# Patient Record
Sex: MALE | Race: BLACK OR AFRICAN AMERICAN | Employment: OTHER | URBAN - METROPOLITAN AREA
[De-identification: names, ages, dates, MRNs, and addresses within clinical notes are randomized per-mention and may not be internally consistent; named-entity substitution may affect disease eponyms.]

---

## 2017-06-27 PROBLEM — I35.9 AORTIC VALVE DISEASE: Status: ACTIVE | Noted: 2017-06-27

## 2017-06-28 PROBLEM — E66.01 MORBID OBESITY DUE TO EXCESS CALORIES (HCC): Status: ACTIVE | Noted: 2017-06-28

## 2017-07-11 PROBLEM — R10.9 ABDOMINAL PAIN: Status: ACTIVE | Noted: 2017-07-11

## 2017-07-29 PROBLEM — E66.01 MORBID OBESITY DUE TO EXCESS CALORIES (HCC): Chronic | Status: ACTIVE | Noted: 2017-06-28

## 2017-09-26 PROBLEM — E03.9 ACQUIRED HYPOTHYROIDISM: Status: ACTIVE | Noted: 2017-09-26

## 2017-09-26 PROBLEM — G47.33 OSA (OBSTRUCTIVE SLEEP APNEA): Status: ACTIVE | Noted: 2017-09-26

## 2017-10-05 PROBLEM — I50.32 CHRONIC DIASTOLIC HEART FAILURE (HCC): Status: ACTIVE | Noted: 2017-10-05

## 2017-10-25 PROBLEM — R05.8 ACE-INHIBITOR COUGH: Status: ACTIVE | Noted: 2017-10-25

## 2017-10-25 PROBLEM — T46.4X5A ACE-INHIBITOR COUGH: Status: ACTIVE | Noted: 2017-10-25

## 2018-03-14 ENCOUNTER — HOSPITAL ENCOUNTER (OUTPATIENT)
Dept: CT IMAGING | Age: 58
Discharge: HOME | End: 2018-03-16
Payer: MEDICARE

## 2018-03-14 DIAGNOSIS — R05.9 COUGH: ICD-10-CM

## 2018-03-14 DIAGNOSIS — R04.2 HEMOPTYSIS: ICD-10-CM

## 2018-03-14 PROCEDURE — 71250 CT THORAX DX C-: CPT

## 2018-04-18 ENCOUNTER — HOSPITAL ENCOUNTER (OUTPATIENT)
Dept: SLEEP CENTER | Age: 58
Discharge: HOME | End: 2018-04-18
Payer: MEDICARE

## 2018-04-18 PROCEDURE — 95811 POLYSOM 6/>YRS CPAP 4/> PARM: CPT

## 2018-04-19 VITALS
WEIGHT: 282 LBS | OXYGEN SATURATION: 93 % | SYSTOLIC BLOOD PRESSURE: 178 MMHG | HEIGHT: 69 IN | DIASTOLIC BLOOD PRESSURE: 84 MMHG | HEART RATE: 75 BPM | BODY MASS INDEX: 41.77 KG/M2

## 2018-04-19 ASSESSMENT — SLEEP AND FATIGUE QUESTIONNAIRES
HOW LIKELY ARE YOU TO NOD OFF OR FALL ASLEEP WHILE SITTING AND TALKING TO SOMEONE: 2
HOW LIKELY ARE YOU TO NOD OFF OR FALL ASLEEP WHILE SITTING QUIETLY AFTER LUNCH WITHOUT ALCOHOL: 0
ESS TOTAL SCORE: 10
HOW LIKELY ARE YOU TO NOD OFF OR FALL ASLEEP IN A CAR, WHILE STOPPED FOR A FEW MINUTES IN TRAFFIC: 0
HOW LIKELY ARE YOU TO NOD OFF OR FALL ASLEEP WHILE LYING DOWN TO REST IN THE AFTERNOON WHEN CIRCUMSTANCES PERMIT: 3
HOW LIKELY ARE YOU TO NOD OFF OR FALL ASLEEP WHEN YOU ARE A PASSENGER IN A CAR FOR AN HOUR WITHOUT A BREAK: 0
HOW LIKELY ARE YOU TO NOD OFF OR FALL ASLEEP WHILE WATCHING TV: 3
HOW LIKELY ARE YOU TO NOD OFF OR FALL ASLEEP WHILE SITTING INACTIVE IN A PUBLIC PLACE: 1
HOW LIKELY ARE YOU TO NOD OFF OR FALL ASLEEP WHILE SITTING AND READING: 1

## 2018-05-18 ENCOUNTER — HOSPITAL ENCOUNTER (OUTPATIENT)
Dept: GENERAL RADIOLOGY | Age: 58
Discharge: HOME | DRG: 194 | End: 2018-05-20
Payer: MEDICARE

## 2018-05-18 ENCOUNTER — HOSPITAL ENCOUNTER (OUTPATIENT)
Age: 58
Discharge: HOME | DRG: 194 | End: 2018-05-20
Payer: MEDICARE

## 2018-05-18 DIAGNOSIS — R06.02 SOB (SHORTNESS OF BREATH): ICD-10-CM

## 2018-05-18 PROCEDURE — 71046 X-RAY EXAM CHEST 2 VIEWS: CPT

## 2018-05-19 ENCOUNTER — HOSPITAL ENCOUNTER (INPATIENT)
Age: 58
LOS: 2 days | Discharge: HOME | DRG: 194 | End: 2018-05-21
Attending: INTERNAL MEDICINE | Admitting: INTERNAL MEDICINE
Payer: MEDICARE

## 2018-05-19 PROBLEM — J18.9 PNEUMONIA: Status: ACTIVE | Noted: 2018-05-19

## 2018-05-19 PROCEDURE — 94664 DEMO&/EVAL PT USE INHALER: CPT

## 2018-05-19 PROCEDURE — 1200000000 HC SEMI PRIVATE

## 2018-05-19 PROCEDURE — 6360000002 HC RX W HCPCS: Performed by: INTERNAL MEDICINE

## 2018-05-19 PROCEDURE — 6370000000 HC RX 637 (ALT 250 FOR IP): Performed by: INTERNAL MEDICINE

## 2018-05-19 PROCEDURE — 87040 BLOOD CULTURE FOR BACTERIA: CPT

## 2018-05-19 PROCEDURE — 94640 AIRWAY INHALATION TREATMENT: CPT

## 2018-05-19 PROCEDURE — 2580000003 HC RX 258: Performed by: INTERNAL MEDICINE

## 2018-05-19 PROCEDURE — 36415 COLL VENOUS BLD VENIPUNCTURE: CPT

## 2018-05-19 RX ORDER — LEVOTHYROXINE SODIUM 0.05 MG/1
50 TABLET ORAL DAILY
Status: DISCONTINUED | OUTPATIENT
Start: 2018-05-20 | End: 2018-05-21 | Stop reason: HOSPADM

## 2018-05-19 RX ORDER — AMLODIPINE BESYLATE 5 MG/1
5 TABLET ORAL 2 TIMES DAILY
Status: DISCONTINUED | OUTPATIENT
Start: 2018-05-19 | End: 2018-05-21 | Stop reason: HOSPADM

## 2018-05-19 RX ORDER — HYDROCODONE BITARTRATE AND ACETAMINOPHEN 5; 325 MG/1; MG/1
1 TABLET ORAL EVERY 6 HOURS PRN
Status: DISCONTINUED | OUTPATIENT
Start: 2018-05-19 | End: 2018-05-19 | Stop reason: ALTCHOICE

## 2018-05-19 RX ORDER — LOSARTAN POTASSIUM 50 MG/1
100 TABLET ORAL DAILY
Status: DISCONTINUED | OUTPATIENT
Start: 2018-05-19 | End: 2018-05-21 | Stop reason: HOSPADM

## 2018-05-19 RX ORDER — SODIUM CHLORIDE 0.9 % (FLUSH) 0.9 %
10 SYRINGE (ML) INJECTION PRN
Status: DISCONTINUED | OUTPATIENT
Start: 2018-05-19 | End: 2018-05-21 | Stop reason: HOSPADM

## 2018-05-19 RX ORDER — IPRATROPIUM BROMIDE AND ALBUTEROL SULFATE 2.5; .5 MG/3ML; MG/3ML
1 SOLUTION RESPIRATORY (INHALATION)
Status: DISCONTINUED | OUTPATIENT
Start: 2018-05-19 | End: 2018-05-21 | Stop reason: HOSPADM

## 2018-05-19 RX ORDER — PANTOPRAZOLE SODIUM 40 MG/1
40 TABLET, DELAYED RELEASE ORAL
Status: DISCONTINUED | OUTPATIENT
Start: 2018-05-20 | End: 2018-05-21 | Stop reason: HOSPADM

## 2018-05-19 RX ORDER — SODIUM CHLORIDE 0.9 % (FLUSH) 0.9 %
10 SYRINGE (ML) INJECTION EVERY 12 HOURS SCHEDULED
Status: DISCONTINUED | OUTPATIENT
Start: 2018-05-19 | End: 2018-05-21 | Stop reason: HOSPADM

## 2018-05-19 RX ORDER — METFORMIN HYDROCHLORIDE 500 MG/1
500 TABLET, EXTENDED RELEASE ORAL
Status: DISCONTINUED | OUTPATIENT
Start: 2018-05-20 | End: 2018-05-21 | Stop reason: HOSPADM

## 2018-05-19 RX ORDER — OXYCODONE HYDROCHLORIDE AND ACETAMINOPHEN 5; 325 MG/1; MG/1
1 TABLET ORAL EVERY 4 HOURS PRN
Status: DISCONTINUED | OUTPATIENT
Start: 2018-05-19 | End: 2018-05-21 | Stop reason: HOSPADM

## 2018-05-19 RX ORDER — ATORVASTATIN CALCIUM 40 MG/1
40 TABLET, FILM COATED ORAL DAILY
Status: DISCONTINUED | OUTPATIENT
Start: 2018-05-19 | End: 2018-05-21 | Stop reason: HOSPADM

## 2018-05-19 RX ORDER — ALPRAZOLAM 0.25 MG/1
0.25 TABLET ORAL 3 TIMES DAILY PRN
Status: DISCONTINUED | OUTPATIENT
Start: 2018-05-19 | End: 2018-05-21 | Stop reason: HOSPADM

## 2018-05-19 RX ORDER — ONDANSETRON 2 MG/ML
4 INJECTION INTRAMUSCULAR; INTRAVENOUS EVERY 6 HOURS PRN
Status: DISCONTINUED | OUTPATIENT
Start: 2018-05-19 | End: 2018-05-21 | Stop reason: HOSPADM

## 2018-05-19 RX ORDER — CARVEDILOL 25 MG/1
25 TABLET ORAL 2 TIMES DAILY
Status: DISCONTINUED | OUTPATIENT
Start: 2018-05-19 | End: 2018-05-21 | Stop reason: HOSPADM

## 2018-05-19 RX ADMIN — SODIUM CHLORIDE 3 G: 900 INJECTION INTRAVENOUS at 23:30

## 2018-05-19 RX ADMIN — AMLODIPINE BESYLATE 5 MG: 5 TABLET ORAL at 13:42

## 2018-05-19 RX ADMIN — OXYCODONE HYDROCHLORIDE AND ACETAMINOPHEN 1 TABLET: 5; 325 TABLET ORAL at 23:30

## 2018-05-19 RX ADMIN — Medication 10 ML: at 18:41

## 2018-05-19 RX ADMIN — SODIUM CHLORIDE 3 G: 900 INJECTION INTRAVENOUS at 13:19

## 2018-05-19 RX ADMIN — HYDROCODONE BITARTRATE AND ACETAMINOPHEN 1 TABLET: 5; 325 TABLET ORAL at 20:12

## 2018-05-19 RX ADMIN — SODIUM CHLORIDE 3 G: 900 INJECTION INTRAVENOUS at 18:40

## 2018-05-19 RX ADMIN — IPRATROPIUM BROMIDE AND ALBUTEROL SULFATE 1 AMPULE: 2.5; .5 SOLUTION RESPIRATORY (INHALATION) at 10:46

## 2018-05-19 RX ADMIN — IPRATROPIUM BROMIDE AND ALBUTEROL SULFATE 1 AMPULE: 2.5; .5 SOLUTION RESPIRATORY (INHALATION) at 22:00

## 2018-05-19 RX ADMIN — Medication 10 ML: at 13:23

## 2018-05-19 RX ADMIN — HYDROCODONE BITARTRATE AND ACETAMINOPHEN 1 TABLET: 5; 325 TABLET ORAL at 14:00

## 2018-05-19 RX ADMIN — IPRATROPIUM BROMIDE AND ALBUTEROL SULFATE 1 AMPULE: 2.5; .5 SOLUTION RESPIRATORY (INHALATION) at 16:00

## 2018-05-19 RX ADMIN — Medication 10 ML: at 20:13

## 2018-05-19 ASSESSMENT — PAIN DESCRIPTION - PAIN TYPE
TYPE: CHRONIC PAIN

## 2018-05-19 ASSESSMENT — PAIN DESCRIPTION - FREQUENCY
FREQUENCY: CONTINUOUS

## 2018-05-19 ASSESSMENT — PAIN DESCRIPTION - ORIENTATION
ORIENTATION: RIGHT;LEFT

## 2018-05-19 ASSESSMENT — PAIN DESCRIPTION - PROGRESSION
CLINICAL_PROGRESSION: GRADUALLY WORSENING

## 2018-05-19 ASSESSMENT — PAIN DESCRIPTION - LOCATION
LOCATION: GENERALIZED

## 2018-05-19 ASSESSMENT — PAIN SCALES - GENERAL
PAINLEVEL_OUTOF10: 10
PAINLEVEL_OUTOF10: 9

## 2018-05-19 ASSESSMENT — PAIN DESCRIPTION - DESCRIPTORS
DESCRIPTORS: ACHING;CONSTANT;DISCOMFORT
DESCRIPTORS: ACHING;CONSTANT;DISCOMFORT
DESCRIPTORS: ACHING;CONSTANT;DISCOMFORT;TENDER
DESCRIPTORS: ACHING;CONSTANT;DISCOMFORT;TENDER

## 2018-05-19 ASSESSMENT — PAIN DESCRIPTION - ONSET
ONSET: ON-GOING
ONSET: ON-GOING

## 2018-05-20 LAB
ALBUMIN SERPL-MCNC: 3.9 G/DL (ref 3.5–5.2)
ALP BLD-CCNC: 59 U/L (ref 40–129)
ALT SERPL-CCNC: 23 U/L (ref 0–40)
ANION GAP SERPL CALCULATED.3IONS-SCNC: 12 MMOL/L (ref 7–16)
AST SERPL-CCNC: 21 U/L (ref 0–39)
BASOPHILS ABSOLUTE: 0.02 E9/L (ref 0–0.2)
BASOPHILS RELATIVE PERCENT: 0.3 % (ref 0–2)
BILIRUB SERPL-MCNC: 0.3 MG/DL (ref 0–1.2)
BUN BLDV-MCNC: 12 MG/DL (ref 6–20)
CALCIUM SERPL-MCNC: 9 MG/DL (ref 8.6–10.2)
CHLORIDE BLD-SCNC: 102 MMOL/L (ref 98–107)
CO2: 27 MMOL/L (ref 22–29)
CREAT SERPL-MCNC: 1 MG/DL (ref 0.7–1.2)
EOSINOPHILS ABSOLUTE: 0.11 E9/L (ref 0.05–0.5)
EOSINOPHILS RELATIVE PERCENT: 1.8 % (ref 0–6)
GFR AFRICAN AMERICAN: >60
GFR NON-AFRICAN AMERICAN: >60 ML/MIN/1.73
GLUCOSE BLD-MCNC: 118 MG/DL (ref 74–109)
HCT VFR BLD CALC: 32.8 % (ref 37–54)
HEMOGLOBIN: 11 G/DL (ref 12.5–16.5)
IMMATURE GRANULOCYTES #: 0.01 E9/L
IMMATURE GRANULOCYTES %: 0.2 % (ref 0–5)
LYMPHOCYTES ABSOLUTE: 3.16 E9/L (ref 1.5–4)
LYMPHOCYTES RELATIVE PERCENT: 51.2 % (ref 20–42)
MCH RBC QN AUTO: 27.1 PG (ref 26–35)
MCHC RBC AUTO-ENTMCNC: 33.5 % (ref 32–34.5)
MCV RBC AUTO: 80.8 FL (ref 80–99.9)
MONOCYTES ABSOLUTE: 0.58 E9/L (ref 0.1–0.95)
MONOCYTES RELATIVE PERCENT: 9.4 % (ref 2–12)
NEUTROPHILS ABSOLUTE: 2.29 E9/L (ref 1.8–7.3)
NEUTROPHILS RELATIVE PERCENT: 37.1 % (ref 43–80)
PDW BLD-RTO: 17.3 FL (ref 11.5–15)
PLATELET # BLD: 172 E9/L (ref 130–450)
PMV BLD AUTO: 9.7 FL (ref 7–12)
POTASSIUM REFLEX MAGNESIUM: 4.1 MMOL/L (ref 3.5–5)
PROCALCITONIN: 0.03 NG/ML (ref 0–0.08)
RBC # BLD: 4.06 E12/L (ref 3.8–5.8)
SODIUM BLD-SCNC: 141 MMOL/L (ref 132–146)
TOTAL PROTEIN: 7 G/DL (ref 6.4–8.3)
WBC # BLD: 6.2 E9/L (ref 4.5–11.5)

## 2018-05-20 PROCEDURE — 6360000002 HC RX W HCPCS: Performed by: INTERNAL MEDICINE

## 2018-05-20 PROCEDURE — 94760 N-INVAS EAR/PLS OXIMETRY 1: CPT

## 2018-05-20 PROCEDURE — 36415 COLL VENOUS BLD VENIPUNCTURE: CPT

## 2018-05-20 PROCEDURE — 84145 PROCALCITONIN (PCT): CPT

## 2018-05-20 PROCEDURE — 6370000000 HC RX 637 (ALT 250 FOR IP): Performed by: INTERNAL MEDICINE

## 2018-05-20 PROCEDURE — 85025 COMPLETE CBC W/AUTO DIFF WBC: CPT

## 2018-05-20 PROCEDURE — 1200000000 HC SEMI PRIVATE

## 2018-05-20 PROCEDURE — 94640 AIRWAY INHALATION TREATMENT: CPT

## 2018-05-20 PROCEDURE — 2580000003 HC RX 258: Performed by: INTERNAL MEDICINE

## 2018-05-20 PROCEDURE — 80053 COMPREHEN METABOLIC PANEL: CPT

## 2018-05-20 RX ORDER — DIPHENHYDRAMINE HCL 25 MG
25 TABLET ORAL EVERY 6 HOURS PRN
Status: DISCONTINUED | OUTPATIENT
Start: 2018-05-20 | End: 2018-05-21 | Stop reason: HOSPADM

## 2018-05-20 RX ADMIN — SODIUM CHLORIDE 3 G: 900 INJECTION INTRAVENOUS at 11:42

## 2018-05-20 RX ADMIN — LEVOTHYROXINE SODIUM 50 MCG: 50 TABLET ORAL at 06:22

## 2018-05-20 RX ADMIN — AMLODIPINE BESYLATE 5 MG: 5 TABLET ORAL at 09:34

## 2018-05-20 RX ADMIN — Medication 10 ML: at 09:38

## 2018-05-20 RX ADMIN — METFORMIN HYDROCHLORIDE 500 MG: 500 TABLET, EXTENDED RELEASE ORAL at 09:34

## 2018-05-20 RX ADMIN — OXYCODONE HYDROCHLORIDE AND ACETAMINOPHEN 1 TABLET: 5; 325 TABLET ORAL at 19:21

## 2018-05-20 RX ADMIN — PANTOPRAZOLE SODIUM 40 MG: 40 TABLET, DELAYED RELEASE ORAL at 06:22

## 2018-05-20 RX ADMIN — CARVEDILOL 25 MG: 25 TABLET, FILM COATED ORAL at 09:34

## 2018-05-20 RX ADMIN — OXYCODONE HYDROCHLORIDE AND ACETAMINOPHEN 1 TABLET: 5; 325 TABLET ORAL at 10:22

## 2018-05-20 RX ADMIN — IPRATROPIUM BROMIDE AND ALBUTEROL SULFATE 1 AMPULE: 2.5; .5 SOLUTION RESPIRATORY (INHALATION) at 17:44

## 2018-05-20 RX ADMIN — Medication 10 ML: at 18:12

## 2018-05-20 RX ADMIN — AMLODIPINE BESYLATE 5 MG: 5 TABLET ORAL at 22:19

## 2018-05-20 RX ADMIN — Medication 10 ML: at 22:21

## 2018-05-20 RX ADMIN — ATORVASTATIN CALCIUM 40 MG: 40 TABLET, FILM COATED ORAL at 09:34

## 2018-05-20 RX ADMIN — IPRATROPIUM BROMIDE AND ALBUTEROL SULFATE 1 AMPULE: 2.5; .5 SOLUTION RESPIRATORY (INHALATION) at 13:32

## 2018-05-20 RX ADMIN — SODIUM CHLORIDE 3 G: 900 INJECTION INTRAVENOUS at 06:21

## 2018-05-20 RX ADMIN — OXYCODONE HYDROCHLORIDE AND ACETAMINOPHEN 1 TABLET: 5; 325 TABLET ORAL at 06:22

## 2018-05-20 RX ADMIN — ONDANSETRON 4 MG: 2 INJECTION INTRAMUSCULAR; INTRAVENOUS at 11:42

## 2018-05-20 RX ADMIN — Medication 10 ML: at 06:21

## 2018-05-20 RX ADMIN — OXYCODONE HYDROCHLORIDE AND ACETAMINOPHEN 1 TABLET: 5; 325 TABLET ORAL at 14:52

## 2018-05-20 RX ADMIN — SODIUM CHLORIDE 3 G: 900 INJECTION INTRAVENOUS at 18:11

## 2018-05-20 RX ADMIN — CARVEDILOL 25 MG: 25 TABLET, FILM COATED ORAL at 18:17

## 2018-05-20 ASSESSMENT — PAIN SCALES - GENERAL
PAINLEVEL_OUTOF10: 4
PAINLEVEL_OUTOF10: 7
PAINLEVEL_OUTOF10: 0
PAINLEVEL_OUTOF10: 7
PAINLEVEL_OUTOF10: 7
PAINLEVEL_OUTOF10: 8

## 2018-05-20 ASSESSMENT — PAIN DESCRIPTION - ORIENTATION
ORIENTATION: RIGHT;LEFT

## 2018-05-20 ASSESSMENT — PAIN DESCRIPTION - FREQUENCY
FREQUENCY: CONTINUOUS

## 2018-05-20 ASSESSMENT — PAIN DESCRIPTION - PROGRESSION
CLINICAL_PROGRESSION: GRADUALLY IMPROVING
CLINICAL_PROGRESSION: GRADUALLY WORSENING

## 2018-05-20 ASSESSMENT — PAIN DESCRIPTION - DESCRIPTORS
DESCRIPTORS: ACHING;CONSTANT;DISCOMFORT
DESCRIPTORS: ACHING;CONSTANT;DISCOMFORT
DESCRIPTORS: ACHING;CONSTANT;DISCOMFORT;TENDER
DESCRIPTORS: ACHING;CONSTANT;DISCOMFORT;TENDER
DESCRIPTORS: ACHING;SORE;DISCOMFORT

## 2018-05-20 ASSESSMENT — PAIN DESCRIPTION - LOCATION
LOCATION: GENERALIZED

## 2018-05-20 ASSESSMENT — PAIN DESCRIPTION - PAIN TYPE
TYPE: CHRONIC PAIN

## 2018-05-20 ASSESSMENT — PAIN DESCRIPTION - ONSET
ONSET: ON-GOING

## 2018-05-21 VITALS
HEART RATE: 73 BPM | HEIGHT: 68 IN | SYSTOLIC BLOOD PRESSURE: 139 MMHG | WEIGHT: 300 LBS | RESPIRATION RATE: 16 BRPM | TEMPERATURE: 97.7 F | DIASTOLIC BLOOD PRESSURE: 56 MMHG | BODY MASS INDEX: 45.47 KG/M2 | OXYGEN SATURATION: 94 %

## 2018-05-21 PROCEDURE — 6370000000 HC RX 637 (ALT 250 FOR IP): Performed by: INTERNAL MEDICINE

## 2018-05-21 PROCEDURE — 6360000002 HC RX W HCPCS: Performed by: INTERNAL MEDICINE

## 2018-05-21 PROCEDURE — 2580000003 HC RX 258: Performed by: INTERNAL MEDICINE

## 2018-05-21 RX ADMIN — Medication 10 ML: at 06:23

## 2018-05-21 RX ADMIN — SODIUM CHLORIDE 3 G: 900 INJECTION INTRAVENOUS at 00:10

## 2018-05-21 RX ADMIN — LEVOTHYROXINE SODIUM 50 MCG: 50 TABLET ORAL at 06:23

## 2018-05-21 RX ADMIN — ATORVASTATIN CALCIUM 40 MG: 40 TABLET, FILM COATED ORAL at 09:39

## 2018-05-21 RX ADMIN — OXYCODONE HYDROCHLORIDE AND ACETAMINOPHEN 1 TABLET: 5; 325 TABLET ORAL at 00:10

## 2018-05-21 RX ADMIN — LOSARTAN POTASSIUM 50 MG: 50 TABLET, FILM COATED ORAL at 09:38

## 2018-05-21 RX ADMIN — METFORMIN HYDROCHLORIDE 500 MG: 500 TABLET, EXTENDED RELEASE ORAL at 09:40

## 2018-05-21 RX ADMIN — PANTOPRAZOLE SODIUM 40 MG: 40 TABLET, DELAYED RELEASE ORAL at 06:23

## 2018-05-21 RX ADMIN — AMLODIPINE BESYLATE 5 MG: 5 TABLET ORAL at 09:40

## 2018-05-21 RX ADMIN — Medication 10 ML: at 00:10

## 2018-05-21 RX ADMIN — SODIUM CHLORIDE 3 G: 900 INJECTION INTRAVENOUS at 05:35

## 2018-05-21 RX ADMIN — CARVEDILOL 25 MG: 25 TABLET, FILM COATED ORAL at 06:23

## 2018-05-21 RX ADMIN — OXYCODONE HYDROCHLORIDE AND ACETAMINOPHEN 1 TABLET: 5; 325 TABLET ORAL at 06:23

## 2018-05-21 ASSESSMENT — PAIN SCALES - GENERAL
PAINLEVEL_OUTOF10: 5
PAINLEVEL_OUTOF10: 0
PAINLEVEL_OUTOF10: 8
PAINLEVEL_OUTOF10: 3

## 2018-05-21 ASSESSMENT — PAIN DESCRIPTION - LOCATION: LOCATION: GENERALIZED

## 2018-05-21 ASSESSMENT — PAIN DESCRIPTION - FREQUENCY: FREQUENCY: CONTINUOUS

## 2018-05-21 ASSESSMENT — PAIN DESCRIPTION - PAIN TYPE: TYPE: CHRONIC PAIN

## 2018-05-21 ASSESSMENT — PAIN DESCRIPTION - DESCRIPTORS: DESCRIPTORS: ACHING;CONSTANT;SORE

## 2018-05-24 LAB
BLOOD CULTURE, ROUTINE: NORMAL
CULTURE, BLOOD 2: NORMAL

## 2018-06-04 PROBLEM — M10.9 ACUTE GOUT OF RIGHT KNEE: Status: ACTIVE | Noted: 2018-06-04

## 2018-07-21 ENCOUNTER — APPOINTMENT (OUTPATIENT)
Dept: GENERAL RADIOLOGY | Age: 58
End: 2018-07-21
Payer: MEDICARE

## 2018-07-21 ENCOUNTER — HOSPITAL ENCOUNTER (OUTPATIENT)
Age: 58
Setting detail: OBSERVATION
Discharge: HOME | End: 2018-07-22
Attending: EMERGENCY MEDICINE | Admitting: INTERNAL MEDICINE
Payer: MEDICARE

## 2018-07-21 DIAGNOSIS — R07.9 CHEST PAIN, UNSPECIFIED TYPE: Primary | ICD-10-CM

## 2018-07-21 LAB
ALBUMIN SERPL-MCNC: 4.6 G/DL (ref 3.5–5.2)
ALP BLD-CCNC: 79 U/L (ref 40–129)
ALT SERPL-CCNC: 14 U/L (ref 0–40)
ANION GAP SERPL CALCULATED.3IONS-SCNC: 16 MMOL/L (ref 7–16)
AST SERPL-CCNC: 20 U/L (ref 0–39)
BILIRUB SERPL-MCNC: 0.5 MG/DL (ref 0–1.2)
BUN BLDV-MCNC: 18 MG/DL (ref 6–20)
CALCIUM SERPL-MCNC: 9.5 MG/DL (ref 8.6–10.2)
CHLORIDE BLD-SCNC: 101 MMOL/L (ref 98–107)
CO2: 25 MMOL/L (ref 22–29)
CREAT SERPL-MCNC: 1.1 MG/DL (ref 0.7–1.2)
D DIMER: 202 NG/ML DDU
EKG ATRIAL RATE: 85 BPM
EKG P AXIS: 67 DEGREES
EKG P-R INTERVAL: 154 MS
EKG Q-T INTERVAL: 358 MS
EKG QRS DURATION: 90 MS
EKG QTC CALCULATION (BAZETT): 426 MS
EKG R AXIS: -44 DEGREES
EKG T AXIS: 53 DEGREES
EKG VENTRICULAR RATE: 85 BPM
GFR AFRICAN AMERICAN: >60
GFR NON-AFRICAN AMERICAN: >60 ML/MIN/1.73
GLUCOSE BLD-MCNC: 113 MG/DL (ref 74–109)
HCT VFR BLD CALC: 36.2 % (ref 37–54)
HEMOGLOBIN: 12.4 G/DL (ref 12.5–16.5)
INR BLD: 0.9
MCH RBC QN AUTO: 25.7 PG (ref 26–35)
MCHC RBC AUTO-ENTMCNC: 34.3 % (ref 32–34.5)
MCV RBC AUTO: 75.1 FL (ref 80–99.9)
PDW BLD-RTO: 18.4 FL (ref 11.5–15)
PLATELET # BLD: 214 E9/L (ref 130–450)
PMV BLD AUTO: 8.9 FL (ref 7–12)
POTASSIUM SERPL-SCNC: 4.4 MMOL/L (ref 3.5–5)
PRO-BNP: 14 PG/ML (ref 0–125)
PROTHROMBIN TIME: 10.9 SEC (ref 9.3–12.4)
RBC # BLD: 4.82 E12/L (ref 3.8–5.8)
SODIUM BLD-SCNC: 142 MMOL/L (ref 132–146)
STREP GRP A PCR: NEGATIVE
TOTAL PROTEIN: 8.3 G/DL (ref 6.4–8.3)
TROPONIN: <0.01 NG/ML (ref 0–0.03)
WBC # BLD: 8.3 E9/L (ref 4.5–11.5)

## 2018-07-21 PROCEDURE — G0378 HOSPITAL OBSERVATION PER HR: HCPCS

## 2018-07-21 PROCEDURE — 94761 N-INVAS EAR/PLS OXIMETRY MLT: CPT

## 2018-07-21 PROCEDURE — 80307 DRUG TEST PRSMV CHEM ANLYZR: CPT

## 2018-07-21 PROCEDURE — 6370000000 HC RX 637 (ALT 250 FOR IP): Performed by: EMERGENCY MEDICINE

## 2018-07-21 PROCEDURE — 36415 COLL VENOUS BLD VENIPUNCTURE: CPT

## 2018-07-21 PROCEDURE — 87880 STREP A ASSAY W/OPTIC: CPT

## 2018-07-21 PROCEDURE — 83880 ASSAY OF NATRIURETIC PEPTIDE: CPT

## 2018-07-21 PROCEDURE — 85610 PROTHROMBIN TIME: CPT

## 2018-07-21 PROCEDURE — 71045 X-RAY EXAM CHEST 1 VIEW: CPT

## 2018-07-21 PROCEDURE — 99285 EMERGENCY DEPT VISIT HI MDM: CPT

## 2018-07-21 PROCEDURE — 85027 COMPLETE CBC AUTOMATED: CPT

## 2018-07-21 PROCEDURE — 80053 COMPREHEN METABOLIC PANEL: CPT

## 2018-07-21 PROCEDURE — 85378 FIBRIN DEGRADE SEMIQUANT: CPT

## 2018-07-21 PROCEDURE — 84484 ASSAY OF TROPONIN QUANT: CPT

## 2018-07-21 PROCEDURE — 6370000000 HC RX 637 (ALT 250 FOR IP): Performed by: INTERNAL MEDICINE

## 2018-07-21 PROCEDURE — 93005 ELECTROCARDIOGRAM TRACING: CPT | Performed by: EMERGENCY MEDICINE

## 2018-07-21 RX ORDER — LOSARTAN POTASSIUM 50 MG/1
100 TABLET ORAL DAILY
Status: DISCONTINUED | OUTPATIENT
Start: 2018-07-22 | End: 2018-07-22 | Stop reason: HOSPADM

## 2018-07-21 RX ORDER — ATORVASTATIN CALCIUM 40 MG/1
40 TABLET, FILM COATED ORAL NIGHTLY
Status: DISCONTINUED | OUTPATIENT
Start: 2018-07-21 | End: 2018-07-22 | Stop reason: HOSPADM

## 2018-07-21 RX ORDER — SODIUM CHLORIDE 0.9 % (FLUSH) 0.9 %
10 SYRINGE (ML) INJECTION PRN
Status: DISCONTINUED | OUTPATIENT
Start: 2018-07-21 | End: 2018-07-21 | Stop reason: SDUPTHER

## 2018-07-21 RX ORDER — ACETAMINOPHEN 325 MG/1
650 TABLET ORAL EVERY 4 HOURS PRN
Status: DISCONTINUED | OUTPATIENT
Start: 2018-07-21 | End: 2018-07-22 | Stop reason: HOSPADM

## 2018-07-21 RX ORDER — SODIUM CHLORIDE 0.9 % (FLUSH) 0.9 %
10 SYRINGE (ML) INJECTION EVERY 12 HOURS SCHEDULED
Status: DISCONTINUED | OUTPATIENT
Start: 2018-07-21 | End: 2018-07-22 | Stop reason: HOSPADM

## 2018-07-21 RX ORDER — LEVOTHYROXINE SODIUM 0.05 MG/1
50 TABLET ORAL DAILY
Status: DISCONTINUED | OUTPATIENT
Start: 2018-07-22 | End: 2018-07-22 | Stop reason: HOSPADM

## 2018-07-21 RX ORDER — SODIUM CHLORIDE 0.9 % (FLUSH) 0.9 %
10 SYRINGE (ML) INJECTION EVERY 12 HOURS SCHEDULED
Status: DISCONTINUED | OUTPATIENT
Start: 2018-07-21 | End: 2018-07-21 | Stop reason: SDUPTHER

## 2018-07-21 RX ORDER — PANTOPRAZOLE SODIUM 40 MG/1
40 TABLET, DELAYED RELEASE ORAL
Status: DISCONTINUED | OUTPATIENT
Start: 2018-07-22 | End: 2018-07-22 | Stop reason: HOSPADM

## 2018-07-21 RX ORDER — HYDRALAZINE HYDROCHLORIDE 25 MG/1
25 TABLET, FILM COATED ORAL EVERY 8 HOURS SCHEDULED
Status: DISCONTINUED | OUTPATIENT
Start: 2018-07-21 | End: 2018-07-22 | Stop reason: HOSPADM

## 2018-07-21 RX ORDER — LABETALOL HYDROCHLORIDE 5 MG/ML
10 INJECTION, SOLUTION INTRAVENOUS EVERY 4 HOURS PRN
Status: DISCONTINUED | OUTPATIENT
Start: 2018-07-21 | End: 2018-07-22 | Stop reason: HOSPADM

## 2018-07-21 RX ORDER — FUROSEMIDE 20 MG/1
20 TABLET ORAL DAILY
Status: DISCONTINUED | OUTPATIENT
Start: 2018-07-22 | End: 2018-07-22 | Stop reason: HOSPADM

## 2018-07-21 RX ORDER — FUROSEMIDE 20 MG/1
20 TABLET ORAL DAILY
COMMUNITY
End: 2018-10-03

## 2018-07-21 RX ORDER — SODIUM CHLORIDE 0.9 % (FLUSH) 0.9 %
10 SYRINGE (ML) INJECTION PRN
Status: DISCONTINUED | OUTPATIENT
Start: 2018-07-21 | End: 2018-07-22 | Stop reason: HOSPADM

## 2018-07-21 RX ORDER — ASPIRIN 81 MG/1
324 TABLET, CHEWABLE ORAL ONCE
Status: COMPLETED | OUTPATIENT
Start: 2018-07-21 | End: 2018-07-21

## 2018-07-21 RX ORDER — AMLODIPINE BESYLATE 5 MG/1
5 TABLET ORAL 2 TIMES DAILY
Status: DISCONTINUED | OUTPATIENT
Start: 2018-07-21 | End: 2018-07-22 | Stop reason: HOSPADM

## 2018-07-21 RX ORDER — METFORMIN HYDROCHLORIDE 500 MG/1
500 TABLET, EXTENDED RELEASE ORAL
Status: DISCONTINUED | OUTPATIENT
Start: 2018-07-22 | End: 2018-07-22 | Stop reason: HOSPADM

## 2018-07-21 RX ORDER — CARVEDILOL 25 MG/1
25 TABLET ORAL 2 TIMES DAILY WITH MEALS
Status: DISCONTINUED | OUTPATIENT
Start: 2018-07-22 | End: 2018-07-22 | Stop reason: HOSPADM

## 2018-07-21 RX ADMIN — NITROGLYCERIN 0.5 INCH: 20 OINTMENT TOPICAL at 19:25

## 2018-07-21 RX ADMIN — ATORVASTATIN CALCIUM 40 MG: 40 TABLET, FILM COATED ORAL at 23:25

## 2018-07-21 RX ADMIN — AMLODIPINE BESYLATE 5 MG: 5 TABLET ORAL at 23:25

## 2018-07-21 RX ADMIN — HYDRALAZINE HYDROCHLORIDE 25 MG: 25 TABLET, FILM COATED ORAL at 23:25

## 2018-07-21 RX ADMIN — ASPIRIN 81 MG CHEWABLE TABLET 324 MG: 81 TABLET CHEWABLE at 19:25

## 2018-07-21 ASSESSMENT — PAIN DESCRIPTION - PROGRESSION: CLINICAL_PROGRESSION: GRADUALLY WORSENING

## 2018-07-21 ASSESSMENT — PAIN DESCRIPTION - PAIN TYPE: TYPE: ACUTE PAIN

## 2018-07-21 ASSESSMENT — PAIN DESCRIPTION - ORIENTATION: ORIENTATION: MID

## 2018-07-21 ASSESSMENT — PAIN DESCRIPTION - LOCATION: LOCATION: CHEST;THROAT

## 2018-07-21 ASSESSMENT — PAIN DESCRIPTION - DESCRIPTORS: DESCRIPTORS: ACHING

## 2018-07-21 ASSESSMENT — PAIN SCALES - GENERAL
PAINLEVEL_OUTOF10: 8
PAINLEVEL_OUTOF10: 0
PAINLEVEL_OUTOF10: 0

## 2018-07-21 ASSESSMENT — PAIN DESCRIPTION - FREQUENCY: FREQUENCY: CONTINUOUS

## 2018-07-21 NOTE — ED PROVIDER NOTES
Department of Emergency Medicine   ED  Provider Note  Admit Date/RoomTime: 7/21/2018  6:54 PM  ED Room: 21/21      History of Present Illness:  7/21/18, Time: 6:46 PM       Wing Carlin is a 62 y.o. male presenting to the ED for chest pain, beginning today. The complaint has been intermittent, moderate in severity, and worsened by nothing. Pt reports that he had a sudden onset of mid sternal chest pain that radiates to the left side of his chest beginning today around 4pm. States that when the pain is present it lasts for about 5 seconds. Reports that he has no pain now but their is some \"pressure\" in his chest. Denies any hx of blood clots. C/o sore throat and nausea that began today. Patient reports no shortness of breath, abdominal pain, vomiting or diarrhea. Patient reports no fever, chills, cough, congestion or any other further symptoms at this time. Review of Systems:   Pertinent positives and negatives are stated within HPI, all other systems reviewed and are negative.    --------------------------------------------- PAST HISTORY ---------------------------------------------  Past Medical History:  has a past medical history of Anxiety; Depression; Gouty arthritis; Hyperlipidemia; Hypertension; Lymphedema; Obesity; Obstructive sleep apnea; and Osteoarthritis. Past Surgical History:  has a past surgical history that includes Foot surgery (1990); External ear surgery (6/2/2009); ECHO Compl W Dop Color Flow (6/21/2013); Gastrostomy tube placement (08/10/2017); Tracheostomy tube placement (08/10/2017); and bronchoscopy (08/10/2017). Social History:  reports that he quit smoking about 13 years ago. His smoking use included Cigarettes. He has a 1.00 pack-year smoking history. He has never used smokeless tobacco. He reports that he drinks about 2.4 oz of alcohol per week . He reports that he does not use drugs.     Family History: family history includes Alzheimer's Disease in his mother; Heart records and past encounters were reviewed. ------------------------------ ED COURSE/MEDICAL DECISION MAKING----------------------  Medications   aspirin chewable tablet 324 mg (324 mg Oral Given 7/21/18 1925)   nitroglycerin (NITRO-BID) 2 % ointment 0.5 inch (0.5 inches Topical Given 7/21/18 1925)       Medical Decision Making:    Patient presents for chest pain. Patient assessed. Imaging, EKG, and Labs obtained and reviewed; results discussed with patient. This patient's ED course included: a personal history and physical examination and re-evaluation prior to disposition. This patient has been closely monitored during their ED course. Re-Evaluations:           Re-evaluation. Patient rechecked in made aware of findings. Patient currently having some mild chest pain but improved. Patient's blood pressure has come down. Consultations:             IM, spoke to Dr Nyoka Runner  Counseling: The emergency provider has spoken with the patient and discussed todays results, in addition to providing specific details for the plan of care and counseling regarding the diagnosis and prognosis. Questions are answered at this time and they are agreeable with the plan.     --------------------------------- IMPRESSION AND DISPOSITION ---------------------------------    IMPRESSION  1. Chest pain, unspecified type        DISPOSITION  Disposition: Admit to telemetry  Patient condition is stable     7/21/18, 6:46 PM.    This note is prepared by [employer], acting as Scribe for Neal Torres MD.    Neal Torres MD:  The scribe's documentation has been prepared under my direction and personally reviewed by me in its entirety. I confirm that the note above accurately reflects all work, treatment, procedures, and medical decision making performed by me. NOTE: This report was transcribed using voice recognition software.  Every effort was made to ensure accuracy; however, inadvertent computerized

## 2018-07-22 ENCOUNTER — APPOINTMENT (OUTPATIENT)
Dept: NUCLEAR MEDICINE | Age: 58
End: 2018-07-22
Payer: MEDICARE

## 2018-07-22 VITALS
BODY MASS INDEX: 44.38 KG/M2 | SYSTOLIC BLOOD PRESSURE: 140 MMHG | TEMPERATURE: 98.6 F | HEIGHT: 70 IN | OXYGEN SATURATION: 96 % | RESPIRATION RATE: 16 BRPM | HEART RATE: 100 BPM | DIASTOLIC BLOOD PRESSURE: 58 MMHG | WEIGHT: 310 LBS

## 2018-07-22 LAB
AMPHETAMINE SCREEN, URINE: NOT DETECTED
ANION GAP SERPL CALCULATED.3IONS-SCNC: 14 MMOL/L (ref 7–16)
BARBITURATE SCREEN URINE: NOT DETECTED
BENZODIAZEPINE SCREEN, URINE: NOT DETECTED
BUN BLDV-MCNC: 20 MG/DL (ref 6–20)
CALCIUM SERPL-MCNC: 9.2 MG/DL (ref 8.6–10.2)
CANNABINOID SCREEN URINE: NOT DETECTED
CHLORIDE BLD-SCNC: 99 MMOL/L (ref 98–107)
CO2: 28 MMOL/L (ref 22–29)
COCAINE METABOLITE SCREEN URINE: NOT DETECTED
CREAT SERPL-MCNC: 1.2 MG/DL (ref 0.7–1.2)
GFR AFRICAN AMERICAN: >60
GFR NON-AFRICAN AMERICAN: >60 ML/MIN/1.73
GLUCOSE BLD-MCNC: 100 MG/DL (ref 74–109)
METHADONE SCREEN, URINE: NOT DETECTED
OPIATE SCREEN URINE: NOT DETECTED
PHENCYCLIDINE SCREEN URINE: NOT DETECTED
POTASSIUM REFLEX MAGNESIUM: 3.9 MMOL/L (ref 3.5–5)
PROPOXYPHENE SCREEN: NOT DETECTED
SODIUM BLD-SCNC: 141 MMOL/L (ref 132–146)
TROPONIN: <0.01 NG/ML (ref 0–0.03)
TROPONIN: <0.01 NG/ML (ref 0–0.03)

## 2018-07-22 PROCEDURE — A9500 TC99M SESTAMIBI: HCPCS | Performed by: RADIOLOGY

## 2018-07-22 PROCEDURE — G0378 HOSPITAL OBSERVATION PER HR: HCPCS

## 2018-07-22 PROCEDURE — 6370000000 HC RX 637 (ALT 250 FOR IP): Performed by: INTERNAL MEDICINE

## 2018-07-22 PROCEDURE — 36415 COLL VENOUS BLD VENIPUNCTURE: CPT

## 2018-07-22 PROCEDURE — 3430000000 HC RX DIAGNOSTIC RADIOPHARMACEUTICAL: Performed by: RADIOLOGY

## 2018-07-22 PROCEDURE — 80048 BASIC METABOLIC PNL TOTAL CA: CPT

## 2018-07-22 PROCEDURE — 84484 ASSAY OF TROPONIN QUANT: CPT

## 2018-07-22 RX ORDER — HYDRALAZINE HYDROCHLORIDE 25 MG/1
25 TABLET, FILM COATED ORAL EVERY 8 HOURS SCHEDULED
Qty: 90 TABLET | Refills: 3 | Status: SHIPPED | OUTPATIENT
Start: 2018-07-22 | End: 2018-09-18 | Stop reason: SDUPTHER

## 2018-07-22 RX ADMIN — HYDRALAZINE HYDROCHLORIDE 25 MG: 25 TABLET, FILM COATED ORAL at 06:23

## 2018-07-22 RX ADMIN — FUROSEMIDE 20 MG: 20 TABLET ORAL at 11:23

## 2018-07-22 RX ADMIN — CARVEDILOL 25 MG: 25 TABLET, FILM COATED ORAL at 11:23

## 2018-07-22 RX ADMIN — LEVOTHYROXINE SODIUM 50 MCG: 50 TABLET ORAL at 06:23

## 2018-07-22 RX ADMIN — AMLODIPINE BESYLATE 5 MG: 5 TABLET ORAL at 11:22

## 2018-07-22 RX ADMIN — LOSARTAN POTASSIUM 100 MG: 50 TABLET, FILM COATED ORAL at 11:24

## 2018-07-22 RX ADMIN — Medication 12 MILLICURIE: at 08:15

## 2018-07-22 RX ADMIN — METFORMIN HYDROCHLORIDE 500 MG: 500 TABLET, EXTENDED RELEASE ORAL at 11:25

## 2018-07-22 RX ADMIN — PANTOPRAZOLE SODIUM 40 MG: 40 TABLET, DELAYED RELEASE ORAL at 06:23

## 2018-07-22 ASSESSMENT — PAIN SCALES - GENERAL
PAINLEVEL_OUTOF10: 0

## 2018-07-22 NOTE — ED NOTES
Telemetry data transferred to [facility], confirmed with Twyla in CM.      Vaishali Mcdaniel  07/21/18 [id number]

## 2018-07-22 NOTE — CONSULTS
Consults   Patient seen, old records reviewed  Clearly NON cardiac chest discomfort by history  ekg and enzymes NO evidence of ACS  No testing needed  Will sing off

## 2018-07-22 NOTE — H&P
pack-year smoking history. He has never used smokeless tobacco.    Family History:   Family History   Problem Relation Age of Onset    Alzheimer's Disease Mother     Heart Disease Father 76        Heart arrhythmia     REVIEW OF SYSTEMS:  CONSTITUTIONAL:  negative  EYES:  negative  HEENT:  negative  RESPIRATORY:  negative  CARDIOVASCULAR:  positive for  chest pain  GASTROINTESTINAL:  positive for nausea  GENITOURINARY:  negative  INTEGUMENT/BREAST:  negative  HEMATOLOGIC/LYMPHATIC:  negative  ALLERGIC/IMMUNOLOGIC:  negative  ENDOCRINE:  negative  MUSCULOSKELETAL:  negative  NEUROLOGICAL:  negative  BEHAVIOR/PSYCH:  negative  PHYSICAL EXAM:    Vitals:  BP (!) 150/58   Pulse 96   Temp 98.5 °F (36.9 °C) (Oral)   Resp 16   Ht 5' 10\" (1.778 m)   Wt (!) 310 lb (140.6 kg)   SpO2 96%   BMI 44.48 kg/m²     CONSTITUTIONAL:  awake, alert, cooperative, no apparent distress, and appears stated age  EYES:  Lids and lashes normal, pupils equal, round and reactive to light, extra ocular muscles intact, sclera clear, conjunctiva normal  ENT:  Normocephalic, without obvious abnormality, atraumatic, sinuses nontender on palpation, external ears without lesions, oral pharynx with moist mucus membranes, tonsils without erythema or exudates, gums normal and good dentition. NECK:  Supple, symmetrical, trachea midline, no adenopathy, thyroid symmetric, not enlarged and no tenderness, skin normal  HEMATOLOGIC/LYMPHATICS:  no cervical lymphadenopathy  BACK:  Symmetric, no curvature, spinous processes are non-tender on palpation, paraspinous muscles are non-tender on palpation, no costal vertebral tenderness  LUNGS:  No increased work of breathing, good air exchange, clear to auscultation bilaterally, no crackles or wheezing  CARDIOVASCULAR:  Normal apical impulse, regular rate and rhythm, normal S1 and S2, no S3 or S4, and no murmur noted  ABDOMEN:  S, nt.  Nd. Nl bs  MUSCULOSKELETAL:  There is no redness, warmth, or swelling of the

## 2018-07-23 NOTE — DISCHARGE SUMMARY
Physician Discharge Summary     Patient ID:  Wing Carlin  [id number]  56 y.o.  1960    Admit date: 7/21/2018    Discharge date and time: 7/22/2018  4:25 PM     Admitting Physician: Lianna Deluca MD     Discharge Physician: Lianna Deluca    Admission Diagnoses: Chest pain [R07.9]    Discharge Diagnoses: noncardiac cp  Admission Condition: fair    Discharged Condition: good    Indication for Admission: cp    Hospital Course: pt had atypical cp. Seen by cardiol. Pt could not complete stress test- got claustrophobia    Consults: cardiology    Significant Diagnostic Studies: labs: troponin neg.     Treatments: analgesia: acetaminophen    Discharge Exam:  BP (!) 140/58   Pulse 100   Temp 98.6 °F (37 °C) (Oral)   Resp 16   Ht 5' 10\" (1.778 m)   Wt (!) 310 lb (140.6 kg)   SpO2 96%   BMI 44.48 kg/m²     General Appearance:    Alert, cooperative, no distress, appears stated age   Head:    Normocephalic, without obvious abnormality, atraumatic   Eyes:    PERRL, conjunctiva/corneas clear, EOM's intact, fundi     benign, both eyes        Ears:    Normal TM's and external ear canals, both ears   Nose:   Nares normal, septum midline, mucosa normal, no drainage    or sinus tenderness   Throat:   Lips, mucosa, and tongue normal; teeth and gums normal   Neck:   Supple, symmetrical, trachea midline, no adenopathy;        thyroid:  No enlargement/tenderness/nodules; no carotid    bruit or JVD   Back:     Symmetric, no curvature, ROM normal, no CVA tenderness   Lungs:     Clear to auscultation bilaterally, respirations unlabored   Chest wall:    No tenderness or deformity   Heart:    Regular rate and rhythm, S1 and S2 normal, no murmur, rub   or gallop   Abdomen:     Soft, non-tender, bowel sounds active all four quadrants,     no masses, no organomegaly   Genitalia:    Normal male without lesion, discharge or tenderness   Rectal:    Normal tone, normal prostate, no masses or tenderness;    guaiac negative stool

## 2018-07-25 ENCOUNTER — OFFICE VISIT (OUTPATIENT)
Dept: CARDIOLOGY CLINIC | Age: 58
End: 2018-07-25
Payer: MEDICARE

## 2018-07-25 VITALS
HEART RATE: 69 BPM | WEIGHT: 300.4 LBS | DIASTOLIC BLOOD PRESSURE: 90 MMHG | HEIGHT: 68 IN | RESPIRATION RATE: 16 BRPM | OXYGEN SATURATION: 96 % | BODY MASS INDEX: 45.53 KG/M2 | SYSTOLIC BLOOD PRESSURE: 158 MMHG

## 2018-07-25 DIAGNOSIS — E11.9 TYPE 2 DIABETES MELLITUS WITHOUT COMPLICATION, WITHOUT LONG-TERM CURRENT USE OF INSULIN (HCC): Chronic | ICD-10-CM

## 2018-07-25 DIAGNOSIS — I10 ESSENTIAL HYPERTENSION: ICD-10-CM

## 2018-07-25 DIAGNOSIS — E78.2 MIXED HYPERLIPIDEMIA: Chronic | ICD-10-CM

## 2018-07-25 DIAGNOSIS — I35.9 AORTIC VALVE DISEASE: ICD-10-CM

## 2018-07-25 DIAGNOSIS — G47.33 OBSTRUCTIVE SLEEP APNEA: ICD-10-CM

## 2018-07-25 DIAGNOSIS — R07.89 ATYPICAL CHEST PAIN: Primary | ICD-10-CM

## 2018-07-25 DIAGNOSIS — I50.32 CHRONIC DIASTOLIC HEART FAILURE (HCC): ICD-10-CM

## 2018-07-25 DIAGNOSIS — E66.01 MORBID OBESITY DUE TO EXCESS CALORIES (HCC): Chronic | ICD-10-CM

## 2018-07-25 PROCEDURE — 2022F DILAT RTA XM EVC RTNOPTHY: CPT | Performed by: INTERNAL MEDICINE

## 2018-07-25 PROCEDURE — G8417 CALC BMI ABV UP PARAM F/U: HCPCS | Performed by: INTERNAL MEDICINE

## 2018-07-25 PROCEDURE — 3044F HG A1C LEVEL LT 7.0%: CPT | Performed by: INTERNAL MEDICINE

## 2018-07-25 PROCEDURE — G8598 ASA/ANTIPLAT THER USED: HCPCS | Performed by: INTERNAL MEDICINE

## 2018-07-25 PROCEDURE — G8427 DOCREV CUR MEDS BY ELIG CLIN: HCPCS | Performed by: INTERNAL MEDICINE

## 2018-07-25 PROCEDURE — 3017F COLORECTAL CA SCREEN DOC REV: CPT | Performed by: INTERNAL MEDICINE

## 2018-07-25 PROCEDURE — 99215 OFFICE O/P EST HI 40 MIN: CPT | Performed by: INTERNAL MEDICINE

## 2018-07-25 PROCEDURE — 1036F TOBACCO NON-USER: CPT | Performed by: INTERNAL MEDICINE

## 2018-07-25 PROCEDURE — 93000 ELECTROCARDIOGRAM COMPLETE: CPT | Performed by: INTERNAL MEDICINE

## 2018-07-25 NOTE — PATIENT INSTRUCTIONS
Patient Education        Heart-Healthy Diet: Care Instructions  Your Care Instructions    A heart-healthy diet has lots of vegetables, fruits, nuts, beans, and whole grains, and is low in salt. It limits foods that are high in saturated fat, such as meats, cheeses, and fried foods. It may be hard to change your diet, but even small changes can lower your risk of heart attack and heart disease. Follow-up care is a key part of your treatment and safety. Be sure to make and go to all appointments, and call your doctor if you are having problems. It's also a good idea to know your test results and keep a list of the medicines you take. How can you care for yourself at home? Watch your portions  · Learn what a serving is. A \"serving\" and a \"portion\" are not always the same thing. Make sure that you are not eating larger portions than are recommended. For example, a serving of pasta is ½ cup. A serving size of meat is 2 to 3 ounces. A 3-ounce serving is about the size of a deck of cards. Measure serving sizes until you are good at [place]" them. Keep in mind that restaurants often serve portions that are 2 or 3 times the size of one serving. · To keep your energy level up and keep you from feeling hungry, eat often but in smaller portions. · Eat only the number of calories you need to stay at a healthy weight. If you need to lose weight, eat fewer calories than your body burns (through exercise and other physical activity). Eat more fruits and vegetables  · Eat a variety of fruit and vegetables every day. Dark green, deep orange, red, or yellow fruits and vegetables are especially good for you. Examples include spinach, carrots, peaches, and berries. · Keep carrots, celery, and other veggies handy for snacks. Buy fruit that is in season and store it where you can see it so that you will be tempted to eat it. · Cook dishes that have a lot of veggies in them, such as stir-fries and soups.   Limit saturated and

## 2018-07-25 NOTE — PROGRESS NOTES
mg by mouth daily      Compression Stockings MISC Chronic left leg swelling. 2 each 0     No current facility-administered medications for this visit. Physical Exam:  BP (!) 158/90   Pulse 69   Resp 16   Ht 5' 8\" (1.727 m)   Wt (!) 300 lb 6.4 oz (136.3 kg)   SpO2 96% Comment: room air  BMI 45.68 kg/m²   Wt Readings from Last 3 Encounters:   07/25/18 (!) 300 lb 6.4 oz (136.3 kg)   07/21/18 (!) 310 lb (140.6 kg)   06/20/18 (!) 302 lb (137 kg)     The patient is awake, alert and in no discomfort or distress. No gross musculoskeletal deformity is present. No significant skin or nail changes are present. Gross examination of head, eyes, nose and throat are negative. Jugular venous pressure is normal and no carotid bruits are present. Normal respiratory effort is noted with no accessory muscle usage present. Lung fields are clear to ascultation. Cardiac examination is notable for a regular rate and rhythm with no palpable thrill. No gallop rhythm and an early to mid peaking systolic ejection murmur at the right upper sternal border with a preserved aortic component the 2nd heart sound are identified. A benign abdominal examination is present with the exception of obesity no masses or organomegaly. Intact pulses are present throughout all extremities and trivial pretibial edema is present. No focal neurologic deficits are present.     Laboratory Tests:  Lab Results   Component Value Date    CREATININE 1.2 07/22/2018    BUN 20 07/22/2018     07/22/2018    K 3.9 07/22/2018    CL 99 07/22/2018    CO2 28 07/22/2018     No results found for: BNP  Lab Results   Component Value Date    WBC 8.3 07/21/2018    RBC 4.82 07/21/2018    HGB 12.4 07/21/2018    HCT 36.2 07/21/2018    MCV 75.1 07/21/2018    MCH 25.7 07/21/2018    MCHC 34.3 07/21/2018    RDW 18.4 07/21/2018     07/21/2018    MPV 8.9 07/21/2018     No results for input(s): ALKPHOS, ALT, AST, PROT, BILITOT, BILIDIR, LABALBU in the last 72 hours. Lab Results   Component Value Date    MG 2.5 08/19/2017     Lab Results   Component Value Date    PROTIME 10.9 07/21/2018    INR 0.9 07/21/2018     Lab Results   Component Value Date    TSH 3.390 08/10/2017     No components found for: CHLPL  Lab Results   Component Value Date    TRIG 348 (H) 08/03/2017    TRIG 183 (H) 03/23/2017    TRIG 180 (H) 08/10/2015     Lab Results   Component Value Date    HDL 66 03/23/2017    HDL 65 08/10/2015    HDL 60.0 06/20/2013     Lab Results   Component Value Date    LDLCALC 192 (H) 03/23/2017    LDLCALC 201 (H) 08/10/2015    LDLCALC 151 (H) 06/20/2013       Cardiac Tests:  ECG: A resting electrocardiogram demonstrates evidence of sinus rhythm with left axis deviation, an incomplete right bundle branch block conduction pattern and nonspecific ST changes  Chest X-ray: A chest x-ray of July, 2018 demonstrated no evidence of cardiomegaly or infiltrate      ASSESSMENT / PLAN:  On a clinical basis, the patient remains stable from a cardiovascular standpoint with continued episodes of atypically described chest discomfort and no clinical manifestations of decompensated left ventricular systolic dysfunction nor significant progression of his known aortic valve disease. I have extensively discussed his needs of appropriate lifestyle modification to achieve weight reduction which will benefit diastolic cardiac performance and assist in the management of his obstructive sleep apnea and attempt to reduce risk of further adverse cardiovascular effects including that of an increased risk of atrial arrhythmias. This will additionally benefit his noncardiovascular issues of lumbosacral back pain. Continued aggressive risk factor modification of blood pressure, diabetes and serum lipids will remain essential to reducing risk of future atherosclerotic development with potential benefits of adjustment of his carvedilol dosage of persistent elevation of blood pressure is noted.  If persistent

## 2018-08-20 ENCOUNTER — CARE COORDINATION (OUTPATIENT)
Dept: CARE COORDINATION | Age: 58
End: 2018-08-20

## 2018-09-21 ENCOUNTER — CARE COORDINATION (OUTPATIENT)
Dept: CARE COORDINATION | Age: 58
End: 2018-09-21

## 2018-09-25 ENCOUNTER — CARE COORDINATION (OUTPATIENT)
Dept: CARE COORDINATION | Age: 58
End: 2018-09-25

## 2018-10-22 ENCOUNTER — CARE COORDINATION (OUTPATIENT)
Dept: CARE COORDINATION | Age: 58
End: 2018-10-22

## 2019-03-21 ENCOUNTER — HOSPITAL ENCOUNTER (OUTPATIENT)
Dept: ULTRASOUND IMAGING | Age: 59
Discharge: HOME | End: 2019-03-23
Payer: MEDICARE

## 2019-03-21 DIAGNOSIS — N50.812 TESTICULAR PAIN, LEFT: ICD-10-CM

## 2019-03-21 PROCEDURE — 76870 US EXAM SCROTUM: CPT

## 2019-05-07 ENCOUNTER — HOSPITAL ENCOUNTER (OUTPATIENT)
Age: 59
Discharge: HOME | End: 2019-05-09
Payer: MEDICARE

## 2019-05-07 ENCOUNTER — HOSPITAL ENCOUNTER (OUTPATIENT)
Dept: GENERAL RADIOLOGY | Age: 59
Discharge: HOME | End: 2019-05-09
Payer: MEDICARE

## 2019-05-07 DIAGNOSIS — R06.02 SOB (SHORTNESS OF BREATH): ICD-10-CM

## 2019-05-07 PROCEDURE — 71046 X-RAY EXAM CHEST 2 VIEWS: CPT

## 2019-05-26 ENCOUNTER — APPOINTMENT (OUTPATIENT)
Dept: GENERAL RADIOLOGY | Age: 59
End: 2019-05-26
Payer: MEDICARE

## 2019-05-26 ENCOUNTER — HOSPITAL ENCOUNTER (EMERGENCY)
Age: 59
Discharge: HOME | End: 2019-05-26
Attending: EMERGENCY MEDICINE
Payer: MEDICARE

## 2019-05-26 VITALS
DIASTOLIC BLOOD PRESSURE: 57 MMHG | RESPIRATION RATE: 18 BRPM | HEIGHT: 69 IN | SYSTOLIC BLOOD PRESSURE: 145 MMHG | TEMPERATURE: 97.5 F | BODY MASS INDEX: 46.51 KG/M2 | WEIGHT: 314 LBS | HEART RATE: 92 BPM | OXYGEN SATURATION: 95 %

## 2019-05-26 DIAGNOSIS — R07.89 ATYPICAL CHEST PAIN: Primary | ICD-10-CM

## 2019-05-26 LAB
ALBUMIN SERPL-MCNC: 4.6 G/DL (ref 3.5–5.2)
ALP BLD-CCNC: 101 U/L (ref 40–129)
ALT SERPL-CCNC: 15 U/L (ref 0–40)
ANION GAP SERPL CALCULATED.3IONS-SCNC: 18 MMOL/L (ref 7–16)
AST SERPL-CCNC: 20 U/L (ref 0–39)
BILIRUB SERPL-MCNC: 0.6 MG/DL (ref 0–1.2)
BUN BLDV-MCNC: 15 MG/DL (ref 6–20)
CALCIUM SERPL-MCNC: 9.4 MG/DL (ref 8.6–10.2)
CHLORIDE BLD-SCNC: 100 MMOL/L (ref 98–107)
CO2: 22 MMOL/L (ref 22–29)
CREAT SERPL-MCNC: 1.1 MG/DL (ref 0.7–1.2)
D DIMER: 214 NG/ML DDU
EKG ATRIAL RATE: 98 BPM
EKG P AXIS: 51 DEGREES
EKG P-R INTERVAL: 170 MS
EKG Q-T INTERVAL: 358 MS
EKG QRS DURATION: 92 MS
EKG QTC CALCULATION (BAZETT): 457 MS
EKG R AXIS: -38 DEGREES
EKG T AXIS: 83 DEGREES
EKG VENTRICULAR RATE: 98 BPM
GFR AFRICAN AMERICAN: >60
GFR NON-AFRICAN AMERICAN: >60 ML/MIN/1.73
GLUCOSE BLD-MCNC: 138 MG/DL (ref 74–99)
HCT VFR BLD CALC: 35.4 % (ref 37–54)
HEMOGLOBIN: 12.1 G/DL (ref 12.5–16.5)
MCH RBC QN AUTO: 26.3 PG (ref 26–35)
MCHC RBC AUTO-ENTMCNC: 34.2 % (ref 32–34.5)
MCV RBC AUTO: 77 FL (ref 80–99.9)
PDW BLD-RTO: 18.9 FL (ref 11.5–15)
PLATELET # BLD: 186 E9/L (ref 130–450)
PMV BLD AUTO: 9.7 FL (ref 7–12)
POTASSIUM SERPL-SCNC: 4 MMOL/L (ref 3.5–5)
PRO-BNP: 17 PG/ML (ref 0–125)
RBC # BLD: 4.6 E12/L (ref 3.8–5.8)
SODIUM BLD-SCNC: 140 MMOL/L (ref 132–146)
TOTAL PROTEIN: 8.4 G/DL (ref 6.4–8.3)
TROPONIN: <0.01 NG/ML (ref 0–0.03)
TROPONIN: <0.01 NG/ML (ref 0–0.03)
WBC # BLD: 8.6 E9/L (ref 4.5–11.5)

## 2019-05-26 PROCEDURE — 84484 ASSAY OF TROPONIN QUANT: CPT

## 2019-05-26 PROCEDURE — 85378 FIBRIN DEGRADE SEMIQUANT: CPT

## 2019-05-26 PROCEDURE — 99285 EMERGENCY DEPT VISIT HI MDM: CPT

## 2019-05-26 PROCEDURE — 93005 ELECTROCARDIOGRAM TRACING: CPT | Performed by: EMERGENCY MEDICINE

## 2019-05-26 PROCEDURE — 80053 COMPREHEN METABOLIC PANEL: CPT

## 2019-05-26 PROCEDURE — 36415 COLL VENOUS BLD VENIPUNCTURE: CPT

## 2019-05-26 PROCEDURE — 93010 ELECTROCARDIOGRAM REPORT: CPT | Performed by: INTERNAL MEDICINE

## 2019-05-26 PROCEDURE — 71045 X-RAY EXAM CHEST 1 VIEW: CPT

## 2019-05-26 PROCEDURE — 85027 COMPLETE CBC AUTOMATED: CPT

## 2019-05-26 PROCEDURE — 83880 ASSAY OF NATRIURETIC PEPTIDE: CPT

## 2019-05-26 NOTE — ED PROVIDER NOTES
products    ---------------------------------------------------PHYSICAL EXAM--------------------------------------    Constitutional/General: Alert and oriented x3, well appearing, non toxic in NAD  Head: Normocephalic and atraumatic  Eyes: PERRL, EOMI  Mouth: Oropharynx clear, handling secretions, no trismus  Neck: Supple, full ROM, non tender to palpation in the midline, no stridor, no crepitus, no meningeal signs  Pulmonary: Lungs clear to auscultation bilaterally, no wheezes, rales, or rhonchi. Not in respiratory distress  Cardiovascular:  Regular rate. Regular rhythm. No murmurs, gallops, or rubs. 2+ distal pulses  Chest: no chest wall tenderness  Abdomen: Soft. Non tender. Non distended. +BS. No rebound, guarding, or rigidity. No pulsatile masses appreciated. Musculoskeletal: Moves all extremities x 4. Warm and well perfused, no clubbing, Edema noted to the left lower extremity  Skin: warm and dry. No rashes. Neurologic: GCS 15, CN 2-12 grossly intact, no focal deficits, symmetric strength 5/5 in the upper and lower extremities bilaterally  Psych: Normal Affect    -------------------------------------------------- RESULTS -------------------------------------------------  I have personally reviewed all laboratory and imaging results for this patient. Results are listed below.      LABS:  Results for orders placed or performed during the hospital encounter of 05/26/19   CBC   Result Value Ref Range    WBC 8.6 4.5 - 11.5 E9/L    RBC 4.60 3.80 - 5.80 E12/L    Hemoglobin 12.1 (L) 12.5 - 16.5 g/dL    Hematocrit 35.4 (L) 37.0 - 54.0 %    MCV 77.0 (L) 80.0 - 99.9 fL    MCH 26.3 26.0 - 35.0 pg    MCHC 34.2 32.0 - 34.5 %    RDW 18.9 (H) 11.5 - 15.0 fL    Platelets 849 238 - 600 E9/L    MPV 9.7 7.0 - 12.0 fL   Comprehensive Metabolic Panel   Result Value Ref Range    Sodium 140 132 - 146 mmol/L    Potassium 4.0 3.5 - 5.0 mmol/L    Chloride 100 98 - 107 mmol/L    CO2 22 22 - 29 mmol/L    Anion Gap 18 (H) 7 - 16 mmol/L    Glucose 138 (H) 74 - 99 mg/dL    BUN 15 6 - 20 mg/dL    CREATININE 1.1 0.7 - 1.2 mg/dL    GFR Non-African American >60 >=60 mL/min/1.73    GFR African American >60     Calcium 9.4 8.6 - 10.2 mg/dL    Total Protein 8.4 (H) 6.4 - 8.3 g/dL    Alb 4.6 3.5 - 5.2 g/dL    Total Bilirubin 0.6 0.0 - 1.2 mg/dL    Alkaline Phosphatase 101 40 - 129 U/L    ALT 15 0 - 40 U/L    AST 20 0 - 39 U/L   Troponin   Result Value Ref Range    Troponin <0.01 0.00 - 0.03 ng/mL   D-Dimer, Quantitative   Result Value Ref Range    D-Dimer, Quant 214 ng/mL DDU   Brain Natriuretic Peptide   Result Value Ref Range    Pro-BNP 17 0 - 125 pg/mL   EKG 12 Lead   Result Value Ref Range    Ventricular Rate 98 BPM    Atrial Rate 98 BPM    P-R Interval 170 ms    QRS Duration 92 ms    Q-T Interval 358 ms    QTc Calculation (Bazett) 457 ms    P Axis 51 degrees    R Axis -38 degrees    T Axis 83 degrees       RADIOLOGY:  Interpreted by Radiologist.  XR CHEST PORTABLE   Final Result   Tortuous ectatic aorta   Cardiomegaly    Airspace disease compatible with atelectasis   at the right and the left lung base                          This X-Ray is independently viewed and interpreted by me:   - Study: Chest X-Ray   - Number of Views: 1  - Findings: No pneumothorax and Cardiomegaly with vascular congestion    EKG: This EKG is signed and interpreted by me. Rate: 98  Rhythm: Sinus  Interpretation: non-specific EKG  Comparison: stable as compared to patient's most recent EKG    ------------------------- NURSING NOTES AND VITALS REVIEWED ---------------------------   The nursing notes within the ED encounter and vital signs as below have been reviewed by myself. BP (!) 145/57   Pulse 92   Temp 97.5 °F (36.4 °C) (Temporal)   Resp 18   Ht 5' 9\" (1.753 m)   Wt (!) 314 lb (142.4 kg)   SpO2 95%   BMI 46.37 kg/m²   Oxygen Saturation Interpretation: Normal    The patients available past medical records and past encounters were reviewed. ------------------------------ ED COURSE/MEDICAL DECISION MAKING----------------------  Medications - No data to display          Medical Decision Making:        Re-Evaluations:             Re-evaluation. Patients symptoms show no change    Patient rechecked and vital signs improved and patient made aware of findings and need for follow-up. Repeat troponin ordered and if within normal limits patient will be discharged  Consultations:             Spoke to Dr Vincent Long and discussed And comfortable with patient discharge and will follow up in office and have cardiology follow-up    Critical Care: This patient's ED course included: a personal history and physicial eaxmination    This patient has been closely monitored during their ED course. Counseling: The emergency provider has spoken with the patient and discussed todays results, in addition to providing specific details for the plan of care and counseling regarding the diagnosis and prognosis. Questions are answered at this time and they are agreeable with the plan.       --------------------------------- IMPRESSION AND DISPOSITION ---------------------------------    IMPRESSION  1. Atypical chest pain        DISPOSITION  Disposition: Discharged home  Patient condition is stable        NOTE: This report was transcribed using voice recognition software.  Every effort was made to ensure accuracy; however, inadvertent computerized transcription errors may be present          Lilliana Armenta MD  05/26/19 Rica Oconnor MD  05/26/19 1515

## 2019-07-30 ENCOUNTER — OFFICE VISIT (OUTPATIENT)
Dept: CARDIOLOGY CLINIC | Age: 59
End: 2019-07-30
Payer: MEDICARE

## 2019-07-30 VITALS
BODY MASS INDEX: 47.74 KG/M2 | DIASTOLIC BLOOD PRESSURE: 70 MMHG | HEIGHT: 68 IN | RESPIRATION RATE: 16 BRPM | WEIGHT: 315 LBS | HEART RATE: 82 BPM | OXYGEN SATURATION: 95 % | SYSTOLIC BLOOD PRESSURE: 142 MMHG

## 2019-07-30 DIAGNOSIS — E66.01 MORBID OBESITY DUE TO EXCESS CALORIES (HCC): Chronic | ICD-10-CM

## 2019-07-30 DIAGNOSIS — I10 ESSENTIAL HYPERTENSION: ICD-10-CM

## 2019-07-30 DIAGNOSIS — E11.9 TYPE 2 DIABETES MELLITUS WITHOUT COMPLICATION, WITHOUT LONG-TERM CURRENT USE OF INSULIN (HCC): ICD-10-CM

## 2019-07-30 DIAGNOSIS — I50.32 CHRONIC DIASTOLIC HEART FAILURE (HCC): Primary | ICD-10-CM

## 2019-07-30 DIAGNOSIS — G47.33 OBSTRUCTIVE SLEEP APNEA: ICD-10-CM

## 2019-07-30 DIAGNOSIS — I35.9 AORTIC VALVE DISEASE: ICD-10-CM

## 2019-07-30 DIAGNOSIS — R07.89 ATYPICAL CHEST PAIN: ICD-10-CM

## 2019-07-30 DIAGNOSIS — E78.2 MIXED HYPERLIPIDEMIA: Chronic | ICD-10-CM

## 2019-07-30 PROCEDURE — G8427 DOCREV CUR MEDS BY ELIG CLIN: HCPCS | Performed by: INTERNAL MEDICINE

## 2019-07-30 PROCEDURE — 2022F DILAT RTA XM EVC RTNOPTHY: CPT | Performed by: INTERNAL MEDICINE

## 2019-07-30 PROCEDURE — G8417 CALC BMI ABV UP PARAM F/U: HCPCS | Performed by: INTERNAL MEDICINE

## 2019-07-30 PROCEDURE — 93000 ELECTROCARDIOGRAM COMPLETE: CPT | Performed by: INTERNAL MEDICINE

## 2019-07-30 PROCEDURE — 3044F HG A1C LEVEL LT 7.0%: CPT | Performed by: INTERNAL MEDICINE

## 2019-07-30 PROCEDURE — 3017F COLORECTAL CA SCREEN DOC REV: CPT | Performed by: INTERNAL MEDICINE

## 2019-07-30 PROCEDURE — 1036F TOBACCO NON-USER: CPT | Performed by: INTERNAL MEDICINE

## 2019-07-30 PROCEDURE — 99215 OFFICE O/P EST HI 40 MIN: CPT | Performed by: INTERNAL MEDICINE

## 2019-07-30 SDOH — HEALTH STABILITY: MENTAL HEALTH: HOW MANY STANDARD DRINKS CONTAINING ALCOHOL DO YOU HAVE ON A TYPICAL DAY?: 1 OR 2

## 2019-07-30 SDOH — HEALTH STABILITY: MENTAL HEALTH: HOW OFTEN DO YOU HAVE A DRINK CONTAINING ALCOHOL?: 2-3 TIMES A WEEK

## 2019-07-30 NOTE — PROGRESS NOTES
OUTPATIENT CARDIOLOGY FOLLOW-UP    Name: Tobias Viera    Age: 61 y.o. Primary Care Physician: Jewels Patterson MD    Date of Service: 7/30/2019    Chief Complaint: Chronic diastolic heart failure, aortic valve disease, atypical chest pain, hypertension, morbid obesity, obstructive sleep apnea    Interim History: Since his most recent evaluation, the patient has noted no significant cardiovascular symptomatology with assessment for atypically described chest discomfort and in the absence of decompensated diastolic heart failure. Unfortunately, he has made no lifestyle modification and is gained in excess of 15 pounds since his most recent assessment and while undergoing the initial phase of sleep assessment and apparent initial fitting for nocturnal CPAP has not followed up to initiate therapy. In addition, inadvertent reduction of his carvedilol dosage has occurred contributing to evidence of stage II systolic hypertension time of his evaluation. Fortunately, interim assessment of his diabetes has demonstrated acceptable control. Review of Systems: The remainder of a complete multisystem review including consitutional, central nervous, respiratory, circulatory, gastrointestinal, genitourinary, endocrinologic, hematologic, musculoskeletal and psychiatric are negative.     Past Medical History:  Past Medical History:   Diagnosis Date    Acquired hypothyroidism 9/26/2017    Anxiety     Congestive heart failure with LV diastolic dysfunction, NYHA class 3 ([address]     Depression     DM (diabetes mellitus) ([address]     Essential hypertension 6/1/2016    Gouty arthritis 2006    Hyperlipidemia     Hypertension     Lymphedema     Obesity     Obstructive sleep apnea 8/17/2009    Obstructive sleep apnea 9/26/2017    Osteoarthritis        Past Surgical History:  Past Surgical History:   Procedure Laterality Date    BRONCHOSCOPY  08/10/2017    ECHO COMPL W DOP COLOR FLOW  6/21/2013         EXTERNAL EAR SURGERY  2009    keloid left ear    FOOT SURGERY      Left foot    GASTROSTOMY TUBE PLACEMENT  08/10/2017    TRACHEOSTOMY TUBE PLACEMENT  08/10/2017       Family History:  Family History   Problem Relation Age of Onset    Alzheimer's Disease Mother     Heart Disease Father         Heart arrhythmia    Heart Attack Father        Social History:  Social History     Socioeconomic History    Marital status: Legally      Spouse name: Not on file    Number of children: Not on file    Years of education: Not on file    Highest education level: Not on file   Occupational History    Not on file   Social Needs    Financial resource strain: Not on file    Food insecurity:     Worry: Not on file     Inability: Not on file    Transportation needs:     Medical: Not on file     Non-medical: Not on file   Tobacco Use    Smoking status: Former Smoker     Packs/day: 0.10     Years: 10.00     Pack years: 1.00     Types: Cigarettes     Last attempt to quit: 2005     Years since quittin.4    Smokeless tobacco: Former User     Types: Chew     Quit date: 1990   Substance and Sexual Activity    Alcohol use: Yes     Frequency: 2-3 times a week     Drinks per session: 1 or 2     Binge frequency: Never    Drug use: Not Currently     Types: Cocaine, Marijuana     Comment: past use; none x 30 years    Sexual activity: Not Currently     Partners: Female   Lifestyle    Physical activity:     Days per week: Not on file     Minutes per session: Not on file    Stress: Not on file   Relationships    Social connections:     Talks on phone: Not on file     Gets together: Not on file     Attends [employer] service: Not on file     Active member of club or organization: Not on file     Attends meetings of clubs or organizations: Not on file     Relationship status: Not on file    Intimate partner violence:     Fear of current or ex partner: Not on file     Emotionally abused: Not on file     Physically 05/26/19 (!) 314 lb (142.4 kg)     The patient is awake, alert and in no discomfort or distress. No gross musculoskeletal deformity is present. No significant skin or nail changes are present. Gross examination of head, eyes, nose and throat are negative. Jugular venous pressure is normal and no carotid bruits are present. Normal respiratory effort is noted with no accessory muscle usage present. Lung fields are clear to ascultation. Cardiac examination is notable for a regular rate and rhythm with no palpable thrill. No gallop rhythm or cardiac murmur are identified. A benign abdominal examination is present with the exception of significant obesity and no masses or organomegaly. Intact pulses are present throughout upper extremities and chronic lymphedema is present. No focal neurologic deficits are present. Laboratory Tests:  Lab Results   Component Value Date    CREATININE 1.1 05/26/2019    BUN 15 05/26/2019     05/26/2019    K 4.0 05/26/2019     05/26/2019    CO2 22 05/26/2019     No results found for: BNP  Lab Results   Component Value Date    WBC 8.6 05/26/2019    RBC 4.60 05/26/2019    HGB 12.1 05/26/2019    HCT 35.4 05/26/2019    MCV 77.0 05/26/2019    MCH 26.3 05/26/2019    MCHC 34.2 05/26/2019    RDW 18.9 05/26/2019     05/26/2019    MPV 9.7 05/26/2019     No results for input(s): ALKPHOS, ALT, AST, PROT, BILITOT, BILIDIR, LABALBU in the last 72 hours.   Lab Results   Component Value Date    MG 2.5 08/19/2017     Lab Results   Component Value Date    PROTIME 10.9 07/21/2018    INR 0.9 07/21/2018     Lab Results   Component Value Date    TSH 3.390 08/10/2017     No components found for: CHLPL  Lab Results   Component Value Date    TRIG 348 (H) 08/03/2017    TRIG 183 (H) 03/23/2017    TRIG 180 (H) 08/10/2015     Lab Results   Component Value Date    HDL 66 03/23/2017    HDL 65 08/10/2015    HDL 60.0 06/20/2013     Lab Results   Component Value Date    LDLCALC 192 (H) 03/23/2017 LDLCALC 201 (H) 08/10/2015    LDLCALC 151 (H) 06/20/2013       Cardiac Tests:  ECG: A resting electrocardiogram demonstrates evidence of sinus rhythm with left atrial enlargement      ASSESSMENT / PLAN: On a clinical basis, the patient remains stable from a cardiovascular standpoint with no overt decompensation of his chronic diastolic heart failure in spite of his lack of compliance both related to weight gain and the absence of appropriate follow-up of his sleep study and initiation of nocturnal CPAP. I have extensively discussed his needs of compliance with these issues and attempt to reduce risk of future risk of exacerbation as well as that of atrial arrhythmias and have extensively discussed the needs of prompt follow-up and initiation of appropriate nocturnal CPAP. In addition, inadvertently in the course of prescription refills, his carvedilol dosage was reduced to that of daily administration of a contributing to a component of the hypertension noted time of his assessment with recommended optimization of dosing and needs of appropriate follow-up of his blood pressures. Continued aggressive risk factor modification of blood pressure, diabetes and serum lipids will remain essential to reducing his risk of future atherosclerotic development. I presently plan his clinical reassessment in approximately 1 year and would happily reassess him in the interim should additional cardiovascular difficulties or concerns arise. The patient's current medication list, allergies, problem list and results of all previously ordered testing were reviewed at today's visit. Follow-up office visit in 1 year     The duration of discussion and counseling with the patient and family at the time of evaluation exceeded 40 minutes      Note: This report was completed using computerized voice recognition software.  Every effort has been made to ensure accuracy, however; and invert and computerized transcription errors may be

## 2019-08-23 ENCOUNTER — HOSPITAL ENCOUNTER (OUTPATIENT)
Dept: ULTRASOUND IMAGING | Age: 59
Discharge: HOME | End: 2019-08-25
Payer: MEDICARE

## 2019-08-23 DIAGNOSIS — R31.9 HEMATURIA, UNSPECIFIED TYPE: ICD-10-CM

## 2019-08-23 PROCEDURE — 76770 US EXAM ABDO BACK WALL COMP: CPT

## 2019-09-16 ENCOUNTER — APPOINTMENT (OUTPATIENT)
Dept: CT IMAGING | Age: 59
End: 2019-09-16
Payer: MEDICARE

## 2019-09-16 ENCOUNTER — HOSPITAL ENCOUNTER (EMERGENCY)
Age: 59
Discharge: HOME | End: 2019-09-16
Attending: EMERGENCY MEDICINE
Payer: MEDICARE

## 2019-09-16 VITALS
OXYGEN SATURATION: 97 % | BODY MASS INDEX: 44.28 KG/M2 | HEIGHT: 69 IN | WEIGHT: 299 LBS | DIASTOLIC BLOOD PRESSURE: 82 MMHG | TEMPERATURE: 98.6 F | SYSTOLIC BLOOD PRESSURE: 181 MMHG | HEART RATE: 85 BPM | RESPIRATION RATE: 14 BRPM

## 2019-09-16 DIAGNOSIS — N20.0 KIDNEY STONE: Primary | ICD-10-CM

## 2019-09-16 LAB
ALBUMIN SERPL-MCNC: 4.8 G/DL (ref 3.5–5.2)
ALP BLD-CCNC: 106 U/L (ref 40–129)
ALT SERPL-CCNC: 14 U/L (ref 0–40)
ANION GAP SERPL CALCULATED.3IONS-SCNC: 13 MMOL/L (ref 7–16)
AST SERPL-CCNC: 17 U/L (ref 0–39)
BACTERIA: ABNORMAL /HPF
BASOPHILS ABSOLUTE: 0.02 E9/L (ref 0–0.2)
BASOPHILS RELATIVE PERCENT: 0.2 % (ref 0–2)
BILIRUB SERPL-MCNC: 0.6 MG/DL (ref 0–1.2)
BILIRUBIN URINE: NEGATIVE
BLOOD, URINE: ABNORMAL
BUN BLDV-MCNC: 20 MG/DL (ref 6–20)
CALCIUM SERPL-MCNC: 10.4 MG/DL (ref 8.6–10.2)
CHLORIDE BLD-SCNC: 96 MMOL/L (ref 98–107)
CLARITY: CLEAR
CO2: 29 MMOL/L (ref 22–29)
COLOR: YELLOW
CREAT SERPL-MCNC: 1.8 MG/DL (ref 0.7–1.2)
EOSINOPHILS ABSOLUTE: 0.03 E9/L (ref 0.05–0.5)
EOSINOPHILS RELATIVE PERCENT: 0.4 % (ref 0–6)
GFR AFRICAN AMERICAN: 47
GFR NON-AFRICAN AMERICAN: 47 ML/MIN/1.73
GLUCOSE BLD-MCNC: 126 MG/DL (ref 74–99)
GLUCOSE URINE: NEGATIVE MG/DL
HCT VFR BLD CALC: 35.6 % (ref 37–54)
HEMOGLOBIN: 11.9 G/DL (ref 12.5–16.5)
IMMATURE GRANULOCYTES #: 0.05 E9/L
IMMATURE GRANULOCYTES %: 0.6 % (ref 0–5)
KETONES, URINE: NEGATIVE MG/DL
LEUKOCYTE ESTERASE, URINE: NEGATIVE
LYMPHOCYTES ABSOLUTE: 2.01 E9/L (ref 1.5–4)
LYMPHOCYTES RELATIVE PERCENT: 23.6 % (ref 20–42)
MCH RBC QN AUTO: 25.9 PG (ref 26–35)
MCHC RBC AUTO-ENTMCNC: 33.4 % (ref 32–34.5)
MCV RBC AUTO: 77.4 FL (ref 80–99.9)
MONOCYTES ABSOLUTE: 0.49 E9/L (ref 0.1–0.95)
MONOCYTES RELATIVE PERCENT: 5.7 % (ref 2–12)
NEUTROPHILS ABSOLUTE: 5.93 E9/L (ref 1.8–7.3)
NEUTROPHILS RELATIVE PERCENT: 69.5 % (ref 43–80)
NITRITE, URINE: NEGATIVE
PDW BLD-RTO: 18.6 FL (ref 11.5–15)
PH UA: 6 (ref 5–9)
PLATELET # BLD: 201 E9/L (ref 130–450)
PMV BLD AUTO: 9.2 FL (ref 7–12)
POTASSIUM SERPL-SCNC: 4.6 MMOL/L (ref 3.5–5)
PROTEIN UA: 100 MG/DL
RBC # BLD: 4.6 E12/L (ref 3.8–5.8)
RBC UA: ABNORMAL /HPF (ref 0–2)
SODIUM BLD-SCNC: 138 MMOL/L (ref 132–146)
SPECIFIC GRAVITY UA: 1.02 (ref 1–1.03)
TOTAL PROTEIN: 9 G/DL (ref 6.4–8.3)
UROBILINOGEN, URINE: 0.2 E.U./DL
WBC # BLD: 8.5 E9/L (ref 4.5–11.5)
WBC UA: ABNORMAL /HPF (ref 0–5)

## 2019-09-16 PROCEDURE — 36415 COLL VENOUS BLD VENIPUNCTURE: CPT

## 2019-09-16 PROCEDURE — 2580000003 HC RX 258: Performed by: EMERGENCY MEDICINE

## 2019-09-16 PROCEDURE — 6360000002 HC RX W HCPCS: Performed by: EMERGENCY MEDICINE

## 2019-09-16 PROCEDURE — 85025 COMPLETE CBC W/AUTO DIFF WBC: CPT

## 2019-09-16 PROCEDURE — 74176 CT ABD & PELVIS W/O CONTRAST: CPT

## 2019-09-16 PROCEDURE — 99284 EMERGENCY DEPT VISIT MOD MDM: CPT

## 2019-09-16 PROCEDURE — 96374 THER/PROPH/DIAG INJ IV PUSH: CPT

## 2019-09-16 PROCEDURE — 6370000000 HC RX 637 (ALT 250 FOR IP): Performed by: EMERGENCY MEDICINE

## 2019-09-16 PROCEDURE — 81001 URINALYSIS AUTO W/SCOPE: CPT

## 2019-09-16 PROCEDURE — 87088 URINE BACTERIA CULTURE: CPT

## 2019-09-16 PROCEDURE — 96375 TX/PRO/DX INJ NEW DRUG ADDON: CPT

## 2019-09-16 PROCEDURE — 80053 COMPREHEN METABOLIC PANEL: CPT

## 2019-09-16 RX ORDER — ONDANSETRON 4 MG/1
4 TABLET, ORALLY DISINTEGRATING ORAL EVERY 8 HOURS PRN
Qty: 20 TABLET | Refills: 0 | Status: ON HOLD | OUTPATIENT
Start: 2019-09-16 | End: 2020-01-01 | Stop reason: HOSPADM

## 2019-09-16 RX ORDER — TAMSULOSIN HYDROCHLORIDE 0.4 MG/1
0.4 CAPSULE ORAL DAILY
Qty: 30 CAPSULE | Refills: 0 | Status: SHIPPED | OUTPATIENT
Start: 2019-09-16 | End: 2020-03-02

## 2019-09-16 RX ORDER — ONDANSETRON 2 MG/ML
4 INJECTION INTRAMUSCULAR; INTRAVENOUS ONCE
Status: COMPLETED | OUTPATIENT
Start: 2019-09-16 | End: 2019-09-16

## 2019-09-16 RX ORDER — HYDROCODONE BITARTRATE AND ACETAMINOPHEN 5; 325 MG/1; MG/1
1 TABLET ORAL EVERY 4 HOURS PRN
Qty: 12 TABLET | Refills: 0 | Status: SHIPPED | OUTPATIENT
Start: 2019-09-16 | End: 2019-09-19

## 2019-09-16 RX ORDER — KETOROLAC TROMETHAMINE 30 MG/ML
15 INJECTION, SOLUTION INTRAMUSCULAR; INTRAVENOUS ONCE
Status: COMPLETED | OUTPATIENT
Start: 2019-09-16 | End: 2019-09-16

## 2019-09-16 RX ORDER — 0.9 % SODIUM CHLORIDE 0.9 %
1000 INTRAVENOUS SOLUTION INTRAVENOUS ONCE
Status: COMPLETED | OUTPATIENT
Start: 2019-09-16 | End: 2019-09-16

## 2019-09-16 RX ORDER — MORPHINE SULFATE 4 MG/ML
4 INJECTION, SOLUTION INTRAMUSCULAR; INTRAVENOUS ONCE
Status: COMPLETED | OUTPATIENT
Start: 2019-09-16 | End: 2019-09-16

## 2019-09-16 RX ORDER — TAMSULOSIN HYDROCHLORIDE 0.4 MG/1
0.4 CAPSULE ORAL ONCE
Status: COMPLETED | OUTPATIENT
Start: 2019-09-16 | End: 2019-09-16

## 2019-09-16 RX ADMIN — ONDANSETRON HYDROCHLORIDE 4 MG: 2 SOLUTION INTRAMUSCULAR; INTRAVENOUS at 08:44

## 2019-09-16 RX ADMIN — MORPHINE SULFATE 4 MG: 4 INJECTION, SOLUTION INTRAMUSCULAR; INTRAVENOUS at 08:43

## 2019-09-16 RX ADMIN — KETOROLAC TROMETHAMINE 15 MG: 30 INJECTION, SOLUTION INTRAMUSCULAR at 08:43

## 2019-09-16 RX ADMIN — TAMSULOSIN HYDROCHLORIDE 0.4 MG: 0.4 CAPSULE ORAL at 09:19

## 2019-09-16 RX ADMIN — SODIUM CHLORIDE 1000 ML: 9 INJECTION, SOLUTION INTRAVENOUS at 08:43

## 2019-09-16 ASSESSMENT — PAIN SCALES - GENERAL
PAINLEVEL_OUTOF10: 10
PAINLEVEL_OUTOF10: 10

## 2019-09-18 LAB — URINE CULTURE, ROUTINE: NORMAL

## 2019-10-04 ENCOUNTER — HOSPITAL ENCOUNTER (OUTPATIENT)
Dept: ULTRASOUND IMAGING | Age: 59
Discharge: HOME | End: 2019-10-06
Payer: MEDICARE

## 2019-10-04 DIAGNOSIS — N20.1 CALCULUS OF URETER: ICD-10-CM

## 2019-10-04 PROCEDURE — 76770 US EXAM ABDO BACK WALL COMP: CPT

## 2020-01-01 ENCOUNTER — APPOINTMENT (OUTPATIENT)
Dept: GENERAL RADIOLOGY | Age: 60
DRG: 003 | End: 2020-01-01
Payer: MEDICARE

## 2020-01-01 ENCOUNTER — APPOINTMENT (OUTPATIENT)
Dept: GENERAL RADIOLOGY | Age: 60
DRG: 175 | End: 2020-01-01
Payer: MEDICARE

## 2020-01-01 ENCOUNTER — APPOINTMENT (OUTPATIENT)
Dept: CT IMAGING | Age: 60
DRG: 175 | End: 2020-01-01
Payer: MEDICARE

## 2020-01-01 ENCOUNTER — APPOINTMENT (OUTPATIENT)
Dept: NEUROLOGY | Age: 60
DRG: 003 | End: 2020-01-01
Payer: MEDICARE

## 2020-01-01 ENCOUNTER — HOSPITAL ENCOUNTER (EMERGENCY)
Age: 60
Discharge: LEFT BEFORE DISCHARGE | End: 2020-12-25
Attending: EMERGENCY MEDICINE
Payer: MEDICARE

## 2020-01-01 ENCOUNTER — ANESTHESIA EVENT (OUTPATIENT)
Dept: NON INVASIVE DIAGNOSTICS | Age: 60
DRG: 003 | End: 2020-01-01
Payer: MEDICARE

## 2020-01-01 ENCOUNTER — HOSPITAL ENCOUNTER (EMERGENCY)
Age: 60
Discharge: HOME | End: 2020-12-24
Attending: EMERGENCY MEDICINE
Payer: MEDICARE

## 2020-01-01 ENCOUNTER — HOSPITAL ENCOUNTER (INPATIENT)
Age: 60
LOS: 8 days | Discharge: NURSING, REHAB OR LONG-TERM CARE | DRG: 175 | End: 2020-12-17
Attending: EMERGENCY MEDICINE | Admitting: INTERNAL MEDICINE
Payer: MEDICARE

## 2020-01-01 ENCOUNTER — APPOINTMENT (OUTPATIENT)
Dept: INTERVENTIONAL RADIOLOGY/VASCULAR | Age: 60
DRG: 003 | End: 2020-01-01
Payer: MEDICARE

## 2020-01-01 ENCOUNTER — TELEPHONE (OUTPATIENT)
Dept: ADMINISTRATIVE | Age: 60
End: 2020-01-01

## 2020-01-01 ENCOUNTER — APPOINTMENT (OUTPATIENT)
Dept: CT IMAGING | Age: 60
DRG: 003 | End: 2020-01-01
Payer: MEDICARE

## 2020-01-01 ENCOUNTER — APPOINTMENT (OUTPATIENT)
Dept: ULTRASOUND IMAGING | Age: 60
DRG: 175 | End: 2020-01-01
Payer: MEDICARE

## 2020-01-01 ENCOUNTER — ANESTHESIA (OUTPATIENT)
Dept: NON INVASIVE DIAGNOSTICS | Age: 60
DRG: 003 | End: 2020-01-01
Payer: MEDICARE

## 2020-01-01 VITALS
HEIGHT: 68 IN | WEIGHT: 315 LBS | RESPIRATION RATE: 18 BRPM | BODY MASS INDEX: 47.74 KG/M2 | OXYGEN SATURATION: 96 % | TEMPERATURE: 98.4 F | SYSTOLIC BLOOD PRESSURE: 185 MMHG | HEART RATE: 87 BPM | DIASTOLIC BLOOD PRESSURE: 72 MMHG

## 2020-01-01 VITALS
HEART RATE: 80 BPM | SYSTOLIC BLOOD PRESSURE: 152 MMHG | WEIGHT: 315 LBS | RESPIRATION RATE: 20 BRPM | HEIGHT: 69 IN | OXYGEN SATURATION: 99 % | TEMPERATURE: 97.8 F | BODY MASS INDEX: 46.65 KG/M2 | DIASTOLIC BLOOD PRESSURE: 71 MMHG

## 2020-01-01 VITALS
DIASTOLIC BLOOD PRESSURE: 76 MMHG | TEMPERATURE: 98.6 F | SYSTOLIC BLOOD PRESSURE: 144 MMHG | RESPIRATION RATE: 20 BRPM | OXYGEN SATURATION: 100 % | HEART RATE: 76 BPM

## 2020-01-01 VITALS
DIASTOLIC BLOOD PRESSURE: 53 MMHG | OXYGEN SATURATION: 100 % | RESPIRATION RATE: 13 BRPM | SYSTOLIC BLOOD PRESSURE: 93 MMHG

## 2020-01-01 LAB
AADO2: 121.3 MMHG
AADO2: 148.5 MMHG
AADO2: 150.2 MMHG
AADO2: 345 MMHG
AADO2: 348.9 MMHG
ACETAMINOPHEN LEVEL: <5 MCG/ML (ref 10–30)
ADENOVIRUS BY PCR: NOT DETECTED
ALBUMIN SERPL-MCNC: 3.1 G/DL (ref 3.5–5.2)
ALBUMIN SERPL-MCNC: 3.2 G/DL (ref 3.5–5.2)
ALBUMIN SERPL-MCNC: 3.2 G/DL (ref 3.5–5.2)
ALBUMIN SERPL-MCNC: 3.3 G/DL (ref 3.5–5.2)
ALBUMIN SERPL-MCNC: 3.3 G/DL (ref 3.5–5.2)
ALBUMIN SERPL-MCNC: 3.4 G/DL (ref 3.5–5.2)
ALBUMIN SERPL-MCNC: 3.5 G/DL (ref 3.5–5.2)
ALBUMIN SERPL-MCNC: 3.6 G/DL (ref 3.5–5.2)
ALBUMIN SERPL-MCNC: 3.7 G/DL (ref 3.5–5.2)
ALBUMIN SERPL-MCNC: 4.2 G/DL (ref 3.5–5.2)
ALP BLD-CCNC: 102 U/L (ref 40–129)
ALP BLD-CCNC: 107 U/L (ref 40–129)
ALP BLD-CCNC: 109 U/L (ref 40–129)
ALP BLD-CCNC: 110 U/L (ref 40–129)
ALP BLD-CCNC: 112 U/L (ref 40–129)
ALP BLD-CCNC: 113 U/L (ref 40–129)
ALP BLD-CCNC: 115 U/L (ref 40–129)
ALP BLD-CCNC: 118 U/L (ref 40–129)
ALP BLD-CCNC: 125 U/L (ref 40–129)
ALP BLD-CCNC: 142 U/L (ref 40–129)
ALP BLD-CCNC: 143 U/L (ref 40–129)
ALP BLD-CCNC: 164 U/L (ref 40–129)
ALP BLD-CCNC: 85 U/L (ref 40–129)
ALP BLD-CCNC: 88 U/L (ref 40–129)
ALP BLD-CCNC: 90 U/L (ref 40–129)
ALP BLD-CCNC: 91 U/L (ref 40–129)
ALP BLD-CCNC: 96 U/L (ref 40–129)
ALP BLD-CCNC: 98 U/L (ref 40–129)
ALP BLD-CCNC: 99 U/L (ref 40–129)
ALT SERPL-CCNC: 107 U/L (ref 0–40)
ALT SERPL-CCNC: 112 U/L (ref 0–40)
ALT SERPL-CCNC: 114 U/L (ref 0–40)
ALT SERPL-CCNC: 120 U/L (ref 0–40)
ALT SERPL-CCNC: 123 U/L (ref 0–40)
ALT SERPL-CCNC: 126 U/L (ref 0–40)
ALT SERPL-CCNC: 132 U/L (ref 0–40)
ALT SERPL-CCNC: 140 U/L (ref 0–40)
ALT SERPL-CCNC: 149 U/L (ref 0–40)
ALT SERPL-CCNC: 163 U/L (ref 0–40)
ALT SERPL-CCNC: 19 U/L (ref 0–40)
ALT SERPL-CCNC: 23 U/L (ref 0–40)
ALT SERPL-CCNC: 28 U/L (ref 0–40)
ALT SERPL-CCNC: 30 U/L (ref 0–40)
ALT SERPL-CCNC: 36 U/L (ref 0–40)
ALT SERPL-CCNC: 44 U/L (ref 0–40)
ALT SERPL-CCNC: 45 U/L (ref 0–40)
ALT SERPL-CCNC: 53 U/L (ref 0–40)
ALT SERPL-CCNC: 65 U/L (ref 0–40)
ALT SERPL-CCNC: 70 U/L (ref 0–40)
ALT SERPL-CCNC: 98 U/L (ref 0–40)
AMORPHOUS: NORMAL
AMPHETAMINE SCREEN, URINE: NOT DETECTED
ANION GAP SERPL CALCULATED.3IONS-SCNC: 10 MMOL/L (ref 7–16)
ANION GAP SERPL CALCULATED.3IONS-SCNC: 11 MMOL/L (ref 7–16)
ANION GAP SERPL CALCULATED.3IONS-SCNC: 12 MMOL/L (ref 7–16)
ANION GAP SERPL CALCULATED.3IONS-SCNC: 13 MMOL/L (ref 7–16)
ANION GAP SERPL CALCULATED.3IONS-SCNC: 13 MMOL/L (ref 7–16)
ANION GAP SERPL CALCULATED.3IONS-SCNC: 14 MMOL/L (ref 7–16)
ANION GAP SERPL CALCULATED.3IONS-SCNC: 15 MMOL/L (ref 7–16)
ANION GAP SERPL CALCULATED.3IONS-SCNC: 15 MMOL/L (ref 7–16)
ANION GAP SERPL CALCULATED.3IONS-SCNC: 16 MMOL/L (ref 7–16)
ANION GAP SERPL CALCULATED.3IONS-SCNC: 8 MMOL/L (ref 7–16)
ANION GAP SERPL CALCULATED.3IONS-SCNC: 9 MMOL/L (ref 7–16)
ANISOCYTOSIS: ABNORMAL
APTT: 166.1 SEC (ref 24.5–35.1)
APTT: 29.2 SEC (ref 24.5–35.1)
APTT: 67.9 SEC (ref 24.5–35.1)
APTT: 78.3 SEC (ref 24.5–35.1)
APTT: 83.7 SEC (ref 24.5–35.1)
APTT: >240 SEC (ref 24.5–35.1)
APTT: >240 SEC (ref 24.5–35.1)
AST SERPL-CCNC: 12 U/L (ref 0–39)
AST SERPL-CCNC: 13 U/L (ref 0–39)
AST SERPL-CCNC: 13 U/L (ref 0–39)
AST SERPL-CCNC: 14 U/L (ref 0–39)
AST SERPL-CCNC: 15 U/L (ref 0–39)
AST SERPL-CCNC: 16 U/L (ref 0–39)
AST SERPL-CCNC: 17 U/L (ref 0–39)
AST SERPL-CCNC: 18 U/L (ref 0–39)
AST SERPL-CCNC: 27 U/L (ref 0–39)
AST SERPL-CCNC: 32 U/L (ref 0–39)
AST SERPL-CCNC: 39 U/L (ref 0–39)
AST SERPL-CCNC: 39 U/L (ref 0–39)
AST SERPL-CCNC: 41 U/L (ref 0–39)
AST SERPL-CCNC: 43 U/L (ref 0–39)
AST SERPL-CCNC: 45 U/L (ref 0–39)
AST SERPL-CCNC: 46 U/L (ref 0–39)
AST SERPL-CCNC: 48 U/L (ref 0–39)
AST SERPL-CCNC: 52 U/L (ref 0–39)
AST SERPL-CCNC: 58 U/L (ref 0–39)
AST SERPL-CCNC: 69 U/L (ref 0–39)
AST SERPL-CCNC: 80 U/L (ref 0–39)
B.E.: -1.3 MMOL/L (ref -3–3)
B.E.: -2.3 MMOL/L (ref -3–3)
B.E.: -4.2 MMOL/L (ref -3–3)
B.E.: 0.7 MMOL/L (ref -3–3)
B.E.: 1.9 MMOL/L (ref -3–3)
BACTERIA: ABNORMAL /HPF
BACTERIA: NORMAL /HPF
BARBITURATE SCREEN URINE: NOT DETECTED
BASOPHILS ABSOLUTE: 0 E9/L (ref 0–0.2)
BASOPHILS ABSOLUTE: 0 E9/L (ref 0–0.2)
BASOPHILS ABSOLUTE: 0.01 E9/L (ref 0–0.2)
BASOPHILS ABSOLUTE: 0.02 E9/L (ref 0–0.2)
BASOPHILS ABSOLUTE: 0.02 E9/L (ref 0–0.2)
BASOPHILS ABSOLUTE: 0.03 E9/L (ref 0–0.2)
BASOPHILS ABSOLUTE: 0.04 E9/L (ref 0–0.2)
BASOPHILS ABSOLUTE: 0.05 E9/L (ref 0–0.2)
BASOPHILS ABSOLUTE: 0.23 E9/L (ref 0–0.2)
BASOPHILS RELATIVE PERCENT: 0.1 % (ref 0–2)
BASOPHILS RELATIVE PERCENT: 0.2 % (ref 0–2)
BASOPHILS RELATIVE PERCENT: 0.3 % (ref 0–2)
BASOPHILS RELATIVE PERCENT: 0.4 % (ref 0–2)
BASOPHILS RELATIVE PERCENT: 0.5 % (ref 0–2)
BASOPHILS RELATIVE PERCENT: 1.8 % (ref 0–2)
BENZODIAZEPINE SCREEN, URINE: NOT DETECTED
BILIRUB SERPL-MCNC: 0.2 MG/DL (ref 0–1.2)
BILIRUB SERPL-MCNC: 0.3 MG/DL (ref 0–1.2)
BILIRUB SERPL-MCNC: 0.3 MG/DL (ref 0–1.2)
BILIRUB SERPL-MCNC: 0.4 MG/DL (ref 0–1.2)
BILIRUB SERPL-MCNC: 0.4 MG/DL (ref 0–1.2)
BILIRUB SERPL-MCNC: 0.5 MG/DL (ref 0–1.2)
BILIRUB SERPL-MCNC: 0.6 MG/DL (ref 0–1.2)
BILIRUB SERPL-MCNC: 0.6 MG/DL (ref 0–1.2)
BILIRUB SERPL-MCNC: 0.7 MG/DL (ref 0–1.2)
BILIRUB SERPL-MCNC: <0.2 MG/DL (ref 0–1.2)
BILIRUBIN URINE: NEGATIVE
BLOOD CULTURE, ROUTINE: ABNORMAL
BLOOD CULTURE, ROUTINE: NORMAL
BLOOD, URINE: ABNORMAL
BLOOD, URINE: ABNORMAL
BLOOD, URINE: NEGATIVE
BORDETELLA PARAPERTUSSIS BY PCR: NOT DETECTED
BORDETELLA PERTUSSIS BY PCR: NOT DETECTED
BUN BLDV-MCNC: 10 MG/DL (ref 8–23)
BUN BLDV-MCNC: 15 MG/DL (ref 8–23)
BUN BLDV-MCNC: 17 MG/DL (ref 8–23)
BUN BLDV-MCNC: 18 MG/DL (ref 8–23)
BUN BLDV-MCNC: 19 MG/DL (ref 8–23)
BUN BLDV-MCNC: 19 MG/DL (ref 8–23)
BUN BLDV-MCNC: 20 MG/DL (ref 8–23)
BUN BLDV-MCNC: 20 MG/DL (ref 8–23)
BUN BLDV-MCNC: 21 MG/DL (ref 8–23)
BUN BLDV-MCNC: 23 MG/DL (ref 8–23)
BUN BLDV-MCNC: 28 MG/DL (ref 8–23)
BUN BLDV-MCNC: 30 MG/DL (ref 8–23)
BUN BLDV-MCNC: 38 MG/DL (ref 8–23)
C DIFF TOXIN/ANTIGEN: NORMAL
CALCIUM IONIZED: 1.39 MMOL/L (ref 1.15–1.33)
CALCIUM IONIZED: 1.41 MMOL/L (ref 1.15–1.33)
CALCIUM IONIZED: 1.42 MMOL/L (ref 1.15–1.33)
CALCIUM IONIZED: 1.43 MMOL/L (ref 1.15–1.33)
CALCIUM SERPL-MCNC: 10 MG/DL (ref 8.6–10.2)
CALCIUM SERPL-MCNC: 10.2 MG/DL (ref 8.6–10.2)
CALCIUM SERPL-MCNC: 8.8 MG/DL (ref 8.6–10.2)
CALCIUM SERPL-MCNC: 8.9 MG/DL (ref 8.6–10.2)
CALCIUM SERPL-MCNC: 9 MG/DL (ref 8.6–10.2)
CALCIUM SERPL-MCNC: 9.1 MG/DL (ref 8.6–10.2)
CALCIUM SERPL-MCNC: 9.1 MG/DL (ref 8.6–10.2)
CALCIUM SERPL-MCNC: 9.2 MG/DL (ref 8.6–10.2)
CALCIUM SERPL-MCNC: 9.3 MG/DL (ref 8.6–10.2)
CALCIUM SERPL-MCNC: 9.3 MG/DL (ref 8.6–10.2)
CALCIUM SERPL-MCNC: 9.4 MG/DL (ref 8.6–10.2)
CALCIUM SERPL-MCNC: 9.4 MG/DL (ref 8.6–10.2)
CALCIUM SERPL-MCNC: 9.5 MG/DL (ref 8.6–10.2)
CALCIUM SERPL-MCNC: 9.6 MG/DL (ref 8.6–10.2)
CALCIUM SERPL-MCNC: 9.7 MG/DL (ref 8.6–10.2)
CALCIUM SERPL-MCNC: 9.7 MG/DL (ref 8.6–10.2)
CALCIUM SERPL-MCNC: 9.8 MG/DL (ref 8.6–10.2)
CALCIUM SERPL-MCNC: 9.9 MG/DL (ref 8.6–10.2)
CANNABINOID SCREEN URINE: NOT DETECTED
CHLAMYDOPHILIA PNEUMONIAE BY PCR: NOT DETECTED
CHLORIDE BLD-SCNC: 100 MMOL/L (ref 98–107)
CHLORIDE BLD-SCNC: 102 MMOL/L (ref 98–107)
CHLORIDE BLD-SCNC: 103 MMOL/L (ref 98–107)
CHLORIDE BLD-SCNC: 104 MMOL/L (ref 98–107)
CHLORIDE BLD-SCNC: 104 MMOL/L (ref 98–107)
CHLORIDE BLD-SCNC: 105 MMOL/L (ref 98–107)
CHLORIDE BLD-SCNC: 106 MMOL/L (ref 98–107)
CHLORIDE BLD-SCNC: 106 MMOL/L (ref 98–107)
CHLORIDE BLD-SCNC: 107 MMOL/L (ref 98–107)
CHLORIDE BLD-SCNC: 107 MMOL/L (ref 98–107)
CHLORIDE BLD-SCNC: 108 MMOL/L (ref 98–107)
CHLORIDE BLD-SCNC: 108 MMOL/L (ref 98–107)
CHLORIDE BLD-SCNC: 110 MMOL/L (ref 98–107)
CHLORIDE BLD-SCNC: 110 MMOL/L (ref 98–107)
CHLORIDE BLD-SCNC: 111 MMOL/L (ref 98–107)
CHLORIDE BLD-SCNC: 115 MMOL/L (ref 98–107)
CHLORIDE BLD-SCNC: 93 MMOL/L (ref 98–107)
CHLORIDE BLD-SCNC: 98 MMOL/L (ref 98–107)
CHLORIDE BLD-SCNC: 98 MMOL/L (ref 98–107)
CHLORIDE BLD-SCNC: 99 MMOL/L (ref 98–107)
CLARITY: CLEAR
CO2: 19 MMOL/L (ref 22–29)
CO2: 21 MMOL/L (ref 22–29)
CO2: 21 MMOL/L (ref 22–29)
CO2: 23 MMOL/L (ref 22–29)
CO2: 24 MMOL/L (ref 22–29)
CO2: 24 MMOL/L (ref 22–29)
CO2: 25 MMOL/L (ref 22–29)
CO2: 26 MMOL/L (ref 22–29)
CO2: 27 MMOL/L (ref 22–29)
CO2: 29 MMOL/L (ref 22–29)
CO2: 31 MMOL/L (ref 22–29)
CO2: 32 MMOL/L (ref 22–29)
CO2: 32 MMOL/L (ref 22–29)
CO2: 33 MMOL/L (ref 22–29)
CO2: 33 MMOL/L (ref 22–29)
COCAINE METABOLITE SCREEN URINE: NOT DETECTED
COHB: 0 % (ref 0–1.5)
COHB: 0.2 % (ref 0–1.5)
COHB: 0.3 % (ref 0–1.5)
COHB: 0.3 % (ref 0–1.5)
COHB: 0.4 % (ref 0–1.5)
COLOR: YELLOW
COMMENT: ABNORMAL
CORONAVIRUS 229E BY PCR: NOT DETECTED
CORONAVIRUS HKU1 BY PCR: NOT DETECTED
CORONAVIRUS NL63 BY PCR: NOT DETECTED
CORONAVIRUS OC43 BY PCR: NOT DETECTED
CREAT SERPL-MCNC: 1 MG/DL (ref 0.7–1.2)
CREAT SERPL-MCNC: 1 MG/DL (ref 0.7–1.2)
CREAT SERPL-MCNC: 1.1 MG/DL (ref 0.7–1.2)
CREAT SERPL-MCNC: 1.2 MG/DL (ref 0.7–1.2)
CREAT SERPL-MCNC: 1.3 MG/DL (ref 0.7–1.2)
CREAT SERPL-MCNC: 1.4 MG/DL (ref 0.7–1.2)
CREAT SERPL-MCNC: 1.4 MG/DL (ref 0.7–1.2)
CREAT SERPL-MCNC: 1.5 MG/DL (ref 0.7–1.2)
CREAT SERPL-MCNC: 1.8 MG/DL (ref 0.7–1.2)
CREAT SERPL-MCNC: 2.2 MG/DL (ref 0.7–1.2)
CREAT SERPL-MCNC: 2.5 MG/DL (ref 0.7–1.2)
CRITICAL: ABNORMAL
CRITICAL: NORMAL
CULTURE, BLOOD 2: NORMAL
CULTURE, BLOOD 2: NORMAL
CULTURE, RESPIRATORY: ABNORMAL
CULTURE, RESPIRATORY: ABNORMAL
DATE ANALYZED: ABNORMAL
DATE ANALYZED: NORMAL
DATE OF COLLECTION: ABNORMAL
DATE OF COLLECTION: NORMAL
EKG ATRIAL RATE: 77 BPM
EKG ATRIAL RATE: 83 BPM
EKG P AXIS: 65 DEGREES
EKG P AXIS: 65 DEGREES
EKG P-R INTERVAL: 156 MS
EKG P-R INTERVAL: 172 MS
EKG Q-T INTERVAL: 368 MS
EKG Q-T INTERVAL: 372 MS
EKG QRS DURATION: 88 MS
EKG QRS DURATION: 92 MS
EKG QTC CALCULATION (BAZETT): 416 MS
EKG QTC CALCULATION (BAZETT): 437 MS
EKG R AXIS: -27 DEGREES
EKG R AXIS: -31 DEGREES
EKG T AXIS: 31 DEGREES
EKG T AXIS: 40 DEGREES
EKG VENTRICULAR RATE: 77 BPM
EKG VENTRICULAR RATE: 83 BPM
EOSINOPHILS ABSOLUTE: 0.19 E9/L (ref 0.05–0.5)
EOSINOPHILS ABSOLUTE: 0.19 E9/L (ref 0.05–0.5)
EOSINOPHILS ABSOLUTE: 0.24 E9/L (ref 0.05–0.5)
EOSINOPHILS ABSOLUTE: 0.27 E9/L (ref 0.05–0.5)
EOSINOPHILS ABSOLUTE: 0.3 E9/L (ref 0.05–0.5)
EOSINOPHILS ABSOLUTE: 0.33 E9/L (ref 0.05–0.5)
EOSINOPHILS ABSOLUTE: 0.37 E9/L (ref 0.05–0.5)
EOSINOPHILS ABSOLUTE: 0.43 E9/L (ref 0.05–0.5)
EOSINOPHILS ABSOLUTE: 0.45 E9/L (ref 0.05–0.5)
EOSINOPHILS ABSOLUTE: 0.48 E9/L (ref 0.05–0.5)
EOSINOPHILS ABSOLUTE: 0.55 E9/L (ref 0.05–0.5)
EOSINOPHILS ABSOLUTE: 0.55 E9/L (ref 0.05–0.5)
EOSINOPHILS ABSOLUTE: 0.6 E9/L (ref 0.05–0.5)
EOSINOPHILS ABSOLUTE: 0.6 E9/L (ref 0.05–0.5)
EOSINOPHILS ABSOLUTE: 0.61 E9/L (ref 0.05–0.5)
EOSINOPHILS ABSOLUTE: 0.64 E9/L (ref 0.05–0.5)
EOSINOPHILS ABSOLUTE: 0.66 E9/L (ref 0.05–0.5)
EOSINOPHILS ABSOLUTE: 0.66 E9/L (ref 0.05–0.5)
EOSINOPHILS ABSOLUTE: 0.71 E9/L (ref 0.05–0.5)
EOSINOPHILS ABSOLUTE: 0.91 E9/L (ref 0.05–0.5)
EOSINOPHILS ABSOLUTE: 0.92 E9/L (ref 0.05–0.5)
EOSINOPHILS ABSOLUTE: 0.94 E9/L (ref 0.05–0.5)
EOSINOPHILS RELATIVE PERCENT: 1.8 % (ref 0–6)
EOSINOPHILS RELATIVE PERCENT: 10.1 % (ref 0–6)
EOSINOPHILS RELATIVE PERCENT: 2.5 % (ref 0–6)
EOSINOPHILS RELATIVE PERCENT: 2.6 % (ref 0–6)
EOSINOPHILS RELATIVE PERCENT: 2.6 % (ref 0–6)
EOSINOPHILS RELATIVE PERCENT: 3.3 % (ref 0–6)
EOSINOPHILS RELATIVE PERCENT: 3.4 % (ref 0–6)
EOSINOPHILS RELATIVE PERCENT: 3.5 % (ref 0–6)
EOSINOPHILS RELATIVE PERCENT: 4.4 % (ref 0–6)
EOSINOPHILS RELATIVE PERCENT: 4.5 % (ref 0–6)
EOSINOPHILS RELATIVE PERCENT: 4.7 % (ref 0–6)
EOSINOPHILS RELATIVE PERCENT: 4.8 % (ref 0–6)
EOSINOPHILS RELATIVE PERCENT: 5.5 % (ref 0–6)
EOSINOPHILS RELATIVE PERCENT: 5.6 % (ref 0–6)
EOSINOPHILS RELATIVE PERCENT: 5.7 % (ref 0–6)
EOSINOPHILS RELATIVE PERCENT: 5.8 % (ref 0–6)
EOSINOPHILS RELATIVE PERCENT: 5.8 % (ref 0–6)
EOSINOPHILS RELATIVE PERCENT: 6.2 % (ref 0–6)
EOSINOPHILS RELATIVE PERCENT: 6.5 % (ref 0–6)
EOSINOPHILS RELATIVE PERCENT: 6.6 % (ref 0–6)
EOSINOPHILS RELATIVE PERCENT: 7.8 % (ref 0–6)
EOSINOPHILS RELATIVE PERCENT: 9.1 % (ref 0–6)
EPITHELIAL CELLS, UA: ABNORMAL /HPF
EPITHELIAL CELLS, UA: NORMAL /HPF
ETHANOL: <10 MG/DL (ref 0–0.08)
FENTANYL SCREEN, URINE: NOT DETECTED
FIO2: 35 %
FIO2: 40 %
FIO2: 40 %
FIO2: 70 %
FIO2: 70 %
GFR AFRICAN AMERICAN: 32
GFR AFRICAN AMERICAN: 37
GFR AFRICAN AMERICAN: 47
GFR AFRICAN AMERICAN: 58
GFR AFRICAN AMERICAN: >60
GFR NON-AFRICAN AMERICAN: 32 ML/MIN/1.73
GFR NON-AFRICAN AMERICAN: 37 ML/MIN/1.73
GFR NON-AFRICAN AMERICAN: 47 ML/MIN/1.73
GFR NON-AFRICAN AMERICAN: 58 ML/MIN/1.73
GFR NON-AFRICAN AMERICAN: >60 ML/MIN/1.73
GLUCOSE BLD-MCNC: 109 MG/DL (ref 74–99)
GLUCOSE BLD-MCNC: 113 MG/DL (ref 74–99)
GLUCOSE BLD-MCNC: 121 MG/DL (ref 74–99)
GLUCOSE BLD-MCNC: 125 MG/DL (ref 74–99)
GLUCOSE BLD-MCNC: 130 MG/DL (ref 74–99)
GLUCOSE BLD-MCNC: 135 MG/DL (ref 74–99)
GLUCOSE BLD-MCNC: 136 MG/DL (ref 74–99)
GLUCOSE BLD-MCNC: 136 MG/DL (ref 74–99)
GLUCOSE BLD-MCNC: 141 MG/DL (ref 74–99)
GLUCOSE BLD-MCNC: 141 MG/DL (ref 74–99)
GLUCOSE BLD-MCNC: 142 MG/DL (ref 74–99)
GLUCOSE BLD-MCNC: 144 MG/DL (ref 74–99)
GLUCOSE BLD-MCNC: 144 MG/DL (ref 74–99)
GLUCOSE BLD-MCNC: 145 MG/DL (ref 74–99)
GLUCOSE BLD-MCNC: 147 MG/DL (ref 74–99)
GLUCOSE BLD-MCNC: 147 MG/DL (ref 74–99)
GLUCOSE BLD-MCNC: 150 MG/DL (ref 74–99)
GLUCOSE BLD-MCNC: 167 MG/DL (ref 74–99)
GLUCOSE BLD-MCNC: 169 MG/DL (ref 74–99)
GLUCOSE BLD-MCNC: 173 MG/DL (ref 74–99)
GLUCOSE BLD-MCNC: 178 MG/DL (ref 74–99)
GLUCOSE BLD-MCNC: 223 MG/DL (ref 74–99)
GLUCOSE BLD-MCNC: 293 MG/DL (ref 74–99)
GLUCOSE URINE: NEGATIVE MG/DL
HCO3: 20 MMOL/L (ref 22–26)
HCO3: 22.8 MMOL/L (ref 22–26)
HCO3: 23.4 MMOL/L (ref 22–26)
HCO3: 25.7 MMOL/L (ref 22–26)
HCO3: 27 MMOL/L (ref 22–26)
HCT VFR BLD CALC: 21.6 % (ref 37–54)
HCT VFR BLD CALC: 22.4 % (ref 37–54)
HCT VFR BLD CALC: 22.4 % (ref 37–54)
HCT VFR BLD CALC: 22.5 % (ref 37–54)
HCT VFR BLD CALC: 22.8 % (ref 37–54)
HCT VFR BLD CALC: 23.1 % (ref 37–54)
HCT VFR BLD CALC: 23.8 % (ref 37–54)
HCT VFR BLD CALC: 23.9 % (ref 37–54)
HCT VFR BLD CALC: 24.4 % (ref 37–54)
HCT VFR BLD CALC: 25.1 % (ref 37–54)
HCT VFR BLD CALC: 25.3 % (ref 37–54)
HCT VFR BLD CALC: 25.5 % (ref 37–54)
HCT VFR BLD CALC: 25.5 % (ref 37–54)
HCT VFR BLD CALC: 25.7 % (ref 37–54)
HCT VFR BLD CALC: 26.5 % (ref 37–54)
HCT VFR BLD CALC: 26.7 % (ref 37–54)
HCT VFR BLD CALC: 26.7 % (ref 37–54)
HCT VFR BLD CALC: 26.8 % (ref 37–54)
HCT VFR BLD CALC: 26.8 % (ref 37–54)
HCT VFR BLD CALC: 27.1 % (ref 37–54)
HCT VFR BLD CALC: 28 % (ref 37–54)
HCT VFR BLD CALC: 28.6 % (ref 37–54)
HCT VFR BLD CALC: 28.7 % (ref 37–54)
HCT VFR BLD CALC: 31.2 % (ref 37–54)
HCT VFR BLD CALC: 32.3 % (ref 37–54)
HEMOGLOBIN: 10.4 G/DL (ref 12.5–16.5)
HEMOGLOBIN: 10.6 G/DL (ref 12.5–16.5)
HEMOGLOBIN: 7.2 G/DL (ref 12.5–16.5)
HEMOGLOBIN: 7.2 G/DL (ref 12.5–16.5)
HEMOGLOBIN: 7.3 G/DL (ref 12.5–16.5)
HEMOGLOBIN: 7.4 G/DL (ref 12.5–16.5)
HEMOGLOBIN: 7.4 G/DL (ref 12.5–16.5)
HEMOGLOBIN: 7.5 G/DL (ref 12.5–16.5)
HEMOGLOBIN: 7.8 G/DL (ref 12.5–16.5)
HEMOGLOBIN: 7.8 G/DL (ref 12.5–16.5)
HEMOGLOBIN: 7.9 G/DL (ref 12.5–16.5)
HEMOGLOBIN: 8.2 G/DL (ref 12.5–16.5)
HEMOGLOBIN: 8.3 G/DL (ref 12.5–16.5)
HEMOGLOBIN: 8.4 G/DL (ref 12.5–16.5)
HEMOGLOBIN: 8.5 G/DL (ref 12.5–16.5)
HEMOGLOBIN: 8.8 G/DL (ref 12.5–16.5)
HEMOGLOBIN: 8.9 G/DL (ref 12.5–16.5)
HEMOGLOBIN: 8.9 G/DL (ref 12.5–16.5)
HEMOGLOBIN: 9 G/DL (ref 12.5–16.5)
HEMOGLOBIN: 9 G/DL (ref 12.5–16.5)
HEMOGLOBIN: 9.2 G/DL (ref 12.5–16.5)
HEMOGLOBIN: 9.3 G/DL (ref 12.5–16.5)
HEMOGLOBIN: 9.5 G/DL (ref 12.5–16.5)
HHB: 3.9 % (ref 0–5)
HHB: 4 % (ref 0–5)
HHB: 4.6 % (ref 0–5)
HHB: 5 % (ref 0–5)
HHB: 7.4 % (ref 0–5)
HUMAN METAPNEUMOVIRUS BY PCR: NOT DETECTED
HUMAN RHINOVIRUS/ENTEROVIRUS BY PCR: NOT DETECTED
HYPOCHROMIA: ABNORMAL
IMMATURE GRANULOCYTES #: 0.02 E9/L
IMMATURE GRANULOCYTES #: 0.03 E9/L
IMMATURE GRANULOCYTES #: 0.06 E9/L
IMMATURE GRANULOCYTES #: 0.06 E9/L
IMMATURE GRANULOCYTES #: 0.07 E9/L
IMMATURE GRANULOCYTES #: 0.08 E9/L
IMMATURE GRANULOCYTES #: 0.1 E9/L
IMMATURE GRANULOCYTES #: 0.12 E9/L
IMMATURE GRANULOCYTES #: 0.17 E9/L
IMMATURE GRANULOCYTES #: 0.2 E9/L
IMMATURE GRANULOCYTES #: 0.27 E9/L
IMMATURE GRANULOCYTES #: 0.28 E9/L
IMMATURE GRANULOCYTES #: 0.28 E9/L
IMMATURE GRANULOCYTES %: 0.3 % (ref 0–5)
IMMATURE GRANULOCYTES %: 0.4 % (ref 0–5)
IMMATURE GRANULOCYTES %: 0.6 % (ref 0–5)
IMMATURE GRANULOCYTES %: 0.7 % (ref 0–5)
IMMATURE GRANULOCYTES %: 0.9 % (ref 0–5)
IMMATURE GRANULOCYTES %: 1.5 % (ref 0–5)
IMMATURE GRANULOCYTES %: 1.5 % (ref 0–5)
IMMATURE GRANULOCYTES %: 1.9 % (ref 0–5)
IMMATURE GRANULOCYTES %: 2.5 % (ref 0–5)
IMMATURE GRANULOCYTES %: 2.5 % (ref 0–5)
IMMATURE GRANULOCYTES %: 2.7 % (ref 0–5)
INFLUENZA A BY PCR: NOT DETECTED
INFLUENZA B BY PCR: NOT DETECTED
INR BLD: 1.2
INR BLD: 1.2
KETONES, URINE: NEGATIVE MG/DL
LAB: ABNORMAL
LAB: NORMAL
LACTIC ACID: 0.7 MMOL/L (ref 0.5–2.2)
LEUKOCYTE ESTERASE, URINE: NEGATIVE
LIPASE: 53 U/L (ref 13–60)
LYMPHOCYTES ABSOLUTE: 1.32 E9/L (ref 1.5–4)
LYMPHOCYTES ABSOLUTE: 1.44 E9/L (ref 1.5–4)
LYMPHOCYTES ABSOLUTE: 1.65 E9/L (ref 1.5–4)
LYMPHOCYTES ABSOLUTE: 1.69 E9/L (ref 1.5–4)
LYMPHOCYTES ABSOLUTE: 1.81 E9/L (ref 1.5–4)
LYMPHOCYTES ABSOLUTE: 2.09 E9/L (ref 1.5–4)
LYMPHOCYTES ABSOLUTE: 2.18 E9/L (ref 1.5–4)
LYMPHOCYTES ABSOLUTE: 2.31 E9/L (ref 1.5–4)
LYMPHOCYTES ABSOLUTE: 2.41 E9/L (ref 1.5–4)
LYMPHOCYTES ABSOLUTE: 2.42 E9/L (ref 1.5–4)
LYMPHOCYTES ABSOLUTE: 2.77 E9/L (ref 1.5–4)
LYMPHOCYTES ABSOLUTE: 2.77 E9/L (ref 1.5–4)
LYMPHOCYTES ABSOLUTE: 2.81 E9/L (ref 1.5–4)
LYMPHOCYTES ABSOLUTE: 2.86 E9/L (ref 1.5–4)
LYMPHOCYTES ABSOLUTE: 2.88 E9/L (ref 1.5–4)
LYMPHOCYTES ABSOLUTE: 2.89 E9/L (ref 1.5–4)
LYMPHOCYTES ABSOLUTE: 2.95 E9/L (ref 1.5–4)
LYMPHOCYTES ABSOLUTE: 2.96 E9/L (ref 1.5–4)
LYMPHOCYTES ABSOLUTE: 3.09 E9/L (ref 1.5–4)
LYMPHOCYTES ABSOLUTE: 3.66 E9/L (ref 1.5–4)
LYMPHOCYTES ABSOLUTE: 3.66 E9/L (ref 1.5–4)
LYMPHOCYTES ABSOLUTE: 4.16 E9/L (ref 1.5–4)
LYMPHOCYTES RELATIVE PERCENT: 10.7 % (ref 20–42)
LYMPHOCYTES RELATIVE PERCENT: 12.2 % (ref 20–42)
LYMPHOCYTES RELATIVE PERCENT: 12.9 % (ref 20–42)
LYMPHOCYTES RELATIVE PERCENT: 15.7 % (ref 20–42)
LYMPHOCYTES RELATIVE PERCENT: 18.2 % (ref 20–42)
LYMPHOCYTES RELATIVE PERCENT: 18.4 % (ref 20–42)
LYMPHOCYTES RELATIVE PERCENT: 20 % (ref 20–42)
LYMPHOCYTES RELATIVE PERCENT: 21.2 % (ref 20–42)
LYMPHOCYTES RELATIVE PERCENT: 21.9 % (ref 20–42)
LYMPHOCYTES RELATIVE PERCENT: 22.5 % (ref 20–42)
LYMPHOCYTES RELATIVE PERCENT: 28.2 % (ref 20–42)
LYMPHOCYTES RELATIVE PERCENT: 29.1 % (ref 20–42)
LYMPHOCYTES RELATIVE PERCENT: 29.2 % (ref 20–42)
LYMPHOCYTES RELATIVE PERCENT: 29.2 % (ref 20–42)
LYMPHOCYTES RELATIVE PERCENT: 31.7 % (ref 20–42)
LYMPHOCYTES RELATIVE PERCENT: 32.1 % (ref 20–42)
LYMPHOCYTES RELATIVE PERCENT: 32.5 % (ref 20–42)
LYMPHOCYTES RELATIVE PERCENT: 33.5 % (ref 20–42)
LYMPHOCYTES RELATIVE PERCENT: 36.5 % (ref 20–42)
LYMPHOCYTES RELATIVE PERCENT: 37.9 % (ref 20–42)
LYMPHOCYTES RELATIVE PERCENT: 39.8 % (ref 20–42)
LYMPHOCYTES RELATIVE PERCENT: 41.3 % (ref 20–42)
Lab: ABNORMAL
Lab: NORMAL
MAGNESIUM: 1.5 MG/DL (ref 1.6–2.6)
MAGNESIUM: 1.7 MG/DL (ref 1.6–2.6)
MAGNESIUM: 1.7 MG/DL (ref 1.6–2.6)
MAGNESIUM: 1.8 MG/DL (ref 1.6–2.6)
MAGNESIUM: 1.9 MG/DL (ref 1.6–2.6)
MCH RBC QN AUTO: 25.8 PG (ref 26–35)
MCH RBC QN AUTO: 25.9 PG (ref 26–35)
MCH RBC QN AUTO: 26 PG (ref 26–35)
MCH RBC QN AUTO: 26.1 PG (ref 26–35)
MCH RBC QN AUTO: 26.2 PG (ref 26–35)
MCH RBC QN AUTO: 26.3 PG (ref 26–35)
MCH RBC QN AUTO: 26.4 PG (ref 26–35)
MCH RBC QN AUTO: 26.5 PG (ref 26–35)
MCH RBC QN AUTO: 26.5 PG (ref 26–35)
MCH RBC QN AUTO: 26.6 PG (ref 26–35)
MCH RBC QN AUTO: 26.7 PG (ref 26–35)
MCHC RBC AUTO-ENTMCNC: 32.1 % (ref 32–34.5)
MCHC RBC AUTO-ENTMCNC: 32.1 % (ref 32–34.5)
MCHC RBC AUTO-ENTMCNC: 32.2 % (ref 32–34.5)
MCHC RBC AUTO-ENTMCNC: 32.4 % (ref 32–34.5)
MCHC RBC AUTO-ENTMCNC: 32.5 % (ref 32–34.5)
MCHC RBC AUTO-ENTMCNC: 32.6 % (ref 32–34.5)
MCHC RBC AUTO-ENTMCNC: 32.6 % (ref 32–34.5)
MCHC RBC AUTO-ENTMCNC: 32.7 % (ref 32–34.5)
MCHC RBC AUTO-ENTMCNC: 32.7 % (ref 32–34.5)
MCHC RBC AUTO-ENTMCNC: 32.8 % (ref 32–34.5)
MCHC RBC AUTO-ENTMCNC: 32.8 % (ref 32–34.5)
MCHC RBC AUTO-ENTMCNC: 32.9 % (ref 32–34.5)
MCHC RBC AUTO-ENTMCNC: 32.9 % (ref 32–34.5)
MCHC RBC AUTO-ENTMCNC: 33.1 % (ref 32–34.5)
MCHC RBC AUTO-ENTMCNC: 33.2 % (ref 32–34.5)
MCHC RBC AUTO-ENTMCNC: 33.3 % (ref 32–34.5)
MCHC RBC AUTO-ENTMCNC: 33.6 % (ref 32–34.5)
MCHC RBC AUTO-ENTMCNC: 33.6 % (ref 32–34.5)
MCV RBC AUTO: 77.9 FL (ref 80–99.9)
MCV RBC AUTO: 78.1 FL (ref 80–99.9)
MCV RBC AUTO: 78.1 FL (ref 80–99.9)
MCV RBC AUTO: 78.4 FL (ref 80–99.9)
MCV RBC AUTO: 78.8 FL (ref 80–99.9)
MCV RBC AUTO: 79.1 FL (ref 80–99.9)
MCV RBC AUTO: 79.3 FL (ref 80–99.9)
MCV RBC AUTO: 79.4 FL (ref 80–99.9)
MCV RBC AUTO: 79.6 FL (ref 80–99.9)
MCV RBC AUTO: 79.7 FL (ref 80–99.9)
MCV RBC AUTO: 79.8 FL (ref 80–99.9)
MCV RBC AUTO: 79.9 FL (ref 80–99.9)
MCV RBC AUTO: 80.1 FL (ref 80–99.9)
MCV RBC AUTO: 80.1 FL (ref 80–99.9)
MCV RBC AUTO: 80.2 FL (ref 80–99.9)
MCV RBC AUTO: 80.3 FL (ref 80–99.9)
MCV RBC AUTO: 80.4 FL (ref 80–99.9)
MCV RBC AUTO: 80.5 FL (ref 80–99.9)
MCV RBC AUTO: 80.8 FL (ref 80–99.9)
MCV RBC AUTO: 80.8 FL (ref 80–99.9)
MCV RBC AUTO: 80.9 FL (ref 80–99.9)
MCV RBC AUTO: 81 FL (ref 80–99.9)
MCV RBC AUTO: 81.1 FL (ref 80–99.9)
MCV RBC AUTO: 81.3 FL (ref 80–99.9)
MCV RBC AUTO: 81.5 FL (ref 80–99.9)
METAMYELOCYTES RELATIVE PERCENT: 1.8 % (ref 0–1)
METER GLUCOSE: 103 MG/DL (ref 74–99)
METER GLUCOSE: 114 MG/DL (ref 74–99)
METER GLUCOSE: 120 MG/DL (ref 74–99)
METER GLUCOSE: 120 MG/DL (ref 74–99)
METER GLUCOSE: 121 MG/DL (ref 74–99)
METER GLUCOSE: 123 MG/DL (ref 74–99)
METER GLUCOSE: 124 MG/DL (ref 74–99)
METER GLUCOSE: 125 MG/DL (ref 74–99)
METER GLUCOSE: 127 MG/DL (ref 74–99)
METER GLUCOSE: 127 MG/DL (ref 74–99)
METER GLUCOSE: 128 MG/DL (ref 74–99)
METER GLUCOSE: 129 MG/DL (ref 74–99)
METER GLUCOSE: 130 MG/DL (ref 74–99)
METER GLUCOSE: 131 MG/DL (ref 74–99)
METER GLUCOSE: 132 MG/DL (ref 74–99)
METER GLUCOSE: 134 MG/DL (ref 74–99)
METER GLUCOSE: 134 MG/DL (ref 74–99)
METER GLUCOSE: 135 MG/DL (ref 74–99)
METER GLUCOSE: 135 MG/DL (ref 74–99)
METER GLUCOSE: 137 MG/DL (ref 74–99)
METER GLUCOSE: 137 MG/DL (ref 74–99)
METER GLUCOSE: 138 MG/DL (ref 74–99)
METER GLUCOSE: 139 MG/DL (ref 74–99)
METER GLUCOSE: 139 MG/DL (ref 74–99)
METER GLUCOSE: 140 MG/DL (ref 74–99)
METER GLUCOSE: 140 MG/DL (ref 74–99)
METER GLUCOSE: 141 MG/DL (ref 74–99)
METER GLUCOSE: 141 MG/DL (ref 74–99)
METER GLUCOSE: 143 MG/DL (ref 74–99)
METER GLUCOSE: 144 MG/DL (ref 74–99)
METER GLUCOSE: 145 MG/DL (ref 74–99)
METER GLUCOSE: 146 MG/DL (ref 74–99)
METER GLUCOSE: 146 MG/DL (ref 74–99)
METER GLUCOSE: 148 MG/DL (ref 74–99)
METER GLUCOSE: 149 MG/DL (ref 74–99)
METER GLUCOSE: 149 MG/DL (ref 74–99)
METER GLUCOSE: 150 MG/DL (ref 74–99)
METER GLUCOSE: 151 MG/DL (ref 74–99)
METER GLUCOSE: 152 MG/DL (ref 74–99)
METER GLUCOSE: 153 MG/DL (ref 74–99)
METER GLUCOSE: 153 MG/DL (ref 74–99)
METER GLUCOSE: 154 MG/DL (ref 74–99)
METER GLUCOSE: 155 MG/DL (ref 74–99)
METER GLUCOSE: 155 MG/DL (ref 74–99)
METER GLUCOSE: 157 MG/DL (ref 74–99)
METER GLUCOSE: 158 MG/DL (ref 74–99)
METER GLUCOSE: 159 MG/DL (ref 74–99)
METER GLUCOSE: 161 MG/DL (ref 74–99)
METER GLUCOSE: 163 MG/DL (ref 74–99)
METER GLUCOSE: 164 MG/DL (ref 74–99)
METER GLUCOSE: 165 MG/DL (ref 74–99)
METER GLUCOSE: 166 MG/DL (ref 74–99)
METER GLUCOSE: 169 MG/DL (ref 74–99)
METER GLUCOSE: 172 MG/DL (ref 74–99)
METER GLUCOSE: 173 MG/DL (ref 74–99)
METER GLUCOSE: 173 MG/DL (ref 74–99)
METER GLUCOSE: 174 MG/DL (ref 74–99)
METER GLUCOSE: 175 MG/DL (ref 74–99)
METER GLUCOSE: 176 MG/DL (ref 74–99)
METER GLUCOSE: 179 MG/DL (ref 74–99)
METER GLUCOSE: 183 MG/DL (ref 74–99)
METER GLUCOSE: 183 MG/DL (ref 74–99)
METER GLUCOSE: 186 MG/DL (ref 74–99)
METER GLUCOSE: 188 MG/DL (ref 74–99)
METER GLUCOSE: 196 MG/DL (ref 74–99)
METER GLUCOSE: 198 MG/DL (ref 74–99)
METER GLUCOSE: 200 MG/DL (ref 74–99)
METER GLUCOSE: 249 MG/DL (ref 74–99)
METER GLUCOSE: 254 MG/DL (ref 74–99)
METER GLUCOSE: 257 MG/DL (ref 74–99)
METER GLUCOSE: 258 MG/DL (ref 74–99)
METER GLUCOSE: 268 MG/DL (ref 74–99)
METER GLUCOSE: 444 MG/DL (ref 74–99)
METER GLUCOSE: 99 MG/DL (ref 74–99)
METHADONE SCREEN, URINE: NOT DETECTED
METHB: 0.3 % (ref 0–1.5)
METHB: 0.4 % (ref 0–1.5)
METHB: 0.5 % (ref 0–1.5)
MODE: ABNORMAL
MODE: AC
MONOCYTES ABSOLUTE: 0.51 E9/L (ref 0.1–0.95)
MONOCYTES ABSOLUTE: 0.56 E9/L (ref 0.1–0.95)
MONOCYTES ABSOLUTE: 0.58 E9/L (ref 0.1–0.95)
MONOCYTES ABSOLUTE: 0.6 E9/L (ref 0.1–0.95)
MONOCYTES ABSOLUTE: 0.63 E9/L (ref 0.1–0.95)
MONOCYTES ABSOLUTE: 0.64 E9/L (ref 0.1–0.95)
MONOCYTES ABSOLUTE: 0.66 E9/L (ref 0.1–0.95)
MONOCYTES ABSOLUTE: 0.67 E9/L (ref 0.1–0.95)
MONOCYTES ABSOLUTE: 0.78 E9/L (ref 0.1–0.95)
MONOCYTES ABSOLUTE: 0.8 E9/L (ref 0.1–0.95)
MONOCYTES ABSOLUTE: 0.8 E9/L (ref 0.1–0.95)
MONOCYTES ABSOLUTE: 0.84 E9/L (ref 0.1–0.95)
MONOCYTES ABSOLUTE: 0.85 E9/L (ref 0.1–0.95)
MONOCYTES ABSOLUTE: 0.89 E9/L (ref 0.1–0.95)
MONOCYTES ABSOLUTE: 0.92 E9/L (ref 0.1–0.95)
MONOCYTES ABSOLUTE: 0.94 E9/L (ref 0.1–0.95)
MONOCYTES ABSOLUTE: 0.94 E9/L (ref 0.1–0.95)
MONOCYTES ABSOLUTE: 1.01 E9/L (ref 0.1–0.95)
MONOCYTES ABSOLUTE: 1.14 E9/L (ref 0.1–0.95)
MONOCYTES ABSOLUTE: 1.22 E9/L (ref 0.1–0.95)
MONOCYTES RELATIVE PERCENT: 10 % (ref 2–12)
MONOCYTES RELATIVE PERCENT: 10 % (ref 2–12)
MONOCYTES RELATIVE PERCENT: 10.1 % (ref 2–12)
MONOCYTES RELATIVE PERCENT: 11.5 % (ref 2–12)
MONOCYTES RELATIVE PERCENT: 3.8 % (ref 2–12)
MONOCYTES RELATIVE PERCENT: 4.1 % (ref 2–12)
MONOCYTES RELATIVE PERCENT: 5 % (ref 2–12)
MONOCYTES RELATIVE PERCENT: 5.8 % (ref 2–12)
MONOCYTES RELATIVE PERCENT: 6.2 % (ref 2–12)
MONOCYTES RELATIVE PERCENT: 6.6 % (ref 2–12)
MONOCYTES RELATIVE PERCENT: 7 % (ref 2–12)
MONOCYTES RELATIVE PERCENT: 7.3 % (ref 2–12)
MONOCYTES RELATIVE PERCENT: 7.4 % (ref 2–12)
MONOCYTES RELATIVE PERCENT: 7.6 % (ref 2–12)
MONOCYTES RELATIVE PERCENT: 7.9 % (ref 2–12)
MONOCYTES RELATIVE PERCENT: 7.9 % (ref 2–12)
MONOCYTES RELATIVE PERCENT: 8 % (ref 2–12)
MONOCYTES RELATIVE PERCENT: 8.2 % (ref 2–12)
MONOCYTES RELATIVE PERCENT: 8.2 % (ref 2–12)
MONOCYTES RELATIVE PERCENT: 8.5 % (ref 2–12)
MONOCYTES RELATIVE PERCENT: 9.3 % (ref 2–12)
MONOCYTES RELATIVE PERCENT: 9.5 % (ref 2–12)
MYCOPLASMA PNEUMONIAE BY PCR: NOT DETECTED
MYELOCYTE PERCENT: 0.9 % (ref 0–0)
NEUTROPHILS ABSOLUTE: 13.64 E9/L (ref 1.8–7.3)
NEUTROPHILS ABSOLUTE: 2.48 E9/L (ref 1.8–7.3)
NEUTROPHILS ABSOLUTE: 3.4 E9/L (ref 1.8–7.3)
NEUTROPHILS ABSOLUTE: 3.7 E9/L (ref 1.8–7.3)
NEUTROPHILS ABSOLUTE: 3.72 E9/L (ref 1.8–7.3)
NEUTROPHILS ABSOLUTE: 4.55 E9/L (ref 1.8–7.3)
NEUTROPHILS ABSOLUTE: 5.44 E9/L (ref 1.8–7.3)
NEUTROPHILS ABSOLUTE: 5.45 E9/L (ref 1.8–7.3)
NEUTROPHILS ABSOLUTE: 5.48 E9/L (ref 1.8–7.3)
NEUTROPHILS ABSOLUTE: 5.53 E9/L (ref 1.8–7.3)
NEUTROPHILS ABSOLUTE: 5.59 E9/L (ref 1.8–7.3)
NEUTROPHILS ABSOLUTE: 5.61 E9/L (ref 1.8–7.3)
NEUTROPHILS ABSOLUTE: 5.81 E9/L (ref 1.8–7.3)
NEUTROPHILS ABSOLUTE: 6.38 E9/L (ref 1.8–7.3)
NEUTROPHILS ABSOLUTE: 6.61 E9/L (ref 1.8–7.3)
NEUTROPHILS ABSOLUTE: 6.8 E9/L (ref 1.8–7.3)
NEUTROPHILS ABSOLUTE: 7.42 E9/L (ref 1.8–7.3)
NEUTROPHILS ABSOLUTE: 7.82 E9/L (ref 1.8–7.3)
NEUTROPHILS ABSOLUTE: 8.19 E9/L (ref 1.8–7.3)
NEUTROPHILS ABSOLUTE: 8.46 E9/L (ref 1.8–7.3)
NEUTROPHILS ABSOLUTE: 8.76 E9/L (ref 1.8–7.3)
NEUTROPHILS ABSOLUTE: 9.98 E9/L (ref 1.8–7.3)
NEUTROPHILS RELATIVE PERCENT: 44.4 % (ref 43–80)
NEUTROPHILS RELATIVE PERCENT: 46.9 % (ref 43–80)
NEUTROPHILS RELATIVE PERCENT: 47.1 % (ref 43–80)
NEUTROPHILS RELATIVE PERCENT: 49.2 % (ref 43–80)
NEUTROPHILS RELATIVE PERCENT: 49.7 % (ref 43–80)
NEUTROPHILS RELATIVE PERCENT: 50.4 % (ref 43–80)
NEUTROPHILS RELATIVE PERCENT: 50.6 % (ref 43–80)
NEUTROPHILS RELATIVE PERCENT: 50.9 % (ref 43–80)
NEUTROPHILS RELATIVE PERCENT: 52.6 % (ref 43–80)
NEUTROPHILS RELATIVE PERCENT: 54.4 % (ref 43–80)
NEUTROPHILS RELATIVE PERCENT: 55.3 % (ref 43–80)
NEUTROPHILS RELATIVE PERCENT: 56.6 % (ref 43–80)
NEUTROPHILS RELATIVE PERCENT: 61.3 % (ref 43–80)
NEUTROPHILS RELATIVE PERCENT: 64.8 % (ref 43–80)
NEUTROPHILS RELATIVE PERCENT: 65.4 % (ref 43–80)
NEUTROPHILS RELATIVE PERCENT: 66 % (ref 43–80)
NEUTROPHILS RELATIVE PERCENT: 67.1 % (ref 43–80)
NEUTROPHILS RELATIVE PERCENT: 71.1 % (ref 43–80)
NEUTROPHILS RELATIVE PERCENT: 72.9 % (ref 43–80)
NEUTROPHILS RELATIVE PERCENT: 73 % (ref 43–80)
NEUTROPHILS RELATIVE PERCENT: 80.7 % (ref 43–80)
NEUTROPHILS RELATIVE PERCENT: 80.9 % (ref 43–80)
NITRITE, URINE: NEGATIVE
NUCLEATED RED BLOOD CELLS: 0.9 /100 WBC
NUCLEATED RED BLOOD CELLS: 0.9 /100 WBC
O2 CONTENT: 11.5 ML/DL
O2 CONTENT: 11.7 ML/DL
O2 CONTENT: 12 ML/DL
O2 CONTENT: 13.6 ML/DL
O2 CONTENT: 15.3 ML/DL
O2 SATURATION: 92.5 % (ref 92–98.5)
O2 SATURATION: 95 % (ref 92–98.5)
O2 SATURATION: 95.4 % (ref 92–98.5)
O2 SATURATION: 96 % (ref 92–98.5)
O2 SATURATION: 96.1 % (ref 92–98.5)
O2HB: 91.9 % (ref 94–97)
O2HB: 94.1 % (ref 94–97)
O2HB: 94.7 % (ref 94–97)
O2HB: 95.3 % (ref 94–97)
O2HB: 95.7 % (ref 94–97)
OPERATOR ID: 2325
OPERATOR ID: 2962
OPERATOR ID: ABNORMAL
OPIATE SCREEN URINE: NOT DETECTED
ORGANISM: ABNORMAL
ORGANISM: ABNORMAL
OSMOLALITY: 310 MOSM/KG (ref 285–310)
OVALOCYTES: ABNORMAL
OXYCODONE URINE: POSITIVE
PARAINFLUENZA VIRUS 1 BY PCR: NOT DETECTED
PARAINFLUENZA VIRUS 2 BY PCR: NOT DETECTED
PARAINFLUENZA VIRUS 3 BY PCR: NOT DETECTED
PARAINFLUENZA VIRUS 4 BY PCR: NOT DETECTED
PATIENT TEMP: 37 C
PCO2: 33.3 MMHG (ref 35–45)
PCO2: 39 MMHG (ref 35–45)
PCO2: 40.2 MMHG (ref 35–45)
PCO2: 43.1 MMHG (ref 35–45)
PCO2: 45 MMHG (ref 35–45)
PDW BLD-RTO: 18.7 FL (ref 11.5–15)
PDW BLD-RTO: 18.7 FL (ref 11.5–15)
PDW BLD-RTO: 18.8 FL (ref 11.5–15)
PDW BLD-RTO: 18.8 FL (ref 11.5–15)
PDW BLD-RTO: 19.3 FL (ref 11.5–15)
PDW BLD-RTO: 19.4 FL (ref 11.5–15)
PDW BLD-RTO: 19.4 FL (ref 11.5–15)
PDW BLD-RTO: 19.5 FL (ref 11.5–15)
PDW BLD-RTO: 19.6 FL (ref 11.5–15)
PDW BLD-RTO: 19.7 FL (ref 11.5–15)
PDW BLD-RTO: 19.7 FL (ref 11.5–15)
PDW BLD-RTO: 19.8 FL (ref 11.5–15)
PDW BLD-RTO: 19.9 FL (ref 11.5–15)
PDW BLD-RTO: 20.2 FL (ref 11.5–15)
PDW BLD-RTO: 20.3 FL (ref 11.5–15)
PDW BLD-RTO: 20.6 FL (ref 11.5–15)
PEEP/CPAP: 5 CMH2O
PEEP/CPAP: 8 CMH2O
PFO2: 1.2 MMHG/%
PFO2: 1.29 MMHG/%
PFO2: 2 MMHG/%
PFO2: 2.08 MMHG/%
PFO2: 2.21 MMHG/%
PH BLOOD GAS: 7.37 (ref 7.35–7.45)
PH BLOOD GAS: 7.39 (ref 7.35–7.45)
PH BLOOD GAS: 7.4 (ref 7.35–7.45)
PH UA: 5.5 (ref 5–9)
PH UA: 5.5 (ref 5–9)
PH UA: 6 (ref 5–9)
PHENCYCLIDINE SCREEN URINE: NOT DETECTED
PHOSPHORUS: 2.4 MG/DL (ref 2.5–4.5)
PHOSPHORUS: 2.4 MG/DL (ref 2.5–4.5)
PHOSPHORUS: 3.2 MG/DL (ref 2.5–4.5)
PHOSPHORUS: 3.6 MG/DL (ref 2.5–4.5)
PHOSPHORUS: 3.7 MG/DL (ref 2.5–4.5)
PLATELET # BLD: 240 E9/L (ref 130–450)
PLATELET # BLD: 252 E9/L (ref 130–450)
PLATELET # BLD: 271 E9/L (ref 130–450)
PLATELET # BLD: 291 E9/L (ref 130–450)
PLATELET # BLD: 298 E9/L (ref 130–450)
PLATELET # BLD: 303 E9/L (ref 130–450)
PLATELET # BLD: 309 E9/L (ref 130–450)
PLATELET # BLD: 310 E9/L (ref 130–450)
PLATELET # BLD: 324 E9/L (ref 130–450)
PLATELET # BLD: 324 E9/L (ref 130–450)
PLATELET # BLD: 344 E9/L (ref 130–450)
PLATELET # BLD: 344 E9/L (ref 130–450)
PLATELET # BLD: 345 E9/L (ref 130–450)
PLATELET # BLD: 347 E9/L (ref 130–450)
PLATELET # BLD: 349 E9/L (ref 130–450)
PLATELET # BLD: 351 E9/L (ref 130–450)
PLATELET # BLD: 354 E9/L (ref 130–450)
PLATELET # BLD: 354 E9/L (ref 130–450)
PLATELET # BLD: 359 E9/L (ref 130–450)
PLATELET # BLD: 364 E9/L (ref 130–450)
PLATELET # BLD: 366 E9/L (ref 130–450)
PLATELET # BLD: 374 E9/L (ref 130–450)
PLATELET # BLD: 383 E9/L (ref 130–450)
PLATELET # BLD: 384 E9/L (ref 130–450)
PLATELET # BLD: 416 E9/L (ref 130–450)
PLATELET # BLD: 419 E9/L (ref 130–450)
PLATELET # BLD: 456 E9/L (ref 130–450)
PMV BLD AUTO: 10 FL (ref 7–12)
PMV BLD AUTO: 10 FL (ref 7–12)
PMV BLD AUTO: 10.1 FL (ref 7–12)
PMV BLD AUTO: 10.2 FL (ref 7–12)
PMV BLD AUTO: 10.3 FL (ref 7–12)
PMV BLD AUTO: 10.3 FL (ref 7–12)
PMV BLD AUTO: 10.4 FL (ref 7–12)
PMV BLD AUTO: 10.5 FL (ref 7–12)
PMV BLD AUTO: 10.5 FL (ref 7–12)
PMV BLD AUTO: 10.6 FL (ref 7–12)
PMV BLD AUTO: 10.7 FL (ref 7–12)
PMV BLD AUTO: 10.8 FL (ref 7–12)
PMV BLD AUTO: 11 FL (ref 7–12)
PMV BLD AUTO: 11.1 FL (ref 7–12)
PMV BLD AUTO: 11.6 FL (ref 7–12)
PMV BLD AUTO: 9.1 FL (ref 7–12)
PMV BLD AUTO: 9.3 FL (ref 7–12)
PMV BLD AUTO: 9.4 FL (ref 7–12)
PMV BLD AUTO: 9.5 FL (ref 7–12)
PMV BLD AUTO: 9.5 FL (ref 7–12)
PMV BLD AUTO: 9.7 FL (ref 7–12)
PMV BLD AUTO: 9.7 FL (ref 7–12)
PMV BLD AUTO: 9.9 FL (ref 7–12)
PO2: 72.8 MMHG (ref 75–100)
PO2: 80.2 MMHG (ref 75–100)
PO2: 84.3 MMHG (ref 75–100)
PO2: 88.4 MMHG (ref 75–100)
PO2: 90.3 MMHG (ref 75–100)
POIKILOCYTES: ABNORMAL
POLYCHROMASIA: ABNORMAL
POTASSIUM SERPL-SCNC: 3.4 MMOL/L (ref 3.5–5)
POTASSIUM SERPL-SCNC: 3.4 MMOL/L (ref 3.5–5)
POTASSIUM SERPL-SCNC: 3.6 MMOL/L (ref 3.5–5)
POTASSIUM SERPL-SCNC: 3.7 MMOL/L (ref 3.5–5)
POTASSIUM SERPL-SCNC: 3.9 MMOL/L (ref 3.5–5)
POTASSIUM SERPL-SCNC: 4 MMOL/L (ref 3.5–5)
POTASSIUM SERPL-SCNC: 4.1 MMOL/L (ref 3.5–5)
POTASSIUM SERPL-SCNC: 4.27 MMOL/L (ref 3.5–5)
POTASSIUM SERPL-SCNC: 4.3 MMOL/L (ref 3.5–5)
POTASSIUM SERPL-SCNC: 4.3 MMOL/L (ref 3.5–5)
POTASSIUM SERPL-SCNC: 4.4 MMOL/L (ref 3.5–5)
POTASSIUM SERPL-SCNC: 4.5 MMOL/L (ref 3.5–5)
POTASSIUM SERPL-SCNC: 4.6 MMOL/L (ref 3.5–5)
POTASSIUM SERPL-SCNC: 4.6 MMOL/L (ref 3.5–5)
POTASSIUM SERPL-SCNC: 4.7 MMOL/L (ref 3.5–5)
POTASSIUM SERPL-SCNC: 4.7 MMOL/L (ref 3.5–5)
POTASSIUM SERPL-SCNC: 4.8 MMOL/L (ref 3.5–5)
PRO-BNP: 154 PG/ML (ref 0–125)
PRO-BNP: 33 PG/ML (ref 0–125)
PROCALCITONIN: 0.12 NG/ML (ref 0–0.08)
PROCALCITONIN: 0.38 NG/ML (ref 0–0.08)
PROTEIN UA: ABNORMAL MG/DL
PROTEIN UA: ABNORMAL MG/DL
PROTEIN UA: NEGATIVE MG/DL
PROTHROMBIN TIME: 13 SEC (ref 9.3–12.4)
PROTHROMBIN TIME: 14 SEC (ref 9.3–12.4)
PS: 12 CMH20
RBC # BLD: 2.73 E12/L (ref 3.8–5.8)
RBC # BLD: 2.79 E12/L (ref 3.8–5.8)
RBC # BLD: 2.81 E12/L (ref 3.8–5.8)
RBC # BLD: 2.81 E12/L (ref 3.8–5.8)
RBC # BLD: 2.82 E12/L (ref 3.8–5.8)
RBC # BLD: 2.85 E12/L (ref 3.8–5.8)
RBC # BLD: 2.95 E12/L (ref 3.8–5.8)
RBC # BLD: 2.96 E12/L (ref 3.8–5.8)
RBC # BLD: 2.98 E12/L (ref 3.8–5.8)
RBC # BLD: 2.99 E12/L (ref 3.8–5.8)
RBC # BLD: 3.03 E12/L (ref 3.8–5.8)
RBC # BLD: 3.13 E12/L (ref 3.8–5.8)
RBC # BLD: 3.13 E12/L (ref 3.8–5.8)
RBC # BLD: 3.16 E12/L (ref 3.8–5.8)
RBC # BLD: 3.18 E12/L (ref 3.8–5.8)
RBC # BLD: 3.21 E12/L (ref 3.8–5.8)
RBC # BLD: 3.26 E12/L (ref 3.8–5.8)
RBC # BLD: 3.39 E12/L (ref 3.8–5.8)
RBC # BLD: 3.4 E12/L (ref 3.8–5.8)
RBC # BLD: 3.42 E12/L (ref 3.8–5.8)
RBC # BLD: 3.43 E12/L (ref 3.8–5.8)
RBC # BLD: 3.44 E12/L (ref 3.8–5.8)
RBC # BLD: 3.53 E12/L (ref 3.8–5.8)
RBC # BLD: 3.54 E12/L (ref 3.8–5.8)
RBC # BLD: 3.6 E12/L (ref 3.8–5.8)
RBC # BLD: 3.98 E12/L (ref 3.8–5.8)
RBC # BLD: 4.05 E12/L (ref 3.8–5.8)
RBC UA: >20 /HPF (ref 0–2)
RBC UA: NORMAL /HPF (ref 0–2)
RESPIRATORY SYNCYTIAL VIRUS BY PCR: NOT DETECTED
RI(T): 1.67
RI(T): 1.68
RI(T): 1.87
RI(T): 3.82
RI(T): 4.14
ROULEAUX: ABNORMAL
RR MECHANICAL: 12 B/MIN
RR MECHANICAL: 16 B/MIN
SALICYLATE, SERUM: <0.3 MG/DL (ref 0–30)
SARS-COV-2, PCR: NOT DETECTED
SARS-COV-2, PCR: NOT DETECTED
SARS-COV-2: NOT DETECTED
SARS-COV-2: NOT DETECTED
SCHISTOCYTES: ABNORMAL
SMEAR, RESPIRATORY: ABNORMAL
SODIUM BLD-SCNC: 134 MMOL/L (ref 132–146)
SODIUM BLD-SCNC: 136 MMOL/L (ref 132–146)
SODIUM BLD-SCNC: 138 MMOL/L (ref 132–146)
SODIUM BLD-SCNC: 138 MMOL/L (ref 132–146)
SODIUM BLD-SCNC: 139 MMOL/L (ref 132–146)
SODIUM BLD-SCNC: 140 MMOL/L (ref 132–146)
SODIUM BLD-SCNC: 141 MMOL/L (ref 132–146)
SODIUM BLD-SCNC: 141 MMOL/L (ref 132–146)
SODIUM BLD-SCNC: 142 MMOL/L (ref 132–146)
SODIUM BLD-SCNC: 143 MMOL/L (ref 132–146)
SODIUM BLD-SCNC: 144 MMOL/L (ref 132–146)
SODIUM BLD-SCNC: 145 MMOL/L (ref 132–146)
SODIUM BLD-SCNC: 147 MMOL/L (ref 132–146)
SODIUM BLD-SCNC: 147 MMOL/L (ref 132–146)
SODIUM BLD-SCNC: 148 MMOL/L (ref 132–146)
SOURCE, BLOOD GAS: ABNORMAL
SOURCE, BLOOD GAS: NORMAL
SOURCE: NORMAL
SOURCE: NORMAL
SPECIFIC GRAVITY UA: 1.01 (ref 1–1.03)
SPECIFIC GRAVITY UA: 1.02 (ref 1–1.03)
SPECIFIC GRAVITY UA: 1.02 (ref 1–1.03)
STOMATOCYTES: ABNORMAL
TARGET CELLS: ABNORMAL
TEAR DROP CELLS: ABNORMAL
TEAR DROP CELLS: ABNORMAL
THB: 10 G/DL (ref 11.5–16.5)
THB: 11.5 G/DL (ref 11.5–16.5)
THB: 8.5 G/DL (ref 11.5–16.5)
THB: 8.6 G/DL (ref 11.5–16.5)
THB: 9.2 G/DL (ref 11.5–16.5)
TIME ANALYZED: 112
TIME ANALYZED: 241
TIME ANALYZED: 536
TIME ANALYZED: 549
TIME ANALYZED: 916
TOTAL PROTEIN: 6.2 G/DL (ref 6.4–8.3)
TOTAL PROTEIN: 6.7 G/DL (ref 6.4–8.3)
TOTAL PROTEIN: 6.8 G/DL (ref 6.4–8.3)
TOTAL PROTEIN: 7.1 G/DL (ref 6.4–8.3)
TOTAL PROTEIN: 7.2 G/DL (ref 6.4–8.3)
TOTAL PROTEIN: 7.4 G/DL (ref 6.4–8.3)
TOTAL PROTEIN: 7.4 G/DL (ref 6.4–8.3)
TOTAL PROTEIN: 7.5 G/DL (ref 6.4–8.3)
TOTAL PROTEIN: 7.6 G/DL (ref 6.4–8.3)
TOTAL PROTEIN: 7.8 G/DL (ref 6.4–8.3)
TOTAL PROTEIN: 8 G/DL (ref 6.4–8.3)
TRICYCLIC ANTIDEPRESSANTS SCREEN SERUM: NEGATIVE NG/ML
UROBILINOGEN, URINE: 0.2 E.U./DL
VANCOMYCIN TROUGH: 16.2 MCG/ML (ref 5–16)
VT MECHANICAL: 450 ML
VT MECHANICAL: 480 ML
WBC # BLD: 10.1 E9/L (ref 4.5–11.5)
WBC # BLD: 10.5 E9/L (ref 4.5–11.5)
WBC # BLD: 10.6 E9/L (ref 4.5–11.5)
WBC # BLD: 10.7 E9/L (ref 4.5–11.5)
WBC # BLD: 10.8 E9/L (ref 4.5–11.5)
WBC # BLD: 10.9 E9/L (ref 4.5–11.5)
WBC # BLD: 11.4 E9/L (ref 4.5–11.5)
WBC # BLD: 11.4 E9/L (ref 4.5–11.5)
WBC # BLD: 11.5 E9/L (ref 4.5–11.5)
WBC # BLD: 12 E9/L (ref 4.5–11.5)
WBC # BLD: 12.3 E9/L (ref 4.5–11.5)
WBC # BLD: 12.6 E9/L (ref 4.5–11.5)
WBC # BLD: 12.6 E9/L (ref 4.5–11.5)
WBC # BLD: 16.9 E9/L (ref 4.5–11.5)
WBC # BLD: 5.6 E9/L (ref 4.5–11.5)
WBC # BLD: 5.7 E9/L (ref 4.5–11.5)
WBC # BLD: 5.9 E9/L (ref 4.5–11.5)
WBC # BLD: 6.1 E9/L (ref 4.5–11.5)
WBC # BLD: 6.3 E9/L (ref 4.5–11.5)
WBC # BLD: 7.2 E9/L (ref 4.5–11.5)
WBC # BLD: 7.3 E9/L (ref 4.5–11.5)
WBC # BLD: 7.9 E9/L (ref 4.5–11.5)
WBC # BLD: 8.3 E9/L (ref 4.5–11.5)
WBC # BLD: 8.3 E9/L (ref 4.5–11.5)
WBC # BLD: 9 E9/L (ref 4.5–11.5)
WBC # BLD: 9.7 E9/L (ref 4.5–11.5)
WBC # BLD: 9.9 E9/L (ref 4.5–11.5)
WBC UA: ABNORMAL /HPF (ref 0–5)
WBC UA: NORMAL /HPF (ref 0–5)

## 2020-01-01 PROCEDURE — 97535 SELF CARE MNGMENT TRAINING: CPT

## 2020-01-01 PROCEDURE — 6370000000 HC RX 637 (ALT 250 FOR IP): Performed by: INTERNAL MEDICINE

## 2020-01-01 PROCEDURE — 2500000003 HC RX 250 WO HCPCS: Performed by: INTERNAL MEDICINE

## 2020-01-01 PROCEDURE — 2580000003 HC RX 258: Performed by: INTERNAL MEDICINE

## 2020-01-01 PROCEDURE — 97162 PT EVAL MOD COMPLEX 30 MIN: CPT

## 2020-01-01 PROCEDURE — 6360000002 HC RX W HCPCS: Performed by: INTERNAL MEDICINE

## 2020-01-01 PROCEDURE — 82962 GLUCOSE BLOOD TEST: CPT

## 2020-01-01 PROCEDURE — 2580000003 HC RX 258: Performed by: SPECIALIST

## 2020-01-01 PROCEDURE — 82330 ASSAY OF CALCIUM: CPT

## 2020-01-01 PROCEDURE — 2580000003 HC RX 258: Performed by: REGISTERED NURSE

## 2020-01-01 PROCEDURE — 6360000002 HC RX W HCPCS: Performed by: REGISTERED NURSE

## 2020-01-01 PROCEDURE — 6370000000 HC RX 637 (ALT 250 FOR IP): Performed by: SPECIALIST

## 2020-01-01 PROCEDURE — 36415 COLL VENOUS BLD VENIPUNCTURE: CPT

## 2020-01-01 PROCEDURE — 99232 SBSQ HOSP IP/OBS MODERATE 35: CPT | Performed by: CLINICAL NURSE SPECIALIST

## 2020-01-01 PROCEDURE — 93010 ELECTROCARDIOGRAM REPORT: CPT | Performed by: INTERNAL MEDICINE

## 2020-01-01 PROCEDURE — 97530 THERAPEUTIC ACTIVITIES: CPT

## 2020-01-01 PROCEDURE — 6360000002 HC RX W HCPCS

## 2020-01-01 PROCEDURE — 80053 COMPREHEN METABOLIC PANEL: CPT

## 2020-01-01 PROCEDURE — 2060000000 HC ICU INTERMEDIATE R&B

## 2020-01-01 PROCEDURE — 97167 OT EVAL HIGH COMPLEX 60 MIN: CPT

## 2020-01-01 PROCEDURE — C9113 INJ PANTOPRAZOLE SODIUM, VIA: HCPCS | Performed by: INTERNAL MEDICINE

## 2020-01-01 PROCEDURE — U0003 INFECTIOUS AGENT DETECTION BY NUCLEIC ACID (DNA OR RNA); SEVERE ACUTE RESPIRATORY SYNDROME CORONAVIRUS 2 (SARS-COV-2) (CORONAVIRUS DISEASE [COVID-19]), AMPLIFIED PROBE TECHNIQUE, MAKING USE OF HIGH THROUGHPUT TECHNOLOGIES AS DESCRIBED BY CMS-2020-01-R: HCPCS

## 2020-01-01 PROCEDURE — 84145 PROCALCITONIN (PCT): CPT

## 2020-01-01 PROCEDURE — 94640 AIRWAY INHALATION TREATMENT: CPT

## 2020-01-01 PROCEDURE — 94003 VENT MGMT INPAT SUBQ DAY: CPT

## 2020-01-01 PROCEDURE — 6360000004 HC RX CONTRAST MEDICATION: Performed by: RADIOLOGY

## 2020-01-01 PROCEDURE — 6370000000 HC RX 637 (ALT 250 FOR IP): Performed by: REGISTERED NURSE

## 2020-01-01 PROCEDURE — B5131ZA FLUOROSCOPY OF RIGHT JUGULAR VEINS USING LOW OSMOLAR CONTRAST, GUIDANCE: ICD-10-PCS | Performed by: RADIOLOGY

## 2020-01-01 PROCEDURE — 82805 BLOOD GASES W/O2 SATURATION: CPT

## 2020-01-01 PROCEDURE — 85025 COMPLETE CBC W/AUTO DIFF WBC: CPT

## 2020-01-01 PROCEDURE — 83690 ASSAY OF LIPASE: CPT

## 2020-01-01 PROCEDURE — 87040 BLOOD CULTURE FOR BACTERIA: CPT

## 2020-01-01 PROCEDURE — 71045 X-RAY EXAM CHEST 1 VIEW: CPT

## 2020-01-01 PROCEDURE — 85027 COMPLETE CBC AUTOMATED: CPT

## 2020-01-01 PROCEDURE — 99232 SBSQ HOSP IP/OBS MODERATE 35: CPT | Performed by: PHYSICIAN ASSISTANT

## 2020-01-01 PROCEDURE — 2700000000 HC OXYGEN THERAPY PER DAY

## 2020-01-01 PROCEDURE — 83880 ASSAY OF NATRIURETIC PEPTIDE: CPT

## 2020-01-01 PROCEDURE — 87070 CULTURE OTHR SPECIMN AEROBIC: CPT

## 2020-01-01 PROCEDURE — 95816 EEG AWAKE AND DROWSY: CPT

## 2020-01-01 PROCEDURE — 92609 USE OF SPEECH DEVICE SERVICE: CPT | Performed by: SPEECH-LANGUAGE PATHOLOGIST

## 2020-01-01 PROCEDURE — 74230 X-RAY XM SWLNG FUNCJ C+: CPT

## 2020-01-01 PROCEDURE — 93970 EXTREMITY STUDY: CPT | Performed by: RADIOLOGY

## 2020-01-01 PROCEDURE — 83930 ASSAY OF BLOOD OSMOLALITY: CPT

## 2020-01-01 PROCEDURE — 92526 ORAL FUNCTION THERAPY: CPT

## 2020-01-01 PROCEDURE — 93970 EXTREMITY STUDY: CPT

## 2020-01-01 PROCEDURE — 2500000003 HC RX 250 WO HCPCS: Performed by: STUDENT IN AN ORGANIZED HEALTH CARE EDUCATION/TRAINING PROGRAM

## 2020-01-01 PROCEDURE — 6360000002 HC RX W HCPCS: Performed by: RADIOLOGY

## 2020-01-01 PROCEDURE — 81291 MTHFR GENE: CPT

## 2020-01-01 PROCEDURE — 36592 COLLECT BLOOD FROM PICC: CPT

## 2020-01-01 PROCEDURE — 96374 THER/PROPH/DIAG INJ IV PUSH: CPT

## 2020-01-01 PROCEDURE — 0202U NFCT DS 22 TRGT SARS-COV-2: CPT

## 2020-01-01 PROCEDURE — 87186 SC STD MICRODIL/AGAR DIL: CPT

## 2020-01-01 PROCEDURE — 6360000002 HC RX W HCPCS: Performed by: EMERGENCY MEDICINE

## 2020-01-01 PROCEDURE — 84100 ASSAY OF PHOSPHORUS: CPT

## 2020-01-01 PROCEDURE — 99284 EMERGENCY DEPT VISIT MOD MDM: CPT

## 2020-01-01 PROCEDURE — 36558 INSERT TUNNELED CV CATH: CPT | Performed by: RADIOLOGY

## 2020-01-01 PROCEDURE — 6360000002 HC RX W HCPCS: Performed by: SPECIALIST

## 2020-01-01 PROCEDURE — 83735 ASSAY OF MAGNESIUM: CPT

## 2020-01-01 PROCEDURE — 81400 MOPATH PROCEDURE LEVEL 1: CPT

## 2020-01-01 PROCEDURE — 80307 DRUG TEST PRSMV CHEM ANLYZR: CPT

## 2020-01-01 PROCEDURE — 77001 FLUOROGUIDE FOR VEIN DEVICE: CPT | Performed by: RADIOLOGY

## 2020-01-01 PROCEDURE — 99285 EMERGENCY DEPT VISIT HI MDM: CPT

## 2020-01-01 PROCEDURE — 2500000003 HC RX 250 WO HCPCS: Performed by: RADIOLOGY

## 2020-01-01 PROCEDURE — 71275 CT ANGIOGRAPHY CHEST: CPT

## 2020-01-01 PROCEDURE — 36012 PLACE CATHETER IN VEIN: CPT

## 2020-01-01 PROCEDURE — 74176 CT ABD & PELVIS W/O CONTRAST: CPT

## 2020-01-01 PROCEDURE — 0JH63XZ INSERTION OF TUNNELED VASCULAR ACCESS DEVICE INTO CHEST SUBCUTANEOUS TISSUE AND FASCIA, PERCUTANEOUS APPROACH: ICD-10-PCS | Performed by: RADIOLOGY

## 2020-01-01 PROCEDURE — 4500000002 HC ER NO CHARGE

## 2020-01-01 PROCEDURE — 87324 CLOSTRIDIUM AG IA: CPT

## 2020-01-01 PROCEDURE — 75820 VEIN X-RAY ARM/LEG: CPT | Performed by: RADIOLOGY

## 2020-01-01 PROCEDURE — 83605 ASSAY OF LACTIC ACID: CPT

## 2020-01-01 PROCEDURE — 93005 ELECTROCARDIOGRAM TRACING: CPT | Performed by: EMERGENCY MEDICINE

## 2020-01-01 PROCEDURE — 74177 CT ABD & PELVIS W/CONTRAST: CPT

## 2020-01-01 PROCEDURE — 96375 TX/PRO/DX INJ NEW DRUG ADDON: CPT

## 2020-01-01 PROCEDURE — 93312 ECHO TRANSESOPHAGEAL: CPT

## 2020-01-01 PROCEDURE — 36600 WITHDRAWAL OF ARTERIAL BLOOD: CPT

## 2020-01-01 PROCEDURE — 81001 URINALYSIS AUTO W/SCOPE: CPT

## 2020-01-01 PROCEDURE — 36558 INSERT TUNNELED CV CATH: CPT

## 2020-01-01 PROCEDURE — 6370000000 HC RX 637 (ALT 250 FOR IP)

## 2020-01-01 PROCEDURE — 97166 OT EVAL MOD COMPLEX 45 MIN: CPT

## 2020-01-01 PROCEDURE — 85610 PROTHROMBIN TIME: CPT

## 2020-01-01 PROCEDURE — 75820 VEIN X-RAY ARM/LEG: CPT

## 2020-01-01 PROCEDURE — 99233 SBSQ HOSP IP/OBS HIGH 50: CPT | Performed by: INTERNAL MEDICINE

## 2020-01-01 PROCEDURE — 81003 URINALYSIS AUTO W/O SCOPE: CPT

## 2020-01-01 PROCEDURE — 85730 THROMBOPLASTIN TIME PARTIAL: CPT

## 2020-01-01 PROCEDURE — 76937 US GUIDE VASCULAR ACCESS: CPT

## 2020-01-01 PROCEDURE — 95816 EEG AWAKE AND DROWSY: CPT | Performed by: PSYCHIATRY & NEUROLOGY

## 2020-01-01 PROCEDURE — 2580000003 HC RX 258: Performed by: STUDENT IN AN ORGANIZED HEALTH CARE EDUCATION/TRAINING PROGRAM

## 2020-01-01 PROCEDURE — 36005 INJECTION EXT VENOGRAPHY: CPT | Performed by: RADIOLOGY

## 2020-01-01 PROCEDURE — 05HM33Z INSERTION OF INFUSION DEVICE INTO RIGHT INTERNAL JUGULAR VEIN, PERCUTANEOUS APPROACH: ICD-10-PCS | Performed by: RADIOLOGY

## 2020-01-01 PROCEDURE — B51M1ZZ FLUOROSCOPY OF RIGHT UPPER EXTREMITY VEINS USING LOW OSMOLAR CONTRAST: ICD-10-PCS | Performed by: RADIOLOGY

## 2020-01-01 PROCEDURE — 97530 THERAPEUTIC ACTIVITIES: CPT | Performed by: PHYSICAL THERAPIST

## 2020-01-01 PROCEDURE — 70450 CT HEAD/BRAIN W/O DYE: CPT

## 2020-01-01 PROCEDURE — 92611 MOTION FLUOROSCOPY/SWALLOW: CPT

## 2020-01-01 PROCEDURE — 80048 BASIC METABOLIC PNL TOTAL CA: CPT

## 2020-01-01 PROCEDURE — 2709999900 IR FLUORO GUIDED CVA DEVICE PLMT/REPLACE/REMOVAL

## 2020-01-01 PROCEDURE — G0480 DRUG TEST DEF 1-7 CLASSES: HCPCS

## 2020-01-01 PROCEDURE — 77001 FLUOROGUIDE FOR VEIN DEVICE: CPT

## 2020-01-01 PROCEDURE — 81241 F5 GENE: CPT

## 2020-01-01 PROCEDURE — 2580000003 HC RX 258

## 2020-01-01 PROCEDURE — 93321 DOPPLER ECHO F-UP/LMTD STD: CPT

## 2020-01-01 PROCEDURE — 2709999900 HC NON-CHARGEABLE SUPPLY

## 2020-01-01 PROCEDURE — 92597 ORAL SPEECH DEVICE EVAL: CPT | Performed by: SPEECH-LANGUAGE PATHOLOGIST

## 2020-01-01 PROCEDURE — 80202 ASSAY OF VANCOMYCIN: CPT

## 2020-01-01 PROCEDURE — 87206 SMEAR FLUORESCENT/ACID STAI: CPT

## 2020-01-01 PROCEDURE — 93325 DOPPLER ECHO COLOR FLOW MAPG: CPT

## 2020-01-01 PROCEDURE — 84132 ASSAY OF SERUM POTASSIUM: CPT

## 2020-01-01 PROCEDURE — 87449 NOS EACH ORGANISM AG IA: CPT

## 2020-01-01 PROCEDURE — 81240 F2 GENE: CPT

## 2020-01-01 PROCEDURE — 76937 US GUIDE VASCULAR ACCESS: CPT | Performed by: RADIOLOGY

## 2020-01-01 RX ORDER — PETROLATUM 42 G/100G
OINTMENT TOPICAL 2 TIMES DAILY PRN
Status: DISCONTINUED | OUTPATIENT
Start: 2020-01-01 | End: 2020-01-01 | Stop reason: HOSPADM

## 2020-01-01 RX ORDER — SENNA PLUS 8.6 MG/1
2 TABLET ORAL NIGHTLY
Status: DISCONTINUED | OUTPATIENT
Start: 2020-01-01 | End: 2020-01-01 | Stop reason: HOSPADM

## 2020-01-01 RX ORDER — SODIUM CHLORIDE 9 MG/ML
INJECTION, SOLUTION INTRAVENOUS CONTINUOUS PRN
Status: DISCONTINUED | OUTPATIENT
Start: 2020-01-01 | End: 2020-01-01 | Stop reason: SDUPTHER

## 2020-01-01 RX ORDER — QUETIAPINE FUMARATE 25 MG/1
25 TABLET, FILM COATED ORAL NIGHTLY
Status: COMPLETED | OUTPATIENT
Start: 2020-01-01 | End: 2020-01-01

## 2020-01-01 RX ORDER — POLYETHYLENE GLYCOL 3350 17 G/17G
17 POWDER, FOR SOLUTION ORAL DAILY PRN
Qty: 527 G | Refills: 1 | Status: SHIPPED | OUTPATIENT
Start: 2020-01-01 | End: 2021-01-01

## 2020-01-01 RX ORDER — FENTANYL CITRATE 50 UG/ML
INJECTION, SOLUTION INTRAMUSCULAR; INTRAVENOUS
Status: COMPLETED | OUTPATIENT
Start: 2020-01-01 | End: 2020-01-01

## 2020-01-01 RX ORDER — HYDROCODONE BITARTRATE AND ACETAMINOPHEN 5; 325 MG/1; MG/1
1 TABLET ORAL EVERY 6 HOURS PRN
Status: DISCONTINUED | OUTPATIENT
Start: 2020-01-01 | End: 2020-01-01 | Stop reason: HOSPADM

## 2020-01-01 RX ORDER — HYDROCODONE BITARTRATE AND ACETAMINOPHEN 5; 325 MG/1; MG/1
1 TABLET ORAL EVERY 6 HOURS PRN
Qty: 9 TABLET | Refills: 0
Start: 2020-01-01 | End: 2020-01-01

## 2020-01-01 RX ORDER — SODIUM CHLORIDE FOR INHALATION 3 %
2 VIAL, NEBULIZER (ML) INHALATION EVERY 4 HOURS
Qty: 300 ML | Refills: 0
Start: 2020-01-01 | End: 2021-01-01

## 2020-01-01 RX ORDER — FLUCONAZOLE 200 MG/1
200 TABLET ORAL DAILY
Qty: 14 TABLET | Refills: 0 | Status: ON HOLD | OUTPATIENT
Start: 2020-01-01 | End: 2020-01-01

## 2020-01-01 RX ORDER — CARVEDILOL 6.25 MG/1
12.5 TABLET ORAL 2 TIMES DAILY WITH MEALS
Status: DISCONTINUED | OUTPATIENT
Start: 2020-01-01 | End: 2020-01-01

## 2020-01-01 RX ORDER — HYDROCODONE BITARTRATE AND ACETAMINOPHEN 5; 325 MG/1; MG/1
1 TABLET ORAL EVERY 4 HOURS PRN
Status: DISCONTINUED | OUTPATIENT
Start: 2020-01-01 | End: 2020-01-01

## 2020-01-01 RX ORDER — CARVEDILOL 6.25 MG/1
6.25 TABLET ORAL 2 TIMES DAILY WITH MEALS
Qty: 60 TABLET | Refills: 3 | Status: ON HOLD | OUTPATIENT
Start: 2020-01-01 | End: 2021-01-01 | Stop reason: HOSPADM

## 2020-01-01 RX ORDER — BUDESONIDE 0.5 MG/2ML
0.5 INHALANT ORAL 2 TIMES DAILY
Qty: 60 AMPULE | Refills: 3 | Status: SHIPPED | OUTPATIENT
Start: 2020-01-01 | End: 2021-01-01

## 2020-01-01 RX ORDER — INSULIN GLARGINE 100 [IU]/ML
20 INJECTION, SOLUTION SUBCUTANEOUS NIGHTLY
COMMUNITY
End: 2021-01-01

## 2020-01-01 RX ORDER — VALPROIC ACID 250 MG/5ML
250 SOLUTION ORAL EVERY 8 HOURS SCHEDULED
Status: DISCONTINUED | OUTPATIENT
Start: 2020-01-01 | End: 2020-01-01 | Stop reason: HOSPADM

## 2020-01-01 RX ORDER — ARFORMOTEROL TARTRATE 15 UG/2ML
15 SOLUTION RESPIRATORY (INHALATION) 2 TIMES DAILY
Qty: 120 ML | Refills: 3 | Status: SHIPPED | OUTPATIENT
Start: 2020-01-01 | End: 2021-01-01

## 2020-01-01 RX ORDER — HEPARIN SODIUM 1000 [USP'U]/ML
5000 INJECTION, SOLUTION INTRAVENOUS; SUBCUTANEOUS PRN
Status: DISCONTINUED | OUTPATIENT
Start: 2020-01-01 | End: 2020-01-01 | Stop reason: ALTCHOICE

## 2020-01-01 RX ORDER — FLUCONAZOLE 100 MG/1
200 TABLET ORAL DAILY
Status: DISCONTINUED | OUTPATIENT
Start: 2020-01-01 | End: 2020-01-01 | Stop reason: HOSPADM

## 2020-01-01 RX ORDER — CARVEDILOL 6.25 MG/1
6.25 TABLET ORAL 2 TIMES DAILY WITH MEALS
Status: DISCONTINUED | OUTPATIENT
Start: 2020-01-01 | End: 2020-01-01 | Stop reason: HOSPADM

## 2020-01-01 RX ORDER — LINEZOLID 600 MG/1
600 TABLET, FILM COATED ORAL EVERY 12 HOURS SCHEDULED
Qty: 28 TABLET | Refills: 0 | Status: SHIPPED
Start: 2020-01-01 | End: 2020-01-01 | Stop reason: HOSPADM

## 2020-01-01 RX ORDER — CHLORDIAZEPOXIDE HYDROCHLORIDE 25 MG/1
25 CAPSULE, GELATIN COATED ORAL DAILY
Status: DISCONTINUED | OUTPATIENT
Start: 2020-01-01 | End: 2020-01-01

## 2020-01-01 RX ORDER — POLYVINYL ALCOHOL 14 MG/ML
1 SOLUTION/ DROPS OPHTHALMIC EVERY 4 HOURS PRN
Qty: 1 BOTTLE | Refills: 4 | Status: SHIPPED | OUTPATIENT
Start: 2020-01-01 | End: 2020-01-01

## 2020-01-01 RX ORDER — SODIUM CHLORIDE 0.9 % (FLUSH) 0.9 %
10 SYRINGE (ML) INJECTION EVERY 12 HOURS SCHEDULED
Status: CANCELLED | OUTPATIENT
Start: 2020-01-01

## 2020-01-01 RX ORDER — LABETALOL HYDROCHLORIDE 5 MG/ML
10 INJECTION, SOLUTION INTRAVENOUS EVERY 6 HOURS PRN
Status: DISCONTINUED | OUTPATIENT
Start: 2020-01-01 | End: 2020-01-01

## 2020-01-01 RX ORDER — IPRATROPIUM BROMIDE AND ALBUTEROL SULFATE 2.5; .5 MG/3ML; MG/3ML
3 SOLUTION RESPIRATORY (INHALATION)
Status: DISCONTINUED | OUTPATIENT
Start: 2020-01-01 | End: 2020-01-01 | Stop reason: HOSPADM

## 2020-01-01 RX ORDER — CHLORHEXIDINE GLUCONATE 0.12 MG/ML
15 RINSE ORAL 2 TIMES DAILY
Qty: 420 ML | Refills: 0 | Status: SHIPPED | OUTPATIENT
Start: 2020-01-01 | End: 2020-01-01

## 2020-01-01 RX ORDER — LEVOTHYROXINE SODIUM 0.05 MG/1
50 TABLET ORAL DAILY
Qty: 30 TABLET | Refills: 3 | Status: ON HOLD | OUTPATIENT
Start: 2020-01-01 | End: 2021-01-01

## 2020-01-01 RX ORDER — HYDRALAZINE HYDROCHLORIDE 25 MG/1
25 TABLET, FILM COATED ORAL EVERY 8 HOURS SCHEDULED
Status: DISCONTINUED | OUTPATIENT
Start: 2020-01-01 | End: 2020-01-01 | Stop reason: HOSPADM

## 2020-01-01 RX ORDER — QUETIAPINE FUMARATE 25 MG/1
25 TABLET, FILM COATED ORAL 2 TIMES DAILY
Status: DISCONTINUED | OUTPATIENT
Start: 2020-01-01 | End: 2020-01-01

## 2020-01-01 RX ORDER — MIDAZOLAM HYDROCHLORIDE 1 MG/ML
2 INJECTION INTRAMUSCULAR; INTRAVENOUS EVERY 4 HOURS PRN
Status: DISCONTINUED | OUTPATIENT
Start: 2020-01-01 | End: 2020-01-01 | Stop reason: SDUPTHER

## 2020-01-01 RX ORDER — SODIUM CHLORIDE 9 MG/ML
INJECTION, SOLUTION INTRAVENOUS CONTINUOUS
Status: DISCONTINUED | OUTPATIENT
Start: 2020-01-01 | End: 2020-01-01 | Stop reason: HOSPADM

## 2020-01-01 RX ORDER — DIPHENHYDRAMINE HCL 25 MG
25 TABLET ORAL EVERY 6 HOURS PRN
Status: DISCONTINUED | OUTPATIENT
Start: 2020-01-01 | End: 2020-01-01 | Stop reason: HOSPADM

## 2020-01-01 RX ORDER — ACETAMINOPHEN 650 MG/1
650 SUPPOSITORY RECTAL EVERY 6 HOURS PRN
Status: DISCONTINUED | OUTPATIENT
Start: 2020-01-01 | End: 2020-01-01 | Stop reason: HOSPADM

## 2020-01-01 RX ORDER — DEXTROSE MONOHYDRATE 25 G/50ML
25 INJECTION, SOLUTION INTRAVENOUS ONCE
Status: COMPLETED | OUTPATIENT
Start: 2020-01-01 | End: 2020-01-01

## 2020-01-01 RX ORDER — HEPARIN SODIUM 10000 [USP'U]/100ML
14.28 INJECTION, SOLUTION INTRAVENOUS CONTINUOUS
Status: DISCONTINUED | OUTPATIENT
Start: 2020-01-01 | End: 2020-01-01

## 2020-01-01 RX ORDER — HYDRALAZINE HYDROCHLORIDE 10 MG/1
10 TABLET, FILM COATED ORAL EVERY 8 HOURS SCHEDULED
Status: DISCONTINUED | OUTPATIENT
Start: 2020-01-01 | End: 2020-01-01

## 2020-01-01 RX ORDER — ARFORMOTEROL TARTRATE 15 UG/2ML
15 SOLUTION RESPIRATORY (INHALATION) 2 TIMES DAILY
Status: DISCONTINUED | OUTPATIENT
Start: 2020-01-01 | End: 2020-01-01 | Stop reason: HOSPADM

## 2020-01-01 RX ORDER — THIAMINE MONONITRATE (VIT B1) 100 MG
100 TABLET ORAL DAILY
Status: DISCONTINUED | OUTPATIENT
Start: 2020-01-01 | End: 2020-01-01 | Stop reason: HOSPADM

## 2020-01-01 RX ORDER — LINEZOLID 100 MG/5ML
600 SUSPENSION ORAL EVERY 12 HOURS SCHEDULED
Qty: 960 ML | Refills: 0 | Status: ON HOLD | OUTPATIENT
Start: 2020-01-01 | End: 2020-01-01 | Stop reason: HOSPADM

## 2020-01-01 RX ORDER — LABETALOL HYDROCHLORIDE 5 MG/ML
10 INJECTION, SOLUTION INTRAVENOUS EVERY 4 HOURS PRN
Status: DISCONTINUED | OUTPATIENT
Start: 2020-01-01 | End: 2020-01-01 | Stop reason: HOSPADM

## 2020-01-01 RX ORDER — SODIUM CHLORIDE 0.9 % (FLUSH) 0.9 %
10 SYRINGE (ML) INJECTION EVERY 12 HOURS SCHEDULED
Status: DISCONTINUED | OUTPATIENT
Start: 2020-01-01 | End: 2020-01-01 | Stop reason: HOSPADM

## 2020-01-01 RX ORDER — IPRATROPIUM BROMIDE AND ALBUTEROL SULFATE 2.5; .5 MG/3ML; MG/3ML
3 SOLUTION RESPIRATORY (INHALATION)
Qty: 360 ML | Refills: 0
Start: 2020-01-01 | End: 2021-01-01

## 2020-01-01 RX ORDER — PROPOFOL 10 MG/ML
INJECTION, EMULSION INTRAVENOUS PRN
Status: DISCONTINUED | OUTPATIENT
Start: 2020-01-01 | End: 2020-01-01 | Stop reason: SDUPTHER

## 2020-01-01 RX ORDER — HEPARIN SODIUM 10000 [USP'U]/ML
7500 INJECTION, SOLUTION INTRAVENOUS; SUBCUTANEOUS EVERY 8 HOURS
Qty: 1 VIAL | Refills: 0 | Status: ON HOLD | OUTPATIENT
Start: 2020-01-01 | End: 2020-01-01 | Stop reason: HOSPADM

## 2020-01-01 RX ORDER — ALPRAZOLAM 0.25 MG/1
0.25 TABLET ORAL 3 TIMES DAILY PRN
Status: DISCONTINUED | OUTPATIENT
Start: 2020-01-01 | End: 2020-01-01 | Stop reason: HOSPADM

## 2020-01-01 RX ORDER — LANSOPRAZOLE
30 KIT
Status: DISCONTINUED | OUTPATIENT
Start: 2020-01-01 | End: 2020-01-01 | Stop reason: HOSPADM

## 2020-01-01 RX ORDER — MIDAZOLAM HYDROCHLORIDE 1 MG/ML
INJECTION INTRAMUSCULAR; INTRAVENOUS
Status: COMPLETED | OUTPATIENT
Start: 2020-01-01 | End: 2020-01-01

## 2020-01-01 RX ORDER — PETROLATUM 42 G/100G
OINTMENT TOPICAL
Qty: 1 TUBE | Refills: 0 | Status: ON HOLD | OUTPATIENT
Start: 2020-01-01 | End: 2020-01-01 | Stop reason: HOSPADM

## 2020-01-01 RX ORDER — ONDANSETRON 2 MG/ML
4 INJECTION INTRAMUSCULAR; INTRAVENOUS EVERY 6 HOURS PRN
Status: DISCONTINUED | OUTPATIENT
Start: 2020-01-01 | End: 2020-01-01 | Stop reason: HOSPADM

## 2020-01-01 RX ORDER — DIVALPROEX SODIUM 125 MG/1
250 CAPSULE, COATED PELLETS ORAL EVERY 8 HOURS SCHEDULED
Qty: 60 CAPSULE | Refills: 3 | Status: SHIPPED | OUTPATIENT
Start: 2020-01-01 | End: 2021-01-01

## 2020-01-01 RX ORDER — SODIUM CHLORIDE 0.9 % (FLUSH) 0.9 %
10 SYRINGE (ML) INJECTION PRN
Status: CANCELLED | OUTPATIENT
Start: 2020-01-01

## 2020-01-01 RX ORDER — DIVALPROEX SODIUM 125 MG/1
250 CAPSULE, COATED PELLETS ORAL EVERY 8 HOURS SCHEDULED
Status: DISCONTINUED | OUTPATIENT
Start: 2020-01-01 | End: 2020-01-01 | Stop reason: ALTCHOICE

## 2020-01-01 RX ORDER — AMLODIPINE BESYLATE 10 MG/1
10 TABLET ORAL DAILY
Status: DISCONTINUED | OUTPATIENT
Start: 2020-01-01 | End: 2020-01-01 | Stop reason: HOSPADM

## 2020-01-01 RX ORDER — OXYCODONE HYDROCHLORIDE AND ACETAMINOPHEN 5; 325 MG/1; MG/1
1 TABLET ORAL EVERY 4 HOURS PRN
Status: DISCONTINUED | OUTPATIENT
Start: 2020-01-01 | End: 2020-01-01 | Stop reason: HOSPADM

## 2020-01-01 RX ORDER — MORPHINE SULFATE 4 MG/ML
4 INJECTION, SOLUTION INTRAMUSCULAR; INTRAVENOUS ONCE
Status: COMPLETED | OUTPATIENT
Start: 2020-01-01 | End: 2020-01-01

## 2020-01-01 RX ORDER — HEPARIN SODIUM (PORCINE) LOCK FLUSH IV SOLN 100 UNIT/ML 100 UNIT/ML
3 SOLUTION INTRAVENOUS EVERY 12 HOURS SCHEDULED
Status: CANCELLED | OUTPATIENT
Start: 2020-01-01

## 2020-01-01 RX ORDER — LIDOCAINE HYDROCHLORIDE 20 MG/ML
INJECTION, SOLUTION INFILTRATION; PERINEURAL
Status: COMPLETED | OUTPATIENT
Start: 2020-01-01 | End: 2020-01-01

## 2020-01-01 RX ORDER — DIVALPROEX SODIUM 125 MG/1
250 CAPSULE, COATED PELLETS ORAL EVERY 8 HOURS SCHEDULED
Status: DISCONTINUED | OUTPATIENT
Start: 2020-01-01 | End: 2020-01-01 | Stop reason: HOSPADM

## 2020-01-01 RX ORDER — PROMETHAZINE HYDROCHLORIDE 25 MG/1
12.5 TABLET ORAL EVERY 6 HOURS PRN
Status: DISCONTINUED | OUTPATIENT
Start: 2020-01-01 | End: 2020-01-01 | Stop reason: HOSPADM

## 2020-01-01 RX ORDER — DIVALPROEX SODIUM 250 MG/1
250 TABLET, DELAYED RELEASE ORAL EVERY 8 HOURS SCHEDULED
Status: DISCONTINUED | OUTPATIENT
Start: 2020-01-01 | End: 2020-01-01 | Stop reason: CLARIF

## 2020-01-01 RX ORDER — LEVOTHYROXINE SODIUM 0.05 MG/1
50 TABLET ORAL DAILY
Status: DISCONTINUED | OUTPATIENT
Start: 2020-01-01 | End: 2020-01-01 | Stop reason: HOSPADM

## 2020-01-01 RX ORDER — BUMETANIDE 0.25 MG/ML
1 INJECTION, SOLUTION INTRAMUSCULAR; INTRAVENOUS 2 TIMES DAILY
Status: DISCONTINUED | OUTPATIENT
Start: 2020-01-01 | End: 2020-01-01

## 2020-01-01 RX ORDER — CHLORHEXIDINE GLUCONATE 0.12 MG/ML
15 RINSE ORAL 2 TIMES DAILY
Qty: 420 ML | Refills: 0 | Status: ON HOLD | OUTPATIENT
Start: 2020-01-01 | End: 2020-01-01

## 2020-01-01 RX ORDER — POLYETHYLENE GLYCOL 3350 17 G/17G
17 POWDER, FOR SOLUTION ORAL DAILY PRN
Status: DISCONTINUED | OUTPATIENT
Start: 2020-01-01 | End: 2020-01-01 | Stop reason: SDUPTHER

## 2020-01-01 RX ORDER — MIDAZOLAM HYDROCHLORIDE 1 MG/ML
2 INJECTION INTRAMUSCULAR; INTRAVENOUS EVERY 4 HOURS PRN
Status: DISCONTINUED | OUTPATIENT
Start: 2020-01-01 | End: 2020-01-01 | Stop reason: HOSPADM

## 2020-01-01 RX ORDER — DIPHENHYDRAMINE HCL 25 MG
25 TABLET ORAL EVERY 6 HOURS PRN
Qty: 30 TABLET | Refills: 0 | COMMUNITY
Start: 2020-01-01 | End: 2021-01-01

## 2020-01-01 RX ORDER — FENTANYL CITRATE 50 UG/ML
100 INJECTION, SOLUTION INTRAMUSCULAR; INTRAVENOUS ONCE
Status: COMPLETED | OUTPATIENT
Start: 2020-01-01 | End: 2020-01-01

## 2020-01-01 RX ORDER — POLYETHYLENE GLYCOL 3350 17 G/17G
17 POWDER, FOR SOLUTION ORAL DAILY PRN
Status: DISCONTINUED | OUTPATIENT
Start: 2020-01-01 | End: 2020-01-01 | Stop reason: HOSPADM

## 2020-01-01 RX ORDER — LANSOPRAZOLE
30 KIT
Qty: 300 ML | Refills: 0 | Status: SHIPPED | OUTPATIENT
Start: 2020-01-01 | End: 2021-01-01

## 2020-01-01 RX ORDER — THIAMINE MONONITRATE (VIT B1) 100 MG
100 TABLET ORAL DAILY
Status: DISCONTINUED | OUTPATIENT
Start: 2020-01-01 | End: 2020-01-01

## 2020-01-01 RX ORDER — LANOLIN ALCOHOL/MO/W.PET/CERES
100 CREAM (GRAM) TOPICAL DAILY
Qty: 30 TABLET | Refills: 3 | Status: SHIPPED | OUTPATIENT
Start: 2020-01-01 | End: 2021-01-01

## 2020-01-01 RX ORDER — POLYVINYL ALCOHOL 14 MG/ML
1 SOLUTION/ DROPS OPHTHALMIC EVERY 4 HOURS PRN
Status: DISCONTINUED | OUTPATIENT
Start: 2020-01-01 | End: 2020-01-01 | Stop reason: HOSPADM

## 2020-01-01 RX ORDER — CHLORDIAZEPOXIDE HYDROCHLORIDE 25 MG/1
25 CAPSULE, GELATIN COATED ORAL EVERY 12 HOURS
Status: DISCONTINUED | OUTPATIENT
Start: 2020-01-01 | End: 2020-01-01

## 2020-01-01 RX ORDER — CHLORHEXIDINE GLUCONATE 0.12 MG/ML
15 RINSE ORAL 2 TIMES DAILY
Status: DISCONTINUED | OUTPATIENT
Start: 2020-01-01 | End: 2020-01-01 | Stop reason: HOSPADM

## 2020-01-01 RX ORDER — QUETIAPINE FUMARATE 25 MG/1
50 TABLET, FILM COATED ORAL 2 TIMES DAILY
Status: DISCONTINUED | OUTPATIENT
Start: 2020-01-01 | End: 2020-01-01

## 2020-01-01 RX ORDER — HYDRALAZINE HYDROCHLORIDE 25 MG/1
25 TABLET, FILM COATED ORAL 3 TIMES DAILY
Status: ON HOLD | COMMUNITY
End: 2020-01-01 | Stop reason: HOSPADM

## 2020-01-01 RX ORDER — FLUCONAZOLE 200 MG/1
200 TABLET ORAL DAILY
Qty: 14 TABLET | Refills: 0 | Status: SHIPPED | OUTPATIENT
Start: 2020-01-01 | End: 2020-01-01

## 2020-01-01 RX ORDER — ACETAMINOPHEN 325 MG/1
650 TABLET ORAL EVERY 6 HOURS PRN
Status: DISCONTINUED | OUTPATIENT
Start: 2020-01-01 | End: 2020-01-01 | Stop reason: HOSPADM

## 2020-01-01 RX ORDER — HEPARIN SODIUM 1000 [USP'U]/ML
10000 INJECTION, SOLUTION INTRAVENOUS; SUBCUTANEOUS ONCE
Status: COMPLETED | OUTPATIENT
Start: 2020-01-01 | End: 2020-01-01

## 2020-01-01 RX ORDER — SODIUM CHLORIDE 0.9 % (FLUSH) 0.9 %
10 SYRINGE (ML) INJECTION PRN
Status: DISCONTINUED | OUTPATIENT
Start: 2020-01-01 | End: 2020-01-01 | Stop reason: HOSPADM

## 2020-01-01 RX ORDER — BUDESONIDE 0.5 MG/2ML
0.5 INHALANT ORAL 2 TIMES DAILY
Status: DISCONTINUED | OUTPATIENT
Start: 2020-01-01 | End: 2020-01-01 | Stop reason: HOSPADM

## 2020-01-01 RX ORDER — DEXTROSE MONOHYDRATE 100 MG/ML
INJECTION, SOLUTION INTRAVENOUS CONTINUOUS
Status: ACTIVE | OUTPATIENT
Start: 2020-01-01 | End: 2020-01-01

## 2020-01-01 RX ORDER — POLYETHYLENE GLYCOL 3350 17 G/17G
17 POWDER, FOR SOLUTION ORAL DAILY PRN
Qty: 527 G | Refills: 1 | Status: ON HOLD | OUTPATIENT
Start: 2020-01-01 | End: 2020-01-01 | Stop reason: HOSPADM

## 2020-01-01 RX ORDER — LORAZEPAM 2 MG/ML
2 INJECTION INTRAMUSCULAR ONCE
Status: COMPLETED | OUTPATIENT
Start: 2020-01-01 | End: 2020-01-01

## 2020-01-01 RX ORDER — BUSPIRONE HYDROCHLORIDE 5 MG/1
5 TABLET ORAL 3 TIMES DAILY
Status: DISCONTINUED | OUTPATIENT
Start: 2020-01-01 | End: 2020-01-01

## 2020-01-01 RX ORDER — HYDRALAZINE HYDROCHLORIDE 50 MG/1
50 TABLET, FILM COATED ORAL EVERY 8 HOURS SCHEDULED
Status: DISCONTINUED | OUTPATIENT
Start: 2020-01-01 | End: 2020-01-01

## 2020-01-01 RX ORDER — LINEZOLID 100 MG/5ML
600 SUSPENSION ORAL EVERY 12 HOURS SCHEDULED
Status: DISCONTINUED | OUTPATIENT
Start: 2020-01-01 | End: 2020-01-01 | Stop reason: HOSPADM

## 2020-01-01 RX ORDER — LINEZOLID 600 MG/1
600 TABLET, FILM COATED ORAL EVERY 12 HOURS SCHEDULED
Status: DISCONTINUED | OUTPATIENT
Start: 2020-01-01 | End: 2020-01-01 | Stop reason: HOSPADM

## 2020-01-01 RX ORDER — NICOTINE POLACRILEX 4 MG
15 LOZENGE BUCCAL PRN
Qty: 45 G | Refills: 1 | Status: SHIPPED | OUTPATIENT
Start: 2020-01-01 | End: 2021-01-01

## 2020-01-01 RX ORDER — SENNA PLUS 8.6 MG/1
2 TABLET ORAL NIGHTLY
Qty: 60 TABLET | Refills: 0 | Status: SHIPPED | OUTPATIENT
Start: 2020-01-01 | End: 2020-01-01

## 2020-01-01 RX ORDER — HEPARIN SODIUM 1000 [USP'U]/ML
10000 INJECTION, SOLUTION INTRAVENOUS; SUBCUTANEOUS PRN
Status: DISCONTINUED | OUTPATIENT
Start: 2020-01-01 | End: 2020-01-01 | Stop reason: ALTCHOICE

## 2020-01-01 RX ORDER — COLCHICINE 0.6 MG/1
0.6 TABLET ORAL 2 TIMES DAILY PRN
Status: DISCONTINUED | OUTPATIENT
Start: 2020-01-01 | End: 2020-01-01 | Stop reason: HOSPADM

## 2020-01-01 RX ORDER — HEPARIN SODIUM (PORCINE) LOCK FLUSH IV SOLN 100 UNIT/ML 100 UNIT/ML
3 SOLUTION INTRAVENOUS PRN
Status: CANCELLED | OUTPATIENT
Start: 2020-01-01

## 2020-01-01 RX ADMIN — BUDESONIDE 500 MCG: 0.5 SUSPENSION RESPIRATORY (INHALATION) at 19:51

## 2020-01-01 RX ADMIN — BUDESONIDE 500 MCG: 0.5 SUSPENSION RESPIRATORY (INHALATION) at 08:36

## 2020-01-01 RX ADMIN — SODIUM CHLORIDE SOLN NEBU 3% 2 ML: 3 NEBU SOLN at 19:15

## 2020-01-01 RX ADMIN — ARFORMOTEROL TARTRATE 15 MCG: 15 SOLUTION RESPIRATORY (INHALATION) at 20:45

## 2020-01-01 RX ADMIN — LEVOTHYROXINE SODIUM 50 MCG: 0.05 TABLET ORAL at 05:30

## 2020-01-01 RX ADMIN — CHLORDIAZEPOXIDE HYDROCHLORIDE 25 MG: 25 CAPSULE ORAL at 08:56

## 2020-01-01 RX ADMIN — CHLORDIAZEPOXIDE HYDROCHLORIDE 25 MG: 25 CAPSULE ORAL at 22:43

## 2020-01-01 RX ADMIN — LEVOTHYROXINE SODIUM 50 MCG: 0.05 TABLET ORAL at 06:20

## 2020-01-01 RX ADMIN — HYDRALAZINE HYDROCHLORIDE 25 MG: 25 TABLET, FILM COATED ORAL at 22:06

## 2020-01-01 RX ADMIN — IPRATROPIUM BROMIDE AND ALBUTEROL SULFATE 1 AMPULE: 2.5; .5 SOLUTION RESPIRATORY (INHALATION) at 20:46

## 2020-01-01 RX ADMIN — DIVALPROEX SODIUM 250 MG: 125 CAPSULE, COATED PELLETS ORAL at 05:30

## 2020-01-01 RX ADMIN — CARVEDILOL 6.25 MG: 6.25 TABLET, FILM COATED ORAL at 10:06

## 2020-01-01 RX ADMIN — ARFORMOTEROL TARTRATE 15 MCG: 15 SOLUTION RESPIRATORY (INHALATION) at 09:51

## 2020-01-01 RX ADMIN — SODIUM CHLORIDE SOLN NEBU 3% 2 ML: 3 NEBU SOLN at 23:00

## 2020-01-01 RX ADMIN — IPRATROPIUM BROMIDE AND ALBUTEROL SULFATE 1 AMPULE: 2.5; .5 SOLUTION RESPIRATORY (INHALATION) at 16:24

## 2020-01-01 RX ADMIN — CHLORDIAZEPOXIDE HYDROCHLORIDE 25 MG: 25 CAPSULE ORAL at 00:34

## 2020-01-01 RX ADMIN — SODIUM CHLORIDE SOLN NEBU 3% 2 ML: 3 NEBU SOLN at 11:52

## 2020-01-01 RX ADMIN — IPRATROPIUM BROMIDE AND ALBUTEROL SULFATE 1 AMPULE: 2.5; .5 SOLUTION RESPIRATORY (INHALATION) at 11:19

## 2020-01-01 RX ADMIN — ALPRAZOLAM 0.25 MG: 0.25 TABLET ORAL at 21:00

## 2020-01-01 RX ADMIN — IPRATROPIUM BROMIDE AND ALBUTEROL SULFATE 3 ML: .5; 3 SOLUTION RESPIRATORY (INHALATION) at 12:20

## 2020-01-01 RX ADMIN — LINEZOLID 600 MG: 100 SUSPENSION ORAL at 08:23

## 2020-01-01 RX ADMIN — ARFORMOTEROL TARTRATE 15 MCG: 15 SOLUTION RESPIRATORY (INHALATION) at 14:29

## 2020-01-01 RX ADMIN — HYDROCODONE BITARTRATE AND ACETAMINOPHEN 1 TABLET: 5; 325 TABLET ORAL at 23:43

## 2020-01-01 RX ADMIN — DIVALPROEX SODIUM 250 MG: 125 CAPSULE, COATED PELLETS ORAL at 13:39

## 2020-01-01 RX ADMIN — PIPERACILLIN AND TAZOBACTAM 3.38 G: 3; .375 INJECTION, POWDER, LYOPHILIZED, FOR SOLUTION INTRAVENOUS at 20:14

## 2020-01-01 RX ADMIN — FENTANYL CITRATE 50 MCG: 50 INJECTION, SOLUTION INTRAMUSCULAR; INTRAVENOUS at 08:37

## 2020-01-01 RX ADMIN — BUDESONIDE 500 MCG: 0.5 SUSPENSION RESPIRATORY (INHALATION) at 21:10

## 2020-01-01 RX ADMIN — IPRATROPIUM BROMIDE AND ALBUTEROL SULFATE 1 AMPULE: 2.5; .5 SOLUTION RESPIRATORY (INHALATION) at 20:10

## 2020-01-01 RX ADMIN — SODIUM CHLORIDE SOLN NEBU 3% 2 ML: 3 NEBU SOLN at 08:35

## 2020-01-01 RX ADMIN — ARFORMOTEROL TARTRATE 15 MCG: 15 SOLUTION RESPIRATORY (INHALATION) at 09:37

## 2020-01-01 RX ADMIN — ARFORMOTEROL TARTRATE 15 MCG: 15 SOLUTION RESPIRATORY (INHALATION) at 20:52

## 2020-01-01 RX ADMIN — HYDRALAZINE HYDROCHLORIDE 10 MG: 20 INJECTION, SOLUTION INTRAMUSCULAR; INTRAVENOUS at 01:03

## 2020-01-01 RX ADMIN — POTASSIUM BICARBONATE 40 MEQ: 782 TABLET, EFFERVESCENT ORAL at 16:58

## 2020-01-01 RX ADMIN — SODIUM CHLORIDE: 9 INJECTION, SOLUTION INTRAVENOUS at 12:44

## 2020-01-01 RX ADMIN — BUDESONIDE 500 MCG: 0.5 SUSPENSION RESPIRATORY (INHALATION) at 19:49

## 2020-01-01 RX ADMIN — DIVALPROEX SODIUM 250 MG: 125 CAPSULE, COATED PELLETS ORAL at 13:23

## 2020-01-01 RX ADMIN — SODIUM CHLORIDE SOLN NEBU 3% 2 ML: 3 NEBU SOLN at 16:58

## 2020-01-01 RX ADMIN — DIVALPROEX SODIUM 250 MG: 125 CAPSULE, COATED PELLETS ORAL at 21:42

## 2020-01-01 RX ADMIN — CHLORHEXIDINE GLUCONATE 0.12% ORAL RINSE 15 ML: 1.2 LIQUID ORAL at 11:22

## 2020-01-01 RX ADMIN — Medication 10 ML: at 10:07

## 2020-01-01 RX ADMIN — Medication 10 ML: at 11:15

## 2020-01-01 RX ADMIN — SODIUM CHLORIDE SOLN NEBU 3% 2 ML: 3 NEBU SOLN at 08:44

## 2020-01-01 RX ADMIN — NYSTATIN 500000 UNITS: 100000 SUSPENSION ORAL at 09:08

## 2020-01-01 RX ADMIN — CARVEDILOL 6.25 MG: 6.25 TABLET, FILM COATED ORAL at 16:44

## 2020-01-01 RX ADMIN — Medication 10 ML: at 10:23

## 2020-01-01 RX ADMIN — IPRATROPIUM BROMIDE AND ALBUTEROL SULFATE 3 ML: .5; 3 SOLUTION RESPIRATORY (INHALATION) at 13:31

## 2020-01-01 RX ADMIN — HEPARIN SODIUM 7500 UNITS: 10000 INJECTION INTRAVENOUS; SUBCUTANEOUS at 08:08

## 2020-01-01 RX ADMIN — SODIUM CHLORIDE, PRESERVATIVE FREE 10 ML: 5 INJECTION INTRAVENOUS at 08:50

## 2020-01-01 RX ADMIN — ARFORMOTEROL TARTRATE 15 MCG: 15 SOLUTION RESPIRATORY (INHALATION) at 11:04

## 2020-01-01 RX ADMIN — HEPARIN SODIUM 7500 UNITS: 10000 INJECTION INTRAVENOUS; SUBCUTANEOUS at 15:40

## 2020-01-01 RX ADMIN — VALPROIC ACID 250 MG: 250 SOLUTION ORAL at 21:03

## 2020-01-01 RX ADMIN — DEXTROSE MONOHYDRATE 25 G: 25 INJECTION, SOLUTION INTRAVENOUS at 01:38

## 2020-01-01 RX ADMIN — HYDRALAZINE HYDROCHLORIDE 25 MG: 25 TABLET, FILM COATED ORAL at 13:39

## 2020-01-01 RX ADMIN — ARFORMOTEROL TARTRATE 15 MCG: 15 SOLUTION RESPIRATORY (INHALATION) at 19:15

## 2020-01-01 RX ADMIN — BUDESONIDE 500 MCG: 0.5 SUSPENSION RESPIRATORY (INHALATION) at 08:25

## 2020-01-01 RX ADMIN — CHLORHEXIDINE GLUCONATE 0.12% ORAL RINSE 15 ML: 1.2 LIQUID ORAL at 09:28

## 2020-01-01 RX ADMIN — DIVALPROEX SODIUM 250 MG: 125 CAPSULE, COATED PELLETS ORAL at 06:18

## 2020-01-01 RX ADMIN — SODIUM CHLORIDE SOLN NEBU 3% 2 ML: 3 NEBU SOLN at 07:41

## 2020-01-01 RX ADMIN — ARFORMOTEROL TARTRATE 15 MCG: 15 SOLUTION RESPIRATORY (INHALATION) at 20:50

## 2020-01-01 RX ADMIN — PANTOPRAZOLE SODIUM 40 MG: 40 INJECTION, POWDER, FOR SOLUTION INTRAVENOUS at 09:28

## 2020-01-01 RX ADMIN — INSULIN LISPRO 6 UNITS: 100 INJECTION, SOLUTION INTRAVENOUS; SUBCUTANEOUS at 00:28

## 2020-01-01 RX ADMIN — BUDESONIDE 500 MCG: 0.5 SUSPENSION RESPIRATORY (INHALATION) at 20:15

## 2020-01-01 RX ADMIN — CHLORHEXIDINE GLUCONATE 0.12% ORAL RINSE 15 ML: 1.2 LIQUID ORAL at 08:10

## 2020-01-01 RX ADMIN — SODIUM CHLORIDE, PRESERVATIVE FREE 300 UNITS: 5 INJECTION INTRAVENOUS at 19:58

## 2020-01-01 RX ADMIN — CARVEDILOL 3.12 MG: 6.25 TABLET, FILM COATED ORAL at 16:59

## 2020-01-01 RX ADMIN — ARFORMOTEROL TARTRATE 15 MCG: 15 SOLUTION RESPIRATORY (INHALATION) at 20:19

## 2020-01-01 RX ADMIN — Medication 200 MCG/HR: at 03:50

## 2020-01-01 RX ADMIN — FENTANYL CITRATE 50 MCG: 50 INJECTION, SOLUTION INTRAMUSCULAR; INTRAVENOUS at 08:59

## 2020-01-01 RX ADMIN — SODIUM CHLORIDE SOLN NEBU 3% 2 ML: 3 NEBU SOLN at 12:16

## 2020-01-01 RX ADMIN — IPRATROPIUM BROMIDE AND ALBUTEROL SULFATE 3 ML: .5; 3 SOLUTION RESPIRATORY (INHALATION) at 23:55

## 2020-01-01 RX ADMIN — CHLORHEXIDINE GLUCONATE 0.12% ORAL RINSE 15 ML: 1.2 LIQUID ORAL at 08:58

## 2020-01-01 RX ADMIN — SODIUM CHLORIDE SOLN NEBU 3% 2 ML: 3 NEBU SOLN at 11:40

## 2020-01-01 RX ADMIN — DIVALPROEX SODIUM 250 MG: 125 CAPSULE, COATED PELLETS ORAL at 17:16

## 2020-01-01 RX ADMIN — HYDRALAZINE HYDROCHLORIDE 25 MG: 25 TABLET, FILM COATED ORAL at 05:11

## 2020-01-01 RX ADMIN — SODIUM CHLORIDE, PRESERVATIVE FREE 300 UNITS: 5 INJECTION INTRAVENOUS at 22:44

## 2020-01-01 RX ADMIN — SENNOSIDES 17.2 MG: 8.6 TABLET, FILM COATED ORAL at 22:04

## 2020-01-01 RX ADMIN — OXYCODONE AND ACETAMINOPHEN 1 TABLET: 5; 325 TABLET ORAL at 12:41

## 2020-01-01 RX ADMIN — FENTANYL CITRATE 50 MCG: 50 INJECTION, SOLUTION INTRAMUSCULAR; INTRAVENOUS at 08:22

## 2020-01-01 RX ADMIN — HYDROCODONE BITARTRATE AND ACETAMINOPHEN 1 TABLET: 5; 325 TABLET ORAL at 09:35

## 2020-01-01 RX ADMIN — PANTOPRAZOLE SODIUM 40 MG: 40 INJECTION, POWDER, FOR SOLUTION INTRAVENOUS at 10:08

## 2020-01-01 RX ADMIN — BUDESONIDE 500 MCG: 0.5 SUSPENSION RESPIRATORY (INHALATION) at 08:07

## 2020-01-01 RX ADMIN — IPRATROPIUM BROMIDE AND ALBUTEROL SULFATE 3 ML: .5; 3 SOLUTION RESPIRATORY (INHALATION) at 20:52

## 2020-01-01 RX ADMIN — SODIUM CHLORIDE SOLN NEBU 3% 2 ML: 3 NEBU SOLN at 16:08

## 2020-01-01 RX ADMIN — INSULIN LISPRO 3 UNITS: 100 INJECTION, SOLUTION INTRAVENOUS; SUBCUTANEOUS at 00:17

## 2020-01-01 RX ADMIN — SODIUM CHLORIDE, PRESERVATIVE FREE 300 UNITS: 5 INJECTION INTRAVENOUS at 21:46

## 2020-01-01 RX ADMIN — SODIUM CHLORIDE, PRESERVATIVE FREE 300 UNITS: 5 INJECTION INTRAVENOUS at 09:06

## 2020-01-01 RX ADMIN — VALPROIC ACID 250 MG: 250 SOLUTION ORAL at 13:19

## 2020-01-01 RX ADMIN — IPRATROPIUM BROMIDE AND ALBUTEROL SULFATE 1 AMPULE: 2.5; .5 SOLUTION RESPIRATORY (INHALATION) at 02:01

## 2020-01-01 RX ADMIN — VALPROIC ACID 250 MG: 250 SOLUTION ORAL at 05:00

## 2020-01-01 RX ADMIN — SODIUM CHLORIDE 3 G: 900 INJECTION INTRAVENOUS at 14:42

## 2020-01-01 RX ADMIN — LANSOPRAZOLE 30 MG: KIT at 06:38

## 2020-01-01 RX ADMIN — IPRATROPIUM BROMIDE AND ALBUTEROL SULFATE 3 ML: .5; 3 SOLUTION RESPIRATORY (INHALATION) at 08:52

## 2020-01-01 RX ADMIN — BUDESONIDE 500 MCG: 0.5 SUSPENSION RESPIRATORY (INHALATION) at 09:52

## 2020-01-01 RX ADMIN — MEROPENEM 1 G: 1 INJECTION, POWDER, FOR SOLUTION INTRAVENOUS at 11:43

## 2020-01-01 RX ADMIN — HYDROCODONE BITARTRATE AND ACETAMINOPHEN 1 TABLET: 5; 325 TABLET ORAL at 09:05

## 2020-01-01 RX ADMIN — FOLIC ACID 1 MG: 5 INJECTION, SOLUTION INTRAMUSCULAR; INTRAVENOUS; SUBCUTANEOUS at 09:34

## 2020-01-01 RX ADMIN — IPRATROPIUM BROMIDE AND ALBUTEROL SULFATE 1 AMPULE: 2.5; .5 SOLUTION RESPIRATORY (INHALATION) at 16:08

## 2020-01-01 RX ADMIN — CHLORHEXIDINE GLUCONATE 0.12% ORAL RINSE 15 ML: 1.2 LIQUID ORAL at 20:46

## 2020-01-01 RX ADMIN — Medication 10 ML: at 21:03

## 2020-01-01 RX ADMIN — QUETIAPINE FUMARATE 25 MG: 25 TABLET ORAL at 09:13

## 2020-01-01 RX ADMIN — SODIUM CHLORIDE SOLN NEBU 3% 2 ML: 3 NEBU SOLN at 20:09

## 2020-01-01 RX ADMIN — IPRATROPIUM BROMIDE AND ALBUTEROL SULFATE 3 ML: .5; 3 SOLUTION RESPIRATORY (INHALATION) at 17:02

## 2020-01-01 RX ADMIN — ARFORMOTEROL TARTRATE 15 MCG: 15 SOLUTION RESPIRATORY (INHALATION) at 08:25

## 2020-01-01 RX ADMIN — HYDRALAZINE HYDROCHLORIDE 25 MG: 25 TABLET, FILM COATED ORAL at 05:29

## 2020-01-01 RX ADMIN — SODIUM CHLORIDE SOLN NEBU 3% 4 ML: 3 NEBU SOLN at 05:25

## 2020-01-01 RX ADMIN — Medication 10 ML: at 15:23

## 2020-01-01 RX ADMIN — LABETALOL HYDROCHLORIDE 10 MG: 5 INJECTION, SOLUTION INTRAVENOUS at 10:07

## 2020-01-01 RX ADMIN — Medication 10 ML: at 11:38

## 2020-01-01 RX ADMIN — DIVALPROEX SODIUM 250 MG: 125 CAPSULE, COATED PELLETS ORAL at 20:16

## 2020-01-01 RX ADMIN — Medication 100 MG: at 09:23

## 2020-01-01 RX ADMIN — ARFORMOTEROL TARTRATE 15 MCG: 15 SOLUTION RESPIRATORY (INHALATION) at 21:10

## 2020-01-01 RX ADMIN — Medication 10 ML: at 19:58

## 2020-01-01 RX ADMIN — CHLORHEXIDINE GLUCONATE 0.12% ORAL RINSE 15 ML: 1.2 LIQUID ORAL at 09:20

## 2020-01-01 RX ADMIN — CARVEDILOL 6.25 MG: 6.25 TABLET, FILM COATED ORAL at 08:27

## 2020-01-01 RX ADMIN — CHLORHEXIDINE GLUCONATE 0.12% ORAL RINSE 15 ML: 1.2 LIQUID ORAL at 20:58

## 2020-01-01 RX ADMIN — HEPARIN SODIUM 7500 UNITS: 10000 INJECTION INTRAVENOUS; SUBCUTANEOUS at 17:49

## 2020-01-01 RX ADMIN — BUDESONIDE 500 MCG: 0.5 SUSPENSION RESPIRATORY (INHALATION) at 20:30

## 2020-01-01 RX ADMIN — DEXTROSE MONOHYDRATE 25 G: 25 INJECTION, SOLUTION INTRAVENOUS at 03:07

## 2020-01-01 RX ADMIN — SODIUM CHLORIDE SOLN NEBU 3% 4 ML: 3 NEBU SOLN at 11:05

## 2020-01-01 RX ADMIN — HEPARIN SODIUM 7500 UNITS: 10000 INJECTION INTRAVENOUS; SUBCUTANEOUS at 08:56

## 2020-01-01 RX ADMIN — CARVEDILOL 6.25 MG: 6.25 TABLET, FILM COATED ORAL at 17:42

## 2020-01-01 RX ADMIN — HYDRALAZINE HYDROCHLORIDE 10 MG: 20 INJECTION, SOLUTION INTRAMUSCULAR; INTRAVENOUS at 22:31

## 2020-01-01 RX ADMIN — HYDROCODONE BITARTRATE AND ACETAMINOPHEN 1 TABLET: 5; 325 TABLET ORAL at 05:59

## 2020-01-01 RX ADMIN — Medication 10 ML: at 08:57

## 2020-01-01 RX ADMIN — LEVOTHYROXINE SODIUM 50 MCG: 0.05 TABLET ORAL at 05:52

## 2020-01-01 RX ADMIN — IPRATROPIUM BROMIDE AND ALBUTEROL SULFATE 3 ML: .5; 3 SOLUTION RESPIRATORY (INHALATION) at 13:10

## 2020-01-01 RX ADMIN — SENNOSIDES 17.2 MG: 8.6 TABLET, FILM COATED ORAL at 22:43

## 2020-01-01 RX ADMIN — QUETIAPINE FUMARATE 25 MG: 25 TABLET ORAL at 21:42

## 2020-01-01 RX ADMIN — HEPARIN SODIUM 7500 UNITS: 10000 INJECTION INTRAVENOUS; SUBCUTANEOUS at 23:18

## 2020-01-01 RX ADMIN — LANSOPRAZOLE 30 MG: KIT at 05:01

## 2020-01-01 RX ADMIN — Medication 10 ML: at 20:15

## 2020-01-01 RX ADMIN — THIAMINE HYDROCHLORIDE 250 MG: 100 INJECTION, SOLUTION INTRAMUSCULAR; INTRAVENOUS at 13:29

## 2020-01-01 RX ADMIN — LINEZOLID 600 MG: 100 SUSPENSION ORAL at 09:37

## 2020-01-01 RX ADMIN — IPRATROPIUM BROMIDE AND ALBUTEROL SULFATE 3 ML: .5; 3 SOLUTION RESPIRATORY (INHALATION) at 20:40

## 2020-01-01 RX ADMIN — AMLODIPINE BESYLATE 10 MG: 10 TABLET ORAL at 08:12

## 2020-01-01 RX ADMIN — ARFORMOTEROL TARTRATE 15 MCG: 15 SOLUTION RESPIRATORY (INHALATION) at 08:46

## 2020-01-01 RX ADMIN — SODIUM CHLORIDE SOLN NEBU 3% 2 ML: 3 NEBU SOLN at 16:45

## 2020-01-01 RX ADMIN — MIDAZOLAM 1 MG: 1 INJECTION INTRAMUSCULAR; INTRAVENOUS at 08:22

## 2020-01-01 RX ADMIN — DEXTROSE MONOHYDRATE 25 G: 25 INJECTION, SOLUTION INTRAVENOUS at 03:41

## 2020-01-01 RX ADMIN — INSULIN LISPRO 3 UNITS: 100 INJECTION, SOLUTION INTRAVENOUS; SUBCUTANEOUS at 08:10

## 2020-01-01 RX ADMIN — SODIUM CHLORIDE SOLN NEBU 3% 2 ML: 3 NEBU SOLN at 00:28

## 2020-01-01 RX ADMIN — QUETIAPINE FUMARATE 25 MG: 25 TABLET ORAL at 08:58

## 2020-01-01 RX ADMIN — AMLODIPINE BESYLATE 10 MG: 10 TABLET ORAL at 09:13

## 2020-01-01 RX ADMIN — Medication 10 ML: at 09:36

## 2020-01-01 RX ADMIN — Medication 10 ML: at 22:07

## 2020-01-01 RX ADMIN — Medication 100 MG: at 08:12

## 2020-01-01 RX ADMIN — SODIUM CHLORIDE: 9 INJECTION, SOLUTION INTRAVENOUS at 12:14

## 2020-01-01 RX ADMIN — QUETIAPINE FUMARATE 50 MG: 25 TABLET ORAL at 09:01

## 2020-01-01 RX ADMIN — CHLORHEXIDINE GLUCONATE 0.12% ORAL RINSE 15 ML: 1.2 LIQUID ORAL at 09:35

## 2020-01-01 RX ADMIN — IPRATROPIUM BROMIDE AND ALBUTEROL SULFATE 1 AMPULE: 2.5; .5 SOLUTION RESPIRATORY (INHALATION) at 11:49

## 2020-01-01 RX ADMIN — SODIUM CHLORIDE, PRESERVATIVE FREE 300 UNITS: 5 INJECTION INTRAVENOUS at 21:18

## 2020-01-01 RX ADMIN — IPRATROPIUM BROMIDE AND ALBUTEROL SULFATE 1 AMPULE: 2.5; .5 SOLUTION RESPIRATORY (INHALATION) at 16:30

## 2020-01-01 RX ADMIN — DIVALPROEX SODIUM 250 MG: 125 CAPSULE, COATED PELLETS ORAL at 16:58

## 2020-01-01 RX ADMIN — INSULIN LISPRO 3 UNITS: 100 INJECTION, SOLUTION INTRAVENOUS; SUBCUTANEOUS at 16:47

## 2020-01-01 RX ADMIN — STANDARDIZED SENNA CONCENTRATE 17.2 MG: 8.6 TABLET ORAL at 21:14

## 2020-01-01 RX ADMIN — HEPARIN SODIUM 7500 UNITS: 10000 INJECTION INTRAVENOUS; SUBCUTANEOUS at 23:32

## 2020-01-01 RX ADMIN — MIDAZOLAM 1 MG: 1 INJECTION INTRAMUSCULAR; INTRAVENOUS at 08:32

## 2020-01-01 RX ADMIN — IPRATROPIUM BROMIDE AND ALBUTEROL SULFATE 1 AMPULE: 2.5; .5 SOLUTION RESPIRATORY (INHALATION) at 19:52

## 2020-01-01 RX ADMIN — BUMETANIDE 1 MG: 0.25 INJECTION INTRAMUSCULAR; INTRAVENOUS at 10:06

## 2020-01-01 RX ADMIN — ARFORMOTEROL TARTRATE 15 MCG: 15 SOLUTION RESPIRATORY (INHALATION) at 08:07

## 2020-01-01 RX ADMIN — CARVEDILOL 6.25 MG: 6.25 TABLET, FILM COATED ORAL at 08:58

## 2020-01-01 RX ADMIN — LEVOTHYROXINE SODIUM 50 MCG: 0.05 TABLET ORAL at 10:07

## 2020-01-01 RX ADMIN — IOVERSOL 10 ML: 678 INJECTION INTRA-ARTERIAL; INTRAVENOUS at 10:55

## 2020-01-01 RX ADMIN — FENTANYL CITRATE 50 MCG: 50 INJECTION, SOLUTION INTRAMUSCULAR; INTRAVENOUS at 09:15

## 2020-01-01 RX ADMIN — CHLORHEXIDINE GLUCONATE 0.12% ORAL RINSE 15 ML: 1.2 LIQUID ORAL at 09:04

## 2020-01-01 RX ADMIN — NYSTATIN 500000 UNITS: 100000 SUSPENSION ORAL at 18:24

## 2020-01-01 RX ADMIN — SODIUM CHLORIDE SOLN NEBU 3% 2 ML: 3 NEBU SOLN at 16:20

## 2020-01-01 RX ADMIN — SODIUM CHLORIDE, PRESERVATIVE FREE 10 ML: 5 INJECTION INTRAVENOUS at 08:58

## 2020-01-01 RX ADMIN — CHLORHEXIDINE GLUCONATE 0.12% ORAL RINSE 15 ML: 1.2 LIQUID ORAL at 08:23

## 2020-01-01 RX ADMIN — SODIUM CHLORIDE SOLN NEBU 3% 2 ML: 3 NEBU SOLN at 20:10

## 2020-01-01 RX ADMIN — HYDRALAZINE HYDROCHLORIDE 10 MG: 20 INJECTION, SOLUTION INTRAMUSCULAR; INTRAVENOUS at 15:24

## 2020-01-01 RX ADMIN — CARVEDILOL 3.12 MG: 6.25 TABLET, FILM COATED ORAL at 18:14

## 2020-01-01 RX ADMIN — BUDESONIDE 500 MCG: 0.5 SUSPENSION RESPIRATORY (INHALATION) at 10:32

## 2020-01-01 RX ADMIN — Medication 10 ML: at 08:52

## 2020-01-01 RX ADMIN — Medication 10 ML: at 10:09

## 2020-01-01 RX ADMIN — IPRATROPIUM BROMIDE AND ALBUTEROL SULFATE 3 ML: .5; 3 SOLUTION RESPIRATORY (INHALATION) at 09:21

## 2020-01-01 RX ADMIN — SODIUM CHLORIDE, PRESERVATIVE FREE 300 UNITS: 5 INJECTION INTRAVENOUS at 21:50

## 2020-01-01 RX ADMIN — IPRATROPIUM BROMIDE AND ALBUTEROL SULFATE 1 AMPULE: 2.5; .5 SOLUTION RESPIRATORY (INHALATION) at 08:43

## 2020-01-01 RX ADMIN — BUMETANIDE 1 MG: 0.25 INJECTION INTRAMUSCULAR; INTRAVENOUS at 08:10

## 2020-01-01 RX ADMIN — LINEZOLID 600 MG: 600 TABLET, FILM COATED ORAL at 11:21

## 2020-01-01 RX ADMIN — IPRATROPIUM BROMIDE AND ALBUTEROL SULFATE 1 AMPULE: 2.5; .5 SOLUTION RESPIRATORY (INHALATION) at 08:10

## 2020-01-01 RX ADMIN — AMLODIPINE BESYLATE 10 MG: 10 TABLET ORAL at 08:24

## 2020-01-01 RX ADMIN — Medication 100 MG: at 11:21

## 2020-01-01 RX ADMIN — CARVEDILOL 6.25 MG: 6.25 TABLET, FILM COATED ORAL at 17:54

## 2020-01-01 RX ADMIN — CARVEDILOL 3.12 MG: 6.25 TABLET, FILM COATED ORAL at 15:48

## 2020-01-01 RX ADMIN — PIPERACILLIN AND TAZOBACTAM 3.38 G: 3; .375 INJECTION, POWDER, LYOPHILIZED, FOR SOLUTION INTRAVENOUS at 04:10

## 2020-01-01 RX ADMIN — ARFORMOTEROL TARTRATE 15 MCG: 15 SOLUTION RESPIRATORY (INHALATION) at 20:30

## 2020-01-01 RX ADMIN — SODIUM CHLORIDE SOLN NEBU 3% 2 ML: 3 NEBU SOLN at 11:49

## 2020-01-01 RX ADMIN — HYDROCODONE BITARTRATE AND ACETAMINOPHEN 1 TABLET: 5; 325 TABLET ORAL at 09:16

## 2020-01-01 RX ADMIN — IPRATROPIUM BROMIDE AND ALBUTEROL SULFATE 1 AMPULE: 2.5; .5 SOLUTION RESPIRATORY (INHALATION) at 16:27

## 2020-01-01 RX ADMIN — DIVALPROEX SODIUM 250 MG: 125 CAPSULE, COATED PELLETS ORAL at 05:56

## 2020-01-01 RX ADMIN — OXYCODONE AND ACETAMINOPHEN 1 TABLET: 5; 325 TABLET ORAL at 05:23

## 2020-01-01 RX ADMIN — SODIUM CHLORIDE SOLN NEBU 3% 2 ML: 3 NEBU SOLN at 09:38

## 2020-01-01 RX ADMIN — QUETIAPINE FUMARATE 25 MG: 25 TABLET ORAL at 20:57

## 2020-01-01 RX ADMIN — Medication 10 ML: at 10:05

## 2020-01-01 RX ADMIN — QUETIAPINE FUMARATE 100 MG: 100 TABLET ORAL at 08:15

## 2020-01-01 RX ADMIN — CARVEDILOL 6.25 MG: 6.25 TABLET, FILM COATED ORAL at 16:55

## 2020-01-01 RX ADMIN — PANTOPRAZOLE SODIUM 40 MG: 40 INJECTION, POWDER, FOR SOLUTION INTRAVENOUS at 10:13

## 2020-01-01 RX ADMIN — HYDRALAZINE HYDROCHLORIDE 25 MG: 25 TABLET, FILM COATED ORAL at 05:55

## 2020-01-01 RX ADMIN — Medication 10 ML: at 22:43

## 2020-01-01 RX ADMIN — HEPARIN SODIUM 7500 UNITS: 10000 INJECTION INTRAVENOUS; SUBCUTANEOUS at 00:14

## 2020-01-01 RX ADMIN — HEPARIN SODIUM 7500 UNITS: 10000 INJECTION INTRAVENOUS; SUBCUTANEOUS at 16:25

## 2020-01-01 RX ADMIN — CHLORHEXIDINE GLUCONATE 0.12% ORAL RINSE 15 ML: 1.2 LIQUID ORAL at 09:00

## 2020-01-01 RX ADMIN — CHLORDIAZEPOXIDE HYDROCHLORIDE 25 MG: 25 CAPSULE ORAL at 08:16

## 2020-01-01 RX ADMIN — IPRATROPIUM BROMIDE AND ALBUTEROL SULFATE 1 AMPULE: 2.5; .5 SOLUTION RESPIRATORY (INHALATION) at 20:30

## 2020-01-01 RX ADMIN — PIPERACILLIN AND TAZOBACTAM 3.38 G: 3; .375 INJECTION, POWDER, LYOPHILIZED, FOR SOLUTION INTRAVENOUS at 03:00

## 2020-01-01 RX ADMIN — TRIMETHOBENZAMIDE HYDROCHLORIDE 200 MG: 100 INJECTION INTRAMUSCULAR at 08:51

## 2020-01-01 RX ADMIN — IPRATROPIUM BROMIDE AND ALBUTEROL SULFATE 3 ML: .5; 3 SOLUTION RESPIRATORY (INHALATION) at 16:45

## 2020-01-01 RX ADMIN — THIAMINE HYDROCHLORIDE 250 MG: 100 INJECTION, SOLUTION INTRAMUSCULAR; INTRAVENOUS at 12:29

## 2020-01-01 RX ADMIN — CARVEDILOL 6.25 MG: 6.25 TABLET, FILM COATED ORAL at 08:06

## 2020-01-01 RX ADMIN — DIVALPROEX SODIUM 250 MG: 125 CAPSULE, COATED PELLETS ORAL at 06:40

## 2020-01-01 RX ADMIN — HEPARIN SODIUM 7500 UNITS: 10000 INJECTION INTRAVENOUS; SUBCUTANEOUS at 09:00

## 2020-01-01 RX ADMIN — Medication 10 ML: at 22:01

## 2020-01-01 RX ADMIN — THIAMINE HYDROCHLORIDE 250 MG: 100 INJECTION, SOLUTION INTRAMUSCULAR; INTRAVENOUS at 10:12

## 2020-01-01 RX ADMIN — NYSTATIN 500000 UNITS: 100000 SUSPENSION ORAL at 20:06

## 2020-01-01 RX ADMIN — BUDESONIDE 500 MCG: 0.5 SUSPENSION RESPIRATORY (INHALATION) at 07:51

## 2020-01-01 RX ADMIN — SODIUM CHLORIDE SOLN NEBU 3% 2 ML: 3 NEBU SOLN at 20:47

## 2020-01-01 RX ADMIN — DIVALPROEX SODIUM 250 MG: 125 CAPSULE, COATED PELLETS ORAL at 06:46

## 2020-01-01 RX ADMIN — ARFORMOTEROL TARTRATE 15 MCG: 15 SOLUTION RESPIRATORY (INHALATION) at 08:11

## 2020-01-01 RX ADMIN — SODIUM CHLORIDE, PRESERVATIVE FREE 300 UNITS: 5 INJECTION INTRAVENOUS at 08:41

## 2020-01-01 RX ADMIN — ARFORMOTEROL TARTRATE 15 MCG: 15 SOLUTION RESPIRATORY (INHALATION) at 08:43

## 2020-01-01 RX ADMIN — DIPHENHYDRAMINE HYDROCHLORIDE 25 MG: 25 TABLET ORAL at 08:12

## 2020-01-01 RX ADMIN — INSULIN LISPRO 3 UNITS: 100 INJECTION, SOLUTION INTRAVENOUS; SUBCUTANEOUS at 06:18

## 2020-01-01 RX ADMIN — SODIUM CHLORIDE SOLN NEBU 3% 2 ML: 3 NEBU SOLN at 00:44

## 2020-01-01 RX ADMIN — SENNOSIDES 17.2 MG: 8.6 TABLET, FILM COATED ORAL at 20:46

## 2020-01-01 RX ADMIN — SODIUM CHLORIDE, PRESERVATIVE FREE 10 ML: 5 INJECTION INTRAVENOUS at 12:13

## 2020-01-01 RX ADMIN — Medication 10 ML: at 09:09

## 2020-01-01 RX ADMIN — Medication 10 ML: at 22:10

## 2020-01-01 RX ADMIN — INSULIN LISPRO 3 UNITS: 100 INJECTION, SOLUTION INTRAVENOUS; SUBCUTANEOUS at 12:02

## 2020-01-01 RX ADMIN — CHLORHEXIDINE GLUCONATE 0.12% ORAL RINSE 15 ML: 1.2 LIQUID ORAL at 09:05

## 2020-01-01 RX ADMIN — HEPARIN SODIUM 7500 UNITS: 10000 INJECTION INTRAVENOUS; SUBCUTANEOUS at 09:25

## 2020-01-01 RX ADMIN — FLUCONAZOLE 200 MG: 100 TABLET ORAL at 09:20

## 2020-01-01 RX ADMIN — COLCHICINE 0.6 MG: 0.6 TABLET, FILM COATED ORAL at 14:05

## 2020-01-01 RX ADMIN — ARFORMOTEROL TARTRATE 15 MCG: 15 SOLUTION RESPIRATORY (INHALATION) at 08:52

## 2020-01-01 RX ADMIN — CHLORHEXIDINE GLUCONATE 0.12% ORAL RINSE 15 ML: 1.2 LIQUID ORAL at 21:17

## 2020-01-01 RX ADMIN — THIAMINE HYDROCHLORIDE 250 MG: 100 INJECTION, SOLUTION INTRAMUSCULAR; INTRAVENOUS at 12:14

## 2020-01-01 RX ADMIN — HYDROCODONE BITARTRATE AND ACETAMINOPHEN 1 TABLET: 5; 325 TABLET ORAL at 15:34

## 2020-01-01 RX ADMIN — CHLORHEXIDINE GLUCONATE 0.12% ORAL RINSE 15 ML: 1.2 LIQUID ORAL at 20:13

## 2020-01-01 RX ADMIN — INSULIN LISPRO 3 UNITS: 100 INJECTION, SOLUTION INTRAVENOUS; SUBCUTANEOUS at 21:00

## 2020-01-01 RX ADMIN — CARVEDILOL 6.25 MG: 6.25 TABLET, FILM COATED ORAL at 09:16

## 2020-01-01 RX ADMIN — HYDRALAZINE HYDROCHLORIDE 25 MG: 25 TABLET, FILM COATED ORAL at 22:01

## 2020-01-01 RX ADMIN — HYDRALAZINE HYDROCHLORIDE 25 MG: 25 TABLET, FILM COATED ORAL at 21:10

## 2020-01-01 RX ADMIN — SENNOSIDES 17.2 MG: 8.6 TABLET, FILM COATED ORAL at 21:46

## 2020-01-01 RX ADMIN — AMLODIPINE BESYLATE 10 MG: 10 TABLET ORAL at 10:30

## 2020-01-01 RX ADMIN — OXYCODONE AND ACETAMINOPHEN 1 TABLET: 5; 325 TABLET ORAL at 23:11

## 2020-01-01 RX ADMIN — APIXABAN 10 MG: 5 TABLET, FILM COATED ORAL at 09:36

## 2020-01-01 RX ADMIN — IPRATROPIUM BROMIDE AND ALBUTEROL SULFATE 1 AMPULE: 2.5; .5 SOLUTION RESPIRATORY (INHALATION) at 16:20

## 2020-01-01 RX ADMIN — VALPROIC ACID 250 MG: 250 SOLUTION ORAL at 20:52

## 2020-01-01 RX ADMIN — DIVALPROEX SODIUM 250 MG: 125 CAPSULE, COATED PELLETS ORAL at 21:16

## 2020-01-01 RX ADMIN — HEPARIN SODIUM 7500 UNITS: 10000 INJECTION INTRAVENOUS; SUBCUTANEOUS at 00:25

## 2020-01-01 RX ADMIN — BUDESONIDE 500 MCG: 0.5 SUSPENSION RESPIRATORY (INHALATION) at 08:11

## 2020-01-01 RX ADMIN — CARVEDILOL 12.5 MG: 6.25 TABLET, FILM COATED ORAL at 08:49

## 2020-01-01 RX ADMIN — HYDRALAZINE HYDROCHLORIDE 10 MG: 20 INJECTION, SOLUTION INTRAMUSCULAR; INTRAVENOUS at 06:24

## 2020-01-01 RX ADMIN — HEPARIN SODIUM 7500 UNITS: 10000 INJECTION INTRAVENOUS; SUBCUTANEOUS at 00:01

## 2020-01-01 RX ADMIN — CHLORHEXIDINE GLUCONATE 0.12% ORAL RINSE 15 ML: 1.2 LIQUID ORAL at 09:17

## 2020-01-01 RX ADMIN — HEPARIN SODIUM 7500 UNITS: 10000 INJECTION INTRAVENOUS; SUBCUTANEOUS at 16:21

## 2020-01-01 RX ADMIN — INSULIN LISPRO 3 UNITS: 100 INJECTION, SOLUTION INTRAVENOUS; SUBCUTANEOUS at 23:36

## 2020-01-01 RX ADMIN — VALPROIC ACID 250 MG: 250 SOLUTION ORAL at 20:11

## 2020-01-01 RX ADMIN — DEXTROSE MONOHYDRATE: 100 INJECTION, SOLUTION INTRAVENOUS at 01:37

## 2020-01-01 RX ADMIN — NYSTATIN 500000 UNITS: 100000 SUSPENSION ORAL at 08:56

## 2020-01-01 RX ADMIN — FOLIC ACID 1 MG: 5 INJECTION, SOLUTION INTRAMUSCULAR; INTRAVENOUS; SUBCUTANEOUS at 09:04

## 2020-01-01 RX ADMIN — VANCOMYCIN HYDROCHLORIDE 1.5 G: 10 INJECTION, POWDER, LYOPHILIZED, FOR SOLUTION INTRAVENOUS at 01:14

## 2020-01-01 RX ADMIN — FOLIC ACID 1 MG: 5 INJECTION, SOLUTION INTRAMUSCULAR; INTRAVENOUS; SUBCUTANEOUS at 09:40

## 2020-01-01 RX ADMIN — LEVOTHYROXINE SODIUM 50 MCG: 0.05 TABLET ORAL at 06:14

## 2020-01-01 RX ADMIN — SODIUM CHLORIDE SOLN NEBU 3% 2 ML: 3 NEBU SOLN at 13:30

## 2020-01-01 RX ADMIN — CHLORHEXIDINE GLUCONATE 0.12% ORAL RINSE 15 ML: 1.2 LIQUID ORAL at 20:47

## 2020-01-01 RX ADMIN — CARVEDILOL 6.25 MG: 6.25 TABLET, FILM COATED ORAL at 17:13

## 2020-01-01 RX ADMIN — OXYCODONE AND ACETAMINOPHEN 1 TABLET: 5; 325 TABLET ORAL at 22:57

## 2020-01-01 RX ADMIN — SODIUM CHLORIDE, PRESERVATIVE FREE 300 UNITS: 5 INJECTION INTRAVENOUS at 08:23

## 2020-01-01 RX ADMIN — SODIUM CHLORIDE SOLN NEBU 3% 2 ML: 3 NEBU SOLN at 00:14

## 2020-01-01 RX ADMIN — HYDRALAZINE HYDROCHLORIDE 25 MG: 25 TABLET, FILM COATED ORAL at 06:46

## 2020-01-01 RX ADMIN — DIVALPROEX SODIUM 250 MG: 125 CAPSULE, COATED PELLETS ORAL at 14:06

## 2020-01-01 RX ADMIN — CHLORHEXIDINE GLUCONATE 0.12% ORAL RINSE 15 ML: 1.2 LIQUID ORAL at 20:06

## 2020-01-01 RX ADMIN — SODIUM CHLORIDE 25 ML: 9 INJECTION, SOLUTION INTRAVENOUS at 08:10

## 2020-01-01 RX ADMIN — IPRATROPIUM BROMIDE AND ALBUTEROL SULFATE 1 AMPULE: 2.5; .5 SOLUTION RESPIRATORY (INHALATION) at 12:59

## 2020-01-01 RX ADMIN — LINEZOLID 600 MG: 100 SUSPENSION ORAL at 21:45

## 2020-01-01 RX ADMIN — INSULIN LISPRO 3 UNITS: 100 INJECTION, SOLUTION INTRAVENOUS; SUBCUTANEOUS at 20:10

## 2020-01-01 RX ADMIN — CHLORHEXIDINE GLUCONATE 0.12% ORAL RINSE 15 ML: 1.2 LIQUID ORAL at 08:38

## 2020-01-01 RX ADMIN — LEVOTHYROXINE SODIUM 50 MCG: 0.05 TABLET ORAL at 07:38

## 2020-01-01 RX ADMIN — IPRATROPIUM BROMIDE AND ALBUTEROL SULFATE 3 ML: .5; 3 SOLUTION RESPIRATORY (INHALATION) at 12:27

## 2020-01-01 RX ADMIN — LANSOPRAZOLE 30 MG: KIT at 06:12

## 2020-01-01 RX ADMIN — TRIMETHOBENZAMIDE HYDROCHLORIDE 200 MG: 100 INJECTION INTRAMUSCULAR at 20:49

## 2020-01-01 RX ADMIN — SODIUM CHLORIDE SOLN NEBU 3% 2 ML: 3 NEBU SOLN at 11:53

## 2020-01-01 RX ADMIN — INSULIN LISPRO 3 UNITS: 100 INJECTION, SOLUTION INTRAVENOUS; SUBCUTANEOUS at 15:39

## 2020-01-01 RX ADMIN — BUSPIRONE HYDROCHLORIDE 5 MG: 5 TABLET ORAL at 13:35

## 2020-01-01 RX ADMIN — SENNOSIDES 17.2 MG: 8.6 TABLET, FILM COATED ORAL at 20:14

## 2020-01-01 RX ADMIN — CHLORHEXIDINE GLUCONATE 0.12% ORAL RINSE 15 ML: 1.2 LIQUID ORAL at 19:57

## 2020-01-01 RX ADMIN — STANDARDIZED SENNA CONCENTRATE 17.2 MG: 8.6 TABLET ORAL at 20:12

## 2020-01-01 RX ADMIN — Medication 10 ML: at 06:24

## 2020-01-01 RX ADMIN — IPRATROPIUM BROMIDE AND ALBUTEROL SULFATE 1 AMPULE: 2.5; .5 SOLUTION RESPIRATORY (INHALATION) at 07:41

## 2020-01-01 RX ADMIN — VALPROIC ACID 250 MG: 250 SOLUTION ORAL at 13:35

## 2020-01-01 RX ADMIN — INSULIN LISPRO 3 UNITS: 100 INJECTION, SOLUTION INTRAVENOUS; SUBCUTANEOUS at 12:18

## 2020-01-01 RX ADMIN — HYDROCODONE BITARTRATE AND ACETAMINOPHEN 1 TABLET: 5; 325 TABLET ORAL at 20:06

## 2020-01-01 RX ADMIN — ARFORMOTEROL TARTRATE 15 MCG: 15 SOLUTION RESPIRATORY (INHALATION) at 20:34

## 2020-01-01 RX ADMIN — DIVALPROEX SODIUM 250 MG: 125 CAPSULE, COATED PELLETS ORAL at 14:53

## 2020-01-01 RX ADMIN — MIDAZOLAM 1 MG: 1 INJECTION INTRAMUSCULAR; INTRAVENOUS at 09:14

## 2020-01-01 RX ADMIN — LABETALOL HYDROCHLORIDE 10 MG: 5 INJECTION, SOLUTION INTRAVENOUS at 06:22

## 2020-01-01 RX ADMIN — HEPARIN 300 UNITS: 100 SYRINGE at 12:13

## 2020-01-01 RX ADMIN — BUMETANIDE 1 MG: 0.25 INJECTION INTRAMUSCULAR; INTRAVENOUS at 15:46

## 2020-01-01 RX ADMIN — DIVALPROEX SODIUM 250 MG: 125 CAPSULE, COATED PELLETS ORAL at 13:32

## 2020-01-01 RX ADMIN — AMLODIPINE BESYLATE 10 MG: 10 TABLET ORAL at 08:15

## 2020-01-01 RX ADMIN — STANDARDIZED SENNA CONCENTRATE 17.2 MG: 8.6 TABLET ORAL at 20:51

## 2020-01-01 RX ADMIN — FOLIC ACID 1 MG: 5 INJECTION, SOLUTION INTRAMUSCULAR; INTRAVENOUS; SUBCUTANEOUS at 09:35

## 2020-01-01 RX ADMIN — OXYCODONE AND ACETAMINOPHEN 1 TABLET: 5; 325 TABLET ORAL at 05:00

## 2020-01-01 RX ADMIN — SODIUM CHLORIDE SOLN NEBU 3% 2 ML: 3 NEBU SOLN at 13:02

## 2020-01-01 RX ADMIN — IPRATROPIUM BROMIDE AND ALBUTEROL SULFATE 1 AMPULE: 2.5; .5 SOLUTION RESPIRATORY (INHALATION) at 16:17

## 2020-01-01 RX ADMIN — SODIUM CHLORIDE, PRESERVATIVE FREE 10 ML: 5 INJECTION INTRAVENOUS at 10:13

## 2020-01-01 RX ADMIN — SODIUM CHLORIDE, PRESERVATIVE FREE 300 UNITS: 5 INJECTION INTRAVENOUS at 09:04

## 2020-01-01 RX ADMIN — HYDRALAZINE HYDROCHLORIDE 50 MG: 50 TABLET, FILM COATED ORAL at 06:04

## 2020-01-01 RX ADMIN — FOLIC ACID 1 MG: 5 INJECTION, SOLUTION INTRAMUSCULAR; INTRAVENOUS; SUBCUTANEOUS at 08:18

## 2020-01-01 RX ADMIN — IPRATROPIUM BROMIDE AND ALBUTEROL SULFATE 1 AMPULE: 2.5; .5 SOLUTION RESPIRATORY (INHALATION) at 08:45

## 2020-01-01 RX ADMIN — HYDRALAZINE HYDROCHLORIDE 25 MG: 25 TABLET, FILM COATED ORAL at 20:06

## 2020-01-01 RX ADMIN — CHLORHEXIDINE GLUCONATE 0.12% ORAL RINSE 15 ML: 1.2 LIQUID ORAL at 09:36

## 2020-01-01 RX ADMIN — NYSTATIN 500000 UNITS: 100000 SUSPENSION ORAL at 21:17

## 2020-01-01 RX ADMIN — NYSTATIN 500000 UNITS: 100000 SUSPENSION ORAL at 14:06

## 2020-01-01 RX ADMIN — QUETIAPINE FUMARATE 50 MG: 25 TABLET ORAL at 10:08

## 2020-01-01 RX ADMIN — CHLORHEXIDINE GLUCONATE 0.12% ORAL RINSE 15 ML: 1.2 LIQUID ORAL at 08:56

## 2020-01-01 RX ADMIN — Medication 10 ML: at 09:25

## 2020-01-01 RX ADMIN — SODIUM CHLORIDE SOLN NEBU 3% 2 ML: 3 NEBU SOLN at 04:08

## 2020-01-01 RX ADMIN — BUDESONIDE 500 MCG: 0.5 SUSPENSION RESPIRATORY (INHALATION) at 20:00

## 2020-01-01 RX ADMIN — MORPHINE SULFATE 4 MG: 4 INJECTION, SOLUTION INTRAMUSCULAR; INTRAVENOUS at 07:11

## 2020-01-01 RX ADMIN — SODIUM CHLORIDE: 9 INJECTION, SOLUTION INTRAVENOUS at 21:21

## 2020-01-01 RX ADMIN — OXYCODONE AND ACETAMINOPHEN 1 TABLET: 5; 325 TABLET ORAL at 13:20

## 2020-01-01 RX ADMIN — IPRATROPIUM BROMIDE AND ALBUTEROL SULFATE 1 AMPULE: 2.5; .5 SOLUTION RESPIRATORY (INHALATION) at 11:53

## 2020-01-01 RX ADMIN — SODIUM CHLORIDE 25 ML: 9 INJECTION, SOLUTION INTRAVENOUS at 06:05

## 2020-01-01 RX ADMIN — COLCHICINE 0.6 MG: 0.6 TABLET, FILM COATED ORAL at 08:23

## 2020-01-01 RX ADMIN — BUDESONIDE 500 MCG: 0.5 SUSPENSION RESPIRATORY (INHALATION) at 19:15

## 2020-01-01 RX ADMIN — PANTOPRAZOLE SODIUM 40 MG: 40 INJECTION, POWDER, FOR SOLUTION INTRAVENOUS at 08:16

## 2020-01-01 RX ADMIN — QUETIAPINE FUMARATE 50 MG: 25 TABLET ORAL at 22:10

## 2020-01-01 RX ADMIN — IPRATROPIUM BROMIDE AND ALBUTEROL SULFATE 1 AMPULE: 2.5; .5 SOLUTION RESPIRATORY (INHALATION) at 11:32

## 2020-01-01 RX ADMIN — VALPROIC ACID 250 MG: 250 SOLUTION ORAL at 13:00

## 2020-01-01 RX ADMIN — IPRATROPIUM BROMIDE AND ALBUTEROL SULFATE 1 AMPULE: 2.5; .5 SOLUTION RESPIRATORY (INHALATION) at 20:34

## 2020-01-01 RX ADMIN — Medication 200 MCG/HR: at 11:38

## 2020-01-01 RX ADMIN — ARFORMOTEROL TARTRATE 15 MCG: 15 SOLUTION RESPIRATORY (INHALATION) at 08:35

## 2020-01-01 RX ADMIN — THIAMINE HYDROCHLORIDE 250 MG: 100 INJECTION, SOLUTION INTRAMUSCULAR; INTRAVENOUS at 09:14

## 2020-01-01 RX ADMIN — ARFORMOTEROL TARTRATE 15 MCG: 15 SOLUTION RESPIRATORY (INHALATION) at 19:52

## 2020-01-01 RX ADMIN — CHLORDIAZEPOXIDE HYDROCHLORIDE 25 MG: 25 CAPSULE ORAL at 10:30

## 2020-01-01 RX ADMIN — Medication 10 ML: at 00:19

## 2020-01-01 RX ADMIN — HEPARIN SODIUM 7500 UNITS: 10000 INJECTION INTRAVENOUS; SUBCUTANEOUS at 08:37

## 2020-01-01 RX ADMIN — SENNOSIDES 17.2 MG: 8.6 TABLET, FILM COATED ORAL at 21:20

## 2020-01-01 RX ADMIN — HEPARIN SODIUM 7500 UNITS: 10000 INJECTION INTRAVENOUS; SUBCUTANEOUS at 16:31

## 2020-01-01 RX ADMIN — VALPROIC ACID 250 MG: 250 SOLUTION ORAL at 21:02

## 2020-01-01 RX ADMIN — SODIUM CHLORIDE SOLN NEBU 3% 2 ML: 3 NEBU SOLN at 20:30

## 2020-01-01 RX ADMIN — OXYCODONE AND ACETAMINOPHEN 1 TABLET: 5; 325 TABLET ORAL at 11:29

## 2020-01-01 RX ADMIN — DIVALPROEX SODIUM 250 MG: 125 CAPSULE, COATED PELLETS ORAL at 15:01

## 2020-01-01 RX ADMIN — SODIUM CHLORIDE SOLN NEBU 3% 2 ML: 3 NEBU SOLN at 12:50

## 2020-01-01 RX ADMIN — HYDRALAZINE HYDROCHLORIDE 10 MG: 20 INJECTION, SOLUTION INTRAMUSCULAR; INTRAVENOUS at 10:12

## 2020-01-01 RX ADMIN — ARFORMOTEROL TARTRATE 15 MCG: 15 SOLUTION RESPIRATORY (INHALATION) at 20:55

## 2020-01-01 RX ADMIN — SODIUM CHLORIDE SOLN NEBU 3% 2 ML: 3 NEBU SOLN at 11:20

## 2020-01-01 RX ADMIN — SODIUM CHLORIDE, PRESERVATIVE FREE 300 UNITS: 5 INJECTION INTRAVENOUS at 08:56

## 2020-01-01 RX ADMIN — MIDAZOLAM 1 MG: 1 INJECTION INTRAMUSCULAR; INTRAVENOUS at 08:59

## 2020-01-01 RX ADMIN — OXYCODONE AND ACETAMINOPHEN 1 TABLET: 5; 325 TABLET ORAL at 09:18

## 2020-01-01 RX ADMIN — CHLORHEXIDINE GLUCONATE 0.12% ORAL RINSE 15 ML: 1.2 LIQUID ORAL at 21:47

## 2020-01-01 RX ADMIN — DIVALPROEX SODIUM 250 MG: 125 CAPSULE, COATED PELLETS ORAL at 22:01

## 2020-01-01 RX ADMIN — IPRATROPIUM BROMIDE AND ALBUTEROL SULFATE 3 ML: .5; 3 SOLUTION RESPIRATORY (INHALATION) at 21:10

## 2020-01-01 RX ADMIN — SODIUM CHLORIDE, PRESERVATIVE FREE 10 ML: 5 INJECTION INTRAVENOUS at 10:30

## 2020-01-01 RX ADMIN — HYDROCODONE BITARTRATE AND ACETAMINOPHEN 1 TABLET: 5; 325 TABLET ORAL at 09:33

## 2020-01-01 RX ADMIN — INSULIN LISPRO 3 UNITS: 100 INJECTION, SOLUTION INTRAVENOUS; SUBCUTANEOUS at 11:45

## 2020-01-01 RX ADMIN — VALPROIC ACID 250 MG: 250 SOLUTION ORAL at 14:45

## 2020-01-01 RX ADMIN — SODIUM CHLORIDE, PRESERVATIVE FREE 10 ML: 5 INJECTION INTRAVENOUS at 09:00

## 2020-01-01 RX ADMIN — CARVEDILOL 6.25 MG: 6.25 TABLET, FILM COATED ORAL at 18:24

## 2020-01-01 RX ADMIN — IPRATROPIUM BROMIDE AND ALBUTEROL SULFATE 1 AMPULE: 2.5; .5 SOLUTION RESPIRATORY (INHALATION) at 11:52

## 2020-01-01 RX ADMIN — ARFORMOTEROL TARTRATE 15 MCG: 15 SOLUTION RESPIRATORY (INHALATION) at 10:32

## 2020-01-01 RX ADMIN — Medication 10 ML: at 21:36

## 2020-01-01 RX ADMIN — HEPARIN SODIUM 7500 UNITS: 10000 INJECTION INTRAVENOUS; SUBCUTANEOUS at 08:50

## 2020-01-01 RX ADMIN — CARVEDILOL 6.25 MG: 6.25 TABLET, FILM COATED ORAL at 16:36

## 2020-01-01 RX ADMIN — OXYCODONE AND ACETAMINOPHEN 1 TABLET: 5; 325 TABLET ORAL at 05:48

## 2020-01-01 RX ADMIN — PIPERACILLIN AND TAZOBACTAM 3.38 G: 3; .375 INJECTION, POWDER, FOR SOLUTION INTRAVENOUS at 15:33

## 2020-01-01 RX ADMIN — DIVALPROEX SODIUM 250 MG: 125 CAPSULE, COATED PELLETS ORAL at 05:52

## 2020-01-01 RX ADMIN — LANSOPRAZOLE 30 MG: KIT at 05:49

## 2020-01-01 RX ADMIN — CARVEDILOL 6.25 MG: 6.25 TABLET, FILM COATED ORAL at 08:30

## 2020-01-01 RX ADMIN — QUETIAPINE FUMARATE 25 MG: 25 TABLET ORAL at 22:06

## 2020-01-01 RX ADMIN — HEPARIN SODIUM 7500 UNITS: 10000 INJECTION INTRAVENOUS; SUBCUTANEOUS at 17:23

## 2020-01-01 RX ADMIN — DIVALPROEX SODIUM 250 MG: 125 CAPSULE, COATED PELLETS ORAL at 15:23

## 2020-01-01 RX ADMIN — CHLORHEXIDINE GLUCONATE 0.12% ORAL RINSE 15 ML: 1.2 LIQUID ORAL at 20:49

## 2020-01-01 RX ADMIN — HEPARIN SODIUM 7500 UNITS: 10000 INJECTION INTRAVENOUS; SUBCUTANEOUS at 00:46

## 2020-01-01 RX ADMIN — HEPARIN SODIUM 7500 UNITS: 10000 INJECTION INTRAVENOUS; SUBCUTANEOUS at 00:26

## 2020-01-01 RX ADMIN — Medication 10 ML: at 20:13

## 2020-01-01 RX ADMIN — SODIUM CHLORIDE, PRESERVATIVE FREE 300 UNITS: 5 INJECTION INTRAVENOUS at 12:46

## 2020-01-01 RX ADMIN — HEPARIN SODIUM 7500 UNITS: 10000 INJECTION INTRAVENOUS; SUBCUTANEOUS at 16:55

## 2020-01-01 RX ADMIN — SODIUM CHLORIDE SOLN NEBU 3% 2 ML: 3 NEBU SOLN at 12:32

## 2020-01-01 RX ADMIN — IPRATROPIUM BROMIDE AND ALBUTEROL SULFATE 1 AMPULE: 2.5; .5 SOLUTION RESPIRATORY (INHALATION) at 08:39

## 2020-01-01 RX ADMIN — HYDRALAZINE HYDROCHLORIDE 25 MG: 25 TABLET, FILM COATED ORAL at 05:39

## 2020-01-01 RX ADMIN — HEPARIN SODIUM AND DEXTROSE 6.28 UNITS/KG/HR: 10000; 5 INJECTION INTRAVENOUS at 06:00

## 2020-01-01 RX ADMIN — COLCHICINE 0.6 MG: 0.6 TABLET, FILM COATED ORAL at 13:49

## 2020-01-01 RX ADMIN — LABETALOL HYDROCHLORIDE 10 MG: 5 INJECTION, SOLUTION INTRAVENOUS at 20:52

## 2020-01-01 RX ADMIN — BUDESONIDE 500 MCG: 0.5 SUSPENSION RESPIRATORY (INHALATION) at 21:04

## 2020-01-01 RX ADMIN — SODIUM CHLORIDE SOLN NEBU 3% 2 ML: 3 NEBU SOLN at 20:31

## 2020-01-01 RX ADMIN — SODIUM CHLORIDE SOLN NEBU 3% 2 ML: 3 NEBU SOLN at 04:39

## 2020-01-01 RX ADMIN — MIDAZOLAM HYDROCHLORIDE 2 MG: 2 INJECTION, SOLUTION INTRAMUSCULAR; INTRAVENOUS at 16:40

## 2020-01-01 RX ADMIN — SODIUM CHLORIDE SOLN NEBU 3% 2 ML: 3 NEBU SOLN at 00:13

## 2020-01-01 RX ADMIN — BUDESONIDE 500 MCG: 0.5 SUSPENSION RESPIRATORY (INHALATION) at 08:43

## 2020-01-01 RX ADMIN — AMLODIPINE BESYLATE 10 MG: 10 TABLET ORAL at 09:36

## 2020-01-01 RX ADMIN — LIDOCAINE HYDROCHLORIDE 5 ML: 10 INJECTION, SOLUTION EPIDURAL; INFILTRATION; INTRACAUDAL; PERINEURAL at 17:21

## 2020-01-01 RX ADMIN — FOLIC ACID 1 MG: 5 INJECTION, SOLUTION INTRAMUSCULAR; INTRAVENOUS; SUBCUTANEOUS at 08:56

## 2020-01-01 RX ADMIN — Medication 10 ML: at 08:13

## 2020-01-01 RX ADMIN — SODIUM CHLORIDE SOLN NEBU 3% 2 ML: 3 NEBU SOLN at 23:48

## 2020-01-01 RX ADMIN — SODIUM CHLORIDE SOLN NEBU 3% 2 ML: 3 NEBU SOLN at 20:15

## 2020-01-01 RX ADMIN — BUDESONIDE 500 MCG: 0.5 SUSPENSION RESPIRATORY (INHALATION) at 08:22

## 2020-01-01 RX ADMIN — SENNOSIDES 17.2 MG: 8.6 TABLET, FILM COATED ORAL at 20:55

## 2020-01-01 RX ADMIN — HEPARIN SODIUM 7500 UNITS: 10000 INJECTION INTRAVENOUS; SUBCUTANEOUS at 23:33

## 2020-01-01 RX ADMIN — INSULIN LISPRO 3 UNITS: 100 INJECTION, SOLUTION INTRAVENOUS; SUBCUTANEOUS at 21:43

## 2020-01-01 RX ADMIN — SODIUM CHLORIDE, PRESERVATIVE FREE 10 ML: 5 INJECTION INTRAVENOUS at 09:05

## 2020-01-01 RX ADMIN — HYDROCODONE BITARTRATE AND ACETAMINOPHEN 1 TABLET: 5; 325 TABLET ORAL at 07:38

## 2020-01-01 RX ADMIN — DIVALPROEX SODIUM 250 MG: 125 CAPSULE, COATED PELLETS ORAL at 20:06

## 2020-01-01 RX ADMIN — LINEZOLID 600 MG: 600 TABLET, FILM COATED ORAL at 08:12

## 2020-01-01 RX ADMIN — Medication 100 MG: at 08:38

## 2020-01-01 RX ADMIN — HYDROCODONE BITARTRATE AND ACETAMINOPHEN 1 TABLET: 5; 325 TABLET ORAL at 17:16

## 2020-01-01 RX ADMIN — LANSOPRAZOLE 30 MG: KIT at 05:30

## 2020-01-01 RX ADMIN — HYDRALAZINE HYDROCHLORIDE 25 MG: 25 TABLET, FILM COATED ORAL at 23:55

## 2020-01-01 RX ADMIN — CHLORHEXIDINE GLUCONATE 0.12% ORAL RINSE 15 ML: 1.2 LIQUID ORAL at 21:33

## 2020-01-01 RX ADMIN — SODIUM CHLORIDE, PRESERVATIVE FREE 300 UNITS: 5 INJECTION INTRAVENOUS at 09:28

## 2020-01-01 RX ADMIN — HYDRALAZINE HYDROCHLORIDE 10 MG: 20 INJECTION, SOLUTION INTRAMUSCULAR; INTRAVENOUS at 14:19

## 2020-01-01 RX ADMIN — LEVOTHYROXINE SODIUM 50 MCG: 0.05 TABLET ORAL at 08:12

## 2020-01-01 RX ADMIN — Medication 100 MG: at 09:04

## 2020-01-01 RX ADMIN — SENNOSIDES 17.2 MG: 8.6 TABLET, FILM COATED ORAL at 22:01

## 2020-01-01 RX ADMIN — Medication 10 ML: at 14:42

## 2020-01-01 RX ADMIN — PROMETHAZINE HYDROCHLORIDE 12.5 MG: 25 TABLET ORAL at 20:12

## 2020-01-01 RX ADMIN — Medication 10 ML: at 09:43

## 2020-01-01 RX ADMIN — SODIUM CHLORIDE SOLN NEBU 3% 2 ML: 3 NEBU SOLN at 11:54

## 2020-01-01 RX ADMIN — THIAMINE HYDROCHLORIDE 250 MG: 100 INJECTION, SOLUTION INTRAMUSCULAR; INTRAVENOUS at 11:43

## 2020-01-01 RX ADMIN — QUETIAPINE FUMARATE 50 MG: 25 TABLET ORAL at 20:19

## 2020-01-01 RX ADMIN — LINEZOLID 600 MG: 100 SUSPENSION ORAL at 20:17

## 2020-01-01 RX ADMIN — DEXTROSE MONOHYDRATE 12.5 G: 25 INJECTION, SOLUTION INTRAVENOUS at 02:18

## 2020-01-01 RX ADMIN — SODIUM CHLORIDE, PRESERVATIVE FREE 300 UNITS: 5 INJECTION INTRAVENOUS at 20:58

## 2020-01-01 RX ADMIN — ARFORMOTEROL TARTRATE 15 MCG: 15 SOLUTION RESPIRATORY (INHALATION) at 21:05

## 2020-01-01 RX ADMIN — QUETIAPINE FUMARATE 50 MG: 25 TABLET ORAL at 19:56

## 2020-01-01 RX ADMIN — OXYCODONE AND ACETAMINOPHEN 1 TABLET: 5; 325 TABLET ORAL at 18:00

## 2020-01-01 RX ADMIN — CHLORDIAZEPOXIDE HYDROCHLORIDE 25 MG: 25 CAPSULE ORAL at 08:12

## 2020-01-01 RX ADMIN — DIVALPROEX SODIUM 250 MG: 125 CAPSULE, COATED PELLETS ORAL at 06:13

## 2020-01-01 RX ADMIN — SODIUM CHLORIDE 1.1 MCG/KG/HR: 9 INJECTION, SOLUTION INTRAVENOUS at 14:53

## 2020-01-01 RX ADMIN — IPRATROPIUM BROMIDE AND ALBUTEROL SULFATE 1 AMPULE: 2.5; .5 SOLUTION RESPIRATORY (INHALATION) at 08:27

## 2020-01-01 RX ADMIN — SODIUM CHLORIDE SOLN NEBU 3% 2 ML: 3 NEBU SOLN at 00:22

## 2020-01-01 RX ADMIN — Medication 10 ML: at 21:21

## 2020-01-01 RX ADMIN — IPRATROPIUM BROMIDE AND ALBUTEROL SULFATE 1 AMPULE: 2.5; .5 SOLUTION RESPIRATORY (INHALATION) at 16:13

## 2020-01-01 RX ADMIN — INSULIN LISPRO 3 UNITS: 100 INJECTION, SOLUTION INTRAVENOUS; SUBCUTANEOUS at 16:55

## 2020-01-01 RX ADMIN — LEVOTHYROXINE SODIUM 50 MCG: 0.05 TABLET ORAL at 06:22

## 2020-01-01 RX ADMIN — HYDRALAZINE HYDROCHLORIDE 25 MG: 25 TABLET, FILM COATED ORAL at 16:32

## 2020-01-01 RX ADMIN — SODIUM CHLORIDE SOLN NEBU 3% 2 ML: 3 NEBU SOLN at 16:30

## 2020-01-01 RX ADMIN — THIAMINE HYDROCHLORIDE 250 MG: 100 INJECTION, SOLUTION INTRAMUSCULAR; INTRAVENOUS at 17:00

## 2020-01-01 RX ADMIN — MORPHINE SULFATE 4 MG: 4 INJECTION, SOLUTION INTRAMUSCULAR; INTRAVENOUS at 06:21

## 2020-01-01 RX ADMIN — BUDESONIDE 500 MCG: 0.5 SUSPENSION RESPIRATORY (INHALATION) at 08:53

## 2020-01-01 RX ADMIN — CARVEDILOL 6.25 MG: 6.25 TABLET, FILM COATED ORAL at 09:19

## 2020-01-01 RX ADMIN — HEPARIN SODIUM 7500 UNITS: 10000 INJECTION INTRAVENOUS; SUBCUTANEOUS at 18:33

## 2020-01-01 RX ADMIN — PANTOPRAZOLE SODIUM 40 MG: 40 INJECTION, POWDER, FOR SOLUTION INTRAVENOUS at 08:58

## 2020-01-01 RX ADMIN — Medication 10 ML: at 09:04

## 2020-01-01 RX ADMIN — SODIUM CHLORIDE 25 ML: 9 INJECTION, SOLUTION INTRAVENOUS at 02:39

## 2020-01-01 RX ADMIN — AMLODIPINE BESYLATE 10 MG: 10 TABLET ORAL at 09:05

## 2020-01-01 RX ADMIN — BUDESONIDE 500 MCG: 0.5 SUSPENSION RESPIRATORY (INHALATION) at 08:03

## 2020-01-01 RX ADMIN — SODIUM CHLORIDE SOLN NEBU 3% 2 ML: 3 NEBU SOLN at 04:31

## 2020-01-01 RX ADMIN — SODIUM CHLORIDE: 9 INJECTION, SOLUTION INTRAVENOUS at 09:14

## 2020-01-01 RX ADMIN — MEROPENEM 1 G: 1 INJECTION, POWDER, FOR SOLUTION INTRAVENOUS at 15:02

## 2020-01-01 RX ADMIN — HEPARIN SODIUM AND DEXTROSE 8.28 UNITS/KG/HR: 10000; 5 INJECTION INTRAVENOUS at 14:33

## 2020-01-01 RX ADMIN — SODIUM CHLORIDE SOLN NEBU 3% 2 ML: 3 NEBU SOLN at 15:49

## 2020-01-01 RX ADMIN — Medication 10 ML: at 21:47

## 2020-01-01 RX ADMIN — SODIUM CHLORIDE, PRESERVATIVE FREE 10 ML: 5 INJECTION INTRAVENOUS at 09:16

## 2020-01-01 RX ADMIN — LEVOTHYROXINE SODIUM 50 MCG: 0.05 TABLET ORAL at 09:19

## 2020-01-01 RX ADMIN — LANSOPRAZOLE 30 MG: KIT at 06:57

## 2020-01-01 RX ADMIN — QUETIAPINE FUMARATE 50 MG: 25 TABLET ORAL at 08:49

## 2020-01-01 RX ADMIN — ACETAMINOPHEN ORAL SOLUTION 650 MG: 650 SOLUTION ORAL at 16:30

## 2020-01-01 RX ADMIN — HYDROCODONE BITARTRATE AND ACETAMINOPHEN 1 TABLET: 5; 325 TABLET ORAL at 20:46

## 2020-01-01 RX ADMIN — SODIUM CHLORIDE SOLN NEBU 3% 2 ML: 3 NEBU SOLN at 04:24

## 2020-01-01 RX ADMIN — OXYCODONE AND ACETAMINOPHEN 1 TABLET: 5; 325 TABLET ORAL at 04:37

## 2020-01-01 RX ADMIN — AMLODIPINE BESYLATE 10 MG: 10 TABLET ORAL at 08:43

## 2020-01-01 RX ADMIN — SODIUM CHLORIDE SOLN NEBU 3% 2 ML: 3 NEBU SOLN at 15:22

## 2020-01-01 RX ADMIN — FLUCONAZOLE 200 MG: 100 TABLET ORAL at 08:56

## 2020-01-01 RX ADMIN — HEPARIN SODIUM 10000 UNITS: 1000 INJECTION INTRAVENOUS; SUBCUTANEOUS at 04:41

## 2020-01-01 RX ADMIN — HYDROCODONE BITARTRATE AND ACETAMINOPHEN 1 TABLET: 5; 325 TABLET ORAL at 11:38

## 2020-01-01 RX ADMIN — SODIUM CHLORIDE SOLN NEBU 3% 2 ML: 3 NEBU SOLN at 07:52

## 2020-01-01 RX ADMIN — MIDAZOLAM 1 MG: 1 INJECTION INTRAMUSCULAR; INTRAVENOUS at 08:37

## 2020-01-01 RX ADMIN — DIVALPROEX SODIUM 250 MG: 125 CAPSULE, COATED PELLETS ORAL at 13:30

## 2020-01-01 RX ADMIN — HYDRALAZINE HYDROCHLORIDE 25 MG: 25 TABLET, FILM COATED ORAL at 21:28

## 2020-01-01 RX ADMIN — QUETIAPINE FUMARATE 50 MG: 25 TABLET ORAL at 20:55

## 2020-01-01 RX ADMIN — ARFORMOTEROL TARTRATE 15 MCG: 15 SOLUTION RESPIRATORY (INHALATION) at 07:41

## 2020-01-01 RX ADMIN — SENNOSIDES 17.2 MG: 8.6 TABLET, FILM COATED ORAL at 22:34

## 2020-01-01 RX ADMIN — SODIUM CHLORIDE 25 ML: 9 INJECTION, SOLUTION INTRAVENOUS at 16:12

## 2020-01-01 RX ADMIN — FOLIC ACID 1 MG: 5 INJECTION, SOLUTION INTRAMUSCULAR; INTRAVENOUS; SUBCUTANEOUS at 15:23

## 2020-01-01 RX ADMIN — APIXABAN 10 MG: 5 TABLET, FILM COATED ORAL at 09:17

## 2020-01-01 RX ADMIN — ACETAMINOPHEN ORAL SOLUTION 650 MG: 650 SOLUTION ORAL at 08:10

## 2020-01-01 RX ADMIN — Medication 10 ML: at 20:52

## 2020-01-01 RX ADMIN — CHLORHEXIDINE GLUCONATE 0.12% ORAL RINSE 15 ML: 1.2 LIQUID ORAL at 22:04

## 2020-01-01 RX ADMIN — CHLORHEXIDINE GLUCONATE 0.12% ORAL RINSE 15 ML: 1.2 LIQUID ORAL at 20:17

## 2020-01-01 RX ADMIN — FOLIC ACID 1 MG: 5 INJECTION, SOLUTION INTRAMUSCULAR; INTRAVENOUS; SUBCUTANEOUS at 08:24

## 2020-01-01 RX ADMIN — CHLORHEXIDINE GLUCONATE 0.12% ORAL RINSE 15 ML: 1.2 LIQUID ORAL at 09:23

## 2020-01-01 RX ADMIN — LINEZOLID 600 MG: 600 TABLET, FILM COATED ORAL at 10:05

## 2020-01-01 RX ADMIN — HYDROCODONE BITARTRATE AND ACETAMINOPHEN 1 TABLET: 5; 325 TABLET ORAL at 13:12

## 2020-01-01 RX ADMIN — LINEZOLID 600 MG: 600 TABLET, FILM COATED ORAL at 09:19

## 2020-01-01 RX ADMIN — HEPARIN SODIUM 7500 UNITS: 10000 INJECTION INTRAVENOUS; SUBCUTANEOUS at 00:00

## 2020-01-01 RX ADMIN — IPRATROPIUM BROMIDE AND ALBUTEROL SULFATE 1 AMPULE: 2.5; .5 SOLUTION RESPIRATORY (INHALATION) at 16:00

## 2020-01-01 RX ADMIN — HYDRALAZINE HYDROCHLORIDE 25 MG: 25 TABLET, FILM COATED ORAL at 20:47

## 2020-01-01 RX ADMIN — VALPROIC ACID 250 MG: 250 SOLUTION ORAL at 05:54

## 2020-01-01 RX ADMIN — SODIUM CHLORIDE SOLN NEBU 3% 2 ML: 3 NEBU SOLN at 00:32

## 2020-01-01 RX ADMIN — HEPARIN SODIUM 7500 UNITS: 10000 INJECTION INTRAVENOUS; SUBCUTANEOUS at 08:30

## 2020-01-01 RX ADMIN — NYSTATIN 500000 UNITS: 100000 SUSPENSION ORAL at 11:57

## 2020-01-01 RX ADMIN — IPRATROPIUM BROMIDE AND ALBUTEROL SULFATE 1 AMPULE: 2.5; .5 SOLUTION RESPIRATORY (INHALATION) at 11:03

## 2020-01-01 RX ADMIN — VALPROIC ACID 250 MG: 250 SOLUTION ORAL at 15:56

## 2020-01-01 RX ADMIN — LINEZOLID 600 MG: 100 SUSPENSION ORAL at 08:42

## 2020-01-01 RX ADMIN — LINEZOLID 600 MG: 600 TABLET, FILM COATED ORAL at 21:00

## 2020-01-01 RX ADMIN — IPRATROPIUM BROMIDE AND ALBUTEROL SULFATE 1 AMPULE: 2.5; .5 SOLUTION RESPIRATORY (INHALATION) at 08:36

## 2020-01-01 RX ADMIN — CHLORHEXIDINE GLUCONATE 0.12% ORAL RINSE 15 ML: 1.2 LIQUID ORAL at 09:16

## 2020-01-01 RX ADMIN — DIVALPROEX SODIUM 250 MG: 125 CAPSULE, COATED PELLETS ORAL at 22:37

## 2020-01-01 RX ADMIN — ACETAMINOPHEN ORAL SOLUTION 650 MG: 650 SOLUTION ORAL at 06:22

## 2020-01-01 RX ADMIN — SODIUM CHLORIDE: 9 INJECTION, SOLUTION INTRAVENOUS at 03:22

## 2020-01-01 RX ADMIN — SENNOSIDES 17.2 MG: 8.6 TABLET, FILM COATED ORAL at 20:17

## 2020-01-01 RX ADMIN — CHLORHEXIDINE GLUCONATE 0.12% ORAL RINSE 15 ML: 1.2 LIQUID ORAL at 08:16

## 2020-01-01 RX ADMIN — DIVALPROEX SODIUM 250 MG: 125 CAPSULE, COATED PELLETS ORAL at 06:12

## 2020-01-01 RX ADMIN — IPRATROPIUM BROMIDE AND ALBUTEROL SULFATE 1 AMPULE: 2.5; .5 SOLUTION RESPIRATORY (INHALATION) at 11:45

## 2020-01-01 RX ADMIN — LABETALOL HYDROCHLORIDE 10 MG: 5 INJECTION, SOLUTION INTRAVENOUS at 12:09

## 2020-01-01 RX ADMIN — HYDRALAZINE HYDROCHLORIDE 25 MG: 25 TABLET, FILM COATED ORAL at 05:52

## 2020-01-01 RX ADMIN — HYDRALAZINE HYDROCHLORIDE 10 MG: 20 INJECTION, SOLUTION INTRAMUSCULAR; INTRAVENOUS at 16:51

## 2020-01-01 RX ADMIN — SODIUM CHLORIDE SOLN NEBU 3% 2 ML: 3 NEBU SOLN at 20:50

## 2020-01-01 RX ADMIN — INSULIN LISPRO 3 UNITS: 100 INJECTION, SOLUTION INTRAVENOUS; SUBCUTANEOUS at 06:12

## 2020-01-01 RX ADMIN — HYDROCODONE BITARTRATE AND ACETAMINOPHEN 1 TABLET: 5; 325 TABLET ORAL at 09:13

## 2020-01-01 RX ADMIN — LEVOTHYROXINE SODIUM 50 MCG: 0.05 TABLET ORAL at 05:39

## 2020-01-01 RX ADMIN — SODIUM CHLORIDE SOLN NEBU 3% 2 ML: 3 NEBU SOLN at 19:49

## 2020-01-01 RX ADMIN — CARVEDILOL 6.25 MG: 6.25 TABLET, FILM COATED ORAL at 09:05

## 2020-01-01 RX ADMIN — DIVALPROEX SODIUM 250 MG: 125 CAPSULE, COATED PELLETS ORAL at 15:19

## 2020-01-01 RX ADMIN — SODIUM CHLORIDE 25 ML: 9 INJECTION, SOLUTION INTRAVENOUS at 01:14

## 2020-01-01 RX ADMIN — SODIUM CHLORIDE, PRESERVATIVE FREE 300 UNITS: 5 INJECTION INTRAVENOUS at 09:16

## 2020-01-01 RX ADMIN — CARVEDILOL 3.12 MG: 6.25 TABLET, FILM COATED ORAL at 08:10

## 2020-01-01 RX ADMIN — SODIUM CHLORIDE SOLN NEBU 3% 2 ML: 3 NEBU SOLN at 19:52

## 2020-01-01 RX ADMIN — IPRATROPIUM BROMIDE AND ALBUTEROL SULFATE 1 AMPULE: 2.5; .5 SOLUTION RESPIRATORY (INHALATION) at 08:22

## 2020-01-01 RX ADMIN — LEVOTHYROXINE SODIUM 50 MCG: 0.05 TABLET ORAL at 05:11

## 2020-01-01 RX ADMIN — DIVALPROEX SODIUM 250 MG: 125 CAPSULE, COATED PELLETS ORAL at 21:49

## 2020-01-01 RX ADMIN — PIPERACILLIN AND TAZOBACTAM 3.38 G: 3; .375 INJECTION, POWDER, LYOPHILIZED, FOR SOLUTION INTRAVENOUS at 12:14

## 2020-01-01 RX ADMIN — DIVALPROEX SODIUM 250 MG: 125 CAPSULE, COATED PELLETS ORAL at 20:14

## 2020-01-01 RX ADMIN — SODIUM CHLORIDE SOLN NEBU 3% 2 ML: 3 NEBU SOLN at 08:12

## 2020-01-01 RX ADMIN — LEVOTHYROXINE SODIUM 50 MCG: 0.05 TABLET ORAL at 06:25

## 2020-01-01 RX ADMIN — LEVOTHYROXINE SODIUM 50 MCG: 0.05 TABLET ORAL at 05:28

## 2020-01-01 RX ADMIN — CHLORHEXIDINE GLUCONATE 0.12% ORAL RINSE 15 ML: 1.2 LIQUID ORAL at 22:00

## 2020-01-01 RX ADMIN — APIXABAN 10 MG: 5 TABLET, FILM COATED ORAL at 21:02

## 2020-01-01 RX ADMIN — LINEZOLID 600 MG: 100 SUSPENSION ORAL at 09:25

## 2020-01-01 RX ADMIN — CHLORDIAZEPOXIDE HYDROCHLORIDE 25 MG: 25 CAPSULE ORAL at 23:59

## 2020-01-01 RX ADMIN — VANCOMYCIN HYDROCHLORIDE 1.5 G: 10 INJECTION, POWDER, LYOPHILIZED, FOR SOLUTION INTRAVENOUS at 15:02

## 2020-01-01 RX ADMIN — Medication 10 ML: at 09:28

## 2020-01-01 RX ADMIN — SODIUM CHLORIDE: 9 INJECTION, SOLUTION INTRAVENOUS at 03:00

## 2020-01-01 RX ADMIN — Medication 10 ML: at 08:39

## 2020-01-01 RX ADMIN — CHLORHEXIDINE GLUCONATE 0.12% ORAL RINSE 15 ML: 1.2 LIQUID ORAL at 09:13

## 2020-01-01 RX ADMIN — BUDESONIDE 500 MCG: 0.5 SUSPENSION RESPIRATORY (INHALATION) at 09:37

## 2020-01-01 RX ADMIN — CARVEDILOL 6.25 MG: 6.25 TABLET, FILM COATED ORAL at 09:04

## 2020-01-01 RX ADMIN — HEPARIN SODIUM 7500 UNITS: 10000 INJECTION INTRAVENOUS; SUBCUTANEOUS at 01:14

## 2020-01-01 RX ADMIN — SODIUM CHLORIDE, PRESERVATIVE FREE 10 ML: 5 INJECTION INTRAVENOUS at 08:06

## 2020-01-01 RX ADMIN — HEPARIN SODIUM 7500 UNITS: 10000 INJECTION INTRAVENOUS; SUBCUTANEOUS at 16:37

## 2020-01-01 RX ADMIN — VALPROIC ACID 250 MG: 250 SOLUTION ORAL at 05:29

## 2020-01-01 RX ADMIN — SODIUM CHLORIDE, PRESERVATIVE FREE 300 UNITS: 5 INJECTION INTRAVENOUS at 21:52

## 2020-01-01 RX ADMIN — SODIUM CHLORIDE 3 G: 900 INJECTION INTRAVENOUS at 06:49

## 2020-01-01 RX ADMIN — SODIUM CHLORIDE, PRESERVATIVE FREE 10 ML: 5 INJECTION INTRAVENOUS at 09:29

## 2020-01-01 RX ADMIN — Medication 10 ML: at 20:50

## 2020-01-01 RX ADMIN — Medication 10 ML: at 09:17

## 2020-01-01 RX ADMIN — NYSTATIN 500000 UNITS: 100000 SUSPENSION ORAL at 08:24

## 2020-01-01 RX ADMIN — ARFORMOTEROL TARTRATE 15 MCG: 15 SOLUTION RESPIRATORY (INHALATION) at 08:36

## 2020-01-01 RX ADMIN — CARVEDILOL 6.25 MG: 6.25 TABLET, FILM COATED ORAL at 07:45

## 2020-01-01 RX ADMIN — NYSTATIN 500000 UNITS: 100000 SUSPENSION ORAL at 20:46

## 2020-01-01 RX ADMIN — FLUCONAZOLE 200 MG: 100 TABLET ORAL at 09:37

## 2020-01-01 RX ADMIN — NYSTATIN 500000 UNITS: 100000 SUSPENSION ORAL at 20:16

## 2020-01-01 RX ADMIN — OXYCODONE AND ACETAMINOPHEN 1 TABLET: 5; 325 TABLET ORAL at 13:38

## 2020-01-01 RX ADMIN — Medication 100 MG: at 09:19

## 2020-01-01 RX ADMIN — DIVALPROEX SODIUM 250 MG: 125 CAPSULE, COATED PELLETS ORAL at 21:30

## 2020-01-01 RX ADMIN — HEPARIN SODIUM 7500 UNITS: 10000 INJECTION INTRAVENOUS; SUBCUTANEOUS at 09:36

## 2020-01-01 RX ADMIN — CARVEDILOL 6.25 MG: 6.25 TABLET, FILM COATED ORAL at 18:32

## 2020-01-01 RX ADMIN — LINEZOLID 600 MG: 600 TABLET, FILM COATED ORAL at 22:37

## 2020-01-01 RX ADMIN — Medication 10 ML: at 22:34

## 2020-01-01 RX ADMIN — ARFORMOTEROL TARTRATE 15 MCG: 15 SOLUTION RESPIRATORY (INHALATION) at 20:10

## 2020-01-01 RX ADMIN — MIDAZOLAM 2 MG: 1 INJECTION INTRAMUSCULAR; INTRAVENOUS at 11:08

## 2020-01-01 RX ADMIN — INSULIN LISPRO 6 UNITS: 100 INJECTION, SOLUTION INTRAVENOUS; SUBCUTANEOUS at 04:06

## 2020-01-01 RX ADMIN — IPRATROPIUM BROMIDE AND ALBUTEROL SULFATE 3 ML: .5; 3 SOLUTION RESPIRATORY (INHALATION) at 08:36

## 2020-01-01 RX ADMIN — AMLODIPINE BESYLATE 10 MG: 10 TABLET ORAL at 10:13

## 2020-01-01 RX ADMIN — Medication 10 ML: at 21:15

## 2020-01-01 RX ADMIN — CARVEDILOL 6.25 MG: 6.25 TABLET, FILM COATED ORAL at 09:13

## 2020-01-01 RX ADMIN — CHLORDIAZEPOXIDE HYDROCHLORIDE 25 MG: 25 CAPSULE ORAL at 10:23

## 2020-01-01 RX ADMIN — IPRATROPIUM BROMIDE AND ALBUTEROL SULFATE 1 AMPULE: 2.5; .5 SOLUTION RESPIRATORY (INHALATION) at 09:38

## 2020-01-01 RX ADMIN — CHLORHEXIDINE GLUCONATE 0.12% ORAL RINSE 15 ML: 1.2 LIQUID ORAL at 21:48

## 2020-01-01 RX ADMIN — LORAZEPAM 2 MG: 2 INJECTION INTRAMUSCULAR; INTRAVENOUS at 14:09

## 2020-01-01 RX ADMIN — ARFORMOTEROL TARTRATE 15 MCG: 15 SOLUTION RESPIRATORY (INHALATION) at 08:22

## 2020-01-01 RX ADMIN — Medication 10 ML: at 22:42

## 2020-01-01 RX ADMIN — STANDARDIZED SENNA CONCENTRATE 17.2 MG: 8.6 TABLET ORAL at 21:03

## 2020-01-01 RX ADMIN — CARVEDILOL 6.25 MG: 6.25 TABLET, FILM COATED ORAL at 08:56

## 2020-01-01 RX ADMIN — SODIUM CHLORIDE SOLN NEBU 3% 2 ML: 3 NEBU SOLN at 06:03

## 2020-01-01 RX ADMIN — BUDESONIDE 500 MCG: 0.5 SUSPENSION RESPIRATORY (INHALATION) at 20:52

## 2020-01-01 RX ADMIN — IPRATROPIUM BROMIDE AND ALBUTEROL SULFATE 3 ML: .5; 3 SOLUTION RESPIRATORY (INHALATION) at 21:04

## 2020-01-01 RX ADMIN — HEPARIN SODIUM 7500 UNITS: 10000 INJECTION INTRAVENOUS; SUBCUTANEOUS at 23:59

## 2020-01-01 RX ADMIN — ASPIRIN 81 MG CHEWABLE TABLET 81 MG: 81 TABLET CHEWABLE at 08:10

## 2020-01-01 RX ADMIN — Medication 10 ML: at 20:07

## 2020-01-01 RX ADMIN — ARFORMOTEROL TARTRATE 15 MCG: 15 SOLUTION RESPIRATORY (INHALATION) at 07:51

## 2020-01-01 RX ADMIN — NYSTATIN 500000 UNITS: 100000 SUSPENSION ORAL at 16:55

## 2020-01-01 RX ADMIN — VANCOMYCIN HYDROCHLORIDE 1.5 G: 10 INJECTION, POWDER, LYOPHILIZED, FOR SOLUTION INTRAVENOUS at 01:12

## 2020-01-01 RX ADMIN — FLUCONAZOLE 200 MG: 100 TABLET ORAL at 08:23

## 2020-01-01 RX ADMIN — POTASSIUM BICARBONATE 40 MEQ: 782 TABLET, EFFERVESCENT ORAL at 10:33

## 2020-01-01 RX ADMIN — IPRATROPIUM BROMIDE AND ALBUTEROL SULFATE 1 AMPULE: 2.5; .5 SOLUTION RESPIRATORY (INHALATION) at 08:04

## 2020-01-01 RX ADMIN — IPRATROPIUM BROMIDE AND ALBUTEROL SULFATE 1 AMPULE: 2.5; .5 SOLUTION RESPIRATORY (INHALATION) at 11:24

## 2020-01-01 RX ADMIN — CHLORHEXIDINE GLUCONATE 0.12% ORAL RINSE 15 ML: 1.2 LIQUID ORAL at 22:44

## 2020-01-01 RX ADMIN — SODIUM CHLORIDE, PRESERVATIVE FREE 300 UNITS: 5 INJECTION INTRAVENOUS at 10:09

## 2020-01-01 RX ADMIN — LEVOTHYROXINE SODIUM 50 MCG: 0.05 TABLET ORAL at 06:18

## 2020-01-01 RX ADMIN — FENTANYL CITRATE 50 MCG: 50 INJECTION, SOLUTION INTRAMUSCULAR; INTRAVENOUS at 08:33

## 2020-01-01 RX ADMIN — CHLORHEXIDINE GLUCONATE 0.12% ORAL RINSE 15 ML: 1.2 LIQUID ORAL at 21:37

## 2020-01-01 RX ADMIN — SODIUM CHLORIDE SOLN NEBU 3% 2 ML: 3 NEBU SOLN at 11:32

## 2020-01-01 RX ADMIN — VANCOMYCIN HYDROCHLORIDE 1.5 G: 10 INJECTION, POWDER, LYOPHILIZED, FOR SOLUTION INTRAVENOUS at 12:30

## 2020-01-01 RX ADMIN — ARFORMOTEROL TARTRATE 15 MCG: 15 SOLUTION RESPIRATORY (INHALATION) at 20:46

## 2020-01-01 RX ADMIN — LABETALOL HYDROCHLORIDE 10 MG: 5 INJECTION INTRAVENOUS at 04:13

## 2020-01-01 RX ADMIN — OXYCODONE AND ACETAMINOPHEN 1 TABLET: 5; 325 TABLET ORAL at 15:23

## 2020-01-01 RX ADMIN — FLUCONAZOLE 200 MG: 100 TABLET ORAL at 09:35

## 2020-01-01 RX ADMIN — Medication 100 MG: at 14:06

## 2020-01-01 RX ADMIN — ARFORMOTEROL TARTRATE 15 MCG: 15 SOLUTION RESPIRATORY (INHALATION) at 19:49

## 2020-01-01 RX ADMIN — CARVEDILOL 6.25 MG: 6.25 TABLET, FILM COATED ORAL at 16:39

## 2020-01-01 RX ADMIN — HEPARIN SODIUM 7500 UNITS: 10000 INJECTION INTRAVENOUS; SUBCUTANEOUS at 17:21

## 2020-01-01 RX ADMIN — HYDRALAZINE HYDROCHLORIDE 25 MG: 25 TABLET, FILM COATED ORAL at 20:50

## 2020-01-01 RX ADMIN — OXYCODONE AND ACETAMINOPHEN 1 TABLET: 5; 325 TABLET ORAL at 20:53

## 2020-01-01 RX ADMIN — IPRATROPIUM BROMIDE AND ALBUTEROL SULFATE 1 AMPULE: 2.5; .5 SOLUTION RESPIRATORY (INHALATION) at 20:18

## 2020-01-01 RX ADMIN — DIVALPROEX SODIUM 250 MG: 125 CAPSULE, COATED PELLETS ORAL at 14:00

## 2020-01-01 RX ADMIN — DIVALPROEX SODIUM 250 MG: 125 CAPSULE, COATED PELLETS ORAL at 21:23

## 2020-01-01 RX ADMIN — SODIUM CHLORIDE SOLN NEBU 3% 2 ML: 3 NEBU SOLN at 04:33

## 2020-01-01 RX ADMIN — DIVALPROEX SODIUM 250 MG: 125 CAPSULE, COATED PELLETS ORAL at 05:11

## 2020-01-01 RX ADMIN — SODIUM CHLORIDE SOLN NEBU 3% 2 ML: 3 NEBU SOLN at 16:17

## 2020-01-01 RX ADMIN — QUETIAPINE FUMARATE 50 MG: 25 TABLET ORAL at 10:30

## 2020-01-01 RX ADMIN — DIVALPROEX SODIUM 250 MG: 125 CAPSULE, COATED PELLETS ORAL at 06:04

## 2020-01-01 RX ADMIN — APIXABAN 10 MG: 5 TABLET, FILM COATED ORAL at 11:19

## 2020-01-01 RX ADMIN — BUDESONIDE 500 MCG: 0.5 SUSPENSION RESPIRATORY (INHALATION) at 08:20

## 2020-01-01 RX ADMIN — OXYCODONE AND ACETAMINOPHEN 1 TABLET: 5; 325 TABLET ORAL at 22:07

## 2020-01-01 RX ADMIN — CHLORHEXIDINE GLUCONATE 0.12% ORAL RINSE 15 ML: 1.2 LIQUID ORAL at 21:00

## 2020-01-01 RX ADMIN — LEVOTHYROXINE SODIUM 50 MCG: 0.05 TABLET ORAL at 06:12

## 2020-01-01 RX ADMIN — LINEZOLID 600 MG: 600 TABLET, FILM COATED ORAL at 21:14

## 2020-01-01 RX ADMIN — BUDESONIDE 500 MCG: 0.5 SUSPENSION RESPIRATORY (INHALATION) at 20:10

## 2020-01-01 RX ADMIN — SODIUM CHLORIDE SOLN NEBU 3% 2 ML: 3 NEBU SOLN at 00:00

## 2020-01-01 RX ADMIN — FOLIC ACID 1 MG: 5 INJECTION, SOLUTION INTRAMUSCULAR; INTRAVENOUS; SUBCUTANEOUS at 10:07

## 2020-01-01 RX ADMIN — ACETAMINOPHEN ORAL SOLUTION 650 MG: 650 SOLUTION ORAL at 02:29

## 2020-01-01 RX ADMIN — HEPARIN 300 UNITS: 100 SYRINGE at 00:23

## 2020-01-01 RX ADMIN — IPRATROPIUM BROMIDE AND ALBUTEROL SULFATE 1 AMPULE: 2.5; .5 SOLUTION RESPIRATORY (INHALATION) at 20:50

## 2020-01-01 RX ADMIN — SODIUM CHLORIDE SOLN NEBU 3% 2 ML: 3 NEBU SOLN at 01:30

## 2020-01-01 RX ADMIN — IPRATROPIUM BROMIDE AND ALBUTEROL SULFATE 1 AMPULE: 2.5; .5 SOLUTION RESPIRATORY (INHALATION) at 19:50

## 2020-01-01 RX ADMIN — IPRATROPIUM BROMIDE AND ALBUTEROL SULFATE 3 ML: .5; 3 SOLUTION RESPIRATORY (INHALATION) at 14:29

## 2020-01-01 RX ADMIN — LABETALOL HYDROCHLORIDE 10 MG: 5 INJECTION INTRAVENOUS at 23:41

## 2020-01-01 RX ADMIN — FOLIC ACID 1 MG: 5 INJECTION, SOLUTION INTRAMUSCULAR; INTRAVENOUS; SUBCUTANEOUS at 10:23

## 2020-01-01 RX ADMIN — OXYCODONE AND ACETAMINOPHEN 1 TABLET: 5; 325 TABLET ORAL at 02:58

## 2020-01-01 RX ADMIN — LINEZOLID 600 MG: 100 SUSPENSION ORAL at 09:27

## 2020-01-01 RX ADMIN — IPRATROPIUM BROMIDE AND ALBUTEROL SULFATE 1 AMPULE: 2.5; .5 SOLUTION RESPIRATORY (INHALATION) at 15:04

## 2020-01-01 RX ADMIN — SODIUM CHLORIDE, PRESERVATIVE FREE 10 ML: 5 INJECTION INTRAVENOUS at 08:19

## 2020-01-01 RX ADMIN — HYDRALAZINE HYDROCHLORIDE 10 MG: 10 TABLET, FILM COATED ORAL at 14:07

## 2020-01-01 RX ADMIN — Medication 10 ML: at 16:51

## 2020-01-01 RX ADMIN — QUETIAPINE FUMARATE 50 MG: 25 TABLET ORAL at 21:30

## 2020-01-01 RX ADMIN — VALPROIC ACID 250 MG: 250 SOLUTION ORAL at 20:53

## 2020-01-01 RX ADMIN — PANTOPRAZOLE SODIUM 40 MG: 40 INJECTION, POWDER, FOR SOLUTION INTRAVENOUS at 09:16

## 2020-01-01 RX ADMIN — BUDESONIDE 500 MCG: 0.5 SUSPENSION RESPIRATORY (INHALATION) at 20:09

## 2020-01-01 RX ADMIN — NYSTATIN 500000 UNITS: 100000 SUSPENSION ORAL at 13:39

## 2020-01-01 RX ADMIN — FOLIC ACID 1 MG: 5 INJECTION, SOLUTION INTRAMUSCULAR; INTRAVENOUS; SUBCUTANEOUS at 09:29

## 2020-01-01 RX ADMIN — CHLORHEXIDINE GLUCONATE 0.12% ORAL RINSE 15 ML: 1.2 LIQUID ORAL at 10:07

## 2020-01-01 RX ADMIN — FLUCONAZOLE 200 MG: 100 TABLET ORAL at 09:19

## 2020-01-01 RX ADMIN — VALPROIC ACID 250 MG: 250 SOLUTION ORAL at 22:56

## 2020-01-01 RX ADMIN — APIXABAN 10 MG: 5 TABLET, FILM COATED ORAL at 20:53

## 2020-01-01 RX ADMIN — QUETIAPINE FUMARATE 25 MG: 25 TABLET ORAL at 09:16

## 2020-01-01 RX ADMIN — SODIUM CHLORIDE SOLN NEBU 3% 2 ML: 3 NEBU SOLN at 04:44

## 2020-01-01 RX ADMIN — Medication 10 ML: at 09:29

## 2020-01-01 RX ADMIN — SENNOSIDES 17.2 MG: 8.6 TABLET, FILM COATED ORAL at 20:58

## 2020-01-01 RX ADMIN — NYSTATIN 500000 UNITS: 100000 SUSPENSION ORAL at 16:59

## 2020-01-01 RX ADMIN — IPRATROPIUM BROMIDE AND ALBUTEROL SULFATE 1 AMPULE: 2.5; .5 SOLUTION RESPIRATORY (INHALATION) at 18:25

## 2020-01-01 RX ADMIN — AMLODIPINE BESYLATE 10 MG: 10 TABLET ORAL at 09:19

## 2020-01-01 RX ADMIN — APIXABAN 10 MG: 5 TABLET, FILM COATED ORAL at 09:19

## 2020-01-01 RX ADMIN — SODIUM CHLORIDE SOLN NEBU 3% 2 ML: 3 NEBU SOLN at 08:04

## 2020-01-01 RX ADMIN — PROPOFOL 50 MG: 10 INJECTION, EMULSION INTRAVENOUS at 16:30

## 2020-01-01 RX ADMIN — HYDRALAZINE HYDROCHLORIDE 25 MG: 25 TABLET, FILM COATED ORAL at 05:49

## 2020-01-01 RX ADMIN — Medication 10 ML: at 12:09

## 2020-01-01 RX ADMIN — FOLIC ACID 1 MG: 5 INJECTION, SOLUTION INTRAMUSCULAR; INTRAVENOUS; SUBCUTANEOUS at 09:28

## 2020-01-01 RX ADMIN — STANDARDIZED SENNA CONCENTRATE 17.2 MG: 8.6 TABLET ORAL at 20:53

## 2020-01-01 RX ADMIN — SODIUM CHLORIDE, PRESERVATIVE FREE 300 UNITS: 5 INJECTION INTRAVENOUS at 08:12

## 2020-01-01 RX ADMIN — THIAMINE HYDROCHLORIDE 250 MG: 100 INJECTION, SOLUTION INTRAMUSCULAR; INTRAVENOUS at 11:16

## 2020-01-01 RX ADMIN — HEPARIN SODIUM AND DEXTROSE 14.28 UNITS/KG/HR: 10000; 5 INJECTION INTRAVENOUS at 04:41

## 2020-01-01 RX ADMIN — SODIUM CHLORIDE SOLN NEBU 3% 2 ML: 3 NEBU SOLN at 16:27

## 2020-01-01 RX ADMIN — LEVOTHYROXINE SODIUM 50 MCG: 0.05 TABLET ORAL at 06:40

## 2020-01-01 RX ADMIN — DIVALPROEX SODIUM 250 MG: 125 CAPSULE, COATED PELLETS ORAL at 22:51

## 2020-01-01 RX ADMIN — NYSTATIN 500000 UNITS: 100000 SUSPENSION ORAL at 21:47

## 2020-01-01 RX ADMIN — IPRATROPIUM BROMIDE AND ALBUTEROL SULFATE 3 ML: .5; 3 SOLUTION RESPIRATORY (INHALATION) at 17:20

## 2020-01-01 RX ADMIN — LEVOTHYROXINE SODIUM 50 MCG: 0.05 TABLET ORAL at 09:36

## 2020-01-01 RX ADMIN — LEVOTHYROXINE SODIUM 50 MCG: 0.05 TABLET ORAL at 11:19

## 2020-01-01 RX ADMIN — INSULIN LISPRO 3 UNITS: 100 INJECTION, SOLUTION INTRAVENOUS; SUBCUTANEOUS at 04:50

## 2020-01-01 RX ADMIN — CHLORHEXIDINE GLUCONATE 0.12% ORAL RINSE 15 ML: 1.2 LIQUID ORAL at 20:52

## 2020-01-01 RX ADMIN — Medication 10 ML: at 20:48

## 2020-01-01 RX ADMIN — PIPERACILLIN AND TAZOBACTAM 3.38 G: 3; .375 INJECTION, POWDER, FOR SOLUTION INTRAVENOUS at 15:39

## 2020-01-01 RX ADMIN — LANSOPRAZOLE 30 MG: KIT at 05:50

## 2020-01-01 RX ADMIN — NYSTATIN 500000 UNITS: 100000 SUSPENSION ORAL at 14:15

## 2020-01-01 RX ADMIN — LANSOPRAZOLE 30 MG: KIT at 05:16

## 2020-01-01 RX ADMIN — ASPIRIN 81 MG CHEWABLE TABLET 81 MG: 81 TABLET CHEWABLE at 08:16

## 2020-01-01 RX ADMIN — ARFORMOTEROL TARTRATE 15 MCG: 15 SOLUTION RESPIRATORY (INHALATION) at 08:03

## 2020-01-01 RX ADMIN — CHLORHEXIDINE GLUCONATE 0.12% ORAL RINSE 15 ML: 1.2 LIQUID ORAL at 07:38

## 2020-01-01 RX ADMIN — HYDROCODONE BITARTRATE AND ACETAMINOPHEN 1 TABLET: 5; 325 TABLET ORAL at 05:16

## 2020-01-01 RX ADMIN — HYDRALAZINE HYDROCHLORIDE 25 MG: 25 TABLET, FILM COATED ORAL at 05:30

## 2020-01-01 RX ADMIN — CHLORHEXIDINE GLUCONATE 0.12% ORAL RINSE 15 ML: 1.2 LIQUID ORAL at 22:12

## 2020-01-01 RX ADMIN — Medication 10 ML: at 02:29

## 2020-01-01 RX ADMIN — PIPERACILLIN AND TAZOBACTAM 3.38 G: 3; .375 INJECTION, POWDER, FOR SOLUTION INTRAVENOUS at 01:22

## 2020-01-01 RX ADMIN — SODIUM CHLORIDE, PRESERVATIVE FREE 300 UNITS: 5 INJECTION INTRAVENOUS at 08:59

## 2020-01-01 RX ADMIN — DEXTROSE MONOHYDRATE 25 G: 25 INJECTION, SOLUTION INTRAVENOUS at 04:30

## 2020-01-01 RX ADMIN — HEPARIN 300 UNITS: 100 SYRINGE at 08:59

## 2020-01-01 RX ADMIN — INSULIN LISPRO 3 UNITS: 100 INJECTION, SOLUTION INTRAVENOUS; SUBCUTANEOUS at 03:47

## 2020-01-01 RX ADMIN — PIPERACILLIN AND TAZOBACTAM 3.38 G: 3; .375 INJECTION, POWDER, LYOPHILIZED, FOR SOLUTION INTRAVENOUS at 12:29

## 2020-01-01 RX ADMIN — LINEZOLID 600 MG: 100 SUSPENSION ORAL at 22:46

## 2020-01-01 RX ADMIN — CHLORHEXIDINE GLUCONATE 0.12% ORAL RINSE 15 ML: 1.2 LIQUID ORAL at 08:06

## 2020-01-01 RX ADMIN — VALPROIC ACID 250 MG: 250 SOLUTION ORAL at 06:12

## 2020-01-01 RX ADMIN — SODIUM CHLORIDE, PRESERVATIVE FREE 10 ML: 5 INJECTION INTRAVENOUS at 04:38

## 2020-01-01 RX ADMIN — VALPROIC ACID 250 MG: 250 SOLUTION ORAL at 14:35

## 2020-01-01 RX ADMIN — BUDESONIDE 500 MCG: 0.5 SUSPENSION RESPIRATORY (INHALATION) at 20:39

## 2020-01-01 RX ADMIN — HYDROCODONE BITARTRATE AND ACETAMINOPHEN 1 TABLET: 5; 325 TABLET ORAL at 18:06

## 2020-01-01 RX ADMIN — IPRATROPIUM BROMIDE AND ALBUTEROL SULFATE 1 AMPULE: 2.5; .5 SOLUTION RESPIRATORY (INHALATION) at 08:11

## 2020-01-01 RX ADMIN — SODIUM CHLORIDE SOLN NEBU 3% 2 ML: 3 NEBU SOLN at 08:45

## 2020-01-01 RX ADMIN — INSULIN LISPRO 3 UNITS: 100 INJECTION, SOLUTION INTRAVENOUS; SUBCUTANEOUS at 08:25

## 2020-01-01 RX ADMIN — CHLORHEXIDINE GLUCONATE 0.12% ORAL RINSE 15 ML: 1.2 LIQUID ORAL at 21:28

## 2020-01-01 RX ADMIN — SODIUM CHLORIDE SOLN NEBU 3% 2 ML: 3 NEBU SOLN at 20:35

## 2020-01-01 RX ADMIN — BUDESONIDE 500 MCG: 0.5 SUSPENSION RESPIRATORY (INHALATION) at 11:05

## 2020-01-01 RX ADMIN — DIVALPROEX SODIUM 250 MG: 125 CAPSULE, COATED PELLETS ORAL at 22:43

## 2020-01-01 RX ADMIN — BUDESONIDE 500 MCG: 0.5 SUSPENSION RESPIRATORY (INHALATION) at 14:29

## 2020-01-01 RX ADMIN — HEPARIN SODIUM 7500 UNITS: 10000 INJECTION INTRAVENOUS; SUBCUTANEOUS at 10:10

## 2020-01-01 RX ADMIN — QUETIAPINE FUMARATE 50 MG: 25 TABLET ORAL at 09:28

## 2020-01-01 RX ADMIN — IPRATROPIUM BROMIDE AND ALBUTEROL SULFATE 1 AMPULE: 2.5; .5 SOLUTION RESPIRATORY (INHALATION) at 12:10

## 2020-01-01 RX ADMIN — PIPERACILLIN AND TAZOBACTAM 3.38 G: 3; .375 INJECTION, POWDER, LYOPHILIZED, FOR SOLUTION INTRAVENOUS at 21:21

## 2020-01-01 RX ADMIN — ARFORMOTEROL TARTRATE 15 MCG: 15 SOLUTION RESPIRATORY (INHALATION) at 05:28

## 2020-01-01 RX ADMIN — NYSTATIN 500000 UNITS: 100000 SUSPENSION ORAL at 17:54

## 2020-01-01 RX ADMIN — IPRATROPIUM BROMIDE AND ALBUTEROL SULFATE 1 AMPULE: 2.5; .5 SOLUTION RESPIRATORY (INHALATION) at 11:51

## 2020-01-01 RX ADMIN — SODIUM CHLORIDE, PRESERVATIVE FREE 10 ML: 5 INJECTION INTRAVENOUS at 09:28

## 2020-01-01 RX ADMIN — IOPAMIDOL 90 ML: 755 INJECTION, SOLUTION INTRAVENOUS at 02:56

## 2020-01-01 RX ADMIN — HYDRALAZINE HYDROCHLORIDE 25 MG: 25 TABLET, FILM COATED ORAL at 06:40

## 2020-01-01 RX ADMIN — SODIUM CHLORIDE SOLN NEBU 3% 2 ML: 3 NEBU SOLN at 23:50

## 2020-01-01 RX ADMIN — IPRATROPIUM BROMIDE AND ALBUTEROL SULFATE 1 AMPULE: 2.5; .5 SOLUTION RESPIRATORY (INHALATION) at 15:43

## 2020-01-01 RX ADMIN — ARFORMOTEROL TARTRATE 15 MCG: 15 SOLUTION RESPIRATORY (INHALATION) at 08:05

## 2020-01-01 RX ADMIN — SODIUM CHLORIDE, PRESERVATIVE FREE 10 ML: 5 INJECTION INTRAVENOUS at 16:09

## 2020-01-01 RX ADMIN — AMLODIPINE BESYLATE 10 MG: 10 TABLET ORAL at 09:34

## 2020-01-01 RX ADMIN — DIVALPROEX SODIUM 250 MG: 125 CAPSULE, COATED PELLETS ORAL at 20:46

## 2020-01-01 RX ADMIN — FLUCONAZOLE 200 MG: 100 TABLET ORAL at 09:04

## 2020-01-01 RX ADMIN — Medication 10 ML: at 08:22

## 2020-01-01 RX ADMIN — CARVEDILOL 6.25 MG: 6.25 TABLET, FILM COATED ORAL at 16:59

## 2020-01-01 RX ADMIN — Medication 10 ML: at 15:02

## 2020-01-01 RX ADMIN — DEXTROSE MONOHYDRATE 12.5 G: 25 INJECTION, SOLUTION INTRAVENOUS at 02:44

## 2020-01-01 RX ADMIN — SENNOSIDES 17.2 MG: 8.6 TABLET, FILM COATED ORAL at 20:06

## 2020-01-01 RX ADMIN — BUDESONIDE 500 MCG: 0.5 SUSPENSION RESPIRATORY (INHALATION) at 09:38

## 2020-01-01 RX ADMIN — BUDESONIDE 500 MCG: 0.5 SUSPENSION RESPIRATORY (INHALATION) at 20:34

## 2020-01-01 RX ADMIN — QUETIAPINE FUMARATE 50 MG: 25 TABLET ORAL at 10:13

## 2020-01-01 RX ADMIN — LABETALOL HYDROCHLORIDE 10 MG: 5 INJECTION, SOLUTION INTRAVENOUS at 12:14

## 2020-01-01 RX ADMIN — LINEZOLID 600 MG: 600 TABLET, FILM COATED ORAL at 09:20

## 2020-01-01 RX ADMIN — DIVALPROEX SODIUM 250 MG: 125 CAPSULE, COATED PELLETS ORAL at 22:10

## 2020-01-01 RX ADMIN — FOLIC ACID 1 MG: 5 INJECTION, SOLUTION INTRAMUSCULAR; INTRAVENOUS; SUBCUTANEOUS at 09:24

## 2020-01-01 RX ADMIN — HEPARIN SODIUM 7500 UNITS: 10000 INJECTION INTRAVENOUS; SUBCUTANEOUS at 16:51

## 2020-01-01 RX ADMIN — LANSOPRAZOLE 30 MG: KIT at 08:23

## 2020-01-01 RX ADMIN — MIDAZOLAM 2 MG: 1 INJECTION INTRAMUSCULAR; INTRAVENOUS at 00:05

## 2020-01-01 RX ADMIN — CHLORDIAZEPOXIDE HYDROCHLORIDE 25 MG: 25 CAPSULE ORAL at 10:12

## 2020-01-01 RX ADMIN — DIVALPROEX SODIUM 250 MG: 125 CAPSULE, COATED PELLETS ORAL at 13:11

## 2020-01-01 RX ADMIN — NYSTATIN 500000 UNITS: 100000 SUSPENSION ORAL at 08:39

## 2020-01-01 RX ADMIN — HYDRALAZINE HYDROCHLORIDE 25 MG: 25 TABLET, FILM COATED ORAL at 14:06

## 2020-01-01 RX ADMIN — QUETIAPINE FUMARATE 50 MG: 25 TABLET ORAL at 22:42

## 2020-01-01 RX ADMIN — CARVEDILOL 3.12 MG: 6.25 TABLET, FILM COATED ORAL at 10:12

## 2020-01-01 RX ADMIN — THIAMINE HYDROCHLORIDE 250 MG: 100 INJECTION, SOLUTION INTRAMUSCULAR; INTRAVENOUS at 10:46

## 2020-01-01 RX ADMIN — HEPARIN SODIUM 7500 UNITS: 10000 INJECTION INTRAVENOUS; SUBCUTANEOUS at 18:23

## 2020-01-01 RX ADMIN — NYSTATIN 500000 UNITS: 100000 SUSPENSION ORAL at 12:30

## 2020-01-01 RX ADMIN — Medication 10 ML: at 18:14

## 2020-01-01 RX ADMIN — QUETIAPINE FUMARATE 25 MG: 25 TABLET ORAL at 21:17

## 2020-01-01 RX ADMIN — Medication 100 MG: at 09:17

## 2020-01-01 RX ADMIN — LINEZOLID 600 MG: 600 TABLET, FILM COATED ORAL at 09:35

## 2020-01-01 RX ADMIN — CARVEDILOL 3.12 MG: 6.25 TABLET, FILM COATED ORAL at 10:30

## 2020-01-01 RX ADMIN — SODIUM CHLORIDE SOLN NEBU 3% 2 ML: 3 NEBU SOLN at 16:24

## 2020-01-01 RX ADMIN — BUDESONIDE 500 MCG: 0.5 SUSPENSION RESPIRATORY (INHALATION) at 05:27

## 2020-01-01 RX ADMIN — IPRATROPIUM BROMIDE AND ALBUTEROL SULFATE 1 AMPULE: 2.5; .5 SOLUTION RESPIRATORY (INHALATION) at 12:31

## 2020-01-01 RX ADMIN — INSULIN LISPRO 3 UNITS: 100 INJECTION, SOLUTION INTRAVENOUS; SUBCUTANEOUS at 11:44

## 2020-01-01 RX ADMIN — CARVEDILOL 3.12 MG: 6.25 TABLET, FILM COATED ORAL at 10:09

## 2020-01-01 RX ADMIN — IPRATROPIUM BROMIDE AND ALBUTEROL SULFATE 1 AMPULE: 2.5; .5 SOLUTION RESPIRATORY (INHALATION) at 16:59

## 2020-01-01 RX ADMIN — AMLODIPINE BESYLATE 10 MG: 10 TABLET ORAL at 09:17

## 2020-01-01 RX ADMIN — QUETIAPINE FUMARATE 25 MG: 25 TABLET ORAL at 20:50

## 2020-01-01 RX ADMIN — LEVOTHYROXINE SODIUM 50 MCG: 0.05 TABLET ORAL at 06:04

## 2020-01-01 RX ADMIN — VALPROIC ACID 250 MG: 250 SOLUTION ORAL at 05:16

## 2020-01-01 RX ADMIN — LIDOCAINE HYDROCHLORIDE 10 ML: 20 INJECTION, SOLUTION INFILTRATION; PERINEURAL at 08:34

## 2020-01-01 RX ADMIN — BUDESONIDE 500 MCG: 0.5 SUSPENSION RESPIRATORY (INHALATION) at 20:40

## 2020-01-01 RX ADMIN — HYDRALAZINE HYDROCHLORIDE 25 MG: 25 TABLET, FILM COATED ORAL at 14:53

## 2020-01-01 RX ADMIN — IPRATROPIUM BROMIDE AND ALBUTEROL SULFATE 1 AMPULE: 2.5; .5 SOLUTION RESPIRATORY (INHALATION) at 19:49

## 2020-01-01 RX ADMIN — HEPARIN 300 UNITS: 100 SYRINGE at 06:27

## 2020-01-01 RX ADMIN — AMLODIPINE BESYLATE 10 MG: 10 TABLET ORAL at 09:04

## 2020-01-01 RX ADMIN — MIDAZOLAM HYDROCHLORIDE 2 MG: 2 INJECTION, SOLUTION INTRAMUSCULAR; INTRAVENOUS at 17:25

## 2020-01-01 RX ADMIN — LINEZOLID 600 MG: 100 SUSPENSION ORAL at 09:08

## 2020-01-01 RX ADMIN — AMLODIPINE BESYLATE 10 MG: 10 TABLET ORAL at 16:58

## 2020-01-01 RX ADMIN — SODIUM CHLORIDE SOLN NEBU 3% 2 ML: 3 NEBU SOLN at 03:40

## 2020-01-01 RX ADMIN — SODIUM CHLORIDE: 9 INJECTION, SOLUTION INTRAVENOUS at 20:38

## 2020-01-01 RX ADMIN — Medication 10 ML: at 08:16

## 2020-01-01 RX ADMIN — INSULIN LISPRO 3 UNITS: 100 INJECTION, SOLUTION INTRAVENOUS; SUBCUTANEOUS at 16:25

## 2020-01-01 RX ADMIN — PIPERACILLIN AND TAZOBACTAM 3.38 G: 3; .375 INJECTION, POWDER, LYOPHILIZED, FOR SOLUTION INTRAVENOUS at 22:00

## 2020-01-01 RX ADMIN — DIVALPROEX SODIUM 250 MG: 125 CAPSULE, COATED PELLETS ORAL at 20:53

## 2020-01-01 RX ADMIN — SODIUM CHLORIDE: 9 INJECTION, SOLUTION INTRAVENOUS at 16:21

## 2020-01-01 RX ADMIN — BUDESONIDE 500 MCG: 0.5 SUSPENSION RESPIRATORY (INHALATION) at 20:18

## 2020-01-01 RX ADMIN — HYDROCODONE BITARTRATE AND ACETAMINOPHEN 1 TABLET: 5; 325 TABLET ORAL at 23:37

## 2020-01-01 RX ADMIN — MIDAZOLAM HYDROCHLORIDE 2 MG: 2 INJECTION, SOLUTION INTRAMUSCULAR; INTRAVENOUS at 21:52

## 2020-01-01 RX ADMIN — APIXABAN 10 MG: 5 TABLET, FILM COATED ORAL at 20:12

## 2020-01-01 RX ADMIN — HEPARIN SODIUM 7500 UNITS: 10000 INJECTION INTRAVENOUS; SUBCUTANEOUS at 16:59

## 2020-01-01 RX ADMIN — CHLORHEXIDINE GLUCONATE 0.12% ORAL RINSE 15 ML: 1.2 LIQUID ORAL at 20:53

## 2020-01-01 RX ADMIN — LINEZOLID 600 MG: 600 TABLET, FILM COATED ORAL at 09:18

## 2020-01-01 RX ADMIN — SENNOSIDES 17.2 MG: 8.6 TABLET, FILM COATED ORAL at 21:48

## 2020-01-01 RX ADMIN — BUMETANIDE 1 MG: 0.25 INJECTION INTRAMUSCULAR; INTRAVENOUS at 16:24

## 2020-01-01 RX ADMIN — SODIUM CHLORIDE 25 ML: 9 INJECTION, SOLUTION INTRAVENOUS at 15:00

## 2020-01-01 RX ADMIN — SODIUM CHLORIDE, PRESERVATIVE FREE 300 UNITS: 5 INJECTION INTRAVENOUS at 21:36

## 2020-01-01 RX ADMIN — CARVEDILOL 3.12 MG: 6.25 TABLET, FILM COATED ORAL at 17:30

## 2020-01-01 RX ADMIN — FLUCONAZOLE 200 MG: 100 TABLET ORAL at 10:06

## 2020-01-01 RX ADMIN — FLUCONAZOLE 200 MG: 100 TABLET ORAL at 15:19

## 2020-01-01 RX ADMIN — THIAMINE HYDROCHLORIDE 250 MG: 100 INJECTION, SOLUTION INTRAMUSCULAR; INTRAVENOUS at 10:39

## 2020-01-01 RX ADMIN — NYSTATIN 500000 UNITS: 100000 SUSPENSION ORAL at 09:23

## 2020-01-01 RX ADMIN — Medication 10 ML: at 21:52

## 2020-01-01 RX ADMIN — BUDESONIDE 500 MCG: 0.5 SUSPENSION RESPIRATORY (INHALATION) at 08:39

## 2020-01-01 RX ADMIN — SODIUM CHLORIDE 25 ML: 9 INJECTION, SOLUTION INTRAVENOUS at 08:36

## 2020-01-01 RX ADMIN — SODIUM CHLORIDE SOLN NEBU 3% 2 ML: 3 NEBU SOLN at 20:18

## 2020-01-01 RX ADMIN — SODIUM CHLORIDE SOLN NEBU 3% 2 ML: 3 NEBU SOLN at 20:55

## 2020-01-01 RX ADMIN — DIVALPROEX SODIUM 250 MG: 125 CAPSULE, COATED PELLETS ORAL at 23:55

## 2020-01-01 RX ADMIN — Medication 10 ML: at 20:58

## 2020-01-01 RX ADMIN — HYDROCODONE BITARTRATE AND ACETAMINOPHEN 1 TABLET: 5; 325 TABLET ORAL at 12:35

## 2020-01-01 RX ADMIN — HEPARIN SODIUM 7500 UNITS: 10000 INJECTION INTRAVENOUS; SUBCUTANEOUS at 00:38

## 2020-01-01 RX ADMIN — Medication 10 ML: at 08:27

## 2020-01-01 RX ADMIN — Medication 100 MG: at 09:36

## 2020-01-01 RX ADMIN — HEPARIN SODIUM 7500 UNITS: 10000 INJECTION INTRAVENOUS; SUBCUTANEOUS at 17:54

## 2020-01-01 RX ADMIN — DIVALPROEX SODIUM 250 MG: 125 CAPSULE, COATED PELLETS ORAL at 13:57

## 2020-01-01 RX ADMIN — NYSTATIN 500000 UNITS: 100000 SUSPENSION ORAL at 09:36

## 2020-01-01 RX ADMIN — SODIUM CHLORIDE, PRESERVATIVE FREE 300 UNITS: 5 INJECTION INTRAVENOUS at 20:07

## 2020-01-01 RX ADMIN — SODIUM CHLORIDE SOLN NEBU 3% 4 ML: 3 NEBU SOLN at 18:25

## 2020-01-01 RX ADMIN — CARVEDILOL 3.12 MG: 6.25 TABLET, FILM COATED ORAL at 17:22

## 2020-01-01 RX ADMIN — IPRATROPIUM BROMIDE AND ALBUTEROL SULFATE 1 AMPULE: 2.5; .5 SOLUTION RESPIRATORY (INHALATION) at 20:54

## 2020-01-01 RX ADMIN — NYSTATIN 500000 UNITS: 100000 SUSPENSION ORAL at 22:01

## 2020-01-01 RX ADMIN — HEPARIN SODIUM 7500 UNITS: 10000 INJECTION INTRAVENOUS; SUBCUTANEOUS at 09:11

## 2020-01-01 RX ADMIN — SODIUM CHLORIDE SOLN NEBU 3% 4 ML: 3 NEBU SOLN at 02:00

## 2020-01-01 RX ADMIN — FOLIC ACID 1 MG: 5 INJECTION, SOLUTION INTRAMUSCULAR; INTRAVENOUS; SUBCUTANEOUS at 12:29

## 2020-01-01 RX ADMIN — VALPROIC ACID 250 MG: 250 SOLUTION ORAL at 21:14

## 2020-01-01 RX ADMIN — IPRATROPIUM BROMIDE AND ALBUTEROL SULFATE 3 ML: .5; 3 SOLUTION RESPIRATORY (INHALATION) at 08:25

## 2020-01-01 RX ADMIN — INSULIN LISPRO 3 UNITS: 100 INJECTION, SOLUTION INTRAVENOUS; SUBCUTANEOUS at 16:37

## 2020-01-01 RX ADMIN — COLCHICINE 0.6 MG: 0.6 TABLET, FILM COATED ORAL at 09:35

## 2020-01-01 RX ADMIN — CARVEDILOL 6.25 MG: 6.25 TABLET, FILM COATED ORAL at 17:32

## 2020-01-01 RX ADMIN — HEPARIN SODIUM 7500 UNITS: 10000 INJECTION INTRAVENOUS; SUBCUTANEOUS at 08:10

## 2020-01-01 RX ADMIN — AMLODIPINE BESYLATE 10 MG: 10 TABLET ORAL at 17:20

## 2020-01-01 RX ADMIN — HEPARIN SODIUM 7500 UNITS: 10000 INJECTION INTRAVENOUS; SUBCUTANEOUS at 00:34

## 2020-01-01 RX ADMIN — SODIUM CHLORIDE SOLN NEBU 3% 2 ML: 3 NEBU SOLN at 16:01

## 2020-01-01 RX ADMIN — AMLODIPINE BESYLATE 10 MG: 10 TABLET ORAL at 09:01

## 2020-01-01 RX ADMIN — HEPARIN SODIUM 7500 UNITS: 10000 INJECTION INTRAVENOUS; SUBCUTANEOUS at 09:24

## 2020-01-01 RX ADMIN — ARFORMOTEROL TARTRATE 15 MCG: 15 SOLUTION RESPIRATORY (INHALATION) at 20:09

## 2020-01-01 RX ADMIN — LEVOTHYROXINE SODIUM 50 MCG: 0.05 TABLET ORAL at 05:49

## 2020-01-01 RX ADMIN — PANTOPRAZOLE SODIUM 40 MG: 40 INJECTION, POWDER, FOR SOLUTION INTRAVENOUS at 09:00

## 2020-01-01 RX ADMIN — PIPERACILLIN AND TAZOBACTAM 3.38 G: 3; .375 INJECTION, POWDER, LYOPHILIZED, FOR SOLUTION INTRAVENOUS at 04:35

## 2020-01-01 RX ADMIN — Medication 10 ML: at 09:18

## 2020-01-01 RX ADMIN — BARIUM SULFATE 15 ML: 400 PASTE ORAL at 14:32

## 2020-01-01 RX ADMIN — SODIUM CHLORIDE, PRESERVATIVE FREE 10 ML: 5 INJECTION INTRAVENOUS at 08:24

## 2020-01-01 RX ADMIN — DIVALPROEX SODIUM 250 MG: 125 CAPSULE, COATED PELLETS ORAL at 20:57

## 2020-01-01 RX ADMIN — DIPHENHYDRAMINE HYDROCHLORIDE 25 MG: 25 TABLET ORAL at 18:20

## 2020-01-01 RX ADMIN — LANSOPRAZOLE 30 MG: KIT at 06:46

## 2020-01-01 RX ADMIN — AMLODIPINE BESYLATE 10 MG: 10 TABLET ORAL at 09:23

## 2020-01-01 RX ADMIN — DIVALPROEX SODIUM 250 MG: 125 CAPSULE, COATED PELLETS ORAL at 05:39

## 2020-01-01 RX ADMIN — DIVALPROEX SODIUM 250 MG: 125 CAPSULE, COATED PELLETS ORAL at 14:05

## 2020-01-01 RX ADMIN — SENNOSIDES 17.2 MG: 8.6 TABLET, FILM COATED ORAL at 22:10

## 2020-01-01 RX ADMIN — LINEZOLID 600 MG: 600 TABLET, FILM COATED ORAL at 21:03

## 2020-01-01 RX ADMIN — INSULIN LISPRO 3 UNITS: 100 INJECTION, SOLUTION INTRAVENOUS; SUBCUTANEOUS at 12:15

## 2020-01-01 RX ADMIN — LANSOPRAZOLE 30 MG: KIT at 05:40

## 2020-01-01 RX ADMIN — IPRATROPIUM BROMIDE AND ALBUTEROL SULFATE 1 AMPULE: 2.5; .5 SOLUTION RESPIRATORY (INHALATION) at 12:50

## 2020-01-01 RX ADMIN — VALPROIC ACID 250 MG: 250 SOLUTION ORAL at 06:57

## 2020-01-01 RX ADMIN — HYDROCODONE BITARTRATE AND ACETAMINOPHEN 1 TABLET: 5; 325 TABLET ORAL at 11:48

## 2020-01-01 RX ADMIN — HYDROCODONE BITARTRATE AND ACETAMINOPHEN 1 TABLET: 5; 325 TABLET ORAL at 23:57

## 2020-01-01 RX ADMIN — CARVEDILOL 6.25 MG: 6.25 TABLET, FILM COATED ORAL at 09:23

## 2020-01-01 RX ADMIN — SODIUM CHLORIDE SOLN NEBU 3% 2 ML: 3 NEBU SOLN at 16:14

## 2020-01-01 RX ADMIN — DIVALPROEX SODIUM 250 MG: 125 CAPSULE, COATED PELLETS ORAL at 21:46

## 2020-01-01 RX ADMIN — HEPARIN SODIUM 7500 UNITS: 10000 INJECTION INTRAVENOUS; SUBCUTANEOUS at 17:32

## 2020-01-01 RX ADMIN — CARVEDILOL 6.25 MG: 6.25 TABLET, FILM COATED ORAL at 09:37

## 2020-01-01 RX ADMIN — SODIUM CHLORIDE SOLN NEBU 3% 2 ML: 3 NEBU SOLN at 12:11

## 2020-01-01 RX ADMIN — IPRATROPIUM BROMIDE AND ALBUTEROL SULFATE 3 ML: .5; 3 SOLUTION RESPIRATORY (INHALATION) at 09:37

## 2020-01-01 RX ADMIN — CHLORHEXIDINE GLUCONATE 0.12% ORAL RINSE 15 ML: 1.2 LIQUID ORAL at 10:09

## 2020-01-01 RX ADMIN — SODIUM CHLORIDE SOLN NEBU 3% 2 ML: 3 NEBU SOLN at 15:51

## 2020-01-01 RX ADMIN — IPRATROPIUM BROMIDE AND ALBUTEROL SULFATE 3 ML: .5; 3 SOLUTION RESPIRATORY (INHALATION) at 08:35

## 2020-01-01 RX ADMIN — FOLIC ACID 1 MG: 5 INJECTION, SOLUTION INTRAMUSCULAR; INTRAVENOUS; SUBCUTANEOUS at 09:05

## 2020-01-01 RX ADMIN — CARVEDILOL 6.25 MG: 6.25 TABLET, FILM COATED ORAL at 09:17

## 2020-01-01 RX ADMIN — HYDRALAZINE HYDROCHLORIDE 10 MG: 20 INJECTION, SOLUTION INTRAMUSCULAR; INTRAVENOUS at 00:20

## 2020-01-01 RX ADMIN — SODIUM CHLORIDE, PRESERVATIVE FREE 10 ML: 5 INJECTION INTRAVENOUS at 10:08

## 2020-01-01 RX ADMIN — CARVEDILOL 6.25 MG: 6.25 TABLET, FILM COATED ORAL at 18:52

## 2020-01-01 RX ADMIN — Medication 10 ML: at 14:45

## 2020-01-01 RX ADMIN — SODIUM CHLORIDE: 9 INJECTION, SOLUTION INTRAVENOUS at 11:56

## 2020-01-01 RX ADMIN — CARVEDILOL 6.25 MG: 6.25 TABLET, FILM COATED ORAL at 15:28

## 2020-01-01 RX ADMIN — Medication 10 ML: at 10:26

## 2020-01-01 RX ADMIN — HEPARIN SODIUM 7500 UNITS: 10000 INJECTION INTRAVENOUS; SUBCUTANEOUS at 16:44

## 2020-01-01 RX ADMIN — HYDRALAZINE HYDROCHLORIDE 50 MG: 50 TABLET, FILM COATED ORAL at 23:16

## 2020-01-01 RX ADMIN — QUETIAPINE FUMARATE 100 MG: 100 TABLET ORAL at 21:23

## 2020-01-01 RX ADMIN — BUDESONIDE 500 MCG: 0.5 SUSPENSION RESPIRATORY (INHALATION) at 20:19

## 2020-01-01 RX ADMIN — SODIUM CHLORIDE 3 G: 900 INJECTION INTRAVENOUS at 01:27

## 2020-01-01 RX ADMIN — IPRATROPIUM BROMIDE AND ALBUTEROL SULFATE 3 ML: .5; 3 SOLUTION RESPIRATORY (INHALATION) at 21:32

## 2020-01-01 RX ADMIN — COLCHICINE 0.6 MG: 0.6 TABLET, FILM COATED ORAL at 09:00

## 2020-01-01 RX ADMIN — Medication 100 MG: at 09:37

## 2020-01-01 RX ADMIN — OXYCODONE AND ACETAMINOPHEN 1 TABLET: 5; 325 TABLET ORAL at 17:26

## 2020-01-01 RX ADMIN — FENTANYL CITRATE 100 MCG: 50 INJECTION, SOLUTION INTRAMUSCULAR; INTRAVENOUS at 00:41

## 2020-01-01 RX ADMIN — INSULIN LISPRO 3 UNITS: 100 INJECTION, SOLUTION INTRAVENOUS; SUBCUTANEOUS at 08:50

## 2020-01-01 RX ADMIN — DIVALPROEX SODIUM 250 MG: 125 CAPSULE, COATED PELLETS ORAL at 13:58

## 2020-01-01 RX ADMIN — HEPARIN SODIUM 7500 UNITS: 10000 INJECTION INTRAVENOUS; SUBCUTANEOUS at 09:04

## 2020-01-01 RX ADMIN — LEVOTHYROXINE SODIUM 50 MCG: 0.05 TABLET ORAL at 06:16

## 2020-01-01 RX ADMIN — AMLODIPINE BESYLATE 10 MG: 10 TABLET ORAL at 08:06

## 2020-01-01 RX ADMIN — ARFORMOTEROL TARTRATE 15 MCG: 15 SOLUTION RESPIRATORY (INHALATION) at 08:27

## 2020-01-01 RX ADMIN — Medication 100 MG: at 08:23

## 2020-01-01 RX ADMIN — APIXABAN 10 MG: 5 TABLET, FILM COATED ORAL at 21:00

## 2020-01-01 RX ADMIN — INSULIN LISPRO 3 UNITS: 100 INJECTION, SOLUTION INTRAVENOUS; SUBCUTANEOUS at 12:52

## 2020-01-01 RX ADMIN — BUDESONIDE 500 MCG: 0.5 SUSPENSION RESPIRATORY (INHALATION) at 08:35

## 2020-01-01 RX ADMIN — CHLORHEXIDINE GLUCONATE 0.12% ORAL RINSE 15 ML: 1.2 LIQUID ORAL at 10:12

## 2020-01-01 RX ADMIN — CARVEDILOL 6.25 MG: 6.25 TABLET, FILM COATED ORAL at 21:16

## 2020-01-01 RX ADMIN — CARVEDILOL 6.25 MG: 6.25 TABLET, FILM COATED ORAL at 16:51

## 2020-01-01 RX ADMIN — SENNOSIDES 17.2 MG: 8.6 TABLET, FILM COATED ORAL at 21:17

## 2020-01-01 RX ADMIN — IPRATROPIUM BROMIDE AND ALBUTEROL SULFATE 3 ML: .5; 3 SOLUTION RESPIRATORY (INHALATION) at 05:50

## 2020-01-01 RX ADMIN — MIDAZOLAM HYDROCHLORIDE 2 MG: 2 INJECTION, SOLUTION INTRAMUSCULAR; INTRAVENOUS at 20:52

## 2020-01-01 RX ADMIN — HEPARIN SODIUM 7500 UNITS: 10000 INJECTION INTRAVENOUS; SUBCUTANEOUS at 23:57

## 2020-01-01 RX ADMIN — HYDRALAZINE HYDROCHLORIDE 10 MG: 20 INJECTION, SOLUTION INTRAMUSCULAR; INTRAVENOUS at 20:30

## 2020-01-01 RX ADMIN — SODIUM CHLORIDE SOLN NEBU 3% 2 ML: 3 NEBU SOLN at 12:43

## 2020-01-01 RX ADMIN — SENNOSIDES 17.2 MG: 8.6 TABLET, FILM COATED ORAL at 21:30

## 2020-01-01 RX ADMIN — ARFORMOTEROL TARTRATE 15 MCG: 15 SOLUTION RESPIRATORY (INHALATION) at 09:38

## 2020-01-01 RX ADMIN — NYSTATIN 500000 UNITS: 100000 SUSPENSION ORAL at 21:45

## 2020-01-01 RX ADMIN — SODIUM CHLORIDE 25 ML: 9 INJECTION, SOLUTION INTRAVENOUS at 17:19

## 2020-01-01 RX ADMIN — MEROPENEM 1 G: 1 INJECTION, POWDER, FOR SOLUTION INTRAVENOUS at 21:34

## 2020-01-01 RX ADMIN — IPRATROPIUM BROMIDE AND ALBUTEROL SULFATE 1 AMPULE: 2.5; .5 SOLUTION RESPIRATORY (INHALATION) at 12:42

## 2020-01-01 RX ADMIN — OXYCODONE AND ACETAMINOPHEN 1 TABLET: 5; 325 TABLET ORAL at 11:06

## 2020-01-01 RX ADMIN — FOLIC ACID 1 MG: 5 INJECTION, SOLUTION INTRAMUSCULAR; INTRAVENOUS; SUBCUTANEOUS at 11:13

## 2020-01-01 RX ADMIN — ARFORMOTEROL TARTRATE 15 MCG: 15 SOLUTION RESPIRATORY (INHALATION) at 20:18

## 2020-01-01 RX ADMIN — Medication 10 ML: at 12:29

## 2020-01-01 RX ADMIN — BUDESONIDE 500 MCG: 0.5 SUSPENSION RESPIRATORY (INHALATION) at 08:27

## 2020-01-01 RX ADMIN — HYDRALAZINE HYDROCHLORIDE 10 MG: 20 INJECTION, SOLUTION INTRAMUSCULAR; INTRAVENOUS at 05:34

## 2020-01-01 RX ADMIN — Medication 100 MG: at 17:16

## 2020-01-01 RX ADMIN — FOLIC ACID 1 MG: 5 INJECTION, SOLUTION INTRAMUSCULAR; INTRAVENOUS; SUBCUTANEOUS at 08:51

## 2020-01-01 RX ADMIN — HYDRALAZINE HYDROCHLORIDE 25 MG: 25 TABLET, FILM COATED ORAL at 06:12

## 2020-01-01 RX ADMIN — PROPOFOL 50 MG: 10 INJECTION, EMULSION INTRAVENOUS at 16:27

## 2020-01-01 RX ADMIN — HEPARIN SODIUM 7500 UNITS: 10000 INJECTION INTRAVENOUS; SUBCUTANEOUS at 08:34

## 2020-01-01 RX ADMIN — CHLORHEXIDINE GLUCONATE 0.12% ORAL RINSE 15 ML: 1.2 LIQUID ORAL at 10:30

## 2020-01-01 RX ADMIN — DIVALPROEX SODIUM 250 MG: 125 CAPSULE, COATED PELLETS ORAL at 06:22

## 2020-01-01 RX ADMIN — SODIUM CHLORIDE SOLN NEBU 3% 2 ML: 3 NEBU SOLN at 08:06

## 2020-01-01 RX ADMIN — NYSTATIN 500000 UNITS: 100000 SUSPENSION ORAL at 17:13

## 2020-01-01 RX ADMIN — SODIUM CHLORIDE SOLN NEBU 3% 2 ML: 3 NEBU SOLN at 09:52

## 2020-01-01 RX ADMIN — HYDRALAZINE HYDROCHLORIDE 10 MG: 20 INJECTION, SOLUTION INTRAMUSCULAR; INTRAVENOUS at 06:12

## 2020-01-01 RX ADMIN — CHLORDIAZEPOXIDE HYDROCHLORIDE 25 MG: 25 CAPSULE ORAL at 15:47

## 2020-01-01 RX ADMIN — SODIUM CHLORIDE, PRESERVATIVE FREE 10 ML: 5 INJECTION INTRAVENOUS at 08:07

## 2020-01-01 RX ADMIN — CARVEDILOL 6.25 MG: 6.25 TABLET, FILM COATED ORAL at 08:38

## 2020-01-01 RX ADMIN — ALPRAZOLAM 0.25 MG: 0.25 TABLET ORAL at 10:05

## 2020-01-01 RX ADMIN — IPRATROPIUM BROMIDE AND ALBUTEROL SULFATE 3 ML: .5; 3 SOLUTION RESPIRATORY (INHALATION) at 20:39

## 2020-01-01 RX ADMIN — AMLODIPINE BESYLATE 10 MG: 10 TABLET ORAL at 07:38

## 2020-01-01 RX ADMIN — AMLODIPINE BESYLATE 10 MG: 10 TABLET ORAL at 08:58

## 2020-01-01 RX ADMIN — CHLORDIAZEPOXIDE HYDROCHLORIDE 25 MG: 25 CAPSULE ORAL at 09:29

## 2020-01-01 RX ADMIN — CHLORDIAZEPOXIDE HYDROCHLORIDE 25 MG: 25 CAPSULE ORAL at 22:10

## 2020-01-01 RX ADMIN — SODIUM CHLORIDE SOLN NEBU 3% 2 ML: 3 NEBU SOLN at 23:45

## 2020-01-01 RX ADMIN — PIPERACILLIN AND TAZOBACTAM 3.38 G: 3; .375 INJECTION, POWDER, FOR SOLUTION INTRAVENOUS at 06:35

## 2020-01-01 RX ADMIN — IPRATROPIUM BROMIDE AND ALBUTEROL SULFATE 3 ML: .5; 3 SOLUTION RESPIRATORY (INHALATION) at 13:39

## 2020-01-01 RX ADMIN — SODIUM CHLORIDE SOLN NEBU 3% 2 ML: 3 NEBU SOLN at 08:39

## 2020-01-01 RX ADMIN — ARFORMOTEROL TARTRATE 15 MCG: 15 SOLUTION RESPIRATORY (INHALATION) at 20:15

## 2020-01-01 RX ADMIN — SODIUM CHLORIDE 3 G: 900 INJECTION INTRAVENOUS at 18:52

## 2020-01-01 RX ADMIN — IPRATROPIUM BROMIDE AND ALBUTEROL SULFATE 3 ML: .5; 3 SOLUTION RESPIRATORY (INHALATION) at 08:07

## 2020-01-01 RX ADMIN — HYDRALAZINE HYDROCHLORIDE 25 MG: 25 TABLET, FILM COATED ORAL at 05:34

## 2020-01-01 RX ADMIN — HYDRALAZINE HYDROCHLORIDE 25 MG: 25 TABLET, FILM COATED ORAL at 13:32

## 2020-01-01 RX ADMIN — IPRATROPIUM BROMIDE AND ALBUTEROL SULFATE 1 AMPULE: 2.5; .5 SOLUTION RESPIRATORY (INHALATION) at 20:09

## 2020-01-01 RX ADMIN — OXYCODONE AND ACETAMINOPHEN 1 TABLET: 5; 325 TABLET ORAL at 16:37

## 2020-01-01 RX ADMIN — LEVOTHYROXINE SODIUM 50 MCG: 0.05 TABLET ORAL at 06:46

## 2020-01-01 RX ADMIN — HYDRALAZINE HYDROCHLORIDE 10 MG: 20 INJECTION, SOLUTION INTRAMUSCULAR; INTRAVENOUS at 04:38

## 2020-01-01 RX ADMIN — INSULIN LISPRO 3 UNITS: 100 INJECTION, SOLUTION INTRAVENOUS; SUBCUTANEOUS at 11:57

## 2020-01-01 RX ADMIN — SODIUM CHLORIDE: 9 INJECTION, SOLUTION INTRAVENOUS at 11:09

## 2020-01-01 RX ADMIN — CARVEDILOL 6.25 MG: 6.25 TABLET, FILM COATED ORAL at 15:56

## 2020-01-01 RX ADMIN — IPRATROPIUM BROMIDE AND ALBUTEROL SULFATE 3 ML: .5; 3 SOLUTION RESPIRATORY (INHALATION) at 10:32

## 2020-01-01 RX ADMIN — DIVALPROEX SODIUM 250 MG: 125 CAPSULE, COATED PELLETS ORAL at 20:49

## 2020-01-01 RX ADMIN — IPRATROPIUM BROMIDE AND ALBUTEROL SULFATE 1 AMPULE: 2.5; .5 SOLUTION RESPIRATORY (INHALATION) at 15:57

## 2020-01-01 RX ADMIN — SODIUM CHLORIDE, PRESERVATIVE FREE 300 UNITS: 5 INJECTION INTRAVENOUS at 09:42

## 2020-01-01 RX ADMIN — BUDESONIDE 500 MCG: 0.5 SUSPENSION RESPIRATORY (INHALATION) at 07:41

## 2020-01-01 RX ADMIN — ARFORMOTEROL TARTRATE 15 MCG: 15 SOLUTION RESPIRATORY (INHALATION) at 20:39

## 2020-01-01 RX ADMIN — QUETIAPINE FUMARATE 25 MG: 25 TABLET ORAL at 09:05

## 2020-01-01 RX ADMIN — DIVALPROEX SODIUM 250 MG: 125 CAPSULE, COATED PELLETS ORAL at 06:20

## 2020-01-01 RX ADMIN — SODIUM CHLORIDE SOLN NEBU 3% 2 ML: 3 NEBU SOLN at 08:23

## 2020-01-01 RX ADMIN — SODIUM CHLORIDE SOLN NEBU 3% 2 ML: 3 NEBU SOLN at 08:30

## 2020-01-01 RX ADMIN — FOLIC ACID 1 MG: 5 INJECTION, SOLUTION INTRAMUSCULAR; INTRAVENOUS; SUBCUTANEOUS at 10:09

## 2020-01-01 RX ADMIN — IPRATROPIUM BROMIDE AND ALBUTEROL SULFATE 1 AMPULE: 2.5; .5 SOLUTION RESPIRATORY (INHALATION) at 20:15

## 2020-01-01 RX ADMIN — BUDESONIDE 500 MCG: 0.5 SUSPENSION RESPIRATORY (INHALATION) at 20:46

## 2020-01-01 RX ADMIN — FLUCONAZOLE 200 MG: 100 TABLET ORAL at 07:38

## 2020-01-01 RX ADMIN — OXYCODONE AND ACETAMINOPHEN 1 TABLET: 5; 325 TABLET ORAL at 08:11

## 2020-01-01 RX ADMIN — SODIUM CHLORIDE, PRESERVATIVE FREE 300 UNITS: 5 INJECTION INTRAVENOUS at 20:17

## 2020-01-01 RX ADMIN — Medication 10 ML: at 10:31

## 2020-01-01 RX ADMIN — IPRATROPIUM BROMIDE AND ALBUTEROL SULFATE 1 AMPULE: 2.5; .5 SOLUTION RESPIRATORY (INHALATION) at 12:16

## 2020-01-01 RX ADMIN — CARVEDILOL 6.25 MG: 6.25 TABLET, FILM COATED ORAL at 17:16

## 2020-01-01 RX ADMIN — QUETIAPINE FUMARATE 100 MG: 100 TABLET ORAL at 08:10

## 2020-01-01 RX ADMIN — SODIUM CHLORIDE, PRESERVATIVE FREE 300 UNITS: 5 INJECTION INTRAVENOUS at 21:20

## 2020-01-01 RX ADMIN — HYDRALAZINE HYDROCHLORIDE 25 MG: 25 TABLET, FILM COATED ORAL at 20:58

## 2020-01-01 RX ADMIN — LINEZOLID 600 MG: 100 SUSPENSION ORAL at 20:47

## 2020-01-01 RX ADMIN — HYDRALAZINE HYDROCHLORIDE 25 MG: 25 TABLET, FILM COATED ORAL at 14:15

## 2020-01-01 RX ADMIN — HYDROCODONE BITARTRATE AND ACETAMINOPHEN 1 TABLET: 5; 325 TABLET ORAL at 21:46

## 2020-01-01 RX ADMIN — DIVALPROEX SODIUM 250 MG: 125 CAPSULE, COATED PELLETS ORAL at 14:15

## 2020-01-01 RX ADMIN — BUDESONIDE 500 MCG: 0.5 SUSPENSION RESPIRATORY (INHALATION) at 21:32

## 2020-01-01 RX ADMIN — CARVEDILOL 6.25 MG: 6.25 TABLET, FILM COATED ORAL at 17:09

## 2020-01-01 RX ADMIN — AMLODIPINE BESYLATE 10 MG: 10 TABLET ORAL at 08:10

## 2020-01-01 RX ADMIN — ACETAMINOPHEN ORAL SOLUTION 650 MG: 650 SOLUTION ORAL at 09:01

## 2020-01-01 RX ADMIN — PANTOPRAZOLE SODIUM 40 MG: 40 INJECTION, POWDER, FOR SOLUTION INTRAVENOUS at 09:14

## 2020-01-01 RX ADMIN — HYDRALAZINE HYDROCHLORIDE 25 MG: 25 TABLET, FILM COATED ORAL at 21:42

## 2020-01-01 RX ADMIN — AMLODIPINE BESYLATE 10 MG: 10 TABLET ORAL at 09:40

## 2020-01-01 RX ADMIN — LEVOTHYROXINE SODIUM 50 MCG: 0.05 TABLET ORAL at 05:34

## 2020-01-01 RX ADMIN — HEPARIN SODIUM 7500 UNITS: 10000 INJECTION INTRAVENOUS; SUBCUTANEOUS at 00:24

## 2020-01-01 RX ADMIN — SODIUM CHLORIDE SOLN NEBU 3% 2 ML: 3 NEBU SOLN at 03:30

## 2020-01-01 RX ADMIN — FLUCONAZOLE 200 MG: 100 TABLET ORAL at 17:16

## 2020-01-01 RX ADMIN — IPRATROPIUM BROMIDE AND ALBUTEROL SULFATE 1 AMPULE: 2.5; .5 SOLUTION RESPIRATORY (INHALATION) at 19:15

## 2020-01-01 RX ADMIN — VALPROIC ACID 250 MG: 250 SOLUTION ORAL at 06:10

## 2020-01-01 RX ADMIN — CARVEDILOL 6.25 MG: 6.25 TABLET, FILM COATED ORAL at 18:00

## 2020-01-01 RX ADMIN — IPRATROPIUM BROMIDE AND ALBUTEROL SULFATE 3 ML: .5; 3 SOLUTION RESPIRATORY (INHALATION) at 16:44

## 2020-01-01 RX ADMIN — ACETAMINOPHEN ORAL SOLUTION 650 MG: 650 SOLUTION ORAL at 00:12

## 2020-01-01 RX ADMIN — PIPERACILLIN AND TAZOBACTAM 3.38 G: 3; .375 INJECTION, POWDER, FOR SOLUTION INTRAVENOUS at 23:59

## 2020-01-01 RX ADMIN — ARFORMOTEROL TARTRATE 15 MCG: 15 SOLUTION RESPIRATORY (INHALATION) at 08:12

## 2020-01-01 RX ADMIN — DIVALPROEX SODIUM 250 MG: 125 CAPSULE, COATED PELLETS ORAL at 05:28

## 2020-01-01 RX ADMIN — CHLORHEXIDINE GLUCONATE 0.12% ORAL RINSE 15 ML: 1.2 LIQUID ORAL at 21:20

## 2020-01-01 RX ADMIN — IPRATROPIUM BROMIDE AND ALBUTEROL SULFATE 1 AMPULE: 2.5; .5 SOLUTION RESPIRATORY (INHALATION) at 05:27

## 2020-01-01 RX ADMIN — OXYCODONE AND ACETAMINOPHEN 1 TABLET: 5; 325 TABLET ORAL at 10:04

## 2020-01-01 RX ADMIN — ARFORMOTEROL TARTRATE 15 MCG: 15 SOLUTION RESPIRATORY (INHALATION) at 09:22

## 2020-01-01 RX ADMIN — FLUCONAZOLE 200 MG: 100 TABLET ORAL at 08:38

## 2020-01-01 RX ADMIN — LINEZOLID 600 MG: 600 TABLET, FILM COATED ORAL at 20:53

## 2020-01-01 RX ADMIN — DIVALPROEX SODIUM 250 MG: 125 CAPSULE, COATED PELLETS ORAL at 06:14

## 2020-01-01 RX ADMIN — ARFORMOTEROL TARTRATE 15 MCG: 15 SOLUTION RESPIRATORY (INHALATION) at 21:32

## 2020-01-01 RX ADMIN — CHLORDIAZEPOXIDE HYDROCHLORIDE 25 MG: 25 CAPSULE ORAL at 16:24

## 2020-01-01 RX ADMIN — THIAMINE HYDROCHLORIDE 250 MG: 100 INJECTION, SOLUTION INTRAMUSCULAR; INTRAVENOUS at 14:38

## 2020-01-01 RX ADMIN — COLCHICINE 0.6 MG: 0.6 TABLET, FILM COATED ORAL at 09:23

## 2020-01-01 RX ADMIN — INSULIN LISPRO 3 UNITS: 100 INJECTION, SOLUTION INTRAVENOUS; SUBCUTANEOUS at 01:18

## 2020-01-01 RX ADMIN — AMLODIPINE BESYLATE 10 MG: 10 TABLET ORAL at 11:21

## 2020-01-01 RX ADMIN — SODIUM CHLORIDE SOLN NEBU 3% 2 ML: 3 NEBU SOLN at 20:20

## 2020-01-01 RX ADMIN — BARIUM SULFATE 15 ML: 0.81 POWDER, FOR SUSPENSION ORAL at 14:33

## 2020-01-01 RX ADMIN — INSULIN LISPRO 3 UNITS: 100 INJECTION, SOLUTION INTRAVENOUS; SUBCUTANEOUS at 04:22

## 2020-01-01 RX ADMIN — Medication 10 ML: at 21:51

## 2020-01-01 RX ADMIN — BARIUM SULFATE 15 ML: 400 SUSPENSION ORAL at 14:32

## 2020-01-01 RX ADMIN — OXYCODONE AND ACETAMINOPHEN 1 TABLET: 5; 325 TABLET ORAL at 21:14

## 2020-01-01 RX ADMIN — HYDROCODONE BITARTRATE AND ACETAMINOPHEN 1 TABLET: 5; 325 TABLET ORAL at 08:57

## 2020-01-01 RX ADMIN — SODIUM CHLORIDE, PRESERVATIVE FREE 300 UNITS: 5 INJECTION INTRAVENOUS at 22:00

## 2020-01-01 RX ADMIN — CARVEDILOL 3.12 MG: 6.25 TABLET, FILM COATED ORAL at 09:28

## 2020-01-01 RX ADMIN — LANSOPRAZOLE 30 MG: KIT at 06:14

## 2020-01-01 RX ADMIN — ARFORMOTEROL TARTRATE 15 MCG: 15 SOLUTION RESPIRATORY (INHALATION) at 19:50

## 2020-01-01 RX ADMIN — Medication 10 ML: at 21:29

## 2020-01-01 RX ADMIN — QUETIAPINE FUMARATE 100 MG: 100 TABLET ORAL at 22:34

## 2020-01-01 RX ADMIN — LEVOTHYROXINE SODIUM 50 MCG: 0.05 TABLET ORAL at 09:17

## 2020-01-01 RX ADMIN — FLUCONAZOLE 200 MG: 100 TABLET ORAL at 11:21

## 2020-01-01 RX ADMIN — VANCOMYCIN HYDROCHLORIDE 1.5 G: 10 INJECTION, POWDER, LYOPHILIZED, FOR SOLUTION INTRAVENOUS at 00:24

## 2020-01-01 RX ADMIN — HYDRALAZINE HYDROCHLORIDE 25 MG: 25 TABLET, FILM COATED ORAL at 06:18

## 2020-01-01 RX ADMIN — SODIUM CHLORIDE SOLN NEBU 3% 2 ML: 3 NEBU SOLN at 15:57

## 2020-01-01 RX ADMIN — ARFORMOTEROL TARTRATE 15 MCG: 15 SOLUTION RESPIRATORY (INHALATION) at 08:06

## 2020-01-01 RX ADMIN — NYSTATIN 500000 UNITS: 100000 SUSPENSION ORAL at 18:33

## 2020-01-01 RX ADMIN — LEVOTHYROXINE SODIUM 50 MCG: 0.05 TABLET ORAL at 05:55

## 2020-01-01 RX ADMIN — PANTOPRAZOLE SODIUM 40 MG: 40 INJECTION, POWDER, FOR SOLUTION INTRAVENOUS at 08:49

## 2020-01-01 RX ADMIN — HYDRALAZINE HYDROCHLORIDE 25 MG: 25 TABLET, FILM COATED ORAL at 13:31

## 2020-01-01 RX ADMIN — INSULIN LISPRO 3 UNITS: 100 INJECTION, SOLUTION INTRAVENOUS; SUBCUTANEOUS at 21:37

## 2020-01-01 RX ADMIN — BUDESONIDE 500 MCG: 0.5 SUSPENSION RESPIRATORY (INHALATION) at 09:22

## 2020-01-01 RX ADMIN — THIAMINE HYDROCHLORIDE 250 MG: 100 INJECTION, SOLUTION INTRAMUSCULAR; INTRAVENOUS at 11:25

## 2020-01-01 RX ADMIN — FLUCONAZOLE 200 MG: 100 TABLET ORAL at 09:17

## 2020-01-01 RX ADMIN — PANTOPRAZOLE SODIUM 40 MG: 40 INJECTION, POWDER, FOR SOLUTION INTRAVENOUS at 10:30

## 2020-01-01 RX ADMIN — HYDRALAZINE HYDROCHLORIDE 25 MG: 25 TABLET, FILM COATED ORAL at 13:58

## 2020-01-01 RX ADMIN — APIXABAN 10 MG: 5 TABLET, FILM COATED ORAL at 20:52

## 2020-01-01 RX ADMIN — SODIUM CHLORIDE, PRESERVATIVE FREE 300 UNITS: 5 INJECTION INTRAVENOUS at 20:47

## 2020-01-01 RX ADMIN — Medication 10 ML: at 08:07

## 2020-01-01 RX ADMIN — CHLORHEXIDINE GLUCONATE 0.12% ORAL RINSE 15 ML: 1.2 LIQUID ORAL at 22:35

## 2020-01-01 RX ADMIN — SODIUM CHLORIDE SOLN NEBU 3% 2 ML: 3 NEBU SOLN at 20:43

## 2020-01-01 RX ADMIN — HYDROCODONE BITARTRATE AND ACETAMINOPHEN 1 TABLET: 5; 325 TABLET ORAL at 21:10

## 2020-01-01 RX ADMIN — SODIUM CHLORIDE, PRESERVATIVE FREE 300 UNITS: 5 INJECTION INTRAVENOUS at 22:07

## 2020-01-01 RX ADMIN — SODIUM CHLORIDE SOLN NEBU 3% 2 ML: 3 NEBU SOLN at 03:55

## 2020-01-01 RX ADMIN — Medication 10 ML: at 09:06

## 2020-01-01 RX ADMIN — COLCHICINE 0.6 MG: 0.6 TABLET, FILM COATED ORAL at 22:43

## 2020-01-01 RX ADMIN — CHLORHEXIDINE GLUCONATE 0.12% ORAL RINSE 15 ML: 1.2 LIQUID ORAL at 08:43

## 2020-01-01 RX ADMIN — ARFORMOTEROL TARTRATE 15 MCG: 15 SOLUTION RESPIRATORY (INHALATION) at 08:39

## 2020-01-01 RX ADMIN — LINEZOLID 600 MG: 600 TABLET, FILM COATED ORAL at 20:12

## 2020-01-01 RX ADMIN — INSULIN LISPRO 3 UNITS: 100 INJECTION, SOLUTION INTRAVENOUS; SUBCUTANEOUS at 20:35

## 2020-01-01 RX ADMIN — SODIUM CHLORIDE 25 ML: 9 INJECTION, SOLUTION INTRAVENOUS at 00:19

## 2020-01-01 RX ADMIN — LINEZOLID 600 MG: 600 TABLET, FILM COATED ORAL at 21:16

## 2020-01-01 RX ADMIN — HYDRALAZINE HYDROCHLORIDE 10 MG: 20 INJECTION, SOLUTION INTRAMUSCULAR; INTRAVENOUS at 12:06

## 2020-01-01 RX ADMIN — LINEZOLID 600 MG: 600 TABLET, FILM COATED ORAL at 20:52

## 2020-01-01 RX ADMIN — SODIUM CHLORIDE SOLN NEBU 3% 2 ML: 3 NEBU SOLN at 11:24

## 2020-01-01 RX ADMIN — HYDRALAZINE HYDROCHLORIDE 25 MG: 25 TABLET, FILM COATED ORAL at 15:33

## 2020-01-01 RX ADMIN — SKIN PROTECTANT: 44 OINTMENT TOPICAL at 14:00

## 2020-01-01 RX ADMIN — LINEZOLID 600 MG: 100 SUSPENSION ORAL at 21:20

## 2020-01-01 RX ADMIN — DIVALPROEX SODIUM 250 MG: 125 CAPSULE, COATED PELLETS ORAL at 13:55

## 2020-01-01 RX ADMIN — BUDESONIDE 500 MCG: 0.5 SUSPENSION RESPIRATORY (INHALATION) at 20:54

## 2020-01-01 RX ADMIN — IPRATROPIUM BROMIDE AND ALBUTEROL SULFATE 1 AMPULE: 2.5; .5 SOLUTION RESPIRATORY (INHALATION) at 20:43

## 2020-01-01 RX ADMIN — BUDESONIDE 500 MCG: 0.5 SUSPENSION RESPIRATORY (INHALATION) at 08:46

## 2020-01-01 RX ADMIN — SODIUM CHLORIDE, PRESERVATIVE FREE 300 UNITS: 5 INJECTION INTRAVENOUS at 21:34

## 2020-01-01 RX ADMIN — CARVEDILOL 6.25 MG: 6.25 TABLET, FILM COATED ORAL at 07:38

## 2020-01-01 RX ADMIN — HYDRALAZINE HYDROCHLORIDE 25 MG: 25 TABLET, FILM COATED ORAL at 13:23

## 2020-01-01 RX ADMIN — INSULIN LISPRO 3 UNITS: 100 INJECTION, SOLUTION INTRAVENOUS; SUBCUTANEOUS at 11:32

## 2020-01-01 RX ADMIN — INSULIN LISPRO 3 UNITS: 100 INJECTION, SOLUTION INTRAVENOUS; SUBCUTANEOUS at 10:10

## 2020-01-01 RX ADMIN — SODIUM CHLORIDE, PRESERVATIVE FREE 300 UNITS: 5 INJECTION INTRAVENOUS at 08:06

## 2020-01-01 RX ADMIN — LANSOPRAZOLE 30 MG: KIT at 06:10

## 2020-01-01 RX ADMIN — DIVALPROEX SODIUM 250 MG: 125 CAPSULE, COATED PELLETS ORAL at 06:25

## 2020-01-01 RX ADMIN — OXYCODONE AND ACETAMINOPHEN 1 TABLET: 5; 325 TABLET ORAL at 15:28

## 2020-01-01 RX ADMIN — SODIUM CHLORIDE, PRESERVATIVE FREE 300 UNITS: 5 INJECTION INTRAVENOUS at 20:51

## 2020-01-01 RX ADMIN — IPRATROPIUM BROMIDE AND ALBUTEROL SULFATE 1 AMPULE: 2.5; .5 SOLUTION RESPIRATORY (INHALATION) at 20:19

## 2020-01-01 RX ADMIN — SENNOSIDES 17.2 MG: 8.6 TABLET, FILM COATED ORAL at 19:58

## 2020-01-01 RX ADMIN — CARVEDILOL 3.12 MG: 6.25 TABLET, FILM COATED ORAL at 16:24

## 2020-01-01 RX ADMIN — SODIUM CHLORIDE SOLN NEBU 3% 2 ML: 3 NEBU SOLN at 20:19

## 2020-01-01 RX ADMIN — HYDRALAZINE HYDROCHLORIDE 25 MG: 25 TABLET, FILM COATED ORAL at 14:00

## 2020-01-01 RX ADMIN — INSULIN LISPRO 3 UNITS: 100 INJECTION, SOLUTION INTRAVENOUS; SUBCUTANEOUS at 12:29

## 2020-01-01 RX ADMIN — HYDRALAZINE HYDROCHLORIDE 10 MG: 20 INJECTION, SOLUTION INTRAMUSCULAR; INTRAVENOUS at 16:09

## 2020-01-01 RX ADMIN — LINEZOLID 600 MG: 600 TABLET, FILM COATED ORAL at 07:44

## 2020-01-01 RX ADMIN — CHLORHEXIDINE GLUCONATE 0.12% ORAL RINSE 15 ML: 1.2 LIQUID ORAL at 20:14

## 2020-01-01 RX ADMIN — PIPERACILLIN AND TAZOBACTAM 3.38 G: 3; .375 INJECTION, POWDER, LYOPHILIZED, FOR SOLUTION INTRAVENOUS at 12:58

## 2020-01-01 RX ADMIN — PANTOPRAZOLE SODIUM 40 MG: 40 INJECTION, POWDER, FOR SOLUTION INTRAVENOUS at 09:05

## 2020-01-01 RX ADMIN — VALPROIC ACID 250 MG: 250 SOLUTION ORAL at 13:38

## 2020-01-01 RX ADMIN — AMLODIPINE BESYLATE 10 MG: 10 TABLET ORAL at 10:06

## 2020-01-01 RX ADMIN — PIPERACILLIN AND TAZOBACTAM 3.38 G: 3; .375 INJECTION, POWDER, FOR SOLUTION INTRAVENOUS at 06:16

## 2020-01-01 RX ADMIN — Medication 10 ML: at 20:54

## 2020-01-01 RX ADMIN — APIXABAN 10 MG: 5 TABLET, FILM COATED ORAL at 21:14

## 2020-01-01 RX ADMIN — ARFORMOTEROL TARTRATE 15 MCG: 15 SOLUTION RESPIRATORY (INHALATION) at 20:43

## 2020-01-01 RX ADMIN — BUMETANIDE 1 MG: 0.25 INJECTION INTRAMUSCULAR; INTRAVENOUS at 09:29

## 2020-01-01 RX ADMIN — PROPOFOL 350 MG: 10 INJECTION, EMULSION INTRAVENOUS at 16:25

## 2020-01-01 RX ADMIN — HYDRALAZINE HYDROCHLORIDE 25 MG: 25 TABLET, FILM COATED ORAL at 15:06

## 2020-01-01 RX ADMIN — CARVEDILOL 6.25 MG: 6.25 TABLET, FILM COATED ORAL at 11:21

## 2020-01-01 RX ADMIN — HYDROCODONE BITARTRATE AND ACETAMINOPHEN 1 TABLET: 5; 325 TABLET ORAL at 11:41

## 2020-01-01 RX ADMIN — BUDESONIDE 500 MCG: 0.5 SUSPENSION RESPIRATORY (INHALATION) at 20:43

## 2020-01-01 RX ADMIN — AMLODIPINE BESYLATE 10 MG: 10 TABLET ORAL at 10:08

## 2020-01-01 RX ADMIN — DIVALPROEX SODIUM 250 MG: 125 CAPSULE, COATED PELLETS ORAL at 13:49

## 2020-01-01 RX ADMIN — HYDRALAZINE HYDROCHLORIDE 25 MG: 25 TABLET, FILM COATED ORAL at 15:19

## 2020-01-01 RX ADMIN — PANTOPRAZOLE SODIUM 40 MG: 40 INJECTION, POWDER, FOR SOLUTION INTRAVENOUS at 08:06

## 2020-01-01 RX ADMIN — AMLODIPINE BESYLATE 10 MG: 10 TABLET ORAL at 08:56

## 2020-01-01 RX ADMIN — DIVALPROEX SODIUM 250 MG: 125 CAPSULE, COATED PELLETS ORAL at 05:49

## 2020-01-01 RX ADMIN — HYDROCODONE BITARTRATE AND ACETAMINOPHEN 1 TABLET: 5; 325 TABLET ORAL at 21:24

## 2020-01-01 RX ADMIN — BUDESONIDE 500 MCG: 0.5 SUSPENSION RESPIRATORY (INHALATION) at 08:06

## 2020-01-01 RX ADMIN — LABETALOL HYDROCHLORIDE 10 MG: 5 INJECTION, SOLUTION INTRAVENOUS at 18:14

## 2020-01-01 RX ADMIN — IPRATROPIUM BROMIDE AND ALBUTEROL SULFATE 1 AMPULE: 2.5; .5 SOLUTION RESPIRATORY (INHALATION) at 15:22

## 2020-01-01 RX ADMIN — NYSTATIN 500000 UNITS: 100000 SUSPENSION ORAL at 17:41

## 2020-01-01 RX ADMIN — FLUCONAZOLE 200 MG: 100 TABLET ORAL at 09:23

## 2020-01-01 RX ADMIN — HYDROCODONE BITARTRATE AND ACETAMINOPHEN 1 TABLET: 5; 325 TABLET ORAL at 11:57

## 2020-01-01 RX ADMIN — HYDROCODONE BITARTRATE AND ACETAMINOPHEN 1 TABLET: 5; 325 TABLET ORAL at 02:57

## 2020-01-01 RX ADMIN — LABETALOL HYDROCHLORIDE 10 MG: 5 INJECTION, SOLUTION INTRAVENOUS at 02:29

## 2020-01-01 RX ADMIN — LINEZOLID 600 MG: 100 SUSPENSION ORAL at 23:15

## 2020-01-01 RX ADMIN — OXYCODONE AND ACETAMINOPHEN 1 TABLET: 5; 325 TABLET ORAL at 06:10

## 2020-01-01 RX ADMIN — SODIUM CHLORIDE: 9 INJECTION, SOLUTION INTRAVENOUS at 03:02

## 2020-01-01 RX ADMIN — Medication 10 ML: at 20:18

## 2020-01-01 RX ADMIN — COLCHICINE 0.6 MG: 0.6 TABLET, FILM COATED ORAL at 10:06

## 2020-01-01 RX ADMIN — IPRATROPIUM BROMIDE AND ALBUTEROL SULFATE 1 AMPULE: 2.5; .5 SOLUTION RESPIRATORY (INHALATION) at 16:45

## 2020-01-01 RX ADMIN — THIAMINE HYDROCHLORIDE 250 MG: 100 INJECTION, SOLUTION INTRAMUSCULAR; INTRAVENOUS at 11:31

## 2020-01-01 RX ADMIN — PANTOPRAZOLE SODIUM 40 MG: 40 INJECTION, POWDER, FOR SOLUTION INTRAVENOUS at 08:10

## 2020-01-01 RX ADMIN — HYDRALAZINE HYDROCHLORIDE 25 MG: 25 TABLET, FILM COATED ORAL at 21:17

## 2020-01-01 RX ADMIN — HEPARIN SODIUM 7500 UNITS: 10000 INJECTION INTRAVENOUS; SUBCUTANEOUS at 17:12

## 2020-01-01 RX ADMIN — DIVALPROEX SODIUM 250 MG: 125 CAPSULE, COATED PELLETS ORAL at 22:05

## 2020-01-01 RX ADMIN — QUETIAPINE FUMARATE 25 MG: 25 TABLET ORAL at 21:48

## 2020-01-01 RX ADMIN — ACETAMINOPHEN ORAL SOLUTION 650 MG: 650 SOLUTION ORAL at 14:08

## 2020-01-01 RX ADMIN — OXYCODONE AND ACETAMINOPHEN 1 TABLET: 5; 325 TABLET ORAL at 18:21

## 2020-01-01 RX ADMIN — SODIUM CHLORIDE SOLN NEBU 3% 2 ML: 3 NEBU SOLN at 00:30

## 2020-01-01 RX ADMIN — DEXTROSE MONOHYDRATE 25 G: 25 INJECTION, SOLUTION INTRAVENOUS at 06:07

## 2020-01-01 RX ADMIN — IPRATROPIUM BROMIDE AND ALBUTEROL SULFATE 1 AMPULE: 2.5; .5 SOLUTION RESPIRATORY (INHALATION) at 09:52

## 2020-01-01 RX ADMIN — Medication 10 ML: at 09:20

## 2020-01-01 RX ADMIN — HEPARIN SODIUM 7500 UNITS: 10000 INJECTION INTRAVENOUS; SUBCUTANEOUS at 09:05

## 2020-01-01 RX ADMIN — MEROPENEM 1 G: 1 INJECTION, POWDER, FOR SOLUTION INTRAVENOUS at 04:21

## 2020-01-01 RX ADMIN — QUETIAPINE FUMARATE 50 MG: 25 TABLET ORAL at 08:06

## 2020-01-01 RX ADMIN — SODIUM CHLORIDE, PRESERVATIVE FREE 10 ML: 5 INJECTION INTRAVENOUS at 12:14

## 2020-01-01 RX ADMIN — IPRATROPIUM BROMIDE AND ALBUTEROL SULFATE 1 AMPULE: 2.5; .5 SOLUTION RESPIRATORY (INHALATION) at 07:52

## 2020-01-01 RX ADMIN — Medication 100 MG: at 07:38

## 2020-01-01 RX ADMIN — IPRATROPIUM BROMIDE AND ALBUTEROL SULFATE 1 AMPULE: 2.5; .5 SOLUTION RESPIRATORY (INHALATION) at 11:40

## 2020-01-01 RX ADMIN — APIXABAN 10 MG: 5 TABLET, FILM COATED ORAL at 10:05

## 2020-01-01 RX ADMIN — HYDRALAZINE HYDROCHLORIDE 25 MG: 25 TABLET, FILM COATED ORAL at 21:46

## 2020-01-01 RX ADMIN — FLUCONAZOLE 200 MG: 100 TABLET ORAL at 08:12

## 2020-01-01 RX ADMIN — DIVALPROEX SODIUM 250 MG: 125 CAPSULE, COATED PELLETS ORAL at 05:34

## 2020-01-01 RX ADMIN — PIPERACILLIN AND TAZOBACTAM 3.38 G: 3; .375 INJECTION, POWDER, FOR SOLUTION INTRAVENOUS at 09:19

## 2020-01-01 RX ADMIN — SODIUM CHLORIDE SOLN NEBU 3% 2 ML: 3 NEBU SOLN at 08:27

## 2020-01-01 RX ADMIN — CARVEDILOL 6.25 MG: 6.25 TABLET, FILM COATED ORAL at 08:12

## 2020-01-01 RX ADMIN — CARVEDILOL 3.12 MG: 6.25 TABLET, FILM COATED ORAL at 08:15

## 2020-01-01 RX ADMIN — IPRATROPIUM BROMIDE AND ALBUTEROL SULFATE 1 AMPULE: 2.5; .5 SOLUTION RESPIRATORY (INHALATION) at 08:06

## 2020-01-01 RX ADMIN — SODIUM CHLORIDE SOLN NEBU 3% 2 ML: 3 NEBU SOLN at 19:50

## 2020-01-01 RX ADMIN — SODIUM CHLORIDE SOLN NEBU 3% 2 ML: 3 NEBU SOLN at 04:04

## 2020-01-01 RX ADMIN — LINEZOLID 600 MG: 100 SUSPENSION ORAL at 20:07

## 2020-01-01 RX ADMIN — VANCOMYCIN HYDROCHLORIDE 1.5 G: 10 INJECTION, POWDER, LYOPHILIZED, FOR SOLUTION INTRAVENOUS at 14:06

## 2020-01-01 RX ADMIN — SODIUM CHLORIDE 1.4 MCG/KG/HR: 9 INJECTION, SOLUTION INTRAVENOUS at 01:30

## 2020-01-01 RX ADMIN — SODIUM CHLORIDE, PRESERVATIVE FREE 300 UNITS: 5 INJECTION INTRAVENOUS at 09:14

## 2020-01-01 RX ADMIN — HYDRALAZINE HYDROCHLORIDE 25 MG: 25 TABLET, FILM COATED ORAL at 20:16

## 2020-01-01 ASSESSMENT — PULMONARY FUNCTION TESTS
PIF_VALUE: 18
PIF_VALUE: 46
PIF_VALUE: 17
PIF_VALUE: 16
PIF_VALUE: 19
PIF_VALUE: 20
PIF_VALUE: 26
PIF_VALUE: 26
PIF_VALUE: 16
PIF_VALUE: 28
PIF_VALUE: 11
PIF_VALUE: 19
PIF_VALUE: 30
PIF_VALUE: 33
PIF_VALUE: 117
PIF_VALUE: 17
PIF_VALUE: 25
PIF_VALUE: 17
PIF_VALUE: 24
PIF_VALUE: 35
PIF_VALUE: 27
PIF_VALUE: 17
PIF_VALUE: 28
PIF_VALUE: 19
PIF_VALUE: 11
PIF_VALUE: 19
PIF_VALUE: 39
PIF_VALUE: 38
PIF_VALUE: 36
PIF_VALUE: 36
PIF_VALUE: 35
PIF_VALUE: 16
PIF_VALUE: 30
PIF_VALUE: 16
PIF_VALUE: 31
PIF_VALUE: 28
PIF_VALUE: 10
PIF_VALUE: 16
PIF_VALUE: 31
PIF_VALUE: 17
PIF_VALUE: 34
PIF_VALUE: 24
PIF_VALUE: 20
PIF_VALUE: 32
PIF_VALUE: 33
PIF_VALUE: 25
PIF_VALUE: 16
PIF_VALUE: 16
PIF_VALUE: 17
PIF_VALUE: 16
PIF_VALUE: 26
PIF_VALUE: 17
PIF_VALUE: 17
PIF_VALUE: 35
PIF_VALUE: 16
PIF_VALUE: 26
PIF_VALUE: 33
PIF_VALUE: 27
PIF_VALUE: 26
PIF_VALUE: 18
PIF_VALUE: 16
PIF_VALUE: 30
PIF_VALUE: 19
PIF_VALUE: 33
PIF_VALUE: 23
PIF_VALUE: 26
PIF_VALUE: 29
PIF_VALUE: 34
PIF_VALUE: 16
PIF_VALUE: 33
PIF_VALUE: 24
PIF_VALUE: 31
PIF_VALUE: 20
PIF_VALUE: 17
PIF_VALUE: 16
PIF_VALUE: 30
PIF_VALUE: 30
PIF_VALUE: 17
PIF_VALUE: 31
PIF_VALUE: 26
PIF_VALUE: 20
PIF_VALUE: 24
PIF_VALUE: 30
PIF_VALUE: 16
PIF_VALUE: 25
PIF_VALUE: 31
PIF_VALUE: 16
PIF_VALUE: 29
PIF_VALUE: 17
PIF_VALUE: 19
PIF_VALUE: 18
PIF_VALUE: 17
PIF_VALUE: 8.4
PIF_VALUE: 29
PIF_VALUE: 28
PIF_VALUE: 20
PIF_VALUE: 18
PIF_VALUE: 38
PIF_VALUE: 18
PIF_VALUE: 34
PIF_VALUE: 16
PIF_VALUE: 37
PIF_VALUE: 17
PIF_VALUE: 34
PIF_VALUE: 31
PIF_VALUE: 39

## 2020-01-01 ASSESSMENT — PAIN DESCRIPTION - DESCRIPTORS
DESCRIPTORS: HEADACHE
DESCRIPTORS: ACHING;HEADACHE
DESCRIPTORS: HEADACHE
DESCRIPTORS: HEADACHE;ACHING;DISCOMFORT
DESCRIPTORS: HEADACHE
DESCRIPTORS: ACHING
DESCRIPTORS: ACHING;DISCOMFORT;SORE
DESCRIPTORS: PATIENT UNABLE TO DESCRIBE
DESCRIPTORS: ACHING;THROBBING;DISCOMFORT
DESCRIPTORS: ACHING;DISCOMFORT;SORE
DESCRIPTORS: ACHING;DISCOMFORT
DESCRIPTORS: ACHING;DISCOMFORT;SORE
DESCRIPTORS: ACHING;DISCOMFORT
DESCRIPTORS: HEADACHE;ACHING;DISCOMFORT
DESCRIPTORS: HEADACHE;ACHING;DISCOMFORT
DESCRIPTORS: ACHING;DISCOMFORT;SORE
DESCRIPTORS: HEADACHE;ACHING;DISCOMFORT
DESCRIPTORS: ACHING;DISCOMFORT;SORE
DESCRIPTORS: ACHING;DISCOMFORT
DESCRIPTORS: ACHING;THROBBING;DISCOMFORT
DESCRIPTORS: ACHING;DISCOMFORT
DESCRIPTORS: ACHING;DISCOMFORT;SORE
DESCRIPTORS: ACHING;DISCOMFORT;SORE

## 2020-01-01 ASSESSMENT — PAIN DESCRIPTION - PAIN TYPE
TYPE: ACUTE PAIN
TYPE: CHRONIC PAIN
TYPE: ACUTE PAIN
TYPE: CHRONIC PAIN
TYPE: ACUTE PAIN
TYPE: CHRONIC PAIN
TYPE: ACUTE PAIN
TYPE: CHRONIC PAIN
TYPE: CHRONIC PAIN
TYPE: ACUTE PAIN
TYPE: CHRONIC PAIN
TYPE: ACUTE PAIN
TYPE: CHRONIC PAIN
TYPE: ACUTE PAIN
TYPE: CHRONIC PAIN
TYPE: ACUTE PAIN
TYPE: ACUTE PAIN
TYPE: CHRONIC PAIN
TYPE: CHRONIC PAIN
TYPE: ACUTE PAIN
TYPE: CHRONIC PAIN
TYPE: CHRONIC PAIN
TYPE: ACUTE PAIN

## 2020-01-01 ASSESSMENT — PAIN DESCRIPTION - PROGRESSION
CLINICAL_PROGRESSION: GRADUALLY IMPROVING
CLINICAL_PROGRESSION: NOT CHANGED
CLINICAL_PROGRESSION: GRADUALLY IMPROVING
CLINICAL_PROGRESSION: NOT CHANGED
CLINICAL_PROGRESSION: GRADUALLY IMPROVING
CLINICAL_PROGRESSION: GRADUALLY IMPROVING
CLINICAL_PROGRESSION: NOT CHANGED
CLINICAL_PROGRESSION: GRADUALLY IMPROVING
CLINICAL_PROGRESSION: NOT CHANGED
CLINICAL_PROGRESSION: GRADUALLY IMPROVING
CLINICAL_PROGRESSION: GRADUALLY IMPROVING
CLINICAL_PROGRESSION: NOT CHANGED
CLINICAL_PROGRESSION: GRADUALLY IMPROVING
CLINICAL_PROGRESSION: NOT CHANGED

## 2020-01-01 ASSESSMENT — PAIN SCALES - GENERAL
PAINLEVEL_OUTOF10: 8
PAINLEVEL_OUTOF10: 0
PAINLEVEL_OUTOF10: 8
PAINLEVEL_OUTOF10: 6
PAINLEVEL_OUTOF10: 5
PAINLEVEL_OUTOF10: 5
PAINLEVEL_OUTOF10: 0
PAINLEVEL_OUTOF10: 6
PAINLEVEL_OUTOF10: 10
PAINLEVEL_OUTOF10: 0
PAINLEVEL_OUTOF10: 0
PAINLEVEL_OUTOF10: 8
PAINLEVEL_OUTOF10: 10
PAINLEVEL_OUTOF10: 9
PAINLEVEL_OUTOF10: 5
PAINLEVEL_OUTOF10: 10
PAINLEVEL_OUTOF10: 5
PAINLEVEL_OUTOF10: 2
PAINLEVEL_OUTOF10: 0
PAINLEVEL_OUTOF10: 0
PAINLEVEL_OUTOF10: 5
PAINLEVEL_OUTOF10: 0
PAINLEVEL_OUTOF10: 0
PAINLEVEL_OUTOF10: 9
PAINLEVEL_OUTOF10: 6
PAINLEVEL_OUTOF10: 5
PAINLEVEL_OUTOF10: 0
PAINLEVEL_OUTOF10: 6
PAINLEVEL_OUTOF10: 0
PAINLEVEL_OUTOF10: 9
PAINLEVEL_OUTOF10: 0
PAINLEVEL_OUTOF10: 7
PAINLEVEL_OUTOF10: 6
PAINLEVEL_OUTOF10: 0
PAINLEVEL_OUTOF10: 2
PAINLEVEL_OUTOF10: 10
PAINLEVEL_OUTOF10: 0
PAINLEVEL_OUTOF10: 2
PAINLEVEL_OUTOF10: 2
PAINLEVEL_OUTOF10: 4
PAINLEVEL_OUTOF10: 0
PAINLEVEL_OUTOF10: 0
PAINLEVEL_OUTOF10: 10
PAINLEVEL_OUTOF10: 10
PAINLEVEL_OUTOF10: 3
PAINLEVEL_OUTOF10: 0
PAINLEVEL_OUTOF10: 10
PAINLEVEL_OUTOF10: 6
PAINLEVEL_OUTOF10: 0
PAINLEVEL_OUTOF10: 0
PAINLEVEL_OUTOF10: 4
PAINLEVEL_OUTOF10: 0
PAINLEVEL_OUTOF10: 6
PAINLEVEL_OUTOF10: 0
PAINLEVEL_OUTOF10: 0
PAINLEVEL_OUTOF10: 10
PAINLEVEL_OUTOF10: 8
PAINLEVEL_OUTOF10: 0
PAINLEVEL_OUTOF10: 0
PAINLEVEL_OUTOF10: 10
PAINLEVEL_OUTOF10: 8
PAINLEVEL_OUTOF10: 0
PAINLEVEL_OUTOF10: 0
PAINLEVEL_OUTOF10: 4
PAINLEVEL_OUTOF10: 0
PAINLEVEL_OUTOF10: 9
PAINLEVEL_OUTOF10: 10
PAINLEVEL_OUTOF10: 6
PAINLEVEL_OUTOF10: 0
PAINLEVEL_OUTOF10: 6
PAINLEVEL_OUTOF10: 6
PAINLEVEL_OUTOF10: 5
PAINLEVEL_OUTOF10: 8
PAINLEVEL_OUTOF10: 0
PAINLEVEL_OUTOF10: 6
PAINLEVEL_OUTOF10: 9
PAINLEVEL_OUTOF10: 10
PAINLEVEL_OUTOF10: 8
PAINLEVEL_OUTOF10: 0
PAINLEVEL_OUTOF10: 0
PAINLEVEL_OUTOF10: 9
PAINLEVEL_OUTOF10: 10
PAINLEVEL_OUTOF10: 6
PAINLEVEL_OUTOF10: 0
PAINLEVEL_OUTOF10: 7
PAINLEVEL_OUTOF10: 10
PAINLEVEL_OUTOF10: 0
PAINLEVEL_OUTOF10: 10
PAINLEVEL_OUTOF10: 8
PAINLEVEL_OUTOF10: 0
PAINLEVEL_OUTOF10: 6
PAINLEVEL_OUTOF10: 0
PAINLEVEL_OUTOF10: 9
PAINLEVEL_OUTOF10: 5
PAINLEVEL_OUTOF10: 10
PAINLEVEL_OUTOF10: 10
PAINLEVEL_OUTOF10: 6
PAINLEVEL_OUTOF10: 6
PAINLEVEL_OUTOF10: 0
PAINLEVEL_OUTOF10: 10
PAINLEVEL_OUTOF10: 3
PAINLEVEL_OUTOF10: 0
PAINLEVEL_OUTOF10: 9
PAINLEVEL_OUTOF10: 0
PAINLEVEL_OUTOF10: 0
PAINLEVEL_OUTOF10: 10
PAINLEVEL_OUTOF10: 10
PAINLEVEL_OUTOF10: 6
PAINLEVEL_OUTOF10: 0
PAINLEVEL_OUTOF10: 9
PAINLEVEL_OUTOF10: 0
PAINLEVEL_OUTOF10: 10
PAINLEVEL_OUTOF10: 0
PAINLEVEL_OUTOF10: 7
PAINLEVEL_OUTOF10: 6
PAINLEVEL_OUTOF10: 10
PAINLEVEL_OUTOF10: 0
PAINLEVEL_OUTOF10: 7
PAINLEVEL_OUTOF10: 8
PAINLEVEL_OUTOF10: 7
PAINLEVEL_OUTOF10: 0
PAINLEVEL_OUTOF10: 2
PAINLEVEL_OUTOF10: 0
PAINLEVEL_OUTOF10: 10
PAINLEVEL_OUTOF10: 0
PAINLEVEL_OUTOF10: 0
PAINLEVEL_OUTOF10: 10
PAINLEVEL_OUTOF10: 0
PAINLEVEL_OUTOF10: 0
PAINLEVEL_OUTOF10: 2
PAINLEVEL_OUTOF10: 5
PAINLEVEL_OUTOF10: 0
PAINLEVEL_OUTOF10: 7
PAINLEVEL_OUTOF10: 0
PAINLEVEL_OUTOF10: 0
PAINLEVEL_OUTOF10: 3
PAINLEVEL_OUTOF10: 0
PAINLEVEL_OUTOF10: 5
PAINLEVEL_OUTOF10: 2
PAINLEVEL_OUTOF10: 10
PAINLEVEL_OUTOF10: 0
PAINLEVEL_OUTOF10: 9
PAINLEVEL_OUTOF10: 0
PAINLEVEL_OUTOF10: 0
PAINLEVEL_OUTOF10: 4
PAINLEVEL_OUTOF10: 0
PAINLEVEL_OUTOF10: 7
PAINLEVEL_OUTOF10: 8
PAINLEVEL_OUTOF10: 0
PAINLEVEL_OUTOF10: 7
PAINLEVEL_OUTOF10: 8

## 2020-01-01 ASSESSMENT — PAIN DESCRIPTION - ONSET
ONSET: ON-GOING
ONSET: GRADUAL
ONSET: ON-GOING
ONSET: GRADUAL
ONSET: ON-GOING
ONSET: GRADUAL
ONSET: ON-GOING
ONSET: GRADUAL
ONSET: GRADUAL
ONSET: ON-GOING
ONSET: ON-GOING
ONSET: GRADUAL
ONSET: ON-GOING

## 2020-01-01 ASSESSMENT — PAIN DESCRIPTION - FREQUENCY
FREQUENCY: CONTINUOUS
FREQUENCY: INTERMITTENT
FREQUENCY: INTERMITTENT
FREQUENCY: CONTINUOUS
FREQUENCY: INTERMITTENT
FREQUENCY: CONTINUOUS
FREQUENCY: INTERMITTENT
FREQUENCY: INTERMITTENT
FREQUENCY: CONTINUOUS
FREQUENCY: INTERMITTENT
FREQUENCY: CONTINUOUS
FREQUENCY: INTERMITTENT
FREQUENCY: CONTINUOUS
FREQUENCY: CONTINUOUS
FREQUENCY: INTERMITTENT

## 2020-01-01 ASSESSMENT — PAIN - FUNCTIONAL ASSESSMENT
PAIN_FUNCTIONAL_ASSESSMENT: ACTIVITIES ARE NOT PREVENTED
PAIN_FUNCTIONAL_ASSESSMENT: PREVENTS OR INTERFERES WITH MANY ACTIVE NOT PASSIVE ACTIVITIES
PAIN_FUNCTIONAL_ASSESSMENT: ACTIVITIES ARE NOT PREVENTED
PAIN_FUNCTIONAL_ASSESSMENT: PREVENTS OR INTERFERES SOME ACTIVE ACTIVITIES AND ADLS
PAIN_FUNCTIONAL_ASSESSMENT: PREVENTS OR INTERFERES SOME ACTIVE ACTIVITIES AND ADLS
PAIN_FUNCTIONAL_ASSESSMENT: ACTIVITIES ARE NOT PREVENTED
PAIN_FUNCTIONAL_ASSESSMENT: PREVENTS OR INTERFERES SOME ACTIVE ACTIVITIES AND ADLS
PAIN_FUNCTIONAL_ASSESSMENT: ACTIVITIES ARE NOT PREVENTED
PAIN_FUNCTIONAL_ASSESSMENT: PREVENTS OR INTERFERES SOME ACTIVE ACTIVITIES AND ADLS
PAIN_FUNCTIONAL_ASSESSMENT: PREVENTS OR INTERFERES WITH MANY ACTIVE NOT PASSIVE ACTIVITIES
PAIN_FUNCTIONAL_ASSESSMENT: PREVENTS OR INTERFERES SOME ACTIVE ACTIVITIES AND ADLS
PAIN_FUNCTIONAL_ASSESSMENT: PREVENTS OR INTERFERES WITH MANY ACTIVE NOT PASSIVE ACTIVITIES
PAIN_FUNCTIONAL_ASSESSMENT: ACTIVITIES ARE NOT PREVENTED
PAIN_FUNCTIONAL_ASSESSMENT: PREVENTS OR INTERFERES SOME ACTIVE ACTIVITIES AND ADLS

## 2020-01-01 ASSESSMENT — PAIN DESCRIPTION - LOCATION
LOCATION: GENERALIZED
LOCATION: HEAD
LOCATION: CHEST
LOCATION: HEAD
LOCATION: HEAD
LOCATION: BACK
LOCATION: GENERALIZED
LOCATION: HEAD
LOCATION: CHEST
LOCATION: GENERALIZED
LOCATION: HEAD
LOCATION: GENERALIZED
LOCATION: CHEST
LOCATION: HEAD
LOCATION: HEAD
LOCATION: GENERALIZED
LOCATION: CHEST
LOCATION: HEAD
LOCATION: GENERALIZED
LOCATION: GENERALIZED
LOCATION: HEAD
LOCATION: HEAD
LOCATION: GENERALIZED
LOCATION: HEAD;CHEST
LOCATION: HEAD;CHEST
LOCATION: HEAD;THROAT
LOCATION: HEAD;CHEST
LOCATION: CHEST

## 2020-01-01 ASSESSMENT — PAIN DESCRIPTION - ORIENTATION
ORIENTATION: MID
ORIENTATION: MID
ORIENTATION: LOWER
ORIENTATION: LOWER
ORIENTATION: MID
ORIENTATION: MID

## 2020-01-01 ASSESSMENT — PAIN SCALES - WONG BAKER
WONGBAKER_NUMERICALRESPONSE: 0

## 2020-10-26 ENCOUNTER — APPOINTMENT (OUTPATIENT)
Dept: GENERAL RADIOLOGY | Age: 60
DRG: 003 | End: 2020-10-26
Payer: MEDICARE

## 2020-10-26 ENCOUNTER — APPOINTMENT (OUTPATIENT)
Dept: CT IMAGING | Age: 60
DRG: 003 | End: 2020-10-26
Payer: MEDICARE

## 2020-10-26 ENCOUNTER — HOSPITAL ENCOUNTER (INPATIENT)
Age: 60
LOS: 38 days | Discharge: NURSING, REHAB OR LONG-TERM CARE | DRG: 003 | End: 2020-12-03
Attending: EMERGENCY MEDICINE | Admitting: INTERNAL MEDICINE
Payer: MEDICARE

## 2020-10-26 LAB
ALBUMIN SERPL-MCNC: 4.1 G/DL (ref 3.5–5.2)
ALP BLD-CCNC: 95 U/L (ref 40–129)
ALT SERPL-CCNC: 10 U/L (ref 0–40)
ANION GAP SERPL CALCULATED.3IONS-SCNC: 14 MMOL/L (ref 7–16)
ANISOCYTOSIS: ABNORMAL
AST SERPL-CCNC: 14 U/L (ref 0–39)
BASOPHILS ABSOLUTE: 0.02 E9/L (ref 0–0.2)
BASOPHILS RELATIVE PERCENT: 0.2 % (ref 0–2)
BILIRUB SERPL-MCNC: 1 MG/DL (ref 0–1.2)
BUN BLDV-MCNC: 11 MG/DL (ref 8–23)
CALCIUM SERPL-MCNC: 8.9 MG/DL (ref 8.6–10.2)
CHLORIDE BLD-SCNC: 99 MMOL/L (ref 98–107)
CO2: 31 MMOL/L (ref 22–29)
CREAT SERPL-MCNC: 1.1 MG/DL (ref 0.7–1.2)
EOSINOPHILS ABSOLUTE: 0.12 E9/L (ref 0.05–0.5)
EOSINOPHILS RELATIVE PERCENT: 1.4 % (ref 0–6)
GFR AFRICAN AMERICAN: >60
GFR NON-AFRICAN AMERICAN: >60 ML/MIN/1.73
GLUCOSE BLD-MCNC: 153 MG/DL (ref 74–99)
HCT VFR BLD CALC: 42.2 % (ref 37–54)
HEMOGLOBIN: 13.7 G/DL (ref 12.5–16.5)
IMMATURE GRANULOCYTES #: 0.07 E9/L
IMMATURE GRANULOCYTES %: 0.8 % (ref 0–5)
LYMPHOCYTES ABSOLUTE: 2.26 E9/L (ref 1.5–4)
LYMPHOCYTES RELATIVE PERCENT: 27.3 % (ref 20–42)
MCH RBC QN AUTO: 26.3 PG (ref 26–35)
MCHC RBC AUTO-ENTMCNC: 32.5 % (ref 32–34.5)
MCV RBC AUTO: 81.2 FL (ref 80–99.9)
MONOCYTES ABSOLUTE: 0.52 E9/L (ref 0.1–0.95)
MONOCYTES RELATIVE PERCENT: 6.3 % (ref 2–12)
NEUTROPHILS ABSOLUTE: 5.29 E9/L (ref 1.8–7.3)
NEUTROPHILS RELATIVE PERCENT: 64 % (ref 43–80)
PDW BLD-RTO: 22.6 FL (ref 11.5–15)
PLATELET # BLD: 190 E9/L (ref 130–450)
PMV BLD AUTO: 8.9 FL (ref 7–12)
POIKILOCYTES: ABNORMAL
POLYCHROMASIA: ABNORMAL
POTASSIUM REFLEX MAGNESIUM: 3.9 MMOL/L (ref 3.5–5)
PRO-BNP: 110 PG/ML (ref 0–125)
RBC # BLD: 5.2 E12/L (ref 3.8–5.8)
SARS-COV-2, NAAT: NOT DETECTED
SODIUM BLD-SCNC: 144 MMOL/L (ref 132–146)
TARGET CELLS: ABNORMAL
TOTAL PROTEIN: 8.1 G/DL (ref 6.4–8.3)
TROPONIN: <0.01 NG/ML (ref 0–0.03)
WBC # BLD: 8.3 E9/L (ref 4.5–11.5)

## 2020-10-26 PROCEDURE — 87040 BLOOD CULTURE FOR BACTERIA: CPT

## 2020-10-26 PROCEDURE — 96375 TX/PRO/DX INJ NEW DRUG ADDON: CPT

## 2020-10-26 PROCEDURE — 6360000002 HC RX W HCPCS: Performed by: GENERAL PRACTICE

## 2020-10-26 PROCEDURE — 80053 COMPREHEN METABOLIC PANEL: CPT

## 2020-10-26 PROCEDURE — 6360000002 HC RX W HCPCS: Performed by: EMERGENCY MEDICINE

## 2020-10-26 PROCEDURE — 84484 ASSAY OF TROPONIN QUANT: CPT

## 2020-10-26 PROCEDURE — 71045 X-RAY EXAM CHEST 1 VIEW: CPT

## 2020-10-26 PROCEDURE — 87150 DNA/RNA AMPLIFIED PROBE: CPT

## 2020-10-26 PROCEDURE — 36415 COLL VENOUS BLD VENIPUNCTURE: CPT

## 2020-10-26 PROCEDURE — U0002 COVID-19 LAB TEST NON-CDC: HCPCS

## 2020-10-26 PROCEDURE — 2140000000 HC CCU INTERMEDIATE R&B

## 2020-10-26 PROCEDURE — 99284 EMERGENCY DEPT VISIT MOD MDM: CPT

## 2020-10-26 PROCEDURE — 85025 COMPLETE CBC W/AUTO DIFF WBC: CPT

## 2020-10-26 PROCEDURE — 6360000004 HC RX CONTRAST MEDICATION: Performed by: STUDENT IN AN ORGANIZED HEALTH CARE EDUCATION/TRAINING PROGRAM

## 2020-10-26 PROCEDURE — 96365 THER/PROPH/DIAG IV INF INIT: CPT

## 2020-10-26 PROCEDURE — 93005 ELECTROCARDIOGRAM TRACING: CPT | Performed by: GENERAL PRACTICE

## 2020-10-26 PROCEDURE — 83880 ASSAY OF NATRIURETIC PEPTIDE: CPT

## 2020-10-26 PROCEDURE — 2500000003 HC RX 250 WO HCPCS: Performed by: EMERGENCY MEDICINE

## 2020-10-26 PROCEDURE — 2580000003 HC RX 258: Performed by: EMERGENCY MEDICINE

## 2020-10-26 RX ORDER — FENTANYL CITRATE 50 UG/ML
100 INJECTION, SOLUTION INTRAMUSCULAR; INTRAVENOUS ONCE
Status: COMPLETED | OUTPATIENT
Start: 2020-10-26 | End: 2020-10-26

## 2020-10-26 RX ADMIN — FENTANYL CITRATE 100 MCG: 50 INJECTION, SOLUTION INTRAMUSCULAR; INTRAVENOUS at 21:26

## 2020-10-26 RX ADMIN — IOPAMIDOL 75 ML: 755 INJECTION, SOLUTION INTRAVENOUS at 20:37

## 2020-10-26 RX ADMIN — CEFTRIAXONE SODIUM 1 G: 1 INJECTION, POWDER, FOR SOLUTION INTRAMUSCULAR; INTRAVENOUS at 19:14

## 2020-10-26 RX ADMIN — DOXYCYCLINE 100 MG: 100 INJECTION, POWDER, LYOPHILIZED, FOR SOLUTION INTRAVENOUS at 19:15

## 2020-10-26 ASSESSMENT — ENCOUNTER SYMPTOMS
EYE PAIN: 0
EYE REDNESS: 0
SORE THROAT: 0
BACK PAIN: 0
ABDOMINAL PAIN: 0
VOMITING: 0
SINUS PRESSURE: 0
SHORTNESS OF BREATH: 1
CHEST TIGHTNESS: 1
WHEEZING: 0
NAUSEA: 0
COUGH: 0
DIARRHEA: 0
EYE DISCHARGE: 0

## 2020-10-26 ASSESSMENT — PAIN SCALES - GENERAL
PAINLEVEL_OUTOF10: 2
PAINLEVEL_OUTOF10: 8
PAINLEVEL_OUTOF10: 7

## 2020-10-26 ASSESSMENT — PAIN DESCRIPTION - LOCATION
LOCATION: HEAD
LOCATION: HEAD;THROAT

## 2020-10-26 NOTE — ED PROVIDER NOTES
Melani Brenner is a pleasant 59-year-old gentleman who presents with a chief complaint of shortness of breath. History comes primarily from the patient. Past medical history includes diabetes, hypothyroidism, COPD, hypertension, hyperlipidemia. He states that he has been short of breath for approximately the past week with intermittent chest pain and fatigue associated with this. Denies any associated fever or proximity to sick contacts during this time. Because of the persistence of his symptoms, he presented to his primary care provider (Dr. Govind Juares) who assessed him in his office and found his pulse oximetry on room air to be in the mid 70s. For this reason he was redirected to [facility] emergency department for further evaluation and treatment. On arrival, he was assessed with history, physical exam, imaging studies, laboratory studies, EKG, vital signs. His vital signs were notable for an SPO2 of 98% on 4 L nasal cannula that precipitously dropped into the mid 60s on room air, and hypertension of 317 systolic. He was afebrile. Review of Systems   Constitutional: Positive for activity change and fatigue. Negative for chills and fever. HENT: Negative for ear pain, sinus pressure and sore throat. Eyes: Negative for pain, discharge and redness. Respiratory: Positive for chest tightness and shortness of breath. Negative for cough and wheezing. Cardiovascular: Positive for chest pain. Gastrointestinal: Negative for abdominal pain, diarrhea, nausea and vomiting. Genitourinary: Negative for dysuria and frequency. Musculoskeletal: Negative for arthralgias and back pain. Skin: Negative for rash and wound. Neurological: Negative for weakness and headaches. Hematological: Negative for adenopathy. All other systems reviewed and are negative. Physical Exam  Vitals signs and nursing note reviewed. Constitutional:       General: He is not in acute distress. Appearance: He is well-developed. He is obese. He is not diaphoretic. HENT:      Head: Normocephalic and atraumatic. Mouth/Throat:      Dentition: Abnormal dentition. Eyes:      Pupils: Pupils are equal, round, and reactive to light. Neck:      Musculoskeletal: Normal range of motion. Vascular: No JVD. Trachea: No tracheal deviation. Cardiovascular:      Rate and Rhythm: Regular rhythm. Heart sounds: No murmur. No friction rub. No gallop. Pulmonary:      Effort: Pulmonary effort is normal. No respiratory distress. Breath sounds: Normal breath sounds. No stridor. No decreased breath sounds, wheezing or rales. Chest:      Chest wall: No tenderness. Abdominal:      General: Bowel sounds are normal. There is no distension. Palpations: Abdomen is soft. Tenderness: There is no abdominal tenderness. There is no guarding. Musculoskeletal: Normal range of motion. Skin:     General: Skin is warm and dry. Capillary Refill: Capillary refill takes less than 2 seconds. Neurological:      General: No focal deficit present. Mental Status: He is alert. Cranial Nerves: No cranial nerve deficit. Psychiatric:         Behavior: Behavior normal.        EKG #1:  Interpreted by emergency department physician unless otherwise noted. Time:  1550    Rate: 95  Rhythm: Sinus. Interpretation: normal sinus rhythm, LAFB. Procedures     MDM  Number of Diagnoses or Management Options  Acute respiratory failure with hypoxia [facility]):   Diagnosis management comments: The patient's emergency department work-up including history, physical exam, vital signs, laboratory studies, imaging studies and reevaluation after initial treatment are concerning for significant pathology requiring further management and care in the inpatient setting.      Specifically, Flaquito Nagy wears no supplemental oxygen at baseline, however today on room air he was satting in the 60-70 percentile range on room air.  This constitutes respiratory failure with hypoxia and requires further evaluation and management in the inpatient setting. This information was relayed to the patient who understood this plan of care and was amenable to the plan. Patient was discussed with the admitting service (Dr. Earnest Reece) who concurred with the decision for admission, and have agreed to admit the patient to intermediate     ED Course as of Oct 27 0053   Mon Oct 26, 2020   2242 Attempt was made to CT scan the patient out of concern for possible pulmonary embolism secondary to patient's hypoxia. He refused to lay flat, and became very agitated in CT scanner, saying his back hurt. Patient was pretreated with fentanyl for pain, however on return to the CT scanner, he again refused to lie flat. [RK]      ED Course User Index  [RK] Jonathan Obrien, DO       --------------------------------------------- PAST HISTORY ---------------------------------------------  Past Medical History:  has a past medical history of Acquired hypothyroidism, Anxiety, Congestive heart failure with LV diastolic dysfunction, NYHA class 3 ([address] Depression, DM (diabetes mellitus) ([facility] Essential hypertension, Gouty arthritis, Hyperlipidemia, Hypertension, Lymphedema, Obesity, Obstructive sleep apnea, Obstructive sleep apnea, Osteoarthritis, and Type 2 diabetes mellitus without complication, without long-term current use of insulin ([facility] Past Surgical History:  has a past surgical history that includes Foot surgery (1990); External ear surgery (6/2/2009); ECHO Compl W Dop Color Flow (6/21/2013); Gastrostomy tube placement (08/10/2017); Tracheostomy tube placement (08/10/2017); and bronchoscopy (08/10/2017). Social History:  reports that he quit smoking about 15 years ago. His smoking use included cigarettes. He has a 1.00 pack-year smoking history. He quit smokeless tobacco use about 30 years ago. His smokeless tobacco use included chew.  He reports Alkaline Phosphatase 95 40 - 129 U/L    ALT 10 0 - 40 U/L    AST 14 0 - 39 U/L   Troponin   Result Value Ref Range    Troponin <0.01 0.00 - 0.03 ng/mL   COVID-19   Result Value Ref Range    SARS-CoV-2, NAAT Not Detected Not Detected   Brain Natriuretic Peptide   Result Value Ref Range    Pro- 0 - 125 pg/mL   EKG 12 Lead   Result Value Ref Range    Ventricular Rate 95 BPM    Atrial Rate 95 BPM    P-R Interval 160 ms    QRS Duration 86 ms    Q-T Interval 358 ms    QTc Calculation (Bazett) 449 ms    P Axis 60 degrees    R Axis -58 degrees    T Axis 82 degrees       RADIOLOGY:  XR CHEST PORTABLE   Final Result   Right infrahilar and basilar infiltrate concerning for pneumonia. Left basilar atelectasis or scarring.           ------------------------- NURSING NOTES AND VITALS REVIEWED ---------------------------  Date / Time Roomed:  10/26/2020  3:39 PM  ED Bed Assignment:  10/10    The nursing notes within the ED encounter and vital signs as below have been reviewed. Patient Vitals for the past 24 hrs:   BP Temp Temp src Pulse Resp SpO2 Height Weight   10/26/20 2303 -- -- -- 82 -- -- -- --   10/26/20 2302 130/70 -- -- 87 18 92 % -- --   10/26/20 2300 130/70 97.5 °F (36.4 °C) Oral 83 -- 94 % -- --   10/26/20 1800 123/84 -- -- -- -- -- -- --   10/26/20 1543 (!) 168/71 97.5 °F (36.4 °C) Temporal 93 16 98 % 5' 8\" (1.727 m) 295 lb (133.8 kg)       Oxygen Saturation Interpretation: Abnormal    ------------------------------------------ PROGRESS NOTES ------------------------------------------  Re-evaluation(s):  Time: 8:21 PM EDT  Patients symptoms show no change  Repeat physical examination is not changed    Counseling:  I have spoken with the patient and discussed todays results, in addition to providing specific details for the plan of care and counseling regarding the diagnosis and prognosis.   Their questions are answered at this time and they are agreeable with the plan of admission.    --------------------------------- ADDITIONAL PROVIDER NOTES ---------------------------------  Consultations:  Time: 8:21 PM EDT. Spoke with Dr. Ruben Gillis. Discussed case. They will admit the patient. This patient's ED course included: a personal history and physicial examination, re-evaluation prior to disposition, cardiac monitoring and continuous pulse oximetry    This patient has remained hemodynamically stable during their ED course. Diagnosis:  1. Acute respiratory failure with hypoxia ([address]    2. Pneumonia due to organism        Disposition:  Patient's disposition: Admit to telemetry  Patient's condition is fair. Jonathan Nelson  PGY-2  8:22 PM EDT    ED Course as of Oct 27 0053   Mon Oct 26, 2020   2242 Attempt was made to CT scan the patient out of concern for possible pulmonary embolism secondary to patient's hypoxia. He refused to lay flat, and became very agitated in CT scanner, saying his back hurt. Patient was pretreated with fentanyl for pain, however on return to the CT scanner, he again refused to lie flat.     [RK]      ED Course User Index  [RK] Jonathan Nunes, DO Jonathan Nunes, DO  Resident  10/26/20 2022       Kaiser Flores DO  10/26/20 2040       Jonathan Nunes,   Resident  10/27/20 [id number]

## 2020-10-26 NOTE — ED NOTES
Bed: 10  Expected date:   Expected time:   Means of arrival:   Comments:  MIKEL He RN  10/26/20 1537

## 2020-10-27 ENCOUNTER — APPOINTMENT (OUTPATIENT)
Dept: GENERAL RADIOLOGY | Age: 60
DRG: 003 | End: 2020-10-27
Payer: MEDICARE

## 2020-10-27 ENCOUNTER — APPOINTMENT (OUTPATIENT)
Dept: CT IMAGING | Age: 60
DRG: 003 | End: 2020-10-27
Payer: MEDICARE

## 2020-10-27 LAB
AADO2: 162.5 MMHG
AADO2: 184.7 MMHG
ANION GAP SERPL CALCULATED.3IONS-SCNC: 10 MMOL/L (ref 7–16)
ANISOCYTOSIS: ABNORMAL
ATYPICAL LYMPHOCYTE RELATIVE PERCENT: 3 % (ref 0–4)
B.E.: 0.6 MMOL/L (ref -3–3)
B.E.: 2.7 MMOL/L (ref -3–3)
B.E.: 4.5 MMOL/L (ref -3–3)
BASOPHILS ABSOLUTE: 0 E9/L (ref 0–0.2)
BASOPHILS RELATIVE PERCENT: 0 % (ref 0–2)
BUN BLDV-MCNC: 12 MG/DL (ref 8–23)
CALCIUM SERPL-MCNC: 8.1 MG/DL (ref 8.6–10.2)
CHLORIDE BLD-SCNC: 98 MMOL/L (ref 98–107)
CO2: 33 MMOL/L (ref 22–29)
COHB: 1.3 % (ref 0–1.5)
COHB: 1.5 % (ref 0–1.5)
COHB: 1.5 % (ref 0–1.5)
CREAT SERPL-MCNC: 1.2 MG/DL (ref 0.7–1.2)
CRITICAL: ABNORMAL
D DIMER: 269 NG/ML DDU
DATE ANALYZED: ABNORMAL
DATE OF COLLECTION: ABNORMAL
EKG ATRIAL RATE: 95 BPM
EKG P AXIS: 60 DEGREES
EKG P-R INTERVAL: 160 MS
EKG Q-T INTERVAL: 358 MS
EKG QRS DURATION: 86 MS
EKG QTC CALCULATION (BAZETT): 449 MS
EKG R AXIS: -58 DEGREES
EKG T AXIS: 82 DEGREES
EKG VENTRICULAR RATE: 95 BPM
EOSINOPHILS ABSOLUTE: 0 E9/L (ref 0.05–0.5)
EOSINOPHILS RELATIVE PERCENT: 0 % (ref 0–6)
FIO2: 50 %
FIO2: 50 %
GFR AFRICAN AMERICAN: >60
GFR NON-AFRICAN AMERICAN: >60 ML/MIN/1.73
GLUCOSE BLD-MCNC: 140 MG/DL (ref 74–99)
HCO3: 31.6 MMOL/L (ref 22–26)
HCO3: 33.9 MMOL/L (ref 22–26)
HCO3: 36.6 MMOL/L (ref 22–26)
HCT VFR BLD CALC: 39.8 % (ref 37–54)
HEMOGLOBIN: 12.8 G/DL (ref 12.5–16.5)
HHB: 12.4 % (ref 0–5)
HHB: 5.6 % (ref 0–5)
HHB: 6.7 % (ref 0–5)
HYPOCHROMIA: ABNORMAL
LAB: ABNORMAL
LYMPHOCYTES ABSOLUTE: 2.09 E9/L (ref 1.5–4)
LYMPHOCYTES RELATIVE PERCENT: 26 % (ref 20–42)
Lab: ABNORMAL
MCH RBC QN AUTO: 26.6 PG (ref 26–35)
MCHC RBC AUTO-ENTMCNC: 32.2 % (ref 32–34.5)
MCV RBC AUTO: 82.6 FL (ref 80–99.9)
METER GLUCOSE: 156 MG/DL (ref 74–99)
METHB: 0.6 % (ref 0–1.5)
MODE: ABNORMAL
MONOCYTES ABSOLUTE: 0.29 E9/L (ref 0.1–0.95)
MONOCYTES RELATIVE PERCENT: 4 % (ref 2–12)
NEUTROPHILS ABSOLUTE: 4.82 E9/L (ref 1.8–7.3)
NEUTROPHILS RELATIVE PERCENT: 67 % (ref 43–80)
NUCLEATED RED BLOOD CELLS: 13 /100 WBC
O2 CONTENT: 17.1 ML/DL
O2 CONTENT: 17.5 ML/DL
O2 CONTENT: 18.2 ML/DL
O2 SATURATION: 87.3 % (ref 92–98.5)
O2 SATURATION: 93.2 % (ref 92–98.5)
O2 SATURATION: 94.3 % (ref 92–98.5)
O2HB: 85.5 % (ref 94–97)
O2HB: 91.4 % (ref 94–97)
O2HB: 92.3 % (ref 94–97)
OPERATOR ID: 359
PATIENT TEMP: 37 C
PCO2: 101.8 MMHG (ref 35–45)
PCO2: 85.5 MMHG (ref 35–45)
PCO2: 91.2 MMHG (ref 35–45)
PDW BLD-RTO: 22.1 FL (ref 11.5–15)
PEEP/CPAP: 10 CMH2O
PEEP/CPAP: 10 CMH2O
PFO2: 1.26 MMHG/%
PFO2: 1.58 MMHG/%
PH BLOOD GAS: 7.17 (ref 7.35–7.45)
PH BLOOD GAS: 7.18 (ref 7.35–7.45)
PH BLOOD GAS: 7.19 (ref 7.35–7.45)
PLATELET # BLD: 178 E9/L (ref 130–450)
PMV BLD AUTO: 8.9 FL (ref 7–12)
PO2: 63.1 MMHG (ref 75–100)
PO2: 78.9 MMHG (ref 75–100)
PO2: 90.9 MMHG (ref 75–100)
POIKILOCYTES: ABNORMAL
POLYCHROMASIA: ABNORMAL
POTASSIUM REFLEX MAGNESIUM: 4 MMOL/L (ref 3.5–5)
PROCALCITONIN: 0.11 NG/ML (ref 0–0.08)
RBC # BLD: 4.82 E12/L (ref 3.8–5.8)
RI(T): 2.06
RI(T): 2.93
RR MECHANICAL: 16 B/MIN
RR MECHANICAL: 16 B/MIN
SODIUM BLD-SCNC: 141 MMOL/L (ref 132–146)
SOURCE, BLOOD GAS: ABNORMAL
TARGET CELLS: ABNORMAL
THB: 13.6 G/DL (ref 11.5–16.5)
THB: 14 G/DL (ref 11.5–16.5)
THB: 14.2 G/DL (ref 11.5–16.5)
TIME ANALYZED: 1838
TIME ANALYZED: 2046
TIME ANALYZED: 2321
TSH SERPL DL<=0.05 MIU/L-ACNC: 4.34 UIU/ML (ref 0.27–4.2)
VT MECHANICAL: 600 ML
VT MECHANICAL: 600 ML
WBC # BLD: 7.2 E9/L (ref 4.5–11.5)

## 2020-10-27 PROCEDURE — 6360000002 HC RX W HCPCS: Performed by: STUDENT IN AN ORGANIZED HEALTH CARE EDUCATION/TRAINING PROGRAM

## 2020-10-27 PROCEDURE — 6370000000 HC RX 637 (ALT 250 FOR IP): Performed by: INTERNAL MEDICINE

## 2020-10-27 PROCEDURE — 2580000003 HC RX 258: Performed by: INTERNAL MEDICINE

## 2020-10-27 PROCEDURE — 36600 WITHDRAWAL OF ARTERIAL BLOOD: CPT

## 2020-10-27 PROCEDURE — 6360000002 HC RX W HCPCS: Performed by: INTERNAL MEDICINE

## 2020-10-27 PROCEDURE — 82805 BLOOD GASES W/O2 SATURATION: CPT

## 2020-10-27 PROCEDURE — 82962 GLUCOSE BLOOD TEST: CPT

## 2020-10-27 PROCEDURE — 2140000000 HC CCU INTERMEDIATE R&B

## 2020-10-27 PROCEDURE — 80048 BASIC METABOLIC PNL TOTAL CA: CPT

## 2020-10-27 PROCEDURE — 84443 ASSAY THYROID STIM HORMONE: CPT

## 2020-10-27 PROCEDURE — 71045 X-RAY EXAM CHEST 1 VIEW: CPT

## 2020-10-27 PROCEDURE — 94660 CPAP INITIATION&MGMT: CPT

## 2020-10-27 PROCEDURE — 2700000000 HC OXYGEN THERAPY PER DAY

## 2020-10-27 PROCEDURE — 85025 COMPLETE CBC W/AUTO DIFF WBC: CPT

## 2020-10-27 PROCEDURE — 84145 PROCALCITONIN (PCT): CPT

## 2020-10-27 PROCEDURE — 85378 FIBRIN DEGRADE SEMIQUANT: CPT

## 2020-10-27 PROCEDURE — 94640 AIRWAY INHALATION TREATMENT: CPT

## 2020-10-27 PROCEDURE — 36415 COLL VENOUS BLD VENIPUNCTURE: CPT

## 2020-10-27 PROCEDURE — 93010 ELECTROCARDIOGRAM REPORT: CPT | Performed by: INTERNAL MEDICINE

## 2020-10-27 RX ORDER — FUROSEMIDE 10 MG/ML
20 INJECTION INTRAMUSCULAR; INTRAVENOUS ONCE
Status: COMPLETED | OUTPATIENT
Start: 2020-10-27 | End: 2020-10-27

## 2020-10-27 RX ORDER — ONDANSETRON 2 MG/ML
4 INJECTION INTRAMUSCULAR; INTRAVENOUS EVERY 6 HOURS PRN
Status: DISCONTINUED | OUTPATIENT
Start: 2020-10-27 | End: 2020-11-04

## 2020-10-27 RX ORDER — LEVOTHYROXINE SODIUM 0.05 MG/1
50 TABLET ORAL DAILY
Status: DISCONTINUED | OUTPATIENT
Start: 2020-10-27 | End: 2020-01-01 | Stop reason: HOSPADM

## 2020-10-27 RX ORDER — AMLODIPINE BESYLATE 10 MG/1
10 TABLET ORAL DAILY
Status: DISCONTINUED | OUTPATIENT
Start: 2020-10-27 | End: 2020-01-01 | Stop reason: HOSPADM

## 2020-10-27 RX ORDER — ACETAMINOPHEN 325 MG/1
650 TABLET ORAL EVERY 6 HOURS PRN
Status: DISCONTINUED | OUTPATIENT
Start: 2020-10-27 | End: 2020-10-28 | Stop reason: SDUPTHER

## 2020-10-27 RX ORDER — HALOPERIDOL 5 MG/ML
5 INJECTION INTRAMUSCULAR ONCE
Status: COMPLETED | OUTPATIENT
Start: 2020-10-27 | End: 2020-10-27

## 2020-10-27 RX ORDER — METHYLPREDNISOLONE SODIUM SUCCINATE 125 MG/2ML
60 INJECTION, POWDER, LYOPHILIZED, FOR SOLUTION INTRAMUSCULAR; INTRAVENOUS ONCE
Status: COMPLETED | OUTPATIENT
Start: 2020-10-27 | End: 2020-10-27

## 2020-10-27 RX ORDER — BUDESONIDE 0.5 MG/2ML
0.5 INHALANT ORAL 2 TIMES DAILY
Status: DISCONTINUED | OUTPATIENT
Start: 2020-10-27 | End: 2020-01-01 | Stop reason: HOSPADM

## 2020-10-27 RX ORDER — SODIUM CHLORIDE 0.9 % (FLUSH) 0.9 %
10 SYRINGE (ML) INJECTION PRN
Status: DISCONTINUED | OUTPATIENT
Start: 2020-10-27 | End: 2020-10-28 | Stop reason: SDUPTHER

## 2020-10-27 RX ORDER — PROMETHAZINE HYDROCHLORIDE 25 MG/1
12.5 TABLET ORAL EVERY 6 HOURS PRN
Status: DISCONTINUED | OUTPATIENT
Start: 2020-10-27 | End: 2020-11-04

## 2020-10-27 RX ORDER — PANTOPRAZOLE SODIUM 40 MG/1
40 TABLET, DELAYED RELEASE ORAL
Status: DISCONTINUED | OUTPATIENT
Start: 2020-10-27 | End: 2020-10-29

## 2020-10-27 RX ORDER — POLYETHYLENE GLYCOL 3350 17 G/17G
17 POWDER, FOR SOLUTION ORAL DAILY PRN
Status: DISCONTINUED | OUTPATIENT
Start: 2020-10-27 | End: 2020-10-28

## 2020-10-27 RX ORDER — COLCHICINE 0.6 MG/1
0.6 TABLET ORAL 2 TIMES DAILY PRN
Status: DISCONTINUED | OUTPATIENT
Start: 2020-10-27 | End: 2020-01-01 | Stop reason: HOSPADM

## 2020-10-27 RX ORDER — SODIUM CHLORIDE 0.9 % (FLUSH) 0.9 %
10 SYRINGE (ML) INJECTION EVERY 12 HOURS SCHEDULED
Status: DISCONTINUED | OUTPATIENT
Start: 2020-10-27 | End: 2020-10-28 | Stop reason: SDUPTHER

## 2020-10-27 RX ORDER — DEXTROSE MONOHYDRATE 25 G/50ML
12.5 INJECTION, SOLUTION INTRAVENOUS PRN
Status: DISCONTINUED | OUTPATIENT
Start: 2020-10-27 | End: 2020-01-01 | Stop reason: HOSPADM

## 2020-10-27 RX ORDER — NICOTINE POLACRILEX 4 MG
15 LOZENGE BUCCAL PRN
Status: DISCONTINUED | OUTPATIENT
Start: 2020-10-27 | End: 2020-01-01 | Stop reason: HOSPADM

## 2020-10-27 RX ORDER — LOSARTAN POTASSIUM 50 MG/1
100 TABLET ORAL DAILY
Status: DISCONTINUED | OUTPATIENT
Start: 2020-10-27 | End: 2020-01-01

## 2020-10-27 RX ORDER — DEXTROSE MONOHYDRATE 50 MG/ML
100 INJECTION, SOLUTION INTRAVENOUS PRN
Status: DISCONTINUED | OUTPATIENT
Start: 2020-10-27 | End: 2020-01-01 | Stop reason: HOSPADM

## 2020-10-27 RX ORDER — IPRATROPIUM BROMIDE AND ALBUTEROL SULFATE 2.5; .5 MG/3ML; MG/3ML
1 SOLUTION RESPIRATORY (INHALATION)
Status: DISCONTINUED | OUTPATIENT
Start: 2020-10-27 | End: 2020-01-01 | Stop reason: HOSPADM

## 2020-10-27 RX ORDER — CARVEDILOL 6.25 MG/1
12.5 TABLET ORAL 2 TIMES DAILY
Status: DISCONTINUED | OUTPATIENT
Start: 2020-10-27 | End: 2020-11-03

## 2020-10-27 RX ORDER — ONDANSETRON 4 MG/1
4 TABLET, ORALLY DISINTEGRATING ORAL EVERY 8 HOURS PRN
Status: DISCONTINUED | OUTPATIENT
Start: 2020-10-27 | End: 2020-11-04

## 2020-10-27 RX ORDER — ACETAMINOPHEN 650 MG/1
650 SUPPOSITORY RECTAL EVERY 6 HOURS PRN
Status: DISCONTINUED | OUTPATIENT
Start: 2020-10-27 | End: 2020-10-28 | Stop reason: SDUPTHER

## 2020-10-27 RX ORDER — FENTANYL CITRATE 50 UG/ML
25 INJECTION, SOLUTION INTRAMUSCULAR; INTRAVENOUS ONCE
Status: COMPLETED | OUTPATIENT
Start: 2020-10-27 | End: 2020-10-27

## 2020-10-27 RX ORDER — ATORVASTATIN CALCIUM 40 MG/1
40 TABLET, FILM COATED ORAL DAILY
Status: DISCONTINUED | OUTPATIENT
Start: 2020-10-27 | End: 2020-11-10

## 2020-10-27 RX ORDER — ASPIRIN 81 MG/1
81 TABLET, CHEWABLE ORAL DAILY
Status: DISCONTINUED | OUTPATIENT
Start: 2020-10-27 | End: 2020-01-01 | Stop reason: HOSPADM

## 2020-10-27 RX ADMIN — ONDANSETRON 4 MG: 4 TABLET, ORALLY DISINTEGRATING ORAL at 10:19

## 2020-10-27 RX ADMIN — LOSARTAN POTASSIUM 100 MG: 25 TABLET, FILM COATED ORAL at 10:18

## 2020-10-27 RX ADMIN — IPRATROPIUM BROMIDE AND ALBUTEROL SULFATE 1 AMPULE: 2.5; .5 SOLUTION RESPIRATORY (INHALATION) at 12:18

## 2020-10-27 RX ADMIN — FENTANYL CITRATE 25 MCG: 50 INJECTION INTRAMUSCULAR; INTRAVENOUS at 16:38

## 2020-10-27 RX ADMIN — AMLODIPINE BESYLATE 10 MG: 10 TABLET ORAL at 10:19

## 2020-10-27 RX ADMIN — SODIUM CHLORIDE, PRESERVATIVE FREE 10 ML: 5 INJECTION INTRAVENOUS at 10:20

## 2020-10-27 RX ADMIN — CARVEDILOL 12.5 MG: 6.25 TABLET, FILM COATED ORAL at 10:19

## 2020-10-27 RX ADMIN — ENOXAPARIN SODIUM 40 MG: 40 INJECTION SUBCUTANEOUS at 10:19

## 2020-10-27 RX ADMIN — HALOPERIDOL LACTATE 5 MG: 5 INJECTION INTRAMUSCULAR at 16:38

## 2020-10-27 RX ADMIN — SODIUM CHLORIDE, PRESERVATIVE FREE 10 ML: 5 INJECTION INTRAVENOUS at 20:04

## 2020-10-27 RX ADMIN — IPRATROPIUM BROMIDE AND ALBUTEROL SULFATE 1 AMPULE: 2.5; .5 SOLUTION RESPIRATORY (INHALATION) at 17:53

## 2020-10-27 RX ADMIN — ASPIRIN 81 MG CHEWABLE TABLET 81 MG: 81 TABLET CHEWABLE at 10:19

## 2020-10-27 RX ADMIN — METHYLPREDNISOLONE SODIUM SUCCINATE 60 MG: 125 INJECTION, POWDER, FOR SOLUTION INTRAMUSCULAR; INTRAVENOUS at 21:54

## 2020-10-27 RX ADMIN — FUROSEMIDE 20 MG: 20 INJECTION, SOLUTION INTRAMUSCULAR; INTRAVENOUS at 20:04

## 2020-10-27 RX ADMIN — IPRATROPIUM BROMIDE AND ALBUTEROL SULFATE 1 AMPULE: 2.5; .5 SOLUTION RESPIRATORY (INHALATION) at 21:59

## 2020-10-27 RX ADMIN — BUDESONIDE 500 MCG: 0.5 SUSPENSION RESPIRATORY (INHALATION) at 21:58

## 2020-10-27 RX ADMIN — PANTOPRAZOLE SODIUM 40 MG: 40 TABLET, DELAYED RELEASE ORAL at 10:19

## 2020-10-27 ASSESSMENT — PAIN SCALES - GENERAL
PAINLEVEL_OUTOF10: 0

## 2020-10-27 NOTE — ED NOTES
Ct called to inform patient has been medicated and agrees to re-try test. They say they do not need a new order just re-vlad ready in rad tracking      Hal Urrutia RN  10/26/20 2526

## 2020-10-27 NOTE — ED NOTES
CT called and said patient argued with them about getting the test and that he would not fit in the machine. CT tech said she was able to get him fit on the table with supplemental straps but as she about to advance him in, he forcefully removed the straps and said he would not get in the machine.  She explained it was the only way to determine if there was a PE but he said his back hurt to much and he refused to compete the test. Physician notified and will follow up with patient    Khadijah Rogel RN  10/26/20 2056       Khadijah Rogel RN  10/26/20 2103

## 2020-10-27 NOTE — H&P
PT SEEN AND EXAMINED. Chart reviewed. meds reviewed. D/w nursing + family as available. EXAM: IN GENERAL, NAD. AWAKE AND ALERT. ROS NEGx10 EXCEPT:   /60   Pulse 84   Temp 97.5 °F (36.4 °C) (Temporal)   Resp 16   Ht 5' 8\" (1.727 m)   Wt 295 lb (133.8 kg)   SpO2 92%   BMI 44.85 kg/m²   GEN: A+O NAD. HEENT: NCAT. EOMI. JOE  NECK: NO JVD. TRACH MIDLINE. NO BRUITS. NO THYROMEGALY. LUNGS: CTA BL NO RALES, RHONCHI OR WHEEZES. GOOD EXCURSION. CV: Regular rate and rhythm, NO Murmurs, Rubs, Or gallops  ABD: Soft. Nontender. Normal bowel sounds. No organomegaly  EXT:No clubbing cyanosis or edema  Neuro: Alert and oriented x 3. No focal motor deficits. No sensory deficits. Reflexes appear intact.   Labs/data reviewedLABS: CBC with Differential:    Lab Results   Component Value Date    WBC 7.2 10/27/2020    RBC 4.82 10/27/2020    HGB 12.8 10/27/2020    HCT 39.8 10/27/2020     10/27/2020    MCV 82.6 10/27/2020    MCH 26.6 10/27/2020    MCHC 32.2 10/27/2020    RDW 22.1 10/27/2020    NRBC 13.0 10/27/2020    SEGSPCT 73 01/26/2014    LYMPHOPCT 26.0 10/27/2020    MONOPCT 4.0 10/27/2020    BASOPCT 0.0 10/27/2020    MONOSABS 0.29 10/27/2020    LYMPHSABS 2.09 10/27/2020    EOSABS 0.00 10/27/2020    BASOSABS 0.00 10/27/2020     Platelets:    Lab Results   Component Value Date     10/27/2020     CMP:    Lab Results   Component Value Date     10/27/2020    K 4.0 10/27/2020    CL 98 10/27/2020    CO2 33 10/27/2020    BUN 12 10/27/2020    CREATININE 1.2 10/27/2020    GFRAA >60 10/27/2020    LABGLOM >60 10/27/2020    GLUCOSE 140 10/27/2020    PROT 8.1 10/26/2020    LABALBU 4.1 10/26/2020    CALCIUM 8.1 10/27/2020    BILITOT 1.0 10/26/2020    ALKPHOS 95 10/26/2020    AST 14 10/26/2020    ALT 10 10/26/2020     Magnesium:    Lab Results   Component Value Date    MG 2.5 08/19/2017     LDH:  No results found for: LDH  PT/INR:    Lab Results   Component Value Date    PROTIME 10.9 07/21/2018    INR 0.9 07/21/2018     Last 3 Troponin:    Lab Results   Component Value Date    TROPONINI <0.01 10/26/2020    TROPONINI <0.01 05/26/2019    TROPONINI <0.01 05/26/2019     ABG:    Lab Results   Component Value Date    PH 7.358 08/07/2017    PCO2 46.0 08/07/2017    PO2 82.2 08/07/2017    HCO3 25.3 08/07/2017    BE -0.4 08/07/2017    O2SAT 95.5 08/07/2017     IRON:  No results found for: IRON  IMAGING    Xr Chest Portable    Result Date: 10/26/2020  EXAMINATION: ONE XRAY VIEW OF THE CHEST 10/26/2020 4:11 pm COMPARISON: 05/26/2019 HISTORY: ORDERING SYSTEM PROVIDED HISTORY: SOB TECHNOLOGIST PROVIDED HISTORY: Reason for exam:->SOB What reading provider will be dictating this exam?->CRC FINDINGS: Cardiac size appears stable. Discoid airspace disease seen at the left lung base which may represent atelectasis or scarring. Right infrahilar and basilar infiltrate is identified. No pneumothorax is identified. No acute osseous abnormality is identified. Right infrahilar and basilar infiltrate concerning for pneumonia. Left basilar atelectasis or scarring.        Medications:    Scheduled Meds:   amLODIPine  10 mg Oral Daily    aspirin  81 mg Oral Daily    atorvastatin  40 mg Oral Daily    carvedilol  12.5 mg Oral BID    levothyroxine  50 mcg Oral Daily    losartan  100 mg Oral Daily    pantoprazole  40 mg Oral QAM AC    sodium chloride flush  10 mL Intravenous 2 times per day    enoxaparin  40 mg Subcutaneous Daily    insulin lispro  0-6 Units Subcutaneous TID WC    insulin lispro  0-3 Units Subcutaneous Nightly    budesonide  0.5 mg Nebulization BID    ipratropium-albuterol  1 ampule Inhalation Q4H WA       Continuous Infusions:   dextrose         PRN Meds:colchicine, ondansetron, sodium chloride flush, acetaminophen **OR** acetaminophen, polyethylene glycol, promethazine **OR** ondansetron, glucose, dextrose, glucagon (rDNA), dextrose, iopamidol    A/P:      Patient Active Problem List   Diagnosis    Hyperlipidemia    Type 2 diabetes mellitus without complication, without long-term current use of insulin (HCC)    Atypical chest pain    Essential hypertension    Chronic acquired lymphedema    Aortic valve disease    Morbid obesity due to excess calories (HCC)    Abdominal pain    Acute respiratory failure with hypoxia (HCC)    Acute pulmonary edema (HCC)    Congestive heart failure with LV diastolic dysfunction, NYHA class 3 (HCC)    Obstructive sleep apnea    Acquired hypothyroidism    Chronic diastolic heart failure (HCC)    Nonrheumatic aortic valve stenosis    ACE-inhibitor cough    Pneumonia    Acute gout of right knee        PLAN:[facility]  Medical History  (Updated 10/27/20 @ 17:14 by Jasmyne Pritchard MD)    GERD (gastroesophageal reflux disease) (Chronic)  Anxiety disorder, unspecified (Chronic)  ACE-inhibitor cough (Acute)  Abdominal pain (Acute)  Atypical chest pain (Acute)  Morbid obesity due to excess calories (Chronic)  Chronic acquired lymphedema (Chronic)  Hyperlipidemia (Chronic)  Acute gout of right knee (Acute)  Acquired hypothyroidism (Chronic)  Type 2 diabetes mellitus with ophthalmic complication, without long-term current use of insulin (Chronic)  Pneumonia (Acute)  HANS (obstructive sleep apnea) (Chronic)  Acute pulmonary edema (Acute)  Acute respiratory failure with hypoxia (Acute)  Nonrheumatic aortic (valve) stenosis (Acute)  Chronic diastolic heart failure (Chronic)  Congestive heart failure with LV diastolic dysfunction, NYHA class 3 (Acute)  Aortic valve disease (Chronic)  Essential (primary) hypertension (Chronic)      Social History  (Updated 10/26/20 @ 17:15 by Jasmyne Pritchard MD)  Have You Traveled Out of the [facility] in the Last Month:  No  Smoking Status:  Former Smoker  Smoking Start Date:  01/01/78  Smoking Cessation Date:  01/01/87          HPI  HPI 1  Visit Reasons: Ear Complaints    HPI Details: brought back immediately in my office 2nd to sob. pulse ox in the 76s. ongoing for 2 weeks. no cp. +chills. some swelling. no cough. pt immediately placed on 2l of O2, but pulse ox only came up to mid 80s. NAD. A+O. nof/s/c.911 activated. sent to ER.    Current Pain: No      ROS  Const'l  Constitutional: Reports As Per HPI, Denies Weight Gain, Denies Weight Loss  Eyes  Eyes: Denies Blurry Vision, Denies Pain  ENT  ENT: Denies Dizziness, Denies Hearing Loss, Denies Hoarseness, Denies Nosebleed, Denies Ringing in the Ears, Denies Sore Throat  Cardio  Cardiovascular: Denies Chest Pain, Denies Claudication, Denies Orthopnea, Denies Palpitations  Respiratory  Respiratory: Denies Cough, Denies Dyspnea, Denies Wheezing  GI  Gastrointestinal: Denies Abdominal Pain, Denies Bloating, Denies Constipation, Denies Diarrhea, Denies Hematochezia, Denies Melena, Denies Nausea, Denies Vomiting    Genitourinary Female: Denies Dysuria, Denies Frequency, Denies Hematuria, Denies Nocturia, Denies Pelvic Pain, Denies Urinary Hesitancy, Denies Urinary Incontinence, Denies Urgency, Denies Vaginal Discharge  MS  Musculoskeletal: Denies Arthralgias, Denies Myalgias  Skin  Skin: Denies Change in Pigmentation, Denies Puritis, Denies Rash  Breast  Breast: Denies Mass, Denies Nipple Discharge  Endocrine  Endocrine: Denies Excessive Sweating  Hematologic/Lymphatic  Hematologic/Lymphatic: Denies Easy Bleeding, Denies Easy Bruising        Exam  Const'l  General: Well Developed  Nutritional Appearance: Yes Well Nourished  Orientation: Oriented to Person, Oriented to Place, Oriented to Time  [facility]  Head: Yes Normocephalic, Yes Atraumatic  Ears: Yes External Ears Normal, Yes TM's Normal Bilaterally  Nose: Yes Nasal Mucous Membranes and Turbinates Normal and Septum Normal; no Nasal Polyps  Mouth: Yes Lips Normal  Teeth and Gingiva: Yes Dentition Normal, Yes Gingiva Normal  Eyes  Conjunctivae: Yes Conjuctivae Normal  Neck  Neck: Yes Full Range of Motion, Yes Supple, No Lymphadenopathy, No Tracheal Deviation, No JVD  Thyroid: No Mass  Carotids: Yes Normal Carotid Upstroke  Resp  Effort and Inspection: Yes Normal Respiratory Effort, No Audible Wheezes  Auscultation and Percussion: Yes Clear to Auscultation Bilaterally  Cardio  Heart Sounds: No Gallop  Cardiovascular exam IM: RRR  Skin  General: Yes Dry Skin  Lesion(s): No Lesion(s)  Psych  Mood: Euthymic  Affect: Normal          Assessment & Plan  1. Acute on chronic respiratory failure with hypoxia  J96.21  Plan  911 called. pt placed on O2. pt taken to ER. will admit. likely has pulm edema/ CHF. R/O COVID. 2. Aortic valve disease  I35.9  Plan  WILL ASK CARDIOL TO EVAL    3. Gastroesophageal reflux disease, esophagitis presence not specified  K21.9  Plan  CONT PPI    4. Morbid obesity due to excess calories  E66.01  Plan  PROB OHS WITH HIS HANS    5. Chronic acquired lymphedema  I89.0  Plan  WILL R/O DVT    6. Pure hypercholesterolemia  E78.00  Plan  CONT LIPITOR    7. Hypothyroidism (acquired)  E03.9  Plan  Will check TSH and adjust patient's replacement Rx accordingly    8. Type 2 diabetes mellitus with diabetic cataract, without long-term current use of insulin  E11.36  Plan  MONITOR BS.    9. HANS (obstructive sleep apnea)  G47.33  Plan  AS ABOVE- SCREEN FOR OHS    10. Acute pulmonary edema  J81.0  Plan  CHECK CXR. DIURESE AS NEEDED    11. Chronic diastolic heart failure  I85.81  Plan  HAS BEEN COMPENSATED. WILL NEED ECHO.     12. Essential hypertension  I10  Plan  WILL ADJUST BP MEDS IN HOSP

## 2020-10-27 NOTE — CONSULTS
Pulmonary Consultation    Admit Date: 10/26/2020  Requesting Physician: Leonides Bonds MD    CC:  Hypoxemia  HPI:  Patient went to see his PCP yesterday as sick call, and found to be hypoxemic with oxygen saturation 70% while on room air patient was admitted advised to come to the emergency department to be seen. In ER patient was found to have an abnormal chest x-rays with questionable right lower lobe infiltrates patient was started mid antibiotics he feels now somewhat better at the time of my evaluation. The ER staff attempted to have a CT scan of the chest to rule out  pulmonary embolism but unfortunately was not done due to inability of the patient to lie down due to his complaint of back discomfort discomfort and anxiety. PMH:    Past Medical History:   Diagnosis Date    Acquired hypothyroidism 9/26/2017    Anxiety     Congestive heart failure with LV diastolic dysfunction, NYHA class 3 (HCC)     Depression     DM (diabetes mellitus) ([address]     Essential hypertension 6/1/2016    Gouty arthritis 2006    Hyperlipidemia     Hypertension     Lymphedema     Obesity     Obstructive sleep apnea 8/17/2009    Obstructive sleep apnea 9/26/2017    Osteoarthritis     Type 2 diabetes mellitus without complication, without long-term current use of insulin ([address] 1/15/2014     PSH:   Past Surgical History:   Procedure Laterality Date    BRONCHOSCOPY  08/10/2017    ECHO COMPL W DOP COLOR FLOW  6/21/2013         EXTERNAL EAR SURGERY  6/2/2009    keloid left ear    FOOT SURGERY  1990    Left foot    GASTROSTOMY TUBE PLACEMENT  08/10/2017    TRACHEOSTOMY TUBE PLACEMENT  08/10/2017          Respiratory ROS: c/o dyspnea, occasional cough. No CP, no palpitations. No hemoptysis no productive cough. Otherwise, a complete review of systems is undertaken and is negative.     Social History:  · Alcohol:   Social drinker History of Alcohol abuse  · Tobacco:  Former tobacco use  · Employment: no silica or asbestos exposure  Medications:     dextrose        amLODIPine  10 mg Oral Daily    aspirin  81 mg Oral Daily    atorvastatin  40 mg Oral Daily    carvedilol  12.5 mg Oral BID    levothyroxine  50 mcg Oral Daily    losartan  100 mg Oral Daily    pantoprazole  40 mg Oral QAM AC    sodium chloride flush  10 mL Intravenous 2 times per day    enoxaparin  40 mg Subcutaneous Daily    insulin lispro  0-6 Units Subcutaneous TID WC    insulin lispro  0-3 Units Subcutaneous Nightly    budesonide  0.5 mg Nebulization BID    ipratropium-albuterol  1 ampule Inhalation Q4H WA    fentanNYL  25 mcg Intravenous Once    haloperidol lactate  5 mg Intravenous Once         Vitals:  Tmax:  VITALS:  /60   Pulse 84   Temp 97.5 °F (36.4 °C) (Temporal)   Resp 16   Ht 5' 8\" (1.727 m)   Wt 295 lb (133.8 kg)   SpO2 92%   BMI 44.85 kg/m²   24HR INTAKE/OUTPUT:      Intake/Output Summary (Last 24 hours) at 10/27/2020 1628  Last data filed at 10/27/2020 1303  Gross per 24 hour   Intake 440 ml   Output 2 ml   Net 438 ml     CURRENT PULSE OXIMETRY:  SpO2: 92 %  24HR PULSE OXIMETRY RANGE:  SpO2  Av.8 %  Min: 92 %  Max: 94 % 2lt O2        EXAM:  General: No distress. Alert. Sedated. Obese  Eyes: PERRL. No sclera icterus. No conjunctival injection. ENT: No discharge. Pharynx clear. Oral cavity crowded. Neck: Trachea midline. Normal thyroid. Resp: No accessory muscle use. No crackles. No wheezing. No rhonchi. CV: Regular rate. Regular rhythm. No mumur or rub. ABD: Non-tender. Non-distended. No masses. No organmegaly. Normal bowel sounds. fatty  Skin: Warm and dry. No nodule on exposed extremities. No rash on exposed extremities. Lymph: No cervical LAD. No supraclavicular LAD. Ext: No joint deformity. No clubbing. No cyanosis. +++ edema L >> R  Neuro: Awake. Follows commands. Positive pupils/gag/corneals. Normal pain response.      Lab Results:  CBC:   Recent Labs     10/26/20  1614 10/27/20  0644   WBC 8.3 7.2 HGB 13.7 12.8   HCT 42.2 39.8   MCV 81.2 82.6    178     BMP:   Recent Labs     10/26/20  1614 10/27/20  0644    141   K 3.9 4.0   CL 99 98   CO2 31* 33*   BUN 11 12   CREATININE 1.1 1.2      ALB:3,BILIDIR:3,BILITOT:3,ALKPHOS:3)@  PT/INR: No results for input(s): PROTIME, INR in the last 72 hours. Cultures:  -    ABG: noted  Films:  CXR : new RLL infiltrates? ? Assessment:  · Hypoxemia. Most likely A/C Respiratory failure. HCO3 > 30. OHS?    · Not very sure about pneumonia. · Concern with VTE  · H/o Moderate HANS, off BIPAP (never started)      Plan:  · Wean fio2, to keep spo2 > 90%  · ABG  · D-dimer, doppler. PCT  · CTA chest .  The patient will premedicated with fentanyl and Haldol. · BIPAP 16/12, empirically. Thanks for letting us see this patient in consultation. Please contact us with any questions.     Lindsay Harp M.D., F.C.PERRI.                    pulp

## 2020-10-27 NOTE — ED NOTES
Pt refused CT again after agreeing that he would take it if given pain medication.  Physician notified, physician is to cancel order      Khadijah Rogel RN  10/26/20 6799

## 2020-10-28 ENCOUNTER — ANESTHESIA EVENT (OUTPATIENT)
Dept: ICU | Age: 60
DRG: 003 | End: 2020-10-28
Payer: MEDICARE

## 2020-10-28 ENCOUNTER — APPOINTMENT (OUTPATIENT)
Dept: CT IMAGING | Age: 60
DRG: 003 | End: 2020-10-28
Payer: MEDICARE

## 2020-10-28 ENCOUNTER — APPOINTMENT (OUTPATIENT)
Dept: GENERAL RADIOLOGY | Age: 60
DRG: 003 | End: 2020-10-28
Payer: MEDICARE

## 2020-10-28 ENCOUNTER — ANESTHESIA (OUTPATIENT)
Dept: ICU | Age: 60
DRG: 003 | End: 2020-10-28
Payer: MEDICARE

## 2020-10-28 LAB
AADO2: 183 MMHG
AADO2: 217.2 MMHG
AADO2: 493.5 MMHG
ACINETOBACTER BAUMANNII BY PCR: NOT DETECTED
ALBUMIN SERPL-MCNC: 3.7 G/DL (ref 3.5–5.2)
ALP BLD-CCNC: 77 U/L (ref 40–129)
ALT SERPL-CCNC: 10 U/L (ref 0–40)
ANION GAP SERPL CALCULATED.3IONS-SCNC: 12 MMOL/L (ref 7–16)
AST SERPL-CCNC: 14 U/L (ref 0–39)
B.E.: 0.8 MMOL/L (ref -3–3)
B.E.: 2.2 MMOL/L (ref -3–3)
B.E.: 2.2 MMOL/L (ref -3–3)
BILIRUB SERPL-MCNC: 0.7 MG/DL (ref 0–1.2)
BOTTLE TYPE: ABNORMAL
BUN BLDV-MCNC: 17 MG/DL (ref 8–23)
CALCIUM SERPL-MCNC: 8.4 MG/DL (ref 8.6–10.2)
CANDIDA ALBICANS BY PCR: NOT DETECTED
CANDIDA GLABRATA BY PCR: NOT DETECTED
CANDIDA KRUSEI BY PCR: NOT DETECTED
CANDIDA PARAPSILOSIS BY PCR: NOT DETECTED
CANDIDA TROPICALIS BY PCR: NOT DETECTED
CHLORIDE BLD-SCNC: 100 MMOL/L (ref 98–107)
CHLORIDE URINE RANDOM: 81 MMOL/L
CO2: 27 MMOL/L (ref 22–29)
COHB: 0.8 % (ref 0–1.5)
COHB: 1.2 % (ref 0–1.5)
COHB: 1.4 % (ref 0–1.5)
CREAT SERPL-MCNC: 2.6 MG/DL (ref 0.7–1.2)
CREATININE URINE: 98 MG/DL (ref 40–278)
CRITICAL: ABNORMAL
DATE ANALYZED: ABNORMAL
DATE OF COLLECTION: ABNORMAL
ENTEROBACTER CLOACAE COMPLEX BY PCR: NOT DETECTED
ENTEROBACTERALES BY PCR: NOT DETECTED
ENTEROCOCCUS BY PCR: NOT DETECTED
ESCHERICHIA COLI BY PCR: NOT DETECTED
FIO2: 100 %
FIO2: 50 %
FIO2: 60 %
GFR AFRICAN AMERICAN: 31
GFR NON-AFRICAN AMERICAN: 31 ML/MIN/1.73
GLUCOSE BLD-MCNC: 183 MG/DL (ref 74–99)
HAEMOPHILUS INFLUENZAE BY PCR: NOT DETECTED
HCO3: 25.4 MMOL/L (ref 22–26)
HCO3: 32.5 MMOL/L (ref 22–26)
HCO3: 32.8 MMOL/L (ref 22–26)
HHB: 1 % (ref 0–5)
HHB: 3.2 % (ref 0–5)
HHB: 8.5 % (ref 0–5)
KLEBSIELLA OXYTOCA BY PCR: NOT DETECTED
KLEBSIELLA PNEUMONIAE GROUP BY PCR: NOT DETECTED
LAB: ABNORMAL
LACTATE DEHYDROGENASE: 543 U/L (ref 135–225)
LISTERIA MONOCYTOGENES BY PCR: NOT DETECTED
LV EF: 65 %
LVEF MODALITY: NORMAL
Lab: ABNORMAL
MAGNESIUM: 1.6 MG/DL (ref 1.6–2.6)
METER GLUCOSE: 138 MG/DL (ref 74–99)
METER GLUCOSE: 152 MG/DL (ref 74–99)
METER GLUCOSE: 176 MG/DL (ref 74–99)
METER GLUCOSE: 180 MG/DL (ref 74–99)
METHB: 0.6 % (ref 0–1.5)
METHB: 0.6 % (ref 0–1.5)
METHB: 0.7 % (ref 0–1.5)
METHICILLIN RESISTANCE MECA/C  BY PCR: NOT DETECTED
MODE: ABNORMAL
MODE: ABNORMAL
MODE: AC
NEISSERIA MENINGITIDIS BY PCR: NOT DETECTED
O2 CONTENT: 17.3 ML/DL
O2 CONTENT: 17.9 ML/DL
O2 CONTENT: 18.5 ML/DL
O2 SATURATION: 91.3 % (ref 92–98.5)
O2 SATURATION: 96.7 % (ref 92–98.5)
O2 SATURATION: 99 % (ref 92–98.5)
O2HB: 89.5 % (ref 94–97)
O2HB: 94.9 % (ref 94–97)
O2HB: 97.6 % (ref 94–97)
OPERATOR ID: ABNORMAL
ORDER NUMBER: ABNORMAL
PATIENT TEMP: 37 C
PCO2: 40.6 MMHG (ref 35–45)
PCO2: 80.9 MMHG (ref 35–45)
PCO2: 84.8 MMHG (ref 35–45)
PEEP/CPAP: 10 CMH2O
PEEP/CPAP: 10 CMH2O
PEEP/CPAP: 8 CMH2O
PFO2: 1.4 MMHG/%
PFO2: 1.54 MMHG/%
PFO2: 1.71 MMHG/%
PH BLOOD GAS: 7.21 (ref 7.35–7.45)
PH BLOOD GAS: 7.22 (ref 7.35–7.45)
PH BLOOD GAS: 7.41 (ref 7.35–7.45)
PHOSPHORUS: 4.4 MG/DL (ref 2.5–4.5)
PO2: 102.3 MMHG (ref 75–100)
PO2: 153.9 MMHG (ref 75–100)
PO2: 70 MMHG (ref 75–100)
POTASSIUM SERPL-SCNC: 5 MMOL/L (ref 3.5–5)
POTASSIUM, UR: 12 MMOL/L
PROTEUS BY PCR: NOT DETECTED
PSEUDOMONAS AERUGINOSA BY PCR: NOT DETECTED
REASON FOR REJECTION: NORMAL
REJECTED TEST: NORMAL
RI(T): 2.12
RI(T): 2.61
RI(T): 3.21
RR MECHANICAL: 16 B/MIN
SERRATIA MARCESCENS BY PCR: NOT DETECTED
SODIUM BLD-SCNC: 139 MMOL/L (ref 132–146)
SODIUM URINE: 84 MMOL/L
SOURCE OF BLOOD CULTURE: ABNORMAL
SOURCE, BLOOD GAS: ABNORMAL
STAPHYLOCOCCUS AUREUS BY PCR: NOT DETECTED
STAPHYLOCOCCUS SPECIES BY PCR: DETECTED
STREPTOCOCCUS AGALACTIAE BY PCR: NOT DETECTED
STREPTOCOCCUS PNEUMONIAE BY PCR: NOT DETECTED
STREPTOCOCCUS PYOGENES  BY PCR: NOT DETECTED
STREPTOCOCCUS SPECIES BY PCR: NOT DETECTED
THB: 12.4 G/DL (ref 11.5–16.5)
THB: 13.8 G/DL (ref 11.5–16.5)
THB: 14.2 G/DL (ref 11.5–16.5)
TIME ANALYZED: 116
TIME ANALYZED: 302
TIME ANALYZED: 623
TOTAL PROTEIN: 6.8 G/DL (ref 6.4–8.3)
TRIGL SERPL-MCNC: 107 MG/DL (ref 0–149)
UREA NITROGEN, UR: 94 MG/DL (ref 800–1666)
VT MECHANICAL: 500 ML
VT MECHANICAL: 600 ML
VT MECHANICAL: 600 ML

## 2020-10-28 PROCEDURE — 2000000000 HC ICU R&B

## 2020-10-28 PROCEDURE — 94640 AIRWAY INHALATION TREATMENT: CPT

## 2020-10-28 PROCEDURE — 84100 ASSAY OF PHOSPHORUS: CPT

## 2020-10-28 PROCEDURE — 82805 BLOOD GASES W/O2 SATURATION: CPT

## 2020-10-28 PROCEDURE — 2580000003 HC RX 258: Performed by: INTERNAL MEDICINE

## 2020-10-28 PROCEDURE — 6360000004 HC RX CONTRAST MEDICATION: Performed by: RADIOLOGY

## 2020-10-28 PROCEDURE — 71275 CT ANGIOGRAPHY CHEST: CPT

## 2020-10-28 PROCEDURE — 2500000003 HC RX 250 WO HCPCS: Performed by: INTERNAL MEDICINE

## 2020-10-28 PROCEDURE — 5A1955Z RESPIRATORY VENTILATION, GREATER THAN 96 CONSECUTIVE HOURS: ICD-10-PCS | Performed by: INTERNAL MEDICINE

## 2020-10-28 PROCEDURE — 36415 COLL VENOUS BLD VENIPUNCTURE: CPT

## 2020-10-28 PROCEDURE — 84478 ASSAY OF TRIGLYCERIDES: CPT

## 2020-10-28 PROCEDURE — 6360000002 HC RX W HCPCS: Performed by: INTERNAL MEDICINE

## 2020-10-28 PROCEDURE — 71045 X-RAY EXAM CHEST 1 VIEW: CPT

## 2020-10-28 PROCEDURE — 6370000000 HC RX 637 (ALT 250 FOR IP): Performed by: INTERNAL MEDICINE

## 2020-10-28 PROCEDURE — 6360000002 HC RX W HCPCS

## 2020-10-28 PROCEDURE — 0202U NFCT DS 22 TRGT SARS-COV-2: CPT

## 2020-10-28 PROCEDURE — 82962 GLUCOSE BLOOD TEST: CPT

## 2020-10-28 PROCEDURE — 80053 COMPREHEN METABOLIC PANEL: CPT

## 2020-10-28 PROCEDURE — 84540 ASSAY OF URINE/UREA-N: CPT

## 2020-10-28 PROCEDURE — 83735 ASSAY OF MAGNESIUM: CPT

## 2020-10-28 PROCEDURE — 0BH17EZ INSERTION OF ENDOTRACHEAL AIRWAY INTO TRACHEA, VIA NATURAL OR ARTIFICIAL OPENING: ICD-10-PCS | Performed by: INTERNAL MEDICINE

## 2020-10-28 PROCEDURE — 83615 LACTATE (LD) (LDH) ENZYME: CPT

## 2020-10-28 PROCEDURE — 94002 VENT MGMT INPAT INIT DAY: CPT

## 2020-10-28 PROCEDURE — 82436 ASSAY OF URINE CHLORIDE: CPT

## 2020-10-28 PROCEDURE — 87088 URINE BACTERIA CULTURE: CPT

## 2020-10-28 PROCEDURE — 31500 INSERT EMERGENCY AIRWAY: CPT

## 2020-10-28 PROCEDURE — 84300 ASSAY OF URINE SODIUM: CPT

## 2020-10-28 PROCEDURE — 31500 INSERT EMERGENCY AIRWAY: CPT | Performed by: ANESTHESIOLOGY

## 2020-10-28 PROCEDURE — 84133 ASSAY OF URINE POTASSIUM: CPT

## 2020-10-28 PROCEDURE — 82570 ASSAY OF URINE CREATININE: CPT

## 2020-10-28 PROCEDURE — 36600 WITHDRAWAL OF ARTERIAL BLOOD: CPT

## 2020-10-28 RX ORDER — PROPOFOL 10 MG/ML
10 INJECTION, EMULSION INTRAVENOUS
Status: DISCONTINUED | OUTPATIENT
Start: 2020-10-28 | End: 2020-11-02

## 2020-10-28 RX ORDER — ACETAMINOPHEN 325 MG/1
650 TABLET ORAL EVERY 6 HOURS PRN
Status: DISCONTINUED | OUTPATIENT
Start: 2020-10-28 | End: 2020-11-09 | Stop reason: ALTCHOICE

## 2020-10-28 RX ORDER — SODIUM CHLORIDE, SODIUM LACTATE, POTASSIUM CHLORIDE, CALCIUM CHLORIDE 600; 310; 30; 20 MG/100ML; MG/100ML; MG/100ML; MG/100ML
INJECTION, SOLUTION INTRAVENOUS CONTINUOUS
Status: DISCONTINUED | OUTPATIENT
Start: 2020-10-28 | End: 2020-11-01

## 2020-10-28 RX ORDER — ARFORMOTEROL TARTRATE 15 UG/2ML
15 SOLUTION RESPIRATORY (INHALATION) 2 TIMES DAILY
Status: DISCONTINUED | OUTPATIENT
Start: 2020-10-28 | End: 2020-01-01 | Stop reason: HOSPADM

## 2020-10-28 RX ORDER — POLYETHYLENE GLYCOL 3350 17 G/17G
17 POWDER, FOR SOLUTION ORAL DAILY PRN
Status: DISCONTINUED | OUTPATIENT
Start: 2020-10-28 | End: 2020-11-10

## 2020-10-28 RX ORDER — METHYLPREDNISOLONE SODIUM SUCCINATE 40 MG/ML
40 INJECTION, POWDER, LYOPHILIZED, FOR SOLUTION INTRAMUSCULAR; INTRAVENOUS EVERY 8 HOURS
Status: DISCONTINUED | OUTPATIENT
Start: 2020-10-28 | End: 2020-10-29

## 2020-10-28 RX ORDER — PROPOFOL 10 MG/ML
INJECTION, EMULSION INTRAVENOUS
Status: COMPLETED
Start: 2020-10-28 | End: 2020-10-28

## 2020-10-28 RX ORDER — SODIUM CHLORIDE 0.9 % (FLUSH) 0.9 %
10 SYRINGE (ML) INJECTION PRN
Status: DISCONTINUED | OUTPATIENT
Start: 2020-10-28 | End: 2020-01-01 | Stop reason: HOSPADM

## 2020-10-28 RX ORDER — HEPARIN SODIUM 10000 [USP'U]/ML
5000 INJECTION, SOLUTION INTRAVENOUS; SUBCUTANEOUS EVERY 8 HOURS
Status: DISCONTINUED | OUTPATIENT
Start: 2020-10-28 | End: 2020-10-31

## 2020-10-28 RX ORDER — SODIUM CHLORIDE 0.9 % (FLUSH) 0.9 %
10 SYRINGE (ML) INJECTION EVERY 12 HOURS SCHEDULED
Status: DISCONTINUED | OUTPATIENT
Start: 2020-10-28 | End: 2020-01-01 | Stop reason: HOSPADM

## 2020-10-28 RX ORDER — CHLORHEXIDINE GLUCONATE 0.12 MG/ML
15 RINSE ORAL 2 TIMES DAILY
Status: DISCONTINUED | OUTPATIENT
Start: 2020-10-28 | End: 2020-01-01 | Stop reason: HOSPADM

## 2020-10-28 RX ORDER — BUMETANIDE 0.25 MG/ML
2 INJECTION, SOLUTION INTRAMUSCULAR; INTRAVENOUS ONCE
Status: COMPLETED | OUTPATIENT
Start: 2020-10-28 | End: 2020-10-28

## 2020-10-28 RX ORDER — ACETAMINOPHEN 650 MG/1
650 SUPPOSITORY RECTAL EVERY 6 HOURS PRN
Status: DISCONTINUED | OUTPATIENT
Start: 2020-10-28 | End: 2020-01-01 | Stop reason: HOSPADM

## 2020-10-28 RX ADMIN — Medication 10 ML: at 06:25

## 2020-10-28 RX ADMIN — ATORVASTATIN CALCIUM 40 MG: 40 TABLET, FILM COATED ORAL at 08:49

## 2020-10-28 RX ADMIN — PROPOFOL 1000 MG: 10 INJECTION, EMULSION INTRAVENOUS at 03:43

## 2020-10-28 RX ADMIN — SODIUM CHLORIDE, POTASSIUM CHLORIDE, SODIUM LACTATE AND CALCIUM CHLORIDE: 600; 310; 30; 20 INJECTION, SOLUTION INTRAVENOUS at 12:16

## 2020-10-28 RX ADMIN — CARVEDILOL 12.5 MG: 6.25 TABLET, FILM COATED ORAL at 08:48

## 2020-10-28 RX ADMIN — CHLORHEXIDINE GLUCONATE 0.12% ORAL RINSE 15 ML: 1.2 LIQUID ORAL at 09:00

## 2020-10-28 RX ADMIN — IPRATROPIUM BROMIDE AND ALBUTEROL SULFATE 1 AMPULE: 2.5; .5 SOLUTION RESPIRATORY (INHALATION) at 12:47

## 2020-10-28 RX ADMIN — PROPOFOL 25 MCG/KG/MIN: 10 INJECTION, EMULSION INTRAVENOUS at 23:49

## 2020-10-28 RX ADMIN — PROPOFOL 25 MCG/KG/MIN: 10 INJECTION, EMULSION INTRAVENOUS at 16:39

## 2020-10-28 RX ADMIN — AMLODIPINE BESYLATE 10 MG: 10 TABLET ORAL at 08:50

## 2020-10-28 RX ADMIN — PROPOFOL INJECTABLE EMULSION 1000 MG: 10 INJECTION, EMULSION INTRAVENOUS at 03:43

## 2020-10-28 RX ADMIN — BUDESONIDE 500 MCG: 0.5 SUSPENSION RESPIRATORY (INHALATION) at 10:02

## 2020-10-28 RX ADMIN — CEFTRIAXONE SODIUM 1 G: 1 INJECTION, POWDER, FOR SOLUTION INTRAMUSCULAR; INTRAVENOUS at 06:52

## 2020-10-28 RX ADMIN — ARFORMOTEROL TARTRATE 15 MCG: 15 SOLUTION RESPIRATORY (INHALATION) at 20:40

## 2020-10-28 RX ADMIN — IPRATROPIUM BROMIDE AND ALBUTEROL SULFATE 1 AMPULE: 2.5; .5 SOLUTION RESPIRATORY (INHALATION) at 16:39

## 2020-10-28 RX ADMIN — Medication 10 ML: at 21:00

## 2020-10-28 RX ADMIN — IPRATROPIUM BROMIDE AND ALBUTEROL SULFATE 1 AMPULE: 2.5; .5 SOLUTION RESPIRATORY (INHALATION) at 20:40

## 2020-10-28 RX ADMIN — SODIUM CHLORIDE, POTASSIUM CHLORIDE, SODIUM LACTATE AND CALCIUM CHLORIDE: 600; 310; 30; 20 INJECTION, SOLUTION INTRAVENOUS at 22:29

## 2020-10-28 RX ADMIN — BUMETANIDE 2 MG: 0.25 INJECTION INTRAMUSCULAR; INTRAVENOUS at 08:50

## 2020-10-28 RX ADMIN — PROPOFOL 25 MCG/KG/MIN: 10 INJECTION, EMULSION INTRAVENOUS at 11:39

## 2020-10-28 RX ADMIN — Medication 10 ML: at 08:51

## 2020-10-28 RX ADMIN — INSULIN LISPRO 1 UNITS: 100 INJECTION, SOLUTION INTRAVENOUS; SUBCUTANEOUS at 14:51

## 2020-10-28 RX ADMIN — PROPOFOL 25 MCG/KG/MIN: 10 INJECTION, EMULSION INTRAVENOUS at 19:12

## 2020-10-28 RX ADMIN — METHYLPREDNISOLONE SODIUM SUCCINATE 40 MG: 40 INJECTION, POWDER, FOR SOLUTION INTRAMUSCULAR; INTRAVENOUS at 18:19

## 2020-10-28 RX ADMIN — HEPARIN SODIUM 5000 UNITS: 10000 INJECTION INTRAVENOUS; SUBCUTANEOUS at 16:29

## 2020-10-28 RX ADMIN — PROPOFOL 30 MCG/KG/MIN: 10 INJECTION, EMULSION INTRAVENOUS at 13:22

## 2020-10-28 RX ADMIN — LOSARTAN POTASSIUM 100 MG: 25 TABLET, FILM COATED ORAL at 08:49

## 2020-10-28 RX ADMIN — ASPIRIN 81 MG CHEWABLE TABLET 81 MG: 81 TABLET CHEWABLE at 08:50

## 2020-10-28 RX ADMIN — HEPARIN SODIUM 5000 UNITS: 10000 INJECTION INTRAVENOUS; SUBCUTANEOUS at 09:20

## 2020-10-28 RX ADMIN — ARFORMOTEROL TARTRATE 15 MCG: 15 SOLUTION RESPIRATORY (INHALATION) at 10:01

## 2020-10-28 RX ADMIN — Medication 25 MCG/HR: at 04:00

## 2020-10-28 RX ADMIN — PROPOFOL 30 MCG/KG/MIN: 10 INJECTION, EMULSION INTRAVENOUS at 14:54

## 2020-10-28 RX ADMIN — INSULIN LISPRO 1 UNITS: 100 INJECTION, SOLUTION INTRAVENOUS; SUBCUTANEOUS at 20:56

## 2020-10-28 RX ADMIN — INSULIN LISPRO 1 UNITS: 100 INJECTION, SOLUTION INTRAVENOUS; SUBCUTANEOUS at 09:20

## 2020-10-28 RX ADMIN — IPRATROPIUM BROMIDE AND ALBUTEROL SULFATE 1 AMPULE: 2.5; .5 SOLUTION RESPIRATORY (INHALATION) at 10:02

## 2020-10-28 RX ADMIN — BUDESONIDE 500 MCG: 0.5 SUSPENSION RESPIRATORY (INHALATION) at 20:40

## 2020-10-28 RX ADMIN — CHLORHEXIDINE GLUCONATE 0.12% ORAL RINSE 15 ML: 1.2 LIQUID ORAL at 20:55

## 2020-10-28 RX ADMIN — LEVOTHYROXINE SODIUM 50 MCG: 0.05 TABLET ORAL at 08:49

## 2020-10-28 RX ADMIN — IOPAMIDOL 75 ML: 755 INJECTION, SOLUTION INTRAVENOUS at 06:25

## 2020-10-28 RX ADMIN — AZITHROMYCIN MONOHYDRATE 500 MG: 500 INJECTION, POWDER, LYOPHILIZED, FOR SOLUTION INTRAVENOUS at 06:44

## 2020-10-28 RX ADMIN — PROPOFOL 15 MCG/KG/MIN: 10 INJECTION, EMULSION INTRAVENOUS at 04:54

## 2020-10-28 ASSESSMENT — PULMONARY FUNCTION TESTS
PIF_VALUE: 31
PIF_VALUE: 33
PIF_VALUE: 33
PIF_VALUE: 31
PIF_VALUE: 32
PIF_VALUE: 34
PIF_VALUE: 33
PIF_VALUE: 31
PIF_VALUE: 28
PIF_VALUE: 32
PIF_VALUE: 29
PIF_VALUE: 34
PIF_VALUE: 32
PIF_VALUE: 33
PIF_VALUE: 34
PIF_VALUE: 33
PIF_VALUE: 35
PIF_VALUE: 33
PIF_VALUE: 31
PIF_VALUE: 35
PIF_VALUE: 35
PIF_VALUE: 32

## 2020-10-28 ASSESSMENT — PAIN SCALES - GENERAL
PAINLEVEL_OUTOF10: 0
PAINLEVEL_OUTOF10: 7

## 2020-10-28 NOTE — FLOWSHEET NOTE
Patient continues to pull at ETT and lines when restraints are removed for assessment and ROM. RN is unable to redirect patient to cease such behavior.  Restraints are continued for patient's safety

## 2020-10-28 NOTE — PLAN OF CARE
Problem: Restraint Use - Nonviolent/Non-Self-Destructive Behavior:  Goal: Absence of restraint-related injury  Description: Absence of restraint-related injury  10/28/2020 0759 by Easton Diaz  Outcome: Met This Shift     Problem: Restraint Use - Nonviolent/Non-Self-Destructive Behavior:  Goal: Absence of restraint indications  Description: Absence of restraint indications  10/28/2020 0759 by Easton Diza  Outcome: Not Met This Shift

## 2020-10-28 NOTE — FLOWSHEET NOTE
Pt reaches for lines and tubes despite attempts to deter. Pt is confused, unable to follow commands, and is kicking. Restraints started for pt safety.  Joshua Fajardo

## 2020-10-28 NOTE — CARE COORDINATION
10/28 Care Coordination: Larisa Sun in ED 10/26 with a chief complaint of shortness of breath. Sat 70's R/O PE Admit to CSU. On evening on 10/27/2020 an RRT was called because patient became delirious after receiving fentanyl and haldol prior to going to CTA (r/o PE). Thus intubated and tx ICU. On sedation. CM/SW will continue to follow for discharge planning.    Ciro PALACIOSN,RN--[phone]

## 2020-10-28 NOTE — PROGRESS NOTES
PT SEEN AND EXAMINED. intubated, in icu. Sedated. Chart reviewed. meds reviewed. D/w nursing + family as available. EXAM: IN GENERAL, NAD. AWAKE AND ALERT. ROS NEGx10 EXCEPT:   /61   Pulse 61   Temp 98.3 °F (36.8 °C) (Temporal)   Resp 18   Ht 5' 8\" (1.727 m)   Wt 295 lb (133.8 kg)   SpO2 100%   BMI 44.85 kg/m²   GEN: A+O NAD. HEENT: NCAT. EOMI. JOE  NECK: NO JVD. TRACH MIDLINE. NO BRUITS. NO THYROMEGALY. LUNGS: CTA BL NO RALES, RHONCHI OR WHEEZES. GOOD EXCURSION. CV: Regular rate and rhythm, NO Murmurs, Rubs, Or gallops  ABD: Soft. Nontender. Normal bowel sounds. No organomegaly  EXT:No clubbing cyanosis or edema  Neuro: Alert and oriented x 3. No focal motor deficits. No sensory deficits. Reflexes appear intact.   Labs/data reviewedLABS: CBC with Differential:    Lab Results   Component Value Date    WBC 7.2 10/27/2020    RBC 4.82 10/27/2020    HGB 12.8 10/27/2020    HCT 39.8 10/27/2020     10/27/2020    MCV 82.6 10/27/2020    MCH 26.6 10/27/2020    MCHC 32.2 10/27/2020    RDW 22.1 10/27/2020    NRBC 13.0 10/27/2020    SEGSPCT 73 01/26/2014    LYMPHOPCT 26.0 10/27/2020    MONOPCT 4.0 10/27/2020    BASOPCT 0.0 10/27/2020    MONOSABS 0.29 10/27/2020    LYMPHSABS 2.09 10/27/2020    EOSABS 0.00 10/27/2020    BASOSABS 0.00 10/27/2020     Platelets:    Lab Results   Component Value Date     10/27/2020     CMP:    Lab Results   Component Value Date     10/27/2020    K 4.0 10/27/2020    CL 98 10/27/2020    CO2 33 10/27/2020    BUN 12 10/27/2020    CREATININE 1.2 10/27/2020    GFRAA >60 10/27/2020    LABGLOM >60 10/27/2020    GLUCOSE 140 10/27/2020    PROT 8.1 10/26/2020    LABALBU 4.1 10/26/2020    CALCIUM 8.1 10/27/2020    BILITOT 1.0 10/26/2020    ALKPHOS 95 10/26/2020    AST 14 10/26/2020    ALT 10 10/26/2020     Magnesium:    Lab Results   Component Value Date    MG 2.5 08/19/2017     LDH:  No results found for: LDH  PT/INR:    Lab Results   Component Value Date    PROTIME 10.9 07/21/2018    INR 0.9 07/21/2018     Last 3 Troponin:    Lab Results   Component Value Date    TROPONINI <0.01 10/26/2020    TROPONINI <0.01 05/26/2019    TROPONINI <0.01 05/26/2019     ABG:    Lab Results   Component Value Date    PH 7.414 10/28/2020    PCO2 40.6 10/28/2020    PO2 153.9 10/28/2020    HCO3 25.4 10/28/2020    BE 0.8 10/28/2020    O2SAT 99.0 10/28/2020     IRON:  No results found for: IRON  IMAGING    Xr Chest Portable    Result Date: 10/27/2020  EXAMINATION: ONE XRAY VIEW OF THE CHEST 10/27/2020 7:58 pm COMPARISON: October 26, 2020 HISTORY: ORDERING SYSTEM PROVIDED HISTORY: SOB TECHNOLOGIST PROVIDED HISTORY: Reason for exam:->SOB What reading provider will be dictating this exam?->CRC FINDINGS: There is mild cardiomegaly. There is patchy perihilar and bibasilar atelectasis/infiltrates. Tiny pleural effusions are suspected. Progressive bibasilar atelectasis/infiltrates concerning for pneumonia. Underlying CHF is considered. Xr Chest Portable    Result Date: 10/26/2020  EXAMINATION: ONE XRAY VIEW OF THE CHEST 10/26/2020 4:11 pm COMPARISON: 05/26/2019 HISTORY: ORDERING SYSTEM PROVIDED HISTORY: SOB TECHNOLOGIST PROVIDED HISTORY: Reason for exam:->SOB What reading provider will be dictating this exam?->CRC FINDINGS: Cardiac size appears stable. Discoid airspace disease seen at the left lung base which may represent atelectasis or scarring. Right infrahilar and basilar infiltrate is identified. No pneumothorax is identified. No acute osseous abnormality is identified. Right infrahilar and basilar infiltrate concerning for pneumonia. Left basilar atelectasis or scarring.        Medications:    Scheduled Meds:   sodium chloride flush  10 mL Intravenous 2 times per day    Arformoterol Tartrate  15 mcg Nebulization BID    enoxaparin  40 mg Subcutaneous Daily    cefTRIAXone (ROCEPHIN) IV  1 g Intravenous Q24H    azithromycin  500 mg Intravenous Q24H    amLODIPine  10 mg Oral Daily    aspirin  81 mg Oral Daily    atorvastatin  40 mg Oral Daily    carvedilol  12.5 mg Oral BID    levothyroxine  50 mcg Oral Daily    losartan  100 mg Oral Daily    pantoprazole  40 mg Oral QAM AC    insulin lispro  0-6 Units Subcutaneous TID WC    insulin lispro  0-3 Units Subcutaneous Nightly    budesonide  0.5 mg Nebulization BID    ipratropium-albuterol  1 ampule Inhalation Q4H WA       Continuous Infusions:   fentaNYL 5 mcg/ml in 0.9%  ml infusion 25 mcg/hr (10/28/20 0400)    propofol 20 mcg/kg/min (10/28/20 3079)    dextrose         PRN Meds:sodium chloride flush, acetaminophen **OR** acetaminophen, polyethylene glycol, colchicine, ondansetron, promethazine **OR** ondansetron, glucose, dextrose, glucagon (rDNA), dextrose    A/P:      Patient Active Problem List   Diagnosis    Hyperlipidemia    Type 2 diabetes mellitus without complication, without long-term current use of insulin (HCC)    Atypical chest pain    Essential hypertension    Chronic acquired lymphedema    Aortic valve disease    Morbid obesity due to excess calories (HCC)    Abdominal pain    Acute respiratory failure with hypoxia (HCC)    Acute pulmonary edema (HCC)    Congestive heart failure with LV diastolic dysfunction, NYHA class 3 (HCC)    Obstructive sleep apnea    Acquired hypothyroidism    Chronic diastolic heart failure (HCC)    Nonrheumatic aortic valve stenosis    ACE-inhibitor cough    Pneumonia    Acute gout of right knee    RESP FAILURE- PROB RELATED TO OHS  DOUBT PNA  IF WE CANT GET CTA, GET US LEGS  MAY ASK CARDIOL FOR EVAL OF AS- IF HE DOESN'T RESPOND  WILL NEED NIV FOR HOME- PROB BIPAP WITH BACKUP RATE

## 2020-10-28 NOTE — CONSULTS
[facility]  Internal Medicine Residency Program  History and Physical  MICU    Patient:  Lazara Zaragoza 61 y.o. male MRN: [id number]     Date of Service: 10/28/2020    Hospital Day: 3      Chief complaint:   Chief Complaint   Patient presents with    Shortness of Breath     for a few weeks. O2 sat in 70s at [facility], placed on 4L and now 95%. History of Present Illness   Lazara Zaragoza is a 61 y.o. male initially presented to the [facility] ED on 10/27/2020 from PCP office where he was found to be saturating at 70%. He was placed on 2 L nasal cannula at his PCP office (saturating in the low 80s) and brought in to the emergency room. In the ED patient was placed on 4 L nasal cannula and saturating in the upper 90s. At that time CT chest was unable to be done because patient became very agitated in CT scanner/ refusing to lie flat. He was admitted to the General Floor with concern for new infiltrates on CXR and hypoxia.      On evening on 10/27/2020 an RRT was called because patient became delirious after receiving fentanyl and haldol prior to going to CTA (r/o PE). Satting well on NC at that time. Was not able to get CTA done.      Evening ABG concerning for worsening respiratory acidosis with hypercapnia and patient transferred to MICU per Pulmonary recommendations. Pt initially placed on BiPAP for 2 hours; initial ABG showed improvement in acidosis; however at 3 AM repeat ABG respiratory acidosis AND mental status changes. Pt no longer responding/ lethargic. Pt intubated.      Unable to get ROS from patient. Unable to get hx from patient.  [de-identified] of hx was taken from EMR.      Of note, patient has a hx of prior tracheostomy (8/10/2017) 2/2 acute on chronic respiratory failure 2/2 ADHF exacerbation requiring mechanical ventilation, thus patient's wife wanted to hold off with intubation at first. Pt's wife agreeable once patient not improving on BiPAP.      Pt PMHx is significant for HFpEF (Ef 65% in 2017), hx of tracheostomy and reversal (as mentioned above), moderate HANS not on CPAP?, DMT2, HTN, HLD, Hypothyroidism. Past Medical History:      Diagnosis Date    Acquired hypothyroidism 9/26/2017    Anxiety     Congestive heart failure with LV diastolic dysfunction, NYHA class 3 (HCC)     Depression     DM [facility]diabetes mellitus) ([facility]     Essential hypertension 6/1/2016    Gouty arthritis 2006    Hyperlipidemia     Hypertension     Lymphedema     Obesity     Obstructive sleep apnea 8/17/2009    Obstructive sleep apnea 9/26/2017    Osteoarthritis     Type 2 diabetes mellitus without complication, without long-term current use of insulin ([facility] 1/15/2014       Past Surgical History:        Procedure Laterality Date    BRONCHOSCOPY  08/10/2017    ECHO COMPL W DOP COLOR FLOW  6/21/2013         EXTERNAL EAR SURGERY  6/2/2009    keloid left ear    FOOT SURGERY  1990    Left foot    GASTROSTOMY TUBE PLACEMENT  08/10/2017    TRACHEOSTOMY TUBE PLACEMENT  08/10/2017       Medications Prior to Admission:    Prior to Admission medications    Medication Sig Start Date End Date Taking?  Authorizing Provider   carvedilol (COREG) 25 MG tablet Take 0.5 tablets by mouth 2 times daily 7/7/20   Adalid Philip MD   olmesartan (BENICAR) 20 MG tablet Take 1 tablet by mouth daily 6/1/20   Adalid Philip MD   losartan (COZAAR) 100 MG tablet Take 1 tablet by mouth daily 5/29/20   Adalid Philip MD   amLODIPine (NORVASC) 10 MG tablet Take 1 tablet by mouth daily 5/19/20   Adalid Philip MD   metFORMIN (GLUCOPHAGE-XR) 500 MG extended release tablet TAKE 1 TABLET BY MOUTH EVERY DAY WITH BREAKFAST 5/12/20   Adalid Philip MD   levothyroxine (SYNTHROID) 50 MCG tablet TAKE 1 TABLET BY MOUTH EVERY DAY 5/12/20   Adalid Philip MD   atorvastatin (LIPITOR) 40 MG tablet Take 1 tablet by mouth daily 1/27/20   Adalid Philip MD   omeprazole (PRILOSEC) 40 MG delayed release capsule TAKE 1 CAPSULE BY MOUTH EVERY DAY polyps  § Neck: supple and non-tender without mass, no thyromegaly or thyroid nodules, no cervical lymphadenopathy  § Pulmonary/Chest: clear to auscultation bilaterally- no wheezes, rales or rhonchi, normal air movement, no respiratory distress  § Cardiovascular: normal rate, regular rhythm, normal S1 and S2, no murmurs, rubs, clicks, or gallops, distal pulses intact, no carotid bruits  § Abdomen: soft, non-tender, non-distended, normal bowel sounds, no masses or organomegaly  § Extremities: no cyanosis, clubbing. +1 edema BLE L> R  § Musculoskeletal: normal range of motion, no joint swelling, deformity or tenderness  § Neurologic: initially alert. Throughout course of night became altered and lethargic. Lines     site day    Art line   None     TLC None    PICC None    Hemoaccess None    Oxygen:     Mechanical ventilation   Mechanical Ventilation:   Mode: A/C   TV: 500 ml RR: 16  PEEP 8cmH2O FiO2 100%  ABG:     Lab Results   Component Value Date    PH 7.414 10/28/2020    PCO2 40.6 10/28/2020    PO2 153.9 10/28/2020    HCO3 25.4 10/28/2020    BE 0.8 10/28/2020    THB 12.4 10/28/2020    O2SAT 99.0 10/28/2020     Labs and Imaging Studies   Basic Labs  CBC:   Lab Results   Component Value Date    WBC 7.2 10/27/2020    RBC 4.82 10/27/2020    HGB 12.8 10/27/2020    HCT 39.8 10/27/2020    MCV 82.6 10/27/2020    RDW 22.1 10/27/2020     10/27/2020     CMP:  Lab Results   Component Value Date     10/28/2020    K 5.0 10/28/2020    K 4.0 10/27/2020     10/28/2020    CO2 27 10/28/2020    BUN 17 10/28/2020    PROT 6.8 10/28/2020       Imaging Studies:     Xr Chest Portable    Result Date: 10/27/2020  EXAMINATION: ONE XRAY VIEW OF THE CHEST 10/27/2020 7:58 pm COMPARISON: October 26, 2020 HISTORY: ORDERING SYSTEM PROVIDED HISTORY: SOB TECHNOLOGIST PROVIDED HISTORY: Reason for exam:->SOB What reading provider will be dictating this exam?->CRC FINDINGS: There is mild cardiomegaly.   There is patchy perihilar and bibasilar atelectasis/infiltrates. Tiny pleural effusions are suspected. Progressive bibasilar atelectasis/infiltrates concerning for pneumonia. Underlying CHF is considered. Xr Chest Portable    Result Date: 10/26/2020  EXAMINATION: ONE XRAY VIEW OF THE CHEST 10/26/2020 4:11 pm COMPARISON: 05/26/2019 HISTORY: ORDERING SYSTEM PROVIDED HISTORY: SOB TECHNOLOGIST PROVIDED HISTORY: Reason for exam:->SOB What reading provider will be dictating this exam?->CRC FINDINGS: Cardiac size appears stable. Discoid airspace disease seen at the left lung base which may represent atelectasis or scarring. Right infrahilar and basilar infiltrate is identified. No pneumothorax is identified. No acute osseous abnormality is identified. Right infrahilar and basilar infiltrate concerning for pneumonia. Left basilar atelectasis or scarring. Cta Chest W Contrast    Result Date: 10/28/2020  EXAMINATION: CTA OF THE CHEST 10/27/2020 5:34 pm TECHNIQUE: CTA of the chest was performed after the administration of intravenous contrast.  Multiplanar reformatted images are provided for review. MIP images are provided for review. Dose modulation, iterative reconstruction, and/or weight based adjustment of the mA/kV was utilized to reduce the radiation dose to as low as reasonably achievable. COMPARISON: 03/14/2018 CT HISTORY: ORDERING SYSTEM PROVIDED HISTORY: check pe TECHNOLOGIST PROVIDED HISTORY: Reason for exam:->check pe What reading provider will be dictating this exam?->CRC FINDINGS: Pulmonary Arteries: Fair technical quality. Body habitus and patient positioning contributes to significant artifact. Despite this, there are no filling defects to suggest pulmonary embolism. Main pulmonary artery is normal in caliber. No evidence of right ventricular strain. Mediastinum: The heart is enlarged. Aorta is normal.  Mild mediastinal and hilar lymph node enlargement is demonstrated.   A dominant right hilar lymph node Continue home Losartan and Amlodipine. HLD  - Continue home Atorvastatin. Aortic Valve Calcification  - Echo 2017 also showing moderate calcification of the AV with mild AS. - Repeat Echo outpatient. Pulmonary:   Acute on chronic hypoxic and hypercapnic respiratory Failure 2/2 PNA vs r/o PE vs chronic OHS vs COPD vs ADHF  - Found to be hypoxic to 70% at PCP office. Transferred to [facility] ED and placed on 4 L NC initially. On floor ABG showing respiratory acidosis and placed on BiPAP And transferred to MICU. - Transferred from floor with ABG showing respiratory acidosis : 7.188/ 91.2/ 78.9/33   - on  BiPAP for 2 hours; initial ABG showed improvement in acidosis; however at 3 AM repeat ABG respiratory acidosis AND mental status changes. Pt no longer responding/ lethargic. Pt intubated. - Continue AC/VC 16/500/ 100%/8   - follow ABG   - need to r/o PE. CTA after patient intubated. - DVT US b/l LE pending.   - pro . Doubt ADHF causing symptoms. Likely pulmonary.   - Nebulizer treatment.   - Repeat CXR in AM.     Possible CAP   - CXR concerning for new infiltrates on 10/27/2020.   - follow Respiratory cultures  - Coverage with Azithromycin and Ceftriaxone.   - WBC 7.2. Afebrile. - procalcitonin 0.11    Nephrology (Fluids/ Electrolytes & Nutrition):   Goal   - Keep Mg> 2  - Keep Phosphorous> 3  - Keep K> 4   Replete as necessary. Endocrine:   DMT2  - Hold home metfomrin. Place of LDSS in hospital.  - Monitor BG AC/HS. - Goal -180 in MICU. Early Mobility:   PT/OT: not indicated at this time. Next of Kin/ POA:   Spouse: Alfred Bernardo [phone]  Code Status:   Full code     DVT ppx: Lovenox  GI ppx: ppi  Disposition: Continue mngt in MICU    Cely Lebron DO, PGY-1   Attending physician: Dr. Oli Aponte physician: Dr Andrew Hui     I personally saw, examined and provided care for the patient. Radiographs, labs and medication list were reviewed by me independently.  I spoke with bedside nursing, therapists and consultants. Critical care services and times documented are independent of procedures and multidisciplinary rounds with Residents. Additionally comprehensive, multidisciplinary rounds were conducted with the MICU team. The case was discussed in detail and plans for care were established. Review of Residents documentation was conducted and revisions were made as appropriate. I agree with the above documented exam, problem list and plan of care.   Mukesh Alvarez MD   CCT excluding procedures: 36'

## 2020-10-28 NOTE — PROGRESS NOTES
Pulmonary Consultation    Admit Date: 10/26/2020  Requesting Physician: Jv Guzman MD    CC:  Hypoxemia  HPI:  Patient went to see his PCP yesterday as sick call, and found to be hypoxemic with oxygen saturation 70% while on room air patient was admitted advised to come to the emergency department to be seen. In ER patient was found to have an abnormal chest x-rays with questionable right lower lobe infiltrates patient was started mid antibiotics he feels now somewhat better at the time of my evaluation. The ER staff attempted to have a CT scan of the chest to rule out  pulmonary embolism but unfortunately was not done due to inability of the patient to lie down due to his complaint of back discomfort discomfort and anxiety. October 28. Events noted overnight, with worsening of acute on chronic respiratory failure. Patient unfortunately failed noninvasive ventilation and required to be transferred to the ICU endotracheally intubated and full mechanical ventilatory support provided. CT scan of the chest finally done did not show pulmonary embolism he did show bilateral pulmonary infiltrates, with patchy presentation.     PMH:    Past Medical History:   Diagnosis Date    Acquired hypothyroidism 9/26/2017    Anxiety     Congestive heart failure with LV diastolic dysfunction, NYHA class 3 (HCC)     Depression     DM (diabetes mellitus) ([address]     Essential hypertension 6/1/2016    Gouty arthritis 2006    Hyperlipidemia     Hypertension     Lymphedema     Obesity     Obstructive sleep apnea 8/17/2009    Obstructive sleep apnea 9/26/2017    Osteoarthritis     Type 2 diabetes mellitus without complication, without long-term current use of insulin ([address] 1/15/2014     PSH:   Past Surgical History:   Procedure Laterality Date    BRONCHOSCOPY  08/10/2017    ECHO COMPL W DOP COLOR FLOW  6/21/2013         EXTERNAL EAR SURGERY  6/2/2009    keloid left ear    FOOT SURGERY  1990    Left foot    GASTROSTOMY TUBE PLACEMENT  08/10/2017    TRACHEOSTOMY TUBE PLACEMENT  08/10/2017          Medications:     fentaNYL 5 mcg/ml in 0.9%  ml infusion 75 mcg/hr (10/28/20 1201)    propofol 30 mcg/kg/min (10/28/20 1454)    lactated ringers 75 mL/hr at 10/28/20 1216    dextrose        sodium chloride flush  10 mL Intravenous 2 times per day    Arformoterol Tartrate  15 mcg Nebulization BID    cefTRIAXone (ROCEPHIN) IV  1 g Intravenous Q24H    azithromycin  500 mg Intravenous Q24H    heparin (porcine)  5,000 Units Subcutaneous Q8H    chlorhexidine  15 mL Mouth/Throat BID    insulin lispro  0-6 Units Subcutaneous Q4H    amLODIPine  10 mg Oral Daily    aspirin  81 mg Oral Daily    atorvastatin  40 mg Oral Daily    carvedilol  12.5 mg Oral BID    levothyroxine  50 mcg Oral Daily    [Held by provider] losartan  100 mg Oral Daily    pantoprazole  40 mg Oral QAM AC    budesonide  0.5 mg Nebulization BID    ipratropium-albuterol  1 ampule Inhalation Q4H WA         Vitals:  Tmax:  VITALS:  BP (!) 151/74   Pulse 58   Temp 98.2 °F (36.8 °C) (Axillary)   Resp 21   Ht 5' 8\" (1.727 m)   Wt 295 lb (133.8 kg)   SpO2 96%   BMI 44.85 kg/m²   24HR INTAKE/OUTPUT:      Intake/Output Summary (Last 24 hours) at 10/28/2020 1554  Last data filed at 10/28/2020 1446  Gross per 24 hour   Intake 365 ml   Output 685 ml   Net -320 ml     CURRENT PULSE OXIMETRY:  SpO2: 96 %  24HR PULSE OXIMETRY RANGE:  SpO2  Av.7 %  Min: 89 %  Max: 100 % 2lt O2        EXAM:  General: No distress. Alert. Sedated. morbid obese  Eyes: PERRL. No sclera icterus. No conjunctival injection. ENT: No discharge. Pharynx clear. Endotracheal tube in place  Neck: Trachea midline. Normal thyroid. Resp: No accessory muscle use. No crackles. Minimal wheezing. No rhonchi. CV: Regular rate. Regular rhythm. No mumur or rub. ABD: Non-tender. Non-distended. No masses. No organmegaly. Normal bowel sounds. fatty  Skin: Warm and dry.  No nodule on exposed extremities. No rash on exposed extremities. Lymph: No cervical LAD. No supraclavicular LAD. Ext: No joint deformity. No clubbing. No cyanosis. +++ edema L >> R  Neuro: Awake. Follows commands. Positive pupils/gag/corneals. Normal pain response. Lab Results:  CBC:   Recent Labs     10/26/20  1614 10/27/20  0644   WBC 8.3 7.2   HGB 13.7 12.8   HCT 42.2 39.8   MCV 81.2 82.6    178     BMP:   Recent Labs     10/26/20  1614 10/27/20  0644 10/28/20  0610    141 139   K 3.9 4.0 5.0   CL 99 98 100   CO2 31* 33* 27   PHOS  --   --  4.4   BUN 11 12 17   CREATININE 1.1 1.2 2.6*      ALB:3,BILIDIR:3,BILITOT:3,ALKPHOS:3)@  PT/INR: No results for input(s): PROTIME, INR in the last 72 hours. Cultures:  -    ABG: noted  Films:  CXR : new RLL infiltrates? ? Assessment:  · Hypoxemia. A/C Respiratory failure. M VS #2. OHS?    · Pneumonia. Normal procalcitonin. Consider atypical microorganism versus viral  · H/o Moderate HANS, off BIPAP (never started)BIPAP 16/12, empirically. Plan:  · Wean fio2, to keep spo2 > 90%  · Respiratory panel and urine antigen to follow. Favor to get a LDH, if more than 300 consider to repeat test for COVID-19. · Patient may benefit of brief run with IV steroids and bronchodilators for underlying bronchoconstriction. · Echocardiogram to be done hopefully prior to discharge patient may have pulmonary hypertension and RV dysfunction. · Discussed with CCM team      Thanks for letting us see this patient in consultation. Please contact us with any questions.     Janice Hackett M.D., F.C.C.P.                    pulp

## 2020-10-28 NOTE — SIGNIFICANT EVENT
Rapid Response Team Note  Date of event: 10/28/2020   Time of event: Norm Narrow 61y.o. year old male   YOB: 1960   Admit date:  10/26/2020   Location: 18/46-A   Witnessed? : [x]Yes  [] No  Monitored? : [x]Yes  [] No  Code status: [x] Full  [] DNR-CCA  []DNR-CC  ______________________________________________________________________  Reason for RRT:    [] RR < 8     [] RR > 28   [] SpO2 <90%   [] HR < 40 bpm   [] HR > 130 bpm  [] SBP < 90 mmHg    [] SpO2 <90%   [] LOC   [] Seizures    [] Significant Bleeding Event    [] Other: Patient agitated and refusing BIPAP  Subjective:   CTSP regarding the above event, patient was admitted for SOB, and LE edema. He was planned for a CTA of the chest today to r/o PE, but patient as claustrophobic and anxious. He was given fentanyl and haldol pre-procedure but he still refused the CTA. ABG done showed acidemia with respiratory acidosis, and he was started on AVAPS. He however became combative on the AVAPs, necessitating the RRT. During the RRT, o2 sats was > 92%, patient was clam and lying in bed, BP elevated at 174/86. Cxr with b/l patchy infiltrates and possible pulmonary vascular congestion. Last ECHO in 2017 with EF 70% and stage 1 DD.      Objective:   Vital signs: Temp: 98.7/BP: 174/86/RR: 20/HR: 101  Initial Condition:  Conscious   [x] Yes  [] No     Breathing [x] Yes  [] No     Pulse  [x] Yes  [] No    Airway:   [x] Open/ Clear     Intervention: [] None  [] Pooled secretions     [] Suctioned  [] Stridor      [] Intubation    Lungs:   [x] Symmetrical chest rise/ CTABL Intervention: [] None  [] Use of accessory muscles    [x] NIV (CPAP/BiPAP)  [] Cyanosis      [] Nasal Oxygen/Mask  [] Wheezing       [x] ABG             [x] CXR  [] Other: Minimal chest wall excursions                    Capillary Refill:  [] > 2 seconds [x] < 2 seconds    Neurologic:   [] NIHSS      [] Pupillary Response:  PERRL   Response to pain:   [x] Yes  [] No  Follow commands:  [x] Yes  [] No  Facial asymmetry:  [] Yes  [x] No  Motor strength:  [x] Equal   Diagnostic Test:  Blood Sugar:  159 mg/dL  EKG:    Not done  ABG:      Lab Results   Component Value Date    PH 7.414 10/28/2020    PCO2 40.6 10/28/2020    PO2 153.9 10/28/2020    HCO3 25.4 10/28/2020    O2SAT 99.0 10/28/2020     Medication(s) Given:  []  Narcan (Naloxone)   []  Romazicon (Flumazenil)    []  Breathing Treatment    []  Steroids (Prednisone, Solu-Medrol)  []  Adenosine  [] Cardiac Medicines:    Infusion(s):   none    Imaging:   [x] CXR:   [] Normal         [] Pneumothorax         [x] Pulmonary Edema  [x] Infiltrate          [] CT Head  Not done    Laboratory Tests:     CBC:   Lab Results   Component Value Date    WBC 7.2 10/27/2020    RBC 4.82 10/27/2020    HGB 12.8 10/27/2020    HCT 39.8 10/27/2020    MCV 82.6 10/27/2020    MCH 26.6 10/27/2020    MCHC 32.2 10/27/2020    RDW 22.1 10/27/2020     10/27/2020    MPV 8.9 10/27/2020     BMP:    Lab Results   Component Value Date     10/28/2020    K 5.0 10/28/2020    K 4.0 10/27/2020     10/28/2020    CO2 27 10/28/2020    BUN 17 10/28/2020    LABALBU 3.7 10/28/2020    CREATININE 2.6 10/28/2020    CALCIUM 8.4 10/28/2020    GFRAA 31 10/28/2020    LABGLOM 31 10/28/2020    GLUCOSE 183 10/28/2020     Troponin:    Lab Results   Component Value Date    TROPONINI <0.01 10/26/2020     ABG:    Lab Results   Component Value Date    PH 7.414 10/28/2020    PCO2 40.6 10/28/2020    PO2 153.9 10/28/2020    HCO3 25.4 10/28/2020    BE 0.8 10/28/2020    O2SAT 99.0 10/28/2020         Teams Assessment and Plan:   1. Acute encephalopathy, possible delirium - likely 2/2 hypercapnia vs medication s/e; patient A0 x 3 but agitated  2. Acute hypoxic respiratory failure - likely 2/2 Cap vs PE with underlying component of HANS and OHS, still unable to obtain CTA  3. History of hypertension.     Plan -  - encourage patient to put on NIV - we will put on AVAPS for now and repeat ABG and monitor for resolution of hypercapnia. -Sitter was assigned to patient so encourage patient to keep NIV on  -   Continue to monitor respiratory status. Reassess in 2 hours. - if patient less agitated, consider ensuring CTA. However, follow Jose R  ? Disposition:  [x] No transfer   [] Transfer to monitor floor  [] Transfer to: [] MICU [] NICU [] CCU [] SICU    Patients family updated: [x] Yes  [] No   Discussed with:      [x] Primary Care Provider: Dr Ubaldo Barros    ?     Keyona Dixon MD PGY-3  10/28/2020 7:49 AM  Attending Physician: Dr Ubaldo Barros

## 2020-10-28 NOTE — PROGRESS NOTES
Called into room, pt pulling bi pap off, found oob, refusing to return to bed  or re apply bipap , became very combative and agitated, RRT called, see notes

## 2020-10-28 NOTE — PROGRESS NOTES
[facility]  Internal Medicine Residency Program  History and Physical  MICU    Patient:  Ellen Cid 61 y.o. male MRN: [id number]     Date of Service: 10/28/2020    Hospital Day: 3      Chief complaint:   Chief Complaint   Patient presents with    Shortness of Breath     for a few weeks. O2 sat in 70s at [facility], placed on 4L and now 95%. History of Present Illness   Ellen Cid is a 61 y.o. male initially presented to the [facility] ED on 10/27/2020 from PCP office where he was found to be saturating at 70%. He was placed on 2 L nasal cannula at his PCP office (saturating in the low 80s) and brought in to the emergency room. In the ED patient was placed on 4 L nasal cannula and saturating in the upper 90s. At that time CT chest was unable to be done because patient became very agitated in CT scanner/ refusing to lie flat. He was admitted to the General Floor with concern for new infiltrates on CXR and hypoxia. On evening on 10/27/2020 an RRT was called because patient became delirious after receiving fentanyl and haldol prior to going to CTA (r/o PE). Satting well on NC at that time. Was not able to get CTA done. Evening ABG concerning for worsening respiratory acidosis with hypercapnia and patient transferred to MICU per Pulmonary recommendations. Pt initially placed on BiPAP for 2 hours; initial ABG showed improvement in acidosis; however at 3 AM repeat ABG respiratory acidosis AND mental status changes. Pt no longer responding/ lethargic. Pt intubated. Unable to get ROS from patient. Unable to get hx from patient. Majority of hx was taken from EMR. Of note, patient has a hx of prior tracheostomy (8/10/2017) 2/2 acute on chronic respiratory failure 2/2 ADHF exacerbation requiring mechanical ventilation, thus patient's wife wanted to hold off with intubation at first. Pt's wife agreeable once patient not improving on BiPAP.      Pt PMHx is significant for HFpEF (Ef 65% in 2017), hx of tracheostomy and reversal (as mentioned above), moderate HANS not on CPAP?, DMT2, HTN, HLD, Hypothyroidism. Past Medical History:      Diagnosis Date    Acquired hypothyroidism 9/26/2017    Anxiety     Congestive heart failure with LV diastolic dysfunction, NYHA class 3 (HCC)     Depression     [facility]     Essential hypertension 6/1/2016    Gouty arthritis 2006    Hyperlipidemia     Hypertension     Lymphedema     Obesity     Obstructive sleep apnea 8/17/2009    Obstructive sleep apnea 9/26/2017    Osteoarthritis     Type 2 diabetes mellitus without complication, without long-term current use of insulin ([facility] 1/15/2014       Past Surgical History:        Procedure Laterality Date    BRONCHOSCOPY  08/10/2017    ECHO COMPL W DOP COLOR FLOW  6/21/2013         EXTERNAL EAR SURGERY  6/2/2009    keloid left ear    FOOT SURGERY  1990    Left foot    GASTROSTOMY TUBE PLACEMENT  08/10/2017    TRACHEOSTOMY TUBE PLACEMENT  08/10/2017       Medications Prior to Admission:    Prior to Admission medications    Medication Sig Start Date End Date Taking?  Authorizing Provider   carvedilol (COREG) 25 MG tablet Take 0.5 tablets by mouth 2 times daily 7/7/20   Rose Quijano MD   olmesartan (BENICAR) 20 MG tablet Take 1 tablet by mouth daily 6/1/20   Rose Quijano MD   losartan (COZAAR) 100 MG tablet Take 1 tablet by mouth daily 5/29/20   Rose Quijano MD   amLODIPine (NORVASC) 10 MG tablet Take 1 tablet by mouth daily 5/19/20   Rose Quijano MD   metFORMIN (GLUCOPHAGE-XR) 500 MG extended release tablet TAKE 1 TABLET BY MOUTH EVERY DAY WITH BREAKFAST 5/12/20   Rose Quijano MD   levothyroxine (SYNTHROID) 50 MCG tablet TAKE 1 TABLET BY MOUTH EVERY DAY 5/12/20   Rose Quijano MD   atorvastatin (LIPITOR) 40 MG tablet Take 1 tablet by mouth daily 1/27/20   Rose Quijano MD   omeprazole (PRILOSEC) 40 MG delayed release capsule TAKE 1 CAPSULE BY MOUTH EVERY DAY 11/6/19 Chaparrita Bhat MD   ondansetron (ZOFRAN-ODT) 4 MG disintegrating tablet Take 1 tablet by mouth every 8 hours as needed for Nausea Ok to substitute for standard Zofran non ODT if cost prohibitive 9/16/19   Harmony Hernandez MD   aspirin 81 MG tablet Take 81 mg by mouth daily    Historical Provider, MD   fluticasone-salmeterol (ADVAIR DISKUS) 100-50 MCG/DOSE diskus inhaler Inhale 1 puff into the lungs every 12 hours 5/31/19   Chaparrita Bhat MD   colchicine (COLCRYS) 0.6 MG tablet Take 1 tablet by mouth 2 times daily as needed for Pain 5/6/19   Chaparrita Bhat MD   Handicap Placard MISC by Does not apply route For 5 years 10/3/18   Chaparrita Bhat MD   Compression Stockings MISC Chronic left leg swelling. 2/21/17   Abilio Gregg MD       Allergies:  Bee venom and Shellfish-derived products    Social History:   TOBACCO:   reports that he quit smoking about 15 years ago. His smoking use included cigarettes. He has a 1.00 pack-year smoking history. He quit smokeless tobacco use about 30 years ago. His smokeless tobacco use included chew. ETOH:   reports current alcohol use. OCCUPATION: not on file     Family History:       Problem Relation Age of Onset    Alzheimer's Disease Mother     Heart Disease Father         Heart arrhythmia    Heart Attack Father        REVIEW OF SYSTEMS: Unable to obtain. Physical Exam   · Vitals: /61   Pulse 61   Temp 98.3 °F (36.8 °C) (Temporal)   Resp 18   Ht 5' 8\" (1.727 m)   Wt 295 lb (133.8 kg)   SpO2 100%   BMI 44.85 kg/m²           · General Appearance: Alert. Delirium but cooperative.  Well developed and well- nourished, in mild distress  · Skin: warm and dry, no rash or erythema  · Head: normocephalic and atraumatic  · Eyes: pupils equal, round, and reactive to light, extraocular eye movements intact, conjunctivae normal  · ENT: tympanic membrane, external ear and ear canal normal bilaterally, nose without deformity, nasal mucosa and turbinates normal without polyps  · Neck: supple and non-tender without mass, no thyromegaly or thyroid nodules, no cervical lymphadenopathy  · Pulmonary/Chest: clear to auscultation bilaterally- no wheezes, rales or rhonchi, normal air movement, no respiratory distress  · Cardiovascular: normal rate, regular rhythm, normal S1 and S2, no murmurs, rubs, clicks, or gallops, distal pulses intact, no carotid bruits  · Abdomen: soft, non-tender, non-distended, normal bowel sounds, no masses or organomegaly  · Extremities: no cyanosis, clubbing. +1 edema BLE   · Musculoskeletal: normal range of motion, no joint swelling, deformity or tenderness  · Neurologic: initially alert. Throughout course of night became altered and lethargic. Lines     site day    Art line   None    TLC None    PICC None    Hemoaccess None    Oxygen:     On Mechanical ventilation  Mechanical Ventilation:   Mode: A/C TV:500 ml RR: 16  PEEP 8cmH2O FiO2 100%  ABG:     Lab Results   Component Value Date    PH 7.206 10/28/2020    PCO2 84.8 10/28/2020    PO2 102.3 10/28/2020    HCO3 32.8 10/28/2020    BE 2.2 10/28/2020    THB 13.8 10/28/2020    O2SAT 96.7 10/28/2020     Labs and Imaging Studies   Basic Labs  CBC:   Lab Results   Component Value Date    WBC 7.2 10/27/2020    RBC 4.82 10/27/2020    HGB 12.8 10/27/2020    HCT 39.8 10/27/2020    MCV 82.6 10/27/2020    RDW 22.1 10/27/2020     10/27/2020     CMP:  Lab Results   Component Value Date     10/27/2020    K 4.0 10/27/2020    CL 98 10/27/2020    CO2 33 10/27/2020    BUN 12 10/27/2020    PROT 8.1 10/26/2020       Imaging Studies:     Xr Chest Portable    Result Date: 10/27/2020  EXAMINATION: ONE XRAY VIEW OF THE CHEST 10/27/2020 7:58 pm COMPARISON: October 26, 2020 HISTORY: ORDERING SYSTEM PROVIDED HISTORY: SOB TECHNOLOGIST PROVIDED HISTORY: Reason for exam:->SOB What reading provider will be dictating this exam?->CRC FINDINGS: There is mild cardiomegaly.   There is patchy perihilar and bibasilar atelectasis/infiltrates. Tiny pleural effusions are suspected. Progressive bibasilar atelectasis/infiltrates concerning for pneumonia. Underlying CHF is considered. Xr Chest Portable    Result Date: 10/26/2020  EXAMINATION: ONE XRAY VIEW OF THE CHEST 10/26/2020 4:11 pm COMPARISON: 05/26/2019 HISTORY: ORDERING SYSTEM PROVIDED HISTORY: SOB TECHNOLOGIST PROVIDED HISTORY: Reason for exam:->SOB What reading provider will be dictating this exam?->CRC FINDINGS: Cardiac size appears stable. Discoid airspace disease seen at the left lung base which may represent atelectasis or scarring. Right infrahilar and basilar infiltrate is identified. No pneumothorax is identified. No acute osseous abnormality is identified. Right infrahilar and basilar infiltrate concerning for pneumonia. Left basilar atelectasis or scarring. EKG: normal sinus rhythm, unchanged from previous tracings. Resident's Assessment and Plan     Jeri Atwood is a 61 y.o. male with PMHx is significant for HFpEF (Ef 65% in 2017), hx of tracheostomy and reversal (as mentioned above), moderate HANS not on CPAP?, DMT2, HTN, HLD, Hypothyroidism who initially was admitted to general floors for hypoxia. Transferred to the MICU when ABG concerning for worsening respiratory acidosis. Currently intubated and being managed in the MICU for:     Neurology:   Acute delirium likely in the setting of hypercapnia vs meds  - RRT called on 10/27/2020 at 7 pm when patient on general floor. Pt was screaming and agitated. He had been given fentanyl and haldol prior to going to CTA. Because of agitation and RRT never went to CTA at that time. - When brought down to MICU, initially responsive and following commands on BiPAP with improving ABG. At 3 am, became altered and was intubated and sedated. - Currently in MICU sedated with Fentanyl and Propofol to RASS goal of -1 to 0.      Cardiology:   Hx of chronic diastolic Heart failure  - proBNP 110 during admission. No acute exacerbation likely. -Goal on 2/20/2017 showing stage I diastolic dysfunction with normal left ventricular size and systolic function. EF 65%. -New carvedilol. Hx of HTN  - Continue home Losartan and Amlodipine. HLD  - Continue home Atorvastatin. Aortic Valve Calcification  - Echo 2017 also showing moderate calcification of the AV with mild AS. - Repeat Echo outpatient. Pulmonary:   Acute on chronic hypoxic and hypercapnic respiratory Failure 2/2 PNA vs r/o PE vs chronic OHS vs COPD vs ADHF  - Found to be hypoxic to 70% at PCP office. Transferred to [facility] ED and placed on 4 L NC initially. On floor ABG showing respiratory acidosis and placed on BiPAP And transferred to MICU. - Transferred from floor with ABG showing respiratory acidosis : 7.188/ 91.2/ 78.9/33   - on  BiPAP for 2 hours; initial ABG showed improvement in acidosis; however at 3 AM repeat ABG respiratory acidosis AND mental status changes. Pt no longer responding/ lethargic. Pt intubated. - Continue AC/VC 16/500/ 100%/8   - follow ABG   - need to r/o PE. CTA after patient intubated. - pro . Doubt ADHF causing symptoms. Likely pulmonary.   - Nebulizer treatment.   - Repeat CXR in AM.     Possible CAP   - CXR concerning for new infiltrates on 10/27/2020.   - follow Respiratory cultures  - Coverage with Azithromycin and Ceftriaxone.   - WBC 7.2. Afebrile. - procalcitonin 0.11    Nephrology (Fluids/ Electrolytes & Nutrition):   Goal   - Keep Mg> 2  - Keep Phosphorous> 3  - Keep K> 4   Replete as necessary. Endocrine:   DMT2  - Hold home metfomrin. Place of LDSS in hospital.  - Monitor BG AC/HS. - Goal -180 in MICU. Early Mobility:   PT/OT: not indicated at this time.    Next of Kin/ POA:   Spouse: Helen Muñiz [phone]  Code Status:   Full code     DVT ppx: Lovenox  GI ppx: ppi  Disposition: Continue mngt in MICU    Ovidio Cullen DO, PGY-1   Attending physician: Dr. Cedric Kramer

## 2020-10-28 NOTE — PATIENT CARE CONFERENCE
[facility] Quality Flow/Interdisciplinary Rounds Progress Note        Quality Flow Rounds held on October 28, 2020    Disciplines Attending:  Bedside Nurse, Charge nurse, HN, , . Josselyn Bellamy was admitted on 10/26/2020  3:39 PM    Anticipated Discharge Date:  Expected Discharge Date: 10/29/20    Disposition:     Romaine Score:  Romaine Scale Score: 10    Readmission Risk              Risk of Unplanned Readmission:        19           Discussed patient goal for the day, patient clinical progression, and barriers to discharge. The following Goal(s) of the Day/Commitment(s) have been identified:  Continue treatment.  Wean vent as able       Elysia Wade  October 28, 2020

## 2020-10-28 NOTE — PROGRESS NOTES
Spoke with Dr. Jolie Hill about recent ABG results and he recommends that patient be transfer to the ICU.  [facility] Dr. Rochelle Woo regarding his request to transfer

## 2020-10-28 NOTE — FLOWSHEET NOTE
Patient continues to pull at ETT and lines when restraints are removed for assessment and ROM. RN is unable to redirect patient to cease such behavior. Restraints are continued for patient's safety.

## 2020-10-28 NOTE — PROGRESS NOTES
Date: 10/28/2020    Time: 1:30 AM    Patient Placed On BIPAP/CPAP/ Non-Invasive Ventilation? Pt already on bipap    If no must comment. Facial area red/color change? No           If YES are Blister/Lesion present? No   If yes must notify nursing staff  BIPAP/CPAP skin barrier?   Yes    Skin barrier type:mepilexlite       Comments:        Su Yates

## 2020-10-28 NOTE — PLAN OF CARE
Problem: Restraint Use - Nonviolent/Non-Self-Destructive Behavior:  Goal: Absence of restraint indications  Description: Absence of restraint indications  10/28/2020 1417 by Shaq Vargas RN  Outcome: Not Met This Shift  Goal: Absence of restraint-related injury  Description: Absence of restraint-related injury  10/28/2020 1417 by Shaq Vargas, RN  Outcome: Met This Shift

## 2020-10-29 ENCOUNTER — APPOINTMENT (OUTPATIENT)
Dept: ULTRASOUND IMAGING | Age: 60
DRG: 003 | End: 2020-10-29
Payer: MEDICARE

## 2020-10-29 ENCOUNTER — APPOINTMENT (OUTPATIENT)
Dept: GENERAL RADIOLOGY | Age: 60
DRG: 003 | End: 2020-10-29
Payer: MEDICARE

## 2020-10-29 LAB
AADO2: 275.6 MMHG
ADENOVIRUS BY PCR: NOT DETECTED
ALBUMIN SERPL-MCNC: 3.4 G/DL (ref 3.5–5.2)
ALP BLD-CCNC: 95 U/L (ref 40–129)
ALT SERPL-CCNC: 31 U/L (ref 0–40)
ANION GAP SERPL CALCULATED.3IONS-SCNC: 14 MMOL/L (ref 7–16)
ANION GAP SERPL CALCULATED.3IONS-SCNC: 17 MMOL/L (ref 7–16)
ANISOCYTOSIS: ABNORMAL
AST SERPL-CCNC: 44 U/L (ref 0–39)
B.E.: 0 MMOL/L (ref -3–3)
BASOPHILS ABSOLUTE: 0 E9/L (ref 0–0.2)
BASOPHILS RELATIVE PERCENT: 0.2 % (ref 0–2)
BILIRUB SERPL-MCNC: 0.6 MG/DL (ref 0–1.2)
BORDETELLA PARAPERTUSSIS BY PCR: NOT DETECTED
BORDETELLA PERTUSSIS BY PCR: NOT DETECTED
BUN BLDV-MCNC: 24 MG/DL (ref 8–23)
BUN BLDV-MCNC: 28 MG/DL (ref 8–23)
CALCIUM SERPL-MCNC: 7.8 MG/DL (ref 8.6–10.2)
CALCIUM SERPL-MCNC: 8.1 MG/DL (ref 8.6–10.2)
CHLAMYDOPHILIA PNEUMONIAE BY PCR: NOT DETECTED
CHLORIDE BLD-SCNC: 94 MMOL/L (ref 98–107)
CHLORIDE BLD-SCNC: 97 MMOL/L (ref 98–107)
CO2: 23 MMOL/L (ref 22–29)
CO2: 23 MMOL/L (ref 22–29)
COHB: 0.7 % (ref 0–1.5)
CORONAVIRUS 229E BY PCR: NOT DETECTED
CORONAVIRUS HKU1 BY PCR: NOT DETECTED
CORONAVIRUS NL63 BY PCR: NOT DETECTED
CORONAVIRUS OC43 BY PCR: NOT DETECTED
CREAT SERPL-MCNC: 3.8 MG/DL (ref 0.7–1.2)
CREAT SERPL-MCNC: 4 MG/DL (ref 0.7–1.2)
CRITICAL: ABNORMAL
DATE ANALYZED: ABNORMAL
DATE OF COLLECTION: ABNORMAL
EOSINOPHILS ABSOLUTE: 0.05 E9/L (ref 0.05–0.5)
EOSINOPHILS RELATIVE PERCENT: 0.9 % (ref 0–6)
FIO2: 60 %
GFR AFRICAN AMERICAN: 19
GFR AFRICAN AMERICAN: 20
GFR NON-AFRICAN AMERICAN: 19 ML/MIN/1.73
GFR NON-AFRICAN AMERICAN: 20 ML/MIN/1.73
GLUCOSE BLD-MCNC: 233 MG/DL (ref 74–99)
GLUCOSE BLD-MCNC: 235 MG/DL (ref 74–99)
HCO3: 25.6 MMOL/L (ref 22–26)
HCT VFR BLD CALC: 38.2 % (ref 37–54)
HEMOGLOBIN: 13.1 G/DL (ref 12.5–16.5)
HHB: 4.8 % (ref 0–5)
HUMAN METAPNEUMOVIRUS BY PCR: NOT DETECTED
HUMAN RHINOVIRUS/ENTEROVIRUS BY PCR: NOT DETECTED
HYPOCHROMIA: ABNORMAL
INFLUENZA A BY PCR: NOT DETECTED
INFLUENZA B BY PCR: NOT DETECTED
LAB: ABNORMAL
LYMPHOCYTES ABSOLUTE: 0.86 E9/L (ref 1.5–4)
LYMPHOCYTES RELATIVE PERCENT: 14.8 % (ref 20–42)
Lab: ABNORMAL
MAGNESIUM: 1.6 MG/DL (ref 1.6–2.6)
MCH RBC QN AUTO: 27 PG (ref 26–35)
MCHC RBC AUTO-ENTMCNC: 34.3 % (ref 32–34.5)
MCV RBC AUTO: 78.6 FL (ref 80–99.9)
METER GLUCOSE: 178 MG/DL (ref 74–99)
METER GLUCOSE: 195 MG/DL (ref 74–99)
METER GLUCOSE: 230 MG/DL (ref 74–99)
METER GLUCOSE: 234 MG/DL (ref 74–99)
METER GLUCOSE: 242 MG/DL (ref 74–99)
METER GLUCOSE: 286 MG/DL (ref 74–99)
METHB: 0.5 % (ref 0–1.5)
MODE: AC
MONOCYTES ABSOLUTE: 0.06 E9/L (ref 0.1–0.95)
MONOCYTES RELATIVE PERCENT: 0.9 % (ref 2–12)
MYCOPLASMA PNEUMONIAE BY PCR: NOT DETECTED
MYELOCYTE PERCENT: 0.9 % (ref 0–0)
NEUTROPHILS ABSOLUTE: 4.79 E9/L (ref 1.8–7.3)
NEUTROPHILS RELATIVE PERCENT: 82.6 % (ref 43–80)
NUCLEATED RED BLOOD CELLS: 4.3 /100 WBC
O2 CONTENT: 18.6 ML/DL
O2 SATURATION: 95.1 % (ref 92–98.5)
O2HB: 94 % (ref 94–97)
OPERATOR ID: ABNORMAL
PARAINFLUENZA VIRUS 1 BY PCR: NOT DETECTED
PARAINFLUENZA VIRUS 2 BY PCR: NOT DETECTED
PARAINFLUENZA VIRUS 3 BY PCR: NOT DETECTED
PARAINFLUENZA VIRUS 4 BY PCR: NOT DETECTED
PATIENT TEMP: 37 C
PCO2: 45.4 MMHG (ref 35–45)
PDW BLD-RTO: 21.9 FL (ref 11.5–15)
PEEP/CPAP: 8 CMH2O
PFO2: 1.46 MMHG/%
PH BLOOD GAS: 7.37 (ref 7.35–7.45)
PHOSPHORUS: 4.2 MG/DL (ref 2.5–4.5)
PLATELET # BLD: 194 E9/L (ref 130–450)
PMV BLD AUTO: 9.4 FL (ref 7–12)
PO2: 87.3 MMHG (ref 75–100)
POIKILOCYTES: ABNORMAL
POLYCHROMASIA: ABNORMAL
POTASSIUM SERPL-SCNC: 3.9 MMOL/L (ref 3.5–5)
POTASSIUM SERPL-SCNC: 3.9 MMOL/L (ref 3.5–5)
RBC # BLD: 4.86 E12/L (ref 3.8–5.8)
RESPIRATORY SYNCYTIAL VIRUS BY PCR: NOT DETECTED
RI(T): 3.16
RR MECHANICAL: 16 B/MIN
SARS-COV-2, PCR: NOT DETECTED
SODIUM BLD-SCNC: 134 MMOL/L (ref 132–146)
SODIUM BLD-SCNC: 134 MMOL/L (ref 132–146)
SOURCE, BLOOD GAS: ABNORMAL
TARGET CELLS: ABNORMAL
THB: 14 G/DL (ref 11.5–16.5)
TIME ANALYZED: 520
TOTAL PROTEIN: 6.8 G/DL (ref 6.4–8.3)
TRIGL SERPL-MCNC: 148 MG/DL (ref 0–149)
VT MECHANICAL: 500 ML
WBC # BLD: 5.7 E9/L (ref 4.5–11.5)

## 2020-10-29 PROCEDURE — 6360000002 HC RX W HCPCS: Performed by: INTERNAL MEDICINE

## 2020-10-29 PROCEDURE — 82805 BLOOD GASES W/O2 SATURATION: CPT

## 2020-10-29 PROCEDURE — 6370000000 HC RX 637 (ALT 250 FOR IP): Performed by: INTERNAL MEDICINE

## 2020-10-29 PROCEDURE — 94003 VENT MGMT INPAT SUBQ DAY: CPT

## 2020-10-29 PROCEDURE — 85025 COMPLETE CBC W/AUTO DIFF WBC: CPT

## 2020-10-29 PROCEDURE — 94640 AIRWAY INHALATION TREATMENT: CPT

## 2020-10-29 PROCEDURE — 84478 ASSAY OF TRIGLYCERIDES: CPT

## 2020-10-29 PROCEDURE — 2580000003 HC RX 258: Performed by: INTERNAL MEDICINE

## 2020-10-29 PROCEDURE — 2500000003 HC RX 250 WO HCPCS: Performed by: INTERNAL MEDICINE

## 2020-10-29 PROCEDURE — 87040 BLOOD CULTURE FOR BACTERIA: CPT

## 2020-10-29 PROCEDURE — 93970 EXTREMITY STUDY: CPT

## 2020-10-29 PROCEDURE — 83735 ASSAY OF MAGNESIUM: CPT

## 2020-10-29 PROCEDURE — 82962 GLUCOSE BLOOD TEST: CPT

## 2020-10-29 PROCEDURE — 87070 CULTURE OTHR SPECIMN AEROBIC: CPT

## 2020-10-29 PROCEDURE — 84100 ASSAY OF PHOSPHORUS: CPT

## 2020-10-29 PROCEDURE — 87186 SC STD MICRODIL/AGAR DIL: CPT

## 2020-10-29 PROCEDURE — 80053 COMPREHEN METABOLIC PANEL: CPT

## 2020-10-29 PROCEDURE — C9113 INJ PANTOPRAZOLE SODIUM, VIA: HCPCS | Performed by: INTERNAL MEDICINE

## 2020-10-29 PROCEDURE — 71045 X-RAY EXAM CHEST 1 VIEW: CPT

## 2020-10-29 PROCEDURE — 76770 US EXAM ABDO BACK WALL COMP: CPT

## 2020-10-29 PROCEDURE — 36415 COLL VENOUS BLD VENIPUNCTURE: CPT

## 2020-10-29 PROCEDURE — 2000000000 HC ICU R&B

## 2020-10-29 PROCEDURE — 87206 SMEAR FLUORESCENT/ACID STAI: CPT

## 2020-10-29 PROCEDURE — 80048 BASIC METABOLIC PNL TOTAL CA: CPT

## 2020-10-29 RX ORDER — SODIUM CHLORIDE 9 MG/ML
10 INJECTION INTRAVENOUS DAILY
Status: DISCONTINUED | OUTPATIENT
Start: 2020-10-29 | End: 2020-01-01 | Stop reason: CLARIF

## 2020-10-29 RX ORDER — METHYLPREDNISOLONE SODIUM SUCCINATE 40 MG/ML
40 INJECTION, POWDER, LYOPHILIZED, FOR SOLUTION INTRAMUSCULAR; INTRAVENOUS EVERY 12 HOURS
Status: DISCONTINUED | OUTPATIENT
Start: 2020-10-29 | End: 2020-10-31

## 2020-10-29 RX ORDER — MAGNESIUM SULFATE IN WATER 40 MG/ML
4 INJECTION, SOLUTION INTRAVENOUS ONCE
Status: COMPLETED | OUTPATIENT
Start: 2020-10-29 | End: 2020-10-29

## 2020-10-29 RX ORDER — PANTOPRAZOLE SODIUM 40 MG/10ML
40 INJECTION, POWDER, LYOPHILIZED, FOR SOLUTION INTRAVENOUS DAILY
Status: DISCONTINUED | OUTPATIENT
Start: 2020-10-29 | End: 2020-01-01 | Stop reason: CLARIF

## 2020-10-29 RX ORDER — MIDAZOLAM HYDROCHLORIDE 1 MG/ML
2 INJECTION INTRAMUSCULAR; INTRAVENOUS EVERY 4 HOURS PRN
Status: DISCONTINUED | OUTPATIENT
Start: 2020-10-29 | End: 2020-10-31 | Stop reason: SDUPTHER

## 2020-10-29 RX ADMIN — Medication 175 MCG/HR: at 16:31

## 2020-10-29 RX ADMIN — HEPARIN SODIUM 5000 UNITS: 10000 INJECTION INTRAVENOUS; SUBCUTANEOUS at 01:25

## 2020-10-29 RX ADMIN — HEPARIN SODIUM 5000 UNITS: 10000 INJECTION INTRAVENOUS; SUBCUTANEOUS at 09:13

## 2020-10-29 RX ADMIN — CHLORHEXIDINE GLUCONATE 0.12% ORAL RINSE 15 ML: 1.2 LIQUID ORAL at 20:12

## 2020-10-29 RX ADMIN — PROPOFOL 50 MCG/KG/MIN: 10 INJECTION, EMULSION INTRAVENOUS at 20:13

## 2020-10-29 RX ADMIN — ASPIRIN 81 MG CHEWABLE TABLET 81 MG: 81 TABLET CHEWABLE at 09:09

## 2020-10-29 RX ADMIN — ARFORMOTEROL TARTRATE 15 MCG: 15 SOLUTION RESPIRATORY (INHALATION) at 20:26

## 2020-10-29 RX ADMIN — AMLODIPINE BESYLATE 10 MG: 10 TABLET ORAL at 09:08

## 2020-10-29 RX ADMIN — PROPOFOL 50 MCG/KG/MIN: 10 INJECTION, EMULSION INTRAVENOUS at 22:29

## 2020-10-29 RX ADMIN — SODIUM CHLORIDE, POTASSIUM CHLORIDE, SODIUM LACTATE AND CALCIUM CHLORIDE: 600; 310; 30; 20 INJECTION, SOLUTION INTRAVENOUS at 20:16

## 2020-10-29 RX ADMIN — PROPOFOL 50 MCG/KG/MIN: 10 INJECTION, EMULSION INTRAVENOUS at 13:08

## 2020-10-29 RX ADMIN — IPRATROPIUM BROMIDE AND ALBUTEROL SULFATE 1 AMPULE: 2.5; .5 SOLUTION RESPIRATORY (INHALATION) at 20:25

## 2020-10-29 RX ADMIN — CEFTRIAXONE SODIUM 1 G: 1 INJECTION, POWDER, FOR SOLUTION INTRAMUSCULAR; INTRAVENOUS at 04:03

## 2020-10-29 RX ADMIN — INSULIN LISPRO 4 UNITS: 100 INJECTION, SOLUTION INTRAVENOUS; SUBCUTANEOUS at 09:12

## 2020-10-29 RX ADMIN — CHLORHEXIDINE GLUCONATE 0.12% ORAL RINSE 15 ML: 1.2 LIQUID ORAL at 09:09

## 2020-10-29 RX ADMIN — INSULIN LISPRO 2 UNITS: 100 INJECTION, SOLUTION INTRAVENOUS; SUBCUTANEOUS at 05:12

## 2020-10-29 RX ADMIN — METHYLPREDNISOLONE SODIUM SUCCINATE 40 MG: 40 INJECTION, POWDER, FOR SOLUTION INTRAMUSCULAR; INTRAVENOUS at 01:26

## 2020-10-29 RX ADMIN — PANTOPRAZOLE SODIUM 40 MG: 40 INJECTION, POWDER, FOR SOLUTION INTRAVENOUS at 09:09

## 2020-10-29 RX ADMIN — LEVOTHYROXINE SODIUM 50 MCG: 0.05 TABLET ORAL at 09:08

## 2020-10-29 RX ADMIN — IPRATROPIUM BROMIDE AND ALBUTEROL SULFATE 1 AMPULE: 2.5; .5 SOLUTION RESPIRATORY (INHALATION) at 12:08

## 2020-10-29 RX ADMIN — ATORVASTATIN CALCIUM 40 MG: 40 TABLET, FILM COATED ORAL at 09:09

## 2020-10-29 RX ADMIN — BUDESONIDE 500 MCG: 0.5 SUSPENSION RESPIRATORY (INHALATION) at 20:25

## 2020-10-29 RX ADMIN — PROPOFOL 50 MCG/KG/MIN: 10 INJECTION, EMULSION INTRAVENOUS at 10:25

## 2020-10-29 RX ADMIN — PROPOFOL 40 MCG/KG/MIN: 10 INJECTION, EMULSION INTRAVENOUS at 15:25

## 2020-10-29 RX ADMIN — PROPOFOL 50 MCG/KG/MIN: 10 INJECTION, EMULSION INTRAVENOUS at 07:50

## 2020-10-29 RX ADMIN — Medication 75 MCG/HR: at 04:40

## 2020-10-29 RX ADMIN — Medication 10 ML: at 09:12

## 2020-10-29 RX ADMIN — INSULIN LISPRO 4 UNITS: 100 INJECTION, SOLUTION INTRAVENOUS; SUBCUTANEOUS at 16:31

## 2020-10-29 RX ADMIN — ARFORMOTEROL TARTRATE 15 MCG: 15 SOLUTION RESPIRATORY (INHALATION) at 09:25

## 2020-10-29 RX ADMIN — INSULIN LISPRO 6 UNITS: 100 INJECTION, SOLUTION INTRAVENOUS; SUBCUTANEOUS at 20:24

## 2020-10-29 RX ADMIN — HEPARIN SODIUM 5000 UNITS: 10000 INJECTION INTRAVENOUS; SUBCUTANEOUS at 16:31

## 2020-10-29 RX ADMIN — MAGNESIUM SULFATE HEPTAHYDRATE 4 G: 40 INJECTION, SOLUTION INTRAVENOUS at 10:39

## 2020-10-29 RX ADMIN — INSULIN LISPRO 1 UNITS: 100 INJECTION, SOLUTION INTRAVENOUS; SUBCUTANEOUS at 01:26

## 2020-10-29 RX ADMIN — PROPOFOL 30 MCG/KG/MIN: 10 INJECTION, EMULSION INTRAVENOUS at 06:31

## 2020-10-29 RX ADMIN — BUDESONIDE 500 MCG: 0.5 SUSPENSION RESPIRATORY (INHALATION) at 09:26

## 2020-10-29 RX ADMIN — IPRATROPIUM BROMIDE AND ALBUTEROL SULFATE 1 AMPULE: 2.5; .5 SOLUTION RESPIRATORY (INHALATION) at 09:26

## 2020-10-29 RX ADMIN — METHYLPREDNISOLONE SODIUM SUCCINATE 40 MG: 40 INJECTION, POWDER, FOR SOLUTION INTRAMUSCULAR; INTRAVENOUS at 09:10

## 2020-10-29 RX ADMIN — INSULIN LISPRO 2 UNITS: 100 INJECTION, SOLUTION INTRAVENOUS; SUBCUTANEOUS at 13:01

## 2020-10-29 RX ADMIN — METHYLPREDNISOLONE SODIUM SUCCINATE 40 MG: 40 INJECTION, POWDER, FOR SOLUTION INTRAMUSCULAR; INTRAVENOUS at 20:13

## 2020-10-29 RX ADMIN — SODIUM CHLORIDE, PRESERVATIVE FREE 10 ML: 5 INJECTION INTRAVENOUS at 09:09

## 2020-10-29 RX ADMIN — PROPOFOL 25 MCG/KG/MIN: 10 INJECTION, EMULSION INTRAVENOUS at 02:10

## 2020-10-29 RX ADMIN — AZITHROMYCIN MONOHYDRATE 500 MG: 500 INJECTION, POWDER, LYOPHILIZED, FOR SOLUTION INTRAVENOUS at 04:02

## 2020-10-29 RX ADMIN — PROPOFOL 50 MCG/KG/MIN: 10 INJECTION, EMULSION INTRAVENOUS at 17:45

## 2020-10-29 RX ADMIN — Medication 10 ML: at 20:14

## 2020-10-29 RX ADMIN — IPRATROPIUM BROMIDE AND ALBUTEROL SULFATE 1 AMPULE: 2.5; .5 SOLUTION RESPIRATORY (INHALATION) at 16:44

## 2020-10-29 RX ADMIN — SODIUM CHLORIDE, POTASSIUM CHLORIDE, SODIUM LACTATE AND CALCIUM CHLORIDE: 600; 310; 30; 20 INJECTION, SOLUTION INTRAVENOUS at 06:31

## 2020-10-29 RX ADMIN — PROPOFOL 25 MCG/KG/MIN: 10 INJECTION, EMULSION INTRAVENOUS at 04:00

## 2020-10-29 ASSESSMENT — PULMONARY FUNCTION TESTS
PIF_VALUE: 31
PIF_VALUE: 31
PIF_VALUE: 34
PIF_VALUE: 30
PIF_VALUE: 33
PIF_VALUE: 33
PIF_VALUE: 32
PIF_VALUE: 33
PIF_VALUE: 31
PIF_VALUE: 38
PIF_VALUE: 31
PIF_VALUE: 32
PIF_VALUE: 33
PIF_VALUE: 31
PIF_VALUE: 33
PIF_VALUE: 34
PIF_VALUE: 37
PIF_VALUE: 31
PIF_VALUE: 32
PIF_VALUE: 34
PIF_VALUE: 32
PIF_VALUE: 31
PIF_VALUE: 33

## 2020-10-29 ASSESSMENT — PAIN SCALES - GENERAL
PAINLEVEL_OUTOF10: 0

## 2020-10-29 NOTE — PROGRESS NOTES
Comprehensive Nutrition Assessment    Type and Reason for Visit:  Initial    Nutrition Recommendations/Plan: Modify Tube Feeding  Current propofol @40.1ml/hr providing an additional 1058kcals. EN Recommendation: Vital High Protein @50ml/hr x23 hours (hold 30 minutes before/after Synthroid) to provide: 1150ml, 1150kcals (2208kcals w/ propofol), 101gm pro, 961ml water     Nutrition Assessment:  Pt admit 2/2 resp failure/PNA. Noted DM. Remains on vent w/ initiation of EN.     Malnutrition Assessment:  Malnutrition Status:  No malnutrition    Context:  Acute Illness     Findings of the 6 clinical characteristics of malnutrition:  Energy Intake:  No significant decrease in energy intake  Weight Loss:  No significant weight loss     Body Fat Loss:  No significant body fat loss     Muscle Mass Loss:  No significant muscle mass loss    Fluid Accumulation:  No significant fluid accumulation     Strength:  Not Performed    Estimated Daily Nutrient Needs:  Energy (kcal):  ; ; Weight Used for Energy Requirements:  Current     Protein (g):  (1.3-1.5gm/kg IBW; noted elevated renal labs); Weight Used for Protein Requirements:  Ideal          Nutrition Related Findings:  vent, OGT, elevated BSL, elevated renal labs, s/p RRT 2/2 AMS, active BS, soft abd, + I/Os, +2 to +4 edema      Wounds:  None       Current Nutrition Therapies:    Current Tube Feeding (TF) Orders:  · Feeding Route: Orogastric  · Formula: 1.5 Diabetic  · Current TF & Flush Orders Provides: 50ml/hr; 1200ml TV, 1800kcals, 99gm pro, 911ml water    Anthropometric Measures:  · Height: 5' 8\" (172.7 cm)  · Current Body Weight: 334 lb (151.5 kg)(10/29 actual)   · Admission Body Weight: 295 lb (133.8 kg)(10/26 no method)    · Usual Body Weight: 318 lb (144.2 kg)(12/2019 per EMR, no method)     · Ideal Body Weight: 154 lbs; % Ideal Body Weight 216.9 %   · BMI: 50.8  · BMI Categories: Obese Class 3 (BMI 40.0 or greater)       Nutrition Diagnosis:   · Inadequate oral intake related to impaired respiratory function as evidenced by NPO or clear liquid status due to medical condition, intubation, nutrition support - enteral nutrition    Nutrition Interventions:   Nutrition Education/Counseling:  Education not indicated   Coordination of Nutrition Care:  Continue to monitor while inpatient, Coordination of Community Care    Goals:   Tolerance to EN       Nutrition Monitoring and Evaluation:   Food/Nutrient Intake Outcomes:  Enteral Nutrition Intake/Tolerance  Physical Signs/Symptoms Outcomes:  Biochemical Data, Nutrition Focused Physical Findings, Skin, Weight, GI Status, Fluid Status or Edema, Hemodynamic Status     Electronically signed by Raphael Guaman MS, RD, LD on 10/29/20 at 10:15 AM EDT

## 2020-10-29 NOTE — PLAN OF CARE
Problem: Pain:  Goal: Pain level will decrease  Description: Pain level will decrease  10/29/2020 1321 by Yue Mckinley RN  Outcome: Met This Shift     Problem: Pain:  Goal: Control of acute pain  Description: Control of acute pain  Outcome: Met This Shift     Problem: Pain:  Goal: Control of chronic pain  Description: Control of chronic pain  Outcome: Met This Shift     Problem: Restraint Use - Nonviolent/Non-Self-Destructive Behavior:  Goal: Absence of restraint-related injury  Description: Absence of restraint-related injury  10/29/2020 1321 by Yue Mckinley RN  Outcome: Met This Shift     Problem: Skin Integrity:  Goal: Will show no infection signs and symptoms  Description: Will show no infection signs and symptoms  Outcome: Met This Shift     Problem: Skin Integrity:  Goal: Absence of new skin breakdown  Description: Absence of new skin breakdown  Outcome: Met This Shift     Problem: Restraint Use - Nonviolent/Non-Self-Destructive Behavior:  Goal: Absence of restraint indications  Description: Absence of restraint indications  10/29/2020 1321 by Yue Mckinley RN  Outcome: Not Met This Shift   Plan of care reviewed with patient and family, as available.

## 2020-10-29 NOTE — CONSULTS
Nephrology Consult Note  Patient's Name: Delmer Jones  10:13 AM  10/29/2020        Reason for Consult: Acute kidney  Requesting Physician:  Keshia Hines MD    Chief Complaint: Shortness of breath  History Obtained From:  EHR; spouse    History of Present Ilness:    Delmer Jones is a 61 y.o. male with a history of COPD, hypertension, hyperlipidemia and diabetes mellitus. He presented to ED 2 days ago with complaints of shortness of breath. According to patient's spouse he had been complaining of shortness of breath over the course of several weeks. This has gotten progressively worse. He went to see his PCP on the day of admission. In office at the time pulse oximeter obtained revealed his oxygen saturation to be in the 70s. He was instructed to proceed to ED for further evaluation. On presentation to ED his initial vital signs showed a blood pressure of 168/71, temperature 97.5 and pulse of 93. His pulse oximeter at the time was noted to be 98% on room air. Laboratory data was significant for a BUN of 11, creatinine 1.1, WBC of 8.3. Rapid COVID-19 testing was negative. A chest x-ray performed revealed right infrahilar and basilar infiltrate concerning for pneumonia. A CTA was ordered but patient declined because of his inability to lay flat. Patient was given ceftriaxone and doxycycline followed by admission to telemetry. 2 days ago an RRT was called as patient was noted to be increasingly more in respiratory distress. He was transferred to MICU and intubated. Laboratory data yesterday showed worsening serum creatinine to 2.8. This a.m. further worsening to 3.6. He has been nonoliguric. Hence renal consult.     Past Medical History:   Diagnosis Date    Acquired hypothyroidism 9/26/2017    Anxiety     Congestive heart failure with LV diastolic dysfunction, NYHA class 3 (HCC)     Depression     DM (diabetes mellitus) ([facility] [address]     Essential hypertension 6/1/2016    Gouty arthritis 2006    Hyperlipidemia     Hypertension     Lymphedema     Obesity     Obstructive sleep apnea 8/17/2009    Obstructive sleep apnea 9/26/2017    Osteoarthritis     Type 2 diabetes mellitus without complication, without long-term current use of insulin ([facility] [address] 1/15/2014       Past Surgical History:   Procedure Laterality Date    BRONCHOSCOPY  08/10/2017    ECHO COMPL W DOP COLOR FLOW  6/21/2013         EXTERNAL EAR SURGERY  6/2/2009    keloid left ear    FOOT SURGERY  1990    Left foot    GASTROSTOMY TUBE PLACEMENT  08/10/2017    TRACHEOSTOMY TUBE PLACEMENT  08/10/2017       Family History   Problem Relation Age of Onset    Alzheimer's Disease Mother     Heart Disease Father         Heart arrhythmia    Heart Attack Father         reports that he quit smoking about 15 years ago. His smoking use included cigarettes. He has a 1.00 pack-year smoking history. He quit smokeless tobacco use about 30 years ago. His smokeless tobacco use included chew. He reports current alcohol use. He reports previous drug use.     Allergies:  Bee venom and Shellfish-derived products    Current Medications:    pantoprazole (PROTONIX) injection 40 mg, Daily    And  sodium chloride (PF) 0.9 % injection 10 mL, Daily  insulin lispro (HUMALOG) injection vial 0-12 Units, Q4H  magnesium sulfate 4 g in 100 mL IVPB premix, Once  fentaNYL 5 mcg/ml in 0.9%  ml infusion, Continuous  propofol injection, Titrated  sodium chloride flush 0.9 % injection 10 mL, 2 times per day  sodium chloride flush 0.9 % injection 10 mL, PRN  acetaminophen (TYLENOL) tablet 650 mg, Q6H PRN    Or  acetaminophen (TYLENOL) suppository 650 mg, Q6H PRN  Arformoterol Tartrate (BROVANA) nebulizer solution 15 mcg, BID  polyethylene glycol (GLYCOLAX) packet 17 g, Daily PRN  cefTRIAXone (ROCEPHIN) 1 g in sterile water 10 mL IV syringe, Q24H  azithromycin (ZITHROMAX) 500 mg in D5W 250ml Vial Mate, Q24H  heparin (porcine) injection 5,000 Units, Q8H  perflutren lipid microspheres (DEFINITY) injection 1.65 mg, ONCE PRN  chlorhexidine (PERIDEX) 0.12 % solution 15 mL, BID  lactated ringers infusion, Continuous  methylPREDNISolone sodium (SOLU-MEDROL) injection 40 mg, Q8H  perflutren lipid microspheres (DEFINITY) injection 1.65 mg, ONCE PRN  amLODIPine (NORVASC) tablet 10 mg, Daily  aspirin chewable tablet 81 mg, Daily  atorvastatin (LIPITOR) tablet 40 mg, Daily  carvedilol (COREG) tablet 12.5 mg, BID  colchicine (COLCRYS) tablet 0.6 mg, BID PRN  levothyroxine (SYNTHROID) tablet 50 mcg, Daily  [Held by provider] losartan (COZAAR) tablet 100 mg, Daily  ondansetron (ZOFRAN-ODT) disintegrating tablet 4 mg, Q8H PRN  promethazine (PHENERGAN) tablet 12.5 mg, Q6H PRN    Or  ondansetron (ZOFRAN) injection 4 mg, Q6H PRN  budesonide (PULMICORT) nebulizer suspension 500 mcg, BID  ipratropium-albuterol (DUONEB) nebulizer solution 1 ampule, Q4H WA  glucose (GLUTOSE) 40 % oral gel 15 g, PRN  dextrose 50 % IV solution, PRN  glucagon (rDNA) injection 1 mg, PRN  dextrose 5 % solution, PRN        Review of Systems:   Review of systems not obtained due to patient factors. Physical exam:  Vitals:    10/29/20 0938   BP:    Pulse:    Resp: 13   Temp:    SpO2: 99%           General: Intubated sedated  Eyes: PERRL. No sclera icterus. No conjunctival injection. ENT: No discharge. Pharynx clear. Neck: Trachea midline. Normal thyroid. Lungs: Coarse breath sounds  CV: Regular rate. Regular rhythm. No murmur or rub. .   Abd: Non-tender. Non-distended. No masses. No organmegaly. Normal bowel sounds. Skin: Warm and dry. No nodule on exposed extremities. No rash on exposed extremities.   Ext: No cyanosis, clubbing, edema; 2+ pitting bilateral lower extremity edema L>R  Neuro: Sedated      Data:   Labs:  Lab Results   Component Value Date     10/29/2020     10/28/2020     10/27/2020    K 3.9 10/29/2020    K 5.0 10/28/2020    K 4.0 10/27/2020    CL 94 (L) 10/29/2020    CO2 23 10/29/2020 bradycardia,  diuretic and ARB use; this is exacerbated by IV contrast use  2. Acute hypoxic respiratory failure due to multilobar pneumonia  3. Community-acquired pneumonia  4. Type 2 diabetes mellitus    Plan  1. Continue IV fluid although will decrease rate  2. Adjust all meds for level of kidney function  3. Hold losartan and diuretics  4. Monitor lab  5. Monitor urine output  6.  Supportive care    D/W Resident    Will follow   Thanks      Fede Owusu MD  10:13 AM  10/29/2020

## 2020-10-29 NOTE — PROGRESS NOTES
PT SEEN AND EXAMINED. intubated, in icu. More responsive  Chart reviewed. meds reviewed. D/w nursing + family as available. EXAM: IN GENERAL, NAD. AWAKE AND . ROS NEGx10 EXCEPT:   BP (!) 113/59   Pulse 50   Temp 96.7 °F (35.9 °C) (Axillary)   Resp 16   Ht 5' 8\" (1.727 m)   Wt (!) 334 lb 14.1 oz (151.9 kg)   SpO2 96%   BMI 50.92 kg/m²   GEN: A+O NAD. HEENT: NCAT. EOMI. JOE  NECK: NO JVD. TRACH MIDLINE. NO BRUITS. NO THYROMEGALY. LUNGS: CTA BL NO RALES, RHONCHI OR WHEEZES. GOOD EXCURSION. CV: Regular rate and rhythm, NO Murmurs, Rubs, Or gallops  ABD: Soft. Nontender. Normal bowel sounds. No organomegaly  EXT:No clubbing cyanosis or edema  Neuro: Alert and oriented x 3. No focal motor deficits. No sensory deficits. Reflexes appear intact.   Labs/data reviewedLABS: CBC with Differential:    Lab Results   Component Value Date    WBC 5.7 10/29/2020    RBC 4.86 10/29/2020    HGB 13.1 10/29/2020    HCT 38.2 10/29/2020     10/29/2020    MCV 78.6 10/29/2020    MCH 27.0 10/29/2020    MCHC 34.3 10/29/2020    RDW 21.9 10/29/2020    NRBC 4.3 10/29/2020    SEGSPCT 73 01/26/2014    LYMPHOPCT 14.8 10/29/2020    MONOPCT 0.9 10/29/2020    MYELOPCT 0.9 10/29/2020    BASOPCT 0.2 10/29/2020    MONOSABS 0.06 10/29/2020    LYMPHSABS 0.86 10/29/2020    EOSABS 0.05 10/29/2020    BASOSABS 0.00 10/29/2020     Platelets:    Lab Results   Component Value Date     10/29/2020     CMP:    Lab Results   Component Value Date     10/29/2020    K 3.9 10/29/2020    K 4.0 10/27/2020    CL 94 10/29/2020    CO2 23 10/29/2020    BUN 24 10/29/2020    CREATININE 3.8 10/29/2020    GFRAA 20 10/29/2020    LABGLOM 20 10/29/2020    GLUCOSE 233 10/29/2020    PROT 6.8 10/29/2020    LABALBU 3.4 10/29/2020    CALCIUM 8.1 10/29/2020    BILITOT 0.6 10/29/2020    ALKPHOS 95 10/29/2020    AST 44 10/29/2020    ALT 31 10/29/2020     Magnesium:    Lab Results   Component Value Date    MG 1.6 10/29/2020     LDH:    Lab Results   Component Value Date     10/28/2020     PT/INR:    Lab Results   Component Value Date    PROTIME 10.9 07/21/2018    INR 0.9 07/21/2018     Last 3 Troponin:    Lab Results   Component Value Date    TROPONINI <0.01 10/26/2020    TROPONINI <0.01 05/26/2019    TROPONINI <0.01 05/26/2019     ABG:    Lab Results   Component Value Date    PH 7.369 10/29/2020    PCO2 45.4 10/29/2020    PO2 87.3 10/29/2020    HCO3 25.6 10/29/2020    BE 0.0 10/29/2020    O2SAT 95.1 10/29/2020     IRON:  No results found for: IRON  IMAGING    Xr Chest Portable    Result Date: 10/27/2020  EXAMINATION: ONE XRAY VIEW OF THE CHEST 10/27/2020 7:58 pm COMPARISON: October 26, 2020 HISTORY: ORDERING SYSTEM PROVIDED HISTORY: SOB TECHNOLOGIST PROVIDED HISTORY: Reason for exam:->SOB What reading provider will be dictating this exam?->CRC FINDINGS: There is mild cardiomegaly. There is patchy perihilar and bibasilar atelectasis/infiltrates. Tiny pleural effusions are suspected. Progressive bibasilar atelectasis/infiltrates concerning for pneumonia. Underlying CHF is considered. Xr Chest Portable    Result Date: 10/26/2020  EXAMINATION: ONE XRAY VIEW OF THE CHEST 10/26/2020 4:11 pm COMPARISON: 05/26/2019 HISTORY: ORDERING SYSTEM PROVIDED HISTORY: SOB TECHNOLOGIST PROVIDED HISTORY: Reason for exam:->SOB What reading provider will be dictating this exam?->CRC FINDINGS: Cardiac size appears stable. Discoid airspace disease seen at the left lung base which may represent atelectasis or scarring. Right infrahilar and basilar infiltrate is identified. No pneumothorax is identified. No acute osseous abnormality is identified. Right infrahilar and basilar infiltrate concerning for pneumonia. Left basilar atelectasis or scarring.        Medications:    Scheduled Meds:   pantoprazole  40 mg Intravenous Daily    And    sodium chloride (PF)  10 mL Intravenous Daily    insulin lispro  0-12 Units Subcutaneous Q4H    magnesium sulfate  4 g Intravenous Once    sodium chloride flush  10 mL Intravenous 2 times per day    Arformoterol Tartrate  15 mcg Nebulization BID    cefTRIAXone (ROCEPHIN) IV  1 g Intravenous Q24H    azithromycin  500 mg Intravenous Q24H    heparin (porcine)  5,000 Units Subcutaneous Q8H    chlorhexidine  15 mL Mouth/Throat BID    methylPREDNISolone  40 mg Intravenous Q8H    amLODIPine  10 mg Oral Daily    aspirin  81 mg Oral Daily    atorvastatin  40 mg Oral Daily    carvedilol  12.5 mg Oral BID    levothyroxine  50 mcg Oral Daily    [Held by provider] losartan  100 mg Oral Daily    budesonide  0.5 mg Nebulization BID    ipratropium-albuterol  1 ampule Inhalation Q4H WA       Continuous Infusions:   fentaNYL 5 mcg/ml in 0.9%  ml infusion 100 mcg/hr (10/29/20 0900)    propofol 50 mcg/kg/min (10/29/20 1308)    lactated ringers 75 mL/hr at 10/29/20 1026    dextrose         PRN Meds:midazolam, sodium chloride flush, acetaminophen **OR** acetaminophen, polyethylene glycol, perflutren lipid microspheres, perflutren lipid microspheres, colchicine, ondansetron, promethazine **OR** ondansetron, glucose, dextrose, glucagon (rDNA), dextrose    A/P:      Patient Active Problem List   Diagnosis    Hyperlipidemia    Type 2 diabetes mellitus without complication, without long-term current use of insulin ([facility] [address]    Atypical chest pain    Essential hypertension    Chronic acquired lymphedema    Aortic valve disease    Morbid obesity due to excess calories (HCC)    Abdominal pain    Acute respiratory failure with hypoxia (HCC)    Acute pulmonary edema (HCC)    Congestive heart failure with LV diastolic dysfunction, NYHA class 3 (HCC)    Obstructive sleep apnea    Acquired hypothyroidism    Chronic diastolic heart failure (HCC)    Nonrheumatic aortic valve stenosis    ACE-inhibitor cough    Pneumonia    Acute gout of right knee    RESP FAILURE- PROB RELATED TO OHS  R/o pna  cta noted  MAY ASK CARDIOL FOR EVAL OF AS- IF HE DOESN'T RESPOND  WILL NEED NIV FOR HOME- PROB BIPAP WITH BACKUP RATE  Wean per pulm/ccm

## 2020-10-29 NOTE — PROGRESS NOTES
Pt continues to reach for lines and tubes despite attempts to deter. Restraints continued for pt safety.  Hallie Zamorano

## 2020-10-29 NOTE — PROGRESS NOTES
Pt continues to reach for lines and tubes despite attempts to deter. Patient unable to be redirected/ follow commands. Bilateral soft wrist and bilateral ankle restraints continued for pt safety.

## 2020-10-29 NOTE — PROGRESS NOTES
Pulmonary Consultation    Admit Date: 10/26/2020  Requesting Physician: Justin Mendoza MD    CC:  Hypoxemia  HPI:  Patient went to see his PCP yesterday as sick call, and found to be hypoxemic with oxygen saturation 70% while on room air patient was admitted advised to come to the emergency department to be seen. In ER patient was found to have an abnormal chest x-rays with questionable right lower lobe infiltrates patient was started mid antibiotics he feels now somewhat better at the time of my evaluation. The ER staff attempted to have a CT scan of the chest to rule out  pulmonary embolism but unfortunately was not done due to inability of the patient to lie down due to his complaint of back discomfort discomfort and anxiety. October 28. Events noted overnight, with worsening of acute on chronic respiratory failure. Patient unfortunately failed noninvasive ventilation and required to be transferred to the ICU endotracheally intubated and full mechanical ventilatory support provided. CT scan of the chest finally done did not show pulmonary embolism he did show bilateral pulmonary infiltrates, with patchy presentation. October 29. No events overnight afebrile,  requires full vent support with FiO2 60% and PEEP of 8.   Urine output marginal    PMH:    Past Medical History:   Diagnosis Date    Acquired hypothyroidism 9/26/2017    Anxiety     Congestive heart failure with LV diastolic dysfunction, NYHA class 3 (HCC)     Depression     DM (diabetes mellitus) ([address]     Essential hypertension 6/1/2016    Gouty arthritis 2006    Hyperlipidemia     Hypertension     Lymphedema     Obesity     Obstructive sleep apnea 8/17/2009    Obstructive sleep apnea 9/26/2017    Osteoarthritis     Type 2 diabetes mellitus without complication, without long-term current use of insulin ([address] 1/15/2014     PSH:   Past Surgical History:   Procedure Laterality Date    BRONCHOSCOPY  08/10/2017    ECHO COMPL W DOP COLOR FLOW  2013         EXTERNAL EAR SURGERY  2009    keloid left ear    FOOT SURGERY      Left foot    GASTROSTOMY TUBE PLACEMENT  08/10/2017    TRACHEOSTOMY TUBE PLACEMENT  08/10/2017          Medications:     fentaNYL 5 mcg/ml in 0.9%  ml infusion 100 mcg/hr (10/29/20 0900)    propofol 45 mcg/kg/min (10/29/20 1427)    lactated ringers 75 mL/hr at 10/29/20 1026    dextrose        pantoprazole  40 mg Intravenous Daily    And    sodium chloride (PF)  10 mL Intravenous Daily    insulin lispro  0-12 Units Subcutaneous Q4H    sodium chloride flush  10 mL Intravenous 2 times per day    Arformoterol Tartrate  15 mcg Nebulization BID    cefTRIAXone (ROCEPHIN) IV  1 g Intravenous Q24H    azithromycin  500 mg Intravenous Q24H    heparin (porcine)  5,000 Units Subcutaneous Q8H    chlorhexidine  15 mL Mouth/Throat BID    methylPREDNISolone  40 mg Intravenous Q8H    amLODIPine  10 mg Oral Daily    aspirin  81 mg Oral Daily    atorvastatin  40 mg Oral Daily    carvedilol  12.5 mg Oral BID    levothyroxine  50 mcg Oral Daily    [Held by provider] losartan  100 mg Oral Daily    budesonide  0.5 mg Nebulization BID    ipratropium-albuterol  1 ampule Inhalation Q4H WA         Vitals:  Tmax:  VITALS:  BP (!) 113/56   Pulse 51   Temp 96.7 °F (35.9 °C) (Axillary)   Resp 21   Ht 5' 8\" (1.727 m)   Wt (!) 334 lb 14.1 oz (151.9 kg)   SpO2 96%   BMI 50.92 kg/m²   24HR INTAKE/OUTPUT:      Intake/Output Summary (Last 24 hours) at 10/29/2020 1451  Last data filed at 10/29/2020 1300  Gross per 24 hour   Intake 2453 ml   Output 1130 ml   Net 1323 ml     CURRENT PULSE OXIMETRY:  SpO2: 96 %  24HR PULSE OXIMETRY RANGE:  SpO2  Av.3 %  Min: 94 %  Max: 99 %       EXAM:  General: No distress. Alert. Sedated. morbid obese  Eyes: PERRL. No sclera icterus. No conjunctival injection. ENT: No discharge. Pharynx clear. Endotracheal tube in place  Neck: Trachea midline. Normal thyroid.   Resp: No accessory muscle use. No crackles. Minimal wheezing. No rhonchi. CV: Regular rate. Regular rhythm. No mumur or rub. ABD: Non-tender. Non-distended. No masses. No organmegaly. Normal bowel sounds. fatty  Skin: Warm and dry. No nodule on exposed extremities. No rash on exposed extremities. Lymph: No cervical LAD. No supraclavicular LAD. Ext: No joint deformity. No clubbing. No cyanosis. +++ edema L >> R  Neuro: Awake. Follows commands. Positive pupils/gag/corneals. Normal pain response. Lab Results:  CBC:   Recent Labs     10/26/20  1614 10/27/20  0644 10/29/20  0519   WBC 8.3 7.2 5.7   HGB 13.7 12.8 13.1   HCT 42.2 39.8 38.2   MCV 81.2 82.6 78.6*    178 194     BMP:   Recent Labs     10/27/20  0644 10/28/20  0610 10/29/20  0519    139 134   K 4.0 5.0 3.9   CL 98 100 94*   CO2 33* 27 23   PHOS  --  4.4 4.2   BUN 12 17 24*   CREATININE 1.2 2.6* 3.8*      ALB:3,BILIDIR:3,BILITOT:3,ALKPHOS:3)@  PT/INR: No results for input(s): PROTIME, INR in the last 72 hours. Cultures:  -    ABG: noted  Films:  CXR : new RLL infiltrates? ? Assessment:  · Hypoxemia. A/C Respiratory failure. M VS #3. OHS?    · Pneumonia. Normal procalcitonin. Consider atypical microorganism versus viral  · H/o Moderate HANS, off BIPAP (never started)BIPAP 16/12, empirically. Plan:  · Wean fio2, to keep spo2 > 90%  · Respiratory panel and urine antigen, so far negative. ·    (more than 300) consider to repeat test for COVID-19. · Patient may benefit of brief run with IV steroids and bronchodilators for underlying bronchoconstriction. · Echocardiogram to be done hopefully prior to discharge patient may have pulmonary hypertension and RV dysfunction. · Discussed with CCM team      Thanks for letting us see this patient in consultation. Please contact us with any questions.     Glies Lazcano M.D., APOORVA.                    pulp

## 2020-10-29 NOTE — PROGRESS NOTES
WBCUA NONE 09/16/2019    RBCUA 5-10 09/16/2019    BACTERIA NONE 09/16/2019    CLARITYU Clear 09/16/2019    SPECGRAV 1.020 09/16/2019    LEUKOCYTESUR Negative 09/16/2019    UROBILINOGEN 0.2 09/16/2019    BILIRUBINUR Negative 09/16/2019    BILIRUBINUR neg 08/30/2019    BLOODU LARGE 09/16/2019    GLUCOSEU Negative 09/16/2019     ABG:    Lab Results   Component Value Date    PH 7.369 10/29/2020    PCO2 45.4 10/29/2020    PO2 87.3 10/29/2020    HCO3 25.6 10/29/2020    BE 0.0 10/29/2020    O2SAT 95.1 10/29/2020     FLP:    Lab Results   Component Value Date    TRIG 148 10/29/2020    HDL 66 03/23/2017    LDLCALC 192 03/23/2017    LABVLDL 37 03/23/2017     TSH:    Lab Results   Component Value Date    TSH 4.340 10/27/2020       Imaging Studies:  CXR:   Mild bibasilar infiltrates versus subsegmental atelectasis, and a    probable small left basilar pleural effusion, are not significantly changed. Resident's Assessment and Plan     Margarita Crystal is a 61 y.o. male with PMHx is significant for HFpEF (Ef 65% in 2017), hx of tracheostomy and reversal (as mentioned above), moderate HANS not on CPAP, DMT2, HTN, HLD, Hypothyroidism who initially was admitted to general floors for hypoxia. Transferred to the MICU when ABG concerning for worsening respiratory acidosis. Currently intubated and being managed in the MICU for:      Acute encephalopathy 2/2 Acute hypoxic hypercapnic respiratory failure. Pt presented to the ED with hypoxia, RRT was called due to worsening respiratory acidosis. Currently intubated and sedated. - Today pt is still intubated and sedated. Saturating well with vent. - ABG pH 7.369, pCO2 45.4, pO2 87.3, HCO3 25.6  - Continue monitor mentation status, wean down sedation as tolerated. Cardiology:   Hx of chronic diastolic Heart failure  - proBNP 110 during admission. No acute exacerbation likely.    -Goal on 2/20/2017 showing stage I diastolic dysfunction with normal left ventricular size and systolic function. EF 65%. -New carvedilol. Hx HFpEF, EF 65%  -Echo done in 2017 showing 1 diastolic dysfunction with normal left ventricular size and systolic function EF 22%. -Home medication: Coreg 25 mg, olmesartan 10 mg, Losartan 10 mg,.  -Repeat ECHO pending.   - Currently hold off Coreg due to bradycardia. - Will continue monitor.       Pulmonary:   Hypercapnic respiratory failure and hypercapnic respiratory Failure 2/2 PNA vs r/o PE vs chronic OHS vs COPD vs ADHF  - Found to be hypoxic to 70% at PCP office. Transferred to [facility] ED and placed on 4 L NC initially. On floor ABG showing respiratory acidosis and placed on BiPAP And transferred to MICU. - Transferred from floor with ABG showing respiratory acidosis : 7.188/ 91.2/ 78.9/33   - on  BiPAP for 2 hours; initial ABG showed improvement in acidosis; however at 3 AM repeat ABG respiratory acidosis AND mental status changes. Pt no longer responding/ lethargic. Pt intubated. - Continue AC/VC 16/500/ 100%/8   - follow ABG   - need to r/o PE. CTA after patient intubated. - DVT US b/l LE pending.   - pro . Doubt ADHF causing symptoms. Likely pulmonary.   - Nebulizer treatment.   - Repeat CXR in AM.      Acute hypoxic hypercapnic respiratory failure 2/2 bacterial pneumonia versus COPD versus ADHF versus chronic OHS  Pt has Hx chronic HANS not on CPAP at home. ABG showed pH 7.188/91.2/78.9/33 on RRT. Patient is not tolerating BiPAP and currently intubated. - Today Pt saturating 95% on vent. ABG showed H 7.369, pCO2 45.4, pO2 87.3, HCO3 25.6.  - Continue to monitor respiratory status, wean down FiO2 as tolerated  - Pro  on presentation. ECHO is pending.   - US dup LE negative for DVT. - Continue breathing treatment  - Monitor daily CXR. CBC.   - Pulmonology consulted, further recommendations appreciated.     ?CAP  - CXR concerning for new infiltrates on 10/27/2020.   - Patient is afebrile, WBC 5.7. procalcitonin 0.11  - Respiratory cultures negative, respiratory panel negative. - Continue Azithromycin and Ceftriaxone day 2.  - Monitor daily CBC, vitals, CXR.      NANCY stage III likely pre renal 2/2 diuretic use, contrast agent vs? cardiorenal syndrome  -  Creatinine 3.8 ( baseline 1.1)  - Currently hold ARB/ diuretics. Continue IVF LR at 75 ml/hr.  - Nephrology consulted. Further recommendations appreciated. - Continue monitor daily BMP, renal function. Avoid nephrotoxicity. DMT2  - Hold home metfomrin. Place of LDSS in hospital.  - Monitor BG AC/HS. - Goal -180 in MICU. Hx of HTN  - Continue home Losartan and Amlodipine.      HLD  - Continue home Atorvastatin. Marybel Santiago MD, PGY-1    Attending physician: Dr. Mukund Stevens. I personally saw, examined and provided care for the patient. Radiographs, labs and medication list were reviewed by me independently. I spoke with bedside nursing, therapists and consultants. Critical care services and times documented are independent of procedures and multidisciplinary rounds with Residents. Additionally comprehensive, multidisciplinary rounds were conducted with the MICU team. The case was discussed in detail and plans for care were established. Review of Residents documentation was conducted and revisions were made as appropriate. I agree with the above documented exam, problem list and plan of care.   Chadwick Peña MD   CCT excluding procedures:40'

## 2020-10-29 NOTE — PROGRESS NOTES
Pt continues to reach for lines and tubes despite attempts to deter. Patient unable to be redirected/ follow commands. Bilateral soft wrist restraints continued for pt safety.

## 2020-10-30 ENCOUNTER — APPOINTMENT (OUTPATIENT)
Dept: GENERAL RADIOLOGY | Age: 60
DRG: 003 | End: 2020-10-30
Payer: MEDICARE

## 2020-10-30 LAB
AADO2: 221.2 MMHG
ALBUMIN SERPL-MCNC: 3.1 G/DL (ref 3.5–5.2)
ALP BLD-CCNC: 101 U/L (ref 40–129)
ALT SERPL-CCNC: 72 U/L (ref 0–40)
ANION GAP SERPL CALCULATED.3IONS-SCNC: 15 MMOL/L (ref 7–16)
ANISOCYTOSIS: ABNORMAL
AST SERPL-CCNC: 154 U/L (ref 0–39)
B.E.: 1.2 MMOL/L (ref -3–3)
BASOPHILS ABSOLUTE: 0 E9/L (ref 0–0.2)
BASOPHILS RELATIVE PERCENT: 0.2 % (ref 0–2)
BILIRUB SERPL-MCNC: 0.6 MG/DL (ref 0–1.2)
BUN BLDV-MCNC: 32 MG/DL (ref 8–23)
CALCIUM SERPL-MCNC: 7.9 MG/DL (ref 8.6–10.2)
CHLORIDE BLD-SCNC: 99 MMOL/L (ref 98–107)
CO2: 25 MMOL/L (ref 22–29)
COHB: 0.6 % (ref 0–1.5)
CREAT SERPL-MCNC: 3.7 MG/DL (ref 0.7–1.2)
CRITICAL: ABNORMAL
DATE ANALYZED: ABNORMAL
DATE OF COLLECTION: ABNORMAL
EOSINOPHILS ABSOLUTE: 0 E9/L (ref 0.05–0.5)
EOSINOPHILS RELATIVE PERCENT: 0 % (ref 0–6)
FIO2: 50 %
GFR AFRICAN AMERICAN: 20
GFR NON-AFRICAN AMERICAN: 20 ML/MIN/1.73
GLUCOSE BLD-MCNC: 234 MG/DL (ref 74–99)
HCO3: 25.2 MMOL/L (ref 22–26)
HCT VFR BLD CALC: 35.4 % (ref 37–54)
HEMOGLOBIN: 12.2 G/DL (ref 12.5–16.5)
HHB: 5.3 % (ref 0–5)
LAB: ABNORMAL
LYMPHOCYTES ABSOLUTE: 0.42 E9/L (ref 1.5–4)
LYMPHOCYTES RELATIVE PERCENT: 7.9 % (ref 20–42)
Lab: ABNORMAL
MAGNESIUM: 2.4 MG/DL (ref 1.6–2.6)
MCH RBC QN AUTO: 26.6 PG (ref 26–35)
MCHC RBC AUTO-ENTMCNC: 34.5 % (ref 32–34.5)
MCV RBC AUTO: 77.3 FL (ref 80–99.9)
METER GLUCOSE: 209 MG/DL (ref 74–99)
METER GLUCOSE: 216 MG/DL (ref 74–99)
METER GLUCOSE: 223 MG/DL (ref 74–99)
METER GLUCOSE: 224 MG/DL (ref 74–99)
METER GLUCOSE: 232 MG/DL (ref 74–99)
METER GLUCOSE: 283 MG/DL (ref 74–99)
METHB: 0.3 % (ref 0–1.5)
MODE: AC
MONOCYTES ABSOLUTE: 0.11 E9/L (ref 0.1–0.95)
MONOCYTES RELATIVE PERCENT: 1.8 % (ref 2–12)
MYELOCYTE PERCENT: 0.9 % (ref 0–0)
NEUTROPHILS ABSOLUTE: 4.77 E9/L (ref 1.8–7.3)
NEUTROPHILS RELATIVE PERCENT: 89.5 % (ref 43–80)
NUCLEATED RED BLOOD CELLS: 6.1 /100 WBC
O2 CONTENT: 17.5 ML/DL
O2 SATURATION: 94.7 % (ref 92–98.5)
O2HB: 93.8 % (ref 94–97)
OPERATOR ID: ABNORMAL
OVALOCYTES: ABNORMAL
PATIENT TEMP: 37 C
PCO2: 38.1 MMHG (ref 35–45)
PDW BLD-RTO: 21.6 FL (ref 11.5–15)
PEEP/CPAP: 8 CMH2O
PFO2: 1.6 MMHG/%
PH BLOOD GAS: 7.44 (ref 7.35–7.45)
PHOSPHORUS: 4.7 MG/DL (ref 2.5–4.5)
PLATELET # BLD: 188 E9/L (ref 130–450)
PMV BLD AUTO: 9.4 FL (ref 7–12)
PO2: 79.9 MMHG (ref 75–100)
POIKILOCYTES: ABNORMAL
POLYCHROMASIA: ABNORMAL
POTASSIUM SERPL-SCNC: 3.9 MMOL/L (ref 3.5–5)
RBC # BLD: 4.58 E12/L (ref 3.8–5.8)
RI(T): 2.77
RR MECHANICAL: 16 B/MIN
SCHISTOCYTES: ABNORMAL
SODIUM BLD-SCNC: 139 MMOL/L (ref 132–146)
SOURCE, BLOOD GAS: ABNORMAL
TARGET CELLS: ABNORMAL
THB: 13.2 G/DL (ref 11.5–16.5)
TIME ANALYZED: 417
TOTAL PROTEIN: 6 G/DL (ref 6.4–8.3)
TRIGL SERPL-MCNC: 89 MG/DL (ref 0–149)
URINE CULTURE, ROUTINE: NORMAL
VT MECHANICAL: 500 ML
WBC # BLD: 5.3 E9/L (ref 4.5–11.5)

## 2020-10-30 PROCEDURE — 71045 X-RAY EXAM CHEST 1 VIEW: CPT

## 2020-10-30 PROCEDURE — 94003 VENT MGMT INPAT SUBQ DAY: CPT

## 2020-10-30 PROCEDURE — 36556 INSERT NON-TUNNEL CV CATH: CPT

## 2020-10-30 PROCEDURE — 6360000002 HC RX W HCPCS: Performed by: INTERNAL MEDICINE

## 2020-10-30 PROCEDURE — 2580000003 HC RX 258: Performed by: INTERNAL MEDICINE

## 2020-10-30 PROCEDURE — 6370000000 HC RX 637 (ALT 250 FOR IP): Performed by: INTERNAL MEDICINE

## 2020-10-30 PROCEDURE — 2500000003 HC RX 250 WO HCPCS: Performed by: INTERNAL MEDICINE

## 2020-10-30 PROCEDURE — 84478 ASSAY OF TRIGLYCERIDES: CPT

## 2020-10-30 PROCEDURE — 80053 COMPREHEN METABOLIC PANEL: CPT

## 2020-10-30 PROCEDURE — 84100 ASSAY OF PHOSPHORUS: CPT

## 2020-10-30 PROCEDURE — C9113 INJ PANTOPRAZOLE SODIUM, VIA: HCPCS | Performed by: INTERNAL MEDICINE

## 2020-10-30 PROCEDURE — 36591 DRAW BLOOD OFF VENOUS DEVICE: CPT

## 2020-10-30 PROCEDURE — 85025 COMPLETE CBC W/AUTO DIFF WBC: CPT

## 2020-10-30 PROCEDURE — 93306 TTE W/DOPPLER COMPLETE: CPT

## 2020-10-30 PROCEDURE — 87081 CULTURE SCREEN ONLY: CPT

## 2020-10-30 PROCEDURE — 2000000000 HC ICU R&B

## 2020-10-30 PROCEDURE — 6360000004 HC RX CONTRAST MEDICATION: Performed by: INTERNAL MEDICINE

## 2020-10-30 PROCEDURE — 82805 BLOOD GASES W/O2 SATURATION: CPT

## 2020-10-30 PROCEDURE — 82962 GLUCOSE BLOOD TEST: CPT

## 2020-10-30 PROCEDURE — 83735 ASSAY OF MAGNESIUM: CPT

## 2020-10-30 PROCEDURE — 05HM33Z INSERTION OF INFUSION DEVICE INTO RIGHT INTERNAL JUGULAR VEIN, PERCUTANEOUS APPROACH: ICD-10-PCS | Performed by: INTERNAL MEDICINE

## 2020-10-30 PROCEDURE — 94640 AIRWAY INHALATION TREATMENT: CPT

## 2020-10-30 RX ORDER — INSULIN GLARGINE 100 [IU]/ML
10 INJECTION, SOLUTION SUBCUTANEOUS NIGHTLY
Status: DISCONTINUED | OUTPATIENT
Start: 2020-10-30 | End: 2020-10-31

## 2020-10-30 RX ORDER — MIDAZOLAM HYDROCHLORIDE 1 MG/ML
4 INJECTION INTRAMUSCULAR; INTRAVENOUS ONCE
Status: COMPLETED | OUTPATIENT
Start: 2020-10-30 | End: 2020-10-30

## 2020-10-30 RX ADMIN — Medication 175 MCG/HR: at 07:25

## 2020-10-30 RX ADMIN — DEXMEDETOMIDINE 0.2 MCG/KG/HR: 100 INJECTION, SOLUTION, CONCENTRATE INTRAVENOUS at 12:29

## 2020-10-30 RX ADMIN — CHLORHEXIDINE GLUCONATE 0.12% ORAL RINSE 15 ML: 1.2 LIQUID ORAL at 20:05

## 2020-10-30 RX ADMIN — METHYLPREDNISOLONE SODIUM SUCCINATE 40 MG: 40 INJECTION, POWDER, FOR SOLUTION INTRAMUSCULAR; INTRAVENOUS at 20:04

## 2020-10-30 RX ADMIN — PROPOFOL 50 MCG/KG/MIN: 10 INJECTION, EMULSION INTRAVENOUS at 06:02

## 2020-10-30 RX ADMIN — PROPOFOL 50 MCG/KG/MIN: 10 INJECTION, EMULSION INTRAVENOUS at 08:49

## 2020-10-30 RX ADMIN — Medication 10 ML: at 09:04

## 2020-10-30 RX ADMIN — IPRATROPIUM BROMIDE AND ALBUTEROL SULFATE 1 AMPULE: 2.5; .5 SOLUTION RESPIRATORY (INHALATION) at 20:40

## 2020-10-30 RX ADMIN — INSULIN LISPRO 6 UNITS: 100 INJECTION, SOLUTION INTRAVENOUS; SUBCUTANEOUS at 20:08

## 2020-10-30 RX ADMIN — IPRATROPIUM BROMIDE AND ALBUTEROL SULFATE 1 AMPULE: 2.5; .5 SOLUTION RESPIRATORY (INHALATION) at 16:35

## 2020-10-30 RX ADMIN — METHYLPREDNISOLONE SODIUM SUCCINATE 40 MG: 40 INJECTION, POWDER, FOR SOLUTION INTRAMUSCULAR; INTRAVENOUS at 09:03

## 2020-10-30 RX ADMIN — LEVOTHYROXINE SODIUM 50 MCG: 0.05 TABLET ORAL at 06:05

## 2020-10-30 RX ADMIN — ASPIRIN 81 MG CHEWABLE TABLET 81 MG: 81 TABLET CHEWABLE at 09:00

## 2020-10-30 RX ADMIN — INSULIN LISPRO 4 UNITS: 100 INJECTION, SOLUTION INTRAVENOUS; SUBCUTANEOUS at 12:43

## 2020-10-30 RX ADMIN — SODIUM CHLORIDE, PRESERVATIVE FREE 10 ML: 5 INJECTION INTRAVENOUS at 09:04

## 2020-10-30 RX ADMIN — AMLODIPINE BESYLATE 10 MG: 10 TABLET ORAL at 08:59

## 2020-10-30 RX ADMIN — SODIUM CHLORIDE, POTASSIUM CHLORIDE, SODIUM LACTATE AND CALCIUM CHLORIDE: 600; 310; 30; 20 INJECTION, SOLUTION INTRAVENOUS at 07:27

## 2020-10-30 RX ADMIN — ARFORMOTEROL TARTRATE 15 MCG: 15 SOLUTION RESPIRATORY (INHALATION) at 09:09

## 2020-10-30 RX ADMIN — Medication 175 MCG/HR: at 16:38

## 2020-10-30 RX ADMIN — HEPARIN SODIUM 5000 UNITS: 10000 INJECTION INTRAVENOUS; SUBCUTANEOUS at 00:17

## 2020-10-30 RX ADMIN — INSULIN LISPRO 4 UNITS: 100 INJECTION, SOLUTION INTRAVENOUS; SUBCUTANEOUS at 16:38

## 2020-10-30 RX ADMIN — Medication 175 MCG/HR: at 00:14

## 2020-10-30 RX ADMIN — PROPOFOL 30 MCG/KG/MIN: 10 INJECTION, EMULSION INTRAVENOUS at 15:39

## 2020-10-30 RX ADMIN — INSULIN LISPRO 4 UNITS: 100 INJECTION, SOLUTION INTRAVENOUS; SUBCUTANEOUS at 03:57

## 2020-10-30 RX ADMIN — DEXMEDETOMIDINE 0.8 MCG/KG/HR: 100 INJECTION, SOLUTION, CONCENTRATE INTRAVENOUS at 17:54

## 2020-10-30 RX ADMIN — Medication 175 MCG/HR: at 23:57

## 2020-10-30 RX ADMIN — BUDESONIDE 500 MCG: 0.5 SUSPENSION RESPIRATORY (INHALATION) at 09:09

## 2020-10-30 RX ADMIN — PROPOFOL 30 MCG/KG/MIN: 10 INJECTION, EMULSION INTRAVENOUS at 20:03

## 2020-10-30 RX ADMIN — INSULIN GLARGINE 10 UNITS: 100 INJECTION, SOLUTION SUBCUTANEOUS at 20:08

## 2020-10-30 RX ADMIN — DEXMEDETOMIDINE 0.8 MCG/KG/HR: 100 INJECTION, SOLUTION, CONCENTRATE INTRAVENOUS at 21:41

## 2020-10-30 RX ADMIN — BUDESONIDE 500 MCG: 0.5 SUSPENSION RESPIRATORY (INHALATION) at 20:40

## 2020-10-30 RX ADMIN — PERFLUTREN 4.4 MG: 6.52 INJECTION, SUSPENSION INTRAVENOUS at 11:37

## 2020-10-30 RX ADMIN — INSULIN LISPRO 4 UNITS: 100 INJECTION, SOLUTION INTRAVENOUS; SUBCUTANEOUS at 09:02

## 2020-10-30 RX ADMIN — PANTOPRAZOLE SODIUM 40 MG: 40 INJECTION, POWDER, FOR SOLUTION INTRAVENOUS at 09:04

## 2020-10-30 RX ADMIN — INSULIN LISPRO 4 UNITS: 100 INJECTION, SOLUTION INTRAVENOUS; SUBCUTANEOUS at 00:17

## 2020-10-30 RX ADMIN — ATORVASTATIN CALCIUM 40 MG: 40 TABLET, FILM COATED ORAL at 09:00

## 2020-10-30 RX ADMIN — IPRATROPIUM BROMIDE AND ALBUTEROL SULFATE 1 AMPULE: 2.5; .5 SOLUTION RESPIRATORY (INHALATION) at 09:09

## 2020-10-30 RX ADMIN — IPRATROPIUM BROMIDE AND ALBUTEROL SULFATE 1 AMPULE: 2.5; .5 SOLUTION RESPIRATORY (INHALATION) at 11:33

## 2020-10-30 RX ADMIN — AZITHROMYCIN MONOHYDRATE 500 MG: 500 INJECTION, POWDER, LYOPHILIZED, FOR SOLUTION INTRAVENOUS at 04:21

## 2020-10-30 RX ADMIN — PROPOFOL 50 MCG/KG/MIN: 10 INJECTION, EMULSION INTRAVENOUS at 01:01

## 2020-10-30 RX ADMIN — VANCOMYCIN HYDROCHLORIDE 2000 MG: 10 INJECTION, POWDER, LYOPHILIZED, FOR SOLUTION INTRAVENOUS at 15:39

## 2020-10-30 RX ADMIN — PROPOFOL 50 MCG/KG/MIN: 10 INJECTION, EMULSION INTRAVENOUS at 11:39

## 2020-10-30 RX ADMIN — PROPOFOL 50 MCG/KG/MIN: 10 INJECTION, EMULSION INTRAVENOUS at 03:56

## 2020-10-30 RX ADMIN — ARFORMOTEROL TARTRATE 15 MCG: 15 SOLUTION RESPIRATORY (INHALATION) at 20:40

## 2020-10-30 RX ADMIN — HEPARIN SODIUM 5000 UNITS: 10000 INJECTION INTRAVENOUS; SUBCUTANEOUS at 09:00

## 2020-10-30 RX ADMIN — Medication 10 ML: at 20:05

## 2020-10-30 RX ADMIN — CHLORHEXIDINE GLUCONATE 0.12% ORAL RINSE 15 ML: 1.2 LIQUID ORAL at 09:00

## 2020-10-30 RX ADMIN — CEFTRIAXONE SODIUM 1 G: 1 INJECTION, POWDER, FOR SOLUTION INTRAMUSCULAR; INTRAVENOUS at 04:21

## 2020-10-30 RX ADMIN — HEPARIN SODIUM 5000 UNITS: 10000 INJECTION INTRAVENOUS; SUBCUTANEOUS at 16:39

## 2020-10-30 RX ADMIN — MIDAZOLAM 4 MG: 1 INJECTION INTRAMUSCULAR; INTRAVENOUS at 01:00

## 2020-10-30 ASSESSMENT — PULMONARY FUNCTION TESTS
PIF_VALUE: 33
PIF_VALUE: 35
PIF_VALUE: 37
PIF_VALUE: 32
PIF_VALUE: 34
PIF_VALUE: 33
PIF_VALUE: 41
PIF_VALUE: 41
PIF_VALUE: 33
PIF_VALUE: 36
PIF_VALUE: 32
PIF_VALUE: 33
PIF_VALUE: 33
PIF_VALUE: 36
PIF_VALUE: 34
PIF_VALUE: 38
PIF_VALUE: 34
PIF_VALUE: 33
PIF_VALUE: 37
PIF_VALUE: 32
PIF_VALUE: 33
PIF_VALUE: 34
PIF_VALUE: 34

## 2020-10-30 ASSESSMENT — PAIN SCALES - GENERAL
PAINLEVEL_OUTOF10: 0
PAINLEVEL_OUTOF10: 3
PAINLEVEL_OUTOF10: 0
PAINLEVEL_OUTOF10: 0

## 2020-10-30 NOTE — PROGRESS NOTES
[address] [facility]  Department of Internal Medicine   Internal Medicine Residency   MICU Progress Note    Patient:  Krista Nieto 61 y.o. male  MRN: [id number]     Date of Service: 10/30/2020    Allergy: Bee venom and Shellfish-derived products    Subjective   Seen and examined vision deficit this morning. Patient is still intubated and sedated on propofol at fentanyl. Patient open eyes when called but is not following command. Creatinine 3.7 trending down. Vent settings: AC/VC/16/500/8/50%. AB.43/38.1/79.9/25.2. I/O 4/2l( +4L)     24h change: no acute event overnight. Objective     VS: /68   Pulse 56   Temp 97.6 °F (36.4 °C) (Axillary)   Resp 26   Ht 5' 8\" (1.727 m)   Wt (!) 334 lb 14.1 oz (151.9 kg)   SpO2 92%   BMI 50.92 kg/m²           I & O - 24hr:     Intake/Output Summary (Last 24 hours) at 10/30/2020 1424  Last data filed at 10/30/2020 1400  Gross per 24 hour   Intake 3890 ml   Output 2150 ml   Net 1740 ml       Physical Exam:  · General Appearance: intubated and sedated  · Neck: no adenopathy, no carotid bruit, no JVD, supple, symmetrical, trachea midline and thyroid not enlarged, symmetric, no tenderness/mass/nodules  · Lung: rales RLL and RML  · Heart: bradycardic, normal S1 S2, no murmur or rub  · Abdomen: soft, non distended, non tender, no organomegaly. · Extremities:  Left lower leg 2+ non pitting edema. Right lower leg chronic lymphedema. · Musculoskeletal: No joint swelling, no muscle tenderness. ROM normal in all joints of extremities.    · Neurologic: Mental status: Patient is sedated, unresponsive    Lines     site day    Art line   None    TLC R IJ 1   PICC None    Hemoaccess None    Oxygen:     none  Mechanical Ventilation:   Mode: A/C    TV:500 ml RR: 16  PEEP 8cmH2O FiO2 50%    ABG:     Lab Results   Component Value Date    PH 7.439 10/30/2020    PCO2 38.1 10/30/2020    PO2 79.9 10/30/2020    HCO3 25.2 10/30/2020    BE 1.2 10/30/2020    THB 13.2 10/30/2020    O2SAT 94.7 10/30/2020        Medications     Infusions: (Fluid, Sedation, Vasopressors)  IVF:    LR at rate 75 ml/hr  Vasopressors   none  Sedation   Propofol at rate of 10 mcg/min Fentanyl at rate 25 mcg/min    Nutrition:   TF low calorie high protein  ATB:   Antibiotics  Days   Azithromycin 2   Ceftriaxone  2         Patient currently has   Urinary cath  Restraints  DVT prophylaxis/ GI prophylaxis,    Labs     CBC with Differential:    Lab Results   Component Value Date    WBC 5.3 10/30/2020    RBC 4.58 10/30/2020    HGB 12.2 10/30/2020    HCT 35.4 10/30/2020     10/30/2020    MCV 77.3 10/30/2020    MCH 26.6 10/30/2020    MCHC 34.5 10/30/2020    RDW 21.6 10/30/2020    NRBC 6.1 10/30/2020    SEGSPCT 73 01/26/2014    LYMPHOPCT 7.9 10/30/2020    MONOPCT 1.8 10/30/2020    MYELOPCT 0.9 10/30/2020    BASOPCT 0.2 10/30/2020    MONOSABS 0.11 10/30/2020    LYMPHSABS 0.42 10/30/2020    EOSABS 0.00 10/30/2020    BASOSABS 0.00 10/30/2020     CMP:    Lab Results   Component Value Date     10/30/2020    K 3.9 10/30/2020    K 4.0 10/27/2020    CL 99 10/30/2020    CO2 25 10/30/2020    BUN 32 10/30/2020    CREATININE 3.7 10/30/2020    GFRAA 20 10/30/2020    LABGLOM 20 10/30/2020    GLUCOSE 234 10/30/2020    PROT 6.0 10/30/2020    LABALBU 3.1 10/30/2020    CALCIUM 7.9 10/30/2020    BILITOT 0.6 10/30/2020    ALKPHOS 101 10/30/2020     10/30/2020    ALT 72 10/30/2020     Ionized Calcium:  No results found for: IONCA  Magnesium:    Lab Results   Component Value Date    MG 2.4 10/30/2020     Phosphorus:    Lab Results   Component Value Date    PHOS 4.7 10/30/2020     LDH:    Lab Results   Component Value Date     10/28/2020     PT/INR:    Lab Results   Component Value Date    PROTIME 10.9 07/21/2018    INR 0.9 07/21/2018     Troponin:    Lab Results   Component Value Date    TROPONINI <0.01 10/26/2020     U/A:    Lab Results   Component Value Date    NITRITE neg 08/30/2019    COLORU Yellow CPAP at home. ABG showed pH 7.188/91.2/78.9/33 on RRT. Patient is not tolerating BiPAP and currently intubated. - Today Pt saturating 95% on vent. AB.43/38.1/79.9/25.2.  - Continue to monitor respiratory status, wean down FiO2 as tolerated. Patient not able to tolerate PS today. - Pro  on presentation. ECHO still pending.   - US dup LE negative for DVT. CTA negative for PE. COVID -19 negative x2  - Continue breathing treatment  - Monitor daily CXR. CBC.   - Pulmonology consulted, further recommendations appreciated. NANCY stage III likely pre renal 2/2 diuretic use, contrast agent vs? cardiorenal syndrome.   - Creatinine 3.8 ( baseline 1.1) -> 4.0 - 3.7  - Currently hold ARB/ diuretics. Continue IVF LR, reduce rate to 50 ml/hr.   - I/O 4L/1.8L ( +4L). - Nephrology consulted. Further recommendations appreciated. - Continue monitor daily BMP, renal function. Monitor I/O. Avoid nephrotoxicity. ?CAP  - CXR concerning for new infiltrates on 10/27/2020.   - Patient is afebrile, WBC 5.7. procalcitonin 0.11  - Respiratory cultures negative, respiratory panel negative. - Continue Azithromycin and Ceftriaxone day 3.  - Monitor daily CBC, vitals, CXR. Hx HFpEF, EF 65%  -Echo done in 2017 showing 1 diastolic dysfunction with normal left ventricular size and systolic function EF 60%. -Home medication: Coreg 25 mg, olmesartan 10 mg, Losartan 10 mg,.  -Repeat ECHO pending.   - Currently hold off Coreg due to bradycardia. - Will continue monitor.      DMT2  - Hold home metfomrin. Place of LDSS in hospital.  - Monitor BG AC/HS. - Goal -180 in MICU. Hx of HTN  - Continue home Losartan and Amlodipine.      HLD  - Continue home Atorvastatin. Giuliana Dupont MD, PGY-1    Attending physician: Dr. Lenwood Merlin.        Addendum:    ECHO:     Summary    Technically suboptimal and limited study.    Left ventricular internal dimensions were normal in diastole and systole.    Moderate left ventricular concentric hypertrophy noted.    No regional wall motion abnormalities seen.    The left atrium is borderline dilated.    The aortic valve leaflets were not well visualized.    The aortic valve appears moderately sclerotic.    Mild tricuspid regurgitation.        Signature        ----------------------------------------------------------------    Electronically signed by Yovana Mitchell MD(Interpreting    [id number]) on 10/30/2020 05:38 PM    ----------------------------------------------------------------         I personally saw, examined and provided care for the patient. Radiographs, labs and medication list were reviewed by me independently. I spoke with bedside nursing, therapists and consultants. Critical care services and times documented are independent of procedures and multidisciplinary rounds with Residents. Additionally comprehensive, multidisciplinary rounds were conducted with the MICU team. The case was discussed in detail and plans for care were established. Review of Residents documentation was conducted and revisions were made as appropriate. I agree with the above documented exam, problem list and plan of care.     Mana Ring MD   CCT excluding procedures:40'

## 2020-10-30 NOTE — PROGRESS NOTES
Nephrology Progress Note  Patient's Name: Cortez Curiel  9:16 AM  10/30/2020        Reason for Consult: Acute kidney  Requesting Physician:  Hang Valderrama MD    Chief Complaint: Shortness of breath  History Obtained From:  EHR; spouse    History of Present Ilness:    Cortez Curiel is a 61 y.o. male with a history of COPD, hypertension, hyperlipidemia and diabetes mellitus. He presented to ED 2 days ago with complaints of shortness of breath. According to patient's spouse he had been complaining of shortness of breath over the course of several weeks. This has gotten progressively worse. He went to see his PCP on the day of admission. In office at the time pulse oximeter obtained revealed his oxygen saturation to be in the 70s. He was instructed to proceed to ED for further evaluation. On presentation to ED his initial vital signs showed a blood pressure of 168/71, temperature 97.5 and pulse of 93. His pulse oximeter at the time was noted to be 98% on room air. Laboratory data was significant for a BUN of 11, creatinine 1.1, WBC of 8.3. Rapid COVID-19 testing was negative. A chest x-ray performed revealed right infrahilar and basilar infiltrate concerning for pneumonia. A CTA was ordered but patient declined because of his inability to lay flat. Patient was given ceftriaxone and doxycycline followed by admission to telemetry. 2 days ago an RRT was called as patient was noted to be increasingly more in respiratory distress. He was transferred to MICU and intubated. Laboratory data yesterday showed worsening serum creatinine to 2.8. This a.m. further worsening to 3.6. He has been nonoliguric. Hence renal consult.     10/30: N new acute issues overnight; remains intubated        Allergies:  Bee venom and Shellfish-derived products    Current Medications:    insulin glargine (LANTUS) injection vial 10 Units, Nightly  [START ON 10/31/2020] insulin lispro (HUMALOG) injection vial 0-6 Units, Q4H  pantoprazole (PROTONIX) injection 40 mg, Daily    And  sodium chloride (PF) 0.9 % injection 10 mL, Daily  insulin lispro (HUMALOG) injection vial 0-12 Units, Q4H  midazolam (VERSED) injection 2 mg, Q4H PRN  methylPREDNISolone sodium (SOLU-MEDROL) injection 40 mg, Q12H  fentaNYL 5 mcg/ml in 0.9%  ml infusion, Continuous  propofol injection, Titrated  sodium chloride flush 0.9 % injection 10 mL, 2 times per day  sodium chloride flush 0.9 % injection 10 mL, PRN  acetaminophen (TYLENOL) tablet 650 mg, Q6H PRN    Or  acetaminophen (TYLENOL) suppository 650 mg, Q6H PRN  Arformoterol Tartrate (BROVANA) nebulizer solution 15 mcg, BID  polyethylene glycol (GLYCOLAX) packet 17 g, Daily PRN  cefTRIAXone (ROCEPHIN) 1 g in sterile water 10 mL IV syringe, Q24H  azithromycin (ZITHROMAX) 500 mg in D5W 250ml Vial Mate, Q24H  heparin (porcine) injection 5,000 Units, Q8H  perflutren lipid microspheres (DEFINITY) injection 1.65 mg, ONCE PRN  chlorhexidine (PERIDEX) 0.12 % solution 15 mL, BID  lactated ringers infusion, Continuous  perflutren lipid microspheres (DEFINITY) injection 1.65 mg, ONCE PRN  amLODIPine (NORVASC) tablet 10 mg, Daily  aspirin chewable tablet 81 mg, Daily  atorvastatin (LIPITOR) tablet 40 mg, Daily  [Held by provider] carvedilol (COREG) tablet 12.5 mg, BID  colchicine (COLCRYS) tablet 0.6 mg, BID PRN  levothyroxine (SYNTHROID) tablet 50 mcg, Daily  [Held by provider] losartan (COZAAR) tablet 100 mg, Daily  ondansetron (ZOFRAN-ODT) disintegrating tablet 4 mg, Q8H PRN  promethazine (PHENERGAN) tablet 12.5 mg, Q6H PRN    Or  ondansetron (ZOFRAN) injection 4 mg, Q6H PRN  budesonide (PULMICORT) nebulizer suspension 500 mcg, BID  ipratropium-albuterol (DUONEB) nebulizer solution 1 ampule, Q4H WA  glucose (GLUTOSE) 40 % oral gel 15 g, PRN  dextrose 50 % IV solution, PRN  glucagon (rDNA) injection 1 mg, PRN  dextrose 5 % solution, PRN        Review of Systems:   Review of systems not obtained due to patient factors. Physical exam:  Vitals:    10/30/20 0900   BP: 116/63   Pulse: 51   Resp: 27   Temp:    SpO2: 94%           General: Intubated sedated  Eyes: PERRL. No sclera icterus. No conjunctival injection. ENT: No discharge. Pharynx clear. Neck: Trachea midline. Normal thyroid. Lungs: Coarse breath sounds  CV: Regular rate. Regular rhythm. No murmur or rub. .   Abd: Non-tender. Non-distended. No masses. No organmegaly. Normal bowel sounds. Skin: Warm and dry. No nodule on exposed extremities. No rash on exposed extremities. Ext: No cyanosis, clubbing, edema; 2+ pitting bilateral lower extremity edema L>R  Neuro: Sedated      Data:   Labs:  Lab Results   Component Value Date     10/30/2020     10/29/2020     10/29/2020    K 3.9 10/30/2020    K 3.9 10/29/2020    K 3.9 10/29/2020    CL 99 10/30/2020    CO2 25 10/30/2020    CO2 23 10/29/2020    CO2 23 10/29/2020    CREATININE 3.7 (H) 10/30/2020    CREATININE 4.0 (H) 10/29/2020    CREATININE 3.8 (H) 10/29/2020    BUN 32 (H) 10/30/2020    BUN 28 (H) 10/29/2020    BUN 24 (H) 10/29/2020    GLUCOSE 234 (H) 10/30/2020    GLUCOSE 235 (H) 10/29/2020    GLUCOSE 233 (H) 10/29/2020    PHOS 4.7 (H) 10/30/2020    PHOS 4.2 10/29/2020    PHOS 4.4 10/28/2020    WBC 5.3 10/30/2020    WBC 5.7 10/29/2020    WBC 7.2 10/27/2020    HGB 12.2 (L) 10/30/2020    HGB 13.1 10/29/2020    HGB 12.8 10/27/2020    HCT 35.4 (L) 10/30/2020    HCT 38.2 10/29/2020    HCT 39.8 10/27/2020    MCV 77.3 (L) 10/30/2020     10/30/2020         Imaging:  Xr Chest Portable    Result Date: 10/27/2020  EXAMINATION: ONE XRAY VIEW OF THE CHEST 10/27/2020 7:58 pm COMPARISON: October 26, 2020 HISTORY: ORDERING SYSTEM PROVIDED HISTORY: SOB TECHNOLOGIST PROVIDED HISTORY: Reason for exam:->SOB What reading provider will be dictating this exam?->CRC FINDINGS: There is mild cardiomegaly. There is patchy perihilar and bibasilar atelectasis/infiltrates. Tiny pleural effusions are suspected. Progressive bibasilar atelectasis/infiltrates concerning for pneumonia. Underlying CHF is considered. Xr Chest Portable    Result Date: 10/26/2020  EXAMINATION: ONE XRAY VIEW OF THE CHEST 10/26/2020 4:11 pm COMPARISON: 05/26/2019 HISTORY: ORDERING SYSTEM PROVIDED HISTORY: SOB TECHNOLOGIST PROVIDED HISTORY: Reason for exam:->SOB What reading provider will be dictating this exam?->CRC FINDINGS: Cardiac size appears stable. Discoid airspace disease seen at the left lung base which may represent atelectasis or scarring. Right infrahilar and basilar infiltrate is identified. No pneumothorax is identified. No acute osseous abnormality is identified. Right infrahilar and basilar infiltrate concerning for pneumonia. Left basilar atelectasis or scarring. Assessment  1. Acute kidney injury prerenal ; hemodynamically mediated due to the combination of bradycardia,  diuretic and ARB use; this is exacerbated by IV contrast use; improved UO and Cr  2. Acute hypoxic respiratory failure due to multilobar pneumonia  3. Community-acquired pneumonia  4. Type 2 diabetes mellitus    Plan  1. Continue IV fluid although will decrease rate  2. Adjust all meds for level of kidney function  3. Continue to  Hold llosartan   4. Monitor lab  5. Monitor urine output  6.  Wean as tolerated    D/W Resident and Dr Sergo Salvador    Will follow   Thanks      Lul Vincent MD  9:16 AM  10/30/2020

## 2020-10-30 NOTE — PROGRESS NOTES
Pulmonary Consultation    Admit Date: 10/26/2020  Requesting Physician: Keshia Hines MD    CC:  Hypoxemia  HPI:  Patient went to see his PCP yesterday as sick call, and found to be hypoxemic with oxygen saturation 70% while on room air patient was admitted advised to come to the emergency department to be seen. In ER patient was found to have an abnormal chest x-rays with questionable right lower lobe infiltrates patient was started mid antibiotics he feels now somewhat better at the time of my evaluation. The ER staff attempted to have a CT scan of the chest to rule out  pulmonary embolism but unfortunately was not done due to inability of the patient to lie down due to his complaint of back discomfort discomfort and anxiety. October 28. Events noted overnight, with worsening of acute on chronic respiratory failure. Patient unfortunately failed noninvasive ventilation and required to be transferred to the ICU endotracheally intubated and full mechanical ventilatory support provided. CT scan of the chest finally done did not show pulmonary embolism he did show bilateral pulmonary infiltrates, with patchy presentation. October 29. No events overnight afebrile,  requires full vent support with FiO2 60% and PEEP of 8. Urine output marginal.  October 30. Able to wean down to fio2 50%. Started on precedex.  U/O improved    PMH:    Past Medical History:   Diagnosis Date    Acquired hypothyroidism 9/26/2017    Anxiety     Congestive heart failure with LV diastolic dysfunction, NYHA class 3 ([address]     Depression     DM (diabetes mellitus) ([address]     Essential hypertension 6/1/2016    Gouty arthritis 2006    Hyperlipidemia     Hypertension     Lymphedema     Obesity     Obstructive sleep apnea 8/17/2009    Obstructive sleep apnea 9/26/2017    Osteoarthritis     Type 2 diabetes mellitus without complication, without long-term current use of insulin ([address] 1/15/2014     PSH:   Past Surgical History: Procedure Laterality Date    BRONCHOSCOPY  08/10/2017    ECHO COMPL W DOP COLOR FLOW  2013         EXTERNAL EAR SURGERY  2009    keloid left ear    FOOT SURGERY      Left foot    GASTROSTOMY TUBE PLACEMENT  08/10/2017    TRACHEOSTOMY TUBE PLACEMENT  08/10/2017          Medications:     dexmedetomidine (PRECEDEX) IV infusion 0.4 mcg/kg/hr (10/30/20 1406)    fentaNYL 5 mcg/ml in 0.9%  ml infusion 175 mcg/hr (10/30/20 0725)    propofol 30 mcg/kg/min (10/30/20 1407)    lactated ringers 50 mL/hr at 10/30/20 0904    dextrose        insulin glargine  10 Units Subcutaneous Nightly    insulin lispro  0-12 Units Subcutaneous Q4H    pantoprazole  40 mg Intravenous Daily    And    sodium chloride (PF)  10 mL Intravenous Daily    methylPREDNISolone  40 mg Intravenous Q12H    sodium chloride flush  10 mL Intravenous 2 times per day    Arformoterol Tartrate  15 mcg Nebulization BID    cefTRIAXone (ROCEPHIN) IV  1 g Intravenous Q24H    azithromycin  500 mg Intravenous Q24H    heparin (porcine)  5,000 Units Subcutaneous Q8H    chlorhexidine  15 mL Mouth/Throat BID    amLODIPine  10 mg Oral Daily    aspirin  81 mg Oral Daily    [Held by provider] atorvastatin  40 mg Oral Daily    [Held by provider] carvedilol  12.5 mg Oral BID    levothyroxine  50 mcg Oral Daily    [Held by provider] losartan  100 mg Oral Daily    budesonide  0.5 mg Nebulization BID    ipratropium-albuterol  1 ampule Inhalation Q4H WA         Vitals:  Tmax:  VITALS:  /68   Pulse 56   Temp 97.6 °F (36.4 °C) (Axillary)   Resp 26   Ht 5' 8\" (1.727 m)   Wt (!) 334 lb 14.1 oz (151.9 kg)   SpO2 92%   BMI 50.92 kg/m²   24HR INTAKE/OUTPUT:      Intake/Output Summary (Last 24 hours) at 10/30/2020 1425  Last data filed at 10/30/2020 1400  Gross per 24 hour   Intake 3890 ml   Output 2150 ml   Net 1740 ml     CURRENT PULSE OXIMETRY:  SpO2: 92 %  24HR PULSE OXIMETRY RANGE:  SpO2  Av.7 %  Min: 92 %  Max: 97 % EXAM:  General: No distress. Alert. Sedated. morbid obese  Eyes: PERRL. No sclera icterus. No conjunctival injection. ENT: No discharge. Pharynx clear. Endotracheal tube in place  Neck: Trachea midline. Normal thyroid. Resp: No accessory muscle use. No crackles. Minimal wheezing. No rhonchi. CV: Regular rate. Regular rhythm. No mumur or rub. ABD: Non-tender. Non-distended. No masses. No organmegaly. Normal bowel sounds. fatty  Skin: Warm and dry. No nodule on exposed extremities. No rash on exposed extremities. Lymph: No cervical LAD. No supraclavicular LAD. Ext: No joint deformity. No clubbing. No cyanosis. +++ edema L >> R  Neuro: Awake. Follows commands. Positive pupils/gag/corneals. Normal pain response. Lab Results:  CBC:   Recent Labs     10/29/20  0519 10/30/20  0355   WBC 5.7 5.3   HGB 13.1 12.2*   HCT 38.2 35.4*   MCV 78.6* 77.3*    188     BMP:   Recent Labs     10/28/20  0610 10/29/20  0519 10/29/20  1740 10/30/20  0355    134 134 139   K 5.0 3.9 3.9 3.9    94* 97* 99   CO2 27 23 23 25   PHOS 4.4 4.2  --  4.7*   BUN 17 24* 28* 32*   CREATININE 2.6* 3.8* 4.0* 3.7*      ALB:3,BILIDIR:3,BILITOT:3,ALKPHOS:3)@  PT/INR: No results for input(s): PROTIME, INR in the last 72 hours. Cultures:  -    ABG: noted  Films:  CXR : new RLL infiltrates? ? Assessment:  · Hypoxemia. A/C Respiratory failure. M VS #4. OHS?    · Pneumonia. Staph aureus +   ·   · H/o Moderate HANS, off BIPAP (never started)BIPAP 16/12, empirically. Plan:  · Wean fio2, to keep spo2 > 90%. To start CPAP trials soon  · Respiratory panel and urine antigen, so far negative. ·    COVID-19 x 2 (-)  · ATBx for Staph coverage  · Patient may benefit of brief run with IV steroids and bronchodilators for underlying bronchoconstriction. · Echocardiogram to be done hopefully prior to discharge patient may have pulmonary hypertension and RV dysfunction.   · Discussed with spouse      Thanks for letting us see this patient in consultation. Please contact us with any questions.     Gilmer Francisco M.D., [email or web address].                    pulp

## 2020-10-30 NOTE — PROGRESS NOTES
PT SEEN AND EXAMINED. intubated, in icu. Sedated  Chart reviewed. meds reviewed. D/w nursing + family as available. EXAM: IN GENERAL, NAD. AWAKE AND . ROS NEGx10 EXCEPT:   /65   Pulse (!) 47   Temp 96.8 °F (36 °C) (Axillary)   Resp (!) 34   Ht 5' 8\" (1.727 m)   Wt (!) 334 lb 14.1 oz (151.9 kg)   SpO2 97%   BMI 50.92 kg/m²   GEN: A+O NAD. HEENT: NCAT. EOMI. JOE  NECK: NO JVD. TRACH MIDLINE. NO BRUITS. NO THYROMEGALY. LUNGS: CTA BL NO RALES, RHONCHI OR WHEEZES. GOOD EXCURSION. CV: Regular rate and rhythm, NO Murmurs, Rubs, Or gallops  ABD: Soft. Nontender. Normal bowel sounds. No organomegaly  EXT:No clubbing cyanosis or edema  Neuro: Alert and oriented x 3. No focal motor deficits. No sensory deficits. Reflexes appear intact.   Labs/data reviewedLABS: CBC with Differential:    Lab Results   Component Value Date    WBC 5.3 10/30/2020    RBC 4.58 10/30/2020    HGB 12.2 10/30/2020    HCT 35.4 10/30/2020     10/30/2020    MCV 77.3 10/30/2020    MCH 26.6 10/30/2020    MCHC 34.5 10/30/2020    RDW 21.6 10/30/2020    NRBC 6.1 10/30/2020    SEGSPCT 73 01/26/2014    LYMPHOPCT 7.9 10/30/2020    MONOPCT 1.8 10/30/2020    MYELOPCT 0.9 10/30/2020    BASOPCT 0.2 10/30/2020    MONOSABS 0.11 10/30/2020    LYMPHSABS 0.42 10/30/2020    EOSABS 0.00 10/30/2020    BASOSABS 0.00 10/30/2020     Platelets:    Lab Results   Component Value Date     10/30/2020     CMP:    Lab Results   Component Value Date     10/30/2020    K 3.9 10/30/2020    K 4.0 10/27/2020    CL 99 10/30/2020    CO2 25 10/30/2020    BUN 32 10/30/2020    CREATININE 3.7 10/30/2020    GFRAA 20 10/30/2020    LABGLOM 20 10/30/2020    GLUCOSE 234 10/30/2020    PROT 6.0 10/30/2020    LABALBU 3.1 10/30/2020    CALCIUM 7.9 10/30/2020    BILITOT 0.6 10/30/2020    ALKPHOS 101 10/30/2020     10/30/2020    ALT 72 10/30/2020     Magnesium:    Lab Results   Component Value Date    MG 2.4 10/30/2020     LDH:    Lab Results   Component Value Date     10/28/2020     PT/INR:    Lab Results   Component Value Date    PROTIME 10.9 07/21/2018    INR 0.9 07/21/2018     Last 3 Troponin:    Lab Results   Component Value Date    TROPONINI <0.01 10/26/2020    TROPONINI <0.01 05/26/2019    TROPONINI <0.01 05/26/2019     ABG:    Lab Results   Component Value Date    PH 7.439 10/30/2020    PCO2 38.1 10/30/2020    PO2 79.9 10/30/2020    HCO3 25.2 10/30/2020    BE 1.2 10/30/2020    O2SAT 94.7 10/30/2020     IRON:  No results found for: IRON  IMAGING    Xr Chest Portable    Result Date: 10/27/2020  EXAMINATION: ONE XRAY VIEW OF THE CHEST 10/27/2020 7:58 pm COMPARISON: October 26, 2020 HISTORY: ORDERING SYSTEM PROVIDED HISTORY: SOB TECHNOLOGIST PROVIDED HISTORY: Reason for exam:->SOB What reading provider will be dictating this exam?->CRC FINDINGS: There is mild cardiomegaly. There is patchy perihilar and bibasilar atelectasis/infiltrates. Tiny pleural effusions are suspected. Progressive bibasilar atelectasis/infiltrates concerning for pneumonia. Underlying CHF is considered. Xr Chest Portable    Result Date: 10/26/2020  EXAMINATION: ONE XRAY VIEW OF THE CHEST 10/26/2020 4:11 pm COMPARISON: 05/26/2019 HISTORY: ORDERING SYSTEM PROVIDED HISTORY: SOB TECHNOLOGIST PROVIDED HISTORY: Reason for exam:->SOB What reading provider will be dictating this exam?->CRC FINDINGS: Cardiac size appears stable. Discoid airspace disease seen at the left lung base which may represent atelectasis or scarring. Right infrahilar and basilar infiltrate is identified. No pneumothorax is identified. No acute osseous abnormality is identified. Right infrahilar and basilar infiltrate concerning for pneumonia. Left basilar atelectasis or scarring.        Medications:    Scheduled Meds:   pantoprazole  40 mg Intravenous Daily    And    sodium chloride (PF)  10 mL Intravenous Daily    insulin lispro  0-12 Units Subcutaneous Q4H    methylPREDNISolone  40 mg Intravenous Q12H FOR HOME- PROB BIPAP WITH BACKUP RATE  Wean per pulm/ccm

## 2020-10-30 NOTE — PLAN OF CARE
Problem: Restraint Use - Nonviolent/Non-Self-Destructive Behavior:  Goal: Absence of restraint-related injury  Description: Absence of restraint-related injury  10/29/2020 2053 by Heather Johnson RN  Outcome: Met This Shift     Problem: Restraint Use - Nonviolent/Non-Self-Destructive Behavior:  Goal: Absence of restraint indications  Description: Absence of restraint indications  10/29/2020 2053 by Heather Johnson RN  Outcome: Not Met This Shift

## 2020-10-30 NOTE — PROGRESS NOTES
Pharmacy Consultation Note  (Antibiotic Dosing and Monitoring)    Initial consult date: 10/30/20  Consulting physician: Dr. Ct Chew  Drug(s): Vancomycin  Indication: Pneumonia      Age/  Gender Height Weight IBW Dosing weight  Allergy Information   60 y.o./male 5' 8\" (172.7 cm) 295 lb (133.8 kg)     Ideal body weight: 68.4 kg (150 lb 12.7 oz)  Adjusted ideal body weight: 101.8 kg (224 lb 6.9 oz)  151.9 kg  Bee venom and Shellfish-derived products          Other anti-infectives Start date Stop date   Rocephin     Azithromycin                 Date  Tmax WBC BUN/CR CrCL  (mL/min) Drug/Dose Time   Given Level(s)   (Time) Comments   10/30 afebrile 5.3 32/3.7 31  Vancomycin 2000 mg IV x1 1539                                            Intake/Output Summary (Last 24 hours) at 10/30/2020 1434  Last data filed at 10/30/2020 1400  Gross per 24 hour   Intake 3890 ml   Output 2150 ml   Net 1740 ml       Cultures:    Site Date Result   Resp 10/30 S. Aureus (S pending)               No results for input(s): Tejas Salas in the last 72 hours. Historical Cultures:  Organism   Date Value Ref Range Status   10/29/2020 Staphylococcus aureus (A)  Preliminary     No results for input(s): BC in the last 72 hours. Assessment:  · 61 yoM currently intubated and sedated 2/2 acute respiratory failure initiated on antibiotics 2/2 pneumonia. Pharmacy consulted to dose/monitor vancomycin.   · Goal trough = 15 - 20 mcg/ml  · sCr 3.7 (baseline 1)    Plan:  · Vancomycin 2000 mg IV x1 - random level tomorrow AM  · Dose per levels  · Pharmacist will follow and monitor/adjust dosing as necessary    Daysi Selby, PharmD, BCPS 10/30/2020 2:34 PM

## 2020-10-30 NOTE — PROCEDURES
Central Line Insertion     Procedure: right internal jugular vein Triple Lumen Catheter placement. Indications: vascular access    Consent: The family members were counseled regarding the procedure, its indications, risks, potential complications and alternatives, and any questions were answered. Consent was obtained to proceed. Number of sticks: 1    Number of Kits used: 1    Procedure: Time Out: Immediately prior to the procedure a \"timeout\" was called to verify the correct patient and procedure. The patient was place in the trendelenburg position and the skin over the right internal jugular vein was prepped with betadine and draped in a sterile fashion. Local anesthesia was obtained by infiltration using 1% Lidocaine without epinephrine. With Ultrasound guidance a large bore needle was used to identify the vein, dark non pulsatile blood returned. The guide wire was then inserted through the needle with minimal resistance. 2 mm nick was made in the skin beside the guidewire. Then a dilator was inserted and removed. A triple lumen catheter was then inserted into the vessel over the guide wire using the Seldinger technique to the  17 cm vlad. All ports showed good, free flowing blood return and were flushed with saline solution. The catheter was then securely fastened to the skin with sutures and covered with a bio patch and sterile dressing. A post procedure X-ray was ordered and showed good line position. Complications: None   The patient tolerated the procedure well. Estimated blood loss: 5 ml.     Stephanie Kraft DO PGY-1  10/30/2020  1:47 AM      Attending: Dr. Lenwood Merlin

## 2020-10-30 NOTE — PLAN OF CARE
Problem: Restraint Use - Nonviolent/Non-Self-Destructive Behavior:  Goal: Absence of restraint-related injury  Description: Absence of restraint-related injury  10/30/2020 0110 by Walter Arauz RN  Outcome: Met This Shift     Problem: Restraint Use - Nonviolent/Non-Self-Destructive Behavior:  Goal: Absence of restraint indications  Description: Absence of restraint indications  10/30/2020 0110 by Walter Arauz RN  Outcome: Not Met This Shift

## 2020-10-30 NOTE — PROGRESS NOTES
Patient thrashing in bed, attempting to reach for ETT. Patient throwing legs over side of bed and scooting body down in bed. Patient unable to be redirected or follow commands. Pt continues to reach for lines and tubes despite attempts to deter. Bilateral soft wrist restraints continued for pt safety. Bilateral soft ankle restraints initiated at this time.

## 2020-10-31 ENCOUNTER — APPOINTMENT (OUTPATIENT)
Dept: GENERAL RADIOLOGY | Age: 60
DRG: 003 | End: 2020-10-31
Payer: MEDICARE

## 2020-10-31 LAB
AADO2: 220.2 MMHG
ALBUMIN SERPL-MCNC: 3.4 G/DL (ref 3.5–5.2)
ALP BLD-CCNC: 116 U/L (ref 40–129)
ALT SERPL-CCNC: 111 U/L (ref 0–40)
ANION GAP SERPL CALCULATED.3IONS-SCNC: 11 MMOL/L (ref 7–16)
ANION GAP SERPL CALCULATED.3IONS-SCNC: 15 MMOL/L (ref 7–16)
ANISOCYTOSIS: ABNORMAL
AST SERPL-CCNC: 94 U/L (ref 0–39)
ATYPICAL LYMPHOCYTE RELATIVE PERCENT: 0.9 % (ref 0–4)
B.E.: 0.1 MMOL/L (ref -3–3)
BASOPHILS ABSOLUTE: 0 E9/L (ref 0–0.2)
BASOPHILS RELATIVE PERCENT: 0 % (ref 0–2)
BILIRUB SERPL-MCNC: 0.5 MG/DL (ref 0–1.2)
BUN BLDV-MCNC: 35 MG/DL (ref 8–23)
BUN BLDV-MCNC: 38 MG/DL (ref 8–23)
CALCIUM SERPL-MCNC: 7.8 MG/DL (ref 8.6–10.2)
CALCIUM SERPL-MCNC: 8 MG/DL (ref 8.6–10.2)
CHLORIDE BLD-SCNC: 96 MMOL/L (ref 98–107)
CHLORIDE BLD-SCNC: 99 MMOL/L (ref 98–107)
CHLORIDE URINE RANDOM: 84 MMOL/L
CO2: 26 MMOL/L (ref 22–29)
CO2: 29 MMOL/L (ref 22–29)
COHB: 1.1 % (ref 0–1.5)
CREAT SERPL-MCNC: 2.1 MG/DL (ref 0.7–1.2)
CREAT SERPL-MCNC: 2.7 MG/DL (ref 0.7–1.2)
CREATININE URINE: 26 MG/DL (ref 40–278)
CRITICAL: ABNORMAL
CULTURE, BLOOD 2: NORMAL
CULTURE, RESPIRATORY: ABNORMAL
CULTURE, RESPIRATORY: ABNORMAL
DATE ANALYZED: ABNORMAL
DATE OF COLLECTION: ABNORMAL
EOSINOPHILS ABSOLUTE: 0.05 E9/L (ref 0.05–0.5)
EOSINOPHILS RELATIVE PERCENT: 0.9 % (ref 0–6)
FIO2: 50 %
GFR AFRICAN AMERICAN: 29
GFR AFRICAN AMERICAN: 39
GFR NON-AFRICAN AMERICAN: 29 ML/MIN/1.73
GFR NON-AFRICAN AMERICAN: 39 ML/MIN/1.73
GLUCOSE BLD-MCNC: 328 MG/DL (ref 74–99)
GLUCOSE BLD-MCNC: 340 MG/DL (ref 74–99)
HCO3: 24.1 MMOL/L (ref 22–26)
HCT VFR BLD CALC: 41.2 % (ref 37–54)
HEMOGLOBIN: 14.1 G/DL (ref 12.5–16.5)
HHB: 4.6 % (ref 0–5)
HYPOCHROMIA: ABNORMAL
LAB: ABNORMAL
LYMPHOCYTES ABSOLUTE: 0.66 E9/L (ref 1.5–4)
LYMPHOCYTES RELATIVE PERCENT: 9.6 % (ref 20–42)
Lab: ABNORMAL
MAGNESIUM: 1.9 MG/DL (ref 1.6–2.6)
MCH RBC QN AUTO: 26.5 PG (ref 26–35)
MCHC RBC AUTO-ENTMCNC: 34.2 % (ref 32–34.5)
MCV RBC AUTO: 77.4 FL (ref 80–99.9)
METER GLUCOSE: 270 MG/DL (ref 74–99)
METER GLUCOSE: 304 MG/DL (ref 74–99)
METER GLUCOSE: 311 MG/DL (ref 74–99)
METER GLUCOSE: 346 MG/DL (ref 74–99)
METER GLUCOSE: 357 MG/DL (ref 74–99)
METHB: 0.5 % (ref 0–1.5)
MODE: AC
MONOCYTES ABSOLUTE: 0.54 E9/L (ref 0.1–0.95)
MONOCYTES RELATIVE PERCENT: 8.8 % (ref 2–12)
NEUTROPHILS ABSOLUTE: 4.8 E9/L (ref 1.8–7.3)
NEUTROPHILS RELATIVE PERCENT: 79.8 % (ref 43–80)
O2 CONTENT: 19.4 ML/DL
O2 SATURATION: 95.3 % (ref 92–98.5)
O2HB: 93.8 % (ref 94–97)
OPERATOR ID: 1874
ORGANISM: ABNORMAL
OSMOLALITY URINE: 324 MOSM/KG (ref 300–900)
OSMOLALITY: 317 MOSM/KG (ref 285–310)
PATIENT TEMP: 37 C
PCO2: 37.1 MMHG (ref 35–45)
PDW BLD-RTO: 21.8 FL (ref 11.5–15)
PEEP/CPAP: 8 CMH2O
PFO2: 1.64 MMHG/%
PH BLOOD GAS: 7.43 (ref 7.35–7.45)
PHOSPHORUS: 5.2 MG/DL (ref 2.5–4.5)
PLATELET # BLD: 205 E9/L (ref 130–450)
PMV BLD AUTO: 9.1 FL (ref 7–12)
PO2: 82.1 MMHG (ref 75–100)
POIKILOCYTES: ABNORMAL
POLYCHROMASIA: ABNORMAL
POTASSIUM SERPL-SCNC: 4 MMOL/L (ref 3.5–5)
POTASSIUM SERPL-SCNC: 4.3 MMOL/L (ref 3.5–5)
POTASSIUM, UR: 5.8 MMOL/L
RBC # BLD: 5.32 E12/L (ref 3.8–5.8)
RI(T): 2.68
RR MECHANICAL: 16 B/MIN
SCHISTOCYTES: ABNORMAL
SMEAR, RESPIRATORY: ABNORMAL
SODIUM BLD-SCNC: 137 MMOL/L (ref 132–146)
SODIUM BLD-SCNC: 139 MMOL/L (ref 132–146)
SODIUM URINE: 101 MMOL/L
SOURCE, BLOOD GAS: ABNORMAL
TARGET CELLS: ABNORMAL
THB: 14.7 G/DL (ref 11.5–16.5)
TIME ANALYZED: 446
TOTAL PROTEIN: 6.7 G/DL (ref 6.4–8.3)
VANCOMYCIN RANDOM: 13.2 MCG/ML (ref 5–40)
VT MECHANICAL: 500 ML
WBC # BLD: 6 E9/L (ref 4.5–11.5)

## 2020-10-31 PROCEDURE — 83930 ASSAY OF BLOOD OSMOLALITY: CPT

## 2020-10-31 PROCEDURE — 6370000000 HC RX 637 (ALT 250 FOR IP): Performed by: INTERNAL MEDICINE

## 2020-10-31 PROCEDURE — 2000000000 HC ICU R&B

## 2020-10-31 PROCEDURE — 84300 ASSAY OF URINE SODIUM: CPT

## 2020-10-31 PROCEDURE — 2500000003 HC RX 250 WO HCPCS: Performed by: INTERNAL MEDICINE

## 2020-10-31 PROCEDURE — 84133 ASSAY OF URINE POTASSIUM: CPT

## 2020-10-31 PROCEDURE — 6360000002 HC RX W HCPCS: Performed by: INTERNAL MEDICINE

## 2020-10-31 PROCEDURE — 82805 BLOOD GASES W/O2 SATURATION: CPT

## 2020-10-31 PROCEDURE — 94669 MECHANICAL CHEST WALL OSCILL: CPT

## 2020-10-31 PROCEDURE — 2580000003 HC RX 258: Performed by: INTERNAL MEDICINE

## 2020-10-31 PROCEDURE — 82436 ASSAY OF URINE CHLORIDE: CPT

## 2020-10-31 PROCEDURE — 71045 X-RAY EXAM CHEST 1 VIEW: CPT

## 2020-10-31 PROCEDURE — 80048 BASIC METABOLIC PNL TOTAL CA: CPT

## 2020-10-31 PROCEDURE — 36415 COLL VENOUS BLD VENIPUNCTURE: CPT

## 2020-10-31 PROCEDURE — 84100 ASSAY OF PHOSPHORUS: CPT

## 2020-10-31 PROCEDURE — 80202 ASSAY OF VANCOMYCIN: CPT

## 2020-10-31 PROCEDURE — 82962 GLUCOSE BLOOD TEST: CPT

## 2020-10-31 PROCEDURE — 80053 COMPREHEN METABOLIC PANEL: CPT

## 2020-10-31 PROCEDURE — 85025 COMPLETE CBC W/AUTO DIFF WBC: CPT

## 2020-10-31 PROCEDURE — 83935 ASSAY OF URINE OSMOLALITY: CPT

## 2020-10-31 PROCEDURE — 36592 COLLECT BLOOD FROM PICC: CPT

## 2020-10-31 PROCEDURE — 82570 ASSAY OF URINE CREATININE: CPT

## 2020-10-31 PROCEDURE — C9113 INJ PANTOPRAZOLE SODIUM, VIA: HCPCS | Performed by: INTERNAL MEDICINE

## 2020-10-31 PROCEDURE — 94640 AIRWAY INHALATION TREATMENT: CPT

## 2020-10-31 PROCEDURE — 94003 VENT MGMT INPAT SUBQ DAY: CPT

## 2020-10-31 PROCEDURE — 83735 ASSAY OF MAGNESIUM: CPT

## 2020-10-31 RX ORDER — MIDAZOLAM HYDROCHLORIDE 1 MG/ML
2 INJECTION INTRAMUSCULAR; INTRAVENOUS
Status: DISCONTINUED | OUTPATIENT
Start: 2020-10-31 | End: 2020-11-09

## 2020-10-31 RX ORDER — METHYLPREDNISOLONE SODIUM SUCCINATE 40 MG/ML
40 INJECTION, POWDER, LYOPHILIZED, FOR SOLUTION INTRAMUSCULAR; INTRAVENOUS DAILY
Status: DISCONTINUED | OUTPATIENT
Start: 2020-10-31 | End: 2020-11-02

## 2020-10-31 RX ORDER — INSULIN GLARGINE 100 [IU]/ML
15 INJECTION, SOLUTION SUBCUTANEOUS NIGHTLY
Status: DISCONTINUED | OUTPATIENT
Start: 2020-10-31 | End: 2020-11-01

## 2020-10-31 RX ORDER — QUETIAPINE FUMARATE 25 MG/1
25 TABLET, FILM COATED ORAL 2 TIMES DAILY
Status: DISCONTINUED | OUTPATIENT
Start: 2020-10-31 | End: 2020-11-02

## 2020-10-31 RX ORDER — INSULIN GLARGINE 100 [IU]/ML
20 INJECTION, SOLUTION SUBCUTANEOUS NIGHTLY
Status: DISCONTINUED | OUTPATIENT
Start: 2020-10-31 | End: 2020-10-31

## 2020-10-31 RX ORDER — HEPARIN SODIUM 10000 [USP'U]/ML
7500 INJECTION, SOLUTION INTRAVENOUS; SUBCUTANEOUS EVERY 8 HOURS
Status: DISCONTINUED | OUTPATIENT
Start: 2020-10-31 | End: 2020-01-01 | Stop reason: HOSPADM

## 2020-10-31 RX ORDER — HYDRALAZINE HYDROCHLORIDE 20 MG/ML
10 INJECTION INTRAMUSCULAR; INTRAVENOUS EVERY 4 HOURS PRN
Status: DISCONTINUED | OUTPATIENT
Start: 2020-10-31 | End: 2020-01-01 | Stop reason: HOSPADM

## 2020-10-31 RX ORDER — MAGNESIUM SULFATE IN WATER 40 MG/ML
2 INJECTION, SOLUTION INTRAVENOUS ONCE
Status: COMPLETED | OUTPATIENT
Start: 2020-10-31 | End: 2020-10-31

## 2020-10-31 RX ORDER — INSULIN GLARGINE 100 [IU]/ML
15 INJECTION, SOLUTION SUBCUTANEOUS NIGHTLY
Status: DISCONTINUED | OUTPATIENT
Start: 2020-10-31 | End: 2020-10-31

## 2020-10-31 RX ORDER — SODIUM CHLORIDE FOR INHALATION 3 %
2 VIAL, NEBULIZER (ML) INHALATION EVERY 4 HOURS
Status: DISCONTINUED | OUTPATIENT
Start: 2020-10-31 | End: 2020-01-01 | Stop reason: HOSPADM

## 2020-10-31 RX ADMIN — SODIUM CHLORIDE, POTASSIUM CHLORIDE, SODIUM LACTATE AND CALCIUM CHLORIDE: 600; 310; 30; 20 INJECTION, SOLUTION INTRAVENOUS at 08:56

## 2020-10-31 RX ADMIN — Medication 10 ML: at 08:57

## 2020-10-31 RX ADMIN — INSULIN LISPRO 12 UNITS: 100 INJECTION, SOLUTION INTRAVENOUS; SUBCUTANEOUS at 12:32

## 2020-10-31 RX ADMIN — AZITHROMYCIN MONOHYDRATE 500 MG: 500 INJECTION, POWDER, LYOPHILIZED, FOR SOLUTION INTRAVENOUS at 04:48

## 2020-10-31 RX ADMIN — DOXYCYCLINE 100 MG: 100 INJECTION, POWDER, LYOPHILIZED, FOR SOLUTION INTRAVENOUS at 23:55

## 2020-10-31 RX ADMIN — INSULIN LISPRO 9 UNITS: 100 INJECTION, SOLUTION INTRAVENOUS; SUBCUTANEOUS at 20:58

## 2020-10-31 RX ADMIN — DOXYCYCLINE 100 MG: 100 INJECTION, POWDER, LYOPHILIZED, FOR SOLUTION INTRAVENOUS at 11:59

## 2020-10-31 RX ADMIN — INSULIN LISPRO 8 UNITS: 100 INJECTION, SOLUTION INTRAVENOUS; SUBCUTANEOUS at 00:49

## 2020-10-31 RX ADMIN — IPRATROPIUM BROMIDE AND ALBUTEROL SULFATE 1 AMPULE: 2.5; .5 SOLUTION RESPIRATORY (INHALATION) at 16:32

## 2020-10-31 RX ADMIN — METHYLPREDNISOLONE SODIUM SUCCINATE 40 MG: 40 INJECTION, POWDER, FOR SOLUTION INTRAMUSCULAR; INTRAVENOUS at 08:56

## 2020-10-31 RX ADMIN — ARFORMOTEROL TARTRATE 15 MCG: 15 SOLUTION RESPIRATORY (INHALATION) at 08:22

## 2020-10-31 RX ADMIN — Medication 200 MCG/HR: at 14:31

## 2020-10-31 RX ADMIN — HEPARIN SODIUM 5000 UNITS: 10000 INJECTION INTRAVENOUS; SUBCUTANEOUS at 08:57

## 2020-10-31 RX ADMIN — INSULIN LISPRO 12 UNITS: 100 INJECTION, SOLUTION INTRAVENOUS; SUBCUTANEOUS at 08:58

## 2020-10-31 RX ADMIN — DEXMEDETOMIDINE 1.4 MCG/KG/HR: 100 INJECTION, SOLUTION, CONCENTRATE INTRAVENOUS at 20:48

## 2020-10-31 RX ADMIN — Medication 175 MCG/HR: at 08:58

## 2020-10-31 RX ADMIN — IPRATROPIUM BROMIDE AND ALBUTEROL SULFATE 1 AMPULE: 2.5; .5 SOLUTION RESPIRATORY (INHALATION) at 08:22

## 2020-10-31 RX ADMIN — INSULIN LISPRO 8 UNITS: 100 INJECTION, SOLUTION INTRAVENOUS; SUBCUTANEOUS at 05:30

## 2020-10-31 RX ADMIN — DEXMEDETOMIDINE 1.3 MCG/KG/HR: 100 INJECTION, SOLUTION, CONCENTRATE INTRAVENOUS at 15:07

## 2020-10-31 RX ADMIN — PROPOFOL 20 MCG/KG/MIN: 10 INJECTION, EMULSION INTRAVENOUS at 20:50

## 2020-10-31 RX ADMIN — INSULIN GLARGINE 15 UNITS: 100 INJECTION, SOLUTION SUBCUTANEOUS at 21:00

## 2020-10-31 RX ADMIN — QUETIAPINE FUMARATE 25 MG: 25 TABLET ORAL at 20:45

## 2020-10-31 RX ADMIN — MIDAZOLAM HYDROCHLORIDE 2 MG: 1 INJECTION, SOLUTION INTRAMUSCULAR; INTRAVENOUS at 12:46

## 2020-10-31 RX ADMIN — CHLORHEXIDINE GLUCONATE 0.12% ORAL RINSE 15 ML: 1.2 LIQUID ORAL at 20:45

## 2020-10-31 RX ADMIN — SODIUM CHLORIDE SOLN NEBU 3% 2 ML: 3 NEBU SOLN at 20:28

## 2020-10-31 RX ADMIN — AMLODIPINE BESYLATE 10 MG: 10 TABLET ORAL at 08:57

## 2020-10-31 RX ADMIN — CHLORHEXIDINE GLUCONATE 0.12% ORAL RINSE 15 ML: 1.2 LIQUID ORAL at 08:56

## 2020-10-31 RX ADMIN — VANCOMYCIN HYDROCHLORIDE 2000 MG: 10 INJECTION, POWDER, LYOPHILIZED, FOR SOLUTION INTRAVENOUS at 09:12

## 2020-10-31 RX ADMIN — PANTOPRAZOLE SODIUM 40 MG: 40 INJECTION, POWDER, FOR SOLUTION INTRAVENOUS at 08:56

## 2020-10-31 RX ADMIN — ASPIRIN 81 MG CHEWABLE TABLET 81 MG: 81 TABLET CHEWABLE at 08:56

## 2020-10-31 RX ADMIN — BUDESONIDE 500 MCG: 0.5 SUSPENSION RESPIRATORY (INHALATION) at 08:22

## 2020-10-31 RX ADMIN — INSULIN LISPRO 15 UNITS: 100 INJECTION, SOLUTION INTRAVENOUS; SUBCUTANEOUS at 16:09

## 2020-10-31 RX ADMIN — SODIUM CHLORIDE, PRESERVATIVE FREE 10 ML: 5 INJECTION INTRAVENOUS at 08:56

## 2020-10-31 RX ADMIN — Medication 10 ML: at 11:59

## 2020-10-31 RX ADMIN — MIDAZOLAM HYDROCHLORIDE 2 MG: 1 INJECTION, SOLUTION INTRAMUSCULAR; INTRAVENOUS at 16:28

## 2020-10-31 RX ADMIN — IPRATROPIUM BROMIDE AND ALBUTEROL SULFATE 1 AMPULE: 2.5; .5 SOLUTION RESPIRATORY (INHALATION) at 12:42

## 2020-10-31 RX ADMIN — IPRATROPIUM BROMIDE AND ALBUTEROL SULFATE 1 AMPULE: 2.5; .5 SOLUTION RESPIRATORY (INHALATION) at 20:28

## 2020-10-31 RX ADMIN — Medication 200 MCG/HR: at 20:51

## 2020-10-31 RX ADMIN — HEPARIN SODIUM 5000 UNITS: 10000 INJECTION INTRAVENOUS; SUBCUTANEOUS at 00:20

## 2020-10-31 RX ADMIN — SODIUM CHLORIDE SOLN NEBU 3% 2 ML: 3 NEBU SOLN at 08:36

## 2020-10-31 RX ADMIN — SODIUM CHLORIDE SOLN NEBU 3% 2 ML: 3 NEBU SOLN at 16:33

## 2020-10-31 RX ADMIN — ARFORMOTEROL TARTRATE 15 MCG: 15 SOLUTION RESPIRATORY (INHALATION) at 20:28

## 2020-10-31 RX ADMIN — PROPOFOL 15 MCG/KG/MIN: 10 INJECTION, EMULSION INTRAVENOUS at 09:12

## 2020-10-31 RX ADMIN — MAGNESIUM SULFATE HEPTAHYDRATE 2 G: 40 INJECTION, SOLUTION INTRAVENOUS at 08:58

## 2020-10-31 RX ADMIN — HEPARIN SODIUM 7500 UNITS: 10000 INJECTION INTRAVENOUS; SUBCUTANEOUS at 16:09

## 2020-10-31 RX ADMIN — MIDAZOLAM HYDROCHLORIDE 2 MG: 1 INJECTION, SOLUTION INTRAMUSCULAR; INTRAVENOUS at 10:43

## 2020-10-31 RX ADMIN — DEXMEDETOMIDINE 0.8 MCG/KG/HR: 100 INJECTION, SOLUTION, CONCENTRATE INTRAVENOUS at 05:14

## 2020-10-31 RX ADMIN — BUDESONIDE 500 MCG: 0.5 SUSPENSION RESPIRATORY (INHALATION) at 20:28

## 2020-10-31 RX ADMIN — MIDAZOLAM HYDROCHLORIDE 2 MG: 1 INJECTION, SOLUTION INTRAMUSCULAR; INTRAVENOUS at 19:05

## 2020-10-31 RX ADMIN — PROPOFOL 15 MCG/KG/MIN: 10 INJECTION, EMULSION INTRAVENOUS at 01:55

## 2020-10-31 RX ADMIN — MIDAZOLAM HYDROCHLORIDE 2 MG: 1 INJECTION, SOLUTION INTRAMUSCULAR; INTRAVENOUS at 22:40

## 2020-10-31 RX ADMIN — QUETIAPINE FUMARATE 25 MG: 25 TABLET ORAL at 11:59

## 2020-10-31 RX ADMIN — SODIUM CHLORIDE SOLN NEBU 3% 2 ML: 3 NEBU SOLN at 12:42

## 2020-10-31 RX ADMIN — LEVOTHYROXINE SODIUM 50 MCG: 0.05 TABLET ORAL at 08:57

## 2020-10-31 RX ADMIN — DEXMEDETOMIDINE 0.8 MCG/KG/HR: 100 INJECTION, SOLUTION, CONCENTRATE INTRAVENOUS at 09:11

## 2020-10-31 RX ADMIN — CEFTRIAXONE SODIUM 1 G: 1 INJECTION, POWDER, FOR SOLUTION INTRAMUSCULAR; INTRAVENOUS at 04:50

## 2020-10-31 RX ADMIN — DEXMEDETOMIDINE 0.8 MCG/KG/HR: 100 INJECTION, SOLUTION, CONCENTRATE INTRAVENOUS at 01:53

## 2020-10-31 ASSESSMENT — PULMONARY FUNCTION TESTS
PIF_VALUE: 33
PIF_VALUE: 35
PIF_VALUE: 34
PIF_VALUE: 44
PIF_VALUE: 34
PIF_VALUE: 35
PIF_VALUE: 36
PIF_VALUE: 34
PIF_VALUE: 36
PIF_VALUE: 34
PIF_VALUE: 39
PIF_VALUE: 43
PIF_VALUE: 35
PIF_VALUE: 40
PIF_VALUE: 36
PIF_VALUE: 35
PIF_VALUE: 33
PIF_VALUE: 33
PIF_VALUE: 37
PIF_VALUE: 36
PIF_VALUE: 35
PIF_VALUE: 36
PIF_VALUE: 34
PIF_VALUE: 36
PIF_VALUE: 35
PIF_VALUE: 33
PIF_VALUE: 35
PIF_VALUE: 32
PIF_VALUE: 36
PIF_VALUE: 60
PIF_VALUE: 40
PIF_VALUE: 33
PIF_VALUE: 36
PIF_VALUE: 33
PIF_VALUE: 34

## 2020-10-31 ASSESSMENT — PAIN SCALES - GENERAL
PAINLEVEL_OUTOF10: 0
PAINLEVEL_OUTOF10: 1
PAINLEVEL_OUTOF10: 5
PAINLEVEL_OUTOF10: 0

## 2020-10-31 NOTE — PLAN OF CARE
Problem: Pain:  Goal: Pain level will decrease  10/31/2020 0145 by Gaby Dejesus RN  Outcome: Met This Shift     Problem: Pain:  Goal: Control of acute pain  10/31/2020 0145 by Gaby Dejesus RN  Outcome: Met This Shift     Problem: Restraint Use - Nonviolent/Non-Self-Destructive Behavior:  Goal: Absence of restraint indications  10/31/2020 0145 by Gaby Dejesus RN  Outcome: Not Met This Shift     Problem: Restraint Use - Nonviolent/Non-Self-Destructive Behavior:  Goal: Absence of restraint-related injury  10/31/2020 0145 by Gaby Dejesus RN  Outcome: Met This Shift     Problem: Skin Integrity:  Goal: Will show no infection signs and symptoms  10/31/2020 0145 by Gaby Dejesus RN  Outcome: Met This Shift     Problem: Skin Integrity:  Goal: Absence of new skin breakdown  10/31/2020 0145 by Gaby Dejesus RN  Outcome: Met This Shift

## 2020-10-31 NOTE — PROGRESS NOTES
Nephrology Progress Note  Patient's Name: Jigar Oscar  1:00 PM  10/31/2020        Reason for Consult: Acute kidney  Requesting Physician:  Alayna Harrington MD    Chief Complaint: Shortness of breath  History Obtained From:  EHR; spouse    History of Present Ilness:    Jigar Oscar is a 61 y.o. male with a history of COPD, hypertension, hyperlipidemia and diabetes mellitus. He presented to ED 2 days ago with complaints of shortness of breath. According to patient's spouse he had been complaining of shortness of breath over the course of several weeks. This has gotten progressively worse. He went to see his PCP on the day of admission. In office at the time pulse oximeter obtained revealed his oxygen saturation to be in the 70s. He was instructed to proceed to ED for further evaluation. On presentation to ED his initial vital signs showed a blood pressure of 168/71, temperature 97.5 and pulse of 93. His pulse oximeter at the time was noted to be 98% on room air. Laboratory data was significant for a BUN of 11, creatinine 1.1, WBC of 8.3. Rapid COVID-19 testing was negative. A chest x-ray performed revealed right infrahilar and basilar infiltrate concerning for pneumonia. A CTA was ordered but patient declined because of his inability to lay flat. Patient was given ceftriaxone and doxycycline followed by admission to telemetry. 2 days ago an RRT was called as patient was noted to be increasingly more in respiratory distress. He was transferred to MICU and intubated. Laboratory data yesterday showed worsening serum creatinine to 2.8. This a.m. further worsening to 3.6. He has been nonoliguric. Hence renal consult.     10/30: N new acute issues overnight; remains intubated  10/31: Polyuric , concerned for possible DI; no other acute issues overnight          Allergies:  Bee venom and Shellfish-derived products    Current Medications:    midazolam (VERSED) injection 2 mg, Q2H PRN  sodium chloride (Inhalant) 3 % nebulizer solution 2 mL, Q4H  methylPREDNISolone sodium (SOLU-MEDROL) injection 40 mg, Daily  insulin glargine (LANTUS) injection vial 15 Units, Nightly  insulin lispro (HUMALOG) injection vial 0-18 Units, Q4H  dexmedetomidine (PRECEDEX) 1,000 mcg in sodium chloride 0.9 % 250 mL infusion, Continuous  QUEtiapine (SEROQUEL) tablet 25 mg, BID  doxycycline (VIBRAMYCIN) 100 mg in dextrose 5 % 100 mL IVPB, Q12H  hydrALAZINE (APRESOLINE) injection 10 mg, Q4H PRN  heparin (porcine) injection 7,500 Units, Q8H  vancomycin (VANCOCIN) intermittent dosing (placeholder), RX Placeholder  pantoprazole (PROTONIX) injection 40 mg, Daily    And  sodium chloride (PF) 0.9 % injection 10 mL, Daily  fentaNYL 5 mcg/ml in 0.9%  ml infusion, Continuous  propofol injection, Titrated  sodium chloride flush 0.9 % injection 10 mL, 2 times per day  sodium chloride flush 0.9 % injection 10 mL, PRN  acetaminophen (TYLENOL) tablet 650 mg, Q6H PRN    Or  acetaminophen (TYLENOL) suppository 650 mg, Q6H PRN  Arformoterol Tartrate (BROVANA) nebulizer solution 15 mcg, BID  polyethylene glycol (GLYCOLAX) packet 17 g, Daily PRN  perflutren lipid microspheres (DEFINITY) injection 1.65 mg, ONCE PRN  chlorhexidine (PERIDEX) 0.12 % solution 15 mL, BID  lactated ringers infusion, Continuous  amLODIPine (NORVASC) tablet 10 mg, Daily  aspirin chewable tablet 81 mg, Daily  [Held by provider] atorvastatin (LIPITOR) tablet 40 mg, Daily  [Held by provider] carvedilol (COREG) tablet 12.5 mg, BID  colchicine (COLCRYS) tablet 0.6 mg, BID PRN  levothyroxine (SYNTHROID) tablet 50 mcg, Daily  [Held by provider] losartan (COZAAR) tablet 100 mg, Daily  ondansetron (ZOFRAN-ODT) disintegrating tablet 4 mg, Q8H PRN  promethazine (PHENERGAN) tablet 12.5 mg, Q6H PRN    Or  ondansetron (ZOFRAN) injection 4 mg, Q6H PRN  budesonide (PULMICORT) nebulizer suspension 500 mcg, BID  ipratropium-albuterol (DUONEB) nebulizer solution 1 ampule, Q4H WA  glucose (GLUTOSE) HISTORY: Reason for exam:->SOB What reading provider will be dictating this exam?->CRC FINDINGS: There is mild cardiomegaly. There is patchy perihilar and bibasilar atelectasis/infiltrates. Tiny pleural effusions are suspected. Progressive bibasilar atelectasis/infiltrates concerning for pneumonia. Underlying CHF is considered. Xr Chest Portable    Result Date: 10/26/2020  EXAMINATION: ONE XRAY VIEW OF THE CHEST 10/26/2020 4:11 pm COMPARISON: 05/26/2019 HISTORY: ORDERING SYSTEM PROVIDED HISTORY: SOB TECHNOLOGIST PROVIDED HISTORY: Reason for exam:->SOB What reading provider will be dictating this exam?->CRC FINDINGS: Cardiac size appears stable. Discoid airspace disease seen at the left lung base which may represent atelectasis or scarring. Right infrahilar and basilar infiltrate is identified. No pneumothorax is identified. No acute osseous abnormality is identified. Right infrahilar and basilar infiltrate concerning for pneumonia. Left basilar atelectasis or scarring. Assessment/Plans  1. Acute kidney injury prerenal ; hemodynamically mediated due to the combination of bradycardia,  diuretic and ARB use; this is exacerbated by IV contrast use; improved UO and Cr; polyuric, suspect due to recovery of AK or solute mediatedI; however need to screen for DI   -check urine lytes, cr, osm and serum osm  2. Acute hypoxic respiratory failure due to multilobar pneumonia, gradual wean  3. Community-acquired pneumonia   -now on doxycycline  4.  Type 2 diabetes mellitus   -increasing blood sugars, ost likely steroid induced      D/W  Dr Crystal Mccracken and MICU Team        Pretty Palma MD  1:00 PM  10/31/2020

## 2020-10-31 NOTE — PROGRESS NOTES
Pt continues to reach for lines and tubes despite attempts to deter. Patient attempts to kick legs out of bed and scoot down in bed when restraints released. Patient unable to be redirected or follow commands. Bilateral soft wrist and bilateral soft ankle restraints continued for pt safety.

## 2020-10-31 NOTE — PROGRESS NOTES
Pt continues to reach for lines and tubes despite attempts to deter. Patient throwing legs over side of bed and scooting self down to end of bed. Patient unable to be redirected or follow commands. Bilateral soft wrist and bilateral soft ankle restraints continued for pt safety.

## 2020-10-31 NOTE — PROGRESS NOTES
Pharmacy Consultation Note  (Antibiotic Dosing and Monitoring)    Initial consult date: 10/30/20  Consulting physician: Dr. Ct Chew  Drug(s): Vancomycin  Indication: Pneumonia      Age/  Gender Height Weight IBW Dosing weight  Allergy Information   60 y.o./male 5' 8\" (172.7 cm) 295 lb (133.8 kg)     Ideal body weight: 68.4 kg (150 lb 12.7 oz)  Adjusted ideal body weight: 104.3 kg (229 lb 14.3 oz)  151.9 kg  Bee venom and Shellfish-derived products          Other anti-infectives Start date Stop date   Rocephin     Azithromycin                 Date  Tmax WBC BUN/CR CrCL  (mL/min) Drug/Dose Time   Given Level(s)   (Time) Comments   10/30 afebrile 5.3 32/3.7 31  Vancomycin 2000 mg IV x1 1539     10/31 afebrile 6 38/2.7 43   Rm level @ 0445 = 13.2 mcg/mL    11/1       Rm level @ <0600> =                      Intake/Output Summary (Last 24 hours) at 10/31/2020 0836  Last data filed at 10/31/2020 0700  Gross per 24 hour   Intake 4943 ml   Output 4215 ml   Net 728 ml       Cultures:    Site Date Result   Resp 10/30 MSSA               Recent Labs     10/29/20  1452   BLOODCULT2 24 Hours no growth        Historical Cultures:  Organism   Date Value Ref Range Status   10/29/2020 Staphylococcus aureus (A)  Final     Recent Labs     10/29/20  1449   BC 24 Hours no growth         Assessment:  · 61 yoM currently intubated and sedated 2/2 acute respiratory failure initiated on antibiotics 2/2 pneumonia. Pharmacy consulted to dose/monitor vancomycin.   · Goal trough = 15 - 20 mcg/ml  · sCr 2.7 (baseline 1)  · Random level today @ 0445 = 13.2 mcg/mL    Plan:  · Vancomycin 2000 mg IV x1 - random level tomorrow AM  · Dose per levels  · Pharmacist will follow and monitor/adjust dosing as necessary    Aries Brooks PharmD, BCPS 10/31/2020 8:39 AM

## 2020-10-31 NOTE — PROGRESS NOTES
Patient noted with increased urine output of 200-400ml/hr. Dr. Michael Arreola notified via [place].

## 2020-10-31 NOTE — PLAN OF CARE
Problem: Pain:  Goal: Pain level will decrease  Description: Pain level will decrease  10/31/2020 1426 by Satya Jimenes RN  Outcome: Met This Shift     Problem: Pain:  Goal: Control of acute pain  Description: Control of acute pain  10/31/2020 1426 by Satya Jimenes RN  Outcome: Met This Shift     Problem: Pain:  Goal: Control of chronic pain  Description: Control of chronic pain  Outcome: Met This Shift     Problem: Restraint Use - Nonviolent/Non-Self-Destructive Behavior:  Goal: Absence of restraint-related injury  Description: Absence of restraint-related injury  10/31/2020 1426 by Satya Jimenes RN  Outcome: Met This Shift     Problem: Skin Integrity:  Goal: Will show no infection signs and symptoms  Description: Will show no infection signs and symptoms  10/31/2020 1426 by Satya Jimenes RN  Outcome: Met This Shift     Problem: Skin Integrity:  Goal: Absence of new skin breakdown  Description: Absence of new skin breakdown  10/31/2020 1426 by Satya Jimenes RN  Outcome: Met This Shift     Problem: Restraint Use - Nonviolent/Non-Self-Destructive Behavior:  Goal: Absence of restraint indications  Description: Absence of restraint indications  10/31/2020 1426 by Satya Jimenes RN  Outcome: Not Met This Shift   Plan of care reviewed with patient and family, as available.

## 2020-10-31 NOTE — PROGRESS NOTES
Patient thrashing around in bed, pulling on restraints, and attempting to pull out ETT. Patient attempting to sit up in the bed, scoot down in bed, kicking legs, and throwing legs over side of bed. Patient chewing on ETT, biting ETT, fighting the ventilator, and attempting to talk. Patient's daughter at bedside attempting to console patient and calm patient down. Patient unable to be redirected or follow directions/commands. Patient subdued in bed x6 nurses. PRN Versed administered at this time.

## 2020-10-31 NOTE — PROGRESS NOTES
PT SEEN AND EXAMINED. intubated, in icu. Sedated  Chart reviewed. meds reviewed. D/w nursing + family as available. EXAM: IN GENERAL, NAD. AWAKE AND . ROS NEGx10 EXCEPT:   BP (!) 168/91   Pulse 52   Temp 97.3 °F (36.3 °C) (Axillary)   Resp 16   Ht 5' 8\" (1.727 m)   Wt (!) 348 lb 8.8 oz (158.1 kg)   SpO2 97%   BMI 53.00 kg/m²   GEN: A+O NAD. HEENT: NCAT. EOMI. JOE  NECK: NO JVD. TRACH MIDLINE. NO BRUITS. NO THYROMEGALY. LUNGS: CTA BL NO RALES, RHONCHI OR WHEEZES. GOOD EXCURSION. CV: Regular rate and rhythm, NO Murmurs, Rubs, Or gallops  ABD: Soft. Nontender. Normal bowel sounds. No organomegaly  EXT:No clubbing cyanosis or edema  Neuro: Alert and oriented x 3. No focal motor deficits. No sensory deficits. Reflexes appear intact.   Labs/data reviewedLABS: CBC with Differential:    Lab Results   Component Value Date    WBC 6.0 10/31/2020    RBC 5.32 10/31/2020    HGB 14.1 10/31/2020    HCT 41.2 10/31/2020     10/31/2020    MCV 77.4 10/31/2020    MCH 26.5 10/31/2020    MCHC 34.2 10/31/2020    RDW 21.8 10/31/2020    NRBC 6.1 10/30/2020    SEGSPCT 73 01/26/2014    LYMPHOPCT 9.6 10/31/2020    MONOPCT 8.8 10/31/2020    MYELOPCT 0.9 10/30/2020    BASOPCT 0.0 10/31/2020    MONOSABS 0.54 10/31/2020    LYMPHSABS 0.66 10/31/2020    EOSABS 0.05 10/31/2020    BASOSABS 0.00 10/31/2020     Platelets:    Lab Results   Component Value Date     10/31/2020     CMP:    Lab Results   Component Value Date     10/31/2020    K 4.3 10/31/2020    K 4.0 10/27/2020    CL 96 10/31/2020    CO2 26 10/31/2020    BUN 38 10/31/2020    CREATININE 2.7 10/31/2020    GFRAA 29 10/31/2020    LABGLOM 29 10/31/2020    GLUCOSE 340 10/31/2020    PROT 6.7 10/31/2020    LABALBU 3.4 10/31/2020    CALCIUM 7.8 10/31/2020    BILITOT 0.5 10/31/2020    ALKPHOS 116 10/31/2020    AST 94 10/31/2020     10/31/2020     Magnesium:    Lab Results   Component Value Date    MG 1.9 10/31/2020     LDH:    Lab Results   Component Value Date  10/28/2020     PT/INR:    Lab Results   Component Value Date    PROTIME 10.9 07/21/2018    INR 0.9 07/21/2018     Last 3 Troponin:    Lab Results   Component Value Date    TROPONINI <0.01 10/26/2020    TROPONINI <0.01 05/26/2019    TROPONINI <0.01 05/26/2019     ABG:    Lab Results   Component Value Date    PH 7.430 10/31/2020    PCO2 37.1 10/31/2020    PO2 82.1 10/31/2020    HCO3 24.1 10/31/2020    BE 0.1 10/31/2020    O2SAT 95.3 10/31/2020     IRON:  No results found for: IRON  IMAGING    Xr Chest Portable    Result Date: 10/27/2020  EXAMINATION: ONE XRAY VIEW OF THE CHEST 10/27/2020 7:58 pm COMPARISON: October 26, 2020 HISTORY: ORDERING SYSTEM PROVIDED HISTORY: SOB TECHNOLOGIST PROVIDED HISTORY: Reason for exam:->SOB What reading provider will be dictating this exam?->CRC FINDINGS: There is mild cardiomegaly. There is patchy perihilar and bibasilar atelectasis/infiltrates. Tiny pleural effusions are suspected. Progressive bibasilar atelectasis/infiltrates concerning for pneumonia. Underlying CHF is considered. Xr Chest Portable    Result Date: 10/26/2020  EXAMINATION: ONE XRAY VIEW OF THE CHEST 10/26/2020 4:11 pm COMPARISON: 05/26/2019 HISTORY: ORDERING SYSTEM PROVIDED HISTORY: SOB TECHNOLOGIST PROVIDED HISTORY: Reason for exam:->SOB What reading provider will be dictating this exam?->CRC FINDINGS: Cardiac size appears stable. Discoid airspace disease seen at the left lung base which may represent atelectasis or scarring. Right infrahilar and basilar infiltrate is identified. No pneumothorax is identified. No acute osseous abnormality is identified. Right infrahilar and basilar infiltrate concerning for pneumonia. Left basilar atelectasis or scarring.        Medications:    Scheduled Meds:   sodium chloride (Inhalant)  2 mL Nebulization Q4H    methylPREDNISolone  40 mg Intravenous Daily    magnesium sulfate  2 g Intravenous Once    insulin glargine  15 Units Subcutaneous Nightly  insulin lispro  0-18 Units Subcutaneous Q4H    vancomycin (VANCOCIN) intermittent dosing (placeholder)   Other RX Placeholder    pantoprazole  40 mg Intravenous Daily    And    sodium chloride (PF)  10 mL Intravenous Daily    sodium chloride flush  10 mL Intravenous 2 times per day    Arformoterol Tartrate  15 mcg Nebulization BID    cefTRIAXone (ROCEPHIN) IV  1 g Intravenous Q24H    azithromycin  500 mg Intravenous Q24H    heparin (porcine)  5,000 Units Subcutaneous Q8H    chlorhexidine  15 mL Mouth/Throat BID    amLODIPine  10 mg Oral Daily    aspirin  81 mg Oral Daily    [Held by provider] atorvastatin  40 mg Oral Daily    [Held by provider] carvedilol  12.5 mg Oral BID    levothyroxine  50 mcg Oral Daily    [Held by provider] losartan  100 mg Oral Daily    budesonide  0.5 mg Nebulization BID    ipratropium-albuterol  1 ampule Inhalation Q4H WA       Continuous Infusions:   dexmedetomidine (PRECEDEX) IV infusion 0.8 mcg/kg/hr (10/31/20 0911)    fentaNYL 5 mcg/ml in 0.9%  ml infusion 200 mcg/hr (10/31/20 1049)    propofol 10 mcg/kg/min (10/31/20 1019)    lactated ringers 50 mL/hr at 10/31/20 0856    dextrose         PRN Meds:midazolam, midazolam, sodium chloride flush, acetaminophen **OR** acetaminophen, polyethylene glycol, perflutren lipid microspheres, colchicine, ondansetron, promethazine **OR** ondansetron, glucose, dextrose, glucagon (rDNA), dextrose    A/P:      Patient Active Problem List   Diagnosis    Hyperlipidemia    Type 2 diabetes mellitus without complication, without long-term current use of insulin (HCC)    Atypical chest pain    Essential hypertension    Chronic acquired lymphedema    Aortic valve disease    Morbid obesity due to excess calories (HCC)    Abdominal pain    Acute respiratory failure with hypoxia (HCC)    Acute pulmonary edema (HCC)    Congestive heart failure with LV diastolic dysfunction, NYHA class 3 (HCC)    Obstructive sleep apnea    Acquired hypothyroidism    Chronic diastolic heart failure (HCC)    Nonrheumatic aortic valve stenosis    ACE-inhibitor cough    Pneumonia    Acute gout of right knee   Acute encephalopathy 2/2 Acute hypoxic hypercapnic respiratory failure. Pt presented to the ED with hypoxia, RRT was called due to worsening respiratory acidosis. Currently intubated and sedated. - Today pt is still intubated and sedated. Saturating well with vent. - Continue monitor mentation status, wean down sedation as tolerated.      Acute hypoxic hypercapnic respiratory failure 2/2 bacterial pneumonia versus COPD versus ADHF versus chronic OHS  Pt has Hx chronic HANS not on CPAP at home. ABG showed pH 7.188/91.2/78.9/33 on RRT. Patient is not tolerating BiPAP and currently intubated. - Continue to monitor respiratory status, wean down FiO2 as tolerated. - Pro  on presentation. ECHO still pending.   - US dup LE negative for DVT. CTA negative for PE. COVID -19 negative x2  - Continue breathing treatment  - Monitor daily CXR. CBC.   - Pulmonology consulted, further recommendations appreciated.     NANCY stage III likely pre renal 2/2 diuretic use, contrast agent vs? cardiorenal syndrome.   - Creatinine 3.8 ( baseline 1.1) -> 4.0 - 3.7  - Currently hold ARB/ diuretics. Continue IVF LR, reduce rate to 50 ml/hr.   - I/O 4L/1.8L ( +4L). - Nephrology consulted. Further recommendations appreciated. - Continue monitor daily BMP, renal function. Monitor I/O. Avoid nephrotoxicity. ?CAP  - CXR concerning for new infiltrates on 10/27/2020.   - Patient is afebrile, WBC 5.7. procalcitonin 0.11  - Respiratory cultures negative, respiratory panel negative.    - Continue Azithromycin and Ceftriaxone day 3.  - Monitor daily CBC, vitals, CXR.      Hx HFpEF, EF 65%cta noted  MAY ASK CARDIOL FOR EVAL OF AS- IF HE DOESN'T RESPOND  WILL NEED NIV FOR HOME- PROB BIPAP WITH BACKUP RATE  Wean per pulm/ccm

## 2020-10-31 NOTE — PROGRESS NOTES
[address] [facility]  Department of Internal Medicine   Internal Medicine Residency   MICU Progress Note    Patient:  Rell Archer 61 y.o. male  MRN: [id number]     Date of Service: 10/31/2020    Allergy: Bee venom and Shellfish-derived products    Subjective   Have seen and examined patient at bedside this morning. Patient is still intubated and sedated. Patient open eyes when called, not follow commands. Vent settings: AC/VC/ 10/500/8/50%  ABG 7.43/37.1/82.1/24.1. Urine out put 4.2L/ 24h.  24h change: no acute event overnight. Objective     VS: BP (!) 162/88   Pulse 53   Temp 97.3 °F (36.3 °C) (Axillary)   Resp 16   Ht 5' 8\" (1.727 m)   Wt (!) 348 lb 8.8 oz (158.1 kg)   SpO2 96%   BMI 53.00 kg/m²           I & O - 24hr:     Intake/Output Summary (Last 24 hours) at 10/31/2020 2828  Last data filed at 10/31/2020 0900  Gross per 24 hour   Intake 4943 ml   Output 5115 ml   Net -172 ml       Physical Exam:  · General Appearance: intubated and sedated  · Neck: no adenopathy, no carotid bruit, no JVD, supple, symmetrical, trachea midline and thyroid not enlarged, symmetric, no tenderness/mass/nodules  · Lung: coarse breath sound bilaterally, no rales or wheezing  · Heart: bradycardic, normal S1 S2, no murmur or rub  · Abdomen: soft, non distended, non tender, no organomegaly. · Extremities:  Left lower leg 2+ non pitting edema. Right lower leg chronic lymphedema. · Musculoskeletal:  ROM normal in all joints of extremities.    · Neurologic: Mental status: Patient is sedated, unresponsive    Lines     site day    Art line   None    TLC R IJ 2   PICC None    Hemoaccess None    Oxygen:     none  Mechanical Ventilation:   Mode: A/C    TV:500 ml RR: 16  PEEP 8cmH2O FiO2 50%    ABG:     Lab Results   Component Value Date    PH 7.430 10/31/2020    PCO2 37.1 10/31/2020    PO2 82.1 10/31/2020    HCO3 24.1 10/31/2020    BE 0.1 10/31/2020    THB 14.7 10/31/2020    O2SAT 95.3 10/31/2020        Medications Infusions: (Fluid, Sedation, Vasopressors)  IVF:    LR at rate 50 ml/hr  Vasopressors   none  Sedation   Propofol at rate of 15 mcg/min Fentanyl at rate 175 mcg/min, Precedex 0.2 mcg/kg/hr    Nutrition:   TF low calorie high protein  ATB:   Antibiotics  Days   Azithromycin 2   Ceftriaxone  2         Patient currently has   Urinary cath  Restraints  DVT prophylaxis/ GI prophylaxis,    Labs     CBC with Differential:    Lab Results   Component Value Date    WBC 6.0 10/31/2020    RBC 5.32 10/31/2020    HGB 14.1 10/31/2020    HCT 41.2 10/31/2020     10/31/2020    MCV 77.4 10/31/2020    MCH 26.5 10/31/2020    MCHC 34.2 10/31/2020    RDW 21.8 10/31/2020    NRBC 6.1 10/30/2020    SEGSPCT 73 01/26/2014    LYMPHOPCT 9.6 10/31/2020    MONOPCT 8.8 10/31/2020    MYELOPCT 0.9 10/30/2020    BASOPCT 0.0 10/31/2020    MONOSABS 0.54 10/31/2020    LYMPHSABS 0.66 10/31/2020    EOSABS 0.05 10/31/2020    BASOSABS 0.00 10/31/2020     CMP:    Lab Results   Component Value Date     10/31/2020    K 4.3 10/31/2020    K 4.0 10/27/2020    CL 96 10/31/2020    CO2 26 10/31/2020    BUN 38 10/31/2020    CREATININE 2.7 10/31/2020    GFRAA 29 10/31/2020    LABGLOM 29 10/31/2020    GLUCOSE 340 10/31/2020    PROT 6.7 10/31/2020    LABALBU 3.4 10/31/2020    CALCIUM 7.8 10/31/2020    BILITOT 0.5 10/31/2020    ALKPHOS 116 10/31/2020    AST 94 10/31/2020     10/31/2020     Ionized Calcium:  No results found for: IONCA  Magnesium:    Lab Results   Component Value Date    MG 1.9 10/31/2020     Phosphorus:    Lab Results   Component Value Date    PHOS 5.2 10/31/2020     LDH:    Lab Results   Component Value Date     10/28/2020     PT/INR:    Lab Results   Component Value Date    PROTIME 10.9 07/21/2018    INR 0.9 07/21/2018     Troponin:    Lab Results   Component Value Date    TROPONINI <0.01 10/26/2020     U/A:    Lab Results   Component Value Date    NITRITE neg 08/30/2019    COLORU Yellow 09/16/2019    PROTEINU 100 09/16/2019    PHUR 6.0 09/16/2019    LABCAST FEW 04/11/2016    WBCUA NONE 09/16/2019    RBCUA 5-10 09/16/2019    BACTERIA NONE 09/16/2019    CLARITYU Clear 09/16/2019    SPECGRAV 1.020 09/16/2019    LEUKOCYTESUR Negative 09/16/2019    UROBILINOGEN 0.2 09/16/2019    BILIRUBINUR Negative 09/16/2019    BILIRUBINUR neg 08/30/2019    BLOODU LARGE 09/16/2019    GLUCOSEU Negative 09/16/2019     ABG:    Lab Results   Component Value Date    PH 7.430 10/31/2020    PCO2 37.1 10/31/2020    PO2 82.1 10/31/2020    HCO3 24.1 10/31/2020    BE 0.1 10/31/2020    O2SAT 95.3 10/31/2020     FLP:    Lab Results   Component Value Date    TRIG 89 10/30/2020    HDL 66 03/23/2017    LDLCALC 192 03/23/2017    LABVLDL 37 03/23/2017     TSH:    Lab Results   Component Value Date    TSH 4.340 10/27/2020       Imaging Studies:  CXR: Unchanged bilateral infiltrates and left pleural effusion. Resident's Assessment and Plan     Meeta Waterman is a 61 y.o. male with PMHx is significant for HFpEF (Ef 65% in 2017), hx of tracheostomy and reversal (as mentioned above), moderate HANS not on CPAP, DMT2, HTN, HLD, Hypothyroidism who initially was admitted to general floors for hypoxia. Transferred to the MICU when ABG concerning for worsening respiratory acidosis. Currently intubated and being managed in the MICU for:      Acute encephalopathy 2/2 Acute hypoxic hypercapnic respiratory failure. Pt presented to the ED with hypoxia, RRT was called due to worsening respiratory acidosis. Currently intubated and sedated. - Today pt is still intubated and sedated. Patient is on Fentanyl, propofol and precedex. Will decrease propofol today as tolerated. Add seroquel 25 mg BID for sedation.   - ABG 7.43/37.1/82.1/24.1.   - Continue monitor mentation status, wean down sedation as tolerated. Acute hypoxic hypercapnic respiratory failure 2/2 CAP versus chronic OHS  Pt has Hx chronic HANS not on CPAP at home. ABG showed pH 7.188/91.2/78.9/33 on RRT. Patient is not tolerating BiPAP and currently intubated. - Today Pt saturating 95% on vent. ABG 7.43/37.1/82.1/24.1  - Continue to monitor respiratory status, wean down FiO2 as tolerated. Patient not able to tolerate PS today. - Pro  on presentation. ECHO showed Left ventricular internal dimensions were normal in diastole and systole. EF 65%. Moderate left ventricular concentric hypertrophy noted. No regional wall motion abnormalities seen. - US dup LE negative for DVT. CTA negative for PE. COVID -19 negative x2  - Continue breathing treatment. Add 3% nebulizer today. Decrease Solumedrol to 40 mg daily.  - Monitor daily CXR. CBC.   - Pulmonology consulted, further recommendations appreciated. CAP  - Respiratory cultures grew MSSA on 31/10.   - Patient is afebrile, WBC 6.0  - Patient received 1 dose of vancomycin   - Will switch to Doxycycline 100 mg BID  - Monitor daily CBC, vitals, CXR. NANCY stage III likely pre renal 2/2 diuretic use, contrast agent vs? cardiorenal syndrome, improving   - Creatinine 3.8 ( baseline 1.1) -> 4.0 - 3.7 -> 2.7   - Currently hold ARB/ diuretics. Continue IVF LR, reduce rate to 50 ml/hr.   - I/O 4L/1.8L ( +4L). - Nephrology consulted. Further recommendations appreciated. - Continue monitor daily BMP, renal function. Monitor I/O. Avoid nephrotoxicity. Polyuria  - Patient had 4.2L urine/24h.   - Patient received 5L fluid/24h  - Obtain Urine electrolytes, urine osm today. Will follow results. - Continue monitor. Hx HFpEF, EF 65%  -Echo done in 2017 showing 1 diastolic dysfunction with normal left ventricular size and systolic function EF 47%. -Home medication: Coreg 25 mg, olmesartan 10 mg, Losartan 10 mg,.  -Repeat ECHO left ventricular internal dimensions were normal in diastole and systole. EF 65%. Moderate left ventricular concentric hypertrophy noted. No regional wall motion abnormalities seen. - Currently hold off Coreg due to bradycardia.    - Will continue monitor.      DMT2  - Hold home metfomrin.   -  overnight.  - Increase Lantus to 15 units. HDSS  - Continue monitor BG q6h    Hx of HTN  - /88 today  - home medication: Losartan/ novasc, hold for now  - Add hydralazine PRN for BP >170  - Continue monitor vitals.     HLD  - Continue home Atorvastatin. DVT ppx: heparin  GI ppx: Protonix  Updated daughter at the bedside. Vincenzo Norwood MD, PGY-1    Attending physician: Dr. Dov Kerr. I personally saw, examined and provided care for the patient. Radiographs, labs and medication list were reviewed by me independently. I spoke with bedside nursing, therapists and consultants. Critical care services and times documented are independent of procedures and multidisciplinary rounds with Residents. Additionally comprehensive, multidisciplinary rounds were conducted with the MICU team. The case was discussed in detail and plans for care were established. Review of Residents documentation was conducted and revisions were made as appropriate. I agree with the above documented exam, problem list and plan of care.   Yrn Underwood MD   CCT excluding procedures:40'

## 2020-10-31 NOTE — PROGRESS NOTES
Pulmonary Progress Note  10/31/2020 2:29 PM  Subjective:   Admit Date: 10/26/2020  PCP: Payton Oscar MD  Interval History: Patient sedated on fentanyl and propofol as well as Precedex  Intubated on mechanical ventilation  Diet: DIET TUBE FEED CONTINUOUS/CYCLIC NPO; Low Calorie High Protein; Orogastric; Continuous; 10; 50; 23      Data:   Scheduled Meds:    sodium chloride (Inhalant)  2 mL Nebulization Q4H    methylPREDNISolone  40 mg Intravenous Daily    insulin glargine  15 Units Subcutaneous Nightly    insulin lispro  0-18 Units Subcutaneous Q4H    QUEtiapine  25 mg Oral BID    doxycycline (VIBRAMYCIN) IV  100 mg Intravenous Q12H    heparin (porcine)  7,500 Units Subcutaneous Q8H    vancomycin (VANCOCIN) intermittent dosing (placeholder)   Other RX Placeholder    pantoprazole  40 mg Intravenous Daily    And    sodium chloride (PF)  10 mL Intravenous Daily    sodium chloride flush  10 mL Intravenous 2 times per day    Arformoterol Tartrate  15 mcg Nebulization BID    chlorhexidine  15 mL Mouth/Throat BID    amLODIPine  10 mg Oral Daily    aspirin  81 mg Oral Daily    [Held by provider] atorvastatin  40 mg Oral Daily    [Held by provider] carvedilol  12.5 mg Oral BID    levothyroxine  50 mcg Oral Daily    [Held by provider] losartan  100 mg Oral Daily    budesonide  0.5 mg Nebulization BID    ipratropium-albuterol  1 ampule Inhalation Q4H WA       Continuous Infusions:    dexmedetomidine (PRECEDEX) IV infusion 1.3 mcg/kg/hr (10/31/20 1240)    fentaNYL 5 mcg/ml in 0.9%  ml infusion 200 mcg/hr (10/31/20 1049)    propofol 10 mcg/kg/min (10/31/20 1348)    lactated ringers 50 mL/hr at 10/31/20 0856    dextrose         PRN Meds: midazolam, hydrALAZINE, sodium chloride flush, acetaminophen **OR** acetaminophen, polyethylene glycol, perflutren lipid microspheres, colchicine, ondansetron, promethazine **OR** ondansetron, glucose, dextrose, glucagon (rDNA), dextrose    I/O last 3 completed shifts: In: 18 [I.V.:3332; AU/JU:5587; IV Piggyback:250]  Out: 4280 [Urine:4280]    I/O this shift:  In: 2081 [I.V.:1635; NG/GT:446]  Out: 3345 [Urine:3345]      Intake/Output Summary (Last 24 hours) at 10/31/2020 1429  Last data filed at 10/31/2020 1400  Gross per 24 hour   Intake 5354 ml   Output 6950 ml   Net -1596 ml       Patient Vitals for the past 96 hrs (Last 3 readings):   Weight   10/31/20 0600 (!) 348 lb 8.8 oz (158.1 kg)   10/29/20 0600 (!) 334 lb 14.1 oz (151.9 kg)         Recent Labs     10/29/20  0519 10/30/20  0355 10/31/20  0445   WBC 5.7 5.3 6.0   HGB 13.1 12.2* 14.1   HCT 38.2 35.4* 41.2   MCV 78.6* 77.3* 77.4*    188 205     Recent Labs     10/29/20  0519 10/29/20  1740 10/30/20  0355 10/31/20  0445    134 139 137   K 3.9 3.9 3.9 4.3   CL 94* 97* 99 96*   CO2 23 23 25 26   BUN 24* 28* 32* 38*   CREATININE 3.8* 4.0* 3.7* 2.7*   PHOS 4.2  --  4.7* 5.2*     Recent Labs     10/29/20  0519 10/30/20  0355 10/31/20  0445   PROT 6.8 6.0* 6.7   ALKPHOS 95 101 116   ALT 31 72* 111*   AST 44* 154* 94*   BILITOT 0.6 0.6 0.5   CPK:  Lab Results   Component Value Date    CKTOTAL 98 10/08/2017          -----------------------------------------------------------------  RAD:   Results for orders placed during the hospital encounter of 10/26/20   XR CHEST PORTABLE    Narrative EXAMINATION:  ONE XRAY VIEW OF THE CHEST    10/30/2020 2:17 am    COMPARISON:  10/29/2020 radiograph    HISTORY:  ORDERING SYSTEM PROVIDED HISTORY: R IJ placement  TECHNOLOGIST PROVIDED HISTORY:  Reason for exam:->R IJ placement  What reading provider will be dictating this exam?->CRC    FINDINGS:  Right IJ central venous catheter has been placed with tip at the distal SVC. No pneumothorax. Worsening diffuse airspace opacification in the right lung  with moderate opacification also present at the left base. Endotracheal tube  and enteric tube stable. No significant skeletal finding.       Impression Interval placement of right IJ central venous catheter. No pneumothorax. Worsening airspace changes in the right lung. Micro:  Recent Labs     10/29/20  1449   BC 24 Hours no growth     Recent Labs     10/29/20  1035   ORG Staphylococcus aureus*     Recent Labs     10/29/20  1452   BLOODCULT2 24 Hours no growth     No results for input(s): STREPPNEUMAG in the last 72 hours. No results for input(s): LEGUR in the last 72 hours. Recent Labs     10/29/20  1035   ORG Staphylococcus aureus*     No results for input(s): ASO in the last 72 hours. Recent Labs     10/29/20  1035   CULTRESP Oral Pharyngeal Tiesha present*  Light growth  This isolate is presumed to be resistant based on the  detection of inducible Clindamycin resistance. Clindamycin  may still be effective in some patients.        Recent Labs     10/28/20  2049   LABURIN Growth not present     Vent Information  $Ventilation: $Subsequent Day  Skin Assessment: Clean, dry, & intact  Equipment ID: [id number]  Vent Type: [phone]  Vent Mode: AC/VC  Vt Ordered: 500 mL  Rate Set: 16 bmp  Peak Flow: 60 L/min  Pressure Support: 0 cmH20  FiO2 : 50 %  SpO2: 96 %  SpO2/FiO2 ratio: 192  PaO2/FiO2 ratio: 146  Sensitivity: 3  PEEP/CPAP: 8  I Time/ I Time %: 0 s  Humidification Source: Heated wire  Humidification Temp: 37  Humidification Temp Measured: 36.9  Circuit Condensation: Drained  Mask Type: Full face mask  Mask Size: Large    Additional Respiratory  Assessments  Pulse: 55  Resp: 16  SpO2: 96 %  Position: Semi-Lockwood's  Humidification Source: Heated wire  Humidification Temp: 37  Circuit Condensation: Drained  Oral Care Completed?: Yes  Oral Care: Mouth swabbed, Mouth suctioned, Mouth moisturizer, Suction toothette  Subglottic Suction Done?: Yes  Airway Type: ET  Airway Size: 8  Skin barrier applied: Yes    Objective:   Vitals:   Vitals:    10/31/20 1400   BP: (!) 159/88   Pulse: 55   Resp: 16   Temp:    SpO2: 96%      TEMP:Current: Temp: 96.8 °F (36 °C)  Max: Temp  Av.4 °F (36.3 °C)  Min: 96.8 °F (36 °C)  Max: 98 °F (36.7 °C)    BP Range: Systolic (88YUO), HWR:225 , Min:140 , IWT:718     Diastolic (46NWP), UVJ:09, Min:75, Max:134      General appearance: Sedated obese on mechanical ventilation , appears stated age and cooperative  In no acute distress  Skin: No rashes or lesions  HEENT: mucous membranes are moist oral endotracheal tube and OG tube  Neck: No JVD  Lungs: symmetrical expansion, coarse breath sounds to auscultation, no use of accessory muscles  Heart: S1S2 no murmurs,  Abdomen: soft, non tender, distended obese  Extremities: no peripheral edema  Neurologic: Sedated  Affect: Calm       Assessment:   Patient Active Problem List:    59-y.o M with history of DM, hypothyroid, COPD, HTN, HLD presented on 10/26 with shortness of breath for 1 week. Saturations were 70% at PCPs. Saturation 65% on room air in ER  Patient agitated for CT scan refused to lay flat  10/27 RRT refused BiPAP became combative. Patient transferred to ICU  10/28 intubated and sedated. CT chest showing dense multifocal airspace disease  10/30 Ultrasound lower extremities no DVT, ultrasound kidneys no hydronephrosis  10/31 bilateral infiltrates left effusion. On 50% +8 of PEEP. Chest x-ray showing right-sided improving with left base atelectasis. Covid negative x2    1. Acute hypoxemic respiratory failure on mechanical ventilation  2. Agitation on Seroquel Precedex fentanyl  3. MSSA pneumonia on doxycycline  4. HANS moderate ( AHI 17 on 4/18/18 requires BIPAP 16/12)  5. History of reactive airway disease  6. CT 3/14/2018 showing pulmonary nodule stable from 10/2017  7. Acute renal failure improving  8. Diabetes with elevated blood sugars  9. Obesity  10. EF 65% LVH  11. chronic lymphedema  12. Hypothyroid  13. H/o respiratory failure: 7/8/2017 pt was  transferred from [facility] for acute respiratory failure after lap cholecystectomy, lap umbilical hernia repair, and lap liver biopsy (fatty liver).  He had been extubated postop but had laryngospasm requiring reintubation, complicated by negative pressure flash pulmonary edema, and required tracheostomy 8/10/2017. Patient has staph aureus pneumonia and C. difficile colitis. [facility] was then [facility] where he was weaned off the ventilator and decannulated.       Plan:     · Wean sedation patient having some bradycardia  · On steroids  · May be able to change antibiotics as [facility]uresh[facility]

## 2020-11-01 ENCOUNTER — APPOINTMENT (OUTPATIENT)
Dept: GENERAL RADIOLOGY | Age: 60
DRG: 003 | End: 2020-11-01
Payer: MEDICARE

## 2020-11-01 LAB
AADO2: 165.9 MMHG
ALBUMIN SERPL-MCNC: 3.2 G/DL (ref 3.5–5.2)
ALP BLD-CCNC: 104 U/L (ref 40–129)
ALT SERPL-CCNC: 117 U/L (ref 0–40)
ANION GAP SERPL CALCULATED.3IONS-SCNC: 13 MMOL/L (ref 7–16)
ANION GAP SERPL CALCULATED.3IONS-SCNC: 13 MMOL/L (ref 7–16)
ANION GAP SERPL CALCULATED.3IONS-SCNC: 14 MMOL/L (ref 7–16)
ANISOCYTOSIS: ABNORMAL
AST SERPL-CCNC: 74 U/L (ref 0–39)
B.E.: 2.8 MMOL/L (ref -3–3)
BACTERIA: ABNORMAL /HPF
BASOPHILS ABSOLUTE: 0 E9/L (ref 0–0.2)
BASOPHILS RELATIVE PERCENT: 0.1 % (ref 0–2)
BILIRUB SERPL-MCNC: 0.3 MG/DL (ref 0–1.2)
BILIRUBIN URINE: NEGATIVE
BLOOD CULTURE, ROUTINE: ABNORMAL
BLOOD, URINE: ABNORMAL
BUN BLDV-MCNC: 32 MG/DL (ref 8–23)
BUN BLDV-MCNC: 32 MG/DL (ref 8–23)
BUN BLDV-MCNC: 37 MG/DL (ref 8–23)
CALCIUM SERPL-MCNC: 7.4 MG/DL (ref 8.6–10.2)
CALCIUM SERPL-MCNC: 7.6 MG/DL (ref 8.6–10.2)
CALCIUM SERPL-MCNC: 7.6 MG/DL (ref 8.6–10.2)
CHLORIDE BLD-SCNC: 100 MMOL/L (ref 98–107)
CHLORIDE BLD-SCNC: 100 MMOL/L (ref 98–107)
CHLORIDE BLD-SCNC: 99 MMOL/L (ref 98–107)
CLARITY: CLEAR
CO2: 24 MMOL/L (ref 22–29)
CO2: 25 MMOL/L (ref 22–29)
CO2: 27 MMOL/L (ref 22–29)
COHB: 0.9 % (ref 0–1.5)
COLOR: YELLOW
CREAT SERPL-MCNC: 1.6 MG/DL (ref 0.7–1.2)
CREAT SERPL-MCNC: 1.7 MG/DL (ref 0.7–1.2)
CREAT SERPL-MCNC: 1.8 MG/DL (ref 0.7–1.2)
CRITICAL: ABNORMAL
DATE ANALYZED: ABNORMAL
DATE OF COLLECTION: ABNORMAL
EOSINOPHILS ABSOLUTE: 0.29 E9/L (ref 0.05–0.5)
EOSINOPHILS RELATIVE PERCENT: 4.3 % (ref 0–6)
FIO2: 40 %
GFR AFRICAN AMERICAN: 47
GFR AFRICAN AMERICAN: 50
GFR AFRICAN AMERICAN: 53
GFR NON-AFRICAN AMERICAN: 47 ML/MIN/1.73
GFR NON-AFRICAN AMERICAN: 50 ML/MIN/1.73
GFR NON-AFRICAN AMERICAN: 53 ML/MIN/1.73
GLUCOSE BLD-MCNC: 242 MG/DL (ref 74–99)
GLUCOSE BLD-MCNC: 257 MG/DL (ref 74–99)
GLUCOSE BLD-MCNC: 335 MG/DL (ref 74–99)
GLUCOSE URINE: 100 MG/DL
HCO3: 26.4 MMOL/L (ref 22–26)
HCT VFR BLD CALC: 41.6 % (ref 37–54)
HEMOGLOBIN: 14.1 G/DL (ref 12.5–16.5)
HHB: 7.7 % (ref 0–5)
KETONES, URINE: NEGATIVE MG/DL
LAB: ABNORMAL
LEUKOCYTE ESTERASE, URINE: NEGATIVE
LYMPHOCYTES ABSOLUTE: 2.48 E9/L (ref 1.5–4)
LYMPHOCYTES RELATIVE PERCENT: 36.5 % (ref 20–42)
Lab: ABNORMAL
MAGNESIUM: 1.5 MG/DL (ref 1.6–2.6)
MCH RBC QN AUTO: 26.2 PG (ref 26–35)
MCHC RBC AUTO-ENTMCNC: 33.9 % (ref 32–34.5)
MCV RBC AUTO: 77.3 FL (ref 80–99.9)
METER GLUCOSE: 196 MG/DL (ref 74–99)
METER GLUCOSE: 205 MG/DL (ref 74–99)
METER GLUCOSE: 240 MG/DL (ref 74–99)
METER GLUCOSE: 273 MG/DL (ref 74–99)
METER GLUCOSE: 309 MG/DL (ref 74–99)
METER GLUCOSE: 321 MG/DL (ref 74–99)
METHB: 0.3 % (ref 0–1.5)
MODE: AC
MONOCYTES ABSOLUTE: 0.6 E9/L (ref 0.1–0.95)
MONOCYTES RELATIVE PERCENT: 8.7 % (ref 2–12)
MRSA CULTURE ONLY: NORMAL
NEUTROPHILS ABSOLUTE: 3.35 E9/L (ref 1.8–7.3)
NEUTROPHILS RELATIVE PERCENT: 50.4 % (ref 43–80)
NITRITE, URINE: NEGATIVE
NUCLEATED RED BLOOD CELLS: 0.9 /100 WBC
O2 CONTENT: 19.1 ML/DL
O2 SATURATION: 92.2 % (ref 92–98.5)
O2HB: 91.1 % (ref 94–97)
OPERATOR ID: ABNORMAL
ORGANISM: ABNORMAL
PATIENT TEMP: 37 C
PCO2: 37.5 MMHG (ref 35–45)
PDW BLD-RTO: 21.4 FL (ref 11.5–15)
PEEP/CPAP: 8 CMH2O
PFO2: 1.65 MMHG/%
PH BLOOD GAS: 7.47 (ref 7.35–7.45)
PH UA: 7 (ref 5–9)
PHOSPHORUS: 4 MG/DL (ref 2.5–4.5)
PLATELET # BLD: 202 E9/L (ref 130–450)
PMV BLD AUTO: 9.7 FL (ref 7–12)
PO2: 66.2 MMHG (ref 75–100)
POIKILOCYTES: ABNORMAL
POLYCHROMASIA: ABNORMAL
POTASSIUM SERPL-SCNC: 3.5 MMOL/L (ref 3.5–5)
POTASSIUM SERPL-SCNC: 4.2 MMOL/L (ref 3.5–5)
POTASSIUM SERPL-SCNC: 4.4 MMOL/L (ref 3.5–5)
PROTEIN UA: NEGATIVE MG/DL
RBC # BLD: 5.38 E12/L (ref 3.8–5.8)
RBC UA: ABNORMAL /HPF (ref 0–2)
RI(T): 2.51
RR MECHANICAL: 16 B/MIN
SODIUM BLD-SCNC: 137 MMOL/L (ref 132–146)
SODIUM BLD-SCNC: 138 MMOL/L (ref 132–146)
SODIUM BLD-SCNC: 140 MMOL/L (ref 132–146)
SOURCE, BLOOD GAS: ABNORMAL
SPECIFIC GRAVITY UA: 1.01 (ref 1–1.03)
TARGET CELLS: ABNORMAL
THB: 14.9 G/DL (ref 11.5–16.5)
TIME ANALYZED: 600
TOTAL PROTEIN: 6.4 G/DL (ref 6.4–8.3)
UROBILINOGEN, URINE: 0.2 E.U./DL
VACUOLATED NEUTROPHILS: ABNORMAL
VANCOMYCIN RANDOM: 13.3 MCG/ML (ref 5–40)
VT MECHANICAL: 500 ML
WBC # BLD: 6.7 E9/L (ref 4.5–11.5)
WBC UA: ABNORMAL /HPF (ref 0–5)

## 2020-11-01 PROCEDURE — 6360000002 HC RX W HCPCS: Performed by: INTERNAL MEDICINE

## 2020-11-01 PROCEDURE — 6370000000 HC RX 637 (ALT 250 FOR IP): Performed by: INTERNAL MEDICINE

## 2020-11-01 PROCEDURE — 36415 COLL VENOUS BLD VENIPUNCTURE: CPT

## 2020-11-01 PROCEDURE — 81001 URINALYSIS AUTO W/SCOPE: CPT

## 2020-11-01 PROCEDURE — 80053 COMPREHEN METABOLIC PANEL: CPT

## 2020-11-01 PROCEDURE — 94669 MECHANICAL CHEST WALL OSCILL: CPT

## 2020-11-01 PROCEDURE — 84100 ASSAY OF PHOSPHORUS: CPT

## 2020-11-01 PROCEDURE — 2580000003 HC RX 258: Performed by: INTERNAL MEDICINE

## 2020-11-01 PROCEDURE — C9113 INJ PANTOPRAZOLE SODIUM, VIA: HCPCS | Performed by: INTERNAL MEDICINE

## 2020-11-01 PROCEDURE — 36592 COLLECT BLOOD FROM PICC: CPT

## 2020-11-01 PROCEDURE — 2500000003 HC RX 250 WO HCPCS: Performed by: INTERNAL MEDICINE

## 2020-11-01 PROCEDURE — 85025 COMPLETE CBC W/AUTO DIFF WBC: CPT

## 2020-11-01 PROCEDURE — 83735 ASSAY OF MAGNESIUM: CPT

## 2020-11-01 PROCEDURE — 82962 GLUCOSE BLOOD TEST: CPT

## 2020-11-01 PROCEDURE — 80202 ASSAY OF VANCOMYCIN: CPT

## 2020-11-01 PROCEDURE — 82805 BLOOD GASES W/O2 SATURATION: CPT

## 2020-11-01 PROCEDURE — 94003 VENT MGMT INPAT SUBQ DAY: CPT

## 2020-11-01 PROCEDURE — 71045 X-RAY EXAM CHEST 1 VIEW: CPT

## 2020-11-01 PROCEDURE — 2000000000 HC ICU R&B

## 2020-11-01 PROCEDURE — 80048 BASIC METABOLIC PNL TOTAL CA: CPT

## 2020-11-01 PROCEDURE — 94640 AIRWAY INHALATION TREATMENT: CPT

## 2020-11-01 RX ORDER — LACTULOSE 10 G/15ML
20 SOLUTION ORAL 2 TIMES DAILY
Status: DISCONTINUED | OUTPATIENT
Start: 2020-11-01 | End: 2020-11-02

## 2020-11-01 RX ORDER — INSULIN GLARGINE 100 [IU]/ML
20 INJECTION, SOLUTION SUBCUTANEOUS NIGHTLY
Status: DISCONTINUED | OUTPATIENT
Start: 2020-11-01 | End: 2020-01-01

## 2020-11-01 RX ORDER — POTASSIUM CHLORIDE 29.8 MG/ML
20 INJECTION INTRAVENOUS ONCE
Status: COMPLETED | OUTPATIENT
Start: 2020-11-01 | End: 2020-11-01

## 2020-11-01 RX ORDER — BUMETANIDE 0.25 MG/ML
2 INJECTION, SOLUTION INTRAMUSCULAR; INTRAVENOUS ONCE
Status: COMPLETED | OUTPATIENT
Start: 2020-11-01 | End: 2020-11-01

## 2020-11-01 RX ORDER — MAGNESIUM SULFATE IN WATER 40 MG/ML
2 INJECTION, SOLUTION INTRAVENOUS ONCE
Status: COMPLETED | OUTPATIENT
Start: 2020-11-01 | End: 2020-11-01

## 2020-11-01 RX ADMIN — SODIUM CHLORIDE SOLN NEBU 3% 2 ML: 3 NEBU SOLN at 17:38

## 2020-11-01 RX ADMIN — PROPOFOL 50 MCG/KG/MIN: 10 INJECTION, EMULSION INTRAVENOUS at 13:30

## 2020-11-01 RX ADMIN — INSULIN LISPRO 3 UNITS: 100 INJECTION, SOLUTION INTRAVENOUS; SUBCUTANEOUS at 00:51

## 2020-11-01 RX ADMIN — HEPARIN SODIUM 7500 UNITS: 10000 INJECTION INTRAVENOUS; SUBCUTANEOUS at 08:21

## 2020-11-01 RX ADMIN — IPRATROPIUM BROMIDE AND ALBUTEROL SULFATE 1 AMPULE: 2.5; .5 SOLUTION RESPIRATORY (INHALATION) at 17:38

## 2020-11-01 RX ADMIN — IPRATROPIUM BROMIDE AND ALBUTEROL SULFATE 1 AMPULE: 2.5; .5 SOLUTION RESPIRATORY (INHALATION) at 21:15

## 2020-11-01 RX ADMIN — SODIUM CHLORIDE SOLN NEBU 3% 2 ML: 3 NEBU SOLN at 03:29

## 2020-11-01 RX ADMIN — DEXMEDETOMIDINE 1.4 MCG/KG/HR: 100 INJECTION, SOLUTION, CONCENTRATE INTRAVENOUS at 06:36

## 2020-11-01 RX ADMIN — PROPOFOL 50 MCG/KG/MIN: 10 INJECTION, EMULSION INTRAVENOUS at 18:57

## 2020-11-01 RX ADMIN — INSULIN GLARGINE 20 UNITS: 100 INJECTION, SOLUTION SUBCUTANEOUS at 20:36

## 2020-11-01 RX ADMIN — DOXYCYCLINE 100 MG: 100 INJECTION, POWDER, LYOPHILIZED, FOR SOLUTION INTRAVENOUS at 11:54

## 2020-11-01 RX ADMIN — PANTOPRAZOLE SODIUM 40 MG: 40 INJECTION, POWDER, FOR SOLUTION INTRAVENOUS at 08:20

## 2020-11-01 RX ADMIN — Medication 10 ML: at 08:21

## 2020-11-01 RX ADMIN — DEXMEDETOMIDINE 1.4 MCG/KG/HR: 100 INJECTION, SOLUTION, CONCENTRATE INTRAVENOUS at 01:25

## 2020-11-01 RX ADMIN — BUDESONIDE 500 MCG: 0.5 SUSPENSION RESPIRATORY (INHALATION) at 21:15

## 2020-11-01 RX ADMIN — ARFORMOTEROL TARTRATE 15 MCG: 15 SOLUTION RESPIRATORY (INHALATION) at 21:16

## 2020-11-01 RX ADMIN — HEPARIN SODIUM 7500 UNITS: 10000 INJECTION INTRAVENOUS; SUBCUTANEOUS at 00:17

## 2020-11-01 RX ADMIN — CHLORHEXIDINE GLUCONATE 0.12% ORAL RINSE 15 ML: 1.2 LIQUID ORAL at 20:50

## 2020-11-01 RX ADMIN — PROPOFOL 50 MCG/KG/MIN: 10 INJECTION, EMULSION INTRAVENOUS at 21:36

## 2020-11-01 RX ADMIN — PROPOFOL 50 MCG/KG/MIN: 10 INJECTION, EMULSION INTRAVENOUS at 16:09

## 2020-11-01 RX ADMIN — VANCOMYCIN HYDROCHLORIDE 2000 MG: 10 INJECTION, POWDER, LYOPHILIZED, FOR SOLUTION INTRAVENOUS at 09:55

## 2020-11-01 RX ADMIN — AMLODIPINE BESYLATE 10 MG: 10 TABLET ORAL at 08:20

## 2020-11-01 RX ADMIN — INSULIN LISPRO 6 UNITS: 100 INJECTION, SOLUTION INTRAVENOUS; SUBCUTANEOUS at 04:41

## 2020-11-01 RX ADMIN — INSULIN LISPRO 12 UNITS: 100 INJECTION, SOLUTION INTRAVENOUS; SUBCUTANEOUS at 17:14

## 2020-11-01 RX ADMIN — IPRATROPIUM BROMIDE AND ALBUTEROL SULFATE 1 AMPULE: 2.5; .5 SOLUTION RESPIRATORY (INHALATION) at 11:16

## 2020-11-01 RX ADMIN — SODIUM CHLORIDE SOLN NEBU 3% 2 ML: 3 NEBU SOLN at 00:21

## 2020-11-01 RX ADMIN — Medication 200 MCG/HR: at 16:09

## 2020-11-01 RX ADMIN — PROPOFOL 20 MCG/KG/MIN: 10 INJECTION, EMULSION INTRAVENOUS at 03:09

## 2020-11-01 RX ADMIN — CHLORHEXIDINE GLUCONATE 0.12% ORAL RINSE 15 ML: 1.2 LIQUID ORAL at 08:19

## 2020-11-01 RX ADMIN — QUETIAPINE FUMARATE 25 MG: 25 TABLET ORAL at 20:51

## 2020-11-01 RX ADMIN — SODIUM CHLORIDE, PRESERVATIVE FREE 10 ML: 5 INJECTION INTRAVENOUS at 08:20

## 2020-11-01 RX ADMIN — SODIUM CHLORIDE SOLN NEBU 3% 2 ML: 3 NEBU SOLN at 08:25

## 2020-11-01 RX ADMIN — HYDRALAZINE HYDROCHLORIDE 10 MG: 20 INJECTION, SOLUTION INTRAMUSCULAR; INTRAVENOUS at 03:50

## 2020-11-01 RX ADMIN — HYDRALAZINE HYDROCHLORIDE 10 MG: 20 INJECTION, SOLUTION INTRAMUSCULAR; INTRAVENOUS at 00:12

## 2020-11-01 RX ADMIN — Medication 10 ML: at 20:52

## 2020-11-01 RX ADMIN — INSULIN LISPRO 12 UNITS: 100 INJECTION, SOLUTION INTRAVENOUS; SUBCUTANEOUS at 12:09

## 2020-11-01 RX ADMIN — LEVOTHYROXINE SODIUM 50 MCG: 0.05 TABLET ORAL at 08:19

## 2020-11-01 RX ADMIN — Medication 200 MCG/HR: at 03:34

## 2020-11-01 RX ADMIN — Medication 200 MCG/HR: at 09:55

## 2020-11-01 RX ADMIN — METHYLPREDNISOLONE SODIUM SUCCINATE 40 MG: 40 INJECTION, POWDER, FOR SOLUTION INTRAMUSCULAR; INTRAVENOUS at 08:21

## 2020-11-01 RX ADMIN — ARFORMOTEROL TARTRATE 15 MCG: 15 SOLUTION RESPIRATORY (INHALATION) at 08:25

## 2020-11-01 RX ADMIN — Medication 200 MCG/HR: at 23:11

## 2020-11-01 RX ADMIN — SODIUM CHLORIDE SOLN NEBU 3% 2 ML: 3 NEBU SOLN at 21:16

## 2020-11-01 RX ADMIN — POTASSIUM CHLORIDE 20 MEQ: 400 INJECTION, SOLUTION INTRAVENOUS at 08:20

## 2020-11-01 RX ADMIN — PROPOFOL 30 MCG/KG/MIN: 10 INJECTION, EMULSION INTRAVENOUS at 09:50

## 2020-11-01 RX ADMIN — MAGNESIUM SULFATE HEPTAHYDRATE 2 G: 40 INJECTION, SOLUTION INTRAVENOUS at 08:21

## 2020-11-01 RX ADMIN — BUMETANIDE 2 MG: 0.25 INJECTION INTRAMUSCULAR; INTRAVENOUS at 12:16

## 2020-11-01 RX ADMIN — MIDAZOLAM HYDROCHLORIDE 2 MG: 1 INJECTION, SOLUTION INTRAMUSCULAR; INTRAVENOUS at 17:10

## 2020-11-01 RX ADMIN — QUETIAPINE FUMARATE 25 MG: 25 TABLET ORAL at 08:19

## 2020-11-01 RX ADMIN — HEPARIN SODIUM 7500 UNITS: 10000 INJECTION INTRAVENOUS; SUBCUTANEOUS at 16:09

## 2020-11-01 RX ADMIN — LACTULOSE 20 G: 20 SOLUTION ORAL at 12:16

## 2020-11-01 RX ADMIN — BUDESONIDE 500 MCG: 0.5 SUSPENSION RESPIRATORY (INHALATION) at 08:25

## 2020-11-01 RX ADMIN — ASPIRIN 81 MG CHEWABLE TABLET 81 MG: 81 TABLET CHEWABLE at 08:19

## 2020-11-01 RX ADMIN — IPRATROPIUM BROMIDE AND ALBUTEROL SULFATE 1 AMPULE: 2.5; .5 SOLUTION RESPIRATORY (INHALATION) at 08:25

## 2020-11-01 RX ADMIN — SODIUM CHLORIDE SOLN NEBU 3% 2 ML: 3 NEBU SOLN at 11:16

## 2020-11-01 RX ADMIN — MIDAZOLAM HYDROCHLORIDE 2 MG: 1 INJECTION, SOLUTION INTRAMUSCULAR; INTRAVENOUS at 12:06

## 2020-11-01 RX ADMIN — SODIUM CHLORIDE, POTASSIUM CHLORIDE, SODIUM LACTATE AND CALCIUM CHLORIDE: 600; 310; 30; 20 INJECTION, SOLUTION INTRAVENOUS at 08:21

## 2020-11-01 RX ADMIN — INSULIN LISPRO 6 UNITS: 100 INJECTION, SOLUTION INTRAVENOUS; SUBCUTANEOUS at 20:35

## 2020-11-01 RX ADMIN — LACTULOSE 20 G: 20 SOLUTION ORAL at 20:51

## 2020-11-01 RX ADMIN — INSULIN LISPRO 9 UNITS: 100 INJECTION, SOLUTION INTRAVENOUS; SUBCUTANEOUS at 08:28

## 2020-11-01 ASSESSMENT — PULMONARY FUNCTION TESTS
PIF_VALUE: 34
PIF_VALUE: 35
PIF_VALUE: 40
PIF_VALUE: 49
PIF_VALUE: 32
PIF_VALUE: 32
PIF_VALUE: 36
PIF_VALUE: 36
PIF_VALUE: 35
PIF_VALUE: 35
PIF_VALUE: 36
PIF_VALUE: 35
PIF_VALUE: 34
PIF_VALUE: 41
PIF_VALUE: 46
PIF_VALUE: 36
PIF_VALUE: 41
PIF_VALUE: 35
PIF_VALUE: 36
PIF_VALUE: 33
PIF_VALUE: 35
PIF_VALUE: 33
PIF_VALUE: 42
PIF_VALUE: 35
PIF_VALUE: 34
PIF_VALUE: 46
PIF_VALUE: 36
PIF_VALUE: 32
PIF_VALUE: 36

## 2020-11-01 ASSESSMENT — PAIN SCALES - GENERAL
PAINLEVEL_OUTOF10: 0
PAINLEVEL_OUTOF10: 0
PAINLEVEL_OUTOF10: 5

## 2020-11-01 NOTE — PROGRESS NOTES
Pharmacy Consultation Note  (Antibiotic Dosing and Monitoring)    Initial consult date: 10/30/20  Consulting physician: Dr. Mitali Peterson  Drug(s): Vancomycin  Indication: Pneumonia      Age/  Gender Height Weight IBW Dosing weight  Allergy Information   60 y.o./male 5' 8\" (172.7 cm) 295 lb (133.8 kg)     Ideal body weight: 68.4 kg (150 lb 12.7 oz)  Adjusted ideal body weight: 104.3 kg (229 lb 14.3 oz)  151.9 kg  Bee venom and Shellfish-derived products          Other anti-infectives Start date Stop date   Rocephin     Azithromycin                 Date  Tmax WBC BUN/CR CrCL  (mL/min) Drug/Dose Time   Given Level(s)   (Time) Comments   10/30 afebrile 5.3 32/3.7 31  Vancomycin 2000 mg IV x1 1539     10/31 afebrile 6 38/2.7 43 Vancomycin 2000 mg IV x1 0912 Rm level @ 0445 = 13.2 mcg/mL    11/1 afebrile 6.7 32/1.7 68 Vancomycin 2000 mg IVx1  Rm level @ 0045 = 13.3                     Intake/Output Summary (Last 24 hours) at 11/1/2020 0847  Last data filed at 11/1/2020 0800  Gross per 24 hour   Intake 5735 ml   Output 6735 ml   Net -1000 ml       Cultures:    Site Date Result   Resp 10/30 MSSA               Recent Labs     10/29/20  1452   BLOODCULT2 24 Hours no growth        Historical Cultures:  Organism   Date Value Ref Range Status   10/29/2020 Staphylococcus aureus (A)  Final     Recent Labs     10/29/20  1449   BC 24 Hours no growth         Assessment:  · 61 yoM currently intubated and sedated 2/2 acute respiratory failure initiated on antibiotics 2/2 pneumonia. Pharmacy consulted to dose/monitor vancomycin.   · Goal trough = 15 - 20 mcg/ml  · sCr 1.7 (baseline 1)    Plan:  · Vancomycin 2000 mg IV x1 - random level tomorrow AM; Consider de-escalating as Resp cx MSSA  · Dose per levels  · Pharmacist will follow and monitor/adjust dosing as necessary    Jorge Hoang, PharmD, BCPS 11/1/2020 8:47 AM

## 2020-11-01 NOTE — PROGRESS NOTES
Lab Results   Component Value Date     10/28/2020     PT/INR:    Lab Results   Component Value Date    PROTIME 10.9 07/21/2018    INR 0.9 07/21/2018     Last 3 Troponin:    Lab Results   Component Value Date    TROPONINI <0.01 10/26/2020    TROPONINI <0.01 05/26/2019    TROPONINI <0.01 05/26/2019     ABG:    Lab Results   Component Value Date    PH 7.466 11/01/2020    PCO2 37.5 11/01/2020    PO2 66.2 11/01/2020    HCO3 26.4 11/01/2020    BE 2.8 11/01/2020    O2SAT 92.2 11/01/2020     IRON:  No results found for: IRON  IMAGING    Xr Chest Portable    Result Date: 10/27/2020  EXAMINATION: ONE XRAY VIEW OF THE CHEST 10/27/2020 7:58 pm COMPARISON: October 26, 2020 HISTORY: ORDERING SYSTEM PROVIDED HISTORY: SOB TECHNOLOGIST PROVIDED HISTORY: Reason for exam:->SOB What reading provider will be dictating this exam?->CRC FINDINGS: There is mild cardiomegaly. There is patchy perihilar and bibasilar atelectasis/infiltrates. Tiny pleural effusions are suspected. Progressive bibasilar atelectasis/infiltrates concerning for pneumonia. Underlying CHF is considered. Xr Chest Portable    Result Date: 10/26/2020  EXAMINATION: ONE XRAY VIEW OF THE CHEST 10/26/2020 4:11 pm COMPARISON: 05/26/2019 HISTORY: ORDERING SYSTEM PROVIDED HISTORY: SOB TECHNOLOGIST PROVIDED HISTORY: Reason for exam:->SOB What reading provider will be dictating this exam?->CRC FINDINGS: Cardiac size appears stable. Discoid airspace disease seen at the left lung base which may represent atelectasis or scarring. Right infrahilar and basilar infiltrate is identified. No pneumothorax is identified. No acute osseous abnormality is identified. Right infrahilar and basilar infiltrate concerning for pneumonia. Left basilar atelectasis or scarring.        Medications:    Scheduled Meds:   insulin glargine  20 Units Subcutaneous Nightly    lactulose  20 g Oral BID    bumetanide  2 mg Intravenous Once    sodium chloride (Inhalant)  2 mL failure ([facility] [address]    Nonrheumatic aortic valve stenosis    ACE-inhibitor cough    Pneumonia    Acute gout of right knee   Acute encephalopathy 2/2 Acute hypoxic hypercapnic respiratory failure.         Acute hypoxic hypercapnic respiratory failure 2/2 bacterial pneumonia versus COPD versus ADHF versus chronic OHS  Pt has Hx chronic HANS not on CPAP at home. ABG showed pH 7.188/91.2/78.9/33 on RRT. currently intubated. - Continue to monitor respiratory status, wean down FiO2 as tolerated. - Pro  on presentation.   - US dup LE negative for DVT. CTA negative for PE. COVID -19 negative x2  - Continue breathing treatment  - Monitor daily CXR. CBC.   - Pulmonology consulted, further recommendations appreciated.     NANCY stage III likely pre renal 2/2 diuretic use, contrast agent vs? cardiorenal syndrome.   - Creatinine 3.8 ( baseline 1.1) -> 4.0 - 1.7  - Currently hold ARB/ diuretics. Continue IVF LR, reduce rate to 50 ml/hr.   - I/O 4L/1.8L ( +4L). - Nephrology consulted. Further recommendations appreciated. - Continue monitor daily BMP, renal function. Monitor I/O. Avoid nephrotoxicity. ?CAP  - CXR concerning for new infiltrates on 10/27/2020.   - Patient is afebrile, WBC 5.7. procalcitonin 0.11  - Respiratory cultures negative, respiratory panel negative.    - Continue ATBx- Monitor daily CBC, vitals, CXR.      Hx HFpEF, EF 65%cta noted  WILL NEED NIV FOR HOME- PROB BIPAP WITH BACKUP RATE  Wean per pulm/ccm

## 2020-11-01 NOTE — PROGRESS NOTES
Nephrology Progress Note  Patient's Name: Rosemary Martino  9:32 AM  11/1/2020        Reason for Consult: Acute kidney  Requesting Physician:  Will Patterson MD    Chief Complaint: Shortness of breath  History Obtained From:  EHR; spouse    History of Present Ilness:    Rosemary Martino is a 61 y.o. male with a history of COPD, hypertension, hyperlipidemia and diabetes mellitus. He presented to ED 2 days ago with complaints of shortness of breath. According to patient's spouse he had been complaining of shortness of breath over the course of several weeks. This has gotten progressively worse. He went to see his PCP on the day of admission. In office at the time pulse oximeter obtained revealed his oxygen saturation to be in the 70s. He was instructed to proceed to ED for further evaluation. On presentation to ED his initial vital signs showed a blood pressure of 168/71, temperature 97.5 and pulse of 93. His pulse oximeter at the time was noted to be 98% on room air. Laboratory data was significant for a BUN of 11, creatinine 1.1, WBC of 8.3. Rapid COVID-19 testing was negative. A chest x-ray performed revealed right infrahilar and basilar infiltrate concerning for pneumonia. A CTA was ordered but patient declined because of his inability to lay flat. Patient was given ceftriaxone and doxycycline followed by admission to telemetry. 2 days ago an RRT was called as patient was noted to be increasingly more in respiratory distress. He was transferred to MICU and intubated. Laboratory data yesterday showed worsening serum creatinine to 2.8. This a.m. further worsening to 3.6. He has been nonoliguric. Hence renal consult.     10/30: N new acute issues overnight; remains intubated  10/31: Polyuric , concerned for possible DI; no other acute issues overnight  11/1: agitated overnight ; remains intubated          Allergies:  Bee venom and Shellfish-derived products    Current Medications:    magnesium sulfate 2 g in 50 mL IVPB premix, Once  vancomycin (VANCOCIN) 2,000 mg in dextrose 5 % 500 mL IVPB, Once  midazolam (VERSED) injection 2 mg, Q2H PRN  sodium chloride (Inhalant) 3 % nebulizer solution 2 mL, Q4H  methylPREDNISolone sodium (SOLU-MEDROL) injection 40 mg, Daily  insulin lispro (HUMALOG) injection vial 0-18 Units, Q4H  dexmedetomidine (PRECEDEX) 1,000 mcg in sodium chloride 0.9 % 250 mL infusion, Continuous  QUEtiapine (SEROQUEL) tablet 25 mg, BID  doxycycline (VIBRAMYCIN) 100 mg in dextrose 5 % 100 mL IVPB, Q12H  hydrALAZINE (APRESOLINE) injection 10 mg, Q4H PRN  heparin (porcine) injection 7,500 Units, Q8H  insulin glargine (LANTUS) injection vial 15 Units, Nightly  vancomycin (VANCOCIN) intermittent dosing (placeholder), RX Placeholder  pantoprazole (PROTONIX) injection 40 mg, Daily    And  sodium chloride (PF) 0.9 % injection 10 mL, Daily  fentaNYL 5 mcg/ml in 0.9%  ml infusion, Continuous  propofol injection, Titrated  sodium chloride flush 0.9 % injection 10 mL, 2 times per day  sodium chloride flush 0.9 % injection 10 mL, PRN  acetaminophen (TYLENOL) tablet 650 mg, Q6H PRN    Or  acetaminophen (TYLENOL) suppository 650 mg, Q6H PRN  Arformoterol Tartrate (BROVANA) nebulizer solution 15 mcg, BID  polyethylene glycol (GLYCOLAX) packet 17 g, Daily PRN  perflutren lipid microspheres (DEFINITY) injection 1.65 mg, ONCE PRN  chlorhexidine (PERIDEX) 0.12 % solution 15 mL, BID  lactated ringers infusion, Continuous  amLODIPine (NORVASC) tablet 10 mg, Daily  aspirin chewable tablet 81 mg, Daily  [Held by provider] atorvastatin (LIPITOR) tablet 40 mg, Daily  [Held by provider] carvedilol (COREG) tablet 12.5 mg, BID  colchicine (COLCRYS) tablet 0.6 mg, BID PRN  levothyroxine (SYNTHROID) tablet 50 mcg, Daily  [Held by provider] losartan (COZAAR) tablet 100 mg, Daily  ondansetron (ZOFRAN-ODT) disintegrating tablet 4 mg, Q8H PRN  promethazine (PHENERGAN) tablet 12.5 mg, Q6H PRN    Or  ondansetron (ZOFRAN) injection 4

## 2020-11-01 NOTE — PLAN OF CARE
Problem: Pain:  Goal: Pain level will decrease  Description: Pain level will decrease  11/1/2020 1407 by Hanny Donovan RN  Outcome: Met This Shift     Problem: Pain:  Goal: Control of acute pain  Description: Control of acute pain  Outcome: Met This Shift     Problem: Pain:  Goal: Control of chronic pain  Description: Control of chronic pain  Outcome: Met This Shift     Problem: Restraint Use - Nonviolent/Non-Self-Destructive Behavior:  Goal: Absence of restraint-related injury  Description: Absence of restraint-related injury  11/1/2020 1407 by Hanny Donovan RN  Outcome: Met This Shift     Problem: Skin Integrity:  Goal: Will show no infection signs and symptoms  Description: Will show no infection signs and symptoms  11/1/2020 1407 by Hanny Donovan RN  Outcome: Met This Shift     Problem: Skin Integrity:  Goal: Absence of new skin breakdown  Description: Absence of new skin breakdown  11/1/2020 1407 by Hanny Donovan RN  Outcome: Met This Shift     Problem: Restraint Use - Nonviolent/Non-Self-Destructive Behavior:  Goal: Absence of restraint indications  Description: Absence of restraint indications  11/1/2020 1407 by Hanny Donovan RN  Outcome: Not Met This Shift   Plan of care reviewed with patient and family, as available.

## 2020-11-01 NOTE — PROGRESS NOTES
Pt continues to reach for lines and tubes despite attempts to deter. Patient attempts to throw legs over side rails and scoot down in bed. Patient unable to be redirected or follow commands. Bilateral soft wrist and bilateral soft ankle restraints continued for pt safety.

## 2020-11-01 NOTE — PROGRESS NOTES
[address] [facility]  Department of Internal Medicine   Internal Medicine Residency   MICU Progress Note    Patient:  Ellen Cid 61 y.o. male  MRN: [id number]     Date of Service: 11/1/2020    Allergy: Bee venom and Shellfish-derived products    Subjective   Have seen and examined patient at bedside this morning. Overnight, patient is agitated. Currently on fentanyl 200, propofol 20, Precedex 1.5. Patient open eyes when called. Patient has 7 L of urine output. Heart rate is around 50s to 60s. Vent settings: AC/VC/16/500/8/41%     24h change: no acute event overnight. Objective     VS: /83   Pulse 64   Temp 97.7 °F (36.5 °C) (Axillary)   Resp 19   Ht 5' 8\" (1.727 m)   Wt (!) 348 lb 8.8 oz (158.1 kg)   SpO2 92%   BMI 53.00 kg/m²           I & O - 24hr:     Intake/Output Summary (Last 24 hours) at 11/1/2020 1241  Last data filed at 11/1/2020 1200  Gross per 24 hour   Intake 5735 ml   Output 5635 ml   Net 100 ml       Physical Exam:  · General Appearance: intubated and sedated  · Neck: no adenopathy, no carotid bruit, no JVD, supple, symmetrical, trachea midline and thyroid not enlarged, symmetric, no tenderness/mass/nodules  · Lung: coarse breath sound bilaterally, no rales or wheezing  · Heart: bradycardic, normal S1 S2, no murmur or rub  · Abdomen: Stop, nontender, distended, no masses, no organomegaly  · Extremities:  Left lower leg 2+ non pitting edema. Right lower leg chronic lymphedema. · Musculoskeletal:  ROM normal in all joints of extremities.    · Neurologic: Mental status: Patient is sedated, open eyes, normal pupillary reflex    Lines     site day    Art line   None    TLC R IJ 3   PICC None    Hemoaccess None    Oxygen:     none  Mechanical Ventilation:   Mode: A/C    TV:500 ml RR: 16  PEEP 8cmH2O FiO2 50%    ABG:     Lab Results   Component Value Date    PH 7.466 11/01/2020    PCO2 37.5 11/01/2020    PO2 66.2 11/01/2020    HCO3 26.4 11/01/2020    BE 2.8 11/01/2020 THB 14.9 11/01/2020    O2SAT 92.2 11/01/2020        Medications     Infusions: (Fluid, Sedation, Vasopressors)  IVF:    LR at rate 50 ml/hr  Vasopressors   none  Sedation   Propofol at rate of 10 mcg/min Fentanyl at rate 200 mcg/min    Nutrition:   TF low calorie high protein  ATB:   Antibiotics  Days   Doxycycline 2             Patient currently has   Urinary cath  Restraints  DVT prophylaxis/ GI prophylaxis,    Labs     CBC with Differential:    Lab Results   Component Value Date    WBC 6.7 11/01/2020    RBC 5.38 11/01/2020    HGB 14.1 11/01/2020    HCT 41.6 11/01/2020     11/01/2020    MCV 77.3 11/01/2020    MCH 26.2 11/01/2020    MCHC 33.9 11/01/2020    RDW 21.4 11/01/2020    NRBC 0.9 11/01/2020    SEGSPCT 73 01/26/2014    LYMPHOPCT 36.5 11/01/2020    MONOPCT 8.7 11/01/2020    MYELOPCT 0.9 10/30/2020    BASOPCT 0.1 11/01/2020    MONOSABS 0.60 11/01/2020    LYMPHSABS 2.48 11/01/2020    EOSABS 0.29 11/01/2020    BASOSABS 0.00 11/01/2020     CMP:    Lab Results   Component Value Date     11/01/2020    K 3.5 11/01/2020    K 4.0 10/27/2020     11/01/2020    CO2 27 11/01/2020    BUN 32 11/01/2020    CREATININE 1.7 11/01/2020    GFRAA 50 11/01/2020    LABGLOM 50 11/01/2020    GLUCOSE 257 11/01/2020    PROT 6.4 11/01/2020    LABALBU 3.2 11/01/2020    CALCIUM 7.6 11/01/2020    BILITOT 0.3 11/01/2020    ALKPHOS 104 11/01/2020    AST 74 11/01/2020     11/01/2020     Ionized Calcium:  No results found for: IONCA  Magnesium:    Lab Results   Component Value Date    MG 1.5 11/01/2020     Phosphorus:    Lab Results   Component Value Date    PHOS 4.0 11/01/2020     LDH:    Lab Results   Component Value Date     10/28/2020     PT/INR:    Lab Results   Component Value Date    PROTIME 10.9 07/21/2018    INR 0.9 07/21/2018     Troponin:    Lab Results   Component Value Date    TROPONINI <0.01 10/26/2020     U/A:    Lab Results   Component Value Date    NITRITE neg 08/30/2019    COLORU Yellow 11/01/2020    PROTEINU Negative 11/01/2020    PHUR 7.0 11/01/2020    LABCAST FEW 04/11/2016    WBCUA 0-1 11/01/2020    RBCUA 2-5 11/01/2020    BACTERIA RARE 11/01/2020    CLARITYU Clear 11/01/2020    SPECGRAV 1.015 11/01/2020    LEUKOCYTESUR Negative 11/01/2020    UROBILINOGEN 0.2 11/01/2020    BILIRUBINUR Negative 11/01/2020    BILIRUBINUR neg 08/30/2019    BLOODU TRACE-INTACT 11/01/2020    GLUCOSEU 100 11/01/2020     ABG:    Lab Results   Component Value Date    PH 7.466 11/01/2020    PCO2 37.5 11/01/2020    PO2 66.2 11/01/2020    HCO3 26.4 11/01/2020    BE 2.8 11/01/2020    O2SAT 92.2 11/01/2020     FLP:    Lab Results   Component Value Date    TRIG 89 10/30/2020    HDL 66 03/23/2017    LDLCALC 192 03/23/2017    LABVLDL 37 03/23/2017     TSH:    Lab Results   Component Value Date    TSH 4.340 10/27/2020       Imaging Studies:  CXR: Unchanged bilateral infiltrates and left pleural effusion. Resident's Assessment and Plan     Jigar Oscar is a 61 y.o. male with PMHx is significant for HFpEF (Ef 65% in 2017), hx of tracheostomy and reversal (as mentioned above), moderate HANS not on CPAP, DMT2, HTN, HLD, Hypothyroidism who initially was admitted to general floors for hypoxia. Transferred to the MICU when ABG concerning for worsening respiratory acidosis. Currently intubated and being managed in the MICU for:      Acute encephalopathy 2/2 Acute hypoxic hypercapnic respiratory failure. Pt presented to the ED with hypoxia, RRT was called due to worsening respiratory acidosis. Currently intubated and sedated. - Today pt is still intubated and sedated. Patient is on Fentanyl, propofol, off precedex today. Will decrease propofol today as tolerated. Add seroquel 25 mg BID for sedation.   - ABG AC/VC/16/500/8/41%  - Continue monitor mentation status, wean down sedation as tolerated. Acute hypoxic hypercapnic respiratory failure 2/2 CAP versus chronic OHS  Pt has Hx chronic HANS not on CPAP at home.  ABG showed pH 7.188/91.2/78.9/33 on RRT. Patient is not tolerating BiPAP and currently intubated. - Today Pt saturating 95% on vent. ABG AC/VC/16/500/8/41%  - Continue to monitor respiratory status, wean down FiO2 as tolerated. Patient not able to tolerate PS today. - Pro  on presentation. ECHO showed Left ventricular internal dimensions were normal in diastole and systole. EF 65%. Moderate left ventricular concentric hypertrophy noted. No regional wall motion abnormalities seen. - US dup LE negative for DVT. CTA negative for PE. COVID -19 negative x2  - Continue breathing treatment. Add 3% nebulizer today. Decrease Solumedrol to 40 mg daily.  - Monitor daily CXR. CBC.   - Pulmonology consulted, further recommendations appreciated. CAP  - Respiratory cultures grew MSSA on 31/10.   - Patient is afebrile, WBC 6.7  - Patient received 1 dose of vancomycin   - Continue doxycycline 100 mg BID  - Monitor daily CBC, vitals, CXR. NANCY stage III likely pre renal 2/2 diuretic use, contrast agent vs? cardiorenal syndrome, improving   - Creatinine 3.8 ( baseline 1.1) -> 4.0 - 3.7 -> 2.7 -> 1.7  - Currently hold ARB/ diuretics. Continue IVF LR, reduce rate to 50 ml/hr.   - I/O 5.7/7L ( +5L). - Will give 1 dose of Bumex 2 mg today per nephrology. Further recommendations appreciated. - Continue monitor daily BMP, renal function. Monitor I/O. Avoid nephrotoxicity. Polyuria  - Patient had 4.2L urine/24h.   - Patient received 7L fluid/24h  -Urine osmolarity 324, serum osmolarity 317. Urine sodium 101, chloride 84, creatinine 26  -Likely recover phase of ATN  - Continue monitor. Hx HFpEF, EF 65%  -Echo done in 2017 showing 1 diastolic dysfunction with normal left ventricular size and systolic function EF 00%. -Home medication: Coreg 25 mg, olmesartan 10 mg, Losartan 10 mg,.  -Repeat ECHO left ventricular internal dimensions were normal in diastole and systole. EF 65%.  Moderate left ventricular concentric

## 2020-11-02 ENCOUNTER — APPOINTMENT (OUTPATIENT)
Dept: GENERAL RADIOLOGY | Age: 60
DRG: 003 | End: 2020-11-02
Payer: MEDICARE

## 2020-11-02 LAB
AADO2: 285 MMHG
ALBUMIN SERPL-MCNC: 2.9 G/DL (ref 3.5–5.2)
ALP BLD-CCNC: 128 U/L (ref 40–129)
ALT SERPL-CCNC: 247 U/L (ref 0–40)
ANION GAP SERPL CALCULATED.3IONS-SCNC: 15 MMOL/L (ref 7–16)
ANION GAP SERPL CALCULATED.3IONS-SCNC: 16 MMOL/L (ref 7–16)
ANION GAP SERPL CALCULATED.3IONS-SCNC: 18 MMOL/L (ref 7–16)
ANISOCYTOSIS: ABNORMAL
AST SERPL-CCNC: 254 U/L (ref 0–39)
B.E.: -1.4 MMOL/L (ref -3–3)
BASOPHILS ABSOLUTE: 0 E9/L (ref 0–0.2)
BASOPHILS RELATIVE PERCENT: 0.1 % (ref 0–2)
BILIRUB SERPL-MCNC: 0.4 MG/DL (ref 0–1.2)
BUN BLDV-MCNC: 45 MG/DL (ref 8–23)
BUN BLDV-MCNC: 50 MG/DL (ref 8–23)
BUN BLDV-MCNC: 55 MG/DL (ref 8–23)
C-REACTIVE PROTEIN: 1.4 MG/DL (ref 0–0.4)
CALCIUM SERPL-MCNC: 7.2 MG/DL (ref 8.6–10.2)
CALCIUM SERPL-MCNC: 7.5 MG/DL (ref 8.6–10.2)
CALCIUM SERPL-MCNC: 7.6 MG/DL (ref 8.6–10.2)
CHLORIDE BLD-SCNC: 104 MMOL/L (ref 98–107)
CHLORIDE BLD-SCNC: 97 MMOL/L (ref 98–107)
CHLORIDE BLD-SCNC: 99 MMOL/L (ref 98–107)
CO2: 20 MMOL/L (ref 22–29)
CO2: 22 MMOL/L (ref 22–29)
CO2: 25 MMOL/L (ref 22–29)
COHB: 0.3 % (ref 0–1.5)
CORTISOL TOTAL: 7.25 MCG/DL (ref 2.68–18.4)
CREAT SERPL-MCNC: 2.9 MG/DL (ref 0.7–1.2)
CREAT SERPL-MCNC: 3.3 MG/DL (ref 0.7–1.2)
CREAT SERPL-MCNC: 3.5 MG/DL (ref 0.7–1.2)
CRITICAL: ABNORMAL
DATE ANALYZED: ABNORMAL
DATE OF COLLECTION: ABNORMAL
EOSINOPHILS ABSOLUTE: 0 E9/L (ref 0.05–0.5)
EOSINOPHILS RELATIVE PERCENT: 2.3 % (ref 0–6)
FIO2: 60 %
GFR AFRICAN AMERICAN: 22
GFR AFRICAN AMERICAN: 23
GFR AFRICAN AMERICAN: 27
GFR NON-AFRICAN AMERICAN: 22 ML/MIN/1.73
GFR NON-AFRICAN AMERICAN: 23 ML/MIN/1.73
GFR NON-AFRICAN AMERICAN: 27 ML/MIN/1.73
GLUCOSE BLD-MCNC: 106 MG/DL (ref 74–99)
GLUCOSE BLD-MCNC: 204 MG/DL (ref 74–99)
GLUCOSE BLD-MCNC: 227 MG/DL (ref 74–99)
HCO3: 24.1 MMOL/L (ref 22–26)
HCT VFR BLD CALC: 39.3 % (ref 37–54)
HEMOGLOBIN: 13.1 G/DL (ref 12.5–16.5)
HHB: 6.1 % (ref 0–5)
HYPOCHROMIA: ABNORMAL
LAB: ABNORMAL
LYMPHOCYTES ABSOLUTE: 2.81 E9/L (ref 1.5–4)
LYMPHOCYTES RELATIVE PERCENT: 36.2 % (ref 20–42)
Lab: ABNORMAL
MAGNESIUM: 1.9 MG/DL (ref 1.6–2.6)
MCH RBC QN AUTO: 26 PG (ref 26–35)
MCHC RBC AUTO-ENTMCNC: 33.3 % (ref 32–34.5)
MCV RBC AUTO: 78.1 FL (ref 80–99.9)
METER GLUCOSE: 106 MG/DL (ref 74–99)
METER GLUCOSE: 139 MG/DL (ref 74–99)
METER GLUCOSE: 176 MG/DL (ref 74–99)
METER GLUCOSE: 209 MG/DL (ref 74–99)
METER GLUCOSE: 227 MG/DL (ref 74–99)
METER GLUCOSE: 98 MG/DL (ref 74–99)
METHB: 0.6 % (ref 0–1.5)
MODE: AC
MONOCYTES ABSOLUTE: 0.78 E9/L (ref 0.1–0.95)
MONOCYTES RELATIVE PERCENT: 9.5 % (ref 2–12)
NEUTROPHILS ABSOLUTE: 4.21 E9/L (ref 1.8–7.3)
NEUTROPHILS RELATIVE PERCENT: 54.3 % (ref 43–80)
NUCLEATED RED BLOOD CELLS: 4.3 /100 WBC
O2 CONTENT: 20 ML/DL
O2 SATURATION: 93.8 % (ref 92–98.5)
O2HB: 93 % (ref 94–97)
OPERATOR ID: ABNORMAL
PATIENT TEMP: 37 C
PCO2: 43.5 MMHG (ref 35–45)
PDW BLD-RTO: 22 FL (ref 11.5–15)
PEEP/CPAP: 8 CMH2O
PFO2: 1.33 MMHG/%
PH BLOOD GAS: 7.36 (ref 7.35–7.45)
PHOSPHORUS: 6.3 MG/DL (ref 2.5–4.5)
PLATELET # BLD: 217 E9/L (ref 130–450)
PMV BLD AUTO: 9.4 FL (ref 7–12)
PO2: 80 MMHG (ref 75–100)
POIKILOCYTES: ABNORMAL
POLYCHROMASIA: ABNORMAL
POTASSIUM SERPL-SCNC: 4.1 MMOL/L (ref 3.5–5)
POTASSIUM SERPL-SCNC: 4.6 MMOL/L (ref 3.5–5)
POTASSIUM SERPL-SCNC: 4.7 MMOL/L (ref 3.5–5)
PRO-BNP: 175 PG/ML (ref 0–125)
PROCALCITONIN: 0.57 NG/ML (ref 0–0.08)
RBC # BLD: 5.03 E12/L (ref 3.8–5.8)
RI(T): 3.56
RR MECHANICAL: 20 B/MIN
SCHISTOCYTES: ABNORMAL
SODIUM BLD-SCNC: 135 MMOL/L (ref 132–146)
SODIUM BLD-SCNC: 137 MMOL/L (ref 132–146)
SODIUM BLD-SCNC: 144 MMOL/L (ref 132–146)
SOURCE, BLOOD GAS: ABNORMAL
TARGET CELLS: ABNORMAL
THB: 15.3 G/DL (ref 11.5–16.5)
TIME ANALYZED: 624
TOTAL PROTEIN: 6 G/DL (ref 6.4–8.3)
TRIGL SERPL-MCNC: 115 MG/DL (ref 0–149)
VANCOMYCIN RANDOM: 26.8 MCG/ML (ref 5–40)
VT MECHANICAL: 450 ML
WBC # BLD: 7.8 E9/L (ref 4.5–11.5)

## 2020-11-02 PROCEDURE — 6360000002 HC RX W HCPCS: Performed by: INTERNAL MEDICINE

## 2020-11-02 PROCEDURE — 6370000000 HC RX 637 (ALT 250 FOR IP): Performed by: INTERNAL MEDICINE

## 2020-11-02 PROCEDURE — 2580000003 HC RX 258: Performed by: INTERNAL MEDICINE

## 2020-11-02 PROCEDURE — 84100 ASSAY OF PHOSPHORUS: CPT

## 2020-11-02 PROCEDURE — 2500000003 HC RX 250 WO HCPCS: Performed by: INTERNAL MEDICINE

## 2020-11-02 PROCEDURE — 80202 ASSAY OF VANCOMYCIN: CPT

## 2020-11-02 PROCEDURE — 80048 BASIC METABOLIC PNL TOTAL CA: CPT

## 2020-11-02 PROCEDURE — 6360000002 HC RX W HCPCS: Performed by: STUDENT IN AN ORGANIZED HEALTH CARE EDUCATION/TRAINING PROGRAM

## 2020-11-02 PROCEDURE — 83735 ASSAY OF MAGNESIUM: CPT

## 2020-11-02 PROCEDURE — C9113 INJ PANTOPRAZOLE SODIUM, VIA: HCPCS | Performed by: INTERNAL MEDICINE

## 2020-11-02 PROCEDURE — 36620 INSERTION CATHETER ARTERY: CPT

## 2020-11-02 PROCEDURE — 74018 RADEX ABDOMEN 1 VIEW: CPT

## 2020-11-02 PROCEDURE — 83880 ASSAY OF NATRIURETIC PEPTIDE: CPT

## 2020-11-02 PROCEDURE — 86140 C-REACTIVE PROTEIN: CPT

## 2020-11-02 PROCEDURE — 94640 AIRWAY INHALATION TREATMENT: CPT

## 2020-11-02 PROCEDURE — 94003 VENT MGMT INPAT SUBQ DAY: CPT

## 2020-11-02 PROCEDURE — 82533 TOTAL CORTISOL: CPT

## 2020-11-02 PROCEDURE — 84478 ASSAY OF TRIGLYCERIDES: CPT

## 2020-11-02 PROCEDURE — 80053 COMPREHEN METABOLIC PANEL: CPT

## 2020-11-02 PROCEDURE — 36415 COLL VENOUS BLD VENIPUNCTURE: CPT

## 2020-11-02 PROCEDURE — 85025 COMPLETE CBC W/AUTO DIFF WBC: CPT

## 2020-11-02 PROCEDURE — 87450 HC DIRECT STREP B ANTIGEN: CPT

## 2020-11-02 PROCEDURE — 82962 GLUCOSE BLOOD TEST: CPT

## 2020-11-02 PROCEDURE — 71045 X-RAY EXAM CHEST 1 VIEW: CPT

## 2020-11-02 PROCEDURE — 87449 NOS EACH ORGANISM AG IA: CPT

## 2020-11-02 PROCEDURE — 2000000000 HC ICU R&B

## 2020-11-02 PROCEDURE — 84145 PROCALCITONIN (PCT): CPT

## 2020-11-02 PROCEDURE — 82805 BLOOD GASES W/O2 SATURATION: CPT

## 2020-11-02 RX ORDER — SENNA AND DOCUSATE SODIUM 50; 8.6 MG/1; MG/1
2 TABLET, FILM COATED ORAL DAILY
Status: DISCONTINUED | OUTPATIENT
Start: 2020-11-02 | End: 2020-11-04

## 2020-11-02 RX ORDER — POLYVINYL ALCOHOL 14 MG/ML
1 SOLUTION/ DROPS OPHTHALMIC EVERY 4 HOURS PRN
Status: DISCONTINUED | OUTPATIENT
Start: 2020-11-02 | End: 2020-01-01 | Stop reason: HOSPADM

## 2020-11-02 RX ORDER — LIDOCAINE HYDROCHLORIDE 10 MG/ML
INJECTION, SOLUTION INFILTRATION; PERINEURAL
Status: DISPENSED
Start: 2020-11-02 | End: 2020-11-03

## 2020-11-02 RX ORDER — LEVOFLOXACIN 5 MG/ML
750 INJECTION, SOLUTION INTRAVENOUS
Status: DISCONTINUED | OUTPATIENT
Start: 2020-11-02 | End: 2020-11-03

## 2020-11-02 RX ORDER — BUMETANIDE 0.25 MG/ML
2 INJECTION, SOLUTION INTRAMUSCULAR; INTRAVENOUS ONCE
Status: COMPLETED | OUTPATIENT
Start: 2020-11-02 | End: 2020-11-02

## 2020-11-02 RX ORDER — MIDAZOLAM HYDROCHLORIDE 1 MG/ML
2 INJECTION INTRAMUSCULAR; INTRAVENOUS ONCE
Status: COMPLETED | OUTPATIENT
Start: 2020-11-02 | End: 2020-11-02

## 2020-11-02 RX ORDER — PROPOFOL 10 MG/ML
10 INJECTION, EMULSION INTRAVENOUS
Status: DISCONTINUED | OUTPATIENT
Start: 2020-11-02 | End: 2020-11-03

## 2020-11-02 RX ADMIN — PROPOFOL 35 MCG/KG/MIN: 10 INJECTION, EMULSION INTRAVENOUS at 04:15

## 2020-11-02 RX ADMIN — BUDESONIDE 500 MCG: 0.5 SUSPENSION RESPIRATORY (INHALATION) at 10:10

## 2020-11-02 RX ADMIN — INSULIN LISPRO 3 UNITS: 100 INJECTION, SOLUTION INTRAVENOUS; SUBCUTANEOUS at 20:03

## 2020-11-02 RX ADMIN — PROPOFOL 50 MCG/KG/MIN: 10 INJECTION, EMULSION INTRAVENOUS at 21:32

## 2020-11-02 RX ADMIN — HYDROCORTISONE SODIUM SUCCINATE 100 MG: 100 INJECTION, POWDER, FOR SOLUTION INTRAMUSCULAR; INTRAVENOUS at 16:53

## 2020-11-02 RX ADMIN — HEPARIN SODIUM 7500 UNITS: 10000 INJECTION INTRAVENOUS; SUBCUTANEOUS at 01:00

## 2020-11-02 RX ADMIN — CHLORHEXIDINE GLUCONATE 0.12% ORAL RINSE 15 ML: 1.2 LIQUID ORAL at 09:07

## 2020-11-02 RX ADMIN — SODIUM CHLORIDE, PRESERVATIVE FREE 10 ML: 5 INJECTION INTRAVENOUS at 09:07

## 2020-11-02 RX ADMIN — ACETAMINOPHEN 650 MG: 325 TABLET ORAL at 12:59

## 2020-11-02 RX ADMIN — SENNOSIDES AND DOCUSATE SODIUM 2 TABLET: 8.6; 5 TABLET ORAL at 14:55

## 2020-11-02 RX ADMIN — ARFORMOTEROL TARTRATE 15 MCG: 15 SOLUTION RESPIRATORY (INHALATION) at 20:16

## 2020-11-02 RX ADMIN — QUETIAPINE FUMARATE 25 MG: 25 TABLET ORAL at 09:04

## 2020-11-02 RX ADMIN — BUMETANIDE 2 MG: 0.25 INJECTION INTRAMUSCULAR; INTRAVENOUS at 14:46

## 2020-11-02 RX ADMIN — PANTOPRAZOLE SODIUM 40 MG: 40 INJECTION, POWDER, FOR SOLUTION INTRAVENOUS at 09:04

## 2020-11-02 RX ADMIN — LEVOTHYROXINE SODIUM 50 MCG: 0.05 TABLET ORAL at 09:03

## 2020-11-02 RX ADMIN — PROPOFOL 40 MCG/KG/MIN: 10 INJECTION, EMULSION INTRAVENOUS at 00:31

## 2020-11-02 RX ADMIN — ASPIRIN 81 MG CHEWABLE TABLET 81 MG: 81 TABLET CHEWABLE at 09:03

## 2020-11-02 RX ADMIN — POLYVINYL ALCOHOL 1 DROP: 14 SOLUTION/ DROPS OPHTHALMIC at 15:26

## 2020-11-02 RX ADMIN — DOXYCYCLINE 100 MG: 100 INJECTION, POWDER, LYOPHILIZED, FOR SOLUTION INTRAVENOUS at 00:00

## 2020-11-02 RX ADMIN — Medication 8 MCG/MIN: at 14:46

## 2020-11-02 RX ADMIN — SODIUM CHLORIDE SOLN NEBU 3% 2 ML: 3 NEBU SOLN at 10:10

## 2020-11-02 RX ADMIN — SODIUM CHLORIDE SOLN NEBU 3% 2 ML: 3 NEBU SOLN at 00:35

## 2020-11-02 RX ADMIN — LEVOFLOXACIN 750 MG: 5 INJECTION, SOLUTION INTRAVENOUS at 18:06

## 2020-11-02 RX ADMIN — Medication 10 ML: at 09:04

## 2020-11-02 RX ADMIN — LACTULOSE 20 G: 20 SOLUTION ORAL at 09:10

## 2020-11-02 RX ADMIN — SODIUM CHLORIDE SOLN NEBU 3% 2 ML: 3 NEBU SOLN at 12:48

## 2020-11-02 RX ADMIN — IPRATROPIUM BROMIDE AND ALBUTEROL SULFATE 1 AMPULE: 2.5; .5 SOLUTION RESPIRATORY (INHALATION) at 16:52

## 2020-11-02 RX ADMIN — IPRATROPIUM BROMIDE AND ALBUTEROL SULFATE 1 AMPULE: 2.5; .5 SOLUTION RESPIRATORY (INHALATION) at 12:48

## 2020-11-02 RX ADMIN — PROPOFOL 35 MCG/KG/MIN: 10 INJECTION, EMULSION INTRAVENOUS at 08:42

## 2020-11-02 RX ADMIN — Medication 200 MCG/HR: at 19:58

## 2020-11-02 RX ADMIN — SODIUM CHLORIDE SOLN NEBU 3% 2 ML: 3 NEBU SOLN at 16:52

## 2020-11-02 RX ADMIN — PROPOFOL 30 MCG/KG/MIN: 10 INJECTION, EMULSION INTRAVENOUS at 16:11

## 2020-11-02 RX ADMIN — INSULIN LISPRO 6 UNITS: 100 INJECTION, SOLUTION INTRAVENOUS; SUBCUTANEOUS at 16:57

## 2020-11-02 RX ADMIN — PROPOFOL 50 MCG/KG/MIN: 10 INJECTION, EMULSION INTRAVENOUS at 12:10

## 2020-11-02 RX ADMIN — MIDAZOLAM HYDROCHLORIDE 2 MG: 1 INJECTION, SOLUTION INTRAMUSCULAR; INTRAVENOUS at 01:39

## 2020-11-02 RX ADMIN — METHYLPREDNISOLONE SODIUM SUCCINATE 40 MG: 40 INJECTION, POWDER, FOR SOLUTION INTRAMUSCULAR; INTRAVENOUS at 09:04

## 2020-11-02 RX ADMIN — HEPARIN SODIUM 7500 UNITS: 10000 INJECTION INTRAVENOUS; SUBCUTANEOUS at 09:05

## 2020-11-02 RX ADMIN — INSULIN GLARGINE 20 UNITS: 100 INJECTION, SOLUTION SUBCUTANEOUS at 20:03

## 2020-11-02 RX ADMIN — CHLORHEXIDINE GLUCONATE 0.12% ORAL RINSE 15 ML: 1.2 LIQUID ORAL at 20:09

## 2020-11-02 RX ADMIN — NAFCILLIN SODIUM 2 G: 2 INJECTION, POWDER, LYOPHILIZED, FOR SOLUTION INTRAMUSCULAR; INTRAVENOUS at 15:26

## 2020-11-02 RX ADMIN — BUDESONIDE 500 MCG: 0.5 SUSPENSION RESPIRATORY (INHALATION) at 20:16

## 2020-11-02 RX ADMIN — IPRATROPIUM BROMIDE AND ALBUTEROL SULFATE 1 AMPULE: 2.5; .5 SOLUTION RESPIRATORY (INHALATION) at 10:09

## 2020-11-02 RX ADMIN — Medication 10 ML: at 20:09

## 2020-11-02 RX ADMIN — MIDAZOLAM 2 MG: 1 INJECTION INTRAMUSCULAR; INTRAVENOUS at 15:31

## 2020-11-02 RX ADMIN — SODIUM CHLORIDE SOLN NEBU 3% 2 ML: 3 NEBU SOLN at 03:05

## 2020-11-02 RX ADMIN — DOXYCYCLINE 100 MG: 100 INJECTION, POWDER, LYOPHILIZED, FOR SOLUTION INTRAVENOUS at 11:27

## 2020-11-02 RX ADMIN — SODIUM CHLORIDE SOLN NEBU 3% 2 ML: 3 NEBU SOLN at 20:17

## 2020-11-02 RX ADMIN — Medication 175 MCG/HR: at 06:30

## 2020-11-02 RX ADMIN — INSULIN LISPRO 6 UNITS: 100 INJECTION, SOLUTION INTRAVENOUS; SUBCUTANEOUS at 13:07

## 2020-11-02 RX ADMIN — HEPARIN SODIUM 7500 UNITS: 10000 INJECTION INTRAVENOUS; SUBCUTANEOUS at 16:14

## 2020-11-02 RX ADMIN — ARFORMOTEROL TARTRATE 15 MCG: 15 SOLUTION RESPIRATORY (INHALATION) at 10:09

## 2020-11-02 RX ADMIN — AMLODIPINE BESYLATE 10 MG: 10 TABLET ORAL at 11:27

## 2020-11-02 RX ADMIN — SODIUM CHLORIDE 3 G: 900 INJECTION INTRAVENOUS at 18:06

## 2020-11-02 RX ADMIN — IPRATROPIUM BROMIDE AND ALBUTEROL SULFATE 1 AMPULE: 2.5; .5 SOLUTION RESPIRATORY (INHALATION) at 20:16

## 2020-11-02 RX ADMIN — MIDAZOLAM HYDROCHLORIDE 2 MG: 1 INJECTION, SOLUTION INTRAMUSCULAR; INTRAVENOUS at 04:11

## 2020-11-02 RX ADMIN — PROPOFOL 40 MCG/KG/MIN: 10 INJECTION, EMULSION INTRAVENOUS at 18:54

## 2020-11-02 RX ADMIN — Medication 200 MCG/HR: at 14:30

## 2020-11-02 ASSESSMENT — PAIN SCALES - GENERAL
PAINLEVEL_OUTOF10: 0
PAINLEVEL_OUTOF10: 4
PAINLEVEL_OUTOF10: 0

## 2020-11-02 ASSESSMENT — PULMONARY FUNCTION TESTS
PIF_VALUE: 22
PIF_VALUE: 39
PIF_VALUE: 34
PIF_VALUE: 34
PIF_VALUE: 36
PIF_VALUE: 35
PIF_VALUE: 39
PIF_VALUE: 35
PIF_VALUE: 37
PIF_VALUE: 38
PIF_VALUE: 35
PIF_VALUE: 40
PIF_VALUE: 32
PIF_VALUE: 41
PIF_VALUE: 38
PIF_VALUE: 31
PIF_VALUE: 35
PIF_VALUE: 34
PIF_VALUE: 41
PIF_VALUE: 37
PIF_VALUE: 34
PIF_VALUE: 35
PIF_VALUE: 37
PIF_VALUE: 38
PIF_VALUE: 35
PIF_VALUE: 35
PIF_VALUE: 34
PIF_VALUE: 39
PIF_VALUE: 34
PIF_VALUE: 29

## 2020-11-02 NOTE — PROGRESS NOTES
PT SEEN AND EXAMINED. intubated, in icu. Sedated. agitated overnight ; remains intubated  Chart reviewed. meds reviewed. D/w nursing + family as available. EXAM: IN GENERAL, NAD. AWAKE AND . ROS NEGx10 EXCEPT:   BP (!) 205/101   Pulse 142   Temp 99.5 °F (37.5 °C) (Axillary)   Resp 25   Ht 5' 8\" (1.727 m)   Wt (!) 334 lb (151.5 kg)   SpO2 (!) 86%   BMI 50.78 kg/m²   GEN: A+O NAD. HEENT: NCAT. EOMI. JOE  NECK: NO JVD. TRACH MIDLINE. NO BRUITS. NO THYROMEGALY. LUNGS: CTA BL NO RALES, RHONCHI OR WHEEZES. GOOD EXCURSION. CV: Regular rate and rhythm, NO Murmurs, Rubs, Or gallops  ABD: Soft. Nontender. Normal bowel sounds. No organomegaly  EXT:No clubbing cyanosis or edema  Neuro: Alert and oriented x 3. No focal motor deficits. No sensory deficits. Reflexes appear intact.   Labs/data reviewedLABS: CBC with Differential:    Lab Results   Component Value Date    WBC 7.8 11/02/2020    RBC 5.03 11/02/2020    HGB 13.1 11/02/2020    HCT 39.3 11/02/2020     11/02/2020    MCV 78.1 11/02/2020    MCH 26.0 11/02/2020    MCHC 33.3 11/02/2020    RDW 22.0 11/02/2020    NRBC 4.3 11/02/2020    SEGSPCT 73 01/26/2014    LYMPHOPCT 36.2 11/02/2020    MONOPCT 9.5 11/02/2020    MYELOPCT 0.9 10/30/2020    BASOPCT 0.1 11/02/2020    MONOSABS 0.78 11/02/2020    LYMPHSABS 2.81 11/02/2020    EOSABS 0.00 11/02/2020    BASOSABS 0.00 11/02/2020     Platelets:    Lab Results   Component Value Date     11/02/2020     CMP:    Lab Results   Component Value Date     11/02/2020    K 4.1 11/02/2020    K 4.0 10/27/2020     11/02/2020    CO2 25 11/02/2020    BUN 45 11/02/2020    CREATININE 2.9 11/02/2020    GFRAA 27 11/02/2020    LABGLOM 27 11/02/2020    GLUCOSE 106 11/02/2020    PROT 6.0 11/02/2020    LABALBU 2.9 11/02/2020    CALCIUM 7.5 11/02/2020    BILITOT 0.4 11/02/2020    ALKPHOS 128 11/02/2020     11/02/2020     11/02/2020     Magnesium:    Lab Results   Component Value Date    MG 1.9 11/02/2020 LDH:    Lab Results   Component Value Date     10/28/2020     PT/INR:    Lab Results   Component Value Date    PROTIME 10.9 07/21/2018    INR 0.9 07/21/2018     Last 3 Troponin:    Lab Results   Component Value Date    TROPONINI <0.01 10/26/2020    TROPONINI <0.01 05/26/2019    TROPONINI <0.01 05/26/2019     ABG:    Lab Results   Component Value Date    PH 7.362 11/02/2020    PCO2 43.5 11/02/2020    PO2 80.0 11/02/2020    HCO3 24.1 11/02/2020    BE -1.4 11/02/2020    O2SAT 93.8 11/02/2020     IRON:  No results found for: IRON  IMAGING    Xr Chest Portable    Result Date: 10/27/2020  EXAMINATION: ONE XRAY VIEW OF THE CHEST 10/27/2020 7:58 pm COMPARISON: October 26, 2020 HISTORY: ORDERING SYSTEM PROVIDED HISTORY: SOB TECHNOLOGIST PROVIDED HISTORY: Reason for exam:->SOB What reading provider will be dictating this exam?->CRC FINDINGS: There is mild cardiomegaly. There is patchy perihilar and bibasilar atelectasis/infiltrates. Tiny pleural effusions are suspected. Progressive bibasilar atelectasis/infiltrates concerning for pneumonia. Underlying CHF is considered. Xr Chest Portable    Result Date: 10/26/2020  EXAMINATION: ONE XRAY VIEW OF THE CHEST 10/26/2020 4:11 pm COMPARISON: 05/26/2019 HISTORY: ORDERING SYSTEM PROVIDED HISTORY: SOB TECHNOLOGIST PROVIDED HISTORY: Reason for exam:->SOB What reading provider will be dictating this exam?->CRC FINDINGS: Cardiac size appears stable. Discoid airspace disease seen at the left lung base which may represent atelectasis or scarring. Right infrahilar and basilar infiltrate is identified. No pneumothorax is identified. No acute osseous abnormality is identified. Right infrahilar and basilar infiltrate concerning for pneumonia. Left basilar atelectasis or scarring.        Medications:    Scheduled Meds:   sennosides-docusate sodium  2 tablet Oral Daily    nafcillin  2 g Intravenous Q4H    bumetanide  2 mg Intravenous Once    insulin glargine  20 Units Subcutaneous Nightly    sodium chloride (Inhalant)  2 mL Nebulization Q4H    methylPREDNISolone  40 mg Intravenous Daily    insulin lispro  0-18 Units Subcutaneous Q4H    heparin (porcine)  7,500 Units Subcutaneous Q8H    pantoprazole  40 mg Intravenous Daily    And    sodium chloride (PF)  10 mL Intravenous Daily    sodium chloride flush  10 mL Intravenous 2 times per day    Arformoterol Tartrate  15 mcg Nebulization BID    chlorhexidine  15 mL Mouth/Throat BID    amLODIPine  10 mg Oral Daily    aspirin  81 mg Oral Daily    [Held by provider] atorvastatin  40 mg Oral Daily    [Held by provider] carvedilol  12.5 mg Oral BID    levothyroxine  50 mcg Oral Daily    [Held by provider] losartan  100 mg Oral Daily    budesonide  0.5 mg Nebulization BID    ipratropium-albuterol  1 ampule Inhalation Q4H WA       Continuous Infusions:   norepinephrine      fentaNYL 5 mcg/ml in 0.9%  ml infusion 200 mcg/hr (11/02/20 1430)    dextrose         PRN Meds:polyvinyl alcohol, midazolam, hydrALAZINE, sodium chloride flush, acetaminophen **OR** acetaminophen, polyethylene glycol, perflutren lipid microspheres, colchicine, ondansetron, promethazine **OR** ondansetron, glucose, dextrose, glucagon (rDNA), dextrose    A/P:      Patient Active Problem List   Diagnosis    Hyperlipidemia    Type 2 diabetes mellitus without complication, without long-term current use of insulin (HCC)    Atypical chest pain    Essential hypertension    Chronic acquired lymphedema    Aortic valve disease    Morbid obesity due to excess calories (HCC)    Abdominal pain    Acute respiratory failure with hypoxia (HCC)    Acute pulmonary edema (HCC)    Congestive heart failure with LV diastolic dysfunction, NYHA class 3 (HCC)    Obstructive sleep apnea    Acquired hypothyroidism    Chronic diastolic heart failure (HCC)    Nonrheumatic aortic valve stenosis    ACE-inhibitor cough    Pneumonia    Acute gout of right

## 2020-11-02 NOTE — PATIENT CARE CONFERENCE
P Quality Flow/Interdisciplinary Rounds Progress Note        Quality Flow Rounds held on November 2, 2020    Disciplines Attending:   pt/ot, resp therapy, bedside nurse, charge nurse, nursing management    Laila Luz was admitted on 10/26/2020  3:39 PM    Anticipated Discharge Date:       Disposition:    Romaine Score:  Romaine Scale Score: 14    Readmission Risk              Risk of Unplanned Readmission:        25           Discussed patient goal for the day, patient clinical progression, and barriers to discharge. The following Goal(s) of the Day/Commitment(s) have been identified:  Wean vent as tolerated.       Deyanira Anis  November 2, 2020

## 2020-11-02 NOTE — PROGRESS NOTES
Pt continues to reach for lines and tubes despite attempts to deter. Patient attempts to kick legs out of bed when restraints released. Patient unable to be redirected or follow commands. Bilateral soft wrist and bilateral soft ankle restraints continued for pt safety.

## 2020-11-02 NOTE — PROCEDURES
Arterial Line Placement Procedure Note                     Indication: cardiovascular instability    Consent: The spouse was counseled regarding the procedure, its indications, risks, potential complications and alternatives, and any questions were answered. Consent was obtained to proceed. Issa's Test: Normal    Procedure: The skin over the right radial artery was prepped with betadine and draped in a sterile fashion. Local anesthesia was obtained by infiltration using 1% Lidocaine without epinephrine. A 20 gauge angiocath was then inserted, over a needle, into the vessel. The transducer set was then attached and securely fastened to the skin with sutures. Waveforms on the monitor were observed and found to be adequate. The patient had good distal perfusion after the procedure. The site was then dressed in a sterile fashion. The patient tolerated the procedure well.      Complications: None

## 2020-11-02 NOTE — CONSULTS
[address] [place] [address] [facility]  [facility]    Consultation Note     Admit Date: 10/26/2020  3:39 PM    Reason for Consult:   Pneumonia     Attending Physician:  Lindsay Noble MD       Chief Complaint   Patient presents with    Shortness of Breath     for a few weeks. O2 sat in 70s at [facility], placed on 4L and now 95%. History Obtained From:  For a detailed history please see previous and current available medical records. HISTORY OF PRESENT ILLNESS:             The patient is a 61 y.o. male known to the Infectious Diseases service. The patient is 61year old  [place] gentleman with past medical history significant for HTN, COPD, HFpEF (65%), HANS, DM, hypothyroidism who initially presented to the hospital initially due to acute on chronic hypoxic respiratory failure with O2 saturation on 70% at his PCP's office. Patient was treated with rocephin and doxycycline in ED and the continued on rocephin. Patient was given Haldol and fentanyl for sedation for CT chest on 10/27, patient was agitated, concern for aspiration. Patient's respiratory status decompensated on 10/28, requiring intubation and ICU care. Treatment was continued with rocephin and Vancomycin and then switched to doxycycline on 10/31 for respiratory culture positive for Staph aureus. CXR 11/02- significant for left lower lobe infiltrate and pleural effusion and right basilar infiltrate/atelectasis. Patient became hypotensive overnight 11/1-11/2 requiring pressor support. Patient remained afebrile, with WBC of 7.8 this am.     History obtained from wife - Patient has history of heavy alcohol use. Patient's wife stated that patient drinks daily. May range from 3-4 drinks to a 1 bottle of gin on some days.      Past Medical History:        Diagnosis Date    Acquired hypothyroidism 9/26/2017    Anxiety     Congestive heart failure with LV diastolic dysfunction, NYHA class 3 (HCC)     Depression     DM (diabetes mellitus) ([facility]     Essential hypertension 6/1/2016    Gouty arthritis 2006    Hyperlipidemia     Hypertension     Lymphedema     Obesity     Obstructive sleep apnea 8/17/2009    Obstructive sleep apnea 9/26/2017    Osteoarthritis     Type 2 diabetes mellitus without complication, without long-term current use of insulin ([facility] 1/15/2014     Past Surgical History:        Procedure Laterality Date    BRONCHOSCOPY  08/10/2017    ECHO COMPL W DOP COLOR FLOW  6/21/2013         EXTERNAL EAR SURGERY  6/2/2009    keloid left ear    FOOT SURGERY  1990    Left foot    GASTROSTOMY TUBE PLACEMENT  08/10/2017    TRACHEOSTOMY TUBE PLACEMENT  08/10/2017     Current Medications:   Scheduled Meds:   sennosides-docusate sodium  2 tablet Oral Daily    nafcillin  2 g Intravenous Q4H    insulin glargine  20 Units Subcutaneous Nightly    sodium chloride (Inhalant)  2 mL Nebulization Q4H    methylPREDNISolone  40 mg Intravenous Daily    insulin lispro  0-18 Units Subcutaneous Q4H    heparin (porcine)  7,500 Units Subcutaneous Q8H    pantoprazole  40 mg Intravenous Daily    And    sodium chloride (PF)  10 mL Intravenous Daily    sodium chloride flush  10 mL Intravenous 2 times per day    Arformoterol Tartrate  15 mcg Nebulization BID    chlorhexidine  15 mL Mouth/Throat BID    amLODIPine  10 mg Oral Daily    aspirin  81 mg Oral Daily    [Held by provider] atorvastatin  40 mg Oral Daily    [Held by provider] carvedilol  12.5 mg Oral BID    levothyroxine  50 mcg Oral Daily    [Held by provider] losartan  100 mg Oral Daily    budesonide  0.5 mg Nebulization BID    ipratropium-albuterol  1 ampule Inhalation Q4H WA     Continuous Infusions:   norepinephrine 8 mcg/min (11/02/20 1446)    fentaNYL 5 mcg/ml in 0.9%  ml infusion 200 mcg/hr (11/02/20 1430)    dextrose       PRN Meds:polyvinyl alcohol, midazolam, hydrALAZINE, sodium chloride flush, acetaminophen **OR** acetaminophen, polyethylene glycol, Otherwise non-pertinent to the chief complaint. REVIEW OF SYSTEMS:    14 ROS negative unless otherwise specified in the HPI    PHYSICAL EXAM:    Vitals:    BP (!) 205/101   Pulse 142   Temp 99.5 °F (37.5 °C) (Axillary)   Resp 25   Ht 5' 8\" (1.727 m)   Wt (!) 334 lb (151.5 kg)   SpO2 (!) 86%   BMI 50.78 kg/m²     General Appearance:    Agitated, intubated, awake   Head:    Normocephalic, without obvious abnormality, atraumatic   Eyes:    PERRL, conjunctiva/corneas red      Neck:   Supple, trachea midline, no adenopathy; no JVD   Lungs:     Clear to auscultation bilaterally, on ventilator   Chest wall:    No tenderness or deformity   Heart:    Regular rate and rhythm, S1 and S2 normal, systolic murmur 2nd intercostal space   Abdomen:     Soft, non-tender, bowel sounds not appreciated. Extremities:   Extremities normal, atraumatic, no cyanosis, +1 edema b/l    Pulses:   2+ and symmetric all extremities   Skin:   Skin color, texture, turgor normal, no rashes or lesions       CBC+dif:  Reviewed and trend followed    Radiology:  Noted    Microbiology:  Pending  No results for input(s): BC in the last 72 hours. No results for input(s): ORG in the last 72 hours. No results for input(s): Aundria Gonzales in the last 72 hours. No results for input(s): STREPNEUMAGU in the last 72 hours. No results for input(s): LP1UAG in the last 72 hours. No results for input(s): ASO in the last 72 hours. No results for input(s): CULTRESP in the last 72 hours. Blood cultures:  Lab Results   Component Value Date    BC 24 Hours no growth 10/29/2020    BC  10/26/2020     This organism was isolated in one set. Susceptibility testing is not routinely done as this  organism frequently represents skin contamination. Additional testing can be ordered by calling the  Microbiology Department.       BC 5 Days- no growth 05/19/2018    BC 5 Days- no growth 08/01/2017     Assessment:  · Bacterial pneumonia, likely aspiration pneumonia vs

## 2020-11-02 NOTE — PROGRESS NOTES
Comprehensive Nutrition Assessment    Type and Reason for Visit:  Reassess    Nutrition Recommendations/Plan: Modify Tube Feeding  Vital HP previously ordered 2/2 propofol dosing however changed to Diabetic formula d/t increased BSL however Vital HP contains less CHO/L. Noted elevated Phos. Current propofol providing an additional 874kcals. EN Recommendation: Diabetic 1.5 @45ml/hr x23 hours (30 minutes before/after Synthroid) w/ protein modular daily to provide: 1035ml, 1552kcals, 85gm pro, 785ml water (with protein modular daily: 1656kcals (2503kcals w/ propofol), 111gm pro)    Nutrition Assessment:  Pt admit 2/2 resp failure/PNA. Noted NANCY, H/o DM. Remains on vent w/ continued EN.     Malnutrition Assessment:  Malnutrition Status:  No malnutrition    Context:  Acute Illness     Findings of the 6 clinical characteristics of malnutrition:  Energy Intake:  No significant decrease in energy intake  Weight Loss:  No significant weight loss     Body Fat Loss:  No significant body fat loss     Muscle Mass Loss:  No significant muscle mass loss    Fluid Accumulation:  No significant fluid accumulation     Strength:  Not Performed    Estimated Daily Nutrient Needs:  Energy (kcal):  ; ; Weight Used for Energy Requirements:  Current     Protein (g):  (1.3-1.5gm/kg IBW; noted elevated renal labs); Weight Used for Protein Requirements:  Ideal          Nutrition Related Findings:  vent, OGT, BSL WNL, intermittent hypotension, AMS, active BS, abd distention, + I/Os, +2 to +4 edema, Phos 6.3      Wounds:  None       Current Nutrition Therapies:    Current Tube Feeding (TF) Orders:  · Feeding Route: Orogastric  · Formula: Diabetic  · Current TF & Flush Orders Provides: 50ml/hr x23; 1150ml, 1380kcals, 69m pro, 925ml water    Anthropometric Measures:  · Height: 5' 8\" (172.7 cm)  · Current Body Weight: 334 lb (151.5 kg)(11/1 actual)   · Admission Body Weight: 334 lb (151.5 kg)(10/29 actual)    · Usual Body Weight: 318 lb (144.2 kg)(12/2019 per EMR, no method)     · Ideal Body Weight: 154 lbs; % Ideal Body Weight 216.9 %   · BMI: 50.8  · BMI Categories: Obese Class 3 (BMI 40.0 or greater)       Nutrition Diagnosis:   · Inadequate oral intake related to impaired respiratory function as evidenced by NPO or clear liquid status due to medical condition, intubation, nutrition support - enteral nutrition    Nutrition Interventions:   Nutrition Education/Counseling:  Education not indicated   Coordination of Nutrition Care:  Continue to monitor while inpatient, Coordination of Community Care    Goals:   Tolerance to EN       Nutrition Monitoring and Evaluation:   Food/Nutrient Intake Outcomes:  Enteral Nutrition Intake/Tolerance  Physical Signs/Symptoms Outcomes:  Biochemical Data, Nutrition Focused Physical Findings, Skin, Weight, GI Status, Fluid Status or Edema, Hemodynamic Status     Electronically signed by Mary Adames MS, RD, LD on 11/2/20 at 1:08 PM EST

## 2020-11-02 NOTE — PROGRESS NOTES
Pulmonary Progress Note  11/2/2020 9:45 AM  Subjective:   Admit Date: 10/26/2020  PCP: Justin Mendoza MD  Interval History: Patient sedated on fentanyl and propofol. Intubated on mechanical ventilation on ACVC with PEEP 8, FIO2 60%  Diet: DIET TUBE FEED CONTINUOUS/CYCLIC NPO; Diabetic; Orogastric; Continuous; 10; 50; 23      Data:   Scheduled Meds:    insulin glargine  20 Units Subcutaneous Nightly    lactulose  20 g Oral BID    sodium chloride (Inhalant)  2 mL Nebulization Q4H    methylPREDNISolone  40 mg Intravenous Daily    insulin lispro  0-18 Units Subcutaneous Q4H    QUEtiapine  25 mg Oral BID    doxycycline (VIBRAMYCIN) IV  100 mg Intravenous Q12H    heparin (porcine)  7,500 Units Subcutaneous Q8H    vancomycin (VANCOCIN) intermittent dosing (placeholder)   Other RX Placeholder    pantoprazole  40 mg Intravenous Daily    And    sodium chloride (PF)  10 mL Intravenous Daily    sodium chloride flush  10 mL Intravenous 2 times per day    Arformoterol Tartrate  15 mcg Nebulization BID    chlorhexidine  15 mL Mouth/Throat BID    amLODIPine  10 mg Oral Daily    aspirin  81 mg Oral Daily    [Held by provider] atorvastatin  40 mg Oral Daily    [Held by provider] carvedilol  12.5 mg Oral BID    levothyroxine  50 mcg Oral Daily    [Held by provider] losartan  100 mg Oral Daily    budesonide  0.5 mg Nebulization BID    ipratropium-albuterol  1 ampule Inhalation Q4H WA       Continuous Infusions:    fentaNYL 5 mcg/ml in 0.9%  ml infusion 150 mcg/hr (11/02/20 0906)    propofol 35 mcg/kg/min (11/02/20 0934)    dextrose         PRN Meds: midazolam, hydrALAZINE, sodium chloride flush, acetaminophen **OR** acetaminophen, polyethylene glycol, perflutren lipid microspheres, colchicine, ondansetron, promethazine **OR** ondansetron, glucose, dextrose, glucagon (rDNA), dextrose    I/O last 3 completed shifts: In: 7222 [I.V.:9268;  FY/MP:7273; IV Piggyback:100]  Out: 1918 [Urine:1918]    No intake/output data recorded. Intake/Output Summary (Last 24 hours) at 11/2/2020 0945  Last data filed at 11/2/2020 0600  Gross per 24 hour   Intake 4577 ml   Output 1678 ml   Net 2899 ml       Patient Vitals for the past 96 hrs (Last 3 readings):   Weight   11/01/20 0800 (!) 334 lb (151.5 kg)   10/31/20 0600 (!) 348 lb 8.8 oz (158.1 kg)         Recent Labs     10/31/20  0445 11/01/20  0445 11/02/20  0530   WBC 6.0 6.7 7.8   HGB 14.1 14.1 13.1   HCT 41.2 41.6 39.3   MCV 77.4* 77.3* 78.1*    202 217     Recent Labs     10/31/20  0445  11/01/20  0445 11/01/20  1208 11/01/20  1825 11/02/20  0530      < > 140 137 138 144   K 4.3   < > 3.5 4.2 4.4 4.1   CL 96*   < > 100 99 100 104   CO2 26   < > 27 24 25 25   BUN 38*   < > 32* 32* 37* 45*   CREATININE 2.7*   < > 1.7* 1.6* 1.8* 2.9*   PHOS 5.2*  --  4.0  --   --  6.3*    < > = values in this interval not displayed. Recent Labs     10/31/20  0445 11/01/20  0445 11/02/20  0530   PROT 6.7 6.4 6.0*   ALKPHOS 116 104 128   * 117* 247*   AST 94* 74* 254*   BILITOT 0.5 0.3 0.4   CPK:  Lab Results   Component Value Date    CKTOTAL 98 10/08/2017          -----------------------------------------------------------------  RAD:   Results for orders placed during the hospital encounter of 10/26/20   XR CHEST PORTABLE    Narrative EXAMINATION:  ONE XRAY VIEW OF THE CHEST    10/30/2020 2:17 am    COMPARISON:  10/29/2020 radiograph    HISTORY:  ORDERING SYSTEM PROVIDED HISTORY: R IJ placement  TECHNOLOGIST PROVIDED HISTORY:  Reason for exam:->R IJ placement  What reading provider will be dictating this exam?->CRC    FINDINGS:  Right IJ central venous catheter has been placed with tip at the distal SVC. No pneumothorax. Worsening diffuse airspace opacification in the right lung  with moderate opacification also present at the left base. Endotracheal tube  and enteric tube stable. No significant skeletal finding.       Impression Interval placement of right IJ central venous catheter. No pneumothorax. Worsening airspace changes in the right lung. Micro:  No results for input(s): BC in the last 72 hours. No results for input(s): ORG in the last 72 hours. No results for input(s): Cosby Carrel in the last 72 hours. No results for input(s): STREPPNEUMAG in the last 72 hours. No results for input(s): LEGUR in the last 72 hours. No results for input(s): ORG in the last 72 hours. No results for input(s): ASO in the last 72 hours. No results for input(s): CULTRESP in the last 72 hours. No results for input(s): LABURIN in the last 72 hours.   Vent Information  $Ventilation: $Subsequent Day  Skin Assessment: Clean, dry, & intact  Equipment ID: [id number]  Vent Type: 980  Vent Mode: AC/VC  Vt Ordered: 450 mL  Rate Set: 20 bmp  Peak Flow: 65 L/min  Pressure Support: 0 cmH20  FiO2 : 60 %  SpO2: 91 %  SpO2/FiO2 ratio: 151.67  PaO2/FiO2 ratio: 146  Sensitivity: 3  PEEP/CPAP: 8  I Time/ I Time %: 0 s  Humidification Source: Heated wire  Humidification Temp: 37  Humidification Temp Measured: 37  Circuit Condensation: Drained  Mask Type: Full face mask  Mask Size: Large    Additional Respiratory  Assessments  Pulse: 94  Resp: 20  SpO2: 91 %  Position: Semi-Lockwood's  Humidification Source: Heated wire  Humidification Temp: 37  Circuit Condensation: Drained  Oral Care Completed?: Yes  Oral Care: Mouth swabbed, Mouth suctioned, Mouth moisturizer, Suction toothette  Subglottic Suction Done?: Yes  Airway Type: ET  Airway Size: 8  Skin barrier applied: Yes    Objective:   Vitals:   Vitals:    20 0900   BP: (!) 70/39   Pulse: 94   Resp: 20   Temp:    SpO2: 91%      TEMP:Current: Temp: 99.1 °F (37.3 °C)  Max: Temp  Av.2 °F (36.8 °C)  Min: 97 °F (36.1 °C)  Max: 99.1 °F (37.3 °C)    BP Range: Systolic (33JIO), CMO:990 , Min:63 , WCO:459     Diastolic (82XGZ), IJC:16, Min:39, Max:116      General appearance: Sedated obese on mechanical ventilation , appears stated age and cooperative  In no acute distress  Skin: No rashes or lesions  HEENT: mucous membranes are moist oral endotracheal tube and OG tube  Neck: No JVD  Lungs: symmetrical expansion, coarse breath sounds to auscultation, no use of accessory muscles  Heart: S1S2 no murmurs,  Abdomen: soft, non tender, distended obese  Extremities: no peripheral edema  Neurologic: Sedated  Affect: Calm       Assessment:   Patient Active Problem List:    59-y.o M with history of DM, hypothyroid, COPD, HTN, HLD presented on 10/26 with shortness of breath for 1 week. Saturations were 70% at PCPs. Saturation 65% on room air in ER  Patient agitated for CT scan refused to lay flat  10/27 RRT refused BiPAP became combative. Patient transferred to ICU  10/28 intubated and sedated. CT chest showing dense multifocal airspace disease  10/30 Ultrasound lower extremities no DVT, ultrasound kidneys no hydronephrosis  10/31 bilateral infiltrates left effusion. On 50% +8 of PEEP. Chest x-ray showing right-sided improving with left base atelectasis. Covid negative x2    1. Acute hypoxemic respiratory failure on mechanical ventilation  2. Agitation on Seroquel Precedex fentanyl  3. MSSA pneumonia on doxycycline  4. HANS moderate ( AHI 17 on 4/18/18 requires BIPAP 16/12)  5. History of reactive airway disease  6. CT 3/14/2018 showing pulmonary nodule stable from 10/2017  7. Acute renal failure improving  8. Diabetes with elevated blood sugars  9. Obesity  10. EF 65% LVH  11. chronic lymphedema  12. Hypothyroid  13. H/o respiratory failure: 7/8/2017 pt was  transferred from [facility] for acute respiratory failure after lap cholecystectomy, lap umbilical hernia repair, and lap liver biopsy (fatty liver). He had been extubated postop but had laryngospasm requiring reintubation, complicated by negative pressure flash pulmonary edema, and required tracheostomy 8/10/2017. Patient has staph aureus pneumonia and C. difficile colitis.   Patient was then transferred to [facility] hospital where he was weaned off the ventilator and decannulated.       Plan:     · Wean sedation patient having some bradycardia  · On steroids  · May be able to change antibiotics as MSSA  · Wean vent as able  · Family updated at bedside    [facility] [employer]

## 2020-11-02 NOTE — PROGRESS NOTES
Pharmacy Consultation Note  (Antibiotic Dosing and Monitoring)    Initial consult date: 10/30/20  Consulting physician: Dr. Karen Langston  Drug(s): Vancomycin  Indication: Pneumonia      · Vancomycin has been discontinued. Clinical pharmacy will sign off, please reconsult if further assistance is needed.        Yeimi BarahonaD, BCPS 11/2/2020 4:29 PM

## 2020-11-02 NOTE — PROGRESS NOTES
Nephrology Progress Note  Patient's Name: Krista Nieto  9:49 AM  11/2/2020        Reason for Consult: Acute kidney  Requesting Physician:  Deepak Tracy MD    Chief Complaint: Shortness of breath  History Obtained From:  EHR; spouse    History of Present Ilness:    Krista Nieto is a 61 y.o. male with a history of COPD, hypertension, hyperlipidemia and diabetes mellitus. He presented to ED 2 days ago with complaints of shortness of breath. According to patient's spouse he had been complaining of shortness of breath over the course of several weeks. This has gotten progressively worse. He went to see his PCP on the day of admission. In office at the time pulse oximeter obtained revealed his oxygen saturation to be in the 70s. He was instructed to proceed to ED for further evaluation. On presentation to ED his initial vital signs showed a blood pressure of 168/71, temperature 97.5 and pulse of 93. His pulse oximeter at the time was noted to be 98% on room air. Laboratory data was significant for a BUN of 11, creatinine 1.1, WBC of 8.3. Rapid COVID-19 testing was negative. A chest x-ray performed revealed right infrahilar and basilar infiltrate concerning for pneumonia. A CTA was ordered but patient declined because of his inability to lay flat. Patient was given ceftriaxone and doxycycline followed by admission to telemetry. 2 days ago an RRT was called as patient was noted to be increasingly more in respiratory distress. He was transferred to MICU and intubated. Laboratory data yesterday showed worsening serum creatinine to 2.8. This a.m. further worsening to 3.6. He has been nonoliguric. Hence renal consult. 10/30: N new acute issues overnight; remains intubated  10/31: Polyuric , concerned for possible DI; no other acute issues overnight  11/1: agitated overnight ; remains intubated  11/2: pt seen and examined.  Chart reviewed, pt intubated      Allergies:  Bee venom and Shellfish-derived products    Current Medications:    insulin glargine (LANTUS) injection vial 20 Units, Nightly  lactulose (CHRONULAC) 10 GM/15ML solution 20 g, BID  midazolam (VERSED) injection 2 mg, Q2H PRN  sodium chloride (Inhalant) 3 % nebulizer solution 2 mL, Q4H  methylPREDNISolone sodium (SOLU-MEDROL) injection 40 mg, Daily  insulin lispro (HUMALOG) injection vial 0-18 Units, Q4H  QUEtiapine (SEROQUEL) tablet 25 mg, BID  doxycycline (VIBRAMYCIN) 100 mg in dextrose 5 % 100 mL IVPB, Q12H  hydrALAZINE (APRESOLINE) injection 10 mg, Q4H PRN  heparin (porcine) injection 7,500 Units, Q8H  vancomycin (VANCOCIN) intermittent dosing (placeholder), RX Placeholder  pantoprazole (PROTONIX) injection 40 mg, Daily    And  sodium chloride (PF) 0.9 % injection 10 mL, Daily  fentaNYL 5 mcg/ml in 0.9%  ml infusion, Continuous  propofol injection, Titrated  sodium chloride flush 0.9 % injection 10 mL, 2 times per day  sodium chloride flush 0.9 % injection 10 mL, PRN  acetaminophen (TYLENOL) tablet 650 mg, Q6H PRN    Or  acetaminophen (TYLENOL) suppository 650 mg, Q6H PRN  Arformoterol Tartrate (BROVANA) nebulizer solution 15 mcg, BID  polyethylene glycol (GLYCOLAX) packet 17 g, Daily PRN  perflutren lipid microspheres (DEFINITY) injection 1.65 mg, ONCE PRN  chlorhexidine (PERIDEX) 0.12 % solution 15 mL, BID  amLODIPine (NORVASC) tablet 10 mg, Daily  aspirin chewable tablet 81 mg, Daily  [Held by provider] atorvastatin (LIPITOR) tablet 40 mg, Daily  [Held by provider] carvedilol (COREG) tablet 12.5 mg, BID  colchicine (COLCRYS) tablet 0.6 mg, BID PRN  levothyroxine (SYNTHROID) tablet 50 mcg, Daily  [Held by provider] losartan (COZAAR) tablet 100 mg, Daily  ondansetron (ZOFRAN-ODT) disintegrating tablet 4 mg, Q8H PRN  promethazine (PHENERGAN) tablet 12.5 mg, Q6H PRN    Or  ondansetron (ZOFRAN) injection 4 mg, Q6H PRN  budesonide (PULMICORT) nebulizer suspension 500 mcg, BID  ipratropium-albuterol (DUONEB) nebulizer solution 1 ampule, Q4H WA  glucose (GLUTOSE) 40 % oral gel 15 g, PRN  dextrose 50 % IV solution, PRN  glucagon (rDNA) injection 1 mg, PRN  dextrose 5 % solution, PRN        Review of Systems:   Review of systems not obtained due to patient factors. Physical exam:  Vitals:    11/02/20 0900   BP: (!) 70/39   Pulse: 94   Resp: 20   Temp:    SpO2: 91%           General: Intubated sedated  Eyes: PERRL. No sclera icterus. No conjunctival injection. ENT: No discharge. Pharynx clear. Neck: Trachea midline. Normal thyroid. Lungs: Coarse breath sounds  CV: Regular rate. Regular rhythm. No murmur or rub. .   Abd:  Non-distended. No masses. No organmegaly. Normal bowel sounds. Skin: Warm and dry. No nodule on exposed extremities. No rash on exposed extremities.   Ext: No cyanosis, clubbing, edema; 2+ pitting bilateral lower extremity edema L>R  Neuro: sedate lightly, but arouses and is agitated      Data:   Labs:  Lab Results   Component Value Date     11/02/2020     11/01/2020     11/01/2020    K 4.1 11/02/2020    K 4.4 11/01/2020    K 4.2 11/01/2020     11/02/2020    CO2 25 11/02/2020    CO2 25 11/01/2020    CO2 24 11/01/2020    CREATININE 2.9 (H) 11/02/2020    CREATININE 1.8 (H) 11/01/2020    CREATININE 1.6 (H) 11/01/2020    BUN 45 (H) 11/02/2020    BUN 37 (H) 11/01/2020    BUN 32 (H) 11/01/2020    GLUCOSE 106 (H) 11/02/2020    GLUCOSE 242 (H) 11/01/2020    GLUCOSE 335 (H) 11/01/2020    PHOS 6.3 (H) 11/02/2020    PHOS 4.0 11/01/2020    PHOS 5.2 (H) 10/31/2020    WBC 7.8 11/02/2020    WBC 6.7 11/01/2020    WBC 6.0 10/31/2020    HGB 13.1 11/02/2020    HGB 14.1 11/01/2020    HGB 14.1 10/31/2020    HCT 39.3 11/02/2020    HCT 41.6 11/01/2020    HCT 41.2 10/31/2020    MCV 78.1 (L) 11/02/2020     11/02/2020         Imaging:  Xr Chest Portable    Result Date: 10/27/2020  EXAMINATION: ONE XRAY VIEW OF THE CHEST 10/27/2020 7:58 pm COMPARISON: October 26, 2020 HISTORY: ORDERING SYSTEM PROVIDED HISTORY: SOB TECHNOLOGIST PROVIDED HISTORY: Reason for exam:->SOB What reading provider will be dictating this exam?->CRC FINDINGS: There is mild cardiomegaly. There is patchy perihilar and bibasilar atelectasis/infiltrates. Tiny pleural effusions are suspected. Progressive bibasilar atelectasis/infiltrates concerning for pneumonia. Underlying CHF is considered. Xr Chest Portable    Result Date: 10/26/2020  EXAMINATION: ONE XRAY VIEW OF THE CHEST 10/26/2020 4:11 pm COMPARISON: 05/26/2019 HISTORY: ORDERING SYSTEM PROVIDED HISTORY: SOB TECHNOLOGIST PROVIDED HISTORY: Reason for exam:->SOB What reading provider will be dictating this exam?->CRC FINDINGS: Cardiac size appears stable. Discoid airspace disease seen at the left lung base which may represent atelectasis or scarring. Right infrahilar and basilar infiltrate is identified. No pneumothorax is identified. No acute osseous abnormality is identified. Right infrahilar and basilar infiltrate concerning for pneumonia. Left basilar atelectasis or scarring. Assessment/Plans  1. Acute kidney injury  hemodynamically mediated due to the combination of bradycardia,  diuretic and ARB use; this is exacerbated by IV contrast use   polyuric, suspect due to recovery of NANCY or solute mediated  urine osmolality >300 with very high osmolar excretion rate  Dose bumex    2. Acute hypoxic respiratory failure  due to multilobar pneumonia  gradual wean  vanc level high, need to adjust with rising cr    3.  Type 2 diabetes mellitus  increasing blood sugars, most likely steroid induced            Jeimy Bullock MD  9:49 AM  11/2/2020

## 2020-11-02 NOTE — PLAN OF CARE
Problem: Pain:  Goal: Pain level will decrease  11/2/2020 0155 by Benjamin Oconnor RN  Outcome: Met This Shift     Problem: Restraint Use - Nonviolent/Non-Self-Destructive Behavior:  Goal: Absence of restraint indications  11/2/2020 0155 by Benjamin Oconnor RN  Outcome: Not Met This Shift     Problem: Restraint Use - Nonviolent/Non-Self-Destructive Behavior:  Goal: Absence of restraint-related injury  11/2/2020 0155 by Benjamin Oconnor RN  Outcome: Met This Shift     Problem: Skin Integrity:  Goal: Will show no infection signs and symptoms  11/2/2020 0155 by Benjamin Oconnor RN  Outcome: Met This Shift

## 2020-11-02 NOTE — PLAN OF CARE
Problem: Restraint Use - Nonviolent/Non-Self-Destructive Behavior:  Goal: Absence of restraint indications  11/2/2020 0719 by Shell Santos RN  Outcome: Not Met This Shift     Problem: Restraint Use - Nonviolent/Non-Self-Destructive Behavior:  Goal: Absence of restraint-related injury  11/2/2020 0719 by Najma Simmons, RN  Outcome: Met This Shift

## 2020-11-02 NOTE — PROGRESS NOTES
[address] [facility]  Department of Internal Medicine   Internal Medicine Residency   MICU Progress Note    Patient:  Loc Ruiz 61 y.o. male  MRN: [id number]     Date of Service: 11/2/2020    Allergy: Bee venom and Shellfish-derived products    Subjective   Patient seen and examined at bedside. Opens eyes to command, withdraws to pain. GCS 7T. ~4.5 in, ~2.5 out past 24 hours. This AM patient became hypotensive into the 70's at which point propofol was discontinued and fentanyl decreased to 150. MAP responded appropriately with a rise to 61    Objective     VS: /74   Pulse 95   Temp 98.8 °F (37.1 °C) (Axillary)   Resp 20   Ht 5' 8\" (1.727 m)   Wt (!) 334 lb (151.5 kg)   SpO2 91%   BMI 50.78 kg/m²           I & O - 24hr:     Intake/Output Summary (Last 24 hours) at 11/2/2020 0914  Last data filed at 11/2/2020 0600  Gross per 24 hour   Intake 4577 ml   Output 1678 ml   Net 2899 ml       Physical Exam:  · General Appearance: intubated and sedated  · Neck: no adenopathy, no carotid bruit, no JVD, supple, symmetrical, trachea midline and thyroid not enlarged, symmetric, no tenderness/mass/nodules  · Lung: coarse breath sound bilaterally, no rales or wheezing  · Heart: bradycardic, normal S1 S2, no murmur or rub  · Abdomen: Stop, nontender, distended, no masses, no organomegaly  · Extremities:  Left lower leg 2+ non pitting edema. Right lower leg chronic lymphedema. · Musculoskeletal:  ROM normal in all joints of extremities.    · Neurologic: Mental status: Patient is sedated, open eyes, normal pupillary reflex    Lines     site day    Art line   R radial 0   TLC R IJ 3   PICC None    Hemoaccess None    Oxygen:     none  Mechanical Ventilation:   Mode: A/C    TV:500 ml RR: 20  PEEP 8cmH2O FiO2 60%    ABG:     Lab Results   Component Value Date    PH 7.362 11/02/2020    PCO2 43.5 11/02/2020    PO2 80.0 11/02/2020    HCO3 24.1 11/02/2020    BE -1.4 11/02/2020    THB 15.3 11/02/2020    O2SAT 93.8 11/02/2020        Medications     Infusions: (Fluid, Sedation, Vasopressors)  IVF:      Vasopressors   none  Sedation   Propofol at rate of 30 mcg/min Fentanyl at rate 200 mcg/min    Nutrition:   TF low calorie high protein  ATB:   Antibiotics  Days   Doxycycline 3             Patient currently has   Urinary cath  Restraints  DVT prophylaxis/ GI prophylaxis,    Labs     CBC with Differential:    Lab Results   Component Value Date    WBC 7.8 11/02/2020    RBC 5.03 11/02/2020    HGB 13.1 11/02/2020    HCT 39.3 11/02/2020     11/02/2020    MCV 78.1 11/02/2020    MCH 26.0 11/02/2020    MCHC 33.3 11/02/2020    RDW 22.0 11/02/2020    NRBC 4.3 11/02/2020    SEGSPCT 73 01/26/2014    LYMPHOPCT 36.2 11/02/2020    MONOPCT 9.5 11/02/2020    MYELOPCT 0.9 10/30/2020    BASOPCT 0.1 11/02/2020    MONOSABS 0.78 11/02/2020    LYMPHSABS 2.81 11/02/2020    EOSABS 0.00 11/02/2020    BASOSABS 0.00 11/02/2020     CMP:    Lab Results   Component Value Date     11/02/2020    K 4.1 11/02/2020    K 4.0 10/27/2020     11/02/2020    CO2 25 11/02/2020    BUN 45 11/02/2020    CREATININE 2.9 11/02/2020    GFRAA 27 11/02/2020    LABGLOM 27 11/02/2020    GLUCOSE 106 11/02/2020    PROT 6.0 11/02/2020    LABALBU 2.9 11/02/2020    CALCIUM 7.5 11/02/2020    BILITOT 0.4 11/02/2020    ALKPHOS 128 11/02/2020     11/02/2020     11/02/2020     Ionized Calcium:  No results found for: IONCA  Magnesium:    Lab Results   Component Value Date    MG 1.9 11/02/2020     Phosphorus:    Lab Results   Component Value Date    PHOS 6.3 11/02/2020     LDH:    Lab Results   Component Value Date     10/28/2020     PT/INR:    Lab Results   Component Value Date    PROTIME 10.9 07/21/2018    INR 0.9 07/21/2018     Troponin:    Lab Results   Component Value Date    TROPONINI <0.01 10/26/2020     U/A:    Lab Results   Component Value Date    NITRITE neg 08/30/2019    COLORU Yellow 11/01/2020    PROTEINU Negative 11/01/2020    PHUR 7.0 11/01/2020    LABCAST FEW 04/11/2016    WBCUA 0-1 11/01/2020    RBCUA 2-5 11/01/2020    BACTERIA RARE 11/01/2020    CLARITYU Clear 11/01/2020    SPECGRAV 1.015 11/01/2020    LEUKOCYTESUR Negative 11/01/2020    UROBILINOGEN 0.2 11/01/2020    BILIRUBINUR Negative 11/01/2020    BILIRUBINUR neg 08/30/2019    BLOODU TRACE-INTACT 11/01/2020    GLUCOSEU 100 11/01/2020     ABG:    Lab Results   Component Value Date    PH 7.362 11/02/2020    PCO2 43.5 11/02/2020    PO2 80.0 11/02/2020    HCO3 24.1 11/02/2020    BE -1.4 11/02/2020    O2SAT 93.8 11/02/2020     FLP:    Lab Results   Component Value Date    TRIG 89 10/30/2020    HDL 66 03/23/2017    LDLCALC 192 03/23/2017    LABVLDL 37 03/23/2017     TSH:    Lab Results   Component Value Date    TSH 4.340 10/27/2020       Imaging Studies:  CXR: Unchanged bilateral infiltrates and left pleural effusion. Resident's Assessment and Plan     Nilesh Sanders is a 61 y.o. male with PMHx is significant for HFpEF (Ef 65% in 2017), hx of tracheostomy and reversal (as mentioned above), moderate HANS not on CPAP, DMT2, HTN, HLD, Hypothyroidism who initially was admitted to general floors for hypoxia. Transferred to the MICU when ABG concerning for worsening respiratory acidosis. Currently intubated and being managed in the MICU for:     Labile BP  · Arterial line placed today 11/2  · Currently hypertensive and agitated - increase propofol, give versed, resume amlodipine 10 mg daily  · If hypotensive can start levophed    CAP vs Aspiration PNA  - Respiratory cultures grew MSSA on 31/10.   - Unasyn and Levaquin until urine legionella antigen clears  - Baseline ESR CRP  - Patient is afebrile, WBC 7.8  - Monitor daily CBC, vitals, CXR.   - Infectious disease consulted and following     Acute encephalopathy 2/2 Acute hypoxic hypercapnic respiratory failure. Pt presented to the ED with hypoxia, RRT was called due to worsening respiratory acidosis. Currently intubated and sedated.   - Today pt is still intubated and sedated. Patient is on Fentanyl, propofol, off precedex. Will decrease propofol today as tolerated  - Seroquel 25 BID  - Continue monitor mentation status, wean down sedation as tolerated. Acute hypoxic hypercapnic respiratory failure 2/2 CAP versus chronic OHS  Pt has Hx chronic HANS not on CPAP at home. ABG showed pH 7.188/91.2/78.9/33 on RRT. Patient is not tolerating BiPAP and currently intubated. - Today Pt saturating 95% on vent. ABG AC/VC/16/500/8/41%  - Continue to monitor respiratory status, wean down FiO2 as tolerated. Patient not able to tolerate PS today. - Pro  on presentation. ECHO showed Left ventricular internal dimensions were normal in diastole and systole. EF 65%. Moderate left ventricular concentric hypertrophy noted. No regional wall motion abnormalities seen. - US dup LE negative for DVT. CTA negative for PE. COVID -19 negative x2  - Continue breathing treatment. Add 3% nebulizer today. Decrease Solumedrol to 40 mg daily.  - Monitor daily CXR. CBC.   - Pulmonology consulted, further recommendations appreciated. - Solucortef started      NANCY stage III likely pre renal 2/2 diuretic use, contrast agent vs? cardiorenal syndrome, improving   - Creatinine 3.8 ( baseline 1.1) -> 4.0 - 3.7 -> 2.7 -> 1.7 -> 3.5  - I/O 4.6/1.9 ; +2.7L  - Will give another dose of Bumex 2 mg today per nephrology. - Further recommendations appreciated. - Continue monitor daily BMP, renal function. Monitor I/O. Avoid nephrotoxicity. Polyuria  - Patient had 4.2L urine/24h.   - Patient received 7L fluid/24h  -Urine osmolarity 324, serum osmolarity 317. Urine sodium 101, chloride 84, creatinine 26  -Likely recover phase of ATN  - Continue monitor. Hx HFpEF, EF 65%  -Echo done in 2017 showing 1 diastolic dysfunction with normal left ventricular size and systolic function EF 15%.   -Home medication: Coreg 25 mg, olmesartan 10 mg, Losartan 10 mg,.  -Repeat ECHO left ventricular internal dimensions were normal in diastole and systole. EF 65%. Moderate left ventricular concentric hypertrophy noted. No regional wall motion abnormalities seen. - Currently hold off Coreg due to bradycardia. - Will continue monitor.      DMT2  - Hold home metfomrin.   -  overnight.  - Increase Lantus to 20 units. HDSS -> Tolerating well  - Continue monitor BG q6h    Constipation  -On physical exam abdomen distended. Patient has not had any bowel movement for several days.  -Patient is on fentanyl  -Lactulose 20 mg daily and senna daily   - KUB: nonobstructive 11/2   - Monitor    Hx of HTN  - /88 today  - home medication: Losartan/ novasc; resume norvasc  - Add hydralazine PRN for BP >170  - Continue monitor vitals.     HLD  - Continue home Atorvastatin. DVT ppx: heparin  GI ppx: Protonix  Updated daughter at the bedside.     Gabby Carey DO, PGY-1    Attending physician: Dr. Foreign Fuentes

## 2020-11-03 ENCOUNTER — ANESTHESIA (OUTPATIENT)
Dept: ICU | Age: 60
DRG: 003 | End: 2020-11-03
Payer: MEDICARE

## 2020-11-03 ENCOUNTER — APPOINTMENT (OUTPATIENT)
Dept: GENERAL RADIOLOGY | Age: 60
DRG: 003 | End: 2020-11-03
Payer: MEDICARE

## 2020-11-03 ENCOUNTER — ANESTHESIA EVENT (OUTPATIENT)
Dept: ICU | Age: 60
DRG: 003 | End: 2020-11-03
Payer: MEDICARE

## 2020-11-03 LAB
AADO2: 341.8 MMHG
ALBUMIN SERPL-MCNC: 3.2 G/DL (ref 3.5–5.2)
ALP BLD-CCNC: 132 U/L (ref 40–129)
ALT SERPL-CCNC: 257 U/L (ref 0–40)
ANION GAP SERPL CALCULATED.3IONS-SCNC: 16 MMOL/L (ref 7–16)
ANION GAP SERPL CALCULATED.3IONS-SCNC: 16 MMOL/L (ref 7–16)
ANION GAP SERPL CALCULATED.3IONS-SCNC: 17 MMOL/L (ref 7–16)
ANISOCYTOSIS: ABNORMAL
AST SERPL-CCNC: 142 U/L (ref 0–39)
B.E.: -2.7 MMOL/L (ref -3–3)
BASOPHILS ABSOLUTE: 0.01 E9/L (ref 0–0.2)
BASOPHILS RELATIVE PERCENT: 0.1 % (ref 0–2)
BILIRUB SERPL-MCNC: 0.5 MG/DL (ref 0–1.2)
BLOOD CULTURE, ROUTINE: NORMAL
BUN BLDV-MCNC: 51 MG/DL (ref 8–23)
BUN BLDV-MCNC: 52 MG/DL (ref 8–23)
BUN BLDV-MCNC: 54 MG/DL (ref 8–23)
CALCIUM IONIZED: 1.01 MMOL/L (ref 1.15–1.33)
CALCIUM SERPL-MCNC: 7.2 MG/DL (ref 8.6–10.2)
CALCIUM SERPL-MCNC: 7.2 MG/DL (ref 8.6–10.2)
CALCIUM SERPL-MCNC: 7.4 MG/DL (ref 8.6–10.2)
CHLORIDE BLD-SCNC: 101 MMOL/L (ref 98–107)
CHLORIDE BLD-SCNC: 104 MMOL/L (ref 98–107)
CHLORIDE BLD-SCNC: 97 MMOL/L (ref 98–107)
CO2: 21 MMOL/L (ref 22–29)
CO2: 22 MMOL/L (ref 22–29)
CO2: 23 MMOL/L (ref 22–29)
COHB: 0.7 % (ref 0–1.5)
CREAT SERPL-MCNC: 2.4 MG/DL (ref 0.7–1.2)
CREAT SERPL-MCNC: 2.6 MG/DL (ref 0.7–1.2)
CREAT SERPL-MCNC: 2.8 MG/DL (ref 0.7–1.2)
CRITICAL: ABNORMAL
CULTURE, BLOOD 2: NORMAL
DATE ANALYZED: ABNORMAL
DATE OF COLLECTION: ABNORMAL
EOSINOPHILS ABSOLUTE: 0.03 E9/L (ref 0.05–0.5)
EOSINOPHILS RELATIVE PERCENT: 0.3 % (ref 0–6)
FIO2: 70 %
GFR AFRICAN AMERICAN: 28
GFR AFRICAN AMERICAN: 31
GFR AFRICAN AMERICAN: 34
GFR NON-AFRICAN AMERICAN: 28 ML/MIN/1.73
GFR NON-AFRICAN AMERICAN: 31 ML/MIN/1.73
GFR NON-AFRICAN AMERICAN: 34 ML/MIN/1.73
GLUCOSE BLD-MCNC: 151 MG/DL (ref 74–99)
GLUCOSE BLD-MCNC: 153 MG/DL (ref 74–99)
GLUCOSE BLD-MCNC: 175 MG/DL (ref 74–99)
HCO3: 23.4 MMOL/L (ref 22–26)
HCT VFR BLD CALC: 38.9 % (ref 37–54)
HEMOGLOBIN: 12.9 G/DL (ref 12.5–16.5)
HHB: 4.2 % (ref 0–5)
HYPOCHROMIA: ABNORMAL
IMMATURE GRANULOCYTES #: 0.04 E9/L
IMMATURE GRANULOCYTES %: 0.4 % (ref 0–5)
L. PNEUMOPHILA SEROGP 1 UR AG: NORMAL
LAB: ABNORMAL
LYMPHOCYTES ABSOLUTE: 0.99 E9/L (ref 1.5–4)
LYMPHOCYTES RELATIVE PERCENT: 10.5 % (ref 20–42)
Lab: ABNORMAL
MAGNESIUM: 1.8 MG/DL (ref 1.6–2.6)
MCH RBC QN AUTO: 25.9 PG (ref 26–35)
MCHC RBC AUTO-ENTMCNC: 33.2 % (ref 32–34.5)
MCV RBC AUTO: 78.1 FL (ref 80–99.9)
METER GLUCOSE: 129 MG/DL (ref 74–99)
METER GLUCOSE: 129 MG/DL (ref 74–99)
METER GLUCOSE: 159 MG/DL (ref 74–99)
METER GLUCOSE: 168 MG/DL (ref 74–99)
METER GLUCOSE: 185 MG/DL (ref 74–99)
METHB: 0.4 % (ref 0–1.5)
MODE: AC
MONOCYTES ABSOLUTE: 1.01 E9/L (ref 0.1–0.95)
MONOCYTES RELATIVE PERCENT: 10.7 % (ref 2–12)
NEUTROPHILS ABSOLUTE: 7.38 E9/L (ref 1.8–7.3)
NEUTROPHILS RELATIVE PERCENT: 78 % (ref 43–80)
O2 CONTENT: 18.3 ML/DL
O2 SATURATION: 95.8 % (ref 92–98.5)
O2HB: 94.7 % (ref 94–97)
OPERATOR ID: ABNORMAL
OVALOCYTES: ABNORMAL
PATIENT TEMP: 37 C
PCO2: 45.7 MMHG (ref 35–45)
PDW BLD-RTO: 21.4 FL (ref 11.5–15)
PEEP/CPAP: 9 CMH2O
PFO2: 1.3 MMHG/%
PH BLOOD GAS: 7.33 (ref 7.35–7.45)
PHOSPHORUS: 6.9 MG/DL (ref 2.5–4.5)
PLATELET # BLD: 260 E9/L (ref 130–450)
PMV BLD AUTO: 10.1 FL (ref 7–12)
PO2: 90.7 MMHG (ref 75–100)
POIKILOCYTES: ABNORMAL
POLYCHROMASIA: ABNORMAL
POTASSIUM SERPL-SCNC: 3.8 MMOL/L (ref 3.5–5)
POTASSIUM SERPL-SCNC: 3.8 MMOL/L (ref 3.5–5)
POTASSIUM SERPL-SCNC: 4.1 MMOL/L (ref 3.5–5)
RBC # BLD: 4.98 E12/L (ref 3.8–5.8)
RI(T): 3.77
RR MECHANICAL: 20 B/MIN
SCHISTOCYTES: ABNORMAL
SODIUM BLD-SCNC: 135 MMOL/L (ref 132–146)
SODIUM BLD-SCNC: 139 MMOL/L (ref 132–146)
SODIUM BLD-SCNC: 143 MMOL/L (ref 132–146)
SOURCE, BLOOD GAS: ABNORMAL
STREP PNEUMONIAE ANTIGEN, URINE: NORMAL
TARGET CELLS: ABNORMAL
TEAR DROP CELLS: ABNORMAL
THB: 13.7 G/DL (ref 11.5–16.5)
TIME ANALYZED: 424
TOTAL PROTEIN: 6.7 G/DL (ref 6.4–8.3)
VT MECHANICAL: 450 ML
WBC # BLD: 9.5 E9/L (ref 4.5–11.5)

## 2020-11-03 PROCEDURE — 2580000003 HC RX 258: Performed by: INTERNAL MEDICINE

## 2020-11-03 PROCEDURE — 6370000000 HC RX 637 (ALT 250 FOR IP): Performed by: INTERNAL MEDICINE

## 2020-11-03 PROCEDURE — 6360000002 HC RX W HCPCS: Performed by: INTERNAL MEDICINE

## 2020-11-03 PROCEDURE — 71045 X-RAY EXAM CHEST 1 VIEW: CPT

## 2020-11-03 PROCEDURE — 80048 BASIC METABOLIC PNL TOTAL CA: CPT

## 2020-11-03 PROCEDURE — 2500000003 HC RX 250 WO HCPCS: Performed by: INTERNAL MEDICINE

## 2020-11-03 PROCEDURE — 6360000002 HC RX W HCPCS: Performed by: STUDENT IN AN ORGANIZED HEALTH CARE EDUCATION/TRAINING PROGRAM

## 2020-11-03 PROCEDURE — 2000000000 HC ICU R&B

## 2020-11-03 PROCEDURE — 85025 COMPLETE CBC W/AUTO DIFF WBC: CPT

## 2020-11-03 PROCEDURE — C9113 INJ PANTOPRAZOLE SODIUM, VIA: HCPCS | Performed by: INTERNAL MEDICINE

## 2020-11-03 PROCEDURE — 2580000003 HC RX 258: Performed by: STUDENT IN AN ORGANIZED HEALTH CARE EDUCATION/TRAINING PROGRAM

## 2020-11-03 PROCEDURE — 82962 GLUCOSE BLOOD TEST: CPT

## 2020-11-03 PROCEDURE — 31500 INSERT EMERGENCY AIRWAY: CPT | Performed by: ANESTHESIOLOGY

## 2020-11-03 PROCEDURE — 36415 COLL VENOUS BLD VENIPUNCTURE: CPT

## 2020-11-03 PROCEDURE — 94003 VENT MGMT INPAT SUBQ DAY: CPT

## 2020-11-03 PROCEDURE — 84100 ASSAY OF PHOSPHORUS: CPT

## 2020-11-03 PROCEDURE — 31500 INSERT EMERGENCY AIRWAY: CPT

## 2020-11-03 PROCEDURE — 94669 MECHANICAL CHEST WALL OSCILL: CPT

## 2020-11-03 PROCEDURE — 2580000003 HC RX 258

## 2020-11-03 PROCEDURE — 82330 ASSAY OF CALCIUM: CPT

## 2020-11-03 PROCEDURE — 83735 ASSAY OF MAGNESIUM: CPT

## 2020-11-03 PROCEDURE — 82805 BLOOD GASES W/O2 SATURATION: CPT

## 2020-11-03 PROCEDURE — 80053 COMPREHEN METABOLIC PANEL: CPT

## 2020-11-03 PROCEDURE — 94640 AIRWAY INHALATION TREATMENT: CPT

## 2020-11-03 RX ORDER — POTASSIUM CHLORIDE 29.8 MG/ML
20 INJECTION INTRAVENOUS ONCE
Status: COMPLETED | OUTPATIENT
Start: 2020-11-03 | End: 2020-11-03

## 2020-11-03 RX ORDER — CARVEDILOL 3.12 MG/1
3.12 TABLET ORAL 2 TIMES DAILY WITH MEALS
Status: DISCONTINUED | OUTPATIENT
Start: 2020-11-03 | End: 2020-11-07

## 2020-11-03 RX ORDER — BUMETANIDE 0.25 MG/ML
2 INJECTION, SOLUTION INTRAMUSCULAR; INTRAVENOUS ONCE
Status: COMPLETED | OUTPATIENT
Start: 2020-11-03 | End: 2020-11-03

## 2020-11-03 RX ORDER — FOLIC ACID 5 MG/ML
1 INJECTION, SOLUTION INTRAMUSCULAR; INTRAVENOUS; SUBCUTANEOUS DAILY
Status: DISCONTINUED | OUTPATIENT
Start: 2020-11-03 | End: 2020-01-01 | Stop reason: HOSPADM

## 2020-11-03 RX ORDER — MAGNESIUM SULFATE IN WATER 40 MG/ML
2 INJECTION, SOLUTION INTRAVENOUS ONCE
Status: COMPLETED | OUTPATIENT
Start: 2020-11-03 | End: 2020-11-03

## 2020-11-03 RX ADMIN — MAGNESIUM SULFATE HEPTAHYDRATE 2 G: 40 INJECTION, SOLUTION INTRAVENOUS at 08:37

## 2020-11-03 RX ADMIN — BUMETANIDE 2 MG: 0.25 INJECTION INTRAMUSCULAR; INTRAVENOUS at 11:38

## 2020-11-03 RX ADMIN — INSULIN LISPRO 3 UNITS: 100 INJECTION, SOLUTION INTRAVENOUS; SUBCUTANEOUS at 14:53

## 2020-11-03 RX ADMIN — SODIUM CHLORIDE SOLN NEBU 3% 2 ML: 3 NEBU SOLN at 13:36

## 2020-11-03 RX ADMIN — HYDRALAZINE HYDROCHLORIDE 10 MG: 20 INJECTION, SOLUTION INTRAMUSCULAR; INTRAVENOUS at 00:10

## 2020-11-03 RX ADMIN — SENNOSIDES AND DOCUSATE SODIUM 2 TABLET: 8.6; 5 TABLET ORAL at 09:00

## 2020-11-03 RX ADMIN — BUDESONIDE 500 MCG: 0.5 SUSPENSION RESPIRATORY (INHALATION) at 08:23

## 2020-11-03 RX ADMIN — Medication 10 ML: at 09:05

## 2020-11-03 RX ADMIN — INSULIN GLARGINE 20 UNITS: 100 INJECTION, SOLUTION SUBCUTANEOUS at 22:39

## 2020-11-03 RX ADMIN — Medication 10 ML: at 21:08

## 2020-11-03 RX ADMIN — ARFORMOTEROL TARTRATE 15 MCG: 15 SOLUTION RESPIRATORY (INHALATION) at 08:23

## 2020-11-03 RX ADMIN — MIDAZOLAM HYDROCHLORIDE 2 MG: 1 INJECTION, SOLUTION INTRAMUSCULAR; INTRAVENOUS at 12:07

## 2020-11-03 RX ADMIN — BUDESONIDE 500 MCG: 0.5 SUSPENSION RESPIRATORY (INHALATION) at 20:34

## 2020-11-03 RX ADMIN — SODIUM CHLORIDE, PRESERVATIVE FREE 10 ML: 5 INJECTION INTRAVENOUS at 09:05

## 2020-11-03 RX ADMIN — Medication 10 ML: at 15:56

## 2020-11-03 RX ADMIN — IPRATROPIUM BROMIDE AND ALBUTEROL SULFATE 1 AMPULE: 2.5; .5 SOLUTION RESPIRATORY (INHALATION) at 13:36

## 2020-11-03 RX ADMIN — INSULIN LISPRO 3 UNITS: 100 INJECTION, SOLUTION INTRAVENOUS; SUBCUTANEOUS at 05:29

## 2020-11-03 RX ADMIN — INSULIN LISPRO 3 UNITS: 100 INJECTION, SOLUTION INTRAVENOUS; SUBCUTANEOUS at 09:30

## 2020-11-03 RX ADMIN — SODIUM CHLORIDE 3 G: 900 INJECTION INTRAVENOUS at 10:57

## 2020-11-03 RX ADMIN — Medication 200 MCG/HR: at 08:27

## 2020-11-03 RX ADMIN — PROPOFOL 35 MCG/KG/MIN: 10 INJECTION, EMULSION INTRAVENOUS at 03:30

## 2020-11-03 RX ADMIN — HEPARIN SODIUM 7500 UNITS: 10000 INJECTION INTRAVENOUS; SUBCUTANEOUS at 01:15

## 2020-11-03 RX ADMIN — HEPARIN SODIUM 7500 UNITS: 10000 INJECTION INTRAVENOUS; SUBCUTANEOUS at 08:55

## 2020-11-03 RX ADMIN — LEVOTHYROXINE SODIUM 50 MCG: 0.05 TABLET ORAL at 05:48

## 2020-11-03 RX ADMIN — HEPARIN SODIUM 7500 UNITS: 10000 INJECTION INTRAVENOUS; SUBCUTANEOUS at 15:54

## 2020-11-03 RX ADMIN — SODIUM CHLORIDE SOLN NEBU 3% 2 ML: 3 NEBU SOLN at 04:19

## 2020-11-03 RX ADMIN — Medication 200 MCG/HR: at 22:06

## 2020-11-03 RX ADMIN — IPRATROPIUM BROMIDE AND ALBUTEROL SULFATE 1 AMPULE: 2.5; .5 SOLUTION RESPIRATORY (INHALATION) at 20:34

## 2020-11-03 RX ADMIN — PROPOFOL 50 MCG/KG/MIN: 10 INJECTION, EMULSION INTRAVENOUS at 11:39

## 2020-11-03 RX ADMIN — MIDAZOLAM HYDROCHLORIDE 2 MG: 1 INJECTION, SOLUTION INTRAMUSCULAR; INTRAVENOUS at 01:44

## 2020-11-03 RX ADMIN — SODIUM CHLORIDE SOLN NEBU 3% 2 ML: 3 NEBU SOLN at 20:34

## 2020-11-03 RX ADMIN — SODIUM CHLORIDE SOLN NEBU 3% 2 ML: 3 NEBU SOLN at 00:42

## 2020-11-03 RX ADMIN — WATER 10 ML: 1 INJECTION INTRAMUSCULAR; INTRAVENOUS; SUBCUTANEOUS at 01:06

## 2020-11-03 RX ADMIN — PANTOPRAZOLE SODIUM 40 MG: 40 INJECTION, POWDER, FOR SOLUTION INTRAVENOUS at 09:04

## 2020-11-03 RX ADMIN — PROPOFOL 50 MCG/KG/MIN: 10 INJECTION, EMULSION INTRAVENOUS at 00:13

## 2020-11-03 RX ADMIN — Medication 200 MCG/HR: at 01:25

## 2020-11-03 RX ADMIN — IPRATROPIUM BROMIDE AND ALBUTEROL SULFATE 1 AMPULE: 2.5; .5 SOLUTION RESPIRATORY (INHALATION) at 08:23

## 2020-11-03 RX ADMIN — HYDROCORTISONE SODIUM SUCCINATE 50 MG: 100 INJECTION, POWDER, FOR SOLUTION INTRAMUSCULAR; INTRAVENOUS at 15:52

## 2020-11-03 RX ADMIN — FOLIC ACID 1 MG: 5 INJECTION, SOLUTION INTRAMUSCULAR; INTRAVENOUS; SUBCUTANEOUS at 15:43

## 2020-11-03 RX ADMIN — HYDROCORTISONE SODIUM SUCCINATE 100 MG: 100 INJECTION, POWDER, FOR SOLUTION INTRAMUSCULAR; INTRAVENOUS at 01:06

## 2020-11-03 RX ADMIN — HYDROCORTISONE SODIUM SUCCINATE 100 MG: 100 INJECTION, POWDER, FOR SOLUTION INTRAMUSCULAR; INTRAVENOUS at 09:01

## 2020-11-03 RX ADMIN — IPRATROPIUM BROMIDE AND ALBUTEROL SULFATE 1 AMPULE: 2.5; .5 SOLUTION RESPIRATORY (INHALATION) at 17:02

## 2020-11-03 RX ADMIN — PROPOFOL 35 MCG/KG/MIN: 10 INJECTION, EMULSION INTRAVENOUS at 14:21

## 2020-11-03 RX ADMIN — CHLORHEXIDINE GLUCONATE 0.12% ORAL RINSE 15 ML: 1.2 LIQUID ORAL at 21:08

## 2020-11-03 RX ADMIN — SODIUM CHLORIDE SOLN NEBU 3% 2 ML: 3 NEBU SOLN at 17:02

## 2020-11-03 RX ADMIN — ARFORMOTEROL TARTRATE 15 MCG: 15 SOLUTION RESPIRATORY (INHALATION) at 20:34

## 2020-11-03 RX ADMIN — ASPIRIN 81 MG CHEWABLE TABLET 81 MG: 81 TABLET CHEWABLE at 09:05

## 2020-11-03 RX ADMIN — POTASSIUM CHLORIDE 20 MEQ: 400 INJECTION, SOLUTION INTRAVENOUS at 09:10

## 2020-11-03 RX ADMIN — MIDAZOLAM HYDROCHLORIDE 2 MG/HR: 5 INJECTION, SOLUTION INTRAMUSCULAR; INTRAVENOUS at 16:34

## 2020-11-03 RX ADMIN — THIAMINE HYDROCHLORIDE 100 MG: 100 INJECTION, SOLUTION INTRAMUSCULAR; INTRAVENOUS at 15:43

## 2020-11-03 RX ADMIN — CALCIUM GLUCONATE 1 G: 98 INJECTION, SOLUTION INTRAVENOUS at 12:56

## 2020-11-03 RX ADMIN — PROPOFOL 50 MCG/KG/MIN: 10 INJECTION, EMULSION INTRAVENOUS at 05:49

## 2020-11-03 RX ADMIN — Medication 200 MCG/HR: at 15:36

## 2020-11-03 RX ADMIN — SODIUM CHLORIDE SOLN NEBU 3% 2 ML: 3 NEBU SOLN at 08:23

## 2020-11-03 RX ADMIN — SODIUM CHLORIDE 3 G: 900 INJECTION INTRAVENOUS at 01:08

## 2020-11-03 RX ADMIN — AMLODIPINE BESYLATE 10 MG: 10 TABLET ORAL at 09:05

## 2020-11-03 RX ADMIN — MIDAZOLAM HYDROCHLORIDE 2 MG: 1 INJECTION, SOLUTION INTRAMUSCULAR; INTRAVENOUS at 15:35

## 2020-11-03 RX ADMIN — PROPOFOL 50 MCG/KG/MIN: 10 INJECTION, EMULSION INTRAVENOUS at 08:28

## 2020-11-03 RX ADMIN — CHLORHEXIDINE GLUCONATE 0.12% ORAL RINSE 15 ML: 1.2 LIQUID ORAL at 09:04

## 2020-11-03 RX ADMIN — SODIUM CHLORIDE 3 G: 900 INJECTION INTRAVENOUS at 18:09

## 2020-11-03 ASSESSMENT — PULMONARY FUNCTION TESTS
PIF_VALUE: 35
PIF_VALUE: 31
PIF_VALUE: 36
PIF_VALUE: 32
PIF_VALUE: 34
PIF_VALUE: 32
PIF_VALUE: 34
PIF_VALUE: 37
PIF_VALUE: 39
PIF_VALUE: 39
PIF_VALUE: 36
PIF_VALUE: 31
PIF_VALUE: 30
PIF_VALUE: 35
PIF_VALUE: 35
PIF_VALUE: 36
PIF_VALUE: 35
PIF_VALUE: 37
PIF_VALUE: 32
PIF_VALUE: 38
PIF_VALUE: 33
PIF_VALUE: 33
PIF_VALUE: 34
PIF_VALUE: 31
PIF_VALUE: 34
PIF_VALUE: 34
PIF_VALUE: 32
PIF_VALUE: 37
PIF_VALUE: 35
PIF_VALUE: 33
PIF_VALUE: 37

## 2020-11-03 ASSESSMENT — PAIN SCALES - GENERAL
PAINLEVEL_OUTOF10: 3
PAINLEVEL_OUTOF10: 3
PAINLEVEL_OUTOF10: 5
PAINLEVEL_OUTOF10: 3

## 2020-11-03 NOTE — CARE COORDINATION
SOCIAL WORK/DISCHARGE PLANNING;  Pt remains in ICU. He is on vent. Pt is awake and alert. Pt's son Mason present. Provided support. He reported prior to admission, pt lived with his wife in a two story plan home. He was independent. Explained SW role and will follow to assist with care coordination as pt progress toward discharge.    Jayson [facility]  [phone]

## 2020-11-03 NOTE — PROGRESS NOTES
Chief Complaint   Patient presents with    Shortness of Breath     for a few weeks. O2 sat in 70s at [facility], placed on 4L and now 95%. SUBJECTIVE:  Patient is tolerating medications. No reported adverse drug reactions. Patient remained afebrile overnight. Continue to be tachycardic and hypertensive   Patient is intubated, sedated on propofol and fentanyl. Off of Levophed at this time. Review of systems:  As stated above in the chief complaint, otherwise negative.     Medications:  Scheduled Meds:   potassium chloride  20 mEq Intravenous Once    magnesium sulfate  2 g Intravenous Once    sennosides-docusate sodium  2 tablet Oral Daily    ampicillin-sulbactam  3 g Intravenous Q8H    levofloxacin  750 mg Intravenous Q48H    hydrocortisone sodium succinate PF  100 mg Intravenous Q8H    insulin glargine  20 Units Subcutaneous Nightly    sodium chloride (Inhalant)  2 mL Nebulization Q4H    insulin lispro  0-18 Units Subcutaneous Q4H    heparin (porcine)  7,500 Units Subcutaneous Q8H    pantoprazole  40 mg Intravenous Daily    And    sodium chloride (PF)  10 mL Intravenous Daily    sodium chloride flush  10 mL Intravenous 2 times per day    Arformoterol Tartrate  15 mcg Nebulization BID    chlorhexidine  15 mL Mouth/Throat BID    amLODIPine  10 mg Oral Daily    aspirin  81 mg Oral Daily    [Held by provider] atorvastatin  40 mg Oral Daily    [Held by provider] carvedilol  12.5 mg Oral BID    levothyroxine  50 mcg Oral Daily    [Held by provider] losartan  100 mg Oral Daily    budesonide  0.5 mg Nebulization BID    ipratropium-albuterol  1 ampule Inhalation Q4H WA     Continuous Infusions:   norepinephrine Stopped (11/02/20 7577)    propofol 50 mcg/kg/min (11/03/20 0828)    fentaNYL 5 mcg/ml in 0.9%  ml infusion 200 mcg/hr (11/03/20 0827)    dextrose       PRN Meds:polyvinyl alcohol, midazolam, hydrALAZINE, sodium chloride flush, acetaminophen **OR** acetaminophen, Lab Results   Component Value Date    CRP 1.4 (H) 11/02/2020    CRP 2.8 (H) 09/04/2017    CRP 10.5 (H) 08/28/2017     Lab Results   Component Value Date    SEDRATE 135 (H) 09/04/2017    SEDRATE 150 (H) 08/28/2017    SEDRATE 141 (H) 08/21/2017     Radiology:  CXR- 11/3- Multifocal bilateral pulmonary infiltrates more prominent within the right lung. Microbiology:   Lab Results   Component Value Date    BC 24 Hours no growth 10/29/2020    BC  10/26/2020     This organism was isolated in one set. Susceptibility testing is not routinely done as this  organism frequently represents skin contamination. Additional testing can be ordered by calling the  Microbiology Department. BC 5 Days- no growth 05/19/2018    ORG Staphylococcus aureus 10/29/2020    ORG Staphylococcus coagulase-negative 10/26/2020    ORG Coagulase Negative Staphylococci 08/26/2017     Lab Results   Component Value Date    BLOODCULT2 24 Hours no growth 10/29/2020    BLOODCULT2 5 Days no growth 10/26/2020    BLOODCULT2 5 Days- no growth 05/19/2018    ORG Staphylococcus aureus 10/29/2020    ORG Staphylococcus coagulase-negative 10/26/2020    ORG Coagulase Negative Staphylococci 08/26/2017     No results found for: WNDABS  Smear, Respiratory   Date Value Ref Range Status   10/29/2020   Final    Group 5: >25 PMN's/LPF and <10 Epithelial cells/LPF  Abundant Polymorphonuclear leukocytes  Epithelial cells not seen  Few Gram positive diplococci       No results found for: MPNEUMO, CLAMYDCU, LABLEGI, AFBCX, FUNGSM, LABFUNG  CULTURE, RESPIRATORY   Date Value Ref Range Status   10/29/2020 Oral Pharyngeal Tiesha present (A)  Final   10/29/2020   Final    Light growth  This isolate is presumed to be resistant based on the  detection of inducible Clindamycin resistance. Clindamycin  may still be effective in some patients.        Culture Catheter Tip   Date Value Ref Range Status   08/26/2017 <15 colonies  Final     No results found for: BFCS  No results found for: CXSURG  Urine Culture, Routine   Date Value Ref Range Status   10/28/2020 Growth not present  Final   09/16/2019 Growth not present  Final   07/31/2017 >100,000 CFU/ml  Final     MRSA Culture Only   Date Value Ref Range Status   10/30/2020 Methicillin resistant Staph aureus not isolated  Final   07/28/2017 Methicillin resistant Staph aureus not isolated  Final          Assessment:  · Bacterial pneumonia, likely aspiration pneumonia vs MSSA   · Shock, sepsis- resolved   · Alcohol withdrawal      Plan:    · Continue Unasyn for aspiration pneumonia; stopLevaquin 750 Q 48 till urine legionella antigen results   · Check cultures  · Baseline CRP (1.4), Procal (0.57)  · Monitor labs  · Will follow with you      Guerrero Tenorio  8:56 AM  11/3/2020  Pt seen and evaluated .   Agree c diagnosis and plans after review of labs, micro and radiographs

## 2020-11-03 NOTE — ANESTHESIA PROCEDURE NOTES
Airway  Urgency: emergent    Difficult airway    General Information and Staff    Patient location during procedure: ICU  Anesthesiologist: Hadley Madrid MD  Resident/CRNA: KATHERINE Benz CRNA  Performed: resident/CRNA     Consent for Airway (if performed for an anesthetic, see related documentation for consents)  Patient identity confirmed: per hospital policy  Consent: The procedure was performed in an emergent situation. Verbal consent not obtained. Written consent not obtained. Risks and benefits: risks, benefits and alternatives were not discussed  Consent given by: other (add comment) (PT currently sedated, tube exchange performed )      Indications and Patient Condition  Indications for airway management: respiratory failure  Spontaneous ventilation: present  Sedation level: moderate (conscious sedation)  Preoxygenated: yes  Patient position: sniffing  MILS maintained throughout  Mask difficulty assessment: not attempted    Final Airway Details  Final airway type: endotracheal airway      Successful airway: ETT  Cuffed: yes   Endotracheal tube insertion site: oral  Blade type: Tube exchange. ETT size (mm): 8.0  Placement verified by: chest auscultation and capnometry   Measured from: lips  ETT to lips (cm): 26  Number of attempts at approach: 1  Ventilation between attempts: vent.   Number of other approaches attempted: 0    Additional Comments  Tube exchange with bougie, VSS, BBS, ETCO2 36  no

## 2020-11-03 NOTE — PROGRESS NOTES
[facility] Quality Flow/Interdisciplinary Rounds Progress Note        Quality Flow Rounds held on November 3, 2020    Disciplines Attending:  Bedside Nurse, Nursing Unit Leadership and ICU team     Earnest Moffett was admitted on 10/26/2020  3:39 PM    Anticipated Discharge Date:       Disposition:    Romaine Score:  Romaine Scale Score: 16    Readmission Risk              Risk of Unplanned Readmission:        26           Discussed patient goal for the day, patient clinical progression, and barriers to discharge. The following Goal(s) of the Day/Commitment(s) have been identified:  Wean sedation and ventilator as able.        Cami Wolf  November 3, 2020

## 2020-11-03 NOTE — PROGRESS NOTES
Piggyback:100]  Out: 3417 [Urine:3415; Stool:2]    I/O this shift:  In: -   Out: 250 [Urine:250]      Intake/Output Summary (Last 24 hours) at 11/3/2020 1615  Last data filed at 11/3/2020 1543  Gross per 24 hour   Intake 3099 ml   Output 3667 ml   Net -568 ml       Patient Vitals for the past 96 hrs (Last 3 readings):   Weight   11/03/20 0000 (!) 350 lb (158.8 kg)   11/02/20 0600 (!) 350 lb (158.8 kg)   11/01/20 0800 (!) 334 lb (151.5 kg)         Recent Labs     11/01/20 0445 11/02/20 0530 11/03/20 0410   WBC 6.7 7.8 9.5   HGB 14.1 13.1 12.9   HCT 41.6 39.3 38.9   MCV 77.3* 78.1* 78.1*    217 260     Recent Labs     11/01/20 0445 11/02/20 0530  11/02/20  1808 11/03/20  0410 11/03/20  1107      < > 144   < > 135 135 139   K 3.5   < > 4.1   < > 4.7 3.8 4.1      < > 104   < > 97* 97* 101   CO2 27   < > 25   < > 22 21* 22   BUN 32*   < > 45*   < > 55* 54* 51*   CREATININE 1.7*   < > 2.9*   < > 3.3* 2.8* 2.6*   PHOS 4.0  --  6.3*  --   --  6.9*  --     < > = values in this interval not displayed. Recent Labs     11/01/20 0445 11/02/20 0530 11/03/20 0410   PROT 6.4 6.0* 6.7   ALKPHOS 104 128 132*   * 247* 257*   AST 74* 254* 142*   BILITOT 0.3 0.4 0.5   CPK:  Lab Results   Component Value Date    CKTOTAL 98 10/08/2017          -----------------------------------------------------------------  RAD:   Results for orders placed during the hospital encounter of 10/26/20   XR CHEST PORTABLE    Narrative EXAMINATION:  ONE XRAY VIEW OF THE CHEST    10/30/2020 2:17 am    COMPARISON:  10/29/2020 radiograph    HISTORY:  ORDERING SYSTEM PROVIDED HISTORY: R IJ placement  TECHNOLOGIST PROVIDED HISTORY:  Reason for exam:->R IJ placement  What reading provider will be dictating this exam?->CRC    FINDINGS:  Right IJ central venous catheter has been placed with tip at the distal SVC. No pneumothorax.   Worsening diffuse airspace opacification in the right lung  with moderate opacification also present at the left base. Endotracheal tube  and enteric tube stable. No significant skeletal finding. Impression Interval placement of right IJ central venous catheter. No pneumothorax. Worsening airspace changes in the right lung. Micro:  No results for input(s): BC in the last 72 hours. No results for input(s): ORG in the last 72 hours. No results for input(s): George Fleet in the last 72 hours. No results for input(s): STREPPNEUMAG in the last 72 hours. No results for input(s): LEGUR in the last 72 hours. No results for input(s): ORG in the last 72 hours. No results for input(s): ASO in the last 72 hours. No results for input(s): CULTRESP in the last 72 hours. No results for input(s): LABURIN in the last 72 hours.   Vent Information  $Ventilation: $Subsequent Day  Skin Assessment: Clean, dry, & intact  Equipment ID: [id number]  Vent Type: 980  Vent Mode: AC/VC  Vt Ordered: 450 mL  Rate Set: 20 bmp  Peak Flow: 65 L/min  Pressure Support: 0 cmH20  FiO2 : 85 %  SpO2: 94 %  SpO2/FiO2 ratio: 110.59  PaO2/FiO2 ratio: 146  Sensitivity: 3  PEEP/CPAP: 9  I Time/ I Time %: 0 s  Humidification Source: Heated wire  Humidification Temp: 37  Humidification Temp Measured: 37.3  Circuit Condensation: Drained  Mask Type: Full face mask  Mask Size: Large    Additional Respiratory  Assessments  Pulse: 71  Resp: 26  SpO2: 94 %  Position: Semi-Lockwood's  Humidification Source: Heated wire  Humidification Temp: 37  Circuit Condensation: Drained  Oral Care Completed?: Yes  Oral Care: Mouth suctioned  Subglottic Suction Done?: Yes  Airway Type: ET  Airway Size: 8  Skin barrier applied: Yes    Objective:   Vitals:   Vitals:    20 1600   BP: (!) 120/53   Pulse: 71   Resp: 26   Temp: 99 °F (37.2 °C)   SpO2: 94%      TEMP:Current: Temp: 99 °F (37.2 °C)  Max: Temp  Av °F (37.2 °C)  Min: 98.4 °F (36.9 °C)  Max: 99.3 °F (37.4 °C)    BP Range: Systolic (10LSG), FRF:395 , Min:105 , JQE:248     Diastolic (45OIG), Av, Min:42, Max:92      General appearance: Sedated obese on mechanical ventilation , appears stated age   In no acute distress  Skin: No rashes or lesions  HEENT: mucous membranes are moist oral endotracheal tube and OG tube  Neck: No JVD  Lungs: symmetrical expansion, coarse breath sounds to auscultation, no use of accessory muscles  Heart: S1S2 no murmurs,  Abdomen: soft, non tender, distended obese  Extremities: no peripheral edema  Neurologic: Sedated  Affect: Calm       Assessment:   Patient Active Problem List:    59-y.o M with history of DM, hypothyroid, COPD, HTN, HLD presented on 10/26 with shortness of breath for 1 week. Saturations were 70% at PCPs. Saturation 65% on room air in ER  Patient agitated for CT scan refused to lay flat  10/27 RRT refused BiPAP became combative. Patient transferred to ICU  10/28 intubated and sedated. CT chest showing dense multifocal airspace disease  10/30 Ultrasound lower extremities no DVT, ultrasound kidneys no hydronephrosis  10/31 bilateral infiltrates left effusion. On 50% +8 of PEEP. Chest x-ray showing right-sided improving with left base atelectasis. Covid negative x2    1. Acute hypoxemic respiratory failure on mechanical ventilation  2. Agitation on Seroquel Precedex fentanyl  3. MSSA pneumonia on doxycycline  4. HANS moderate ( AHI 17 on 18 requires BIPAP )  5. History of reactive airway disease  6. CT 3/14/2018 showing pulmonary nodule stable from 10/2017  7. Acute renal failure improving  8. Diabetes with elevated blood sugars  9. Obesity  10. EF 65% LVH  11. chronic lymphedema  12. Hypothyroid  13. H/o respiratory failure: 2017 pt was  transferred from [facility] for acute respiratory failure after lap cholecystectomy, lap umbilical hernia repair, and lap liver biopsy (fatty liver).  He had been extubated postop but had laryngospasm requiring reintubation, complicated by negative pressure flash pulmonary edema, and required tracheostomy 8/10/2017. Patient has staph aureus pneumonia and C. difficile colitis. Patient was then transferred to [facility] hospital where he was weaned off the ventilator and decannulated.       Plan:     · Wean sedation patient having some bradycardia  · On steroids  · May be able to change antibiotics as MSSA  · Wean vent as able  · Family updated at bedside  · [address] 20/450/85/9  · Versed to be added (concern for alcohol withdrawal), patient on propofol and fentanyl at the current time    Dalia Tolbert MD, [facility]

## 2020-11-03 NOTE — PROGRESS NOTES
PT SEEN AND EXAMINED. intubated, in icu. Sedated. agitated overnight ; remains intubated  Opens eyes to command, withdraws to pain. GCS 7T. ~4.5 in, ~2.5 out past 24 hours. This AM patient became hypotensive into the 70's at which point propofol was discontinued and fentanyl decreased to 150. MAP responded appropriately with a rise to 61. BP labile     Chart reviewed. meds reviewed. D/w nursing + family as available. EXAM: IN GENERAL, NAD. AWAKE AND . ROS NEGx10 EXCEPT:   BP (!) 170/68   Pulse 117   Temp 99 °F (37.2 °C)   Resp 30   Ht 5' 8\" (1.727 m)   Wt (!) 350 lb (158.8 kg)   SpO2 96%   BMI 53.22 kg/m²   GEN: A+O NAD. HEENT: NCAT. EOMI. JOE  NECK: NO JVD. TRACH MIDLINE. NO BRUITS. NO THYROMEGALY. LUNGS: CTA BL NO RALES, RHONCHI OR WHEEZES. GOOD EXCURSION. CV: Regular rate and rhythm, NO Murmurs, Rubs, Or gallops  ABD: Soft. Nontender. Normal bowel sounds. No organomegaly  EXT:No clubbing cyanosis or edema  Neuro: Alert and oriented x 3. No focal motor deficits. No sensory deficits. Reflexes appear intact.   Labs/data reviewedLABS: CBC with Differential:    Lab Results   Component Value Date    WBC 9.5 11/03/2020    RBC 4.98 11/03/2020    HGB 12.9 11/03/2020    HCT 38.9 11/03/2020     11/03/2020    MCV 78.1 11/03/2020    MCH 25.9 11/03/2020    MCHC 33.2 11/03/2020    RDW 21.4 11/03/2020    NRBC 4.3 11/02/2020    SEGSPCT 73 01/26/2014    LYMPHOPCT 10.5 11/03/2020    MONOPCT 10.7 11/03/2020    MYELOPCT 0.9 10/30/2020    BASOPCT 0.1 11/03/2020    MONOSABS 1.01 11/03/2020    LYMPHSABS 0.99 11/03/2020    EOSABS 0.03 11/03/2020    BASOSABS 0.01 11/03/2020     Platelets:    Lab Results   Component Value Date     11/03/2020     CMP:    Lab Results   Component Value Date     11/03/2020    K 4.1 11/03/2020    K 4.0 10/27/2020     11/03/2020    CO2 22 11/03/2020    BUN 51 11/03/2020    CREATININE 2.6 11/03/2020    GFRAA 31 11/03/2020    LABGLOM 31 11/03/2020    GLUCOSE 153 11/03/2020 PROT 6.7 11/03/2020    LABALBU 3.2 11/03/2020    CALCIUM 7.2 11/03/2020    BILITOT 0.5 11/03/2020    ALKPHOS 132 11/03/2020     11/03/2020     11/03/2020     Magnesium:    Lab Results   Component Value Date    MG 1.8 11/03/2020     LDH:    Lab Results   Component Value Date     10/28/2020     PT/INR:    Lab Results   Component Value Date    PROTIME 10.9 07/21/2018    INR 0.9 07/21/2018     Last 3 Troponin:    Lab Results   Component Value Date    TROPONINI <0.01 10/26/2020    TROPONINI <0.01 05/26/2019    TROPONINI <0.01 05/26/2019     ABG:    Lab Results   Component Value Date    PH 7.328 11/03/2020    PCO2 45.7 11/03/2020    PO2 90.7 11/03/2020    HCO3 23.4 11/03/2020    BE -2.7 11/03/2020    O2SAT 95.8 11/03/2020     IRON:  No results found for: IRON  IMAGING    Xr Chest Portable    Result Date: 10/27/2020  EXAMINATION: ONE XRAY VIEW OF THE CHEST 10/27/2020 7:58 pm COMPARISON: October 26, 2020 HISTORY: ORDERING SYSTEM PROVIDED HISTORY: SOB TECHNOLOGIST PROVIDED HISTORY: Reason for exam:->SOB What reading provider will be dictating this exam?->CRC FINDINGS: There is mild cardiomegaly. There is patchy perihilar and bibasilar atelectasis/infiltrates. Tiny pleural effusions are suspected. Progressive bibasilar atelectasis/infiltrates concerning for pneumonia. Underlying CHF is considered. Xr Chest Portable    Result Date: 10/26/2020  EXAMINATION: ONE XRAY VIEW OF THE CHEST 10/26/2020 4:11 pm COMPARISON: 05/26/2019 HISTORY: ORDERING SYSTEM PROVIDED HISTORY: SOB TECHNOLOGIST PROVIDED HISTORY: Reason for exam:->SOB What reading provider will be dictating this exam?->CRC FINDINGS: Cardiac size appears stable. Discoid airspace disease seen at the left lung base which may represent atelectasis or scarring. Right infrahilar and basilar infiltrate is identified. No pneumothorax is identified. No acute osseous abnormality is identified.      Right infrahilar and basilar infiltrate concerning for pneumonia. Left basilar atelectasis or scarring.        Medications:    Scheduled Meds:   folic acid  1 mg Intravenous Daily    thiamine (VITAMIN B1) IVPB  100 mg Intravenous Q24H    hydrocortisone sodium succinate PF  50 mg Intravenous Q8H    [Held by provider] carvedilol  3.125 mg Oral BID WC    sennosides-docusate sodium  2 tablet Oral Daily    ampicillin-sulbactam  3 g Intravenous Q8H    insulin glargine  20 Units Subcutaneous Nightly    sodium chloride (Inhalant)  2 mL Nebulization Q4H    insulin lispro  0-18 Units Subcutaneous Q4H    heparin (porcine)  7,500 Units Subcutaneous Q8H    pantoprazole  40 mg Intravenous Daily    And    sodium chloride (PF)  10 mL Intravenous Daily    sodium chloride flush  10 mL Intravenous 2 times per day    Arformoterol Tartrate  15 mcg Nebulization BID    chlorhexidine  15 mL Mouth/Throat BID    amLODIPine  10 mg Oral Daily    aspirin  81 mg Oral Daily    [Held by provider] atorvastatin  40 mg Oral Daily    levothyroxine  50 mcg Oral Daily    [Held by provider] losartan  100 mg Oral Daily    budesonide  0.5 mg Nebulization BID    ipratropium-albuterol  1 ampule Inhalation Q4H WA       Continuous Infusions:   midazolam 4 mg/hr (11/03/20 0754)    fentaNYL 5 mcg/ml in 0.9%  ml infusion 200 mcg/hr (11/03/20 1536)    dextrose         PRN Meds:polyvinyl alcohol, midazolam, hydrALAZINE, sodium chloride flush, acetaminophen **OR** acetaminophen, polyethylene glycol, perflutren lipid microspheres, colchicine, ondansetron, promethazine **OR** ondansetron, glucose, dextrose, glucagon (rDNA), dextrose    A/P:      Patient Active Problem List   Diagnosis    Hyperlipidemia    Type 2 diabetes mellitus without complication, without long-term current use of insulin (HCC)    Atypical chest pain    Essential hypertension    Chronic acquired lymphedema    Aortic valve disease    Morbid obesity due to excess calories (HCC)    Abdominal pain    Acute

## 2020-11-03 NOTE — PLAN OF CARE
Problem: Restraint Use - Nonviolent/Non-Self-Destructive Behavior:  Goal: Absence of restraint indications  Description: Absence of restraint indications  11/3/2020 0210 by Eulogio Barrow RN  Outcome: Not Met This Shift     Problem: Restraint Use - Nonviolent/Non-Self-Destructive Behavior:  Goal: Absence of restraint-related injury  Description: Absence of restraint-related injury  11/3/2020 0210 by Eulogio Barrow RN  Outcome: Met This Shift

## 2020-11-03 NOTE — PLAN OF CARE
Problem: Pain:  Goal: Pain level will decrease  Description: Pain level will decrease  Outcome: Met This Shift     Problem: Restraint Use - Nonviolent/Non-Self-Destructive Behavior:  Goal: Absence of restraint-related injury  Description: Absence of restraint-related injury  11/2/2020 2043 by Komal Yoo RN  Outcome: Met This Shift  11/2/2020 0719 by Pinky Garvin RN  Outcome: Met This Shift     Problem: Skin Integrity:  Goal: Will show no infection signs and symptoms  Description: Will show no infection signs and symptoms  Outcome: Met This Shift     Problem: Inadequate oral food/beverage intake (NI-2.1)  Goal: Food and/or Nutrient Delivery  Description: Individualized approach for food/nutrient provision.   11/2/2020 1308 by Gilford Ivan, MS, RD, LD  Outcome: Met This Shift

## 2020-11-03 NOTE — PROGRESS NOTES
Pt found to have bit through  balloon. No volume loss on vent sat 90%. Anesthesia called for ETT tube exchage. ETT exchanged without difficulty. 8.0/ @ lip  EtCO2    BS equal bilt .

## 2020-11-03 NOTE — PROGRESS NOTES
[address] [facility]  Department of Internal Medicine   Internal Medicine Residency   MICU Progress Note    Patient:  Rell Archer 61 y.o. male  MRN: [id number]     Date of Service: 11/3/2020    Allergy: Bee venom and Shellfish-derived products    Subjective     Agitated overnight requiring versed and increasing sedation requirements. Responds appropriately to questions, BP still fluctuating. Objective     VS: BP (!) 212/92   Pulse 101   Temp 99 °F (37.2 °C)   Resp 26   Ht 5' 8\" (1.727 m)   Wt (!) 350 lb (158.8 kg)   SpO2 95%   BMI 53.22 kg/m²   ABP (Arterial line BP): 104/54  ABP mean (Arterial line mean): 66 mmHg    I & O - 24hr:     Intake/Output Summary (Last 24 hours) at 11/3/2020 1408  Last data filed at 11/3/2020 1256  Gross per 24 hour   Intake 1829 ml   Output 2705 ml   Net -876 ml       Physical Exam:  · General Appearance: intubated and sedated  · Neck: no adenopathy, no carotid bruit, no JVD, supple, symmetrical, trachea midline and thyroid not enlarged, symmetric, no tenderness/mass/nodules  · Lung: coarse breath sound bilaterally, no rales or wheezing  · Heart: bradycardic, normal S1 S2, no murmur or rub  · Abdomen: Stop, nontender, distended, no masses, no organomegaly  · Extremities:  Left lower leg 2+ non pitting edema. Right lower leg chronic lymphedema. · Musculoskeletal:  ROM normal in all joints of extremities.    · Neurologic: Mental status: Patient is sedated, open eyes, normal pupillary reflex    Lines     site day    Art line   R radial 0   TLC R IJ 3   PICC None    Hemoaccess None    Oxygen:     none  Mechanical Ventilation:   Mode: A/C    TV:500 ml RR: 20  PEEP 8cmH2O FiO2 60%    ABG:     Lab Results   Component Value Date    PH 7.328 11/03/2020    PCO2 45.7 11/03/2020    PO2 90.7 11/03/2020    HCO3 23.4 11/03/2020    BE -2.7 11/03/2020    THB 13.7 11/03/2020    O2SAT 95.8 11/03/2020        Medications     Infusions: (Fluid, Sedation, Vasopressors)  IVF:    Vasopressors   none  Sedation   Propofol at rate of 30 mcg/min Fentanyl at rate 200 mcg/min    Nutrition:   TF low calorie high protein  ATB:   Antibiotics  Days   Doxycycline 3             Patient currently has   Urinary cath  Restraints  DVT prophylaxis/ GI prophylaxis,    Labs     CBC with Differential:    Lab Results   Component Value Date    WBC 9.5 11/03/2020    RBC 4.98 11/03/2020    HGB 12.9 11/03/2020    HCT 38.9 11/03/2020     11/03/2020    MCV 78.1 11/03/2020    MCH 25.9 11/03/2020    MCHC 33.2 11/03/2020    RDW 21.4 11/03/2020    NRBC 4.3 11/02/2020    SEGSPCT 73 01/26/2014    LYMPHOPCT 10.5 11/03/2020    MONOPCT 10.7 11/03/2020    MYELOPCT 0.9 10/30/2020    BASOPCT 0.1 11/03/2020    MONOSABS 1.01 11/03/2020    LYMPHSABS 0.99 11/03/2020    EOSABS 0.03 11/03/2020    BASOSABS 0.01 11/03/2020     CMP:    Lab Results   Component Value Date     11/03/2020    K 4.1 11/03/2020    K 4.0 10/27/2020     11/03/2020    CO2 22 11/03/2020    BUN 51 11/03/2020    CREATININE 2.6 11/03/2020    GFRAA 31 11/03/2020    LABGLOM 31 11/03/2020    GLUCOSE 153 11/03/2020    PROT 6.7 11/03/2020    LABALBU 3.2 11/03/2020    CALCIUM 7.2 11/03/2020    BILITOT 0.5 11/03/2020    ALKPHOS 132 11/03/2020     11/03/2020     11/03/2020     Ionized Calcium:  No results found for: IONCA  Magnesium:    Lab Results   Component Value Date    MG 1.8 11/03/2020     Phosphorus:    Lab Results   Component Value Date    PHOS 6.9 11/03/2020     LDH:    Lab Results   Component Value Date     10/28/2020     PT/INR:    Lab Results   Component Value Date    PROTIME 10.9 07/21/2018    INR 0.9 07/21/2018     Troponin:    Lab Results   Component Value Date    TROPONINI <0.01 10/26/2020     U/A:    Lab Results   Component Value Date    NITRITE neg 08/30/2019    COLORU Yellow 11/01/2020    PROTEINU Negative 11/01/2020    PHUR 7.0 11/01/2020    LABCAST FEW 04/11/2016    WBCUA 0-1 11/01/2020    RBCUA 2-5 11/01/2020    BACTERIA RARE 11/01/2020    CLARITYU Clear 11/01/2020    SPECGRAV 1.015 11/01/2020    LEUKOCYTESUR Negative 11/01/2020    UROBILINOGEN 0.2 11/01/2020    BILIRUBINUR Negative 11/01/2020    BILIRUBINUR neg 08/30/2019    BLOODU TRACE-INTACT 11/01/2020    GLUCOSEU 100 11/01/2020     ABG:    Lab Results   Component Value Date    PH 7.328 11/03/2020    PCO2 45.7 11/03/2020    PO2 90.7 11/03/2020    HCO3 23.4 11/03/2020    BE -2.7 11/03/2020    O2SAT 95.8 11/03/2020     FLP:    Lab Results   Component Value Date    TRIG 115 11/02/2020    HDL 66 03/23/2017    LDLCALC 192 03/23/2017    LABVLDL 37 03/23/2017     TSH:    Lab Results   Component Value Date    TSH 4.340 10/27/2020       Imaging Studies:  CXR: Unchanged bilateral infiltrates and left pleural effusion. Resident's Assessment and Plan     Mary Mcgovern is a 61 y.o. male with PMHx is significant for HFpEF (Ef 65% in 2017), hx of tracheostomy and reversal (as mentioned above), moderate HANS not on CPAP, DMT2, HTN, HLD, Hypothyroidism who initially was admitted to general floors for hypoxia. Transferred to the MICU when ABG concerning for worsening respiratory acidosis. Currently intubated and being managed in the MICU for:     NEURO   Acute encephalopathy 2/2 Acute hypoxic hypercapnic respiratory failure. Pt presented to the ED with hypoxia, RRT was called due to worsening respiratory acidosis. Currently intubated and sedated. - Today pt is still intubated and sedated. - Fentanyl gtt; Start Versed gtt  - D/C propofol gtt  - Continue monitor mentation status, wean down sedation as tolerated.     Labile BP  · Arterial line placed 11/2  · Questionable alcohol withdrawal - thi/folate/versed gtt  · solucortef decreased to 50 TID     CAP vs Aspiration PNA  - Respiratory cultures grew MSSA on 31/10.   - Unasyn d/c Levaquin  - Baseline ESR CRP  - Patient is afebrile, WBC wnl  - Monitor daily CBC, vitals, CXR.   - Infectious disease consulted and following     Acute encephalopathy 2/2 Acute hypoxic hypercapnic respiratory failure. Pt presented to the ED with hypoxia, RRT was called due to worsening respiratory acidosis. Currently intubated and sedated. - Today pt is still intubated and sedated. Patient is on Fentanyl, propofol, off precedex. Will decrease propofol today as tolerated  - Continue monitor mentation status, wean down sedation as tolerated. Acute hypoxic hypercapnic respiratory failure 2/2 CAP versus chronic OHS  Pt has Hx chronic HANS not on CPAP at home. ABG showed pH 7.188/91.2/78.9/33 on RRT. Patient is not tolerating BiPAP and currently intubated. - Today Pt saturating 95% on vent. ABG AC/VC/16/500/8/41%  - Continue to monitor respiratory status, wean down FiO2 as tolerated. Patient not able to tolerate PS today. - Pro  on presentation. ECHO showed Left ventricular internal dimensions were normal in diastole and systole. EF 65%. Moderate left ventricular concentric hypertrophy noted. No regional wall motion abnormalities seen. - US dup LE negative for DVT. CTA negative for PE. COVID -19 negative x2  - Continue breathing treatment. Add 3% nebulizer today. Decrease Solumedrol to 40 mg daily.  - Monitor daily CXR. CBC.   - Pulmonology consulted, further recommendations appreciated. - Solucortef started      NANCY stage III likely pre renal 2/2 diuretic use, contrast agent vs? cardiorenal syndrome, improving   - Creatinine 3.8 ( baseline 1.1) -> 4.0 - 3.7 -> 2.7 -> 1.7 -> 3.5  - I/O 4.6/1.9 ; +2.7L  - Will give another dose of Bumex 2 mg today per nephrology. - Further recommendations appreciated. - Continue monitor daily BMP, renal function. Monitor I/O. Avoid nephrotoxicity. Polyuria  - Patient had 4.2L urine/24h.   - Patient received 7L fluid/24h  -Urine osmolarity 324, serum osmolarity 317. Urine sodium 101, chloride 84, creatinine 26  -Likely recover phase of ATN  - Continue monitor.       Hx HFpEF, EF 65%  -Echo done in 2017 showing 1 diastolic dysfunction with normal left ventricular size and systolic function EF 13%. -Home medication: Coreg 25 mg, olmesartan 10 mg, Losartan 10 mg,.  -Repeat ECHO left ventricular internal dimensions were normal in diastole and systole. EF 65%. Moderate left ventricular concentric hypertrophy noted. No regional wall motion abnormalities seen. - Currently hold off Coreg due to bradycardia. - Will continue monitor.      DMT2  - Hold home metfomrin.   -  overnight.  - Increase Lantus to 20 units. HDSS -> Tolerating well  - Continue monitor BG q6h    Constipation  -On physical exam abdomen distended. Patient has not had any bowel movement for several days.  -Patient is on fentanyl  -Lactulose 20 mg daily and senna daily   - KUB: nonobstructive 11/2   - Monitor    Hx of HTN  - /88 today  - home medication: Losartan/ novasc; resume norvasc  - Add hydralazine PRN for BP >170  - Continue monitor vitals.     HLD  - Continue home Atorvastatin. DVT ppx: heparin  GI ppx: Protonix    Dispo: Started versed gtt, d/c propofol. Weaning steroids. D/c Levaquin. Thiamine/Folate. Bumex 2mg x1, continue to [facility], PGY-1    Attending physician: Dr. Elba Dawkins      I personally saw, examined and provided care for the patient. Radiographs, labs and medication list were reviewed by me independently. I spoke with bedside nursing, therapists and consultants. Critical care services and times documented are independent of procedures and multidisciplinary rounds with Residents. Additionally comprehensive, multidisciplinary rounds were conducted with the MICU team. The case was discussed in detail and plans for care were established. Review of Residents documentation was conducted and revisions were made as appropriate. I agree with the above documented exam, problem list and plan of care.     Abx   Diuresis as tolerated   Titrate pressors     Dayanna Beckman M.D. Pulmonary/Critical Care Medicine   45 min cct excluding procedures

## 2020-11-03 NOTE — FLOWSHEET NOTE
This note also relates to the following rows which could not be included:  Pulse - Cannot attach notes to unvalidated device data  Resp - Cannot attach notes to unvalidated device data    Patient's two  Point restraints continued as patient reaches for ettube. Two  Point ankle restraints discontinued at this time.

## 2020-11-03 NOTE — FLOWSHEET NOTE
Pt intubated and sedated. Continues to pull at lines and tubes and kicks legs out of bed upon agitation. Verbal re-direction unsuccessful at this time. Bilateral soft wrist restraints and bilateral soft ankle restraints continued for patient safety. Will continue to monitor.

## 2020-11-03 NOTE — PROGRESS NOTES
intubated      Allergies:  Bee venom and Shellfish-derived products    Current Medications:    magnesium sulfate 2 g in 50 mL IVPB premix, Once  polyvinyl alcohol (LIQUIFILM TEARS) 1.4 % ophthalmic solution 1 drop, Q4H PRN  sennosides-docusate sodium (SENOKOT-S) 8.6-50 MG tablet 2 tablet, Daily  norepinephrine (LEVOPHED) 16 mg in dextrose 5% 250 mL infusion, Continuous  propofol injection, Titrated  ampicillin-sulbactam (UNASYN) 3 g ivpb minibag, Q8H  levoFLOXacin (LEVAQUIN) 750 MG/150ML infusion 750 mg, Q48H  hydrocortisone sodium succinate PF (SOLU-CORTEF) injection 100 mg, Q8H  insulin glargine (LANTUS) injection vial 20 Units, Nightly  midazolam (VERSED) injection 2 mg, Q2H PRN  sodium chloride (Inhalant) 3 % nebulizer solution 2 mL, Q4H  insulin lispro (HUMALOG) injection vial 0-18 Units, Q4H  hydrALAZINE (APRESOLINE) injection 10 mg, Q4H PRN  heparin (porcine) injection 7,500 Units, Q8H  pantoprazole (PROTONIX) injection 40 mg, Daily    And  sodium chloride (PF) 0.9 % injection 10 mL, Daily  fentaNYL 5 mcg/ml in 0.9%  ml infusion, Continuous  sodium chloride flush 0.9 % injection 10 mL, 2 times per day  sodium chloride flush 0.9 % injection 10 mL, PRN  acetaminophen (TYLENOL) tablet 650 mg, Q6H PRN    Or  acetaminophen (TYLENOL) suppository 650 mg, Q6H PRN  Arformoterol Tartrate (BROVANA) nebulizer solution 15 mcg, BID  polyethylene glycol (GLYCOLAX) packet 17 g, Daily PRN  perflutren lipid microspheres (DEFINITY) injection 1.65 mg, ONCE PRN  chlorhexidine (PERIDEX) 0.12 % solution 15 mL, BID  amLODIPine (NORVASC) tablet 10 mg, Daily  aspirin chewable tablet 81 mg, Daily  [Held by provider] atorvastatin (LIPITOR) tablet 40 mg, Daily  [Held by provider] carvedilol (COREG) tablet 12.5 mg, BID  colchicine (COLCRYS) tablet 0.6 mg, BID PRN  levothyroxine (SYNTHROID) tablet 50 mcg, Daily  [Held by provider] losartan (COZAAR) tablet 100 mg, Daily  ondansetron (ZOFRAN-ODT) disintegrating tablet 4 mg, Q8H PRN  promethazine (PHENERGAN) tablet 12.5 mg, Q6H PRN    Or  ondansetron (ZOFRAN) injection 4 mg, Q6H PRN  budesonide (PULMICORT) nebulizer suspension 500 mcg, BID  ipratropium-albuterol (DUONEB) nebulizer solution 1 ampule, Q4H WA  glucose (GLUTOSE) 40 % oral gel 15 g, PRN  dextrose 50 % IV solution, PRN  glucagon (rDNA) injection 1 mg, PRN  dextrose 5 % solution, PRN        Review of Systems:   Review of systems not obtained due to patient factors. Physical exam:  Vitals:    11/03/20 0827   BP:    Pulse:    Resp: 14   Temp:    SpO2: 92%           General: Intubated sedated  Eyes: PERRL. No sclera icterus. No conjunctival injection. ENT: No discharge. Pharynx clear. Neck: Trachea midline. Normal thyroid. Lungs: Coarse breath sounds  CV: Regular rate. Regular rhythm. No murmur or rub. .   Abd:  Non-distended. No masses. No organmegaly. Normal bowel sounds. Skin: Warm and dry. No nodule on exposed extremities. No rash on exposed extremities.   Ext: No cyanosis, clubbing, edema; 2+ pitting bilateral lower extremity edema L>R  Neuro: sedate lightly, but arouses and is agitated      Data:   Labs:  Lab Results   Component Value Date     11/03/2020     11/02/2020     11/02/2020    K 3.8 11/03/2020    K 4.7 11/02/2020    K 4.6 11/02/2020    CL 97 (L) 11/03/2020    CO2 21 (L) 11/03/2020    CO2 22 11/02/2020    CO2 20 (L) 11/02/2020    CREATININE 2.8 (H) 11/03/2020    CREATININE 3.3 (H) 11/02/2020    CREATININE 3.5 (H) 11/02/2020    BUN 54 (H) 11/03/2020    BUN 55 (H) 11/02/2020    BUN 50 (H) 11/02/2020    GLUCOSE 175 (H) 11/03/2020    GLUCOSE 227 (H) 11/02/2020    GLUCOSE 204 (H) 11/02/2020    PHOS 6.9 (H) 11/03/2020    PHOS 6.3 (H) 11/02/2020    PHOS 4.0 11/01/2020    WBC 9.5 11/03/2020    WBC 7.8 11/02/2020    WBC 6.7 11/01/2020    HGB 12.9 11/03/2020    HGB 13.1 11/02/2020    HGB 14.1 11/01/2020    HCT 38.9 11/03/2020    HCT 39.3 11/02/2020    HCT 41.6 11/01/2020    MCV 78.1 (L) 11/03/2020    PLT 260 11/03/2020         Imaging:  Xr Chest Portable    Result Date: 10/27/2020  EXAMINATION: ONE XRAY VIEW OF THE CHEST 10/27/2020 7:58 pm COMPARISON: October 26, 2020 HISTORY: ORDERING SYSTEM PROVIDED HISTORY: SOB TECHNOLOGIST PROVIDED HISTORY: Reason for exam:->SOB What reading provider will be dictating this exam?->CRC FINDINGS: There is mild cardiomegaly. There is patchy perihilar and bibasilar atelectasis/infiltrates. Tiny pleural effusions are suspected. Progressive bibasilar atelectasis/infiltrates concerning for pneumonia. Underlying CHF is considered. Xr Chest Portable    Result Date: 10/26/2020  EXAMINATION: ONE XRAY VIEW OF THE CHEST 10/26/2020 4:11 pm COMPARISON: 05/26/2019 HISTORY: ORDERING SYSTEM PROVIDED HISTORY: SOB TECHNOLOGIST PROVIDED HISTORY: Reason for exam:->SOB What reading provider will be dictating this exam?->CRC FINDINGS: Cardiac size appears stable. Discoid airspace disease seen at the left lung base which may represent atelectasis or scarring. Right infrahilar and basilar infiltrate is identified. No pneumothorax is identified. No acute osseous abnormality is identified. Right infrahilar and basilar infiltrate concerning for pneumonia. Left basilar atelectasis or scarring. Assessment/Plans  1. Acute kidney injury  hemodynamically mediated due to the combination of bradycardia,  diuretic and ARB use; this is exacerbated by IV contrast use   polyuric, suspect due to recovery of NANCY or solute mediated  urine osmolality >300 with very high osmolar excretion rate  Dose bumex 11/2  Dose again today    2. Acute hypoxic respiratory failure  due to multilobar pneumonia  gradual wean  vanc level high, need to adjust with rising cr    3.  Type 2 diabetes mellitus  increasing blood sugars, most likely steroid induced    4. etoh abuse            Jeimy Bullock MD  10:13 AM  11/3/2020

## 2020-11-04 ENCOUNTER — APPOINTMENT (OUTPATIENT)
Dept: GENERAL RADIOLOGY | Age: 60
DRG: 003 | End: 2020-11-04
Payer: MEDICARE

## 2020-11-04 LAB
AADO2: 409.5 MMHG
ALBUMIN SERPL-MCNC: 3 G/DL (ref 3.5–5.2)
ALP BLD-CCNC: 102 U/L (ref 40–129)
ALT SERPL-CCNC: 193 U/L (ref 0–40)
ANION GAP SERPL CALCULATED.3IONS-SCNC: 11 MMOL/L (ref 7–16)
ANION GAP SERPL CALCULATED.3IONS-SCNC: 17 MMOL/L (ref 7–16)
ANISOCYTOSIS: ABNORMAL
AST SERPL-CCNC: 83 U/L (ref 0–39)
B.E.: -4.1 MMOL/L (ref -3–3)
BASOPHILS ABSOLUTE: 0 E9/L (ref 0–0.2)
BASOPHILS RELATIVE PERCENT: 0.1 % (ref 0–2)
BILIRUB SERPL-MCNC: 0.5 MG/DL (ref 0–1.2)
BUN BLDV-MCNC: 49 MG/DL (ref 8–23)
BUN BLDV-MCNC: 50 MG/DL (ref 8–23)
CALCIUM IONIZED: 1 MMOL/L (ref 1.15–1.33)
CALCIUM SERPL-MCNC: 7.3 MG/DL (ref 8.6–10.2)
CALCIUM SERPL-MCNC: 7.5 MG/DL (ref 8.6–10.2)
CHLORIDE BLD-SCNC: 104 MMOL/L (ref 98–107)
CHLORIDE BLD-SCNC: 105 MMOL/L (ref 98–107)
CO2: 22 MMOL/L (ref 22–29)
CO2: 27 MMOL/L (ref 22–29)
COHB: 0.9 % (ref 0–1.5)
CREAT SERPL-MCNC: 2.4 MG/DL (ref 0.7–1.2)
CREAT SERPL-MCNC: 2.5 MG/DL (ref 0.7–1.2)
CRITICAL: ABNORMAL
DATE ANALYZED: ABNORMAL
DATE OF COLLECTION: ABNORMAL
EOSINOPHILS ABSOLUTE: 0 E9/L (ref 0.05–0.5)
EOSINOPHILS RELATIVE PERCENT: 2 % (ref 0–6)
FIO2: 80 %
GFR AFRICAN AMERICAN: 32
GFR AFRICAN AMERICAN: 34
GFR NON-AFRICAN AMERICAN: 32 ML/MIN/1.73
GFR NON-AFRICAN AMERICAN: 34 ML/MIN/1.73
GLUCOSE BLD-MCNC: 122 MG/DL (ref 74–99)
GLUCOSE BLD-MCNC: 163 MG/DL (ref 74–99)
HCO3: 21.5 MMOL/L (ref 22–26)
HCT VFR BLD CALC: 33.4 % (ref 37–54)
HEMOGLOBIN: 11.3 G/DL (ref 12.5–16.5)
HHB: 3.2 % (ref 0–5)
LAB: ABNORMAL
LYMPHOCYTES ABSOLUTE: 0.79 E9/L (ref 1.5–4)
LYMPHOCYTES RELATIVE PERCENT: 10.4 % (ref 20–42)
Lab: ABNORMAL
MAGNESIUM: 1.9 MG/DL (ref 1.6–2.6)
MAGNESIUM: 2.2 MG/DL (ref 1.6–2.6)
MCH RBC QN AUTO: 26.3 PG (ref 26–35)
MCHC RBC AUTO-ENTMCNC: 33.8 % (ref 32–34.5)
MCV RBC AUTO: 77.7 FL (ref 80–99.9)
METER GLUCOSE: 117 MG/DL (ref 74–99)
METER GLUCOSE: 118 MG/DL (ref 74–99)
METER GLUCOSE: 137 MG/DL (ref 74–99)
METER GLUCOSE: 139 MG/DL (ref 74–99)
METER GLUCOSE: 152 MG/DL (ref 74–99)
METER GLUCOSE: 173 MG/DL (ref 74–99)
METHB: 0.3 % (ref 0–1.5)
MODE: AC
MONOCYTES ABSOLUTE: 0.47 E9/L (ref 0.1–0.95)
MONOCYTES RELATIVE PERCENT: 6.1 % (ref 2–12)
NEUTROPHILS ABSOLUTE: 6.64 E9/L (ref 1.8–7.3)
NEUTROPHILS RELATIVE PERCENT: 83.5 % (ref 43–80)
O2 CONTENT: 16.7 ML/DL
O2 SATURATION: 96.8 % (ref 92–98.5)
O2HB: 95.6 % (ref 94–97)
OPERATOR ID: ABNORMAL
OVALOCYTES: ABNORMAL
PATIENT TEMP: 37 C
PCO2: 41.4 MMHG (ref 35–45)
PDW BLD-RTO: 21.2 FL (ref 11.5–15)
PEEP/CPAP: 9 CMH2O
PFO2: 1.22 MMHG/%
PH BLOOD GAS: 7.33 (ref 7.35–7.45)
PHOSPHORUS: 6 MG/DL (ref 2.5–4.5)
PLATELET # BLD: 272 E9/L (ref 130–450)
PMV BLD AUTO: 9.9 FL (ref 7–12)
PO2: 97.4 MMHG (ref 75–100)
POIKILOCYTES: ABNORMAL
POLYCHROMASIA: ABNORMAL
POTASSIUM SERPL-SCNC: 3.8 MMOL/L (ref 3.5–5)
POTASSIUM SERPL-SCNC: 3.9 MMOL/L (ref 3.5–5)
RBC # BLD: 4.3 E12/L (ref 3.8–5.8)
RI(T): 4.2
RR MECHANICAL: 20 B/MIN
SCHISTOCYTES: ABNORMAL
SODIUM BLD-SCNC: 143 MMOL/L (ref 132–146)
SODIUM BLD-SCNC: 143 MMOL/L (ref 132–146)
SOURCE, BLOOD GAS: ABNORMAL
TARGET CELLS: ABNORMAL
TEAR DROP CELLS: ABNORMAL
THB: 12.3 G/DL (ref 11.5–16.5)
TIME ANALYZED: 536
TOTAL PROTEIN: 6.3 G/DL (ref 6.4–8.3)
TRIGL SERPL-MCNC: 118 MG/DL (ref 0–149)
VT MECHANICAL: 450 ML
WBC # BLD: 7.9 E9/L (ref 4.5–11.5)

## 2020-11-04 PROCEDURE — 94640 AIRWAY INHALATION TREATMENT: CPT

## 2020-11-04 PROCEDURE — 80053 COMPREHEN METABOLIC PANEL: CPT

## 2020-11-04 PROCEDURE — 94003 VENT MGMT INPAT SUBQ DAY: CPT

## 2020-11-04 PROCEDURE — 6360000002 HC RX W HCPCS: Performed by: INTERNAL MEDICINE

## 2020-11-04 PROCEDURE — 6370000000 HC RX 637 (ALT 250 FOR IP): Performed by: INTERNAL MEDICINE

## 2020-11-04 PROCEDURE — C9113 INJ PANTOPRAZOLE SODIUM, VIA: HCPCS | Performed by: INTERNAL MEDICINE

## 2020-11-04 PROCEDURE — 6360000002 HC RX W HCPCS: Performed by: STUDENT IN AN ORGANIZED HEALTH CARE EDUCATION/TRAINING PROGRAM

## 2020-11-04 PROCEDURE — 84478 ASSAY OF TRIGLYCERIDES: CPT

## 2020-11-04 PROCEDURE — 36415 COLL VENOUS BLD VENIPUNCTURE: CPT

## 2020-11-04 PROCEDURE — 85025 COMPLETE CBC W/AUTO DIFF WBC: CPT

## 2020-11-04 PROCEDURE — 2500000003 HC RX 250 WO HCPCS: Performed by: INTERNAL MEDICINE

## 2020-11-04 PROCEDURE — 2000000000 HC ICU R&B

## 2020-11-04 PROCEDURE — 82330 ASSAY OF CALCIUM: CPT

## 2020-11-04 PROCEDURE — 2580000003 HC RX 258: Performed by: STUDENT IN AN ORGANIZED HEALTH CARE EDUCATION/TRAINING PROGRAM

## 2020-11-04 PROCEDURE — 2580000003 HC RX 258: Performed by: INTERNAL MEDICINE

## 2020-11-04 PROCEDURE — 80048 BASIC METABOLIC PNL TOTAL CA: CPT

## 2020-11-04 PROCEDURE — 6370000000 HC RX 637 (ALT 250 FOR IP): Performed by: STUDENT IN AN ORGANIZED HEALTH CARE EDUCATION/TRAINING PROGRAM

## 2020-11-04 PROCEDURE — 94644 CONT INHLJ TX 1ST HOUR: CPT

## 2020-11-04 PROCEDURE — 82805 BLOOD GASES W/O2 SATURATION: CPT

## 2020-11-04 PROCEDURE — 82962 GLUCOSE BLOOD TEST: CPT

## 2020-11-04 PROCEDURE — 94669 MECHANICAL CHEST WALL OSCILL: CPT

## 2020-11-04 PROCEDURE — 37799 UNLISTED PX VASCULAR SURGERY: CPT

## 2020-11-04 PROCEDURE — 83735 ASSAY OF MAGNESIUM: CPT

## 2020-11-04 PROCEDURE — 84100 ASSAY OF PHOSPHORUS: CPT

## 2020-11-04 PROCEDURE — 71045 X-RAY EXAM CHEST 1 VIEW: CPT

## 2020-11-04 RX ORDER — QUETIAPINE FUMARATE 25 MG/1
25 TABLET, FILM COATED ORAL 2 TIMES DAILY
Status: DISCONTINUED | OUTPATIENT
Start: 2020-11-04 | End: 2020-11-05

## 2020-11-04 RX ORDER — MAGNESIUM SULFATE IN WATER 40 MG/ML
2 INJECTION, SOLUTION INTRAVENOUS ONCE
Status: COMPLETED | OUTPATIENT
Start: 2020-11-04 | End: 2020-11-04

## 2020-11-04 RX ORDER — SENNA AND DOCUSATE SODIUM 50; 8.6 MG/1; MG/1
2 TABLET, FILM COATED ORAL DAILY PRN
Status: DISCONTINUED | OUTPATIENT
Start: 2020-11-04 | End: 2020-11-09

## 2020-11-04 RX ORDER — BUMETANIDE 0.25 MG/ML
2 INJECTION, SOLUTION INTRAMUSCULAR; INTRAVENOUS EVERY 8 HOURS
Status: COMPLETED | OUTPATIENT
Start: 2020-11-04 | End: 2020-11-05

## 2020-11-04 RX ADMIN — SODIUM CHLORIDE 3 G: 900 INJECTION INTRAVENOUS at 03:43

## 2020-11-04 RX ADMIN — BUDESONIDE 500 MCG: 0.5 SUSPENSION RESPIRATORY (INHALATION) at 09:47

## 2020-11-04 RX ADMIN — SODIUM CHLORIDE 3 G: 900 INJECTION INTRAVENOUS at 10:47

## 2020-11-04 RX ADMIN — IPRATROPIUM BROMIDE AND ALBUTEROL SULFATE 1 AMPULE: 2.5; .5 SOLUTION RESPIRATORY (INHALATION) at 13:55

## 2020-11-04 RX ADMIN — INSULIN LISPRO 3 UNITS: 100 INJECTION, SOLUTION INTRAVENOUS; SUBCUTANEOUS at 13:50

## 2020-11-04 RX ADMIN — CHLORHEXIDINE GLUCONATE 0.12% ORAL RINSE 15 ML: 1.2 LIQUID ORAL at 08:32

## 2020-11-04 RX ADMIN — BUDESONIDE 500 MCG: 0.5 SUSPENSION RESPIRATORY (INHALATION) at 20:28

## 2020-11-04 RX ADMIN — AMLODIPINE BESYLATE 10 MG: 10 TABLET ORAL at 08:32

## 2020-11-04 RX ADMIN — THIAMINE HYDROCHLORIDE 100 MG: 100 INJECTION, SOLUTION INTRAMUSCULAR; INTRAVENOUS at 14:02

## 2020-11-04 RX ADMIN — MAGNESIUM SULFATE HEPTAHYDRATE 2 G: 40 INJECTION, SOLUTION INTRAVENOUS at 08:31

## 2020-11-04 RX ADMIN — BUMETANIDE 2 MG: 0.25 INJECTION INTRAMUSCULAR; INTRAVENOUS at 20:15

## 2020-11-04 RX ADMIN — HEPARIN SODIUM 7500 UNITS: 10000 INJECTION INTRAVENOUS; SUBCUTANEOUS at 01:04

## 2020-11-04 RX ADMIN — IPRATROPIUM BROMIDE AND ALBUTEROL SULFATE 1 AMPULE: 2.5; .5 SOLUTION RESPIRATORY (INHALATION) at 09:47

## 2020-11-04 RX ADMIN — SODIUM CHLORIDE SOLN NEBU 3% 2 ML: 3 NEBU SOLN at 04:08

## 2020-11-04 RX ADMIN — PANTOPRAZOLE SODIUM 40 MG: 40 INJECTION, POWDER, FOR SOLUTION INTRAVENOUS at 08:31

## 2020-11-04 RX ADMIN — FOLIC ACID 1 MG: 5 INJECTION, SOLUTION INTRAMUSCULAR; INTRAVENOUS; SUBCUTANEOUS at 13:47

## 2020-11-04 RX ADMIN — SODIUM CHLORIDE SOLN NEBU 3% 2 ML: 3 NEBU SOLN at 00:33

## 2020-11-04 RX ADMIN — QUETIAPINE FUMARATE 25 MG: 25 TABLET ORAL at 16:14

## 2020-11-04 RX ADMIN — Medication 200 MCG/HR: at 03:28

## 2020-11-04 RX ADMIN — SODIUM CHLORIDE SOLN NEBU 3% 2 ML: 3 NEBU SOLN at 17:19

## 2020-11-04 RX ADMIN — CALCIUM GLUCONATE 3 G: 98 INJECTION, SOLUTION INTRAVENOUS at 10:51

## 2020-11-04 RX ADMIN — LEVOTHYROXINE SODIUM 50 MCG: 0.05 TABLET ORAL at 06:08

## 2020-11-04 RX ADMIN — HEPARIN SODIUM 7500 UNITS: 10000 INJECTION INTRAVENOUS; SUBCUTANEOUS at 08:31

## 2020-11-04 RX ADMIN — Medication 200 MCG/HR: at 09:39

## 2020-11-04 RX ADMIN — IPRATROPIUM BROMIDE AND ALBUTEROL SULFATE 1 AMPULE: 2.5; .5 SOLUTION RESPIRATORY (INHALATION) at 20:48

## 2020-11-04 RX ADMIN — HYDROCORTISONE SODIUM SUCCINATE 50 MG: 100 INJECTION, POWDER, FOR SOLUTION INTRAMUSCULAR; INTRAVENOUS at 01:03

## 2020-11-04 RX ADMIN — ASPIRIN 81 MG CHEWABLE TABLET 81 MG: 81 TABLET CHEWABLE at 08:31

## 2020-11-04 RX ADMIN — ARFORMOTEROL TARTRATE 15 MCG: 15 SOLUTION RESPIRATORY (INHALATION) at 09:47

## 2020-11-04 RX ADMIN — SODIUM CHLORIDE SOLN NEBU 3% 2 ML: 3 NEBU SOLN at 09:48

## 2020-11-04 RX ADMIN — SODIUM CHLORIDE 3 G: 900 INJECTION INTRAVENOUS at 22:32

## 2020-11-04 RX ADMIN — CHLORHEXIDINE GLUCONATE 0.12% ORAL RINSE 15 ML: 1.2 LIQUID ORAL at 20:20

## 2020-11-04 RX ADMIN — HYDROCORTISONE SODIUM SUCCINATE 50 MG: 100 INJECTION, POWDER, FOR SOLUTION INTRAMUSCULAR; INTRAVENOUS at 20:31

## 2020-11-04 RX ADMIN — BUMETANIDE 2 MG: 0.25 INJECTION INTRAMUSCULAR; INTRAVENOUS at 11:54

## 2020-11-04 RX ADMIN — HYDROCORTISONE SODIUM SUCCINATE 50 MG: 100 INJECTION, POWDER, FOR SOLUTION INTRAMUSCULAR; INTRAVENOUS at 09:19

## 2020-11-04 RX ADMIN — HEPARIN SODIUM 7500 UNITS: 10000 INJECTION INTRAVENOUS; SUBCUTANEOUS at 16:15

## 2020-11-04 RX ADMIN — MIDAZOLAM HYDROCHLORIDE 10 MG/HR: 5 INJECTION, SOLUTION INTRAMUSCULAR; INTRAVENOUS at 16:15

## 2020-11-04 RX ADMIN — Medication 200 MCG/HR: at 22:32

## 2020-11-04 RX ADMIN — ARFORMOTEROL TARTRATE 15 MCG: 15 SOLUTION RESPIRATORY (INHALATION) at 20:28

## 2020-11-04 RX ADMIN — INSULIN GLARGINE 20 UNITS: 100 INJECTION, SOLUTION SUBCUTANEOUS at 20:18

## 2020-11-04 RX ADMIN — INSULIN LISPRO 3 UNITS: 100 INJECTION, SOLUTION INTRAVENOUS; SUBCUTANEOUS at 06:01

## 2020-11-04 RX ADMIN — MIDAZOLAM HYDROCHLORIDE 8 MG/HR: 5 INJECTION, SOLUTION INTRAMUSCULAR; INTRAVENOUS at 07:02

## 2020-11-04 RX ADMIN — POTASSIUM BICARBONATE 20 MEQ: 782 TABLET, EFFERVESCENT ORAL at 08:31

## 2020-11-04 RX ADMIN — SODIUM CHLORIDE SOLN NEBU 3% 2 ML: 3 NEBU SOLN at 20:50

## 2020-11-04 RX ADMIN — Medication 10 ML: at 08:37

## 2020-11-04 RX ADMIN — QUETIAPINE FUMARATE 25 MG: 25 TABLET ORAL at 20:20

## 2020-11-04 RX ADMIN — SODIUM CHLORIDE, PRESERVATIVE FREE 10 ML: 5 INJECTION INTRAVENOUS at 08:31

## 2020-11-04 RX ADMIN — IPRATROPIUM BROMIDE AND ALBUTEROL SULFATE 1 AMPULE: 2.5; .5 SOLUTION RESPIRATORY (INHALATION) at 17:19

## 2020-11-04 RX ADMIN — Medication 10 ML: at 20:16

## 2020-11-04 RX ADMIN — Medication 200 MCG/HR: at 16:15

## 2020-11-04 RX ADMIN — SODIUM CHLORIDE SOLN NEBU 3% 2 ML: 3 NEBU SOLN at 13:55

## 2020-11-04 RX ADMIN — SODIUM CHLORIDE 3 G: 900 INJECTION INTRAVENOUS at 17:30

## 2020-11-04 ASSESSMENT — PULMONARY FUNCTION TESTS
PIF_VALUE: 42
PIF_VALUE: 38
PIF_VALUE: 37
PIF_VALUE: 36
PIF_VALUE: 55
PIF_VALUE: 45
PIF_VALUE: 36
PIF_VALUE: 51
PIF_VALUE: 35
PIF_VALUE: 44
PIF_VALUE: 39
PIF_VALUE: 31
PIF_VALUE: 38
PIF_VALUE: 39
PIF_VALUE: 36
PIF_VALUE: 37
PIF_VALUE: 36
PIF_VALUE: 45
PIF_VALUE: 33
PIF_VALUE: 40
PIF_VALUE: 43
PIF_VALUE: 38
PIF_VALUE: 43
PIF_VALUE: 43
PIF_VALUE: 38
PIF_VALUE: 44
PIF_VALUE: 34
PIF_VALUE: 36
PIF_VALUE: 38
PIF_VALUE: 40
PIF_VALUE: 44
PIF_VALUE: 35

## 2020-11-04 ASSESSMENT — PAIN SCALES - GENERAL
PAINLEVEL_OUTOF10: 0

## 2020-11-04 NOTE — PROGRESS NOTES
Chief Complaint   Patient presents with    Shortness of Breath     for a few weeks. O2 sat in 70s at [facility], placed on 4L and now 95%. SUBJECTIVE:  Patient is tolerating medications. No reported adverse drug reactions. Patient remained afebrile overnight. Continue to be tachycardic and hypertensive. Patient is intubated, (PEEP-9, FiO2-80, paO2 97.4) sedated on propofol and fentanyl. Off of Levophed at this time. WBC 7.9, on unasyn, CXR -worsening b/l infiltrates with a possible pleural effusion. FiO2 demand LESS  than 11/3 but appears to overall positive 7 L. Fecal management system in place. Review of systems:  As stated above in the chief complaint, otherwise negative.     Medications:  Scheduled Meds:   calcium gluconate IVPB  3 g Intravenous Once    ampicillin-sulbactam  3 g Intravenous Q6H    hydrocortisone sodium succinate PF  50 mg Intravenous Q29X    folic acid  1 mg Intravenous Daily    thiamine (VITAMIN B1) IVPB  100 mg Intravenous Q24H    [Held by provider] carvedilol  3.125 mg Oral BID     sennosides-docusate sodium  2 tablet Oral Daily    insulin glargine  20 Units Subcutaneous Nightly    sodium chloride (Inhalant)  2 mL Nebulization Q4H    insulin lispro  0-18 Units Subcutaneous Q4H    heparin (porcine)  7,500 Units Subcutaneous Q8H    pantoprazole  40 mg Intravenous Daily    And    sodium chloride (PF)  10 mL Intravenous Daily    sodium chloride flush  10 mL Intravenous 2 times per day    Arformoterol Tartrate  15 mcg Nebulization BID    chlorhexidine  15 mL Mouth/Throat BID    amLODIPine  10 mg Oral Daily    aspirin  81 mg Oral Daily    [Held by provider] atorvastatin  40 mg Oral Daily    levothyroxine  50 mcg Oral Daily    [Held by provider] losartan  100 mg Oral Daily    budesonide  0.5 mg Nebulization BID    ipratropium-albuterol  1 ampule Inhalation Q4H WA     Continuous Infusions:   midazolam 8 mg/hr (11/04/20 0702)    fentaNYL 5 mcg/ml in 0.9%  ml infusion 200 mcg/hr (20 0939)    dextrose       PRN Meds:polyvinyl alcohol, midazolam, hydrALAZINE, sodium chloride flush, acetaminophen **OR** acetaminophen, polyethylene glycol, perflutren lipid microspheres, colchicine, ondansetron, promethazine **OR** ondansetron, glucose, dextrose, glucagon (rDNA), dextrose    OBJECTIVE:  BP (!) 129/57   Pulse 66   Temp 97.6 °F (36.4 °C) (Axillary)   Resp 20   Ht 5' 8\" (1.727 m)   Wt (!) 312 lb 14.4 oz (141.9 kg)   SpO2 95%   BMI 47.58 kg/m²   Temp  Av.7 °F (37.1 °C)  Min: 97.6 °F (36.4 °C)  Max: 99.1 °F (37.3 °C)  Constitutional: Intubated and Sedated, wakes up to stimulation    Skin: Warm and dry. No rashes were noted. HEENT: Round and reactive pupils. Moist mucous membranes. No ulcerations or thrush. Chest: Intubated. Symmetrical expansion. No wheezing, crackles or rhonchi. Cardiovascular: S1 and S2 are rhythmic and regular. Systolic murmurs appreciated.    Abdomen: Hyperactive bowel sounds, obese abdomen, unable to palpate any masses   Extremities: No clubbing, no cyanosis, + Lymphedema left lower extremity   Lines: CVC Right IJ 10/30, Right radial Lindsey     Laboratory and Tests Review:  Lab Results   Component Value Date    WBC 7.9 2020    WBC 9.5 2020    WBC 7.8 2020    HGB 11.3 (L) 2020    HCT 33.4 (L) 2020    MCV 77.7 (L) 2020     2020     Lab Results   Component Value Date    NEUTROABS 6.64 2020    NEUTROABS 7.38 (H) 2020    NEUTROABS 4.21 2020     No results found for: [facility]  Lab Results   Component Value Date     (H) 2020    AST 83 (H) 2020    ALKPHOS 102 2020    BILITOT 0.5 2020     Lab Results   Component Value Date     2020    K 3.8 2020    K 4.0 10/27/2020     2020    CO2 22 2020    BUN 50 2020    CREATININE 2.4 2020    CREATININE 2.4 2020    CREATININE 2.6 2020

## 2020-11-04 NOTE — PROGRESS NOTES
[facility] Quality Flow/Interdisciplinary Rounds Progress Note        Quality Flow Rounds held on November 4, 2020    Disciplines Attending:  Bedside nurse, charge nurse, , PT/OT, pharmacy, nursing unit leadership, , Medical residents    Elbert Jiang was admitted on 10/26/2020  3:39 PM    Anticipated Discharge Date:       Disposition:    Romaine Score:  Romaine Scale Score: 12    Readmission Risk              Risk of Unplanned Readmission:        28           Discussed patient goal for the day, patient clinical progression, and barriers to discharge. The following Goal(s) of the Day/Commitment(s) have been identified:  Wean vent, continue icu care.        Tiago Talavera  November 4, 2020

## 2020-11-04 NOTE — PROGRESS NOTES
Pulmonary Progress Note  11/4/2020 1:52 PM  Subjective:   Admit Date: 10/26/2020  PCP: Courtney Winslow MD  Interval History: Patient sedated on fentanyl and propofol. Intubated on mechanical ventilation on ACVC with PEEP 9, FIO2 65%  Diet: DIET TUBE FEED CONTINUOUS/CYCLIC NPO; Diabetic 1.5; Orogastric; Continuous; 10; 45; 23  Diet Tube Feed Modular: Protein Modular    Data:   Scheduled Meds:    ampicillin-sulbactam  3 g Intravenous Q6H    hydrocortisone sodium succinate PF  50 mg Intravenous Q12H    bumetanide  2 mg Intravenous Q8H    QUEtiapine  25 mg Oral BID    folic acid  1 mg Intravenous Daily    thiamine (VITAMIN B1) IVPB  100 mg Intravenous Q24H    [Held by provider] carvedilol  3.125 mg Oral BID WC    insulin glargine  20 Units Subcutaneous Nightly    sodium chloride (Inhalant)  2 mL Nebulization Q4H    insulin lispro  0-18 Units Subcutaneous Q4H    heparin (porcine)  7,500 Units Subcutaneous Q8H    pantoprazole  40 mg Intravenous Daily    And    sodium chloride (PF)  10 mL Intravenous Daily    sodium chloride flush  10 mL Intravenous 2 times per day    Arformoterol Tartrate  15 mcg Nebulization BID    chlorhexidine  15 mL Mouth/Throat BID    amLODIPine  10 mg Oral Daily    aspirin  81 mg Oral Daily    [Held by provider] atorvastatin  40 mg Oral Daily    levothyroxine  50 mcg Oral Daily    [Held by provider] losartan  100 mg Oral Daily    budesonide  0.5 mg Nebulization BID    ipratropium-albuterol  1 ampule Inhalation Q4H WA       Continuous Infusions:    midazolam 8 mg/hr (11/04/20 0702)    fentaNYL 5 mcg/ml in 0.9%  ml infusion 200 mcg/hr (11/04/20 0939)    dextrose         PRN Meds: sennosides-docusate sodium, polyvinyl alcohol, midazolam, hydrALAZINE, sodium chloride flush, acetaminophen **OR** acetaminophen, polyethylene glycol, perflutren lipid microspheres, colchicine, glucose, dextrose, glucagon (rDNA), dextrose    I/O last 3 completed shifts:   In: 3105.7 [I.V.:2250.7; NG/GT:755; IV Piggyback:100]  Out: 6175 [Urine:3160; RLTNB:407]    I/O this shift:  In: -   Out: 1150 [Urine:1150]      Intake/Output Summary (Last 24 hours) at 11/4/2020 1352  Last data filed at 11/4/2020 1346  Gross per 24 hour   Intake 3105.7 ml   Output 3633 ml   Net -527.3 ml       Patient Vitals for the past 96 hrs (Last 3 readings):   Weight   11/04/20 0600 (!) 312 lb 14.4 oz (141.9 kg)   11/03/20 0000 (!) 350 lb (158.8 kg)   11/02/20 0600 (!) 350 lb (158.8 kg)         Recent Labs     11/02/20  0530 11/03/20  0410 11/04/20  0510   WBC 7.8 9.5 7.9   HGB 13.1 12.9 11.3*   HCT 39.3 38.9 33.4*   MCV 78.1* 78.1* 77.7*    260 272     Recent Labs     11/02/20  0530  11/03/20  0410 11/03/20  1107 11/03/20  1806 11/04/20  0510      < > 135 139 143 143   K 4.1   < > 3.8 4.1 3.8 3.8      < > 97* 101 104 104   CO2 25   < > 21* 22 23 22   BUN 45*   < > 54* 51* 52* 50*   CREATININE 2.9*   < > 2.8* 2.6* 2.4* 2.4*   PHOS 6.3*  --  6.9*  --   --  6.0*    < > = values in this interval not displayed. Recent Labs     11/02/20  0530 11/03/20  0410 11/04/20  0510   PROT 6.0* 6.7 6.3*   ALKPHOS 128 132* 102   * 257* 193*   * 142* 83*   BILITOT 0.4 0.5 0.5   CPK:  Lab Results   Component Value Date    CKTOTAL 98 10/08/2017          -----------------------------------------------------------------  RAD:   Results for orders placed during the hospital encounter of 10/26/20   XR CHEST PORTABLE    Narrative EXAMINATION:  ONE XRAY VIEW OF THE CHEST    10/30/2020 2:17 am    COMPARISON:  10/29/2020 radiograph    HISTORY:  ORDERING SYSTEM PROVIDED HISTORY: R IJ placement  TECHNOLOGIST PROVIDED HISTORY:  Reason for exam:->R IJ placement  What reading provider will be dictating this exam?->CRC    FINDINGS:  Right IJ central venous catheter has been placed with tip at the distal SVC. No pneumothorax.   Worsening diffuse airspace opacification in the right lung  with moderate opacification also present at the left base. Endotracheal tube  and enteric tube stable. No significant skeletal finding. Impression Interval placement of right IJ central venous catheter. No pneumothorax. Worsening airspace changes in the right lung. Micro:  No results for input(s): BC in the last 72 hours. No results for input(s): ORG in the last 72 hours. No results for input(s): Cecilia Canter in the last 72 hours. No results for input(s): STREPPNEUMAG in the last 72 hours. No results for input(s): LEGUR in the last 72 hours. No results for input(s): ORG in the last 72 hours. No results for input(s): ASO in the last 72 hours. No results for input(s): CULTRESP in the last 72 hours. No results for input(s): LABURIN in the last 72 hours.   Vent Information  $Ventilation: $Subsequent Day  Skin Assessment: Clean, dry, & intact  Equipment ID: [id number]  Vent Type: [phone]  Vent Mode: AC/VC  Vt Ordered: 450 mL  Rate Set: 20 bmp  Peak Flow: 65 L/min  Pressure Support: 0 cmH20  FiO2 : 65 %  SpO2: 91 %  SpO2/FiO2 ratio: 140  PaO2/FiO2 ratio: 146  Sensitivity: 3  PEEP/CPAP: 9  I Time/ I Time %: 0 s  Humidification Source: Heated wire  Humidification Temp: 37  Humidification Temp Measured: 37  Circuit Condensation: Drained  Mask Type: Full face mask  Mask Size: Large    Additional Respiratory  Assessments  Pulse: 79  Resp: 20  SpO2: 91 %  Position: Semi-Lockwood's  Humidification Source: Heated wire  Humidification Temp: 37  Circuit Condensation: Drained  Oral Care Completed?: Yes  Oral Care: Mouth swabbed, Mouth moisturizer, Mouth suctioned  Subglottic Suction Done?: Yes  Airway Type: ET  Airway Size: 8  Skin barrier applied: Yes    Objective:   Vitals:   Vitals:    20 1300   BP:    Pulse: 79   Resp: 20   Temp:    SpO2: 91%      TEMP:Current: Temp: 97.6 °F (36.4 °C)  Max: Temp  Av.6 °F (37 °C)  Min: 97.6 °F (36.4 °C)  Max: 99.1 °F (37.3 °C)    BP Range: Systolic (93QFU), EAQ:302 , Min:110 , ZDI:746     Diastolic (35PIX), Av, Min:47, Max:93      General appearance: Sedated obese on mechanical ventilation , appears stated age   In no acute distress  Skin: No rashes or lesions  HEENT: mucous membranes are moist oral endotracheal tube and OG tube  Neck: No JVD  Lungs: symmetrical expansion, coarse breath sounds to auscultation, no use of accessory muscles  Heart: S1S2 no murmurs,  Abdomen: soft, non tender, distended obese  Extremities: no peripheral edema  Neurologic: Sedated  Affect: Calm       Assessment:   Patient Active Problem List:    59-y.o M with history of DM, hypothyroid, COPD, HTN, HLD presented on 10/26 with shortness of breath for 1 week. Saturations were 70% at PCPs. Saturation 65% on room air in ER  Patient agitated for CT scan refused to lay flat  10/27 RRT refused BiPAP became combative. Patient transferred to ICU  10/28 intubated and sedated. CT chest showing dense multifocal airspace disease  10/30 Ultrasound lower extremities no DVT, ultrasound kidneys no hydronephrosis  10/31 bilateral infiltrates left effusion. On 50% +8 of PEEP. Chest x-ray showing right-sided improving with left base atelectasis. Covid negative x2    1. Acute hypoxemic respiratory failure on mechanical ventilation  2. Agitation on Seroquel Precedex fentanyl  3. MSSA pneumonia on doxycycline  4. HANS moderate ( AHI 17 on 18 requires BIPAP )  5. History of reactive airway disease  6. CT 3/14/2018 showing pulmonary nodule stable from 10/2017  7. Acute renal failure improving  8. Diabetes with elevated blood sugars  9. Obesity  10. EF 65% LVH  11. chronic lymphedema  12. Hypothyroid  13. H/o respiratory failure: 2017 pt was  transferred from [facility] for acute respiratory failure after lap cholecystectomy, lap umbilical hernia repair, and lap liver biopsy (fatty liver).  He had been extubated postop but had laryngospasm requiring reintubation, complicated by negative pressure flash pulmonary edema, and required tracheostomy 8/10/2017. Patient has staph aureus pneumonia and C. difficile colitis. Patient was then transferred to [facility] hospital where he was weaned off the ventilator and decannulated.       Plan:     · Wean sedation patient having some bradycardia  · On steroids  · May be able to change antibiotics as MSSA  · Wean vent as able  · Family updated at bedside  · [address] 20/450/65/9  · Sedation and vent per ICU team       Shelley Cruz MD, [facility]

## 2020-11-04 NOTE — PROGRESS NOTES
intubated  11/4: pt seen and examined in icu, no overnight events, intubated      Allergies:  Bee venom and Shellfish-derived products    Current Medications:    magnesium sulfate 2 g in 50 mL IVPB premix, Once  calcium gluconate 3 g in dextrose 5 % 100 mL IVPB, Once  folic acid injection 1 mg, Daily  thiamine (B-1) 100 mg in sodium chloride 0.9 % 100 mL IVPB, Q24H  hydrocortisone sodium succinate PF (SOLU-CORTEF) injection 50 mg, Q8H  [Held by provider] carvedilol (COREG) tablet 3.125 mg, BID WC  midazolam (VERSED) 100 mg in dextrose 5 % 100 mL infusion, Continuous  polyvinyl alcohol (LIQUIFILM TEARS) 1.4 % ophthalmic solution 1 drop, Q4H PRN  sennosides-docusate sodium (SENOKOT-S) 8.6-50 MG tablet 2 tablet, Daily  ampicillin-sulbactam (UNASYN) 3 g ivpb minibag, Q8H  insulin glargine (LANTUS) injection vial 20 Units, Nightly  midazolam (VERSED) injection 2 mg, Q2H PRN  sodium chloride (Inhalant) 3 % nebulizer solution 2 mL, Q4H  insulin lispro (HUMALOG) injection vial 0-18 Units, Q4H  hydrALAZINE (APRESOLINE) injection 10 mg, Q4H PRN  heparin (porcine) injection 7,500 Units, Q8H  pantoprazole (PROTONIX) injection 40 mg, Daily    And  sodium chloride (PF) 0.9 % injection 10 mL, Daily  fentaNYL 5 mcg/ml in 0.9%  ml infusion, Continuous  sodium chloride flush 0.9 % injection 10 mL, 2 times per day  sodium chloride flush 0.9 % injection 10 mL, PRN  acetaminophen (TYLENOL) tablet 650 mg, Q6H PRN    Or  acetaminophen (TYLENOL) suppository 650 mg, Q6H PRN  Arformoterol Tartrate (BROVANA) nebulizer solution 15 mcg, BID  polyethylene glycol (GLYCOLAX) packet 17 g, Daily PRN  perflutren lipid microspheres (DEFINITY) injection 1.65 mg, ONCE PRN  chlorhexidine (PERIDEX) 0.12 % solution 15 mL, BID  amLODIPine (NORVASC) tablet 10 mg, Daily  aspirin chewable tablet 81 mg, Daily  [Held by provider] atorvastatin (LIPITOR) tablet 40 mg, Daily  colchicine (COLCRYS) tablet 0.6 mg, BID PRN  levothyroxine (SYNTHROID) tablet 50 mcg, Daily  [Held by provider] losartan (COZAAR) tablet 100 mg, Daily  ondansetron (ZOFRAN-ODT) disintegrating tablet 4 mg, Q8H PRN  promethazine (PHENERGAN) tablet 12.5 mg, Q6H PRN    Or  ondansetron (ZOFRAN) injection 4 mg, Q6H PRN  budesonide (PULMICORT) nebulizer suspension 500 mcg, BID  ipratropium-albuterol (DUONEB) nebulizer solution 1 ampule, Q4H WA  glucose (GLUTOSE) 40 % oral gel 15 g, PRN  dextrose 50 % IV solution, PRN  glucagon (rDNA) injection 1 mg, PRN  dextrose 5 % solution, PRN        Review of Systems:   Review of systems not obtained due to patient factors. Physical exam:  Vitals:    11/04/20 0957   BP:    Pulse: 82   Resp: 20   Temp:    SpO2: 93%           General: Intubated sedated  Eyes: PERRL. No sclera icterus. No conjunctival injection. ENT: No discharge. Pharynx clear. Neck: Trachea midline. Normal thyroid. Lungs: Coarse breath sounds  CV: Regular rate. Regular rhythm. No murmur or rub. .   Abd:  Non-distended. No masses. No organmegaly. Normal bowel sounds. Skin: Warm and dry. No nodule on exposed extremities. No rash on exposed extremities.   Ext: No cyanosis, clubbing, edema; 2+ pitting bilateral lower extremity edema L>R  Neuro: sedate lightly, but arouses and is agitated      Data:   Labs:  Lab Results   Component Value Date     11/04/2020     11/03/2020     11/03/2020    K 3.8 11/04/2020    K 3.8 11/03/2020    K 4.1 11/03/2020     11/04/2020    CO2 22 11/04/2020    CO2 23 11/03/2020    CO2 22 11/03/2020    CREATININE 2.4 (H) 11/04/2020    CREATININE 2.4 (H) 11/03/2020    CREATININE 2.6 (H) 11/03/2020    BUN 50 (H) 11/04/2020    BUN 52 (H) 11/03/2020    BUN 51 (H) 11/03/2020    GLUCOSE 163 (H) 11/04/2020    GLUCOSE 151 (H) 11/03/2020    GLUCOSE 153 (H) 11/03/2020    PHOS 6.0 (H) 11/04/2020    PHOS 6.9 (H) 11/03/2020    PHOS 6.3 (H) 11/02/2020    WBC 7.9 11/04/2020    WBC 9.5 11/03/2020    WBC 7.8 11/02/2020    HGB 11.3 (L) 11/04/2020    HGB 12.9 11/03/2020    HGB 13.1 11/02/2020    HCT 33.4 (L) 11/04/2020    HCT 38.9 11/03/2020    HCT 39.3 11/02/2020    MCV 77.7 (L) 11/04/2020     11/04/2020         Imaging:  Xr Chest Portable    Result Date: 10/27/2020  EXAMINATION: ONE XRAY VIEW OF THE CHEST 10/27/2020 7:58 pm COMPARISON: October 26, 2020 HISTORY: ORDERING SYSTEM PROVIDED HISTORY: SOB TECHNOLOGIST PROVIDED HISTORY: Reason for exam:->SOB What reading provider will be dictating this exam?->CRC FINDINGS: There is mild cardiomegaly. There is patchy perihilar and bibasilar atelectasis/infiltrates. Tiny pleural effusions are suspected. Progressive bibasilar atelectasis/infiltrates concerning for pneumonia. Underlying CHF is considered. Xr Chest Portable    Result Date: 10/26/2020  EXAMINATION: ONE XRAY VIEW OF THE CHEST 10/26/2020 4:11 pm COMPARISON: 05/26/2019 HISTORY: ORDERING SYSTEM PROVIDED HISTORY: SOB TECHNOLOGIST PROVIDED HISTORY: Reason for exam:->SOB What reading provider will be dictating this exam?->CRC FINDINGS: Cardiac size appears stable. Discoid airspace disease seen at the left lung base which may represent atelectasis or scarring. Right infrahilar and basilar infiltrate is identified. No pneumothorax is identified. No acute osseous abnormality is identified. Right infrahilar and basilar infiltrate concerning for pneumonia. Left basilar atelectasis or scarring. Assessment/Plans  1. Acute kidney injury  Baseline 1.8 from 9/2019, 1.1 in 5/2019  hemodynamically mediated due to the combination of bradycardia,  diuretic and ARB use; this is exacerbated by IV contrast use   polyuric, suspect due to recovery of NANCY or solute mediated  urine osmolality >300 with very high osmolar excretion rate  Continue bumex, good response    2. Acute hypoxic respiratory failure  due to multilobar pneumonia  gradual wean  vanc level high, need to adjust with rising cr    3.  Type 2 diabetes mellitus  increasing blood sugars, most likely

## 2020-11-04 NOTE — PROGRESS NOTES
PT SEEN AND EXAMINED. intubated, in icu. Sedated. agitated overnight ; remains intubated  Opens eyes to command, withdraws to pain. GCS 7T. ~4.5 in, ~2.5 out past 24 hours. This AM patient became hypotensive into the 70's at which point propofol was discontinued and fentanyl decreased to 150. MAP responded appropriately with a rise to 61. BP labile     Chart reviewed. meds reviewed. D/w nursing + family as available. EXAM: IN GENERAL, NAD. AWAKE AND . ROS NEGx10 EXCEPT:   BP (!) 149/70   Pulse 62   Temp 97.7 °F (36.5 °C) (Axillary)   Resp 15   Ht 5' 8\" (1.727 m)   Wt (!) 312 lb 14.4 oz (141.9 kg)   SpO2 95%   BMI 47.58 kg/m²   GEN: A+O NAD. HEENT: NCAT. EOMI. JOE  NECK: NO JVD. TRACH MIDLINE. NO BRUITS. NO THYROMEGALY. LUNGS: CTA BL NO RALES, RHONCHI OR WHEEZES. GOOD EXCURSION. CV: Regular rate and rhythm, NO Murmurs, Rubs, Or gallops  ABD: Soft. Nontender. Normal bowel sounds. No organomegaly  EXT:No clubbing cyanosis or edema  Neuro: Alert and oriented x 3. No focal motor deficits. No sensory deficits. Reflexes appear intact.   Labs/data reviewedLABS: CBC with Differential:    Lab Results   Component Value Date    WBC 7.9 11/04/2020    RBC 4.30 11/04/2020    HGB 11.3 11/04/2020    HCT 33.4 11/04/2020     11/04/2020    MCV 77.7 11/04/2020    MCH 26.3 11/04/2020    MCHC 33.8 11/04/2020    RDW 21.2 11/04/2020    NRBC 4.3 11/02/2020    SEGSPCT 73 01/26/2014    LYMPHOPCT 10.4 11/04/2020    MONOPCT 6.1 11/04/2020    MYELOPCT 0.9 10/30/2020    BASOPCT 0.1 11/04/2020    MONOSABS 0.47 11/04/2020    LYMPHSABS 0.79 11/04/2020    EOSABS 0.00 11/04/2020    BASOSABS 0.00 11/04/2020     Platelets:    Lab Results   Component Value Date     11/04/2020     CMP:    Lab Results   Component Value Date     11/04/2020    K 3.8 11/04/2020    K 4.0 10/27/2020     11/04/2020    CO2 22 11/04/2020    BUN 50 11/04/2020    CREATININE 2.4 11/04/2020    GFRAA 34 11/04/2020    LABGLOM 34 11/04/2020    GLUCOSE 163 11/04/2020    PROT 6.3 11/04/2020    LABALBU 3.0 11/04/2020    CALCIUM 7.3 11/04/2020    BILITOT 0.5 11/04/2020    ALKPHOS 102 11/04/2020    AST 83 11/04/2020     11/04/2020     Magnesium:    Lab Results   Component Value Date    MG 1.9 11/04/2020     LDH:    Lab Results   Component Value Date     10/28/2020     PT/INR:    Lab Results   Component Value Date    PROTIME 10.9 07/21/2018    INR 0.9 07/21/2018     Last 3 Troponin:    Lab Results   Component Value Date    TROPONINI <0.01 10/26/2020    TROPONINI <0.01 05/26/2019    TROPONINI <0.01 05/26/2019     ABG:    Lab Results   Component Value Date    PH 7.333 11/04/2020    PCO2 41.4 11/04/2020    PO2 97.4 11/04/2020    HCO3 21.5 11/04/2020    BE -4.1 11/04/2020    O2SAT 96.8 11/04/2020     IRON:  No results found for: IRON  IMAGING    Xr Chest Portable    Result Date: 10/27/2020  EXAMINATION: ONE XRAY VIEW OF THE CHEST 10/27/2020 7:58 pm COMPARISON: October 26, 2020 HISTORY: ORDERING SYSTEM PROVIDED HISTORY: SOB TECHNOLOGIST PROVIDED HISTORY: Reason for exam:->SOB What reading provider will be dictating this exam?->CRC FINDINGS: There is mild cardiomegaly. There is patchy perihilar and bibasilar atelectasis/infiltrates. Tiny pleural effusions are suspected. Progressive bibasilar atelectasis/infiltrates concerning for pneumonia. Underlying CHF is considered. Xr Chest Portable    Result Date: 10/26/2020  EXAMINATION: ONE XRAY VIEW OF THE CHEST 10/26/2020 4:11 pm COMPARISON: 05/26/2019 HISTORY: ORDERING SYSTEM PROVIDED HISTORY: SOB TECHNOLOGIST PROVIDED HISTORY: Reason for exam:->SOB What reading provider will be dictating this exam?->CRC FINDINGS: Cardiac size appears stable. Discoid airspace disease seen at the left lung base which may represent atelectasis or scarring. Right infrahilar and basilar infiltrate is identified. No pneumothorax is identified. No acute osseous abnormality is identified.      Right infrahilar and basilar pain    Acute respiratory failure with hypoxia (HCC)    Acute pulmonary edema (HCC)    Congestive heart failure with LV diastolic dysfunction, NYHA class 3 (HCC)    Obstructive sleep apnea    Acquired hypothyroidism    Chronic diastolic heart failure (HCC)    Nonrheumatic aortic valve stenosis    ACE-inhibitor cough    Pneumonia    Acute gout of right knee   Acute encephalopathy 2/2 Acute hypoxic hypercapnic respiratory failure.         Acute hypoxic hypercapnic respiratory failure 2/2 MSSA pneumonia versus COPD versus ADHF versus chronic OHS  Pt has Hx chronic HANS not on CPAP at home. ABG showed pH 7.188/91.2/78.9/33 on RRT. currently intubated. - Continue to monitor respiratory status, wean down FiO2 as tolerated. - Pro  on presentation.   - US dup LE negative for DVT. CTA negative for PE. COVID -19 negative x2  - Continue breathing treatment  - Monitor daily CXR. CBC.   - Pulmonology consulted, further recommendations appreciated.     NANCY stage III likely pre renal 2/2 diuretic use, contrast agent vs? cardiorenal syndrome. -   - Nephrology consulted. Further recommendations appreciated. - Continue monitor daily BMP, renal function. Monitor I/O. Avoid nephrotoxicity.       · Bacterial pneumonia, likely aspiration pneumonia vs MSSA   · Shock, sepsis- resolved   · ATBx per id  Alcohol withdrawal .    Triple acid base disorder   Hx HFpEF, EF 65%cta noted  WILL NEED NIV FOR HOME- PROB BIPAP WITH BACKUP RATE  Wean per pulm/ccm

## 2020-11-04 NOTE — PROGRESS NOTES
[address] [facility]  Department of Internal Medicine   Internal Medicine Residency   MICU Progress Note    Patient:  Lazara Zaragoza 61 y.o. male  MRN: [id number]     Date of Service: 11/4/2020    Allergy: Bee venom and Shellfish-derived products    Subjective     Agitation remains overnight. GCS 10T. FMS placed    Objective     VS: BP (!) 129/57   Pulse 84   Temp 97.6 °F (36.4 °C) (Axillary)   Resp 17   Ht 5' 8\" (1.727 m)   Wt (!) 312 lb 14.4 oz (141.9 kg)   SpO2 93%   BMI 47.58 kg/m²   ABP (Arterial line BP): (!) 188/95  ABP mean (Arterial line mean): 132 mmHg    I & O - 24hr:     Intake/Output Summary (Last 24 hours) at 11/4/2020 1319  Last data filed at 11/4/2020 1157  Gross per 24 hour   Intake 3105.7 ml   Output 2883 ml   Net 222.7 ml       Physical Exam:  · General Appearance: intubated and sedated  · Neck: no adenopathy, no carotid bruit, no JVD, supple, symmetrical, trachea midline and thyroid not enlarged, symmetric, no tenderness/mass/nodules  · Lung: coarse breath sound bilaterally, no rales or wheezing  · Heart: bradycardic, normal S1 S2, no murmur or rub  · Abdomen: Stop, nontender, distended, no masses, no organomegaly  · Extremities:  Left lower leg 2+ non pitting edema. Right lower leg chronic lymphedema. · Musculoskeletal:  ROM normal in all joints of extremities.    · Neurologic: Mental status: Patient is sedated, open eyes, normal pupillary reflex    Lines     site day    Art line   R radial 2   TLC R IJ 5   PICC None    Hemoaccess None    Oxygen:     none  Mechanical Ventilation:   Mode: A/C    TV:500 ml RR: 20  PEEP 9cmH2O FiO2 80%    ABG:     Lab Results   Component Value Date    PH 7.333 11/04/2020    PCO2 41.4 11/04/2020    PO2 97.4 11/04/2020    HCO3 21.5 11/04/2020    BE -4.1 11/04/2020    THB 12.3 11/04/2020    O2SAT 96.8 11/04/2020        Medications     Infusions: (Fluid, Sedation, Vasopressors)  IVF:      Vasopressors   none  Sedation   Fentanyl at rate 200 wnl  - Monitor daily CBC, vitals, CXR.   - Infectious disease consulted and following  - Bume 2mg for three doses will consider bronchoscopy if airway does not clear    Acute encephalopathy 2/2 Acute hypoxic hypercapnic respiratory failure. Pt presented to the ED with hypoxia, RRT was called due to worsening respiratory acidosis. Currently intubated and sedated. - Today pt is still intubated and sedated. Patient is on Fentanyl, versed gtt. - Continue monitor mentation status, wean down sedation as tolerated. Acute hypoxic hypercapnic respiratory failure 2/2 CAP versus chronic OHS  Pt has Hx chronic HANS not on CPAP at home. ABG showed pH 7.188/91.2/78.9/33 on RRT. Patient is not tolerating BiPAP and currently intubated. - Today Pt saturating 95% on vent. ABG AC/VC/16/500/8/41%  - Continue to monitor respiratory status, wean down FiO2 as tolerated. Patient not able to tolerate PS today. - Pro  on presentation. ECHO showed Left ventricular internal dimensions were normal in diastole and systole. EF 65%. Moderate left ventricular concentric hypertrophy noted. No regional wall motion abnormalities seen. - US dup LE negative for DVT. CTA negative for PE. COVID -19 negative x2  - Continue breathing treatment. Add 3% nebulizer today. Decrease Solumedrol to 40 mg daily.  - Monitor daily CXR. CBC.   - Pulmonology consulted, further recommendations appreciated. - Solucortef started      NANCY stage III likely pre renal 2/2 diuretic use, contrast agent vs? cardiorenal syndrome, improving   - Creatinine 3.8 ( baseline 1.1) -> 4.0 - 3.7 -> 2.7 -> 1.7 -> 3.5  - I/O 4.6/1.9 ; +2.7L  - Will give another dose of Bumex 2 mg today per nephrology. - Further recommendations appreciated. - Continue monitor daily BMP, renal function. Monitor I/O. Avoid nephrotoxicity.   - Bumex 2mg for three doses repeat bmp 7pm     Polyuria  - Patient had 4.2L urine/24h.   - Patient received 7L fluid/24h  -Urine osmolarity 324, serum osmolarity 317. Urine sodium 101, chloride 84, creatinine 26  -Likely recover phase of ATN  - Continue monitor. Hx HFpEF, EF 65%  -Echo done in 2017 showing 1 diastolic dysfunction with normal left ventricular size and systolic function EF 46%. -Home medication: Coreg 25 mg, olmesartan 10 mg, Losartan 10 mg,.  -Repeat ECHO left ventricular internal dimensions were normal in diastole and systole. EF 65%. Moderate left ventricular concentric hypertrophy noted. No regional wall motion abnormalities seen. - Currently hold off Coreg due to bradycardia. - Will continue monitor.      DMT2  - Hold home metfomrin.   -  overnight.  - Increase Lantus to 20 units. HDSS -> Tolerating well  - Continue monitor BG q6h    Constipation  -On physical exam abdomen distended. Patient has not had any bowel movement for several days.  -Patient is on fentanyl  -Lactulose 20 mg daily and senna daily   - KUB: nonobstructive 11/2   - Monitor    Hx of HTN  - /88 today  - home medication: Losartan/ novasc; resume norvasc  - Add hydralazine PRN for BP >170  - Continue monitor vitals.     HLD  - Continue home Atorvastatin. DVT ppx: heparin  GI ppx: Protonix    Dispo: Start Seroquel 25 mg BID. Give Bumex 2mg for three doses and a repeat BMP around 7pm to help clear airway. If airway doesn't clear will consider bronchoscopy. Continue to wean vent settings. Olimpia Coates DO, PGY-1    Attending physician: Dr. Antonia Barrera      I personally saw, examined and provided care for the patient. Radiographs, labs and medication list were reviewed by me independently. I spoke with bedside nursing, therapists and consultants. Critical care services and times documented are independent of procedures and multidisciplinary rounds with Residents. Additionally comprehensive, multidisciplinary rounds were conducted with the MICU team. The case was discussed in detail and plans for care were established.  Review of Residents documentation was conducted and revisions were made as appropriate. I agree with the above documented exam, problem list and plan of care. Diuresis   Atypical antipsychotic to help with ICU delirium if QTc allows if QTc prohibitive add depakote iv   Abx     Divina Hall M.D.    Pulmonary/Critical Care Medicine   41 min cct excluding procedures

## 2020-11-04 NOTE — PLAN OF CARE
Problem: Restraint Use - Nonviolent/Non-Self-Destructive Behavior:  Goal: Absence of restraint-related injury  Description: Absence of restraint-related injury  11/3/2020 2344 by Waylon Malloy RN  Outcome: Met This Shift     Problem: Restraint Use - Nonviolent/Non-Self-Destructive Behavior:  Goal: Absence of restraint indications  Description: Absence of restraint indications  11/3/2020 2344 by Waylon Malloy RN  Outcome: Not Met This Shift

## 2020-11-05 ENCOUNTER — APPOINTMENT (OUTPATIENT)
Dept: GENERAL RADIOLOGY | Age: 60
DRG: 003 | End: 2020-11-05
Payer: MEDICARE

## 2020-11-05 LAB
AADO2: 313.6 MMHG
ALBUMIN SERPL-MCNC: 3.5 G/DL (ref 3.5–5.2)
ALP BLD-CCNC: 96 U/L (ref 40–129)
ALT SERPL-CCNC: 154 U/L (ref 0–40)
ANION GAP SERPL CALCULATED.3IONS-SCNC: 10 MMOL/L (ref 7–16)
ANION GAP SERPL CALCULATED.3IONS-SCNC: 15 MMOL/L (ref 7–16)
ANION GAP SERPL CALCULATED.3IONS-SCNC: 17 MMOL/L (ref 7–16)
ANISOCYTOSIS: ABNORMAL
AST SERPL-CCNC: 50 U/L (ref 0–39)
B.E.: 0.4 MMOL/L (ref -3–3)
BASOPHILS ABSOLUTE: 0 E9/L (ref 0–0.2)
BASOPHILS RELATIVE PERCENT: 0.1 % (ref 0–2)
BILIRUB SERPL-MCNC: 0.3 MG/DL (ref 0–1.2)
BUN BLDV-MCNC: 45 MG/DL (ref 8–23)
BUN BLDV-MCNC: 47 MG/DL (ref 8–23)
BUN BLDV-MCNC: 47 MG/DL (ref 8–23)
CALCIUM IONIZED: 1.04 MMOL/L (ref 1.15–1.33)
CALCIUM SERPL-MCNC: 7.3 MG/DL (ref 8.6–10.2)
CALCIUM SERPL-MCNC: 7.6 MG/DL (ref 8.6–10.2)
CALCIUM SERPL-MCNC: 7.7 MG/DL (ref 8.6–10.2)
CHLORIDE BLD-SCNC: 102 MMOL/L (ref 98–107)
CHLORIDE BLD-SCNC: 105 MMOL/L (ref 98–107)
CHLORIDE BLD-SCNC: 105 MMOL/L (ref 98–107)
CO2: 24 MMOL/L (ref 22–29)
CO2: 24 MMOL/L (ref 22–29)
CO2: 29 MMOL/L (ref 22–29)
COHB: 0.6 % (ref 0–1.5)
CREAT SERPL-MCNC: 2.3 MG/DL (ref 0.7–1.2)
CREAT SERPL-MCNC: 2.4 MG/DL (ref 0.7–1.2)
CREAT SERPL-MCNC: 2.5 MG/DL (ref 0.7–1.2)
CRITICAL: NORMAL
DATE ANALYZED: NORMAL
DATE OF COLLECTION: NORMAL
EOSINOPHILS ABSOLUTE: 0.17 E9/L (ref 0.05–0.5)
EOSINOPHILS RELATIVE PERCENT: 2.6 % (ref 0–6)
FERRITIN: 335 NG/ML
FIO2: 65 %
FOLATE: >20 NG/ML (ref 4.8–24.2)
GFR AFRICAN AMERICAN: 32
GFR AFRICAN AMERICAN: 34
GFR AFRICAN AMERICAN: 35
GFR NON-AFRICAN AMERICAN: 32 ML/MIN/1.73
GFR NON-AFRICAN AMERICAN: 34 ML/MIN/1.73
GFR NON-AFRICAN AMERICAN: 35 ML/MIN/1.73
GLUCOSE BLD-MCNC: 137 MG/DL (ref 74–99)
GLUCOSE BLD-MCNC: 148 MG/DL (ref 74–99)
GLUCOSE BLD-MCNC: 166 MG/DL (ref 74–99)
HCO3: 25.9 MMOL/L (ref 22–26)
HCT VFR BLD CALC: 34.9 % (ref 37–54)
HEMOGLOBIN: 11.9 G/DL (ref 12.5–16.5)
HHB: 4.6 % (ref 0–5)
IRON SATURATION: 35 % (ref 20–55)
IRON: 95 MCG/DL (ref 59–158)
LAB: NORMAL
LYMPHOCYTES ABSOLUTE: 1.34 E9/L (ref 1.5–4)
LYMPHOCYTES RELATIVE PERCENT: 20.2 % (ref 20–42)
Lab: NORMAL
MAGNESIUM: 1.9 MG/DL (ref 1.6–2.6)
MAGNESIUM: 2 MG/DL (ref 1.6–2.6)
MCH RBC QN AUTO: 26.4 PG (ref 26–35)
MCHC RBC AUTO-ENTMCNC: 34.1 % (ref 32–34.5)
MCV RBC AUTO: 77.6 FL (ref 80–99.9)
METAMYELOCYTES RELATIVE PERCENT: 0.9 % (ref 0–1)
METER GLUCOSE: 124 MG/DL (ref 74–99)
METER GLUCOSE: 128 MG/DL (ref 74–99)
METER GLUCOSE: 138 MG/DL (ref 74–99)
METER GLUCOSE: 148 MG/DL (ref 74–99)
METER GLUCOSE: 165 MG/DL (ref 74–99)
METER GLUCOSE: 196 MG/DL (ref 74–99)
METHB: 0.4 % (ref 0–1.5)
MODE: AC
MONOCYTES ABSOLUTE: 0.67 E9/L (ref 0.1–0.95)
MONOCYTES RELATIVE PERCENT: 9.6 % (ref 2–12)
MYELOCYTE PERCENT: 0.9 % (ref 0–0)
NEUTROPHILS ABSOLUTE: 4.56 E9/L (ref 1.8–7.3)
NEUTROPHILS RELATIVE PERCENT: 65.8 % (ref 43–80)
O2 CONTENT: 16.5 ML/DL
O2 SATURATION: 95.4 % (ref 92–98.5)
O2HB: 94.4 % (ref 94–97)
OPERATOR ID: 1874
PARATHYROID HORMONE INTACT: 410 PG/ML (ref 15–65)
PATIENT TEMP: 37 C
PCO2: 45 MMHG (ref 35–45)
PDW BLD-RTO: 21.2 FL (ref 11.5–15)
PEEP/CPAP: 9 CMH2O
PFO2: 1.3 MMHG/%
PH BLOOD GAS: 7.38 (ref 7.35–7.45)
PHOSPHORUS: 5 MG/DL (ref 2.5–4.5)
PLATELET # BLD: 323 E9/L (ref 130–450)
PMV BLD AUTO: 9.6 FL (ref 7–12)
PO2: 84.7 MMHG (ref 75–100)
POIKILOCYTES: ABNORMAL
POLYCHROMASIA: ABNORMAL
POTASSIUM SERPL-SCNC: 3.7 MMOL/L (ref 3.5–5)
POTASSIUM SERPL-SCNC: 4 MMOL/L (ref 3.5–5)
POTASSIUM SERPL-SCNC: 4.2 MMOL/L (ref 3.5–5)
RBC # BLD: 4.5 E12/L (ref 3.8–5.8)
RI(T): 3.7
RR MECHANICAL: 20 B/MIN
SODIUM BLD-SCNC: 141 MMOL/L (ref 132–146)
SODIUM BLD-SCNC: 144 MMOL/L (ref 132–146)
SODIUM BLD-SCNC: 146 MMOL/L (ref 132–146)
SOURCE, BLOOD GAS: NORMAL
TARGET CELLS: ABNORMAL
TEAR DROP CELLS: ABNORMAL
THB: 12.4 G/DL (ref 11.5–16.5)
TIME ANALYZED: 521
TOTAL IRON BINDING CAPACITY: 275 MCG/DL (ref 250–450)
TOTAL PROTEIN: 6.7 G/DL (ref 6.4–8.3)
TRANSFERRIN: 224 MG/DL (ref 200–360)
VITAMIN B-12: 1037 PG/ML (ref 211–946)
VITAMIN D 25-HYDROXY: 9 NG/ML (ref 30–100)
VT MECHANICAL: 450 ML
WBC # BLD: 6.7 E9/L (ref 4.5–11.5)

## 2020-11-05 PROCEDURE — 6360000002 HC RX W HCPCS: Performed by: INTERNAL MEDICINE

## 2020-11-05 PROCEDURE — 94003 VENT MGMT INPAT SUBQ DAY: CPT

## 2020-11-05 PROCEDURE — 2500000003 HC RX 250 WO HCPCS: Performed by: INTERNAL MEDICINE

## 2020-11-05 PROCEDURE — 84466 ASSAY OF TRANSFERRIN: CPT

## 2020-11-05 PROCEDURE — 6360000002 HC RX W HCPCS: Performed by: STUDENT IN AN ORGANIZED HEALTH CARE EDUCATION/TRAINING PROGRAM

## 2020-11-05 PROCEDURE — 2000000000 HC ICU R&B

## 2020-11-05 PROCEDURE — 85025 COMPLETE CBC W/AUTO DIFF WBC: CPT

## 2020-11-05 PROCEDURE — 6370000000 HC RX 637 (ALT 250 FOR IP): Performed by: INTERNAL MEDICINE

## 2020-11-05 PROCEDURE — 83550 IRON BINDING TEST: CPT

## 2020-11-05 PROCEDURE — 2580000003 HC RX 258: Performed by: INTERNAL MEDICINE

## 2020-11-05 PROCEDURE — 82607 VITAMIN B-12: CPT

## 2020-11-05 PROCEDURE — 74018 RADEX ABDOMEN 1 VIEW: CPT

## 2020-11-05 PROCEDURE — 94669 MECHANICAL CHEST WALL OSCILL: CPT

## 2020-11-05 PROCEDURE — 2580000003 HC RX 258: Performed by: STUDENT IN AN ORGANIZED HEALTH CARE EDUCATION/TRAINING PROGRAM

## 2020-11-05 PROCEDURE — 83540 ASSAY OF IRON: CPT

## 2020-11-05 PROCEDURE — 94640 AIRWAY INHALATION TREATMENT: CPT

## 2020-11-05 PROCEDURE — C9113 INJ PANTOPRAZOLE SODIUM, VIA: HCPCS | Performed by: INTERNAL MEDICINE

## 2020-11-05 PROCEDURE — 71045 X-RAY EXAM CHEST 1 VIEW: CPT

## 2020-11-05 PROCEDURE — 82330 ASSAY OF CALCIUM: CPT

## 2020-11-05 PROCEDURE — 36415 COLL VENOUS BLD VENIPUNCTURE: CPT

## 2020-11-05 PROCEDURE — 84100 ASSAY OF PHOSPHORUS: CPT

## 2020-11-05 PROCEDURE — 82805 BLOOD GASES W/O2 SATURATION: CPT

## 2020-11-05 PROCEDURE — 82746 ASSAY OF FOLIC ACID SERUM: CPT

## 2020-11-05 PROCEDURE — 84238 ASSAY NONENDOCRINE RECEPTOR: CPT

## 2020-11-05 PROCEDURE — 6370000000 HC RX 637 (ALT 250 FOR IP): Performed by: STUDENT IN AN ORGANIZED HEALTH CARE EDUCATION/TRAINING PROGRAM

## 2020-11-05 PROCEDURE — 83970 ASSAY OF PARATHORMONE: CPT

## 2020-11-05 PROCEDURE — 82306 VITAMIN D 25 HYDROXY: CPT

## 2020-11-05 PROCEDURE — 82962 GLUCOSE BLOOD TEST: CPT

## 2020-11-05 PROCEDURE — 80048 BASIC METABOLIC PNL TOTAL CA: CPT

## 2020-11-05 PROCEDURE — 83735 ASSAY OF MAGNESIUM: CPT

## 2020-11-05 PROCEDURE — 82728 ASSAY OF FERRITIN: CPT

## 2020-11-05 PROCEDURE — 80053 COMPREHEN METABOLIC PANEL: CPT

## 2020-11-05 RX ORDER — BUMETANIDE 0.25 MG/ML
2 INJECTION, SOLUTION INTRAMUSCULAR; INTRAVENOUS EVERY 8 HOURS
Status: COMPLETED | OUTPATIENT
Start: 2020-11-05 | End: 2020-11-06

## 2020-11-05 RX ORDER — QUETIAPINE FUMARATE 25 MG/1
50 TABLET, FILM COATED ORAL 2 TIMES DAILY
Status: DISCONTINUED | OUTPATIENT
Start: 2020-11-05 | End: 2020-11-06

## 2020-11-05 RX ORDER — MAGNESIUM SULFATE 1 G/100ML
1 INJECTION INTRAVENOUS ONCE
Status: COMPLETED | OUTPATIENT
Start: 2020-11-05 | End: 2020-11-05

## 2020-11-05 RX ORDER — BUMETANIDE 0.25 MG/ML
INJECTION, SOLUTION INTRAMUSCULAR; INTRAVENOUS
Status: DISPENSED
Start: 2020-11-05 | End: 2020-11-05

## 2020-11-05 RX ADMIN — HEPARIN SODIUM 7500 UNITS: 10000 INJECTION INTRAVENOUS; SUBCUTANEOUS at 09:15

## 2020-11-05 RX ADMIN — AMLODIPINE BESYLATE 10 MG: 10 TABLET ORAL at 09:16

## 2020-11-05 RX ADMIN — MIDAZOLAM HYDROCHLORIDE 10 MG/HR: 5 INJECTION, SOLUTION INTRAMUSCULAR; INTRAVENOUS at 22:03

## 2020-11-05 RX ADMIN — IPRATROPIUM BROMIDE AND ALBUTEROL SULFATE 1 AMPULE: 2.5; .5 SOLUTION RESPIRATORY (INHALATION) at 19:39

## 2020-11-05 RX ADMIN — MIDAZOLAM HYDROCHLORIDE 2 MG: 1 INJECTION, SOLUTION INTRAMUSCULAR; INTRAVENOUS at 22:29

## 2020-11-05 RX ADMIN — MAGNESIUM SULFATE HEPTAHYDRATE 1 G: 1 INJECTION, SOLUTION INTRAVENOUS at 22:05

## 2020-11-05 RX ADMIN — INSULIN LISPRO 3 UNITS: 100 INJECTION, SOLUTION INTRAVENOUS; SUBCUTANEOUS at 01:08

## 2020-11-05 RX ADMIN — IPRATROPIUM BROMIDE AND ALBUTEROL SULFATE 1 AMPULE: 2.5; .5 SOLUTION RESPIRATORY (INHALATION) at 12:34

## 2020-11-05 RX ADMIN — SODIUM CHLORIDE 3 G: 900 INJECTION INTRAVENOUS at 22:56

## 2020-11-05 RX ADMIN — SODIUM CHLORIDE SOLN NEBU 3% 2 ML: 3 NEBU SOLN at 12:40

## 2020-11-05 RX ADMIN — SODIUM CHLORIDE SOLN NEBU 3% 2 ML: 3 NEBU SOLN at 00:08

## 2020-11-05 RX ADMIN — SODIUM CHLORIDE SOLN NEBU 3% 2 ML: 3 NEBU SOLN at 19:39

## 2020-11-05 RX ADMIN — IPRATROPIUM BROMIDE AND ALBUTEROL SULFATE 1 AMPULE: 2.5; .5 SOLUTION RESPIRATORY (INHALATION) at 16:30

## 2020-11-05 RX ADMIN — QUETIAPINE FUMARATE 50 MG: 25 TABLET ORAL at 22:08

## 2020-11-05 RX ADMIN — BUDESONIDE 500 MCG: 0.5 SUSPENSION RESPIRATORY (INHALATION) at 19:39

## 2020-11-05 RX ADMIN — QUETIAPINE FUMARATE 25 MG: 25 TABLET ORAL at 09:16

## 2020-11-05 RX ADMIN — Medication 200 MCG/HR: at 11:39

## 2020-11-05 RX ADMIN — Medication 200 MCG/HR: at 18:21

## 2020-11-05 RX ADMIN — Medication 200 MCG/HR: at 05:36

## 2020-11-05 RX ADMIN — HEPARIN SODIUM 7500 UNITS: 10000 INJECTION INTRAVENOUS; SUBCUTANEOUS at 00:32

## 2020-11-05 RX ADMIN — Medication 10 ML: at 20:47

## 2020-11-05 RX ADMIN — SODIUM CHLORIDE SOLN NEBU 3% 2 ML: 3 NEBU SOLN at 16:30

## 2020-11-05 RX ADMIN — BUMETANIDE 2 MG: 0.25 INJECTION INTRAMUSCULAR; INTRAVENOUS at 10:17

## 2020-11-05 RX ADMIN — HEPARIN SODIUM 7500 UNITS: 10000 INJECTION INTRAVENOUS; SUBCUTANEOUS at 16:32

## 2020-11-05 RX ADMIN — CHLORHEXIDINE GLUCONATE 0.12% ORAL RINSE 15 ML: 1.2 LIQUID ORAL at 09:16

## 2020-11-05 RX ADMIN — ARFORMOTEROL TARTRATE 15 MCG: 15 SOLUTION RESPIRATORY (INHALATION) at 19:39

## 2020-11-05 RX ADMIN — CHLORHEXIDINE GLUCONATE 0.12% ORAL RINSE 15 ML: 1.2 LIQUID ORAL at 20:47

## 2020-11-05 RX ADMIN — ASPIRIN 81 MG CHEWABLE TABLET 81 MG: 81 TABLET CHEWABLE at 09:16

## 2020-11-05 RX ADMIN — PSYLLIUM HUSK 1 PACKET: 3.4 GRANULE ORAL at 17:07

## 2020-11-05 RX ADMIN — PANTOPRAZOLE SODIUM 40 MG: 40 INJECTION, POWDER, FOR SOLUTION INTRAVENOUS at 09:14

## 2020-11-05 RX ADMIN — BUMETANIDE 2 MG: 0.25 INJECTION INTRAMUSCULAR; INTRAVENOUS at 17:08

## 2020-11-05 RX ADMIN — IPRATROPIUM BROMIDE AND ALBUTEROL SULFATE 1 AMPULE: 2.5; .5 SOLUTION RESPIRATORY (INHALATION) at 08:58

## 2020-11-05 RX ADMIN — LEVOTHYROXINE SODIUM 50 MCG: 0.05 TABLET ORAL at 07:39

## 2020-11-05 RX ADMIN — SODIUM CHLORIDE 3 G: 900 INJECTION INTRAVENOUS at 10:18

## 2020-11-05 RX ADMIN — SODIUM CHLORIDE SOLN NEBU 3%: 3 NEBU SOLN at 08:59

## 2020-11-05 RX ADMIN — SODIUM CHLORIDE SOLN NEBU 3% 2 ML: 3 NEBU SOLN at 04:12

## 2020-11-05 RX ADMIN — SODIUM CHLORIDE 3 G: 900 INJECTION INTRAVENOUS at 05:30

## 2020-11-05 RX ADMIN — INSULIN LISPRO 3 UNITS: 100 INJECTION, SOLUTION INTRAVENOUS; SUBCUTANEOUS at 13:06

## 2020-11-05 RX ADMIN — THIAMINE HYDROCHLORIDE 100 MG: 100 INJECTION, SOLUTION INTRAMUSCULAR; INTRAVENOUS at 16:00

## 2020-11-05 RX ADMIN — SODIUM CHLORIDE 3 G: 900 INJECTION INTRAVENOUS at 17:02

## 2020-11-05 RX ADMIN — BUMETANIDE 2 MG: 0.25 INJECTION INTRAMUSCULAR; INTRAVENOUS at 04:48

## 2020-11-05 RX ADMIN — SODIUM CHLORIDE, PRESERVATIVE FREE 10 ML: 5 INJECTION INTRAVENOUS at 09:16

## 2020-11-05 RX ADMIN — MIDAZOLAM HYDROCHLORIDE 10 MG/HR: 5 INJECTION, SOLUTION INTRAMUSCULAR; INTRAVENOUS at 11:39

## 2020-11-05 RX ADMIN — BUDESONIDE 500 MCG: 0.5 SUSPENSION RESPIRATORY (INHALATION) at 08:59

## 2020-11-05 RX ADMIN — FOLIC ACID 1 MG: 5 INJECTION, SOLUTION INTRAMUSCULAR; INTRAVENOUS; SUBCUTANEOUS at 09:16

## 2020-11-05 RX ADMIN — ARFORMOTEROL TARTRATE 15 MCG: 15 SOLUTION RESPIRATORY (INHALATION) at 08:57

## 2020-11-05 RX ADMIN — HYDROCORTISONE SODIUM SUCCINATE 50 MG: 100 INJECTION, POWDER, FOR SOLUTION INTRAMUSCULAR; INTRAVENOUS at 09:15

## 2020-11-05 RX ADMIN — MIDAZOLAM HYDROCHLORIDE 10 MG/HR: 5 INJECTION, SOLUTION INTRAMUSCULAR; INTRAVENOUS at 01:57

## 2020-11-05 ASSESSMENT — PULMONARY FUNCTION TESTS
PIF_VALUE: 44
PIF_VALUE: 34
PIF_VALUE: 30
PIF_VALUE: 33
PIF_VALUE: 28
PIF_VALUE: 38
PIF_VALUE: 31
PIF_VALUE: 33
PIF_VALUE: 31
PIF_VALUE: 33
PIF_VALUE: 35
PIF_VALUE: 31
PIF_VALUE: 45
PIF_VALUE: 31
PIF_VALUE: 29
PIF_VALUE: 31
PIF_VALUE: 31
PIF_VALUE: 37
PIF_VALUE: 34
PIF_VALUE: 37
PIF_VALUE: 36
PIF_VALUE: 34
PIF_VALUE: 31
PIF_VALUE: 31
PIF_VALUE: 29
PIF_VALUE: 32
PIF_VALUE: 30
PIF_VALUE: 36
PIF_VALUE: 45
PIF_VALUE: 38
PIF_VALUE: 37
PIF_VALUE: 32
PIF_VALUE: 31

## 2020-11-05 ASSESSMENT — PAIN SCALES - GENERAL
PAINLEVEL_OUTOF10: 1
PAINLEVEL_OUTOF10: 0

## 2020-11-05 NOTE — PROGRESS NOTES
PT SEEN AND EXAMINED. intubated, in icu. BP labile     Chart reviewed. meds reviewed. D/w nursing + family as available. EXAM: IN GENERAL, NAD. AWAKE seem's to know me, follows what I am saying. . ROS NEGx10 EXCEPT:   BP (!) 145/62   Pulse 79   Temp 98 °F (36.7 °C) (Axillary)   Resp 18   Ht 5' 8\" (1.727 m)   Wt (!) 312 lb 14.4 oz (141.9 kg)   SpO2 92%   BMI 47.58 kg/m²   GEN: A+O NAD. HEENT: NCAT. NECK: NO JVD. TRACH MIDLINE. NO BRUITS. NO THYROMEGALY. LUNGS: CTA BL NO RALES, RHONCHI OR WHEEZES. GOOD EXCURSION. CV: Regular rate and rhythm, NO Murmurs, Rubs, Or gallops  ABD: Soft. Nontender. Normal bowel sounds. No organomegaly  EXT:No clubbing cyanosis or edema  Neuro: Alert and oriented x 3. No focal motor deficits. No sensory deficits. Reflexes appear intact.   Labs/data reviewedLABS: CBC with Differential:    Lab Results   Component Value Date    WBC 6.7 11/05/2020    RBC 4.50 11/05/2020    HGB 11.9 11/05/2020    HCT 34.9 11/05/2020     11/05/2020    MCV 77.6 11/05/2020    MCH 26.4 11/05/2020    MCHC 34.1 11/05/2020    RDW 21.2 11/05/2020    NRBC 4.3 11/02/2020    SEGSPCT 73 01/26/2014    METASPCT 0.9 11/05/2020    LYMPHOPCT 20.2 11/05/2020    MONOPCT 9.6 11/05/2020    MYELOPCT 0.9 11/05/2020    BASOPCT 0.1 11/05/2020    MONOSABS 0.67 11/05/2020    LYMPHSABS 1.34 11/05/2020    EOSABS 0.17 11/05/2020    BASOSABS 0.00 11/05/2020     Platelets:    Lab Results   Component Value Date     11/05/2020     CMP:    Lab Results   Component Value Date     11/05/2020    K 4.2 11/05/2020    K 4.0 10/27/2020     11/05/2020    CO2 24 11/05/2020    BUN 47 11/05/2020    CREATININE 2.5 11/05/2020    GFRAA 32 11/05/2020    LABGLOM 32 11/05/2020    GLUCOSE 137 11/05/2020    PROT 6.7 11/05/2020    LABALBU 3.5 11/05/2020    CALCIUM 7.6 11/05/2020    BILITOT 0.3 11/05/2020    ALKPHOS 96 11/05/2020    AST 50 11/05/2020     11/05/2020     Magnesium:    Lab Results   Component Value Date MG 2.0 11/05/2020     LDH:    Lab Results   Component Value Date     10/28/2020     PT/INR:    Lab Results   Component Value Date    PROTIME 10.9 07/21/2018    INR 0.9 07/21/2018     Last 3 Troponin:    Lab Results   Component Value Date    TROPONINI <0.01 10/26/2020    TROPONINI <0.01 05/26/2019    TROPONINI <0.01 05/26/2019     ABG:    Lab Results   Component Value Date    PH 7.378 11/05/2020    PCO2 45.0 11/05/2020    PO2 84.7 11/05/2020    HCO3 25.9 11/05/2020    BE 0.4 11/05/2020    O2SAT 95.4 11/05/2020     IRON:    Lab Results   Component Value Date    IRON 95 11/05/2020     IMAGING    Xr Chest Portable    Result Date: 10/27/2020  EXAMINATION: ONE XRAY VIEW OF THE CHEST 10/27/2020 7:58 pm COMPARISON: October 26, 2020 HISTORY: ORDERING SYSTEM PROVIDED HISTORY: SOB TECHNOLOGIST PROVIDED HISTORY: Reason for exam:->SOB What reading provider will be dictating this exam?->CRC FINDINGS: There is mild cardiomegaly. There is patchy perihilar and bibasilar atelectasis/infiltrates. Tiny pleural effusions are suspected. Progressive bibasilar atelectasis/infiltrates concerning for pneumonia. Underlying CHF is considered. Xr Chest Portable    Result Date: 10/26/2020  EXAMINATION: ONE XRAY VIEW OF THE CHEST 10/26/2020 4:11 pm COMPARISON: 05/26/2019 HISTORY: ORDERING SYSTEM PROVIDED HISTORY: SOB TECHNOLOGIST PROVIDED HISTORY: Reason for exam:->SOB What reading provider will be dictating this exam?->CRC FINDINGS: Cardiac size appears stable. Discoid airspace disease seen at the left lung base which may represent atelectasis or scarring. Right infrahilar and basilar infiltrate is identified. No pneumothorax is identified. No acute osseous abnormality is identified. Right infrahilar and basilar infiltrate concerning for pneumonia. Left basilar atelectasis or scarring.        Medications:    Scheduled Meds:   bumetanide        bumetanide  2 mg Intravenous Q8H    QUEtiapine  50 mg Oral BID    ampicillin-sulbactam  3 g Intravenous T6U    folic acid  1 mg Intravenous Daily    thiamine (VITAMIN B1) IVPB  100 mg Intravenous Q24H    [Held by provider] carvedilol  3.125 mg Oral BID WC    insulin glargine  20 Units Subcutaneous Nightly    sodium chloride (Inhalant)  2 mL Nebulization Q4H    insulin lispro  0-18 Units Subcutaneous Q4H    heparin (porcine)  7,500 Units Subcutaneous Q8H    pantoprazole  40 mg Intravenous Daily    And    sodium chloride (PF)  10 mL Intravenous Daily    sodium chloride flush  10 mL Intravenous 2 times per day    Arformoterol Tartrate  15 mcg Nebulization BID    chlorhexidine  15 mL Mouth/Throat BID    amLODIPine  10 mg Oral Daily    aspirin  81 mg Oral Daily    [Held by provider] atorvastatin  40 mg Oral Daily    levothyroxine  50 mcg Oral Daily    [Held by provider] losartan  100 mg Oral Daily    budesonide  0.5 mg Nebulization BID    ipratropium-albuterol  1 ampule Inhalation Q4H WA       Continuous Infusions:   midazolam 10 mg/hr (11/05/20 1139)    fentaNYL 5 mcg/ml in 0.9%  ml infusion 200 mcg/hr (11/05/20 1139)    dextrose         PRN Meds:sennosides-docusate sodium, polyvinyl alcohol, midazolam, hydrALAZINE, sodium chloride flush, acetaminophen **OR** acetaminophen, polyethylene glycol, perflutren lipid microspheres, colchicine, glucose, dextrose, glucagon (rDNA), dextrose    A/P:      Patient Active Problem List   Diagnosis    Hyperlipidemia    Type 2 diabetes mellitus without complication, without long-term current use of insulin (HCC)    Atypical chest pain    Essential hypertension    Chronic acquired lymphedema    Aortic valve disease    Morbid obesity due to excess calories (HCC)    Abdominal pain    Acute respiratory failure with hypoxia (HCC)    Acute pulmonary edema (HCC)    Congestive heart failure with LV diastolic dysfunction, NYHA class 3 (HCC)    Obstructive sleep apnea    Acquired hypothyroidism    Chronic diastolic heart failure (HCC)    Nonrheumatic aortic valve stenosis    ACE-inhibitor cough    Pneumonia    Acute gout of right knee   Acute encephalopathy 2/2 Acute hypoxic hypercapnic respiratory failure.         Acute hypoxic hypercapnic respiratory failure 2/2 MSSA pneumonia versus COPD versus ADHF versus chronic OHS  Pt has Hx chronic HANS not on CPAP at home. ABG showed pH 7.188/91.2/78.9/33 on RRT. currently intubated. - Continue to monitor respiratory status, wean down FiO2 as tolerated. - Pro  on presentation.   - US dup LE negative for DVT. CTA negative for PE. COVID -19 negative x2  - Continue breathing treatment  - Monitor daily CXR. CBC.   - Pulmonology consulted, further recommendations appreciated.     NANCY stage III likely pre renal 2/2 diuretic use, contrast agent vs? cardiorenal syndrome. -   - Nephrology consulted. Further recommendations appreciated. - Continue monitor daily BMP, renal function. Monitor I/O. Avoid nephrotoxicity.       · Bacterial pneumonia, likely aspiration pneumonia vs MSSA   · Shock, sepsis- resolved   · ATBx per id  Alcohol withdrawal .    Triple acid base disorder   Hx HFpEF, EF 65%cta noted  WILL NEED NIV FOR HOME- PROB BIPAP WITH BACKUP RATE  Wean per pulm/ccm

## 2020-11-05 NOTE — PROGRESS NOTES
Pt continues to reach for lines and tubes despite attempts to deter. Patient unable to be redirected. Bilateral soft wrist restraints continued for pt safety.

## 2020-11-05 NOTE — PROGRESS NOTES
Pulmonary Progress Note  11/5/2020 3:26 PM  Subjective:   Admit Date: 10/26/2020  PCP: Will Patterson MD  Interval History: Patient sedated on fentanyl and propofol. Intubated on mechanical ventilation on ACVC with PEEP 9, FIO2 65%  Diet: DIET TUBE FEED CONTINUOUS/CYCLIC NPO; Diabetic 1.5; Orogastric; Continuous; 10; 45; 23  Diet Tube Feed Modular: Protein Modular    Data:   Scheduled Meds:    bumetanide        bumetanide  2 mg Intravenous Q8H    QUEtiapine  50 mg Oral BID    psyllium  1 packet Oral Daily    ampicillin-sulbactam  3 g Intravenous E0Y    folic acid  1 mg Intravenous Daily    thiamine (VITAMIN B1) IVPB  100 mg Intravenous Q24H    [Held by provider] carvedilol  3.125 mg Oral BID WC    insulin glargine  20 Units Subcutaneous Nightly    sodium chloride (Inhalant)  2 mL Nebulization Q4H    insulin lispro  0-18 Units Subcutaneous Q4H    heparin (porcine)  7,500 Units Subcutaneous Q8H    pantoprazole  40 mg Intravenous Daily    And    sodium chloride (PF)  10 mL Intravenous Daily    sodium chloride flush  10 mL Intravenous 2 times per day    Arformoterol Tartrate  15 mcg Nebulization BID    chlorhexidine  15 mL Mouth/Throat BID    amLODIPine  10 mg Oral Daily    aspirin  81 mg Oral Daily    [Held by provider] atorvastatin  40 mg Oral Daily    levothyroxine  50 mcg Oral Daily    [Held by provider] losartan  100 mg Oral Daily    budesonide  0.5 mg Nebulization BID    ipratropium-albuterol  1 ampule Inhalation Q4H WA     Continuous Infusions:    midazolam 10 mg/hr (11/05/20 1139)    fentaNYL 5 mcg/ml in 0.9%  ml infusion 200 mcg/hr (11/05/20 1139)    dextrose         PRN Meds: sennosides-docusate sodium, polyvinyl alcohol, midazolam, hydrALAZINE, sodium chloride flush, acetaminophen **OR** acetaminophen, polyethylene glycol, perflutren lipid microspheres, colchicine, glucose, dextrose, glucagon (rDNA), dextrose    I/O last 3 completed shifts:   In: 2994 [I.V.:1810; KY/QM:2904; IV Piggyback:100]  Out: 7170 [SVHFO:1219; Stool:600]    No intake/output data recorded. Intake/Output Summary (Last 24 hours) at 11/5/2020 1526  Last data filed at 11/5/2020 1459  Gross per 24 hour   Intake 2994 ml   Output 7170 ml   Net -4176 ml       Patient Vitals for the past 96 hrs (Last 3 readings):   Weight   11/04/20 0600 (!) 312 lb 14.4 oz (141.9 kg)   11/03/20 0000 (!) 350 lb (158.8 kg)   11/02/20 0600 (!) 350 lb (158.8 kg)         Recent Labs     11/03/20  0410 11/04/20  0510 11/05/20  0511   WBC 9.5 7.9 6.7   HGB 12.9 11.3* 11.9*   HCT 38.9 33.4* 34.9*   MCV 78.1* 77.7* 77.6*    272 323     Recent Labs     11/03/20  0410  11/04/20  0510 11/04/20  1810 11/05/20  0235 11/05/20  0511      < > 143 143 141 146   K 3.8   < > 3.8 3.9 4.0 4.2   CL 97*   < > 104 105 102 105   CO2 21*   < > 22 27 24 24   BUN 54*   < > 50* 49* 47* 47*   CREATININE 2.8*   < > 2.4* 2.5* 2.4* 2.5*   PHOS 6.9*  --  6.0*  --   --  5.0*    < > = values in this interval not displayed. Recent Labs     11/03/20 0410 11/04/20  0510 11/05/20  0511   PROT 6.7 6.3* 6.7   ALKPHOS 132* 102 96   * 193* 154*   * 83* 50*   BILITOT 0.5 0.5 0.3   CPK:  Lab Results   Component Value Date    CKTOTAL 98 10/08/2017          -----------------------------------------------------------------  RAD:   Results for orders placed during the hospital encounter of 10/26/20   XR CHEST PORTABLE    Narrative EXAMINATION:  ONE XRAY VIEW OF THE CHEST    10/30/2020 2:17 am    COMPARISON:  10/29/2020 radiograph    HISTORY:  ORDERING SYSTEM PROVIDED HISTORY: R IJ placement  TECHNOLOGIST PROVIDED HISTORY:  Reason for exam:->R IJ placement  What reading provider will be dictating this exam?->CRC    FINDINGS:  Right IJ central venous catheter has been placed with tip at the distal SVC. No pneumothorax. Worsening diffuse airspace opacification in the right lung  with moderate opacification also present at the left base.   Endotracheal tube  and enteric tube stable. No significant skeletal finding. Impression Interval placement of right IJ central venous catheter. No pneumothorax. Worsening airspace changes in the right lung. Micro:  No results for input(s): BC in the last 72 hours. No results for input(s): ORG in the last 72 hours. No results for input(s): Cecilia Canter in the last 72 hours. No results for input(s): STREPPNEUMAG in the last 72 hours. No results for input(s): LEGUR in the last 72 hours. No results for input(s): ORG in the last 72 hours. No results for input(s): ASO in the last 72 hours. No results for input(s): CULTRESP in the last 72 hours. No results for input(s): LABURIN in the last 72 hours.   Vent Information  $Ventilation: $Subsequent Day  Skin Assessment: Clean, dry, & intact  Equipment ID: [id number]  Vent Type: [phone]  Vent Mode: AC/VC  Vt Ordered: 500 mL  Rate Set: 18 bmp  Peak Flow: 65 L/min  Pressure Support: 0 cmH20  FiO2 : 55 %  SpO2: 92 %  SpO2/FiO2 ratio: 167.27  PaO2/FiO2 ratio: 146  Sensitivity: 3  PEEP/CPAP: 5  I Time/ I Time %: 0 s  Humidification Source: Heated wire  Humidification Temp: 37  Humidification Temp Measured: 36.8  Circuit Condensation: Drained  Mask Type: Full face mask  Mask Size: Large    Additional Respiratory  Assessments  Pulse: 79  Resp: 18  SpO2: 92 %  Position: Semi-Lockwood's  Humidification Source: Heated wire  Humidification Temp: 37  Circuit Condensation: Drained  Oral Care Completed?: Yes  Oral Care: Mouth swabbed, Mouth moisturizer, Mouth suctioned  Subglottic Suction Done?: Yes  Airway Type: ET  Airway Size: 8  Skin barrier applied: Yes    Objective:   Vitals:   Vitals:    20 1500   BP: (!) 145/62   Pulse: 79   Resp: 18   Temp:    SpO2: 92%      TEMP:Current: Temp: 98 °F (36.7 °C)  Max: Temp  Av.2 °F (36.8 °C)  Min: 97.7 °F (36.5 °C)  Max: 98.6 °F (37 °C)    BP Range: Systolic (77JLN), MARRY:422 , Min:114 , OAV:367     Diastolic (13UHD), DJN:13, Min:52, Max:111      General appearance: Sedated obese on mechanical ventilation , appears stated age   In no acute distress  Skin: No rashes or lesions  HEENT: mucous membranes are moist oral endotracheal tube and OG tube  Neck: No JVD  Lungs: symmetrical expansion, coarse breath sounds to auscultation, no use of accessory muscles  Heart: S1S2 no murmurs,  Abdomen: soft, non tender, distended obese  Extremities: no peripheral edema  Neurologic: Sedated  Affect: Calm       Assessment:   Patient Active Problem List:    59-y.o M with history of DM, hypothyroid, COPD, HTN, HLD presented on 10/26 with shortness of breath for 1 week. Saturations were 70% at PCPs. Saturation 65% on room air in ER  Patient agitated for CT scan refused to lay flat  10/27 RRT refused BiPAP became combative. Patient transferred to ICU  10/28 intubated and sedated. CT chest showing dense multifocal airspace disease  10/30 Ultrasound lower extremities no DVT, ultrasound kidneys no hydronephrosis  10/31 bilateral infiltrates left effusion. On 50% +8 of PEEP. Chest x-ray showing right-sided improving with left base atelectasis. Covid negative x2    1. Acute hypoxemic respiratory failure on mechanical ventilation  2. Agitation on Seroquel Fentanyl  3. MSSA pneumonia on Unasyn  4. HANS moderate ( AHI 17 on 4/18/18 requires BIPAP 16/12)  5. History of reactive airway disease  6. CT 3/14/2018 showing pulmonary nodule stable from 10/2017  7. Acute renal failure improving - nephrology on board   8. Diabetes with elevated blood sugars  9. Obesity  10. EF 65% LVH  11. chronic lymphedema  12. Hypothyroid  13. H/o respiratory failure: 7/8/2017 pt was  transferred from [facility] for acute respiratory failure after lap cholecystectomy, lap umbilical hernia repair, and lap liver biopsy (fatty liver).  He had been extubated postop but had laryngospasm requiring reintubation, complicated by negative pressure flash pulmonary edema, and required tracheostomy

## 2020-11-05 NOTE — PLAN OF CARE
Problem: Pain:  Goal: Pain level will decrease  Description: Pain level will decrease  Outcome: Met This Shift  Goal: Control of acute pain  Description: Control of acute pain  Outcome: Met This Shift  Goal: Control of chronic pain  Description: Control of chronic pain  Outcome: Met This Shift     Problem: Restraint Use - Nonviolent/Non-Self-Destructive Behavior:  Goal: Absence of restraint-related injury  Description: Absence of restraint-related injury  Outcome: Met This Shift     Problem: Skin Integrity:  Goal: Will show no infection signs and symptoms  Description: Will show no infection signs and symptoms  Outcome: Met This Shift  Goal: Absence of new skin breakdown  Description: Absence of new skin breakdown  Outcome: Met This Shift     Problem: Restraint Use - Nonviolent/Non-Self-Destructive Behavior:  Goal: Absence of restraint indications  Description: Absence of restraint indications  Outcome: Not Met This Shift   Plan of care reviewed with patient and family, as available.

## 2020-11-05 NOTE — PROGRESS NOTES
Chief Complaint   Patient presents with    Shortness of Breath     for a few weeks. O2 sat in 70s at [facility], placed on 4L and now 95%. SUBJECTIVE:  Patient is tolerating medications. No reported adverse drug reactions. Patient remained afebrile overnight. Continue to be tachycardic and hypertensive. Patient is intubated, (PEEP-9, FiO2-65, paO2 84.7 this AM) sedated on propofol and fentanyl. Minimal secretions on suctioning     WBC 6.7, on Unasyn day 4, CXR shows increasing aeration b/l infiltrate. FiO2 demand decreased to 60%, continue diuresis. Fecal management system in place. Review of systems:  As stated above in the chief complaint, otherwise negative.     Medications:  Scheduled Meds:   bumetanide        bumetanide  2 mg Intravenous Q8H    ampicillin-sulbactam  3 g Intravenous Q6H    QUEtiapine  25 mg Oral BID    folic acid  1 mg Intravenous Daily    thiamine (VITAMIN B1) IVPB  100 mg Intravenous Q24H    [Held by provider] carvedilol  3.125 mg Oral BID WC    insulin glargine  20 Units Subcutaneous Nightly    sodium chloride (Inhalant)  2 mL Nebulization Q4H    insulin lispro  0-18 Units Subcutaneous Q4H    heparin (porcine)  7,500 Units Subcutaneous Q8H    pantoprazole  40 mg Intravenous Daily    And    sodium chloride (PF)  10 mL Intravenous Daily    sodium chloride flush  10 mL Intravenous 2 times per day    Arformoterol Tartrate  15 mcg Nebulization BID    chlorhexidine  15 mL Mouth/Throat BID    amLODIPine  10 mg Oral Daily    aspirin  81 mg Oral Daily    [Held by provider] atorvastatin  40 mg Oral Daily    levothyroxine  50 mcg Oral Daily    [Held by provider] losartan  100 mg Oral Daily    budesonide  0.5 mg Nebulization BID    ipratropium-albuterol  1 ampule Inhalation Q4H WA     Continuous Infusions:   midazolam 10 mg/hr (11/05/20 0157)    fentaNYL 5 mcg/ml in 0.9%  ml infusion 200 mcg/hr (11/05/20 0536)    dextrose       PRN Meds:sennosides-docusate sodium, polyvinyl alcohol, midazolam, hydrALAZINE, sodium chloride flush, acetaminophen **OR** acetaminophen, polyethylene glycol, perflutren lipid microspheres, colchicine, glucose, dextrose, glucagon (rDNA), dextrose    OBJECTIVE:  BP (!) 155/97   Pulse 79   Temp 98.3 °F (36.8 °C)   Resp 20   Ht 5' 8\" (1.727 m)   Wt (!) 312 lb 14.4 oz (141.9 kg)   SpO2 95%   BMI 47.58 kg/m²   Temp  Av.1 °F (36.7 °C)  Min: 97.7 °F (36.5 °C)  Max: 98.6 °F (37 °C)  Constitutional: Intubated and Sedated, wakes up to stimulation    Skin: Warm and dry. No rashes were noted. HEENT: Round and reactive pupils. Moist mucous membranes. No ulcerations or thrush. Chest: Intubated. Symmetrical expansion. Some coarseness right upper lobe. Cardiovascular: S1 and S2 are rhythmic and regular. Systolic murmurs appreciated.    Abdomen: Hyperactive bowel sounds, obese abdomen, unable to palpate any masses   Extremities: No clubbing, no cyanosis, + Lymphedema left lower extremity, improving   Lines: CVC Right IJ 10/30, Right radial [place]     Laboratory and Tests Review:  Lab Results   Component Value Date    WBC 6.7 2020    WBC 7.9 2020    WBC 9.5 2020    HGB 11.9 (L) 2020    HCT 34.9 (L) 2020    MCV 77.6 (L) 2020     2020     Lab Results   Component Value Date    NEUTROABS 4.56 2020    NEUTROABS 6.64 2020    NEUTROABS 7.38 (H) 2020     No results found for: [facility]  Lab Results   Component Value Date     (H) 2020    AST 50 (H) 2020    ALKPHOS 96 2020    BILITOT 0.3 2020     Lab Results   Component Value Date     2020    K 4.2 2020    K 4.0 10/27/2020     2020    CO2 24 2020    BUN 47 2020    CREATININE 2.5 2020    CREATININE 2.4 2020    CREATININE 2.5 2020    GFRAA 32 2020    LABGLOM 32 2020    GLUCOSE 137 2020    PROT 6.7 2020 LABALBU 3.5 11/05/2020    CALCIUM 7.6 11/05/2020    BILITOT 0.3 11/05/2020    ALKPHOS 96 11/05/2020    AST 50 11/05/2020     11/05/2020     Lab Results   Component Value Date    CRP 1.4 (H) 11/02/2020    CRP 2.8 (H) 09/04/2017    CRP 10.5 (H) 08/28/2017     Lab Results   Component Value Date    SEDRATE 135 (H) 09/04/2017    SEDRATE 150 (H) 08/28/2017    SEDRATE 141 (H) 08/21/2017     Radiology:  CXR- 11/3- Multifocal bilateral pulmonary infiltrates more prominent within the right lung. CXR- 11/4- worsening b/l infiltrates, worse on left today with possible small L sided effusion   CXR- 11/5 - Stable b/l infiltrates     Microbiology:   Lab Results   Component Value Date    BC 5 Days no growth 10/29/2020    BC  10/26/2020     This organism was isolated in one set. Susceptibility testing is not routinely done as this  organism frequently represents skin contamination. Additional testing can be ordered by calling the  Microbiology Department.       BC 5 Days- no growth 05/19/2018    ORG Staphylococcus aureus 10/29/2020    ORG Staphylococcus coagulase-negative 10/26/2020    ORG Coagulase Negative Staphylococci 08/26/2017     Lab Results   Component Value Date    BLOODCULT2 5 Days no growth 10/29/2020    BLOODCULT2 5 Days no growth 10/26/2020    BLOODCULT2 5 Days- no growth 05/19/2018    ORG Staphylococcus aureus 10/29/2020    ORG Staphylococcus coagulase-negative 10/26/2020    ORG Coagulase Negative Staphylococci 08/26/2017     No results found for: WNDABS  Smear, Respiratory   Date Value Ref Range Status   10/29/2020   Final    Group 5: >25 PMN's/LPF and <10 Epithelial cells/LPF  Abundant Polymorphonuclear leukocytes  Epithelial cells not seen  Few Gram positive diplococci       No results found for: MPNEUMO, CLAMYDCU, LABLEGI, AFBCX, FUNGSM, LABFUNG  CULTURE, RESPIRATORY   Date Value Ref Range Status   10/29/2020 Oral Pharyngeal Tiesha present (A)  Final   10/29/2020   Final    Light growth  This isolate is presumed to be resistant based on the  detection of inducible Clindamycin resistance. Clindamycin  may still be effective in some patients. Culture Catheter Tip   Date Value Ref Range Status   08/26/2017 <15 colonies  Final     No results found for: BFCS  No results found for: CXSURG  Urine Culture, Routine   Date Value Ref Range Status   10/28/2020 Growth not present  Final   09/16/2019 Growth not present  Final   07/31/2017 >100,000 CFU/ml  Final     MRSA Culture Only   Date Value Ref Range Status   10/30/2020 Methicillin resistant Staph aureus not isolated  Final   07/28/2017 Methicillin resistant Staph aureus not isolated  Final          Assessment:  · Acute on chronic hypoxic respiratory failure likely 2/2 pneumonia and volume overload  - resolving   · Bacterial pneumonia, likely aspiration pneumonia vs MSSA - resolving   · Shock, sepsis- resolved, blood cultures negative   · Alcohol withdrawal   · Hypocalcemia     Plan:    · Continue Unasyn for aspiration pneumonia , continue diuresis   · Baseline CRP (1.4), Procal (0.57)  · Follow PTH, Vitamin D levels   · Monitor labs, primary management by ICU team  · Will follow with you    Nayely Portillo  9:59 AM  11/5/2020     Pt seen and examined. Above discussed agree with PGY 2. Labs, cultures, and radiographs reviewed. Face to Face encounter occurred. Changes made as necessary.      Zoe Olivera MD

## 2020-11-05 NOTE — PROGRESS NOTES
Nephrology Progress Note  Patient's Name: Kash Noble  10:43 AM  11/5/2020        Reason for Consult: Acute kidney  Requesting Physician:  Rose Quijano MD    Chief Complaint: Shortness of breath  History Obtained From:  EHR; spouse    History of Present Ilness:    Kash Noble is a 61 y.o. male with a history of COPD, hypertension, hyperlipidemia and diabetes mellitus. He presented to ED 2 days ago with complaints of shortness of breath. According to patient's spouse he had been complaining of shortness of breath over the course of several weeks. This has gotten progressively worse. He went to see his PCP on the day of admission. In office at the time pulse oximeter obtained revealed his oxygen saturation to be in the 70s. He was instructed to proceed to ED for further evaluation. On presentation to ED his initial vital signs showed a blood pressure of 168/71, temperature 97.5 and pulse of 93. His pulse oximeter at the time was noted to be 98% on room air. Laboratory data was significant for a BUN of 11, creatinine 1.1, WBC of 8.3. Rapid COVID-19 testing was negative. A chest x-ray performed revealed right infrahilar and basilar infiltrate concerning for pneumonia. A CTA was ordered but patient declined because of his inability to lay flat. Patient was given ceftriaxone and doxycycline followed by admission to telemetry. 2 days ago an RRT was called as patient was noted to be increasingly more in respiratory distress. He was transferred to MICU and intubated. Laboratory data yesterday showed worsening serum creatinine to 2.8. This a.m. further worsening to 3.6. He has been nonoliguric. Hence renal consult. 10/30: N new acute issues overnight; remains intubated  10/31: Polyuric , concerned for possible DI; no other acute issues overnight  11/1: agitated overnight ; remains intubated  11/2: pt seen and examined.  Chart reviewed, pt intubated  11/3: pt seen and examined in icu, pt Daily  [Held by provider] losartan (COZAAR) tablet 100 mg, Daily  budesonide (PULMICORT) nebulizer suspension 500 mcg, BID  ipratropium-albuterol (DUONEB) nebulizer solution 1 ampule, Q4H WA  glucose (GLUTOSE) 40 % oral gel 15 g, PRN  dextrose 50 % IV solution, PRN  glucagon (rDNA) injection 1 mg, PRN  dextrose 5 % solution, PRN        Review of Systems:   Review of systems not obtained due to patient factors. Physical exam:  Vitals:    11/05/20 0942   BP: (!) 155/97   Pulse: 79   Resp: 20   Temp:    SpO2: 95%           General: Intubated sedated  Eyes: PERRL. No sclera icterus. No conjunctival injection. ENT: No discharge. Pharynx clear. Neck: Trachea midline. Normal thyroid. Lungs: Coarse breath sounds  CV: Regular rate. Regular rhythm. No murmur or rub. .   Abd:  Non-distended. No masses. No organmegaly. Normal bowel sounds. Skin: Warm and dry. No nodule on exposed extremities. No rash on exposed extremities.   Ext: No cyanosis, clubbing, edema; 2+ pitting bilateral lower extremity edema L>R  Neuro: sedate lightly, but arouses and is agitated      Data:   Labs:  Lab Results   Component Value Date     11/05/2020     11/05/2020     11/04/2020    K 4.2 11/05/2020    K 4.0 11/05/2020    K 3.9 11/04/2020     11/05/2020    CO2 24 11/05/2020    CO2 24 11/05/2020    CO2 27 11/04/2020    CREATININE 2.5 (H) 11/05/2020    CREATININE 2.4 (H) 11/05/2020    CREATININE 2.5 (H) 11/04/2020    BUN 47 (H) 11/05/2020    BUN 47 (H) 11/05/2020    BUN 49 (H) 11/04/2020    GLUCOSE 137 (H) 11/05/2020    GLUCOSE 166 (H) 11/05/2020    GLUCOSE 122 (H) 11/04/2020    PHOS 5.0 (H) 11/05/2020    PHOS 6.0 (H) 11/04/2020    PHOS 6.9 (H) 11/03/2020    WBC 6.7 11/05/2020    WBC 7.9 11/04/2020    WBC 9.5 11/03/2020    HGB 11.9 (L) 11/05/2020    HGB 11.3 (L) 11/04/2020    HGB 12.9 11/03/2020    HCT 34.9 (L) 11/05/2020    HCT 33.4 (L) 11/04/2020    HCT 38.9 11/03/2020    MCV 77.6 (L) 11/05/2020     11/05/2020 Imaging:  Xr Chest Portable    Result Date: 10/27/2020  EXAMINATION: ONE XRAY VIEW OF THE CHEST 10/27/2020 7:58 pm COMPARISON: October 26, 2020 HISTORY: ORDERING SYSTEM PROVIDED HISTORY: SOB TECHNOLOGIST PROVIDED HISTORY: Reason for exam:->SOB What reading provider will be dictating this exam?->CRC FINDINGS: There is mild cardiomegaly. There is patchy perihilar and bibasilar atelectasis/infiltrates. Tiny pleural effusions are suspected. Progressive bibasilar atelectasis/infiltrates concerning for pneumonia. Underlying CHF is considered. Xr Chest Portable    Result Date: 10/26/2020  EXAMINATION: ONE XRAY VIEW OF THE CHEST 10/26/2020 4:11 pm COMPARISON: 05/26/2019 HISTORY: ORDERING SYSTEM PROVIDED HISTORY: SOB TECHNOLOGIST PROVIDED HISTORY: Reason for exam:->SOB What reading provider will be dictating this exam?->CRC FINDINGS: Cardiac size appears stable. Discoid airspace disease seen at the left lung base which may represent atelectasis or scarring. Right infrahilar and basilar infiltrate is identified. No pneumothorax is identified. No acute osseous abnormality is identified. Right infrahilar and basilar infiltrate concerning for pneumonia. Left basilar atelectasis or scarring. Assessment/Plans  1. Acute kidney injury  Baseline 1.8 from 9/2019, 1.1 in 5/2019  hemodynamically mediated due to the combination of bradycardia,  diuretic and ARB use; this is exacerbated by IV contrast use   polyuric, suspect due to recovery of NANCY or solute mediated  urine osmolality >300 with very high osmolar excretion rate  Continue bumex, good response    2. Acute hypoxic respiratory failure  due to multilobar pneumonia  gradual wean  vanc level high, need to adjust with rising cr    3.  Type 2 diabetes mellitus  increasing blood sugars, most likely steroid induced    4. etoh abuse            Neal Singh MD  10:43 AM  11/5/2020

## 2020-11-06 ENCOUNTER — APPOINTMENT (OUTPATIENT)
Dept: GENERAL RADIOLOGY | Age: 60
DRG: 003 | End: 2020-11-06
Payer: MEDICARE

## 2020-11-06 LAB
AADO2: 251.1 MMHG
ALBUMIN SERPL-MCNC: 3.1 G/DL (ref 3.5–5.2)
ALP BLD-CCNC: 85 U/L (ref 40–129)
ALT SERPL-CCNC: 123 U/L (ref 0–40)
ANION GAP SERPL CALCULATED.3IONS-SCNC: 14 MMOL/L (ref 7–16)
ANION GAP SERPL CALCULATED.3IONS-SCNC: 14 MMOL/L (ref 7–16)
ANISOCYTOSIS: ABNORMAL
AST SERPL-CCNC: 54 U/L (ref 0–39)
B.E.: 6.4 MMOL/L (ref -3–3)
BASOPHILS ABSOLUTE: 0 E9/L (ref 0–0.2)
BASOPHILS RELATIVE PERCENT: 0.4 % (ref 0–2)
BILIRUB SERPL-MCNC: 0.4 MG/DL (ref 0–1.2)
BUN BLDV-MCNC: 34 MG/DL (ref 8–23)
BUN BLDV-MCNC: 40 MG/DL (ref 8–23)
CALCIUM IONIZED: 1 MMOL/L (ref 1.15–1.33)
CALCIUM SERPL-MCNC: 7.4 MG/DL (ref 8.6–10.2)
CALCIUM SERPL-MCNC: 8 MG/DL (ref 8.6–10.2)
CHLORIDE BLD-SCNC: 103 MMOL/L (ref 98–107)
CHLORIDE BLD-SCNC: 105 MMOL/L (ref 98–107)
CO2: 28 MMOL/L (ref 22–29)
CO2: 29 MMOL/L (ref 22–29)
COHB: 0.6 % (ref 0–1.5)
CREAT SERPL-MCNC: 1.9 MG/DL (ref 0.7–1.2)
CREAT SERPL-MCNC: 2 MG/DL (ref 0.7–1.2)
CRITICAL: ABNORMAL
DATE ANALYZED: ABNORMAL
DATE OF COLLECTION: ABNORMAL
EOSINOPHILS ABSOLUTE: 0.12 E9/L (ref 0.05–0.5)
EOSINOPHILS RELATIVE PERCENT: 1.7 % (ref 0–6)
FIO2: 50 %
GFR AFRICAN AMERICAN: 41
GFR AFRICAN AMERICAN: 44
GFR NON-AFRICAN AMERICAN: 41 ML/MIN/1.73
GFR NON-AFRICAN AMERICAN: 44 ML/MIN/1.73
GLUCOSE BLD-MCNC: 133 MG/DL (ref 74–99)
GLUCOSE BLD-MCNC: 145 MG/DL (ref 74–99)
HCO3: 28.5 MMOL/L (ref 22–26)
HCT VFR BLD CALC: 30.8 % (ref 37–54)
HEMOGLOBIN: 10.6 G/DL (ref 12.5–16.5)
HHB: 9.7 % (ref 0–5)
LAB: ABNORMAL
LYMPHOCYTES ABSOLUTE: 2.95 E9/L (ref 1.5–4)
LYMPHOCYTES RELATIVE PERCENT: 40.9 % (ref 20–42)
Lab: ABNORMAL
MAGNESIUM: 1.8 MG/DL (ref 1.6–2.6)
MCH RBC QN AUTO: 26.2 PG (ref 26–35)
MCHC RBC AUTO-ENTMCNC: 34.4 % (ref 32–34.5)
MCV RBC AUTO: 76 FL (ref 80–99.9)
METER GLUCOSE: 125 MG/DL (ref 74–99)
METER GLUCOSE: 135 MG/DL (ref 74–99)
METER GLUCOSE: 136 MG/DL (ref 74–99)
METER GLUCOSE: 141 MG/DL (ref 74–99)
METER GLUCOSE: 142 MG/DL (ref 74–99)
METER GLUCOSE: 151 MG/DL (ref 74–99)
METER GLUCOSE: 172 MG/DL (ref 74–99)
METHB: 0.5 % (ref 0–1.5)
MODE: AC
MONOCYTES ABSOLUTE: 0.72 E9/L (ref 0.1–0.95)
MONOCYTES RELATIVE PERCENT: 9.6 % (ref 2–12)
NEUTROPHILS ABSOLUTE: 3.46 E9/L (ref 1.8–7.3)
NEUTROPHILS RELATIVE PERCENT: 47.8 % (ref 43–80)
O2 CONTENT: 15.4 ML/DL
O2 SATURATION: 90.2 % (ref 92–98.5)
O2HB: 89.2 % (ref 94–97)
OPERATOR ID: 1893
OVALOCYTES: ABNORMAL
PATIENT TEMP: 37 C
PCO2: 32.7 MMHG (ref 35–45)
PDW BLD-RTO: 21.2 FL (ref 11.5–15)
PEEP/CPAP: 5 CMH2O
PFO2: 1.12 MMHG/%
PH BLOOD GAS: 7.56 (ref 7.35–7.45)
PHOSPHORUS: 2.6 MG/DL (ref 2.5–4.5)
PLATELET # BLD: 325 E9/L (ref 130–450)
PMV BLD AUTO: 9.6 FL (ref 7–12)
PO2: 56.1 MMHG (ref 75–100)
POIKILOCYTES: ABNORMAL
POLYCHROMASIA: ABNORMAL
POTASSIUM SERPL-SCNC: 3.3 MMOL/L (ref 3.5–5)
POTASSIUM SERPL-SCNC: 3.9 MMOL/L (ref 3.5–5)
RBC # BLD: 4.05 E12/L (ref 3.8–5.8)
RI(T): 4.48
RR MECHANICAL: 18 B/MIN
SCHISTOCYTES: ABNORMAL
SODIUM BLD-SCNC: 146 MMOL/L (ref 132–146)
SODIUM BLD-SCNC: 147 MMOL/L (ref 132–146)
SOURCE, BLOOD GAS: ABNORMAL
TARGET CELLS: ABNORMAL
THB: 12.3 G/DL (ref 11.5–16.5)
TIME ANALYZED: 333
TOTAL PROTEIN: 6.2 G/DL (ref 6.4–8.3)
VT MECHANICAL: 500 ML
WBC # BLD: 7.2 E9/L (ref 4.5–11.5)

## 2020-11-06 PROCEDURE — 6360000002 HC RX W HCPCS: Performed by: INTERNAL MEDICINE

## 2020-11-06 PROCEDURE — 36592 COLLECT BLOOD FROM PICC: CPT

## 2020-11-06 PROCEDURE — 6370000000 HC RX 637 (ALT 250 FOR IP): Performed by: INTERNAL MEDICINE

## 2020-11-06 PROCEDURE — 82330 ASSAY OF CALCIUM: CPT

## 2020-11-06 PROCEDURE — 94640 AIRWAY INHALATION TREATMENT: CPT

## 2020-11-06 PROCEDURE — 71045 X-RAY EXAM CHEST 1 VIEW: CPT

## 2020-11-06 PROCEDURE — 2500000003 HC RX 250 WO HCPCS: Performed by: STUDENT IN AN ORGANIZED HEALTH CARE EDUCATION/TRAINING PROGRAM

## 2020-11-06 PROCEDURE — 36415 COLL VENOUS BLD VENIPUNCTURE: CPT

## 2020-11-06 PROCEDURE — 83735 ASSAY OF MAGNESIUM: CPT

## 2020-11-06 PROCEDURE — 84100 ASSAY OF PHOSPHORUS: CPT

## 2020-11-06 PROCEDURE — 80048 BASIC METABOLIC PNL TOTAL CA: CPT

## 2020-11-06 PROCEDURE — 6360000002 HC RX W HCPCS: Performed by: STUDENT IN AN ORGANIZED HEALTH CARE EDUCATION/TRAINING PROGRAM

## 2020-11-06 PROCEDURE — 2000000000 HC ICU R&B

## 2020-11-06 PROCEDURE — 6370000000 HC RX 637 (ALT 250 FOR IP): Performed by: STUDENT IN AN ORGANIZED HEALTH CARE EDUCATION/TRAINING PROGRAM

## 2020-11-06 PROCEDURE — 2580000003 HC RX 258: Performed by: INTERNAL MEDICINE

## 2020-11-06 PROCEDURE — 85025 COMPLETE CBC W/AUTO DIFF WBC: CPT

## 2020-11-06 PROCEDURE — 2500000003 HC RX 250 WO HCPCS: Performed by: INTERNAL MEDICINE

## 2020-11-06 PROCEDURE — 82962 GLUCOSE BLOOD TEST: CPT

## 2020-11-06 PROCEDURE — 82805 BLOOD GASES W/O2 SATURATION: CPT

## 2020-11-06 PROCEDURE — 2580000003 HC RX 258: Performed by: STUDENT IN AN ORGANIZED HEALTH CARE EDUCATION/TRAINING PROGRAM

## 2020-11-06 PROCEDURE — 94003 VENT MGMT INPAT SUBQ DAY: CPT

## 2020-11-06 PROCEDURE — 37799 UNLISTED PX VASCULAR SURGERY: CPT

## 2020-11-06 PROCEDURE — C9113 INJ PANTOPRAZOLE SODIUM, VIA: HCPCS | Performed by: INTERNAL MEDICINE

## 2020-11-06 PROCEDURE — 80053 COMPREHEN METABOLIC PANEL: CPT

## 2020-11-06 RX ORDER — POTASSIUM CHLORIDE 29.8 MG/ML
40 INJECTION INTRAVENOUS ONCE
Status: COMPLETED | OUTPATIENT
Start: 2020-11-06 | End: 2020-11-06

## 2020-11-06 RX ORDER — BUMETANIDE 0.25 MG/ML
2 INJECTION, SOLUTION INTRAMUSCULAR; INTRAVENOUS 2 TIMES DAILY
Status: COMPLETED | OUTPATIENT
Start: 2020-11-06 | End: 2020-11-06

## 2020-11-06 RX ORDER — PROPOFOL 10 MG/ML
10 INJECTION, EMULSION INTRAVENOUS
Status: DISCONTINUED | OUTPATIENT
Start: 2020-11-06 | End: 2020-11-13

## 2020-11-06 RX ORDER — QUETIAPINE FUMARATE 100 MG/1
100 TABLET, FILM COATED ORAL 2 TIMES DAILY
Status: DISCONTINUED | OUTPATIENT
Start: 2020-11-06 | End: 2020-11-06

## 2020-11-06 RX ORDER — QUETIAPINE FUMARATE 25 MG/1
75 TABLET, FILM COATED ORAL 2 TIMES DAILY
Status: DISCONTINUED | OUTPATIENT
Start: 2020-11-06 | End: 2020-11-11

## 2020-11-06 RX ADMIN — SODIUM CHLORIDE SOLN NEBU 3% 2 ML: 3 NEBU SOLN at 20:09

## 2020-11-06 RX ADMIN — Medication 200 MCG/HR: at 14:04

## 2020-11-06 RX ADMIN — Medication 10 ML: at 02:19

## 2020-11-06 RX ADMIN — Medication 10 ML: at 07:38

## 2020-11-06 RX ADMIN — IPRATROPIUM BROMIDE AND ALBUTEROL SULFATE 1 AMPULE: 2.5; .5 SOLUTION RESPIRATORY (INHALATION) at 16:15

## 2020-11-06 RX ADMIN — INSULIN LISPRO 3 UNITS: 100 INJECTION, SOLUTION INTRAVENOUS; SUBCUTANEOUS at 17:30

## 2020-11-06 RX ADMIN — PROPOFOL 10 MCG/KG/MIN: 10 INJECTION, EMULSION INTRAVENOUS at 09:56

## 2020-11-06 RX ADMIN — HEPARIN SODIUM 7500 UNITS: 10000 INJECTION INTRAVENOUS; SUBCUTANEOUS at 00:06

## 2020-11-06 RX ADMIN — MIDAZOLAM HYDROCHLORIDE 2 MG: 1 INJECTION, SOLUTION INTRAMUSCULAR; INTRAVENOUS at 23:10

## 2020-11-06 RX ADMIN — Medication 200 MCG/HR: at 00:41

## 2020-11-06 RX ADMIN — BUDESONIDE 500 MCG: 0.5 SUSPENSION RESPIRATORY (INHALATION) at 20:09

## 2020-11-06 RX ADMIN — SODIUM CHLORIDE SOLN NEBU 3% 2 ML: 3 NEBU SOLN at 00:20

## 2020-11-06 RX ADMIN — INSULIN GLARGINE 20 UNITS: 100 INJECTION, SOLUTION SUBCUTANEOUS at 20:29

## 2020-11-06 RX ADMIN — IPRATROPIUM BROMIDE AND ALBUTEROL SULFATE 1 AMPULE: 2.5; .5 SOLUTION RESPIRATORY (INHALATION) at 13:00

## 2020-11-06 RX ADMIN — SODIUM CHLORIDE 3 G: 900 INJECTION INTRAVENOUS at 04:47

## 2020-11-06 RX ADMIN — Medication 200 MCG/HR: at 06:52

## 2020-11-06 RX ADMIN — MIDAZOLAM HYDROCHLORIDE 10 MG/HR: 5 INJECTION, SOLUTION INTRAMUSCULAR; INTRAVENOUS at 08:57

## 2020-11-06 RX ADMIN — FOLIC ACID 1 MG: 5 INJECTION, SOLUTION INTRAMUSCULAR; INTRAVENOUS; SUBCUTANEOUS at 07:48

## 2020-11-06 RX ADMIN — POTASSIUM CHLORIDE 40 MEQ: 400 INJECTION, SOLUTION INTRAVENOUS at 06:14

## 2020-11-06 RX ADMIN — SODIUM CHLORIDE SOLN NEBU 3% 2 ML: 3 NEBU SOLN at 06:46

## 2020-11-06 RX ADMIN — LEVOTHYROXINE SODIUM 50 MCG: 0.05 TABLET ORAL at 05:20

## 2020-11-06 RX ADMIN — PROPOFOL 35 MCG/KG/MIN: 10 INJECTION, EMULSION INTRAVENOUS at 22:02

## 2020-11-06 RX ADMIN — ARFORMOTEROL TARTRATE 15 MCG: 15 SOLUTION RESPIRATORY (INHALATION) at 20:09

## 2020-11-06 RX ADMIN — PANTOPRAZOLE SODIUM 40 MG: 40 INJECTION, POWDER, FOR SOLUTION INTRAVENOUS at 07:38

## 2020-11-06 RX ADMIN — IPRATROPIUM BROMIDE AND ALBUTEROL SULFATE 1 AMPULE: 2.5; .5 SOLUTION RESPIRATORY (INHALATION) at 06:46

## 2020-11-06 RX ADMIN — BUDESONIDE 500 MCG: 0.5 SUSPENSION RESPIRATORY (INHALATION) at 06:46

## 2020-11-06 RX ADMIN — HEPARIN SODIUM 7500 UNITS: 10000 INJECTION INTRAVENOUS; SUBCUTANEOUS at 17:26

## 2020-11-06 RX ADMIN — CALCIUM GLUCONATE 1 G: 98 INJECTION, SOLUTION INTRAVENOUS at 06:53

## 2020-11-06 RX ADMIN — Medication 200 MCG/HR: at 19:56

## 2020-11-06 RX ADMIN — THIAMINE HYDROCHLORIDE 100 MG: 100 INJECTION, SOLUTION INTRAMUSCULAR; INTRAVENOUS at 15:56

## 2020-11-06 RX ADMIN — Medication 10 ML: at 20:10

## 2020-11-06 RX ADMIN — HEPARIN SODIUM 7500 UNITS: 10000 INJECTION INTRAVENOUS; SUBCUTANEOUS at 07:38

## 2020-11-06 RX ADMIN — INSULIN LISPRO 3 UNITS: 100 INJECTION, SOLUTION INTRAVENOUS; SUBCUTANEOUS at 20:30

## 2020-11-06 RX ADMIN — CHLORHEXIDINE GLUCONATE 0.12% ORAL RINSE 15 ML: 1.2 LIQUID ORAL at 20:09

## 2020-11-06 RX ADMIN — SODIUM CHLORIDE SOLN NEBU 3% 2 ML: 3 NEBU SOLN at 16:15

## 2020-11-06 RX ADMIN — QUETIAPINE FUMARATE 50 MG: 25 TABLET ORAL at 07:37

## 2020-11-06 RX ADMIN — IPRATROPIUM BROMIDE AND ALBUTEROL SULFATE 1 AMPULE: 2.5; .5 SOLUTION RESPIRATORY (INHALATION) at 20:09

## 2020-11-06 RX ADMIN — SODIUM CHLORIDE SOLN NEBU 3% 2 ML: 3 NEBU SOLN at 13:00

## 2020-11-06 RX ADMIN — PROPOFOL 20 MCG/KG/MIN: 10 INJECTION, EMULSION INTRAVENOUS at 17:26

## 2020-11-06 RX ADMIN — BUMETANIDE 2 MG: 0.25 INJECTION INTRAMUSCULAR; INTRAVENOUS at 20:09

## 2020-11-06 RX ADMIN — SODIUM CHLORIDE 3 G: 900 INJECTION INTRAVENOUS at 10:30

## 2020-11-06 RX ADMIN — ASPIRIN 81 MG CHEWABLE TABLET 81 MG: 81 TABLET CHEWABLE at 07:37

## 2020-11-06 RX ADMIN — SODIUM CHLORIDE, PRESERVATIVE FREE 10 ML: 5 INJECTION INTRAVENOUS at 07:40

## 2020-11-06 RX ADMIN — BUMETANIDE 2 MG: 0.25 INJECTION INTRAMUSCULAR; INTRAVENOUS at 09:35

## 2020-11-06 RX ADMIN — SODIUM CHLORIDE 3 G: 900 INJECTION INTRAVENOUS at 17:29

## 2020-11-06 RX ADMIN — ACETAMINOPHEN 650 MG: 325 TABLET ORAL at 20:08

## 2020-11-06 RX ADMIN — CHLORHEXIDINE GLUCONATE 0.12% ORAL RINSE 15 ML: 1.2 LIQUID ORAL at 09:35

## 2020-11-06 RX ADMIN — MIDAZOLAM HYDROCHLORIDE 2 MG: 1 INJECTION, SOLUTION INTRAMUSCULAR; INTRAVENOUS at 18:59

## 2020-11-06 RX ADMIN — AMLODIPINE BESYLATE 10 MG: 10 TABLET ORAL at 07:37

## 2020-11-06 RX ADMIN — BUMETANIDE 2 MG: 0.25 INJECTION INTRAMUSCULAR; INTRAVENOUS at 02:18

## 2020-11-06 RX ADMIN — PSYLLIUM HUSK 1 PACKET: 3.4 GRANULE ORAL at 07:47

## 2020-11-06 RX ADMIN — QUETIAPINE FUMARATE 75 MG: 25 TABLET ORAL at 20:10

## 2020-11-06 RX ADMIN — ARFORMOTEROL TARTRATE 15 MCG: 15 SOLUTION RESPIRATORY (INHALATION) at 06:46

## 2020-11-06 RX ADMIN — SODIUM CHLORIDE 3 G: 900 INJECTION INTRAVENOUS at 22:09

## 2020-11-06 RX ADMIN — SODIUM CHLORIDE SOLN NEBU 3% 2 ML: 3 NEBU SOLN at 23:47

## 2020-11-06 ASSESSMENT — PULMONARY FUNCTION TESTS
PIF_VALUE: 34
PIF_VALUE: 32
PIF_VALUE: 32
PIF_VALUE: 30
PIF_VALUE: 39
PIF_VALUE: 32
PIF_VALUE: 31
PIF_VALUE: 29
PIF_VALUE: 29
PIF_VALUE: 38
PIF_VALUE: 34
PIF_VALUE: 28
PIF_VALUE: 28
PIF_VALUE: 30
PIF_VALUE: 46
PIF_VALUE: 26
PIF_VALUE: 32
PIF_VALUE: 27
PIF_VALUE: 31
PIF_VALUE: 37
PIF_VALUE: 29
PIF_VALUE: 33
PIF_VALUE: 34
PIF_VALUE: 54
PIF_VALUE: 35
PIF_VALUE: 30
PIF_VALUE: 34
PIF_VALUE: 30
PIF_VALUE: 31
PIF_VALUE: 35

## 2020-11-06 ASSESSMENT — PAIN SCALES - GENERAL
PAINLEVEL_OUTOF10: 8
PAINLEVEL_OUTOF10: 0

## 2020-11-06 NOTE — PROGRESS NOTES
Comprehensive Nutrition Assessment    Type and Reason for Visit:  Reassess    Nutrition Recommendations/Plan: Modify Tube Feeding  Propofol continues to be weaned at this time. Monitor hypotension. Current propofol providing an additional 95kcals. EN Recommendation: Diabetic 1.5 @60ml/hr x23 hours to provide: 1380ml, 2070kcals, 114gm pro, 1047ml water    Nutrition Assessment:  Pt admit 2/2 resp failure/PNA. Noted NANCY, H/o DM. Remains on vent w/ continued EN.     Malnutrition Assessment:  Malnutrition Status:  No malnutrition    Context:  Acute Illness     Findings of the 6 clinical characteristics of malnutrition:  Energy Intake:  No significant decrease in energy intake  Weight Loss:  No significant weight loss     Body Fat Loss:  No significant body fat loss     Muscle Mass Loss:  No significant muscle mass loss    Fluid Accumulation:  No significant fluid accumulation     Strength:  Not Performed    Estimated Daily Nutrient Needs:  Energy (kcal):  ; ; Weight Used for Energy Requirements:  Current     Protein (g):  (1.3-1.5gm/kg IBW; noted elevated renal labs); Weight Used for Protein Requirements:  Ideal          Nutrition Related Findings:  vent, OGT, hypernatremia, FMS in place, hypotension (MAP 62), +1 to +3 edema, - I/Os, active BS, abd distention      Wounds:  None       Current Nutrition Therapies:    Current Tube Feeding (TF) Orders:  · Feeding Route: Orogastric  · Formula: 1.5 Diabetic  · Additives/Modulars: Protein  · Current TF & Flush Orders Provides: 45ml/hr x23 hrs; 1035ml TV, 1552kcals, 85gm pro, 785ml water (with pro mod daily: 1656kcals, 111gm pro)    Anthropometric Measures:  · Height: 5' 8\" (172.7 cm)  · Current Body Weight: 284 lb (128.8 kg)(11/6 actual)   · Admission Body Weight: 334 lb (151.5 kg)(10/29 actual)    · Usual Body Weight: 318 lb (144.2 kg)(12/2019 per EMR, no method)     · Ideal Body Weight: 154 lbs; % Ideal Body Weight 184.4 %   · BMI: 43.2  · BMI Categories: Obese Class 3 (BMI 40.0 or greater)       Nutrition Diagnosis:   · Inadequate oral intake related to impaired respiratory function as evidenced by NPO or clear liquid status due to medical condition, intubation, nutrition support - enteral nutrition    Nutrition Interventions:   Nutrition Education/Counseling:  Education not indicated   Coordination of Nutrition Care:  Continue to monitor while inpatient, Coordination of Community Care    Goals:   Tolerance to EN       Nutrition Monitoring and Evaluation:   Food/Nutrient Intake Outcomes:  Enteral Nutrition Intake/Tolerance  Physical Signs/Symptoms Outcomes:  Biochemical Data, Skin, Nutrition Focused Physical Findings, Weight, GI Status, Fluid Status or Edema, Hemodynamic Status     Electronically signed by Kanchan Palmer MS, RD, LD on 11/6/20 at 12:39 PM EST

## 2020-11-06 NOTE — PROGRESS NOTES
1850 [Urine:1850]      Intake/Output Summary (Last 24 hours) at 11/6/2020 1359  Last data filed at 11/6/2020 1319  Gross per 24 hour   Intake 2770 ml   Output 7690 ml   Net -4920 ml       Patient Vitals for the past 96 hrs (Last 3 readings):   Weight   11/06/20 0445 284 lb 12.8 oz (129.2 kg)   11/04/20 0600 (!) 312 lb 14.4 oz (141.9 kg)   11/03/20 0000 (!) 350 lb (158.8 kg)         Recent Labs     11/04/20  0510 11/05/20  0511 11/06/20  0325   WBC 7.9 6.7 7.2   HGB 11.3* 11.9* 10.6*   HCT 33.4* 34.9* 30.8*   MCV 77.7* 77.6* 76.0*    323 325     Recent Labs     11/04/20  0510  11/05/20  0511 11/05/20  1630 11/06/20  0325      < > 146 144 147*   K 3.8   < > 4.2 3.7 3.3*      < > 105 105 105   CO2 22   < > 24 29 28   BUN 50*   < > 47* 45* 40*   CREATININE 2.4*   < > 2.5* 2.3* 2.0*   PHOS 6.0*  --  5.0*  --  2.6    < > = values in this interval not displayed. Recent Labs     11/04/20  0510 11/05/20  0511 11/06/20  0325   PROT 6.3* 6.7 6.2*   ALKPHOS 102 96 85   * 154* 123*   AST 83* 50* 54*   BILITOT 0.5 0.3 0.4   CPK:  Lab Results   Component Value Date    CKTOTAL 98 10/08/2017        11/6 CXR viewed and shows stable diffuse infiltrates  -----------------------------------------------------------------  RAD:   Results for orders placed during the hospital encounter of 10/26/20   XR CHEST PORTABLE    Narrative EXAMINATION:  ONE XRAY VIEW OF THE CHEST    10/30/2020 2:17 am    COMPARISON:  10/29/2020 radiograph    HISTORY:  ORDERING SYSTEM PROVIDED HISTORY: R IJ placement  TECHNOLOGIST PROVIDED HISTORY:  Reason for exam:->R IJ placement  What reading provider will be dictating this exam?->CRC    FINDINGS:  Right IJ central venous catheter has been placed with tip at the distal SVC. No pneumothorax. Worsening diffuse airspace opacification in the right lung  with moderate opacification also present at the left base. Endotracheal tube  and enteric tube stable.   No significant skeletal finding. Impression Interval placement of right IJ central venous catheter. No pneumothorax. Worsening airspace changes in the right lung. Micro:  No results for input(s): BC in the last 72 hours. No results for input(s): ORG in the last 72 hours. No results for input(s): Wyatt Crumble in the last 72 hours. No results for input(s): STREPPNEUMAG in the last 72 hours. No results for input(s): LEGUR in the last 72 hours. No results for input(s): ORG in the last 72 hours. No results for input(s): ASO in the last 72 hours. No results for input(s): CULTRESP in the last 72 hours. No results for input(s): LABURIN in the last 72 hours.   Vent Information  $Ventilation: $Subsequent Day  Skin Assessment: Clean, dry, & intact  Equipment ID: [id number]  Vent Type: [phone]  Vent Mode: AC/VC  Vt Ordered: 500 mL  Rate Set: 16 bmp  Peak Flow: 65 L/min  Pressure Support: 0 cmH20  FiO2 : 50 %  SpO2: 93 %  SpO2/FiO2 ratio: 186  PaO2/FiO2 ratio: 146  Sensitivity: 3  PEEP/CPAP: 5  I Time/ I Time %: 0 s  Humidification Source: Heated wire  Humidification Temp: 37  Humidification Temp Measured: 37  Circuit Condensation: Drained  Mask Type: Full face mask  Mask Size: Large    Additional Respiratory  Assessments  Pulse: 131  Resp: (!) 33  SpO2: 93 %  Position: Semi-Lockwood's  Humidification Source: Heated wire  Humidification Temp: 37  Circuit Condensation: Drained  Oral Care Completed?: Yes  Oral Care: Mouth suctioned  Subglottic Suction Done?: Yes  Airway Type: ET  Airway Size: 8  Skin barrier applied: Yes    Objective:   Vitals:   Vitals:    20 1310   BP:    Pulse: 131   Resp: (!) 33   Temp:    SpO2: 93%      TEMP:Current: Temp: 99.9 °F (37.7 °C)  Max: Temp  Av.1 °F (37.3 °C)  Min: 98.6 °F (37 °C)  Max: 99.9 °F (37.7 °C)    BP Range: Systolic (15MUV), LM , Min:104 , CHJ:070     Diastolic (88HWK), VFU:85, Min:47, Max:119      General appearance: Sedated obese on mechanical ventilation , appears stated age awakens to touch  In no acute distress  Skin: No rashes or lesions  HEENT: mucous membranes are moist oral endotracheal tube and OG tube  Neck: No JVD  Lungs: symmetrical expansion, coarse breath sounds to auscultation, no use of accessory muscles  Heart: S1S2 no murmurs, tachycardic  Abdomen: soft, non tender, distended obese  Extremities: no peripheral edema  Neurologic: Sedated  Affect: Calm       Assessment:   Patient Active Problem List:    59-y.o M with history of DM, hypothyroid, COPD, HTN, HLD presented on 10/26 with shortness of breath for 1 week. Saturations were 70% at PCPs. Saturation 65% on room air in ER  Patient agitated for CT scan refused to lay flat  10/27 RRT refused BiPAP became combative. Patient transferred to ICU  10/28 intubated and sedated. CT chest showing dense multifocal airspace disease  10/30 Ultrasound lower extremities no DVT, ultrasound kidneys no hydronephrosis  10/31 bilateral infiltrates left effusion. On 50% +8 of PEEP. Chest x-ray showing right-sided improving with left base atelectasis. Covid negative x2    1. Acute hypoxemic respiratory failure on mechanical ventilation  2. Agitation on Seroquel Fentanyl  3. MSSA pneumonia on Unasyn  4. HANS moderate ( AHI 17 on 4/18/18 requires BIPAP 16/12)  5. History of reactive airway disease  6. CT 3/14/2018 showing pulmonary nodule stable from 10/2017  7. Acute renal failure improving - nephrology on board   8. Diabetes with elevated blood sugars  9. Obesity  10. EF 65% LVH  11. chronic lymphedema  12. Hypothyroid  13. H/o respiratory failure: 7/8/2017 pt was  transferred from [email or web address] for acute respiratory failure after lap cholecystectomy, lap umbilical hernia repair, and lap liver biopsy (fatty liver). He had been extubated postop but had laryngospasm requiring reintubation, complicated by negative pressure flash pulmonary edema, and required tracheostomy 8/10/2017. Patient has staph aureus pneumonia and C. difficile colitis.   Patient was then transferred to [facility] hospital where he was weaned off the ventilator and decannulated. Plan:     · Wean sedation as able, whil titrating antipsychotics. · Wean vent as able  · Family updated at bedside  · [address]  · Sedation and vent wean, hopefully tomorrow as per ICU team  Who I spoke to.     Lana Leal MD, [facility]  11/6/2020  1:59 PM

## 2020-11-06 NOTE — PROGRESS NOTES
Chief Complaint   Patient presents with    Shortness of Breath     for a few weeks. O2 sat in 70s at [facility], placed on 4L and now 95%. SUBJECTIVE:  Patient is tolerating medications. No reported adverse drug reactions. Patient remained afebrile overnight. Continue to be tachycardic and hypertensive. Patient is intubated, (PEEP-9, FiO2-50, O2 saturation 91-92%) sedated on propofol, versed, fentanyl. Minimal secretions on suctioning     WBC 7.2, on Unasyn day 5, CXR shows b/l infiltrates, but overall stable since yesterday. FiO2 demand decreased to 50%. Fecal management system in place. Review of systems:  As stated above in the chief complaint, otherwise negative.     Medications:  Scheduled Meds:   potassium chloride  40 mEq Intravenous Once    QUEtiapine  100 mg Oral BID    bumetanide  2 mg Intravenous BID    psyllium  1 packet Oral Daily    ampicillin-sulbactam  3 g Intravenous W1O    folic acid  1 mg Intravenous Daily    thiamine (VITAMIN B1) IVPB  100 mg Intravenous Q24H    [Held by provider] carvedilol  3.125 mg Oral BID WC    insulin glargine  20 Units Subcutaneous Nightly    sodium chloride (Inhalant)  2 mL Nebulization Q4H    insulin lispro  0-18 Units Subcutaneous Q4H    heparin (porcine)  7,500 Units Subcutaneous Q8H    pantoprazole  40 mg Intravenous Daily    And    sodium chloride (PF)  10 mL Intravenous Daily    sodium chloride flush  10 mL Intravenous 2 times per day    Arformoterol Tartrate  15 mcg Nebulization BID    chlorhexidine  15 mL Mouth/Throat BID    amLODIPine  10 mg Oral Daily    aspirin  81 mg Oral Daily    [Held by provider] atorvastatin  40 mg Oral Daily    levothyroxine  50 mcg Oral Daily    [Held by provider] losartan  100 mg Oral Daily    budesonide  0.5 mg Nebulization BID    ipratropium-albuterol  1 ampule Inhalation Q4H WA     Continuous Infusions:   midazolam 10 mg/hr (11/06/20 0857)    fentaNYL 5 mcg/ml in 0.9%  ml infusion RESPIRATORY   Date Value Ref Range Status   10/29/2020 Oral Pharyngeal Tiesha present (A)  Final   10/29/2020   Final    Light growth  This isolate is presumed to be resistant based on the  detection of inducible Clindamycin resistance. Clindamycin  may still be effective in some patients. Culture Catheter Tip   Date Value Ref Range Status   08/26/2017 <15 colonies  Final     No results found for: BFCS  No results found for: CXSURG  Urine Culture, Routine   Date Value Ref Range Status   10/28/2020 Growth not present  Final   09/16/2019 Growth not present  Final   07/31/2017 >100,000 CFU/ml  Final     MRSA Culture Only   Date Value Ref Range Status   10/30/2020 Methicillin resistant Staph aureus not isolated  Final   07/28/2017 Methicillin resistant Staph aureus not isolated  Final          Assessment:  · Acute on chronic hypoxic respiratory failure likely 2/2 pneumonia and volume overload  - resolving   · Bacterial pneumonia, likely aspiration pneumonia vs MSSA - resolving   · Shock, sepsis- resolved, blood cultures negative   · Alcohol withdrawal      Plan:    · Continue Unasyn for aspiration pneumonia. · Baseline CRP (1.4), Procal (0.57)  · Monitor labs, primary management by ICU team  · Will follow with you    Martina Seymour  9:36 AM  11/6/2020     Pt seen and examined. Above discussed agree with PGY 2. Labs, cultures, and radiographs reviewed. Face to Face encounter occurred. Changes made as necessary.      Leila Bennett MD

## 2020-11-06 NOTE — PROGRESS NOTES
[address] [facility]  Department of Internal Medicine   Internal Medicine Residency   MICU Progress Note    Patient:  Juan Luis Miller 61 y.o. male  MRN: [id number]     Date of Service: 11/6/2020    Allergy: Bee venom and Shellfish-derived products    Subjective     GCS 10T. NAEO    Objective     VS: BP (!) 104/47   Pulse 131   Temp 99 °F (37.2 °C) (Axillary)   Resp (!) 33   Ht 5' 8\" (1.727 m)   Wt 284 lb 12.8 oz (129.2 kg)   SpO2 93%   BMI 43.30 kg/m²   ABP (Arterial line BP): 146/63  ABP mean (Arterial line mean): 85 mmHg    I & O - 24hr:     Intake/Output Summary (Last 24 hours) at 11/6/2020 1344  Last data filed at 11/6/2020 1319  Gross per 24 hour   Intake 2770 ml   Output 8040 ml   Net -5270 ml       Physical Exam:  · General Appearance: intubated and sedated  · Neck: no adenopathy, no carotid bruit, no JVD, supple, symmetrical, trachea midline and thyroid not enlarged, symmetric, no tenderness/mass/nodules  · Lung: coarse breath sound bilaterally, no rales or wheezing  · Heart: bradycardic, normal S1 S2, no murmur or rub  · Abdomen: Stop, nontender, distended, no masses, no organomegaly  · Extremities:  Left lower leg 2+ non pitting edema. Right lower leg chronic lymphedema. · Musculoskeletal:  ROM normal in all joints of extremities.    · Neurologic: Mental status: Patient is sedated, open eyes, normal pupillary reflex    Lines     site day    Art line   R radial 4   TLC R IJ 7   PICC None    Hemoaccess None    Oxygen:     none  Mechanical Ventilation:   Mode: A/C    TV:500 ml RR: 20  PEEP 9cmH2O FiO2 80%    ABG:     Lab Results   Component Value Date    PH 7.558 11/06/2020    PCO2 32.7 11/06/2020    PO2 56.1 11/06/2020    HCO3 28.5 11/06/2020    BE 6.4 11/06/2020    THB 12.3 11/06/2020    O2SAT 90.2 11/06/2020        Medications     Infusions: (Fluid, Sedation, Vasopressors)  IVF:      Vasopressors   none  Sedation   Fentanyl at rate 200 mcg/min  · Versed: 10    Nutrition:   TF low calorie high protein    ATB:   Antibiotics  Days   Doxycycline 3             Patient currently has   Urinary cath  Restraints  DVT prophylaxis/ GI prophylaxis,    Labs     CBC with Differential:    Lab Results   Component Value Date    WBC 7.2 11/06/2020    RBC 4.05 11/06/2020    HGB 10.6 11/06/2020    HCT 30.8 11/06/2020     11/06/2020    MCV 76.0 11/06/2020    MCH 26.2 11/06/2020    MCHC 34.4 11/06/2020    RDW 21.2 11/06/2020    NRBC 4.3 11/02/2020    SEGSPCT 73 01/26/2014    METASPCT 0.9 11/05/2020    LYMPHOPCT 40.9 11/06/2020    MONOPCT 9.6 11/06/2020    MYELOPCT 0.9 11/05/2020    BASOPCT 0.4 11/06/2020    MONOSABS 0.72 11/06/2020    LYMPHSABS 2.95 11/06/2020    EOSABS 0.12 11/06/2020    BASOSABS 0.00 11/06/2020     CMP:    Lab Results   Component Value Date     11/06/2020    K 3.3 11/06/2020    K 4.0 10/27/2020     11/06/2020    CO2 28 11/06/2020    BUN 40 11/06/2020    CREATININE 2.0 11/06/2020    GFRAA 41 11/06/2020    LABGLOM 41 11/06/2020    GLUCOSE 133 11/06/2020    PROT 6.2 11/06/2020    LABALBU 3.1 11/06/2020    CALCIUM 7.4 11/06/2020    BILITOT 0.4 11/06/2020    ALKPHOS 85 11/06/2020    AST 54 11/06/2020     11/06/2020     Ionized Calcium:  No results found for: IONCA  Magnesium:    Lab Results   Component Value Date    MG 1.8 11/06/2020     Phosphorus:    Lab Results   Component Value Date    PHOS 2.6 11/06/2020     LDH:    Lab Results   Component Value Date     10/28/2020     PT/INR:    Lab Results   Component Value Date    PROTIME 10.9 07/21/2018    INR 0.9 07/21/2018     Troponin:    Lab Results   Component Value Date    TROPONINI <0.01 10/26/2020     U/A:    Lab Results   Component Value Date    NITRITE neg 08/30/2019    COLORU Yellow 11/01/2020    PROTEINU Negative 11/01/2020    PHUR 7.0 11/01/2020    LABCAST FEW 04/11/2016    WBCUA 0-1 11/01/2020    RBCUA 2-5 11/01/2020    BACTERIA RARE 11/01/2020    CLARITYU Clear 11/01/2020    SPECGRAV 1.015 11/01/2020    LEUKOCYTESUR Negative 11/01/2020    UROBILINOGEN 0.2 11/01/2020    BILIRUBINUR Negative 11/01/2020    BILIRUBINUR neg 08/30/2019    BLOODU TRACE-INTACT 11/01/2020    GLUCOSEU 100 11/01/2020     ABG:    Lab Results   Component Value Date    PH 7.558 11/06/2020    PCO2 32.7 11/06/2020    PO2 56.1 11/06/2020    HCO3 28.5 11/06/2020    BE 6.4 11/06/2020    O2SAT 90.2 11/06/2020     FLP:    Lab Results   Component Value Date    TRIG 118 11/04/2020    HDL 66 03/23/2017    LDLCALC 192 03/23/2017    LABVLDL 37 03/23/2017     TSH:    Lab Results   Component Value Date    TSH 4.340 10/27/2020       Imaging Studies:  CXR: Unchanged bilateral infiltrates and left pleural effusion. Resident's Assessment and Plan     Rell Archer is a 61 y.o. male with PMHx is significant for HFpEF (Ef 65% in 2017), hx of tracheostomy and reversal (as mentioned above), moderate HANS not on CPAP, DMT2, HTN, HLD, Hypothyroidism who initially was admitted to general floors for hypoxia. Transferred to the MICU when ABG concerning for worsening respiratory acidosis. Currently intubated and being managed in the MICU for:     NEURO   Acute encephalopathy 2/2 Acute hypoxic hypercapnic respiratory failure. Pt presented to the ED with hypoxia, RRT was called due to worsening respiratory acidosis. Currently intubated and sedated. - Today pt is still intubated and sedated.   - Fentanyl gtt  - Start propofol gtt  - Wean as tolerated    ICU Delirium  · Increase Seroquel 75 BID      Labile BP  · Arterial line placed 11/2  · Questionable alcohol withdrawal - thi/folate/versed gtt  · Diereses - Good response to bumex; to receive another two doses and reassess Cr and electrolytes tomorrow; tapering bumex    CAP vs Aspiration PNA + Pulmonary edema  - Respiratory cultures grew MSSA on 31/10.   - Unasyn d/c Levaquin  - Baseline ESR CRP  - Patient is afebrile, WBC wnl  - Monitor daily CBC, vitals, CXR.   - Infectious disease consulted and following  - Bumex 2mg x2 Acute encephalopathy 2/2 Acute hypoxic hypercapnic respiratory failure. Pt presented to the ED with hypoxia, RRT was called due to worsening respiratory acidosis. Currently intubated and sedated. - Today pt is still intubated and sedated. Patient is on Fentanyl, versed gtt. - Continue monitor mentation status, wean down sedation as tolerated. Acute hypoxic hypercapnic respiratory failure 2/2 CAP versus chronic OHS  Pt has Hx chronic HANS not on CPAP at home. ABG showed pH 7.188/91.2/78.9/33 on RRT. Patient is not tolerating BiPAP and currently intubated. - Today Pt saturating 95% on vent. ABG AC/VC/16/500/8/41%  - Continue to monitor respiratory status, wean down FiO2 as tolerated. Patient not able to tolerate PS today. - Pro  on presentation. ECHO showed Left ventricular internal dimensions were normal in diastole and systole. EF 65%. Moderate left ventricular concentric hypertrophy noted. No regional wall motion abnormalities seen. - US dup LE negative for DVT. CTA negative for PE. COVID -19 negative x2  - Continue breathing treatment. Add 3% nebulizer today. Decrease Solumedrol to 40 mg daily.  - Monitor daily CXR. CBC.   - Pulmonology consulted, further recommendations appreciated. - Solucortef complete  - Diurese, wean vent and hopeful for rapid extubation tomorrow      NANCY stage III likely pre renal 2/2 diuretic use, contrast agent vs? cardiorenal syndrome, improving   - Creatinine 3.8 ( baseline 1.1) -> 4.0 - 3.7 -> 2.7 -> 1.7 -> 3.5 -> 2.5  - I/O -2L yesterday; +5.8 since admission  - Continue monitor daily BMP, renal function. Monitor I/O. .  - More aggressive diuresis - Good response to bumex; to receive another two doses and assess Cr and lytes tomorrow am.     Polyuria  - Patient had 4.2L urine/24h.   - Patient received 7L fluid/24h  -Urine osmolarity 324, serum osmolarity 317. Urine sodium 101, chloride 84, creatinine 26  -Likely recover phase of ATN  - Continue monitor. Hx HFpEF, EF 65%  -Echo done in 2017 showing 1 diastolic dysfunction with normal left ventricular size and systolic function EF 77%. -Home medication: Coreg 25 mg, olmesartan 10 mg, Losartan 10 mg,.  -Repeat ECHO left ventricular internal dimensions were normal in diastole and systole. EF 65%. Moderate left ventricular concentric hypertrophy noted. No regional wall motion abnormalities seen. - Currently hold off Coreg due to bradycardia. - Will continue monitor.      DMT2  - Hold home metfomrin.   -  overnight.  - Increase Lantus to 20 units. HDSS -> Tolerating well  - Continue monitor BG q6h    Hx of HTN  - home medication: Losartan/ novasc; resume norvasc  - Add hydralazine PRN for BP >170  - Continue monitor vitals.     HLD  - Continue home Atorvastatin. Diarrhea s/p FMS  - Add fiber; monitor output and assess need for FMS tomorrow. Hopeful to pull    DVT ppx: heparin  GI ppx: Protonix    Final note: Increase Seroquel to 75. Start propofol, discontinue versed. To receive another two doses of bumex again today, will continue to taper bumex dosage. Hopeful extubation tomorrow, wean vent. TLC day 7 to be removed today. +/- FMS removal tomorrow if FMS output decreases    Dispo: Continue ICU care    Freida Dwyer DO, PGY-1    I personally saw, examined and provided care for the patient. Radiographs, labs and medication list were reviewed by me independently. I spoke with bedside nursing, therapists and consultants. Critical care services and times documented are independent of procedures and multidisciplinary rounds with Residents. Additionally comprehensive, multidisciplinary rounds were conducted with the MICU team. The case was discussed in detail and plans for care were established. Review of Residents documentation was conducted and revisions were made as appropriate. I agree with the above documented exam, problem list and plan of care.     Titrate up seroquel   Propofol gtt   Diuresis Theresa Miller M.D.    Pulmonary/Critical Care Medicine   42 min cct excluding procedures

## 2020-11-06 NOTE — PROGRESS NOTES
Upon assessment, OGT at 40 cm. TF stopped, OGT removed, pt suctioned. Notified resident.  Will place new OGT place stat xray

## 2020-11-06 NOTE — PROGRESS NOTES
PT SEEN AND EXAMINED. intubated, in icu. BP labile     Chart reviewed. meds reviewed. D/w nursing + family as available. EXAM: IN GENERAL, NAD. AWAKE seem's to know me, follows what I am saying. . ROS NEGx10 EXCEPT:   BP (!) 148/66   Pulse 101   Temp 99.9 °F (37.7 °C) (Axillary)   Resp 16   Ht 5' 8\" (1.727 m)   Wt 284 lb 12.8 oz (129.2 kg)   SpO2 92%   BMI 43.30 kg/m²   GEN: A+O NAD. HEENT: NCAT. NECK: NO JVD. TRACH MIDLINE. NO BRUITS. NO THYROMEGALY. LUNGS: CTA BL NO RALES, RHONCHI OR WHEEZES. GOOD EXCURSION. CV: Regular rate and rhythm, NO Murmurs, Rubs, Or gallops  ABD: Soft. Nontender. Normal bowel sounds. No organomegaly  EXT:No clubbing cyanosis or edema  Neuro: Alert and oriented x 3. No focal motor deficits. No sensory deficits. Reflexes appear intact.   Labs/data reviewedLABS: CBC with Differential:    Lab Results   Component Value Date    WBC 7.2 11/06/2020    RBC 4.05 11/06/2020    HGB 10.6 11/06/2020    HCT 30.8 11/06/2020     11/06/2020    MCV 76.0 11/06/2020    MCH 26.2 11/06/2020    MCHC 34.4 11/06/2020    RDW 21.2 11/06/2020    NRBC 4.3 11/02/2020    SEGSPCT 73 01/26/2014    METASPCT 0.9 11/05/2020    LYMPHOPCT 40.9 11/06/2020    MONOPCT 9.6 11/06/2020    MYELOPCT 0.9 11/05/2020    BASOPCT 0.4 11/06/2020    MONOSABS 0.72 11/06/2020    LYMPHSABS 2.95 11/06/2020    EOSABS 0.12 11/06/2020    BASOSABS 0.00 11/06/2020     Platelets:    Lab Results   Component Value Date     11/06/2020     CMP:    Lab Results   Component Value Date     11/06/2020    K 3.3 11/06/2020    K 4.0 10/27/2020     11/06/2020    CO2 28 11/06/2020    BUN 40 11/06/2020    CREATININE 2.0 11/06/2020    GFRAA 41 11/06/2020    LABGLOM 41 11/06/2020    GLUCOSE 133 11/06/2020    PROT 6.2 11/06/2020    LABALBU 3.1 11/06/2020    CALCIUM 7.4 11/06/2020    BILITOT 0.4 11/06/2020    ALKPHOS 85 11/06/2020    AST 54 11/06/2020     11/06/2020     Magnesium:    Lab Results   Component Value Date    MG 1.8 11/06/2020     LDH:    Lab Results   Component Value Date     10/28/2020     PT/INR:    Lab Results   Component Value Date    PROTIME 10.9 07/21/2018    INR 0.9 07/21/2018     Last 3 Troponin:    Lab Results   Component Value Date    TROPONINI <0.01 10/26/2020    TROPONINI <0.01 05/26/2019    TROPONINI <0.01 05/26/2019     ABG:    Lab Results   Component Value Date    PH 7.558 11/06/2020    PCO2 32.7 11/06/2020    PO2 56.1 11/06/2020    HCO3 28.5 11/06/2020    BE 6.4 11/06/2020    O2SAT 90.2 11/06/2020     IRON:    Lab Results   Component Value Date    IRON 95 11/05/2020     IMAGING    Xr Chest Portable    Result Date: 10/27/2020  EXAMINATION: ONE XRAY VIEW OF THE CHEST 10/27/2020 7:58 pm COMPARISON: October 26, 2020 HISTORY: ORDERING SYSTEM PROVIDED HISTORY: SOB TECHNOLOGIST PROVIDED HISTORY: Reason for exam:->SOB What reading provider will be dictating this exam?->CRC FINDINGS: There is mild cardiomegaly. There is patchy perihilar and bibasilar atelectasis/infiltrates. Tiny pleural effusions are suspected. Progressive bibasilar atelectasis/infiltrates concerning for pneumonia. Underlying CHF is considered. Xr Chest Portable    Result Date: 10/26/2020  EXAMINATION: ONE XRAY VIEW OF THE CHEST 10/26/2020 4:11 pm COMPARISON: 05/26/2019 HISTORY: ORDERING SYSTEM PROVIDED HISTORY: SOB TECHNOLOGIST PROVIDED HISTORY: Reason for exam:->SOB What reading provider will be dictating this exam?->CRC FINDINGS: Cardiac size appears stable. Discoid airspace disease seen at the left lung base which may represent atelectasis or scarring. Right infrahilar and basilar infiltrate is identified. No pneumothorax is identified. No acute osseous abnormality is identified. Right infrahilar and basilar infiltrate concerning for pneumonia. Left basilar atelectasis or scarring.        Medications:    Scheduled Meds:   bumetanide  2 mg Intravenous BID    QUEtiapine  75 mg Oral BID    psyllium  1 packet Oral Daily  ampicillin-sulbactam  3 g Intravenous S3M    folic acid  1 mg Intravenous Daily    thiamine (VITAMIN B1) IVPB  100 mg Intravenous Q24H    [Held by provider] carvedilol  3.125 mg Oral BID WC    insulin glargine  20 Units Subcutaneous Nightly    sodium chloride (Inhalant)  2 mL Nebulization Q4H    insulin lispro  0-18 Units Subcutaneous Q4H    heparin (porcine)  7,500 Units Subcutaneous Q8H    pantoprazole  40 mg Intravenous Daily    And    sodium chloride (PF)  10 mL Intravenous Daily    sodium chloride flush  10 mL Intravenous 2 times per day    Arformoterol Tartrate  15 mcg Nebulization BID    chlorhexidine  15 mL Mouth/Throat BID    amLODIPine  10 mg Oral Daily    aspirin  81 mg Oral Daily    [Held by provider] atorvastatin  40 mg Oral Daily    levothyroxine  50 mcg Oral Daily    [Held by provider] losartan  100 mg Oral Daily    budesonide  0.5 mg Nebulization BID    ipratropium-albuterol  1 ampule Inhalation Q4H WA       Continuous Infusions:   propofol 20 mcg/kg/min (11/06/20 1726)    fentaNYL 5 mcg/ml in D5W 250 ml infusion 200 mcg/hr (11/06/20 1404)    dextrose         PRN Meds:sennosides-docusate sodium, polyvinyl alcohol, midazolam, hydrALAZINE, sodium chloride flush, acetaminophen **OR** acetaminophen, polyethylene glycol, perflutren lipid microspheres, colchicine, glucose, dextrose, glucagon (rDNA), dextrose    A/P:      Patient Active Problem List   Diagnosis    Hyperlipidemia    Type 2 diabetes mellitus without complication, without long-term current use of insulin (HCC)    Atypical chest pain    Essential hypertension    Chronic acquired lymphedema    Aortic valve disease    Morbid obesity due to excess calories (HCC)    Abdominal pain    Acute respiratory failure with hypoxia (HCC)    Acute pulmonary edema (HCC)    Congestive heart failure with LV diastolic dysfunction, NYHA class 3 (HCC)    Obstructive sleep apnea    Acquired hypothyroidism    Chronic diastolic heart failure (HCC)    Nonrheumatic aortic valve stenosis    ACE-inhibitor cough    Pneumonia    Acute gout of right knee   Acute encephalopathy 2/2 Acute hypoxic hypercapnic respiratory failure.         Acute hypoxic hypercapnic respiratory failure 2/2 MSSA pneumonia versus COPD versus ADHF versus chronic OHS  Pt has Hx chronic HANS not on CPAP at home. ABG showed pH 7.188/91.2/78.9/33 on RRT. currently intubated. - Continue to monitor respiratory status, wean down FiO2 as tolerated. - Pro  on presentation.   - US dup LE negative for DVT. CTA negative for PE. COVID -19 negative x2  - Continue breathing treatment  - Monitor daily CXR. CBC.   - Pulmonology consulted, further recommendations appreciated.     NANCY stage III likely pre renal 2/2 diuretic use, contrast agent vs? cardiorenal syndrome. -   - Nephrology consulted. Further recommendations appreciated. - Continue monitor daily BMP, renal function. Monitor I/O. Avoid nephrotoxicity.       · Bacterial pneumonia, likely aspiration pneumonia vs MSSA   · Shock, sepsis- resolved   · ATBx per id  Alcohol withdrawal .    Triple acid base disorder   Hx HFpEF, EF 65%cta noted  WILL NEED NIV FOR HOME- PROB BIPAP WITH BACKUP RATE  Wean per pulm/ccm

## 2020-11-06 NOTE — PROGRESS NOTES
Nephrology Progress Note  Patient's Name: Rosemary Martino  10:58 AM  11/6/2020        Reason for Consult: Acute kidney  Requesting Physician:  Will Patterson MD    Chief Complaint: Shortness of breath  History Obtained From:  EHR; spouse    History of Present Ilness:    Rosemary Martino is a 61 y.o. male with a history of COPD, hypertension, hyperlipidemia and diabetes mellitus. He presented to ED 2 days ago with complaints of shortness of breath. According to patient's spouse he had been complaining of shortness of breath over the course of several weeks. This has gotten progressively worse. He went to see his PCP on the day of admission. In office at the time pulse oximeter obtained revealed his oxygen saturation to be in the 70s. He was instructed to proceed to ED for further evaluation. On presentation to ED his initial vital signs showed a blood pressure of 168/71, temperature 97.5 and pulse of 93. His pulse oximeter at the time was noted to be 98% on room air. Laboratory data was significant for a BUN of 11, creatinine 1.1, WBC of 8.3. Rapid COVID-19 testing was negative. A chest x-ray performed revealed right infrahilar and basilar infiltrate concerning for pneumonia. A CTA was ordered but patient declined because of his inability to lay flat. Patient was given ceftriaxone and doxycycline followed by admission to telemetry. 2 days ago an RRT was called as patient was noted to be increasingly more in respiratory distress. He was transferred to MICU and intubated. Laboratory data yesterday showed worsening serum creatinine to 2.8. This a.m. further worsening to 3.6. He has been nonoliguric. Hence renal consult. 10/30: N new acute issues overnight; remains intubated  10/31: Polyuric , concerned for possible DI; no other acute issues overnight  11/1: agitated overnight ; remains intubated  11/2: pt seen and examined.  Chart reviewed, pt intubated  11/3: pt seen and examined in icu, pt intubated  11/4: pt seen and examined in icu, no overnight events, intubated  11/5: pt seen in room ,remains intubated  11/6: pt seen in room, intubated.       Allergies:  Bee venom and Shellfish-derived products    Current Medications:    bumetanide (BUMEX) injection 2 mg, BID  propofol injection, Titrated  QUEtiapine (SEROQUEL) tablet 75 mg, BID  psyllium (KONSYL) 28.3 % packet 1 packet, Daily  ampicillin-sulbactam (UNASYN) 3 g ivpb minibag, Q6H  sennosides-docusate sodium (SENOKOT-S) 8.6-50 MG tablet 2 tablet, Daily PRN  folic acid injection 1 mg, Daily  thiamine (B-1) 100 mg in sodium chloride 0.9 % 100 mL IVPB, Q24H  [Held by provider] carvedilol (COREG) tablet 3.125 mg, BID WC  midazolam (VERSED) 100 mg in dextrose 5 % 100 mL infusion, Continuous  polyvinyl alcohol (LIQUIFILM TEARS) 1.4 % ophthalmic solution 1 drop, Q4H PRN  insulin glargine (LANTUS) injection vial 20 Units, Nightly  midazolam (VERSED) injection 2 mg, Q2H PRN  sodium chloride (Inhalant) 3 % nebulizer solution 2 mL, Q4H  insulin lispro (HUMALOG) injection vial 0-18 Units, Q4H  hydrALAZINE (APRESOLINE) injection 10 mg, Q4H PRN  heparin (porcine) injection 7,500 Units, Q8H  pantoprazole (PROTONIX) injection 40 mg, Daily    And  sodium chloride (PF) 0.9 % injection 10 mL, Daily  fentaNYL 5 mcg/ml in 0.9%  ml infusion, Continuous  sodium chloride flush 0.9 % injection 10 mL, 2 times per day  sodium chloride flush 0.9 % injection 10 mL, PRN  acetaminophen (TYLENOL) tablet 650 mg, Q6H PRN    Or  acetaminophen (TYLENOL) suppository 650 mg, Q6H PRN  Arformoterol Tartrate (BROVANA) nebulizer solution 15 mcg, BID  polyethylene glycol (GLYCOLAX) packet 17 g, Daily PRN  perflutren lipid microspheres (DEFINITY) injection 1.65 mg, ONCE PRN  chlorhexidine (PERIDEX) 0.12 % solution 15 mL, BID  amLODIPine (NORVASC) tablet 10 mg, Daily  aspirin chewable tablet 81 mg, Daily  [Held by provider] atorvastatin (LIPITOR) tablet 40 mg, Daily  colchicine (COLCRYS) tablet 0.6 mg, BID PRN  levothyroxine (SYNTHROID) tablet 50 mcg, Daily  [Held by provider] losartan (COZAAR) tablet 100 mg, Daily  budesonide (PULMICORT) nebulizer suspension 500 mcg, BID  ipratropium-albuterol (DUONEB) nebulizer solution 1 ampule, Q4H WA  glucose (GLUTOSE) 40 % oral gel 15 g, PRN  dextrose 50 % IV solution, PRN  glucagon (rDNA) injection 1 mg, PRN  dextrose 5 % solution, PRN        Review of Systems:   Review of systems not obtained due to patient factors. Physical exam:  Vitals:    11/06/20 0922   BP:    Pulse: 101   Resp: 16   Temp:    SpO2: 92%           General: Intubated sedated  Eyes: PERRL. No sclera icterus. No conjunctival injection. ENT: No discharge. Pharynx clear. Neck: Trachea midline. Normal thyroid. Lungs: Coarse breath sounds  CV: Regular rate. Regular rhythm. No murmur or rub. .   Abd:  Non-distended. No masses. No organmegaly. Normal bowel sounds. Skin: Warm and dry. No nodule on exposed extremities. No rash on exposed extremities.   Ext: No cyanosis, clubbing, edema; 2+ pitting bilateral lower extremity edema L>R  Neuro: sedate lightly, but arouses and is agitated      Data:   Labs:  Lab Results   Component Value Date     (H) 11/06/2020     11/05/2020     11/05/2020    K 3.3 (L) 11/06/2020    K 3.7 11/05/2020    K 4.2 11/05/2020     11/06/2020    CO2 28 11/06/2020    CO2 29 11/05/2020    CO2 24 11/05/2020    CREATININE 2.0 (H) 11/06/2020    CREATININE 2.3 (H) 11/05/2020    CREATININE 2.5 (H) 11/05/2020    BUN 40 (H) 11/06/2020    BUN 45 (H) 11/05/2020    BUN 47 (H) 11/05/2020    GLUCOSE 133 (H) 11/06/2020    GLUCOSE 148 (H) 11/05/2020    GLUCOSE 137 (H) 11/05/2020    PHOS 2.6 11/06/2020    PHOS 5.0 (H) 11/05/2020    PHOS 6.0 (H) 11/04/2020    WBC 7.2 11/06/2020    WBC 6.7 11/05/2020    WBC 7.9 11/04/2020    HGB 10.6 (L) 11/06/2020    HGB 11.9 (L) 11/05/2020    HGB 11.3 (L) 11/04/2020    HCT 30.8 (L) 11/06/2020    HCT 34.9 (L) 11/05/2020    HCT 33.4 (L) 11/04/2020    MCV 76.0 (L) 11/06/2020     11/06/2020         Imaging:  Xr Chest Portable    Result Date: 10/27/2020  EXAMINATION: ONE XRAY VIEW OF THE CHEST 10/27/2020 7:58 pm COMPARISON: October 26, 2020 HISTORY: ORDERING SYSTEM PROVIDED HISTORY: SOB TECHNOLOGIST PROVIDED HISTORY: Reason for exam:->SOB What reading provider will be dictating this exam?->CRC FINDINGS: There is mild cardiomegaly. There is patchy perihilar and bibasilar atelectasis/infiltrates. Tiny pleural effusions are suspected. Progressive bibasilar atelectasis/infiltrates concerning for pneumonia. Underlying CHF is considered. Xr Chest Portable    Result Date: 10/26/2020  EXAMINATION: ONE XRAY VIEW OF THE CHEST 10/26/2020 4:11 pm COMPARISON: 05/26/2019 HISTORY: ORDERING SYSTEM PROVIDED HISTORY: SOB TECHNOLOGIST PROVIDED HISTORY: Reason for exam:->SOB What reading provider will be dictating this exam?->CRC FINDINGS: Cardiac size appears stable. Discoid airspace disease seen at the left lung base which may represent atelectasis or scarring. Right infrahilar and basilar infiltrate is identified. No pneumothorax is identified. No acute osseous abnormality is identified. Right infrahilar and basilar infiltrate concerning for pneumonia. Left basilar atelectasis or scarring. Assessment/Plans  1. Acute kidney injury  Baseline 1.8 from 9/2019, 1.1 in 5/2019  hemodynamically mediated due to the combination of bradycardia,  diuretic and ARB use; this is exacerbated by IV contrast use   Continue bumex, good response  Consider lower bumex to qd  Out 8.1L last 24 h    2. Acute hypoxic respiratory failure  due to multilobar pneumonia  gradual wean  vanc level high, need to adjust with rising cr    3. Type 2 diabetes mellitus  increasing blood sugars, most likely steroid induced    4. etoh abuse    5.  Hypernatremia  Lower bumex            Gregg Maldonado MD  10:58 AM  11/6/2020

## 2020-11-06 NOTE — FLOWSHEET NOTE
11/06/20 0000   Assessment   Less Restrictive Alternative RP;RE;DE;RO;VR;PM;AL   Special Consideration/Risk Factors N   Justification   Clinical Justification L;T;H;U   Education   Discontinuation Criteria Absence   Criteria Explained Yes   Patient's Response NL   Family Notification O  (previously notified)   Restraint Monitoring Q60 Minutes   Visual/Safety Check (q 60 mins) SD   Restraint  Monitoring Q2 Hours   Circulation NS   Range of Motion P   Fluids N   Food/Meal TPN   Elimination UC   Restraint Type   Soft Restraint B Wrist CONTINUED   Vital Signs   Temp 98.6 °F (37 °C)   Temp Source Axillary   Pulse 83   Heart Rate Source Monitor   Resp 18   /61   BP Location Arterial   MAP (mmHg) 81   Patient Position Semi fowlers   Level of Consciousness 1   MEWS Score 2       Pt continues to reach for lines and tubes, despite attempts to deter. Restraints continued for patient safety.

## 2020-11-06 NOTE — PLAN OF CARE
Problem: Pain:  Goal: Control of chronic pain  Description: Control of chronic pain  Outcome: Met This Shift     Problem: Restraint Use - Nonviolent/Non-Self-Destructive Behavior:  Goal: Absence of restraint-related injury  Description: Absence of restraint-related injury  11/6/2020 0515 by Tawana Hernandez RN  Outcome: Met This Shift     Problem: Skin Integrity:  Goal: Absence of new skin breakdown  Description: Absence of new skin breakdown  Outcome: Met This Shift     Problem: Restraint Use - Nonviolent/Non-Self-Destructive Behavior:  Goal: Absence of restraint indications  Description: Absence of restraint indications  11/6/2020 0515 by Tawana Hernandez RN  Outcome: Not Met This Shift      Plan of care reviewed with patient.

## 2020-11-06 NOTE — PROGRESS NOTES
Pt continues to reach for lines and tubes, despite attempts to deter. Bilateral wrist restraints continued for patient safety.

## 2020-11-06 NOTE — PROGRESS NOTES
[facility] Quality Flow/Interdisciplinary Rounds Progress Note        Quality Flow Rounds held on November 6, 2020    Disciplines Attending:  Bedside Nurse, Charge nurse, HECTOR, OT, PT, , and . Jeri Atwood was admitted on 10/26/2020  3:39 PM    Anticipated Discharge Date:       Disposition:    Romaine Score:  Romaine Scale Score: 14    Readmission Risk              Risk of Unplanned Readmission:        30           Discussed patient goal for the day, patient clinical progression, and barriers to discharge.   The following Goal(s) of the Day/Commitment(s) have been identified:  TLC to be changed, wean vent and sedation      Barb Ayala  November 6, 2020

## 2020-11-07 ENCOUNTER — APPOINTMENT (OUTPATIENT)
Dept: GENERAL RADIOLOGY | Age: 60
DRG: 003 | End: 2020-11-07
Payer: MEDICARE

## 2020-11-07 LAB
AADO2: 297.7 MMHG
ALBUMIN SERPL-MCNC: 3.2 G/DL (ref 3.5–5.2)
ALP BLD-CCNC: 98 U/L (ref 40–129)
ALT SERPL-CCNC: 148 U/L (ref 0–40)
ANION GAP SERPL CALCULATED.3IONS-SCNC: 14 MMOL/L (ref 7–16)
ANION GAP SERPL CALCULATED.3IONS-SCNC: 14 MMOL/L (ref 7–16)
ANISOCYTOSIS: ABNORMAL
AST SERPL-CCNC: 89 U/L (ref 0–39)
ATYPICAL LYMPHOCYTE RELATIVE PERCENT: 0.9 % (ref 0–4)
B.E.: 4.7 MMOL/L (ref -3–3)
BASOPHILS ABSOLUTE: 0 E9/L (ref 0–0.2)
BASOPHILS RELATIVE PERCENT: 0.2 % (ref 0–2)
BILIRUB SERPL-MCNC: 0.3 MG/DL (ref 0–1.2)
BUN BLDV-MCNC: 32 MG/DL (ref 8–23)
BUN BLDV-MCNC: 33 MG/DL (ref 8–23)
CALCIUM IONIZED: 1.02 MMOL/L (ref 1.15–1.33)
CALCIUM SERPL-MCNC: 7.7 MG/DL (ref 8.6–10.2)
CALCIUM SERPL-MCNC: 7.8 MG/DL (ref 8.6–10.2)
CHLORIDE BLD-SCNC: 101 MMOL/L (ref 98–107)
CHLORIDE BLD-SCNC: 103 MMOL/L (ref 98–107)
CO2: 27 MMOL/L (ref 22–29)
CO2: 28 MMOL/L (ref 22–29)
COHB: 1.1 % (ref 0–1.5)
CREAT SERPL-MCNC: 1.7 MG/DL (ref 0.7–1.2)
CREAT SERPL-MCNC: 1.8 MG/DL (ref 0.7–1.2)
CRITICAL: ABNORMAL
DATE ANALYZED: ABNORMAL
DATE OF COLLECTION: ABNORMAL
EOSINOPHILS ABSOLUTE: 0.15 E9/L (ref 0.05–0.5)
EOSINOPHILS RELATIVE PERCENT: 1.8 % (ref 0–6)
FIO2: 60 %
GFR AFRICAN AMERICAN: 47
GFR AFRICAN AMERICAN: 50
GFR NON-AFRICAN AMERICAN: 47 ML/MIN/1.73
GFR NON-AFRICAN AMERICAN: 50 ML/MIN/1.73
GLUCOSE BLD-MCNC: 130 MG/DL (ref 74–99)
GLUCOSE BLD-MCNC: 144 MG/DL (ref 74–99)
HCO3: 28.8 MMOL/L (ref 22–26)
HCT VFR BLD CALC: 33 % (ref 37–54)
HEMOGLOBIN: 10.8 G/DL (ref 12.5–16.5)
HHB: 5.8 % (ref 0–5)
LAB: ABNORMAL
LYMPHOCYTES ABSOLUTE: 1.22 E9/L (ref 1.5–4)
LYMPHOCYTES RELATIVE PERCENT: 14 % (ref 20–42)
Lab: ABNORMAL
MAGNESIUM: 1.5 MG/DL (ref 1.6–2.6)
MCH RBC QN AUTO: 25.7 PG (ref 26–35)
MCHC RBC AUTO-ENTMCNC: 32.7 % (ref 32–34.5)
MCV RBC AUTO: 78.6 FL (ref 80–99.9)
METAMYELOCYTES RELATIVE PERCENT: 0.9 % (ref 0–1)
METER GLUCOSE: 104 MG/DL (ref 74–99)
METER GLUCOSE: 105 MG/DL (ref 74–99)
METER GLUCOSE: 125 MG/DL (ref 74–99)
METER GLUCOSE: 129 MG/DL (ref 74–99)
METER GLUCOSE: 135 MG/DL (ref 74–99)
METER GLUCOSE: 153 MG/DL (ref 74–99)
METHB: 0.3 % (ref 0–1.5)
MODE: AC
MONOCYTES ABSOLUTE: 0.65 E9/L (ref 0.1–0.95)
MONOCYTES RELATIVE PERCENT: 7.9 % (ref 2–12)
NEUTROPHILS ABSOLUTE: 6.16 E9/L (ref 1.8–7.3)
NEUTROPHILS RELATIVE PERCENT: 74.6 % (ref 43–80)
O2 CONTENT: 16 ML/DL
O2 SATURATION: 94.1 % (ref 92–98.5)
O2HB: 92.8 % (ref 94–97)
OPERATOR ID: 2577
OVALOCYTES: ABNORMAL
PATIENT TEMP: 37 C
PCO2: 40.6 MMHG (ref 35–45)
PDW BLD-RTO: 21.1 FL (ref 11.5–15)
PEEP/CPAP: 5 CMH2O
PFO2: 1.17 MMHG/%
PH BLOOD GAS: 7.47 (ref 7.35–7.45)
PHOSPHORUS: 4.4 MG/DL (ref 2.5–4.5)
PLATELET # BLD: 325 E9/L (ref 130–450)
PMV BLD AUTO: 10 FL (ref 7–12)
PO2: 70.4 MMHG (ref 75–100)
POLYCHROMASIA: ABNORMAL
POTASSIUM SERPL-SCNC: 3.7 MMOL/L (ref 3.5–5)
POTASSIUM SERPL-SCNC: 4.3 MMOL/L (ref 3.5–5)
RBC # BLD: 4.2 E12/L (ref 3.8–5.8)
RI(T): 4.23
RR MECHANICAL: 16 B/MIN
SODIUM BLD-SCNC: 142 MMOL/L (ref 132–146)
SODIUM BLD-SCNC: 145 MMOL/L (ref 132–146)
SOLUBLE TRANSFERRIN RECEPT: 4.7 MG/L (ref 2.2–5)
SOURCE, BLOOD GAS: ABNORMAL
TARGET CELLS: ABNORMAL
TEAR DROP CELLS: ABNORMAL
THB: 12.2 G/DL (ref 11.5–16.5)
TIME ANALYZED: 410
TOTAL PROTEIN: 6.6 G/DL (ref 6.4–8.3)
VT MECHANICAL: 500 ML
WBC # BLD: 8.1 E9/L (ref 4.5–11.5)

## 2020-11-07 PROCEDURE — 2580000003 HC RX 258: Performed by: INTERNAL MEDICINE

## 2020-11-07 PROCEDURE — 6360000002 HC RX W HCPCS: Performed by: STUDENT IN AN ORGANIZED HEALTH CARE EDUCATION/TRAINING PROGRAM

## 2020-11-07 PROCEDURE — 71045 X-RAY EXAM CHEST 1 VIEW: CPT

## 2020-11-07 PROCEDURE — 6360000002 HC RX W HCPCS: Performed by: INTERNAL MEDICINE

## 2020-11-07 PROCEDURE — 83735 ASSAY OF MAGNESIUM: CPT

## 2020-11-07 PROCEDURE — 82330 ASSAY OF CALCIUM: CPT

## 2020-11-07 PROCEDURE — 82962 GLUCOSE BLOOD TEST: CPT

## 2020-11-07 PROCEDURE — 2500000003 HC RX 250 WO HCPCS: Performed by: INTERNAL MEDICINE

## 2020-11-07 PROCEDURE — 6370000000 HC RX 637 (ALT 250 FOR IP): Performed by: INTERNAL MEDICINE

## 2020-11-07 PROCEDURE — C9113 INJ PANTOPRAZOLE SODIUM, VIA: HCPCS | Performed by: INTERNAL MEDICINE

## 2020-11-07 PROCEDURE — 80053 COMPREHEN METABOLIC PANEL: CPT

## 2020-11-07 PROCEDURE — 84100 ASSAY OF PHOSPHORUS: CPT

## 2020-11-07 PROCEDURE — 80048 BASIC METABOLIC PNL TOTAL CA: CPT

## 2020-11-07 PROCEDURE — 85025 COMPLETE CBC W/AUTO DIFF WBC: CPT

## 2020-11-07 PROCEDURE — 94640 AIRWAY INHALATION TREATMENT: CPT

## 2020-11-07 PROCEDURE — 6370000000 HC RX 637 (ALT 250 FOR IP): Performed by: STUDENT IN AN ORGANIZED HEALTH CARE EDUCATION/TRAINING PROGRAM

## 2020-11-07 PROCEDURE — 94003 VENT MGMT INPAT SUBQ DAY: CPT

## 2020-11-07 PROCEDURE — 37799 UNLISTED PX VASCULAR SURGERY: CPT

## 2020-11-07 PROCEDURE — 74018 RADEX ABDOMEN 1 VIEW: CPT

## 2020-11-07 PROCEDURE — 36415 COLL VENOUS BLD VENIPUNCTURE: CPT

## 2020-11-07 PROCEDURE — 2000000000 HC ICU R&B

## 2020-11-07 PROCEDURE — 05HN33Z INSERTION OF INFUSION DEVICE INTO LEFT INTERNAL JUGULAR VEIN, PERCUTANEOUS APPROACH: ICD-10-PCS | Performed by: INTERNAL MEDICINE

## 2020-11-07 PROCEDURE — 82805 BLOOD GASES W/O2 SATURATION: CPT

## 2020-11-07 PROCEDURE — 94669 MECHANICAL CHEST WALL OSCILL: CPT

## 2020-11-07 RX ORDER — CARVEDILOL 3.12 MG/1
3.12 TABLET ORAL 2 TIMES DAILY WITH MEALS
Status: DISCONTINUED | OUTPATIENT
Start: 2020-11-07 | End: 2020-01-01

## 2020-11-07 RX ORDER — LABETALOL HYDROCHLORIDE 5 MG/ML
10 INJECTION, SOLUTION INTRAVENOUS ONCE
Status: COMPLETED | OUTPATIENT
Start: 2020-11-07 | End: 2020-11-07

## 2020-11-07 RX ORDER — MAGNESIUM SULFATE IN WATER 40 MG/ML
2 INJECTION, SOLUTION INTRAVENOUS ONCE
Status: COMPLETED | OUTPATIENT
Start: 2020-11-07 | End: 2020-11-07

## 2020-11-07 RX ORDER — POTASSIUM CHLORIDE 29.8 MG/ML
40 INJECTION INTRAVENOUS ONCE
Status: COMPLETED | OUTPATIENT
Start: 2020-11-07 | End: 2020-11-07

## 2020-11-07 RX ORDER — BUMETANIDE 0.25 MG/ML
2 INJECTION, SOLUTION INTRAMUSCULAR; INTRAVENOUS ONCE
Status: COMPLETED | OUTPATIENT
Start: 2020-11-07 | End: 2020-11-07

## 2020-11-07 RX ADMIN — SODIUM CHLORIDE 0.2 MCG/KG/HR: 9 INJECTION, SOLUTION INTRAVENOUS at 12:08

## 2020-11-07 RX ADMIN — BUMETANIDE 2 MG: 0.25 INJECTION INTRAMUSCULAR; INTRAVENOUS at 09:14

## 2020-11-07 RX ADMIN — INSULIN GLARGINE 20 UNITS: 100 INJECTION, SOLUTION SUBCUTANEOUS at 20:14

## 2020-11-07 RX ADMIN — SODIUM CHLORIDE 3 G: 900 INJECTION INTRAVENOUS at 22:42

## 2020-11-07 RX ADMIN — Medication 200 MCG/HR: at 02:09

## 2020-11-07 RX ADMIN — LABETALOL HYDROCHLORIDE 10 MG: 5 INJECTION, SOLUTION INTRAVENOUS at 20:36

## 2020-11-07 RX ADMIN — SODIUM CHLORIDE SOLN NEBU 3% 2 ML: 3 NEBU SOLN at 12:36

## 2020-11-07 RX ADMIN — CALCIUM GLUCONATE 1 G: 98 INJECTION, SOLUTION INTRAVENOUS at 08:56

## 2020-11-07 RX ADMIN — SODIUM CHLORIDE 1.1 MCG/KG/HR: 9 INJECTION, SOLUTION INTRAVENOUS at 22:53

## 2020-11-07 RX ADMIN — IPRATROPIUM BROMIDE AND ALBUTEROL SULFATE 1 AMPULE: 2.5; .5 SOLUTION RESPIRATORY (INHALATION) at 12:36

## 2020-11-07 RX ADMIN — PROPOFOL 50 MCG/KG/MIN: 10 INJECTION, EMULSION INTRAVENOUS at 16:36

## 2020-11-07 RX ADMIN — IPRATROPIUM BROMIDE AND ALBUTEROL SULFATE 1 AMPULE: 2.5; .5 SOLUTION RESPIRATORY (INHALATION) at 09:15

## 2020-11-07 RX ADMIN — LEVOTHYROXINE SODIUM 50 MCG: 0.05 TABLET ORAL at 06:02

## 2020-11-07 RX ADMIN — SODIUM CHLORIDE SOLN NEBU 3% 2 ML: 3 NEBU SOLN at 16:33

## 2020-11-07 RX ADMIN — MIDAZOLAM HYDROCHLORIDE 2 MG: 1 INJECTION, SOLUTION INTRAMUSCULAR; INTRAVENOUS at 04:34

## 2020-11-07 RX ADMIN — QUETIAPINE FUMARATE 75 MG: 25 TABLET ORAL at 20:13

## 2020-11-07 RX ADMIN — IPRATROPIUM BROMIDE AND ALBUTEROL SULFATE 1 AMPULE: 2.5; .5 SOLUTION RESPIRATORY (INHALATION) at 16:33

## 2020-11-07 RX ADMIN — MAGNESIUM SULFATE HEPTAHYDRATE 2 G: 40 INJECTION, SOLUTION INTRAVENOUS at 07:43

## 2020-11-07 RX ADMIN — SODIUM CHLORIDE, PRESERVATIVE FREE 10 ML: 5 INJECTION INTRAVENOUS at 09:10

## 2020-11-07 RX ADMIN — BUDESONIDE 500 MCG: 0.5 SUSPENSION RESPIRATORY (INHALATION) at 20:38

## 2020-11-07 RX ADMIN — PROPOFOL 50 MCG/KG/MIN: 10 INJECTION, EMULSION INTRAVENOUS at 18:55

## 2020-11-07 RX ADMIN — ASPIRIN 81 MG CHEWABLE TABLET 81 MG: 81 TABLET CHEWABLE at 09:09

## 2020-11-07 RX ADMIN — CARVEDILOL 3.12 MG: 6.25 TABLET, FILM COATED ORAL at 20:14

## 2020-11-07 RX ADMIN — THIAMINE HYDROCHLORIDE 100 MG: 100 INJECTION, SOLUTION INTRAMUSCULAR; INTRAVENOUS at 15:00

## 2020-11-07 RX ADMIN — SODIUM CHLORIDE 3 G: 900 INJECTION INTRAVENOUS at 17:30

## 2020-11-07 RX ADMIN — PROPOFOL 35 MCG/KG/MIN: 10 INJECTION, EMULSION INTRAVENOUS at 00:59

## 2020-11-07 RX ADMIN — MIDAZOLAM HYDROCHLORIDE 2 MG: 1 INJECTION, SOLUTION INTRAMUSCULAR; INTRAVENOUS at 02:19

## 2020-11-07 RX ADMIN — Medication 200 MCG/HR: at 09:01

## 2020-11-07 RX ADMIN — FOLIC ACID 1 MG: 5 INJECTION, SOLUTION INTRAMUSCULAR; INTRAVENOUS; SUBCUTANEOUS at 08:45

## 2020-11-07 RX ADMIN — PROPOFOL 50 MCG/KG/MIN: 10 INJECTION, EMULSION INTRAVENOUS at 09:28

## 2020-11-07 RX ADMIN — SODIUM CHLORIDE SOLN NEBU 3% 2 ML: 3 NEBU SOLN at 09:15

## 2020-11-07 RX ADMIN — Medication 10 ML: at 20:15

## 2020-11-07 RX ADMIN — ARFORMOTEROL TARTRATE 15 MCG: 15 SOLUTION RESPIRATORY (INHALATION) at 09:15

## 2020-11-07 RX ADMIN — SODIUM CHLORIDE SOLN NEBU 3% 2 ML: 3 NEBU SOLN at 23:54

## 2020-11-07 RX ADMIN — Medication 10 ML: at 09:09

## 2020-11-07 RX ADMIN — BUDESONIDE 500 MCG: 0.5 SUSPENSION RESPIRATORY (INHALATION) at 09:15

## 2020-11-07 RX ADMIN — HEPARIN SODIUM 7500 UNITS: 10000 INJECTION INTRAVENOUS; SUBCUTANEOUS at 09:02

## 2020-11-07 RX ADMIN — PROPOFOL 50 MCG/KG/MIN: 10 INJECTION, EMULSION INTRAVENOUS at 12:11

## 2020-11-07 RX ADMIN — AMLODIPINE BESYLATE 10 MG: 10 TABLET ORAL at 09:09

## 2020-11-07 RX ADMIN — IPRATROPIUM BROMIDE AND ALBUTEROL SULFATE 1 AMPULE: 2.5; .5 SOLUTION RESPIRATORY (INHALATION) at 20:38

## 2020-11-07 RX ADMIN — HEPARIN SODIUM 7500 UNITS: 10000 INJECTION INTRAVENOUS; SUBCUTANEOUS at 17:31

## 2020-11-07 RX ADMIN — PROPOFOL 50 MCG/KG/MIN: 10 INJECTION, EMULSION INTRAVENOUS at 06:51

## 2020-11-07 RX ADMIN — POTASSIUM CHLORIDE 40 MEQ: 400 INJECTION, SOLUTION INTRAVENOUS at 07:42

## 2020-11-07 RX ADMIN — CHLORHEXIDINE GLUCONATE 0.12% ORAL RINSE 15 ML: 1.2 LIQUID ORAL at 20:15

## 2020-11-07 RX ADMIN — PROPOFOL 45 MCG/KG/MIN: 10 INJECTION, EMULSION INTRAVENOUS at 04:25

## 2020-11-07 RX ADMIN — QUETIAPINE FUMARATE 75 MG: 25 TABLET ORAL at 09:09

## 2020-11-07 RX ADMIN — ARFORMOTEROL TARTRATE 15 MCG: 15 SOLUTION RESPIRATORY (INHALATION) at 20:38

## 2020-11-07 RX ADMIN — SODIUM CHLORIDE SOLN NEBU 3% 2 ML: 3 NEBU SOLN at 04:14

## 2020-11-07 RX ADMIN — SODIUM CHLORIDE 3 G: 900 INJECTION INTRAVENOUS at 10:45

## 2020-11-07 RX ADMIN — PANTOPRAZOLE SODIUM 40 MG: 40 INJECTION, POWDER, FOR SOLUTION INTRAVENOUS at 09:09

## 2020-11-07 RX ADMIN — HEPARIN SODIUM 7500 UNITS: 10000 INJECTION INTRAVENOUS; SUBCUTANEOUS at 00:06

## 2020-11-07 RX ADMIN — INSULIN LISPRO 3 UNITS: 100 INJECTION, SOLUTION INTRAVENOUS; SUBCUTANEOUS at 20:14

## 2020-11-07 RX ADMIN — CHLORHEXIDINE GLUCONATE 0.12% ORAL RINSE 15 ML: 1.2 LIQUID ORAL at 09:06

## 2020-11-07 RX ADMIN — COLCHICINE 0.6 MG: 0.6 TABLET, FILM COATED ORAL at 09:09

## 2020-11-07 RX ADMIN — SODIUM CHLORIDE 0.2 MCG/KG/HR: 9 INJECTION, SOLUTION INTRAVENOUS at 17:51

## 2020-11-07 RX ADMIN — ACETAMINOPHEN 650 MG: 325 TABLET ORAL at 02:42

## 2020-11-07 RX ADMIN — SODIUM CHLORIDE SOLN NEBU 3% 2 ML: 3 NEBU SOLN at 20:37

## 2020-11-07 RX ADMIN — PSYLLIUM HUSK 1 PACKET: 3.4 GRANULE ORAL at 09:14

## 2020-11-07 RX ADMIN — PROPOFOL 50 MCG/KG/MIN: 10 INJECTION, EMULSION INTRAVENOUS at 21:25

## 2020-11-07 RX ADMIN — SODIUM CHLORIDE 3 G: 900 INJECTION INTRAVENOUS at 05:11

## 2020-11-07 ASSESSMENT — PULMONARY FUNCTION TESTS
PIF_VALUE: 30
PIF_VALUE: 31
PIF_VALUE: 32
PIF_VALUE: 28
PIF_VALUE: 29
PIF_VALUE: 55
PIF_VALUE: 33
PIF_VALUE: 30
PIF_VALUE: 38
PIF_VALUE: 31
PIF_VALUE: 30
PIF_VALUE: 31
PIF_VALUE: 32
PIF_VALUE: 34
PIF_VALUE: 31
PIF_VALUE: 31
PIF_VALUE: 42
PIF_VALUE: 32
PIF_VALUE: 33
PIF_VALUE: 32
PIF_VALUE: 39
PIF_VALUE: 30
PIF_VALUE: 31
PIF_VALUE: 50
PIF_VALUE: 32
PIF_VALUE: 30
PIF_VALUE: 30
PIF_VALUE: 32
PIF_VALUE: 32
PIF_VALUE: 34
PIF_VALUE: 33
PIF_VALUE: 30
PIF_VALUE: 19

## 2020-11-07 ASSESSMENT — PAIN SCALES - GENERAL
PAINLEVEL_OUTOF10: 0
PAINLEVEL_OUTOF10: 3

## 2020-11-07 NOTE — PLAN OF CARE
Problem: Restraint Use - Nonviolent/Non-Self-Destructive Behavior:  Goal: Absence of restraint indications  11/7/2020 0711 by João Santos RN  Outcome: Not Met This Shift     Problem: Restraint Use - Nonviolent/Non-Self-Destructive Behavior:  Goal: Absence of restraint-related injury  11/7/2020 0711 by João Santos, CH[facility]  Outcome: Met This Shift

## 2020-11-07 NOTE — PROGRESS NOTES
intubated  11/4: pt seen and examined in icu, no overnight events, intubated  11/5: pt seen in room ,remains intubated  11/6: pt seen in room, intubated.   11/7: pt seen in room, intubated, chart reviewed      Allergies:  Bee venom and Shellfish-derived products    Current Medications:    calcium gluconate 1 g in dextrose 5 % 100 mL IVPB, Once  potassium chloride 40 mEq in 100 mL IVPB (Central Line), Once  magnesium sulfate 2 g in 50 mL IVPB premix, Once  propofol injection, Titrated  QUEtiapine (SEROQUEL) tablet 75 mg, BID  fentaNYL 5 mcg/mL in D5W 250 mL infusion, Continuous  psyllium (KONSYL) 28.3 % packet 1 packet, Daily  ampicillin-sulbactam (UNASYN) 3 g ivpb minibag, Q6H  sennosides-docusate sodium (SENOKOT-S) 8.6-50 MG tablet 2 tablet, Daily PRN  folic acid injection 1 mg, Daily  thiamine (B-1) 100 mg in sodium chloride 0.9 % 100 mL IVPB, Q24H  [Held by provider] carvedilol (COREG) tablet 3.125 mg, BID WC  polyvinyl alcohol (LIQUIFILM TEARS) 1.4 % ophthalmic solution 1 drop, Q4H PRN  insulin glargine (LANTUS) injection vial 20 Units, Nightly  midazolam (VERSED) injection 2 mg, Q2H PRN  sodium chloride (Inhalant) 3 % nebulizer solution 2 mL, Q4H  insulin lispro (HUMALOG) injection vial 0-18 Units, Q4H  hydrALAZINE (APRESOLINE) injection 10 mg, Q4H PRN  heparin (porcine) injection 7,500 Units, Q8H  pantoprazole (PROTONIX) injection 40 mg, Daily    And  sodium chloride (PF) 0.9 % injection 10 mL, Daily  sodium chloride flush 0.9 % injection 10 mL, 2 times per day  sodium chloride flush 0.9 % injection 10 mL, PRN  acetaminophen (TYLENOL) tablet 650 mg, Q6H PRN    Or  acetaminophen (TYLENOL) suppository 650 mg, Q6H PRN  Arformoterol Tartrate (BROVANA) nebulizer solution 15 mcg, BID  polyethylene glycol (GLYCOLAX) packet 17 g, Daily PRN  perflutren lipid microspheres (DEFINITY) injection 1.65 mg, ONCE PRN  chlorhexidine (PERIDEX) 0.12 % solution 15 mL, BID  amLODIPine (NORVASC) tablet 10 mg, Daily  aspirin chewable tablet 81 mg, Daily  [Held by provider] atorvastatin (LIPITOR) tablet 40 mg, Daily  colchicine (COLCRYS) tablet 0.6 mg, BID PRN  levothyroxine (SYNTHROID) tablet 50 mcg, Daily  [Held by provider] losartan (COZAAR) tablet 100 mg, Daily  budesonide (PULMICORT) nebulizer suspension 500 mcg, BID  ipratropium-albuterol (DUONEB) nebulizer solution 1 ampule, Q4H WA  glucose (GLUTOSE) 40 % oral gel 15 g, PRN  dextrose 50 % IV solution, PRN  glucagon (rDNA) injection 1 mg, PRN  dextrose 5 % solution, PRN        Review of Systems:   Review of systems not obtained due to patient factors. Physical exam:  Vitals:    11/07/20 0800   BP:    Pulse: 76   Resp: 18   Temp: 98.6 °F (37 °C)   SpO2: 95%           General: Intubated sedated  Eyes: PERRL. No sclera icterus. No conjunctival injection. ENT: No discharge. Pharynx clear. Neck: Trachea midline. Normal thyroid. Lungs: Coarse breath sounds  CV: Regular rate. Regular rhythm. No murmur or rub. .   Abd:  Non-distended. No masses. No organmegaly. Normal bowel sounds. Skin: Warm and dry. No nodule on exposed extremities. No rash on exposed extremities.   Ext: No cyanosis, clubbing, edema; 2+ pitting bilateral lower extremity edema L>R  Neuro: sedate lightly, but arouses and is agitated      Data:   Labs:  Lab Results   Component Value Date     11/07/2020     11/06/2020     (H) 11/06/2020    K 3.7 11/07/2020    K 3.9 11/06/2020    K 3.3 (L) 11/06/2020     11/07/2020    CO2 28 11/07/2020    CO2 29 11/06/2020    CO2 28 11/06/2020    CREATININE 1.8 (H) 11/07/2020    CREATININE 1.9 (H) 11/06/2020    CREATININE 2.0 (H) 11/06/2020    BUN 33 (H) 11/07/2020    BUN 34 (H) 11/06/2020    BUN 40 (H) 11/06/2020    GLUCOSE 130 (H) 11/07/2020    GLUCOSE 145 (H) 11/06/2020    GLUCOSE 133 (H) 11/06/2020    PHOS 4.4 11/07/2020    PHOS 2.6 11/06/2020    PHOS 5.0 (H) 11/05/2020    WBC 8.1 11/07/2020    WBC 7.2 11/06/2020    WBC 6.7 11/05/2020    HGB 10.8 (L) 11/07/2020 HGB 10.6 (L) 11/06/2020    HGB 11.9 (L) 11/05/2020    HCT 33.0 (L) 11/07/2020    HCT 30.8 (L) 11/06/2020    HCT 34.9 (L) 11/05/2020    MCV 78.6 (L) 11/07/2020     11/07/2020         Imaging:  Xr Chest Portable    Result Date: 10/27/2020  EXAMINATION: ONE XRAY VIEW OF THE CHEST 10/27/2020 7:58 pm COMPARISON: October 26, 2020 HISTORY: ORDERING SYSTEM PROVIDED HISTORY: SOB TECHNOLOGIST PROVIDED HISTORY: Reason for exam:->SOB What reading provider will be dictating this exam?->CRC FINDINGS: There is mild cardiomegaly. There is patchy perihilar and bibasilar atelectasis/infiltrates. Tiny pleural effusions are suspected. Progressive bibasilar atelectasis/infiltrates concerning for pneumonia. Underlying CHF is considered. Xr Chest Portable    Result Date: 10/26/2020  EXAMINATION: ONE XRAY VIEW OF THE CHEST 10/26/2020 4:11 pm COMPARISON: 05/26/2019 HISTORY: ORDERING SYSTEM PROVIDED HISTORY: SOB TECHNOLOGIST PROVIDED HISTORY: Reason for exam:->SOB What reading provider will be dictating this exam?->CRC FINDINGS: Cardiac size appears stable. Discoid airspace disease seen at the left lung base which may represent atelectasis or scarring. Right infrahilar and basilar infiltrate is identified. No pneumothorax is identified. No acute osseous abnormality is identified. Right infrahilar and basilar infiltrate concerning for pneumonia. Left basilar atelectasis or scarring. Assessment/Plans  1. Acute kidney injury  Baseline 1.8 from 9/2019, 1.1 in 5/2019  hemodynamically mediated due to the combination of bradycardia,  diuretic and ARB use; this is exacerbated by IV contrast use   Continue bumex, good response  Consider lower bumex to qd  Out 4.8L last 24 h    2. Acute hypoxic respiratory failure  due to multilobar pneumonia  gradual wean  On unasyn  Off vanco    3. Type 2 diabetes mellitus  increasing blood sugars, most likely steroid induced    4. etoh abuse    5.  Hypernatremia  Lowered bumex q [address], MD  9:26 AM  11/7/2020

## 2020-11-07 NOTE — PROCEDURES
Central Line Placement Procedure Note    Indication: vascular access    Consent: The family members were counseled regarding the procedure, its indications, risks, potential complications and alternatives, and any questions were answered. Consent was obtained to proceed    Procedure: The patient was positioned appropriately and the skin over the left internal jugular vein was prepped with betadine and draped in a sterile fashion. Local anesthesia was obtained by infiltration using 1% Lidocaine without epinephrine. A large bore needle was used to identify the vein. A guide wire was then inserted into the vein through the needle. A triple lumen catheter was then inserted into the vessel over the guide wire using the Seldinger technique. All ports showed good, free flowing blood return and were flushed with saline solution. The catheter was then securely fastened to the skin with suture at 17 cm. Two sutures were placed into the kit included tube clamp and proximal eyelets. An antibiotic disk was placed and the site was then covered with a sterile dressing. A post procedure X-ray was ordered and is still pending at this time. The patient tolerated the procedure well.     Complications: None

## 2020-11-07 NOTE — PROGRESS NOTES
[address] [facility]  Department of Internal Medicine   Internal Medicine Residency   MICU Progress Note    Patient:  Jasper Guerrero 61 y.o. male  MRN: [id number]     Date of Service: 11/7/2020    Allergy: Bee venom and Shellfish-derived products    Subjective     S/e. GCS 10T. Remains agitated upon arousal. NAEO    Objective     VS: BP (!) 101/55   Pulse 78   Temp 98.6 °F (37 °C) (Esophageal)   Resp 21   Ht 5' 8\" (1.727 m)   Wt 278 lb 8 oz (126.3 kg)   SpO2 92%   BMI 42.35 kg/m²   ABP (Arterial line BP): 109/54  ABP mean (Arterial line mean): 69 mmHg    I & O - 24hr:     Intake/Output Summary (Last 24 hours) at 11/7/2020 1317  Last data filed at 11/7/2020 1300  Gross per 24 hour   Intake 3351.1 ml   Output 5070 ml   Net -1718.9 ml       Physical Exam:  · General Appearance: intubated and sedated  · Neck: no adenopathy, no carotid bruit, no JVD, supple, symmetrical, trachea midline and thyroid not enlarged, symmetric, no tenderness/mass/nodules  · Lung: coarse breath sound bilaterally, no rales or wheezing  · Heart: bradycardic, normal S1 S2, no murmur or rub  · Abdomen: Stop, nontender, distended, no masses, no organomegaly  · Extremities:  Left lower leg 2+ non pitting edema. Right lower leg chronic lymphedema. · Musculoskeletal:  ROM normal in all joints of extremities.    · Neurologic: Mental status: Patient is sedated, open eyes, normal pupillary reflex    Lines     site day    Art line   R radial 4   TLC R IJ 7   PICC None    Hemoaccess None    Oxygen:     none  Mechanical Ventilation:   Mode: A/C    TV:500 ml RR: 20  PEEP 9cmH2O FiO2 80%    ABG:     Lab Results   Component Value Date    PH 7.468 11/07/2020    PCO2 40.6 11/07/2020    PO2 70.4 11/07/2020    HCO3 28.8 11/07/2020    BE 4.7 11/07/2020    THB 12.2 11/07/2020    O2SAT 94.1 11/07/2020        Medications     Infusions: (Fluid, Sedation, Vasopressors)  IVF:      Vasopressors   none  Sedation   Fentanyl at rate 200 mcg/min  · Propofol  · Precedex    Nutrition:   TF low calorie high protein    ATB:   Antibiotics  Days   Unasyn 4             Patient currently has   Urinary cath  Restraints  DVT prophylaxis/ GI prophylaxis,    Labs     CBC with Differential:    Lab Results   Component Value Date    WBC 8.1 11/07/2020    RBC 4.20 11/07/2020    HGB 10.8 11/07/2020    HCT 33.0 11/07/2020     11/07/2020    MCV 78.6 11/07/2020    MCH 25.7 11/07/2020    MCHC 32.7 11/07/2020    RDW 21.1 11/07/2020    NRBC 4.3 11/02/2020    SEGSPCT 73 01/26/2014    METASPCT 0.9 11/07/2020    LYMPHOPCT 14.0 11/07/2020    MONOPCT 7.9 11/07/2020    MYELOPCT 0.9 11/05/2020    BASOPCT 0.2 11/07/2020    MONOSABS 0.65 11/07/2020    LYMPHSABS 1.22 11/07/2020    EOSABS 0.15 11/07/2020    BASOSABS 0.00 11/07/2020     CMP:    Lab Results   Component Value Date     11/07/2020    K 4.3 11/07/2020    K 4.0 10/27/2020     11/07/2020    CO2 27 11/07/2020    BUN 32 11/07/2020    CREATININE 1.7 11/07/2020    GFRAA 50 11/07/2020    LABGLOM 50 11/07/2020    GLUCOSE 144 11/07/2020    PROT 6.6 11/07/2020    LABALBU 3.2 11/07/2020    CALCIUM 7.8 11/07/2020    BILITOT 0.3 11/07/2020    ALKPHOS 98 11/07/2020    AST 89 11/07/2020     11/07/2020     Ionized Calcium:  No results found for: IONCA  Magnesium:    Lab Results   Component Value Date    MG 1.5 11/07/2020     Phosphorus:    Lab Results   Component Value Date    PHOS 4.4 11/07/2020     LDH:    Lab Results   Component Value Date     10/28/2020     PT/INR:    Lab Results   Component Value Date    PROTIME 10.9 07/21/2018    INR 0.9 07/21/2018     Troponin:    Lab Results   Component Value Date    TROPONINI <0.01 10/26/2020     U/A:    Lab Results   Component Value Date    NITRITE neg 08/30/2019    COLORU Yellow 11/01/2020    PROTEINU Negative 11/01/2020    PHUR 7.0 11/01/2020    LABCAST FEW 04/11/2016    WBCUA 0-1 11/01/2020    RBCUA 2-5 11/01/2020    BACTERIA RARE 11/01/2020    CLARITYU Clear 11/01/2020    SPECGRAV 1.015 11/01/2020    LEUKOCYTESUR Negative 11/01/2020    UROBILINOGEN 0.2 11/01/2020    BILIRUBINUR Negative 11/01/2020    BILIRUBINUR neg 08/30/2019    BLOODU TRACE-INTACT 11/01/2020    GLUCOSEU 100 11/01/2020     ABG:    Lab Results   Component Value Date    PH 7.468 11/07/2020    PCO2 40.6 11/07/2020    PO2 70.4 11/07/2020    HCO3 28.8 11/07/2020    BE 4.7 11/07/2020    O2SAT 94.1 11/07/2020     FLP:    Lab Results   Component Value Date    TRIG 118 11/04/2020    HDL 66 03/23/2017    LDLCALC 192 03/23/2017    LABVLDL 37 03/23/2017     TSH:    Lab Results   Component Value Date    TSH 4.340 10/27/2020       Imaging Studies:  CXR: Unchanged bilateral infiltrates and left pleural effusion. Resident's Assessment and Plan     Earnest Moffett is a 61 y.o. male with PMHx is significant for HFpEF (Ef 65% in 2017), hx of tracheostomy and reversal (as mentioned above), moderate HANS not on CPAP, DMT2, HTN, HLD, Hypothyroidism who initially was admitted to general floors for hypoxia. Transferred to the MICU when ABG concerning for worsening respiratory acidosis. Currently intubated and being managed in the MICU for:     NEURO   Acute encephalopathy 2/2 Acute hypoxic hypercapnic respiratory failure. Pt presented to the ED with hypoxia, RRT was called due to worsening respiratory acidosis. Currently intubated and sedated. - Today pt is still intubated and sedated.   - Fentanyl gtt  - Start propofol gtt  - Wean as tolerated    ICU Delirium  · Increase Seroquel 75 BID      Labile BP  · Arterial line placed 11/2  · Questionable alcohol withdrawal - thi/folate/versed gtt  · Diereses - Good response to bumex; to receive another two doses and reassess Cr and electrolytes tomorrow; tapering bumex    CAP vs Aspiration PNA + Pulmonary edema  - Respiratory cultures grew MSSA on 31/10.   - Unasyn d/c Levaquin  - Baseline ESR CRP  - Patient is afebrile, WBC wnl  - Monitor daily CBC, vitals, CXR.   - Infectious disease consulted and following  - Bumex 2mg x2     Acute encephalopathy 2/2 Acute hypoxic hypercapnic respiratory failure. Pt presented to the ED with hypoxia, RRT was called due to worsening respiratory acidosis. Currently intubated and sedated. - Today pt is still intubated and sedated. Patient is on Fentanyl, versed gtt. - Continue monitor mentation status, wean down sedation as tolerated. Acute hypoxic hypercapnic respiratory failure 2/2 CAP versus chronic OHS  Pt has Hx chronic HANS not on CPAP at home. ABG showed pH 7.188/91.2/78.9/33 on RRT. Patient is not tolerating BiPAP and currently intubated. - Today Pt saturating 95% on vent. ABG AC/VC/16/500/8/41%  - Continue to monitor respiratory status, wean down FiO2 as tolerated. Patient not able to tolerate PS today. - Pro  on presentation. ECHO showed Left ventricular internal dimensions were normal in diastole and systole. EF 65%. Moderate left ventricular concentric hypertrophy noted. No regional wall motion abnormalities seen. - US dup LE negative for DVT. CTA negative for PE. COVID -19 negative x2  - Continue breathing treatment. Add 3% nebulizer today. Decrease Solumedrol to 40 mg daily.  - Monitor daily CXR. CBC.   - Pulmonology consulted, further recommendations appreciated. - Solucortef complete  - Diurese, wean vent and hopeful for rapid extubation tomorrow      NANCY stage III likely pre renal 2/2 diuretic use, contrast agent vs? cardiorenal syndrome, improving   - Creatinine 3.8 ( baseline 1.1) -> 4.0 - 3.7 -> 2.7 -> 1.7 -> 3.5 -> 2.5  - I/O -2L yesterday; +5.8 since admission  - Continue monitor daily BMP, renal function. Monitor I/O. .  - More aggressive diuresis - Good response to bumex; to receive another two doses and assess Cr and lytes tomorrow am.     Polyuria  - Patient had 4.2L urine/24h.   - Patient received 7L fluid/24h  -Urine osmolarity 324, serum osmolarity 317.   Urine sodium 101, chloride 84, creatinine 26  -Likely recover phase of ATN  - Continue monitor. Hx HFpEF, EF 65%  -Echo done in 2017 showing 1 diastolic dysfunction with normal left ventricular size and systolic function EF 92%. -Home medication: Coreg 25 mg, olmesartan 10 mg, Losartan 10 mg,.  -Repeat ECHO left ventricular internal dimensions were normal in diastole and systole. EF 65%. Moderate left ventricular concentric hypertrophy noted. No regional wall motion abnormalities seen. - Currently hold off Coreg due to bradycardia. - Will continue monitor.      DMT2  - Hold home metfomrin.   -  overnight.  - Increase Lantus to 20 units. HDSS -> Tolerating well  - Continue monitor BG q6h    Hx of HTN  - home medication: Losartan/ novasc; resume norvasc  - Add hydralazine PRN for BP >170  - Continue monitor vitals.     HLD  - Continue home Atorvastatin. Diarrhea s/p FMS  - Add fiber; monitor output and assess need for FMS tomorrow. Hopeful to pull    DVT ppx: heparin  GI ppx: Protonix    Final note: Will try and attempt PS and potential rapid extubation. Add precedex. TLC site change. Bumex 2mg x1 - gentle diuresis now. Replenish lytes. Monitor output from FMS - is decreasing, +/- remove tomorrow    Dispo: Continue ICU care    Dheeraj Phillips DO, PGY-1    I personally saw, examined and provided care for the patient. Radiographs, labs and medication list were reviewed by me independently. I spoke with bedside nursing, therapists and consultants. Critical care services and times documented are independent of procedures and multidisciplinary rounds with Residents. Additionally comprehensive, multidisciplinary rounds were conducted with the MICU team. The case was discussed in detail and plans for care were established. Review of Residents documentation was conducted and revisions were made as appropriate. I agree with the above documented exam, problem list and plan of care.     Wean sedation   psv trial as tolerated, if progress is limited by mentation he may benefit from trach and peg with prolonged wean. Gentle diuresis   Danyelle Mendez M.D.    Pulmonary/Critical Care Medicine   36 min cct excluding procedures

## 2020-11-07 NOTE — FLOWSHEET NOTE
11/06/20 2000   Assessment   Less Restrictive Alternative RP;RE;DE;RO;VR;PM;AL   Special Consideration/Risk Factors N   Justification   Clinical Justification L;T;H;U   Education   Discontinuation Criteria Absence   Criteria Explained Yes   Patient's Response NL   Family Notification O  (previously notified)   Restraint Monitoring Q60 Minutes   Visual/Safety Check (q 60 mins) AG   Restraint  Monitoring Q2 Hours   Circulation NS   Range of Motion P   Fluids N   Food/Meal TPN   Elimination UC   Restraint Type   Soft Restraint B Wrist CONTINUED   Vital Signs   Temp 100.8 °F (38.2 °C)   Temp Source Axillary   Pulse 96   Heart Rate Source Monitor   Resp 18   BP (!) 196/83   BP Location Arterial   MAP (mmHg) 118   Patient Position Semi fowlers   Level of Consciousness 0   MEWS Score 1   Patient Currently in Pain Yes  (CPOT)       Pt continues to reach for lines and tubes, despite attempts to deter. Restraints continued for patient safety.

## 2020-11-07 NOTE — PROGRESS NOTES
Pulmonary Progress Note  11/7/2020 8:35 AM  Subjective:   Admit Date: 10/26/2020  PCP: Darlene Lanier MD  Interval History: Patient sedated on fentanyl and propofol. Seroquel being uptitrated for agitation.   Intubated on mechanical ventilation on ACVC, Fio2 to 60%, PEEP 5  Diet: DIET TUBE FEED CONTINUOUS/CYCLIC NPO; Diabetic 1.5; Orogastric; Continuous; 25; 60; 23    Data:   Scheduled Meds:    calcium gluconate IVPB  1 g Intravenous Once    potassium chloride  40 mEq Intravenous Once    magnesium sulfate  2 g Intravenous Once    bumetanide  2 mg Intravenous Once    QUEtiapine  75 mg Oral BID    psyllium  1 packet Oral Daily    ampicillin-sulbactam  3 g Intravenous W0S    folic acid  1 mg Intravenous Daily    thiamine (VITAMIN B1) IVPB  100 mg Intravenous Q24H    [Held by provider] carvedilol  3.125 mg Oral BID WC    insulin glargine  20 Units Subcutaneous Nightly    sodium chloride (Inhalant)  2 mL Nebulization Q4H    insulin lispro  0-18 Units Subcutaneous Q4H    heparin (porcine)  7,500 Units Subcutaneous Q8H    pantoprazole  40 mg Intravenous Daily    And    sodium chloride (PF)  10 mL Intravenous Daily    sodium chloride flush  10 mL Intravenous 2 times per day    Arformoterol Tartrate  15 mcg Nebulization BID    chlorhexidine  15 mL Mouth/Throat BID    amLODIPine  10 mg Oral Daily    aspirin  81 mg Oral Daily    [Held by provider] atorvastatin  40 mg Oral Daily    levothyroxine  50 mcg Oral Daily    [Held by provider] losartan  100 mg Oral Daily    budesonide  0.5 mg Nebulization BID    ipratropium-albuterol  1 ampule Inhalation Q4H WA     Continuous Infusions:    propofol 50 mcg/kg/min (11/07/20 0651)    fentaNYL 5 mcg/ml in D5W 250 ml infusion 200 mcg/hr (11/07/20 0209)    dextrose         PRN Meds: sennosides-docusate sodium, polyvinyl alcohol, midazolam, hydrALAZINE, sodium chloride flush, acetaminophen **OR** acetaminophen, polyethylene glycol, perflutren lipid microspheres, colchicine, glucose, dextrose, glucagon (rDNA), dextrose    I/O last 3 completed shifts: In: 3291.1 [I.V.:2074.1; NG/GT:1217]  Out: 9565 [Urine:4870; QBKDD:670]    I/O this shift: In: 0   Out: 50 [Urine:50]      Intake/Output Summary (Last 24 hours) at 11/7/2020 0835  Last data filed at 11/7/2020 0800  Gross per 24 hour   Intake 3291.1 ml   Output 5295 ml   Net -2003.9 ml       Patient Vitals for the past 96 hrs (Last 3 readings):   Weight   11/07/20 0426 278 lb 8 oz (126.3 kg)   11/06/20 0445 284 lb 12.8 oz (129.2 kg)   11/04/20 0600 (!) 312 lb 14.4 oz (141.9 kg)         Recent Labs     11/05/20 0511 11/06/20 0325 11/07/20 0354   WBC 6.7 7.2 8.1   HGB 11.9* 10.6* 10.8*   HCT 34.9* 30.8* 33.0*   MCV 77.6* 76.0* 78.6*    325 325     Recent Labs     11/05/20 0511 11/06/20 0325 11/06/20 2015 11/07/20  0354      < > 147* 146 145   K 4.2   < > 3.3* 3.9 3.7      < > 105 103 103   CO2 24   < > 28 29 28   BUN 47*   < > 40* 34* 33*   CREATININE 2.5*   < > 2.0* 1.9* 1.8*   PHOS 5.0*  --  2.6  --  4.4    < > = values in this interval not displayed. Recent Labs     11/05/20 0511 11/06/20 0325 11/07/20 0354   PROT 6.7 6.2* 6.6   ALKPHOS 96 85 98   * 123* 148*   AST 50* 54* 89*   BILITOT 0.3 0.4 0.3   CPK:  Lab Results   Component Value Date    CKTOTAL 98 10/08/2017        11/6 CXR viewed and shows stable diffuse infiltrates  -----------------------------------------------------------------  RAD:   Results for orders placed during the hospital encounter of 10/26/20   XR CHEST PORTABLE    Narrative EXAMINATION:  ONE XRAY VIEW OF THE CHEST    10/30/2020 2:17 am    COMPARISON:  10/29/2020 radiograph    HISTORY:  ORDERING SYSTEM PROVIDED HISTORY: R IJ placement  TECHNOLOGIST PROVIDED HISTORY:  Reason for exam:->R IJ placement  What reading provider will be dictating this exam?->CRC    FINDINGS:  Right IJ central venous catheter has been placed with tip at the distal SVC. No pneumothorax. Worsening diffuse airspace opacification in the right lung  with moderate opacification also present at the left base. Endotracheal tube  and enteric tube stable. No significant skeletal finding. Impression Interval placement of right IJ central venous catheter. No pneumothorax. Worsening airspace changes in the right lung. Micro:  No results for input(s): BC in the last 72 hours. No results for input(s): ORG in the last 72 hours. No results for input(s): Cecilia Canter in the last 72 hours. No results for input(s): STREPPNEUMAG in the last 72 hours. No results for input(s): LEGUR in the last 72 hours. No results for input(s): ORG in the last 72 hours. No results for input(s): ASO in the last 72 hours. No results for input(s): CULTRESP in the last 72 hours. No results for input(s): LABURIN in the last 72 hours.   Vent Information  $Ventilation: $Subsequent Day  Skin Assessment: Clean, dry, & intact  Equipment ID: [id number]  Vent Type: [phone]  Vent Mode: AC/VC  Vt Ordered: 500 mL  Rate Set: 16 bmp  Peak Flow: 65 L/min  Pressure Support: 0 cmH20  FiO2 : 60 %  SpO2: 95 %  SpO2/FiO2 ratio: 158.33  PaO2/FiO2 ratio: 146  Sensitivity: 3  PEEP/CPAP: 5  I Time/ I Time %: 0 s  Humidification Source: Heated wire  Humidification Temp: 37  Humidification Temp Measured: 37  Circuit Condensation: Drained  Mask Type: Full face mask  Mask Size: Large    Additional Respiratory  Assessments  Pulse: 76  Resp: 18  SpO2: 95 %  Position: Semi-Lockwood's  Humidification Source: Heated wire  Humidification Temp: 37  Circuit Condensation: Drained  Oral Care Completed?: Yes  Oral Care: Mouthwash, Mouth swabbed, Mouth suctioned  Subglottic Suction Done?: Yes  Airway Type: ET  Airway Size: 8  Skin barrier applied: Yes    Objective:   Vitals:   Vitals:    20 0800   BP:    Pulse: 76   Resp: 18   Temp: 98.6 °F (37 °C)   SpO2: 95%      TEMP:Current: Temp: 98.6 °F (37 °C)  Max: Temp  Av.1 °F (37.8 °C)  Min: 98.6 °F (37 °C)  Max: 101.8 °F (38.8 °C)    BP Range: Systolic (72UVE), FERNANDO:799 , Min:92 , ERS:166     Diastolic (92VES), ABT:01, Min:44, Max:103      General appearance: Sedated obese on mechanical ventilation , appears stated age awakens to touch  In no acute distress  Skin: No rashes or lesions  HEENT: mucous membranes are moist oral endotracheal tube and OG tube  Neck: No JVD  Lungs: symmetrical expansion, coarse breath sounds to auscultation, no use of accessory muscles  Heart: S1S2 no murmurs, tachycardic  Abdomen: soft, non tender, distended obese  Extremities: no peripheral edema  Neurologic: Sedated  Affect: Calm       Assessment:   Patient Active Problem List:    59-y.o M with history of DM, hypothyroid, COPD, HTN, HLD presented on 10/26 with shortness of breath for 1 week. Saturations were 70% at PCPs. Saturation 65% on room air in ER  Patient agitated for CT scan refused to lay flat  10/27 RRT refused BiPAP became combative. Patient transferred to ICU  10/28 intubated and sedated. CT chest showing dense multifocal airspace disease  10/30 Ultrasound lower extremities no DVT, ultrasound kidneys no hydronephrosis  10/31 bilateral infiltrates left effusion. On 50% +8 of PEEP. Chest x-ray showing right-sided improving with left base atelectasis. Covid negative x2    1. Acute hypoxemic respiratory failure on mechanical ventilation  2. Agitation on Seroquel Fentanyl  3. MSSA pneumonia on Unasyn  4. HANS moderate ( AHI 17 on 4/18/18 requires BIPAP 16/12)  5. History of reactive airway disease  6. CT 3/14/2018 showing pulmonary nodule stable from 10/2017  7. Acute renal failure improving - nephrology on board   8. Diabetes with elevated blood sugars  9. Obesity  10. EF 65% LVH  11. chronic lymphedema  12. Hypothyroid  13. H/o respiratory failure: 7/8/2017 pt was  transferred from [facility] for acute respiratory failure after lap cholecystectomy, lap umbilical hernia repair, and lap liver biopsy (fatty liver).  He had been extubated postop but had laryngospasm requiring reintubation, complicated by negative pressure flash pulmonary edema, and required tracheostomy 8/10/2017. Patient has staph aureus pneumonia and C. difficile colitis. [facility] he was weaned off the ventilator and decannulated. Plan:     · Wean sedation as able, whil titrating antipsychotics.     · Wean vent as able  · Family updated at bedside  · [address] with FiO2 to 60% and PEEP to 5  · Sedation and vent wean, hopefully tomorrow as per ICU team      Tramaine Amanda MD, [facility]  11/7/2020  8:35 AM

## 2020-11-07 NOTE — PLAN OF CARE
Problem: Restraint Use - Nonviolent/Non-Self-Destructive Behavior:  Goal: Absence of restraint indications  11/7/2020 1845 by Hazel Santos RN  Outcome: Not Met This Shift     Problem: Restraint Use - Nonviolent/Non-Self-Destructive Behavior:  Goal: Absence of restraint-related injury  11/7/2020 1845 by Sara Goff RN  Outcome: Met This Shift

## 2020-11-07 NOTE — PROGRESS NOTES
Chief Complaint   Patient presents with    Shortness of Breath     for a few weeks. O2 sat in 70s at [facility], placed on 4L and now 95%. SUBJECTIVE:  Patient is tolerating medications. No reported adverse drug reactions. Patient remained afebrile overnight. Continue to be tachycardic and hypertensive. Patient is intubated,  sedated on propofol, versed, fentanyl. Minimal secretions on suctioning     WBC 7.2, on Unasyn day 6 CXR shows b/l infiltrates, but overall stable since yesterday. FiO2  60%. FiO2  Fecal management system in place. Review of systems:  As stated above in the chief complaint, otherwise negative.     Medications:  Scheduled Meds:   QUEtiapine  75 mg Oral BID    psyllium  1 packet Oral Daily    ampicillin-sulbactam  3 g Intravenous H1A    folic acid  1 mg Intravenous Daily    thiamine (VITAMIN B1) IVPB  100 mg Intravenous Q24H    [Held by provider] carvedilol  3.125 mg Oral BID WC    insulin glargine  20 Units Subcutaneous Nightly    sodium chloride (Inhalant)  2 mL Nebulization Q4H    insulin lispro  0-18 Units Subcutaneous Q4H    heparin (porcine)  7,500 Units Subcutaneous Q8H    pantoprazole  40 mg Intravenous Daily    And    sodium chloride (PF)  10 mL Intravenous Daily    sodium chloride flush  10 mL Intravenous 2 times per day    Arformoterol Tartrate  15 mcg Nebulization BID    chlorhexidine  15 mL Mouth/Throat BID    amLODIPine  10 mg Oral Daily    aspirin  81 mg Oral Daily    [Held by provider] atorvastatin  40 mg Oral Daily    levothyroxine  50 mcg Oral Daily    [Held by provider] losartan  100 mg Oral Daily    budesonide  0.5 mg Nebulization BID    ipratropium-albuterol  1 ampule Inhalation Q4H WA     Continuous Infusions:   dexmedetomidine (PRECEDEX) IV infusion 0.2 mcg/kg/hr (11/07/20 1208)    propofol 50 mcg/kg/min (11/07/20 1211)    fentaNYL 5 mcg/ml in D5W 250 ml infusion 50 mcg/hr (11/07/20 1530)    dextrose       PRN Meds:sennosides-docusate sodium, polyvinyl alcohol, midazolam, hydrALAZINE, sodium chloride flush, acetaminophen **OR** acetaminophen, polyethylene glycol, perflutren lipid microspheres, colchicine, glucose, dextrose, glucagon (rDNA), dextrose    OBJECTIVE:  BP (!) 101/55   Pulse 66   Temp 98.6 °F (37 °C) (Esophageal)   Resp 16   Ht 5' 8\" (1.727 m)   Wt 278 lb 8 oz (126.3 kg)   SpO2 93%   BMI 42.35 kg/m²   Temp  Av.1 °F (37.8 °C)  Min: 98.6 °F (37 °C)  Max: 101.8 °F (38.8 °C)  Constitutional: Intubated and Sedated, wakes up to stimulation , easily agitated   Skin: Warm and dry. No rashes were noted. HEENT: Round and reactive pupils. Moist mucous membranes. No ulcerations or thrush. Chest: Intubated. Symmetrical expansion. Some coarseness right upper lobe. Cardiovascular: S1 and S2 are rhythmic and regular. Systolic murmurs appreciated.    Abdomen: Hyperactive bowel sounds, obese abdomen, unable to palpate any masses   Extremities: No clubbing, no cyanosis, + Lymphedema left lower extremity, improving   Lines: CVC Right IJ 10/30, Right radial Lindsey     Laboratory and Tests Review:  Lab Results   Component Value Date    WBC 8.1 2020    WBC 7.2 2020    WBC 6.7 2020    HGB 10.8 (L) 2020    HCT 33.0 (L) 2020    MCV 78.6 (L) 2020     2020     Lab Results   Component Value Date    NEUTROABS 6.16 2020    NEUTROABS 3.46 2020    NEUTROABS 4.56 2020     No results found for: [facility]  Lab Results   Component Value Date     (H) 2020    AST 89 (H) 2020    ALKPHOS 98 2020    BILITOT 0.3 2020     Lab Results   Component Value Date     2020    K 4.3 2020    K 4.0 10/27/2020     2020    CO2 27 2020    BUN 32 2020    CREATININE 1.7 2020    CREATININE 1.8 2020    CREATININE 1.9 2020    GFRAA 50 2020    LABGLOM 50 2020    GLUCOSE 144 2020 PROT 6.6 11/07/2020    LABALBU 3.2 11/07/2020    CALCIUM 7.8 11/07/2020    BILITOT 0.3 11/07/2020    ALKPHOS 98 11/07/2020    AST 89 11/07/2020     11/07/2020     Lab Results   Component Value Date    CRP 1.4 (H) 11/02/2020    CRP 2.8 (H) 09/04/2017    CRP 10.5 (H) 08/28/2017     Lab Results   Component Value Date    SEDRATE 135 (H) 09/04/2017    SEDRATE 150 (H) 08/28/2017    SEDRATE 141 (H) 08/21/2017     Radiology:  CXR- 11/3- Multifocal bilateral pulmonary infiltrates more prominent within the right lung. CXR- 11/4- worsening b/l infiltrates, worse on left today with possible small L sided effusion   CXR- 11/5 - Stable b/l infiltrates   CXR- 11/6 - Expiratory film, image otherwise appears overall stable. Microbiology:   Lab Results   Component Value Date    BC 5 Days no growth 10/29/2020    BC  10/26/2020     This organism was isolated in one set. Susceptibility testing is not routinely done as this  organism frequently represents skin contamination. Additional testing can be ordered by calling the  Microbiology Department.       BC 5 Days- no growth 05/19/2018    ORG Staphylococcus aureus 10/29/2020    ORG Staphylococcus coagulase-negative 10/26/2020    ORG Coagulase Negative Staphylococci 08/26/2017     Lab Results   Component Value Date    BLOODCULT2 5 Days no growth 10/29/2020    BLOODCULT2 5 Days no growth 10/26/2020    BLOODCULT2 5 Days- no growth 05/19/2018    ORG Staphylococcus aureus 10/29/2020    ORG Staphylococcus coagulase-negative 10/26/2020    ORG Coagulase Negative Staphylococci 08/26/2017     No results found for: WNDABS  Smear, Respiratory   Date Value Ref Range Status   10/29/2020   Final    Group 5: >25 PMN's/LPF and <10 Epithelial cells/LPF  Abundant Polymorphonuclear leukocytes  Epithelial cells not seen  Few Gram positive diplococci       No results found for: MPNEUMO, CLAMYDCU, LABLEGI, AFBCX, FUNGSM, LABFUNG  CULTURE, RESPIRATORY   Date Value Ref Range Status   10/29/2020 Oral Pharyngeal Tiesha present (A)  Final   10/29/2020   Final    Light growth  This isolate is presumed to be resistant based on the  detection of inducible Clindamycin resistance. Clindamycin  may still be effective in some patients. Culture Catheter Tip   Date Value Ref Range Status   08/26/2017 <15 colonies  Final     No results found for: BFCS  No results found for: CXSURG  Urine Culture, Routine   Date Value Ref Range Status   10/28/2020 Growth not present  Final   09/16/2019 Growth not present  Final   07/31/2017 >100,000 CFU/ml  Final     MRSA Culture Only   Date Value Ref Range Status   10/30/2020 Methicillin resistant Staph aureus not isolated  Final   07/28/2017 Methicillin resistant Staph aureus not isolated  Final          Assessment:  · Acute on chronic hypoxic respiratory failure likely 2/2 pneumonia and volume overload  - resolving   · Bacterial pneumonia, likely aspiration pneumonia vs MSSA - resolving   · Shock, sepsis- resolved, blood cultures negative   · Alcohol withdrawal      Plan:    · Continue Unasyn for aspiration pneumonia.    · Baseline CRP (1.4), Procal (0.57)  · Monitor labs, primary management by ICU team  · Will follow with you    Navya Lopez  4:26 PM  11/7/2020

## 2020-11-07 NOTE — PROGRESS NOTES
Nilesh Sanders is a 61 y.o. male  Chief Complaint   Patient presents with    Shortness of Breath     for a few weeks. O2 sat in 70s at [facility], placed on 4L and now 95%. HPI  Pt is on vent, he opens eyes and communicates. Case discussed with staff at bedside. No current facility-administered medications on file prior to encounter. Current Outpatient Medications on File Prior to Encounter   Medication Sig Dispense Refill    carvedilol (COREG) 25 MG tablet Take 0.5 tablets by mouth 2 times daily 60 tablet 3    olmesartan (BENICAR) 20 MG tablet Take 1 tablet by mouth daily 30 tablet 5    losartan (COZAAR) 100 MG tablet Take 1 tablet by mouth daily 90 tablet 0    amLODIPine (NORVASC) 10 MG tablet Take 1 tablet by mouth daily 90 tablet 0    metFORMIN (GLUCOPHAGE-XR) 500 MG extended release tablet TAKE 1 TABLET BY MOUTH EVERY DAY WITH BREAKFAST 90 tablet 0    levothyroxine (SYNTHROID) 50 MCG tablet TAKE 1 TABLET BY MOUTH EVERY DAY 90 tablet 0    atorvastatin (LIPITOR) 40 MG tablet Take 1 tablet by mouth daily 30 tablet 5    omeprazole (PRILOSEC) 40 MG delayed release capsule TAKE 1 CAPSULE BY MOUTH EVERY DAY 90 capsule 0    ondansetron (ZOFRAN-ODT) 4 MG disintegrating tablet Take 1 tablet by mouth every 8 hours as needed for Nausea Ok to substitute for standard Zofran non ODT if cost prohibitive 20 tablet 0    aspirin 81 MG tablet Take 81 mg by mouth daily      fluticasone-salmeterol (ADVAIR DISKUS) 100-50 MCG/DOSE diskus inhaler Inhale 1 puff into the lungs every 12 hours 60 each 3    colchicine (COLCRYS) 0.6 MG tablet Take 1 tablet by mouth 2 times daily as needed for Pain 60 tablet 3    Handicap Placard MISC by Does not apply route For 5 years 1 each 0    Compression Stockings MISC Chronic left leg swelling.  2 each 0     Past Medical History:   Diagnosis Date    Acquired hypothyroidism 9/26/2017    Anxiety     Congestive heart failure with LV diastolic dysfunction, NYHA class 3 ([address]  Depression     DM [facility]diabetes mellitus) ([facility]     Essential hypertension 6/1/2016    Gouty arthritis 2006    Hyperlipidemia     Hypertension     Lymphedema     Obesity     Obstructive sleep apnea 8/17/2009    Obstructive sleep apnea 9/26/2017    Osteoarthritis     Type 2 diabetes mellitus without complication, without long-term current use of insulin ([facility] 1/15/2014       OBJECTIVE  Vitals:    11/07/20 0700   BP:    Pulse: 76   Resp: 16   Temp:    SpO2: 90%     Head: AT/NC, PERRL, EOMIx2, no icterus, conjunctival injection  Intubated   Neck: No JVD, carotid bruits, LAD, thyroid non-palpable  Heart: RRR with no murmurs, rubs, gallops  Lungs: Diminished in all fields   Abd: soft, NT, ND, BS+, no G/R, no HSM  Ext: No C/C/E, pulses intact distally B/L  Neuro: CN 2-12 grossly intact with no focal deficits  Lab Results   Component Value Date    WBC 8.1 11/07/2020    HGB 10.8 (L) 11/07/2020    HCT 33.0 (L) 11/07/2020    MCV 78.6 (L) 11/07/2020     11/07/2020     Lab Results   Component Value Date     11/07/2020    K 3.7 11/07/2020    K 4.0 10/27/2020     11/07/2020    CO2 28 11/07/2020    BUN 33 11/07/2020    CREATININE 1.8 11/07/2020    GLUCOSE 130 11/07/2020    CALCIUM 7.7 11/07/2020        ASSESSMENT  1. Acute respiratory failure with hypoxia ([facility]    2.  Pneumonia due to organism      PLAN  Critical care  Cont weaning  Monitor labs

## 2020-11-08 ENCOUNTER — ANESTHESIA (OUTPATIENT)
Dept: ICU | Age: 60
DRG: 003 | End: 2020-11-08
Payer: MEDICARE

## 2020-11-08 ENCOUNTER — APPOINTMENT (OUTPATIENT)
Dept: GENERAL RADIOLOGY | Age: 60
DRG: 003 | End: 2020-11-08
Payer: MEDICARE

## 2020-11-08 ENCOUNTER — ANESTHESIA EVENT (OUTPATIENT)
Dept: ICU | Age: 60
DRG: 003 | End: 2020-11-08
Payer: MEDICARE

## 2020-11-08 LAB
AADO2: 296.9 MMHG
AADO2: 569.5 MMHG
ALBUMIN SERPL-MCNC: 3.1 G/DL (ref 3.5–5.2)
ALP BLD-CCNC: 163 U/L (ref 40–129)
ALT SERPL-CCNC: 243 U/L (ref 0–40)
ANION GAP SERPL CALCULATED.3IONS-SCNC: 14 MMOL/L (ref 7–16)
ANION GAP SERPL CALCULATED.3IONS-SCNC: 16 MMOL/L (ref 7–16)
ANISOCYTOSIS: ABNORMAL
AST SERPL-CCNC: 164 U/L (ref 0–39)
B.E.: 0.2 MMOL/L (ref -3–3)
B.E.: 3.2 MMOL/L (ref -3–3)
BASOPHILS ABSOLUTE: 0 E9/L (ref 0–0.2)
BASOPHILS RELATIVE PERCENT: 0.2 % (ref 0–2)
BILIRUB SERPL-MCNC: 0.5 MG/DL (ref 0–1.2)
BUN BLDV-MCNC: 26 MG/DL (ref 8–23)
BUN BLDV-MCNC: 26 MG/DL (ref 8–23)
CALCIUM IONIZED: 1.07 MMOL/L (ref 1.15–1.33)
CALCIUM SERPL-MCNC: 8.1 MG/DL (ref 8.6–10.2)
CALCIUM SERPL-MCNC: 8.1 MG/DL (ref 8.6–10.2)
CHLORIDE BLD-SCNC: 105 MMOL/L (ref 98–107)
CHLORIDE BLD-SCNC: 105 MMOL/L (ref 98–107)
CO2: 22 MMOL/L (ref 22–29)
CO2: 24 MMOL/L (ref 22–29)
COHB: 0.3 % (ref 0–1.5)
COHB: 0.4 % (ref 0–1.5)
CREAT SERPL-MCNC: 1.5 MG/DL (ref 0.7–1.2)
CREAT SERPL-MCNC: 1.5 MG/DL (ref 0.7–1.2)
CRITICAL: ABNORMAL
CRITICAL: ABNORMAL
DATE ANALYZED: ABNORMAL
DATE ANALYZED: ABNORMAL
DATE OF COLLECTION: ABNORMAL
DATE OF COLLECTION: ABNORMAL
EOSINOPHILS ABSOLUTE: 0.5 E9/L (ref 0.05–0.5)
EOSINOPHILS RELATIVE PERCENT: 5.3 % (ref 0–6)
FIO2: 100 %
FIO2: 60 %
GFR AFRICAN AMERICAN: 58
GFR AFRICAN AMERICAN: 58
GFR NON-AFRICAN AMERICAN: 58 ML/MIN/1.73
GFR NON-AFRICAN AMERICAN: 58 ML/MIN/1.73
GLUCOSE BLD-MCNC: 125 MG/DL (ref 74–99)
GLUCOSE BLD-MCNC: 127 MG/DL (ref 74–99)
HCO3: 23.7 MMOL/L (ref 22–26)
HCO3: 26.8 MMOL/L (ref 22–26)
HCT VFR BLD CALC: 32.8 % (ref 37–54)
HEMOGLOBIN: 11.1 G/DL (ref 12.5–16.5)
HHB: 3.9 % (ref 0–5)
HHB: 5.3 % (ref 0–5)
LAB: ABNORMAL
LAB: ABNORMAL
LYMPHOCYTES ABSOLUTE: 1.69 E9/L (ref 1.5–4)
LYMPHOCYTES RELATIVE PERCENT: 18.4 % (ref 20–42)
Lab: ABNORMAL
Lab: ABNORMAL
MAGNESIUM: 1.6 MG/DL (ref 1.6–2.6)
MCH RBC QN AUTO: 26.2 PG (ref 26–35)
MCHC RBC AUTO-ENTMCNC: 33.8 % (ref 32–34.5)
MCV RBC AUTO: 77.5 FL (ref 80–99.9)
METER GLUCOSE: 113 MG/DL (ref 74–99)
METER GLUCOSE: 137 MG/DL (ref 74–99)
METER GLUCOSE: 140 MG/DL (ref 74–99)
METER GLUCOSE: 153 MG/DL (ref 74–99)
METER GLUCOSE: 163 MG/DL (ref 74–99)
METER GLUCOSE: 184 MG/DL (ref 74–99)
METHB: 0.4 % (ref 0–1.5)
METHB: 0.5 % (ref 0–1.5)
MODE: AC
MODE: AC
MONOCYTES ABSOLUTE: 0.56 E9/L (ref 0.1–0.95)
MONOCYTES RELATIVE PERCENT: 6.1 % (ref 2–12)
NEUTROPHILS ABSOLUTE: 6.58 E9/L (ref 1.8–7.3)
NEUTROPHILS RELATIVE PERCENT: 70.2 % (ref 43–80)
O2 CONTENT: 16.4 ML/DL
O2 CONTENT: 16.8 ML/DL
O2 SATURATION: 94.7 % (ref 92–98.5)
O2 SATURATION: 96.1 % (ref 92–98.5)
O2HB: 94 % (ref 94–97)
O2HB: 95.2 % (ref 94–97)
OPERATOR ID: 1868
OPERATOR ID: 1874
OVALOCYTES: ABNORMAL
PATIENT TEMP: 37 C
PATIENT TEMP: 37 C
PCO2: 34.8 MMHG (ref 35–45)
PCO2: 37.3 MMHG (ref 35–45)
PDW BLD-RTO: 20.6 FL (ref 11.5–15)
PEEP/CPAP: 14 CMH2O
PEEP/CPAP: 5 CMH2O
PFO2: 0.84 MMHG/%
PFO2: 1.25 MMHG/%
PH BLOOD GAS: 7.45 (ref 7.35–7.45)
PH BLOOD GAS: 7.47 (ref 7.35–7.45)
PHOSPHORUS: 3.5 MG/DL (ref 2.5–4.5)
PLATELET # BLD: 316 E9/L (ref 130–450)
PMV BLD AUTO: 9.9 FL (ref 7–12)
PO2: 74.9 MMHG (ref 75–100)
PO2: 83.7 MMHG (ref 75–100)
POIKILOCYTES: ABNORMAL
POLYCHROMASIA: ABNORMAL
POTASSIUM SERPL-SCNC: 4.1 MMOL/L (ref 3.5–5)
POTASSIUM SERPL-SCNC: 4.8 MMOL/L (ref 3.5–5)
RBC # BLD: 4.23 E12/L (ref 3.8–5.8)
RI(T): 3.96
RI(T): 6.8
RR MECHANICAL: 16 B/MIN
RR MECHANICAL: 18 B/MIN
SCHISTOCYTES: ABNORMAL
SODIUM BLD-SCNC: 143 MMOL/L (ref 132–146)
SODIUM BLD-SCNC: 143 MMOL/L (ref 132–146)
SOURCE, BLOOD GAS: ABNORMAL
SOURCE, BLOOD GAS: ABNORMAL
TARGET CELLS: ABNORMAL
TEAR DROP CELLS: ABNORMAL
THB: 12.4 G/DL (ref 11.5–16.5)
THB: 12.5 G/DL (ref 11.5–16.5)
TIME ANALYZED: 1603
TIME ANALYZED: 508
TOTAL PROTEIN: 7.1 G/DL (ref 6.4–8.3)
VT MECHANICAL: 500 ML
VT MECHANICAL: 500 ML
WBC # BLD: 9.4 E9/L (ref 4.5–11.5)

## 2020-11-08 PROCEDURE — C9113 INJ PANTOPRAZOLE SODIUM, VIA: HCPCS | Performed by: INTERNAL MEDICINE

## 2020-11-08 PROCEDURE — 94640 AIRWAY INHALATION TREATMENT: CPT

## 2020-11-08 PROCEDURE — 6370000000 HC RX 637 (ALT 250 FOR IP): Performed by: INTERNAL MEDICINE

## 2020-11-08 PROCEDURE — 71045 X-RAY EXAM CHEST 1 VIEW: CPT

## 2020-11-08 PROCEDURE — 2500000003 HC RX 250 WO HCPCS: Performed by: INTERNAL MEDICINE

## 2020-11-08 PROCEDURE — 82330 ASSAY OF CALCIUM: CPT

## 2020-11-08 PROCEDURE — 80048 BASIC METABOLIC PNL TOTAL CA: CPT

## 2020-11-08 PROCEDURE — 85025 COMPLETE CBC W/AUTO DIFF WBC: CPT

## 2020-11-08 PROCEDURE — 2580000003 HC RX 258: Performed by: INTERNAL MEDICINE

## 2020-11-08 PROCEDURE — 0BH17EZ INSERTION OF ENDOTRACHEAL AIRWAY INTO TRACHEA, VIA NATURAL OR ARTIFICIAL OPENING: ICD-10-PCS | Performed by: INTERNAL MEDICINE

## 2020-11-08 PROCEDURE — 6370000000 HC RX 637 (ALT 250 FOR IP): Performed by: STUDENT IN AN ORGANIZED HEALTH CARE EDUCATION/TRAINING PROGRAM

## 2020-11-08 PROCEDURE — 31500 INSERT EMERGENCY AIRWAY: CPT | Performed by: ANESTHESIOLOGY

## 2020-11-08 PROCEDURE — 6360000002 HC RX W HCPCS: Performed by: INTERNAL MEDICINE

## 2020-11-08 PROCEDURE — 6360000002 HC RX W HCPCS: Performed by: SPECIALIST

## 2020-11-08 PROCEDURE — 84100 ASSAY OF PHOSPHORUS: CPT

## 2020-11-08 PROCEDURE — 82962 GLUCOSE BLOOD TEST: CPT

## 2020-11-08 PROCEDURE — 5A1955Z RESPIRATORY VENTILATION, GREATER THAN 96 CONSECUTIVE HOURS: ICD-10-PCS | Performed by: INTERNAL MEDICINE

## 2020-11-08 PROCEDURE — 94003 VENT MGMT INPAT SUBQ DAY: CPT

## 2020-11-08 PROCEDURE — 83735 ASSAY OF MAGNESIUM: CPT

## 2020-11-08 PROCEDURE — 2000000000 HC ICU R&B

## 2020-11-08 PROCEDURE — 82805 BLOOD GASES W/O2 SATURATION: CPT

## 2020-11-08 PROCEDURE — 2580000003 HC RX 258: Performed by: SPECIALIST

## 2020-11-08 PROCEDURE — 2500000003 HC RX 250 WO HCPCS: Performed by: STUDENT IN AN ORGANIZED HEALTH CARE EDUCATION/TRAINING PROGRAM

## 2020-11-08 PROCEDURE — 80053 COMPREHEN METABOLIC PANEL: CPT

## 2020-11-08 PROCEDURE — 2500000003 HC RX 250 WO HCPCS

## 2020-11-08 RX ORDER — BUMETANIDE 0.25 MG/ML
2 INJECTION, SOLUTION INTRAMUSCULAR; INTRAVENOUS DAILY
Status: DISCONTINUED | OUTPATIENT
Start: 2020-11-08 | End: 2020-11-08

## 2020-11-08 RX ORDER — DIMETHICONE, OXYBENZONE, AND PADIMATE O 2; 2.5; 6.6 G/100G; G/100G; G/100G
STICK TOPICAL
Status: DISCONTINUED
Start: 2020-11-08 | End: 2020-11-09

## 2020-11-08 RX ORDER — BUMETANIDE 0.25 MG/ML
2 INJECTION, SOLUTION INTRAMUSCULAR; INTRAVENOUS 2 TIMES DAILY
Status: COMPLETED | OUTPATIENT
Start: 2020-11-09 | End: 2020-11-10

## 2020-11-08 RX ORDER — DEXAMETHASONE SODIUM PHOSPHATE 4 MG/ML
4 INJECTION, SOLUTION INTRA-ARTICULAR; INTRALESIONAL; INTRAMUSCULAR; INTRAVENOUS; SOFT TISSUE EVERY 8 HOURS
Status: DISCONTINUED | OUTPATIENT
Start: 2020-11-08 | End: 2020-11-10

## 2020-11-08 RX ORDER — BUMETANIDE 0.25 MG/ML
2 INJECTION, SOLUTION INTRAMUSCULAR; INTRAVENOUS ONCE
Status: COMPLETED | OUTPATIENT
Start: 2020-11-08 | End: 2020-11-08

## 2020-11-08 RX ORDER — SUCCINYLCHOLINE CHLORIDE 20 MG/ML
INJECTION INTRAMUSCULAR; INTRAVENOUS
Status: DISPENSED
Start: 2020-11-08 | End: 2020-11-09

## 2020-11-08 RX ADMIN — SODIUM CHLORIDE SOLN NEBU 3% 2 ML: 3 NEBU SOLN at 03:30

## 2020-11-08 RX ADMIN — SODIUM CHLORIDE SOLN NEBU 3% 2 ML: 3 NEBU SOLN at 23:23

## 2020-11-08 RX ADMIN — PROPOFOL 50 MCG/KG/MIN: 10 INJECTION, EMULSION INTRAVENOUS at 14:31

## 2020-11-08 RX ADMIN — SODIUM CHLORIDE SOLN NEBU 3% 2 ML: 3 NEBU SOLN at 09:01

## 2020-11-08 RX ADMIN — QUETIAPINE FUMARATE 75 MG: 25 TABLET ORAL at 08:35

## 2020-11-08 RX ADMIN — MIDAZOLAM HYDROCHLORIDE 2 MG: 1 INJECTION, SOLUTION INTRAMUSCULAR; INTRAVENOUS at 11:46

## 2020-11-08 RX ADMIN — PROPOFOL 45 MCG/KG/MIN: 10 INJECTION, EMULSION INTRAVENOUS at 04:39

## 2020-11-08 RX ADMIN — PROPOFOL 50 MCG/KG/MIN: 10 INJECTION, EMULSION INTRAVENOUS at 02:41

## 2020-11-08 RX ADMIN — PANTOPRAZOLE SODIUM 40 MG: 40 INJECTION, POWDER, FOR SOLUTION INTRAVENOUS at 08:35

## 2020-11-08 RX ADMIN — QUETIAPINE FUMARATE 75 MG: 25 TABLET ORAL at 22:10

## 2020-11-08 RX ADMIN — PROPOFOL 40 MCG/KG/MIN: 10 INJECTION, EMULSION INTRAVENOUS at 07:25

## 2020-11-08 RX ADMIN — SODIUM CHLORIDE 3 G: 900 INJECTION INTRAVENOUS at 04:41

## 2020-11-08 RX ADMIN — SODIUM CHLORIDE 3 G: 900 INJECTION INTRAVENOUS at 12:57

## 2020-11-08 RX ADMIN — Medication 10 ML: at 22:10

## 2020-11-08 RX ADMIN — DEXMEDETOMIDINE 1.4 MCG/KG/HR: 100 INJECTION, SOLUTION, CONCENTRATE INTRAVENOUS at 14:33

## 2020-11-08 RX ADMIN — CARVEDILOL 3.12 MG: 6.25 TABLET, FILM COATED ORAL at 08:35

## 2020-11-08 RX ADMIN — PROPOFOL 35 MCG/KG/MIN: 10 INJECTION, EMULSION INTRAVENOUS at 23:30

## 2020-11-08 RX ADMIN — Medication 10 ML: at 08:37

## 2020-11-08 RX ADMIN — CARVEDILOL 3.12 MG: 6.25 TABLET, FILM COATED ORAL at 17:03

## 2020-11-08 RX ADMIN — IPRATROPIUM BROMIDE AND ALBUTEROL SULFATE 1 AMPULE: 2.5; .5 SOLUTION RESPIRATORY (INHALATION) at 16:33

## 2020-11-08 RX ADMIN — PROPOFOL 45 MCG/KG/MIN: 10 INJECTION, EMULSION INTRAVENOUS at 22:01

## 2020-11-08 RX ADMIN — SODIUM CHLORIDE: 9 INJECTION, SOLUTION INTRAVENOUS at 22:10

## 2020-11-08 RX ADMIN — IPRATROPIUM BROMIDE AND ALBUTEROL SULFATE 1 AMPULE: 2.5; .5 SOLUTION RESPIRATORY (INHALATION) at 19:56

## 2020-11-08 RX ADMIN — PIPERACILLIN AND TAZOBACTAM 3.38 G: 3; .375 INJECTION, POWDER, FOR SOLUTION INTRAVENOUS at 16:17

## 2020-11-08 RX ADMIN — INSULIN LISPRO 3 UNITS: 100 INJECTION, SOLUTION INTRAVENOUS; SUBCUTANEOUS at 08:33

## 2020-11-08 RX ADMIN — SODIUM CHLORIDE, PRESERVATIVE FREE 10 ML: 5 INJECTION INTRAVENOUS at 08:36

## 2020-11-08 RX ADMIN — LEVOTHYROXINE SODIUM 50 MCG: 0.05 TABLET ORAL at 06:06

## 2020-11-08 RX ADMIN — Medication 1 MCG/MIN: at 12:45

## 2020-11-08 RX ADMIN — THIAMINE HYDROCHLORIDE 100 MG: 100 INJECTION, SOLUTION INTRAMUSCULAR; INTRAVENOUS at 14:13

## 2020-11-08 RX ADMIN — HEPARIN SODIUM 7500 UNITS: 10000 INJECTION INTRAVENOUS; SUBCUTANEOUS at 00:40

## 2020-11-08 RX ADMIN — ARFORMOTEROL TARTRATE 15 MCG: 15 SOLUTION RESPIRATORY (INHALATION) at 19:56

## 2020-11-08 RX ADMIN — IPRATROPIUM BROMIDE AND ALBUTEROL SULFATE 1 AMPULE: 2.5; .5 SOLUTION RESPIRATORY (INHALATION) at 09:01

## 2020-11-08 RX ADMIN — HEPARIN SODIUM 7500 UNITS: 10000 INJECTION INTRAVENOUS; SUBCUTANEOUS at 08:33

## 2020-11-08 RX ADMIN — DEXAMETHASONE SODIUM PHOSPHATE 4 MG: 4 INJECTION, SOLUTION INTRAMUSCULAR; INTRAVENOUS at 22:01

## 2020-11-08 RX ADMIN — ASPIRIN 81 MG CHEWABLE TABLET 81 MG: 81 TABLET CHEWABLE at 08:35

## 2020-11-08 RX ADMIN — BUMETANIDE 2 MG: 0.25 INJECTION INTRAMUSCULAR; INTRAVENOUS at 16:34

## 2020-11-08 RX ADMIN — HEPARIN SODIUM 7500 UNITS: 10000 INJECTION INTRAVENOUS; SUBCUTANEOUS at 16:34

## 2020-11-08 RX ADMIN — BUDESONIDE 500 MCG: 0.5 SUSPENSION RESPIRATORY (INHALATION) at 19:56

## 2020-11-08 RX ADMIN — BUDESONIDE 500 MCG: 0.5 SUSPENSION RESPIRATORY (INHALATION) at 09:00

## 2020-11-08 RX ADMIN — DEXMEDETOMIDINE 1 MCG/KG/HR: 100 INJECTION, SOLUTION, CONCENTRATE INTRAVENOUS at 03:17

## 2020-11-08 RX ADMIN — HYDRALAZINE HYDROCHLORIDE 10 MG: 20 INJECTION, SOLUTION INTRAMUSCULAR; INTRAVENOUS at 11:49

## 2020-11-08 RX ADMIN — PSYLLIUM HUSK 1 PACKET: 3.4 GRANULE ORAL at 08:36

## 2020-11-08 RX ADMIN — CHLORHEXIDINE GLUCONATE 0.12% ORAL RINSE 15 ML: 1.2 LIQUID ORAL at 22:12

## 2020-11-08 RX ADMIN — ARFORMOTEROL TARTRATE 15 MCG: 15 SOLUTION RESPIRATORY (INHALATION) at 09:00

## 2020-11-08 RX ADMIN — PROPOFOL 50 MCG/KG/MIN: 10 INJECTION, EMULSION INTRAVENOUS at 00:00

## 2020-11-08 RX ADMIN — BUMETANIDE 2 MG: 0.25 INJECTION INTRAMUSCULAR; INTRAVENOUS at 11:52

## 2020-11-08 RX ADMIN — FOLIC ACID 1 MG: 5 INJECTION, SOLUTION INTRAMUSCULAR; INTRAVENOUS; SUBCUTANEOUS at 08:43

## 2020-11-08 RX ADMIN — AMLODIPINE BESYLATE 10 MG: 10 TABLET ORAL at 08:36

## 2020-11-08 RX ADMIN — PIPERACILLIN AND TAZOBACTAM 3.38 G: 3; .375 INJECTION, POWDER, FOR SOLUTION INTRAVENOUS at 23:43

## 2020-11-08 RX ADMIN — CHLORHEXIDINE GLUCONATE 0.12% ORAL RINSE 15 ML: 1.2 LIQUID ORAL at 08:37

## 2020-11-08 RX ADMIN — PROPOFOL 40 MCG/KG/MIN: 10 INJECTION, EMULSION INTRAVENOUS at 07:20

## 2020-11-08 RX ADMIN — HYDRALAZINE HYDROCHLORIDE 10 MG: 20 INJECTION, SOLUTION INTRAMUSCULAR; INTRAVENOUS at 00:38

## 2020-11-08 RX ADMIN — IPRATROPIUM BROMIDE AND ALBUTEROL SULFATE 1 AMPULE: 2.5; .5 SOLUTION RESPIRATORY (INHALATION) at 11:59

## 2020-11-08 RX ADMIN — SODIUM CHLORIDE SOLN NEBU 3% 2 ML: 3 NEBU SOLN at 19:56

## 2020-11-08 RX ADMIN — PROPOFOL 50 MCG/KG/MIN: 10 INJECTION, EMULSION INTRAVENOUS at 17:22

## 2020-11-08 RX ADMIN — DEXMEDETOMIDINE 1.4 MCG/KG/HR: 100 INJECTION, SOLUTION, CONCENTRATE INTRAVENOUS at 22:04

## 2020-11-08 RX ADMIN — ACETAMINOPHEN 650 MG: 325 TABLET ORAL at 17:03

## 2020-11-08 RX ADMIN — SODIUM CHLORIDE SOLN NEBU 3% 2 ML: 3 NEBU SOLN at 16:33

## 2020-11-08 RX ADMIN — DEXAMETHASONE SODIUM PHOSPHATE 4 MG: 4 INJECTION, SOLUTION INTRAMUSCULAR; INTRAVENOUS at 14:12

## 2020-11-08 ASSESSMENT — PAIN SCALES - GENERAL
PAINLEVEL_OUTOF10: 0
PAINLEVEL_OUTOF10: 1
PAINLEVEL_OUTOF10: 0
PAINLEVEL_OUTOF10: 0

## 2020-11-08 ASSESSMENT — PULMONARY FUNCTION TESTS
PIF_VALUE: 41
PIF_VALUE: 35
PIF_VALUE: 30
PIF_VALUE: 35
PIF_VALUE: 38
PIF_VALUE: 28
PIF_VALUE: 34
PIF_VALUE: 35
PIF_VALUE: 45
PIF_VALUE: 36
PIF_VALUE: 31
PIF_VALUE: 33
PIF_VALUE: 36
PIF_VALUE: 28
PIF_VALUE: 27
PIF_VALUE: 28
PIF_VALUE: 35
PIF_VALUE: 28
PIF_VALUE: 29
PIF_VALUE: 19
PIF_VALUE: 35
PIF_VALUE: 35
PIF_VALUE: 30
PIF_VALUE: 32
PIF_VALUE: 27
PIF_VALUE: 37
PIF_VALUE: 37
PIF_VALUE: 28

## 2020-11-08 NOTE — FLOWSHEET NOTE
11/08/20 0000   Assessment   Less Restrictive Alternative PC;RP;RE;DE;VR;RO   Special Consideration/Risk Factors MC   Justification   Clinical Justification L;T;E;D   Education   Discontinuation Criteria Absence   Criteria Explained Yes   Patient's Response NR   Family Notification S  (previously notified)   Restraint Monitoring Q60 Minutes   Visual/Safety Check (q 60 mins) SD   Restraint  Monitoring Q2 Hours   Circulation NS   Range of Motion P   Fluids IV   Food/Meal TPN   Elimination UC   Restraint Type   Soft Restraint B Wrist CONTINUED   Vital Signs   Temp 98.6 °F (37 °C)   Temp Source Esophageal   Pulse 62   Heart Rate Source Monitor   Resp 24   BP (!) 183/90   BP Location Arterial   MAP (mmHg) 125   Level of Consciousness 1   MEWS Score 3   Patient Currently in Pain Other (comment)  (CPOT)       PT still requiring restraints - bilateral wrist restraints. Pulling & tugging on lines/tubes.  Monitoring closely  Yuly Patel RN

## 2020-11-08 NOTE — PLAN OF CARE
Problem: Restraint Use - Nonviolent/Non-Self-Destructive Behavior:  Goal: Absence of restraint indications  Description: Absence of restraint indications  11/8/2020 0136 by Tom Orozco RN  Outcome: Not Met This Shift     Problem: Restraint Use - Nonviolent/Non-Self-Destructive Behavior:  Goal: Absence of restraint-related injury  Description: Absence of restraint-related injury  11/8/2020 0136 by Tom Orozco RN  Outcome: Met This Shift

## 2020-11-08 NOTE — CONSULTS
GENERAL SURGERY  CONSULT NOTE  11/8/2020    Physician Consulted: Dr. Josue Almonte  Reason for Consult: tracheostomy consult  Referring Physician: Dr. Sae Tamayomo-Katy    [facility]  Jasper Guerrero is a 61 y.o. male who presents for evaluation of shortness of breath. PMH CHF, hypothyroid, DM, HTN, HLD, obesity, HANS. Hx of prior trach/PEG in 2017 from failure to wean off vent, acute respiratory failure. Initially presented to the hospital initially due to hypoxia and found to have acute on chronic hypoxic respiratory failure with O2 saturation on 70%. Admitted and started on treatment for PNA. Patient's respiratory status decompensated on 10/28, requiring intubation and ICU care. Respiratory culture positive for Staph aureus, now on Doxycycline. Required pressors from 11/1-11/2. He was extubated today but failed extubation. Anesthesia was available for reiintubation but it was a difficulty airway. Surgery consulted for possible stat Trach however anesthesia obtained ET intubation by time of arrival of surgical team at bedside. Formal consult for evaluation for trach due to failed extubation. Hx of prior trach/PEG in 2017 for respiratory failure by Dr. Jesus Martinez. These have since been removed. No known hx of ascites or liver disease. He is on [facility] but no therapeutic anticoagulation.         Past Medical History:   Diagnosis Date    Acquired hypothyroidism 9/26/2017    Anxiety     Congestive heart failure with LV diastolic dysfunction, NYHA class 3 ([address]     Depression     DM (diabetes mellitus) ([address]     Essential hypertension 6/1/2016    Gouty arthritis 2006    Hyperlipidemia     Hypertension     Lymphedema     Obesity     Obstructive sleep apnea 8/17/2009    Obstructive sleep apnea 9/26/2017    Osteoarthritis     Type 2 diabetes mellitus without complication, without long-term current use of insulin ([address] 1/15/2014       Past Surgical History:   Procedure Laterality Date    BRONCHOSCOPY  08/10/2017    Venom Anaphylaxis    Shellfish-Derived Products Anaphylaxis     Only crab legs       Family History   Problem Relation Age of Onset    Alzheimer's Disease Mother     Heart Disease Father         Heart arrhythmia    Heart Attack Father        Social History     Tobacco Use    Smoking status: Former Smoker     Packs/day: 0.10     Years: 10.00     Pack years: 1.00     Types: Cigarettes     Last attempt to quit: 2/19/2005     Years since quitting: 15.7    Smokeless tobacco: Former User     Types: Chew     Quit date: 1/1/1990   Substance Use Topics    Alcohol use: Yes     Frequency: 2-3 times a week     Drinks per session: 1 or 2     Binge frequency: Never     Comment: socially    Drug use: Not Currently     Comment: past use; none x 30 years         Review of Systems:  Unable to be obtained as is intubated and sedated. PHYSICAL EXAM:    Vitals:    11/08/20 1638   BP:    Pulse: 71   Resp: 14   Temp:    SpO2: 96%       GENERAL:  Intubated, sedated  HEAD:  Normocephalic. Atraumatic. EYES:   No scleral icterus. PERRL. LUNGS:  Intubated, mechanical ventilation:  AC 18/500/100%+14  CARDIOVASCULAR: RR  ABDOMEN:  Soft,obese,  non-distended. No guarding, rigidity, rebound. LUQ scar from prior PEG  EXTREMITIES:    Atraumatic. No LE edema. SKIN:  Warm and dry      LABS:    CBC  Recent Labs     11/08/20  0450   WBC 9.4   HGB 11.1*   HCT 32.8*        BMP  Recent Labs     11/08/20  0450     143   K 4.1  4.8     105   CO2 24  22   BUN 26*  26*   CREATININE 1.5*  1.5*   CALCIUM 8.1*  8.1*     Liver Function  Recent Labs     11/08/20  0450   BILITOT 0.5   *   *   ALKPHOS 163*   PROT 7.1   LABALBU 3.1*     No results for input(s): LACTATE in the last 72 hours. No results for input(s): INR, PTT in the last 72 hours.     Invalid input(s): PT    RADIOLOGY    Xr Chest Portable    Result Date: 10/27/2020  EXAMINATION: ONE XRAY VIEW OF THE CHEST 10/27/2020 7:58 pm COMPARISON: October 26, 2020 HISTORY: ORDERING SYSTEM PROVIDED HISTORY: SOB TECHNOLOGIST PROVIDED HISTORY: Reason for exam:->SOB What reading provider will be dictating this exam?->CRC FINDINGS: There is mild cardiomegaly. There is patchy perihilar and bibasilar atelectasis/infiltrates. Tiny pleural effusions are suspected. Progressive bibasilar atelectasis/infiltrates concerning for pneumonia. Underlying CHF is considered. Xr Chest Portable    Result Date: 10/26/2020  EXAMINATION: ONE XRAY VIEW OF THE CHEST 10/26/2020 4:11 pm COMPARISON: 05/26/2019 HISTORY: ORDERING SYSTEM PROVIDED HISTORY: SOB TECHNOLOGIST PROVIDED HISTORY: Reason for exam:->SOB What reading provider will be dictating this exam?->CRC FINDINGS: Cardiac size appears stable. Discoid airspace disease seen at the left lung base which may represent atelectasis or scarring. Right infrahilar and basilar infiltrate is identified. No pneumothorax is identified. No acute osseous abnormality is identified. Right infrahilar and basilar infiltrate concerning for pneumonia. Left basilar atelectasis or scarring. ASSESSMENT/PLAN:  61 y.o. male with acute respiratory failure, failed extubation and difficulty airway for reintubation    Overall care per ICU team  He is a candidate for Trach/PEG  Discussed procedures with wife who is agreeable with proceeding with trach/peg when medically appropriate. Presently vent settings too high to undergo tracheostomy. Will await until more appropriate vent settings and then can be scheduled. Would recommend being done in OR in light of prior trach/PEG and body habitus    Plan discussed with Dr. Cary Garcia.     Karen Ray DO  Resident, PGY-2  11/8/2020  4:55 PM

## 2020-11-08 NOTE — PROGRESS NOTES
Patient was extubated to 15 liters/min via high flow NC. Breath Sounds post extubation were diminished. Stridor was not present post extubation. SPO2 was 95%.     Bipap on stand by    Performed by  Any Vega

## 2020-11-08 NOTE — PROGRESS NOTES
Pulmonary Progress Note  11/8/2020 9:05 AM  Subjective:   Admit Date: 10/26/2020  PCP: Payton Oscar MD  Interval History: Patient sedated on fentanyl and propofol. Seroquel being uptitrated for agitation. Intubated on mechanical ventilation on ACVC, Fio2 to 60%, PEEP 5 with patient now on propofol and precedex for wean.   Diet: DIET TUBE FEED CONTINUOUS/CYCLIC NPO; Diabetic 1.5; Orogastric; Continuous; 25; 60; 23    Data:   Scheduled Meds:    carvedilol  3.125 mg Oral BID WC    QUEtiapine  75 mg Oral BID    psyllium  1 packet Oral Daily    ampicillin-sulbactam  3 g Intravenous I9F    folic acid  1 mg Intravenous Daily    thiamine (VITAMIN B1) IVPB  100 mg Intravenous Q24H    insulin glargine  20 Units Subcutaneous Nightly    sodium chloride (Inhalant)  2 mL Nebulization Q4H    insulin lispro  0-18 Units Subcutaneous Q4H    heparin (porcine)  7,500 Units Subcutaneous Q8H    pantoprazole  40 mg Intravenous Daily    And    sodium chloride (PF)  10 mL Intravenous Daily    sodium chloride flush  10 mL Intravenous 2 times per day    Arformoterol Tartrate  15 mcg Nebulization BID    chlorhexidine  15 mL Mouth/Throat BID    amLODIPine  10 mg Oral Daily    aspirin  81 mg Oral Daily    [Held by provider] atorvastatin  40 mg Oral Daily    levothyroxine  50 mcg Oral Daily    [Held by provider] losartan  100 mg Oral Daily    budesonide  0.5 mg Nebulization BID    ipratropium-albuterol  1 ampule Inhalation Q4H WA     Continuous Infusions:    dexmedetomidine (PRECEDEX) IV infusion 0.7 mcg/kg/hr (11/08/20 0600)    propofol 30 mcg/kg/min (11/08/20 0836)    fentaNYL 5 mcg/ml in D5W 250 ml infusion Stopped (11/07/20 1637)    dextrose         PRN Meds: sennosides-docusate sodium, polyvinyl alcohol, midazolam, hydrALAZINE, sodium chloride flush, acetaminophen **OR** acetaminophen, polyethylene glycol, perflutren lipid microspheres, colchicine, glucose, dextrose, glucagon (rDNA), dextrose    I/O last 3 completed shifts: In: 5157.4 [I.V.:3866.4; NG/GT:1091; IV SQWOSYURQ:935]  Out: 5680 [Urine:4180; ADZVP:6211]    I/O this shift:  In: 60 [NG/GT:60]  Out: 150 [Urine:150]      Intake/Output Summary (Last 24 hours) at 11/8/2020 0905  Last data filed at 11/8/2020 0800  Gross per 24 hour   Intake 5157.41 ml   Output 5705 ml   Net -547.59 ml       Patient Vitals for the past 96 hrs (Last 3 readings):   Weight   11/08/20 0600 290 lb 6.4 oz (131.7 kg)   11/07/20 0426 278 lb 8 oz (126.3 kg)   11/06/20 0445 284 lb 12.8 oz (129.2 kg)         Recent Labs     11/06/20 0325 11/07/20  0354 11/08/20  0450   WBC 7.2 8.1 9.4   HGB 10.6* 10.8* 11.1*   HCT 30.8* 33.0* 32.8*   MCV 76.0* 78.6* 77.5*    325 316     Recent Labs     11/06/20  0325 11/07/20  0354 11/07/20  1120 11/08/20  0450   *   < > 145 142 143  143   K 3.3*   < > 3.7 4.3 4.1  4.8      < > 103 101 105  105   CO2 28   < > 28 27 24  22   BUN 40*   < > 33* 32* 26*  26*   CREATININE 2.0*   < > 1.8* 1.7* 1.5*  1.5*   PHOS 2.6  --  4.4  --  3.5    < > = values in this interval not displayed. Recent Labs     11/06/20 0325 11/07/20  0354 11/08/20  0450   PROT 6.2* 6.6 7.1   ALKPHOS 85 98 163*   * 148* 243*   AST 54* 89* 164*   BILITOT 0.4 0.3 0.5   CPK:  Lab Results   Component Value Date    CKTOTAL 98 10/08/2017        11/6 CXR viewed and shows stable diffuse infiltrates  -----------------------------------------------------------------  RAD:   Results for orders placed during the hospital encounter of 10/26/20   XR CHEST PORTABLE    Narrative EXAMINATION:  ONE XRAY VIEW OF THE CHEST    10/30/2020 2:17 am    COMPARISON:  10/29/2020 radiograph    HISTORY:  ORDERING SYSTEM PROVIDED HISTORY: R IJ placement  TECHNOLOGIST PROVIDED HISTORY:  Reason for exam:->R IJ placement  What reading provider will be dictating this exam?->CRC    FINDINGS:  Right IJ central venous catheter has been placed with tip at the distal SVC. No pneumothorax. Worsening diffuse airspace opacification in the right lung  with moderate opacification also present at the left base. Endotracheal tube  and enteric tube stable. No significant skeletal finding. Impression Interval placement of right IJ central venous catheter. No pneumothorax. Worsening airspace changes in the right lung. Micro:  No results for input(s): BC in the last 72 hours. No results for input(s): ORG in the last 72 hours. No results for input(s): Winda Lyons in the last 72 hours. No results for input(s): STREPPNEUMAG in the last 72 hours. No results for input(s): LEGUR in the last 72 hours. No results for input(s): ORG in the last 72 hours. No results for input(s): ASO in the last 72 hours. No results for input(s): CULTRESP in the last 72 hours. No results for input(s): LABURIN in the last 72 hours.   Vent Information  $Ventilation: $Subsequent Day  Skin Assessment: Clean, dry, & intact  Equipment ID: [id number]  Equipment Changed: (S) Humidification  Vent Type: 980  Vent Mode: AC/VC  Vt Ordered: 500 mL  Rate Set: 16 bmp  Peak Flow: 65 L/min  Pressure Support: 0 cmH20  FiO2 : 60 %  SpO2: 95 %  SpO2/FiO2 ratio: 158.33  PaO2/FiO2 ratio: 146  Sensitivity: 3  PEEP/CPAP: 5  I Time/ I Time %: 0 s  Humidification Source: Heated wire  Humidification Temp: 37  Humidification Temp Measured: 36.7  Circuit Condensation: Drained  Mask Type: Full face mask  Mask Size: Large    Additional Respiratory  Assessments  Pulse: 88  Resp: 19  SpO2: 95 %  Position: Semi-Lockwood's  Humidification Source: Heated wire  Humidification Temp: 37  Circuit Condensation: Drained  Oral Care Completed?: Yes  Oral Care: Mouth swabbed, Mouth moisturizer, Mouth suctioned, Suction toothette  Subglottic Suction Done?: Yes  Airway Type: ET  Airway Size: 8(27)  Cuff Pressure (cm H2O): 29 cm H2O  Skin barrier applied: Yes    Objective:   Vitals:   Vitals:    11/08/20 0901   BP:    Pulse:    Resp: 19   Temp:    SpO2: 95% TEMP:Current: Temp: 99.3 °F (37.4 °C)  Max: Temp  Av.4 °F (37.4 °C)  Min: 98.6 °F (37 °C)  Max: 100.6 °F (38.1 °C)    BP Range: Systolic (02ZRN), MIGUEL ANGEL:150 , Min:126 , NFV:175     Diastolic (16WKB), EOU:26, Min:57, Max:99      General appearance: Sedated obese on mechanical ventilation , appears stated age awakens to touch  In no acute distress  Skin: No rashes or lesions  HEENT: mucous membranes are moist oral endotracheal tube and OG tube  Neck: No JVD  Lungs: symmetrical expansion, coarse breath sounds to auscultation, no use of accessory muscles  Heart: S1S2 no murmurs, tachycardic  Abdomen: soft, non tender, distended obese  Extremities: no peripheral edema  Neurologic: Sedated  Affect: Calm       Assessment:   Patient Active Problem List:    59-y.o M with history of DM, hypothyroid, COPD, HTN, HLD presented on 10/26 with shortness of breath for 1 week. Saturations were 70% at PCPs. Saturation 65% on room air in ER  Patient agitated for CT scan refused to lay flat  10/27 RRT refused BiPAP became combative. Patient transferred to ICU  10/28 intubated and sedated. CT chest showing dense multifocal airspace disease  10/30 Ultrasound lower extremities no DVT, ultrasound kidneys no hydronephrosis  10/31 bilateral infiltrates left effusion. On 50% +8 of PEEP. Chest x-ray showing right-sided improving with left base atelectasis. Covid negative x2    1. Acute hypoxemic respiratory failure on mechanical ventilation  2. Agitation on Seroquel Fentanyl  3. MSSA pneumonia on Unasyn  4. HANS moderate ( AHI 17 on 18 requires BIPAP )  5. History of reactive airway disease  6. CT 3/14/2018 showing pulmonary nodule stable from 10/2017  7. Acute renal failure improving - nephrology on board   8. Diabetes with elevated blood sugars  9. Obesity  10. EF 65% LVH  11. chronic lymphedema  12. Hypothyroid  13.  H/o respiratory failure: 2017 pt was  transferred from [facility] for acute respiratory failure after lap cholecystectomy, lap umbilical hernia repair, and lap liver biopsy (fatty liver). He had been extubated postop but had laryngospasm requiring reintubation, complicated by negative pressure flash pulmonary edema, and required tracheostomy 8/10/2017. Patient has staph aureus pneumonia and C. difficile colitis. Patient was then transferred to [facility] hospital where he was weaned off the ventilator and decannulated. Plan:     · Wean sedation as able, whil titrating antipsychotics.     · Wean vent as able  · Family updated at bedside  · [address] with FiO2 to 60% and PEEP to 5  · Propofol, precedex  · Sedation and vent wean, hopefully tomorrow as per ICU team      Leigh Pfeiffer MD, [facility]  11/8/2020  9:05 AM

## 2020-11-08 NOTE — PROGRESS NOTES
Chief Complaint   Patient presents with    Shortness of Breath     for a few weeks. O2 sat in 70s at [facility], placed on 4L and now 95%. SUBJECTIVE:  Patient is tolerating medications. No reported adverse drug reactions. Patient remained afebrile overnight. Continue to be hypertensive. Patient reintubated ,  sedated on propofol, versed, fentanyl. Minimal secretions on suctioning               Failed extubation earlier today  WBC 7.2, on Unasyn day 7  60%. FiO2  Afebrile  Fecal management system in place. Review of systems:  As stated above in the chief complaint, otherwise negative.     Medications:  Scheduled Meds:   succinylcholine        bumetanide  2 mg Intravenous Once    [START ON 11/9/2020] bumetanide  2 mg Intravenous BID    dexamethasone  4 mg Intravenous Q8H    piperacillin-tazobactam  3.375 g Intravenous Q8H    carvedilol  3.125 mg Oral BID WC    QUEtiapine  75 mg Oral BID    psyllium  1 packet Oral Daily    folic acid  1 mg Intravenous Daily    thiamine (VITAMIN B1) IVPB  100 mg Intravenous Q24H    insulin glargine  20 Units Subcutaneous Nightly    sodium chloride (Inhalant)  2 mL Nebulization Q4H    insulin lispro  0-18 Units Subcutaneous Q4H    heparin (porcine)  7,500 Units Subcutaneous Q8H    pantoprazole  40 mg Intravenous Daily    And    sodium chloride (PF)  10 mL Intravenous Daily    sodium chloride flush  10 mL Intravenous 2 times per day    Arformoterol Tartrate  15 mcg Nebulization BID    chlorhexidine  15 mL Mouth/Throat BID    amLODIPine  10 mg Oral Daily    aspirin  81 mg Oral Daily    [Held by provider] atorvastatin  40 mg Oral Daily    levothyroxine  50 mcg Oral Daily    [Held by provider] losartan  100 mg Oral Daily    budesonide  0.5 mg Nebulization BID    ipratropium-albuterol  1 ampule Inhalation Q4H WA     Continuous Infusions:   dexmedetomidine (PRECEDEX) IV infusion 1.4 mcg/kg/hr (11/08/20 1258)    propofol 50 mcg/kg/min (11/08/20 1258)    fentaNYL 5 mcg/ml in D5W 250 ml infusion Stopped (20 1637)    dextrose       PRN Meds:sennosides-docusate sodium, polyvinyl alcohol, midazolam, hydrALAZINE, sodium chloride flush, acetaminophen **OR** acetaminophen, polyethylene glycol, perflutren lipid microspheres, colchicine, glucose, dextrose, glucagon (rDNA), dextrose    OBJECTIVE:  BP (!) 201/105   Pulse 88   Temp 99.3 °F (37.4 °C) (Esophageal)   Resp (!) 35   Ht 5' 8\" (1.727 m)   Wt 290 lb 6.4 oz (131.7 kg)   SpO2 (!) 79%   BMI 44.16 kg/m²   Temp  Av.4 °F (37.4 °C)  Min: 98.6 °F (37 °C)  Max: 100.6 °F (38.1 °C)  Constitutional: Intubated and Sedated, wakes up to stimulation , easily agitated   Skin: Warm and dry. No rashes were noted. HEENT: Round and reactive pupils. Moist mucous membranes. No ulcerations or thrush. Chest: Intubated. Symmetrical expansion. Some coarseness right upper lobe. Cardiovascular: S1 and S2 are rhythmic and regular. Systolic murmurs appreciated.    Abdomen: Hyperactive bowel sounds, obese abdomen, unable to palpate any masses   Extremities: No clubbing, no cyanosis, + Lymphedema left lower extremity, improving   Lines: CVC Right IJ 10/30, Right radial [place]     Laboratory and Tests Review:  Lab Results   Component Value Date    WBC 9.4 2020    WBC 8.1 2020    WBC 7.2 2020    HGB 11.1 (L) 2020    HCT 32.8 (L) 2020    MCV 77.5 (L) 2020     2020     Lab Results   Component Value Date    NEUTROABS 6.58 2020    NEUTROABS 6.16 2020    NEUTROABS 3.46 2020     No results found for: [facility]  Lab Results   Component Value Date     (H) 2020     (H) 2020    ALKPHOS 163 (H) 2020    BILITOT 0.5 2020     Lab Results   Component Value Date     2020     2020    K 4.8 2020    K 4.1 2020    K 4.0 10/27/2020     2020     2020    CO2 22 2020    CO2 24 11/08/2020    BUN 26 11/08/2020    BUN 26 11/08/2020    CREATININE 1.5 11/08/2020    CREATININE 1.5 11/08/2020    CREATININE 1.7 11/07/2020    GFRAA 58 11/08/2020    GFRAA 58 11/08/2020    LABGLOM 58 11/08/2020    LABGLOM 58 11/08/2020    GLUCOSE 125 11/08/2020    GLUCOSE 127 11/08/2020    PROT 7.1 11/08/2020    LABALBU 3.1 11/08/2020    CALCIUM 8.1 11/08/2020    CALCIUM 8.1 11/08/2020    BILITOT 0.5 11/08/2020    ALKPHOS 163 11/08/2020     11/08/2020     11/08/2020     Lab Results   Component Value Date    CRP 1.4 (H) 11/02/2020    CRP 2.8 (H) 09/04/2017    CRP 10.5 (H) 08/28/2017     Lab Results   Component Value Date    SEDRATE 135 (H) 09/04/2017    SEDRATE 150 (H) 08/28/2017    SEDRATE 141 (H) 08/21/2017     Radiology:  CXR- 11/3- Multifocal bilateral pulmonary infiltrates more prominent within the right lung. CXR- 11/4- worsening b/l infiltrates, worse on left today with possible small L sided effusion   CXR- 11/5 - Stable b/l infiltrates   CXR- 11/6 - Expiratory film, image otherwise appears overall stable. Microbiology:   Lab Results   Component Value Date    BC 5 Days no growth 10/29/2020    BC  10/26/2020     This organism was isolated in one set. Susceptibility testing is not routinely done as this  organism frequently represents skin contamination. Additional testing can be ordered by calling the  Microbiology Department.       BC 5 Days- no growth 05/19/2018    ORG Staphylococcus aureus 10/29/2020    ORG Staphylococcus coagulase-negative 10/26/2020    ORG Coagulase Negative Staphylococci 08/26/2017     Lab Results   Component Value Date    BLOODCULT2 5 Days no growth 10/29/2020    BLOODCULT2 5 Days no growth 10/26/2020    BLOODCULT2 5 Days- no growth 05/19/2018    ORG Staphylococcus aureus 10/29/2020    ORG Staphylococcus coagulase-negative 10/26/2020    ORG Coagulase Negative Staphylococci 08/26/2017     No results found for: WNDABS  Smear, Respiratory   Date Value Ref Range Status 10/29/2020   Final    Group 5: >25 PMN's/LPF and <10 Epithelial cells/LPF  Abundant Polymorphonuclear leukocytes  Epithelial cells not seen  Few Gram positive diplococci       No results found for: MPNEUMO, CLAMYDCU, LABLEGI, AFBCX, FUNGSM, LABFUNG  CULTURE, RESPIRATORY   Date Value Ref Range Status   10/29/2020 Oral Pharyngeal Tiesha present (A)  Final   10/29/2020   Final    Light growth  This isolate is presumed to be resistant based on the  detection of inducible Clindamycin resistance. Clindamycin  may still be effective in some patients.        Culture Catheter Tip   Date Value Ref Range Status   08/26/2017 <15 colonies  Final     No results found for: BFCS  No results found for: CXSURG  Urine Culture, Routine   Date Value Ref Range Status   10/28/2020 Growth not present  Final   09/16/2019 Growth not present  Final   07/31/2017 >100,000 CFU/ml  Final     MRSA Culture Only   Date Value Ref Range Status   10/30/2020 Methicillin resistant Staph aureus not isolated  Final   07/28/2017 Methicillin resistant Staph aureus not isolated  Final          Assessment:  · Acute on chronic hypoxic respiratory failure likely 2/2 pneumonia and volume overload  - resolving   · Bacterial pneumonia, likely aspiration pneumonia but after extubation patient had to be reintubated rule out HCAP  · Shock, sepsis- resolved, blood cultures negative   · Alcohol withdrawal      Plan:    · Continue Zosyn for aspiration pneumonia plus HCAP after reintubation  · Repeat respiratory culture  · Monitor labs, primary management by ICU team  · Will follow with you    Deanna Menon  2:19 PM  11/8/2020

## 2020-11-08 NOTE — PROGRESS NOTES
[facility] Quality Flow/Interdisciplinary Rounds Progress Note        Quality Flow Rounds held on November 8, 2020    Disciplines Attending:  Bedside nurse, charge nurse, , PT/OT, pharmacy, nursing unit leadership, , Medical residents    Nilesh Hayes was admitted on 10/26/2020  3:39 PM    Anticipated Discharge Date:       Disposition:    Romaine Score:  Romaine Scale Score: 15    Readmission Risk              Risk of Unplanned Readmission:        32           Discussed patient goal for the day, patient clinical progression, and barriers to discharge.   The following Goal(s) of the Day/Commitment(s) have been identified:  Extubate patient, continue current care      [facility]  November 8, 2020

## 2020-11-08 NOTE — PROGRESS NOTES
[address] [facility]   Department of Internal Medicine   Internal Medicine Residency  MICU Progress Note    Patient:  Nilesh Sanders 61 y.o. male   MRN: [id number]       Date of Service: 2020    Allergy: Bee venom and Shellfish-derived products    Subjective     Patient was seen and examined in AM.   Rapid extubation--> Respiratory distress with BiPAP--> sating in the lower 80s--> re intubated  Respond to voice command     24 hour change: Stable overnight. Hypertensive, home antihypertensive meds resumed     Objective     TEMPERATURE:  Current - Temp: 99 °F (37.2 °C); Max - Temp  Av.5 °F (37.5 °C)  Min: 98.6 °F (37 °C)  Max: 100.6 °F (38.1 °C)  RESPIRATIONS RANGE: Resp  Av  Min: 14  Max: 35  PULSE RANGE: Pulse  Av.1  Min: 62  Max: 130  BLOOD PRESSURE RANGE:  Systolic (45NCJ), EFT:585 , Min:126 , GHA:858   ; Diastolic (73HRJ), MBO:82, Min:57, Max:105    PULSE OXIMETRY RANGE: SpO2  Av.4 %  Min: 79 %  Max: 97 %    I & O - 24hr:    Intake/Output Summary (Last 24 hours) at 2020 1729  Last data filed at 2020 1400  Gross per 24 hour   Intake 4405.41 ml   Output 5105 ml   Net -699.59 ml     I/O last 3 completed shifts: In: 4505.4 [I.V.:3428.4; NG/GT:777; IV Piggyback:300]  Out: 1872 [Urine:3330; Stool:2000] No intake/output data recorded. Weight change: 11 lb 14.4 oz (5.398 kg)    Physical Exam:  General Appearance:  Intubated    HEENT:    NC/AT, mucous membranes are moist   Neck:   Supple, no jugular venous distention. Resp:     Coarse rhonchi    Heart:    RRR, S1 and S2 normal, no murmur, rub or gallop.     Abdomen:     Soft, non-tender, non-distended with normal bowel sounds   Extremities:   Atraumatic, no cyanosis or edema   Pulses:  Radial and pedal pulses are intact bilaterally   Neurologic: Sedated and intubated      Medications     Continuous Infusions:   dexmedetomidine (PRECEDEX) IV infusion 1.4 mcg/kg/hr (20 2663)    propofol 50 mcg/kg/min (20 6803)    fentaNYL 5 mcg/ml in D5W 250 ml infusion Stopped (11/07/20 1637)    dextrose       Scheduled Meds:   succinylcholine        [START ON 11/9/2020] bumetanide  2 mg Intravenous BID    dexamethasone  4 mg Intravenous Q8H    piperacillin-tazobactam  3.375 g Intravenous Q8H    sodium chloride   Intravenous Q8H    carvedilol  3.125 mg Oral BID WC    QUEtiapine  75 mg Oral BID    psyllium  1 packet Oral Daily    folic acid  1 mg Intravenous Daily    thiamine (VITAMIN B1) IVPB  100 mg Intravenous Q24H    insulin glargine  20 Units Subcutaneous Nightly    sodium chloride (Inhalant)  2 mL Nebulization Q4H    insulin lispro  0-18 Units Subcutaneous Q4H    heparin (porcine)  7,500 Units Subcutaneous Q8H    pantoprazole  40 mg Intravenous Daily    And    sodium chloride (PF)  10 mL Intravenous Daily    sodium chloride flush  10 mL Intravenous 2 times per day    Arformoterol Tartrate  15 mcg Nebulization BID    chlorhexidine  15 mL Mouth/Throat BID    amLODIPine  10 mg Oral Daily    aspirin  81 mg Oral Daily    [Held by provider] atorvastatin  40 mg Oral Daily    levothyroxine  50 mcg Oral Daily    [Held by provider] losartan  100 mg Oral Daily    budesonide  0.5 mg Nebulization BID    ipratropium-albuterol  1 ampule Inhalation Q4H WA     PRN Meds: sennosides-docusate sodium, polyvinyl alcohol, midazolam, hydrALAZINE, sodium chloride flush, acetaminophen **OR** acetaminophen, polyethylene glycol, perflutren lipid microspheres, colchicine, glucose, dextrose, glucagon (rDNA), dextrose  Nutrition:       Labs and Imaging Studies     CBC:   Recent Labs     11/06/20  0325 11/07/20  0354 11/08/20  0450   WBC 7.2 8.1 9.4   HGB 10.6* 10.8* 11.1*   HCT 30.8* 33.0* 32.8*   MCV 76.0* 78.6* 77.5*    325 316       BMP:    Recent Labs     11/07/20  0354 11/07/20  1120 11/08/20  0450    142 143  143   K 3.7 4.3 4.1  4.8    101 105  105   CO2 28 27 24  22   BUN 33* 32* 26*  26*   CREATININE 1.8* 1. 7* 1.5*  1.5*   GLUCOSE 130* 144* 127*  125*       LIVER PROFILE:   Recent Labs     11/06/20  0325 11/07/20  0354 11/08/20  0450   AST 54* 89* 164*   * 148* 243*   BILITOT 0.4 0.3 0.5   ALKPHOS 85 98 163*       PT/INR:   No results for input(s): PROTIME, INR in the last 72 hours. APTT:   No results for input(s): APTT in the last 72 hours. Fasting Lipid Panel:    Lab Results   Component Value Date    CHOL 295 03/23/2017    TRIG 118 11/04/2020    HDL 66 03/23/2017       Cardiac Enzymes:    Lab Results   Component Value Date    CKTOTAL 98 10/08/2017    CKTOTAL 114 10/07/2017    CKMB 1.1 10/08/2017    CKMB 1.1 10/07/2017    TROPONINI <0.01 10/26/2020    TROPONINI <0.01 05/26/2019    TROPONINI <0.01 05/26/2019       Notable Cultures:      Blood cultures   Blood Culture, Routine   Date Value Ref Range Status   10/29/2020 5 Days no growth  Final     Respiratory cultures No results found for: RESPCULTURE No results found for: LABGRAM  Urine   Urine Culture, Routine   Date Value Ref Range Status   10/28/2020 Growth not present  Final     Legionella No results found for: LABLEGI  C Diff PCR No results found for: CDIFPCR  Wound culture/abscess: No results for input(s): WNDABS in the last 72 hours. Tip culture:No results for input(s): CXCATHTIP in the last 72 hours.      Antibiotic  Days  Day started                                Oxygen:     Vent Information  $Ventilation: $Subsequent Day  Skin Assessment: Clean, dry, & intact  Equipment ID: [id number]  Equipment Changed: (S) Humidification  Vent Type: 980  Vent Mode: AC/VC  Vt Ordered: 500 mL  Rate Set: 18 bmp  Peak Flow: 65 L/min  Pressure Support: 0 cmH20  FiO2 : 100 %  SpO2: 96 %  SpO2/FiO2 ratio: 96  PaO2/FiO2 ratio: 146  Sensitivity: 3  PEEP/CPAP: 14  I Time/ I Time %: 0 s  Humidification Source: Heated wire  Humidification Temp: 37  Humidification Temp Measured: 37  Circuit Condensation: Drained  Mask Type: Full face mask  Mask Size: Large  Additional Respiratory  Assessments  Pulse: 74  Resp: 14  SpO2: 96 %  Position: Semi-Lockwood's  Humidification Source: Heated wire  Humidification Temp: 37  Circuit Condensation: Drained  Oral Care Completed?: Yes  Oral Care: Mouth swabbed, Mouth moisturizer, Mouth suctioned, Suction toothette  Subglottic Suction Done?: Yes  Airway Type: ET  Airway Size: 7(26 cm @ lip)  Cuff Pressure (cm H2O): 29 cm H2O  Skin barrier applied: Yes     Nasal cannula L/min     Face mask %     Reservoirs mask %       ABG   Vent Settings     PH   Mode      PCO2   TV      PO2   RR      HCO3   PS      Sat%   PEEP      FIO2   FIO2        P/F          Lines:  Site  Day  Date inserted     TLC              PICC              Arterial line              Peripheral line              HD cath            Urethral Catheter-Output (mL): 100 mL    Imaging Studies:    EKG: Rhythm Strip: normal sinus rhythm. Assessment and Plan     Suhail Patee a 61 y. o. male with PMHx is significant for HFpEF (Ef 65% in 2017), hx of tracheostomy and reversal, moderate HANS not on CPAP, DMT2, HTN, HLD, Hypothyroidism who initially was admitted to general floors for hypoxia. Transferred to the MICU when ABG concerning for worsening respiratory acidosis.  We are maniging him for the following:     Acute hypoxic hypercapnic respiratory failure 2/2 CAP versus chronic OHS  Admitted to the ICU from the floor with worsening respiratory acidosis   Intubated and sedated   Fentanyl, propofol and prece dex   Hx of track and peg   Fail extubation today   re intubated after sating 80% on BiPAP  Vent: AC VC peep 14, Vt 500 and RR 18  AB.4/34.8/83/27.3 with FiO2 100%  PF ratio 84  Wean FiO2 and then trach and peg  Gen surg consulted, appreciate recommendation  Continue unasyn per ID    Breathing treatment with Brovana, Pulmicort, DuoNeb  Culture MSSA  Follow repeat culture    COVID negative     CAP vs Aspiration PNA complicated by  Pulmonary edema  Respiratory cultures grew MSSA on 31/10. Continue Unasyn   Patient is afebrile, WBC wnl  onitor daily CBC, vitals, CXR. Infectious disease following, appreciate recommendations     Acute kidney injury stage III likely pre renal 2/2 diuretic use, contrast agent vs? cardiorenal syndrome  Improving   Creatinine 1.5 ( baseline 1.1)   Net negative 2.9 L  Continue bumex   Nephrology following, appreciate recommendations    Continue monitor daily BMP, renal function. Monitor I/O. Cat Graven Labile BP  Arterial line placed 11/2  Ho of hypertension   Home coreg resumed last night  Continue to monitor   As patient is sedated again, may hold antihypertensive meds if BP dropped      Transaminitis likely secondary to Lipitor  Consider holding Lipitor  Trend LFTs    History of heart failure with preserved ejection fraction  Echo done in 2017 showing 1 diastolic dysfunction with normal left ventricular size and systolic function EF 55%. Home medication: Coreg 25 mg, olmesartan 10 mg, Losartan 10 mg,. Repeat ECHO left ventricular internal dimensions were normal in diastole and systole. EF 65%. Moderate left ventricular concentric hypertrophy noted. No regional wall motion abnormalities seen. Currently hold off Coreg due to bradycardia. Will continue monitor.      Ho DMT2, HLD, obstructive sleep apnea, obesity, hypothyroidism  Hold home metfomrin.  overnight. Increase Lantus to 20 units. HDSS -> Tolerating well  Continue monitor BG q6h  Continue Atorvastatin   Continue Synthroid 50 MCG      # Peptic ulcer prophylaxis: Protonix 40 mg  # DVT Prophylaxis: Heparin 7500 subcu   # Disposition: Continue ICU care    Melanie Toro M.D., PGY-1  Internal medicine resident  Attending Physician: Dr. Chayo Menendez     I personally saw, examined and provided care for the patient. Radiographs, labs and medication list were reviewed by me independently. I spoke with bedside nursing, therapists and consultants.  Critical care services and times documented are independent of

## 2020-11-08 NOTE — PLAN OF CARE
Problem: Restraint Use - Nonviolent/Non-Self-Destructive Behavior:  Goal: Absence of restraint indications  11/8/2020 0711 by Latoya Santos RN  Outcome: Not Met This Shift     Problem: Restraint Use - Nonviolent/Non-Self-Destructive Behavior:  Goal: Absence of restraint-related injury  11/8/2020 0711 by Latoya Santos, CHASE  Outcome: Met This Shift

## 2020-11-08 NOTE — PLAN OF CARE
Problem: Restraint Use - Nonviolent/Non-Self-Destructive Behavior:  Goal: Absence of restraint indications  11/8/2020 0711 by Shell Santos, RN  Outcome: Not Met This Shift     Problem: Restraint Use - Nonviolent/Non-Self-Destructive Behavior:  Goal: Absence of restraint-related injury  11/8/2020 0711 by Najma Simmons, RN  Outcome: Met This Shift     Problem: Skin Integrity:  Goal: Will show no infection signs and symptoms  Outcome: Met This Shift     Problem: Skin Integrity:  Goal: Absence of new skin breakdown  Outcome: Met This Shift

## 2020-11-08 NOTE — PROGRESS NOTES
intubated  11/4: pt seen and examined in icu, no overnight events, intubated  11/5: pt seen in room ,remains intubated  11/6: pt seen in room, intubated.   11/7: pt seen in room, intubated, chart reviewed  11/8:pt seen in room, remains intuibated      Allergies:  Bee venom and Shellfish-derived products    Current Medications:    dexmedetomidine (PRECEDEX) 1,000 mcg in sodium chloride 0.9 % 250 mL infusion, Continuous  bumetanide (BUMEX) injection 2 mg, Daily  carvedilol (COREG) tablet 3.125 mg, BID WC  propofol injection, Titrated  QUEtiapine (SEROQUEL) tablet 75 mg, BID  fentaNYL 5 mcg/mL in D5W 250 mL infusion, Continuous  psyllium (KONSYL) 28.3 % packet 1 packet, Daily  ampicillin-sulbactam (UNASYN) 3 g ivpb minibag, Q6H  sennosides-docusate sodium (SENOKOT-S) 8.6-50 MG tablet 2 tablet, Daily PRN  folic acid injection 1 mg, Daily  thiamine (B-1) 100 mg in sodium chloride 0.9 % 100 mL IVPB, Q24H  polyvinyl alcohol (LIQUIFILM TEARS) 1.4 % ophthalmic solution 1 drop, Q4H PRN  insulin glargine (LANTUS) injection vial 20 Units, Nightly  midazolam (VERSED) injection 2 mg, Q2H PRN  sodium chloride (Inhalant) 3 % nebulizer solution 2 mL, Q4H  insulin lispro (HUMALOG) injection vial 0-18 Units, Q4H  hydrALAZINE (APRESOLINE) injection 10 mg, Q4H PRN  heparin (porcine) injection 7,500 Units, Q8H  pantoprazole (PROTONIX) injection 40 mg, Daily    And  sodium chloride (PF) 0.9 % injection 10 mL, Daily  sodium chloride flush 0.9 % injection 10 mL, 2 times per day  sodium chloride flush 0.9 % injection 10 mL, PRN  acetaminophen (TYLENOL) tablet 650 mg, Q6H PRN    Or  acetaminophen (TYLENOL) suppository 650 mg, Q6H PRN  Arformoterol Tartrate (BROVANA) nebulizer solution 15 mcg, BID  polyethylene glycol (GLYCOLAX) packet 17 g, Daily PRN  perflutren lipid microspheres (DEFINITY) injection 1.65 mg, ONCE PRN  chlorhexidine (PERIDEX) 0.12 % solution 15 mL, BID  amLODIPine (NORVASC) tablet 10 mg, Daily  aspirin chewable tablet 81 mg, Daily  [Held by provider] atorvastatin (LIPITOR) tablet 40 mg, Daily  colchicine (COLCRYS) tablet 0.6 mg, BID PRN  levothyroxine (SYNTHROID) tablet 50 mcg, Daily  [Held by provider] losartan (COZAAR) tablet 100 mg, Daily  budesonide (PULMICORT) nebulizer suspension 500 mcg, BID  ipratropium-albuterol (DUONEB) nebulizer solution 1 ampule, Q4H WA  glucose (GLUTOSE) 40 % oral gel 15 g, PRN  dextrose 50 % IV solution, PRN  glucagon (rDNA) injection 1 mg, PRN  dextrose 5 % solution, PRN        Review of Systems:   Review of systems not obtained due to patient factors. Physical exam:  Vitals:    11/08/20 0901   BP:    Pulse:    Resp: 19   Temp:    SpO2: 95%           General: Intubated sedated  Eyes: PERRL. No sclera icterus. No conjunctival injection. ENT: No discharge. Pharynx clear. Neck: Trachea midline. Normal thyroid. Lungs: Coarse breath sounds  CV: Regular rate. Regular rhythm. No murmur or rub. .   Abd:  Non-distended. No masses. No organmegaly. Normal bowel sounds. Skin: Warm and dry. No nodule on exposed extremities. No rash on exposed extremities.   Ext: No cyanosis, clubbing, edema; 2+ pitting bilateral lower extremity edema L>R  Neuro: sedate lightly, but arouses and is agitated      Data:   Labs:  Lab Results   Component Value Date     11/08/2020     11/08/2020     11/07/2020    K 4.8 11/08/2020    K 4.1 11/08/2020    K 4.3 11/07/2020     11/08/2020     11/08/2020    CO2 22 11/08/2020    CO2 24 11/08/2020    CO2 27 11/07/2020    CREATININE 1.5 (H) 11/08/2020    CREATININE 1.5 (H) 11/08/2020    CREATININE 1.7 (H) 11/07/2020    BUN 26 (H) 11/08/2020    BUN 26 (H) 11/08/2020    BUN 32 (H) 11/07/2020    GLUCOSE 125 (H) 11/08/2020    GLUCOSE 127 (H) 11/08/2020    GLUCOSE 144 (H) 11/07/2020    PHOS 3.5 11/08/2020    PHOS 4.4 11/07/2020    PHOS 2.6 11/06/2020    WBC 9.4 11/08/2020    WBC 8.1 11/07/2020    WBC 7.2 11/06/2020    HGB 11.1 (L) 11/08/2020    HGB 10.8 (L) 11/07/2020 HGB 10.6 (L) 11/06/2020    HCT 32.8 (L) 11/08/2020    HCT 33.0 (L) 11/07/2020    HCT 30.8 (L) 11/06/2020    MCV 77.5 (L) 11/08/2020     11/08/2020         Imaging:  Xr Chest Portable    Result Date: 10/27/2020  EXAMINATION: ONE XRAY VIEW OF THE CHEST 10/27/2020 7:58 pm COMPARISON: October 26, 2020 HISTORY: ORDERING SYSTEM PROVIDED HISTORY: SOB TECHNOLOGIST PROVIDED HISTORY: Reason for exam:->SOB What reading provider will be dictating this exam?->CRC FINDINGS: There is mild cardiomegaly. There is patchy perihilar and bibasilar atelectasis/infiltrates. Tiny pleural effusions are suspected. Progressive bibasilar atelectasis/infiltrates concerning for pneumonia. Underlying CHF is considered. Xr Chest Portable    Result Date: 10/26/2020  EXAMINATION: ONE XRAY VIEW OF THE CHEST 10/26/2020 4:11 pm COMPARISON: 05/26/2019 HISTORY: ORDERING SYSTEM PROVIDED HISTORY: SOB TECHNOLOGIST PROVIDED HISTORY: Reason for exam:->SOB What reading provider will be dictating this exam?->CRC FINDINGS: Cardiac size appears stable. Discoid airspace disease seen at the left lung base which may represent atelectasis or scarring. Right infrahilar and basilar infiltrate is identified. No pneumothorax is identified. No acute osseous abnormality is identified. Right infrahilar and basilar infiltrate concerning for pneumonia. Left basilar atelectasis or scarring. Assessment/Plans  1. Acute kidney injury  Baseline 1.8 from 9/2019, 1.1 in 5/2019  hemodynamically mediated due to the combination of bradycardia,  diuretic and ARB use; this is exacerbated by IV contrast use   Continue bumex, good response  bumex iv qd  Out 4.1L last 24 h, -2.9L overall    2. Acute hypoxic respiratory failure  due to multilobar pneumonia  gradual wean  On unasyn  Off vanco    3. Type 2 diabetes mellitus  increasing blood sugars, most likely steroid induced    4. etoh abuse    5.  Hypernatremia  imrpoved on lower dose bumex            Delicia Wilkinson Jaime Blanco MD  11:07 AM  11/8/2020

## 2020-11-08 NOTE — ANESTHESIA PROCEDURE NOTES
Airway  Urgency: emergent    Difficult airway    General Information and Staff    Patient location during procedure: ICU  Anesthesiologist: Juan Florez MD  Resident/CRNA: KATHERINE Pepe - CRNA    Consent for Airway (if performed for an anesthetic, see related documentation for consents)  Consent: The procedure was performed in an emergent situation. Verbal consent not obtained. Written consent not obtained. Risks and benefits: risks, benefits and alternatives were not discussed      Indications and Patient Condition  Indications for airway management: airway protection, respiratory distress and respiratory failure  Spontaneous ventilation: present  Sedation level: deep  Preoxygenated: yes  Patient position: sniffing      Final Airway Details  Final airway type: endotracheal airway      Successful airway: ETT  Cuffed: yes   Successful intubation technique: video laryngoscopy  ETT size (mm): 7.0  Number of attempts at approach: 2  Ventilation between attempts: spontaneous and 2 hand mask  Number of other approaches attempted: 1    Other Attempts  Unsuccessful attempted endotracheal techniques: video laryngoscopy    Additional Comments  100 mg propofol used to facilitate intubation. Large amount of edema noted making intubation difficult. Secured 26 cm at lip which is where it was secured with previous intubation. Patient was extubated earlier today.

## 2020-11-08 NOTE — FLOWSHEET NOTE
11/07/20 2000   Assessment   Less Restrictive Alternative PC;RP;RE;DE;RO;VR   Special Consideration/Risk Factors MC   Justification   Clinical Justification L;T;E;D   Education   Discontinuation Criteria Absence   Criteria Explained Yes   Patient's Response NR   Family Notification S  (previously notified)   Restraint Monitoring Q60 Minutes   Visual/Safety Check (q 60 mins) AG   Restraint  Monitoring Q2 Hours   Circulation NS   Range of Motion P   Fluids IV   Food/Meal TPN   Elimination UC   Restraint Type   Soft Restraint B Wrist CONTINUED   Vital Signs   Temp 99.8 °F (37.7 °C)   Temp Source Axillary   Pulse 110   Heart Rate Source Monitor   Resp 19   BP (!) 213/99   MAP (mmHg) 142   Level of Consciousness 1   MEWS Score 5   Patient Currently in Pain Other (comment)  (cpot)       PT continues to try and reach for tubes/lines even with & without restraints.  Monitoring closely   Akanksha Oshea RN

## 2020-11-08 NOTE — PROGRESS NOTES
Subjective:  Pt resting on vent, events of last 24 hrs reviewed with records and staff. Objective:  Pt is intubated and sedated   BP (!) 126/57   Pulse 68   Temp 100.6 °F (38.1 °C) (Esophageal)   Resp 16   Ht 5' 8\" (1.727 m)   Wt 278 lb 8 oz (126.3 kg)   SpO2 94%   BMI 42.35 kg/m²   HEENT no adenopathy no bruits  ET tube in place   Heart:  RRR, no murmurs, gallops, or rubs. Lungs:  CTA bilaterally, no wheeze, rales or rhonchi  Abd: bowel sounds present, nontender, nondistended, no masses  Extrem:  No clubbing, cyanosis, or edema  WBC/Hgb/Hct/Plts:  9.4/11.1/32.8/316 (11/08 5559) basic metabolic panel   Lab Results   Component Value Date    WBC 9.4 11/08/2020    HGB 11.1 (L) 11/08/2020    HCT 32.8 (L) 11/08/2020    MCV 77.5 (L) 11/08/2020     11/08/2020     Lab Results   Component Value Date     11/07/2020    K 4.3 11/07/2020    K 4.0 10/27/2020     11/07/2020    CO2 27 11/07/2020    BUN 32 11/07/2020    CREATININE 1.7 11/07/2020    GLUCOSE 144 11/07/2020    CALCIUM 7.8 11/07/2020      XR ABDOMEN FOR NG/OG/NE TUBE PLACEMENT  Narrative: EXAMINATION:  ONE SUPINE XRAY VIEW(S) OF THE ABDOMEN    11/7/2020 9:40 pm    COMPARISON:  11/02/2020    HISTORY:  ORDERING SYSTEM PROVIDED HISTORY: OG placement  TECHNOLOGIST PROVIDED HISTORY:  Reason for exam:->OG placement  Portable? ->Yes  What reading provider will be dictating this exam?->CRC    FINDINGS:  OG tube tip in the body of the stomach. The upper abdomen is unremarkable. No gross free air. Impression: Of OG tube tip in the body of the stomach. XR CHEST PORTABLE  Narrative: EXAMINATION:  ONE XRAY VIEW OF THE CHEST    11/7/2020 3:40 pm    COMPARISON:  November 7, 2020    HISTORY:  ORDERING SYSTEM PROVIDED HISTORY: s/p left ij placement  TECHNOLOGIST PROVIDED HISTORY:  Reason for exam:->s/p left ij placement  What reading provider will be dictating this exam?->CRC    FINDINGS:  Endotracheal tube is 2.7 cm above the ellie.   Right and left central venous  catheters are present with distal tips at location of superior vena cava. NG  tube courses below the diaphragm. Redemonstration of interstitial and hazy  opacities bilaterally. There is improved aeration in lung bases. The heart  is normal size. No pneumothorax. Impression: 1. Improved aeration in lung bases. 2.  Interstitial and hazy opacities persist throughout both lungs. 3.  Endotracheal tube is 2.7 cm above ellie. XR CHEST PORTABLE  Narrative: EXAMINATION:  ONE XRAY VIEW OF THE CHEST    11/7/2020 7:53 am    COMPARISON:  November 6, 2020    HISTORY:  ORDERING SYSTEM PROVIDED HISTORY: SOB  TECHNOLOGIST PROVIDED HISTORY:  Reason for exam:->SOB  What reading provider will be dictating this exam?->CRC    FINDINGS:  Endotracheal tube is approximately 1.8 cm above the ellie. Right IJ central  venous catheter is present with distal tip of location of SVC. Satisfactory  position of NG tube. Stable interstitial and hazy opacities bilaterally  notable in lung bases. No pneumothorax. There are low lung volumes. Impression: Stable chest radiograph with interstitial pulmonary edema or bilateral  pneumonia.       Assessment:    Patient Active Problem List   Diagnosis    Hyperlipidemia    Type 2 diabetes mellitus without complication, without long-term current use of insulin ([address]    Atypical chest pain    Essential hypertension    Chronic acquired lymphedema    Aortic valve disease    Morbid obesity due to excess calories ([address]    Abdominal pain    Acute respiratory failure with hypoxia (HCC)    Acute pulmonary edema (HCC)    Congestive heart failure with LV diastolic dysfunction, NYHA class 3 ([address]    Obstructive sleep apnea    Acquired hypothyroidism    Chronic diastolic heart failure (HCC)    Nonrheumatic aortic valve stenosis    ACE-inhibitor cough    Pneumonia    Acute gout of right knee       Plan:  Critical care  Wean sedation  Attempt to wean vent        Quynh Valencia I

## 2020-11-09 ENCOUNTER — APPOINTMENT (OUTPATIENT)
Dept: GENERAL RADIOLOGY | Age: 60
DRG: 003 | End: 2020-11-09
Payer: MEDICARE

## 2020-11-09 LAB
AADO2: 235.2 MMHG
ALBUMIN SERPL-MCNC: 2.7 G/DL (ref 3.5–5.2)
ALP BLD-CCNC: 129 U/L (ref 40–129)
ALT SERPL-CCNC: 154 U/L (ref 0–40)
ANION GAP SERPL CALCULATED.3IONS-SCNC: 14 MMOL/L (ref 7–16)
ANION GAP SERPL CALCULATED.3IONS-SCNC: 15 MMOL/L (ref 7–16)
ANISOCYTOSIS: ABNORMAL
AST SERPL-CCNC: 55 U/L (ref 0–39)
B.E.: 0.3 MMOL/L (ref -3–3)
B.E.: 3.5 MMOL/L (ref -3–0)
BACTERIA: ABNORMAL /HPF
BASOPHILS ABSOLUTE: 0 E9/L (ref 0–0.2)
BASOPHILS RELATIVE PERCENT: 0.2 % (ref 0–2)
BILIRUB SERPL-MCNC: 0.5 MG/DL (ref 0–1.2)
BILIRUBIN URINE: NEGATIVE
BLOOD, URINE: ABNORMAL
BUN BLDV-MCNC: 28 MG/DL (ref 8–23)
BUN BLDV-MCNC: 29 MG/DL (ref 8–23)
CALCIUM IONIZED: 0.98 MMOL/L (ref 1.15–1.33)
CALCIUM SERPL-MCNC: 8.2 MG/DL (ref 8.6–10.2)
CALCIUM SERPL-MCNC: 8.3 MG/DL (ref 8.6–10.2)
CHLORIDE BLD-SCNC: 100 MMOL/L (ref 98–107)
CHLORIDE BLD-SCNC: 103 MMOL/L (ref 98–107)
CLARITY: CLEAR
CO2: 22 MMOL/L (ref 22–29)
CO2: 23 MMOL/L (ref 22–29)
COHB: 0.4 % (ref 0–1.5)
COLOR: YELLOW
CREAT SERPL-MCNC: 1.7 MG/DL (ref 0.7–1.2)
CREAT SERPL-MCNC: 1.8 MG/DL (ref 0.7–1.2)
CRITICAL: NORMAL
DATE ANALYZED: NORMAL
DATE OF COLLECTION: NORMAL
DEVICE: ABNORMAL
EOSINOPHILS ABSOLUTE: 0 E9/L (ref 0.05–0.5)
EOSINOPHILS RELATIVE PERCENT: 0.3 % (ref 0–6)
FIO2 ARTERIAL: 90
FIO2: 55 %
GFR AFRICAN AMERICAN: 47
GFR AFRICAN AMERICAN: 50
GFR NON-AFRICAN AMERICAN: 47 ML/MIN/1.73
GFR NON-AFRICAN AMERICAN: 50 ML/MIN/1.73
GLUCOSE BLD-MCNC: 157 MG/DL (ref 74–99)
GLUCOSE BLD-MCNC: 172 MG/DL (ref 74–99)
GLUCOSE URINE: NEGATIVE MG/DL
HCO3 ARTERIAL: 26.1 MMOL/L (ref 22–26)
HCO3: 24.8 MMOL/L (ref 22–26)
HCT (EST): 36 % (ref 37–54)
HCT VFR BLD CALC: 33 % (ref 37–54)
HEMOGLOBIN: 11.2 G/DL (ref 12.5–16.5)
HGB, (EST): 12.3 G/DL (ref 12.5–15.5)
HHB: 3.1 % (ref 0–5)
HYPOCHROMIA: ABNORMAL
KETONES, URINE: NEGATIVE MG/DL
LAB: NORMAL
LEUKOCYTE ESTERASE, URINE: ABNORMAL
LYMPHOCYTES ABSOLUTE: 1.36 E9/L (ref 1.5–4)
LYMPHOCYTES RELATIVE PERCENT: 12.2 % (ref 20–42)
Lab: NORMAL
MAGNESIUM: 1.9 MG/DL (ref 1.6–2.6)
MCH RBC QN AUTO: 26 PG (ref 26–35)
MCHC RBC AUTO-ENTMCNC: 33.9 % (ref 32–34.5)
MCV RBC AUTO: 76.7 FL (ref 80–99.9)
METER GLUCOSE: 157 MG/DL (ref 74–99)
METER GLUCOSE: 168 MG/DL (ref 74–99)
METER GLUCOSE: 171 MG/DL (ref 74–99)
METER GLUCOSE: 176 MG/DL (ref 74–99)
METHB: 0.4 % (ref 0–1.5)
MODE: AC
MODE: AC
MONOCYTES ABSOLUTE: 0.45 E9/L (ref 0.1–0.95)
MONOCYTES RELATIVE PERCENT: 3.5 % (ref 2–12)
NEUTROPHILS ABSOLUTE: 9.49 E9/L (ref 1.8–7.3)
NEUTROPHILS RELATIVE PERCENT: 84.3 % (ref 43–80)
NITRITE, URINE: NEGATIVE
O2 CONTENT: 17.1 ML/DL
O2 SATURATION: 96.9 % (ref 92–98.5)
O2 SATURATION: 99.9 % (ref 92–98.5)
O2HB: 96.1 % (ref 94–97)
OPERATOR ID: 1874
OPERATOR ID: NORMAL
PATIENT TEMP: 37
PATIENT TEMP: 37 C
PCO2 (TEMP CORRECTED): 32.3 MMHG (ref 35–45)
PCO2: 39.7 MMHG (ref 35–45)
PDW BLD-RTO: 20.7 FL (ref 11.5–15)
PEEP/CPAP: 10 CMH2O
PFO2: 1.8 MMHG/%
PH (TEMPERATURE CORRECTED): 7.51 (ref 7.35–7.45)
PH BLOOD GAS: 7.41 (ref 7.35–7.45)
PH UA: 5.5 (ref 5–9)
PHOSPHORUS: 5.1 MG/DL (ref 2.5–4.5)
PLATELET # BLD: 335 E9/L (ref 130–450)
PMV BLD AUTO: 10.3 FL (ref 7–12)
PO2 (TEMP CORRECTED): 237.4 MMHG (ref 60–80)
PO2: 99 MMHG (ref 75–100)
POIKILOCYTES: ABNORMAL
POLYCHROMASIA: ABNORMAL
POSITIVE END EXP PRESS: 14 CMH2O
POTASSIUM SERPL-SCNC: 4.2 MMOL/L (ref 3.5–5)
POTASSIUM SERPL-SCNC: 5.4 MMOL/L (ref 3.5–5)
PRO-BNP: 261 PG/ML (ref 0–125)
PROCALCITONIN: 4.35 NG/ML (ref 0–0.08)
PROTEIN UA: NEGATIVE MG/DL
RBC # BLD: 4.3 E12/L (ref 3.8–5.8)
RBC UA: ABNORMAL /HPF (ref 0–2)
RESPIRATORY RATE: 18 B/MIN
RI(T): 2.38
RR MECHANICAL: 16 B/MIN
SODIUM BLD-SCNC: 138 MMOL/L (ref 132–146)
SODIUM BLD-SCNC: 139 MMOL/L (ref 132–146)
SOURCE, BLOOD GAS: ABNORMAL
SOURCE, BLOOD GAS: NORMAL
SPECIFIC GRAVITY UA: 1.02 (ref 1–1.03)
TARGET CELLS: ABNORMAL
THB: 12.6 G/DL (ref 11.5–16.5)
TIDAL VOLUME: 500 ML
TIME ANALYZED: 1718
TOTAL PROTEIN: 7.4 G/DL (ref 6.4–8.3)
UROBILINOGEN, URINE: 0.2 E.U./DL
VT MECHANICAL: 480 ML
WBC # BLD: 11.3 E9/L (ref 4.5–11.5)
WBC UA: ABNORMAL /HPF (ref 0–5)

## 2020-11-09 PROCEDURE — 2580000003 HC RX 258: Performed by: SPECIALIST

## 2020-11-09 PROCEDURE — 2580000003 HC RX 258: Performed by: INTERNAL MEDICINE

## 2020-11-09 PROCEDURE — 6370000000 HC RX 637 (ALT 250 FOR IP): Performed by: INTERNAL MEDICINE

## 2020-11-09 PROCEDURE — 6360000002 HC RX W HCPCS: Performed by: INTERNAL MEDICINE

## 2020-11-09 PROCEDURE — 83735 ASSAY OF MAGNESIUM: CPT

## 2020-11-09 PROCEDURE — 99291 CRITICAL CARE FIRST HOUR: CPT | Performed by: INTERNAL MEDICINE

## 2020-11-09 PROCEDURE — 6360000002 HC RX W HCPCS: Performed by: SPECIALIST

## 2020-11-09 PROCEDURE — 51702 INSERT TEMP BLADDER CATH: CPT

## 2020-11-09 PROCEDURE — 83880 ASSAY OF NATRIURETIC PEPTIDE: CPT

## 2020-11-09 PROCEDURE — 87206 SMEAR FLUORESCENT/ACID STAI: CPT

## 2020-11-09 PROCEDURE — 71045 X-RAY EXAM CHEST 1 VIEW: CPT

## 2020-11-09 PROCEDURE — 94003 VENT MGMT INPAT SUBQ DAY: CPT

## 2020-11-09 PROCEDURE — 2000000000 HC ICU R&B

## 2020-11-09 PROCEDURE — 82805 BLOOD GASES W/O2 SATURATION: CPT

## 2020-11-09 PROCEDURE — 84100 ASSAY OF PHOSPHORUS: CPT

## 2020-11-09 PROCEDURE — 94640 AIRWAY INHALATION TREATMENT: CPT

## 2020-11-09 PROCEDURE — 80048 BASIC METABOLIC PNL TOTAL CA: CPT

## 2020-11-09 PROCEDURE — 36415 COLL VENOUS BLD VENIPUNCTURE: CPT

## 2020-11-09 PROCEDURE — 87070 CULTURE OTHR SPECIMN AEROBIC: CPT

## 2020-11-09 PROCEDURE — 2500000003 HC RX 250 WO HCPCS: Performed by: INTERNAL MEDICINE

## 2020-11-09 PROCEDURE — 6360000002 HC RX W HCPCS: Performed by: STUDENT IN AN ORGANIZED HEALTH CARE EDUCATION/TRAINING PROGRAM

## 2020-11-09 PROCEDURE — 82330 ASSAY OF CALCIUM: CPT

## 2020-11-09 PROCEDURE — 85025 COMPLETE CBC W/AUTO DIFF WBC: CPT

## 2020-11-09 PROCEDURE — 82962 GLUCOSE BLOOD TEST: CPT

## 2020-11-09 PROCEDURE — 87040 BLOOD CULTURE FOR BACTERIA: CPT

## 2020-11-09 PROCEDURE — C9113 INJ PANTOPRAZOLE SODIUM, VIA: HCPCS | Performed by: INTERNAL MEDICINE

## 2020-11-09 PROCEDURE — 87088 URINE BACTERIA CULTURE: CPT

## 2020-11-09 PROCEDURE — 82803 BLOOD GASES ANY COMBINATION: CPT

## 2020-11-09 PROCEDURE — 2500000003 HC RX 250 WO HCPCS: Performed by: STUDENT IN AN ORGANIZED HEALTH CARE EDUCATION/TRAINING PROGRAM

## 2020-11-09 PROCEDURE — 81001 URINALYSIS AUTO W/SCOPE: CPT

## 2020-11-09 PROCEDURE — 6370000000 HC RX 637 (ALT 250 FOR IP): Performed by: STUDENT IN AN ORGANIZED HEALTH CARE EDUCATION/TRAINING PROGRAM

## 2020-11-09 PROCEDURE — 2580000003 HC RX 258: Performed by: STUDENT IN AN ORGANIZED HEALTH CARE EDUCATION/TRAINING PROGRAM

## 2020-11-09 PROCEDURE — 87081 CULTURE SCREEN ONLY: CPT

## 2020-11-09 PROCEDURE — 99232 SBSQ HOSP IP/OBS MODERATE 35: CPT | Performed by: SURGERY

## 2020-11-09 PROCEDURE — 80053 COMPREHEN METABOLIC PANEL: CPT

## 2020-11-09 PROCEDURE — 84145 PROCALCITONIN (PCT): CPT

## 2020-11-09 RX ORDER — ACETAMINOPHEN 160 MG/5ML
650 SOLUTION ORAL EVERY 6 HOURS PRN
Status: DISCONTINUED | OUTPATIENT
Start: 2020-11-09 | End: 2020-01-01 | Stop reason: HOSPADM

## 2020-11-09 RX ORDER — FENTANYL CITRATE 50 UG/ML
100 INJECTION, SOLUTION INTRAMUSCULAR; INTRAVENOUS
Status: DISCONTINUED | OUTPATIENT
Start: 2020-11-09 | End: 2020-11-09

## 2020-11-09 RX ORDER — DIAZEPAM 5 MG/1
5 TABLET ORAL EVERY 12 HOURS
Status: DISCONTINUED | OUTPATIENT
Start: 2020-11-09 | End: 2020-11-10

## 2020-11-09 RX ORDER — SODIUM POLYSTYRENE SULFONATE 15 G/60ML
15 SUSPENSION ORAL; RECTAL ONCE
Status: DISCONTINUED | OUTPATIENT
Start: 2020-11-09 | End: 2020-11-09

## 2020-11-09 RX ADMIN — SODIUM CHLORIDE SOLN NEBU 3% 2 ML: 3 NEBU SOLN at 09:29

## 2020-11-09 RX ADMIN — THIAMINE HYDROCHLORIDE 100 MG: 100 INJECTION, SOLUTION INTRAMUSCULAR; INTRAVENOUS at 14:33

## 2020-11-09 RX ADMIN — SODIUM CHLORIDE SOLN NEBU 3% 2 ML: 3 NEBU SOLN at 17:09

## 2020-11-09 RX ADMIN — Medication 10 ML: at 08:47

## 2020-11-09 RX ADMIN — SODIUM CHLORIDE SOLN NEBU 3% 2 ML: 3 NEBU SOLN at 03:46

## 2020-11-09 RX ADMIN — IPRATROPIUM BROMIDE AND ALBUTEROL SULFATE 1 AMPULE: 2.5; .5 SOLUTION RESPIRATORY (INHALATION) at 20:42

## 2020-11-09 RX ADMIN — DEXAMETHASONE SODIUM PHOSPHATE 4 MG: 4 INJECTION, SOLUTION INTRAMUSCULAR; INTRAVENOUS at 23:46

## 2020-11-09 RX ADMIN — BUDESONIDE 500 MCG: 0.5 SUSPENSION RESPIRATORY (INHALATION) at 20:42

## 2020-11-09 RX ADMIN — INSULIN LISPRO 3 UNITS: 100 INJECTION, SOLUTION INTRAVENOUS; SUBCUTANEOUS at 12:32

## 2020-11-09 RX ADMIN — CHLORHEXIDINE GLUCONATE 0.12% ORAL RINSE 15 ML: 1.2 LIQUID ORAL at 20:55

## 2020-11-09 RX ADMIN — DIAZEPAM 5 MG: 5 TABLET ORAL at 23:42

## 2020-11-09 RX ADMIN — FOLIC ACID 1 MG: 5 INJECTION, SOLUTION INTRAMUSCULAR; INTRAVENOUS; SUBCUTANEOUS at 08:31

## 2020-11-09 RX ADMIN — DEXMEDETOMIDINE 1.4 MCG/KG/HR: 100 INJECTION, SOLUTION, CONCENTRATE INTRAVENOUS at 03:06

## 2020-11-09 RX ADMIN — BUMETANIDE 2 MG: 0.25 INJECTION INTRAMUSCULAR; INTRAVENOUS at 20:55

## 2020-11-09 RX ADMIN — AMLODIPINE BESYLATE 10 MG: 10 TABLET ORAL at 08:39

## 2020-11-09 RX ADMIN — HEPARIN SODIUM 7500 UNITS: 10000 INJECTION INTRAVENOUS; SUBCUTANEOUS at 16:44

## 2020-11-09 RX ADMIN — HEPARIN SODIUM 7500 UNITS: 10000 INJECTION INTRAVENOUS; SUBCUTANEOUS at 08:39

## 2020-11-09 RX ADMIN — CHLORHEXIDINE GLUCONATE 0.12% ORAL RINSE 15 ML: 1.2 LIQUID ORAL at 08:47

## 2020-11-09 RX ADMIN — BUDESONIDE 500 MCG: 0.5 SUSPENSION RESPIRATORY (INHALATION) at 09:28

## 2020-11-09 RX ADMIN — INSULIN LISPRO 3 UNITS: 100 INJECTION, SOLUTION INTRAVENOUS; SUBCUTANEOUS at 00:48

## 2020-11-09 RX ADMIN — DEXAMETHASONE SODIUM PHOSPHATE 4 MG: 4 INJECTION, SOLUTION INTRAMUSCULAR; INTRAVENOUS at 14:33

## 2020-11-09 RX ADMIN — IPRATROPIUM BROMIDE AND ALBUTEROL SULFATE 1 AMPULE: 2.5; .5 SOLUTION RESPIRATORY (INHALATION) at 13:27

## 2020-11-09 RX ADMIN — PSYLLIUM HUSK 1 PACKET: 3.4 GRANULE ORAL at 08:37

## 2020-11-09 RX ADMIN — DEXMEDETOMIDINE 1.4 MCG/KG/HR: 100 INJECTION, SOLUTION, CONCENTRATE INTRAVENOUS at 09:32

## 2020-11-09 RX ADMIN — HEPARIN SODIUM 7500 UNITS: 10000 INJECTION INTRAVENOUS; SUBCUTANEOUS at 23:42

## 2020-11-09 RX ADMIN — Medication 25 MCG/HR: at 12:44

## 2020-11-09 RX ADMIN — HYDRALAZINE HYDROCHLORIDE 10 MG: 20 INJECTION, SOLUTION INTRAMUSCULAR; INTRAVENOUS at 00:54

## 2020-11-09 RX ADMIN — PROPOFOL 50 MCG/KG/MIN: 10 INJECTION, EMULSION INTRAVENOUS at 09:12

## 2020-11-09 RX ADMIN — PIPERACILLIN AND TAZOBACTAM 3.38 G: 3; .375 INJECTION, POWDER, FOR SOLUTION INTRAVENOUS at 15:00

## 2020-11-09 RX ADMIN — INSULIN LISPRO 3 UNITS: 100 INJECTION, SOLUTION INTRAVENOUS; SUBCUTANEOUS at 08:40

## 2020-11-09 RX ADMIN — SODIUM CHLORIDE SOLN NEBU 3% 2 ML: 3 NEBU SOLN at 13:28

## 2020-11-09 RX ADMIN — ARFORMOTEROL TARTRATE 15 MCG: 15 SOLUTION RESPIRATORY (INHALATION) at 09:28

## 2020-11-09 RX ADMIN — PROPOFOL 40 MCG/KG/MIN: 10 INJECTION, EMULSION INTRAVENOUS at 14:43

## 2020-11-09 RX ADMIN — HEPARIN SODIUM 7500 UNITS: 10000 INJECTION INTRAVENOUS; SUBCUTANEOUS at 00:48

## 2020-11-09 RX ADMIN — Medication 200 MCG/HR: at 19:47

## 2020-11-09 RX ADMIN — IPRATROPIUM BROMIDE AND ALBUTEROL SULFATE 1 AMPULE: 2.5; .5 SOLUTION RESPIRATORY (INHALATION) at 09:29

## 2020-11-09 RX ADMIN — SODIUM CHLORIDE, PRESERVATIVE FREE 10 ML: 5 INJECTION INTRAVENOUS at 08:38

## 2020-11-09 RX ADMIN — MIDAZOLAM HYDROCHLORIDE 2 MG: 1 INJECTION, SOLUTION INTRAMUSCULAR; INTRAVENOUS at 09:11

## 2020-11-09 RX ADMIN — PIPERACILLIN AND TAZOBACTAM 3.38 G: 3; .375 INJECTION, POWDER, FOR SOLUTION INTRAVENOUS at 06:40

## 2020-11-09 RX ADMIN — PROPOFOL 50 MCG/KG/MIN: 10 INJECTION, EMULSION INTRAVENOUS at 22:20

## 2020-11-09 RX ADMIN — IPRATROPIUM BROMIDE AND ALBUTEROL SULFATE 1 AMPULE: 2.5; .5 SOLUTION RESPIRATORY (INHALATION) at 17:08

## 2020-11-09 RX ADMIN — QUETIAPINE FUMARATE 75 MG: 25 TABLET ORAL at 20:57

## 2020-11-09 RX ADMIN — SODIUM CHLORIDE: 9 INJECTION, SOLUTION INTRAVENOUS at 11:01

## 2020-11-09 RX ADMIN — ASPIRIN 81 MG CHEWABLE TABLET 81 MG: 81 TABLET CHEWABLE at 08:38

## 2020-11-09 RX ADMIN — QUETIAPINE FUMARATE 75 MG: 25 TABLET ORAL at 08:38

## 2020-11-09 RX ADMIN — MIDAZOLAM 1 MG/HR: 5 INJECTION INTRAMUSCULAR; INTRAVENOUS at 17:58

## 2020-11-09 RX ADMIN — DIAZEPAM 5 MG: 5 TABLET ORAL at 12:32

## 2020-11-09 RX ADMIN — DEXAMETHASONE SODIUM PHOSPHATE 4 MG: 4 INJECTION, SOLUTION INTRAMUSCULAR; INTRAVENOUS at 06:38

## 2020-11-09 RX ADMIN — LEVOTHYROXINE SODIUM 50 MCG: 0.05 TABLET ORAL at 06:38

## 2020-11-09 RX ADMIN — PROPOFOL 50 MCG/KG/MIN: 10 INJECTION, EMULSION INTRAVENOUS at 11:59

## 2020-11-09 RX ADMIN — Medication 10 ML: at 20:57

## 2020-11-09 RX ADMIN — PIPERACILLIN AND TAZOBACTAM 3.38 G: 3; .375 INJECTION, POWDER, FOR SOLUTION INTRAVENOUS at 23:43

## 2020-11-09 RX ADMIN — ARFORMOTEROL TARTRATE 15 MCG: 15 SOLUTION RESPIRATORY (INHALATION) at 20:42

## 2020-11-09 RX ADMIN — SODIUM CHLORIDE: 9 INJECTION, SOLUTION INTRAVENOUS at 03:30

## 2020-11-09 RX ADMIN — BUMETANIDE 2 MG: 0.25 INJECTION INTRAMUSCULAR; INTRAVENOUS at 08:37

## 2020-11-09 RX ADMIN — SODIUM CHLORIDE SOLN NEBU 3% 2 ML: 3 NEBU SOLN at 20:42

## 2020-11-09 RX ADMIN — PANTOPRAZOLE SODIUM 40 MG: 40 INJECTION, POWDER, FOR SOLUTION INTRAVENOUS at 08:37

## 2020-11-09 RX ADMIN — SODIUM CHLORIDE: 9 INJECTION, SOLUTION INTRAVENOUS at 19:30

## 2020-11-09 RX ADMIN — PROPOFOL 35 MCG/KG/MIN: 10 INJECTION, EMULSION INTRAVENOUS at 03:08

## 2020-11-09 ASSESSMENT — PULMONARY FUNCTION TESTS
PIF_VALUE: 32
PIF_VALUE: 35
PIF_VALUE: 38
PIF_VALUE: 29
PIF_VALUE: 33
PIF_VALUE: 32
PIF_VALUE: 34
PIF_VALUE: 31
PIF_VALUE: 30
PIF_VALUE: 33
PIF_VALUE: 32
PIF_VALUE: 35
PIF_VALUE: 32
PIF_VALUE: 34
PIF_VALUE: 35
PIF_VALUE: 34
PIF_VALUE: 33
PIF_VALUE: 31
PIF_VALUE: 36
PIF_VALUE: 30
PIF_VALUE: 35
PIF_VALUE: 32
PIF_VALUE: 29
PIF_VALUE: 34
PIF_VALUE: 33

## 2020-11-09 ASSESSMENT — PAIN SCALES - GENERAL
PAINLEVEL_OUTOF10: 0

## 2020-11-09 NOTE — PROGRESS NOTES
Physical Therapy    PT consult to evaluate/treat received and appreciated. Pt chart reviewed and evaluation attempted. Pt is currently on hold d/t respiratory status and potential trach/PEG today. Will check back as able. Thank you.         Felix Hoyt, PT, DPT   [id number]

## 2020-11-09 NOTE — PROGRESS NOTES
Chief Complaint   Patient presents with    Shortness of Breath     for a few weeks. O2 sat in 70s at [facility], placed on 4L and now 95%. SUBJECTIVE:  Patient is tolerating medications. No reported adverse drug reactions. Patient remained afebrile overnight. Continue to be hypertensive. Patient reintubated 11/8/2020,  sedated on propofol, versed, fentanyl. Minimal secretions on suctioning   Out extubation             WBC 7.2, on Zosyn day 8 of antibiotic total  60%. FiO2  Afebrile-sedated  Fecal management system in place. Review of systems:  As stated above in the chief complaint, otherwise negative.     Medications:  Scheduled Meds:   diazePAM  5 mg Oral Q12H    insulin lispro  0-18 Units Subcutaneous Q4H    bumetanide  2 mg Intravenous BID    dexamethasone  4 mg Intravenous Q8H    piperacillin-tazobactam  3.375 g Intravenous Q8H    sodium chloride   Intravenous Q8H    carvedilol  3.125 mg Oral BID WC    QUEtiapine  75 mg Oral BID    folic acid  1 mg Intravenous Daily    thiamine (VITAMIN B1) IVPB  100 mg Intravenous Q24H    insulin glargine  20 Units Subcutaneous Nightly    sodium chloride (Inhalant)  2 mL Nebulization Q4H    heparin (porcine)  7,500 Units Subcutaneous Q8H    pantoprazole  40 mg Intravenous Daily    And    sodium chloride (PF)  10 mL Intravenous Daily    sodium chloride flush  10 mL Intravenous 2 times per day    Arformoterol Tartrate  15 mcg Nebulization BID    chlorhexidine  15 mL Mouth/Throat BID    amLODIPine  10 mg Oral Daily    aspirin  81 mg Oral Daily    [Held by provider] atorvastatin  40 mg Oral Daily    levothyroxine  50 mcg Oral Daily    [Held by provider] losartan  100 mg Oral Daily    budesonide  0.5 mg Nebulization BID    ipratropium-albuterol  1 ampule Inhalation Q4H WA     Continuous Infusions:   midazolam 1 mg/hr (11/09/20 1758)    propofol 200 mcg/kg/min (11/09/20 1741)    fentaNYL 5 mcg/ml in D5W 250 ml infusion 150 mcg/hr (20 1720)    dextrose       PRN Meds:perflutren lipid microspheres, acetaminophen, polyvinyl alcohol, hydrALAZINE, sodium chloride flush, [DISCONTINUED] acetaminophen **OR** acetaminophen, polyethylene glycol, colchicine, glucose, dextrose, glucagon (rDNA), dextrose    OBJECTIVE:  BP (!) 99/51   Pulse 87   Temp 99.1 °F (37.3 °C) (Esophageal)   Resp 26   Ht 5' 8\" (1.727 m)   Wt 277 lb (125.6 kg)   SpO2 94%   BMI 42.12 kg/m²   Temp  Av.5 °F (37.5 °C)  Min: 99 °F (37.2 °C)  Max: 100.4 °F (38 °C)  Constitutional: Intubated and Sedated, wakes up to stimulation , easily agitated   Skin: Warm and dry. No rashes were noted. HEENT: Round and reactive pupils. Moist mucous membranes. No ulcerations or thrush. Chest: Intubated. Symmetrical expansion. Some coarseness right upper lobe. Cardiovascular: S1 and S2 are rhythmic and regular. Systolic murmurs appreciated.    Abdomen: Hyperactive bowel sounds, obese abdomen, unable to palpate any masses   Extremities: No clubbing, no cyanosis, + Lymphedema left lower extremity, improving   Lines: CVC Right IJ 10/30, Right radial Lindsey     Laboratory and Tests Review:  Lab Results   Component Value Date    WBC 11.3 2020    WBC 9.4 2020    WBC 8.1 2020    HGB 11.2 (L) 2020    HCT 33.0 (L) 2020    MCV 76.7 (L) 2020     2020     Lab Results   Component Value Date    NEUTROABS 9.49 (H) 2020    NEUTROABS 6.58 2020    NEUTROABS 6.16 2020     No results found for: [facility]  Lab Results   Component Value Date     (H) 2020    AST 55 (H) 2020    ALKPHOS 129 2020    BILITOT 0.5 2020     Lab Results   Component Value Date     2020    K 4.2 2020    K 4.0 10/27/2020     2020    CO2 23 2020    BUN 29 2020    CREATININE 1.8 2020    CREATININE 1.7 2020    CREATININE 1.5 2020    CREATININE 1.5 2020    GFRAA 47 11/09/2020    LABGLOM 47 11/09/2020    GLUCOSE 172 11/09/2020    PROT 7.4 11/09/2020    LABALBU 2.7 11/09/2020    CALCIUM 8.3 11/09/2020    BILITOT 0.5 11/09/2020    ALKPHOS 129 11/09/2020    AST 55 11/09/2020     11/09/2020     Lab Results   Component Value Date    CRP 1.4 (H) 11/02/2020    CRP 2.8 (H) 09/04/2017    CRP 10.5 (H) 08/28/2017     Lab Results   Component Value Date    SEDRATE 135 (H) 09/04/2017    SEDRATE 150 (H) 08/28/2017    SEDRATE 141 (H) 08/21/2017     Radiology:  CXR- 11/3- Multifocal bilateral pulmonary infiltrates more prominent within the right lung. CXR- 11/4- worsening b/l infiltrates, worse on left today with possible small L sided effusion   CXR- 11/5 - Stable b/l infiltrates   CXR- 11/6 - Expiratory film, image otherwise appears overall stable. Microbiology:   Lab Results   Component Value Date    BC 5 Days no growth 10/29/2020    BC  10/26/2020     This organism was isolated in one set. Susceptibility testing is not routinely done as this  organism frequently represents skin contamination. Additional testing can be ordered by calling the  Microbiology Department.       BC 5 Days- no growth 05/19/2018    ORG Staphylococcus aureus 10/29/2020    ORG Staphylococcus coagulase-negative 10/26/2020    ORG Coagulase Negative Staphylococci 08/26/2017     Lab Results   Component Value Date    BLOODCULT2 5 Days no growth 10/29/2020    BLOODCULT2 5 Days no growth 10/26/2020    BLOODCULT2 5 Days- no growth 05/19/2018    ORG Staphylococcus aureus 10/29/2020    ORG Staphylococcus coagulase-negative 10/26/2020    ORG Coagulase Negative Staphylococci 08/26/2017     No results found for: WNDABS  Smear, Respiratory   Date Value Ref Range Status   10/29/2020   Final    Group 5: >25 PMN's/LPF and <10 Epithelial cells/LPF  Abundant Polymorphonuclear leukocytes  Epithelial cells not seen  Few Gram positive diplococci       No results found for: [place], LABLEGI, AFBCX, FUNGSM, LABFUNG  CULTURE, RESPIRATORY   Date Value Ref Range Status   10/29/2020 Oral Pharyngeal Tiesha present (A)  Final   10/29/2020   Final    Light growth  This isolate is presumed to be resistant based on the  detection of inducible Clindamycin resistance. Clindamycin  may still be effective in some patients.        Culture Catheter Tip   Date Value Ref Range Status   08/26/2017 <15 colonies  Final     No results found for: BFCS  No results found for: CXSURG  Urine Culture, Routine   Date Value Ref Range Status   10/28/2020 Growth not present  Final   09/16/2019 Growth not present  Final   07/31/2017 >100,000 CFU/ml  Final     MRSA Culture Only   Date Value Ref Range Status   10/30/2020 Methicillin resistant Staph aureus not isolated  Final   07/28/2017 Methicillin resistant Staph aureus not isolated  Final          Assessment:  · Acute on chronic hypoxic respiratory failure likely 2/2 pneumonia and volume overload  - resolving   · Bacterial pneumonia, likely aspiration pneumonia but after extubation patient had to be reintubated rule out HCAP  · Shock, sepsis- resolved, blood cultures negative   · Alcohol withdrawal      Plan:    · Continue Zosyn for aspiration pneumonia plus HCAP after reintubation  · Trach and PEG  · Repeat respiratory culture  · Monitor labs, primary management by ICU team  · Will follow with you    Jose Francisco Traore  6:32 PM  11/9/2020

## 2020-11-09 NOTE — PROGRESS NOTES
Nephrology Progress Note  Patient's Name: Sarahy Patterson  12:23 PM  11/9/2020        Reason for Consult: Acute kidney  Requesting Physician:  Javi Reyes MD    Chief Complaint: Shortness of breath  History Obtained From:  EHR; spouse    History of Present Ilness:    Sarahy Patterson is a 61 y.o. male with a history of COPD, hypertension, hyperlipidemia and diabetes mellitus. He presented to ED 2 days ago with complaints of shortness of breath. According to patient's spouse he had been complaining of shortness of breath over the course of several weeks. This has gotten progressively worse. He went to see his PCP on the day of admission. In office at the time pulse oximeter obtained revealed his oxygen saturation to be in the 70s. He was instructed to proceed to ED for further evaluation. On presentation to ED his initial vital signs showed a blood pressure of 168/71, temperature 97.5 and pulse of 93. His pulse oximeter at the time was noted to be 98% on room air. Laboratory data was significant for a BUN of 11, creatinine 1.1, WBC of 8.3. Rapid COVID-19 testing was negative. A chest x-ray performed revealed right infrahilar and basilar infiltrate concerning for pneumonia. A CTA was ordered but patient declined because of his inability to lay flat. Patient was given ceftriaxone and doxycycline followed by admission to telemetry. 2 days ago an RRT was called as patient was noted to be increasingly more in respiratory distress. He was transferred to MICU and intubated. Laboratory data yesterday showed worsening serum creatinine to 2.8. This a.m. further worsening to 3.6. He has been nonoliguric. Hence renal consult. 10/30: N new acute issues overnight; remains intubated  10/31: Polyuric , concerned for possible DI; no other acute issues overnight  11/1: agitated overnight ; remains intubated  11/2: pt seen and examined.  Chart reviewed, pt intubated  11/3: pt seen and examined in icu, pt intubated  11/4: pt seen and examined in icu, no overnight events, intubated  11/5: pt seen in room ,remains intubated  11/6: pt seen in room, intubated.   11/7: pt seen in room, intubated, chart reviewed  11/8:pt seen in room, remains intuibated  11/9: pt seen in room, reintubated yesterday      Allergies:  Bee venom and Shellfish-derived products    Current Medications:    perflutren lipid microspheres (DEFINITY) injection 1.65 mg, ONCE PRN  diazePAM (VALIUM) tablet 5 mg, Q12H  bumetanide (BUMEX) injection 2 mg, BID  dexamethasone (DECADRON) injection 4 mg, Q8H  piperacillin-tazobactam (ZOSYN) 3.375 g in dextrose 5 % 100 mL IVPB extended infusion (mini-bag), Q8H  Zosyn Flush (0.9 % sodium chloride infusion), Q8H  carvedilol (COREG) tablet 3.125 mg, BID WC  propofol injection, Titrated  QUEtiapine (SEROQUEL) tablet 75 mg, BID  fentaNYL 5 mcg/mL in D5W 250 mL infusion, Continuous  psyllium (KONSYL) 28.3 % packet 1 packet, Daily  sennosides-docusate sodium (SENOKOT-S) 8.6-50 MG tablet 2 tablet, Daily PRN  folic acid injection 1 mg, Daily  thiamine (B-1) 100 mg in sodium chloride 0.9 % 100 mL IVPB, Q24H  polyvinyl alcohol (LIQUIFILM TEARS) 1.4 % ophthalmic solution 1 drop, Q4H PRN  insulin glargine (LANTUS) injection vial 20 Units, Nightly  sodium chloride (Inhalant) 3 % nebulizer solution 2 mL, Q4H  insulin lispro (HUMALOG) injection vial 0-18 Units, Q4H  hydrALAZINE (APRESOLINE) injection 10 mg, Q4H PRN  heparin (porcine) injection 7,500 Units, Q8H  pantoprazole (PROTONIX) injection 40 mg, Daily    And  sodium chloride (PF) 0.9 % injection 10 mL, Daily  sodium chloride flush 0.9 % injection 10 mL, 2 times per day  sodium chloride flush 0.9 % injection 10 mL, PRN  acetaminophen (TYLENOL) tablet 650 mg, Q6H PRN    Or  acetaminophen (TYLENOL) suppository 650 mg, Q6H PRN  Arformoterol Tartrate (BROVANA) nebulizer solution 15 mcg, BID  polyethylene glycol (GLYCOLAX) packet 17 g, Daily PRN  chlorhexidine (PERIDEX) 0.12 % solution 15 mL, BID  amLODIPine (NORVASC) tablet 10 mg, Daily  aspirin chewable tablet 81 mg, Daily  [Held by provider] atorvastatin (LIPITOR) tablet 40 mg, Daily  colchicine (COLCRYS) tablet 0.6 mg, BID PRN  levothyroxine (SYNTHROID) tablet 50 mcg, Daily  [Held by provider] losartan (COZAAR) tablet 100 mg, Daily  budesonide (PULMICORT) nebulizer suspension 500 mcg, BID  ipratropium-albuterol (DUONEB) nebulizer solution 1 ampule, Q4H WA  glucose (GLUTOSE) 40 % oral gel 15 g, PRN  dextrose 50 % IV solution, PRN  glucagon (rDNA) injection 1 mg, PRN  dextrose 5 % solution, PRN        Review of Systems:   Review of systems not obtained due to patient factors. Physical exam:  Vitals:    11/09/20 1000   BP: 135/73   Pulse: 62   Resp: 18   Temp:    SpO2: 95%           General: Intubated sedated  Eyes: PERRL. No sclera icterus. No conjunctival injection. ENT: No discharge. Pharynx clear. Neck: Trachea midline. Normal thyroid. Lungs: Coarse breath sounds  CV: Regular rate. Regular rhythm. No murmur or rub. .   Abd:  Non-distended. No masses. No organmegaly. Normal bowel sounds. Skin: Warm and dry. No nodule on exposed extremities. No rash on exposed extremities.   Ext: No cyanosis, clubbing, edema; 2+ pitting bilateral lower extremity edema L>R  Neuro: sedate lightly, but arouses and is agitated      Data:   Labs:  Lab Results   Component Value Date     11/09/2020     11/08/2020     11/08/2020    K 5.4 (H) 11/09/2020    K 4.8 11/08/2020    K 4.1 11/08/2020     11/09/2020    CO2 22 11/09/2020    CO2 22 11/08/2020    CO2 24 11/08/2020    CREATININE 1.7 (H) 11/09/2020    CREATININE 1.5 (H) 11/08/2020    CREATININE 1.5 (H) 11/08/2020    BUN 28 (H) 11/09/2020    BUN 26 (H) 11/08/2020    BUN 26 (H) 11/08/2020    GLUCOSE 157 (H) 11/09/2020    GLUCOSE 125 (H) 11/08/2020    GLUCOSE 127 (H) 11/08/2020    PHOS 5.1 (H) 11/09/2020    PHOS 3.5 11/08/2020    PHOS 4.4 11/07/2020    WBC 11.3 11/09/2020    WBC sugars, most likely steroid induced    4. etoh abuse    5. Hypernatremia  imrpoved on lower dose bumex    6.  Hyperkalemia  Repeat  Renal tube feed            Jeimy Bullock MD  12:23 PM  11/9/2020

## 2020-11-09 NOTE — PROGRESS NOTES
[facility] Quality Flow/Interdisciplinary Rounds Progress Note        Quality Flow Rounds held on November 9, 2020    Disciplines Attending:  Bedside nurse, charge nurse, , PT/OT, pharmacy, nursing unit leadership, , Medical residents    Rosemary Martino was admitted on 10/26/2020  3:39 PM    Anticipated Discharge Date:       Disposition:    Romaine Score:  Romaine Scale Score: 13    Readmission Risk              Risk of Unplanned Readmission:        35           Discussed patient goal for the day, patient clinical progression, and barriers to discharge.   The following Goal(s) of the Day/Commitment(s) have been identified:  Trach and peg, continue ICU careKatherin  November 9, 2020

## 2020-11-09 NOTE — PROGRESS NOTES
Occupational Therapy      OT consult received and appreciated. Chart reviewed. Discussed pt case with RN. Will hold evaluation due to respiratory status and possible trach/PEG today. Will evaluate at a later time. Thank you.  Elsa Iglesias, OTR/L #[id number]

## 2020-11-09 NOTE — PROGRESS NOTES
[address] [facility]   Department of Internal Medicine   Internal Medicine Residency  MICU Progress Note    Patient:  Iron Patel 61 y.o. male   MRN: [id number]       Date of Service: 2020    Allergy: Bee venom and Shellfish-derived products  Follow up on resp failure   Subjective     S/e. Agitated this AM upon entering room. Fentanyl and versed given and four points placed. Failed rapid extubation yesterday, reintubated. Weaning vent. Since then, naeo. 24 hour change: Stable overnight. Objective     TEMPERATURE:  Current - Temp: 99 °F (37.2 °C); Max - Temp  Av.9 °F (37.7 °C)  Min: 99 °F (37.2 °C)  Max: 101.1 °F (38.4 °C)  RESPIRATIONS RANGE: Resp  Av.3  Min: 0  Max: 20  PULSE RANGE: Pulse  Av.9  Min: 54  Max: 105  BLOOD PRESSURE RANGE:  Systolic (81KBL), DUB:666 , Min:135 , ATU:647   ; Diastolic (11AYA), GZS:36, Min:73, Max:120    PULSE OXIMETRY RANGE: SpO2  Av.8 %  Min: 89 %  Max: 100 %    I & O - 24hr:    Intake/Output Summary (Last 24 hours) at 2020 1331  Last data filed at 2020 0640  Gross per 24 hour   Intake 2637 ml   Output 4100 ml   Net -1463 ml     I/O last 3 completed shifts: In: 2797 [I.V.:2437; NG/GT:60; IV Piggyback:300]  Out: 8621 [Urine:4175; Stool:1000] No intake/output data recorded. Weight change: -13 lb 6.4 oz (-6.078 kg)    Physical Exam:  General Appearance:  Intubated    HEENT:    NC/AT, mucous membranes are moist   Neck:   Supple, no jugular venous distention. Resp:     Coarse rhonchi    Heart:    RRR, S1 and S2 normal, no murmur, rub or gallop.     Abdomen:     Soft, non-tender, non-distended with normal bowel sounds   Extremities:   Atraumatic, no cyanosis or edema   Pulses:  Radial and pedal pulses are intact bilaterally   Neurologic: Sedated and intubated      Medications     Continuous Infusions:   propofol 50 mcg/kg/min (20 9319)    fentaNYL 5 mcg/ml in D5W 250 ml infusion 25 mcg/hr (20 1244)    dextrose Scheduled Meds:   diazePAM  5 mg Oral Q12H    bumetanide  2 mg Intravenous BID    dexamethasone  4 mg Intravenous Q8H    piperacillin-tazobactam  3.375 g Intravenous Q8H    sodium chloride   Intravenous Q8H    carvedilol  3.125 mg Oral BID WC    QUEtiapine  75 mg Oral BID    psyllium  1 packet Oral Daily    folic acid  1 mg Intravenous Daily    thiamine (VITAMIN B1) IVPB  100 mg Intravenous Q24H    insulin glargine  20 Units Subcutaneous Nightly    sodium chloride (Inhalant)  2 mL Nebulization Q4H    insulin lispro  0-18 Units Subcutaneous Q4H    heparin (porcine)  7,500 Units Subcutaneous Q8H    pantoprazole  40 mg Intravenous Daily    And    sodium chloride (PF)  10 mL Intravenous Daily    sodium chloride flush  10 mL Intravenous 2 times per day    Arformoterol Tartrate  15 mcg Nebulization BID    chlorhexidine  15 mL Mouth/Throat BID    amLODIPine  10 mg Oral Daily    aspirin  81 mg Oral Daily    [Held by provider] atorvastatin  40 mg Oral Daily    levothyroxine  50 mcg Oral Daily    [Held by provider] losartan  100 mg Oral Daily    budesonide  0.5 mg Nebulization BID    ipratropium-albuterol  1 ampule Inhalation Q4H WA     PRN Meds: perflutren lipid microspheres, sennosides-docusate sodium, polyvinyl alcohol, hydrALAZINE, sodium chloride flush, acetaminophen **OR** acetaminophen, polyethylene glycol, colchicine, glucose, dextrose, glucagon (rDNA), dextrose  Nutrition:       Labs and Imaging Studies     CBC:   Recent Labs     11/07/20 0354 11/08/20 0450 11/09/20  0600   WBC 8.1 9.4 11.3   HGB 10.8* 11.1* 11.2*   HCT 33.0* 32.8* 33.0*   MCV 78.6* 77.5* 76.7*    316 335       BMP:    Recent Labs     11/08/20  0450 11/09/20  0600 11/09/20  1212     143 139 138   K 4.1  4.8 5.4* 4.2     105 103 100   CO2 24  22 22 23   BUN 26*  26* 28* 29*   CREATININE 1.5*  1.5* 1.7* 1.8*   GLUCOSE 127*  125* 157* 172*       LIVER PROFILE:   Recent Labs     11/07/20 0354 11/08/20  0450 11/09/20  0600   AST 89* 164* 55*   * 243* 154*   BILITOT 0.3 0.5 0.5   ALKPHOS 98 163* 129       PT/INR:   No results for input(s): PROTIME, INR in the last 72 hours. APTT:   No results for input(s): APTT in the last 72 hours. Fasting Lipid Panel:    Lab Results   Component Value Date    CHOL 295 03/23/2017    TRIG 118 11/04/2020    HDL 66 03/23/2017       Cardiac Enzymes:    Lab Results   Component Value Date    CKTOTAL 98 10/08/2017    CKTOTAL 114 10/07/2017    CKMB 1.1 10/08/2017    CKMB 1.1 10/07/2017    TROPONINI <0.01 10/26/2020    TROPONINI <0.01 05/26/2019    TROPONINI <0.01 05/26/2019       Notable Cultures:      Blood cultures   Blood Culture, Routine   Date Value Ref Range Status   10/29/2020 5 Days no growth  Final     Respiratory cultures No results found for: RESPCULTURE No results found for: LABGRAM  Urine   Urine Culture, Routine   Date Value Ref Range Status   10/28/2020 Growth not present  Final     Legionella No results found for: LABLEGI  C Diff PCR No results found for: CDIFPCR  Wound culture/abscess: No results for input(s): WNDABS in the last 72 hours. Tip culture:No results for input(s): CXCATHTIP in the last 72 hours.      Antibiotic  Days  Day started                                Oxygen:     Vent Information  $Ventilation: $Subsequent Day  Skin Assessment: Clean, dry, & intact  Equipment ID: [id number]  Equipment Changed: (S) Humidification  Vent Type: 980  Vent Mode: AC/VC  Vt Ordered: 500 mL  Rate Set: 18 bmp  Peak Flow: 65 L/min  Pressure Support: 0 cmH20  FiO2 : 60 %  SpO2: 95 %  SpO2/FiO2 ratio: 158.33  PaO2/FiO2 ratio: 146  Sensitivity: 3  PEEP/CPAP: 12  I Time/ I Time %: 0 s  Humidification Source: Heated wire  Humidification Temp: 37  Humidification Temp Measured: 37  Circuit Condensation: Drained  Mask Type: Full face mask  Mask Size: Large  Additional Respiratory  Assessments  Pulse: 62  Resp: 18  SpO2: 95 %  Position: Semi-Lockwood's  Humidification Source: Heated wire  Humidification Temp: 37  Circuit Condensation: Drained  Oral Care Completed?: Yes  Oral Care: Mouth suctioned  Subglottic Suction Done?: Yes  Airway Type: ET  Airway Size: 7  Cuff Pressure (cm H2O): 29 cm H2O  Skin barrier applied: Yes     Nasal cannula L/min     Face mask %     Reservoirs mask %       ABG   Vent Settings     PH   Mode      PCO2   TV      PO2   RR      HCO3   PS      Sat%   PEEP      FIO2   FIO2        P/F          Lines:  Site  Day  Date inserted     TLC              PICC              Arterial line              Peripheral line              HD cath            Urethral Catheter-Output (mL): 75 mL    Imaging Studies:    EKG: Rhythm Strip: normal sinus rhythm. Assessment and Plan     Grecia Peralta a 61 y. o. male with PMHx is significant for HFpEF (Ef 65% in 2017), hx of tracheostomy and reversal, moderate HANS not on CPAP, DMT2, HTN, HLD, Hypothyroidism who initially was admitted to general floors for hypoxia. Transferred to the MICU when ABG concerning for worsening respiratory acidosis. We are maniging him for the following:     Acute hypoxic hypercapnic respiratory failure 2/2 CAP versus chronic OHS  Admitted to the ICU from the floor with worsening respiratory acidosis   Intubated and sedated   Fentanyl, propofol and prece dex   Hx of track and peg   Fail extubation today   re intubated after sating 80% on BiPAP  PF ratio . 80  Gen surg consulted, appreciate recommendation  Continue unasyn per ID    Breathing treatment with Brovana, Pulmicort, DuoNeb  Culture MSSA  Follow repeat culture    COVID negative   · Wean PEEP/FiO2 and then trach and peg    CAP vs Aspiration PNA complicated by  Pulmonary edema  Respiratory cultures grew MSSA on 31/10. Patient is afebrile, WBC wnl  onitor daily CBC, vitals, CXR.    Infectious disease following, appreciate recommendations  · Unasyn    Acute kidney injury stage III likely pre renal 2/2 diuretic use, contrast agent vs? cardiorenal syndrome  Improving   Creatinine 1.7 ( baseline 1.1)   Net negative 5 L  Continue bumex   Nephrology following, appreciate recommendations    Continue monitor daily BMP, renal function. Monitor I/O  · Continue bumex diuresis, monitor lytes, monitor output, monitor daily cxr    Labile BP  Arterial line placed 11/2  Ho of hypertension   Home coreg resumed last night - with holding parameters  Continue to monitor   As patient is sedated again, may hold antihypertensive meds if BP dropped  · Hypertensive when agitated - d/c precedex. Restart fentanyl gtt. Continue prop gtt. · Continue seroquel  · Start valium BID 5mg    Transaminitis likely secondary to Lipitor  Consider holding Lipitor  Trend LFTs    History of heart failure with preserved ejection fraction  Echo done in 2017 showing 1 diastolic dysfunction with normal left ventricular size and systolic function EF 61%. Home medication: Coreg 25 mg, olmesartan 10 mg, Losartan 10 mg,. Repeat ECHO left ventricular internal dimensions were normal in diastole and systole. EF 65%. Moderate left ventricular concentric hypertrophy noted. No regional wall motion abnormalities seen. Currently hold off Coreg due to bradycardia. · Echo/BNP     Ho DMT2, HLD, obstructive sleep apnea, obesity, hypothyroidism  Hold home metfomrin.  overnight. Increase Lantus to 20 units. HDSS -> Tolerating well  Continue monitor BG q6h  Continue Atorvastatin   Continue Synthroid 50 MCG      # Peptic ulcer prophylaxis: Protonix 40 mg  # DVT Prophylaxis: Heparin 7500 subcu   # Disposition: Continue ICU care    Final Note - Weaning vent - settings too high for trach/peg. Echo assess heart function. BNP + Pro-mickey. Agitated this morning - restarted fentanyl and added valium 5 BID. Unasyn for likely aspiration pna + HCAP. Continue bumex diuresis, net negative 5L since admission.     Raymond Cox D.O., PGY-1  Attending Physician: Dr. Kodak Cardoza personally saw, examined and provided care for the patient. Radiographs, labs and medication list were reviewed by me independently. I spoke with bedside nursing, therapists and consultants. Critical care services and times documented are independent of procedures and multidisciplinary rounds with Residents. Additionally comprehensive, multidisciplinary rounds were conducted with the MICU team. The case was discussed in detail and plans for care were established. Review of Residents documentation was conducted and revisions were made as appropriate. I agree with the above documented exam, problem list and plan of care. Hypercapnic respiratory  COPD/Pneuminia  Fail extubation  reintubated  tv 500/18/14 /(0   Lasix ,negative 5 L     Care reviewed with nursing staff, medical and surgical specialty care, primary care and the patient's family as available. (granddaughter)    Chart review/lab review/X-ray viewing/documentation and had long Conversation with patient/family re: prognosis, care options and any end of life issues:      Critical care time spent reviewing labs/films, examining patient, collaborating with other physicians more than 35  Minutes  excluding procedures . Capo Lema M.D.   11/9/2020  11:53 PM

## 2020-11-09 NOTE — PROGRESS NOTES
PT SEEN AND EXAMINED. intubated, in icu. BP better. Sedated. proob for peg/trach.      Chart reviewed. meds reviewed. D/w nursing + family as available. EXAM: IN GENERAL, NAD. AWAKE seem's to know me, follows what I am saying. . ROS NEGx10 EXCEPT:   BP (!) 187/89   Pulse 57   Temp 99.9 °F (37.7 °C) (Esophageal)   Resp 18   Ht 5' 8\" (1.727 m)   Wt 277 lb (125.6 kg)   SpO2 99%   BMI 42.12 kg/m²   GEN: A+O NAD. HEENT: NCAT. NECK: NO JVD. TRACH MIDLINE. NO BRUITS. NO THYROMEGALY. LUNGS: CTA BL NO RALES, RHONCHI OR WHEEZES. GOOD EXCURSION. CV: Regular rate and rhythm, NO Murmurs, Rubs, Or gallops  ABD: Soft. Nontender. Normal bowel sounds. No organomegaly  EXT:No clubbing cyanosis or edema  Neuro: Alert and oriented x 3. No focal motor deficits. No sensory deficits. Reflexes appear intact.   Labs/data reviewedLABS: CBC with Differential:    Lab Results   Component Value Date    WBC 11.3 11/09/2020    RBC 4.30 11/09/2020    HGB 11.2 11/09/2020    HCT 33.0 11/09/2020     11/09/2020    MCV 76.7 11/09/2020    MCH 26.0 11/09/2020    MCHC 33.9 11/09/2020    RDW 20.7 11/09/2020    NRBC 4.3 11/02/2020    SEGSPCT 73 01/26/2014    METASPCT 0.9 11/07/2020    LYMPHOPCT 18.4 11/08/2020    MONOPCT 6.1 11/08/2020    MYELOPCT 0.9 11/05/2020    BASOPCT 0.2 11/08/2020    MONOSABS 0.56 11/08/2020    LYMPHSABS 1.69 11/08/2020    EOSABS 0.50 11/08/2020    BASOSABS 0.00 11/08/2020     Platelets:    Lab Results   Component Value Date     11/09/2020     CMP:    Lab Results   Component Value Date     11/09/2020    K 5.4 11/09/2020    K 4.0 10/27/2020     11/09/2020    CO2 22 11/09/2020    BUN 28 11/09/2020    CREATININE 1.7 11/09/2020    GFRAA 50 11/09/2020    LABGLOM 50 11/09/2020    GLUCOSE 157 11/09/2020    PROT 7.4 11/09/2020    LABALBU 2.7 11/09/2020    CALCIUM 8.2 11/09/2020    BILITOT 0.5 11/09/2020    ALKPHOS 129 11/09/2020    AST 55 11/09/2020     11/09/2020     Magnesium:    Lab Results Component Value Date    MG 1.9 11/09/2020     LDH:    Lab Results   Component Value Date     10/28/2020     PT/INR:    Lab Results   Component Value Date    PROTIME 10.9 07/21/2018    INR 0.9 07/21/2018     Last 3 Troponin:    Lab Results   Component Value Date    TROPONINI <0.01 10/26/2020    TROPONINI <0.01 05/26/2019    TROPONINI <0.01 05/26/2019     ABG:    Lab Results   Component Value Date    PH 7.451 11/08/2020    PCO2 34.8 11/08/2020    PO2 83.7 11/08/2020    HCO3 23.7 11/08/2020    BE 3.5 11/09/2020    O2SAT 99.9 11/09/2020     IRON:    Lab Results   Component Value Date    IRON 95 11/05/2020     IMAGING    Xr Chest Portable    Result Date: 10/27/2020  EXAMINATION: ONE XRAY VIEW OF THE CHEST 10/27/2020 7:58 pm COMPARISON: October 26, 2020 HISTORY: ORDERING SYSTEM PROVIDED HISTORY: SOB TECHNOLOGIST PROVIDED HISTORY: Reason for exam:->SOB What reading provider will be dictating this exam?->CRC FINDINGS: There is mild cardiomegaly. There is patchy perihilar and bibasilar atelectasis/infiltrates. Tiny pleural effusions are suspected. Progressive bibasilar atelectasis/infiltrates concerning for pneumonia. Underlying CHF is considered. Xr Chest Portable    Result Date: 10/26/2020  EXAMINATION: ONE XRAY VIEW OF THE CHEST 10/26/2020 4:11 pm COMPARISON: 05/26/2019 HISTORY: ORDERING SYSTEM PROVIDED HISTORY: SOB TECHNOLOGIST PROVIDED HISTORY: Reason for exam:->SOB What reading provider will be dictating this exam?->CRC FINDINGS: Cardiac size appears stable. Discoid airspace disease seen at the left lung base which may represent atelectasis or scarring. Right infrahilar and basilar infiltrate is identified. No pneumothorax is identified. No acute osseous abnormality is identified. Right infrahilar and basilar infiltrate concerning for pneumonia. Left basilar atelectasis or scarring.        Medications:    Scheduled Meds:   bumetanide  2 mg Intravenous BID    dexamethasone  4 mg Intravenous

## 2020-11-09 NOTE — PROGRESS NOTES
Sedated with propofol and Precedex, intubated, no history available.  O2 needs improving as down to 70% this am.  Vent settings:Vent Information  $Ventilation: $Subsequent Day  Skin Assessment: Clean, dry, & intact  Equipment ID: [id number]  Equipment Changed: (S) Humidification  Vent Type: 980  Vent Mode: AC/VC  Vt Ordered: 500 mL  Rate Set: 18 bmp  Peak Flow: 65 L/min  Pressure Support: 0 cmH20  FiO2 : 90 %  SpO2: 99 %  SpO2/FiO2 ratio: 110  PaO2/FiO2 ratio: 146  Sensitivity: 3  PEEP/CPAP: 14  I Time/ I Time %: 0 s  Humidification Source: Heated wire  Humidification Temp: 37  Humidification Temp Measured: 37  Circuit Condensation: Drained  Mask Type: Full face mask  Mask Size: Large  Additional Respiratory  Assessments  Pulse: 57  Resp: 18  SpO2: 99 %  Position: Semi-Lockwood's  Humidification Source: Heated wire  Humidification Temp: 37  Circuit Condensation: Drained  Oral Care Completed?: Yes  Oral Care: Mouth suctioned  Subglottic Suction Done?: Yes  Airway Type: ET  Airway Size: 7  Cuff Pressure (cm H2O): 29 cm H2O  Skin barrier applied: Yes      Current Facility-Administered Medications:     fentaNYL (SUBLIMAZE) injection 100 mcg, 100 mcg, Intravenous, Q2H PRN, Romana Dunning., DO    dexmedetomidine (PRECEDEX) 1,000 mcg in sodium chloride 0.9 % 250 mL infusion, 0.2 mcg/kg/hr, Intravenous, Continuous, Anand Solares MD, Last Rate: 44.2 mL/hr at 11/09/20 0306, 1.4 mcg/kg/hr at 11/09/20 0306    bumetanide (BUMEX) injection 2 mg, 2 mg, Intravenous, BID, [facility], DO    dexamethasone (DECADRON) injection 4 mg, 4 mg, Intravenous, Q8H, Carla Bosch MD, 4 mg at 11/09/20 0638    piperacillin-tazobactam (ZOSYN) 3.375 g in dextrose 5 % 100 mL IVPB extended infusion (mini-bag), 3.375 g, Intravenous, Q8H, Ross Rubalcava MD, Last Rate: 25 mL/hr at 11/09/20 0640, 3.375 g at 11/09/20 0640    Zosyn Flush (0.9 % sodium chloride infusion), , Intravenous, Q8H, Ross Rubalcava MD, Stopped at 11/09/20 0630    carvedilol (COREG) tablet 3.125 mg, 3.125 mg, Oral, BID WC, Shannon Ramsey MD, 3.125 mg at 11/08/20 1703    propofol injection, 10 mcg/kg/min, Intravenous, Titrated, Jeff Jorge MD, Last Rate: 27.1 mL/hr at 11/09/20 0308, 35 mcg/kg/min at 11/09/20 0308    QUEtiapine (SEROQUEL) tablet 75 mg, 75 mg, Oral, BID, Charanjit Verdin DO, 75 mg at 11/08/20 2210    fentaNYL 5 mcg/mL in D5W 250 mL infusion, 25 mcg/hr, Intravenous, Continuous, Nithya Max MD, Stopped at 11/07/20 1637    psyllium (KONSYL) 28.3 % packet 1 packet, 1 packet, Oral, Daily, Charanjit Verdin DO, 1 packet at 11/08/20 0836    sennosides-docusate sodium (SENOKOT-S) 8.6-50 MG tablet 2 tablet, 2 tablet, Oral, Daily PRN, Jeff Jorge MD    folic acid injection 1 mg, 1 mg, Intravenous, Daily, Nithya Max MD, 1 mg at 11/08/20 0843    thiamine (B-1) 100 mg in sodium chloride 0.9 % 100 mL IVPB, 100 mg, Intravenous, Q24H, Nithya Max MD, Stopped at 11/08/20 1705    polyvinyl alcohol (LIQUIFILM TEARS) 1.4 % ophthalmic solution 1 drop, 1 drop, Both Eyes, Q4H PRN, Jeff Jorge MD, 1 drop at 11/02/20 1526    insulin glargine (LANTUS) injection vial 20 Units, 20 Units, Subcutaneous, Nightly, Oneida Rosen MD, 20 Units at 11/07/20 2014    midazolam (VERSED) injection 2 mg, 2 mg, Intravenous, Q2H PRN, Ellie Zelaya MD, 2 mg at 11/08/20 1146    sodium chloride (Inhalant) 3 % nebulizer solution 2 mL, 2 mL, Nebulization, Q4H, Farrah Arredondo MD, 2 mL at 11/09/20 0346    insulin lispro (HUMALOG) injection vial 0-18 Units, 0-18 Units, Subcutaneous, Q4H, Farrah Arredondo MD, 3 Units at 11/09/20 0048    hydrALAZINE (APRESOLINE) injection 10 mg, 10 mg, Intravenous, Q4H PRN, Farrah Arredondo MD, 10 mg at 11/09/20 0054    heparin (porcine) injection 7,500 Units, 7,500 Units, Subcutaneous, Q8H, Farrah Arredondo MD, 7,500 Units at 11/09/20 0048    pantoprazole (PROTONIX) injection 40 mg, 40 mg, Intravenous, Daily, 40 mg at 11/08/20 0835 **AND** sodium chloride (PF) 0.9 % injection 10 mL, 10 mL, Intravenous, Daily, Niko Lorenzo MD, 10 mL at 11/08/20 0836    sodium chloride flush 0.9 % injection 10 mL, 10 mL, Intravenous, 2 times per day, Terry Dhaliwal MD, 10 mL at 11/08/20 2210    sodium chloride flush 0.9 % injection 10 mL, 10 mL, Intravenous, PRN, Terry Dhaliwal MD, 10 mL at 11/06/20 0219    acetaminophen (TYLENOL) tablet 650 mg, 650 mg, Oral, Q6H PRN, 650 mg at 11/08/20 1703 **OR** acetaminophen (TYLENOL) suppository 650 mg, 650 mg, Rectal, Q6H PRN, Terry Dhaliwal MD    Arformoterol Tartrate (BROVANA) nebulizer solution 15 mcg, 15 mcg, Nebulization, BID, Terry Dhaliwal MD, 15 mcg at 11/08/20 1956    polyethylene glycol (GLYCOLAX) packet 17 g, 17 g, Oral, Daily PRN, Terry Dhaliwal MD    perflutren lipid microspheres (DEFINITY) injection 1.65 mg, 1.5 mL, Intravenous, ONCE PRN, Kiki Fabian MD    chlorhexidine (PERIDEX) 0.12 % solution 15 mL, 15 mL, Mouth/Throat, BID, Kiki Fabian MD, 15 mL at 11/08/20 2212    amLODIPine (NORVASC) tablet 10 mg, 10 mg, Oral, Daily, Enoch Wu MD, 10 mg at 11/08/20 0836    aspirin chewable tablet 81 mg, 81 mg, Oral, Daily, Enoch Wu MD, 81 mg at 11/08/20 0835    [Held by provider] atorvastatin (LIPITOR) tablet 40 mg, 40 mg, Oral, Daily, Enoch Wu MD, 40 mg at 10/30/20 0900    colchicine (COLCRYS) tablet 0.6 mg, 0.6 mg, Oral, BID PRN, Satnam Tavares MD, 0.6 mg at 11/07/20 0909    levothyroxine (SYNTHROID) tablet 50 mcg, 50 mcg, Oral, Daily, Satnam Tavares MD, 50 mcg at 11/09/20 0638    [Held by provider] losartan (COZAAR) tablet 100 mg, 100 mg, Oral, Daily, Enoch Wu MD, 100 mg at 10/28/20 0849    budesonide (PULMICORT) nebulizer suspension 500 mcg, 0.5 mg, Nebulization, BID, Logan Wu MD, 500 mcg at 11/08/20 1956    ipratropium-albuterol (DUONEB) nebulizer solution 1 ampule, 1 ampule, Inhalation, Q4H WA, Satnam Tavares MD, 1 ampule at 11/08/20 1956    glucose (GLUTOSE) 40 % oral gel 15 g, 15 g, Oral, PRN, Gerard Borja MD    dextrose 50 % IV solution, 12.5 g, Intravenous, PRN, Gerard Borja MD    glucagon (rDNA) injection 1 mg, 1 mg, Intramuscular, PRN, Gerard Borja MD    dextrose 5 % solution, 100 mL/hr, Intravenous, PRN, Enoch Wu MD  BP (!) 187/89   Pulse 57   Temp 99.9 °F (37.7 °C) (Esophageal)   Resp 18   Ht 5' 8\" (1.727 m)   Wt 277 lb (125.6 kg)   SpO2 99%   BMI 42.12 kg/m²     General: Obese, eyes open but not interactive, no distress  Mouth: Orally intubated  Neck: LIJ in place  Chest: Symmetric, no accessory use  Heart: Darl Hench but reg  Lungs: Coarse bs bilaterally  Abdomen: Distended, soft, nt, no hsm  Extremities: No edema    Intake/Output Summary (Last 24 hours) at 11/9/2020 0832  Last data filed at 11/9/2020 0640  Gross per 24 hour   Intake 2737 ml   Output 5025 ml   Net -2288 ml     CBC with Differential:    Lab Results   Component Value Date    WBC 11.3 11/09/2020    RBC 4.30 11/09/2020    HGB 11.2 11/09/2020    HCT 33.0 11/09/2020     11/09/2020    MCV 76.7 11/09/2020    MCH 26.0 11/09/2020    MCHC 33.9 11/09/2020    RDW 20.7 11/09/2020    NRBC 4.3 11/02/2020    SEGSPCT 73 01/26/2014    METASPCT 0.9 11/07/2020    LYMPHOPCT 12.2 11/09/2020    MONOPCT 3.5 11/09/2020    MYELOPCT 0.9 11/05/2020    BASOPCT 0.2 11/09/2020    MONOSABS 0.45 11/09/2020    LYMPHSABS 1.36 11/09/2020    EOSABS 0.00 11/09/2020    BASOSABS 0.00 11/09/2020     BMP:    Lab Results   Component Value Date     11/09/2020    K 5.4 11/09/2020    K 4.0 10/27/2020     11/09/2020    CO2 22 11/09/2020    BUN 28 11/09/2020    LABALBU 2.7 11/09/2020    CREATININE 1.7 11/09/2020    CALCIUM 8.2 11/09/2020    GFRAA 50 11/09/2020    LABGLOM 50 11/09/2020    GLUCOSE 157 11/09/2020     ABG:    Lab Results   Component Value Date    PH 7.451 11/08/2020    PCO2 34.8 11/08/2020    PO2 83.7 11/08/2020    HCO3 23.7 11/08/2020    BE 3.5 11/09/2020    O2SAT 99.9 11/09/2020   CXR viewed improving diffuse infiltrates    IMPRESSION:   Patient Active Problem List   Diagnosis    Hyperlipidemia    Type 2 diabetes mellitus without complication, without long-term current use of insulin ([facility] [address]    Atypical chest pain    Essential hypertension    Chronic acquired lymphedema    Aortic valve disease    Morbid obesity due to excess calories (HCC)    Abdominal pain    Acute respiratory failure with hypoxia (HCC)    Acute pulmonary edema (HCC)    Congestive heart failure with LV diastolic dysfunction, NYHA class 3 (HCC)    Obstructive sleep apnea    Acquired hypothyroidism    Chronic diastolic heart failure (HCC)    Nonrheumatic aortic valve stenosis    ACE-inhibitor cough    Pneumonia    Acute gout of right knee   Acute respiratory failure for eventual trach per surgery  Probable GNR pneumonia on Zosyn, continue per ARMINDA Escoto  11/9/2020  8:32 AM

## 2020-11-09 NOTE — PLAN OF CARE
Problem: Restraint Use - Nonviolent/Non-Self-Destructive Behavior:  Goal: Absence of restraint indications  11/9/2020 0709 by Lisa Santos RN  Outcome: Not Met This Shift     Problem: Restraint Use - Nonviolent/Non-Self-Destructive Behavior:  Goal: Absence of restraint-related injury  11/9/2020 0709 by Lisa Santos RN  Outcome: Met This Shift

## 2020-11-10 ENCOUNTER — APPOINTMENT (OUTPATIENT)
Dept: ULTRASOUND IMAGING | Age: 60
DRG: 003 | End: 2020-11-10
Payer: MEDICARE

## 2020-11-10 ENCOUNTER — APPOINTMENT (OUTPATIENT)
Dept: GENERAL RADIOLOGY | Age: 60
DRG: 003 | End: 2020-11-10
Payer: MEDICARE

## 2020-11-10 LAB
ALBUMIN SERPL-MCNC: 3.1 G/DL (ref 3.5–5.2)
ALP BLD-CCNC: 118 U/L (ref 40–129)
ALT SERPL-CCNC: 111 U/L (ref 0–40)
ANION GAP SERPL CALCULATED.3IONS-SCNC: 17 MMOL/L (ref 7–16)
AST SERPL-CCNC: 40 U/L (ref 0–39)
BASOPHILS ABSOLUTE: 0 E9/L (ref 0–0.2)
BASOPHILS RELATIVE PERCENT: 0 % (ref 0–2)
BILIRUB SERPL-MCNC: 0.5 MG/DL (ref 0–1.2)
BUN BLDV-MCNC: 32 MG/DL (ref 8–23)
CALCIUM IONIZED: 1.12 MMOL/L (ref 1.15–1.33)
CALCIUM SERPL-MCNC: 8.2 MG/DL (ref 8.6–10.2)
CHLORIDE BLD-SCNC: 101 MMOL/L (ref 98–107)
CO2: 23 MMOL/L (ref 22–29)
CREAT SERPL-MCNC: 1.7 MG/DL (ref 0.7–1.2)
EOSINOPHILS ABSOLUTE: 0.05 E9/L (ref 0.05–0.5)
EOSINOPHILS RELATIVE PERCENT: 0.6 % (ref 0–6)
GFR AFRICAN AMERICAN: 50
GFR NON-AFRICAN AMERICAN: 50 ML/MIN/1.73
GLUCOSE BLD-MCNC: 154 MG/DL (ref 74–99)
HCT VFR BLD CALC: 29.5 % (ref 37–54)
HEMOGLOBIN: 10.2 G/DL (ref 12.5–16.5)
IMMATURE GRANULOCYTES #: 0.03 E9/L
IMMATURE GRANULOCYTES %: 0.4 % (ref 0–5)
LYMPHOCYTES ABSOLUTE: 1.68 E9/L (ref 1.5–4)
LYMPHOCYTES RELATIVE PERCENT: 20.6 % (ref 20–42)
MAGNESIUM: 1.7 MG/DL (ref 1.6–2.6)
MCH RBC QN AUTO: 26.5 PG (ref 26–35)
MCHC RBC AUTO-ENTMCNC: 34.6 % (ref 32–34.5)
MCV RBC AUTO: 76.6 FL (ref 80–99.9)
METER GLUCOSE: 122 MG/DL (ref 74–99)
METER GLUCOSE: 136 MG/DL (ref 74–99)
METER GLUCOSE: 142 MG/DL (ref 74–99)
METER GLUCOSE: 144 MG/DL (ref 74–99)
METER GLUCOSE: 153 MG/DL (ref 74–99)
METER GLUCOSE: 158 MG/DL (ref 74–99)
MONOCYTES ABSOLUTE: 0.52 E9/L (ref 0.1–0.95)
MONOCYTES RELATIVE PERCENT: 6.4 % (ref 2–12)
NEUTROPHILS ABSOLUTE: 5.88 E9/L (ref 1.8–7.3)
NEUTROPHILS RELATIVE PERCENT: 72 % (ref 43–80)
PDW BLD-RTO: 20 FL (ref 11.5–15)
PHOSPHORUS: 4.2 MG/DL (ref 2.5–4.5)
PLATELET # BLD: 277 E9/L (ref 130–450)
PMV BLD AUTO: 9.6 FL (ref 7–12)
POTASSIUM SERPL-SCNC: 4 MMOL/L (ref 3.5–5)
RBC # BLD: 3.85 E12/L (ref 3.8–5.8)
SODIUM BLD-SCNC: 141 MMOL/L (ref 132–146)
TOTAL PROTEIN: 7.2 G/DL (ref 6.4–8.3)
TRIGL SERPL-MCNC: 216 MG/DL (ref 0–149)
WBC # BLD: 8.2 E9/L (ref 4.5–11.5)

## 2020-11-10 PROCEDURE — 6360000002 HC RX W HCPCS: Performed by: INTERNAL MEDICINE

## 2020-11-10 PROCEDURE — 93970 EXTREMITY STUDY: CPT

## 2020-11-10 PROCEDURE — 6360000002 HC RX W HCPCS: Performed by: SPECIALIST

## 2020-11-10 PROCEDURE — 84100 ASSAY OF PHOSPHORUS: CPT

## 2020-11-10 PROCEDURE — 2580000003 HC RX 258: Performed by: INTERNAL MEDICINE

## 2020-11-10 PROCEDURE — 6370000000 HC RX 637 (ALT 250 FOR IP): Performed by: INTERNAL MEDICINE

## 2020-11-10 PROCEDURE — 74018 RADEX ABDOMEN 1 VIEW: CPT

## 2020-11-10 PROCEDURE — 94003 VENT MGMT INPAT SUBQ DAY: CPT

## 2020-11-10 PROCEDURE — 82962 GLUCOSE BLOOD TEST: CPT

## 2020-11-10 PROCEDURE — 83735 ASSAY OF MAGNESIUM: CPT

## 2020-11-10 PROCEDURE — 2500000003 HC RX 250 WO HCPCS: Performed by: STUDENT IN AN ORGANIZED HEALTH CARE EDUCATION/TRAINING PROGRAM

## 2020-11-10 PROCEDURE — 80053 COMPREHEN METABOLIC PANEL: CPT

## 2020-11-10 PROCEDURE — 99232 SBSQ HOSP IP/OBS MODERATE 35: CPT | Performed by: SURGERY

## 2020-11-10 PROCEDURE — C9113 INJ PANTOPRAZOLE SODIUM, VIA: HCPCS | Performed by: INTERNAL MEDICINE

## 2020-11-10 PROCEDURE — 2580000003 HC RX 258: Performed by: SPECIALIST

## 2020-11-10 PROCEDURE — 82330 ASSAY OF CALCIUM: CPT

## 2020-11-10 PROCEDURE — 2500000003 HC RX 250 WO HCPCS: Performed by: INTERNAL MEDICINE

## 2020-11-10 PROCEDURE — 84478 ASSAY OF TRIGLYCERIDES: CPT

## 2020-11-10 PROCEDURE — 2000000000 HC ICU R&B

## 2020-11-10 PROCEDURE — 93970 EXTREMITY STUDY: CPT | Performed by: RADIOLOGY

## 2020-11-10 PROCEDURE — 99233 SBSQ HOSP IP/OBS HIGH 50: CPT | Performed by: INTERNAL MEDICINE

## 2020-11-10 PROCEDURE — 36415 COLL VENOUS BLD VENIPUNCTURE: CPT

## 2020-11-10 PROCEDURE — 71045 X-RAY EXAM CHEST 1 VIEW: CPT

## 2020-11-10 PROCEDURE — 85025 COMPLETE CBC W/AUTO DIFF WBC: CPT

## 2020-11-10 PROCEDURE — 94640 AIRWAY INHALATION TREATMENT: CPT

## 2020-11-10 PROCEDURE — 6370000000 HC RX 637 (ALT 250 FOR IP): Performed by: STUDENT IN AN ORGANIZED HEALTH CARE EDUCATION/TRAINING PROGRAM

## 2020-11-10 RX ORDER — MAGNESIUM SULFATE IN WATER 40 MG/ML
2 INJECTION, SOLUTION INTRAVENOUS ONCE
Status: COMPLETED | OUTPATIENT
Start: 2020-11-10 | End: 2020-11-10

## 2020-11-10 RX ORDER — POLYETHYLENE GLYCOL 3350 17 G/17G
17 POWDER, FOR SOLUTION ORAL DAILY PRN
Status: DISCONTINUED | OUTPATIENT
Start: 2020-11-10 | End: 2020-01-01 | Stop reason: HOSPADM

## 2020-11-10 RX ORDER — METOCLOPRAMIDE HYDROCHLORIDE 5 MG/ML
5 INJECTION INTRAMUSCULAR; INTRAVENOUS EVERY 6 HOURS
Status: DISCONTINUED | OUTPATIENT
Start: 2020-11-10 | End: 2020-11-10

## 2020-11-10 RX ORDER — DEXAMETHASONE SODIUM PHOSPHATE 4 MG/ML
4 INJECTION, SOLUTION INTRA-ARTICULAR; INTRALESIONAL; INTRAMUSCULAR; INTRAVENOUS; SOFT TISSUE EVERY 12 HOURS
Status: COMPLETED | OUTPATIENT
Start: 2020-11-11 | End: 2020-11-12

## 2020-11-10 RX ORDER — SENNA PLUS 8.6 MG/1
2 TABLET ORAL NIGHTLY
Status: DISCONTINUED | OUTPATIENT
Start: 2020-11-10 | End: 2020-01-01 | Stop reason: HOSPADM

## 2020-11-10 RX ORDER — POLYETHYLENE GLYCOL 3350 17 G/17G
17 POWDER, FOR SOLUTION ORAL DAILY
Status: DISCONTINUED | OUTPATIENT
Start: 2020-11-10 | End: 2020-11-10

## 2020-11-10 RX ORDER — DIAZEPAM 5 MG/1
10 TABLET ORAL EVERY 12 HOURS
Status: DISCONTINUED | OUTPATIENT
Start: 2020-11-11 | End: 2020-11-11

## 2020-11-10 RX ADMIN — PANTOPRAZOLE SODIUM 40 MG: 40 INJECTION, POWDER, FOR SOLUTION INTRAVENOUS at 08:53

## 2020-11-10 RX ADMIN — ARFORMOTEROL TARTRATE 15 MCG: 15 SOLUTION RESPIRATORY (INHALATION) at 21:20

## 2020-11-10 RX ADMIN — CARVEDILOL 3.12 MG: 6.25 TABLET, FILM COATED ORAL at 08:51

## 2020-11-10 RX ADMIN — AMLODIPINE BESYLATE 10 MG: 10 TABLET ORAL at 08:46

## 2020-11-10 RX ADMIN — PROPOFOL 40 MCG/KG/MIN: 10 INJECTION, EMULSION INTRAVENOUS at 06:09

## 2020-11-10 RX ADMIN — INSULIN LISPRO 3 UNITS: 100 INJECTION, SOLUTION INTRAVENOUS; SUBCUTANEOUS at 06:09

## 2020-11-10 RX ADMIN — QUETIAPINE FUMARATE 75 MG: 25 TABLET ORAL at 20:25

## 2020-11-10 RX ADMIN — Medication 200 MCG/HR: at 03:00

## 2020-11-10 RX ADMIN — INSULIN LISPRO 3 UNITS: 100 INJECTION, SOLUTION INTRAVENOUS; SUBCUTANEOUS at 11:02

## 2020-11-10 RX ADMIN — SODIUM CHLORIDE SOLN NEBU 3% 2 ML: 3 NEBU SOLN at 16:55

## 2020-11-10 RX ADMIN — QUETIAPINE FUMARATE 75 MG: 25 TABLET ORAL at 08:47

## 2020-11-10 RX ADMIN — DEXAMETHASONE SODIUM PHOSPHATE 4 MG: 4 INJECTION, SOLUTION INTRAMUSCULAR; INTRAVENOUS at 13:53

## 2020-11-10 RX ADMIN — ARFORMOTEROL TARTRATE 15 MCG: 15 SOLUTION RESPIRATORY (INHALATION) at 09:05

## 2020-11-10 RX ADMIN — BUMETANIDE 2 MG: 0.25 INJECTION INTRAMUSCULAR; INTRAVENOUS at 20:25

## 2020-11-10 RX ADMIN — SODIUM CHLORIDE SOLN NEBU 3% 2 ML: 3 NEBU SOLN at 00:14

## 2020-11-10 RX ADMIN — PIPERACILLIN AND TAZOBACTAM 3.38 G: 3; .375 INJECTION, POWDER, FOR SOLUTION INTRAVENOUS at 17:00

## 2020-11-10 RX ADMIN — SODIUM CHLORIDE: 9 INJECTION, SOLUTION INTRAVENOUS at 13:31

## 2020-11-10 RX ADMIN — DIAZEPAM 5 MG: 5 TABLET ORAL at 13:53

## 2020-11-10 RX ADMIN — IPRATROPIUM BROMIDE AND ALBUTEROL SULFATE 1 AMPULE: 2.5; .5 SOLUTION RESPIRATORY (INHALATION) at 12:48

## 2020-11-10 RX ADMIN — SODIUM CHLORIDE, PRESERVATIVE FREE 10 ML: 5 INJECTION INTRAVENOUS at 08:53

## 2020-11-10 RX ADMIN — Medication 200 MCG/HR: at 17:15

## 2020-11-10 RX ADMIN — SODIUM CHLORIDE SOLN NEBU 3% 2 ML: 3 NEBU SOLN at 12:49

## 2020-11-10 RX ADMIN — FOLIC ACID 1 MG: 5 INJECTION, SOLUTION INTRAMUSCULAR; INTRAVENOUS; SUBCUTANEOUS at 11:16

## 2020-11-10 RX ADMIN — MIDAZOLAM HYDROCHLORIDE 1 MG/HR: 5 INJECTION, SOLUTION INTRAMUSCULAR; INTRAVENOUS at 18:15

## 2020-11-10 RX ADMIN — CHLORHEXIDINE GLUCONATE 0.12% ORAL RINSE 15 ML: 1.2 LIQUID ORAL at 08:51

## 2020-11-10 RX ADMIN — PROPOFOL 45 MCG/KG/MIN: 10 INJECTION, EMULSION INTRAVENOUS at 03:00

## 2020-11-10 RX ADMIN — THIAMINE HYDROCHLORIDE 100 MG: 100 INJECTION, SOLUTION INTRAMUSCULAR; INTRAVENOUS at 17:21

## 2020-11-10 RX ADMIN — SODIUM CHLORIDE SOLN NEBU 3% 2 ML: 3 NEBU SOLN at 04:38

## 2020-11-10 RX ADMIN — HEPARIN SODIUM 7500 UNITS: 10000 INJECTION INTRAVENOUS; SUBCUTANEOUS at 17:13

## 2020-11-10 RX ADMIN — Medication 10 ML: at 08:53

## 2020-11-10 RX ADMIN — PROPOFOL 50 MCG/KG/MIN: 10 INJECTION, EMULSION INTRAVENOUS at 15:21

## 2020-11-10 RX ADMIN — PROPOFOL 50 MCG/KG/MIN: 10 INJECTION, EMULSION INTRAVENOUS at 20:24

## 2020-11-10 RX ADMIN — IPRATROPIUM BROMIDE AND ALBUTEROL SULFATE 1 AMPULE: 2.5; .5 SOLUTION RESPIRATORY (INHALATION) at 09:08

## 2020-11-10 RX ADMIN — BUMETANIDE 2 MG: 0.25 INJECTION INTRAMUSCULAR; INTRAVENOUS at 08:47

## 2020-11-10 RX ADMIN — CARVEDILOL 3.12 MG: 6.25 TABLET, FILM COATED ORAL at 17:00

## 2020-11-10 RX ADMIN — CHLORHEXIDINE GLUCONATE 0.12% ORAL RINSE 15 ML: 1.2 LIQUID ORAL at 20:25

## 2020-11-10 RX ADMIN — MAGNESIUM SULFATE HEPTAHYDRATE 2 G: 40 INJECTION, SOLUTION INTRAVENOUS at 06:59

## 2020-11-10 RX ADMIN — PROPOFOL 50 MCG/KG/MIN: 10 INJECTION, EMULSION INTRAVENOUS at 17:36

## 2020-11-10 RX ADMIN — PIPERACILLIN AND TAZOBACTAM 3.38 G: 3; .375 INJECTION, POWDER, FOR SOLUTION INTRAVENOUS at 06:08

## 2020-11-10 RX ADMIN — HEPARIN SODIUM 7500 UNITS: 10000 INJECTION INTRAVENOUS; SUBCUTANEOUS at 08:47

## 2020-11-10 RX ADMIN — SENNOSIDES 17.2 MG: 8.6 TABLET, FILM COATED ORAL at 10:51

## 2020-11-10 RX ADMIN — PIPERACILLIN AND TAZOBACTAM 3.38 G: 3; .375 INJECTION, POWDER, FOR SOLUTION INTRAVENOUS at 23:15

## 2020-11-10 RX ADMIN — LEVOTHYROXINE SODIUM 50 MCG: 0.05 TABLET ORAL at 06:08

## 2020-11-10 RX ADMIN — POLYETHYLENE GLYCOL 3350 17 G: 17 POWDER, FOR SOLUTION ORAL at 10:51

## 2020-11-10 RX ADMIN — Medication 200 MCG/HR: at 09:58

## 2020-11-10 RX ADMIN — DEXAMETHASONE SODIUM PHOSPHATE 4 MG: 4 INJECTION, SOLUTION INTRAMUSCULAR; INTRAVENOUS at 06:08

## 2020-11-10 RX ADMIN — SODIUM CHLORIDE SOLN NEBU 3% 2 ML: 3 NEBU SOLN at 21:20

## 2020-11-10 RX ADMIN — SODIUM CHLORIDE SOLN NEBU 3% 2 ML: 3 NEBU SOLN at 09:09

## 2020-11-10 RX ADMIN — INSULIN LISPRO 3 UNITS: 100 INJECTION, SOLUTION INTRAVENOUS; SUBCUTANEOUS at 13:50

## 2020-11-10 RX ADMIN — BUDESONIDE 500 MCG: 0.5 SUSPENSION RESPIRATORY (INHALATION) at 09:06

## 2020-11-10 RX ADMIN — IPRATROPIUM BROMIDE AND ALBUTEROL SULFATE 1 AMPULE: 2.5; .5 SOLUTION RESPIRATORY (INHALATION) at 16:51

## 2020-11-10 RX ADMIN — Medication 200 MCG/HR: at 23:22

## 2020-11-10 RX ADMIN — METOCLOPRAMIDE HYDROCHLORIDE 5 MG: 5 INJECTION INTRAMUSCULAR; INTRAVENOUS at 11:04

## 2020-11-10 RX ADMIN — BUDESONIDE 500 MCG: 0.5 SUSPENSION RESPIRATORY (INHALATION) at 21:20

## 2020-11-10 RX ADMIN — SODIUM CHLORIDE: 9 INJECTION, SOLUTION INTRAVENOUS at 04:45

## 2020-11-10 RX ADMIN — IPRATROPIUM BROMIDE AND ALBUTEROL SULFATE 1 AMPULE: 2.5; .5 SOLUTION RESPIRATORY (INHALATION) at 21:20

## 2020-11-10 RX ADMIN — ASPIRIN 81 MG CHEWABLE TABLET 81 MG: 81 TABLET CHEWABLE at 08:47

## 2020-11-10 ASSESSMENT — PULMONARY FUNCTION TESTS
PIF_VALUE: 37
PIF_VALUE: 39
PIF_VALUE: 31
PIF_VALUE: 30
PIF_VALUE: 49
PIF_VALUE: 29
PIF_VALUE: 35
PIF_VALUE: 33
PIF_VALUE: 32
PIF_VALUE: 39
PIF_VALUE: 40
PIF_VALUE: 31
PIF_VALUE: 32
PIF_VALUE: 44
PIF_VALUE: 35
PIF_VALUE: 31
PIF_VALUE: 31
PIF_VALUE: 43
PIF_VALUE: 36
PIF_VALUE: 30
PIF_VALUE: 36
PIF_VALUE: 36
PIF_VALUE: 33
PIF_VALUE: 32
PIF_VALUE: 41
PIF_VALUE: 45
PIF_VALUE: 34
PIF_VALUE: 40

## 2020-11-10 ASSESSMENT — PAIN SCALES - GENERAL
PAINLEVEL_OUTOF10: 0

## 2020-11-10 NOTE — PROGRESS NOTES
11/10/2020     LDH:    Lab Results   Component Value Date     10/28/2020     PT/INR:    Lab Results   Component Value Date    PROTIME 10.9 07/21/2018    INR 0.9 07/21/2018     Last 3 Troponin:    Lab Results   Component Value Date    TROPONINI <0.01 10/26/2020    TROPONINI <0.01 05/26/2019    TROPONINI <0.01 05/26/2019     ABG:    Lab Results   Component Value Date    PH 7.414 11/09/2020    PCO2 39.7 11/09/2020    PO2 99.0 11/09/2020    HCO3 24.8 11/09/2020    BE 0.3 11/09/2020    O2SAT 96.9 11/09/2020     IRON:    Lab Results   Component Value Date    IRON 95 11/05/2020     IMAGING    Xr Chest Portable    Result Date: 10/27/2020  EXAMINATION: ONE XRAY VIEW OF THE CHEST 10/27/2020 7:58 pm COMPARISON: October 26, 2020 HISTORY: ORDERING SYSTEM PROVIDED HISTORY: SOB TECHNOLOGIST PROVIDED HISTORY: Reason for exam:->SOB What reading provider will be dictating this exam?->CRC FINDINGS: There is mild cardiomegaly. There is patchy perihilar and bibasilar atelectasis/infiltrates. Tiny pleural effusions are suspected. Progressive bibasilar atelectasis/infiltrates concerning for pneumonia. Underlying CHF is considered. Xr Chest Portable    Result Date: 10/26/2020  EXAMINATION: ONE XRAY VIEW OF THE CHEST 10/26/2020 4:11 pm COMPARISON: 05/26/2019 HISTORY: ORDERING SYSTEM PROVIDED HISTORY: SOB TECHNOLOGIST PROVIDED HISTORY: Reason for exam:->SOB What reading provider will be dictating this exam?->CRC FINDINGS: Cardiac size appears stable. Discoid airspace disease seen at the left lung base which may represent atelectasis or scarring. Right infrahilar and basilar infiltrate is identified. No pneumothorax is identified. No acute osseous abnormality is identified. Right infrahilar and basilar infiltrate concerning for pneumonia. Left basilar atelectasis or scarring.        Medications:    Scheduled Meds:   diazePAM  5 mg Oral Q12H    insulin lispro  0-18 Units Subcutaneous Q4H    bumetanide  2 mg Intravenous BID    dexamethasone  4 mg Intravenous Q8H    piperacillin-tazobactam  3.375 g Intravenous Q8H    sodium chloride   Intravenous Q8H    carvedilol  3.125 mg Oral BID WC    QUEtiapine  75 mg Oral BID    folic acid  1 mg Intravenous Daily    thiamine (VITAMIN B1) IVPB  100 mg Intravenous Q24H    insulin glargine  20 Units Subcutaneous Nightly    sodium chloride (Inhalant)  2 mL Nebulization Q4H    heparin (porcine)  7,500 Units Subcutaneous Q8H    pantoprazole  40 mg Intravenous Daily    And    sodium chloride (PF)  10 mL Intravenous Daily    sodium chloride flush  10 mL Intravenous 2 times per day    Arformoterol Tartrate  15 mcg Nebulization BID    chlorhexidine  15 mL Mouth/Throat BID    amLODIPine  10 mg Oral Daily    aspirin  81 mg Oral Daily    levothyroxine  50 mcg Oral Daily    [Held by provider] losartan  100 mg Oral Daily    budesonide  0.5 mg Nebulization BID    ipratropium-albuterol  1 ampule Inhalation Q4H WA       Continuous Infusions:   midazolam 3 mg/hr (11/09/20 2004)    propofol 40 mcg/kg/min (11/10/20 0609)    fentaNYL 5 mcg/ml in D5W 250 ml infusion 200 mcg/hr (11/10/20 0300)    dextrose         PRN Meds:perflutren lipid microspheres, acetaminophen, polyvinyl alcohol, hydrALAZINE, sodium chloride flush, [DISCONTINUED] acetaminophen **OR** acetaminophen, polyethylene glycol, colchicine, glucose, dextrose, glucagon (rDNA), dextrose    A/P:      Patient Active Problem List   Diagnosis    Hyperlipidemia    Type 2 diabetes mellitus without complication, without long-term current use of insulin (HCC)    Atypical chest pain    Essential hypertension    Chronic acquired lymphedema    Aortic valve disease    Morbid obesity due to excess calories (HCC)    Abdominal pain    Acute respiratory failure with hypoxia (HCC)    Acute pulmonary edema (HCC)    Congestive heart failure with LV diastolic dysfunction, NYHA class 3 (HCC)    Obstructive sleep apnea    Acquired hypothyroidism    Chronic diastolic heart failure (HCC)    Nonrheumatic aortic valve stenosis    ACE-inhibitor cough    Pneumonia    Acute gout of right knee   Acute encephalopathy 2/2 Acute hypoxic hypercapnic respiratory failure.         Acute hypoxic hypercapnic respiratory failure 2/2 MSSA pneumonia versus COPD versus ADHF versus chronic OHS  Pt has Hx chronic HANS not on CPAP at home. ABG showed pH 7.188/91.2/78.9/33 on RRT. currently intubated. - Continue to monitor respiratory status, wean down FiO2 as tolerated. - Pro  on presentation.   - US dup LE negative for DVT. CTA negative for PE. COVID -19 negative x2  - Continue breathing treatment  - Monitor daily CXR. CBC.   - Pulmonology consulted, further recommendations appreciated.     NANCY stage III likely pre renal 2/2 diuretic use, contrast agent vs? cardiorenal syndrome. -   - Nephrology consulted. Further recommendations appreciated. - Continue monitor daily BMP, renal function. Monitor I/O. Avoid nephrotoxicity.       · Bacterial pneumonia, likely aspiration pneumonia vs MSSA   · Shock, sepsis- resolved   · ATBx per id  Alcohol withdrawal .    Hx HFpEF, EF 65%cta noted  prob needs ltac  Wean per pulm/ccm  Peg/trach  Monitor HR

## 2020-11-10 NOTE — PROGRESS NOTES
Patient reaches for ETT, lines and tubes when sedation is decreased and or an attempt to release restraints is made by nursing. Patient is very agitated and uses all extremities and his full body force to attempt to remove said life sustaining lines and tubes, patient is unable to comprehend safety needs at this time.

## 2020-11-10 NOTE — PROGRESS NOTES
[facility]  ATTENDING PHYSICIAN PROGRESS NOTE     I have examined the patient and  reviewed the record. I have reviewed all relevant labs and imaging data. The following summarizes my clinical findings and independent assessment. CC: acute resp failure    S. Is on a PEEP of 8 and FiO2 of 50%. O.  Intubated  Obese  Thick neck  Abdomen soft nontender nondistended    ASSESSMENT:  Active Problems:    Acute respiratory failure with hypoxia (HCC)  Resolved Problems:    * No resolved hospital problems. *       PLAN:  Acute respiratory failure--history of prior tracheostomy by Dr. Nati Gilmore in the past  Currently PEEP of 8 and FiO2 of 50%. I will plan on placing a tracheostomy and a PEG tube in the OR tomorrow. Discussed with bedside nurse  DVT Proph: SCDS/heparin 3 times daily       Vijaya Carroll MD, FACS  11/10/2020  4:04 PM    NOTE: This report was transcribed using voice recognition software. Every effort was made to ensure accuracy; however, inadvertent computerized transcription errors may be present.

## 2020-11-10 NOTE — PROGRESS NOTES
Pulmonary Progress Note  11/10/2020 5:07 PM  Subjective:   Admit Date: 10/26/2020  PCP: Chaparrita Bhat MD  Interval History: Patient sedated on fentanyl and propofol. Seroquel being uptitrated for agitation. Intubated on mechanical ventilation on ACVC. Plan for trach/PEG in AM.  Diet: DIET TUBE FEED CONTINUOUS/CYCLIC NPO; Renal Formula;  Orogastric; Continuous; 25; 60; 23    Data:   Scheduled Meds:    senna  2 tablet Oral Nightly    [START ON 11/11/2020] diazePAM  10 mg Oral Q12H    [START ON 11/11/2020] dexamethasone  4 mg Intravenous Q12H    insulin lispro  0-18 Units Subcutaneous Q4H    bumetanide  2 mg Intravenous BID    piperacillin-tazobactam  3.375 g Intravenous Q8H    sodium chloride   Intravenous Q8H    carvedilol  3.125 mg Oral BID WC    QUEtiapine  75 mg Oral BID    folic acid  1 mg Intravenous Daily    thiamine (VITAMIN B1) IVPB  100 mg Intravenous Q24H    insulin glargine  20 Units Subcutaneous Nightly    sodium chloride (Inhalant)  2 mL Nebulization Q4H    heparin (porcine)  7,500 Units Subcutaneous Q8H    pantoprazole  40 mg Intravenous Daily    And    sodium chloride (PF)  10 mL Intravenous Daily    sodium chloride flush  10 mL Intravenous 2 times per day    Arformoterol Tartrate  15 mcg Nebulization BID    chlorhexidine  15 mL Mouth/Throat BID    amLODIPine  10 mg Oral Daily    aspirin  81 mg Oral Daily    levothyroxine  50 mcg Oral Daily    [Held by provider] losartan  100 mg Oral Daily    budesonide  0.5 mg Nebulization BID    ipratropium-albuterol  1 ampule Inhalation Q4H WA     Continuous Infusions:    propofol 50 mcg/kg/min (11/10/20 1521)    fentaNYL 5 mcg/ml in D5W 250 ml infusion 200 mcg/hr (11/10/20 0958)    dextrose         PRN Meds: polyethylene glycol, perflutren lipid microspheres, acetaminophen, polyvinyl alcohol, hydrALAZINE, sodium chloride flush, [DISCONTINUED] acetaminophen **OR** acetaminophen, colchicine, glucose, dextrose, glucagon (rDNA), dextrose    I/O last 3 completed shifts: In: 7010 [I.V.:1635; NG/GT:40]  Out: 8423 [Urine:2675]    No intake/output data recorded. Intake/Output Summary (Last 24 hours) at 11/10/2020 1707  Last data filed at 11/10/2020 1100  Gross per 24 hour   Intake 1675 ml   Output 2345 ml   Net -670 ml       Patient Vitals for the past 96 hrs (Last 3 readings):   Weight   11/10/20 0600 287 lb 6.4 oz (130.4 kg)   11/08/20 2330 277 lb (125.6 kg)   11/08/20 0600 290 lb 6.4 oz (131.7 kg)         Recent Labs     11/08/20 0450 11/09/20  0600 11/10/20  0450   WBC 9.4 11.3 8.2   HGB 11.1* 11.2* 10.2*   HCT 32.8* 33.0* 29.5*   MCV 77.5* 76.7* 76.6*    335 277     Recent Labs     11/08/20  0450 11/09/20  0600 11/09/20  1212 11/10/20  0450     143 139 138 141   K 4.1  4.8 5.4* 4.2 4.0     105 103 100 101   CO2 24  22 22 23 23   BUN 26*  26* 28* 29* 32*   CREATININE 1.5*  1.5* 1.7* 1.8* 1.7*   PHOS 3.5 5.1*  --  4.2     Recent Labs     11/08/20  0450 11/09/20  0600 11/10/20  0450   PROT 7.1 7.4 7.2   ALKPHOS 163* 129 118   * 154* 111*   * 55* 40*   BILITOT 0.5 0.5 0.5   CPK:  Lab Results   Component Value Date    CKTOTAL 98 10/08/2017        11/6 CXR viewed and shows stable diffuse infiltrates  -----------------------------------------------------------------  RAD:   Results for orders placed during the hospital encounter of 10/26/20   XR CHEST PORTABLE    Narrative EXAMINATION:  ONE XRAY VIEW OF THE CHEST    10/30/2020 2:17 am    COMPARISON:  10/29/2020 radiograph    HISTORY:  ORDERING SYSTEM PROVIDED HISTORY: R IJ placement  TECHNOLOGIST PROVIDED HISTORY:  Reason for exam:->R IJ placement  What reading provider will be dictating this exam?->CRC    FINDINGS:  Right IJ central venous catheter has been placed with tip at the distal SVC. No pneumothorax. Worsening diffuse airspace opacification in the right lung  with moderate opacification also present at the left base.   Endotracheal tube  and enteric tube stable. No significant skeletal finding. Impression Interval placement of right IJ central venous catheter. No pneumothorax. Worsening airspace changes in the right lung. Micro:  No results for input(s): BC in the last 72 hours. No results for input(s): ORG in the last 72 hours. No results for input(s): Ronita [place] in the last 72 hours. No results for input(s): STREPPNEUMAG in the last 72 hours. No results for input(s): LEGUR in the last 72 hours. No results for input(s): ORG in the last 72 hours. No results for input(s): ASO in the last 72 hours.   Recent Labs     20  1724   CULTRESP Growth not present, incubation continues  Oral Pharyngeal Tiesha absent       Recent Labs     20  1633   LABURIN Growth not present, incubation continues     Vent Information  $Ventilation: $Subsequent Day  Skin Assessment: Clean, dry, & intact  Equipment ID: [id number]  Equipment Changed: (S) Humidification  Vent Type: 980  Vent Mode: AC/VC  Vt Ordered: 480 mL  Rate Set: 16 bmp  Peak Flow: 80 L/min  Pressure Support: 0 cmH20  FiO2 : 50 %  SpO2: 98 %  SpO2/FiO2 ratio: 196  PaO2/FiO2 ratio: 146  Sensitivity: 3  PEEP/CPAP: (S) 8  I Time/ I Time %: 0 s  Humidification Source: Heated wire  Humidification Temp: 37  Humidification Temp Measured: 37  Circuit Condensation: Drained  Mask Type: Full face mask  Mask Size: Large    Additional Respiratory  Assessments  Pulse: 51  Resp: (!) 43  SpO2: 98 %  Position: Semi-Lockwood's  Humidification Source: Heated wire  Humidification Temp: 37  Circuit Condensation: Drained  Oral Care Completed?: Yes  Oral Care: Mouth suctioned  Subglottic Suction Done?: Yes  Airway Type: ET  Airway Size: 7  Cuff Pressure (cm H2O): 29 cm H2O  Skin barrier applied: Yes    Objective:   Vitals:   Vitals:    11/10/20 1657   BP:    Pulse:    Resp: (!) 43   Temp:    SpO2: 98%      TEMP:Current: Temp: 97.5 °F (36.4 °C)  Max: Temp  Av °F (36.7 °C)  Min: 97 °F (36.1 °C)  Max: 100 °F (37.8 °C)    BP Range: Systolic (53NKZ), FUK:196 , Min:88 , AWQ:299     Diastolic (43HDI), MAR:65, Min:51, Max:107      General appearance: Sedated obese on mechanical ventilation , appears stated age awakens to touch  In no acute distress  Skin: No rashes or lesions  HEENT: mucous membranes are moist oral endotracheal tube and OG tube  Neck: No JVD  Lungs: symmetrical expansion, coarse breath sounds to auscultation, no use of accessory muscles  Heart: S1S2 no murmurs, tachycardic  Abdomen: soft, non tender, distended obese  Extremities: no peripheral edema  Neurologic: Sedated  Affect: Calm       Assessment:   Patient Active Problem List:    59-y.o M with history of DM, hypothyroid, COPD, HTN, HLD presented on 10/26 with shortness of breath for 1 week. Saturations were 70% at PCPs. Saturation 65% on room air in ER  Patient agitated for CT scan refused to lay flat  10/27 RRT refused BiPAP became combative. Patient transferred to ICU  10/28 intubated and sedated. CT chest showing dense multifocal airspace disease  10/30 Ultrasound lower extremities no DVT, ultrasound kidneys no hydronephrosis  10/31 bilateral infiltrates left effusion. On 50% +8 of PEEP. Chest x-ray showing right-sided improving with left base atelectasis. Covid negative x2    1. Acute hypoxemic respiratory failure on mechanical ventilation  2. Agitation on Seroquel Fentanyl  3. MSSA pneumonia on Unasyn  4. HANS moderate ( AHI 17 on 4/18/18 requires BIPAP 16/12)  5. History of reactive airway disease  6. CT 3/14/2018 showing pulmonary nodule stable from 10/2017  7. Acute renal failure improving - nephrology on board   8. Diabetes with elevated blood sugars  9. Obesity  10. EF 65% LVH  11. chronic lymphedema  12. Hypothyroid  13. H/o respiratory failure: 7/8/2017 pt was  transferred from [facility] for acute respiratory failure after lap cholecystectomy, lap umbilical hernia repair, and lap liver biopsy (fatty liver).  He had been extubated postop but had laryngospasm requiring reintubation, complicated by negative pressure flash pulmonary edema, and required tracheostomy 8/10/2017. Patient has staph aureus pneumonia and C. difficile colitis. Patient was then transferred to [facility] hospital where he was weaned off the ventilator and decannulated. Plan:     · Wean sedation as able, whil titrating antipsychotics.     · Family updated at bedside  · Propofol  · Sedation  · Plan for trach/PEG 11/11/2020  · LTACH evaluation and placement     Tramaine Amanda MD, [facility]  11/10/2020  5:07 PM

## 2020-11-10 NOTE — PROGRESS NOTES
Chief Complaint   Patient presents with    Shortness of Breath     for a few weeks. O2 sat in 70s at [facility], placed on 4L and now 95%. SUBJECTIVE:  Patient is tolerating medications. No reported adverse drug reactions. Patient remained afebrile overnight. Continue to be hypertensive. Patient reintubated 11/8/2020,  sedated on propofol, versed, fentanyl. Minimal secretions on suctioning   Out extubation             WBC 7.2, on Zosyn day 8 of antibiotic total  60%. FiO2  Afebrile-sedated  Fecal management system in place. Review of systems:  As stated above in the chief complaint, otherwise negative.     Medications:  Scheduled Meds:   senna  2 tablet Oral Nightly    metoclopramide  5 mg Intravenous Q6H    polyethylene glycol  17 g Oral Daily    diazePAM  5 mg Oral Q12H    insulin lispro  0-18 Units Subcutaneous Q4H    bumetanide  2 mg Intravenous BID    dexamethasone  4 mg Intravenous Q8H    piperacillin-tazobactam  3.375 g Intravenous Q8H    sodium chloride   Intravenous Q8H    carvedilol  3.125 mg Oral BID WC    QUEtiapine  75 mg Oral BID    folic acid  1 mg Intravenous Daily    thiamine (VITAMIN B1) IVPB  100 mg Intravenous Q24H    insulin glargine  20 Units Subcutaneous Nightly    sodium chloride (Inhalant)  2 mL Nebulization Q4H    heparin (porcine)  7,500 Units Subcutaneous Q8H    pantoprazole  40 mg Intravenous Daily    And    sodium chloride (PF)  10 mL Intravenous Daily    sodium chloride flush  10 mL Intravenous 2 times per day    Arformoterol Tartrate  15 mcg Nebulization BID    chlorhexidine  15 mL Mouth/Throat BID    amLODIPine  10 mg Oral Daily    aspirin  81 mg Oral Daily    levothyroxine  50 mcg Oral Daily    [Held by provider] losartan  100 mg Oral Daily    budesonide  0.5 mg Nebulization BID    ipratropium-albuterol  1 ampule Inhalation Q4H WA     Continuous Infusions:   midazolam 3 mg/hr (11/09/20 2004)    propofol 50 mcg/kg/min (11/10/20 1107)  fentaNYL 5 mcg/ml in D5W 250 ml infusion 200 mcg/hr (11/10/20 0958)    dextrose       PRN Meds:perflutren lipid microspheres, acetaminophen, polyvinyl alcohol, hydrALAZINE, sodium chloride flush, [DISCONTINUED] acetaminophen **OR** acetaminophen, colchicine, glucose, dextrose, glucagon (rDNA), dextrose    OBJECTIVE:  BP (!) 155/92   Pulse 93   Temp 97 °F (36.1 °C) (Esophageal)   Resp (!) 34   Ht 5' 8\" (1.727 m)   Wt 287 lb 6.4 oz (130.4 kg)   SpO2 98%   BMI 43.70 kg/m²   Temp  Av.7 °F (37.1 °C)  Min: 97 °F (36.1 °C)  Max: 100 °F (37.8 °C)  Constitutional: Intubated and Sedated, wakes up to stimulation , easily agitated   Skin: Warm and dry. No rashes were noted. HEENT: Round and reactive pupils. Moist mucous membranes. No ulcerations or thrush. Chest: Intubated. Symmetrical expansion. Some coarseness right upper lobe. Cardiovascular: S1 and S2 are rhythmic and regular. Systolic murmurs appreciated.    Abdomen: Hyperactive bowel sounds, obese abdomen, unable to palpate any masses   Extremities: No clubbing, no cyanosis, + Lymphedema left lower extremity, improving   Lines: CVC Right IJ 10/30, Right radial Lindsey     Laboratory and Tests Review:  Lab Results   Component Value Date    WBC 8.2 11/10/2020    WBC 11.3 2020    WBC 9.4 2020    HGB 10.2 (L) 11/10/2020    HCT 29.5 (L) 11/10/2020    MCV 76.6 (L) 11/10/2020     11/10/2020     Lab Results   Component Value Date    NEUTROABS 5.88 11/10/2020    NEUTROABS 9.49 (H) 2020    NEUTROABS 6.58 2020     No results found for: [facility]  Lab Results   Component Value Date     (H) 11/10/2020    AST 40 (H) 11/10/2020    ALKPHOS 118 11/10/2020    BILITOT 0.5 11/10/2020     Lab Results   Component Value Date     11/10/2020    K 4.0 11/10/2020    K 4.0 10/27/2020     11/10/2020    CO2 23 11/10/2020    BUN 32 11/10/2020    CREATININE 1.7 11/10/2020    CREATININE 1.8 2020    CREATININE 1.7 2020 RESPIRATORY   Date Value Ref Range Status   10/29/2020 Oral Pharyngeal Tiesha present (A)  Final   10/29/2020   Final    Light growth  This isolate is presumed to be resistant based on the  detection of inducible Clindamycin resistance. Clindamycin  may still be effective in some patients.        Culture Catheter Tip   Date Value Ref Range Status   08/26/2017 <15 colonies  Final     No results found for: BFCS  No results found for: CXSURG  Urine Culture, Routine   Date Value Ref Range Status   11/09/2020 Growth not present, incubation continues  Preliminary   10/28/2020 Growth not present  Final   09/16/2019 Growth not present  Final     MRSA Culture Only   Date Value Ref Range Status   10/30/2020 Methicillin resistant Staph aureus not isolated  Final   07/28/2017 Methicillin resistant Staph aureus not isolated  Final          Assessment:  · Acute on chronic hypoxic respiratory failure likely 2/2 pneumonia and volume overload  - resolving   · Bacterial pneumonia, likely aspiration pneumonia but after extubation patient had to be reintubated rule out HCAP  · Shock, sepsis- resolved, blood cultures negative   · Alcohol withdrawal      Plan:    · Continue Zosyn for aspiration pneumonia plus HCAP after reintubation  · Trach and PEG  · Repeat respiratory culture  · Monitor labs, primary management by ICU team  · Will follow with you    Eliu Fields  11:26 AM  11/10/2020

## 2020-11-10 NOTE — PROGRESS NOTES
11/09/2020    PCO2 39.7 11/09/2020    PO2 99.0 11/09/2020    AFJ0SPI 26.1 11/09/2020    HCO3 24.8 11/09/2020    BE 0.3 11/09/2020    THB 12.6 11/09/2020    O2SAT 96.9 11/09/2020     Medications     Infusions: (Fluid, Sedation, Vasopressors)  Sedation   Propofol at rate of 50mcg/kg/min;  Fentanyl at rate of 200mcg/hr, Versed at rate of 3mg/hr     Nutrition:   OG tube Goal rate: 60ml/h  ATB:   Antibiotics  Days   Zosyn 3             Skin issues: NAEO     Patient currently has   Urinary cath  Restraints - B/L wrists and ankles   DVT prophylaxis/ GI prophylaxis - Heparin, Protonix      Labs     CBC:   Lab Results   Component Value Date    WBC 8.2 11/10/2020    RBC 3.85 11/10/2020    HGB 10.2 11/10/2020    HCT 29.5 11/10/2020    MCV 76.6 11/10/2020    MCH 26.5 11/10/2020    MCHC 34.6 11/10/2020    RDW 20.0 11/10/2020     11/10/2020    MPV 9.6 11/10/2020     CMP:    Lab Results   Component Value Date     11/10/2020    K 4.0 11/10/2020    K 4.0 10/27/2020     11/10/2020    CO2 23 11/10/2020    BUN 32 11/10/2020    CREATININE 1.7 11/10/2020    GFRAA 50 11/10/2020    LABGLOM 50 11/10/2020    GLUCOSE 154 11/10/2020    PROT 7.2 11/10/2020    LABALBU 3.1 11/10/2020    CALCIUM 8.2 11/10/2020    BILITOT 0.5 11/10/2020    ALKPHOS 118 11/10/2020    AST 40 11/10/2020     11/10/2020     Ionized Calcium:  No results found for: IONCA  Magnesium:    Lab Results   Component Value Date    MG 1.7 11/10/2020     Phosphorus:    Lab Results   Component Value Date    PHOS 4.2 11/10/2020     Imaging Studies:  CXR 11/10/2020      KUB 11/10/2020     Narrative    EXAMINATION:    ONE SUPINE XRAY VIEW(S) OF THE ABDOMEN         11/10/2020 2:05 pm         COMPARISON:    November 8, 2020         HISTORY:    ORDERING SYSTEM PROVIDED HISTORY: evaluate bowel    TECHNOLOGIST PROVIDED HISTORY:    Reason for exam:->evaluate bowel    What reading provider will be dictating this exam?->CRC         FINDINGS:    NG tube tip is in the gastric lumen. Vasiliy Casey is no free air or bowel wall    pneumatosis.  No dilated segments of bowel are evident.              Impression    NG tube tip in the gastric lumen. Resident's Assessment and Plan     Cardiology:   1. Volume overload in setting of Hx HFpEF (EF 65% w/ indeterminate DD as of 10/26/2020)  · Pro- as of 11/9/2020    · Continue Bumex IV BID   · Continue ASA 81mg PO daily   · Continue to monitor U/O   · Daily weights   · Continue to monitor pt vitals     2. Hx of HTN   · Continue Amlodipine PO daily  · Continue Bumex IV BID   · Continue Coreg PO BID   · Continue Hydralazine PRN      Pulmonary:   1. Acute hypoxic hypercapnic respiratory failure likely 2/2 CAP vs chronic OHS vs fluid overload in setting of chronic HANS noncompliant with CPAP  · Respiratory cx growing MSSA on 10/31/2020  · CXR showing interval worsening of B/L effusion vs infiltrate, L>R   · Continue sedation and intubation - wean FiO2 as tolerated   · Pt for trach and PEG per General Surgery   · Continue Zosyn   · Continue Decadron 4mg IV q12 hrs - will wean over the next three doses before discontinuing per Pharmacy   · Continue 3% Normal saline nebulizer solution  · Continue Brovana, Pulmicort, and Duonebs   · Infectious Disease following, appreciate further recs   · Pulmonology following, appreciate further recs   · Continue to monitor daily labs - will replete electrolytes as necessary   · Daily CXR and ABGs     Nephrology (Fluids/ Electrolytes & Nutrition):   1. NANCY - likely pre-renal vs intrinsic renal in setting of ?cardiorenal syndrome w/ concomitant bradycardia and IV contrast and diuretic use. · Continue Bumex IV BID   · Continue daily BMP - will replete electrolytes as necessary   · Continue to monitor U/O   · Daily CXR to monitor volume status   · Nephrology following, appreciate further recommendations     Gastroenterology:   1.  Transaminitis likely 2/2 Lipitor use   · Continue holding Lipitor at this time · Continue trending LFTs   ·   Endocrine:   1. Type 2 DM  · Continue Lantus 20U   · Continue HDSS   · Continue POCT BG q4hrs   · Hypoglycemia protocol in place     2. Hx of hypothyroidism   · Continue Synthroid 50mcg PO daily     Code Status:   FULL     PT/OT Consult: PT/OT consutled   Disposition: Continue current level of care     Alissa Greenberg DO, PGY-1  Attending physician: Dr. Zoya Hammer     I personally saw, examined and provided care for the patient. Radiographs, labs and medication list were reviewed by me independently. I spoke with bedside nursing, therapists and consultants. Critical care services and times documented are independent of procedures and multidisciplinary rounds with Residents. Additionally comprehensive, multidisciplinary rounds were conducted with the MICU team. The case was discussed in detail and plans for care were established. Review of Residents documentation was conducted and revisions were made as appropriate. I agree with the above documented exam, problem list and plan of care. \Hypercapnic respiratory  COPD/Pneuminia  Fail extubation  reintubated  Lasix ,negative 7 L   Agitated - on seroquel, valium - wean versed   Trach and PEG?     Mariana Richardson MD,[facility]  Pulmonary&Critical Care Medicine   Director of [facility]  Medical Director of [address] [place] [address]    Agnes Kim

## 2020-11-10 NOTE — PROGRESS NOTES
Patient reaches for ETT, lines and tubes when sedation is decreased and or an attempt to release restraints is made by nurse. Patient is very agitated and uses all extremities and his full body force to attempt to remove said life sustaining lines and tubes, patient is unable to comprehend safety needs at this time.  Restraints continued for patient safety

## 2020-11-10 NOTE — PROGRESS NOTES
Nephrology Progress Note  Patient's Name: Bharath Marie  10:58 AM  11/10/2020        Reason for Consult: Acute kidney  Requesting Physician:  Scooter Morataya MD    Chief Complaint: Shortness of breath  History Obtained From:  EHR; spouse    History of Present Ilness:    Bharath Marie is a 61 y.o. male with a history of COPD, hypertension, hyperlipidemia and diabetes mellitus. He presented to ED 2 days ago with complaints of shortness of breath. According to patient's spouse he had been complaining of shortness of breath over the course of several weeks. This has gotten progressively worse. He went to see his PCP on the day of admission. In office at the time pulse oximeter obtained revealed his oxygen saturation to be in the 70s. He was instructed to proceed to ED for further evaluation. On presentation to ED his initial vital signs showed a blood pressure of 168/71, temperature 97.5 and pulse of 93. His pulse oximeter at the time was noted to be 98% on room air. Laboratory data was significant for a BUN of 11, creatinine 1.1, WBC of 8.3. Rapid COVID-19 testing was negative. A chest x-ray performed revealed right infrahilar and basilar infiltrate concerning for pneumonia. A CTA was ordered but patient declined because of his inability to lay flat. Patient was given ceftriaxone and doxycycline followed by admission to telemetry. 2 days ago an RRT was called as patient was noted to be increasingly more in respiratory distress. He was transferred to MICU and intubated. Laboratory data yesterday showed worsening serum creatinine to 2.8. This a.m. further worsening to 3.6. He has been nonoliguric. Hence renal consult. 10/30: N new acute issues overnight; remains intubated  10/31: Polyuric , concerned for possible DI; no other acute issues overnight  11/1: agitated overnight ; remains intubated  11/2: pt seen and examined.  Chart reviewed, pt intubated  11/3: pt seen and examined in icu, pt intubated  11/4: pt seen and examined in icu, no overnight events, intubated  11/5: pt seen in room ,remains intubated  11/6: pt seen in room, intubated. 11/7: pt seen in room, intubated, chart reviewed  11/8:pt seen in room, remains intuibated  11/9: pt seen in room, reintubated yesterday  11/10: pt seen in icu, intubated.        Allergies:  Bee venom and Shellfish-derived products    Current Medications:    senna (SENOKOT) tablet 17.2 mg, Nightly  metoclopramide (REGLAN) injection 5 mg, Q6H  polyethylene glycol (GLYCOLAX) packet 17 g, Daily  perflutren lipid microspheres (DEFINITY) injection 1.65 mg, ONCE PRN  diazePAM (VALIUM) tablet 5 mg, Q12H  insulin lispro (HUMALOG) injection vial 0-18 Units, Q4H  acetaminophen (TYLENOL) 160 MG/5ML solution 650 mg, Q6H PRN  midazolam (VERSED) 100 mg in dextrose 5 % 100 mL infusion, Continuous  bumetanide (BUMEX) injection 2 mg, BID  dexamethasone (DECADRON) injection 4 mg, Q8H  piperacillin-tazobactam (ZOSYN) 3.375 g in dextrose 5 % 100 mL IVPB extended infusion (mini-bag), Q8H  Zosyn Flush (0.9 % sodium chloride infusion), Q8H  carvedilol (COREG) tablet 3.125 mg, BID WC  propofol injection, Titrated  QUEtiapine (SEROQUEL) tablet 75 mg, BID  fentaNYL 5 mcg/mL in D5W 250 mL infusion, Continuous  folic acid injection 1 mg, Daily  thiamine (B-1) 100 mg in sodium chloride 0.9 % 100 mL IVPB, Q24H  polyvinyl alcohol (LIQUIFILM TEARS) 1.4 % ophthalmic solution 1 drop, Q4H PRN  insulin glargine (LANTUS) injection vial 20 Units, Nightly  sodium chloride (Inhalant) 3 % nebulizer solution 2 mL, Q4H  hydrALAZINE (APRESOLINE) injection 10 mg, Q4H PRN  heparin (porcine) injection 7,500 Units, Q8H  pantoprazole (PROTONIX) injection 40 mg, Daily    And  sodium chloride (PF) 0.9 % injection 10 mL, Daily  sodium chloride flush 0.9 % injection 10 mL, 2 times per day  sodium chloride flush 0.9 % injection 10 mL, PRN  acetaminophen (TYLENOL) suppository 650 mg, Q6H PRN  Arformoterol Tartrate (BROVANA) nebulizer solution 15 mcg, BID  chlorhexidine (PERIDEX) 0.12 % solution 15 mL, BID  amLODIPine (NORVASC) tablet 10 mg, Daily  aspirin chewable tablet 81 mg, Daily  colchicine (COLCRYS) tablet 0.6 mg, BID PRN  levothyroxine (SYNTHROID) tablet 50 mcg, Daily  [Held by provider] losartan (COZAAR) tablet 100 mg, Daily  budesonide (PULMICORT) nebulizer suspension 500 mcg, BID  ipratropium-albuterol (DUONEB) nebulizer solution 1 ampule, Q4H WA  glucose (GLUTOSE) 40 % oral gel 15 g, PRN  dextrose 50 % IV solution, PRN  glucagon (rDNA) injection 1 mg, PRN  dextrose 5 % solution, PRN        Review of Systems:   Review of systems not obtained due to patient factors. Physical exam:  Vitals:    11/10/20 1000   BP: (!) 155/92   Pulse: 93   Resp: (!) 34   Temp:    SpO2: 98%           General: Intubated sedated  Eyes: PERRL. No sclera icterus. No conjunctival injection. ENT: No discharge. Pharynx clear. Neck: Trachea midline. Normal thyroid. Lungs: Coarse breath sounds  CV: Regular rate. Regular rhythm. No murmur or rub. .   Abd:  Non-distended. No masses. No organmegaly. Normal bowel sounds. Skin: Warm and dry. No nodule on exposed extremities. No rash on exposed extremities.   Ext: No cyanosis, clubbing, edema; 2+ pitting bilateral lower extremity edema L>R  Neuro: sedate lightly, but arouses and is agitated      Data:   Labs:  Lab Results   Component Value Date     11/10/2020     11/09/2020     11/09/2020    K 4.0 11/10/2020    K 4.2 11/09/2020    K 5.4 (H) 11/09/2020     11/10/2020    CO2 23 11/10/2020    CO2 23 11/09/2020    CO2 22 11/09/2020    CREATININE 1.7 (H) 11/10/2020    CREATININE 1.8 (H) 11/09/2020    CREATININE 1.7 (H) 11/09/2020    BUN 32 (H) 11/10/2020    BUN 29 (H) 11/09/2020    BUN 28 (H) 11/09/2020    GLUCOSE 154 (H) 11/10/2020    GLUCOSE 172 (H) 11/09/2020    GLUCOSE 157 (H) 11/09/2020    PHOS 4.2 11/10/2020    PHOS 5.1 (H) 11/09/2020    PHOS 3.5 11/08/2020    WBC 8.2 11/10/2020    WBC 11.3 11/09/2020    WBC 9.4 11/08/2020    HGB 10.2 (L) 11/10/2020    HGB 11.2 (L) 11/09/2020    HGB 11.1 (L) 11/08/2020    HCT 29.5 (L) 11/10/2020    HCT 33.0 (L) 11/09/2020    HCT 32.8 (L) 11/08/2020    MCV 76.6 (L) 11/10/2020     11/10/2020         Imaging:  Xr Chest Portable    Result Date: 10/27/2020  EXAMINATION: ONE XRAY VIEW OF THE CHEST 10/27/2020 7:58 pm COMPARISON: October 26, 2020 HISTORY: ORDERING SYSTEM PROVIDED HISTORY: SOB TECHNOLOGIST PROVIDED HISTORY: Reason for exam:->SOB What reading provider will be dictating this exam?->CRC FINDINGS: There is mild cardiomegaly. There is patchy perihilar and bibasilar atelectasis/infiltrates. Tiny pleural effusions are suspected. Progressive bibasilar atelectasis/infiltrates concerning for pneumonia. Underlying CHF is considered. Xr Chest Portable    Result Date: 10/26/2020  EXAMINATION: ONE XRAY VIEW OF THE CHEST 10/26/2020 4:11 pm COMPARISON: 05/26/2019 HISTORY: ORDERING SYSTEM PROVIDED HISTORY: SOB TECHNOLOGIST PROVIDED HISTORY: Reason for exam:->SOB What reading provider will be dictating this exam?->CRC FINDINGS: Cardiac size appears stable. Discoid airspace disease seen at the left lung base which may represent atelectasis or scarring. Right infrahilar and basilar infiltrate is identified. No pneumothorax is identified. No acute osseous abnormality is identified. Right infrahilar and basilar infiltrate concerning for pneumonia. Left basilar atelectasis or scarring. Assessment/Plans  1. Acute kidney injury  Baseline 1.8 from 9/2019, 1.1 in 5/2019  hemodynamically mediated due to the combination of bradycardia,  diuretic and ARB use; this is exacerbated by IV contrast use   Continue bumex, good response  bumex iv qd  Out 2.9L last 24 h, -5.6L overall    2. Acute hypoxic respiratory failure  due to multilobar pneumonia  gradual wean  On unasyn  Off vanco  Failed extubation 11/8  bumex 2 mg iv q 12    3.  Type 2 diabetes mellitus  increasing blood sugars, most likely steroid induced    4. etoh abuse    5. Hypernatremia  imrpoved on lower dose bumex    6.  Hyperkalemia  Nl now  Renal tube feed            Rip Stephenson MD  10:58 AM  11/10/2020

## 2020-11-11 ENCOUNTER — ANESTHESIA EVENT (OUTPATIENT)
Dept: OPERATING ROOM | Age: 60
DRG: 003 | End: 2020-11-11
Payer: MEDICARE

## 2020-11-11 ENCOUNTER — ANESTHESIA (OUTPATIENT)
Dept: OPERATING ROOM | Age: 60
DRG: 003 | End: 2020-11-11
Payer: MEDICARE

## 2020-11-11 ENCOUNTER — APPOINTMENT (OUTPATIENT)
Dept: GENERAL RADIOLOGY | Age: 60
DRG: 003 | End: 2020-11-11
Payer: MEDICARE

## 2020-11-11 ENCOUNTER — APPOINTMENT (OUTPATIENT)
Dept: CT IMAGING | Age: 60
DRG: 003 | End: 2020-11-11
Payer: MEDICARE

## 2020-11-11 VITALS
OXYGEN SATURATION: 99 % | DIASTOLIC BLOOD PRESSURE: 71 MMHG | SYSTOLIC BLOOD PRESSURE: 131 MMHG | RESPIRATION RATE: 12 BRPM

## 2020-11-11 LAB
ALBUMIN SERPL-MCNC: 3.3 G/DL (ref 3.5–5.2)
ALP BLD-CCNC: 151 U/L (ref 40–129)
ALT SERPL-CCNC: 142 U/L (ref 0–40)
ANION GAP SERPL CALCULATED.3IONS-SCNC: 16 MMOL/L (ref 7–16)
ANISOCYTOSIS: ABNORMAL
AST SERPL-CCNC: 76 U/L (ref 0–39)
BASOPHILS ABSOLUTE: 0.01 E9/L (ref 0–0.2)
BASOPHILS RELATIVE PERCENT: 0.1 % (ref 0–2)
BILIRUB SERPL-MCNC: 0.4 MG/DL (ref 0–1.2)
BUN BLDV-MCNC: 32 MG/DL (ref 8–23)
CALCIUM IONIZED: 1.14 MMOL/L (ref 1.15–1.33)
CALCIUM SERPL-MCNC: 8.7 MG/DL (ref 8.6–10.2)
CHLORIDE BLD-SCNC: 99 MMOL/L (ref 98–107)
CO2: 22 MMOL/L (ref 22–29)
CREAT SERPL-MCNC: 1.6 MG/DL (ref 0.7–1.2)
CULTURE, RESPIRATORY: NORMAL
EOSINOPHILS ABSOLUTE: 0.13 E9/L (ref 0.05–0.5)
EOSINOPHILS RELATIVE PERCENT: 1.8 % (ref 0–6)
GFR AFRICAN AMERICAN: 53
GFR NON-AFRICAN AMERICAN: 53 ML/MIN/1.73
GLUCOSE BLD-MCNC: 144 MG/DL (ref 74–99)
HCT VFR BLD CALC: 33.3 % (ref 37–54)
HEMOGLOBIN: 11.4 G/DL (ref 12.5–16.5)
HYPOCHROMIA: ABNORMAL
IMMATURE GRANULOCYTES #: 0.03 E9/L
IMMATURE GRANULOCYTES %: 0.4 % (ref 0–5)
LYMPHOCYTES ABSOLUTE: 1.76 E9/L (ref 1.5–4)
LYMPHOCYTES RELATIVE PERCENT: 24.9 % (ref 20–42)
MAGNESIUM: 2 MG/DL (ref 1.6–2.6)
MCH RBC QN AUTO: 26.5 PG (ref 26–35)
MCHC RBC AUTO-ENTMCNC: 34.2 % (ref 32–34.5)
MCV RBC AUTO: 77.3 FL (ref 80–99.9)
METER GLUCOSE: 112 MG/DL (ref 74–99)
METER GLUCOSE: 112 MG/DL (ref 74–99)
METER GLUCOSE: 135 MG/DL (ref 74–99)
METER GLUCOSE: 142 MG/DL (ref 74–99)
METER GLUCOSE: 143 MG/DL (ref 74–99)
METER GLUCOSE: 155 MG/DL (ref 74–99)
MONOCYTES ABSOLUTE: 0.56 E9/L (ref 0.1–0.95)
MONOCYTES RELATIVE PERCENT: 7.9 % (ref 2–12)
MRSA CULTURE ONLY: NORMAL
NEUTROPHILS ABSOLUTE: 4.58 E9/L (ref 1.8–7.3)
NEUTROPHILS RELATIVE PERCENT: 64.9 % (ref 43–80)
OVALOCYTES: ABNORMAL
PDW BLD-RTO: 20.1 FL (ref 11.5–15)
PHOSPHORUS: 4.3 MG/DL (ref 2.5–4.5)
PLATELET # BLD: 309 E9/L (ref 130–450)
PMV BLD AUTO: 10.2 FL (ref 7–12)
POIKILOCYTES: ABNORMAL
POLYCHROMASIA: ABNORMAL
POTASSIUM SERPL-SCNC: 4.2 MMOL/L (ref 3.5–5)
RBC # BLD: 4.31 E12/L (ref 3.8–5.8)
SMEAR, RESPIRATORY: NORMAL
SODIUM BLD-SCNC: 137 MMOL/L (ref 132–146)
TARGET CELLS: ABNORMAL
TOTAL PROTEIN: 7.9 G/DL (ref 6.4–8.3)
URINE CULTURE, ROUTINE: NORMAL
WBC # BLD: 7.1 E9/L (ref 4.5–11.5)

## 2020-11-11 PROCEDURE — 99233 SBSQ HOSP IP/OBS HIGH 50: CPT | Performed by: INTERNAL MEDICINE

## 2020-11-11 PROCEDURE — 6370000000 HC RX 637 (ALT 250 FOR IP): Performed by: INTERNAL MEDICINE

## 2020-11-11 PROCEDURE — 6360000002 HC RX W HCPCS: Performed by: INTERNAL MEDICINE

## 2020-11-11 PROCEDURE — 3600000003 HC SURGERY LEVEL 3 BASE: Performed by: SURGERY

## 2020-11-11 PROCEDURE — 36415 COLL VENOUS BLD VENIPUNCTURE: CPT

## 2020-11-11 PROCEDURE — 43246 EGD PLACE GASTROSTOMY TUBE: CPT | Performed by: SURGERY

## 2020-11-11 PROCEDURE — 6360000002 HC RX W HCPCS: Performed by: SPECIALIST

## 2020-11-11 PROCEDURE — 84100 ASSAY OF PHOSPHORUS: CPT

## 2020-11-11 PROCEDURE — 71045 X-RAY EXAM CHEST 1 VIEW: CPT

## 2020-11-11 PROCEDURE — 85025 COMPLETE CBC W/AUTO DIFF WBC: CPT

## 2020-11-11 PROCEDURE — 2500000003 HC RX 250 WO HCPCS: Performed by: INTERNAL MEDICINE

## 2020-11-11 PROCEDURE — 0BB10ZZ EXCISION OF TRACHEA, OPEN APPROACH: ICD-10-PCS | Performed by: RADIOLOGY

## 2020-11-11 PROCEDURE — 94003 VENT MGMT INPAT SUBQ DAY: CPT

## 2020-11-11 PROCEDURE — 70490 CT SOFT TISSUE NECK W/O DYE: CPT

## 2020-11-11 PROCEDURE — 2580000003 HC RX 258: Performed by: SPECIALIST

## 2020-11-11 PROCEDURE — 82330 ASSAY OF CALCIUM: CPT

## 2020-11-11 PROCEDURE — C9113 INJ PANTOPRAZOLE SODIUM, VIA: HCPCS | Performed by: INTERNAL MEDICINE

## 2020-11-11 PROCEDURE — 2500000003 HC RX 250 WO HCPCS

## 2020-11-11 PROCEDURE — 80053 COMPREHEN METABOLIC PANEL: CPT

## 2020-11-11 PROCEDURE — 82962 GLUCOSE BLOOD TEST: CPT

## 2020-11-11 PROCEDURE — 2000000000 HC ICU R&B

## 2020-11-11 PROCEDURE — 3700000001 HC ADD 15 MINUTES (ANESTHESIA): Performed by: SURGERY

## 2020-11-11 PROCEDURE — 2580000003 HC RX 258: Performed by: INTERNAL MEDICINE

## 2020-11-11 PROCEDURE — 83735 ASSAY OF MAGNESIUM: CPT

## 2020-11-11 PROCEDURE — 2709999900 HC NON-CHARGEABLE SUPPLY: Performed by: SURGERY

## 2020-11-11 PROCEDURE — 3700000000 HC ANESTHESIA ATTENDED CARE: Performed by: SURGERY

## 2020-11-11 PROCEDURE — 94640 AIRWAY INHALATION TREATMENT: CPT

## 2020-11-11 PROCEDURE — 0DH63UZ INSERTION OF FEEDING DEVICE INTO STOMACH, PERCUTANEOUS APPROACH: ICD-10-PCS | Performed by: SURGERY

## 2020-11-11 PROCEDURE — 3600000013 HC SURGERY LEVEL 3 ADDTL 15MIN: Performed by: SURGERY

## 2020-11-11 RX ORDER — ROCURONIUM BROMIDE 10 MG/ML
INJECTION, SOLUTION INTRAVENOUS PRN
Status: DISCONTINUED | OUTPATIENT
Start: 2020-11-11 | End: 2020-11-11 | Stop reason: SDUPTHER

## 2020-11-11 RX ORDER — CALCIUM CHLORIDE 100 MG/ML
1 INJECTION INTRAVENOUS; INTRAVENTRICULAR ONCE
Status: COMPLETED | OUTPATIENT
Start: 2020-11-11 | End: 2020-11-11

## 2020-11-11 RX ORDER — QUETIAPINE FUMARATE 100 MG/1
100 TABLET, FILM COATED ORAL 2 TIMES DAILY
Status: DISCONTINUED | OUTPATIENT
Start: 2020-11-11 | End: 2020-01-01

## 2020-11-11 RX ORDER — CHLORDIAZEPOXIDE HYDROCHLORIDE 25 MG/1
25 CAPSULE, GELATIN COATED ORAL EVERY 8 HOURS
Status: DISCONTINUED | OUTPATIENT
Start: 2020-11-11 | End: 2020-01-01

## 2020-11-11 RX ADMIN — QUETIAPINE FUMARATE 100 MG: 100 TABLET ORAL at 20:38

## 2020-11-11 RX ADMIN — CHLORHEXIDINE GLUCONATE 0.12% ORAL RINSE 15 ML: 1.2 LIQUID ORAL at 20:38

## 2020-11-11 RX ADMIN — ROCURONIUM BROMIDE 20 MG: 10 INJECTION, SOLUTION INTRAVENOUS at 13:39

## 2020-11-11 RX ADMIN — CALCIUM CHLORIDE 1 G: 100 INJECTION, SOLUTION INTRAVENOUS; INTRAVENTRICULAR at 06:24

## 2020-11-11 RX ADMIN — PROPOFOL 50 MCG/KG/MIN: 10 INJECTION, EMULSION INTRAVENOUS at 22:40

## 2020-11-11 RX ADMIN — Medication 200 MCG/HR: at 14:39

## 2020-11-11 RX ADMIN — SODIUM CHLORIDE SOLN NEBU 3% 2 ML: 3 NEBU SOLN at 04:16

## 2020-11-11 RX ADMIN — PROPOFOL 50 MCG/KG/MIN: 10 INJECTION, EMULSION INTRAVENOUS at 15:10

## 2020-11-11 RX ADMIN — ROCURONIUM BROMIDE 10 MG: 10 INJECTION, SOLUTION INTRAVENOUS at 13:20

## 2020-11-11 RX ADMIN — PIPERACILLIN AND TAZOBACTAM 3.38 G: 3; .375 INJECTION, POWDER, FOR SOLUTION INTRAVENOUS at 22:55

## 2020-11-11 RX ADMIN — SODIUM CHLORIDE, PRESERVATIVE FREE 10 ML: 5 INJECTION INTRAVENOUS at 08:37

## 2020-11-11 RX ADMIN — THIAMINE HYDROCHLORIDE 100 MG: 100 INJECTION, SOLUTION INTRAMUSCULAR; INTRAVENOUS at 17:31

## 2020-11-11 RX ADMIN — HEPARIN SODIUM 7500 UNITS: 10000 INJECTION INTRAVENOUS; SUBCUTANEOUS at 08:34

## 2020-11-11 RX ADMIN — SODIUM CHLORIDE SOLN NEBU 3% 2 ML: 3 NEBU SOLN at 23:41

## 2020-11-11 RX ADMIN — PIPERACILLIN AND TAZOBACTAM 3.38 G: 3; .375 INJECTION, POWDER, FOR SOLUTION INTRAVENOUS at 15:47

## 2020-11-11 RX ADMIN — Medication 200 MCG/HR: at 20:15

## 2020-11-11 RX ADMIN — HYDRALAZINE HYDROCHLORIDE 10 MG: 20 INJECTION, SOLUTION INTRAMUSCULAR; INTRAVENOUS at 03:42

## 2020-11-11 RX ADMIN — ROCURONIUM BROMIDE 40 MG: 10 INJECTION, SOLUTION INTRAVENOUS at 12:42

## 2020-11-11 RX ADMIN — PANTOPRAZOLE SODIUM 40 MG: 40 INJECTION, POWDER, FOR SOLUTION INTRAVENOUS at 08:31

## 2020-11-11 RX ADMIN — IPRATROPIUM BROMIDE AND ALBUTEROL SULFATE 1 AMPULE: 2.5; .5 SOLUTION RESPIRATORY (INHALATION) at 16:02

## 2020-11-11 RX ADMIN — PROPOFOL 50 MCG/KG/MIN: 10 INJECTION, EMULSION INTRAVENOUS at 20:15

## 2020-11-11 RX ADMIN — IPRATROPIUM BROMIDE AND ALBUTEROL SULFATE 1 AMPULE: 2.5; .5 SOLUTION RESPIRATORY (INHALATION) at 08:33

## 2020-11-11 RX ADMIN — HEPARIN SODIUM 7500 UNITS: 10000 INJECTION INTRAVENOUS; SUBCUTANEOUS at 17:32

## 2020-11-11 RX ADMIN — DEXAMETHASONE SODIUM PHOSPHATE 4 MG: 4 INJECTION, SOLUTION INTRAMUSCULAR; INTRAVENOUS at 15:47

## 2020-11-11 RX ADMIN — CHLORDIAZEPOXIDE HYDROCHLORIDE 25 MG: 25 CAPSULE ORAL at 18:10

## 2020-11-11 RX ADMIN — PROPOFOL 40 MCG/KG/MIN: 10 INJECTION, EMULSION INTRAVENOUS at 01:40

## 2020-11-11 RX ADMIN — MIDAZOLAM 5 MG/HR: 5 INJECTION INTRAMUSCULAR; INTRAVENOUS at 20:28

## 2020-11-11 RX ADMIN — HEPARIN SODIUM 7500 UNITS: 10000 INJECTION INTRAVENOUS; SUBCUTANEOUS at 01:36

## 2020-11-11 RX ADMIN — MIDAZOLAM HYDROCHLORIDE 5 MG/HR: 5 INJECTION, SOLUTION INTRAMUSCULAR; INTRAVENOUS at 11:26

## 2020-11-11 RX ADMIN — PROPOFOL 50 MCG/KG/MIN: 10 INJECTION, EMULSION INTRAVENOUS at 18:09

## 2020-11-11 RX ADMIN — SODIUM CHLORIDE SOLN NEBU 3% 2 ML: 3 NEBU SOLN at 19:50

## 2020-11-11 RX ADMIN — SODIUM CHLORIDE SOLN NEBU 3% 2 ML: 3 NEBU SOLN at 11:29

## 2020-11-11 RX ADMIN — Medication 10 ML: at 08:38

## 2020-11-11 RX ADMIN — PROPOFOL 50 MCG/KG/MIN: 10 INJECTION, EMULSION INTRAVENOUS at 11:35

## 2020-11-11 RX ADMIN — CHLORHEXIDINE GLUCONATE 0.12% ORAL RINSE 15 ML: 1.2 LIQUID ORAL at 08:37

## 2020-11-11 RX ADMIN — BUDESONIDE 500 MCG: 0.5 SUSPENSION RESPIRATORY (INHALATION) at 08:33

## 2020-11-11 RX ADMIN — BUDESONIDE 500 MCG: 0.5 SUSPENSION RESPIRATORY (INHALATION) at 19:50

## 2020-11-11 RX ADMIN — CARVEDILOL 3.12 MG: 6.25 TABLET, FILM COATED ORAL at 17:47

## 2020-11-11 RX ADMIN — IPRATROPIUM BROMIDE AND ALBUTEROL SULFATE 1 AMPULE: 2.5; .5 SOLUTION RESPIRATORY (INHALATION) at 19:50

## 2020-11-11 RX ADMIN — PIPERACILLIN AND TAZOBACTAM 3.38 G: 3; .375 INJECTION, POWDER, FOR SOLUTION INTRAVENOUS at 06:24

## 2020-11-11 RX ADMIN — IPRATROPIUM BROMIDE AND ALBUTEROL SULFATE 1 AMPULE: 2.5; .5 SOLUTION RESPIRATORY (INHALATION) at 11:28

## 2020-11-11 RX ADMIN — SENNOSIDES 17.2 MG: 8.6 TABLET, FILM COATED ORAL at 20:38

## 2020-11-11 RX ADMIN — SODIUM CHLORIDE SOLN NEBU 3% 2 ML: 3 NEBU SOLN at 08:34

## 2020-11-11 RX ADMIN — PROPOFOL 50 MCG/KG/MIN: 10 INJECTION, EMULSION INTRAVENOUS at 06:24

## 2020-11-11 RX ADMIN — ARFORMOTEROL TARTRATE 15 MCG: 15 SOLUTION RESPIRATORY (INHALATION) at 08:33

## 2020-11-11 RX ADMIN — PROPOFOL 50 MCG/KG/MIN: 10 INJECTION, EMULSION INTRAVENOUS at 03:47

## 2020-11-11 RX ADMIN — ARFORMOTEROL TARTRATE 15 MCG: 15 SOLUTION RESPIRATORY (INHALATION) at 19:50

## 2020-11-11 RX ADMIN — DEXAMETHASONE SODIUM PHOSPHATE 4 MG: 4 INJECTION, SOLUTION INTRAMUSCULAR; INTRAVENOUS at 01:40

## 2020-11-11 RX ADMIN — AMLODIPINE BESYLATE 10 MG: 10 TABLET ORAL at 08:24

## 2020-11-11 RX ADMIN — SODIUM CHLORIDE SOLN NEBU 3% 2 ML: 3 NEBU SOLN at 00:21

## 2020-11-11 RX ADMIN — PROPOFOL 50 MCG/KG/MIN: 10 INJECTION, EMULSION INTRAVENOUS at 09:03

## 2020-11-11 RX ADMIN — SODIUM CHLORIDE SOLN NEBU 3% 2 ML: 3 NEBU SOLN at 16:02

## 2020-11-11 RX ADMIN — FOLIC ACID 1 MG: 5 INJECTION, SOLUTION INTRAMUSCULAR; INTRAVENOUS; SUBCUTANEOUS at 08:51

## 2020-11-11 RX ADMIN — Medication 200 MCG/HR: at 06:25

## 2020-11-11 RX ADMIN — HYDRALAZINE HYDROCHLORIDE 10 MG: 20 INJECTION, SOLUTION INTRAMUSCULAR; INTRAVENOUS at 19:07

## 2020-11-11 RX ADMIN — LEVOTHYROXINE SODIUM 50 MCG: 0.05 TABLET ORAL at 06:24

## 2020-11-11 ASSESSMENT — PULMONARY FUNCTION TESTS
PIF_VALUE: 39
PIF_VALUE: 43
PIF_VALUE: 38
PIF_VALUE: 43
PIF_VALUE: 42
PIF_VALUE: 35
PIF_VALUE: 35
PIF_VALUE: 36
PIF_VALUE: 39
PIF_VALUE: 40
PIF_VALUE: 41
PIF_VALUE: 40
PIF_VALUE: 36
PIF_VALUE: 32
PIF_VALUE: 39
PIF_VALUE: 33
PIF_VALUE: 42
PIF_VALUE: 41
PIF_VALUE: 39
PIF_VALUE: 35
PIF_VALUE: 38
PIF_VALUE: 38
PIF_VALUE: 34
PIF_VALUE: 43
PIF_VALUE: 0
PIF_VALUE: 44
PIF_VALUE: 39
PIF_VALUE: 40
PIF_VALUE: 45
PIF_VALUE: 39
PIF_VALUE: 39
PIF_VALUE: 34
PIF_VALUE: 40
PIF_VALUE: 39
PIF_VALUE: 0
PIF_VALUE: 40
PIF_VALUE: 40
PIF_VALUE: 34
PIF_VALUE: 41
PIF_VALUE: 37
PIF_VALUE: 0
PIF_VALUE: 39
PIF_VALUE: 42
PIF_VALUE: 37
PIF_VALUE: 36
PIF_VALUE: 34
PIF_VALUE: 40
PIF_VALUE: 36
PIF_VALUE: 43
PIF_VALUE: 45
PIF_VALUE: 41
PIF_VALUE: 37
PIF_VALUE: 3
PIF_VALUE: 41
PIF_VALUE: 41
PIF_VALUE: 33
PIF_VALUE: 41
PIF_VALUE: 39
PIF_VALUE: 37
PIF_VALUE: 39
PIF_VALUE: 40
PIF_VALUE: 35
PIF_VALUE: 34
PIF_VALUE: 40
PIF_VALUE: 44
PIF_VALUE: 39
PIF_VALUE: 40
PIF_VALUE: 44
PIF_VALUE: 41
PIF_VALUE: 34
PIF_VALUE: 33
PIF_VALUE: 37
PIF_VALUE: 31
PIF_VALUE: 39
PIF_VALUE: 42
PIF_VALUE: 40
PIF_VALUE: 40
PIF_VALUE: 44
PIF_VALUE: 0
PIF_VALUE: 42
PIF_VALUE: 42
PIF_VALUE: 40
PIF_VALUE: 35
PIF_VALUE: 34
PIF_VALUE: 40
PIF_VALUE: 39
PIF_VALUE: 0
PIF_VALUE: 42
PIF_VALUE: 40
PIF_VALUE: 42
PIF_VALUE: 35
PIF_VALUE: 34
PIF_VALUE: 38
PIF_VALUE: 31
PIF_VALUE: 40
PIF_VALUE: 39
PIF_VALUE: 40
PIF_VALUE: 0
PIF_VALUE: 40
PIF_VALUE: 41
PIF_VALUE: 35
PIF_VALUE: 40
PIF_VALUE: 40
PIF_VALUE: 41
PIF_VALUE: 39
PIF_VALUE: 42
PIF_VALUE: 36
PIF_VALUE: 38
PIF_VALUE: 38
PIF_VALUE: 42
PIF_VALUE: 34
PIF_VALUE: 42
PIF_VALUE: 37
PIF_VALUE: 42
PIF_VALUE: 34
PIF_VALUE: 40
PIF_VALUE: 39
PIF_VALUE: 40
PIF_VALUE: 36

## 2020-11-11 ASSESSMENT — COPD QUESTIONNAIRES: CAT_SEVERITY: SEVERE

## 2020-11-11 ASSESSMENT — ENCOUNTER SYMPTOMS: SHORTNESS OF BREATH: 1

## 2020-11-11 NOTE — PROGRESS NOTES
Nephrology Progress Note  Patient's Name: Jigar Oscar  11:00 AM  11/11/2020        Reason for Consult: Acute kidney  Requesting Physician:  Alayna Harrington MD    Chief Complaint: Shortness of breath  History Obtained From:  EHR; spouse    History of Present Ilness:    Jigar Oscar is a 61 y.o. male with a history of COPD, hypertension, hyperlipidemia and diabetes mellitus. He presented to ED 2 days ago with complaints of shortness of breath. According to patient's spouse he had been complaining of shortness of breath over the course of several weeks. This has gotten progressively worse. He went to see his PCP on the day of admission. In office at the time pulse oximeter obtained revealed his oxygen saturation to be in the 70s. He was instructed to proceed to ED for further evaluation. On presentation to ED his initial vital signs showed a blood pressure of 168/71, temperature 97.5 and pulse of 93. His pulse oximeter at the time was noted to be 98% on room air. Laboratory data was significant for a BUN of 11, creatinine 1.1, WBC of 8.3. Rapid COVID-19 testing was negative. A chest x-ray performed revealed right infrahilar and basilar infiltrate concerning for pneumonia. A CTA was ordered but patient declined because of his inability to lay flat. Patient was given ceftriaxone and doxycycline followed by admission to telemetry. 2 days ago an RRT was called as patient was noted to be increasingly more in respiratory distress. He was transferred to MICU and intubated. Laboratory data yesterday showed worsening serum creatinine to 2.8. This a.m. further worsening to 3.6. He has been nonoliguric. Hence renal consult. 10/30: N new acute issues overnight; remains intubated  10/31: Polyuric , concerned for possible DI; no other acute issues overnight  11/1: agitated overnight ; remains intubated  11/2: pt seen and examined.  Chart reviewed, pt intubated  11/3: pt seen and examined in icu, pt intubated  11/4: pt seen and examined in icu, no overnight events, intubated  11/5: pt seen in room ,remains intubated  11/6: pt seen in room, intubated. 11/7: pt seen in room, intubated, chart reviewed  11/8:pt seen in room, remains intuibated  11/9: pt seen in room, reintubated yesterday  11/10: pt seen in icu, intubated.    11/11: pt seen in icu, remains intubated      Allergies:  Bee venom and Shellfish-derived products    Current Medications:    chlordiazePOXIDE (LIBRIUM) capsule 25 mg, Q8H  QUEtiapine (SEROQUEL) tablet 100 mg, BID  senna (SENOKOT) tablet 17.2 mg, Nightly  polyethylene glycol (GLYCOLAX) packet 17 g, Daily PRN  dexamethasone (DECADRON) injection 4 mg, Q12H  midazolam (VERSED) 100 mg in dextrose 5 % 100 mL infusion, Continuous  fentaNYL 5 mcg/ml in 0.9%  ml infusion, Continuous  perflutren lipid microspheres (DEFINITY) injection 1.65 mg, ONCE PRN  insulin lispro (HUMALOG) injection vial 0-18 Units, Q4H  acetaminophen (TYLENOL) 160 MG/5ML solution 650 mg, Q6H PRN  piperacillin-tazobactam (ZOSYN) 3.375 g in dextrose 5 % 100 mL IVPB extended infusion (mini-bag), Q8H  Zosyn Flush (0.9 % sodium chloride infusion), Q8H  carvedilol (COREG) tablet 3.125 mg, BID WC  propofol injection, Titrated  folic acid injection 1 mg, Daily  thiamine (B-1) 100 mg in sodium chloride 0.9 % 100 mL IVPB, Q24H  polyvinyl alcohol (LIQUIFILM TEARS) 1.4 % ophthalmic solution 1 drop, Q4H PRN  insulin glargine (LANTUS) injection vial 20 Units, Nightly  sodium chloride (Inhalant) 3 % nebulizer solution 2 mL, Q4H  hydrALAZINE (APRESOLINE) injection 10 mg, Q4H PRN  heparin (porcine) injection 7,500 Units, Q8H  pantoprazole (PROTONIX) injection 40 mg, Daily    And  sodium chloride (PF) 0.9 % injection 10 mL, Daily  sodium chloride flush 0.9 % injection 10 mL, 2 times per day  sodium chloride flush 0.9 % injection 10 mL, PRN  acetaminophen (TYLENOL) suppository 650 mg, Q6H PRN  Arformoterol Tartrate (BROVANA) nebulizer solution 15 mcg, BID  chlorhexidine (PERIDEX) 0.12 % solution 15 mL, BID  amLODIPine (NORVASC) tablet 10 mg, Daily  aspirin chewable tablet 81 mg, Daily  colchicine (COLCRYS) tablet 0.6 mg, BID PRN  levothyroxine (SYNTHROID) tablet 50 mcg, Daily  [Held by provider] losartan (COZAAR) tablet 100 mg, Daily  budesonide (PULMICORT) nebulizer suspension 500 mcg, BID  ipratropium-albuterol (DUONEB) nebulizer solution 1 ampule, Q4H WA  glucose (GLUTOSE) 40 % oral gel 15 g, PRN  dextrose 50 % IV solution, PRN  glucagon (rDNA) injection 1 mg, PRN  dextrose 5 % solution, PRN        Review of Systems:   Review of systems not obtained due to patient factors. Physical exam:  Vitals:    11/11/20 0900   BP: (!) 118/58   Pulse: 53   Resp: 23   Temp:    SpO2: 95%           General: Intubated sedated  Eyes: PERRL. No sclera icterus. No conjunctival injection. ENT: No discharge. Pharynx clear. Neck: Trachea midline. Normal thyroid. Lungs: Coarse breath sounds  CV: Regular rate. Regular rhythm. No murmur or rub. .   Abd:  Non-distended. No masses. No organmegaly. Normal bowel sounds. Skin: Warm and dry. No nodule on exposed extremities. No rash on exposed extremities.   Ext: No cyanosis, clubbing, edema; 2+ pitting bilateral lower extremity edema L>R  Neuro: sedate lightly, but arouses and is agitated      Data:   Labs:  Lab Results   Component Value Date     11/11/2020     11/10/2020     11/09/2020    K 4.2 11/11/2020    K 4.0 11/10/2020    K 4.2 11/09/2020    CL 99 11/11/2020    CO2 22 11/11/2020    CO2 23 11/10/2020    CO2 23 11/09/2020    CREATININE 1.6 (H) 11/11/2020    CREATININE 1.7 (H) 11/10/2020    CREATININE 1.8 (H) 11/09/2020    BUN 32 (H) 11/11/2020    BUN 32 (H) 11/10/2020    BUN 29 (H) 11/09/2020    GLUCOSE 144 (H) 11/11/2020    GLUCOSE 154 (H) 11/10/2020    GLUCOSE 172 (H) 11/09/2020    PHOS 4.3 11/11/2020    PHOS 4.2 11/10/2020    PHOS 5.1 (H) 11/09/2020    WBC 7.1 11/11/2020    WBC 8.2 11/10/2020    WBC 11.3 11/09/2020    HGB 11.4 (L) 11/11/2020    HGB 10.2 (L) 11/10/2020    HGB 11.2 (L) 11/09/2020    HCT 33.3 (L) 11/11/2020    HCT 29.5 (L) 11/10/2020    HCT 33.0 (L) 11/09/2020    MCV 77.3 (L) 11/11/2020     11/11/2020         Imaging:  Xr Chest Portable    Result Date: 10/27/2020  EXAMINATION: ONE XRAY VIEW OF THE CHEST 10/27/2020 7:58 pm COMPARISON: October 26, 2020 HISTORY: ORDERING SYSTEM PROVIDED HISTORY: SOB TECHNOLOGIST PROVIDED HISTORY: Reason for exam:->SOB What reading provider will be dictating this exam?->CRC FINDINGS: There is mild cardiomegaly. There is patchy perihilar and bibasilar atelectasis/infiltrates. Tiny pleural effusions are suspected. Progressive bibasilar atelectasis/infiltrates concerning for pneumonia. Underlying CHF is considered. Xr Chest Portable    Result Date: 10/26/2020  EXAMINATION: ONE XRAY VIEW OF THE CHEST 10/26/2020 4:11 pm COMPARISON: 05/26/2019 HISTORY: ORDERING SYSTEM PROVIDED HISTORY: SOB TECHNOLOGIST PROVIDED HISTORY: Reason for exam:->SOB What reading provider will be dictating this exam?->CRC FINDINGS: Cardiac size appears stable. Discoid airspace disease seen at the left lung base which may represent atelectasis or scarring. Right infrahilar and basilar infiltrate is identified. No pneumothorax is identified. No acute osseous abnormality is identified. Right infrahilar and basilar infiltrate concerning for pneumonia. Left basilar atelectasis or scarring. Assessment/Plans  1. Acute kidney injury  Baseline 1.8 from 9/2019, 1.1 in 5/2019  hemodynamically mediated due to the combination of bradycardia,  diuretic and ARB use; this is exacerbated by IV contrast use   Continue bumex, good response  bumex iv qd  Out 3.6L last 24 h, -7.2L overall    2. Acute hypoxic respiratory failure  due to multilobar pneumonia  gradual wean  On unasyn  Off vanco  Failed extubation 11/8  bumex 2 mg iv q 12    3.  Type 2 diabetes mellitus  increasing blood sugars, most likely steroid induced    4. etoh abuse    5. Hypernatremia  imrpoved on lower dose bumex    6.  Hyperkalemia  Nl now  Renal tube feed            Lynette Hedrick MD  11:00 AM  11/11/2020

## 2020-11-11 NOTE — PROGRESS NOTES
Patient off floor at this time for trach/PEG. I will see later.     Bobby Washington  11/11/2020  1:42 PM 0 = independent

## 2020-11-11 NOTE — PLAN OF CARE
Problem: Restraint Use - Nonviolent/Non-Self-Destructive Behavior:  Goal: Absence of restraint-related injury  Description: Absence of restraint-related injury  11/11/2020 1706 by Yeimi Naranjo  Outcome: Met This Shift     Problem: Skin Integrity:  Goal: Will show no infection signs and symptoms  Description: Will show no infection signs and symptoms  Outcome: Met This Shift     Problem: Skin Integrity:  Goal: Absence of new skin breakdown  Description: Absence of new skin breakdown  Outcome: Met This Shift     Problem: Restraint Use - Nonviolent/Non-Self-Destructive Behavior:  Goal: Absence of restraint indications  Description: Absence of restraint indications  11/11/2020 1706 by Yeimi Naranjo  Outcome: Not Met This Shift

## 2020-11-11 NOTE — PROGRESS NOTES
When stimulated, patient reaches for lines and tubes. Patient also throws legs out of bed.  Bilateral soft wrist restraints and bilateral soft ankle restraints continued for patient safety

## 2020-11-11 NOTE — CARE COORDINATION
11/11 Care Coordination: Ricky Veronica is for Trach/Peg today. Remains in ICU,Vent,sedation. Message left for family (daughter)to call CM. Will await call back. CM/SW will continue to follow for discharge planning.    Flor PALACIOSN,RN--[phone]

## 2020-11-11 NOTE — PROGRESS NOTES
intubation, nutrition support - enteral nutrition    Nutrition Interventions:   Nutrition Education/Counseling:  Education not indicated   Coordination of Nutrition Care:  Continue to monitor while inpatient, Coordination of Community Care    Goals:  Nutrition progression       Nutrition Monitoring and Evaluation:   Food/Nutrient Intake Outcomes:  Enteral Nutrition Intake/Tolerance  Physical Signs/Symptoms Outcomes:  Biochemical Data, Nutrition Focused Physical Findings, Skin, Weight, GI Status, Fluid Status or Edema, Hemodynamic Status     Electronically signed by Emmanuel Wood MS, RD, LD on 11/11/20 at 12:18 PM EST  Contact: [phone]

## 2020-11-11 NOTE — ANESTHESIA POSTPROCEDURE EVALUATION
Department of Anesthesiology  Postprocedure Note    Patient: Josselyn Bellamy  MRN: [id number]  YOB: 1960  Date of evaluation: 11/11/2020  Time:  5:22 PM     Procedure Summary     Date:  11/11/20 Room / Location:  [facility] OR 09 / [facility]    Anesthesia Start:  1240 Anesthesia Stop:  2207    Procedures:       OPEN TRACHEOTOMY AND PEG TUBE INSERTION -- AVAILABLE AFTER 1130 (N/A Neck)      EGD ESOPHAGOGASTRODUODENOSCOPY PEG TUBE INSERTION (N/A Abdomen) Diagnosis:  (.)    Surgeon:  Ronal Pruitt MD Responsible Provider:  Malcolm Jay MD    Anesthesia Type:  general ASA Status:  4          Anesthesia Type: general    Erik Phase I:      Erik Phase II:      Last vitals: Reviewed and per EMR flowsheets.        Anesthesia Post Evaluation    Patient location during evaluation: ICU  Patient participation: complete - patient cannot participate  Level of consciousness: sedated and ventilated  Pain score: 0  Airway patency: patent  Nausea & Vomiting: no nausea  Complications: no  Cardiovascular status: hemodynamically stable  Respiratory status: ventilator  Hydration status: stable

## 2020-11-11 NOTE — PROGRESS NOTES
Occupational Therapy      OT consult received and appreciated. Chart reviewed. Discussed pt case during quality flow rounds. Will hold evaluation due to possible trach/PEG today and respiratory status. Will evaluate at a later time. Thank you.  Praveena Meyer, OTR/L #[id number]

## 2020-11-11 NOTE — ANESTHESIA PRE PROCEDURE
apply route For 5 years 10/3/18   Keshia Hines MD   Compression Stockings MISC Chronic left leg swelling. 2/21/17   Hunter Guzman MD       Current medications:    Current Facility-Administered Medications   Medication Dose Route Frequency Provider Last Rate Last Dose    chlordiazePOXIDE (LIBRIUM) capsule 25 mg  25 mg Oral Q8H Lindsey Tan,         QUEtiapine (SEROQUEL) tablet 100 mg  100 mg Oral BID Lindsey Tan, DO        senna [facility]) tablet 17.2 mg  2 tablet Oral Nightly Ashley SHERMAN Bautistas, DO   17.2 mg at 11/10/20 1051    polyethylene glycol (GLYCOLAX) packet 17 g  17 g Oral Daily PRN Ashleysa SHERMAN Bautistas DO        dexamethasone (DECADRON) injection 4 mg  4 mg Intravenous Q12H Mariana Wyatt MD   4 mg at 11/11/20 0140    midazolam (VERSED) 100 mg in dextrose 5 % 100 mL infusion  1 mg/hr Intravenous Continuous Michael Fernández Jr., DO 5 mL/hr at 11/11/20 1126 5 mg/hr at 11/11/20 1126    fentaNYL 5 mcg/ml in 0.9%  ml infusion  25 mcg/hr Intravenous Continuous Gagan Ott MD 40 mL/hr at 11/11/20 0625 200 mcg/hr at 11/11/20 3925    perflutren lipid microspheres (DEFINITY) injection 1.65 mg  1.5 mL Intravenous ONCE PRN Michael Fernández Jr., DO        insulin lispro (HUMALOG) injection vial 0-18 Units  0-18 Units Subcutaneous Q4H Lindsey Tan, DO   Stopped at 11/10/20 1724    acetaminophen (TYLENOL) 160 MG/5ML solution 650 mg  650 mg Oral Q6H PRN Michael Fernández Jr., DO        piperacillin-tazobactam (ZOSYN) 3.375 g in dextrose 5 % 100 mL IVPB extended infusion (mini-bag)  3.375 g Intravenous Q8H Jose Francisco Traore MD   Stopped at 11/11/20 1050    Zosyn Flush (0.9 % sodium chloride infusion)   Intravenous Q8H Jose Francisco Traore MD   Stopped at 11/10/20 1557    carvedilol (COREG) tablet 3.125 mg  3.125 mg Oral BID  Gagan Ott MD   Stopped at 11/11/20 0829    propofol injection  10 mcg/kg/min Intravenous Titrated Marcia Escamilla MD 38.8 mL/hr at 11/11/20 1130 (COLCRYS) tablet 0.6 mg  0.6 mg Oral BID PRN Carolyn Del Real MD   0.6 mg at 11/07/20 0909    levothyroxine (SYNTHROID) tablet 50 mcg  50 mcg Oral Daily Carolyn Del Real MD   50 mcg at 11/11/20 0624    [Held by provider] losartan (COZAAR) tablet 100 mg  100 mg Oral Daily Carolyn Del Real MD   100 mg at 10/28/20 0849    budesonide (PULMICORT) nebulizer suspension 500 mcg  0.5 mg Nebulization BID Carolyn Del Real MD   500 mcg at 11/11/20 0833    ipratropium-albuterol (DUONEB) nebulizer solution 1 ampule  1 ampule Inhalation Q4H WA Carolyn Del Real MD   1 ampule at 11/11/20 1128    glucose (GLUTOSE) 40 % oral gel 15 g  15 g Oral PRN Carolyn Del Real MD        dextrose 50 % IV solution  12.5 g Intravenous PRN Carolyn Del Real MD        glucagon (rDNA) injection 1 mg  1 mg Intramuscular PRN Carolyn Del Real MD        dextrose 5 % solution  100 mL/hr Intravenous PRN Carolyn Del Real MD           Allergies:     Allergies   Allergen Reactions    Bee Venom Anaphylaxis    Shellfish-Derived Products Anaphylaxis     Only crab legs       Problem List:    Patient Active Problem List   Diagnosis Code    Hyperlipidemia E78.5    Type 2 diabetes mellitus without complication, without long-term current use of insulin ([facility]) E11.9    Atypical chest pain R07.89    Essential hypertension I10    Chronic acquired lymphedema I89.0    Aortic valve disease I35.9    Morbid obesity due to excess calories ([facility]) E66.01    Abdominal pain R10.9    Acute respiratory failure with hypoxia ([facility]) J96.01    Acute pulmonary edema ([facility]) J81.0    Congestive heart failure with LV diastolic dysfunction, NYHA class 3 ([facility]) I50.30    Obstructive sleep apnea G47.33    Acquired hypothyroidism E03.9    Chronic diastolic heart failure ([facility]) I50.32    Nonrheumatic aortic valve stenosis I35.0    ACE-inhibitor cough R05, T46.4X5A    Pneumonia J18.9    Acute gout of right knee M10.9       Past Medical History:        Diagnosis Date    Acquired hypothyroidism 9/26/2017    Anxiety     Congestive heart failure with LV diastolic dysfunction, NYHA class 3 (HCC)     Depression     DM (diabetes mellitus) [facility] [address] [address]     Essential hypertension 6/1/2016    Gouty arthritis 2006    Hyperlipidemia     Hypertension     Lymphedema     Obesity     Obstructive sleep apnea 8/17/2009    Obstructive sleep apnea 9/26/2017    Osteoarthritis     Type 2 diabetes mellitus without complication, without long-term current use of insulin ([facility] [address] 1/15/2014       Past Surgical History:        Procedure Laterality Date    BRONCHOSCOPY  08/10/2017    ECHO COMPL W DOP COLOR FLOW  6/21/2013         EXTERNAL EAR SURGERY  6/2/2009    keloid left ear    FOOT SURGERY  1990    Left foot    GASTROSTOMY TUBE PLACEMENT  08/10/2017    TRACHEOSTOMY TUBE PLACEMENT  08/10/2017       Social History:    Social History     Tobacco Use    Smoking status: Former Smoker     Packs/day: 0.10     Years: 10.00     Pack years: 1.00     Types: Cigarettes     Last attempt to quit: 2/19/2005     Years since quitting: 15.7    Smokeless tobacco: Former User     Types: Chew     Quit date: 1/1/1990   Substance Use Topics    Alcohol use: Yes     Frequency: 2-3 times a week     Drinks per session: 1 or 2     Binge frequency: Never     Comment: socially                                Counseling given: Not Answered      Vital Signs (Current):   Vitals:    11/11/20 0900 11/11/20 1000 11/11/20 1100 11/11/20 1206   BP: (!) 118/58 120/60 132/61    Pulse: 53 53 53    Resp: 23 23 20    Temp:       TempSrc: Esophageal      SpO2: 95% 95% 95%    Weight:       Height:    5' 8\" (1.727 m)                                              BP Readings from Last 3 Encounters:   11/11/20 132/61   06/15/20 124/76   03/02/20 130/70       NPO Status:                                                                                 BMI:   Wt Readings from Last 3 Encounters:   11/11/20 290 lb 12.8 oz (131.9 kg)   06/15/20 (!) 326 lb (147.9 kg) 03/02/20 (!) 322 lb (146.1 kg)     Body mass index is 44.22 kg/m². CBC:   Lab Results   Component Value Date    WBC 7.1 11/11/2020    RBC 4.31 11/11/2020    HGB 11.4 11/11/2020    HCT 33.3 11/11/2020    MCV 77.3 11/11/2020    RDW 20.1 11/11/2020     11/11/2020       CMP:   Lab Results   Component Value Date     11/11/2020    K 4.2 11/11/2020    K 4.0 10/27/2020    CL 99 11/11/2020    CO2 22 11/11/2020    BUN 32 11/11/2020    CREATININE 1.6 11/11/2020    GFRAA 53 11/11/2020    LABGLOM 53 11/11/2020    GLUCOSE 144 11/11/2020    PROT 7.9 11/11/2020    CALCIUM 8.7 11/11/2020    BILITOT 0.4 11/11/2020    ALKPHOS 151 11/11/2020    AST 76 11/11/2020     11/11/2020       POC Tests: No results for input(s): POCGLU, POCNA, POCK, POCCL, POCBUN, POCHEMO, POCHCT in the last 72 hours.     Coags:   Lab Results   Component Value Date    PROTIME 10.9 07/21/2018    INR 0.9 07/21/2018       HCG (If Applicable): No results found for: PREGTESTUR, PREGSERUM, HCG, HCGQUANT     ABGs:   Lab Results   Component Value Date    MPN4DAR 26.1 11/09/2020        Type & Screen (If Applicable):  No results found for: LABABO, LABRH    Drug/Infectious Status (If Applicable):  No results found for: HIV, HEPCAB    COVID-19 Screening (If Applicable):   Lab Results   Component Value Date    COVID19 Not Detected 10/28/2020         Anesthesia Evaluation  Patient summary reviewed and Nursing notes reviewed no history of anesthetic complications:   Airway: Mallampati: Unable to assess / NA       Comment: 7.0 ETT 23 @ lip   Dental:    (+) other      Pulmonary: breath sounds clear to auscultation  (+) pneumonia: unresolved,  COPD: severe,  shortness of breath: chronic,  recent URI:  sleep apnea:                             Cardiovascular:  Exercise tolerance: poor (<4 METS),   (+) hypertension:, valvular problems/murmurs (Tricuspid regurg): AS, CHF: diastolic, weak pulses, peripheral edema, hyperlipidemia      ECG reviewed  Rhythm: regular  Rate: normal  Echocardiogram reviewed         Beta Blocker:  Dose within 24 Hrs         Neuro/Psych:   (+) depression/anxiety             GI/Hepatic/Renal:   (+) GERD:, liver disease:, renal disease: CRI,           Endo/Other:    (+) DiabetesType II DM, using insulin, hypothyroidism: arthritis: OA., electrolyte abnormalities, . Abdominal:           Vascular:                                        Anesthesia Plan      general     ASA 4       Induction: inhalational.  BIS  MIPS: Postoperative opioids intended and Prophylactic antiemetics administered. Anesthetic plan and risks discussed with patient. Plan discussed with CRNA and attending. Attending anesthesiologist reviewed and agrees with Pre Eval content      CTA OF THE CHEST 10/27/2020 5:34 pm        TECHNIQUE:    CTA of the chest was performed after the administration of intravenous    contrast.  Multiplanar reformatted images are provided for review. MIP    images are provided for review. Dose modulation, iterative reconstruction,    and/or weight based adjustment of the mA/kV was utilized to reduce the    radiation dose to as low as reasonably achievable. COMPARISON:    03/14/2018 CT        HISTORY:    ORDERING SYSTEM PROVIDED HISTORY: check pe    TECHNOLOGIST PROVIDED HISTORY:    Reason for exam:->check pe    What reading provider will be dictating this exam?->CRC        FINDINGS:    Pulmonary Arteries: Fair technical quality. Body habitus and patient    positioning contributes to significant artifact. Despite this, there are no    filling defects to suggest pulmonary embolism. Main pulmonary artery is    normal in caliber. No evidence of right ventricular strain. Mediastinum: The heart is enlarged. Aorta is normal.  Mild mediastinal and    hilar lymph node enlargement is demonstrated. A dominant right hilar lymph    node measures 2.1 cm.   Thyroid and esophagus appear normal.        Lungs/pleura: personnel involved discussed with patient. Patient verbalized an understanding and agrees to proceed. Plan discussed with care team members and agreed upon. Postop ICU stay.   Corrine Fisher MD  Staff Anesthesiologist  1:10 PM

## 2020-11-11 NOTE — CONSULTS
OTOLARYNGOLOGY  CONSULT NOTE  11/11/2020    Physician Consulted: Dr. Saad Ortega  Reason for Consult: trach   Referring Physician: Dr. Arjun PIZANO  Juan Luis Miller is a 61 y.o. male who ENT was consulted for trach evaluation. Pt has been intubated for respiratory failure due to PNA and HANS since 10/27. He has a history of a similar hospital stay in 2017 where he was unable to be weaned from the vent and underwent tracheotomy. Today open trach by general surgery was attempted but aborted due to excessive tracheal calcification. ENT consulted for evaluation of his airway. Daughter at bedside providing history. Review of Systems   Unable to perform ROS: Intubated       Past Medical History:   Diagnosis Date    Acquired hypothyroidism 9/26/2017    Anxiety     Congestive heart failure with LV diastolic dysfunction, NYHA class 3 ([address]     Depression     DM (diabetes mellitus) ([address]     Essential hypertension 6/1/2016    Gouty arthritis 2006    Hyperlipidemia     Hypertension     Lymphedema     Obesity     Obstructive sleep apnea 8/17/2009    Obstructive sleep apnea 9/26/2017    Osteoarthritis     Type 2 diabetes mellitus without complication, without long-term current use of insulin ([address] 1/15/2014       Past Surgical History:   Procedure Laterality Date    BRONCHOSCOPY  08/10/2017    ECHO COMPL W DOP COLOR FLOW  6/21/2013         EXTERNAL EAR SURGERY  6/2/2009    keloid left ear    FOOT SURGERY  1990    Left foot    GASTROSTOMY TUBE PLACEMENT  08/10/2017    TRACHEOSTOMY TUBE PLACEMENT  08/10/2017       Medications Prior to Admission:    Prior to Admission medications    Medication Sig Start Date End Date Taking?  Authorizing Provider   carvedilol (COREG) 25 MG tablet Take 0.5 tablets by mouth 2 times daily 7/7/20   Courtney Winslow MD   olmesartan (BENICAR) 20 MG tablet Take 1 tablet by mouth daily 6/1/20   Courtney Winslow MD   losartan (COZAAR) 100 MG tablet Take 1 tablet by mouth daily 5/29/20 session: 1 or 2     Binge frequency: Never     Comment: socially    Drug use: Not Currently     Comment: past use; none x 30 years           PHYSICAL EXAM:    Vitals:    11/11/20 1700   BP: (!) 159/67   Pulse: 58   Resp: 16   Temp:    SpO2: 97%       General Appearance:  Intubated, sedated   Head/face:  NC/AT  Eyes: PERRL, EOMI  ENT: Bilateral external ears WNL, Nares patent, Septum midline,  Neck:obese, landmarks palpable, previous trach scar visible, recent trach incision c/d/i  Lungs:  Vent   Heart:  RR  . LABS:  CBC  Recent Labs     11/11/20  0400   WBC 7.1   HGB 11.4*   HCT 33.3*          RADIOLOGY  Xr Chest Portable    Result Date: 10/27/2020  EXAMINATION: ONE XRAY VIEW OF THE CHEST 10/27/2020 7:58 pm COMPARISON: October 26, 2020 HISTORY: ORDERING SYSTEM PROVIDED HISTORY: SOB TECHNOLOGIST PROVIDED HISTORY: Reason for exam:->SOB What reading provider will be dictating this exam?->CRC FINDINGS: There is mild cardiomegaly. There is patchy perihilar and bibasilar atelectasis/infiltrates. Tiny pleural effusions are suspected. Progressive bibasilar atelectasis/infiltrates concerning for pneumonia. Underlying CHF is considered. Xr Chest Portable    Result Date: 10/26/2020  EXAMINATION: ONE XRAY VIEW OF THE CHEST 10/26/2020 4:11 pm COMPARISON: 05/26/2019 HISTORY: ORDERING SYSTEM PROVIDED HISTORY: SOB TECHNOLOGIST PROVIDED HISTORY: Reason for exam:->SOB What reading provider will be dictating this exam?->CRC FINDINGS: Cardiac size appears stable. Discoid airspace disease seen at the left lung base which may represent atelectasis or scarring. Right infrahilar and basilar infiltrate is identified. No pneumothorax is identified. No acute osseous abnormality is identified. Right infrahilar and basilar infiltrate concerning for pneumonia. Left basilar atelectasis or scarring.          ASSESSMENT:  61 y.o. male with vent dependence and tracheal calcification h/o previous trach     PLAN:  Dustin Krishnan aborted today bu surgery due to excessive tracheal calcification   Recommend CT neck to evaluate entire trachea  Will plan for open trach, direct laryngoscopy, and bronchoscopy 11/12 PM    The risks, benefits and options were discussed with the pt's daughter. The risks included but not limited to pain, bleeding, infection, heavy scarring, damage to surrounding structures, fluid collections, asymmetry, need for further procedures and Death. All of her questions were answered to her satisfaction and she agrees to proceed with the operation.     Will discuss with Dr. Krystle Melton      Electronically signed by Antonino Joyce DO on 11/11/20 at 6:26 PM EST

## 2020-11-11 NOTE — PROGRESS NOTES
PT SEEN AND EXAMINED. intubated, in icu. BP better. Sedated. for peg/trach, when weaned a bit      Chart reviewed. meds reviewed. D/w nursing + family as available. EXAM: IN GENERAL, SUKHI ESCALERA NEGx10 EXCEPT:   /69   Pulse 68   Temp 98.8 °F (37.1 °C) (Esophageal)   Resp (!) 31   Ht 5' 8\" (1.727 m)   Wt 290 lb 12.8 oz (131.9 kg)   SpO2 95%   BMI 44.22 kg/m²   GEN:  NAD. HEENT: NCAT. NECK: NO JVD. TRACH MIDLINE. NO BRUITS. NO THYROMEGALY. LUNGS: CTA BL NO RALES, RHONCHI OR WHEEZES. GOOD EXCURSION. CV: Regular rate and rhythm, NO Murmurs, Rubs, Or gallops  ABD: Soft. Nontender. Normal bowel sounds.  No organomegaly  EXT:No clubbing cyanosis or edema  Neuro: Labs/data reviewedLABS: CBC with Differential:    Lab Results   Component Value Date    WBC 7.1 11/11/2020    RBC 4.31 11/11/2020    HGB 11.4 11/11/2020    HCT 33.3 11/11/2020     11/11/2020    MCV 77.3 11/11/2020    MCH 26.5 11/11/2020    MCHC 34.2 11/11/2020    RDW 20.1 11/11/2020    NRBC 4.3 11/02/2020    SEGSPCT 73 01/26/2014    METASPCT 0.9 11/07/2020    LYMPHOPCT 24.9 11/11/2020    MONOPCT 7.9 11/11/2020    MYELOPCT 0.9 11/05/2020    BASOPCT 0.1 11/11/2020    MONOSABS 0.56 11/11/2020    LYMPHSABS 1.76 11/11/2020    EOSABS 0.13 11/11/2020    BASOSABS 0.01 11/11/2020     Platelets:    Lab Results   Component Value Date     11/11/2020     CMP:    Lab Results   Component Value Date     11/11/2020    K 4.2 11/11/2020    K 4.0 10/27/2020    CL 99 11/11/2020    CO2 22 11/11/2020    BUN 32 11/11/2020    CREATININE 1.6 11/11/2020    GFRAA 53 11/11/2020    LABGLOM 53 11/11/2020    GLUCOSE 144 11/11/2020    PROT 7.9 11/11/2020    LABALBU 3.3 11/11/2020    CALCIUM 8.7 11/11/2020    BILITOT 0.4 11/11/2020    ALKPHOS 151 11/11/2020    AST 76 11/11/2020     11/11/2020     Magnesium:    Lab Results   Component Value Date    MG 2.0 11/11/2020     LDH:    Lab Results   Component Value Date     10/28/2020     PT/INR: Lab Results   Component Value Date    PROTIME 10.9 07/21/2018    INR 0.9 07/21/2018     Last 3 Troponin:    Lab Results   Component Value Date    TROPONINI <0.01 10/26/2020    TROPONINI <0.01 05/26/2019    TROPONINI <0.01 05/26/2019     ABG:    Lab Results   Component Value Date    PH 7.414 11/09/2020    PCO2 39.7 11/09/2020    PO2 99.0 11/09/2020    HCO3 24.8 11/09/2020    BE 0.3 11/09/2020    O2SAT 96.9 11/09/2020     IRON:    Lab Results   Component Value Date    IRON 95 11/05/2020     IMAGING    Xr Chest Portable    Result Date: 10/27/2020  EXAMINATION: ONE XRAY VIEW OF THE CHEST 10/27/2020 7:58 pm COMPARISON: October 26, 2020 HISTORY: ORDERING SYSTEM PROVIDED HISTORY: SOB TECHNOLOGIST PROVIDED HISTORY: Reason for exam:->SOB What reading provider will be dictating this exam?->CRC FINDINGS: There is mild cardiomegaly. There is patchy perihilar and bibasilar atelectasis/infiltrates. Tiny pleural effusions are suspected. Progressive bibasilar atelectasis/infiltrates concerning for pneumonia. Underlying CHF is considered. Xr Chest Portable    Result Date: 10/26/2020  EXAMINATION: ONE XRAY VIEW OF THE CHEST 10/26/2020 4:11 pm COMPARISON: 05/26/2019 HISTORY: ORDERING SYSTEM PROVIDED HISTORY: SOB TECHNOLOGIST PROVIDED HISTORY: Reason for exam:->SOB What reading provider will be dictating this exam?->CRC FINDINGS: Cardiac size appears stable. Discoid airspace disease seen at the left lung base which may represent atelectasis or scarring. Right infrahilar and basilar infiltrate is identified. No pneumothorax is identified. No acute osseous abnormality is identified. Right infrahilar and basilar infiltrate concerning for pneumonia. Left basilar atelectasis or scarring.        Medications:    Scheduled Meds:   senna  2 tablet Oral Nightly    [Held by provider] diazePAM  10 mg Oral Q12H    dexamethasone  4 mg Intravenous Q12H    insulin lispro  0-18 Units Subcutaneous Q4H    piperacillin-tazobactam 3.375 g Intravenous Q8H    sodium chloride   Intravenous Q8H    carvedilol  3.125 mg Oral BID WC    QUEtiapine  75 mg Oral BID    folic acid  1 mg Intravenous Daily    thiamine (VITAMIN B1) IVPB  100 mg Intravenous Q24H    insulin glargine  20 Units Subcutaneous Nightly    sodium chloride (Inhalant)  2 mL Nebulization Q4H    heparin (porcine)  7,500 Units Subcutaneous Q8H    pantoprazole  40 mg Intravenous Daily    And    sodium chloride (PF)  10 mL Intravenous Daily    sodium chloride flush  10 mL Intravenous 2 times per day    Arformoterol Tartrate  15 mcg Nebulization BID    chlorhexidine  15 mL Mouth/Throat BID    amLODIPine  10 mg Oral Daily    aspirin  81 mg Oral Daily    levothyroxine  50 mcg Oral Daily    [Held by provider] losartan  100 mg Oral Daily    budesonide  0.5 mg Nebulization BID    ipratropium-albuterol  1 ampule Inhalation Q4H WA       Continuous Infusions:   midazolam 5 mg/hr (11/11/20 0504)    fentaNYL 5 mcg/ml in 0.9%  ml infusion 200 mcg/hr (11/11/20 0625)    propofol 50 mcg/kg/min (11/11/20 0624)    dextrose         PRN Meds:polyethylene glycol, perflutren lipid microspheres, acetaminophen, polyvinyl alcohol, hydrALAZINE, sodium chloride flush, [DISCONTINUED] acetaminophen **OR** acetaminophen, colchicine, glucose, dextrose, glucagon (rDNA), dextrose    A/P:      Patient Active Problem List   Diagnosis    Hyperlipidemia    Type 2 diabetes mellitus without complication, without long-term current use of insulin (HCC)    Atypical chest pain    Essential hypertension    Chronic acquired lymphedema    Aortic valve disease    Morbid obesity due to excess calories ([facility]CC)    Abdominal pain    Acute respiratory failure with hypoxia ([facility])    Acute pulmonary edema ([facility]CC)    Congestive heart failure with LV diastolic dysfunction, NYHA class 3 ([facility]CC)    Obstructive sleep apnea    Acquired hypothyroidism    Chronic diastolic heart failure ([facility]CC)    Nonrheumatic aortic valve stenosis    ACE-inhibitor cough    Pneumonia    Acute gout of right knee   Acute encephalopathy 2/2 Acute hypoxic hypercapnic respiratory failure.         Acute hypoxic hypercapnic respiratory failure 2/2 MSSA pneumonia versus COPD versus ADHF versus chronic OHS  Pt has Hx chronic HANS not on CPAP at home. ABG showed pH 7.188/91.2/78.9/33 on RRT. currently intubated. - Continue to monitor respiratory status, wean down FiO2 as tolerated. - Pro  on presentation.   - US dup LE negative for DVT. CTA negative for PE. COVID -19 negative x2  - Continue breathing treatment  - Monitor daily CXR. CBC.   - Pulmonology consulted, further recommendations appreciated.     NANCY stage III likely pre renal 2/2 diuretic use, contrast agent vs? cardiorenal syndrome. -   - Nephrology consulted. Further recommendations appreciated. - Continue monitor daily BMP, renal function. Monitor I/O. Avoid nephrotoxicity.       · Bacterial pneumonia, likely aspiration pneumonia vs MSSA   · Shock, sepsis- resolved   · ATBx per id  Alcohol withdrawal .    Hx HFpEF, EF 65%cta noted  prob needs ltac  Wean per pulm/ccm  Peg/trach  Monitor HR

## 2020-11-11 NOTE — OP NOTE
Operative Note      Patient: Margarita Crystal  YOB: 1960  MRN: [id number]    Date of Procedure: 11/11/2020    Pre-Op Diagnosis: Respiratory failure, mechanical ventilation     Post-Op Diagnosis: Same       Procedure: Open tracheostomy - aborted  EGD with PEG tube insertion       Surgeon(s):  MD Kayce Reyes MD    Assistant:   Resident: Rosalie Redd MD    Anesthesia: General    Estimated Blood Loss (mL): Minimal    Complications: Unable to complete tracheostomy, procedure aborted    Specimens:   * No specimens in log *    Implants:  * No implants in log *      Drains:   NG/OG/NJ/NE Tube Orogastric Right mouth (Active)   Surrounding Skin Dry; Intact 11/11/20 0000   Securement device Yes 11/11/20 0000   Status Clamped 11/11/20 0000   Placement Verified by X-Ray (Initial) 11/08/20 2000   NG/OG/NJ/NE External Measurement (cm) 59 cm 11/11/20 0000   Drainage Appearance None 11/10/20 2000   Tube Feeding Intake (mL) 0 ml 11/10/20 2200   Free Water Flush (mL) 60 mL 11/11/20 0645   Residual Volume (ml) 50 ml 11/11/20 0645       Gastrostomy/Enterostomy/Jejunostomy Percutaneous Endoscopic Gastrostomy (PEG) LUQ 1 20 fr (Active)       Urethral Catheter Non-latex 16 fr (Active)   $ Urethral catheter insertion $ Not inserted for procedure 11/09/20 1634   Catheter Indications Need for fluid management in critically ill patients in a critical care setting not able to be managed by other means such as BSC with hat, bedpan, urinal, condom catheter, or short term intermittent urethral catherization 11/11/20 1600   Site Assessment No urethral drainage 11/11/20 1600   Urine Color Yellow; [place] 11/11/20 1600   Urine Appearance Hazy 11/11/20 1600   Output (mL) 125 mL 11/11/20 1600       [REMOVED] NG/OG/NJ/NE Tube Center mouth (Removed)   Surrounding Skin Dry; Intact 11/05/20 2000   Securement device Yes 11/05/20 2000   Status Other (Comment) 11/05/20 2000   Placement Verified by X-Ray (repeat) 11/02/20 0800   NG/OG/NJ/NE External Measurement (cm) 58 cm 11/04/20 2200   Tube Feeding Diabetic 11/05/20 0100   Tube Feeding Status Continuous 11/05/20 0100   Rate/Schedule 45 mL/hr 11/05/20 0100   Tube Feeding Intake (mL) 344 ml 11/05/20 1459   Free Water Flush (mL) 60 mL 11/05/20 1459   Free Water Rate [id number] 11/05/20 0100   Residual Volume (ml) 100 ml 11/04/20 0615       [REMOVED] NG/OG/NJ/NE Tube Orogastric 14 fr Left mouth (Removed)   Surrounding Skin Dry; Intact 11/08/20 0800   Securement device Yes 11/08/20 0800   Status Other (Comment) 11/08/20 0600   Placement Verified by X-Ray (repeat) 11/08/20 0800   NG/OG/NJ/NE External Measurement (cm) 63 cm 11/08/20 0800   Drainage Appearance None 11/08/20 0800   Tube Feeding Diabetic 11/08/20 0800   Tube Feeding Status Continuous 11/08/20 0800   Rate/Schedule 60 mL/hr 11/08/20 0800   Tube Feeding Intake (mL) 273 ml 11/08/20 0600   Free Water Flush (mL) 60 mL 11/08/20 0800   Free Water Rate 30MLS Q 4HRS 11/08/20 0800   Residual Volume (ml) 0 ml 11/08/20 0800       [REMOVED] Urethral Catheter (Removed)   Catheter Indications Need for fluid management in critically ill patients in a critical care setting not able to be managed by other means such as BSC with hat, bedpan, urinal, condom catheter, or short term intermittent urethral catherization 11/09/20 1400   Site Assessment No urethral drainage 11/09/20 1400   Urine Color Yellow 11/09/20 1400   Urine Appearance Sediment 11/09/20 1400   Output (mL) 250 mL 11/09/20 1633       [REMOVED] Fecal Management System (Removed)   Stool Appearance Watery 11/08/20 0600   Stool Color Brown 11/08/20 0600   Stool Amount Medium 11/08/20 0600   Fecal Management Tube Output 1000 ml 11/08/20 1400       Findings: extensive fibrotic scar tissue surrounding trachea    DESCRIPTION OF PROCEDURE: The patient was positioned on the OR table in the supine position. A time-out was called and agreed upon by all present.     The patient's neck was then extended by placing a towel roll underneath the upper back. The anterior neck was then prepped with chlorhexidine and draped. The skin was then incised with a #15-blade scalpel. The subcutaneous tissue was then dissected down to the pretracheal plane using a right angle clamp and electrocautery. At this point, an extensive amount of fibrotic scar tissue was encountered over the trachea. This was unable to fully be incised with a #11 blade. After attempting to pass through this material for several minutes, the decision was made to abort this part of the procedure and move on to the PEG placement. Hemostasis was obtained with gel-foam and pressure and the incision was closed using 4-0 vicryl sutures. The orogastric tube was removed. A bite block was inserted between the incisors. A lubricated adult gastroscope was then passed through the oral cavity into the oropharynx. The upper esophagus was then visualized and gently intubated. The scope was advanced down the esophagus into the stomach. The stomach was then maximally insufflated. The upper abdomen was then fully exposed. An insertion site was then chosen in the epigastrium using transillumination and direct 1:1 point palpation. The skin was then prepped with chlorhexidine and draped. 1% lidocaine was then used to infiltrate the skin and subcutaneous tissues. A #11-blade scalpel was then used to make a small transverse incision. An angiocatheter was inserted through this into the stomach under direct gastroscopic visualization. A snare was then passed through the gastroscope. A guide wire was then passed through the angiocatheter, secured with the snare, and brought out through the mouth. A 20-Lebanese gastrostomy tube was then secured to the guide wire and pulled in antegrade fashion into the stomach and up through the abdominal wall.  The gastroscope was then reinserted and passed down into the stomach, confirming position of the bolster against the gastric wall. The gastrostomy tube was then secured to the anterior abdominal wall with a silicone hub and a 2-0 Nylon  drain stitch. The tube was then cut to desired length, fitted with the tube clamp and connector, and placed to gravity drainage. The patient tolerated the procedure well without complications. Dr. Narciso Crews was present and scrubbed for the procedure.      Electronically signed by Debbi Brito MD on 11/11/2020 at 5:05 PM   Giselle Bustamante MD

## 2020-11-11 NOTE — PROGRESS NOTES
When stimulated, patient reaches for tubes and lines necessary for ICU care; patient begins to throw legs out of bed. Bilateral soft wrist restraints and bilateral soft ankle restraints continued for patient safety.

## 2020-11-11 NOTE — PROGRESS NOTES
Physical Therapy    PT consult to evaluate/treat received and appreciated. Pt chart reviewed and evaluation attempted. Pt is currently on hold for trach/PEG procedure. Will check back as able. Thank you.         Saroj Rodriguez, PT, DPT   [id number]

## 2020-11-11 NOTE — PROGRESS NOTES
[address] [facility]  Department of Internal Medicine   Internal Medicine Residency   MICU Progress Note    Patient:  Jeff Hernandez 61 y.o. male  MRN: [id number]     Date of Service: 11/11/2020    Allergy: Bee venom and Shellfish-derived products  CC follow SOB   Subjective     Overnight Events: NAEO - for trach/peg today    Objective     VS: /74   Pulse 91   Temp 97.1 °F (36.2 °C)   Resp 18   Ht 5' 8\" (1.727 m)   Wt 290 lb 12.8 oz (131.9 kg)   SpO2 97%   BMI 44.22 kg/m²   ABP (Arterial line BP): (!) 300/300  ABP mean (Arterial line mean): 300 mmHg    0I & O - 24hr:     Intake/Output Summary (Last 24 hours) at 11/11/2020 1449  Last data filed at 11/11/2020 1424  Gross per 24 hour   Intake 2638 ml   Output 2770 ml   Net -132 ml     Physical Exam:    Constitutional: Alert, obese male. In no apparent distress. Head: Normocephalic and atraumatic. Eyes: Conjunctiva normal, (-) scleral icterus. Mucus membranes moist.  Neck: (-)  swelling  Respiratory: ETT in place. Lungs clear to auscultation bilaterally. (-)  wheezes, (-) increased work of breathing or respiratory distress. Cardiovascular: RRR S1 and S2 were normal.   GI:  Abdomen tense and mildly distended. Soft BS. (-) guarding  Extremities: Warm and well perfused. (-) clubbing, (-) cyanosis. 2+ distal pulses. (-) peripheral edema.   Neurologic:  Arousable to verba/tactile stimuli, no facial droop or tremor noted    Lines     site day    Art line   None    TLC L IJ 4   PICC None    Hemoaccess None      Mechanical Ventilation:   Mode: A/C    TV:480 ml RR: 16  PEEP 8cmH2O  FiO2 60%    Pplat: 27 Cs: 27     ABG:     Lab Results   Component Value Date    PH 7.414 11/09/2020    PCO2 39.7 11/09/2020    PO2 99.0 11/09/2020    IVI0MVB 26.1 11/09/2020    HCO3 24.8 11/09/2020    BE 0.3 11/09/2020    THB 12.6 11/09/2020    O2SAT 96.9 11/09/2020     Medications     Infusions: (Fluid, Sedation, Vasopressors)  Sedation   Propofol 50  · Versed 5  · Fentanyl 200    Nutrition:   OG tube Goal rate: 60ml/h  ATB:   Antibiotics  Days   Zosyn 3             Skin issues: NAEO     Patient currently has   Urinary cath  Restraints - B/L wrists and ankles   DVT prophylaxis/ GI prophylaxis - Heparin, Protonix      Labs     CBC:   Lab Results   Component Value Date    WBC 7.1 11/11/2020    RBC 4.31 11/11/2020    HGB 11.4 11/11/2020    HCT 33.3 11/11/2020    MCV 77.3 11/11/2020    MCH 26.5 11/11/2020    MCHC 34.2 11/11/2020    RDW 20.1 11/11/2020     11/11/2020    MPV 10.2 11/11/2020     CMP:    Lab Results   Component Value Date     11/11/2020    K 4.2 11/11/2020    K 4.0 10/27/2020    CL 99 11/11/2020    CO2 22 11/11/2020    BUN 32 11/11/2020    CREATININE 1.6 11/11/2020    GFRAA 53 11/11/2020    LABGLOM 53 11/11/2020    GLUCOSE 144 11/11/2020    PROT 7.9 11/11/2020    LABALBU 3.3 11/11/2020    CALCIUM 8.7 11/11/2020    BILITOT 0.4 11/11/2020    ALKPHOS 151 11/11/2020    AST 76 11/11/2020     11/11/2020     Ionized Calcium:  No results found for: IONCA  Magnesium:    Lab Results   Component Value Date    MG 2.0 11/11/2020     Phosphorus:    Lab Results   Component Value Date    PHOS 4.3 11/11/2020     Imaging Studies:  CXR 11/10/2020      KUB 11/10/2020     Narrative    EXAMINATION:    ONE SUPINE XRAY VIEW(S) OF THE ABDOMEN         11/10/2020 2:05 pm         COMPARISON:    November 8, 2020         HISTORY:    ORDERING SYSTEM PROVIDED HISTORY: evaluate bowel    TECHNOLOGIST PROVIDED HISTORY:    Reason for exam:->evaluate bowel    What reading provider will be dictating this exam?->CRC         FINDINGS:    NG tube tip is in the gastric lumen.  There is no free air or bowel wall    pneumatosis.  No dilated segments of bowel are evident.              Impression    NG tube tip in the gastric lumen.      Resident's Assessment and Plan     Neurology  - ICU delirium vs alcohol withdrawal vs agitation  · Increase seroquel to 100 BID  · Librium 25 TID  · Propofol/versed/fentanyl gtt      Cardiology:   1. Volume overload in setting of Hx HFpEF (EF 65% w/ indeterminate DD as of 10/26/2020)  · Pro- as of 11/9/2020    · Continue Bumex IV BID   · Continue ASA 81mg PO daily   · Continue to monitor U/O   · Daily weights   · Continue to monitor pt vitals     2. Hx of HTN   · Continue Amlodipine PO daily  · Continue Bumex IV BID   · Continue Coreg PO BID   · Continue Hydralazine PRN      Pulmonary:   1. Acute hypoxic hypercapnic respiratory failure likely 2/2 CAP vs chronic OHS vs fluid overload in setting of chronic HANS noncompliant with CPAP  · Respiratory cx growing MSSA on 10/31/2020  · CXR showing interval worsening of B/L effusion vs infiltrate, L>R   · Continue sedation and intubation - wean FiO2 as tolerated   · Pt for trach and PEG per General Surgery   · Continue Zosyn   · Continue Decadron 4mg IV q12 hrs - will wean over the next three doses before discontinuing per Pharmacy   · Continue 3% Normal saline nebulizer solution  · Continue Brovana, Pulmicort, and Duonebs   · Infectious Disease following, appreciate further recs   · Pulmonology following, appreciate further recs   · Continue to monitor daily labs - will replete electrolytes as necessary   · Daily CXR and ABGs     - Trach aborted today; PEG placed  - Due to patients past failed extubation, difficult airway, and aborted trach, will consult ENT for tracheostomy placement. Wean vent settings as appropriate    Nephrology (Fluids/ Electrolytes & Nutrition):   1. NANCY - likely pre-renal vs intrinsic renal in setting of ?cardiorenal syndrome w/ concomitant bradycardia and IV contrast and diuretic use. · Continue Bumex IV BID   · Continue daily BMP - will replete electrolytes as necessary   · Continue to monitor U/O   · Daily CXR to monitor volume status   · Nephrology following, appreciate further recommendations     Gastroenterology:   1.  Transaminitis likely 2/2 Lipitor use   · Continue holding Lipitor at this time   · Continue trending LFTs   ·   Endocrine:   1. Type 2 DM  · Continue Lantus 20U   · Continue HDSS   · Continue POCT BG q4hrs   · Hypoglycemia protocol in place     2. Hx of hypothyroidism   · Continue Synthroid 50mcg PO daily     Code Status:   FULL     PT/OT Consult: PT/OT consutled   Disposition: Continue current level of care     Final note: Continue sedation with propofol/fentanyl/versed gtt. Increase Seroquel to 100 BID. Add Libruim 25 TID. PEG placed today, trach aborted. Will formally consult ENT for trach placement. Continue MICU care and vent settings as appropriate for trach. Frederic Kaplan DO, PGY-1  Attending physician: Dr. Ana Dominguez personally saw, examined and provided care for the patient. Radiographs, labs and medication list were reviewed by me independently. I spoke with bedside nursing, therapists and consultants. Critical care services and times documented are independent of procedures and multidisciplinary rounds with Residents. Additionally comprehensive, multidisciplinary rounds were conducted with the MICU team. The case was discussed in detail and plans for care were established. Review of Residents documentation was conducted and revisions were made as appropriate. I agree with the above documented exam, problem list and plan of care.     For trach and peg   LALIT DE JESUS

## 2020-11-11 NOTE — PROGRESS NOTES
When stimulated, patient grabs for tubes and lines necessary for ICU care and patient begins to throw legs out of bed. Bilateral soft wrist restraints and bilateral soft ankle restraints continued for patient safety.

## 2020-11-11 NOTE — PLAN OF CARE
Problem: Restraint Use - Nonviolent/Non-Self-Destructive Behavior:  Goal: Absence of restraint-related injury  Description: Absence of restraint-related injury  11/11/2020 0458 by Raford Nissen, RN  Outcome: Met This Shift     Problem: Restraint Use - Nonviolent/Non-Self-Destructive Behavior:  Goal: Absence of restraint indications  Description: Absence of restraint indications  11/11/2020 0458 by Raford Nissen, RN  Outcome: Not Met This Shift     Problem: Pain:  Description: Pain management should include both nonpharmacologic and pharmacologic interventions. Goal: Pain level will decrease  Description: Pain level will decrease  Outcome: Ongoing     Problem: Pain:  Description: Pain management should include both nonpharmacologic and pharmacologic interventions. Goal: Control of acute pain  Description: Control of acute pain  Outcome: Ongoing     Problem: Pain:  Description: Pain management should include both nonpharmacologic and pharmacologic interventions.   Goal: Control of chronic pain  Description: Control of chronic pain  Outcome: Ongoing

## 2020-11-11 NOTE — PATIENT CARE CONFERENCE
[facility] Quality Flow/Interdisciplinary Rounds Progress Note        Quality Flow Rounds held on November 11, 2020    Disciplines Attending:  , pt/ot, resp therapy, bedside nurse, charge nurse, nursing management    Jigar Oscar was admitted on 10/26/2020  3:39 PM    Anticipated Discharge Date:       Disposition:    Romaine Score:  Romaine Scale Score: 13    Readmission Risk              Risk of Unplanned Readmission:        31           Discussed patient goal for the day, patient clinical progression, and barriers to discharge. The following Goal(s) of the Day/Commitment(s) have been identified: For Trach and Peg today.       Radha Ramírez  November 11, 2020

## 2020-11-12 ENCOUNTER — ANESTHESIA EVENT (OUTPATIENT)
Dept: OPERATING ROOM | Age: 60
DRG: 003 | End: 2020-11-12
Payer: MEDICARE

## 2020-11-12 ENCOUNTER — APPOINTMENT (OUTPATIENT)
Dept: GENERAL RADIOLOGY | Age: 60
DRG: 003 | End: 2020-11-12
Payer: MEDICARE

## 2020-11-12 ENCOUNTER — ANESTHESIA (OUTPATIENT)
Dept: OPERATING ROOM | Age: 60
DRG: 003 | End: 2020-11-12
Payer: MEDICARE

## 2020-11-12 VITALS — OXYGEN SATURATION: 95 % | SYSTOLIC BLOOD PRESSURE: 114 MMHG | DIASTOLIC BLOOD PRESSURE: 66 MMHG

## 2020-11-12 LAB
AADO2: 223.6 MMHG
ALBUMIN SERPL-MCNC: 3.3 G/DL (ref 3.5–5.2)
ALP BLD-CCNC: 148 U/L (ref 40–129)
ALT SERPL-CCNC: 131 U/L (ref 0–40)
ANION GAP SERPL CALCULATED.3IONS-SCNC: 14 MMOL/L (ref 7–16)
ANISOCYTOSIS: ABNORMAL
AST SERPL-CCNC: 58 U/L (ref 0–39)
B.E.: -2.3 MMOL/L (ref -3–3)
BASOPHILS ABSOLUTE: 0 E9/L (ref 0–0.2)
BASOPHILS RELATIVE PERCENT: 0.2 % (ref 0–2)
BILIRUB SERPL-MCNC: 0.4 MG/DL (ref 0–1.2)
BUN BLDV-MCNC: 31 MG/DL (ref 8–23)
CALCIUM IONIZED: 1.25 MMOL/L (ref 1.15–1.33)
CALCIUM SERPL-MCNC: 9.1 MG/DL (ref 8.6–10.2)
CHLORIDE BLD-SCNC: 101 MMOL/L (ref 98–107)
CO2: 23 MMOL/L (ref 22–29)
COHB: 0.3 % (ref 0–1.5)
CREAT SERPL-MCNC: 1.5 MG/DL (ref 0.7–1.2)
CRITICAL: ABNORMAL
DATE ANALYZED: ABNORMAL
DATE OF COLLECTION: ABNORMAL
EOSINOPHILS ABSOLUTE: 0.16 E9/L (ref 0.05–0.5)
EOSINOPHILS RELATIVE PERCENT: 1.8 % (ref 0–6)
FIO2: 50 %
GFR AFRICAN AMERICAN: 58
GFR NON-AFRICAN AMERICAN: 58 ML/MIN/1.73
GLUCOSE BLD-MCNC: 143 MG/DL (ref 74–99)
HCO3: 21.2 MMOL/L (ref 22–26)
HCT VFR BLD CALC: 32.2 % (ref 37–54)
HEMOGLOBIN: 10.9 G/DL (ref 12.5–16.5)
HHB: 4.2 % (ref 0–5)
LAB: ABNORMAL
LACTIC ACID: 0.8 MMOL/L (ref 0.5–2.2)
LIPASE: 65 U/L (ref 13–60)
LYMPHOCYTES ABSOLUTE: 1.73 E9/L (ref 1.5–4)
LYMPHOCYTES RELATIVE PERCENT: 19.3 % (ref 20–42)
Lab: ABNORMAL
MAGNESIUM: 2.1 MG/DL (ref 1.6–2.6)
MCH RBC QN AUTO: 26.2 PG (ref 26–35)
MCHC RBC AUTO-ENTMCNC: 33.9 % (ref 32–34.5)
MCV RBC AUTO: 77.4 FL (ref 80–99.9)
METER GLUCOSE: 107 MG/DL (ref 74–99)
METER GLUCOSE: 124 MG/DL (ref 74–99)
METER GLUCOSE: 152 MG/DL (ref 74–99)
METER GLUCOSE: 164 MG/DL (ref 74–99)
METHB: 0.4 % (ref 0–1.5)
MODE: AC
MONOCYTES ABSOLUTE: 0.27 E9/L (ref 0.1–0.95)
MONOCYTES RELATIVE PERCENT: 2.6 % (ref 2–12)
NEUTROPHILS ABSOLUTE: 6.92 E9/L (ref 1.8–7.3)
NEUTROPHILS RELATIVE PERCENT: 76.3 % (ref 43–80)
O2 CONTENT: 14.7 ML/DL
O2 SATURATION: 95.8 % (ref 92–98.5)
O2HB: 95.1 % (ref 94–97)
OPERATOR ID: 2593
PATIENT TEMP: 37 C
PCO2: 32 MMHG (ref 35–45)
PDW BLD-RTO: 20.4 FL (ref 11.5–15)
PEEP/CPAP: 8 CMH2O
PFO2: 1.69 MMHG/%
PH BLOOD GAS: 7.44 (ref 7.35–7.45)
PHOSPHORUS: 4.4 MG/DL (ref 2.5–4.5)
PLATELET # BLD: 270 E9/L (ref 130–450)
PMV BLD AUTO: 10.4 FL (ref 7–12)
PO2: 84.4 MMHG (ref 75–100)
POIKILOCYTES: ABNORMAL
POLYCHROMASIA: ABNORMAL
POTASSIUM SERPL-SCNC: 4.1 MMOL/L (ref 3.5–5)
RBC # BLD: 4.16 E12/L (ref 3.8–5.8)
RI(T): 2.65
RR MECHANICAL: 16 B/MIN
SCHISTOCYTES: ABNORMAL
SODIUM BLD-SCNC: 138 MMOL/L (ref 132–146)
SOURCE, BLOOD GAS: ABNORMAL
TARGET CELLS: ABNORMAL
THB: 10.9 G/DL (ref 11.5–16.5)
TIME ANALYZED: 219
TOTAL PROTEIN: 7.5 G/DL (ref 6.4–8.3)
TRIGL SERPL-MCNC: 299 MG/DL (ref 0–149)
VT MECHANICAL: 480 ML
WBC # BLD: 9.1 E9/L (ref 4.5–11.5)

## 2020-11-12 PROCEDURE — 2500000003 HC RX 250 WO HCPCS: Performed by: INTERNAL MEDICINE

## 2020-11-12 PROCEDURE — 7100000000 HC PACU RECOVERY - FIRST 15 MIN

## 2020-11-12 PROCEDURE — 85025 COMPLETE CBC W/AUTO DIFF WBC: CPT

## 2020-11-12 PROCEDURE — 6370000000 HC RX 637 (ALT 250 FOR IP): Performed by: INTERNAL MEDICINE

## 2020-11-12 PROCEDURE — 2580000003 HC RX 258: Performed by: INTERNAL MEDICINE

## 2020-11-12 PROCEDURE — 99221 1ST HOSP IP/OBS SF/LOW 40: CPT | Performed by: OTOLARYNGOLOGY

## 2020-11-12 PROCEDURE — 83735 ASSAY OF MAGNESIUM: CPT

## 2020-11-12 PROCEDURE — 6360000002 HC RX W HCPCS: Performed by: INTERNAL MEDICINE

## 2020-11-12 PROCEDURE — 82330 ASSAY OF CALCIUM: CPT

## 2020-11-12 PROCEDURE — 99233 SBSQ HOSP IP/OBS HIGH 50: CPT | Performed by: INTERNAL MEDICINE

## 2020-11-12 PROCEDURE — 82805 BLOOD GASES W/O2 SATURATION: CPT

## 2020-11-12 PROCEDURE — 3700000001 HC ADD 15 MINUTES (ANESTHESIA): Performed by: OTOLARYNGOLOGY

## 2020-11-12 PROCEDURE — 84100 ASSAY OF PHOSPHORUS: CPT

## 2020-11-12 PROCEDURE — 2709999900 HC NON-CHARGEABLE SUPPLY: Performed by: OTOLARYNGOLOGY

## 2020-11-12 PROCEDURE — 80053 COMPREHEN METABOLIC PANEL: CPT

## 2020-11-12 PROCEDURE — 2580000003 HC RX 258: Performed by: SPECIALIST

## 2020-11-12 PROCEDURE — 3600000003 HC SURGERY LEVEL 3 BASE: Performed by: OTOLARYNGOLOGY

## 2020-11-12 PROCEDURE — 7100000001 HC PACU RECOVERY - ADDTL 15 MIN

## 2020-11-12 PROCEDURE — 0BJ08ZZ INSPECTION OF TRACHEOBRONCHIAL TREE, VIA NATURAL OR ARTIFICIAL OPENING ENDOSCOPIC: ICD-10-PCS | Performed by: STUDENT IN AN ORGANIZED HEALTH CARE EDUCATION/TRAINING PROGRAM

## 2020-11-12 PROCEDURE — 2580000003 HC RX 258: Performed by: ANESTHESIOLOGIST ASSISTANT

## 2020-11-12 PROCEDURE — 2500000003 HC RX 250 WO HCPCS: Performed by: ANESTHESIOLOGIST ASSISTANT

## 2020-11-12 PROCEDURE — 51702 INSERT TEMP BLADDER CATH: CPT

## 2020-11-12 PROCEDURE — 2500000003 HC RX 250 WO HCPCS: Performed by: OTOLARYNGOLOGY

## 2020-11-12 PROCEDURE — 2000000000 HC ICU R&B

## 2020-11-12 PROCEDURE — 82962 GLUCOSE BLOOD TEST: CPT

## 2020-11-12 PROCEDURE — 36592 COLLECT BLOOD FROM PICC: CPT

## 2020-11-12 PROCEDURE — 6360000002 HC RX W HCPCS: Performed by: SPECIALIST

## 2020-11-12 PROCEDURE — 6360000002 HC RX W HCPCS: Performed by: ANESTHESIOLOGIST ASSISTANT

## 2020-11-12 PROCEDURE — 3600000013 HC SURGERY LEVEL 3 ADDTL 15MIN: Performed by: OTOLARYNGOLOGY

## 2020-11-12 PROCEDURE — 0B110F4 BYPASS TRACHEA TO CUTANEOUS WITH TRACHEOSTOMY DEVICE, OPEN APPROACH: ICD-10-PCS | Performed by: STUDENT IN AN ORGANIZED HEALTH CARE EDUCATION/TRAINING PROGRAM

## 2020-11-12 PROCEDURE — 3700000000 HC ANESTHESIA ATTENDED CARE: Performed by: OTOLARYNGOLOGY

## 2020-11-12 PROCEDURE — 94003 VENT MGMT INPAT SUBQ DAY: CPT

## 2020-11-12 PROCEDURE — 84478 ASSAY OF TRIGLYCERIDES: CPT

## 2020-11-12 PROCEDURE — 31600 PLANNED TRACHEOSTOMY: CPT | Performed by: OTOLARYNGOLOGY

## 2020-11-12 PROCEDURE — 83605 ASSAY OF LACTIC ACID: CPT

## 2020-11-12 PROCEDURE — 83690 ASSAY OF LIPASE: CPT

## 2020-11-12 PROCEDURE — C9113 INJ PANTOPRAZOLE SODIUM, VIA: HCPCS | Performed by: INTERNAL MEDICINE

## 2020-11-12 PROCEDURE — 94640 AIRWAY INHALATION TREATMENT: CPT

## 2020-11-12 PROCEDURE — 71045 X-RAY EXAM CHEST 1 VIEW: CPT

## 2020-11-12 PROCEDURE — 36415 COLL VENOUS BLD VENIPUNCTURE: CPT

## 2020-11-12 RX ORDER — SODIUM CHLORIDE 9 MG/ML
INJECTION, SOLUTION INTRAVENOUS CONTINUOUS PRN
Status: DISCONTINUED | OUTPATIENT
Start: 2020-11-12 | End: 2020-11-12 | Stop reason: SDUPTHER

## 2020-11-12 RX ORDER — SODIUM CHLORIDE 0.9 % (FLUSH) 0.9 %
10 SYRINGE (ML) INJECTION EVERY 12 HOURS SCHEDULED
Status: DISCONTINUED | OUTPATIENT
Start: 2020-11-12 | End: 2020-11-12 | Stop reason: HOSPADM

## 2020-11-12 RX ORDER — ROCURONIUM BROMIDE 10 MG/ML
INJECTION, SOLUTION INTRAVENOUS PRN
Status: DISCONTINUED | OUTPATIENT
Start: 2020-11-12 | End: 2020-11-12 | Stop reason: SDUPTHER

## 2020-11-12 RX ORDER — VECURONIUM BROMIDE 1 MG/ML
INJECTION, POWDER, LYOPHILIZED, FOR SOLUTION INTRAVENOUS PRN
Status: DISCONTINUED | OUTPATIENT
Start: 2020-11-12 | End: 2020-11-12 | Stop reason: SDUPTHER

## 2020-11-12 RX ORDER — LIDOCAINE HYDROCHLORIDE AND EPINEPHRINE 10; 10 MG/ML; UG/ML
INJECTION, SOLUTION INFILTRATION; PERINEURAL PRN
Status: DISCONTINUED | OUTPATIENT
Start: 2020-11-12 | End: 2020-11-12 | Stop reason: HOSPADM

## 2020-11-12 RX ORDER — PHENYLEPHRINE HCL IN 0.9% NACL 1 MG/10 ML
SYRINGE (ML) INTRAVENOUS PRN
Status: DISCONTINUED | OUTPATIENT
Start: 2020-11-12 | End: 2020-11-12 | Stop reason: SDUPTHER

## 2020-11-12 RX ORDER — SODIUM CHLORIDE 0.9 % (FLUSH) 0.9 %
10 SYRINGE (ML) INJECTION PRN
Status: DISCONTINUED | OUTPATIENT
Start: 2020-11-12 | End: 2020-11-12 | Stop reason: HOSPADM

## 2020-11-12 RX ORDER — MIDAZOLAM HYDROCHLORIDE 1 MG/ML
INJECTION INTRAMUSCULAR; INTRAVENOUS PRN
Status: DISCONTINUED | OUTPATIENT
Start: 2020-11-12 | End: 2020-11-12 | Stop reason: SDUPTHER

## 2020-11-12 RX ORDER — PROPOFOL 10 MG/ML
INJECTION, EMULSION INTRAVENOUS PRN
Status: DISCONTINUED | OUTPATIENT
Start: 2020-11-12 | End: 2020-11-12 | Stop reason: SDUPTHER

## 2020-11-12 RX ADMIN — PIPERACILLIN AND TAZOBACTAM 3.38 G: 3; .375 INJECTION, POWDER, FOR SOLUTION INTRAVENOUS at 06:00

## 2020-11-12 RX ADMIN — PROPOFOL 50 MCG/KG/MIN: 10 INJECTION, EMULSION INTRAVENOUS at 11:20

## 2020-11-12 RX ADMIN — FOLIC ACID 1 MG: 5 INJECTION, SOLUTION INTRAMUSCULAR; INTRAVENOUS; SUBCUTANEOUS at 09:03

## 2020-11-12 RX ADMIN — PROPOFOL 50 MG: 10 INJECTION, EMULSION INTRAVENOUS at 11:40

## 2020-11-12 RX ADMIN — Medication 300 MCG: at 11:50

## 2020-11-12 RX ADMIN — Medication 200 MCG/HR: at 23:18

## 2020-11-12 RX ADMIN — PROPOFOL 45 MCG/KG/MIN: 10 INJECTION, EMULSION INTRAVENOUS at 20:27

## 2020-11-12 RX ADMIN — MIDAZOLAM 4 MG/HR: 5 INJECTION INTRAMUSCULAR; INTRAVENOUS at 05:57

## 2020-11-12 RX ADMIN — PROPOFOL 50 MCG/KG/MIN: 10 INJECTION, EMULSION INTRAVENOUS at 23:19

## 2020-11-12 RX ADMIN — BUDESONIDE 500 MCG: 0.5 SUSPENSION RESPIRATORY (INHALATION) at 20:05

## 2020-11-12 RX ADMIN — HEPARIN SODIUM 7500 UNITS: 10000 INJECTION INTRAVENOUS; SUBCUTANEOUS at 16:57

## 2020-11-12 RX ADMIN — PROPOFOL 50 MCG/KG/MIN: 10 INJECTION, EMULSION INTRAVENOUS at 08:51

## 2020-11-12 RX ADMIN — Medication 100 MCG: at 13:12

## 2020-11-12 RX ADMIN — Medication 200 MCG/HR: at 09:07

## 2020-11-12 RX ADMIN — ARFORMOTEROL TARTRATE 15 MCG: 15 SOLUTION RESPIRATORY (INHALATION) at 09:07

## 2020-11-12 RX ADMIN — Medication 100 MCG: at 13:09

## 2020-11-12 RX ADMIN — SODIUM CHLORIDE: 9 INJECTION, SOLUTION INTRAVENOUS at 11:35

## 2020-11-12 RX ADMIN — PROPOFOL 50 MCG/KG/MIN: 10 INJECTION, EMULSION INTRAVENOUS at 05:58

## 2020-11-12 RX ADMIN — IPRATROPIUM BROMIDE AND ALBUTEROL SULFATE 1 AMPULE: 2.5; .5 SOLUTION RESPIRATORY (INHALATION) at 20:05

## 2020-11-12 RX ADMIN — Medication 100 MCG: at 12:09

## 2020-11-12 RX ADMIN — PROPOFOL 45 MCG/KG/MIN: 10 INJECTION, EMULSION INTRAVENOUS at 16:57

## 2020-11-12 RX ADMIN — SENNOSIDES 17.2 MG: 8.6 TABLET, FILM COATED ORAL at 21:20

## 2020-11-12 RX ADMIN — AMLODIPINE BESYLATE 10 MG: 10 TABLET ORAL at 09:04

## 2020-11-12 RX ADMIN — ARFORMOTEROL TARTRATE 15 MCG: 15 SOLUTION RESPIRATORY (INHALATION) at 20:05

## 2020-11-12 RX ADMIN — CHLORDIAZEPOXIDE HYDROCHLORIDE 25 MG: 25 CAPSULE ORAL at 02:16

## 2020-11-12 RX ADMIN — PIPERACILLIN AND TAZOBACTAM 3.38 G: 3; .375 INJECTION, POWDER, FOR SOLUTION INTRAVENOUS at 16:57

## 2020-11-12 RX ADMIN — PANTOPRAZOLE SODIUM 40 MG: 40 INJECTION, POWDER, FOR SOLUTION INTRAVENOUS at 09:02

## 2020-11-12 RX ADMIN — CHLORDIAZEPOXIDE HYDROCHLORIDE 25 MG: 25 CAPSULE ORAL at 16:57

## 2020-11-12 RX ADMIN — HEPARIN SODIUM 7500 UNITS: 10000 INJECTION INTRAVENOUS; SUBCUTANEOUS at 00:20

## 2020-11-12 RX ADMIN — CHLORHEXIDINE GLUCONATE 0.12% ORAL RINSE 15 ML: 1.2 LIQUID ORAL at 09:02

## 2020-11-12 RX ADMIN — ROCURONIUM BROMIDE 25 MG: 10 INJECTION, SOLUTION INTRAVENOUS at 11:45

## 2020-11-12 RX ADMIN — SODIUM CHLORIDE, PRESERVATIVE FREE 10 ML: 5 INJECTION INTRAVENOUS at 09:05

## 2020-11-12 RX ADMIN — PROPOFOL 50 MCG/KG/MIN: 10 INJECTION, EMULSION INTRAVENOUS at 03:39

## 2020-11-12 RX ADMIN — DEXAMETHASONE SODIUM PHOSPHATE 4 MG: 4 INJECTION, SOLUTION INTRAMUSCULAR; INTRAVENOUS at 02:16

## 2020-11-12 RX ADMIN — MIDAZOLAM 2 MG: 1 INJECTION INTRAMUSCULAR; INTRAVENOUS at 11:40

## 2020-11-12 RX ADMIN — ASPIRIN 81 MG CHEWABLE TABLET 81 MG: 81 TABLET CHEWABLE at 09:04

## 2020-11-12 RX ADMIN — CARVEDILOL 3.12 MG: 6.25 TABLET, FILM COATED ORAL at 18:14

## 2020-11-12 RX ADMIN — IPRATROPIUM BROMIDE AND ALBUTEROL SULFATE 1 AMPULE: 2.5; .5 SOLUTION RESPIRATORY (INHALATION) at 09:07

## 2020-11-12 RX ADMIN — SODIUM CHLORIDE SOLN NEBU 3% 2 ML: 3 NEBU SOLN at 04:56

## 2020-11-12 RX ADMIN — QUETIAPINE FUMARATE 100 MG: 100 TABLET ORAL at 09:04

## 2020-11-12 RX ADMIN — BUDESONIDE 500 MCG: 0.5 SUSPENSION RESPIRATORY (INHALATION) at 09:07

## 2020-11-12 RX ADMIN — Medication 100 MCG: at 12:52

## 2020-11-12 RX ADMIN — SODIUM CHLORIDE: 9 INJECTION, SOLUTION INTRAVENOUS at 20:28

## 2020-11-12 RX ADMIN — QUETIAPINE FUMARATE 100 MG: 100 TABLET ORAL at 21:20

## 2020-11-12 RX ADMIN — CHLORHEXIDINE GLUCONATE 0.12% ORAL RINSE 15 ML: 1.2 LIQUID ORAL at 21:20

## 2020-11-12 RX ADMIN — LEVOTHYROXINE SODIUM 50 MCG: 0.05 TABLET ORAL at 05:58

## 2020-11-12 RX ADMIN — Medication 10 ML: at 09:03

## 2020-11-12 RX ADMIN — Medication 200 MCG/HR: at 02:35

## 2020-11-12 RX ADMIN — PROPOFOL 50 MCG/KG/MIN: 10 INJECTION, EMULSION INTRAVENOUS at 14:52

## 2020-11-12 RX ADMIN — ROCURONIUM BROMIDE 25 MG: 10 INJECTION, SOLUTION INTRAVENOUS at 11:41

## 2020-11-12 RX ADMIN — VECURONIUM BROMIDE 3 MG: 10 INJECTION, POWDER, LYOPHILIZED, FOR SOLUTION INTRAVENOUS at 11:59

## 2020-11-12 RX ADMIN — CHLORDIAZEPOXIDE HYDROCHLORIDE 25 MG: 25 CAPSULE ORAL at 09:04

## 2020-11-12 RX ADMIN — PIPERACILLIN AND TAZOBACTAM 3.38 G: 3; .375 INJECTION, POWDER, FOR SOLUTION INTRAVENOUS at 23:44

## 2020-11-12 RX ADMIN — VECURONIUM BROMIDE 2 MG: 10 INJECTION, POWDER, LYOPHILIZED, FOR SOLUTION INTRAVENOUS at 12:57

## 2020-11-12 RX ADMIN — PROPOFOL 50 MCG/KG/MIN: 10 INJECTION, EMULSION INTRAVENOUS at 01:27

## 2020-11-12 RX ADMIN — Medication 200 MCG/HR: at 16:57

## 2020-11-12 RX ADMIN — SODIUM CHLORIDE SOLN NEBU 3% 2 ML: 3 NEBU SOLN at 20:05

## 2020-11-12 RX ADMIN — THIAMINE HYDROCHLORIDE 100 MG: 100 INJECTION, SOLUTION INTRAMUSCULAR; INTRAVENOUS at 16:07

## 2020-11-12 RX ADMIN — SODIUM CHLORIDE SOLN NEBU 3% 2 ML: 3 NEBU SOLN at 09:10

## 2020-11-12 RX ADMIN — Medication 10 ML: at 21:20

## 2020-11-12 ASSESSMENT — PULMONARY FUNCTION TESTS
PIF_VALUE: 39
PIF_VALUE: 1
PIF_VALUE: 56
PIF_VALUE: 39
PIF_VALUE: 41
PIF_VALUE: 35
PIF_VALUE: 42
PIF_VALUE: 38
PIF_VALUE: 4
PIF_VALUE: 0
PIF_VALUE: 39
PIF_VALUE: 0
PIF_VALUE: 39
PIF_VALUE: 37
PIF_VALUE: 24
PIF_VALUE: 39
PIF_VALUE: 38
PIF_VALUE: 39
PIF_VALUE: 39
PIF_VALUE: 3
PIF_VALUE: 0
PIF_VALUE: 38
PIF_VALUE: 38
PIF_VALUE: 39
PIF_VALUE: 37
PIF_VALUE: 39
PIF_VALUE: 41
PIF_VALUE: 39
PIF_VALUE: 24
PIF_VALUE: 36
PIF_VALUE: 41
PIF_VALUE: 37
PIF_VALUE: 39
PIF_VALUE: 39
PIF_VALUE: 41
PIF_VALUE: 35
PIF_VALUE: 4
PIF_VALUE: 38
PIF_VALUE: 0
PIF_VALUE: 33
PIF_VALUE: 0
PIF_VALUE: 39
PIF_VALUE: 37
PIF_VALUE: 44
PIF_VALUE: 0
PIF_VALUE: 28
PIF_VALUE: 38
PIF_VALUE: 60
PIF_VALUE: 1
PIF_VALUE: 1
PIF_VALUE: 39
PIF_VALUE: 37
PIF_VALUE: 43
PIF_VALUE: 43
PIF_VALUE: 39
PIF_VALUE: 0
PIF_VALUE: 39
PIF_VALUE: 36
PIF_VALUE: 44
PIF_VALUE: 38
PIF_VALUE: 38
PIF_VALUE: 41
PIF_VALUE: 34
PIF_VALUE: 39
PIF_VALUE: 44
PIF_VALUE: 0
PIF_VALUE: 39
PIF_VALUE: 43
PIF_VALUE: 39
PIF_VALUE: 42
PIF_VALUE: 0
PIF_VALUE: 39
PIF_VALUE: 0
PIF_VALUE: 34
PIF_VALUE: 37
PIF_VALUE: 35
PIF_VALUE: 35
PIF_VALUE: 41
PIF_VALUE: 1
PIF_VALUE: 38
PIF_VALUE: 39
PIF_VALUE: 24
PIF_VALUE: 39
PIF_VALUE: 0
PIF_VALUE: 39
PIF_VALUE: 38
PIF_VALUE: 39
PIF_VALUE: 39
PIF_VALUE: 42
PIF_VALUE: 37
PIF_VALUE: 0
PIF_VALUE: 0
PIF_VALUE: 43
PIF_VALUE: 44
PIF_VALUE: 32
PIF_VALUE: 0
PIF_VALUE: 39
PIF_VALUE: 39
PIF_VALUE: 36
PIF_VALUE: 41
PIF_VALUE: 4
PIF_VALUE: 39
PIF_VALUE: 39
PIF_VALUE: 38
PIF_VALUE: 51
PIF_VALUE: 39
PIF_VALUE: 43
PIF_VALUE: 53
PIF_VALUE: 39
PIF_VALUE: 0
PIF_VALUE: 42
PIF_VALUE: 39
PIF_VALUE: 38
PIF_VALUE: 0
PIF_VALUE: 4
PIF_VALUE: 34
PIF_VALUE: 38
PIF_VALUE: 38
PIF_VALUE: 39
PIF_VALUE: 46
PIF_VALUE: 38
PIF_VALUE: 39
PIF_VALUE: 41
PIF_VALUE: 0
PIF_VALUE: 50
PIF_VALUE: 0

## 2020-11-12 ASSESSMENT — COPD QUESTIONNAIRES: CAT_SEVERITY: SEVERE

## 2020-11-12 ASSESSMENT — ENCOUNTER SYMPTOMS: SHORTNESS OF BREATH: 1

## 2020-11-12 ASSESSMENT — PAIN SCALES - GENERAL
PAINLEVEL_OUTOF10: 0

## 2020-11-12 NOTE — PLAN OF CARE
Problem: Restraint Use - Nonviolent/Non-Self-Destructive Behavior:  Goal: Absence of restraint-related injury  Description: Absence of restraint-related injury  11/12/2020 0100 by Patricio Meyer RN  Outcome: Met This Shift     Problem: Restraint Use - Nonviolent/Non-Self-Destructive Behavior:  Goal: Absence of restraint indications  Description: Absence of restraint indications  11/12/2020 0100 by Patricio Meyer RN  Outcome: Not Met This Shift

## 2020-11-12 NOTE — PROGRESS NOTES
Called and left message for patients wife [facility] to notify her that patient will be going to OR around 11 AM.

## 2020-11-12 NOTE — PROGRESS NOTES
PT SEEN AND EXAMINED. intubated, in icu. BP better. Sedated. for peg/trach, when weaned a bit      Chart reviewed. meds reviewed. D/w nursing + family as available. EXAM: IN GENERAL, NADJaqueline Gregorio ROS NEGx10 EXCEPT:   /67   Pulse 62   Temp 97.5 °F (36.4 °C) (Axillary)   Resp 16   Ht 5' 8\" (1.727 m)   Wt 290 lb 6.4 oz (131.7 kg)   SpO2 97%   BMI 44.16 kg/m²   GEN:  NAD. HEENT: NCAT. NECK: NO JVD. TRACH MIDLINE. NO BRUITS. NO THYROMEGALY. LUNGS: CTA BL NO RALES, RHONCHI OR WHEEZES. GOOD EXCURSION. CV: Regular rate and rhythm, NO Murmurs, Rubs, Or gallops  ABD: Soft. Nontender. Normal bowel sounds.  No organomegaly  EXT:No clubbing cyanosis or edema  Neuro: Labs/data reviewedLABS: CBC with Differential:    Lab Results   Component Value Date    WBC 9.1 11/12/2020    RBC 4.16 11/12/2020    HGB 10.9 11/12/2020    HCT 32.2 11/12/2020     11/12/2020    MCV 77.4 11/12/2020    MCH 26.2 11/12/2020    MCHC 33.9 11/12/2020    RDW 20.4 11/12/2020    NRBC 4.3 11/02/2020    SEGSPCT 73 01/26/2014    METASPCT 0.9 11/07/2020    LYMPHOPCT 19.3 11/12/2020    MONOPCT 2.6 11/12/2020    MYELOPCT 0.9 11/05/2020    BASOPCT 0.2 11/12/2020    MONOSABS 0.27 11/12/2020    LYMPHSABS 1.73 11/12/2020    EOSABS 0.16 11/12/2020    BASOSABS 0.00 11/12/2020     Platelets:    Lab Results   Component Value Date     11/12/2020     CMP:    Lab Results   Component Value Date     11/12/2020    K 4.1 11/12/2020    K 4.0 10/27/2020     11/12/2020    CO2 23 11/12/2020    BUN 31 11/12/2020    CREATININE 1.5 11/12/2020    GFRAA 58 11/12/2020    LABGLOM 58 11/12/2020    GLUCOSE 143 11/12/2020    PROT 7.5 11/12/2020    LABALBU 3.3 11/12/2020    CALCIUM 9.1 11/12/2020    BILITOT 0.4 11/12/2020    ALKPHOS 148 11/12/2020    AST 58 11/12/2020     11/12/2020     Magnesium:    Lab Results   Component Value Date    MG 2.1 11/12/2020     LDH:    Lab Results   Component Value Date     10/28/2020     PT/INR:    Lab  sodium chloride   Intravenous Q8H    carvedilol  3.125 mg Oral BID WC    folic acid  1 mg Intravenous Daily    thiamine (VITAMIN B1) IVPB  100 mg Intravenous Q24H    [Held by provider] insulin glargine  20 Units Subcutaneous Nightly    sodium chloride (Inhalant)  2 mL Nebulization Q4H    heparin (porcine)  7,500 Units Subcutaneous Q8H    pantoprazole  40 mg Intravenous Daily    And    sodium chloride (PF)  10 mL Intravenous Daily    sodium chloride flush  10 mL Intravenous 2 times per day    Arformoterol Tartrate  15 mcg Nebulization BID    chlorhexidine  15 mL Mouth/Throat BID    amLODIPine  10 mg Oral Daily    aspirin  81 mg Oral Daily    levothyroxine  50 mcg Oral Daily    [Held by provider] losartan  100 mg Oral Daily    budesonide  0.5 mg Nebulization BID    ipratropium-albuterol  1 ampule Inhalation Q4H WA       Continuous Infusions:   midazolam 4 mg/hr (11/12/20 1135)    fentaNYL 5 mcg/ml in 0.9%  ml infusion 200 mcg/hr (11/12/20 1135)    propofol 50 mcg/kg/min (11/12/20 1452)    dextrose         PRN Meds:polyethylene glycol, perflutren lipid microspheres, acetaminophen, polyvinyl alcohol, hydrALAZINE, sodium chloride flush, [DISCONTINUED] acetaminophen **OR** acetaminophen, colchicine, glucose, dextrose, glucagon (rDNA), dextrose    A/P:      Patient Active Problem List   Diagnosis    Hyperlipidemia    Type 2 diabetes mellitus without complication, without long-term current use of insulin (HCC)    Atypical chest pain    Essential hypertension    Chronic acquired lymphedema    Aortic valve disease    Morbid obesity due to excess calories (HCC)    Abdominal pain    Acute respiratory failure with hypoxia (HCC)    Acute pulmonary edema (HCC)    Congestive heart failure with LV diastolic dysfunction, NYHA class 3 (HCC)    Obstructive sleep apnea    Acquired hypothyroidism    Chronic diastolic heart failure (HCC)    Nonrheumatic aortic valve stenosis    ACE-inhibitor cough    Pneumonia    Acute gout of right knee   Acute encephalopathy 2/2 Acute hypoxic hypercapnic respiratory failure.         Acute hypoxic hypercapnic respiratory failure 2/2 MSSA pneumonia versus COPD versus ADHF versus chronic OHS  Pt has Hx chronic HANS not on CPAP at home. ABG showed pH 7.188/91.2/78.9/33 on RRT. currently intubated. - Continue to monitor respiratory status, wean down FiO2 as tolerated. - Pro  on presentation.   - US dup LE negative for DVT. CTA negative for PE. COVID -19 negative x2  - Continue breathing treatment  - Monitor daily CXR. CBC.   - Pulmonology consulted, further recommendations appreciated.     NANCY stage III likely pre renal 2/2 diuretic use, contrast agent vs? cardiorenal syndrome. -   - Nephrology consulted. Further recommendations appreciated. - Continue monitor daily BMP, renal function. Monitor I/O. Avoid nephrotoxicity.       · Bacterial pneumonia, likely aspiration pneumonia vs MSSA   · Shock, sepsis- resolved   · ATBx per id  Alcohol withdrawal .    Hx HFpEF, EF 65%cta noted  prob needs ltac  Wean per pulm/ccm  Peg/trach PER ENT  Monitor HR

## 2020-11-12 NOTE — CARE COORDINATION
11/12 Care Coordination: Spoke with Elias's daughter Olu Whitfield about discharge planning. Discussed LTAC's. She will talk with her mom and sister for Option. Await ENT for Gio Dominguez. General Surgery could not complete Trach 11/11. Did place peg CM/SW will continue to follow for discharge planning.    Preeti HARRIS,RN-BC  [phone]

## 2020-11-12 NOTE — OP NOTE
Operative Note      Patient: Rosemary Martino  YOB: 1960  MRN: [id number]    Date of Procedure: 11/12/2020    Pre-Op Diagnosis: PROLONGED INTUBATION    Post-Op Diagnosis: Same       Procedure:  Open tracheotomy, bronchoscopy       Surgeon(s):  Aixa Busby DO    Assistant:   Resident: Oren Mak DO; Bonnie Diez DO    Anesthesia: General    Estimated Blood Loss (mL): less than 50     Complications: None    Specimens:   * No specimens in log *    Implants:  * No implants in log *      Drains:   Gastrostomy/Enterostomy/Jejunostomy Percutaneous Endoscopic Gastrostomy (PEG) LUQ 1 20 fr (Active)   Drainage Appearance Green;Brown 11/12/20 0400   Site Description Unable to view 11/12/20 0800   Drain Status Clamped 11/12/20 0800   Surrounding Skin Dry; Intact 11/12/20 0800   Dressing Status Clean;Dry; Intact 11/12/20 0800   Dressing Type Dry dressing 11/12/20 0800   Dressing Change Due 11/13/20 11/12/20 0800   Free Water Flush (mL) 60 mL 11/12/20 0800   Residual Volume (ml) 130 ml 11/12/20 0600       Urethral Catheter Non-latex 16 fr (Active)   $ Urethral catheter insertion $ Not inserted for procedure 11/12/20 1002   Catheter Indications Need for fluid management in critically ill patients in a critical care setting not able to be managed by other means such as BSC with hat, bedpan, urinal, condom catheter, or short term intermittent urethral catherization 11/12/20 1003   Site Assessment No urethral drainage 11/12/20 1003   Urine Color Yellow; [place] 11/12/20 1003   Urine Appearance Hazy 11/12/20 1003   Output (mL) 100 mL 11/12/20 1057       [REMOVED] NG/OG/NJ/NE Tube Center mouth (Removed)   Surrounding Skin Dry; Intact 11/05/20 2000   Securement device Yes 11/05/20 2000   Status Other (Comment) 11/05/20 2000   Placement Verified by X-Ray (repeat) 11/02/20 0800   NG/OG/NJ/NE External Measurement (cm) 58 cm 11/04/20 2200   Tube Feeding Diabetic 11/05/20 0100   Tube Feeding Status Continuous Amount Medium 11/08/20 0600   Fecal Management Tube Output 1000 ml 11/08/20 1400       Findings: #8 Shiley proximal XLT placed     Detailed Description of Procedure:     Pt was consented preoperatively, taken to the OR and identified appropriately. Pt was placed in the supine position on the Operating table. The patient was then prepped and draped in a sterile fashion. Using 2% lidocaine with epinephrine 10cc was injected into the neck. A #15 blade was used to make a horizontal incision in the patient's neck just below the cricoid and trachea through the previous incision and suture. Dissection was carried down to the cricoid and tracheal rings, excessive granulation tissue was present just immediately inferior to the cricoid overlying the rings. This was removed with hemostat and scissors. The cricothyroid membrane appeared calcified. Once the tracheal rings were exposed a #11 blade was used to incise a tracheal window approximately through ring 2 and 3. A cric hook was used to retract superiorly and helped with visualization. A Shiley #8 proximal XLT trach was inserted and cuff inflated, tidal volumes were quickly lost and the Shiley was removed which revealed that the balloon had been punctured on the calcified anterior trachea. A ET tube was inserted into the airway and patient was oxygenated up to upper 90s while a replacement Shiley was obtained. The ET tube was removed and the replacement Shiley #8 proximal XLT was placed with end total and good tidal volume. A flexible bronchoscope was then used to look through the trach tube and revealed a sharp ellie without obstruction and the trach in good position. The tracheal plate was then sewn into place with 2-0 Ethilon suture. The tracheal tie was placed around the neck. Patient was then turned back to anesthesia for ventilation. Patient tolerated procedure well. Dr. Jovanna Nichols was present and scrubbed the entire procedure.      Electronically signed by Eryn Briceno DO on 11/12/2020 at 1:46 PM

## 2020-11-12 NOTE — ANESTHESIA PRE PROCEDURE
Department of Anesthesiology  Preprocedure Note       Name:  Lazara Zaragoza   Age:  61 y.o.  :  1960                                          MRN:  [id number]         Date:  2020      Surgeon: Lisa Milligan):  Alexei Rasmussen DO    Procedure: Procedure(s):  TRACHEOTOMY, BRONCHOSCOPY, DIRECT ,LARYNGOSCOPY    Medications prior to admission:   Prior to Admission medications    Medication Sig Start Date End Date Taking?  Authorizing Provider   carvedilol (COREG) 25 MG tablet Take 0.5 tablets by mouth 2 times daily 20   Payton Oscar MD   olmesartan (BENICAR) 20 MG tablet Take 1 tablet by mouth daily 20   Payton Ocsar MD   losartan (COZAAR) 100 MG tablet Take 1 tablet by mouth daily 20   Payton Oscar MD   amLODIPine (NORVASC) 10 MG tablet Take 1 tablet by mouth daily 20   Payton Oscar MD   metFORMIN (GLUCOPHAGE-XR) 500 MG extended release tablet TAKE 1 TABLET BY MOUTH EVERY DAY WITH BREAKFAST 20   Payton Oscar MD   levothyroxine (SYNTHROID) 50 MCG tablet TAKE 1 TABLET BY MOUTH EVERY DAY 20   Payton Oscar MD   atorvastatin (LIPITOR) 40 MG tablet Take 1 tablet by mouth daily 20   Payton Oscar MD   omeprazole (PRILOSEC) 40 MG delayed release capsule TAKE 1 CAPSULE BY MOUTH EVERY DAY 19   Payton Oscar MD   ondansetron (ZOFRAN-ODT) 4 MG disintegrating tablet Take 1 tablet by mouth every 8 hours as needed for Nausea Ok to substitute for standard Zofran non ODT if cost prohibitive 19   Layla Schulz MD   aspirin 81 MG tablet Take 81 mg by mouth daily    Historical Provider, MD   fluticasone-salmeterol (ADVAIR DISKUS) 100-50 MCG/DOSE diskus inhaler Inhale 1 puff into the lungs every 12 hours 19   Payton Oscar MD   colchicine (COLCRYS) 0.6 MG tablet Take 1 tablet by mouth 2 times daily as needed for Pain 19   Payton Oscar MD   Handicap Placard MISC by Does not apply route For 5 years 10/3/18   Payton Oscar MD   Compression Stockings MISC Chronic left leg swelling. 2/21/17   Ismael De La Trore MD       Current medications:    Current Facility-Administered Medications   Medication Dose Route Frequency Provider Last Rate Last Dose    sodium chloride flush 0.9 % injection 10 mL  10 mL Intravenous 2 times per day Scott Riggs,         sodium chloride flush 0.9 % injection 10 mL  10 mL Intravenous PRN Scott Riggs,         chlordiazePOXIDE (LIBRIUM) capsule 25 mg  25 mg Oral Q8H [facility] Marina Nuno, DO   25 mg at 11/12/20 9282    QUEtiapine (SEROQUEL) tablet 100 mg  100 mg Oral BID Shelah Sat Polnata Jaqueline, DO   100 mg at 11/12/20 3040    midazolam (VERSED) 100 mg in dextrose 5 % 100 mL infusion  1 mg/hr Intravenous Continuous Ashley L Cespedes, DO 4 mL/hr at 11/12/20 0557 4 mg/hr at 11/12/20 0557    senna (SENOKOT) tablet 17.2 mg  2 tablet Oral Nightly Ashley L Cespedes, DO   17.2 mg at 11/11/20 2038    polyethylene glycol (GLYCOLAX) packet 17 g  17 g Oral Daily PRN Otoniel Xavi, DO        fentaNYL 5 mcg/ml in 0.9%  ml infusion  25 mcg/hr Intravenous Continuous Jess Garvin MD 40 mL/hr at 11/12/20 0907 200 mcg/hr at 11/12/20 9869    perflutren lipid microspheres (DEFINITY) injection 1.65 mg  1.5 mL Intravenous ONCE PRN Becca Fuller Jr., DO        insulin lispro (HUMALOG) injection vial 0-18 Units  0-18 Units Subcutaneous Q4H [facility] Marina Nuno, DO   Stopped at 11/10/20 1724    acetaminophen (TYLENOL) 160 MG/5ML solution 650 mg  650 mg Oral Q6H PRN Becca Jonny Alegria, DO        piperacillin-tazobactam (ZOSYN) 3.375 g in dextrose 5 % 100 mL IVPB extended infusion (mini-bag)  3.375 g Intravenous Q8H Ian Bucio MD   Stopped at 11/12/20 0959    Zosyn Flush (0.9 % sodium chloride infusion)   Intravenous Q8H Ian Bucio MD   Stopped at 11/10/20 1557    carvedilol (COREG) tablet 3.125 mg  3.125 mg Oral BID  Jess Garvin MD   Stopped at 11/12/20 0834    propofol injection  10 mcg/kg/min Intravenous Titrated Yaquelin Fields MD 38.8 mL/hr at 11/12/20 1120 50 mcg/kg/min at 78/76/48 9948    folic acid injection 1 mg  1 mg Intravenous Daily Jesus Young MD   1 mg at 11/12/20 7188    thiamine (B-1) 100 mg in sodium chloride 0.9 % 100 mL IVPB  100 mg Intravenous Q24H Jesus Young MD   Stopped at 11/11/20 1810    polyvinyl alcohol (LIQUIFILM TEARS) 1.4 % ophthalmic solution 1 drop  1 drop Both Eyes Q4H PRN Yaquelin Fields MD   1 drop at 11/02/20 1526    [Held by provider] insulin glargine (LANTUS) injection vial 20 Units  20 Units Subcutaneous Nightly Glenn May MD   20 Units at 11/07/20 2014    sodium chloride (Inhalant) 3 % nebulizer solution 2 mL  2 mL Nebulization Q4H Farrah Arredondo MD   2 mL at 11/12/20 0910    hydrALAZINE (APRESOLINE) injection 10 mg  10 mg Intravenous Q4H PRN Nona Hernandez MD   10 mg at 11/11/20 1907    heparin (porcine) injection 7,500 Units  7,500 Units Subcutaneous Q8H Nona Hernandez MD   Stopped at 11/12/20 0726    pantoprazole (PROTONIX) injection 40 mg  40 mg Intravenous Daily Sylvia Álvarez MD   40 mg at 11/12/20 0902    And    sodium chloride (PF) 0.9 % injection 10 mL  10 mL Intravenous Daily Sylvia Álvarez MD   10 mL at 11/12/20 0905    sodium chloride flush 0.9 % injection 10 mL  10 mL Intravenous 2 times per day Stephen Parks MD   10 mL at 11/11/20 0838    sodium chloride flush 0.9 % injection 10 mL  10 mL Intravenous PRN Stephen Parks MD   10 mL at 11/12/20 0903    acetaminophen (TYLENOL) suppository 650 mg  650 mg Rectal Q6H PRN Stephen Parks MD        Arformoterol Tartrate (BROVANA) nebulizer solution 15 mcg  15 mcg Nebulization BID Stephen Parks MD   15 mcg at 11/12/20 0907    chlorhexidine (PERIDEX) 0.12 % solution 15 mL  15 mL Mouth/Throat BID Glenn May MD   15 mL at 11/12/20 0902    amLODIPine (NORVASC) tablet 10 mg  10 mg Oral Daily Courtney Winslow MD   10 mg at 11/12/20 0904    aspirin chewable tablet 81 mg  81 mg Oral Daily Payton Oscar MD   81 mg at 11/12/20 0904    colchicine (COLCRYS) tablet 0.6 mg  0.6 mg Oral BID PRN Payton Oscar MD   0.6 mg at 11/07/20 0909    levothyroxine (SYNTHROID) tablet 50 mcg  50 mcg Oral Daily Payton Oscar MD   50 mcg at 11/12/20 0558    [Held by provider] losartan (COZAAR) tablet 100 mg  100 mg Oral Daily Payton Oscar MD   100 mg at 10/28/20 0849    budesonide (PULMICORT) nebulizer suspension 500 mcg  0.5 mg Nebulization BID Payton Oscar MD   500 mcg at 11/12/20 0907    ipratropium-albuterol (DUONEB) nebulizer solution 1 ampule  1 ampule Inhalation Q4H [place] Payton Oscar MD   1 ampule at 11/12/20 0907    glucose (GLUTOSE) 40 % oral gel 15 g  15 g Oral PRN Payton Oscar MD        dextrose 50 % IV solution  12.5 g Intravenous PRN Payton Oscar MD        glucagon (rDNA) injection 1 mg  1 mg Intramuscular PRN Payton Oscar MD        dextrose 5 % solution  100 mL/hr Intravenous PRN Payton Oscar MD           Allergies:     Allergies   Allergen Reactions    Bee Venom Anaphylaxis    Shellfish-Derived Products Anaphylaxis     Only crab legs       Problem List:    Patient Active Problem List   Diagnosis Code    Hyperlipidemia E78.5    Type 2 diabetes mellitus without complication, without long-term current use of insulin ([facility]) E11.9    Atypical chest pain R07.89    Essential hypertension I10    Chronic acquired lymphedema I89.0    Aortic valve disease I35.9    Morbid obesity due to excess calories ([facility]) E66.01    Abdominal pain R10.9    Acute respiratory failure with hypoxia ([facility]) J96.01    Acute pulmonary edema ([facility]) J81.0    Congestive heart failure with LV diastolic dysfunction, NYHA class 3 ([facility]) I50.30    Obstructive sleep apnea G47.33    Acquired hypothyroidism E03.9    Chronic diastolic heart failure ([facility]) I50.32    Nonrheumatic aortic valve stenosis I35.0    ACE-inhibitor cough R05, T46.4X5A    Pneumonia J18.9    Acute gout of right knee M10.9       Past Medical History:        Diagnosis Date    Acquired hypothyroidism 9/26/2017    Anxiety     Congestive heart failure with LV diastolic dysfunction, NYHA class 3 [facility]     Depression     DM (diabetes mellitus) ([facility]     Essential hypertension 6/1/2016    Gouty arthritis 2006    Hyperlipidemia     Hypertension     Lymphedema     Obesity     Obstructive sleep apnea 8/17/2009    Obstructive sleep apnea 9/26/2017    Osteoarthritis     Type 2 diabetes mellitus without complication, without long-term current use of insulin ([facility] 1/15/2014       Past Surgical History:        Procedure Laterality Date    BRONCHOSCOPY  08/10/2017    ECHO COMPL W DOP COLOR FLOW  6/21/2013         EXTERNAL EAR SURGERY  6/2/2009    keloid left ear    FOOT SURGERY  1990    Left foot    GASTROSTOMY TUBE PLACEMENT  08/10/2017    TRACHEOSTOMY N/A 11/11/2020    OPEN TRACHEOTOMY AND PEG TUBE INSERTION -- AVAILABLE AFTER 1130 performed by Pantera Pedraza MD at [facility]  08/10/2017    UPPER GASTROINTESTINAL ENDOSCOPY N/A 11/11/2020    EGD ESOPHAGOGASTRODUODENOSCOPY PEG TUBE INSERTION performed by Pantera Pedraza MD at [address] [place] [address] History:    Social History     Tobacco Use    Smoking status: Former Smoker     Packs/day: 0.10     Years: 10.00     Pack years: 1.00     Types: Cigarettes     Last attempt to quit: 2/19/2005     Years since quitting: 15.7    Smokeless tobacco: Former User     Types: Chew     Quit date: 1/1/1990   Substance Use Topics    Alcohol use: Yes     Frequency: 2-3 times a week     Drinks per session: 1 or 2     Binge frequency: Never     Comment: socially                                Counseling given: Not Answered      Vital Signs (Current):   Vitals:    11/12/20 1000 11/12/20 1002 11/12/20 1054 11/12/20 1100   BP: (!) 117/56 (!) 117/56 (!) 121/58 123/63   Pulse: 61 61 53 54   Resp: 16 16 16 16   Temp:  37 °C (98.6 °F) 36.9 °C (98.4 °F)    TempSrc:  Axillary Axillary SpO2: 95% 95% 94% 94%   Weight:       Height:                                                  BP Readings from Last 3 Encounters:   11/12/20 123/63   11/11/20 131/71   06/15/20 124/76       NPO Status:                                                                                 BMI:   Wt Readings from Last 3 Encounters:   11/12/20 290 lb 6.4 oz (131.7 kg)   06/15/20 (!) 326 lb (147.9 kg)   03/02/20 (!) 322 lb (146.1 kg)     Body mass index is 44.16 kg/m². CBC:   Lab Results   Component Value Date    WBC 9.1 11/12/2020    RBC 4.16 11/12/2020    HGB 10.9 11/12/2020    HCT 32.2 11/12/2020    MCV 77.4 11/12/2020    RDW 20.4 11/12/2020     11/12/2020       CMP:   Lab Results   Component Value Date     11/12/2020    K 4.1 11/12/2020    K 4.0 10/27/2020     11/12/2020    CO2 23 11/12/2020    BUN 31 11/12/2020    CREATININE 1.5 11/12/2020    GFRAA 58 11/12/2020    LABGLOM 58 11/12/2020    GLUCOSE 143 11/12/2020    PROT 7.5 11/12/2020    CALCIUM 9.1 11/12/2020    BILITOT 0.4 11/12/2020    ALKPHOS 148 11/12/2020    AST 58 11/12/2020     11/12/2020       POC Tests: No results for input(s): POCGLU, POCNA, POCK, POCCL, POCBUN, POCHEMO, POCHCT in the last 72 hours.     Coags:   Lab Results   Component Value Date    PROTIME 10.9 07/21/2018    INR 0.9 07/21/2018       HCG (If Applicable): No results found for: PREGTESTUR, PREGSERUM, HCG, HCGQUANT     ABGs:   Lab Results   Component Value Date    SUK3TDB 26.1 11/09/2020        Type & Screen (If Applicable):  No results found for: LABABO, LABRH    Drug/Infectious Status (If Applicable):  No results found for: HIV, HEPCAB    COVID-19 Screening (If Applicable):   Lab Results   Component Value Date    COVID19 Not Detected 10/28/2020         Anesthesia Evaluation  Patient summary reviewed and Nursing notes reviewed no history of anesthetic complications:   Airway: Mallampati: Unable to assess / NA     Neck ROM: limited  Comment: 7.0 ETT 23 @ lip Dental:    (+) other      Pulmonary: breath sounds clear to auscultation  (+) pneumonia: unresolved,  COPD: severe,  shortness of breath: chronic,  recent URI:  sleep apnea:                            ROS comment: Previous trach,    Cardiovascular:  Exercise tolerance: poor (<4 METS),   (+) hypertension:, valvular problems/murmurs (Tricuspid regurg): AS, CHF: diastolic, weak pulses, peripheral edema, hyperlipidemia      ECG reviewed  Rhythm: regular  Rate: normal  Echocardiogram reviewed         Beta Blocker:  Dose within 24 Hrs         Neuro/Psych:   (+) depression/anxiety             GI/Hepatic/Renal:   (+) GERD:, liver disease:, renal disease: CRI,          ROS comment: G-tube placed 11/11/20. Endo/Other:    (+) DiabetesType II DM, using insulin, hypothyroidism, blood dyscrasia: anticoagulation therapy and anemia, arthritis: OA., electrolyte abnormalities, . Abdominal:           Vascular:                                          Anesthesia Plan      general     ASA 4       Induction: inhalational.  BIS  MIPS: Postoperative opioids intended and Prophylactic antiemetics administered. Anesthetic plan and risks discussed with patient. Plan discussed with CRNA and attending. Attending anesthesiologist reviewed and agrees with Pre Eval content        CTA OF THE CHEST 10/27/2020 5:34 pm        TECHNIQUE:    CTA of the chest was performed after the administration of intravenous    contrast.  Multiplanar reformatted images are provided for review. MIP    images are provided for review. Dose modulation, iterative reconstruction,    and/or weight based adjustment of the mA/kV was utilized to reduce the    radiation dose to as low as reasonably achievable.         COMPARISON:    03/14/2018 CT        HISTORY:    ORDERING SYSTEM PROVIDED HISTORY: check pe    TECHNOLOGIST PROVIDED HISTORY:    Reason for exam:->check pe    What reading provider will be dictating this exam?->CRC        FINDINGS: Pulmonary Arteries: Fair technical quality. Body habitus and patient    positioning contributes to significant artifact. Despite this, there are no    filling defects to suggest pulmonary embolism. Main pulmonary artery is    normal in caliber. No evidence of right ventricular strain. Mediastinum: The heart is enlarged. Aorta is normal.  Mild mediastinal and    hilar lymph node enlargement is demonstrated. A dominant right hilar lymph    node measures 2.1 cm. Thyroid and esophagus appear normal.        Lungs/pleura: Endotracheal tube is positioned just above the level of the    ellie. Dense multifocal airspace opacities are present in the bilateral    lungs. This is most prominent in the left greater than right lower lobe. Small associated pleural effusions. Upper Abdomen: Visualized abdominal structures in the upper abdomen are    normal.        Soft Tissues/Bones: No skeletal abnormalities throughout the chest.            Impression    1. No pulmonary embolism. 2. Dense multifocal airspace opacities may represent ARDS or developing    pneumonitis. 3. Reactive mediastinal and hilar lymph node enlargement.           11/4/2020 11:46 AM - Roque, Mhy Incoming Ekg Results From [facility]     Component  Value  Ref Range & Units  Status  Collected  Lab    Ventricular Rate  82  BPM  Incomplete  11/04/2020 11:43 AM  [facility]    Atrial Rate  82  BPM  Incomplete  11/04/2020 11:43 AM  [facility]    P-R Interval  170  ms  Incomplete  11/04/2020 11:43 AM  [facility]    QRS Duration  92  ms  Incomplete  11/04/2020 11:43 AM  [facility]    Q-T Interval  384  ms  Incomplete  11/04/2020 11:43 AM  [facility]    QTc Calculation (Bazett)  448  ms  Incomplete  11/04/2020 11:43 AM  [facility]    P Axis  8  degrees  Incomplete  11/04/2020 11:43 AM  [facility]    R Axis  -17  degrees  Incomplete  11/04/2020 11:43 AM  [facility]    T Axis  17  degrees  Incomplete  11/04/2020 11:43 AM  [facility]    Testing Performed By     Lab - Abbreviation  Name  Director  Address  Valid Date Range    360-[facility]  [facility] MUSE  Unknown  Unknown  04/18/16 0721-Present    Narrative & Impression     Normal sinus rhythm  Normal ECG  When compared with ECG of 26-OCT-2020 15:50,  Significant changes have occurred         TTE procedure:Echo Complete W/Doppler & Color Flow. Date: 10/28/2020 Start: 09:24 AM     Study Location: Portable  Technical Quality: Poor visualization due to body habitus. Indications:Congestive heart failure. Patient Status: Routine     Contrast Medium: Definity. Height: 68 inches Weight: 300 pounds BSA: 2.43 m^2 BMI: 45.61 kg/m^2     Rhythm: Within normal limits BP: 133/70 mmHg      Findings      Left Ventricle   Left ventricular internal dimensions were normal in diastole and systole. Moderate left ventricular concentric hypertrophy noted. Micro-bubble contrast injected to enhance left ventricular visualization. No regional wall motion abnormalities seen. Ejection fraction is visually estimated at 65%. Indeterminate diastolic function. Right Ventricle   The right ventricle was not clearly visualized. Left Atrium   The left atrium is borderline dilated. Interatrial septum appears intact. Right Atrium   Right atrium is not clearly visualized. Mitral Valve   Structurally normal mitral valve. Tricuspid Valve   The tricuspid valve appears structurally normal.   Mild tricuspid regurgitation. Aortic Valve   The aortic valve leaflets were not well visualized. The aortic valve appears moderately sclerotic. Pulmonic Valve   The pulmonic valve was not well visualized. Pericardial Effusion   No evidence of pericardial effusion. Aorta   Aortic root dimension within normal limits. Conclusions      Summary   Technically suboptimal and limited study. Left ventricular internal dimensions were normal in diastole and systole.    Moderate left ventricular concentric hypertrophy noted. No regional wall motion abnormalities seen. The left atrium is borderline dilated. The aortic valve leaflets were not well visualized. The aortic valve appears moderately sclerotic. Mild tricuspid regurgitation.      Electronically signed by Sofya Mcnair MD(Interpreting   physician) on 10/30/2020 05:38 PM      Alexis Herrera RN   11/12/2020

## 2020-11-12 NOTE — PROGRESS NOTES
[address] [facility]  Department of Internal Medicine   Internal Medicine Residency   MICU Progress Note    Patient:  Juan Luis Miller 61 y.o. male  MRN: [id number]     Date of Service: 11/12/2020    Allergy: Bee venom and Shellfish-derived products  CC follow SOB   Subjective     Overnight Events: NAEO - PEG placed yesterday. Trach aborted due to extensive tracheal calcification. For trach with ENT today    Objective     VS: /82   Pulse 56   Temp 98.8 °F (37.1 °C) (Axillary)   Resp 16   Ht 5' 8\" (1.727 m)   Wt 290 lb 6.4 oz (131.7 kg)   SpO2 94%   BMI 44.16 kg/m²   ABP (Arterial line BP): (!) 300/300  ABP mean (Arterial line mean): 300 mmHg    0I & O - 24hr:     Intake/Output Summary (Last 24 hours) at 11/12/2020 0959  Last data filed at 11/12/2020 0920  Gross per 24 hour   Intake 3349 ml   Output 2775 ml   Net 574 ml     Physical Exam:    Constitutional: obese male. In no apparent distress. Head: Normocephalic and atraumatic. Eyes: Conjunctiva normal, (-) scleral icterus. Mucus membranes moist.  Neck: (-)  swelling  Respiratory: ETT in place. Lungs clear to auscultation bilaterally. (-)  wheezes, (-) increased work of breathing or respiratory distress. Cardiovascular: RRR S1 and S2 were normal.   GI:  Abdomen tense and mildly distended. Soft BS. (-) guarding  Extremities: Warm and well perfused. (-) clubbing, (-) cyanosis. 2+ distal pulses. (-) peripheral edema.   Neurologic:  Arousable to verba/tactile stimuli, no facial droop or tremor noted    Lines     site day    Art line   None    TLC L IJ 4   PICC None    Hemoaccess None      Mechanical Ventilation:   Mode: A/C    TV:480 ml RR: 16  PEEP 8cmH2O  FiO2 50%       ABG:     Lab Results   Component Value Date    PH 7.439 11/12/2020    PCO2 32.0 11/12/2020    PO2 84.4 11/12/2020    GHW6HQJ 26.1 11/09/2020    HCO3 21.2 11/12/2020    BE -2.3 11/12/2020    THB 10.9 11/12/2020    O2SAT 95.8 11/12/2020     Medications     Infusions: (Fluid, Sedation, Vasopressors)  Sedation   Propofol 50  · Versed 4  · Fentanyl 200    Nutrition:   OG tube Goal rate: 60ml/h  ATB:   Antibiotics  Days   Zosyn 4             Skin issues: NAEO     Patient currently has   Urinary cath  Restraints - B/L wrists and ankles   DVT prophylaxis/ GI prophylaxis - Heparin, Protonix      Labs     CBC:   Lab Results   Component Value Date    WBC 9.1 11/12/2020    RBC 4.16 11/12/2020    HGB 10.9 11/12/2020    HCT 32.2 11/12/2020    MCV 77.4 11/12/2020    MCH 26.2 11/12/2020    MCHC 33.9 11/12/2020    RDW 20.4 11/12/2020     11/12/2020    MPV 10.4 11/12/2020     CMP:    Lab Results   Component Value Date     11/12/2020    K 4.1 11/12/2020    K 4.0 10/27/2020     11/12/2020    CO2 23 11/12/2020    BUN 31 11/12/2020    CREATININE 1.5 11/12/2020    GFRAA 58 11/12/2020    LABGLOM 58 11/12/2020    GLUCOSE 143 11/12/2020    PROT 7.5 11/12/2020    LABALBU 3.3 11/12/2020    CALCIUM 9.1 11/12/2020    BILITOT 0.4 11/12/2020    ALKPHOS 148 11/12/2020    AST 58 11/12/2020     11/12/2020     Ionized Calcium:  No results found for: IONCA  Magnesium:    Lab Results   Component Value Date    MG 2.1 11/12/2020     Phosphorus:    Lab Results   Component Value Date    PHOS 4.4 11/12/2020     Imaging Studies:  CXR 11/10/2020      KUB 11/10/2020     Narrative    EXAMINATION:    ONE SUPINE XRAY VIEW(S) OF THE ABDOMEN         11/10/2020 2:05 pm         COMPARISON:    November 8, 2020         HISTORY:    ORDERING SYSTEM PROVIDED HISTORY: evaluate bowel    TECHNOLOGIST PROVIDED HISTORY:    Reason for exam:->evaluate bowel    What reading provider will be dictating this exam?->CRC         FINDINGS:    NG tube tip is in the gastric lumen.  There is no free air or bowel wall    pneumatosis.  No dilated segments of bowel are evident.              Impression    NG tube tip in the gastric lumen.      Resident's Assessment and Plan     Neurology  - ICU delirium vs alcohol withdrawal vs following    Final note: For trach with ENT today; hold nightly lantus for time being. Otherwise continue current MICU care after trach placement; dispo pending    Trena Thapa DO, PGY-1  Attending physician: Dr. Rich Sauceda   . attets    Doing well  Post trach   [address] [facility]

## 2020-11-12 NOTE — PATIENT CARE CONFERENCE
[facility] Quality Flow/Interdisciplinary Rounds Progress Note        Quality Flow Rounds held on November 12, 2020    Disciplines Attending:  Bedside Nurse, Nursing Unit Leadership, Physical Therapy and Occupational Therapy    Rell Archer was admitted on 10/26/2020  3:39 PM    Anticipated Discharge Date:       Disposition:    Romaine Score:  Romaine Scale Score: 11    Readmission Risk              Risk of Unplanned Readmission:        30           Discussed patient goal for the day, patient clinical progression, and barriers to discharge. The following Goal(s) of the Day/Commitment(s) have been identified:  Plan for trach today.        Ralph Marie  November 12, 2020

## 2020-11-12 NOTE — PROGRESS NOTES
Chief Complaint   Patient presents with    Shortness of Breath     for a few weeks. O2 sat in 70s at [facility], placed on 4L and now 95%. SUBJECTIVE:  Patient is tolerating medications. No reported adverse drug reactions. Patient remained afebrile overnight. Continue to be hypertensive. Patient reintubated 11/8/2020,  sedated on propofol, versed, fentanyl. Minimal secretions on suctioning   But trach could not be performed on 11/11/2020            WBC 9.1, on Zosyn   50%. FiO2  Afebrile-sedated  Fecal management system in place. Review of systems:  As stated above in the chief complaint, otherwise negative.     Medications:  Scheduled Meds:   chlordiazePOXIDE  25 mg Oral Q8H    QUEtiapine  100 mg Oral BID    senna  2 tablet Oral Nightly    insulin lispro  0-18 Units Subcutaneous Q4H    piperacillin-tazobactam  3.375 g Intravenous Q8H    sodium chloride   Intravenous Q8H    carvedilol  3.125 mg Oral BID WC    folic acid  1 mg Intravenous Daily    thiamine (VITAMIN B1) IVPB  100 mg Intravenous Q24H    [Held by provider] insulin glargine  20 Units Subcutaneous Nightly    sodium chloride (Inhalant)  2 mL Nebulization Q4H    heparin (porcine)  7,500 Units Subcutaneous Q8H    pantoprazole  40 mg Intravenous Daily    And    sodium chloride (PF)  10 mL Intravenous Daily    sodium chloride flush  10 mL Intravenous 2 times per day    Arformoterol Tartrate  15 mcg Nebulization BID    chlorhexidine  15 mL Mouth/Throat BID    amLODIPine  10 mg Oral Daily    aspirin  81 mg Oral Daily    levothyroxine  50 mcg Oral Daily    [Held by provider] losartan  100 mg Oral Daily    budesonide  0.5 mg Nebulization BID    ipratropium-albuterol  1 ampule Inhalation Q4H WA     Continuous Infusions:   midazolam 4 mg/hr (11/12/20 2685)    fentaNYL 5 mcg/ml in 0.9%  ml infusion 200 mcg/hr (11/12/20 1657)    propofol 45 mcg/kg/min (11/12/20 1657)    dextrose       PRN Meds:polyethylene glycol, 3.3 11/12/2020    CALCIUM 9.1 11/12/2020    BILITOT 0.4 11/12/2020    ALKPHOS 148 11/12/2020    AST 58 11/12/2020     11/12/2020     Lab Results   Component Value Date    CRP 1.4 (H) 11/02/2020    CRP 2.8 (H) 09/04/2017    CRP 10.5 (H) 08/28/2017     Lab Results   Component Value Date    SEDRATE 135 (H) 09/04/2017    SEDRATE 150 (H) 08/28/2017    SEDRATE 141 (H) 08/21/2017     Radiology:  CXR- 11/3- Multifocal bilateral pulmonary infiltrates more prominent within the right lung. CXR- 11/4- worsening b/l infiltrates, worse on left today with possible small L sided effusion   CXR- 11/5 - Stable b/l infiltrates   CXR- 11/6 - Expiratory film, image otherwise appears overall stable. Microbiology:   Lab Results   Component Value Date    BC 24 Hours no growth 11/09/2020    BC 5 Days no growth 10/29/2020    BC  10/26/2020     This organism was isolated in one set. Susceptibility testing is not routinely done as this  organism frequently represents skin contamination. Additional testing can be ordered by calling the  Microbiology Department.       ORG Staphylococcus aureus 10/29/2020    ORG Staphylococcus coagulase-negative 10/26/2020    ORG Coagulase Negative Staphylococci 08/26/2017     Lab Results   Component Value Date    BLOODCULT2 24 Hours no growth 11/09/2020    BLOODCULT2 5 Days no growth 10/29/2020    BLOODCULT2 5 Days no growth 10/26/2020    ORG Staphylococcus aureus 10/29/2020    ORG Staphylococcus coagulase-negative 10/26/2020    ORG Coagulase Negative Staphylococci 08/26/2017     No results found for: WNDABS  Smear, Respiratory   Date Value Ref Range Status   11/09/2020   Final    Group 6: <25 PMN's/LPF and <25 Epithelial cells/LPF  Moderate Polymorphonuclear leukocytes  Rare Epithelial cells  No organisms seen       No results found for: MPNEUMO, CLAMYDCU, LABLEGI, AFBCX, FUNGSM, LABFUNG  CULTURE, RESPIRATORY   Date Value Ref Range Status   11/09/2020 Growth not present  Oral Pharyngeal Tiesha absent    Final     Culture Catheter Tip   Date Value Ref Range Status   08/26/2017 <15 colonies  Final     No results found for: BFCS  No results found for: CXSURG  Urine Culture, Routine   Date Value Ref Range Status   11/09/2020 Growth not present  Final   10/28/2020 Growth not present  Final   09/16/2019 Growth not present  Final     MRSA Culture Only   Date Value Ref Range Status   11/09/2020 Methicillin resistant Staph aureus not isolated  Final   10/30/2020 Methicillin resistant Staph aureus not isolated  Final   07/28/2017 Methicillin resistant Staph aureus not isolated  Final          Assessment:  · Acute on chronic hypoxic respiratory failure likely 2/2 pneumonia and volume overload  - resolving   · Bacterial pneumonia, likely aspiration pneumonia but after extubation patient had to be reintubated rule out HCAP  · Shock, sepsis- resolved, blood cultures negative   · Alcohol withdrawal   · Cultures have remained fairly nondiagnostic except for the original 1029 staph aureus culture and respiratory tract along with the oral graham.   This is day 14 of antibiotics     Plan:    · Continue Zosyn for aspiration pneumonia plus HCAP few more days  · Trach completed today and PEG pain place  · Repeat respiratory culture 11 - no growth  · Monitor labs, primary management by ICU team  · Will follow with you    Deanna Menon  5:09 PM  11/12/2020

## 2020-11-12 NOTE — PLAN OF CARE
Problem: Restraint Use - Nonviolent/Non-Self-Destructive Behavior:  Goal: Absence of restraint-related injury  Description: Absence of restraint-related injury  11/11/2020 2124 by Radha Rowland RN  Outcome: Met This Shift     Problem: Restraint Use - Nonviolent/Non-Self-Destructive Behavior:  Goal: Absence of restraint indications  Description: Absence of restraint indications  11/11/2020 2124 by Radha Rowland RN  Outcome: Not Met This Shift

## 2020-11-12 NOTE — PROGRESS NOTES
Pulmonary Progress Note  11/12/2020 3:39 PM  Subjective:   Admit Date: 10/26/2020  PCP: Rancho Norris MD  Interval History: Patient sedated on fentanyl and propofol. Seroquel being uptitrated for agitation. Patient is s/p trach and eg placement on ACVC 16/450/70/8 at this time. Diet: DIET TUBE FEED CONTINUOUS/CYCLIC NPO; Renal Formula;  Orogastric; Continuous; 25; 60; 23; Exceptions are: Sips with Meds    Data:   Scheduled Meds:    chlordiazePOXIDE  25 mg Oral Q8H    QUEtiapine  100 mg Oral BID    senna  2 tablet Oral Nightly    insulin lispro  0-18 Units Subcutaneous Q4H    piperacillin-tazobactam  3.375 g Intravenous Q8H    sodium chloride   Intravenous Q8H    carvedilol  3.125 mg Oral BID WC    folic acid  1 mg Intravenous Daily    thiamine (VITAMIN B1) IVPB  100 mg Intravenous Q24H    [Held by provider] insulin glargine  20 Units Subcutaneous Nightly    sodium chloride (Inhalant)  2 mL Nebulization Q4H    heparin (porcine)  7,500 Units Subcutaneous Q8H    pantoprazole  40 mg Intravenous Daily    And    sodium chloride (PF)  10 mL Intravenous Daily    sodium chloride flush  10 mL Intravenous 2 times per day    Arformoterol Tartrate  15 mcg Nebulization BID    chlorhexidine  15 mL Mouth/Throat BID    amLODIPine  10 mg Oral Daily    aspirin  81 mg Oral Daily    levothyroxine  50 mcg Oral Daily    [Held by provider] losartan  100 mg Oral Daily    budesonide  0.5 mg Nebulization BID    ipratropium-albuterol  1 ampule Inhalation Q4H WA     Continuous Infusions:    midazolam 4 mg/hr (11/12/20 1135)    fentaNYL 5 mcg/ml in 0.9%  ml infusion 200 mcg/hr (11/12/20 1135)    propofol 50 mcg/kg/min (11/12/20 1452)    dextrose         PRN Meds: polyethylene glycol, perflutren lipid microspheres, acetaminophen, polyvinyl alcohol, hydrALAZINE, sodium chloride flush, [DISCONTINUED] acetaminophen **OR** acetaminophen, colchicine, glucose, dextrose, glucagon (rDNA), dextrose    I/O last 3 completed shifts: In: 1245 [I.V.:4318; NG/GT:150]  Out: 1912 [Urine:3045]    No intake/output data recorded. Intake/Output Summary (Last 24 hours) at 11/12/2020 1539  Last data filed at 11/12/2020 1400  Gross per 24 hour   Intake 4468 ml   Output 3045 ml   Net 1423 ml       Patient Vitals for the past 96 hrs (Last 3 readings):   Weight   11/12/20 0805 290 lb 6.4 oz (131.7 kg)   11/12/20 0350 291 lb 12.8 oz (132.4 kg)   11/11/20 0340 290 lb 12.8 oz (131.9 kg)         Recent Labs     11/10/20  0450 11/11/20  0400 11/12/20  0415   WBC 8.2 7.1 9.1   HGB 10.2* 11.4* 10.9*   HCT 29.5* 33.3* 32.2*   MCV 76.6* 77.3* 77.4*    309 270     Recent Labs     11/10/20  0450 11/11/20 0400 11/12/20  0415    137 138   K 4.0 4.2 4.1    99 101   CO2 23 22 23   BUN 32* 32* 31*   CREATININE 1.7* 1.6* 1.5*   PHOS 4.2 4.3 4.4     Recent Labs     11/10/20  0450 11/11/20 0400 11/12/20  0415 11/12/20  1404   PROT 7.2 7.9 7.5  --    ALKPHOS 118 151* 148*  --    * 142* 131*  --    AST 40* 76* 58*  --    BILITOT 0.5 0.4 0.4  --    LIPASE  --   --   --  65*   CPK:  Lab Results   Component Value Date    CKTOTAL 98 10/08/2017        11/6 CXR viewed and shows stable diffuse infiltrates  -----------------------------------------------------------------  RAD:   Results for orders placed during the hospital encounter of 10/26/20   XR CHEST PORTABLE    Narrative EXAMINATION:  ONE XRAY VIEW OF THE CHEST    10/30/2020 2:17 am    COMPARISON:  10/29/2020 radiograph    HISTORY:  ORDERING SYSTEM PROVIDED HISTORY: R IJ placement  TECHNOLOGIST PROVIDED HISTORY:  Reason for exam:->R IJ placement  What reading provider will be dictating this exam?->CRC    FINDINGS:  Right IJ central venous catheter has been placed with tip at the distal SVC. No pneumothorax. Worsening diffuse airspace opacification in the right lung  with moderate opacification also present at the left base. Endotracheal tube  and enteric tube stable.   No significant skeletal finding. Impression Interval placement of right IJ central venous catheter. No pneumothorax. Worsening airspace changes in the right lung. Micro:  Recent Labs     20  1840   BC 24 Hours no growth     No results for input(s): ORG in the last 72 hours. Recent Labs     20  1840   BLOODCULT2 24 Hours no growth     No results for input(s): STREPPNEUMAG in the last 72 hours. No results for input(s): LEGUR in the last 72 hours. No results for input(s): ORG in the last 72 hours. No results for input(s): ASO in the last 72 hours.   Recent Labs     20  1724   CULTRESP Growth not present  Oral Pharyngeal Tiesha absent       Recent Labs     20  1633   LABURIN Growth not present     Vent Information  $Ventilation: $Subsequent Day  Skin Assessment: Clean, dry, & intact  Equipment ID: [id number]  Equipment Changed: (S) Humidification  Vent Type: 980  Vent Mode: AC/VC  Vt Ordered: 480 mL  Rate Set: 16 bmp  Peak Flow: 70 L/min  Pressure Support: 0 cmH20  FiO2 : 100 %  SpO2: 94 %  SpO2/FiO2 ratio: 93  PaO2/FiO2 ratio: 169  Sensitivity: 3  PEEP/CPAP: 8  I Time/ I Time %: 0 s  Humidification Source: Heated wire  Humidification Temp: 37  Humidification Temp Measured: 37  Circuit Condensation: Drained  Mask Type: Full face mask  Mask Size: Large    Additional Respiratory  Assessments  Pulse: 73  Resp: 16  SpO2: 94 %  Position: Semi-Lockwood's  Humidification Source: Heated wire  Humidification Temp: 37  Circuit Condensation: Drained  Oral Care Completed?: Yes  Oral Care: Mouth swabbed, Mouth suctioned, Suction toothette  Subglottic Suction Done?: Yes  Airway Type: ET  Airway Size: 7  Cuff Pressure (cm H2O): 29 cm H2O  Skin barrier applied: Yes    Objective:   Vitals:   Vitals:    20 1500   BP: (!) 121/59   Pulse: 73   Resp: 16   Temp:    SpO2: 94%      TEMP:Current: Temp: 97.5 °F (36.4 °C)  Max: Temp  Av.3 °F (36.8 °C)  Min: 97 °F (36.1 °C)  Max: 98.8 °F (37.1 °C)    BP laryngospasm requiring reintubation, complicated by negative pressure flash pulmonary edema, and required tracheostomy 8/10/2017. Patient has staph aureus pneumonia and C. difficile colitis. [facility] where he was weaned off the ventilator and decannulated.     Plan:     · S/P for trach/PEG   · LTACH evaluation and placement   · Vent wean per ICU team     Gonzales Jones MD, [facility]  11/12/2020  3:39 PM

## 2020-11-12 NOTE — PROGRESS NOTES
When stimulated, patient attempts to grab lines and tubes necessary for care. He does move his legs back and forth, but he is not kicking or trying to throw his legs out of the bed. Bilateral wrist restraints continue in place for patient safety.

## 2020-11-12 NOTE — BRIEF OP NOTE
Brief Postoperative Note      Patient: Cheri Palmer  YOB: 1960  MRN: [id number]    Date of Procedure: 11/12/2020    Pre-Op Diagnosis: PROLONGED INTUBATION    Post-Op Diagnosis: Same       Procedure(s):  OPEN TRACHEOTOMY, BRONCHOSCOPY, DIRECT LARYNGOSCOPY    Surgeon(s):  Monique King DO    Assistant:  Resident: Ruben Dumont DO; Trish Alcala DO    Anesthesia: General    Estimated Blood Loss (mL): less than 50     Complications: None    Specimens:   * No specimens in log *    Implants:  * No implants in log *      Drains:   Gastrostomy/Enterostomy/Jejunostomy Percutaneous Endoscopic Gastrostomy (PEG) LUQ 1 20 fr (Active)   Drainage Appearance Green;Brown 11/12/20 0400   Site Description Unable to view 11/12/20 0800   Drain Status Clamped 11/12/20 0800   Surrounding Skin Dry; Intact 11/12/20 0800   Dressing Status Clean;Dry; Intact 11/12/20 0800   Dressing Type Dry dressing 11/12/20 0800   Dressing Change Due 11/13/20 11/12/20 0800   Free Water Flush (mL) 60 mL 11/12/20 0800   Residual Volume (ml) 130 ml 11/12/20 0600       Urethral Catheter Non-latex 16 fr (Active)   $ Urethral catheter insertion $ Not inserted for procedure 11/12/20 1002   Catheter Indications Need for fluid management in critically ill patients in a critical care setting not able to be managed by other means such as BSC with hat, bedpan, urinal, condom catheter, or short term intermittent urethral catherization 11/12/20 1003   Site Assessment No urethral drainage 11/12/20 1003   Urine Color Yellow; [place] 11/12/20 1003   Urine Appearance Hazy 11/12/20 1003   Output (mL) 100 mL 11/12/20 1057       [REMOVED] NG/OG/NJ/NE Tube Center mouth (Removed)   Surrounding Skin Dry; Intact 11/05/20 2000   Securement device Yes 11/05/20 2000   Status Other (Comment) 11/05/20 2000   Placement Verified by X-Ray (repeat) 11/02/20 0800   NG/OG/NJ/NE External Measurement (cm) 58 cm 11/04/20 2200   Tube Feeding Diabetic 11/05/20 0100 Tube Feeding Status Continuous 11/05/20 0100   Rate/Schedule 45 mL/hr 11/05/20 0100   Tube Feeding Intake (mL) 344 ml 11/05/20 1459   Free Water Flush (mL) 60 mL 11/05/20 1459   Free Water Rate 88c1smh 11/05/20 0100   Residual Volume (ml) 100 ml 11/04/20 0615       [REMOVED] NG/OG/NJ/NE Tube Orogastric 14 fr Left mouth (Removed)   Surrounding Skin Dry; Intact 11/08/20 0800   Securement device Yes 11/08/20 0800   Status Other (Comment) 11/08/20 0600   Placement Verified by X-Ray (repeat) 11/08/20 0800   NG/OG/NJ/NE External Measurement (cm) 63 cm 11/08/20 0800   Drainage Appearance None 11/08/20 0800   Tube Feeding Diabetic 11/08/20 0800   Tube Feeding Status Continuous 11/08/20 0800   Rate/Schedule 60 mL/hr 11/08/20 0800   Tube Feeding Intake (mL) 273 ml 11/08/20 0600   Free Water Flush (mL) 60 mL 11/08/20 0800   Free Water Rate 30MLS Q 4HRS 11/08/20 0800   Residual Volume (ml) 0 ml 11/08/20 0800       [REMOVED] NG/OG/NJ/NE Tube Orogastric Right mouth (Removed)   Surrounding Skin Dry; Intact 11/11/20 0000   Securement device Yes 11/11/20 0000   Status Clamped 11/11/20 0000   Placement Verified by X-Ray (Initial) 11/08/20 2000   NG/OG/NJ/NE External Measurement (cm) 59 cm 11/11/20 0000   Drainage Appearance None 11/10/20 2000   Tube Feeding Intake (mL) 0 ml 11/10/20 2200   Free Water Flush (mL) 60 mL 11/11/20 0645   Residual Volume (ml) 50 ml 11/11/20 0645       [REMOVED] Urethral Catheter (Removed)   Catheter Indications Need for fluid management in critically ill patients in a critical care setting not able to be managed by other means such as BSC with hat, bedpan, urinal, condom catheter, or short term intermittent urethral catherization 11/09/20 1400   Site Assessment No urethral drainage 11/09/20 1400   Urine Color Yellow 11/09/20 1400   Urine Appearance Sediment 11/09/20 1400   Output (mL) 250 mL 11/09/20 1633       [REMOVED] Fecal Management System (Removed)   Stool Appearance Watery 11/08/20 0600   Stool

## 2020-11-12 NOTE — PROGRESS NOTES
When stimulated, patient grabs for ETT and other lines necessary for care. Bilateral soft wrist restraints continued for patient safety.

## 2020-11-12 NOTE — ANESTHESIA POSTPROCEDURE EVALUATION
Department of Anesthesiology  Postprocedure Note    Patient: Marla Olszewski  MRN: [id number]  YOB: 1960  Date of evaluation: 11/12/2020  Time:  12:51 PM     Procedure Summary     Date:  11/12/20 Room / Location:  [facility] OR 06 / [facility]    Anesthesia Start:  0187 Anesthesia Stop:      Procedure:  OPEN TRACHEOTOMY, BRONCHOSCOPY, DIRECT LARYNGOSCOPY (N/A ) Diagnosis:  (PROLONGED INTUBATION)    Surgeon:  Berta Liu DO Responsible Provider:  Kye Case DO    Anesthesia Type:  general ASA Status:  4          Anesthesia Type: general    Erik Phase I:      Erik Phase II:      Last vitals: Reviewed and per EMR flowsheets. Anesthesia Post Evaluation    Patient location during evaluation: ICU  Patient participation: waiting for patient participation  Level of consciousness: sedated and ventilated  Airway patency: Tracheostomy.   Nausea & Vomiting: no nausea and no vomiting  Complications: no  Cardiovascular status: hemodynamically stable  Respiratory status: ventilator (Tracheostomy)

## 2020-11-12 NOTE — PROGRESS NOTES
Patient reaching for trach and IV lines when unrestrained. Unable to follow verbal redirection. Bilateral soft wrist restraints placed on patient for patient safety.

## 2020-11-12 NOTE — PLAN OF CARE
Problem: Pain:  Goal: Pain level will decrease  Description: Pain level will decrease  Outcome: Met This Shift  Goal: Control of acute pain  Description: Control of acute pain  Outcome: Met This Shift  Goal: Control of chronic pain  Description: Control of chronic pain  Outcome: Met This Shift     Problem: Restraint Use - Nonviolent/Non-Self-Destructive Behavior:  Goal: Absence of restraint-related injury  Description: Absence of restraint-related injury  Outcome: Met This Shift     Problem: Skin Integrity:  Goal: Will show no infection signs and symptoms  Description: Will show no infection signs and symptoms  Outcome: Met This Shift  Goal: Absence of new skin breakdown  Description: Absence of new skin breakdown  Outcome: Met This Shift     Problem: Respiratory:  Goal: Ability to maintain a clear airway will improve  Description: Ability to maintain a clear airway will improve  Outcome: Met This Shift  Goal: Ability to maintain adequate ventilation will improve  Description: Ability to maintain adequate ventilation will improve  Outcome: Met This Shift  Goal: Complications related to the disease process, condition or treatment will be avoided or minimized  Description: Complications related to the disease process, condition or treatment will be avoided or minimized  Outcome: Met This Shift     Problem: Restraint Use - Nonviolent/Non-Self-Destructive Behavior:  Goal: Absence of restraint indications  Description: Absence of restraint indications  Outcome: Not Met This Shift

## 2020-11-12 NOTE — PROGRESS NOTES
Called patients wife, Max Faith, to obtain consent for tracheotomy. No answer, message left with callback number.

## 2020-11-12 NOTE — ANESTHESIA PRE PROCEDURE
Department of Anesthesiology  Preprocedure Note       Name:  Laila Luz   Age:  61 y.o.  :  1960                                          MRN:  [id number]         Date:  2020      Surgeon: Jeanie Dumont):  Monica Rasmussen DO    Procedure: Procedure(s):  TRACHEOTOMY, BRONCHOSCOPY, DIRECT ,LARYNGOSCOPY    Medications prior to admission:   Prior to Admission medications    Medication Sig Start Date End Date Taking?  Authorizing Provider   carvedilol (COREG) 25 MG tablet Take 0.5 tablets by mouth 2 times daily 20   Jerilyn Tavares MD   olmesartan (BENICAR) 20 MG tablet Take 1 tablet by mouth daily 20   Jerilyn Tavares MD   losartan (COZAAR) 100 MG tablet Take 1 tablet by mouth daily 20   Jerilyn Tavares MD   amLODIPine (NORVASC) 10 MG tablet Take 1 tablet by mouth daily 20   Jerilyn Tavares MD   metFORMIN (GLUCOPHAGE-XR) 500 MG extended release tablet TAKE 1 TABLET BY MOUTH EVERY DAY WITH BREAKFAST 20   Jerilyn Tavares MD   levothyroxine (SYNTHROID) 50 MCG tablet TAKE 1 TABLET BY MOUTH EVERY DAY 20   Jerilyn Tavares MD   atorvastatin (LIPITOR) 40 MG tablet Take 1 tablet by mouth daily 20   Jerilyn Tavares MD   omeprazole (PRILOSEC) 40 MG delayed release capsule TAKE 1 CAPSULE BY MOUTH EVERY DAY 19   Jerilyn Tavares MD   ondansetron (ZOFRAN-ODT) 4 MG disintegrating tablet Take 1 tablet by mouth every 8 hours as needed for Nausea Ok to substitute for standard Zofran non ODT if cost prohibitive 19   Stella Ortega MD   aspirin 81 MG tablet Take 81 mg by mouth daily    Historical Provider, MD   fluticasone-salmeterol (ADVAIR DISKUS) 100-50 MCG/DOSE diskus inhaler Inhale 1 puff into the lungs every 12 hours 19   Jerilyn Tavares MD   colchicine (COLCRYS) 0.6 MG tablet Take 1 tablet by mouth 2 times daily as needed for Pain 19   Jerilyn Tavares MD   Handicap Placard MISC by Does not apply route For 5 years 10/3/18   Jerilyn Tavares MD   Compression Stockings MISC Chronic left leg swelling. 2/21/17   Marshall Cranker, MD       Current medications:    No current facility-administered medications for this visit. No current outpatient medications on file.      Facility-Administered Medications Ordered in Other Visits   Medication Dose Route Frequency Provider Last Rate Last Dose    sodium chloride flush 0.9 % injection 10 mL  10 mL Intravenous 2 times per day Soundra Riley, DO        sodium chloride flush 0.9 % injection 10 mL  10 mL Intravenous PRN Soundra Riley, DO        chlordiazePOXIDE (LIBRIUM) capsule 25 mg  25 mg Oral Q8H Dhruv Osborne Nest., DO   25 mg at 11/12/20 2820    QUEtiapine (SEROQUEL) tablet 100 mg  100 mg Oral BID Dhruv Osborne Nest., DO   100 mg at 11/12/20 5089    midazolam (VERSED) 100 mg in dextrose 5 % 100 mL infusion  1 mg/hr Intravenous Continuous Ashley L Cespedes, DO 4 mL/hr at 11/12/20 0557 4 mg/hr at 11/12/20 0557    senna (SENOKOT) tablet 17.2 mg  2 tablet Oral Nightly Ashley L Cespedes, DO   17.2 mg at 11/11/20 2038    polyethylene glycol (GLYCOLAX) packet 17 g  17 g Oral Daily PRN Leopoldo Stewart, DO        fentaNYL 5 mcg/ml in 0.9%  ml infusion  25 mcg/hr Intravenous Continuous Eulalio Marie MD 40 mL/hr at 11/12/20 0907 200 mcg/hr at 11/12/20 0901    perflutren lipid microspheres (DEFINITY) injection 1.65 mg  1.5 mL Intravenous ONCE PRN Mariaa Swift Jr., DO        insulin lispro (HUMALOG) injection vial 0-18 Units  0-18 Units Subcutaneous Q4H Dhruv Britton., DO   Stopped at 11/10/20 1724    acetaminophen (TYLENOL) 160 MG/5ML solution 650 mg  650 mg Oral Q6H PRN Mariaa Swift Jr., DO        piperacillin-tazobactam (ZOSYN) 3.375 g in dextrose 5 % 100 mL IVPB extended infusion (mini-bag)  3.375 g Intravenous Q8H Dillon Grullon MD   Stopped at 11/12/20 0959    Zosyn Flush (0.9 % sodium chloride infusion)   Intravenous Q8H Dillon Grullon MD   Stopped at 11/10/20 2057    carvedilol (COREG) tablet 3.125 mg  3.125 mg Oral BID  Ron Ware MD   Stopped at 11/12/20 0834    propofol injection  10 mcg/kg/min Intravenous Titrated Akil Blanchard MD 38.8 mL/hr at 11/12/20 0851 50 mcg/kg/min at 58/63/34 7100    folic acid injection 1 mg  1 mg Intravenous Daily Aster Jeter MD   1 mg at 11/12/20 6018    thiamine (B-1) 100 mg in sodium chloride 0.9 % 100 mL IVPB  100 mg Intravenous Q24H Aster Jeter MD   Stopped at 11/11/20 1810    polyvinyl alcohol (LIQUIFILM TEARS) 1.4 % ophthalmic solution 1 drop  1 drop Both Eyes Q4H PRN Akil Blanchard MD   1 drop at 11/02/20 1526    [Held by provider] insulin glargine (LANTUS) injection vial 20 Units  20 Units Subcutaneous Nightly Cyndi Jade MD   20 Units at 11/07/20 2014    sodium chloride (Inhalant) 3 % nebulizer solution 2 mL  2 mL Nebulization Q4H Farrah Arredondo MD   2 mL at 11/12/20 0910    hydrALAZINE (APRESOLINE) injection 10 mg  10 mg Intravenous Q4H PRN Caroline Kilpatrick MD   10 mg at 11/11/20 1907    heparin (porcine) injection 7,500 Units  7,500 Units Subcutaneous Q8H Caroline Kilpatrick MD   Stopped at 11/12/20 0726    pantoprazole (PROTONIX) injection 40 mg  40 mg Intravenous Daily Willie Allen MD   40 mg at 11/12/20 0902    And    sodium chloride (PF) 0.9 % injection 10 mL  10 mL Intravenous Daily Willie Allen MD   10 mL at 11/12/20 0905    sodium chloride flush 0.9 % injection 10 mL  10 mL Intravenous 2 times per day Aki Jaquez MD   10 mL at 11/11/20 0838    sodium chloride flush 0.9 % injection 10 mL  10 mL Intravenous PRN Aki Jaquez MD   10 mL at 11/12/20 0903    acetaminophen (TYLENOL) suppository 650 mg  650 mg Rectal Q6H PRN Aki Jaquez MD        Arformoterol Tartrate (BROVANA) nebulizer solution 15 mcg  15 mcg Nebulization BID Aki Jaquez MD   15 mcg at 11/12/20 0907    chlorhexidine (PERIDEX) 0.12 % solution 15 mL  15 mL Mouth/Throat BID Cyndi Jade MD   15 mL at 11/12/20 0902    amLODIPine (NORVASC) tablet 10 mg  10 mg Oral Daily Keshia Hines MD   10 mg at 11/12/20 6343    aspirin chewable tablet 81 mg  81 mg Oral Daily Keshia Hines MD   81 mg at 11/12/20 0904    colchicine (COLCRYS) tablet 0.6 mg  0.6 mg Oral BID PRN Keshia Hines MD   0.6 mg at 11/07/20 0909    levothyroxine (SYNTHROID) tablet 50 mcg  50 mcg Oral Daily Keshia Hines MD   50 mcg at 11/12/20 0558    [Held by provider] losartan (COZAAR) tablet 100 mg  100 mg Oral Daily Keshia Hines MD   100 mg at 10/28/20 0849    budesonide (PULMICORT) nebulizer suspension 500 mcg  0.5 mg Nebulization BID Keshia Hines MD   500 mcg at 11/12/20 0907    ipratropium-albuterol (DUONEB) nebulizer solution 1 ampule  1 ampule Inhalation Q4H WA Keshia Hines MD   1 ampule at 11/12/20 0907    glucose (GLUTOSE) 40 % oral gel 15 g  15 g Oral PRN Keshia Hines MD        dextrose 50 % IV solution  12.5 g Intravenous PRN Keshia Hines MD        glucagon (rDNA) injection 1 mg  1 mg Intramuscular PRN Keshia Hines MD        dextrose 5 % solution  100 mL/hr Intravenous PRN Keshia Hines MD           Allergies:     Allergies   Allergen Reactions    Bee Venom Anaphylaxis    Shellfish-Derived Products Anaphylaxis     Only crab legs       Problem List:    Patient Active Problem List   Diagnosis Code    Hyperlipidemia E78.5    Type 2 diabetes mellitus without complication, without long-term current use of insulin ([facility]) E11.9    Atypical chest pain R07.89    Essential hypertension I10    Chronic acquired lymphedema I89.0    Aortic valve disease I35.9    Morbid obesity due to excess calories ([facility]) E66.01    Abdominal pain R10.9    Acute respiratory failure with hypoxia ([facility]) J96.01    Acute pulmonary edema ([facility]) J81.0    Congestive heart failure with LV diastolic dysfunction, NYHA class 3 ([facility]) I50.30    Obstructive sleep apnea G47.33    Acquired hypothyroidism E03.9    Chronic diastolic heart failure ([facility]) I50.32    Nonrheumatic aortic valve stenosis I35.0    ACE-inhibitor cough R05, T46.4X5A    Pneumonia J18.9    Acute gout of right knee M10.9       Past Medical History:        Diagnosis Date    Acquired hypothyroidism 9/26/2017    Anxiety     Congestive heart failure with LV diastolic dysfunction, NYHA class 3 ([facility]     Depression     DM (diabetes mellitus) ([facility]     Essential hypertension 6/1/2016    Gouty arthritis 2006    Hyperlipidemia     Hypertension     Lymphedema     Obesity     Obstructive sleep apnea 8/17/2009    Obstructive sleep apnea 9/26/2017    Osteoarthritis     Type 2 diabetes mellitus without complication, without long-term current use of insulin [facility] 1/15/2014       Past Surgical History:        Procedure Laterality Date    BRONCHOSCOPY  08/10/2017    ECHO COMPL W DOP COLOR FLOW  6/21/2013         EXTERNAL EAR SURGERY  6/2/2009    keloid left ear    FOOT SURGERY  1990    Left foot    GASTROSTOMY TUBE PLACEMENT  08/10/2017    TRACHEOSTOMY N/A 11/11/2020    OPEN TRACHEOTOMY AND PEG TUBE INSERTION -- AVAILABLE AFTER 1130 performed by Taylor Albright MD at \[facility]""  08/10/2017    UPPER GASTROINTESTINAL ENDOSCOPY N/A 11/11/2020    EGD ESOPHAGOGASTRODUODENOSCOPY PEG TUBE INSERTION performed by Taylor Albright MD at [address] [place] [address] History:    Social History     Tobacco Use    Smoking status: Former Smoker     Packs/day: 0.10     Years: 10.00     Pack years: 1.00     Types: Cigarettes     Last attempt to quit: 2/19/2005     Years since quitting: 15.7    Smokeless tobacco: Former User     Types: Chew     Quit date: 1/1/1990   Substance Use Topics    Alcohol use: Yes     Frequency: 2-3 times a week     Drinks per session: 1 or 2     Binge frequency: Never     Comment: socially                                Counseling given: Not Answered      Vital Signs (Current): There were no vitals filed for this visit.                                            BP Readings from Last 3 Encounters:   11/12/20 131/82   11/11/20 131/71   06/15/20 124/76       NPO Status:                                                                                 BMI:   Wt Readings from Last 3 Encounters:   11/12/20 290 lb 6.4 oz (131.7 kg)   06/15/20 (!) 326 lb (147.9 kg)   03/02/20 (!) 322 lb (146.1 kg)     There is no height or weight on file to calculate BMI.    CBC:   Lab Results   Component Value Date    WBC 9.1 11/12/2020    RBC 4.16 11/12/2020    HGB 10.9 11/12/2020    HCT 32.2 11/12/2020    MCV 77.4 11/12/2020    RDW 20.4 11/12/2020     11/12/2020       CMP:   Lab Results   Component Value Date     11/12/2020    K 4.1 11/12/2020    K 4.0 10/27/2020     11/12/2020    CO2 23 11/12/2020    BUN 31 11/12/2020    CREATININE 1.5 11/12/2020    GFRAA 58 11/12/2020    LABGLOM 58 11/12/2020    GLUCOSE 143 11/12/2020    PROT 7.5 11/12/2020    CALCIUM 9.1 11/12/2020    BILITOT 0.4 11/12/2020    ALKPHOS 148 11/12/2020    AST 58 11/12/2020     11/12/2020       POC Tests: No results for input(s): POCGLU, POCNA, POCK, POCCL, POCBUN, POCHEMO, POCHCT in the last 72 hours.     Coags:   Lab Results   Component Value Date    PROTIME 10.9 07/21/2018    INR 0.9 07/21/2018       HCG (If Applicable): No results found for: PREGTESTUR, PREGSERUM, HCG, HCGQUANT     ABGs:   Lab Results   Component Value Date    LZL1LZQ 26.1 11/09/2020        Type & Screen (If Applicable):  No results found for: LABABO, LABRH    Drug/Infectious Status (If Applicable):  No results found for: HIV, HEPCAB    COVID-19 Screening (If Applicable):   Lab Results   Component Value Date    COVID19 Not Detected 10/28/2020         Anesthesia Evaluation  Patient summary reviewed and Nursing notes reviewed no history of anesthetic complications:   Airway: Mallampati: Unable to assess / NA       Comment: 7.0 ETT 23 @ lip   Dental:    (+) other      Pulmonary: breath sounds clear to auscultation  (+) pneumonia: unresolved,  COPD: pe    TECHNOLOGIST PROVIDED HISTORY:    Reason for exam:->check pe    What reading provider will be dictating this exam?->CRC        FINDINGS:    Pulmonary Arteries: Fair technical quality. Body habitus and patient    positioning contributes to significant artifact. Despite this, there are no    filling defects to suggest pulmonary embolism. Main pulmonary artery is    normal in caliber. No evidence of right ventricular strain. Mediastinum: The heart is enlarged. Aorta is normal.  Mild mediastinal and    hilar lymph node enlargement is demonstrated. A dominant right hilar lymph    node measures 2.1 cm. Thyroid and esophagus appear normal.        Lungs/pleura: Endotracheal tube is positioned just above the level of the    ellie. Dense multifocal airspace opacities are present in the bilateral    lungs. This is most prominent in the left greater than right lower lobe. Small associated pleural effusions. Upper Abdomen: Visualized abdominal structures in the upper abdomen are    normal.        Soft Tissues/Bones: No skeletal abnormalities throughout the chest.            Impression    1. No pulmonary embolism. 2. Dense multifocal airspace opacities may represent ARDS or developing    pneumonitis. 3. Reactive mediastinal and hilar lymph node enlargement.           11/4/2020 11:46 AM - Roque, Mhy Incoming Ekg Results From [facility]     Component  Value  Ref Range & Units  Status  Collected  Lab    Ventricular Rate  82  BPM  Incomplete  11/04/2020 11:43 AM  [facility]    Atrial Rate  82  BPM  Incomplete  11/04/2020 11:43 AM  [facility]    P-R Interval  170  ms  Incomplete  11/04/2020 11:43 AM  [facility]    QRS Duration  92  ms  Incomplete  11/04/2020 11:43 AM  [facility]    Q-T Interval  384  ms  Incomplete  11/04/2020 11:43 AM  [facility]    QTc Calculation (Bazett)  448  ms  Incomplete  11/04/2020 11:43 AM  [facility]    P Axis  8  degrees  Incomplete  11/04/2020 11:43 AM  [facility]    R Axis  -17  degrees limited study. Left ventricular internal dimensions were normal in diastole and systole. Moderate left ventricular concentric hypertrophy noted. No regional wall motion abnormalities seen. The left atrium is borderline dilated. The aortic valve leaflets were not well visualized. The aortic valve appears moderately sclerotic. Mild tricuspid regurgitation. Electronically signed by Frandy Lr MD(Interpreting   physician) on 10/30/2020 05:38 PM      Pt. Examined, chart reviewed. Pt. Is intubated, ventilated and sedated. Neurologic status cannot be assessed. No family present to speak with. Will proceed with GA.     Kye Case DO  November 12, 2020  12:17 PM

## 2020-11-12 NOTE — PROGRESS NOTES
Nephrology Progress Note  Patient's Name: Erna Teran  11:53 AM  11/12/2020        Reason for Consult: Acute kidney  Requesting Physician:  Leonides Bonds MD    Chief Complaint: Shortness of breath  History Obtained From:  EHR; spouse    History of Present Ilness:    Erna Teran is a 61 y.o. male with a history of COPD, hypertension, hyperlipidemia and diabetes mellitus. He presented to ED 2 days ago with complaints of shortness of breath. According to patient's spouse he had been complaining of shortness of breath over the course of several weeks. This has gotten progressively worse. He went to see his PCP on the day of admission. In office at the time pulse oximeter obtained revealed his oxygen saturation to be in the 70s. He was instructed to proceed to ED for further evaluation. On presentation to ED his initial vital signs showed a blood pressure of 168/71, temperature 97.5 and pulse of 93. His pulse oximeter at the time was noted to be 98% on room air. Laboratory data was significant for a BUN of 11, creatinine 1.1, WBC of 8.3. Rapid COVID-19 testing was negative. A chest x-ray performed revealed right infrahilar and basilar infiltrate concerning for pneumonia. A CTA was ordered but patient declined because of his inability to lay flat. Patient was given ceftriaxone and doxycycline followed by admission to telemetry. 2 days ago an RRT was called as patient was noted to be increasingly more in respiratory distress. He was transferred to MICU and intubated. Laboratory data yesterday showed worsening serum creatinine to 2.8. This a.m. further worsening to 3.6. He has been nonoliguric. Hence renal consult. 10/30: N new acute issues overnight; remains intubated  10/31: Polyuric , concerned for possible DI; no other acute issues overnight  11/1: agitated overnight ; remains intubated  11/2: pt seen and examined.  Chart reviewed, pt intubated  11/3: pt seen and examined in icu, pt intubated  11/4: pt seen and examined in icu, no overnight events, intubated  11/5: pt seen in room ,remains intubated  11/6: pt seen in room, intubated. 11/7: pt seen in room, intubated, chart reviewed  11/8:pt seen in room, remains intuibated  11/9: pt seen in room, reintubated yesterday  11/10: pt seen in icu, intubated.    11/12: pt getting trach today      Allergies:  Bee venom and Shellfish-derived products    Current Medications:    sodium chloride flush 0.9 % injection 10 mL, 2 times per day  sodium chloride flush 0.9 % injection 10 mL, PRN  chlordiazePOXIDE (LIBRIUM) capsule 25 mg, Q8H  QUEtiapine (SEROQUEL) tablet 100 mg, BID  midazolam (VERSED) 100 mg in dextrose 5 % 100 mL infusion, Continuous  senna (SENOKOT) tablet 17.2 mg, Nightly  polyethylene glycol (GLYCOLAX) packet 17 g, Daily PRN  fentaNYL 5 mcg/ml in 0.9%  ml infusion, Continuous  perflutren lipid microspheres (DEFINITY) injection 1.65 mg, ONCE PRN  insulin lispro (HUMALOG) injection vial 0-18 Units, Q4H  acetaminophen (TYLENOL) 160 MG/5ML solution 650 mg, Q6H PRN  piperacillin-tazobactam (ZOSYN) 3.375 g in dextrose 5 % 100 mL IVPB extended infusion (mini-bag), Q8H  Zosyn Flush (0.9 % sodium chloride infusion), Q8H  carvedilol (COREG) tablet 3.125 mg, BID WC  propofol injection, Titrated  folic acid injection 1 mg, Daily  thiamine (B-1) 100 mg in sodium chloride 0.9 % 100 mL IVPB, Q24H  polyvinyl alcohol (LIQUIFILM TEARS) 1.4 % ophthalmic solution 1 drop, Q4H PRN  [Held by provider] insulin glargine (LANTUS) injection vial 20 Units, Nightly  sodium chloride (Inhalant) 3 % nebulizer solution 2 mL, Q4H  hydrALAZINE (APRESOLINE) injection 10 mg, Q4H PRN  [Held by provider] heparin (porcine) injection 7,500 Units, Q8H  pantoprazole (PROTONIX) injection 40 mg, Daily    And  sodium chloride (PF) 0.9 % injection 10 mL, Daily  sodium chloride flush 0.9 % injection 10 mL, 2 times per day  sodium chloride flush 0.9 % injection 10 mL, 11/12/2020    PHOS 4.3 11/11/2020    PHOS 4.2 11/10/2020    WBC 9.1 11/12/2020    WBC 7.1 11/11/2020    WBC 8.2 11/10/2020    HGB 10.9 (L) 11/12/2020    HGB 11.4 (L) 11/11/2020    HGB 10.2 (L) 11/10/2020    HCT 32.2 (L) 11/12/2020    HCT 33.3 (L) 11/11/2020    HCT 29.5 (L) 11/10/2020    MCV 77.4 (L) 11/12/2020     11/12/2020         Imaging:  Xr Chest Portable    Result Date: 10/27/2020  EXAMINATION: ONE XRAY VIEW OF THE CHEST 10/27/2020 7:58 pm COMPARISON: October 26, 2020 HISTORY: ORDERING SYSTEM PROVIDED HISTORY: SOB TECHNOLOGIST PROVIDED HISTORY: Reason for exam:->SOB What reading provider will be dictating this exam?->CRC FINDINGS: There is mild cardiomegaly. There is patchy perihilar and bibasilar atelectasis/infiltrates. Tiny pleural effusions are suspected. Progressive bibasilar atelectasis/infiltrates concerning for pneumonia. Underlying CHF is considered. Xr Chest Portable    Result Date: 10/26/2020  EXAMINATION: ONE XRAY VIEW OF THE CHEST 10/26/2020 4:11 pm COMPARISON: 05/26/2019 HISTORY: ORDERING SYSTEM PROVIDED HISTORY: SOB TECHNOLOGIST PROVIDED HISTORY: Reason for exam:->SOB What reading provider will be dictating this exam?->CRC FINDINGS: Cardiac size appears stable. Discoid airspace disease seen at the left lung base which may represent atelectasis or scarring. Right infrahilar and basilar infiltrate is identified. No pneumothorax is identified. No acute osseous abnormality is identified. Right infrahilar and basilar infiltrate concerning for pneumonia. Left basilar atelectasis or scarring. Assessment/Plans  1. Acute kidney injury  Baseline 1.8 from 9/2019, 1.1 in 5/2019  hemodynamically mediated due to the combination of bradycardia,  diuretic and ARB use; this is exacerbated by IV contrast use   Continue bumex, good response  bumex iv qd  Out 2.7L last 24 h, -7.7L overall    2.  Acute hypoxic respiratory failure  due to multilobar pneumonia  gradual wean  On unasyn  Off vanco  Failed extubation 11/8  bumex 2 mg iv q 12  For trach today    3. etoh abuse    4. Hypernatremia  imrpoved on lower dose bumex    5.  Hyperkalemia  Nl now  Renal tube feed            Saleem Somers MD  11:53 AM  11/12/2020

## 2020-11-12 NOTE — PROGRESS NOTES
Chief Complaint   Patient presents with    Shortness of Breath     for a few weeks. O2 sat in 70s at [facility], placed on 4L and now 95%. SUBJECTIVE:  Patient is tolerating medications. No reported adverse drug reactions. Patient remained afebrile overnight. Continue to be hypertensive. Patient reintubated 11/8/2020,  sedated on propofol, versed, fentanyl. Minimal secretions on suctioning   But trach could not be performed on 11/11/2020            WBC 7.1, on Zosyn day 9 of antibiotic total  50%. FiO2  Afebrile-sedated  Fecal management system in place. Review of systems:  As stated above in the chief complaint, otherwise negative.     Medications:  Scheduled Meds:   chlordiazePOXIDE  25 mg Oral Q8H    QUEtiapine  100 mg Oral BID    senna  2 tablet Oral Nightly    dexamethasone  4 mg Intravenous Q12H    insulin lispro  0-18 Units Subcutaneous Q4H    piperacillin-tazobactam  3.375 g Intravenous Q8H    sodium chloride   Intravenous Q8H    carvedilol  3.125 mg Oral BID WC    folic acid  1 mg Intravenous Daily    thiamine (VITAMIN B1) IVPB  100 mg Intravenous Q24H    [Held by provider] insulin glargine  20 Units Subcutaneous Nightly    sodium chloride (Inhalant)  2 mL Nebulization Q4H    heparin (porcine)  7,500 Units Subcutaneous Q8H    pantoprazole  40 mg Intravenous Daily    And    sodium chloride (PF)  10 mL Intravenous Daily    sodium chloride flush  10 mL Intravenous 2 times per day    Arformoterol Tartrate  15 mcg Nebulization BID    chlorhexidine  15 mL Mouth/Throat BID    amLODIPine  10 mg Oral Daily    aspirin  81 mg Oral Daily    levothyroxine  50 mcg Oral Daily    [Held by provider] losartan  100 mg Oral Daily    budesonide  0.5 mg Nebulization BID    ipratropium-albuterol  1 ampule Inhalation Q4H WA     Continuous Infusions:   fentaNYL 5 mcg/ml in 0.9%  ml infusion 200 mcg/hr (11/11/20 1439)    propofol 50 mcg/kg/min (11/11/20 1910)    dextrose       PRN Meds:polyethylene glycol, perflutren lipid microspheres, acetaminophen, polyvinyl alcohol, hydrALAZINE, sodium chloride flush, [DISCONTINUED] acetaminophen **OR** acetaminophen, colchicine, glucose, dextrose, glucagon (rDNA), dextrose    OBJECTIVE:  BP (!) 190/84   Pulse 60   Temp 97 °F (36.1 °C) (Esophageal)   Resp 16   Ht 5' 8\" (1.727 m)   Wt 290 lb 12.8 oz (131.9 kg)   SpO2 96%   BMI 44.22 kg/m²   Temp  Av.8 °F (36.6 °C)  Min: 97 °F (36.1 °C)  Max: 98.8 °F (37.1 °C)  Constitutional: Intubated and Sedated, wakes up to stimulation , easily agitated   Skin: Warm and dry. No rashes were noted. HEENT: Round and reactive pupils. Moist mucous membranes. No ulcerations or thrush. Chest: Intubated. Symmetrical expansion. Some coarseness right upper lobe. Cardiovascular: S1 and S2 are rhythmic and regular. Systolic murmurs appreciated.    Abdomen: Hyperactive bowel sounds, obese abdomen, unable to palpate any masses PEG  Extremities: No clubbing, no cyanosis, + Lymphedema left lower extremity, improving   Lines: CVC Right IJ 10/30, Right radial Lindsey     Laboratory and Tests Review:  Lab Results   Component Value Date    WBC 7.1 2020    WBC 8.2 11/10/2020    WBC 11.3 2020    HGB 11.4 (L) 2020    HCT 33.3 (L) 2020    MCV 77.3 (L) 2020     2020     Lab Results   Component Value Date    NEUTROABS 4.58 2020    NEUTROABS 5.88 11/10/2020    NEUTROABS 9.49 (H) 2020     No results found for: [facility]  Lab Results   Component Value Date     (H) 2020    AST 76 (H) 2020    ALKPHOS 151 (H) 2020    BILITOT 0.4 2020     Lab Results   Component Value Date     2020    K 4.2 2020    K 4.0 10/27/2020    CL 99 2020    CO2 22 2020    BUN 32 2020    CREATININE 1.6 2020    CREATININE 1.7 11/10/2020    CREATININE 1.8 2020    GFRAA 53 2020    LABGLOM 53 2020    GLUCOSE 144 2020 PROT 7.9 11/11/2020    LABALBU 3.3 11/11/2020    CALCIUM 8.7 11/11/2020    BILITOT 0.4 11/11/2020    ALKPHOS 151 11/11/2020    AST 76 11/11/2020     11/11/2020     Lab Results   Component Value Date    CRP 1.4 (H) 11/02/2020    CRP 2.8 (H) 09/04/2017    CRP 10.5 (H) 08/28/2017     Lab Results   Component Value Date    SEDRATE 135 (H) 09/04/2017    SEDRATE 150 (H) 08/28/2017    SEDRATE 141 (H) 08/21/2017     Radiology:  CXR- 11/3- Multifocal bilateral pulmonary infiltrates more prominent within the right lung. CXR- 11/4- worsening b/l infiltrates, worse on left today with possible small L sided effusion   CXR- 11/5 - Stable b/l infiltrates   CXR- 11/6 - Expiratory film, image otherwise appears overall stable. Microbiology:   Lab Results   Component Value Date    BC 24 Hours no growth 11/09/2020    BC 5 Days no growth 10/29/2020    BC  10/26/2020     This organism was isolated in one set. Susceptibility testing is not routinely done as this  organism frequently represents skin contamination. Additional testing can be ordered by calling the  Microbiology Department.       ORG Staphylococcus aureus 10/29/2020    ORG Staphylococcus coagulase-negative 10/26/2020    ORG Coagulase Negative Staphylococci 08/26/2017     Lab Results   Component Value Date    BLOODCULT2 24 Hours no growth 11/09/2020    BLOODCULT2 5 Days no growth 10/29/2020    BLOODCULT2 5 Days no growth 10/26/2020    ORG Staphylococcus aureus 10/29/2020    ORG Staphylococcus coagulase-negative 10/26/2020    ORG Coagulase Negative Staphylococci 08/26/2017     No results found for: WNDABS  Smear, Respiratory   Date Value Ref Range Status   11/09/2020   Final    Group 6: <25 PMN's/LPF and <25 Epithelial cells/LPF  Moderate Polymorphonuclear leukocytes  Rare Epithelial cells  No organisms seen       No results found for: MPNEUMO, CLAMYDCU, LABLEGI, AFBCX, FUNGSM, LABFUNG  CULTURE, RESPIRATORY   Date Value Ref Range Status   11/09/2020 Growth not present  Oral Pharyngeal Tiesha absent    Final     Culture Catheter Tip   Date Value Ref Range Status   08/26/2017 <15 colonies  Final     No results found for: BFCS  No results found for: CXSURG  Urine Culture, Routine   Date Value Ref Range Status   11/09/2020 Growth not present  Final   10/28/2020 Growth not present  Final   09/16/2019 Growth not present  Final     MRSA Culture Only   Date Value Ref Range Status   11/09/2020 Methicillin resistant Staph aureus not isolated  Final   10/30/2020 Methicillin resistant Staph aureus not isolated  Final   07/28/2017 Methicillin resistant Staph aureus not isolated  Final          Assessment:  · Acute on chronic hypoxic respiratory failure likely 2/2 pneumonia and volume overload  - resolving   · Bacterial pneumonia, likely aspiration pneumonia but after extubation patient had to be reintubated rule out HCAP  · Shock, sepsis- resolved, blood cultures negative   · Alcohol withdrawal      Plan:    · Continue Zosyn for aspiration pneumonia plus HCAP after reintubation-day  · Trach aborted today and plan for possibly tomorrow and PEG place  · Repeat respiratory culture  · Monitor labs, primary management by ICU team  · Will follow with you    Kristi De Jesus  7:11 PM  11/11/2020

## 2020-11-13 ENCOUNTER — APPOINTMENT (OUTPATIENT)
Dept: GENERAL RADIOLOGY | Age: 60
DRG: 003 | End: 2020-11-13
Payer: MEDICARE

## 2020-11-13 LAB
AADO2: 297.4 MMHG
ALBUMIN SERPL-MCNC: 2.6 G/DL (ref 3.5–5.2)
ALP BLD-CCNC: 143 U/L (ref 40–129)
ALT SERPL-CCNC: 113 U/L (ref 0–40)
ANION GAP SERPL CALCULATED.3IONS-SCNC: 10 MMOL/L (ref 7–16)
ANISOCYTOSIS: ABNORMAL
AST SERPL-CCNC: 50 U/L (ref 0–39)
B.E.: -4.1 MMOL/L (ref -3–3)
BASOPHILS ABSOLUTE: 0.09 E9/L (ref 0–0.2)
BASOPHILS RELATIVE PERCENT: 1 % (ref 0–2)
BILIRUB SERPL-MCNC: 0.3 MG/DL (ref 0–1.2)
BUN BLDV-MCNC: 25 MG/DL (ref 8–23)
CALCIUM IONIZED: 1.29 MMOL/L (ref 1.15–1.33)
CALCIUM SERPL-MCNC: 8.7 MG/DL (ref 8.6–10.2)
CHLORIDE BLD-SCNC: 106 MMOL/L (ref 98–107)
CO2: 21 MMOL/L (ref 22–29)
COHB: 0.2 % (ref 0–1.5)
CREAT SERPL-MCNC: 1.3 MG/DL (ref 0.7–1.2)
CRITICAL: ABNORMAL
DATE ANALYZED: ABNORMAL
DATE OF COLLECTION: ABNORMAL
EOSINOPHILS ABSOLUTE: 0.43 E9/L (ref 0.05–0.5)
EOSINOPHILS RELATIVE PERCENT: 5 % (ref 0–6)
FIO2: 60 %
GFR AFRICAN AMERICAN: >60
GFR NON-AFRICAN AMERICAN: >60 ML/MIN/1.73
GLUCOSE BLD-MCNC: 150 MG/DL (ref 74–99)
HCO3: 21 MMOL/L (ref 22–26)
HCT VFR BLD CALC: 30.6 % (ref 37–54)
HEMOGLOBIN: 10.3 G/DL (ref 12.5–16.5)
HHB: 7.3 % (ref 0–5)
HYPOCHROMIA: ABNORMAL
LAB: ABNORMAL
LYMPHOCYTES ABSOLUTE: 3.27 E9/L (ref 1.5–4)
LYMPHOCYTES RELATIVE PERCENT: 38 % (ref 20–42)
Lab: ABNORMAL
MAGNESIUM: 1.9 MG/DL (ref 1.6–2.6)
MCH RBC QN AUTO: 26.8 PG (ref 26–35)
MCHC RBC AUTO-ENTMCNC: 33.7 % (ref 32–34.5)
MCV RBC AUTO: 79.7 FL (ref 80–99.9)
METER GLUCOSE: 102 MG/DL (ref 74–99)
METER GLUCOSE: 130 MG/DL (ref 74–99)
METER GLUCOSE: 131 MG/DL (ref 74–99)
METER GLUCOSE: 133 MG/DL (ref 74–99)
METER GLUCOSE: 163 MG/DL (ref 74–99)
METHB: 0.4 % (ref 0–1.5)
MODE: AC
MONOCYTES ABSOLUTE: 0.34 E9/L (ref 0.1–0.95)
MONOCYTES RELATIVE PERCENT: 4 % (ref 2–12)
NEUTROPHILS ABSOLUTE: 4.47 E9/L (ref 1.8–7.3)
NEUTROPHILS RELATIVE PERCENT: 52 % (ref 43–80)
O2 CONTENT: 15.2 ML/DL
O2 SATURATION: 92.7 % (ref 92–98.5)
O2HB: 92.1 % (ref 94–97)
OPERATOR ID: 2593
OVALOCYTES: ABNORMAL
PATIENT TEMP: 37 C
PCO2: 38.5 MMHG (ref 35–45)
PDW BLD-RTO: 20.6 FL (ref 11.5–15)
PEEP/CPAP: 8 CMH2O
PFO2: 1.22 MMHG/%
PH BLOOD GAS: 7.35 (ref 7.35–7.45)
PHOSPHORUS: 3.2 MG/DL (ref 2.5–4.5)
PLATELET # BLD: 262 E9/L (ref 130–450)
PMV BLD AUTO: 10.4 FL (ref 7–12)
PO2: 73.1 MMHG (ref 75–100)
POIKILOCYTES: ABNORMAL
POLYCHROMASIA: ABNORMAL
POTASSIUM SERPL-SCNC: 3.6 MMOL/L (ref 3.5–5)
RBC # BLD: 3.84 E12/L (ref 3.8–5.8)
RI(T): 4.07
RR MECHANICAL: 16 B/MIN
SCHISTOCYTES: ABNORMAL
SMUDGE CELLS: ABNORMAL
SODIUM BLD-SCNC: 137 MMOL/L (ref 132–146)
SOURCE, BLOOD GAS: ABNORMAL
TARGET CELLS: ABNORMAL
TEAR DROP CELLS: ABNORMAL
THB: 11.7 G/DL (ref 11.5–16.5)
TIME ANALYZED: 443
TOTAL PROTEIN: 7 G/DL (ref 6.4–8.3)
TRIGL SERPL-MCNC: 508 MG/DL (ref 0–149)
VT MECHANICAL: 480 ML
WBC # BLD: 8.6 E9/L (ref 4.5–11.5)

## 2020-11-13 PROCEDURE — 2000000000 HC ICU R&B

## 2020-11-13 PROCEDURE — 36415 COLL VENOUS BLD VENIPUNCTURE: CPT

## 2020-11-13 PROCEDURE — 84100 ASSAY OF PHOSPHORUS: CPT

## 2020-11-13 PROCEDURE — 2580000003 HC RX 258: Performed by: STUDENT IN AN ORGANIZED HEALTH CARE EDUCATION/TRAINING PROGRAM

## 2020-11-13 PROCEDURE — 2580000003 HC RX 258: Performed by: SPECIALIST

## 2020-11-13 PROCEDURE — 83735 ASSAY OF MAGNESIUM: CPT

## 2020-11-13 PROCEDURE — 6360000002 HC RX W HCPCS: Performed by: INTERNAL MEDICINE

## 2020-11-13 PROCEDURE — 94003 VENT MGMT INPAT SUBQ DAY: CPT

## 2020-11-13 PROCEDURE — 6370000000 HC RX 637 (ALT 250 FOR IP): Performed by: INTERNAL MEDICINE

## 2020-11-13 PROCEDURE — 2580000003 HC RX 258: Performed by: INTERNAL MEDICINE

## 2020-11-13 PROCEDURE — 84478 ASSAY OF TRIGLYCERIDES: CPT

## 2020-11-13 PROCEDURE — 85025 COMPLETE CBC W/AUTO DIFF WBC: CPT

## 2020-11-13 PROCEDURE — 82962 GLUCOSE BLOOD TEST: CPT

## 2020-11-13 PROCEDURE — 71045 X-RAY EXAM CHEST 1 VIEW: CPT

## 2020-11-13 PROCEDURE — 82330 ASSAY OF CALCIUM: CPT

## 2020-11-13 PROCEDURE — 6360000002 HC RX W HCPCS: Performed by: SPECIALIST

## 2020-11-13 PROCEDURE — 80053 COMPREHEN METABOLIC PANEL: CPT

## 2020-11-13 PROCEDURE — C9113 INJ PANTOPRAZOLE SODIUM, VIA: HCPCS | Performed by: INTERNAL MEDICINE

## 2020-11-13 PROCEDURE — 36592 COLLECT BLOOD FROM PICC: CPT

## 2020-11-13 PROCEDURE — 82805 BLOOD GASES W/O2 SATURATION: CPT

## 2020-11-13 PROCEDURE — 94640 AIRWAY INHALATION TREATMENT: CPT

## 2020-11-13 PROCEDURE — 2500000003 HC RX 250 WO HCPCS: Performed by: INTERNAL MEDICINE

## 2020-11-13 PROCEDURE — 99233 SBSQ HOSP IP/OBS HIGH 50: CPT | Performed by: INTERNAL MEDICINE

## 2020-11-13 PROCEDURE — 2500000003 HC RX 250 WO HCPCS: Performed by: STUDENT IN AN ORGANIZED HEALTH CARE EDUCATION/TRAINING PROGRAM

## 2020-11-13 RX ORDER — HEPARIN SODIUM (PORCINE) LOCK FLUSH IV SOLN 100 UNIT/ML 100 UNIT/ML
3 SOLUTION INTRAVENOUS EVERY 12 HOURS SCHEDULED
Status: DISCONTINUED | OUTPATIENT
Start: 2020-11-13 | End: 2020-01-01 | Stop reason: HOSPADM

## 2020-11-13 RX ORDER — SODIUM CHLORIDE 0.9 % (FLUSH) 0.9 %
10 SYRINGE (ML) INJECTION PRN
Status: DISCONTINUED | OUTPATIENT
Start: 2020-11-13 | End: 2020-01-01 | Stop reason: HOSPADM

## 2020-11-13 RX ORDER — MAGNESIUM SULFATE 1 G/100ML
1 INJECTION INTRAVENOUS ONCE
Status: COMPLETED | OUTPATIENT
Start: 2020-11-13 | End: 2020-11-13

## 2020-11-13 RX ORDER — LIDOCAINE HYDROCHLORIDE 10 MG/ML
5 INJECTION, SOLUTION EPIDURAL; INFILTRATION; INTRACAUDAL; PERINEURAL ONCE
Status: COMPLETED | OUTPATIENT
Start: 2020-11-13 | End: 2020-01-01

## 2020-11-13 RX ORDER — HEPARIN SODIUM (PORCINE) LOCK FLUSH IV SOLN 100 UNIT/ML 100 UNIT/ML
3 SOLUTION INTRAVENOUS PRN
Status: DISCONTINUED | OUTPATIENT
Start: 2020-11-13 | End: 2020-01-01 | Stop reason: HOSPADM

## 2020-11-13 RX ORDER — POTASSIUM CHLORIDE 29.8 MG/ML
40 INJECTION INTRAVENOUS ONCE
Status: COMPLETED | OUTPATIENT
Start: 2020-11-13 | End: 2020-11-13

## 2020-11-13 RX ADMIN — CHLORDIAZEPOXIDE HYDROCHLORIDE 25 MG: 25 CAPSULE ORAL at 01:13

## 2020-11-13 RX ADMIN — SODIUM CHLORIDE: 9 INJECTION, SOLUTION INTRAVENOUS at 20:55

## 2020-11-13 RX ADMIN — FOLIC ACID 1 MG: 5 INJECTION, SOLUTION INTRAMUSCULAR; INTRAVENOUS; SUBCUTANEOUS at 10:12

## 2020-11-13 RX ADMIN — MAGNESIUM SULFATE HEPTAHYDRATE 1 G: 1 INJECTION, SOLUTION INTRAVENOUS at 07:54

## 2020-11-13 RX ADMIN — PIPERACILLIN AND TAZOBACTAM 3.38 G: 3; .375 INJECTION, POWDER, FOR SOLUTION INTRAVENOUS at 22:30

## 2020-11-13 RX ADMIN — IPRATROPIUM BROMIDE AND ALBUTEROL SULFATE 1 AMPULE: 2.5; .5 SOLUTION RESPIRATORY (INHALATION) at 15:07

## 2020-11-13 RX ADMIN — PIPERACILLIN AND TAZOBACTAM 3.38 G: 3; .375 INJECTION, POWDER, FOR SOLUTION INTRAVENOUS at 15:29

## 2020-11-13 RX ADMIN — CHLORHEXIDINE GLUCONATE 0.12% ORAL RINSE 15 ML: 1.2 LIQUID ORAL at 08:46

## 2020-11-13 RX ADMIN — SODIUM CHLORIDE: 9 INJECTION, SOLUTION INTRAVENOUS at 02:24

## 2020-11-13 RX ADMIN — SODIUM CHLORIDE SOLN NEBU 3% 2 ML: 3 NEBU SOLN at 00:07

## 2020-11-13 RX ADMIN — AMLODIPINE BESYLATE 10 MG: 10 TABLET ORAL at 08:45

## 2020-11-13 RX ADMIN — Medication 10 ML: at 08:47

## 2020-11-13 RX ADMIN — SODIUM CHLORIDE SOLN NEBU 3% 2 ML: 3 NEBU SOLN at 20:50

## 2020-11-13 RX ADMIN — Medication 200 MCG/HR: at 19:52

## 2020-11-13 RX ADMIN — CHLORDIAZEPOXIDE HYDROCHLORIDE 25 MG: 25 CAPSULE ORAL at 23:58

## 2020-11-13 RX ADMIN — QUETIAPINE FUMARATE 100 MG: 100 TABLET ORAL at 08:45

## 2020-11-13 RX ADMIN — IPRATROPIUM BROMIDE AND ALBUTEROL SULFATE 1 AMPULE: 2.5; .5 SOLUTION RESPIRATORY (INHALATION) at 11:31

## 2020-11-13 RX ADMIN — SODIUM CHLORIDE 0.6 MCG/KG/HR: 9 INJECTION, SOLUTION INTRAVENOUS at 17:36

## 2020-11-13 RX ADMIN — HEPARIN SODIUM 7500 UNITS: 10000 INJECTION INTRAVENOUS; SUBCUTANEOUS at 08:46

## 2020-11-13 RX ADMIN — SODIUM CHLORIDE 0.2 MCG/KG/HR: 9 INJECTION, SOLUTION INTRAVENOUS at 10:12

## 2020-11-13 RX ADMIN — CARVEDILOL 3.12 MG: 6.25 TABLET, FILM COATED ORAL at 08:45

## 2020-11-13 RX ADMIN — THIAMINE HYDROCHLORIDE 100 MG: 100 INJECTION, SOLUTION INTRAMUSCULAR; INTRAVENOUS at 14:29

## 2020-11-13 RX ADMIN — CHLORHEXIDINE GLUCONATE 0.12% ORAL RINSE 15 ML: 1.2 LIQUID ORAL at 20:55

## 2020-11-13 RX ADMIN — Medication 200 MCG/HR: at 05:53

## 2020-11-13 RX ADMIN — Medication 10 ML: at 20:55

## 2020-11-13 RX ADMIN — IPRATROPIUM BROMIDE AND ALBUTEROL SULFATE 1 AMPULE: 2.5; .5 SOLUTION RESPIRATORY (INHALATION) at 09:02

## 2020-11-13 RX ADMIN — PROPOFOL 45 MCG/KG/MIN: 10 INJECTION, EMULSION INTRAVENOUS at 05:04

## 2020-11-13 RX ADMIN — SODIUM CHLORIDE SOLN NEBU 3% 2 ML: 3 NEBU SOLN at 15:07

## 2020-11-13 RX ADMIN — SODIUM CHLORIDE, PRESERVATIVE FREE 10 ML: 5 INJECTION INTRAVENOUS at 08:46

## 2020-11-13 RX ADMIN — LEVOTHYROXINE SODIUM 50 MCG: 0.05 TABLET ORAL at 06:11

## 2020-11-13 RX ADMIN — INSULIN LISPRO 3 UNITS: 100 INJECTION, SOLUTION INTRAVENOUS; SUBCUTANEOUS at 21:11

## 2020-11-13 RX ADMIN — CHLORDIAZEPOXIDE HYDROCHLORIDE 25 MG: 25 CAPSULE ORAL at 08:46

## 2020-11-13 RX ADMIN — BUDESONIDE 500 MCG: 0.5 SUSPENSION RESPIRATORY (INHALATION) at 09:02

## 2020-11-13 RX ADMIN — POTASSIUM CHLORIDE 40 MEQ: 400 INJECTION, SOLUTION INTRAVENOUS at 07:53

## 2020-11-13 RX ADMIN — IPRATROPIUM BROMIDE AND ALBUTEROL SULFATE 1 AMPULE: 2.5; .5 SOLUTION RESPIRATORY (INHALATION) at 20:50

## 2020-11-13 RX ADMIN — CARVEDILOL 3.12 MG: 6.25 TABLET, FILM COATED ORAL at 17:13

## 2020-11-13 RX ADMIN — ASPIRIN 81 MG CHEWABLE TABLET 81 MG: 81 TABLET CHEWABLE at 08:45

## 2020-11-13 RX ADMIN — PANTOPRAZOLE SODIUM 40 MG: 40 INJECTION, POWDER, FOR SOLUTION INTRAVENOUS at 08:45

## 2020-11-13 RX ADMIN — PROPOFOL 45 MCG/KG/MIN: 10 INJECTION, EMULSION INTRAVENOUS at 07:53

## 2020-11-13 RX ADMIN — ARFORMOTEROL TARTRATE 15 MCG: 15 SOLUTION RESPIRATORY (INHALATION) at 09:02

## 2020-11-13 RX ADMIN — INSULIN LISPRO 3 UNITS: 100 INJECTION, SOLUTION INTRAVENOUS; SUBCUTANEOUS at 06:11

## 2020-11-13 RX ADMIN — PROPOFOL 45 MCG/KG/MIN: 10 INJECTION, EMULSION INTRAVENOUS at 02:30

## 2020-11-13 RX ADMIN — HEPARIN SODIUM 7500 UNITS: 10000 INJECTION INTRAVENOUS; SUBCUTANEOUS at 17:13

## 2020-11-13 RX ADMIN — BUDESONIDE 500 MCG: 0.5 SUSPENSION RESPIRATORY (INHALATION) at 20:50

## 2020-11-13 RX ADMIN — SODIUM CHLORIDE SOLN NEBU 3% 2 ML: 3 NEBU SOLN at 09:02

## 2020-11-13 RX ADMIN — CHLORDIAZEPOXIDE HYDROCHLORIDE 25 MG: 25 CAPSULE ORAL at 17:13

## 2020-11-13 RX ADMIN — SODIUM CHLORIDE SOLN NEBU 3% 2 ML: 3 NEBU SOLN at 23:37

## 2020-11-13 RX ADMIN — MIDAZOLAM HYDROCHLORIDE 5 MG/HR: 5 INJECTION, SOLUTION INTRAMUSCULAR; INTRAVENOUS at 05:48

## 2020-11-13 RX ADMIN — SODIUM CHLORIDE SOLN NEBU 3% 2 ML: 3 NEBU SOLN at 03:52

## 2020-11-13 RX ADMIN — Medication 200 MCG/HR: at 13:19

## 2020-11-13 RX ADMIN — PIPERACILLIN AND TAZOBACTAM 3.38 G: 3; .375 INJECTION, POWDER, FOR SOLUTION INTRAVENOUS at 06:30

## 2020-11-13 RX ADMIN — SODIUM CHLORIDE SOLN NEBU 3% 2 ML: 3 NEBU SOLN at 11:31

## 2020-11-13 RX ADMIN — ARFORMOTEROL TARTRATE 15 MCG: 15 SOLUTION RESPIRATORY (INHALATION) at 20:50

## 2020-11-13 RX ADMIN — HEPARIN SODIUM 7500 UNITS: 10000 INJECTION INTRAVENOUS; SUBCUTANEOUS at 01:13

## 2020-11-13 ASSESSMENT — PULMONARY FUNCTION TESTS
PIF_VALUE: 31
PIF_VALUE: 38
PIF_VALUE: 33
PIF_VALUE: 33
PIF_VALUE: 32
PIF_VALUE: 34
PIF_VALUE: 35
PIF_VALUE: 34
PIF_VALUE: 32
PIF_VALUE: 31
PIF_VALUE: 31
PIF_VALUE: 36
PIF_VALUE: 32
PIF_VALUE: 40
PIF_VALUE: 31
PIF_VALUE: 33
PIF_VALUE: 31
PIF_VALUE: 32
PIF_VALUE: 32
PIF_VALUE: 31
PIF_VALUE: 35
PIF_VALUE: 34
PIF_VALUE: 36
PIF_VALUE: 31
PIF_VALUE: 33

## 2020-11-13 ASSESSMENT — PAIN SCALES - GENERAL
PAINLEVEL_OUTOF10: 0

## 2020-11-13 NOTE — CARE COORDINATION
11/13 Care Coordination:Elias remains in ICU, [id number] trach 11/12. Plan is wean off sedation and start precert for [facility]. Pt has a hx at [facility] in the past.  CM/SW will continue to follow for discharge planning.    Peter Jeffers BSN,RN--[phone]

## 2020-11-13 NOTE — FLOWSHEET NOTE
Patient reaching for ETT when unrestrained. Unable to follow verbal redirection. Restraints continued.

## 2020-11-13 NOTE — PROGRESS NOTES
Comprehensive Nutrition Assessment    Type and Reason for Visit:  Reassess    Nutrition Recommendations/Plan: Modify Tube Feeding  Noted elevated TG (508), Propofol d/c'd at this time. K/Phos WNL therefore Renal formula not necessary at this time given high Fat content within formula (96g/L). BSL WNL at this time noted H/o DM, to monitor. EN Recommendation: Immune Enhancing @60ml/hr x23 hours (hold 30 minutes before/after Synthroid) to provide: 1380ml, 2070kcals, 129gm pro, 1047ml water     Nutrition Assessment:  Pt admit 2/2 resp failure/PNA. ETOH abuse. Noted NANCY, H/o DM. S/p trach/PEG placement w/ bronch, EN continued. Malnutrition Assessment:  Malnutrition Status:  No malnutrition    Context:  Acute Illness     Findings of the 6 clinical characteristics of malnutrition:  Energy Intake:  No significant decrease in energy intake  Weight Loss:  No significant weight loss     Body Fat Loss:  No significant body fat loss     Muscle Mass Loss:  No significant muscle mass loss    Fluid Accumulation:  No significant fluid accumulation     Strength:  Not Performed    Estimated Daily Nutrient Needs:  Energy (kcal):  ; ; Weight Used for Energy Requirements:  Current     Protein (g):  110-140(1.5-2.0gm/kg IBW; to monitor w/ NANCY);  Weight Used for Protein Requirements:  Ideal          Nutrition Related Findings:  trach, PEG LUQ, FMS, K/Phos WNL, BSL WNL, active BS, abd distention, , - I/Os, +1 to +2 edema      Wounds:  Multiple, Surgical Incision       Current Nutrition Therapies:    Current Tube Feeding (TF) Orders:  · Feeding Route: PEG  · Formula: Renal  · Current TF & Flush Orders Provides: 60ml/hr x23 hrs; 1380ml TV, 2484kcals, 112gm pro, 1003ml water    Anthropometric Measures:  · Height: 5' 8\" (172.7 cm)  · Current Body Weight: 290 lb (131.5 kg)(11/13 actual)   · Admission Body Weight: 334 lb (151.5 kg)(10/29 actual)    · Usual Body Weight: 318 lb (144.2 kg)(12/2019 per EMR, no method)     · Ideal Body Weight: 154 lbs; % Ideal Body Weight 188.3 %   · BMI: 44.1  · BMI Categories: Obese Class 3 (BMI 40.0 or greater)(noted wt loss since admit, current fluids - at this time)       Nutrition Diagnosis:   · Inadequate oral intake related to impaired respiratory function as evidenced by NPO or clear liquid status due to medical condition, intubation, nutrition support - enteral nutrition    Nutrition Interventions:   Nutrition Education/Counseling:  Education not indicated   Coordination of Nutrition Care:  Continue to monitor while inpatient, Coordination of Community Care    Goals:   Tolerance to EN       Nutrition Monitoring and Evaluation:   Food/Nutrient Intake Outcomes:  Enteral Nutrition Intake/Tolerance  Physical Signs/Symptoms Outcomes:  Biochemical Data, Nutrition Focused Physical Findings, Skin, Weight, Diarrhea, GI Status, Fluid Status or Edema, Hemodynamic Status     Electronically signed by Lily Seaman MS, RD, LD on 11/13/20 at 12:01 PM EST  Contact: [phone]

## 2020-11-13 NOTE — PROGRESS NOTES
Nephrology Progress Note  Patient's Name: Jeri Atwood  9:41 AM  11/13/2020        Reason for Consult: Acute kidney  Requesting Physician:  Mercy Morgan MD    Chief Complaint: Shortness of breath  History Obtained From:  EHR; spouse    History of Present Ilness:    Jeri Atwood is a 61 y.o. male with a history of COPD, hypertension, hyperlipidemia and diabetes mellitus. He presented to ED 2 days ago with complaints of shortness of breath. According to patient's spouse he had been complaining of shortness of breath over the course of several weeks. This has gotten progressively worse. He went to see his PCP on the day of admission. In office at the time pulse oximeter obtained revealed his oxygen saturation to be in the 70s. He was instructed to proceed to ED for further evaluation. On presentation to ED his initial vital signs showed a blood pressure of 168/71, temperature 97.5 and pulse of 93. His pulse oximeter at the time was noted to be 98% on room air. Laboratory data was significant for a BUN of 11, creatinine 1.1, WBC of 8.3. Rapid COVID-19 testing was negative. A chest x-ray performed revealed right infrahilar and basilar infiltrate concerning for pneumonia. A CTA was ordered but patient declined because of his inability to lay flat. Patient was given ceftriaxone and doxycycline followed by admission to telemetry. 2 days ago an RRT was called as patient was noted to be increasingly more in respiratory distress. He was transferred to MICU and intubated. Laboratory data yesterday showed worsening serum creatinine to 2.8. This a.m. further worsening to 3.6. He has been nonoliguric. Hence renal consult. 10/30: N new acute issues overnight; remains intubated  10/31: Polyuric , concerned for possible DI; no other acute issues overnight  11/1: agitated overnight ; remains intubated  11/2: pt seen and examined.  Chart reviewed, pt intubated  11/3: pt seen and examined in icu, pt intubated  11/4: pt seen and examined in icu, no overnight events, intubated  11/5: pt seen in room ,remains intubated  11/6: pt seen in room, intubated. 11/7: pt seen in room, intubated, chart reviewed  11/8:pt seen in room, remains intuibated  11/9: pt seen in room, reintubated yesterday  11/10: pt seen in icu, intubated.    11/12: pt getting trach today  11/13: sp trach peg      Allergies:  Bee venom and Shellfish-derived products    Current Medications:    potassium chloride 40 mEq in 100 mL IVPB (Central Line), Once  dexmedetomidine (PRECEDEX) 400 mcg in sodium chloride 0.9 % 100 mL infusion, Continuous  midazolam (VERSED) 100 mg in dextrose 5 % 100 mL infusion, Continuous  chlordiazePOXIDE (LIBRIUM) capsule 25 mg, Q8H  QUEtiapine (SEROQUEL) tablet 100 mg, BID  senna (SENOKOT) tablet 17.2 mg, Nightly  polyethylene glycol (GLYCOLAX) packet 17 g, Daily PRN  fentaNYL 5 mcg/ml in 0.9%  ml infusion, Continuous  perflutren lipid microspheres (DEFINITY) injection 1.65 mg, ONCE PRN  insulin lispro (HUMALOG) injection vial 0-18 Units, Q4H  acetaminophen (TYLENOL) 160 MG/5ML solution 650 mg, Q6H PRN  piperacillin-tazobactam (ZOSYN) 3.375 g in dextrose 5 % 100 mL IVPB extended infusion (mini-bag), Q8H  Zosyn Flush (0.9 % sodium chloride infusion), Q8H  carvedilol (COREG) tablet 6.778 mg, BID WC  folic acid injection 1 mg, Daily  thiamine (B-1) 100 mg in sodium chloride 0.9 % 100 mL IVPB, Q24H  polyvinyl alcohol (LIQUIFILM TEARS) 1.4 % ophthalmic solution 1 drop, Q4H PRN  [Held by provider] insulin glargine (LANTUS) injection vial 20 Units, Nightly  sodium chloride (Inhalant) 3 % nebulizer solution 2 mL, Q4H  hydrALAZINE (APRESOLINE) injection 10 mg, Q4H PRN  heparin (porcine) injection 7,500 Units, Q8H  pantoprazole (PROTONIX) injection 40 mg, Daily    And  sodium chloride (PF) 0.9 % injection 10 mL, Daily  sodium chloride flush 0.9 % injection 10 mL, 2 times per day  sodium chloride flush 0.9 % injection 10 mL, PRN  acetaminophen (TYLENOL) suppository 650 mg, Q6H PRN  Arformoterol Tartrate (BROVANA) nebulizer solution 15 mcg, BID  chlorhexidine (PERIDEX) 0.12 % solution 15 mL, BID  amLODIPine (NORVASC) tablet 10 mg, Daily  aspirin chewable tablet 81 mg, Daily  colchicine (COLCRYS) tablet 0.6 mg, BID PRN  levothyroxine (SYNTHROID) tablet 50 mcg, Daily  [Held by provider] losartan (COZAAR) tablet 100 mg, Daily  budesonide (PULMICORT) nebulizer suspension 500 mcg, BID  ipratropium-albuterol (DUONEB) nebulizer solution 1 ampule, Q4H WA  glucose (GLUTOSE) 40 % oral gel 15 g, PRN  dextrose 50 % IV solution, PRN  glucagon (rDNA) injection 1 mg, PRN  dextrose 5 % solution, PRN        Review of Systems:   Review of systems not obtained due to patient factors. Physical exam:  Vitals:    11/13/20 0901   BP:    Pulse: 58   Resp: 15   Temp:    SpO2: 96%           General: Intubated sedated  Eyes: PERRL. No sclera icterus. No conjunctival injection. ENT: No discharge. Pharynx clear. Neck: Trachea midline. Normal thyroid. Lungs: Coarse breath sounds  CV: Regular rate. Regular rhythm. No murmur or rub. .   Abd:  Non-distended. No masses. No organmegaly. Normal bowel sounds. Skin: Warm and dry. No nodule on exposed extremities. No rash on exposed extremities.   Ext: No cyanosis, clubbing, edema; 2+ pitting bilateral lower extremity edema L>R  Neuro: sedate lightly, but arouses and is agitated      Data:   Labs:  Lab Results   Component Value Date     11/13/2020     11/12/2020     11/11/2020    K 3.6 11/13/2020    K 4.1 11/12/2020    K 4.2 11/11/2020     11/13/2020    CO2 21 (L) 11/13/2020    CO2 23 11/12/2020    CO2 22 11/11/2020    CREATININE 1.3 (H) 11/13/2020    CREATININE 1.5 (H) 11/12/2020    CREATININE 1.6 (H) 11/11/2020    BUN 25 (H) 11/13/2020    BUN 31 (H) 11/12/2020    BUN 32 (H) 11/11/2020    GLUCOSE 150 (H) 11/13/2020    GLUCOSE 143 (H) 11/12/2020    GLUCOSE 144 (H) 11/11/2020    PHOS 3.2 11/13/2020    PHOS 4.4 11/12/2020    PHOS 4.3 11/11/2020    WBC 8.6 11/13/2020    WBC 9.1 11/12/2020    WBC 7.1 11/11/2020    HGB 10.3 (L) 11/13/2020    HGB 10.9 (L) 11/12/2020    HGB 11.4 (L) 11/11/2020    HCT 30.6 (L) 11/13/2020    HCT 32.2 (L) 11/12/2020    HCT 33.3 (L) 11/11/2020    MCV 79.7 (L) 11/13/2020     11/13/2020         Imaging:  Xr Chest Portable    Result Date: 10/27/2020  EXAMINATION: ONE XRAY VIEW OF THE CHEST 10/27/2020 7:58 pm COMPARISON: October 26, 2020 HISTORY: ORDERING SYSTEM PROVIDED HISTORY: SOB TECHNOLOGIST PROVIDED HISTORY: Reason for exam:->SOB What reading provider will be dictating this exam?->CRC FINDINGS: There is mild cardiomegaly. There is patchy perihilar and bibasilar atelectasis/infiltrates. Tiny pleural effusions are suspected. Progressive bibasilar atelectasis/infiltrates concerning for pneumonia. Underlying CHF is considered. Xr Chest Portable    Result Date: 10/26/2020  EXAMINATION: ONE XRAY VIEW OF THE CHEST 10/26/2020 4:11 pm COMPARISON: 05/26/2019 HISTORY: ORDERING SYSTEM PROVIDED HISTORY: SOB TECHNOLOGIST PROVIDED HISTORY: Reason for exam:->SOB What reading provider will be dictating this exam?->CRC FINDINGS: Cardiac size appears stable. Discoid airspace disease seen at the left lung base which may represent atelectasis or scarring. Right infrahilar and basilar infiltrate is identified. No pneumothorax is identified. No acute osseous abnormality is identified. Right infrahilar and basilar infiltrate concerning for pneumonia. Left basilar atelectasis or scarring. Assessment/Plans  1. Acute kidney injury  Baseline 1.8 from 9/2019, 1.1 in 5/2019  hemodynamically mediated due to the combination of bradycardia,  diuretic and ARB use; this is exacerbated by IV contrast use   Continue bumex, good response  bumex iv qd  Out 2.9L last 24 h, -9.9L overall    2.  Acute hypoxic respiratory failure  due to multilobar pneumonia  gradual wean  On unasyn  Off vanco  Failed extubation 11/8  bumex 2 mg iv q 12  Sp trach    3. etoh abuse    4. Hypernatremia  imrpoved on lower dose bumex    5.  Hyperkalemia  Nl now  Renal tube feed            Dixon Velasco MD  9:41 AM  11/13/2020

## 2020-11-13 NOTE — PROGRESS NOTES
PT SEEN AND EXAMINED. intubated, in icu. BP better. Sedated. S/p peg/trach, Chart reviewed. meds reviewed. D/w nursing + family as available. EXAM: IN GENERAL, NAD. Rupinder Davis ROS NEGx10 EXCEPT:   BP (!) 109/51   Pulse 73   Temp 98.9 °F (37.2 °C) (Axillary)   Resp 16   Ht 5' 8\" (1.727 m)   Wt 290 lb 1.6 oz (131.6 kg)   SpO2 92%   BMI 44.11 kg/m²   GEN:  NAD. HEENT: NCAT. NECK: NO JVD. TRACH MIDLINE. NO BRUITS. NO THYROMEGALY. LUNGS: CTA BL NO RALES, RHONCHI OR WHEEZES. GOOD EXCURSION. CV: Regular rate and rhythm, NO Murmurs, Rubs, Or gallops  ABD: Soft. Nontender. Normal bowel sounds.  No organomegaly  EXT:No clubbing cyanosis or edema  Neuro: Labs/data reviewedLABS: CBC with Differential:    Lab Results   Component Value Date    WBC 8.6 11/13/2020    RBC 3.84 11/13/2020    HGB 10.3 11/13/2020    HCT 30.6 11/13/2020     11/13/2020    MCV 79.7 11/13/2020    MCH 26.8 11/13/2020    MCHC 33.7 11/13/2020    RDW 20.6 11/13/2020    NRBC 4.3 11/02/2020    SEGSPCT 73 01/26/2014    METASPCT 0.9 11/07/2020    LYMPHOPCT 38.0 11/13/2020    MONOPCT 4.0 11/13/2020    MYELOPCT 0.9 11/05/2020    BASOPCT 1.0 11/13/2020    MONOSABS 0.34 11/13/2020    LYMPHSABS 3.27 11/13/2020    EOSABS 0.43 11/13/2020    BASOSABS 0.09 11/13/2020     Platelets:    Lab Results   Component Value Date     11/13/2020     CMP:    Lab Results   Component Value Date     11/13/2020    K 3.6 11/13/2020    K 4.0 10/27/2020     11/13/2020    CO2 21 11/13/2020    BUN 25 11/13/2020    CREATININE 1.3 11/13/2020    GFRAA >60 11/13/2020    LABGLOM >60 11/13/2020    GLUCOSE 150 11/13/2020    PROT 7.0 11/13/2020    LABALBU 2.6 11/13/2020    CALCIUM 8.7 11/13/2020    BILITOT 0.3 11/13/2020    ALKPHOS 143 11/13/2020    AST 50 11/13/2020     11/13/2020     Magnesium:    Lab Results   Component Value Date    MG 1.9 11/13/2020     LDH:    Lab Results   Component Value Date     10/28/2020     PT/INR:    Lab Results   Component Value Date    PROTIME 10.9 07/21/2018    INR 0.9 07/21/2018     Last 3 Troponin:    Lab Results   Component Value Date    TROPONINI <0.01 10/26/2020    TROPONINI <0.01 05/26/2019    TROPONINI <0.01 05/26/2019     ABG:    Lab Results   Component Value Date    PH 7.354 11/13/2020    PCO2 38.5 11/13/2020    PO2 73.1 11/13/2020    HCO3 21.0 11/13/2020    BE -4.1 11/13/2020    O2SAT 92.7 11/13/2020     IRON:    Lab Results   Component Value Date    IRON 95 11/05/2020     IMAGING    Xr Chest Portable    Result Date: 10/27/2020  EXAMINATION: ONE XRAY VIEW OF THE CHEST 10/27/2020 7:58 pm COMPARISON: October 26, 2020 HISTORY: ORDERING SYSTEM PROVIDED HISTORY: SOB TECHNOLOGIST PROVIDED HISTORY: Reason for exam:->SOB What reading provider will be dictating this exam?->CRC FINDINGS: There is mild cardiomegaly. There is patchy perihilar and bibasilar atelectasis/infiltrates. Tiny pleural effusions are suspected. Progressive bibasilar atelectasis/infiltrates concerning for pneumonia. Underlying CHF is considered. Xr Chest Portable    Result Date: 10/26/2020  EXAMINATION: ONE XRAY VIEW OF THE CHEST 10/26/2020 4:11 pm COMPARISON: 05/26/2019 HISTORY: ORDERING SYSTEM PROVIDED HISTORY: SOB TECHNOLOGIST PROVIDED HISTORY: Reason for exam:->SOB What reading provider will be dictating this exam?->CRC FINDINGS: Cardiac size appears stable. Discoid airspace disease seen at the left lung base which may represent atelectasis or scarring. Right infrahilar and basilar infiltrate is identified. No pneumothorax is identified. No acute osseous abnormality is identified. Right infrahilar and basilar infiltrate concerning for pneumonia. Left basilar atelectasis or scarring.        Medications:    Scheduled Meds:   chlordiazePOXIDE  25 mg Oral Q8H    QUEtiapine  100 mg Oral BID    senna  2 tablet Oral Nightly    insulin lispro  0-18 Units Subcutaneous Q4H    piperacillin-tazobactam  3.375 g Intravenous Q8H    sodium chloride Intravenous Q8H    carvedilol  3.125 mg Oral BID WC    folic acid  1 mg Intravenous Daily    thiamine (VITAMIN B1) IVPB  100 mg Intravenous Q24H    [Held by provider] insulin glargine  20 Units Subcutaneous Nightly    sodium chloride (Inhalant)  2 mL Nebulization Q4H    heparin (porcine)  7,500 Units Subcutaneous Q8H    pantoprazole  40 mg Intravenous Daily    And    sodium chloride (PF)  10 mL Intravenous Daily    sodium chloride flush  10 mL Intravenous 2 times per day    Arformoterol Tartrate  15 mcg Nebulization BID    chlorhexidine  15 mL Mouth/Throat BID    amLODIPine  10 mg Oral Daily    aspirin  81 mg Oral Daily    levothyroxine  50 mcg Oral Daily    [Held by provider] losartan  100 mg Oral Daily    budesonide  0.5 mg Nebulization BID    ipratropium-albuterol  1 ampule Inhalation Q4H WA       Continuous Infusions:   dexmedetomidine (PRECEDEX) IV infusion 0.4 mcg/kg/hr (11/13/20 1059)    midazolam 3 mg/hr (11/13/20 3745)    fentaNYL 5 mcg/ml in 0.9%  ml infusion 200 mcg/hr (11/13/20 4008)    dextrose         PRN Meds:polyethylene glycol, perflutren lipid microspheres, acetaminophen, polyvinyl alcohol, hydrALAZINE, sodium chloride flush, [DISCONTINUED] acetaminophen **OR** acetaminophen, colchicine, glucose, dextrose, glucagon (rDNA), dextrose    A/P:      Patient Active Problem List   Diagnosis    Hyperlipidemia    Type 2 diabetes mellitus without complication, without long-term current use of insulin (HCC)    Atypical chest pain    Essential hypertension    Chronic acquired lymphedema    Aortic valve disease    Morbid obesity due to excess calories (HCC)    Abdominal pain    Acute respiratory failure with hypoxia (HCC)    Acute pulmonary edema (HCC)    Congestive heart failure with LV diastolic dysfunction, NYHA class 3 (HCC)    Obstructive sleep apnea    Acquired hypothyroidism    Chronic diastolic heart failure (HCC)    Nonrheumatic aortic valve stenosis    ACE-inhibitor cough    Pneumonia    Acute gout of right knee   Acute encephalopathy 2/2 Acute hypoxic hypercapnic respiratory failure.         Acute hypoxic hypercapnic respiratory failure 2/2 MSSA pneumonia versus COPD versus ADHF versus chronic OHS  Pt has Hx chronic HANS not on CPAP at home. ABG showed pH 7.188/91.2/78.9/33 on RRT. currently intubated. - Continue to monitor respiratory status, wean down FiO2 as tolerated. - Pro  on presentation.   - US dup LE negative for DVT. CTA negative for PE. COVID -19 negative x2  - Continue breathing treatment  - Monitor daily CXR. CBC.   - Pulmonology consulted, further recommendations appreciated.     NANCY stage III likely pre renal 2/2 diuretic use, contrast agent vs? cardiorenal syndrome. -   - Nephrology consulted. Further recommendations appreciated. - Continue monitor daily BMP, renal function. Monitor I/O. Avoid nephrotoxicity.       · Bacterial pneumonia, likely aspiration pneumonia vs MSSA   · Shock, sepsis- resolved   · ATBx per id  Alcohol withdrawal .    Hx HFpEF, EF 65%cta noted  prob needs ltac  Wean per pulm/ccm  S/p Peg/trach PER ENT  Monitor HR

## 2020-11-13 NOTE — PLAN OF CARE
Problem: Restraint Use - Nonviolent/Non-Self-Destructive Behavior:  Goal: Absence of restraint-related injury  11/13/2020 0650 by Jesenia Alcala RN  Outcome: Met This Shift     Problem: Restraint Use - Nonviolent/Non-Self-Destructive Behavior:  Goal: Absence of restraint indications  11/13/2020 0650 by Jesenia Alcala RN  Outcome: Not Met This Shift

## 2020-11-13 NOTE — PROGRESS NOTES
Pulmonary Progress Note  11/13/2020 3:43 PM  Subjective:   Admit Date: 10/26/2020  PCP: Will Patterson MD  Interval History: Patient sedated on fentanyl and propofol. Seroquel being uptitrated for agitation. Patient is s/p trach and eg placement on ACVC 16/480/60/8 at this time. Sedation includes Precedex, Fentanyl, Versed. Patient apean triggering vent with PSV/PEEP trials in place   Diet: DIET TUBE FEED CONTINUOUS/CYCLIC NPO;  Immune Enhancing; Gastrostomy; Continuous; 25; 60; 23; Exceptions are: Sips with Meds    Data:   Scheduled Meds:    chlordiazePOXIDE  25 mg Oral Q8H    QUEtiapine  100 mg Oral BID    senna  2 tablet Oral Nightly    insulin lispro  0-18 Units Subcutaneous Q4H    piperacillin-tazobactam  3.375 g Intravenous Q8H    sodium chloride   Intravenous Q8H    carvedilol  3.125 mg Oral BID WC    folic acid  1 mg Intravenous Daily    thiamine (VITAMIN B1) IVPB  100 mg Intravenous Q24H    [Held by provider] insulin glargine  20 Units Subcutaneous Nightly    sodium chloride (Inhalant)  2 mL Nebulization Q4H    heparin (porcine)  7,500 Units Subcutaneous Q8H    pantoprazole  40 mg Intravenous Daily    And    sodium chloride (PF)  10 mL Intravenous Daily    sodium chloride flush  10 mL Intravenous 2 times per day    Arformoterol Tartrate  15 mcg Nebulization BID    chlorhexidine  15 mL Mouth/Throat BID    amLODIPine  10 mg Oral Daily    aspirin  81 mg Oral Daily    levothyroxine  50 mcg Oral Daily    [Held by provider] losartan  100 mg Oral Daily    budesonide  0.5 mg Nebulization BID    ipratropium-albuterol  1 ampule Inhalation Q4H WA     Continuous Infusions:    dexmedetomidine (PRECEDEX) IV infusion      midazolam 1 mg/hr (11/13/20 1430)    fentaNYL 5 mcg/ml in 0.9%  ml infusion 200 mcg/hr (11/13/20 1319)    dextrose         PRN Meds: polyethylene glycol, perflutren lipid microspheres, acetaminophen, polyvinyl alcohol, hydrALAZINE, sodium chloride flush, [DISCONTINUED] the right lung  with moderate opacification also present at the left base. Endotracheal tube  and enteric tube stable. No significant skeletal finding. Impression Interval placement of right IJ central venous catheter. No pneumothorax. Worsening airspace changes in the right lung. Micro:  No results for input(s): BC in the last 72 hours. No results for input(s): ORG in the last 72 hours. No results for input(s): Aleksandar Dunk in the last 72 hours. No results for input(s): STREPPNEUMAG in the last 72 hours. No results for input(s): LEGUR in the last 72 hours. No results for input(s): ORG in the last 72 hours. No results for input(s): ASO in the last 72 hours. No results for input(s): CULTRESP in the last 72 hours. No results for input(s): LABURIN in the last 72 hours.   Vent Information  $Ventilation: $Subsequent Day  Skin Assessment: Clean, dry, & intact  Equipment ID: [id number]  Equipment Changed: (S) Humidification  Vent Type: 980  Vent Mode: AC/VC  Vt Ordered: 480 mL  Rate Set: 16 bmp  Peak Flow: 70 L/min  Pressure Support: 0 cmH20  FiO2 : 60 %  SpO2: 94 %  SpO2/FiO2 ratio: 156.67  PaO2/FiO2 ratio: 169  Sensitivity: 3  PEEP/CPAP: 8  I Time/ I Time %: 0 s  Humidification Source: Heated wire  Humidification Temp: 36.9  Humidification Temp Measured: 37  Circuit Condensation: Drained  Mask Type: Full face mask  Mask Size: Large    Additional Respiratory  Assessments  Pulse: 59  Resp: 16  SpO2: 94 %  Position: Semi-Lockwood's  Humidification Source: Heated wire  Humidification Temp: 36.9  Circuit Condensation: Drained  Oral Care Completed?: Yes  Oral Care: Mouth swabbed, Mouth suctioned  Subglottic Suction Done?: Yes  Airway Type: Trachial  Airway Size: 8(xlt)  Cuff Pressure (cm H2O): 29 cm H2O  Skin barrier applied: Yes    Objective:   Vitals:   Vitals:    20 1508   BP:    Pulse:    Resp: 16   Temp:    SpO2: 94%      TEMP:Current: Temp: 99.2 °F (37.3 °C)  Max: Temp  Av.5 °F (36.9 °C)  Min: 97.5 °F (36.4 °C)  Max: 99.3 °F (37.4 °C)    BP Range: Systolic (83VBL), NJK:118 , Min:93 , EBZ:351     Diastolic (02GBY), VS, Min:43, Max:68      General appearance: Sedated obese on mechanical ventilation , appears stated age awakens to touch  In no acute distress  Skin: No rashes or lesions  HEENT: mucous membranes are moist oral endotracheal tube and OG tube  Neck: No JVD  Lungs: symmetrical expansion, coarse breath sounds to auscultation, no use of accessory muscles  Heart: S1S2 no murmurs, tachycardic  Abdomen: soft, non tender, distended obese  Extremities: no peripheral edema  Neurologic: Sedated  Affect: Calm       Assessment:   Patient Active Problem List:    59-y.o M with history of DM, hypothyroid, COPD, HTN, HLD presented on 10/26 with shortness of breath for 1 week. Saturations were 70% at PCPs. Saturation 65% on room air in ER  Patient agitated for CT scan refused to lay flat  10/27 RRT refused BiPAP became combative. Patient transferred to ICU  10/28 intubated and sedated. CT chest showing dense multifocal airspace disease  10/30 Ultrasound lower extremities no DVT, ultrasound kidneys no hydronephrosis  10/31 bilateral infiltrates left effusion. On 50% +8 of PEEP. Chest x-ray showing right-sided improving with left base atelectasis. Covid negative x2    1. Acute hypoxemic respiratory failure on mechanical ventilation  2. Agitation on Seroquel Fentanyl  3. MSSA pneumonia on Unasyn  4. HANS moderate ( AHI 17 on 18 requires BIPAP )  5. History of reactive airway disease  6. CT 3/14/2018 showing pulmonary nodule stable from 10/2017  7. Acute renal failure improving - nephrology on board   8. Diabetes with elevated blood sugars  9. Obesity  10. EF 65% LVH  11. chronic lymphedema  12. Hypothyroid  13.  H/o respiratory failure: 2017 pt was  transferred from [facility] for acute respiratory failure after lap cholecystectomy, lap umbilical hernia repair, and lap liver biopsy (fatty liver). He had been extubated postop but had laryngospasm requiring reintubation, complicated by negative pressure flash pulmonary edema, and required tracheostomy 8/10/2017. Patient has staph aureus pneumonia and C. difficile colitis. Patient was then transferred to [facility] hospital where he was weaned off the ventilator and decannulated.     Plan:     · S/P trach/PEG   · LTACH evaluation and placement   · Vent wean per ICU team, patient has apnea triggers on vent wean PSV/PEEP  · Precedex, Versed, Fentanyl   · [facility] 16/480/60/8 otherwise noted    Vanessa Tavares MD, [facility]  11/13/2020  3:43 PM

## 2020-11-13 NOTE — PROGRESS NOTES
[address] [facility]  Department of Internal Medicine   Internal Medicine Residency   MICU Progress Note    Patient:  Zulema Teresa 61 y.o. male  MRN: [id number]     Date of Service: 11/13/2020    Allergy: Bee venom and Shellfish-derived products  CC follow SOB   Subjective     S/p trach with ENT yesterday. POD2 PEG placement by gen surg. BARBARA. Triglyc's elevated. Switch prop to precedex. Objective     VS: /68   Pulse 58   Temp 99.3 °F (37.4 °C) (Axillary)   Resp 15   Ht 5' 8\" (1.727 m)   Wt 290 lb 1.6 oz (131.6 kg)   SpO2 96%   BMI 44.11 kg/m²   ABP (Arterial line BP): (!) 300/300  ABP mean (Arterial line mean): 300 mmHg    0I & O - 24hr:     Intake/Output Summary (Last 24 hours) at 11/13/2020 4923  Last data filed at 11/13/2020 0915  Gross per 24 hour   Intake 3146 ml   Output 2665 ml   Net 481 ml     Physical Exam:    Constitutional: obese male. In no apparent distress. Head: Normocephalic and atraumatic. Eyes: Conjunctiva normal, (-) scleral icterus. Mucus membranes moist.  Neck: (-)  swelling  Respiratory: ETT in place. Lungs clear to auscultation bilaterally. (-)  wheezes, (-) increased work of breathing or respiratory distress. Cardiovascular: RRR S1 and S2 were normal.   GI:  Abdomen tense and mildly distended. Soft BS. (-) guarding  Extremities: Warm and well perfused. (-) clubbing, (-) cyanosis. 2+ distal pulses. (-) peripheral edema.   Neurologic:  Arousable to verba/tactile stimuli, no facial droop or tremor noted    Lines     site day    Art line   None    TLC L IJ 5   PICC None    Hemoaccess None      Mechanical Ventilation:   Mode: A/C    TV:480 ml RR: 16  PEEP 8cmH2O  FiO2 50%       ABG:     Lab Results   Component Value Date    PH 7.354 11/13/2020    PCO2 38.5 11/13/2020    PO2 73.1 11/13/2020    TQV4JSR 26.1 11/09/2020    HCO3 21.0 11/13/2020    BE -4.1 11/13/2020    THB 11.7 11/13/2020    O2SAT 92.7 11/13/2020     Medications     Infusions: (Fluid, Sedation, Vasopressors)  Sedation   Propofol 50  · Versed 4  · Fentanyl 200    Nutrition:   OG tube Goal rate: 60ml/h  ATB:   Antibiotics  Days   Zosyn 5             Skin issues: NAEO     Patient currently has   Urinary cath  Restraints - B/L wrists and ankles   DVT prophylaxis/ GI prophylaxis - Heparin, Protonix      Labs     CBC:   Lab Results   Component Value Date    WBC 8.6 11/13/2020    RBC 3.84 11/13/2020    HGB 10.3 11/13/2020    HCT 30.6 11/13/2020    MCV 79.7 11/13/2020    MCH 26.8 11/13/2020    MCHC 33.7 11/13/2020    RDW 20.6 11/13/2020     11/13/2020    MPV 10.4 11/13/2020     CMP:    Lab Results   Component Value Date     11/13/2020    K 3.6 11/13/2020    K 4.0 10/27/2020     11/13/2020    CO2 21 11/13/2020    BUN 25 11/13/2020    CREATININE 1.3 11/13/2020    GFRAA >60 11/13/2020    LABGLOM >60 11/13/2020    GLUCOSE 150 11/13/2020    PROT 7.0 11/13/2020    LABALBU 2.6 11/13/2020    CALCIUM 8.7 11/13/2020    BILITOT 0.3 11/13/2020    ALKPHOS 143 11/13/2020    AST 50 11/13/2020     11/13/2020     Ionized Calcium:  No results found for: IONCA  Magnesium:    Lab Results   Component Value Date    MG 1.9 11/13/2020     Phosphorus:    Lab Results   Component Value Date    PHOS 3.2 11/13/2020     Imaging Studies:  CXR 11/10/2020      KUB 11/10/2020     Narrative    EXAMINATION:    ONE SUPINE XRAY VIEW(S) OF THE ABDOMEN         11/10/2020 2:05 pm         COMPARISON:    November 8, 2020         HISTORY:    ORDERING SYSTEM PROVIDED HISTORY: evaluate bowel    TECHNOLOGIST PROVIDED HISTORY:    Reason for exam:->evaluate bowel    What reading provider will be dictating this exam?->CRC         FINDINGS:    NG tube tip is in the gastric lumen.  There is no free air or bowel wall    pneumatosis.  No dilated segments of bowel are evident.              Impression    NG tube tip in the gastric lumen.      Resident's Assessment and Plan     Neurology  - ICU delirium vs alcohol withdrawal vs agitation  · Seroquel 100 BID  · Librium 25 TID  · Fent/Versed gtt; switch prop -> precedex       Cardiology:   1. Volume overload in setting of Hx HFpEF (EF 65% w/ indeterminate DD as of 10/26/2020)  · Pro- as of 11/9/2020     · Continue ASA 81mg PO daily   · Continue to monitor U/O   · Daily weights   · Continue to monitor pt vitals     - Approx 10L negative after diuresing     2. Hx of HTN   · Continue Amlodipine PO daily  · Continue Coreg PO BID   · Continue Hydralazine PRN      Pulmonary:   1. Acute hypoxic hypercapnic respiratory failure likely 2/2 CAP vs chronic OHS vs fluid overload in setting of chronic HANS noncompliant with CPAP  · Respiratory cx growing MSSA on 10/31/2020  · Continue Zosyn - per ID  · Continue 3% Normal saline nebulizer solution  · Continue Brovana, Pulmicort, and Duonebs    · Continue to monitor daily labs - lytes replaced as necessary  · Daily CXR and ABGs     - S/p Trach by ENT and PEG by gen surg;    Nephrology (Fluids/ Electrolytes & Nutrition):   1. NANCY - likely pre-renal vs intrinsic renal in setting of ?cardiorenal syndrome w/ concomitant bradycardia and IV contrast and diuretic use. · Continue daily BMP - replete electrolytes as necessary   · Continue to monitor U/O   · Daily CXR to monitor volume status   · Nephrology following, appreciate further recommendations     Gastroenterology:   1. Transaminitis likely 2/2 Lipitor use   · Continue holding Lipitor at this time   · Continue trending LFTs   ·   Endocrine:   1. Type 2 DM  · Continue HDSS   · Continue POCT BG q4hrs   · Hypoglycemia protocol in place    - Hold Lantus for time being, intermittently bg < 140    2. Hx of hypothyroidism   · Continue Synthroid 50mcg PO daily     Code Status:   FULL     PT/OT Consult: PT/OT consutled   Disposition: MICU for now; placement pending. Precert started at [facility]    Final note: Post trach/peg. Placement pending. Continue current ICU care for time being.  Tri's high, switching sedation from prop->precedex and keeping fent/versed as appropriate. Anita Ortega DO, PGY-1  Attending physician: Dr. Ida Diaz personally saw, examined and provided care for the patient. Radiographs, labs and medication list were reviewed by me independently. I spoke with bedside nursing, therapists and consultants. Critical care services and times documented are independent of procedures and multidisciplinary rounds with Residents. Additionally comprehensive, multidisciplinary rounds were conducted with the MICU team. The case was discussed in detail and plans for care were established. Review of Residents documentation was conducted and revisions were made as appropriate. I agree with the above documented exam, problem list and plan of care.     Mariana Richardson MD,[facility]  Pulmonary&Critical Care Medicine   Director of [facility]  Medical Director of [address] [place] [address]    Sharon Helton

## 2020-11-13 NOTE — PROGRESS NOTES
Chief Complaint   Patient presents with    Shortness of Breath     for a few weeks. O2 sat in 70s at [facility], placed on 4L and now 95%. SUBJECTIVE:  Patient is tolerating medications. No reported adverse drug reactions. Patient remained afebrile overnight. Continue to be hypertensive. Patient reintubated 11/8/2020,  sedated on propofol, versed, fentanyl. Minimal secretions on suctioning          WBC 8.6, on Zosyn   50%-60% FiO2 O2 sats somewhere between 92 and 95  Afebrile-sedated  Fecal management system in place. Status post trach and PEG  Review of systems:  As stated above in the chief complaint, otherwise negative.     Medications:  Scheduled Meds:   potassium chloride  40 mEq Intravenous Once    chlordiazePOXIDE  25 mg Oral Q8H    QUEtiapine  100 mg Oral BID    senna  2 tablet Oral Nightly    insulin lispro  0-18 Units Subcutaneous Q4H    piperacillin-tazobactam  3.375 g Intravenous Q8H    sodium chloride   Intravenous Q8H    carvedilol  3.125 mg Oral BID WC    folic acid  1 mg Intravenous Daily    thiamine (VITAMIN B1) IVPB  100 mg Intravenous Q24H    [Held by provider] insulin glargine  20 Units Subcutaneous Nightly    sodium chloride (Inhalant)  2 mL Nebulization Q4H    heparin (porcine)  7,500 Units Subcutaneous Q8H    pantoprazole  40 mg Intravenous Daily    And    sodium chloride (PF)  10 mL Intravenous Daily    sodium chloride flush  10 mL Intravenous 2 times per day    Arformoterol Tartrate  15 mcg Nebulization BID    chlorhexidine  15 mL Mouth/Throat BID    amLODIPine  10 mg Oral Daily    aspirin  81 mg Oral Daily    levothyroxine  50 mcg Oral Daily    [Held by provider] losartan  100 mg Oral Daily    budesonide  0.5 mg Nebulization BID    ipratropium-albuterol  1 ampule Inhalation Q4H WA     Continuous Infusions:   dexmedetomidine (PRECEDEX) IV infusion 0.4 mcg/kg/hr (11/13/20 1059)    midazolam 4 mg/hr (11/13/20 0800)    fentaNYL 5 mcg/ml in 0.9%  ml infusion 200 mcg/hr (20 0553)    dextrose       PRN Meds:polyethylene glycol, perflutren lipid microspheres, acetaminophen, polyvinyl alcohol, hydrALAZINE, sodium chloride flush, [DISCONTINUED] acetaminophen **OR** acetaminophen, colchicine, glucose, dextrose, glucagon (rDNA), dextrose    OBJECTIVE:  /60   Pulse 80   Temp 98.9 °F (37.2 °C) (Axillary)   Resp 16   Ht 5' 8\" (1.727 m)   Wt 290 lb 1.6 oz (131.6 kg)   SpO2 91%   BMI 44.11 kg/m²   Temp  Av.2 °F (36.8 °C)  Min: 97.5 °F (36.4 °C)  Max: 99.3 °F (37.4 °C)  Constitutional: Intubated and Sedated, wakes up to stimulation , easily agitated   Skin: Warm and dry. No rashes were noted. HEENT: Round and reactive pupils. Moist mucous membranes. No ulcerations or thrush. Chest: Trach symmetrical expansion. Some coarseness right upper lobe. Cardiovascular: S1 and S2 are rhythmic and regular. Systolic murmurs appreciated.    Abdomen: Hyperactive bowel sounds, obese abdomen, unable to palpate any masses PEG  Extremities: No clubbing, no cyanosis, + Lymphedema left lower extremity, improving   Lines: CVC Right IJ 10/30, Right radial Lindsey     Laboratory and Tests Review:  Lab Results   Component Value Date    WBC 8.6 2020    WBC 9.1 2020    WBC 7.1 2020    HGB 10.3 (L) 2020    HCT 30.6 (L) 2020    MCV 79.7 (L) 2020     2020     Lab Results   Component Value Date    NEUTROABS 4.47 2020    NEUTROABS 6.92 2020    NEUTROABS 4.58 2020     No results found for: [facility]  Lab Results   Component Value Date     (H) 2020    AST 50 (H) 2020    ALKPHOS 143 (H) 2020    BILITOT 0.3 2020     Lab Results   Component Value Date     2020    K 3.6 2020    K 4.0 10/27/2020     2020    CO2 21 2020    BUN 25 2020    CREATININE 1.3 2020    CREATININE 1.5 2020    CREATININE 1.6 2020    GFRAA >60 2020 LABGLOM >60 11/13/2020    GLUCOSE 150 11/13/2020    PROT 7.0 11/13/2020    LABALBU 2.6 11/13/2020    CALCIUM 8.7 11/13/2020    BILITOT 0.3 11/13/2020    ALKPHOS 143 11/13/2020    AST 50 11/13/2020     11/13/2020     Lab Results   Component Value Date    CRP 1.4 (H) 11/02/2020    CRP 2.8 (H) 09/04/2017    CRP 10.5 (H) 08/28/2017     Lab Results   Component Value Date    SEDRATE 135 (H) 09/04/2017    SEDRATE 150 (H) 08/28/2017    SEDRATE 141 (H) 08/21/2017     Radiology:  CXR- 11/3- Multifocal bilateral pulmonary infiltrates more prominent within the right lung. CXR- 11/4- worsening b/l infiltrates, worse on left today with possible small L sided effusion   CXR- 11/5 - Stable b/l infiltrates   CXR- 11/6 - Expiratory film, image otherwise appears overall stable. Microbiology:   Lab Results   Component Value Date    BC 24 Hours no growth 11/09/2020    BC 5 Days no growth 10/29/2020    BC  10/26/2020     This organism was isolated in one set. Susceptibility testing is not routinely done as this  organism frequently represents skin contamination. Additional testing can be ordered by calling the  Microbiology Department.       ORG Staphylococcus aureus 10/29/2020    ORG Staphylococcus coagulase-negative 10/26/2020    ORG Coagulase Negative Staphylococci 08/26/2017     Lab Results   Component Value Date    BLOODCULT2 24 Hours no growth 11/09/2020    BLOODCULT2 5 Days no growth 10/29/2020    BLOODCULT2 5 Days no growth 10/26/2020    ORG Staphylococcus aureus 10/29/2020    ORG Staphylococcus coagulase-negative 10/26/2020    ORG Coagulase Negative Staphylococci 08/26/2017     No results found for: WNDABS  Smear, Respiratory   Date Value Ref Range Status   11/09/2020   Final    Group 6: <25 PMN's/LPF and <25 Epithelial cells/LPF  Moderate Polymorphonuclear leukocytes  Rare Epithelial cells  No organisms seen       No results found for: MPNEUMO, CLAMYDCU, LABLEGI, AFBCX, FUNGSM, LABFUNG  CULTURE, RESPIRATORY Date Value Ref Range Status   11/09/2020 Growth not present  Oral Pharyngeal Graham absent    Final     Culture Catheter Tip   Date Value Ref Range Status   08/26/2017 <15 colonies  Final     No results found for: BFCS  No results found for: CXSURG  Urine Culture, Routine   Date Value Ref Range Status   11/09/2020 Growth not present  Final   10/28/2020 Growth not present  Final   09/16/2019 Growth not present  Final     MRSA Culture Only   Date Value Ref Range Status   11/09/2020 Methicillin resistant Staph aureus not isolated  Final   10/30/2020 Methicillin resistant Staph aureus not isolated  Final   07/28/2017 Methicillin resistant Staph aureus not isolated  Final          Assessment:  · Acute on chronic hypoxic respiratory failure likely 2/2 pneumonia and volume overload  - resolving   · Bacterial pneumonia, likely aspiration pneumonia but after extubation patient had to be reintubated rule out HCAP  · Shock, sepsis- resolved, blood cultures negative   · Alcohol withdrawal   · Cultures have remained fairly nondiagnostic except for the original 1029 staph aureus culture and respiratory tract along with the oral graham.   This is day 14 of antibiotics     Plan:    · Continue Zosyn for aspiration pneumonia plus HCAP few more days  · Trach completed and PEG pain place  · Repeat respiratory culture 11 - no growth  · Monitor labs, primary management by ICU team  · Will follow with you    Kali Long  11:42 AM  11/13/2020

## 2020-11-14 ENCOUNTER — APPOINTMENT (OUTPATIENT)
Dept: GENERAL RADIOLOGY | Age: 60
DRG: 003 | End: 2020-11-14
Payer: MEDICARE

## 2020-11-14 LAB
AADO2: 213.3 MMHG
ALBUMIN SERPL-MCNC: 2.9 G/DL (ref 3.5–5.2)
ALP BLD-CCNC: 140 U/L (ref 40–129)
ALT SERPL-CCNC: 109 U/L (ref 0–40)
ANION GAP SERPL CALCULATED.3IONS-SCNC: 13 MMOL/L (ref 7–16)
ANISOCYTOSIS: ABNORMAL
AST SERPL-CCNC: 45 U/L (ref 0–39)
B.E.: -6.4 MMOL/L (ref -3–3)
BASOPHILS ABSOLUTE: 0.05 E9/L (ref 0–0.2)
BASOPHILS RELATIVE PERCENT: 0.5 % (ref 0–2)
BILIRUB SERPL-MCNC: 0.5 MG/DL (ref 0–1.2)
BLOOD CULTURE, ROUTINE: NORMAL
BUN BLDV-MCNC: 23 MG/DL (ref 8–23)
CALCIUM IONIZED: 1.35 MMOL/L (ref 1.15–1.33)
CALCIUM SERPL-MCNC: 9 MG/DL (ref 8.6–10.2)
CHLORIDE BLD-SCNC: 106 MMOL/L (ref 98–107)
CO2: 18 MMOL/L (ref 22–29)
COHB: 0.3 % (ref 0–1.5)
CREAT SERPL-MCNC: 1.4 MG/DL (ref 0.7–1.2)
CRITICAL: ABNORMAL
CULTURE, BLOOD 2: NORMAL
DATE ANALYZED: ABNORMAL
DATE OF COLLECTION: ABNORMAL
EKG ATRIAL RATE: 82 BPM
EKG P AXIS: 8 DEGREES
EKG P-R INTERVAL: 170 MS
EKG Q-T INTERVAL: 384 MS
EKG QRS DURATION: 92 MS
EKG QTC CALCULATION (BAZETT): 448 MS
EKG R AXIS: -17 DEGREES
EKG T AXIS: 17 DEGREES
EKG VENTRICULAR RATE: 82 BPM
EOSINOPHILS ABSOLUTE: 0.54 E9/L (ref 0.05–0.5)
EOSINOPHILS RELATIVE PERCENT: 5.9 % (ref 0–6)
FIO2: 50 %
GFR AFRICAN AMERICAN: >60
GFR NON-AFRICAN AMERICAN: >60 ML/MIN/1.73
GLUCOSE BLD-MCNC: 178 MG/DL (ref 74–99)
HCO3: 17 MMOL/L (ref 22–26)
HCT VFR BLD CALC: 30.7 % (ref 37–54)
HEMOGLOBIN: 10.2 G/DL (ref 12.5–16.5)
HHB: 2.4 % (ref 0–5)
HYPOCHROMIA: ABNORMAL
IMMATURE GRANULOCYTES #: 0.06 E9/L
IMMATURE GRANULOCYTES %: 0.7 % (ref 0–5)
LAB: ABNORMAL
LACTIC ACID: 0.8 MMOL/L (ref 0.5–2.2)
LYMPHOCYTES ABSOLUTE: 2.21 E9/L (ref 1.5–4)
LYMPHOCYTES RELATIVE PERCENT: 24.2 % (ref 20–42)
Lab: ABNORMAL
MAGNESIUM: 1.9 MG/DL (ref 1.6–2.6)
MCH RBC QN AUTO: 26.8 PG (ref 26–35)
MCHC RBC AUTO-ENTMCNC: 33.2 % (ref 32–34.5)
MCV RBC AUTO: 80.6 FL (ref 80–99.9)
METER GLUCOSE: 131 MG/DL (ref 74–99)
METER GLUCOSE: 136 MG/DL (ref 74–99)
METER GLUCOSE: 145 MG/DL (ref 74–99)
METER GLUCOSE: 148 MG/DL (ref 74–99)
METER GLUCOSE: 158 MG/DL (ref 74–99)
METER GLUCOSE: 161 MG/DL (ref 74–99)
METER GLUCOSE: 172 MG/DL (ref 74–99)
METHB: 0.2 % (ref 0–1.5)
MODE: AC
MONOCYTES ABSOLUTE: 0.47 E9/L (ref 0.1–0.95)
MONOCYTES RELATIVE PERCENT: 5.1 % (ref 2–12)
NEUTROPHILS ABSOLUTE: 5.81 E9/L (ref 1.8–7.3)
NEUTROPHILS RELATIVE PERCENT: 63.6 % (ref 43–80)
O2 CONTENT: 15.4 ML/DL
O2 SATURATION: 97.6 % (ref 92–98.5)
O2HB: 97.1 % (ref 94–97)
OPERATOR ID: ABNORMAL
OVALOCYTES: ABNORMAL
PATIENT TEMP: 37 C
PCO2: 27.3 MMHG (ref 35–45)
PDW BLD-RTO: 20.7 FL (ref 11.5–15)
PEEP/CPAP: 8 CMH2O
PFO2: 2 MMHG/%
PH BLOOD GAS: 7.41 (ref 7.35–7.45)
PHOSPHORUS: 3.5 MG/DL (ref 2.5–4.5)
PLATELET # BLD: 259 E9/L (ref 130–450)
PMV BLD AUTO: 10.3 FL (ref 7–12)
PO2: 100 MMHG (ref 75–100)
POIKILOCYTES: ABNORMAL
POLYCHROMASIA: ABNORMAL
POTASSIUM SERPL-SCNC: 3.4 MMOL/L (ref 3.5–5)
RBC # BLD: 3.81 E12/L (ref 3.8–5.8)
RI(T): 2.13
RR MECHANICAL: 16 B/MIN
SCHISTOCYTES: ABNORMAL
SODIUM BLD-SCNC: 137 MMOL/L (ref 132–146)
SOURCE, BLOOD GAS: ABNORMAL
TARGET CELLS: ABNORMAL
THB: 11.2 G/DL (ref 11.5–16.5)
TIME ANALYZED: 1040
TOTAL PROTEIN: 6.8 G/DL (ref 6.4–8.3)
TRIGL SERPL-MCNC: 292 MG/DL (ref 0–149)
VT MECHANICAL: 480 ML
WBC # BLD: 9.1 E9/L (ref 4.5–11.5)

## 2020-11-14 PROCEDURE — 82330 ASSAY OF CALCIUM: CPT

## 2020-11-14 PROCEDURE — 2000000000 HC ICU R&B

## 2020-11-14 PROCEDURE — 6360000002 HC RX W HCPCS: Performed by: INTERNAL MEDICINE

## 2020-11-14 PROCEDURE — 80053 COMPREHEN METABOLIC PANEL: CPT

## 2020-11-14 PROCEDURE — 83735 ASSAY OF MAGNESIUM: CPT

## 2020-11-14 PROCEDURE — 6370000000 HC RX 637 (ALT 250 FOR IP): Performed by: INTERNAL MEDICINE

## 2020-11-14 PROCEDURE — 94640 AIRWAY INHALATION TREATMENT: CPT

## 2020-11-14 PROCEDURE — 85025 COMPLETE CBC W/AUTO DIFF WBC: CPT

## 2020-11-14 PROCEDURE — 71045 X-RAY EXAM CHEST 1 VIEW: CPT

## 2020-11-14 PROCEDURE — 2580000003 HC RX 258: Performed by: INTERNAL MEDICINE

## 2020-11-14 PROCEDURE — 36415 COLL VENOUS BLD VENIPUNCTURE: CPT

## 2020-11-14 PROCEDURE — 83605 ASSAY OF LACTIC ACID: CPT

## 2020-11-14 PROCEDURE — 2580000003 HC RX 258: Performed by: STUDENT IN AN ORGANIZED HEALTH CARE EDUCATION/TRAINING PROGRAM

## 2020-11-14 PROCEDURE — 36592 COLLECT BLOOD FROM PICC: CPT

## 2020-11-14 PROCEDURE — 82805 BLOOD GASES W/O2 SATURATION: CPT

## 2020-11-14 PROCEDURE — 99233 SBSQ HOSP IP/OBS HIGH 50: CPT | Performed by: INTERNAL MEDICINE

## 2020-11-14 PROCEDURE — 2500000003 HC RX 250 WO HCPCS: Performed by: STUDENT IN AN ORGANIZED HEALTH CARE EDUCATION/TRAINING PROGRAM

## 2020-11-14 PROCEDURE — 94003 VENT MGMT INPAT SUBQ DAY: CPT

## 2020-11-14 PROCEDURE — 2500000003 HC RX 250 WO HCPCS: Performed by: INTERNAL MEDICINE

## 2020-11-14 PROCEDURE — 6360000002 HC RX W HCPCS: Performed by: SPECIALIST

## 2020-11-14 PROCEDURE — C9113 INJ PANTOPRAZOLE SODIUM, VIA: HCPCS | Performed by: INTERNAL MEDICINE

## 2020-11-14 PROCEDURE — 82962 GLUCOSE BLOOD TEST: CPT

## 2020-11-14 PROCEDURE — 6360000002 HC RX W HCPCS

## 2020-11-14 PROCEDURE — 2580000003 HC RX 258: Performed by: SPECIALIST

## 2020-11-14 PROCEDURE — 84478 ASSAY OF TRIGLYCERIDES: CPT

## 2020-11-14 PROCEDURE — 84100 ASSAY OF PHOSPHORUS: CPT

## 2020-11-14 RX ORDER — MIDAZOLAM HYDROCHLORIDE 1 MG/ML
INJECTION INTRAMUSCULAR; INTRAVENOUS
Status: COMPLETED
Start: 2020-11-14 | End: 2020-11-14

## 2020-11-14 RX ORDER — POTASSIUM CHLORIDE 29.8 MG/ML
40 INJECTION INTRAVENOUS ONCE
Status: COMPLETED | OUTPATIENT
Start: 2020-11-14 | End: 2020-11-14

## 2020-11-14 RX ORDER — MIDAZOLAM HYDROCHLORIDE 1 MG/ML
2 INJECTION INTRAMUSCULAR; INTRAVENOUS ONCE
Status: COMPLETED | OUTPATIENT
Start: 2020-11-14 | End: 2020-11-14

## 2020-11-14 RX ORDER — MIDAZOLAM HYDROCHLORIDE 1 MG/ML
2 INJECTION INTRAMUSCULAR; INTRAVENOUS EVERY 4 HOURS PRN
Status: COMPLETED | OUTPATIENT
Start: 2020-11-14 | End: 2020-01-01

## 2020-11-14 RX ADMIN — INSULIN LISPRO 3 UNITS: 100 INJECTION, SOLUTION INTRAVENOUS; SUBCUTANEOUS at 12:34

## 2020-11-14 RX ADMIN — SODIUM CHLORIDE SOLN NEBU 3% 2 ML: 3 NEBU SOLN at 08:57

## 2020-11-14 RX ADMIN — INSULIN LISPRO 3 UNITS: 100 INJECTION, SOLUTION INTRAVENOUS; SUBCUTANEOUS at 20:39

## 2020-11-14 RX ADMIN — Medication 200 MCG/HR: at 08:31

## 2020-11-14 RX ADMIN — Medication 10 ML: at 20:37

## 2020-11-14 RX ADMIN — FOLIC ACID 1 MG: 5 INJECTION, SOLUTION INTRAMUSCULAR; INTRAVENOUS; SUBCUTANEOUS at 08:49

## 2020-11-14 RX ADMIN — SODIUM CHLORIDE: 9 INJECTION, SOLUTION INTRAVENOUS at 11:20

## 2020-11-14 RX ADMIN — CHLORDIAZEPOXIDE HYDROCHLORIDE 25 MG: 25 CAPSULE ORAL at 16:27

## 2020-11-14 RX ADMIN — SODIUM CHLORIDE 1.3 MCG/KG/HR: 9 INJECTION, SOLUTION INTRAVENOUS at 19:15

## 2020-11-14 RX ADMIN — HYDRALAZINE HYDROCHLORIDE 10 MG: 20 INJECTION, SOLUTION INTRAMUSCULAR; INTRAVENOUS at 03:37

## 2020-11-14 RX ADMIN — SENNOSIDES 17.2 MG: 8.6 TABLET, FILM COATED ORAL at 20:38

## 2020-11-14 RX ADMIN — IPRATROPIUM BROMIDE AND ALBUTEROL SULFATE 1 AMPULE: 2.5; .5 SOLUTION RESPIRATORY (INHALATION) at 17:04

## 2020-11-14 RX ADMIN — ASPIRIN 81 MG CHEWABLE TABLET 81 MG: 81 TABLET CHEWABLE at 08:49

## 2020-11-14 RX ADMIN — SODIUM CHLORIDE SOLN NEBU 3% 2 ML: 3 NEBU SOLN at 17:04

## 2020-11-14 RX ADMIN — SODIUM CHLORIDE: 9 INJECTION, SOLUTION INTRAVENOUS at 03:00

## 2020-11-14 RX ADMIN — CHLORHEXIDINE GLUCONATE 0.12% ORAL RINSE 15 ML: 1.2 LIQUID ORAL at 20:37

## 2020-11-14 RX ADMIN — Medication 200 MCG/HR: at 21:54

## 2020-11-14 RX ADMIN — BUDESONIDE 500 MCG: 0.5 SUSPENSION RESPIRATORY (INHALATION) at 08:56

## 2020-11-14 RX ADMIN — MIDAZOLAM 2 MG: 1 INJECTION INTRAMUSCULAR; INTRAVENOUS at 16:27

## 2020-11-14 RX ADMIN — IPRATROPIUM BROMIDE AND ALBUTEROL SULFATE 1 AMPULE: 2.5; .5 SOLUTION RESPIRATORY (INHALATION) at 08:56

## 2020-11-14 RX ADMIN — LEVOTHYROXINE SODIUM 50 MCG: 0.05 TABLET ORAL at 06:18

## 2020-11-14 RX ADMIN — ARFORMOTEROL TARTRATE 15 MCG: 15 SOLUTION RESPIRATORY (INHALATION) at 20:35

## 2020-11-14 RX ADMIN — BUDESONIDE 500 MCG: 0.5 SUSPENSION RESPIRATORY (INHALATION) at 20:35

## 2020-11-14 RX ADMIN — PIPERACILLIN AND TAZOBACTAM 3.38 G: 3; .375 INJECTION, POWDER, FOR SOLUTION INTRAVENOUS at 15:09

## 2020-11-14 RX ADMIN — QUETIAPINE FUMARATE 100 MG: 100 TABLET ORAL at 20:37

## 2020-11-14 RX ADMIN — INSULIN LISPRO 3 UNITS: 100 INJECTION, SOLUTION INTRAVENOUS; SUBCUTANEOUS at 04:51

## 2020-11-14 RX ADMIN — SODIUM CHLORIDE SOLN NEBU 3% 2 ML: 3 NEBU SOLN at 13:23

## 2020-11-14 RX ADMIN — HEPARIN SODIUM 7500 UNITS: 10000 INJECTION INTRAVENOUS; SUBCUTANEOUS at 00:18

## 2020-11-14 RX ADMIN — SODIUM CHLORIDE SOLN NEBU 3% 2 ML: 3 NEBU SOLN at 03:14

## 2020-11-14 RX ADMIN — SODIUM CHLORIDE, PRESERVATIVE FREE 10 ML: 5 INJECTION INTRAVENOUS at 08:49

## 2020-11-14 RX ADMIN — CHLORHEXIDINE GLUCONATE 0.12% ORAL RINSE 15 ML: 1.2 LIQUID ORAL at 08:49

## 2020-11-14 RX ADMIN — CHLORDIAZEPOXIDE HYDROCHLORIDE 25 MG: 25 CAPSULE ORAL at 23:22

## 2020-11-14 RX ADMIN — ARFORMOTEROL TARTRATE 15 MCG: 15 SOLUTION RESPIRATORY (INHALATION) at 08:56

## 2020-11-14 RX ADMIN — MIDAZOLAM 2 MG: 1 INJECTION INTRAMUSCULAR; INTRAVENOUS at 20:31

## 2020-11-14 RX ADMIN — COLCHICINE 0.6 MG: 0.6 TABLET, FILM COATED ORAL at 03:37

## 2020-11-14 RX ADMIN — PANTOPRAZOLE SODIUM 40 MG: 40 INJECTION, POWDER, FOR SOLUTION INTRAVENOUS at 08:49

## 2020-11-14 RX ADMIN — MIDAZOLAM 2 MG: 1 INJECTION INTRAMUSCULAR; INTRAVENOUS at 04:10

## 2020-11-14 RX ADMIN — QUETIAPINE FUMARATE 100 MG: 100 TABLET ORAL at 00:53

## 2020-11-14 RX ADMIN — MIDAZOLAM 2 MG: 1 INJECTION INTRAMUSCULAR; INTRAVENOUS at 10:56

## 2020-11-14 RX ADMIN — QUETIAPINE FUMARATE 100 MG: 100 TABLET ORAL at 08:49

## 2020-11-14 RX ADMIN — SODIUM CHLORIDE: 9 INJECTION, SOLUTION INTRAVENOUS at 18:37

## 2020-11-14 RX ADMIN — Medication 200 MCG/HR: at 15:17

## 2020-11-14 RX ADMIN — SODIUM CHLORIDE SOLN NEBU 3% 2 ML: 3 NEBU SOLN at 20:27

## 2020-11-14 RX ADMIN — THIAMINE HYDROCHLORIDE 100 MG: 100 INJECTION, SOLUTION INTRAMUSCULAR; INTRAVENOUS at 16:48

## 2020-11-14 RX ADMIN — HEPARIN SODIUM 7500 UNITS: 10000 INJECTION INTRAVENOUS; SUBCUTANEOUS at 08:50

## 2020-11-14 RX ADMIN — Medication 10 ML: at 08:50

## 2020-11-14 RX ADMIN — MIDAZOLAM HYDROCHLORIDE 2 MG: 1 INJECTION INTRAMUSCULAR; INTRAVENOUS at 04:10

## 2020-11-14 RX ADMIN — CARVEDILOL 3.12 MG: 6.25 TABLET, FILM COATED ORAL at 08:49

## 2020-11-14 RX ADMIN — PIPERACILLIN AND TAZOBACTAM 3.38 G: 3; .375 INJECTION, POWDER, FOR SOLUTION INTRAVENOUS at 23:22

## 2020-11-14 RX ADMIN — IPRATROPIUM BROMIDE AND ALBUTEROL SULFATE 1 AMPULE: 2.5; .5 SOLUTION RESPIRATORY (INHALATION) at 20:27

## 2020-11-14 RX ADMIN — INSULIN LISPRO 3 UNITS: 100 INJECTION, SOLUTION INTRAVENOUS; SUBCUTANEOUS at 23:48

## 2020-11-14 RX ADMIN — CARVEDILOL 3.12 MG: 6.25 TABLET, FILM COATED ORAL at 16:27

## 2020-11-14 RX ADMIN — SODIUM CHLORIDE 1.2 MCG/KG/HR: 9 INJECTION, SOLUTION INTRAVENOUS at 04:57

## 2020-11-14 RX ADMIN — POTASSIUM CHLORIDE 40 MEQ: 400 INJECTION, SOLUTION INTRAVENOUS at 11:13

## 2020-11-14 RX ADMIN — SODIUM CHLORIDE 1.3 MCG/KG/HR: 9 INJECTION, SOLUTION INTRAVENOUS at 12:21

## 2020-11-14 RX ADMIN — IPRATROPIUM BROMIDE AND ALBUTEROL SULFATE 1 AMPULE: 2.5; .5 SOLUTION RESPIRATORY (INHALATION) at 13:23

## 2020-11-14 RX ADMIN — INSULIN LISPRO 3 UNITS: 100 INJECTION, SOLUTION INTRAVENOUS; SUBCUTANEOUS at 16:53

## 2020-11-14 RX ADMIN — Medication 200 MCG/HR: at 02:14

## 2020-11-14 RX ADMIN — PIPERACILLIN AND TAZOBACTAM 3.38 G: 3; .375 INJECTION, POWDER, FOR SOLUTION INTRAVENOUS at 06:30

## 2020-11-14 RX ADMIN — CHLORDIAZEPOXIDE HYDROCHLORIDE 25 MG: 25 CAPSULE ORAL at 08:49

## 2020-11-14 RX ADMIN — AMLODIPINE BESYLATE 10 MG: 10 TABLET ORAL at 08:49

## 2020-11-14 RX ADMIN — COLCHICINE 0.6 MG: 0.6 TABLET, FILM COATED ORAL at 20:37

## 2020-11-14 RX ADMIN — HEPARIN SODIUM 7500 UNITS: 10000 INJECTION INTRAVENOUS; SUBCUTANEOUS at 16:27

## 2020-11-14 ASSESSMENT — PULMONARY FUNCTION TESTS
PIF_VALUE: 38
PIF_VALUE: 50
PIF_VALUE: 33
PIF_VALUE: 32
PIF_VALUE: 30
PIF_VALUE: 37
PIF_VALUE: 37
PIF_VALUE: 39
PIF_VALUE: 41
PIF_VALUE: 40
PIF_VALUE: 29
PIF_VALUE: 34
PIF_VALUE: 34
PIF_VALUE: 29
PIF_VALUE: 30
PIF_VALUE: 32
PIF_VALUE: 37
PIF_VALUE: 35
PIF_VALUE: 37
PIF_VALUE: 33
PIF_VALUE: 34
PIF_VALUE: 34
PIF_VALUE: 33
PIF_VALUE: 32
PIF_VALUE: 31
PIF_VALUE: 36
PIF_VALUE: 39
PIF_VALUE: 35
PIF_VALUE: 31

## 2020-11-14 ASSESSMENT — PAIN SCALES - GENERAL
PAINLEVEL_OUTOF10: 0
PAINLEVEL_OUTOF10: 10
PAINLEVEL_OUTOF10: 4
PAINLEVEL_OUTOF10: 7
PAINLEVEL_OUTOF10: 0
PAINLEVEL_OUTOF10: 0

## 2020-11-14 NOTE — PLAN OF CARE
Problem: Pain:  Goal: Pain level will decrease  Description: Pain level will decrease  Outcome: Met This Shift  Goal: Control of acute pain  Description: Control of acute pain  Outcome: Met This Shift  Goal: Control of chronic pain  Description: Control of chronic pain  Outcome: Met This Shift     Problem: Restraint Use - Nonviolent/Non-Self-Destructive Behavior:  Goal: Absence of restraint-related injury  Description: Absence of restraint-related injury  11/14/2020 1614 by Jesus Manuel Baron  Outcome: Met This Shift    Problem: Restraint Use - Nonviolent/Non-Self-Destructive Behavior:  Goal: Absence of restraint indications  Description: Absence of restraint indications  11/14/2020 1614 by Eddi Baron  Outcome: Not Met This Shift          Problem: Skin Integrity:  Goal: Will show no infection signs and symptoms  Description: Will show no infection signs and symptoms  Outcome: Met This Shift  Goal: Absence of new skin breakdown  Description: Absence of new skin breakdown  Outcome: Met This Shift     Problem: Respiratory:  Goal: Ability to maintain a clear airway will improve  Description: Ability to maintain a clear airway will improve  Outcome: Met This Shift  Goal: Ability to maintain adequate ventilation will improve  Description: Ability to maintain adequate ventilation will improve  Outcome: Met This Shift  Goal: Complications related to the disease process, condition or treatment will be avoided or minimized  Description: Complications related to the disease process, condition or treatment will be avoided or minimized  Outcome: Met This Shift

## 2020-11-14 NOTE — PROGRESS NOTES
Nephrology Progress Note  Patient's Name: Jigar Oscar  7:53 AM  11/14/2020        Reason for Consult: Acute kidney  Requesting Physician:  Alayna Harrington MD    Chief Complaint: Shortness of breath  History Obtained From:  EHR; spouse    History of Present Ilness:    Jigar Oscar is a 61 y.o. male with a history of COPD, hypertension, hyperlipidemia and diabetes mellitus. He presented to ED 2 days ago with complaints of shortness of breath. According to patient's spouse he had been complaining of shortness of breath over the course of several weeks. This has gotten progressively worse. He went to see his PCP on the day of admission. In office at the time pulse oximeter obtained revealed his oxygen saturation to be in the 70s. He was instructed to proceed to ED for further evaluation. On presentation to ED his initial vital signs showed a blood pressure of 168/71, temperature 97.5 and pulse of 93. His pulse oximeter at the time was noted to be 98% on room air. Laboratory data was significant for a BUN of 11, creatinine 1.1, WBC of 8.3. Rapid COVID-19 testing was negative. A chest x-ray performed revealed right infrahilar and basilar infiltrate concerning for pneumonia. A CTA was ordered but patient declined because of his inability to lay flat. Patient was given ceftriaxone and doxycycline followed by admission to telemetry. 2 days ago an RRT was called as patient was noted to be increasingly more in respiratory distress. He was transferred to MICU and intubated. Laboratory data yesterday showed worsening serum creatinine to 2.8. This a.m. further worsening to 3.6. He has been nonoliguric. Hence renal consult. 10/30: N new acute issues overnight; remains intubated  10/31: Polyuric , concerned for possible DI; no other acute issues overnight  11/1: agitated overnight ; remains intubated  11/2: pt seen and examined.  Chart reviewed, pt intubated  11/3: pt seen and examined in icu, pt intubated  11/4: pt seen and examined in icu, no overnight events, intubated  11/5: pt seen in room ,remains intubated  11/6: pt seen in room, intubated. 11/7: pt seen in room, intubated, chart reviewed  11/8:pt seen in room, remains intuibated  11/9: pt seen in room, reintubated yesterday  11/10: pt seen in icu, intubated.    11/12: pt getting trach today  11/13: sp trach peg      Allergies:  Bee venom and Shellfish-derived products    Current Medications:    dexmedetomidine (PRECEDEX) 1,000 mcg in sodium chloride 0.9 % 250 mL infusion, Continuous  lidocaine PF 1 % injection 5 mL, Once  sodium chloride flush 0.9 % injection 10 mL, PRN  heparin flush 100 UNIT/ML injection 300 Units, 2 times per day  heparin flush 100 UNIT/ML injection 300 Units, PRN  chlordiazePOXIDE (LIBRIUM) capsule 25 mg, Q8H  QUEtiapine (SEROQUEL) tablet 100 mg, BID  senna (SENOKOT) tablet 17.2 mg, Nightly  polyethylene glycol (GLYCOLAX) packet 17 g, Daily PRN  fentaNYL 5 mcg/ml in 0.9%  ml infusion, Continuous  perflutren lipid microspheres (DEFINITY) injection 1.65 mg, ONCE PRN  insulin lispro (HUMALOG) injection vial 0-18 Units, Q4H  acetaminophen (TYLENOL) 160 MG/5ML solution 650 mg, Q6H PRN  piperacillin-tazobactam (ZOSYN) 3.375 g in dextrose 5 % 100 mL IVPB extended infusion (mini-bag), Q8H  Zosyn Flush (0.9 % sodium chloride infusion), Q8H  carvedilol (COREG) tablet 2.374 mg, BID WC  folic acid injection 1 mg, Daily  thiamine (B-1) 100 mg in sodium chloride 0.9 % 100 mL IVPB, Q24H  polyvinyl alcohol (LIQUIFILM TEARS) 1.4 % ophthalmic solution 1 drop, Q4H PRN  [Held by provider] insulin glargine (LANTUS) injection vial 20 Units, Nightly  sodium chloride (Inhalant) 3 % nebulizer solution 2 mL, Q4H  hydrALAZINE (APRESOLINE) injection 10 mg, Q4H PRN  heparin (porcine) injection 7,500 Units, Q8H  pantoprazole (PROTONIX) injection 40 mg, Daily    And  sodium chloride (PF) 0.9 % injection 10 mL, Daily  sodium chloride flush 0.9 % injection GLUCOSE 150 (H) 11/13/2020    GLUCOSE 143 (H) 11/12/2020    PHOS 3.5 11/14/2020    PHOS 3.2 11/13/2020    PHOS 4.4 11/12/2020    WBC 9.1 11/14/2020    WBC 8.6 11/13/2020    WBC 9.1 11/12/2020    HGB 10.2 (L) 11/14/2020    HGB 10.3 (L) 11/13/2020    HGB 10.9 (L) 11/12/2020    HCT 30.7 (L) 11/14/2020    HCT 30.6 (L) 11/13/2020    HCT 32.2 (L) 11/12/2020    MCV 80.6 11/14/2020     11/14/2020         Imaging:  Xr Chest Portable    Result Date: 10/27/2020  EXAMINATION: ONE XRAY VIEW OF THE CHEST 10/27/2020 7:58 pm COMPARISON: October 26, 2020 HISTORY: ORDERING SYSTEM PROVIDED HISTORY: SOB TECHNOLOGIST PROVIDED HISTORY: Reason for exam:->SOB What reading provider will be dictating this exam?->CRC FINDINGS: There is mild cardiomegaly. There is patchy perihilar and bibasilar atelectasis/infiltrates. Tiny pleural effusions are suspected. Progressive bibasilar atelectasis/infiltrates concerning for pneumonia. Underlying CHF is considered. Xr Chest Portable    Result Date: 10/26/2020  EXAMINATION: ONE XRAY VIEW OF THE CHEST 10/26/2020 4:11 pm COMPARISON: 05/26/2019 HISTORY: ORDERING SYSTEM PROVIDED HISTORY: SOB TECHNOLOGIST PROVIDED HISTORY: Reason for exam:->SOB What reading provider will be dictating this exam?->CRC FINDINGS: Cardiac size appears stable. Discoid airspace disease seen at the left lung base which may represent atelectasis or scarring. Right infrahilar and basilar infiltrate is identified. No pneumothorax is identified. No acute osseous abnormality is identified. Right infrahilar and basilar infiltrate concerning for pneumonia. Left basilar atelectasis or scarring. Assessment/Plans  1. Acute kidney injury  Baseline 1.8 from 9/2019, 1.1 in 5/2019  hemodynamically mediated due to the combination of bradycardia,  diuretic and ARB use; this is exacerbated by IV contrast use   Of diuretics for now; good spontaneous urine output    2.  Acute hypoxic respiratory failure  due to multilobar pneumonia  gradual wean  On unasyn  Off vanco  Failed extubation 11/8  S/P trach 11/12      3. etoh abuse    4. Hypernatremia  imrpoved on lower dose bumex    5.   Hypokalemia due to brisk urine output  Supplement  Nl now  Renal tube feed            Anushka Lozano MD  7:53 AM  11/14/2020

## 2020-11-14 NOTE — PROGRESS NOTES
Pulmonary Progress Note  11/14/2020 4:19 PM  Subjective:   Admit Date: 10/26/2020  PCP: Deepak Tracy MD  Interval History: Patient sedated on fentanyl and propofol. Seroquel being uptitrated for agitation. Patient is s/p trach and eg placement on ACVC  at this time. Sedation includes Precedex, Fentanyl, gtt  Patient apean triggering vent with PSV/PEEP trials in place   Diet: DIET TUBE FEED CONTINUOUS/CYCLIC NPO;  Immune Enhancing; Gastrostomy; Continuous; 25; 60; 23; Exceptions are: Sips with Meds    Data:   Scheduled Meds:    lidocaine PF  5 mL Intradermal Once    heparin flush  3 mL Intravenous 2 times per day    chlordiazePOXIDE  25 mg Oral Q8H    QUEtiapine  100 mg Oral BID    senna  2 tablet Oral Nightly    insulin lispro  0-18 Units Subcutaneous Q4H    piperacillin-tazobactam  3.375 g Intravenous Q8H    sodium chloride   Intravenous Q8H    carvedilol  3.125 mg Oral BID WC    folic acid  1 mg Intravenous Daily    thiamine (VITAMIN B1) IVPB  100 mg Intravenous Q24H    [Held by provider] insulin glargine  20 Units Subcutaneous Nightly    sodium chloride (Inhalant)  2 mL Nebulization Q4H    heparin (porcine)  7,500 Units Subcutaneous Q8H    pantoprazole  40 mg Intravenous Daily    And    sodium chloride (PF)  10 mL Intravenous Daily    sodium chloride flush  10 mL Intravenous 2 times per day    Arformoterol Tartrate  15 mcg Nebulization BID    chlorhexidine  15 mL Mouth/Throat BID    amLODIPine  10 mg Oral Daily    aspirin  81 mg Oral Daily    levothyroxine  50 mcg Oral Daily    [Held by provider] losartan  100 mg Oral Daily    budesonide  0.5 mg Nebulization BID    ipratropium-albuterol  1 ampule Inhalation Q4H WA     Continuous Infusions:    dexmedetomidine (PRECEDEX) IV infusion 1.3 mcg/kg/hr (11/14/20 1221)    fentaNYL 5 mcg/ml in 0.9%  ml infusion 200 mcg/hr (11/14/20 1517)    dextrose         PRN Meds: midazolam, sodium chloride flush, heparin flush, polyethylene glycol, central venous catheter has been placed with tip at the distal SVC. No pneumothorax. Worsening diffuse airspace opacification in the right lung  with moderate opacification also present at the left base. Endotracheal tube  and enteric tube stable. No significant skeletal finding. Impression Interval placement of right IJ central venous catheter. No pneumothorax. Worsening airspace changes in the right lung. Micro:  No results for input(s): BC in the last 72 hours. No results for input(s): ORG in the last 72 hours. No results for input(s): Raphael Lav in the last 72 hours. No results for input(s): STREPPNEUMAG in the last 72 hours. No results for input(s): LEGUR in the last 72 hours. No results for input(s): ORG in the last 72 hours. No results for input(s): ASO in the last 72 hours. No results for input(s): CULTRESP in the last 72 hours. No results for input(s): LABURIN in the last 72 hours.   Vent Information  $Ventilation: $Subsequent Day  Skin Assessment: Clean, dry, & intact  Equipment ID: [id number]  Equipment Changed: (S) Humidification  Vent Type: [phone]  Vent Mode: AC/VC  Vt Ordered: 480 mL  Rate Set: 16 bmp  Peak Flow: 70 L/min  Pressure Support: 0 cmH20  FiO2 : 50 %  SpO2: 94 %  SpO2/FiO2 ratio: 188  PaO2/FiO2 ratio: 169  Sensitivity: 3  PEEP/CPAP: 8  I Time/ I Time %: 0 s  Humidification Source: Heated wire  Humidification Temp: 37  Humidification Temp Measured: 37  Circuit Condensation: Drained  Mask Type: Full face mask  Mask Size: Large    Additional Respiratory  Assessments  Pulse: 65  Resp: 26  SpO2: 94 %  Position: Semi-Lockwood's  Humidification Source: Heated wire  Humidification Temp: 37  Circuit Condensation: Drained  Oral Care Completed?: Yes  Oral Care: Mouth swabbed, Mouth suctioned, Suction toothette  Subglottic Suction Done?: Yes  Airway Type: Trachial  Airway Size: 8  Cuff Pressure (cm H2O): 29 cm H2O  Skin barrier applied: Yes    Objective:   Vitals:   Vitals:    11/14/20 1600   BP: (!) 141/74   Pulse: 65   Resp: 26   Temp:    SpO2: 94%      TEMP:Current: Temp: 98.7 °F (37.1 °C)  Max: Temp  Av.2 °F (37.3 °C)  Min: 98.7 °F (37.1 °C)  Max: 99.9 °F (37.7 °C)    BP Range: Systolic (53ZBG), BCQ:195 , Min:105 , QRA:263     Diastolic (19SIY), MKU:05, Min:54, Max:122      General appearance: Sedated obese on mechanical ventilation , appears stated age awakens to touch  In no acute distress  Skin: No rashes or lesions  HEENT: mucous membranes are moist oral endotracheal tube and OG tube  Neck: No JVD  Lungs: symmetrical expansion, coarse breath sounds to auscultation, no use of accessory muscles  Heart: S1S2 no murmurs, tachycardic  Abdomen: soft, non tender, distended obese  Extremities: no peripheral edema  Neurologic: Sedated  Affect: Calm       Assessment:   Patient Active Problem List:    59-y.o M with history of DM, hypothyroid, COPD, HTN, HLD presented on 10/26 with shortness of breath for 1 week. Saturations were 70% at PCPs. Saturation 65% on room air in ER  Patient agitated for CT scan refused to lay flat  10/27 RRT refused BiPAP became combative. Patient transferred to ICU  10/28 intubated and sedated. CT chest showing dense multifocal airspace disease  10/30 Ultrasound lower extremities no DVT, ultrasound kidneys no hydronephrosis  10/31 bilateral infiltrates left effusion. On 50% +8 of PEEP. Chest x-ray showing right-sided improving with left base atelectasis. Covid negative x2    1. Acute hypoxemic respiratory failure on mechanical ventilation  2. Agitation on Seroquel Fentanyl  3. MSSA pneumonia on Unasyn  4. HANS moderate ( AHI 17 on 18 requires BIPAP )  5. History of reactive airway disease  6. CT 3/14/2018 showing pulmonary nodule stable from 10/2017  7. Acute renal failure improving - nephrology on board   8. Diabetes with elevated blood sugars  9. Obesity  10. EF 65% LVH  11. chronic lymphedema  12. Hypothyroid  13.  H/o respiratory failure: 2017 pt was  transferred from [facility] for acute respiratory failure after lap cholecystectomy, lap umbilical hernia repair, and lap liver biopsy (fatty liver). He had been extubated postop but had laryngospasm requiring reintubation, complicated by negative pressure flash pulmonary edema, and required tracheostomy 8/10/2017. Patient has staph aureus pneumonia and C. difficile colitis. Patient was then transferred to [facility] hospital where he was weaned off the ventilator and decannulated. Plan:     · S/P trach/PEG   · LTACH evaluation and placement   · Vent wean per ICU team, patient has apnea triggers on vent wean PSV/PEEP  · Precedex + Fentanyl gtt.  May need higher dose of Librium and seroquel to enhance gtt weaning    Lindsay Harp MD, [facility]  11/14/2020  4:19 PM

## 2020-11-14 NOTE — PROGRESS NOTES
[address] [facility]   Department of Internal Medicine   Internal Medicine Residency  MICU Progress Note    Patient:  Juan Luis Miller 61 y.o. male   MRN: [id number]       Date of Service: 2020    Allergy: Bee venom and Shellfish-derived products  Follow up ARF  Subjective     Patient was seen and examined in AM.   Stat pos trach and peg  seems agitated to me  Abdomen tense and distended   4 bowel movements in the last 24 days    Awaits LTACT transfer      24 hour change: Stable overnight. Hypertensive, home antihypertensive meds resumed and PRN meds      Objective     TEMPERATURE:  Current - Temp: 98.7 °F (37.1 °C); Max - Temp  Av.2 °F (37.3 °C)  Min: 98.7 °F (37.1 °C)  Max: 99.9 °F (37.7 °C)  RESPIRATIONS RANGE: Resp  Av.6  Min: 16  Max: 36  PULSE RANGE: Pulse  Av  Min: 58  Max: 145  BLOOD PRESSURE RANGE:  Systolic (51GZA), IBD:631 , Min:105 , UYK:260   ; Diastolic (34JFK), LWM:52, Min:54, Max:122    PULSE OXIMETRY RANGE: SpO2  Av.2 %  Min: 90 %  Max: 99 %    I & O - 24hr:    Intake/Output Summary (Last 24 hours) at 2020 1820  Last data filed at 2020 1600  Gross per 24 hour   Intake 3552 ml   Output 1245 ml   Net 2307 ml     I/O last 3 completed shifts: In: 9110 [I.V.:2343; NG/GT:1209]  Out: 1385 [Urine:1325; Emesis/NG output:60] I/O this shift:  In: -   Out: 60 [Urine:60]   Weight change: -2 lb 3.2 oz (-0.998 kg)    Physical Exam:  General Appearance:  Seems agitated     HEENT:    NC/AT, mucous membranes are moist   Neck:   Supple, no jugular venous distention. Resp:     Coarse rhonchi    Heart:    RRR, S1 and S2 normal, no murmur, rub or gallop.     Abdomen:     Mild tense and distended    Extremities:   Atraumatic, no cyanosis or edema   Pulses:  Radial and pedal pulses are intact bilaterally   Neurologic:  Follow command, trach and peg      Medications     Continuous Infusions:   dexmedetomidine (PRECEDEX) IV infusion 1.3 mcg/kg/hr (20 1221)    fentaNYL 5 mcg/ml in 0.9%  ml infusion 200 mcg/hr (11/14/20 9847)    dextrose       Scheduled Meds:   lidocaine PF  5 mL Intradermal Once    heparin flush  3 mL Intravenous 2 times per day    chlordiazePOXIDE  25 mg Oral Q8H    QUEtiapine  100 mg Oral BID    senna  2 tablet Oral Nightly    insulin lispro  0-18 Units Subcutaneous Q4H    piperacillin-tazobactam  3.375 g Intravenous Q8H    sodium chloride   Intravenous Q8H    carvedilol  3.125 mg Oral BID WC    folic acid  1 mg Intravenous Daily    thiamine (VITAMIN B1) IVPB  100 mg Intravenous Q24H    [Held by provider] insulin glargine  20 Units Subcutaneous Nightly    sodium chloride (Inhalant)  2 mL Nebulization Q4H    heparin (porcine)  7,500 Units Subcutaneous Q8H    pantoprazole  40 mg Intravenous Daily    And    sodium chloride (PF)  10 mL Intravenous Daily    sodium chloride flush  10 mL Intravenous 2 times per day    Arformoterol Tartrate  15 mcg Nebulization BID    chlorhexidine  15 mL Mouth/Throat BID    amLODIPine  10 mg Oral Daily    aspirin  81 mg Oral Daily    levothyroxine  50 mcg Oral Daily    [Held by provider] losartan  100 mg Oral Daily    budesonide  0.5 mg Nebulization BID    ipratropium-albuterol  1 ampule Inhalation Q4H WA     PRN Meds: midazolam, sodium chloride flush, heparin flush, polyethylene glycol, perflutren lipid microspheres, acetaminophen, polyvinyl alcohol, hydrALAZINE, sodium chloride flush, [DISCONTINUED] acetaminophen **OR** acetaminophen, colchicine, glucose, dextrose, glucagon (rDNA), dextrose  Nutrition:       Labs and Imaging Studies     CBC:   Recent Labs     11/12/20 0415 11/13/20 0440 11/14/20 0445   WBC 9.1 8.6 9.1   HGB 10.9* 10.3* 10.2*   HCT 32.2* 30.6* 30.7*   MCV 77.4* 79.7* 80.6    262 259       BMP:    Recent Labs     11/12/20 0415 11/13/20 0440 11/14/20 0445    137 137   K 4.1 3.6 3.4*    106 106   CO2 23 21* 18*   BUN 31* 25* 23   CREATININE 1.5* 1.3* 1.4*   GLUCOSE 143* 150* 178*       LIVER PROFILE:   Recent Labs     11/12/20  0415 11/12/20  1404 11/13/20  0440 11/14/20  0445   AST 58*  --  50* 45*   *  --  113* 109*   LIPASE  --  65*  --   --    BILITOT 0.4  --  0.3 0.5   ALKPHOS 148*  --  143* 140*       PT/INR:   No results for input(s): PROTIME, INR in the last 72 hours. APTT:   No results for input(s): APTT in the last 72 hours. Fasting Lipid Panel:    Lab Results   Component Value Date    CHOL 295 03/23/2017    TRIG 292 11/14/2020    HDL 66 03/23/2017       Cardiac Enzymes:    Lab Results   Component Value Date    CKTOTAL 98 10/08/2017    CKTOTAL 114 10/07/2017    CKMB 1.1 10/08/2017    CKMB 1.1 10/07/2017    TROPONINI <0.01 10/26/2020    TROPONINI <0.01 05/26/2019    TROPONINI <0.01 05/26/2019       Notable Cultures:      Blood cultures   Blood Culture, Routine   Date Value Ref Range Status   11/09/2020 24 Hours no growth  Preliminary     Respiratory cultures No results found for: RESPCULTURE No results found for: LABGRAM  Urine   Urine Culture, Routine   Date Value Ref Range Status   11/09/2020 Growth not present  Final     Legionella No results found for: LABLEGI  C Diff PCR No results found for: CDIFPCR  Wound culture/abscess: No results for input(s): WNDABS in the last 72 hours. Tip culture:No results for input(s): CXCATHTIP in the last 72 hours.      Antibiotic  Days  Day started                                Oxygen:     Vent Information  $Ventilation: $Subsequent Day  Skin Assessment: Clean, dry, & intact  Equipment ID: [id number]  Equipment Changed: (S) Humidification  Vent Type: 980  Vent Mode: AC/VC  Vt Ordered: 480 mL  Rate Set: 16 bmp  Peak Flow: 70 L/min  Pressure Support: 0 cmH20  FiO2 : 50 %  SpO2: 94 %  SpO2/FiO2 ratio: 188  PaO2/FiO2 ratio: 169  Sensitivity: 3  PEEP/CPAP: 8  I Time/ I Time %: 0 s  Humidification Source: Heated wire  Humidification Temp: 37  Humidification Temp Measured: 37  Circuit Condensation: Drained  Mask Type: Full face mask  Mask Size: Large  Additional Respiratory  Assessments  Pulse: 110  Resp: 26  SpO2: 94 %  Position: Semi-Lockwood's  Humidification Source: Heated wire  Humidification Temp: 37  Circuit Condensation: Drained  Oral Care Completed?: Yes  Oral Care: Mouth swabbed, Mouth suctioned, Suction toothette  Subglottic Suction Done?: Yes  Airway Type: Trachial  Airway Size: 8(xlt)  Cuff Pressure (cm H2O): 29 cm H2O  Skin barrier applied: Yes     Nasal cannula L/min     Face mask %     Reservoirs mask %       ABG   Vent Settings     PH   Mode      PCO2   TV      PO2   RR      HCO3   PS      Sat%   PEEP      FIO2   FIO2        P/F          Lines:  Site  Day  Date inserted     TLC              PICC              Arterial line              Peripheral line              HD cath            [REMOVED] Urethral Catheter-Output (mL): 250 mL  Urethral Catheter Non-latex 16 fr-Output (mL): 60 mL    Imaging Studies:    EKG: Rhythm Strip: normal sinus rhythm. Assessment and Plan     Shanelle Razo a 61 y. o. male with PMHx is significant for HFpEF (Ef 65% in 2017), hx of tracheostomy and reversal, moderate HANS not on CPAP, DMT2, HTN, HLD, Hypothyroidism who initially was admitted to general floors for hypoxia. Transferred to the MICU when ABG concerning for worsening respiratory acidosis.  We are maniging him for the following:     Acute hypoxic hypercapnic respiratory failure 2/2 CAP versus chronic OHS  Admitted to the ICU from the floor with worsening respiratory acidosis   Intubated and sedated   Fentanyl, and precedex   Hx of track and peg   S/P trach and peg tow days ago   Vent: AC VC peep 14, Vt 500 and RR 18  AB.4/27,3/100/17 with FiO2 50%  PF ratio 200  Gen surg on board, appreciate recommendation  Continue unasyn per ID    Breathing treatment with Brovana, Pulmicort, DuoNeb  Culture MSSA  Follow repeat culture    COVID negative X 2  Awaits LTACT transfer    Avoid propofol  Wean FiO2 and sedation      CAP vs Aspiration PNA complicated by  Pulmonary edema  Respiratory cultures grew MSSA on 31/10. Continue Unasyn   Patient is afebrile, WBC wnl  Follow daily CBC, vitals, CXR. Infectious disease following, appreciate recommendations     Acute kidney injury stage III likely pre renal 2/2 diuretic use, contrast agent vs? cardiorenal syndrome  Improving   Creatinine 1.5 ( baseline 1.1)   Net negative 2.9 L  Continue bumex   Nephrology following, appreciate recommendations    Continue monitor daily BMP, renal function. Monitor I/O. Elevated TG  Avoid Propofol     Hypokalemia/Hypocalcemmia likely 2/2 to medications and low urine output   Meds list reviewed   diurese cautiously    Replace electrolytes appropriately     Labile BP  Arterial line placed 11/2  Ho of hypertension   Home coreg resumed last night  Continue to monitor   As patient is sedated again, may hold antihypertensive meds if BP dropped      Transaminitis likely secondary to Lipitor  Consider holding Lipitor  Trend LFTs    History of heart failure with preserved ejection fraction  Echo done in 2017 showing 1 diastolic dysfunction with normal left ventricular size and systolic function EF 56%. Home medication: Coreg 25 mg, olmesartan 10 mg, Losartan 10 mg,. Repeat ECHO left ventricular internal dimensions were normal in diastole and systole. EF 65%. Moderate left ventricular concentric hypertrophy noted. No regional wall motion abnormalities seen. Currently hold off Coreg due to bradycardia. Will continue monitor.      Ho DMT2, HLD, obstructive sleep apnea, obesity, hypothyroidism  Hold home metfomrin.  overnight. Increase Lantus to 20 units.  HDSS -> Tolerating well  Continue monitor BG q6h  Continue Atorvastatin he road    Continue Synthroid 50 MCG      # Peptic ulcer prophylaxis: Protonix 40 mg  # DVT Prophylaxis: Heparin 7500 subcu   # Disposition: Continue ICU care    Yadira Cadet M.D., PGY-1  Internal medicine resident  Attending Physician: Dr. Betzy Good Filipe    I personally saw, examined and provided care for the patient. Radiographs, labs and medication list were reviewed by me independently. I spoke with bedside nursing, therapists and consultants. Critical care services and times documented are independent of procedures and multidisciplinary rounds with Residents. Additionally comprehensive, multidisciplinary rounds were conducted with the MICU team. The case was discussed in detail and plans for care were established. Review of Residents documentation was conducted and revisions were made as appropriate. I agree with the above documented exam, problem list and plan of care.     S/p Trach   Still not waking up   Consider CT head and Neurology evaluation  LTAC down the road  [address]

## 2020-11-14 NOTE — PLAN OF CARE
Problem: Restraint Use - Nonviolent/Non-Self-Destructive Behavior:  Goal: Absence of restraint indications  Description: Absence of restraint indications  11/14/2020 0336 by Mi Armijo RN  Outcome: Not Met This Shift     Problem: Restraint Use - Nonviolent/Non-Self-Destructive Behavior:  Goal: Absence of restraint-related injury  Description: Absence of restraint-related injury  11/14/2020 0336 by Mi Armijo RN  Outcome: Met This Shift

## 2020-11-14 NOTE — PROGRESS NOTES
Pt PEG tube checked for residual tube feeding. None noted at this time. TF increased to goal of 60mL/hr.  Will continue to monitor

## 2020-11-14 NOTE — FLOWSHEET NOTE
Patient very agitated and reaching for trach when unrestrained. Unable to follow verbal redirection. Restraints continued.

## 2020-11-14 NOTE — PROGRESS NOTES
Subjective: patient seen this am in icu spoke with staff no new issues overnight off sedation no chest pain or bp issues     . ROS:  Review of systems not obtained due to patient factors.     Objective:    midazolam (VERSED) injection 2 mg, Q4H PRN  dexmedetomidine (PRECEDEX) 1,000 mcg in sodium chloride 0.9 % 250 mL infusion, Continuous  lidocaine PF 1 % injection 5 mL, Once  sodium chloride flush 0.9 % injection 10 mL, PRN  heparin flush 100 UNIT/ML injection 300 Units, 2 times per day  heparin flush 100 UNIT/ML injection 300 Units, PRN  chlordiazePOXIDE (LIBRIUM) capsule 25 mg, Q8H  QUEtiapine (SEROQUEL) tablet 100 mg, BID  senna (SENOKOT) tablet 17.2 mg, Nightly  polyethylene glycol (GLYCOLAX) packet 17 g, Daily PRN  fentaNYL 5 mcg/ml in 0.9%  ml infusion, Continuous  perflutren lipid microspheres (DEFINITY) injection 1.65 mg, ONCE PRN  insulin lispro (HUMALOG) injection vial 0-18 Units, Q4H  acetaminophen (TYLENOL) 160 MG/5ML solution 650 mg, Q6H PRN  piperacillin-tazobactam (ZOSYN) 3.375 g in dextrose 5 % 100 mL IVPB extended infusion (mini-bag), Q8H  Zosyn Flush (0.9 % sodium chloride infusion), Q8H  carvedilol (COREG) tablet 9.787 mg, BID WC  folic acid injection 1 mg, Daily  thiamine (B-1) 100 mg in sodium chloride 0.9 % 100 mL IVPB, Q24H  polyvinyl alcohol (LIQUIFILM TEARS) 1.4 % ophthalmic solution 1 drop, Q4H PRN  [Held by provider] insulin glargine (LANTUS) injection vial 20 Units, Nightly  sodium chloride (Inhalant) 3 % nebulizer solution 2 mL, Q4H  hydrALAZINE (APRESOLINE) injection 10 mg, Q4H PRN  heparin (porcine) injection 7,500 Units, Q8H  pantoprazole (PROTONIX) injection 40 mg, Daily    And  sodium chloride (PF) 0.9 % injection 10 mL, Daily  sodium chloride flush 0.9 % injection 10 mL, 2 times per day  sodium chloride flush 0.9 % injection 10 mL, PRN  acetaminophen (TYLENOL) suppository 650 mg, Q6H PRN  Arformoterol Tartrate (BROVANA) nebulizer solution 15 mcg, BID  chlorhexidine (PERIDEX) 0.12 % solution 15 mL, BID  amLODIPine (NORVASC) tablet 10 mg, Daily  aspirin chewable tablet 81 mg, Daily  colchicine (COLCRYS) tablet 0.6 mg, BID PRN  levothyroxine (SYNTHROID) tablet 50 mcg, Daily  [Held by provider] losartan (COZAAR) tablet 100 mg, Daily  budesonide (PULMICORT) nebulizer suspension 500 mcg, BID  ipratropium-albuterol (DUONEB) nebulizer solution 1 ampule, Q4H WA  glucose (GLUTOSE) 40 % oral gel 15 g, PRN  dextrose 50 % IV solution, PRN  glucagon (rDNA) injection 1 mg, PRN  dextrose 5 % solution, PRN          BP (!) 142/73   Pulse 66   Temp 98.7 °F (37.1 °C) (Axillary)   Resp 20   Ht 5' 8\" (1.727 m)   Wt 288 lb 3.2 oz (130.7 kg)   SpO2 93%   BMI 43.82 kg/m²     Neck:  Trach   Lungs:  Breath sounds: rhonchi- scattered  Heart:  Heart regular rate and rhythm  Abdomen:  Soft, non-tender, normal bowel sounds. No bruits, organomegaly or masses. Extremities: Extremities warm to touch, pink, with no edema.     CBC with Differential:    Lab Results   Component Value Date    WBC 9.1 11/14/2020    RBC 3.81 11/14/2020    HGB 10.2 11/14/2020    HCT 30.7 11/14/2020     11/14/2020    MCV 80.6 11/14/2020    MCH 26.8 11/14/2020    MCHC 33.2 11/14/2020    RDW 20.7 11/14/2020    NRBC 4.3 11/02/2020    SEGSPCT 73 01/26/2014    METASPCT 0.9 11/07/2020    LYMPHOPCT 24.2 11/14/2020    MONOPCT 5.1 11/14/2020    MYELOPCT 0.9 11/05/2020    BASOPCT 0.5 11/14/2020    MONOSABS 0.47 11/14/2020    LYMPHSABS 2.21 11/14/2020    EOSABS 0.54 11/14/2020    BASOSABS 0.05 11/14/2020     BMP:    Lab Results   Component Value Date     11/14/2020    K 3.4 11/14/2020    K 4.0 10/27/2020     11/14/2020    CO2 18 11/14/2020    BUN 23 11/14/2020    LABALBU 2.9 11/14/2020    CREATININE 1.4 11/14/2020    CALCIUM 9.0 11/14/2020    GFRAA >60 11/14/2020    LABGLOM >60 11/14/2020    GLUCOSE 178 11/14/2020        Assessment:    Patient Active Problem List   Diagnosis    Hyperlipidemia    Type 2 diabetes mellitus without complication, without long-term current use of insulin (HCC)    Atypical chest pain    Essential hypertension    Chronic acquired lymphedema    Aortic valve disease    Morbid obesity due to excess calories (HCC)    Abdominal pain    Acute respiratory failure with hypoxia (HCC)    Acute pulmonary edema (HCC)    Congestive heart failure with LV diastolic dysfunction, NYHA class 3 (HCC)    Obstructive sleep apnea    Acquired hypothyroidism    Chronic diastolic heart failure (HCC)    Nonrheumatic aortic valve stenosis    ACE-inhibitor cough    Pneumonia    Acute gout of right knee       Plan:  contnue same rx wean as tolerated eventually ltac        Krishna Escalante  3:51 PM  11/14/2020

## 2020-11-14 NOTE — PROGRESS NOTES
Pt wife Héctor Costello called back to give consent for PICC line. This RN and Zehra Betancourt RN signed as witnesses.

## 2020-11-15 NOTE — CONSULTS
NEUROLOGY CONSULT NOTE      Requesting Physician: Chantale Farnsworth., DO    Reason for Consult:  Evaluate for evaluation of agitation and altered mentation s/p respiratoyr failure. History of Present Illness:  Sarahy Patterson is a 61 y.o. male  with h/o CHF, COPD, HANS, HTN, HLD, DM type II, and hypothyroidism who was admitted to [facility]  on 10/26/2020 with presentation of shortness of breath. He was admitted for acute on chronic hypoxic respiratory failure with intubation on 10/28/20 due to respiratory decompensation. Respiratory cultures came back positive for staph aureus and patient's antibiotic regimen was switched from Rocephin and doxycycline to Rocephin and vancomycin. He was agitated on presentation and required sedation at that time and was significantly sedated for his vent management. Patient now status post tracheostomy and continues to have problems with intermittent agitation. Significant agitation over the last few days as his sedation were being weaned. Sedation now replaced with Seroquel and Depakote. He is now more alert but poorly interactive and seems confused. He will open eyes to voice and does try to speak but no eye contact made and poor ability to follow commands. According to staff he is more alert and is moving more but remains significantly confused.       Past Medical History:        Diagnosis Date    Acquired hypothyroidism 9/26/2017    Anxiety     Congestive heart failure with LV diastolic dysfunction, NYHA class 3 ([address]     Depression     DM (diabetes mellitus) ([address]     Essential hypertension 6/1/2016    Gouty arthritis 2006    Hyperlipidemia     Hypertension     Lymphedema     Obesity     Obstructive sleep apnea 8/17/2009    Obstructive sleep apnea 9/26/2017    Osteoarthritis     Type 2 diabetes mellitus without complication, without long-term current use of insulin ([address] 1/15/2014           Procedure Laterality Date    BRONCHOSCOPY 08/10/2017    ECHO COMPL W DOP COLOR FLOW  6/21/2013         EXTERNAL EAR SURGERY  6/2/2009    keloid left ear    FOOT SURGERY  1990    Left foot    GASTROSTOMY TUBE PLACEMENT  08/10/2017    TRACHEOSTOMY N/A 11/11/2020    TRACHEOTOMY performed by Ernesto Alaniz [facility] at [facility]  08/10/2017    UPPER GASTROINTESTINAL ENDOSCOPY N/A 11/11/2020    EGD ESOPHAGOGASTRODUODENOSCOPY PEG TUBE INSERTION performed by Ernesto Alaniz MD at [address] [place] [address] History:  Social History     Tobacco Use   Smoking Status Former Smoker    Packs/day: 0.10    Years: 10.00    Pack years: 1.00    Types: Cigarettes    Last attempt to quit: 2/19/2005    Years since quitting: 15.7   Smokeless Tobacco Former User    Types: Stacey De Leon Quit date: 1/1/1990     Social History     Substance and Sexual Activity   Alcohol Use Yes    Frequency: 2-3 times a week    Drinks per session: 1 or 2    Binge frequency: Never    Comment: socially     Social History     Substance and Sexual Activity   Drug Use Not Currently    Comment: past use; none x 30 years     Legally     Family History:       Problem Relation Age of Onset    Alzheimer's Disease Mother     Heart Disease Father         Heart arrhythmia    Heart Attack Father        Review of Systems:  All systems reviewed are negative except what is mentioned in history of present illness. Allergies:     Allergies   Allergen Reactions    Bee Venom Anaphylaxis    Shellfish-Derived Products Anaphylaxis     Only crab legs        Current Medications:   midazolam (VERSED) injection 2 mg, Q4H PRN  bumetanide (BUMEX) injection 1 mg, BID  thiamine (B-1) 250 mg in sodium chloride 0.9 % 100 mL IVPB, Q24H  dexmedetomidine (PRECEDEX) 1,000 mcg in sodium chloride 0.9 % 250 mL infusion, Continuous  lidocaine PF 1 % injection 5 mL, Once  sodium chloride flush 0.9 % injection 10 mL, PRN  heparin flush 100 UNIT/ML injection 300 Units, 2 times per day  heparin flush 100 UNIT/ML injection 300 Units, PRN  chlordiazePOXIDE (LIBRIUM) capsule 25 mg, Q8H  QUEtiapine (SEROQUEL) tablet 100 mg, BID  senna (SENOKOT) tablet 17.2 mg, Nightly  polyethylene glycol (GLYCOLAX) packet 17 g, Daily PRN  fentaNYL 5 mcg/ml in 0.9%  ml infusion, Continuous  perflutren lipid microspheres (DEFINITY) injection 1.65 mg, ONCE PRN  insulin lispro (HUMALOG) injection vial 0-18 Units, Q4H  acetaminophen (TYLENOL) 160 MG/5ML solution 650 mg, Q6H PRN  piperacillin-tazobactam (ZOSYN) 3.375 g in dextrose 5 % 100 mL IVPB extended infusion (mini-bag), Q8H  Zosyn Flush (0.9 % sodium chloride infusion), Q8H  carvedilol (COREG) tablet 8.799 mg, BID WC  folic acid injection 1 mg, Daily  polyvinyl alcohol (LIQUIFILM TEARS) 1.4 % ophthalmic solution 1 drop, Q4H PRN  [Held by provider] insulin glargine (LANTUS) injection vial 20 Units, Nightly  sodium chloride (Inhalant) 3 % nebulizer solution 2 mL, Q4H  hydrALAZINE (APRESOLINE) injection 10 mg, Q4H PRN  heparin (porcine) injection 7,500 Units, Q8H  pantoprazole (PROTONIX) injection 40 mg, Daily    And  sodium chloride (PF) 0.9 % injection 10 mL, Daily  sodium chloride flush 0.9 % injection 10 mL, 2 times per day  sodium chloride flush 0.9 % injection 10 mL, PRN  acetaminophen (TYLENOL) suppository 650 mg, Q6H PRN  Arformoterol Tartrate (BROVANA) nebulizer solution 15 mcg, BID  chlorhexidine (PERIDEX) 0.12 % solution 15 mL, BID  amLODIPine (NORVASC) tablet 10 mg, Daily  aspirin chewable tablet 81 mg, Daily  colchicine (COLCRYS) tablet 0.6 mg, BID PRN  levothyroxine (SYNTHROID) tablet 50 mcg, Daily  [Held by provider] losartan (COZAAR) tablet 100 mg, Daily  budesonide (PULMICORT) nebulizer suspension 500 mcg, BID  ipratropium-albuterol (DUONEB) nebulizer solution 1 ampule, Q4H WA  glucose (GLUTOSE) 40 % oral gel 15 g, PRN  dextrose 50 % IV solution, PRN  glucagon (rDNA) injection 1 mg, PRN  dextrose 5 % solution, PRN         Physical Exam:  BP (!) 108/56 Pulse 74   Temp 99.3 °F (37.4 °C) (Axillary)   Resp 26   Ht 5' 8\" (1.727 m)   Wt 293 lb 11.2 oz (133.2 kg)   SpO2 (!) 89%   BMI 44.66 kg/m²  I Body mass index is 44.66 kg/m². I   Wt Readings from Last 1 Encounters:   11/15/20 293 lb 11.2 oz (133.2 kg)          HEENT: Normocephalic, atraumatic, no lesions or abnormalities noted. Neck:   No masses or nodes    Lungs:  clear to auscultation  bilaterally     CV: RRR without  murmurs     Extremities: no c/c; venous stasis changes in the left lower leg and calf region with associated lymphedema. No swelling on the right leg. Dependent edema noted in bilateral upper extremity. Neurologic Exam   Mental Status:   Patient was arousable to voice but no fixation or tracking with eyes noted. He did not follow commands with note of attempts to speak well and ventilator via tracheostomy. No evidence of comprehension noted. Cranial Nerves: Pupils were equal round and reactive to light; Unable to assess visual fields due to poor cooperation. However, fields were grossly intact to threat exam.  Conjugate eye movements with only limited excursion and unable to assess further due to poor comprehension; Facial movements were symmetric and was unable to evaluate facial sensation or hearing. Patient did open eyes in response to loud noise or voice. Cough and gag was present. Motor Exam:   Movement in all extremities that seemed purposeful. Unable to assess strength due to impaired comprehension and lack of cooperation. Sensory Exam:   Responsive to noxious stimuli bilaterally with greater response on the right versus left side. Cerebella Exam:   Purposeful movements in bilateral lower extremities more so than upper extremities. No tremors or abnormal movements observed. Gait:  Gait was not tested.    Reflexes: normal and symmetric bilaterally; Babinski is negative    Labs:    CBC:   Recent Labs     11/13/20  0440 11/14/20  0445 11/15/20  0420   WBC identified. There is no significant fecal residual evident. There is no obvious organomegaly or abnormal soft tissue calcification. Nonobstructive bowel gas pattern. Ct Soft Tissue Neck Wo Contrast    Result Date: 11/11/2020  EXAMINATION: CT OF THE NECK WITHOUT CONTRAST  11/11/2020 TECHNIQUE: CT of the neck was performed without the administration of intravenous contrast. Multiplanar reformatted images are provided for review. Dose modulation, iterative reconstruction, and/or weight based adjustment of the mA/kV was utilized to reduce the radiation dose to as low as reasonably achievable. COMPARISON: CTA chest October 28, 2020. CTA neck February 9, 2017. HISTORY: ORDERING SYSTEM PROVIDED HISTORY: unable to be trached today, evaluate for trach calcification TECHNOLOGIST PROVIDED HISTORY: Please include entire trachea in window Reason for exam:->unable to be trached today, evaluate for trach calcification What reading provider will be dictating this exam?->CRC FINDINGS: PHARYNX/LARYNX:  The palatine tonsils are normal in appearance. The tongue is normal in appearance. The valleculae, epiglottis, aryepiglottic folds and pyriform sinuses appear unremarkable. The true and false vocal cords are normal in appearance. No mass or abscess is seen. OTHER: There is calcification at the level of the cricothyroid membrane. There is defect in the trachea anteriorly below the cricoid with overlying soft tissue swelling and soft tissue gas contiguous with the skin anteriorly. Tracheostomy tube is in place. SALIVARY GLANDS/THYROID:  The parotid and submandibular glands appear unremarkable. The thyroid gland appears unremarkable. LYMPH NODES:  No cervical or supraclavicular lymphadenopathy is seen. SOFT TISSUES:  No appreciable soft tissue swelling or mass is seen.  BRAIN/ORBITS/SINUSES:  The visualized portion of the intracranial contents appear unremarkable, paranasal sinuses and mastoid air cells demonstrate no acute abnormality. Opacification of the bilateral sphenoid sinuses. LUNG APICES/SUPERIOR MEDIASTINUM:  Patchy densities in the bilateral lung apices are again noted. No superior mediastinal lymphadenopathy or mass. The visualized portion of the trachea appears unremarkable. BONES: Multilevel degenerative changes. 1.  There is calcification at the level of the cricothyroid membrane. 2.  Defect in the trachea anteriorly below the cricoid with overlying soft tissue swelling and soft tissue gas contiguous with the skin anteriorly likely related to recent procedure. Xr Chest Portable    Result Date: 11/15/2020  EXAMINATION: ONE XRAY VIEW OF THE CHEST 11/15/2020 7:52 am COMPARISON: 11/14/2020 HISTORY: ORDERING SYSTEM PROVIDED HISTORY: SOB TECHNOLOGIST PROVIDED HISTORY: Reason for exam:->SOB What reading provider will be dictating this exam?->CRC FINDINGS: Tracheostomy tube appears unchanged. Left internal jugular central venous catheter with catheter tip not well visualized but possibly in the central left brachiocephalic vein or SVC. Stable cardiomediastinal silhouette with persistent bilateral airspace opacities, right worse than left. Possible small left pleural effusion. No pneumothorax. Stable bilateral airspace opacities, right worse than left. Xr Chest Portable    Result Date: 11/14/2020  EXAMINATION: ONE XRAY VIEW OF THE CHEST 11/14/2020 7:54 am COMPARISON: Comparison November 11-13 HISTORY: ORDERING SYSTEM PROVIDED HISTORY: SOB TECHNOLOGIST PROVIDED HISTORY: Reason for exam:->SOB What reading provider will be dictating this exam?->CRC FINDINGS: Tracheostomy tube in good position. Heart appears to be mildly enlarged. The Bilateral infiltrates observed predominant in the right lung as seen previously, more limited to the base on the left. Some degree of atelectasis appears to be also present in left lower lobe. Left internal jugular central venous catheter tip in the SVC.      No significant change Xr Chest Portable    Result Date: 11/7/2020  EXAMINATION: ONE XRAY VIEW OF THE CHEST 11/7/2020 3:40 pm COMPARISON: November 7, 2020 HISTORY: ORDERING SYSTEM PROVIDED HISTORY: s/p left ij placement TECHNOLOGIST PROVIDED HISTORY: Reason for exam:->s/p left ij placement What reading provider will be dictating this exam?->CRC FINDINGS: Endotracheal tube is 2.7 cm above the ellie. Right and left central venous catheters are present with distal tips at location of superior vena cava. NG tube courses below the diaphragm. Redemonstration of interstitial and hazy opacities bilaterally. There is improved aeration in lung bases. The heart is normal size. No pneumothorax. 1.  Improved aeration in lung bases. 2.  Interstitial and hazy opacities persist throughout both lungs. 3.  Endotracheal tube is 2.7 cm above ellie. Xr Chest Portable    Result Date: 11/7/2020  EXAMINATION: ONE XRAY VIEW OF THE CHEST 11/7/2020 7:53 am COMPARISON: November 6, 2020 HISTORY: ORDERING SYSTEM PROVIDED HISTORY: SOB TECHNOLOGIST PROVIDED HISTORY: Reason for exam:->SOB What reading provider will be dictating this exam?->CRC FINDINGS: Endotracheal tube is approximately 1.8 cm above the ellie. Right IJ central venous catheter is present with distal tip of location of SVC. Satisfactory position of NG tube. Stable interstitial and hazy opacities bilaterally notable in lung bases. No pneumothorax. There are low lung volumes. Stable chest radiograph with interstitial pulmonary edema or bilateral pneumonia. Xr Chest Portable    Result Date: 11/6/2020  EXAMINATION: ONE XRAY VIEW OF THE CHEST 11/6/2020 8:07 am COMPARISON: 11/05/2020 HISTORY: ORDERING SYSTEM PROVIDED HISTORY: SOB TECHNOLOGIST PROVIDED HISTORY: Reason for exam:->SOB What reading provider will be dictating this exam?->CRC FINDINGS: Lines and tubes are stable. Heart size and mediastinal contours are unchanged. The lungs are stable. No change.      Xr Chest Portable    Result Date: 11/5/2020  EXAMINATION: ONE XRAY VIEW OF THE CHEST 11/5/2020 8:09 am COMPARISON: 11/04/2020 HISTORY: ORDERING SYSTEM PROVIDED HISTORY: SOB TECHNOLOGIST PROVIDED HISTORY: Reason for exam:->SOB What reading provider will be dictating this exam?->CRC FINDINGS: Lines and tubes are stable. Heart size and mediastinal contours are unchanged. The lungs are stable. No change. Xr Chest Portable    Result Date: 11/4/2020  EXAMINATION: ONE XRAY VIEW OF THE CHEST 11/4/2020 8:02 am COMPARISON: 11/03/2020 HISTORY: ORDERING SYSTEM PROVIDED HISTORY: SOB TECHNOLOGIST PROVIDED HISTORY: Reason for exam:->SOB What reading provider will be dictating this exam?->CRC FINDINGS: Heart is enlarged. There are significant bilateral infiltrates worse in the left lower lobe. These appear unchanged. There may be a small left effusion. Lines/tubes are appropriate. No interval change     Xr Chest Portable    Result Date: 11/3/2020  EXAMINATION: ONE XRAY VIEW OF THE CHEST 11/3/2020 8:05 am COMPARISON: None. HISTORY: ORDERING SYSTEM PROVIDED HISTORY: SOB TECHNOLOGIST PROVIDED HISTORY: Reason for exam:->SOB What reading provider will be dictating this exam?->CRC FINDINGS: The heart is mildly enlarged. Multifocal bilateral pulmonary infiltrates are noted more prominent within the right lung. There is stable position of the endotracheal tube and NG tube. There is no pneumothorax. Multifocal bilateral pulmonary infiltrates more prominent within the right lung. Overall there is no interval change when compared with the prior study of 1 day earlier.      Xr Chest Portable    Result Date: 11/3/2020  EXAMINATION: ONE XRAY VIEW OF THE CHEST 11/3/2020 8:43 am COMPARISON: 3 November 2020 HISTORY: ORDERING SYSTEM PROVIDED HISTORY: s/p ETT exchange TECHNOLOGIST PROVIDED HISTORY: Reason for exam:->s/p ETT exchange What reading provider will be dictating this exam?->CRC FINDINGS: Stable right-sided central venous catheter, endotracheal and nasogastric tubes. Opacity in the right lung is minimally progressive and may relate to any combination of atelectasis and/or infiltrate. Aeration on the left is stable. Mildly worsening aeration on the right which may relate any combination of infiltrate and/or atelectasis. Stable findings on the left. Xr Chest Portable    Result Date: 11/2/2020  EXAMINATION: ONE XRAY VIEW OF THE CHEST 11/2/2020 7:48 am COMPARISON: 11/01/2020 HISTORY: ORDERING SYSTEM PROVIDED HISTORY: SOB TECHNOLOGIST PROVIDED HISTORY: Reason for exam:->SOB What reading provider will be dictating this exam?->CRC FINDINGS: Significant left lower lobe infiltrate and pleural effusion are unchanged. Right basilar atelectasis/infiltrate appears mildly worse. Lines/tubes are appropriate. Slight worsening of right basilar atelectasis/infiltrate     Xr Chest Portable    Result Date: 11/1/2020  EXAMINATION: ONE XRAY VIEW OF THE CHEST 11/1/2020 7:39 am COMPARISON: 10/31/2020 HISTORY: ORDERING SYSTEM PROVIDED HISTORY: SOB TECHNOLOGIST PROVIDED HISTORY: Reason for exam:->SOB What reading provider will be dictating this exam?->CRC FINDINGS: Cardiac mediastinal silhouettes appear similar to the previous examination. Persistent left basilar consolidation and pleural effusion, with multifocal infiltrates seen throughout the remainder of the lungs bilaterally, with slightly decreased consolidation in the right lung base. No pneumothorax is identified. Enteric catheter and right central venous catheter are again identified, not significantly changed. Left basilar consolidation and pleural effusion, with multifocal infiltrates seen throughout the remainder of the lungs, similar to the previous examination with exception of mild improved consolidation at the right lung base.      Xr Chest Portable    Result Date: 10/31/2020  EXAMINATION: ONE XRAY VIEW OF THE CHEST 10/31/2020 7:38 am COMPARISON: 10/30/2020 HISTORY: ONE XRAY VIEW OF THE CHEST 10/29/2020 8:05 am COMPARISON: AP portable chest radiograph 10/28/2020 HISTORY: ORDERING SYSTEM PROVIDED HISTORY: dyspnea TECHNOLOGIST PROVIDED HISTORY: dyspnea What reading provider will be dictating this exam?->CRC FINDINGS: Given slight patient rotation to the left, as well as AP portable, semi-upright technique: Multiple cardiac monitoring leads overlie the patient's chest.  There is a new enteric tube following the expected course of the esophagus, below the left hemidiaphragm, off the inferior edge of the image. An endotracheal tube appears not significantly changed. Mild bibasilar infiltrates versus subsegmental atelectasis, and a probable small left basilar pleural effusion, are not significantly changed. No other clinically-significant changes are noted. .     1. New enteric tube. as described. 2.  Mild bibasilar infiltrates versus subsegmental atelectasis, and a probable small left basilar pleural effusion, are not significantly changed. 3.  Otherwise, no significant change. RECOMMENDATION: 1. Recommend follow-up thoracic imaging to resolution.  Low Chest Portable    Result Date: 10/28/2020  EXAMINATION: ONE XRAY VIEW OF THE CHEST 10/28/2020 7:47 am COMPARISON: 10/27/2020 HISTORY: ORDERING SYSTEM PROVIDED HISTORY: SOB TECHNOLOGIST PROVIDED HISTORY: Reason for exam:->SOB What reading provider will be dictating this exam?->CRC FINDINGS: The tip of the endotracheal tube is located 4 cm proximal to the ellie. There are persistent bilateral lower lobe infiltrates. The heart is mildly enlarged. 1. No interval change in the persistent bilateral lower lobe infiltrates 2. Satisfactory position of the endotracheal tube.      Xr Chest Portable    Result Date: 10/27/2020  EXAMINATION: ONE XRAY VIEW OF THE CHEST 10/27/2020 7:58 pm COMPARISON: October 26, 2020 HISTORY: ORDERING SYSTEM PROVIDED HISTORY: SOB TECHNOLOGIST PROVIDED HISTORY: Reason for exam:->SOB What reading provider will be dictating this exam?->CRC FINDINGS: There is mild cardiomegaly. There is patchy perihilar and bibasilar atelectasis/infiltrates. Tiny pleural effusions are suspected. Progressive bibasilar atelectasis/infiltrates concerning for pneumonia. Underlying CHF is considered. Xr Chest Portable    Result Date: 10/26/2020  EXAMINATION: ONE XRAY VIEW OF THE CHEST 10/26/2020 4:11 pm COMPARISON: 05/26/2019 HISTORY: ORDERING SYSTEM PROVIDED HISTORY: SOB TECHNOLOGIST PROVIDED HISTORY: Reason for exam:->SOB What reading provider will be dictating this exam?->CRC FINDINGS: Cardiac size appears stable. Discoid airspace disease seen at the left lung base which may represent atelectasis or scarring. Right infrahilar and basilar infiltrate is identified. No pneumothorax is identified. No acute osseous abnormality is identified. Right infrahilar and basilar infiltrate concerning for pneumonia. Left basilar atelectasis or scarring. Cta Chest W Contrast    Result Date: 10/28/2020  EXAMINATION: CTA OF THE CHEST 10/27/2020 5:34 pm TECHNIQUE: CTA of the chest was performed after the administration of intravenous contrast.  Multiplanar reformatted images are provided for review. MIP images are provided for review. Dose modulation, iterative reconstruction, and/or weight based adjustment of the mA/kV was utilized to reduce the radiation dose to as low as reasonably achievable. COMPARISON: 03/14/2018 CT HISTORY: ORDERING SYSTEM PROVIDED HISTORY: check pe TECHNOLOGIST PROVIDED HISTORY: Reason for exam:->check pe What reading provider will be dictating this exam?->CRC FINDINGS: Pulmonary Arteries: Fair technical quality. Body habitus and patient positioning contributes to significant artifact. Despite this, there are no filling defects to suggest pulmonary embolism. Main pulmonary artery is normal in caliber. No evidence of right ventricular strain. Mediastinum: The heart is enlarged.   Aorta is normal.  Mild mediastinal and hilar lymph node enlargement is demonstrated. A dominant right hilar lymph node measures 2.1 cm. Thyroid and esophagus appear normal. Lungs/pleura: Endotracheal tube is positioned just above the level of the ellie. Dense multifocal airspace opacities are present in the bilateral lungs. This is most prominent in the left greater than right lower lobe. Small associated pleural effusions. Upper Abdomen: Visualized abdominal structures in the upper abdomen are normal. Soft Tissues/Bones: No skeletal abnormalities throughout the chest.     1. No pulmonary embolism. 2. Dense multifocal airspace opacities may represent ARDS or developing pneumonitis. 3. Reactive mediastinal and hilar lymph node enlargement. Xr Abdomen For Ng/og/ne Tube Placement    Result Date: 11/7/2020  EXAMINATION: ONE SUPINE XRAY VIEW(S) OF THE ABDOMEN 11/7/2020 9:40 pm COMPARISON: 11/02/2020 HISTORY: ORDERING SYSTEM PROVIDED HISTORY: OG placement TECHNOLOGIST PROVIDED HISTORY: Reason for exam:->OG placement Portable? ->Yes What reading provider will be dictating this exam?->CRC FINDINGS: OG tube tip in the body of the stomach. The upper abdomen is unremarkable. No gross free air. Of OG tube tip in the body of the stomach. Xr Abdomen For Ng/og/ne Tube Placement    Result Date: 11/5/2020  EXAMINATION: ONE SUPINE XRAY VIEW(S) OF THE ABDOMEN 11/5/2020 8:27 pm COMPARISON: None. HISTORY: ORDERING SYSTEM PROVIDED HISTORY: Confirmation of course of NG/OG/NE tube and location of tip of tube TECHNOLOGIST PROVIDED HISTORY: Reason for exam:->Confirmation of course of NG/OG/NE tube and location of tip of tube Portable? ->Yes What reading provider will be dictating this exam?->CRC FINDINGS: Nasogastric/orogastric tube tip in the fundus of the stomach. Nonobstructive bowel gas pattern. No gross free air. Nasogastric/orogastric tube tip in the fundus of the stomach.      Xr Chest Abdomen Ng Placement    Result Date: 11/8/2020  EXAMINATION: ONE SUPINE XRAY VIEW(S) OF THE ABDOMEN 11/8/2020 1:01 pm COMPARISON: 11/07/2020 HISTORY: ORDERING SYSTEM PROVIDED HISTORY: Encounter for nasogastric (NG) tube placement TECHNOLOGIST PROVIDED HISTORY: Reason for exam:->Encounter for nasogastric (NG) tube placement What reading provider will be dictating this exam?->CRC FINDINGS: The nasogastric tube terminates in the gastric body. There is a nonobstructive bowel gas pattern. The lung bases are clear. Degenerative changes involve the lumbar spine. 1. Nasogastric tube terminating in the gastric body. Xr Chest Abdomen Ng Placement    Result Date: 10/28/2020  EXAMINATION: ONE SUPINE XRAY VIEW(S) OF THE ABDOMEN 10/28/2020 10:56 am COMPARISON: None. HISTORY: ORDERING SYSTEM PROVIDED HISTORY: verify OG placement TECHNOLOGIST PROVIDED HISTORY: Reason for exam:->verify OG placement What reading provider will be dictating this exam?->CRC FINDINGS: There is an NG tube seen within the stomach. There is no significant bowel distention. 1. Satisfactory position of the NG tube within the stomach     Us Retroperitoneal Complete    Addendum Date: 10/30/2020    ADDENDUM: The renal ultrasound and Doppler ultrasound requisites were linked. The reports were not initially linked and therefore the Doppler ultrasound of the lower extremities will be dictated separately. Study: Venous Doppler of the lower extremities. Duplex, grayscale, color flow and spectral analysis of the veins of the right and left lower extremities was performed. There is normal flow seen within the deep venous system. There is normal compression. No filling defects are noted to suggest deep venous thrombosis.      Result Date: 10/30/2020  EXAMINATION: RETROPERITONEAL ULTRASOUND OF THE KIDNEYS AND URINARY BLADDER 10/29/2020 COMPARISON: None HISTORY: ORDERING SYSTEM PROVIDED HISTORY: r/o hydronephrosis TECHNOLOGIST PROVIDED HISTORY: Reason for exam:->r/o hydronephrosis What reading provider will be dictating this exam?->CRC FINDINGS: Kidneys: The right kidney measures 12.3 x 5.9 x 5.5 cm in length and the left kidney measures 12.6 x 6.5 x 7.0 cm in length. Kidneys demonstrate normal cortical echogenicity. No evidence of hydronephrosis or intrarenal stones. Bladder: Unremarkable appearance of the bladder. There is a Zamorano catheter seen within the urinary bladder. Unremarkable ultrasound of the kidneys and urinary bladder. IMPRESSION: There is no evidence of right or left lower extremity deep venous thrombosis. Us Dup Lower Extremities Bilateral Venous    Result Date: 11/10/2020  Patient MRN:  [id number] : 1960 Age: 61 years Gender: Male Order Date:  11/10/2020 2:55 PM EXAM: US DUP LOWER EXTREMITIES BILATERAL VENOUS NUMBER OF IMAGES:  62 INDICATION:  r/o DVT r/o DVT What reading provider will be dictating this exam?->[facility] COMPARISON: 10/29/2020 Within the visualized vessels, there is no evidence for deep venous thrombosis There is good compressibility, there is good augmentation, there is good color flow. Within the visualized vessels there is no evidence for deep venous thrombosis     Us Dup Lower Extremities Bilateral Venous    Addendum Date: 10/30/2020    ADDENDUM: The renal ultrasound and Doppler ultrasound requisites were linked. The reports were not initially linked and therefore the Doppler ultrasound of the lower extremities will be dictated separately. Study: Venous Doppler of the lower extremities. Duplex, grayscale, color flow and spectral analysis of the veins of the right and left lower extremities was performed. There is normal flow seen within the deep venous system. There is normal compression. No filling defects are noted to suggest deep venous thrombosis.      Result Date: 10/30/2020  EXAMINATION: RETROPERITONEAL ULTRASOUND OF THE KIDNEYS AND URINARY BLADDER 10/29/2020 COMPARISON: None HISTORY: ORDERING SYSTEM PROVIDED HISTORY: r/o hydronephrosis TECHNOLOGIST PROVIDED HISTORY: Reason for exam:->r/o hydronephrosis What reading provider will be dictating this exam?->CRC FINDINGS: Kidneys: The right kidney measures 12.3 x 5.9 x 5.5 cm in length and the left kidney measures 12.6 x 6.5 x 7.0 cm in length. Kidneys demonstrate normal cortical echogenicity. No evidence of hydronephrosis or intrarenal stones. Bladder: Unremarkable appearance of the bladder. There is a Zamorano catheter seen within the urinary bladder. Unremarkable ultrasound of the kidneys and urinary bladder. IMPRESSION: There is no evidence of right or left lower extremity deep venous thrombosis. The patient's records from referring provider and available information in the EHR was reviewed. Impression:  1. Acute encephalopathy: Patient status post prolonged intubation and now being weaned off sedating medications. He was agitated on presentation and remains agitated even now. No obvious focality on the neurologic exam other than impaired cognitive and mental status functioning. Do not suspect seizure activity at this time but will continue to monitor. Findings more consistent with metabolic encephalopathy after prolonged ventilator management and sedation from critical illness with respiratory failure. Patient with prolonged bedrest as well and no being switched from sedation with propofol, fentanyl, and Versed. Precedex also used to manage agitation on the ventilator. Currently on antipsychotic medications and poorly responsive. It may take some time before patient able to become more appropriate in his responses. No indication of seizure activity. EEG however would be helpful in evaluating CNS status and to rule out seizure activity. 2. Acute respiratory failure: Now status post tracheostomy placement for chronic management  3. Left lower extremity lymphedema: For medical management  4. Physical deconditioning from prolonged bedrest and critical illness.       Active

## 2020-11-15 NOTE — PROGRESS NOTES
1. 2   GLUCOSE 150* 178* 178*       LIVER PROFILE:   Recent Labs     11/12/20  1404 11/13/20  0440 11/14/20  0445 11/15/20  0420   AST  --  50* 45* 32   ALT  --  113* 109* 98*   LIPASE 65*  --   --   --    BILITOT  --  0.3 0.5 0.4   ALKPHOS  --  143* 140* 143*       PT/INR:   No results for input(s): PROTIME, INR in the last 72 hours. APTT:   No results for input(s): APTT in the last 72 hours. Fasting Lipid Panel:    Lab Results   Component Value Date    CHOL 295 03/23/2017    TRIG 292 11/14/2020    HDL 66 03/23/2017       Cardiac Enzymes:    Lab Results   Component Value Date    CKTOTAL 98 10/08/2017    CKTOTAL 114 10/07/2017    CKMB 1.1 10/08/2017    CKMB 1.1 10/07/2017    TROPONINI <0.01 10/26/2020    TROPONINI <0.01 05/26/2019    TROPONINI <0.01 05/26/2019       Notable Cultures:      Blood cultures   Blood Culture, Routine   Date Value Ref Range Status   11/09/2020 5 Days no growth  Final     Respiratory cultures No results found for: RESPCULTURE No results found for: LABGRAM  Urine   Urine Culture, Routine   Date Value Ref Range Status   11/09/2020 Growth not present  Final     Legionella No results found for: LABLEGI  C Diff PCR No results found for: CDIFPCR  Wound culture/abscess: No results for input(s): WNDABS in the last 72 hours. Tip culture:No results for input(s): CXCATHTIP in the last 72 hours.      Antibiotic  Days  Day started                                Oxygen:     Vent Information  $Ventilation: $Subsequent Day  Skin Assessment: Clean, dry, & intact  Equipment ID: [id number]  Equipment Changed: Humidification  Vent Type: 980  Vent Mode: AC/VC  Vt Ordered: 480 mL  Rate Set: 16 bmp  Peak Flow: 65 L/min  Pressure Support: 0 cmH20  FiO2 : 50 %  SpO2: 92 %  SpO2/FiO2 ratio: 180  PaO2/FiO2 ratio: 169  Sensitivity: 3  PEEP/CPAP: 8  I Time/ I Time %: 0 s  Humidification Source: Heated wire  Humidification Temp: 37  Humidification Temp Measured: 36.9  Circuit Condensation: Drained  Mask Type: Full face mask  Mask Size: Large  Additional Respiratory  Assessments  Pulse: 70  Resp: 25  SpO2: 92 %  Position: Semi-Lockwood's  Humidification Source: Heated wire  Humidification Temp: 37  Circuit Condensation: Drained  Oral Care Completed?: Yes  Oral Care: Mouth suctioned  Subglottic Suction Done?: Yes  Airway Type: Trachial  Airway Size: 8(XL)  Cuff Pressure (cm H2O): 29 cm H2O  Skin barrier applied: Yes     Nasal cannula L/min     Face mask %     Reservoirs mask %       ABG   Vent Settings     PH   Mode      PCO2   TV      PO2   RR      HCO3   PS      Sat%   PEEP      FIO2   FIO2        P/F          Lines:  Site  Day  Date inserted     TLC              PICC              Arterial line              Peripheral line              HD cath            [REMOVED] Urethral Catheter-Output (mL): 250 mL  Urethral Catheter Non-latex 16 fr-Output (mL): 1100 mL    Imaging Studies:    EKG: Rhythm Strip: normal sinus rhythm. Assessment and Plan     Derrick Her a 61 y. o. male with PMHx is significant for HFpEF (Ef 65% in 2017), hx of tracheostomy and reversal, moderate HANS not on CPAP, DMT2, HTN, HLD, Hypothyroidism who initially was admitted to general floors for hypoxia. Transferred to the MICU when ABG concerning for worsening respiratory acidosis.  We are maniging him for the following:     Acute hypoxic hypercapnic respiratory failure 2/2 CAP versus chronic OHS  Admitted to the ICU from the floor with worsening respiratory acidosis   Intubated and sedated   Fentanyl, and precedex   Hx of track and peg   S/P trach and peg tow days ago   Vent: AC VC peep 14, Vt 500 and RR 18  AB.4/27,3/100/17 with FiO2 50%  PF ratio 200  Gen surg on board, appreciate recommendation  Continue unasyn per ID    Breathing treatment with Brovana Pulmicort, DuoNeb  Culture MSSA  Follow repeat culture    COVID negative X 2  Awaits LTACT transfer    Avoid propofol  Wean FiO2 and sedation     ICU delirium   He is agitated   On Seroquel   Give thiamine   Obtain Ct head    Neuralogy consult     CAP vs Aspiration PNA complicated by  Pulmonary edema  Respiratory cultures grew MSSA on 31/10. Continue Unasyn   Patient is afebrile, WBC wnl  Follow daily CBC, vitals, CXR. Infectious disease following, appreciate recommendations     Acute kidney injury stage III likely pre renal 2/2 diuretic use, contrast agent vs? cardiorenal syndrome  Improving   Creatinine 1.5 ( baseline 1.1)   Net negative 2.9 L  Continue bumex   Nephrology following, appreciate recommendations    Continue monitor daily BMP, renal function. Monitor I/O. Elevated TG  Avoid Propofol     Hypokalemia/Hypocalcemmia likely 2/2 to medications and low urine output   Meds list reviewed   diurese cautiously    Replace electrolytes appropriately     Labile BP  Arterial line placed 11/2  Ho of hypertension   Home coreg resumed last night  Continue to monitor   As patient is sedated again, may hold antihypertensive meds if BP dropped      Transaminitis likely secondary to Lipitor  Consider holding Lipitor  Trend LFTs    History of heart failure with preserved ejection fraction  Echo done in 2017 showing 1 diastolic dysfunction with normal left ventricular size and systolic function EF 96%. Home medication: Coreg 25 mg, olmesartan 10 mg, Losartan 10 mg,. Repeat ECHO left ventricular internal dimensions were normal in diastole and systole. EF 65%. Moderate left ventricular concentric hypertrophy noted. No regional wall motion abnormalities seen. Currently hold off Coreg due to bradycardia. Will continue monitor.      Ho DMT2, HLD, obstructive sleep apnea, obesity, hypothyroidism  Hold home metfomrin.  overnight. Increase Lantus to 20 units. HDSS -> Tolerating well  Continue monitor BG q6h  Continue Atorvastatin he road  Continue Synthroid 50 MCG    Final note: Awaits LTACT clearence, neurology consult. Give thiamine, go down on sedation.  Get CT head       # Peptic ulcer prophylaxis: Protonix 40 mg  # DVT Prophylaxis: Heparin 7500 subcu   # Disposition: Continue ICU care    Nikko Millan M.D., PGY-1  Internal medicine resident  Attending Physician: Dr. Barry Banks personally saw, examined and provided care for the patient. Radiographs, labs and medication list were reviewed by me independently. I spoke with bedside nursing, therapists and consultants. Critical care services and times documented are independent of procedures and multidisciplinary rounds with Residents. Additionally comprehensive, multidisciplinary rounds were conducted with the MICU team. The case was discussed in detail and plans for care were established. Review of Residents documentation was conducted and revisions were made as appropriate. I agree with the above documented exam, problem list and plan of care.     [address]

## 2020-11-15 NOTE — PROGRESS NOTES
Nephrology Progress Note  Patient's Name: Zach Ruiz  9:07 AM  11/15/2020        Reason for Consult: Acute kidney  Requesting Physician:  Jv Guzman MD    Chief Complaint: Shortness of breath  History Obtained From:  EHR; spouse    History of Present Ilness:    Zach Ruiz is a 61 y.o. male with a history of COPD, hypertension, hyperlipidemia and diabetes mellitus. He presented to ED 2 days ago with complaints of shortness of breath. According to patient's spouse he had been complaining of shortness of breath over the course of several weeks. This has gotten progressively worse. He went to see his PCP on the day of admission. In office at the time pulse oximeter obtained revealed his oxygen saturation to be in the 70s. He was instructed to proceed to ED for further evaluation. On presentation to ED his initial vital signs showed a blood pressure of 168/71, temperature 97.5 and pulse of 93. His pulse oximeter at the time was noted to be 98% on room air. Laboratory data was significant for a BUN of 11, creatinine 1.1, WBC of 8.3. Rapid COVID-19 testing was negative. A chest x-ray performed revealed right infrahilar and basilar infiltrate concerning for pneumonia. A CTA was ordered but patient declined because of his inability to lay flat. Patient was given ceftriaxone and doxycycline followed by admission to telemetry. 2 days ago an RRT was called as patient was noted to be increasingly more in respiratory distress. He was transferred to MICU and intubated. Laboratory data yesterday showed worsening serum creatinine to 2.8. This a.m. further worsening to 3.6. He has been nonoliguric. Hence renal consult. 10/30: N new acute issues overnight; remains intubated  10/31: Polyuric , concerned for possible DI; no other acute issues overnight  11/1: agitated overnight ; remains intubated  11/2: pt seen and examined.  Chart reviewed, pt intubated  11/3: pt seen and examined in icu, pt PRN  heparin (porcine) injection 7,500 Units, Q8H  pantoprazole (PROTONIX) injection 40 mg, Daily    And  sodium chloride (PF) 0.9 % injection 10 mL, Daily  sodium chloride flush 0.9 % injection 10 mL, 2 times per day  sodium chloride flush 0.9 % injection 10 mL, PRN  acetaminophen (TYLENOL) suppository 650 mg, Q6H PRN  Arformoterol Tartrate (BROVANA) nebulizer solution 15 mcg, BID  chlorhexidine (PERIDEX) 0.12 % solution 15 mL, BID  amLODIPine (NORVASC) tablet 10 mg, Daily  aspirin chewable tablet 81 mg, Daily  colchicine (COLCRYS) tablet 0.6 mg, BID PRN  levothyroxine (SYNTHROID) tablet 50 mcg, Daily  [Held by provider] losartan (COZAAR) tablet 100 mg, Daily  budesonide (PULMICORT) nebulizer suspension 500 mcg, BID  ipratropium-albuterol (DUONEB) nebulizer solution 1 ampule, Q4H WA  glucose (GLUTOSE) 40 % oral gel 15 g, PRN  dextrose 50 % IV solution, PRN  glucagon (rDNA) injection 1 mg, PRN  dextrose 5 % solution, PRN      Physical exam:  Vitals:    11/15/20 0600   BP: 135/73   Pulse: 84   Resp: (!) 31   Temp:    SpO2: 94%           General: Intubated/trach; sedated  Eyes: PERRL. No sclera icterus. No conjunctival injection. ENT: No discharge. Pharynx clear. Neck: Tracheostomy/intubated  Lungs: Coarse breath sounds  CV: Regular rate. Regular rhythm. No murmur or rub. .   Abd:  Non-distended. No masses. No organmegaly. Normal bowel sounds. Skin: Warm and dry. No nodule on exposed extremities. No rash on exposed extremities.   Ext: No cyanosis, clubbing, edema; 2+ pitting bilateral lower extremity edema L>R  Neuro: sedate lightly, but arouses and is agitated      Data:   Labs:  Lab Results   Component Value Date     11/15/2020     11/14/2020     11/13/2020    K 3.7 11/15/2020    K 3.4 (L) 11/14/2020    K 3.6 11/13/2020     (H) 11/15/2020    CO2 19 (L) 11/15/2020    CO2 18 (L) 11/14/2020    CO2 21 (L) 11/13/2020    CREATININE 1.2 11/15/2020    CREATININE 1.4 (H) 11/14/2020    CREATININE 1.3 (H) 11/13/2020    BUN 18 11/15/2020    BUN 23 11/14/2020    BUN 25 (H) 11/13/2020    GLUCOSE 178 (H) 11/15/2020    GLUCOSE 178 (H) 11/14/2020    GLUCOSE 150 (H) 11/13/2020    PHOS 3.2 11/15/2020    PHOS 3.5 11/14/2020    PHOS 3.2 11/13/2020    WBC 12.6 (H) 11/15/2020    WBC 9.1 11/14/2020    WBC 8.6 11/13/2020    HGB 10.6 (L) 11/15/2020    HGB 10.2 (L) 11/14/2020    HGB 10.3 (L) 11/13/2020    HCT 32.3 (L) 11/15/2020    HCT 30.7 (L) 11/14/2020    HCT 30.6 (L) 11/13/2020    MCV 79.8 (L) 11/15/2020     11/15/2020         Imaging:  Xr Chest Portable    Result Date: 10/27/2020  EXAMINATION: ONE XRAY VIEW OF THE CHEST 10/27/2020 7:58 pm COMPARISON: October 26, 2020 HISTORY: ORDERING SYSTEM PROVIDED HISTORY: SOB TECHNOLOGIST PROVIDED HISTORY: Reason for exam:->SOB What reading provider will be dictating this exam?->CRC FINDINGS: There is mild cardiomegaly. There is patchy perihilar and bibasilar atelectasis/infiltrates. Tiny pleural effusions are suspected. Progressive bibasilar atelectasis/infiltrates concerning for pneumonia. Underlying CHF is considered. Xr Chest Portable    Result Date: 10/26/2020  EXAMINATION: ONE XRAY VIEW OF THE CHEST 10/26/2020 4:11 pm COMPARISON: 05/26/2019 HISTORY: ORDERING SYSTEM PROVIDED HISTORY: SOB TECHNOLOGIST PROVIDED HISTORY: Reason for exam:->SOB What reading provider will be dictating this exam?->CRC FINDINGS: Cardiac size appears stable. Discoid airspace disease seen at the left lung base which may represent atelectasis or scarring. Right infrahilar and basilar infiltrate is identified. No pneumothorax is identified. No acute osseous abnormality is identified. Right infrahilar and basilar infiltrate concerning for pneumonia. Left basilar atelectasis or scarring. Assessment/Plans  1.  Acute kidney injury  Baseline 1.8 from 9/2019, 1.1 in 5/2019  hemodynamically mediated due to the combination of bradycardia,  diuretic and ARB use; this is exacerbated by

## 2020-11-15 NOTE — PROGRESS NOTES
Attempted right sided picc line unable to advance guidewire after acccessing vein.  Recommend IR for picc

## 2020-11-15 NOTE — PROGRESS NOTES
Unable to obtained ABG due to pt's agitation. Pt kicking legs at staff and over side rails, scooting self down in bed, attempting to bite when CVC assessed. Resident notified, no new orders.

## 2020-11-15 NOTE — PROGRESS NOTES
100 mL IVPB extended infusion (mini-bag)  3.375 g Intravenous Q8H Maura Gilliland MD 25 mL/hr at 11/15/20 0616 3.375 g at 11/15/20 0616    Zosyn Flush (0.9 % sodium chloride infusion)   Intravenous Q8H Maura Gilliland MD   Stopped at 11/15/20 0522    carvedilol (COREG) tablet 3.125 mg  3.125 mg Oral BID WC Fern Seymour MD   3.125 mg at 89/08/78 2290    folic acid injection 1 mg  1 mg Intravenous Daily Zackery Adan MD   1 mg at 11/15/20 0818    thiamine (B-1) 100 mg in sodium chloride 0.9 % 100 mL IVPB  100 mg Intravenous Q24H Zackery Adan MD   Stopped at 11/14/20 1730    polyvinyl alcohol (LIQUIFILM TEARS) 1.4 % ophthalmic solution 1 drop  1 drop Both Eyes Q4H PRN Xander Wisdom MD   1 drop at 11/02/20 1526    [Held by provider] insulin glargine (LANTUS) injection vial 20 Units  20 Units Subcutaneous Nightly Cyrilla Boast, MD   Stopped at 11/12/20 2100    sodium chloride (Inhalant) 3 % nebulizer solution 2 mL  2 mL Nebulization Q4H Farrah Arredondo MD   2 mL at 11/15/20 0433    hydrALAZINE (APRESOLINE) injection 10 mg  10 mg Intravenous Q4H PRN Alphonso Staton MD   10 mg at 11/14/20 0337    heparin (porcine) injection 7,500 Units  7,500 Units Subcutaneous Q8H Farrah Arredondo MD   7,500 Units at 11/15/20 0810    pantoprazole (PROTONIX) injection 40 mg  40 mg Intravenous Daily Cande Bravo MD   40 mg at 11/15/20 0816    And    sodium chloride (PF) 0.9 % injection 10 mL  10 mL Intravenous Daily Cande Bravo MD   10 mL at 11/15/20 0819    sodium chloride flush 0.9 % injection 10 mL  10 mL Intravenous 2 times per day Diana Tang MD   10 mL at 11/15/20 0816    sodium chloride flush 0.9 % injection 10 mL  10 mL Intravenous PRN Diana Tang MD   10 mL at 11/12/20 0903    acetaminophen (TYLENOL) suppository 650 mg  650 mg Rectal Q6H PRN Diana Tang MD        Arformoterol Tartrate (BROVANA) nebulizer solution 15 mcg  15 mcg Nebulization BID Diana Tang MD   15 mcg at 20    chlorhexidine (PERIDEX) 0.12 % solution 15 mL  15 mL Mouth/Throat BID Isaac Walton MD   15 mL at 11/15/20 0816    amLODIPine (NORVASC) tablet 10 mg  10 mg Oral Daily Chaparrita Bhat MD   10 mg at 11/15/20 0815    aspirin chewable tablet 81 mg  81 mg Oral Daily Chaparrita Bhat MD   81 mg at 11/15/20 0816    colchicine (COLCRYS) tablet 0.6 mg  0.6 mg Oral BID PRN Chaparrita Bhat MD   0.6 mg at 20    levothyroxine (SYNTHROID) tablet 50 mcg  50 mcg Oral Daily Chaparrita Bhat MD   50 mcg at 11/15/20 0616    [Held by provider] losartan (COZAAR) tablet 100 mg  100 mg Oral Daily Chaparrita Bhat MD   100 mg at 10/28/20 0849    budesonide (PULMICORT) nebulizer suspension 500 mcg  0.5 mg Nebulization BID Chaparrita Bhat MD   500 mcg at 20    ipratropium-albuterol (DUONEB) nebulizer solution 1 ampule  1 ampule Inhalation Q4H WA Chaparrita Bhat MD   1 ampule at 20    glucose (GLUTOSE) 40 % oral gel 15 g  15 g Oral PRN Chaparrita Bhat MD        dextrose 50 % IV solution  12.5 g Intravenous PRN Chaparrita Bhat MD        glucagon (rDNA) injection 1 mg  1 mg Intramuscular PRN Chaparrita Bhat MD        dextrose 5 % solution  100 mL/hr Intravenous PRN Chaparrita Bhat MD               Review of systems: could not be obtained     OBJECTIVE:  /73   Pulse 84   Temp 98.8 °F (37.1 °C) (Axillary)   Resp (!) 31   Ht 5' 8\" (1.727 m)   Wt 293 lb 11.2 oz (133.2 kg)   SpO2 94%   BMI 44.66 kg/m²   Temp  Av °F (37.2 °C)  Min: 98.8 °F (37.1 °C)  Max: 99.2 °F (37.3 °C)     Constitutional: Ventilated  , FiO2 50%, PEEP 8  Skin: Warm and dry. No rashes were noted. HEENT: no pallor, pupils reacting to light . Chest: Bilateral rhonchi   Cardiovascular: Regular . Systolic murmurs appreciated. Abdomen:  Bowel sound +, obese abdomen, PEG OK   Extremities:  + Lymphedema left lower extremity  Left IJ     Laboratory and Tests Review:  Lab Results   Component Value Date    WBC 12.6 (H) 11/15/2020    WBC 9.1 11/14/2020    WBC 8.6 11/13/2020    HGB 10.6 (L) 11/15/2020    HCT 32.3 (L) 11/15/2020    MCV 79.8 (L) 11/15/2020     11/15/2020     Lab Results   Component Value Date    NEUTROABS 6.80 11/15/2020    NEUTROABS 5.81 11/14/2020    NEUTROABS 4.47 11/13/2020     No results found for: CRPHS  Lab Results   Component Value Date    ALT 98 (H) 11/15/2020    AST 32 11/15/2020    ALKPHOS 143 (H) 11/15/2020    BILITOT 0.4 11/15/2020     Lab Results   Component Value Date     11/15/2020    K 3.7 11/15/2020    K 4.0 10/27/2020     11/15/2020    CO2 19 11/15/2020    BUN 18 11/15/2020    CREATININE 1.2 11/15/2020    CREATININE 1.4 11/14/2020    CREATININE 1.3 11/13/2020    GFRAA >60 11/15/2020    LABGLOM >60 11/15/2020    GLUCOSE 178 11/15/2020    PROT 7.4 11/15/2020    LABALBU 3.3 11/15/2020    CALCIUM 9.7 11/15/2020    BILITOT 0.4 11/15/2020    ALKPHOS 143 11/15/2020    AST 32 11/15/2020    ALT 98 11/15/2020     Lab Results   Component Value Date    CRP 1.4 (H) 11/02/2020    CRP 2.8 (H) 09/04/2017    CRP 10.5 (H) 08/28/2017     Lab Results   Component Value Date    SEDRATE 135 (H) 09/04/2017    SEDRATE 150 (H) 08/28/2017    SEDRATE 141 (H) 08/21/2017     Radiology:  Chest x ray - bilateral patchy infiltrates   Microbiology:     Blood cx - CONS   Sputum cx - Neg       Assessment:  · Acute on chronic hypoxic respiratory failure s/p tracheostomy   · Bacterial pneumonia,  (Likely aspiration pneumonia but after extubation patient had to be reintubated - HCAP)  · Shock, sepsis- resolved, blood cultures negative   · Alcohol withdrawal   · CONS bacteremia   ·   Plan:    ·  Zosyn 3.375 grams IV q 8 hrs   · Monitor labs,  · Will follow with you    Quinton Kulkarni  8:28 AM  11/15/2020

## 2020-11-15 NOTE — PLAN OF CARE
Problem: Pain:  Goal: Pain level will decrease  11/14/2020 1614 by Rakan Baron  Outcome: Met This Shift  Goal: Control of acute pain  11/14/2020 1614 by Rakan Baron  Outcome: Met This Shift  Goal: Control of chronic pain  11/14/2020 1614 by aRkan Baron  Outcome: Met This Shift     Problem: Restraint Use - Nonviolent/Non-Self-Destructive Behavior:  Goal: Absence of restraint-related injury  11/15/2020 0023 by Radha Ho RN  Outcome: Met This Shift  11/14/2020 1614 by Juan F Deng  Outcome: Met This Shift     Problem: Skin Integrity:  Goal: Will show no infection signs and symptoms  11/14/2020 1614 by Rakan Baron  Outcome: Met This Shift  Goal: Absence of new skin breakdown  11/14/2020 1614 by Juhi Baron  Outcome: Met This Shift     Problem: Respiratory:  Goal: Ability to maintain a clear airway will improve  11/15/2020 0023 by Radha Ho RN  Outcome: Met This Shift  11/14/2020 1614 by Juhi Baron  Outcome: Met This Shift  Goal: Ability to maintain adequate ventilation will improve  11/15/2020 0023 by Radha Ho RN  Outcome: Met This Shift  11/14/2020 1614 by Juan F Deng  Outcome: Met This Shift     Problem: Respiratory:  Goal: Complications related to the disease process, condition or treatment will be avoided or minimized  11/15/2020 0023 by Radha Ho RN  Outcome: Ongoing  11/14/2020 1614 by Juhi Baron  Outcome: Met This Shift     Problem: Restraint Use - Nonviolent/Non-Self-Destructive Behavior:  Goal: Absence of restraint indications  11/15/2020 0023 by Radha Ho RN  Outcome: Not Met This Shift  11/14/2020 1614 by Juhi Baron  Outcome: Not Met This Shift

## 2020-11-16 NOTE — PROGRESS NOTES
PT SEEN AND EXAMINED. intubated, in pic. BP better. Sedated. S/p peg/trach, Chart reviewed. meds reviewed. D/w nursing + family as available. EXAM: IN GENERAL, NAD. Dayron Confer ROS NEGx10 EXCEPT:   BP (!) 155/93   Pulse 104   Temp 98.7 °F (37.1 °C) (Temporal)   Resp 28   Ht 5' 8\" (1.727 m)   Wt 293 lb 11.2 oz (133.2 kg)   SpO2 97%   BMI 44.66 kg/m²   GEN:  NAD. HEENT: NCAT. NECK: NO JVD. TRACH MIDLINE. NO BRUITS. NO THYROMEGALY. LUNGS: CTA BL NO RALES, RHONCHI OR WHEEZES. GOOD EXCURSION. CV: Regular rate and rhythm, NO Murmurs, Rubs, Or gallops  ABD: Soft. Nontender. Normal bowel sounds.  No organomegaly  EXT:No clubbing cyanosis or edema  Neuro: Labs/data reviewedLABS: CBC with Differential:    Lab Results   Component Value Date    WBC 12.0 11/16/2020    RBC 3.98 11/16/2020    HGB 10.4 11/16/2020    HCT 31.2 11/16/2020     11/16/2020    MCV 78.4 11/16/2020    MCH 26.1 11/16/2020    MCHC 33.3 11/16/2020    RDW 20.2 11/16/2020    NRBC 0.9 11/15/2020    SEGSPCT 73 01/26/2014    METASPCT 1.8 11/15/2020    LYMPHOPCT 32.5 11/15/2020    MONOPCT 7.9 11/15/2020    MYELOPCT 0.9 11/15/2020    BASOPCT 1.8 11/15/2020    MONOSABS 1.01 11/15/2020    LYMPHSABS 4.16 11/15/2020    EOSABS 0.55 11/15/2020    BASOSABS 0.23 11/15/2020     Platelets:    Lab Results   Component Value Date     11/16/2020     CMP:    Lab Results   Component Value Date     11/16/2020    K 3.6 11/16/2020    K 4.0 10/27/2020     11/16/2020    CO2 21 11/16/2020    BUN 15 11/16/2020    CREATININE 1.2 11/16/2020    GFRAA >60 11/16/2020    LABGLOM >60 11/16/2020    GLUCOSE 150 11/16/2020    PROT 7.5 11/16/2020    LABALBU 3.4 11/16/2020    CALCIUM 9.9 11/16/2020    BILITOT 0.6 11/16/2020    ALKPHOS 164 11/16/2020    AST 80 11/16/2020     11/16/2020     Magnesium:    Lab Results   Component Value Date    MG 1.5 11/16/2020     LDH:    Lab Results   Component Value Date     10/28/2020     PT/INR:    Lab Results Component Value Date    PROTIME 10.9 07/21/2018    INR 0.9 07/21/2018     Last 3 Troponin:    Lab Results   Component Value Date    TROPONINI <0.01 10/26/2020    TROPONINI <0.01 05/26/2019    TROPONINI <0.01 05/26/2019     ABG:    Lab Results   Component Value Date    PH 7.411 11/14/2020    PCO2 27.3 11/14/2020    PO2 100.0 11/14/2020    HCO3 17.0 11/14/2020    BE -6.4 11/14/2020    O2SAT 97.6 11/14/2020     IRON:    Lab Results   Component Value Date    IRON 95 11/05/2020     IMAGING    Xr Chest Portable    Result Date: 10/27/2020  EXAMINATION: ONE XRAY VIEW OF THE CHEST 10/27/2020 7:58 pm COMPARISON: October 26, 2020 HISTORY: ORDERING SYSTEM PROVIDED HISTORY: SOB TECHNOLOGIST PROVIDED HISTORY: Reason for exam:->SOB What reading provider will be dictating this exam?->CRC FINDINGS: There is mild cardiomegaly. There is patchy perihilar and bibasilar atelectasis/infiltrates. Tiny pleural effusions are suspected. Progressive bibasilar atelectasis/infiltrates concerning for pneumonia. Underlying CHF is considered. Xr Chest Portable    Result Date: 10/26/2020  EXAMINATION: ONE XRAY VIEW OF THE CHEST 10/26/2020 4:11 pm COMPARISON: 05/26/2019 HISTORY: ORDERING SYSTEM PROVIDED HISTORY: SOB TECHNOLOGIST PROVIDED HISTORY: Reason for exam:->SOB What reading provider will be dictating this exam?->CRC FINDINGS: Cardiac size appears stable. Discoid airspace disease seen at the left lung base which may represent atelectasis or scarring. Right infrahilar and basilar infiltrate is identified. No pneumothorax is identified. No acute osseous abnormality is identified. Right infrahilar and basilar infiltrate concerning for pneumonia. Left basilar atelectasis or scarring.        Medications:    Scheduled Meds:   bumetanide  1 mg Intravenous BID    thiamine (VITAMIN B1) IVPB  250 mg Intravenous Q24H    divalproex  250 mg Oral 3 times per day    heparin flush  3 mL Intravenous 2 times per day    chlordiazePOXIDE 25 mg Oral Q8H    QUEtiapine  100 mg Oral BID    senna  2 tablet Oral Nightly    insulin lispro  0-18 Units Subcutaneous Q4H    piperacillin-tazobactam  3.375 g Intravenous Q8H    sodium chloride   Intravenous Q8H    carvedilol  3.125 mg Oral BID WC    folic acid  1 mg Intravenous Daily    [Held by provider] insulin glargine  20 Units Subcutaneous Nightly    sodium chloride (Inhalant)  2 mL Nebulization Q4H    heparin (porcine)  7,500 Units Subcutaneous Q8H    pantoprazole  40 mg Intravenous Daily    And    sodium chloride (PF)  10 mL Intravenous Daily    sodium chloride flush  10 mL Intravenous 2 times per day    Arformoterol Tartrate  15 mcg Nebulization BID    chlorhexidine  15 mL Mouth/Throat BID    amLODIPine  10 mg Oral Daily    aspirin  81 mg Oral Daily    levothyroxine  50 mcg Oral Daily    [Held by provider] losartan  100 mg Oral Daily    budesonide  0.5 mg Nebulization BID    ipratropium-albuterol  1 ampule Inhalation Q4H WA       Continuous Infusions:   dexmedetomidine (PRECEDEX) IV infusion Stopped (11/15/20 1534)    fentaNYL 5 mcg/ml in 0.9%  ml infusion Stopped (11/15/20 1534)    dextrose         PRN Meds:labetalol, midazolam, sodium chloride flush, heparin flush, polyethylene glycol, perflutren lipid microspheres, acetaminophen, polyvinyl alcohol, hydrALAZINE, sodium chloride flush, [DISCONTINUED] acetaminophen **OR** acetaminophen, colchicine, glucose, dextrose, glucagon (rDNA), dextrose    A/P:      Patient Active Problem List   Diagnosis    Hyperlipidemia    Type 2 diabetes mellitus without complication, without long-term current use of insulin (HCC)    Atypical chest pain    Essential hypertension    Chronic acquired lymphedema    Aortic valve disease    Morbid obesity due to excess calories (HCC)    Abdominal pain    Acute respiratory failure with hypoxia (HCC)    Acute pulmonary edema (HCC)    Congestive heart failure with LV diastolic dysfunction, NYHA class 3 (HCC)    Obstructive sleep apnea    Acquired hypothyroidism    Chronic diastolic heart failure (HCC)    Nonrheumatic aortic valve stenosis    ACE-inhibitor cough    Pneumonia    Acute gout of right knee   Acute encephalopathy 2/2 Acute hypoxic hypercapnic respiratory failure.         Acute hypoxic hypercapnic respiratory failure 2/2 MSSA pneumonia versus COPD versus ADHF versus chronic OHS  Pt has Hx chronic HANS not on CPAP at home. ABG showed pH 7.188/91.2/78.9/33 on RRT. currently intubated. - Continue to monitor respiratory status, wean down FiO2 as tolerated. - Pro  on presentation.   - US dup LE negative for DVT. CTA negative for PE. COVID -19 negative x2  - Continue breathing treatment  - Monitor daily CXR. CBC.   - Pulmonology consulted, further recommendations appreciated.     NANCY stage III likely pre renal 2/2 diuretic use, contrast agent vs? cardiorenal syndrome. -   - Nephrology consulted. Further recommendations appreciated. - Continue monitor daily BMP, renal function. Monitor I/O. Avoid nephrotoxicity.       · Bacterial pneumonia, likely aspiration pneumonia vs MSSA   · Shock, sepsis- resolved   · ATBx per id  Alcohol withdrawal .    Hx HFpEF, EF 65%cta noted  prob needs ltac  Wean per pulm/ccm  S/p Peg/trach PER ENT  Monitor HR  Transfer to ltac when ok with others  Cont restraints for pt safety

## 2020-11-16 NOTE — PROCEDURES
Urvashi Ran., DO, 250 mg at 11/16/20 0625    dexmedetomidine (PRECEDEX) 1,000 mcg in sodium chloride 0.9 % 250 mL infusion, 0.2 mcg/kg/hr, Intravenous, Continuous, Cathryne Fleet Urvashi Ran., DO, Stopped at 11/15/20 1534    sodium chloride flush 0.9 % injection 10 mL, 10 mL, Intravenous, PRN, Yury Briceñoon., DO    heparin flush 100 UNIT/ML injection 300 Units, 3 mL, Intravenous, 2 times per day, Yury Briceñoon., DO, 300 Units at 11/16/20 0479    heparin flush 100 UNIT/ML injection 300 Units, 3 mL, Intracatheter, PRN, Yury Radon., DO    chlordiazePOXIDE (LIBRIUM) capsule 25 mg, 25 mg, Oral, Q8H, Marcelo Landin Ran., DO, 25 mg at 11/16/20 7278    QUEtiapine (SEROQUEL) tablet 100 mg, 100 mg, Oral, BID, Yury Briceñoon., DO, 100 mg at 11/16/20 4418    senna (SENOKOT) tablet 17.2 mg, 2 tablet, Oral, Nightly, Yury Briceñoon., DO, 17.2 mg at 11/15/20 2234    polyethylene glycol (GLYCOLAX) packet 17 g, 17 g, Oral, Daily PRN, Yury Briceñoon., DO    fentaNYL 5 mcg/ml in 0.9%  ml infusion, 25 mcg/hr, Intravenous, Continuous, Estellane Fleet Uvrashi Ran., DO, Stopped at 11/15/20 1534    perflutren lipid microspheres (DEFINITY) injection 1.65 mg, 1.5 mL, Intravenous, ONCE PRN, Kirk Amadoet Urvashi Ran., DO    insulin lispro (HUMALOG) injection vial 0-18 Units, 0-18 Units, Subcutaneous, Q4H, Estellane Ingriset Urvashi Ran., DO, 3 Units at 11/16/20 0825    acetaminophen (TYLENOL) 160 MG/5ML solution 650 mg, 650 mg, Oral, Q6H PRN, Yury Briceñoon., DO, 650 mg at 11/16/20 0810    piperacillin-tazobactam (ZOSYN) 3.375 g in dextrose 5 % 100 mL IVPB extended infusion (mini-bag), 3.375 g, Intravenous, Q8H, Yury Arroyo., DO, Last Rate: 25 mL/hr at 11/16/20 0635, 3.375 g at 11/16/20 0635    Zosyn Flush (0.9 % sodium chloride infusion), , Intravenous, Q8H, Yury Arroyo., DO, Stopped at 11/16/20 0500    carvedilol (COREG) tablet 3.125 mg, 3.125 mg, Oral, BID WC, Yury Arroyo., DO, 3.125 mg at 49/12/30 3606    folic acid injection 1 mg, 1 mg, Intravenous, Daily, Mara Hummer., DO, 1 mg at 11/15/20 0818    polyvinyl alcohol (LIQUIFILM TEARS) 1.4 % ophthalmic solution 1 drop, 1 drop, Both Eyes, Q4H PRN, Mara Hummer., DO, 1 drop at 11/02/20 1526    [Held by provider] insulin glargine (LANTUS) injection vial 20 Units, 20 Units, Subcutaneous, Nightly, Glenn May MD, Stopped at 11/12/20 2100    sodium chloride (Inhalant) 3 % nebulizer solution 2 mL, 2 mL, Nebulization, Q4H, Kamar Juliena Randall Distad., DO, 2 mL at 11/16/20 0408    hydrALAZINE (APRESOLINE) injection 10 mg, 10 mg, Intravenous, Q4H PRN, Mara Hummer., DO, 10 mg at 11/15/20 2231    heparin (porcine) injection 7,500 Units, 7,500 Units, Subcutaneous, Q8H, Mara Hummer., DO, Stopped at 11/16/20 0814    pantoprazole (PROTONIX) injection 40 mg, 40 mg, Intravenous, Daily, 40 mg at 11/16/20 0810 **AND** sodium chloride (PF) 0.9 % injection 10 mL, 10 mL, Intravenous, Daily, Kamar Bulla Randall Distad., DO, 10 mL at 11/16/20 6691    sodium chloride flush 0.9 % injection 10 mL, 10 mL, Intravenous, 2 times per day, Mara Hummer., DO, 10 mL at 11/16/20 0813    sodium chloride flush 0.9 % injection 10 mL, 10 mL, Intravenous, PRN, Kamar Marvin Jr., DO, 10 mL at 11/15/20 1007    [DISCONTINUED] acetaminophen (TYLENOL) tablet 650 mg, 650 mg, Oral, Q6H PRN, 650 mg at 11/08/20 1703 **OR** acetaminophen (TYLENOL) suppository 650 mg, 650 mg, Rectal, Q6H PRN, Mara Hummer., DO    Arformoterol Tartrate [facility]) nebulizer solution 15 mcg, 15 mcg, Nebulization, BID, Mara Hummer., DO, 15 mcg at 11/15/20 2010    chlorhexidine (PERIDEX) 0.12 % solution 15 mL, 15 mL, Mouth/Throat, BID, Mara Hummer., DO, 15 mL at 11/16/20 0810    amLODIPine (NORVASC) tablet 10 mg, 10 mg, Oral, Daily, Mara Hummer., DO, 10 mg at 11/16/20 6842    aspirin chewable tablet 81 mg, 81 mg, Oral, Daily, Mara Hummer., DO,

## 2020-11-16 NOTE — PLAN OF CARE
Problem: Restraint Use - Nonviolent/Non-Self-Destructive Behavior:  Goal: Absence of restraint-related injury  Description: Absence of restraint-related injury  11/16/2020 0155 by Gwen Moreno RN  Outcome: Met This Shift  11/15/2020 1230 by Mireille Darden RN  Outcome: Met This Shift     Problem: Injury - Risk of, Physical Injury:  Goal: Absence of physical injury  Description: Absence of physical injury  Outcome: Met This Shift

## 2020-11-16 NOTE — PROGRESS NOTES
Chief Complaint   Patient presents with    Shortness of Breath     for a few weeks[facility] and now 95%. SUBJECTIVE:  Patient is tolerating medications. No reported adverse drug reactions. Tmax 100 overnight. Continues to be hypertensive. Periods of agitation. Does not communicate or follow commands     Trach to vent - 50% FiO2, 100% SpO2, PEEP 8  Bleeding around trach site          Review of systems:  As stated above in the chief complaint, otherwise negative.     Medications:  Scheduled Meds:   bumetanide  1 mg Intravenous BID    thiamine (VITAMIN B1) IVPB  250 mg Intravenous Q24H    divalproex  250 mg Oral 3 times per day    heparin flush  3 mL Intravenous 2 times per day    chlordiazePOXIDE  25 mg Oral Q8H    QUEtiapine  100 mg Oral BID    senna  2 tablet Oral Nightly    insulin lispro  0-18 Units Subcutaneous Q4H    piperacillin-tazobactam  3.375 g Intravenous Q8H    sodium chloride   Intravenous Q8H    carvedilol  3.125 mg Oral BID WC    folic acid  1 mg Intravenous Daily    [Held by provider] insulin glargine  20 Units Subcutaneous Nightly    sodium chloride (Inhalant)  2 mL Nebulization Q4H    heparin (porcine)  7,500 Units Subcutaneous Q8H    pantoprazole  40 mg Intravenous Daily    And    sodium chloride (PF)  10 mL Intravenous Daily    sodium chloride flush  10 mL Intravenous 2 times per day    Arformoterol Tartrate  15 mcg Nebulization BID    chlorhexidine  15 mL Mouth/Throat BID    amLODIPine  10 mg Oral Daily    aspirin  81 mg Oral Daily    levothyroxine  50 mcg Oral Daily    [Held by provider] losartan  100 mg Oral Daily    budesonide  0.5 mg Nebulization BID    ipratropium-albuterol  1 ampule Inhalation Q4H WA     Continuous Infusions:   dexmedetomidine (PRECEDEX) IV infusion Stopped (11/15/20 1534)    fentaNYL 5 mcg/ml in 0.9%  ml infusion Stopped (11/15/20 1534)    dextrose       PRN Meds:trimethobenzamide, labetalol, LABGLOM >60 11/16/2020    GLUCOSE 150 11/16/2020    PROT 7.5 11/16/2020    LABALBU 3.4 11/16/2020    CALCIUM 9.9 11/16/2020    BILITOT 0.6 11/16/2020    ALKPHOS 164 11/16/2020    AST 80 11/16/2020     11/16/2020     Lab Results   Component Value Date    CRP 1.4 (H) 11/02/2020    CRP 2.8 (H) 09/04/2017    CRP 10.5 (H) 08/28/2017     Lab Results   Component Value Date    SEDRATE 135 (H) 09/04/2017    SEDRATE 150 (H) 08/28/2017    SEDRATE 141 (H) 08/21/2017     Radiology:  CXR- 11/3- Multifocal bilateral pulmonary infiltrates more prominent within the right lung. CXR- 11/4- worsening b/l infiltrates, worse on left today with possible small L sided effusion   CXR- 11/5 - Stable b/l infiltrates   CXR- 11/6 - Expiratory film, image otherwise appears overall stable. CXR- 11/16 - stable right parahilar pulmonary opacity which may represent   atelectasis or pneumonia. Microbiology:   Lab Results   Component Value Date    BC 5 Days no growth 11/09/2020    BC 5 Days no growth 10/29/2020    BC  10/26/2020     This organism was isolated in one set. Susceptibility testing is not routinely done as this  organism frequently represents skin contamination. Additional testing can be ordered by calling the  Microbiology Department.       ORG Staphylococcus aureus 10/29/2020    ORG Staphylococcus coagulase-negative 10/26/2020    ORG Coagulase Negative Staphylococci 08/26/2017     Lab Results   Component Value Date    BLOODCULT2 5 Days no growth 11/09/2020    BLOODCULT2 5 Days no growth 10/29/2020    BLOODCULT2 5 Days no growth 10/26/2020    ORG Staphylococcus aureus 10/29/2020    ORG Staphylococcus coagulase-negative 10/26/2020    ORG Coagulase Negative Staphylococci 08/26/2017     No results found for: WNDABS  Smear, Respiratory   Date Value Ref Range Status   11/09/2020   Final    Group 6: <25 PMN's/LPF and <25 Epithelial cells/LPF  Moderate Polymorphonuclear leukocytes  Rare Epithelial cells  No organisms seen No results found for: MPNEUMO, CLAMYDCU, LABLEGI, AFBCX, FUNGSM, LABFUNG  CULTURE, RESPIRATORY   Date Value Ref Range Status   11/09/2020 Growth not present  Oral Pharyngeal Tiesha absent    Final     Culture Catheter Tip   Date Value Ref Range Status   08/26/2017 <15 colonies  Final     No results found for: BFCS  No results found for: CXSURG  Urine Culture, Routine   Date Value Ref Range Status   11/09/2020 Growth not present  Final   10/28/2020 Growth not present  Final   09/16/2019 Growth not present  Final     MRSA Culture Only   Date Value Ref Range Status   11/09/2020 Methicillin resistant Staph aureus not isolated  Final   10/30/2020 Methicillin resistant Staph aureus not isolated  Final   07/28/2017 Methicillin resistant Staph aureus not isolated  Final          Assessment:  · Acute on chronic hypoxic respiratory failure likely 2/2 pneumonia and volume overload  - resolving   · Bacterial pneumonia, likely aspiration pneumonia but after extubation patient had to be reintubated rule out HCAP  · Shock, sepsis- resolved, blood cultures negative   · Alcohol withdrawal       Plan:    · Continue Zosyn for aspiration pneumonia plus HCAP - day 9 of zosyn. · Repeat respiratory & blood cultures - no growth  · TLC needs removed. Has been in for 9 days. -- plan in place for IR inserted PICC   · Will follow with you    Clement Buckley  12:17 PM  11/16/2020   Pt seen and examined. Above discussed agree with advanced practice nurse. Labs, cultures, and radiographs reviewed. Face to Face encounter occurred. Changes made as necessary.      Delilah Nieves MD

## 2020-11-16 NOTE — PROGRESS NOTES
Attempts to pull at lines/tubings. Kicks with his legs when repositioned. Does not comply to verbal redirection. Bilateral soft wrist resraints continue per protocol.

## 2020-11-16 NOTE — PROGRESS NOTES
Zach Ruiz is a 61 y.o.  male     Neurology is following for an altered mental status    Past medical history significant for Diabetes, HANS, hypertension, hyperlipidemia, COPD, anxiety, depression, hypothyroidism, obesity    He presented on 10/26 with complaints of shortness of breath. He was admitted for acute on chronic hypoxic respiratory failure and was intubated on 10/28 for respiratory decompensation. Respiratory cultures showed staph aureus. He was sedated due to agitation. He is now status post trach and PEG and continues to have problems with intermittent agitation. He is now off of sedation and is on Seroquel and Depakote for his agitation. He remains poorly interactive and confused. He opens his eyes to voice, but otherwise does not follow commands    EEG is being completed at the time of my assessment    Objective:       BP (!) 190/93   Pulse 105   Temp 98.5 °F (36.9 °C) (Temporal)   Resp 26   Ht 5' 8\" (1.727 m)   Wt (!) 307 lb 8 oz (139.5 kg)   SpO2 100%   BMI 46.76 kg/m²     General: Awake. Restless. In no acute distress. Obese  Head: Normocephalic atraumatic  Neck: Trach in place  Extremities: No cyanosis or edema-bilateral wrist restraints      Mental Status: Awake. Opens eyes to voice. Does not make eye contact or attend examiner. Does not follow commands.     Cranial Nerves:  I: smell    II: visual acuity     II: visual fields  bilateral threat   II: pupils OMER   III,VII: ptosis None   III,IV,VI: extraocular muscles   appears to have preference to the left, but able to cross midline   V: mastication    V: facial light touch sensation     V,VII: corneal reflex  Present   VII: facial muscle function - upper     VII: facial muscle function - lower  appears symmetric   VIII: hearing    IX: soft palate elevation     IX,X: gag reflex    XI: trapezius strength     XI: sternocleidomastoid strength    XI: neck extension strength     XII: tongue strength Motor:  Spontaneously moving all extremities-not to command  Normal tone and bulk  No abnormal movements    Sensory:  Withdraws to pain in all extremities    DTR:     No Babinskis    Laboratory/Radiology:     CBC with Differential:    Lab Results   Component Value Date    WBC 12.0 11/16/2020    RBC 3.98 11/16/2020    HGB 10.4 11/16/2020    HCT 31.2 11/16/2020     11/16/2020    MCV 78.4 11/16/2020    MCH 26.1 11/16/2020    MCHC 33.3 11/16/2020    RDW 20.2 11/16/2020    NRBC 0.9 11/16/2020    SEGSPCT 73 01/26/2014    METASPCT 1.8 11/15/2020    LYMPHOPCT 12.2 11/16/2020    MONOPCT 7.0 11/16/2020    MYELOPCT 0.9 11/15/2020    BASOPCT 0.4 11/16/2020    MONOSABS 0.84 11/16/2020    LYMPHSABS 1.44 11/16/2020    EOSABS 0.94 11/16/2020    BASOSABS 0.00 11/16/2020     CMP:    Lab Results   Component Value Date     11/16/2020    K 3.6 11/16/2020    K 4.0 10/27/2020     11/16/2020    CO2 21 11/16/2020    BUN 15 11/16/2020    CREATININE 1.2 11/16/2020    GFRAA >60 11/16/2020    LABGLOM >60 11/16/2020    GLUCOSE 150 11/16/2020    PROT 7.5 11/16/2020    LABALBU 3.4 11/16/2020    CALCIUM 9.9 11/16/2020    BILITOT 0.6 11/16/2020    ALKPHOS 164 11/16/2020    AST 80 11/16/2020     11/16/2020     HgBA1c:    Lab Results   Component Value Date    LABA1C 6.8 06/15/2020     FLP:    Lab Results   Component Value Date    TRIG 292 11/14/2020    HDL 66 03/23/2017    LDLCALC 192 03/23/2017    LABVLDL 37 03/23/2017     I personally reviewed all labs and images today  Assessment:     Altered mental status in a patient who has been admitted since 10/26 with a prolonged intubation, now with significant agitation and persistent altered mental status since weaning of sedating medications   No obvious focality on neurologic exam.  No obvious evidence of seizure activity. He remains altered and does not follow commands.   Plan:     Follow-up EEG results    CT head    Lesley Metzger PA-C  10:44 AM  11/16/2020

## 2020-11-16 NOTE — BRIEF OP NOTE
Brief Postoperative Note    Merary Peralta  YOB: 1960  [id number]    Pre-operative Diagnosis and Procedure: 60 yo M need a PICC. Here for imaging guided PICC placement. Post-operative Diagnosis: Same    Anesthesia: Local    Estimated Blood Loss: < 10 cc    Surgeon: Tommy Werner MD    Complications: none    Specimen obtained: none     Findings: Successful placement of a tunneled right IJ PICC.  Successful right arm venogram.    Tommy Werner MD   11/16/2020 10:51 AM

## 2020-11-16 NOTE — PROGRESS NOTES
Nephrology Progress Note  Patient's Name: Elbert Jiang  11:58 AM  11/16/2020        Reason for Consult: Acute kidney  Requesting Physician:  Satnam Tavares MD    Chief Complaint: Shortness of breath  History Obtained From:  EHR; spouse    History of Present Ilness:    Elbert Jiang is a 61 y.o. male with a history of COPD, hypertension, hyperlipidemia and diabetes mellitus. He presented to ED 2 days ago with complaints of shortness of breath. According to patient's spouse he had been complaining of shortness of breath over the course of several weeks. This has gotten progressively worse. He went to see his PCP on the day of admission. In office at the time pulse oximeter obtained revealed his oxygen saturation to be in the 70s. He was instructed to proceed to ED for further evaluation. On presentation to ED his initial vital signs showed a blood pressure of 168/71, temperature 97.5 and pulse of 93. His pulse oximeter at the time was noted to be 98% on room air. Laboratory data was significant for a BUN of 11, creatinine 1.1, WBC of 8.3. Rapid COVID-19 testing was negative. A chest x-ray performed revealed right infrahilar and basilar infiltrate concerning for pneumonia. A CTA was ordered but patient declined because of his inability to lay flat. Patient was given ceftriaxone and doxycycline followed by admission to telemetry. 2 days ago an RRT was called as patient was noted to be increasingly more in respiratory distress. He was transferred to MICU and intubated. Laboratory data yesterday showed worsening serum creatinine to 2.8. This a.m. further worsening to 3.6. He has been nonoliguric. Hence renal consult. 10/30: N new acute issues overnight; remains intubated  10/31: Polyuric , concerned for possible DI; no other acute issues overnight  11/1: agitated overnight ; remains intubated  11/2: pt seen and examined.  Chart reviewed, pt intubated  11/3: pt seen and examined in icu, pt intubated  11/4: pt seen and examined in icu, no overnight events, intubated  11/5: pt seen in room ,remains intubated  11/6: pt seen in room, intubated. 11/7: pt seen in room, intubated, chart reviewed  11/8:pt seen in room, remains intuibated  11/9: pt seen in room, reintubated yesterday  11/10: pt seen in icu, intubated.    11/12: pt getting trach today  11/13: sp trach peg  11/14: No new problems reported from overnight  11/15: Remains trach/intubated; periods of agitation  11/16: emesis, agitation this am      Allergies:  Bee venom and Shellfish-derived products    Current Medications:    trimethobenzamide (TIGAN) injection 200 mg, Q6H PRN  bumetanide (BUMEX) injection 1 mg, BID  thiamine (B-1) 250 mg in sodium chloride 0.9 % 100 mL IVPB, Q24H  labetalol (NORMODYNE;TRANDATE) injection 10 mg, Q6H PRN  midazolam (VERSED) injection 2 mg, Q4H PRN  divalproex (DEPAKOTE SPRINKLE) capsule 250 mg, 3 times per day  dexmedetomidine (PRECEDEX) 1,000 mcg in sodium chloride 0.9 % 250 mL infusion, Continuous  sodium chloride flush 0.9 % injection 10 mL, PRN  heparin flush 100 UNIT/ML injection 300 Units, 2 times per day  heparin flush 100 UNIT/ML injection 300 Units, PRN  chlordiazePOXIDE (LIBRIUM) capsule 25 mg, Q8H  QUEtiapine (SEROQUEL) tablet 100 mg, BID  senna (SENOKOT) tablet 17.2 mg, Nightly  polyethylene glycol (GLYCOLAX) packet 17 g, Daily PRN  fentaNYL 5 mcg/ml in 0.9%  ml infusion, Continuous  perflutren lipid microspheres (DEFINITY) injection 1.65 mg, ONCE PRN  insulin lispro (HUMALOG) injection vial 0-18 Units, Q4H  acetaminophen (TYLENOL) 160 MG/5ML solution 650 mg, Q6H PRN  piperacillin-tazobactam (ZOSYN) 3.375 g in dextrose 5 % 100 mL IVPB extended infusion (mini-bag), Q8H  Zosyn Flush (0.9 % sodium chloride infusion), Q8H  carvedilol (COREG) tablet 7.705 mg, BID WC  folic acid injection 1 mg, Daily  polyvinyl alcohol (LIQUIFILM TEARS) 1.4 % ophthalmic solution 1 drop, Q4H PRN  [Held by provider] insulin glargine (LANTUS) injection vial 20 Units, Nightly  sodium chloride (Inhalant) 3 % nebulizer solution 2 mL, Q4H  hydrALAZINE (APRESOLINE) injection 10 mg, Q4H PRN  heparin (porcine) injection 7,500 Units, Q8H  pantoprazole (PROTONIX) injection 40 mg, Daily    And  sodium chloride (PF) 0.9 % injection 10 mL, Daily  sodium chloride flush 0.9 % injection 10 mL, 2 times per day  sodium chloride flush 0.9 % injection 10 mL, PRN  acetaminophen (TYLENOL) suppository 650 mg, Q6H PRN  Arformoterol Tartrate (BROVANA) nebulizer solution 15 mcg, BID  chlorhexidine (PERIDEX) 0.12 % solution 15 mL, BID  amLODIPine (NORVASC) tablet 10 mg, Daily  aspirin chewable tablet 81 mg, Daily  colchicine (COLCRYS) tablet 0.6 mg, BID PRN  levothyroxine (SYNTHROID) tablet 50 mcg, Daily  [Held by provider] losartan (COZAAR) tablet 100 mg, Daily  budesonide (PULMICORT) nebulizer suspension 500 mcg, BID  ipratropium-albuterol (DUONEB) nebulizer solution 1 ampule, Q4H WA  glucose (GLUTOSE) 40 % oral gel 15 g, PRN  dextrose 50 % IV solution, PRN  glucagon (rDNA) injection 1 mg, PRN  dextrose 5 % solution, PRN      Physical exam:  Vitals:    11/16/20 1045   BP: (!) 144/80   Pulse:    Resp:    Temp:    SpO2:            General: Intubated/trach; sedated  Eyes: PERRL. No sclera icterus. No conjunctival injection. ENT: No discharge. Pharynx clear. Neck: Tracheostomy/intubated  Lungs: Coarse breath sounds  CV: Regular rate. Regular rhythm. No murmur or rub. .   Abd:  Non-distended. No masses. No organmegaly. Normal bowel sounds. Skin: Warm and dry. No nodule on exposed extremities. No rash on exposed extremities.   Ext: No cyanosis, clubbing, edema; 2+ pitting bilateral lower extremity edema L>R  Neuro: awake does not follow commands      Data:   Labs:  Lab Results   Component Value Date     11/16/2020     11/15/2020     11/14/2020    K 3.6 11/16/2020    K 3.7 11/15/2020    K 3.4 (L) 11/14/2020     11/16/2020    CO2 21 (L) 11/16/2020    CO2 19 (L) 11/15/2020    CO2 18 (L) 11/14/2020    CREATININE 1.2 11/16/2020    CREATININE 1.2 11/15/2020    CREATININE 1.4 (H) 11/14/2020    BUN 15 11/16/2020    BUN 18 11/15/2020    BUN 23 11/14/2020    GLUCOSE 150 (H) 11/16/2020    GLUCOSE 178 (H) 11/15/2020    GLUCOSE 178 (H) 11/14/2020    PHOS 2.4 (L) 11/16/2020    PHOS 3.2 11/15/2020    PHOS 3.5 11/14/2020    WBC 12.0 (H) 11/16/2020    WBC 12.6 (H) 11/15/2020    WBC 9.1 11/14/2020    HGB 10.4 (L) 11/16/2020    HGB 10.6 (L) 11/15/2020    HGB 10.2 (L) 11/14/2020    HCT 31.2 (L) 11/16/2020    HCT 32.3 (L) 11/15/2020    HCT 30.7 (L) 11/14/2020    MCV 78.4 (L) 11/16/2020     11/16/2020         Imaging:  Xr Chest Portable    Result Date: 10/27/2020  EXAMINATION: ONE XRAY VIEW OF THE CHEST 10/27/2020 7:58 pm COMPARISON: October 26, 2020 HISTORY: ORDERING SYSTEM PROVIDED HISTORY: SOB TECHNOLOGIST PROVIDED HISTORY: Reason for exam:->SOB What reading provider will be dictating this exam?->CRC FINDINGS: There is mild cardiomegaly. There is patchy perihilar and bibasilar atelectasis/infiltrates. Tiny pleural effusions are suspected. Progressive bibasilar atelectasis/infiltrates concerning for pneumonia. Underlying CHF is considered. Xr Chest Portable    Result Date: 10/26/2020  EXAMINATION: ONE XRAY VIEW OF THE CHEST 10/26/2020 4:11 pm COMPARISON: 05/26/2019 HISTORY: ORDERING SYSTEM PROVIDED HISTORY: SOB TECHNOLOGIST PROVIDED HISTORY: Reason for exam:->SOB What reading provider will be dictating this exam?->CRC FINDINGS: Cardiac size appears stable. Discoid airspace disease seen at the left lung base which may represent atelectasis or scarring. Right infrahilar and basilar infiltrate is identified. No pneumothorax is identified. No acute osseous abnormality is identified. Right infrahilar and basilar infiltrate concerning for pneumonia. Left basilar atelectasis or scarring. Assessment/Plans  1.  Acute kidney injury  Baseline 1.8 from 9/2019, 1.1 in 5/2019  hemodynamically mediated due to the combination of bradycardia,  diuretic and ARB use; this is exacerbated by IV contrast use   Renal function back to baseline  CXR with increasing congestion; bumex restarted 11/15    2. Acute hypoxic respiratory failure  due to multilobar pneumonia  gradual wean  On unasyn  Off vanco  Failed extubation 11/8  S/P trach 11/12      3. H/O ETOH  abuse    4. Hypernatremia  imrpoved on lower dose bumex    5.   Hypokalemia due to brisk urine output  Supplement; now iproved        D/W Resident        Seferino Melgar MD  11:58 AM  11/16/2020

## 2020-11-16 NOTE — PROGRESS NOTES
Pulmonary Progress Note  11/16/2020 12:40 PM  Subjective:   Admit Date: 10/26/2020  PCP: Adalid Philip MD  Interval History:  On mechanical ventilation    Diet: Diet NPO, After Midnight Exceptions are: Sips with Meds      Data:   Scheduled Meds:    bumetanide  1 mg Intravenous BID    thiamine (VITAMIN B1) IVPB  250 mg Intravenous Q24H    divalproex  250 mg Oral 3 times per day    heparin flush  3 mL Intravenous 2 times per day    chlordiazePOXIDE  25 mg Oral Q8H    QUEtiapine  100 mg Oral BID    senna  2 tablet Oral Nightly    insulin lispro  0-18 Units Subcutaneous Q4H    piperacillin-tazobactam  3.375 g Intravenous Q8H    sodium chloride   Intravenous Q8H    carvedilol  3.125 mg Oral BID WC    folic acid  1 mg Intravenous Daily    [Held by provider] insulin glargine  20 Units Subcutaneous Nightly    sodium chloride (Inhalant)  2 mL Nebulization Q4H    heparin (porcine)  7,500 Units Subcutaneous Q8H    pantoprazole  40 mg Intravenous Daily    And    sodium chloride (PF)  10 mL Intravenous Daily    sodium chloride flush  10 mL Intravenous 2 times per day    Arformoterol Tartrate  15 mcg Nebulization BID    chlorhexidine  15 mL Mouth/Throat BID    amLODIPine  10 mg Oral Daily    [Held by provider] aspirin  81 mg Oral Daily    levothyroxine  50 mcg Oral Daily    [Held by provider] losartan  100 mg Oral Daily    budesonide  0.5 mg Nebulization BID    ipratropium-albuterol  1 ampule Inhalation Q4H WA       Continuous Infusions:    dexmedetomidine (PRECEDEX) IV infusion Stopped (11/15/20 1534)    fentaNYL 5 mcg/ml in 0.9%  ml infusion Stopped (11/15/20 1534)    dextrose         PRN Meds: trimethobenzamide, labetalol, midazolam, sodium chloride flush, heparin flush, polyethylene glycol, perflutren lipid microspheres, acetaminophen, polyvinyl alcohol, hydrALAZINE, sodium chloride flush, [DISCONTINUED] acetaminophen **OR** acetaminophen, colchicine, glucose, dextrose, glucagon (rDNA), dextrose    I/O last 3 completed shifts: In: 8229 [I.V.:615; NG/GT:769; IV Piggyback:300]  Out: 6623 [Urine:5225; Stool:600]    No intake/output data recorded. Intake/Output Summary (Last 24 hours) at 11/16/2020 1240  Last data filed at 11/16/2020 0886  Gross per 24 hour   Intake 1404 ml   Output 3825 ml   Net -2421 ml       Patient Vitals for the past 96 hrs (Last 3 readings):   Weight   11/16/20 1006 (!) 307 lb 8 oz (139.5 kg)   11/15/20 0542 293 lb 11.2 oz (133.2 kg)   11/14/20 0600 288 lb 3.2 oz (130.7 kg)         Recent Labs     11/14/20  0445 11/15/20  0420 11/16/20  0500   WBC 9.1 12.6* 12.0*   HGB 10.2* 10.6* 10.4*   HCT 30.7* 32.3* 31.2*   MCV 80.6 79.8* 78.4*    303 349     Recent Labs     11/14/20  0445 11/15/20  0420 11/16/20  0500    142 141   K 3.4* 3.7 3.6    110* 106   CO2 18* 19* 21*   BUN 23 18 15   CREATININE 1.4* 1.2 1.2   PHOS 3.5 3.2 2.4*     Recent Labs     11/14/20  0445 11/15/20  0420 11/16/20  0500   PROT 6.8 7.4 7.5   ALKPHOS 140* 143* 164*   * 98* 149*   AST 45* 32 80*   BILITOT 0.5 0.4 0.6     Recent Labs     11/16/20  0820   INR 1.2     CPK:  Lab Results   Component Value Date    CKTOTAL 98 10/08/2017      INR:   Recent Labs     11/16/20  0820   INR 1.2         -----------------------------------------------------------------  RAD:   Results for orders placed during the hospital encounter of 10/26/20   XR CHEST PORTABLE    Narrative EXAMINATION:  ONE XRAY VIEW OF THE CHEST    11/16/2020 9:04 am    COMPARISON:  11/15/2020    HISTORY:  ORDERING SYSTEM PROVIDED HISTORY: SOB  TECHNOLOGIST PROVIDED HISTORY:  Reason for exam:->SOB  What reading provider will be dictating this exam?->CRC    FINDINGS:  A right parahilar pulmonary opacity is seen, which allowing for the  differences in the degree of inspiratory effort, are likely stable as of  yesterday. In the lower left lung is suboptimally visualized due to  underpenetration.   Within this limitation, no definite left lung opacity is  seen. No pneumothorax or pleural effusion is seen. The cardiac silhouette is enlarged, which may be in part or entirely due to  technique. The tracheostomy tube appears grossly well positioned. The left  internal jugular vein central venous catheter is well positioned, with the  tip overlying the upper SVC. Impression Grossly stable right parahilar pulmonary opacity which may represent  atelectasis or pneumonia. The life-support lines and tubes are well positioned.          Micro:    Vent Information  $Ventilation: $Subsequent Day  Skin Assessment: Other (see comment/note)  Equipment ID: [id number]  Equipment Changed: Humidification  Vent Type: 840  Vent Mode: AC/VC  Vt Ordered: 480 mL  Rate Set: 16 bmp  Peak Flow: (70)  Pressure Support: 0 cmH20  FiO2 : 50 %  SpO2: 100 %  SpO2/FiO2 ratio: 194  PaO2/FiO2 ratio: 169  Sensitivity: 3  PEEP/CPAP: 8  I Time/ I Time %: 0 s  Humidification Source: Heated wire  Humidification Temp: 37  Humidification Temp Measured: 36  Circuit Condensation: Drained  Mask Type: Full face mask  Mask Size: Large    Additional Respiratory  Assessments  Pulse: 95  Resp: 28  SpO2: 100 %  Position: Semi-Lockwood's  Humidification Source: Heated wire  Humidification Temp: 37  Circuit Condensation: Drained  Oral Care Completed?: Yes  Oral Care: Teeth brushed, Suction toothette  Subglottic Suction Done?: Yes  Airway Type: Trachial  Airway Size: 8  Cuff Pressure (cm H2O): 29 cm H2O  Skin barrier applied: Yes    Objective:   Vitals:   Vitals:    20 1200   BP: (!) 190/60   Pulse: 95   Resp: 28   Temp: 98.5 °F (36.9 °C)   SpO2: 100%      TEMP:Current: Temp: 98.5 °F (36.9 °C)  Max: Temp  Av.1 °F (37.3 °C)  Min: 98.4 °F (36.9 °C)  Max: 100.6 °F (38.1 °C)    BP Range: Systolic (63CNW), KBE:717 , Min:101 , SFY:098     Diastolic (12CXF), IH, Min:54, Max:108       General appearance: Sedated obese on mechanical ventilation , appears stated age awakens to touch  In no acute distress  Skin: No rashes or lesions  HEENT: mucous membranes are moist oral endotracheal tube and OG tube  Neck: No JVD  Lungs: symmetrical expansion, coarse breath sounds to auscultation, no use of accessory muscles  Heart: S1S2 no murmurs, tachycardic  Abdomen: soft, non tender, distended obese  Extremities: no peripheral edema  Neurologic: Sedated  Affect: Calm          Assessment:   Patient Active Problem List:     59-y.o M with history of DM, hypothyroid, COPD, HTN, HLD presented on 10/26 with shortness of breath for 1 week. Saturations were 70% at PCPs. Saturation 65% on room air in ER  Patient agitated for CT scan refused to lay flat  10/27 RRT refused BiPAP became combative. Patient transferred to ICU  10/28 intubated and sedated. CT chest showing dense multifocal airspace disease  10/30 Ultrasound lower extremities no DVT, ultrasound kidneys no hydronephrosis  10/31 bilateral infiltrates left effusion. On 50% +8 of PEEP. Chest x-ray showing right-sided improving with left base atelectasis. Covid negative x2  11/8 patient extubated and reintubated the same day  11/10 ultrasound lower extremities no DVT  11/11 PEG tube placed unable to place tracheostomy due to scar tissue. Neck CT showing calcification at level of critical thyroid membrane  11/12 tracheostomy placed by ENT  11/13 propofol DC'd  11/15 Precedex and fentanyl drips DC'd  11/16 off sedation hard time waking up being seen by neurology. CT head negative  Chest x-ray right perihilar infiltrate      1. Acute hypoxemic respiratory failure on mechanical ventilation s/p trach 11/12  2. History of EtOH abuse with agitation on Depakote, Librium  3. Anemia  4. HAP on Zosyn day 9  5. HANS moderate ( AHI 17 on 4/18/18 requires BIPAP 16/12)  6. History of reactive airway disease  7. CT 3/14/2018 showing pulmonary nodule stable from 10/2017  8.  Acute renal failure improving - nephrology on board Baseline creatinine 1.9 on 5/2019  9. Diabetes with elevated blood sugars  10. Obesity  11. EF 65% LVH  12. chronic lymphedema  13. History of gout on colchicine  14. Hypothyroid  15. H/o respiratory failure: 7/8/2017 pt was  transferred from [facility] for acute respiratory failure after lap cholecystectomy, lap umbilical hernia repair, and lap liver biopsy (fatty liver). He had been extubated postop but had laryngospasm requiring reintubation, complicated by negative pressure flash pulmonary edema, and required tracheostomy 8/10/2017. Patient has staph aureus pneumonia and C. difficile colitis.   Patient was then transferred to [facility] where he was weaned off the ventilator and decannulated.     Plan:      · Has been on medication for a long time would give time for the medications to wear off   · saturating 100% on 50% wean FiO2   · Low-grade temperatures of 100.6 T-max  · Now on Bumex 1 mg twice daily  · Start pressure support weaning as tolerated  · LTACH evaluation and placement   ·  PSV/PEEP    Tiesha SureshCascade Valley HospitalNILO

## 2020-11-16 NOTE — CARE COORDINATION
Spoke to Sangeetha with [employer], insurance denied LTAC. He spoke to  and he would like to complete a Peer to Peer. This will be done today. Spoke to wife and daughter over the phone and gave an update. I did explain that if the appeal is not overturned we will need to move on to a GAVINO that can accomodate a vent. Wife is thinking about [facility] but will not agree until she knows LTAC is denied or approved. Will follow up with her in AM. He is in 2 pt restraints, vent dependent with new trach and peg.      Arline Nance, RN  [id number] [facility]  [phone]

## 2020-11-16 NOTE — PROGRESS NOTES
Doesn't followredirection. Restless and frequent attempts to remove restraints. Bilateral wrist restraintscontinue.

## 2020-11-16 NOTE — PROGRESS NOTES
Consent obtained for PICC line placement tomorrow in IR from wife Asa Held.  [facility] RN, verified on phone too

## 2020-11-16 NOTE — INTERVAL H&P NOTE
Update History & Physical    The patient's History and Physical of October 27, 2020 was reviewed with the patient and I examined the patient. There was no change. The surgical site was confirmed by the patient and me. Plan: The risks, benefits, expected outcome, and alternative to the recommended procedure have been discussed with the patient. Patient understands and wants to proceed with the procedure.      Electronically signed by Josselyn Blake MD on 11/16/2020 at 10:46 AM

## 2020-11-16 NOTE — PROGRESS NOTES
Subjective: patient seen for follow up more awake and alert no new issues overnight per staff        ROS:  Review of systems not obtained due to patient factors.     Objective:    bumetanide (BUMEX) injection 1 mg, BID  thiamine (B-1) 250 mg in sodium chloride 0.9 % 100 mL IVPB, Q24H  labetalol (NORMODYNE;TRANDATE) injection 10 mg, Q6H PRN  midazolam (VERSED) injection 2 mg, Q4H PRN  divalproex (DEPAKOTE SPRINKLE) capsule 250 mg, 3 times per day  dexmedetomidine (PRECEDEX) 1,000 mcg in sodium chloride 0.9 % 250 mL infusion, Continuous  sodium chloride flush 0.9 % injection 10 mL, PRN  heparin flush 100 UNIT/ML injection 300 Units, 2 times per day  heparin flush 100 UNIT/ML injection 300 Units, PRN  chlordiazePOXIDE (LIBRIUM) capsule 25 mg, Q8H  QUEtiapine (SEROQUEL) tablet 100 mg, BID  senna (SENOKOT) tablet 17.2 mg, Nightly  polyethylene glycol (GLYCOLAX) packet 17 g, Daily PRN  fentaNYL 5 mcg/ml in 0.9%  ml infusion, Continuous  perflutren lipid microspheres (DEFINITY) injection 1.65 mg, ONCE PRN  insulin lispro (HUMALOG) injection vial 0-18 Units, Q4H  acetaminophen (TYLENOL) 160 MG/5ML solution 650 mg, Q6H PRN  piperacillin-tazobactam (ZOSYN) 3.375 g in dextrose 5 % 100 mL IVPB extended infusion (mini-bag), Q8H  Zosyn Flush (0.9 % sodium chloride infusion), Q8H  carvedilol (COREG) tablet 1.248 mg, BID WC  folic acid injection 1 mg, Daily  polyvinyl alcohol (LIQUIFILM TEARS) 1.4 % ophthalmic solution 1 drop, Q4H PRN  [Held by provider] insulin glargine (LANTUS) injection vial 20 Units, Nightly  sodium chloride (Inhalant) 3 % nebulizer solution 2 mL, Q4H  hydrALAZINE (APRESOLINE) injection 10 mg, Q4H PRN  heparin (porcine) injection 7,500 Units, Q8H  pantoprazole (PROTONIX) injection 40 mg, Daily    And  sodium chloride (PF) 0.9 % injection 10 mL, Daily  sodium chloride flush 0.9 % injection 10 mL, 2 times per day  sodium chloride flush 0.9 % injection 10 mL, PRN  acetaminophen (TYLENOL) suppository 650 mg, Q6H PRN  Arformoterol Tartrate (BROVANA) nebulizer solution 15 mcg, BID  chlorhexidine (PERIDEX) 0.12 % solution 15 mL, BID  amLODIPine (NORVASC) tablet 10 mg, Daily  aspirin chewable tablet 81 mg, Daily  colchicine (COLCRYS) tablet 0.6 mg, BID PRN  levothyroxine (SYNTHROID) tablet 50 mcg, Daily  [Held by provider] losartan (COZAAR) tablet 100 mg, Daily  budesonide (PULMICORT) nebulizer suspension 500 mcg, BID  ipratropium-albuterol (DUONEB) nebulizer solution 1 ampule, Q4H WA  glucose (GLUTOSE) 40 % oral gel 15 g, PRN  dextrose 50 % IV solution, PRN  glucagon (rDNA) injection 1 mg, PRN  dextrose 5 % solution, PRN          BP (!) 156/80   Pulse 115   Temp 99.7 °F (37.6 °C) (Axillary)   Resp (!) 33   Ht 5' 8\" (1.727 m)   Wt 293 lb 11.2 oz (133.2 kg)   SpO2 97%   BMI 44.66 kg/m²     General Appearance:  disoriented  Lungs:  Breath sounds: rhonchi- scattered  Heart:  Heart regular rate and rhythm  Abdomen:  Soft, non-tender, normal bowel sounds. No bruits, organomegaly or masses.   Extremities: trace pedal edema    CBC with Differential:    Lab Results   Component Value Date    WBC 12.6 11/15/2020    RBC 4.05 11/15/2020    HGB 10.6 11/15/2020    HCT 32.3 11/15/2020     11/15/2020    MCV 79.8 11/15/2020    MCH 26.2 11/15/2020    MCHC 32.8 11/15/2020    RDW 20.6 11/15/2020    NRBC 0.9 11/15/2020    SEGSPCT 73 01/26/2014    METASPCT 1.8 11/15/2020    LYMPHOPCT 32.5 11/15/2020    MONOPCT 7.9 11/15/2020    MYELOPCT 0.9 11/15/2020    BASOPCT 1.8 11/15/2020    MONOSABS 1.01 11/15/2020    LYMPHSABS 4.16 11/15/2020    EOSABS 0.55 11/15/2020    BASOSABS 0.23 11/15/2020     CMP:    Lab Results   Component Value Date     11/15/2020    K 3.7 11/15/2020    K 4.0 10/27/2020     11/15/2020    CO2 19 11/15/2020    BUN 18 11/15/2020    CREATININE 1.2 11/15/2020    GFRAA >60 11/15/2020    LABGLOM >60 11/15/2020    GLUCOSE 178 11/15/2020    PROT 7.4 11/15/2020    LABALBU 3.3 11/15/2020    CALCIUM 9.7 11/15/2020

## 2020-11-17 NOTE — PROGRESS NOTES
Patient continues to need bilateral 4pt wrist restraints due to patient pulling at his urinary catheter, triple lumen catheter and trach/vent. ROM is performed hourly along with hourly vitals to ensure no injury sucomes to the patient. Will continue too monitor.

## 2020-11-17 NOTE — PROGRESS NOTES
Krista Nieto is a 61 y.o.  male     Neurology is following for an altered mental status    Past medical history significant for Diabetes, HANS, hypertension, hyperlipidemia, COPD, anxiety, depression, hypothyroidism, obesity    He presented on 10/26 with complaints of shortness of breath. He was admitted for acute on chronic hypoxic respiratory failure and was intubated on 10/28 for respiratory decompensation. Respiratory cultures showed staph aureus. He was sedated due to agitation. He is now status post trach and PEG and continues to have problems with intermittent agitation. He is now off of sedation and is on Seroquel and Depakote for his agitation. Yesterday he was not following commands for neurology and remained agitated    EEG was completed which was normal  CT Head was unrevealing for acute changes as well     Objective:     /87   Pulse 102   Temp 98.3 °F (36.8 °C)   Resp 18   Ht 5' 8\" (1.727 m)   Wt (!) 306 lb 14.4 oz (139.2 kg)   SpO2 96%   BMI 46.66 kg/m²     General: Awake. Restless. In no acute distress. Obese  Head: Normocephalic atraumatic  Neck: Trach in place  Extremities: No cyanosis or edema-bilateral wrist restraints      Mental Status: Awake.     Opens eyes and follows examiner around room    Following simple commands     Cranial Nerves:  I: smell    II: visual acuity     II: visual fields bilateral threat present    II: pupils OMER   III,VII: ptosis None   III,IV,VI: extraocular muscles  Follows examiner round room    V: mastication    V: facial light touch sensation     V,VII: corneal reflex  Present   VII: facial muscle function - upper     VII: facial muscle function - lower  appears symmetric   VIII: hearing    IX: soft palate elevation     IX,X: gag reflex    XI: trapezius strength     XI: sternocleidomastoid strength    XI: neck extension strength     XII: tongue strength  Normal      Motor:  Spontaneously moving all extremities   Attempts to give thumbs up both side     Normal tone and bulk    Sensory:  Withdraws to noxious stimulation in all extremities    DTR:     No Babinskis    Laboratory/Radiology:     CBC with Differential:    Lab Results   Component Value Date    WBC 12.6 11/17/2020    RBC 3.44 11/17/2020    HGB 9.0 11/17/2020    HCT 26.8 11/17/2020     11/17/2020    MCV 77.9 11/17/2020    MCH 26.2 11/17/2020    MCHC 33.6 11/17/2020    RDW 19.9 11/17/2020    NRBC 0.9 11/16/2020    SEGSPCT 73 01/26/2014    METASPCT 1.8 11/15/2020    LYMPHOPCT 21.9 11/17/2020    MONOPCT 7.4 11/17/2020    MYELOPCT 0.9 11/15/2020    BASOPCT 0.4 11/17/2020    MONOSABS 0.94 11/17/2020    LYMPHSABS 2.77 11/17/2020    EOSABS 0.33 11/17/2020    BASOSABS 0.05 11/17/2020     CMP:    Lab Results   Component Value Date     11/17/2020    K 3.4 11/17/2020    K 4.0 10/27/2020     11/17/2020    CO2 21 11/17/2020    BUN 21 11/17/2020    CREATININE 1.8 11/17/2020    GFRAA 47 11/17/2020    LABGLOM 47 11/17/2020    GLUCOSE 145 11/17/2020    PROT 7.5 11/17/2020    LABALBU 3.1 11/17/2020    CALCIUM 9.8 11/17/2020    BILITOT 0.7 11/17/2020    ALKPHOS 142 11/17/2020    AST 69 11/17/2020     11/17/2020     HgBA1c:    Lab Results   Component Value Date    LABA1C 6.8 06/15/2020     FLP:    Lab Results   Component Value Date    TRIG 292 11/14/2020    HDL 66 03/23/2017    LDLCALC 192 03/23/2017    LABVLDL 37 03/23/2017     CT Head:  No acute abnormality     EEG:  Within normal limits     I personally reviewed all labs and images today  Assessment:     Altered mental status in a patient who has been admitted since 10/26 with a prolonged intubation, now with significant agitation and persistent altered mental status since weaning of sedating medications   No obvious focality on neurologic exam.     No obvious evidence of seizure activity and EEG was normal    Improving since assessment yesterday       Plan:      Will follow serially     Blaze Clark DNP, APRN  1:90 AM  11/17/2020

## 2020-11-17 NOTE — PROGRESS NOTES
Patient vomited 3 x at the start of my shift 11/16/20 at 1900. I perfect served surgical resident Poly to change dressing. Dressing was changed at 11/17/20 0200. At 630 11/17/20 patient has bright red blood and clots coming from trach. I perfect served ENT and notified them. They stated they would assess during morning rounds with source and scope.

## 2020-11-17 NOTE — PROGRESS NOTES
intubated  11/4: pt seen and examined in icu, no overnight events, intubated  11/5: pt seen in room ,remains intubated  11/6: pt seen in room, intubated. 11/7: pt seen in room, intubated, chart reviewed  11/8:pt seen in room, remains intuibated  11/9: pt seen in room, reintubated yesterday  11/10: pt seen in icu, intubated.    11/12: pt getting trach today  11/13: sp trach peg  11/14: No new problems reported from overnight  11/15: Remains trach/intubated; periods of agitation  11/16: emesis, agitation this am  11/17: Patient responding to some simple commands today      Allergies:  Bee venom and Shellfish-derived products    Current Medications:    chlordiazePOXIDE (LIBRIUM) capsule 25 mg, Q12H  QUEtiapine (SEROQUEL) tablet 50 mg, BID  trimethobenzamide (TIGAN) injection 200 mg, Q6H PRN  bumetanide (BUMEX) injection 1 mg, BID  thiamine (B-1) 250 mg in sodium chloride 0.9 % 100 mL IVPB, Q24H  labetalol (NORMODYNE;TRANDATE) injection 10 mg, Q6H PRN  midazolam (VERSED) injection 2 mg, Q4H PRN  divalproex (DEPAKOTE SPRINKLE) capsule 250 mg, 3 times per day  dexmedetomidine (PRECEDEX) 1,000 mcg in sodium chloride 0.9 % 250 mL infusion, Continuous  sodium chloride flush 0.9 % injection 10 mL, PRN  heparin flush 100 UNIT/ML injection 300 Units, 2 times per day  heparin flush 100 UNIT/ML injection 300 Units, PRN  senna (SENOKOT) tablet 17.2 mg, Nightly  polyethylene glycol (GLYCOLAX) packet 17 g, Daily PRN  fentaNYL 5 mcg/ml in 0.9%  ml infusion, Continuous  perflutren lipid microspheres (DEFINITY) injection 1.65 mg, ONCE PRN  insulin lispro (HUMALOG) injection vial 0-18 Units, Q4H  acetaminophen (TYLENOL) 160 MG/5ML solution 650 mg, Q6H PRN  piperacillin-tazobactam (ZOSYN) 3.375 g in dextrose 5 % 100 mL IVPB extended infusion (mini-bag), Q8H  Zosyn Flush (0.9 % sodium chloride infusion), Q8H  carvedilol (COREG) tablet 8.803 mg, BID WC  folic acid injection 1 mg, Daily  polyvinyl alcohol (LIQUIFILM TEARS) 1.4 % ophthalmic solution 1 drop, Q4H PRN  [Held by provider] insulin glargine (LANTUS) injection vial 20 Units, Nightly  sodium chloride (Inhalant) 3 % nebulizer solution 2 mL, Q4H  hydrALAZINE (APRESOLINE) injection 10 mg, Q4H PRN  heparin (porcine) injection 7,500 Units, Q8H  pantoprazole (PROTONIX) injection 40 mg, Daily    And  sodium chloride (PF) 0.9 % injection 10 mL, Daily  sodium chloride flush 0.9 % injection 10 mL, 2 times per day  sodium chloride flush 0.9 % injection 10 mL, PRN  acetaminophen (TYLENOL) suppository 650 mg, Q6H PRN  Arformoterol Tartrate (BROVANA) nebulizer solution 15 mcg, BID  chlorhexidine (PERIDEX) 0.12 % solution 15 mL, BID  amLODIPine (NORVASC) tablet 10 mg, Daily  [Held by provider] aspirin chewable tablet 81 mg, Daily  colchicine (COLCRYS) tablet 0.6 mg, BID PRN  levothyroxine (SYNTHROID) tablet 50 mcg, Daily  [Held by provider] losartan (COZAAR) tablet 100 mg, Daily  budesonide (PULMICORT) nebulizer suspension 500 mcg, BID  ipratropium-albuterol (DUONEB) nebulizer solution 1 ampule, Q4H WA  glucose (GLUTOSE) 40 % oral gel 15 g, PRN  dextrose 50 % IV solution, PRN  glucagon (rDNA) injection 1 mg, PRN  dextrose 5 % solution, PRN      Physical exam:  Vitals:    11/17/20 1140   BP:    Pulse:    Resp:    Temp:    SpO2: 96%           General: Intubated/trach; awake  Eyes: PERRL. No sclera icterus. No conjunctival injection. ENT: No discharge. Pharynx clear. Neck: Tracheostomy/intubated  Lungs: Coarse breath sounds  CV: Regular rate. Regular rhythm. No murmur or rub. .   Abd:  Non-distended. No masses. No organmegaly. Normal bowel sounds. Skin: Warm and dry. No nodule on exposed extremities. No rash on exposed extremities.   Ext: No cyanosis, clubbing, edema; 2+ pitting bilateral lower extremity edema L>R  Neuro: awake; follows some simple commands      Data:   Labs:  Lab Results   Component Value Date     11/17/2020     11/16/2020     11/15/2020    K 3.4 (L) 11/17/2020 K 3.6 11/16/2020    K 3.7 11/15/2020     11/17/2020    CO2 21 (L) 11/17/2020    CO2 21 (L) 11/16/2020    CO2 19 (L) 11/15/2020    CREATININE 1.8 (H) 11/17/2020    CREATININE 1.2 11/16/2020    CREATININE 1.2 11/15/2020    BUN 21 11/17/2020    BUN 15 11/16/2020    BUN 18 11/15/2020    GLUCOSE 145 (H) 11/17/2020    GLUCOSE 150 (H) 11/16/2020    GLUCOSE 178 (H) 11/15/2020    PHOS 3.7 11/17/2020    PHOS 2.4 (L) 11/16/2020    PHOS 3.2 11/15/2020    WBC 12.6 (H) 11/17/2020    WBC 12.0 (H) 11/16/2020    WBC 12.6 (H) 11/15/2020    HGB 9.0 (L) 11/17/2020    HGB 10.4 (L) 11/16/2020    HGB 10.6 (L) 11/15/2020    HCT 26.8 (L) 11/17/2020    HCT 31.2 (L) 11/16/2020    HCT 32.3 (L) 11/15/2020    MCV 77.9 (L) 11/17/2020     11/17/2020         Imaging:  Xr Chest Portable    Result Date: 10/27/2020  EXAMINATION: ONE XRAY VIEW OF THE CHEST 10/27/2020 7:58 pm COMPARISON: October 26, 2020 HISTORY: ORDERING SYSTEM PROVIDED HISTORY: SOB TECHNOLOGIST PROVIDED HISTORY: Reason for exam:->SOB What reading provider will be dictating this exam?->CRC FINDINGS: There is mild cardiomegaly. There is patchy perihilar and bibasilar atelectasis/infiltrates. Tiny pleural effusions are suspected. Progressive bibasilar atelectasis/infiltrates concerning for pneumonia. Underlying CHF is considered. Xr Chest Portable    Result Date: 10/26/2020  EXAMINATION: ONE XRAY VIEW OF THE CHEST 10/26/2020 4:11 pm COMPARISON: 05/26/2019 HISTORY: ORDERING SYSTEM PROVIDED HISTORY: SOB TECHNOLOGIST PROVIDED HISTORY: Reason for exam:->SOB What reading provider will be dictating this exam?->CRC FINDINGS: Cardiac size appears stable. Discoid airspace disease seen at the left lung base which may represent atelectasis or scarring. Right infrahilar and basilar infiltrate is identified. No pneumothorax is identified. No acute osseous abnormality is identified. Right infrahilar and basilar infiltrate concerning for pneumonia.  Left basilar atelectasis or scarring. Assessment/Plans  1. Acute kidney injury  Baseline 1.8 from 9/2019, 1.1 in 5/2019  hemodynamically mediated due to the combination of bradycardia,  diuretic and ARB use;  exacerbated by IV contrast use   Renal function back to baseline  CXR with increasing congestion; bumex restarted 11/15    2. Acute hypoxic respiratory failure  due to multilobar pneumonia  gradual wean  Completed course of antibiotics  Failed extubation 11/8  S/P trach 11/12      3. H/O ETOH  abuse    4. Hypernatremia  imrpoved on lower dose bumex    5.   Hypokalemia due to brisk urine output  Supplement as needed                Brodie Garvin MD  11:55 AM  11/17/2020

## 2020-11-17 NOTE — PROGRESS NOTES
Called the floor anspoke with Dionne Guzman RN. The patient's medical history, allergies, medications, labs, and NPO status were all reviewed and verified. Aspirin has been held since 11/16 at 0810, heparin 7500 units SQ held since 11/16 at 1625  The patient has been NPO since midnight (tube feed was turned off). Rapid COVID is ordered. Last COVID was upon admission on 10/26 and it was negative. BP (!) 144/66   Pulse 93   Temp 100.8 °F (38.2 °C) (Axillary)   Resp 16   Ht 5' 8\" (1.727 m)   Wt (!) 306 lb 14.4 oz (139.2 kg)   SpO2 97%   BMI 46.66 kg/m²   Patient is currently on vented trach. Allergies were reviewed, including contrast, latex, and iodine. Allergies   Allergen Reactions    Bee Venom Anaphylaxis    Shellfish-Derived Products Anaphylaxis     Only crab legs       Lab Results   Component Value Date    INR 1.2 11/16/2020    PROTIME 13.0 (H) 11/16/2020     11/17/2020    CREATININE 1.8 (H) 11/17/2020    BUN 21 11/17/2020    LABGLOM 47 11/17/2020    GFRAA 47 11/17/2020    HGB 9.0 (L) 11/17/2020    HCT 26.8 (L) 11/17/2020    WBC 12.6 (H) 11/17/2020     PMH significant for CHF with LV diastolic dysfunction, COPD, DM, HTN, HL, HANS. The patient is on a precedex drip 6.6 mL/H and a fentanyl drip of 25 mcg/H. Zosyn due at 0700 today.

## 2020-11-17 NOTE — PROGRESS NOTES
Dr. Lulú Cantu is tied up with a complicated case, unable to do picc today.   Pt transported with [facility] and respiratory therapist.

## 2020-11-17 NOTE — PROGRESS NOTES
OTOLARYNGOLOGY  DAILY PROGRESS NOTE  2020    Subjective:     Required trach dressing change last night and concern for bright red blood. Patient is on vent support    Objective:     /87   Pulse 102   Temp 98.3 °F (36.8 °C)   Resp 18   Ht 5' 8\" (1.727 m)   Wt (!) 306 lb 14.4 oz (139.2 kg)   SpO2 96%   BMI 46.66 kg/m²   PULSE OXIMETRY RANGE: SpO2  Av.6 %  Min: 95 %  Max: 100 %  I/O last 3 completed shifts: In: 4834 [I.V.:682; NG/GT:502]  Out: 4075 [Urine:; Stool:2000]    GENERAL:  Laying in bed, awake, does not participate in exam, no apparent distress  HENT: Normocephalic, atraumatic, 8-0 cuffed proximal xlt trach in place with mild serosanguinous oozing around the trach site  NEURO: CN II-XII intact  EYES: No sclera icterus, pupils equal  LUNGS:  No increased work of breathing  CARDIOVASCULAR: Normal rate     CBC  Recent Labs     11/15/20  0420 20  0500 20  0428   WBC 12.6* 12.0* 12.6*   HGB 10.6* 10.4* 9.0*   HCT 32.3* 31.2* 26.8*    349 309     BMP  Recent Labs     20  0428      K 3.4*      CO2 21*   BUN 21   CREATININE 1.8*   CALCIUM 9.8     PT-INR  Recent Labs     20  0820   INR 1.2     CALCIUM  Recent Labs     11/15/20  0420 20  0500 20  0428   CALCIUM 9.7 9.9 9.8       Endoscopy Procedure Note    Pre-operative Diagnosis: pneumonia, recent OR    Post-operative Diagnosis: normal    Indications: concern for bleeding    Anesthesia: none    Endoscopy Type:  Flex scope thru trach    Procedure Details   With the patient sitting upright in the bed informed consent was obtained. The flexible fiberoptic was passed through the tracheal stom and the subglottic larynx was examined.       Findings:  Normal trachea with some excessive secretions, ellie clear, no active signs of bleeding      Condition:  Stable    Complications:  None    Assessment/Plan:     61 y.o. male with pneumonia and HANS s/p tracheostomy     - patient still requiring ventilatory support  - continue 8-0 cuffed shiley trach proximal XLT   - OK to change dressing around trach as drainage is expected  - if there are any concerns please contact ENT resident on call  - this was a complicated tracheostomy and he will never be decannulated   - if there are no concerns with tracheostomy tube would like to change the trach in 2-4 weeks after discharge in Dr. Juan Carlos Peraza office, if patient is to remain in the hospital for another week we may consider changing this in the OR for the first trach change  - please update ENT team if there are any acute changes in his discharge planning  - discussed trach supplies with social work    Will discuss with attending    Electronically signed by Kush Gabriel DO on 11/17/2020 at 7:44 AM

## 2020-11-17 NOTE — PLAN OF CARE
Problem: Pain:  Goal: Pain level will decrease  Description: Pain level will decrease  Outcome: Met This Shift  Goal: Control of acute pain  Description: Control of acute pain  Outcome: Met This Shift  Goal: Control of chronic pain  Description: Control of chronic pain  Outcome: Met This Shift     Problem: Restraint Use - Nonviolent/Non-Self-Destructive Behavior:  Goal: Absence of restraint indications  Description: Absence of restraint indications  Outcome: Met This Shift  Goal: Absence of restraint-related injury  Description: Absence of restraint-related injury  Outcome: Met This Shift     Problem: Skin Integrity:  Goal: Will show no infection signs and symptoms  Description: Will show no infection signs and symptoms  Outcome: Met This Shift  Goal: Absence of new skin breakdown  Description: Absence of new skin breakdown  Outcome: Met This Shift     Problem: Respiratory:  Goal: Ability to maintain a clear airway will improve  Description: Ability to maintain a clear airway will improve  Outcome: Met This Shift  Goal: Ability to maintain adequate ventilation will improve  Description: Ability to maintain adequate ventilation will improve  Outcome: Met This Shift  Goal: Complications related to the disease process, condition or treatment will be avoided or minimized  Description: Complications related to the disease process, condition or treatment will be avoided or minimized  Outcome: Met This Shift     Problem: Confusion - Acute:  Goal: Absence of continued neurological deterioration signs and symptoms  Description: Absence of continued neurological deterioration signs and symptoms  Outcome: Met This Shift  Goal: Mental status will be restored to baseline  Description: Mental status will be restored to baseline  Outcome: Met This Shift     Problem: Discharge Planning:  Goal: Ability to perform activities of daily living will improve  Description: Ability to perform activities of daily living will improve  Outcome: Met This Shift  Goal: Participates in care planning  Description: Participates in care planning  Outcome: Met This Shift     Problem: Injury - Risk of, Physical Injury:  Goal: Absence of physical injury  Description: Absence of physical injury  Outcome: Met This Shift  Goal: Will remain free from falls  Description: Will remain free from falls  Outcome: Met This Shift     Problem: Mood - Altered:  Goal: Mood stable  Description: Mood stable  Outcome: Met This Shift  Goal: Absence of abusive behavior  Description: Absence of abusive behavior  Outcome: Met This Shift  Goal: Verbalizations of feeling emotionally comfortable while being cared for will increase  Description: Verbalizations of feeling emotionally comfortable while being cared for will increase  Outcome: Met This Shift     Problem: Psychomotor Activity - Altered:  Goal: Absence of psychomotor disturbance signs and symptoms  Description: Absence of psychomotor disturbance signs and symptoms  Outcome: Met This Shift     Problem: Sleep Pattern Disturbance:  Goal: Appears well-rested  Description: Appears well-rested  Outcome: Met This Shift

## 2020-11-17 NOTE — PROGRESS NOTES
Pulmonary Progress Note  11/17/2020 4:02 PM  Subjective:   Admit Date: 10/26/2020  PCP: Orion Rosario MD  Interval History:  Mental status improving.   Patient following commands    Diet: Diet NPO, After Midnight Exceptions are: Sips with Meds  SOB is: Mild  Cough: None  Wheezing: None  chest pain: None    Data:   Scheduled Meds:    chlordiazePOXIDE  25 mg Oral Q12H    QUEtiapine  50 mg Oral BID    potassium bicarb-citric acid  40 mEq Oral Once    bumetanide  1 mg Intravenous BID    thiamine (VITAMIN B1) IVPB  250 mg Intravenous Q24H    divalproex  250 mg Oral 3 times per day    heparin flush  3 mL Intravenous 2 times per day    senna  2 tablet Oral Nightly    insulin lispro  0-18 Units Subcutaneous Q4H    piperacillin-tazobactam  3.375 g Intravenous Q8H    sodium chloride   Intravenous Q8H    carvedilol  3.125 mg Oral BID WC    folic acid  1 mg Intravenous Daily    [Held by provider] insulin glargine  20 Units Subcutaneous Nightly    sodium chloride (Inhalant)  2 mL Nebulization Q4H    heparin (porcine)  7,500 Units Subcutaneous Q8H    pantoprazole  40 mg Intravenous Daily    And    sodium chloride (PF)  10 mL Intravenous Daily    sodium chloride flush  10 mL Intravenous 2 times per day    Arformoterol Tartrate  15 mcg Nebulization BID    chlorhexidine  15 mL Mouth/Throat BID    amLODIPine  10 mg Oral Daily    [Held by provider] aspirin  81 mg Oral Daily    levothyroxine  50 mcg Oral Daily    [Held by provider] losartan  100 mg Oral Daily    budesonide  0.5 mg Nebulization BID    ipratropium-albuterol  1 ampule Inhalation Q4H WA       Continuous Infusions:    dexmedetomidine (PRECEDEX) IV infusion Stopped (11/15/20 1534)    fentaNYL 5 mcg/ml in 0.9%  ml infusion Stopped (11/15/20 1534)    dextrose         PRN Meds: trimethobenzamide, labetalol, midazolam, sodium chloride flush, heparin flush, polyethylene glycol, perflutren lipid microspheres, acetaminophen, polyvinyl alcohol, hydrALAZINE, sodium chloride flush, [DISCONTINUED] acetaminophen **OR** acetaminophen, colchicine, glucose, dextrose, glucagon (rDNA), dextrose    I/O last 3 completed shifts: In: 5005 [I.V.:802; NG/GT:502]  Out: 2775 [Urine:775; Stool:2000]    No intake/output data recorded.       Intake/Output Summary (Last 24 hours) at 11/17/2020 1602  Last data filed at 11/17/2020 0706  Gross per 24 hour   Intake 1304 ml   Output 2775 ml   Net -1471 ml       Patient Vitals for the past 96 hrs (Last 3 readings):   Weight   11/17/20 0518 (!) 306 lb 14.4 oz (139.2 kg)   11/16/20 1006 (!) 307 lb 8 oz (139.5 kg)   11/15/20 0542 293 lb 11.2 oz (133.2 kg)         Recent Labs     11/15/20  0420 11/16/20  0500 11/17/20  0428   WBC 12.6* 12.0* 12.6*   HGB 10.6* 10.4* 9.0*   HCT 32.3* 31.2* 26.8*   MCV 79.8* 78.4* 77.9*    349 309     Recent Labs     11/15/20  0420 11/16/20  0500 11/17/20  0428    141 139   K 3.7 3.6 3.4*   * 106 103   CO2 19* 21* 21*   BUN 18 15 21   CREATININE 1.2 1.2 1.8*   PHOS 3.2 2.4* 3.7     Recent Labs     11/15/20  0420 11/16/20  0500 11/17/20  0428   PROT 7.4 7.5 7.5   ALKPHOS 143* 164* 142*   ALT 98* 149* 163*   AST 32 80* 69*   BILITOT 0.4 0.6 0.7     Recent Labs     11/16/20  0820   INR 1.2     CPK:  Lab Results   Component Value Date    CKTOTAL 98 10/08/2017      INR:   Recent Labs     11/16/20  0820   INR 1.2         -----------------------------------------------------------------  RAD:   Results for orders placed during the hospital encounter of 10/26/20   XR CHEST PORTABLE    Narrative EXAMINATION:  ONE XRAY VIEW OF THE CHEST    11/16/2020 9:04 am    COMPARISON:  11/15/2020    HISTORY:  ORDERING SYSTEM PROVIDED HISTORY: SOB  TECHNOLOGIST PROVIDED HISTORY:  Reason for exam:->SOB  What reading provider will be dictating this exam?->CRC    FINDINGS:  A right parahilar pulmonary opacity is seen, which allowing for the  differences in the degree of inspiratory effort, are likely stable as of  yesterday. In the lower left lung is suboptimally visualized due to  underpenetration. Within this limitation, no definite left lung opacity is  seen. No pneumothorax or pleural effusion is seen. The cardiac silhouette is enlarged, which may be in part or entirely due to  technique. The tracheostomy tube appears grossly well positioned. The left  internal jugular vein central venous catheter is well positioned, with the  tip overlying the upper SVC. Impression Grossly stable right parahilar pulmonary opacity which may represent  atelectasis or pneumonia. The life-support lines and tubes are well positioned. Micro:     Additional Respiratory  Assessments  Pulse: 89  Resp: 18  SpO2: 100 %  Position: Semi-Lockwood's  Humidification Source: Heated wire  Humidification Temp: 37  Circuit Condensation: Drained  Oral Care Completed?: Yes  Oral Care: Teeth brushed, Suction toothette  Subglottic Suction Done?: Yes  Airway Type: Trachial  Airway Size: 8(XLT)  Cuff Pressure (cm H2O): (MLT)  Skin barrier applied: Yes    Objective:   Vitals:   Vitals:    20 1415   BP: (!) 141/68   Pulse: 89   Resp: 18   Temp: 98.6 °F (37 °C)   SpO2: 100%      TEMP:Current: Temp: 98.6 °F (37 °C)  Max: Temp  Av.4 °F (36.9 °C)  Min: 97.5 °F (36.4 °C)  Max: 100.8 °F (38.2 °C)    BP Range: Systolic (95DVG), DV , Min:120 , CBZ:375     Diastolic (29CJK), JAE:02, Min:62, Max:89    General appearance: Sedated obese on mechanical ventilation , appears stated age awakens to touch  In no acute distress  Skin: No rashes or lesions  HEENT: mucous membranes are moist oral endotracheal tube and OG tube  Neck: No JVD  Lungs: symmetrical expansion, coarse breath sounds to auscultation, no use of accessory muscles  Heart: S1S2 no murmurs, tachycardic  Abdomen: soft, non tender, distended obese  Extremities: no peripheral edema  Neurologic:  alert following commands moving all extremities  Affect: Calm          Assessment: Patient Active Problem List:     60-y.o M with history of DM, hypothyroid, COPD, HTN, HLD presented on 10/26 with shortness of breath for 1 week.  Saturations were 70% at PCPs.  Saturation 65% on room air in ER  Patient agitated for CT scan refused to lay flat  10/27 RRT refused BiPAP became combative.  Patient transferred to ICU  10/28 intubated and sedated.  CT chest showing dense multifocal airspace disease  10/30 Ultrasound lower extremities no DVT, ultrasound kidneys no hydronephrosis  10/31 bilateral infiltrates left effusion.  On 50% +8 of PEEP.  Chest x-ray showing right-sided improving with left base atelectasis. Covid negative x2  11/8 patient extubated and reintubated the same day  11/10 ultrasound lower extremities no DVT  11/11 PEG tube placed unable to place tracheostomy due to scar tissue. Neck CT showing calcification at level of critical thyroid membrane  11/12 tracheostomy placed by ENT  11/13 propofol DC'd  11/15 Precedex and fentanyl drips DC'd  11/16 off sedation hard time waking up being seen by neurology. CT head negative. Chest x-ray right perihilar infiltrate. 11/17 emesis x3. Had bright blood and clots coming from ENT. ENT evaluated with no evidence of bleeding. Neurologically improving moving all extremities        1. Hemoptysis / bleeding at trach site  2. Drop of hemoglobin from 10.4  - 9  3. acute hypoxemic respiratory failure on mechanical ventilation s/p trach 11/12 8 cuffed Shiley trach proximal XLT  4. Metabolic encephalopathy history of EtOH abuse with agitation on Depakote, Librium dose decreased to every 12 and Seroquel DC'd significantly improved  5. Anemia  6. HAP on Zosyn day 9  7. HANS moderate ( AHI 17 on 4/18/18 requires BIPAP 16/12)  8. History of reactive airway disease  9. CT 3/14/2018 showing pulmonary nodule stable from 10/2017  10. Acute renal failure improving - nephrology on board Baseline creatinine 1.9 on 5/2019  11.  Diabetes with elevated blood sugars  12. Obesity  13. EF 71% LVH diastolic dysfunction  14. chronic lymphedema  15. History of gout on colchicine  16. Hypothyroid  17. H/o respiratory failure: 7/8/2017 pt was  transferred from [facility] for acute respiratory failure after lap cholecystectomy, lap umbilical hernia repair, and lap liver biopsy (fatty liver).  He had been extubated postop but had laryngospasm requiring reintubation, complicated by negative pressure flash pulmonary edema, and required tracheostomy 8/10/2017. Tamiko Almazan has staph aureus pneumonia and C. difficile colitis. Tamiko Almazan was then transferred to [facility] where he was weaned off the ventilator and decannulated.     Plan:      · For PICC line   · Wean FiO2 still on 50% / 8 of PEEP - wean FIO2 and PEEP , start  PSV Start pressure support weaning as tolerated  · Rising creatinine with Bumex would hold check proBNP tomorrow  · Elevated LFTs may be due to Zosyn  · DC Zosyn day 10 today check procalcitonin tomorrow  · Check stool for occult blood  · LTACH evaluation and placement   ·   TERESA Amin

## 2020-11-17 NOTE — CARE COORDINATION
LTAC appeal pending. 2 point restraints continue. Referral to [facility] while family is considering options as LTAC will very likely be denied per anthem's history. Will need to be restraint free before he can step down to [facility]. #[id number]. Discussed with ENT, first trach change will need to be done by ENT. For questions I can be reached at [phone].  Aisha Khalil, [place]

## 2020-11-17 NOTE — PROGRESS NOTES
PT SEEN AND EXAMINED. intubated, in pic. BP better. More alert. S/p peg/trach, some bleeding around trach  Chart reviewed. meds reviewed. D/w nursing + family as available. EXAM: IN GENERAL, SUKHI Flannery ROS NEGx10 EXCEPT:   /87   Pulse 102   Temp 98.3 °F (36.8 °C)   Resp 18   Ht 5' 8\" (1.727 m)   Wt (!) 306 lb 14.4 oz (139.2 kg)   SpO2 96%   BMI 46.66 kg/m²   GEN:  NAD. HEENT: NCAT. NECK: NO JVD. TRACH MIDLINE. NO BRUITS. NO THYROMEGALY. LUNGS: CTA BL NO RALES, RHONCHI OR WHEEZES. GOOD EXCURSION. CV: Regular rate and rhythm, NO Murmurs, Rubs, Or gallops  ABD: Soft. Nontender. Normal bowel sounds.  No organomegaly  EXT:No clubbing cyanosis or edema  Neuro: Labs/data reviewedLABS: CBC with Differential:    Lab Results   Component Value Date    WBC 12.6 11/17/2020    RBC 3.44 11/17/2020    HGB 9.0 11/17/2020    HCT 26.8 11/17/2020     11/17/2020    MCV 77.9 11/17/2020    MCH 26.2 11/17/2020    MCHC 33.6 11/17/2020    RDW 19.9 11/17/2020    NRBC 0.9 11/16/2020    SEGSPCT 73 01/26/2014    METASPCT 1.8 11/15/2020    LYMPHOPCT 21.9 11/17/2020    MONOPCT 7.4 11/17/2020    MYELOPCT 0.9 11/15/2020    BASOPCT 0.4 11/17/2020    MONOSABS 0.94 11/17/2020    LYMPHSABS 2.77 11/17/2020    EOSABS 0.33 11/17/2020    BASOSABS 0.05 11/17/2020     Platelets:    Lab Results   Component Value Date     11/17/2020     CMP:    Lab Results   Component Value Date     11/17/2020    K 3.4 11/17/2020    K 4.0 10/27/2020     11/17/2020    CO2 21 11/17/2020    BUN 21 11/17/2020    CREATININE 1.8 11/17/2020    GFRAA 47 11/17/2020    LABGLOM 47 11/17/2020    GLUCOSE 145 11/17/2020    PROT 7.5 11/17/2020    LABALBU 3.1 11/17/2020    CALCIUM 9.8 11/17/2020    BILITOT 0.7 11/17/2020    ALKPHOS 142 11/17/2020    AST 69 11/17/2020     11/17/2020     Magnesium:    Lab Results   Component Value Date    MG 1.7 11/17/2020     LDH:    Lab Results   Component Value Date     10/28/2020     PT/INR:    Lab Results   Component Value Date    PROTIME 13.0 11/16/2020    INR 1.2 11/16/2020     Last 3 Troponin:    Lab Results   Component Value Date    TROPONINI <0.01 10/26/2020    TROPONINI <0.01 05/26/2019    TROPONINI <0.01 05/26/2019     ABG:    Lab Results   Component Value Date    PH 7.396 11/17/2020    PCO2 33.3 11/17/2020    PO2 88.4 11/17/2020    HCO3 20.0 11/17/2020    BE -4.2 11/17/2020    O2SAT 96.0 11/17/2020     IRON:    Lab Results   Component Value Date    IRON 95 11/05/2020     IMAGING    Xr Chest Portable    Result Date: 10/27/2020  EXAMINATION: ONE XRAY VIEW OF THE CHEST 10/27/2020 7:58 pm COMPARISON: October 26, 2020 HISTORY: ORDERING SYSTEM PROVIDED HISTORY: SOB TECHNOLOGIST PROVIDED HISTORY: Reason for exam:->SOB What reading provider will be dictating this exam?->CRC FINDINGS: There is mild cardiomegaly. There is patchy perihilar and bibasilar atelectasis/infiltrates. Tiny pleural effusions are suspected. Progressive bibasilar atelectasis/infiltrates concerning for pneumonia. Underlying CHF is considered. Xr Chest Portable    Result Date: 10/26/2020  EXAMINATION: ONE XRAY VIEW OF THE CHEST 10/26/2020 4:11 pm COMPARISON: 05/26/2019 HISTORY: ORDERING SYSTEM PROVIDED HISTORY: SOB TECHNOLOGIST PROVIDED HISTORY: Reason for exam:->SOB What reading provider will be dictating this exam?->CRC FINDINGS: Cardiac size appears stable. Discoid airspace disease seen at the left lung base which may represent atelectasis or scarring. Right infrahilar and basilar infiltrate is identified. No pneumothorax is identified. No acute osseous abnormality is identified. Right infrahilar and basilar infiltrate concerning for pneumonia. Left basilar atelectasis or scarring.        Medications:    Scheduled Meds:   bumetanide  1 mg Intravenous BID    thiamine (VITAMIN B1) IVPB  250 mg Intravenous Q24H    divalproex  250 mg Oral 3 times per day    heparin flush  3 mL Intravenous 2 times per day    chlordiazePOXIDE  25 mg Oral Q8H    QUEtiapine  100 mg Oral BID    senna  2 tablet Oral Nightly    insulin lispro  0-18 Units Subcutaneous Q4H    piperacillin-tazobactam  3.375 g Intravenous Q8H    sodium chloride   Intravenous Q8H    carvedilol  3.125 mg Oral BID WC    folic acid  1 mg Intravenous Daily    [Held by provider] insulin glargine  20 Units Subcutaneous Nightly    sodium chloride (Inhalant)  2 mL Nebulization Q4H    heparin (porcine)  7,500 Units Subcutaneous Q8H    pantoprazole  40 mg Intravenous Daily    And    sodium chloride (PF)  10 mL Intravenous Daily    sodium chloride flush  10 mL Intravenous 2 times per day    Arformoterol Tartrate  15 mcg Nebulization BID    chlorhexidine  15 mL Mouth/Throat BID    amLODIPine  10 mg Oral Daily    [Held by provider] aspirin  81 mg Oral Daily    levothyroxine  50 mcg Oral Daily    [Held by provider] losartan  100 mg Oral Daily    budesonide  0.5 mg Nebulization BID    ipratropium-albuterol  1 ampule Inhalation Q4H WA       Continuous Infusions:   dexmedetomidine (PRECEDEX) IV infusion Stopped (11/15/20 1534)    fentaNYL 5 mcg/ml in 0.9%  ml infusion Stopped (11/15/20 1534)    dextrose         PRN Meds:trimethobenzamide, labetalol, midazolam, sodium chloride flush, heparin flush, polyethylene glycol, perflutren lipid microspheres, acetaminophen, polyvinyl alcohol, hydrALAZINE, sodium chloride flush, [DISCONTINUED] acetaminophen **OR** acetaminophen, colchicine, glucose, dextrose, glucagon (rDNA), dextrose    A/P:      Patient Active Problem List   Diagnosis    Hyperlipidemia    Type 2 diabetes mellitus without complication, without long-term current use of insulin (HCC)    Atypical chest pain    Essential hypertension    Chronic acquired lymphedema    Aortic valve disease    Morbid obesity due to excess calories (HCC)    Abdominal pain    Acute respiratory failure with hypoxia (HCC)    Acute pulmonary edema ([facility] [address]    Congestive heart failure with LV diastolic dysfunction, NYHA class 3 (HCC)    Obstructive sleep apnea    Acquired hypothyroidism    Chronic diastolic heart failure (HCC)    Nonrheumatic aortic valve stenosis    ACE-inhibitor cough    Pneumonia    Acute gout of right knee   Acute encephalopathy 2/2 Acute hypoxic hypercapnic respiratory failure.         Acute hypoxic hypercapnic respiratory failure 2/2 MSSA pneumonia versus COPD versus ADHF versus chronic OHS  Pt has Hx chronic HANS not on CPAP at home. ABG showed pH 7.188/91.2/78.9/33 on RRT. currently intubated. - Continue to monitor respiratory status, wean down FiO2 as tolerated. - Pro  on presentation.   - US dup LE negative for DVT. CTA negative for PE. COVID -19 negative x2  - Continue breathing treatment  - Monitor daily CXR. CBC.   - Pulmonology consulted, further recommendations appreciated.     NANCY stage III likely pre renal 2/2 diuretic use, contrast agent vs? cardiorenal syndrome. -   - Nephrology consulted. Further recommendations appreciated. - Continue monitor daily BMP, renal function. Monitor I/O. Avoid nephrotoxicity. · Bacterial pneumonia, likely aspiration pneumonia vs MSSA   · Shock, sepsis- resolved   · ATBx per id  Alcohol withdrawal .    Hx HFpEF, EF 65%  cta noted  prob needs ltac  Wean per pulm/ccm  S/p Peg/trach PER ENT  Monitor HR    Cont restraints for pt safety  Wean librium- q12 hr x 3 days then qd x 3 days then off  Change seroquel to 50 mg bid  I called [facility] for peer to peer- they denied. I dw Dr Kraig Jackson- as director of [employer]- she will try again.  Try to get him out of restraints as that was  A sticking point for [facility],

## 2020-11-17 NOTE — PROGRESS NOTES
Chief Complaint   Patient presents with    Shortness of Breath     for a few weeks. O2 sat in 70s at [facility], placed on 4L and now 95%. SUBJECTIVE:  Patient is tolerating medications. No reported adverse drug reactions. Tmax 100.8 overnight. BP much improved. Periods of agitation. Does not communicate or follow commands     Trach to vent - 50% FiO2, 96% SpO2, PEEP 8  Bleeding around trach site - evaluated by ENT         Review of systems:  As stated above in the chief complaint, otherwise negative.     Medications:  Scheduled Meds:   chlordiazePOXIDE  25 mg Oral Q12H    QUEtiapine  50 mg Oral BID    bumetanide  1 mg Intravenous BID    thiamine (VITAMIN B1) IVPB  250 mg Intravenous Q24H    divalproex  250 mg Oral 3 times per day    heparin flush  3 mL Intravenous 2 times per day    senna  2 tablet Oral Nightly    insulin lispro  0-18 Units Subcutaneous Q4H    piperacillin-tazobactam  3.375 g Intravenous Q8H    sodium chloride   Intravenous Q8H    carvedilol  3.125 mg Oral BID WC    folic acid  1 mg Intravenous Daily    [Held by provider] insulin glargine  20 Units Subcutaneous Nightly    sodium chloride (Inhalant)  2 mL Nebulization Q4H    heparin (porcine)  7,500 Units Subcutaneous Q8H    pantoprazole  40 mg Intravenous Daily    And    sodium chloride (PF)  10 mL Intravenous Daily    sodium chloride flush  10 mL Intravenous 2 times per day    Arformoterol Tartrate  15 mcg Nebulization BID    chlorhexidine  15 mL Mouth/Throat BID    amLODIPine  10 mg Oral Daily    [Held by provider] aspirin  81 mg Oral Daily    levothyroxine  50 mcg Oral Daily    [Held by provider] losartan  100 mg Oral Daily    budesonide  0.5 mg Nebulization BID    ipratropium-albuterol  1 ampule Inhalation Q4H WA     Continuous Infusions:   dexmedetomidine (PRECEDEX) IV infusion Stopped (11/15/20 1534)    fentaNYL 5 mcg/ml in 0.9%  ml infusion Stopped (11/15/20 1534)    dextrose       PRN Meds:trimethobenzamide, labetalol, midazolam, sodium chloride flush, heparin flush, polyethylene glycol, perflutren lipid microspheres, acetaminophen, polyvinyl alcohol, hydrALAZINE, sodium chloride flush, [DISCONTINUED] acetaminophen **OR** acetaminophen, colchicine, glucose, dextrose, glucagon (rDNA), dextrose    OBJECTIVE:  /65   Pulse 97   Temp 97.5 °F (36.4 °C) (Temporal)   Resp 16   Ht 5' 8\" (1.727 m)   Wt (!) 306 lb 14.4 oz (139.2 kg)   SpO2 96%   BMI 46.66 kg/m²   Temp  Av.6 °F (37 °C)  Min: 97.5 °F (36.4 °C)  Max: 100.8 °F (38.2 °C)  Constitutional: awake, does not follow commands, does not communicate, agitation  Skin: Warm and dry. No rashes were noted. HEENT: Round and reactive pupils. Moist mucous membranes. No ulcerations or thrush. Chest: Trach - bleeding around site, symmetrical expansion. Some coarseness right & left upper lobes. Cardiovascular: S1 and S2 are rhythmic and regular. Systolic murmurs appreciated.    Abdomen: Hyperactive bowel sounds, obese abdomen, PEG, FMS system   Extremities: No clubbing, no cyanosis, + Lymphedema left lower extremity, improving   Lines: TLC left IJ 2020     Laboratory and Tests Review:  Lab Results   Component Value Date    WBC 12.6 (H) 2020    WBC 12.0 (H) 2020    WBC 12.6 (H) 11/15/2020    HGB 9.0 (L) 2020    HCT 26.8 (L) 2020    MCV 77.9 (L) 2020     2020     Lab Results   Component Value Date    NEUTROABS 8.46 (H) 2020    NEUTROABS 8.76 (H) 2020    NEUTROABS 6.80 11/15/2020     No results found for: [facility]  Lab Results   Component Value Date     (H) 2020    AST 69 (H) 2020    ALKPHOS 142 (H) 2020    BILITOT 0.7 2020     Lab Results   Component Value Date     2020    K 3.4 2020    K 4.0 10/27/2020     2020    CO2 21 2020    BUN 21 2020    CREATININE 1.8 2020    CREATININE 1.2 2020    CREATININE Epithelial cells  No organisms seen       No results found for: MPNEUMO, CLAMYDCU, LABLEGI, AFBCX, FUNGSM, LABFUNG  CULTURE, RESPIRATORY   Date Value Ref Range Status   11/09/2020 Growth not present  Oral Pharyngeal Tiesha absent    Final     Culture Catheter Tip   Date Value Ref Range Status   08/26/2017 <15 colonies  Final     No results found for: BFCS  No results found for: CXSURG  Urine Culture, Routine   Date Value Ref Range Status   11/09/2020 Growth not present  Final   10/28/2020 Growth not present  Final   09/16/2019 Growth not present  Final     MRSA Culture Only   Date Value Ref Range Status   11/09/2020 Methicillin resistant Staph aureus not isolated  Final   10/30/2020 Methicillin resistant Staph aureus not isolated  Final   07/28/2017 Methicillin resistant Staph aureus not isolated  Final          Assessment:  · Acute on chronic hypoxic respiratory failure likely 2/2 pneumonia and volume overload  - resolving   · Bacterial pneumonia, likely aspiration pneumonia but after extubation patient had to be reintubated rule out HCAP  · Shock, sepsis- resolved, blood cultures negative   · Alcohol withdrawal       Plan:    · Continue Zosyn for aspiration pneumonia plus HCAP - day 10 of zosyn. · Repeat respiratory & blood cultures - no growth  · TLC needs removed. Has been in for 10 days. -- plan in place for IR inserted PICC - needs covid test prior to IR procedure which is delaying the process. · Asked for TLC dressing to be redone, difficult to keep this clean r/t trach, secretions, & bleeding. · Will follow with you    Leonidas Stewart  11:58 AM  11/17/2020     Pt seen and examined. Above discussed agree with advanced practice nurse. Labs, cultures, and radiographs reviewed. Face to Face encounter occurred. Changes made as necessary.      Celeste Ortega MD

## 2020-11-18 NOTE — PLAN OF CARE
Problem: Restraint Use - Nonviolent/Non-Self-Destructive Behavior:  Goal: Absence of restraint-related injury  Description: Absence of restraint-related injury  11/18/2020 1759 by Ralph Russell RN  Outcome: Met This Shift     Problem: Restraint Use - Nonviolent/Non-Self-Destructive Behavior:  Goal: Absence of restraint indications  Description: Absence of restraint indications  11/18/2020 1759 by Ralph Russell RN  Outcome: Not Met This Shift  11/18/2020 0746 by Ralph Russell RN  Outcome: Not Met This Shift

## 2020-11-18 NOTE — PROGRESS NOTES
Talked to Jake Johnson on the floor, had back to back stat procedures.  Will attempt to do patient tomorrow first thing in the AM.

## 2020-11-18 NOTE — PROGRESS NOTES
When restraints released pt attempts to pull at lines and trach. Continued need for b/l soft restraints for pt safety.

## 2020-11-18 NOTE — PROGRESS NOTES
intubated  11/4: pt seen and examined in icu, no overnight events, intubated  11/5: pt seen in room ,remains intubated  11/6: pt seen in room, intubated. 11/7: pt seen in room, intubated, chart reviewed  11/8:pt seen in room, remains intuibated  11/9: pt seen in room, reintubated yesterday  11/10: pt seen in icu, intubated.    11/12: pt getting trach today  11/13: sp trach peg  11/14: No new problems reported from overnight  11/15: Remains trach/intubated; periods of agitation  11/16: emesis, agitation this am  11/17: Patient responding to some simple commands today  11/18: Bleeding from trach stoma yesterday; seen by ENT; no bleed this am      Allergies:  Bee venom and Shellfish-derived products    Current Medications:    chlordiazePOXIDE (LIBRIUM) capsule 25 mg, Q12H  QUEtiapine (SEROQUEL) tablet 50 mg, BID  trimethobenzamide (TIGAN) injection 200 mg, Q6H PRN  thiamine (B-1) 250 mg in sodium chloride 0.9 % 100 mL IVPB, Q24H  labetalol (NORMODYNE;TRANDATE) injection 10 mg, Q6H PRN  midazolam (VERSED) injection 2 mg, Q4H PRN  divalproex (DEPAKOTE SPRINKLE) capsule 250 mg, 3 times per day  dexmedetomidine (PRECEDEX) 1,000 mcg in sodium chloride 0.9 % 250 mL infusion, Continuous  sodium chloride flush 0.9 % injection 10 mL, PRN  heparin flush 100 UNIT/ML injection 300 Units, 2 times per day  heparin flush 100 UNIT/ML injection 300 Units, PRN  senna (SENOKOT) tablet 17.2 mg, Nightly  polyethylene glycol (GLYCOLAX) packet 17 g, Daily PRN  fentaNYL 5 mcg/ml in 0.9%  ml infusion, Continuous  perflutren lipid microspheres (DEFINITY) injection 1.65 mg, ONCE PRN  insulin lispro (HUMALOG) injection vial 0-18 Units, Q4H  acetaminophen (TYLENOL) 160 MG/5ML solution 650 mg, Q6H PRN  carvedilol (COREG) tablet 9.367 mg, BID WC  folic acid injection 1 mg, Daily  polyvinyl alcohol (LIQUIFILM TEARS) 1.4 % ophthalmic solution 1 drop, Q4H PRN  [Held by provider] insulin glargine (LANTUS) injection vial 20 Units, Nightly  sodium chloride (Inhalant) 3 % nebulizer solution 2 mL, Q4H  hydrALAZINE (APRESOLINE) injection 10 mg, Q4H PRN  heparin (porcine) injection 7,500 Units, Q8H  pantoprazole (PROTONIX) injection 40 mg, Daily    And  sodium chloride (PF) 0.9 % injection 10 mL, Daily  sodium chloride flush 0.9 % injection 10 mL, 2 times per day  sodium chloride flush 0.9 % injection 10 mL, PRN  acetaminophen (TYLENOL) suppository 650 mg, Q6H PRN  Arformoterol Tartrate (BROVANA) nebulizer solution 15 mcg, BID  chlorhexidine (PERIDEX) 0.12 % solution 15 mL, BID  amLODIPine (NORVASC) tablet 10 mg, Daily  [Held by provider] aspirin chewable tablet 81 mg, Daily  colchicine (COLCRYS) tablet 0.6 mg, BID PRN  levothyroxine (SYNTHROID) tablet 50 mcg, Daily  [Held by provider] losartan (COZAAR) tablet 100 mg, Daily  budesonide (PULMICORT) nebulizer suspension 500 mcg, BID  ipratropium-albuterol (DUONEB) nebulizer solution 1 ampule, Q4H WA  glucose (GLUTOSE) 40 % oral gel 15 g, PRN  dextrose 50 % IV solution, PRN  glucagon (rDNA) injection 1 mg, PRN  dextrose 5 % solution, PRN      Physical exam:  Vitals:    11/18/20 0800   BP: (!) 166/84   Pulse: 82   Resp: 20   Temp: 98.1 °F (36.7 °C)   SpO2: 98%           General: Intubated/trach; awake  Eyes: PERRL. No sclera icterus. No conjunctival injection. ENT: No discharge. Pharynx clear. Neck: Tracheostomy/intubated  Lungs: Coarse breath sounds  CV: Regular rate. Regular rhythm. No murmur or rub. .   Abd:  Non-distended. No masses. No organmegaly. Normal bowel sounds. Skin: Warm and dry. No nodule on exposed extremities. No rash on exposed extremities.   Ext: No cyanosis, clubbing, edema; 2+ pitting bilateral lower extremity edema L>R  Neuro: awake; follows some simple commands      Data:   Labs:  Lab Results   Component Value Date     11/18/2020     11/17/2020     11/16/2020    K 3.4 (L) 11/18/2020    K 3.4 (L) 11/17/2020    K 3.6 11/16/2020     11/18/2020    CO2 24 11/18/2020    CO2 21 (L) 11/17/2020    CO2 21 (L) 11/16/2020    CREATININE 1.4 (H) 11/18/2020    CREATININE 1.8 (H) 11/17/2020    CREATININE 1.2 11/16/2020    BUN 23 11/18/2020    BUN 21 11/17/2020    BUN 15 11/16/2020    GLUCOSE 113 (H) 11/18/2020    GLUCOSE 145 (H) 11/17/2020    GLUCOSE 150 (H) 11/16/2020    PHOS 3.6 11/18/2020    PHOS 3.7 11/17/2020    PHOS 2.4 (L) 11/16/2020    WBC 10.8 11/18/2020    WBC 12.6 (H) 11/17/2020    WBC 12.0 (H) 11/16/2020    HGB 9.0 (L) 11/18/2020    HGB 9.0 (L) 11/17/2020    HGB 10.4 (L) 11/16/2020    HCT 26.8 (L) 11/18/2020    HCT 26.8 (L) 11/17/2020    HCT 31.2 (L) 11/16/2020    MCV 78.1 (L) 11/18/2020     11/18/2020         Imaging:  Xr Chest Portable    Result Date: 10/27/2020  EXAMINATION: ONE XRAY VIEW OF THE CHEST 10/27/2020 7:58 pm COMPARISON: October 26, 2020 HISTORY: ORDERING SYSTEM PROVIDED HISTORY: SOB TECHNOLOGIST PROVIDED HISTORY: Reason for exam:->SOB What reading provider will be dictating this exam?->CRC FINDINGS: There is mild cardiomegaly. There is patchy perihilar and bibasilar atelectasis/infiltrates. Tiny pleural effusions are suspected. Progressive bibasilar atelectasis/infiltrates concerning for pneumonia. Underlying CHF is considered. Xr Chest Portable    Result Date: 10/26/2020  EXAMINATION: ONE XRAY VIEW OF THE CHEST 10/26/2020 4:11 pm COMPARISON: 05/26/2019 HISTORY: ORDERING SYSTEM PROVIDED HISTORY: SOB TECHNOLOGIST PROVIDED HISTORY: Reason for exam:->SOB What reading provider will be dictating this exam?->CRC FINDINGS: Cardiac size appears stable. Discoid airspace disease seen at the left lung base which may represent atelectasis or scarring. Right infrahilar and basilar infiltrate is identified. No pneumothorax is identified. No acute osseous abnormality is identified. Right infrahilar and basilar infiltrate concerning for pneumonia. Left basilar atelectasis or scarring. Assessment/Plans  1.  Acute kidney injury  Baseline 1.8 from 9/2019, 1.1 in 5/2019  hemodynamically mediated due to the combination of bradycardia,  diuretic and ARB use;  exacerbated by IV contrast use   Renal function back to baseline  CXR with increasing congestion; bumex restarted 11/15    2. Acute hypoxic respiratory failure  due to multilobar pneumonia  gradual wean  Completed course of antibiotics  Failed extubation 11/8  S/P trach 11/12  Problems with bleeding at trach stoma; currently controlled; ENT following      3. H/O ETOH  abuse    4. Hypernatremia  imrpoved on lower dose bumex    5. Hypokalemia due to brisk urine output  Supplement as needed    6.  Hypertension; BP trending high  Will adjust medzaki Hernández MD  9:21 AM  11/18/2020

## 2020-11-18 NOTE — PROGRESS NOTES
Patient continues to need bilateral wrist restraints due to patient pulling at his urinary catheter, triple lumen catheter and trach/vent. ROM is performed hourly along with hourly vitals to ensure no injury sucomes to the patient. Will continue too monitor.

## 2020-11-18 NOTE — PROGRESS NOTES
ENT called to bedside for bleeding from tracheal stoma. Upon arrival bleeding controlled. Xeroform gauze around stoma with dried clots. Flexible scope passed through trach tube, dried blood in the right mainstem bronchus and sitting on ellie. No active bleeding noted. No active bleeding noted around the stoma. Left IJ line falling out. Xeroform gauze repacked around the tracheal stoma and split gauze placed around outside stoma. Discussed with nursing staff to continue with stoma care, some mild bleeding will be expected as this is a fresh trach. If continues to bleed, consider CTA Neck/Chest w/ contrast.  ENT to follow.

## 2020-11-18 NOTE — PROGRESS NOTES
Chief Complaint   Patient presents with    Shortness of Breath     for a few weeks. O2 sat in 70s at [facility], placed on 4L and now 95%. SUBJECTIVE:  Patient is tolerating medications. No reported adverse drug reactions. Tmax 100.8 overnight. BP much improved. Periods of agitation. Does not communicate or follow commands     Trach to vent - 50% FiO2, 96% SpO2, PEEP 8  Bleeding around trach site - evaluated by ENT         Review of systems:  As stated above in the chief complaint, otherwise negative.     Medications:  Scheduled Meds:   chlordiazePOXIDE  25 mg Oral Q12H    QUEtiapine  50 mg Oral BID    thiamine (VITAMIN B1) IVPB  250 mg Intravenous Q24H    divalproex  250 mg Oral 3 times per day    heparin flush  3 mL Intravenous 2 times per day    senna  2 tablet Oral Nightly    insulin lispro  0-18 Units Subcutaneous Q4H    carvedilol  3.125 mg Oral BID WC    folic acid  1 mg Intravenous Daily    [Held by provider] insulin glargine  20 Units Subcutaneous Nightly    sodium chloride (Inhalant)  2 mL Nebulization Q4H    heparin (porcine)  7,500 Units Subcutaneous Q8H    pantoprazole  40 mg Intravenous Daily    And    sodium chloride (PF)  10 mL Intravenous Daily    sodium chloride flush  10 mL Intravenous 2 times per day    Arformoterol Tartrate  15 mcg Nebulization BID    chlorhexidine  15 mL Mouth/Throat BID    amLODIPine  10 mg Oral Daily    [Held by provider] aspirin  81 mg Oral Daily    levothyroxine  50 mcg Oral Daily    [Held by provider] losartan  100 mg Oral Daily    budesonide  0.5 mg Nebulization BID    ipratropium-albuterol  1 ampule Inhalation Q4H WA     Continuous Infusions:   dexmedetomidine (PRECEDEX) IV infusion Stopped (11/15/20 1534)    fentaNYL 5 mcg/ml in 0.9%  ml infusion Stopped (11/15/20 1534)    dextrose       PRN Meds:trimethobenzamide, labetalol, midazolam, sodium chloride flush, heparin flush, polyethylene glycol, perflutren lipid microspheres, acetaminophen, polyvinyl alcohol, hydrALAZINE, sodium chloride flush, [DISCONTINUED] acetaminophen **OR** acetaminophen, colchicine, glucose, dextrose, glucagon (rDNA), dextrose    OBJECTIVE:  BP (!) 170/80   Pulse 89   Temp 97.7 °F (36.5 °C) (Temporal)   Resp 16   Ht 5' 8\" (1.727 m)   Wt (!) 302 lb 6.4 oz (137.2 kg)   SpO2 100%   BMI 45.98 kg/m²   Temp  Av.7 °F (37.1 °C)  Min: 97.7 °F (36.5 °C)  Max: 99.8 °F (37.7 °C)  Constitutional: awake, does not follow commands, does not communicate, agitation  Skin: Warm and dry. No rashes were noted. HEENT: Round and reactive pupils. Moist mucous membranes. No ulcerations or thrush. Chest: Trach - bleeding around site, symmetrical expansion. Some coarseness right & left upper lobes. Cardiovascular: S1 and S2 are rhythmic and regular. Systolic murmurs appreciated.    Abdomen: Hyperactive bowel sounds, obese abdomen, PEG, FMS system   Extremities: No clubbing, no cyanosis, + Lymphedema left lower extremity, improving   Lines: TLC left IJ 2020     Laboratory and Tests Review:  Lab Results   Component Value Date    WBC 10.8 2020    WBC 12.6 (H) 2020    WBC 12.0 (H) 2020    HGB 9.0 (L) 2020    HCT 26.8 (L) 2020    MCV 78.1 (L) 2020     2020     Lab Results   Component Value Date    NEUTROABS 6.61 2020    NEUTROABS 8.46 (H) 2020    NEUTROABS 8.76 (H) 2020     No results found for: [facility]  Lab Results   Component Value Date     (H) 2020    AST 48 (H) 2020    ALKPHOS 115 2020    BILITOT 0.6 2020     Lab Results   Component Value Date     2020    K 3.4 2020    K 4.0 10/27/2020     2020    CO2 24 2020    BUN 23 2020    CREATININE 1.4 2020    CREATININE 1.8 2020    CREATININE 1.2 2020    GFRAA >60 2020    LABGLOM >60 2020    GLUCOSE 113 2020    PROT 7.8 2020    LABALBU 3.5 2020 CALCIUM 9.6 11/18/2020    BILITOT 0.6 11/18/2020    ALKPHOS 115 11/18/2020    AST 48 11/18/2020     11/18/2020     Lab Results   Component Value Date    CRP 1.4 (H) 11/02/2020    CRP 2.8 (H) 09/04/2017    CRP 10.5 (H) 08/28/2017     Lab Results   Component Value Date    SEDRATE 135 (H) 09/04/2017    SEDRATE 150 (H) 08/28/2017    SEDRATE 141 (H) 08/21/2017     Radiology:  CXR- 11/3- Multifocal bilateral pulmonary infiltrates more prominent within the right lung. CXR- 11/4- worsening b/l infiltrates, worse on left today with possible small L sided effusion   CXR- 11/5 - Stable b/l infiltrates   CXR- 11/6 - Expiratory film, image otherwise appears overall stable. CXR- 11/16 - stable right parahilar pulmonary opacity which may represent   atelectasis or pneumonia. Microbiology:   Lab Results   Component Value Date    BC 5 Days no growth 11/09/2020    BC 5 Days no growth 10/29/2020    BC  10/26/2020     This organism was isolated in one set. Susceptibility testing is not routinely done as this  organism frequently represents skin contamination. Additional testing can be ordered by calling the  Microbiology Department.       ORG Staphylococcus aureus 10/29/2020    ORG Staphylococcus coagulase-negative 10/26/2020    ORG Coagulase Negative Staphylococci 08/26/2017     Lab Results   Component Value Date    BLOODCULT2 5 Days no growth 11/09/2020    BLOODCULT2 5 Days no growth 10/29/2020    BLOODCULT2 5 Days no growth 10/26/2020    ORG Staphylococcus aureus 10/29/2020    ORG Staphylococcus coagulase-negative 10/26/2020    ORG Coagulase Negative Staphylococci 08/26/2017     No results found for: WNDABS  Smear, Respiratory   Date Value Ref Range Status   11/09/2020   Final    Group 6: <25 PMN's/LPF and <25 Epithelial cells/LPF  Moderate Polymorphonuclear leukocytes  Rare Epithelial cells  No organisms seen       No results found for: MPNEUMO, CLAMYDCU, LABLEGI, AFBCX, FUNGSM, LABFUNG  CULTURE, RESPIRATORY Date Value Ref Range Status   11/09/2020 Growth not present  Oral Pharyngeal Tiesha absent    Final     Culture Catheter Tip   Date Value Ref Range Status   08/26/2017 <15 colonies  Final     No results found for: BFCS  No results found for: CXSURG  Urine Culture, Routine   Date Value Ref Range Status   11/09/2020 Growth not present  Final   10/28/2020 Growth not present  Final   09/16/2019 Growth not present  Final     MRSA Culture Only   Date Value Ref Range Status   11/09/2020 Methicillin resistant Staph aureus not isolated  Final   10/30/2020 Methicillin resistant Staph aureus not isolated  Final   07/28/2017 Methicillin resistant Staph aureus not isolated  Final          Assessment:  · Acute on chronic hypoxic respiratory failure likely 2/2 pneumonia and volume overload  - resolving   · Bacterial pneumonia, likely aspiration pneumonia but after extubation patient had to be reintubated rule out HCAP  · Shock, sepsis- resolved, blood cultures negative   · Alcohol withdrawal       Plan:    · Continue Zosyn for aspiration pneumonia plus HCAP - day 10 of zosyn, day 7/of 10 from the trach   · Repeat respiratory & blood cultures - no growth  · TLC needs removed. Has been in for 10 days. -- plan in place for IR inserted PICC - needs covid test prior to IR procedure which is delaying the process. · Asked for TLC dressing to be redone, difficult to keep this clean r/t trach, secretions, & bleeding.    · Will follow with you    Cindy Acuna  12:28 PM  11/18/2020

## 2020-11-18 NOTE — PLAN OF CARE
Problem: Restraint Use - Nonviolent/Non-Self-Destructive Behavior:  Goal: Absence of restraint-related injury  Description: Absence of restraint-related injury  Outcome: Met This Shift     Problem: Skin Integrity:  Goal: Will show no infection signs and symptoms  Description: Will show no infection signs and symptoms  Outcome: Met This Shift  Goal: Absence of new skin breakdown  Description: Absence of new skin breakdown  Outcome: Met This Shift     Problem: Respiratory:  Goal: Complications related to the disease process, condition or treatment will be avoided or minimized  Description: Complications related to the disease process, condition or treatment will be avoided or minimized  Outcome: Met This Shift     Problem: Injury - Risk of, Physical Injury:  Goal: Absence of physical injury  Description: Absence of physical injury  Outcome: Met This Shift     Problem: Mood - Altered:  Goal: Mood stable  Description: Mood stable  Outcome: Met This Shift  Goal: Absence of abusive behavior  Description: Absence of abusive behavior  Outcome: Met This Shift     Problem: Sleep Pattern Disturbance:  Goal: Appears well-rested  Description: Appears well-rested  Outcome: Met This Shift     Problem: Pain:  Goal: Pain level will decrease  Description: Pain level will decrease  Outcome: Ongoing  Goal: Control of acute pain  Description: Control of acute pain  Outcome: Ongoing  Goal: Control of chronic pain  Description: Control of chronic pain  Outcome: Ongoing     Problem: Respiratory:  Goal: Ability to maintain a clear airway will improve  Description: Ability to maintain a clear airway will improve  Outcome: Ongoing  Goal: Ability to maintain adequate ventilation will improve  Description: Ability to maintain adequate ventilation will improve  Outcome: Ongoing     Problem: Confusion - Acute:  Goal: Absence of continued neurological deterioration signs and symptoms  Description: Absence of continued neurological deterioration signs and symptoms  Outcome: Ongoing     Problem: Discharge Planning:  Goal: Ability to perform activities of daily living will improve  Description: Ability to perform activities of daily living will improve  Outcome: Ongoing     Problem: Mood - Altered:  Goal: Verbalizations of feeling emotionally comfortable while being cared for will increase  Description: Verbalizations of feeling emotionally comfortable while being cared for will increase  Outcome: Ongoing     Problem: Psychomotor Activity - Altered:  Goal: Absence of psychomotor disturbance signs and symptoms  Description: Absence of psychomotor disturbance signs and symptoms  Outcome: Ongoing     Problem: Sensory Perception - Impaired:  Goal: Demonstrations of improved sensory functioning will increase  Description: Demonstrations of improved sensory functioning will increase  Outcome: Ongoing  Goal: Decrease in sensory misperception frequency  Description: Decrease in sensory misperception frequency  Outcome: Ongoing  Goal: Able to refrain from responding to false sensory perceptions  Description: Able to refrain from responding to false sensory perceptions  Outcome: Ongoing  Goal: Demonstrates accurate environmental perceptions  Description: Demonstrates accurate environmental perceptions  Outcome: Ongoing  Goal: Able to distinguish between reality-based and nonreality-based thinking  Description: Able to distinguish between reality-based and nonreality-based thinking  Outcome: Ongoing  Goal: Able to interrupt nonreality-based thinking  Description: Able to interrupt nonreality-based thinking  Outcome: Ongoing     Problem: Restraint Use - Nonviolent/Non-Self-Destructive Behavior:  Goal: Absence of restraint indications  Description: Absence of restraint indications  Outcome: Not Met This Shift     Problem: Confusion - Acute:  Goal: Mental status will be restored to baseline  Description: Mental status will be restored to baseline  Outcome: Not Met This

## 2020-11-18 NOTE — PROGRESS NOTES
Comprehensive Nutrition Assessment    Type and Reason for Visit:  Reassess    Nutrition Recommendations/Plan: Would recommend switch from immune - enhancing formula w/ patient now transferred out of ICU care. Recommend Diabetic 1.5 @ 65ml/hr x 23 hours (1 hr hold for synthroid) to provide  1495 ml TV, 2242 kcals, 123 gm protein and 1134 ml free water    Will continue to monitor & follow patient      Nutrition Assessment:  Pt w/ continued EN for nutritional support s/p trach/PEG.  Will provide updated recs 2/2 transfer out of ICU    Malnutrition Assessment:  Malnutrition Status:  No malnutrition        Estimated Daily Nutrient Needs:  Energy (kcal):  PS10: 5789-4538; Weight Used for Energy Requirements:  Current     Protein (g):  1.5-2.0= 110-140; Weight Used for Protein Requirements:  Ideal        Fluid (ml/day):  2100ml; Method Used for Fluid Requirements:  1 ml/kcal      Nutrition Related Findings:  -17 L i/os, trach/PEG, distended abd, diarrhea w/ FMS, +1 generalized edema noted      Wounds:  Multiple, Surgical Incision       Anthropometric Measures:  · Height: 5' 8\" (172.7 cm)  · Current Body Weight: 290 lb (131.5 kg)(11/13 - will use 2/2 11/18 wt 302#)   · Admission Body Weight: 334 lb (151.5 kg)(10/29 actual)    · Usual Body Weight: 318 lb (144.2 kg)(12/2019 per EMR, no method)     · Ideal Body Weight: 154 lbs; % Ideal Body Weight 188.3 %   · BMI: 44.1  · BMI Categories: Obese Class 3 (BMI 40.0 or greater)(noted wt loss since admit, current fluids - at this time)       Nutrition Diagnosis:   · Inadequate oral intake related to impaired respiratory function as evidenced by NPO or clear liquid status due to medical condition, intubation, nutrition support - enteral nutrition      Nutrition Interventions:   Food and/or Nutrient Delivery:  Modify Tube Feeding  Nutrition Education/Counseling:  No recommendation at this time   Coordination of Nutrition Care:  Continue to monitor while inpatient    Goals:  EN to

## 2020-11-18 NOTE — CARE COORDINATION
Appeal for [facility] denied. [facility] denied. Checking with [facility] to see if they could accept. If meets their criteria will follow up with spouse. [facility] remains bedlock for [facility]. For questions I can be reached at [phone].  Paolo Cardenas, [place]

## 2020-11-18 NOTE — PROGRESS NOTES
TLC is occluded. Tried multiple attempts to open up at least one of the lumens and I got nowhere. Will make it a lab draw until patient can get a PICC this morning. Will continue to monitor.

## 2020-11-18 NOTE — PROGRESS NOTES
Subjective:  Pt is awake, trying to get out of bed, he is in restraints. Case discussed with nurse at bedside. Objective:  Pt is awake in nad  BP (!) 174/110   Pulse 99   Temp 99.6 °F (37.6 °C) (Temporal)   Resp 20   Ht 5' 8\" (1.727 m)   Wt (!) 302 lb 6.4 oz (137.2 kg)   SpO2 98%   BMI 45.98 kg/m²   HEENT no adenopathy no bruits  Trach is midline   Heart:  RRR, no murmurs, gallops, or rubs.   Lungs:  CTA bilaterally, no wheeze, rales or rhonchi  Abd: bowel sounds present, nontender, nondistended, no masses  Extrem:  No clubbing, cyanosis, or edema  WBC/Hgb/Hct/Plts:  10.8/9.0/26.8/324 (11/18 6501) basic metabolic panel   Recent Labs     11/18/20  0524 11/17/20  0428 11/16/20  0500   WBC 10.8 12.6* 12.0*   HGB 9.0* 9.0* 10.4*   HCT 26.8* 26.8* 31.2*   MCV 78.1* 77.9* 78.4*    309 349     Lab Results   Component Value Date     11/18/2020    K 3.4 11/18/2020    K 4.0 10/27/2020     11/18/2020    CO2 24 11/18/2020    BUN 23 11/18/2020    CREATININE 1.4 11/18/2020    GLUCOSE 113 11/18/2020    CALCIUM 9.6 11/18/2020        Assessment:    Patient Active Problem List   Diagnosis    Hyperlipidemia    Type 2 diabetes mellitus without complication, without long-term current use of insulin ([address]    Atypical chest pain    Essential hypertension    Chronic acquired lymphedema    Aortic valve disease    Morbid obesity due to excess calories ([address]    Abdominal pain    Acute respiratory failure with hypoxia (HCC)    Acute pulmonary edema (HCC)    Congestive heart failure with LV diastolic dysfunction, NYHA class 3 (HCC)    Obstructive sleep apnea    Acquired hypothyroidism    Chronic diastolic heart failure (HCC)    Nonrheumatic aortic valve stenosis    ACE-inhibitor cough    Pneumonia due to organism    Acute gout of right knee       Plan:  He needs to go to [facility] weaning  He is off sedation  Monitor labs        [facility]  7:27 AM  11/18/2020

## 2020-11-18 NOTE — PROGRESS NOTES
Pulmonary Progress Note  11/18/2020 3:23 PM  Subjective:   Admit Date: 10/26/2020  PCP: Mercy Morgan MD  Interval History: Patient is asking how he is doing  Diet: DIET TUBE FEED CONTINUOUS/CYCLIC NPO; Diabetic 1.5; Gastrostomy; Continuous; 25; 65; 23; Exceptions are: Sips with Meds  SOB is: Mild  Cough: Mild  Wheezing: None  chest pain: None    Data:   Scheduled Meds:    chlordiazePOXIDE  25 mg Oral Q12H    QUEtiapine  50 mg Oral BID    thiamine (VITAMIN B1) IVPB  250 mg Intravenous Q24H    divalproex  250 mg Oral 3 times per day    heparin flush  3 mL Intravenous 2 times per day    senna  2 tablet Oral Nightly    insulin lispro  0-18 Units Subcutaneous Q4H    carvedilol  3.125 mg Oral BID WC    folic acid  1 mg Intravenous Daily    [Held by provider] insulin glargine  20 Units Subcutaneous Nightly    sodium chloride (Inhalant)  2 mL Nebulization Q4H    heparin (porcine)  7,500 Units Subcutaneous Q8H    pantoprazole  40 mg Intravenous Daily    And    sodium chloride (PF)  10 mL Intravenous Daily    sodium chloride flush  10 mL Intravenous 2 times per day    Arformoterol Tartrate  15 mcg Nebulization BID    chlorhexidine  15 mL Mouth/Throat BID    amLODIPine  10 mg Oral Daily    [Held by provider] aspirin  81 mg Oral Daily    levothyroxine  50 mcg Oral Daily    [Held by provider] losartan  100 mg Oral Daily    budesonide  0.5 mg Nebulization BID    ipratropium-albuterol  1 ampule Inhalation Q4H WA       Continuous Infusions:    dexmedetomidine (PRECEDEX) IV infusion Stopped (11/15/20 1534)    fentaNYL 5 mcg/ml in 0.9%  ml infusion Stopped (11/15/20 1534)    dextrose         PRN Meds: trimethobenzamide, labetalol, midazolam, sodium chloride flush, heparin flush, polyethylene glycol, perflutren lipid microspheres, acetaminophen, polyvinyl alcohol, hydrALAZINE, sodium chloride flush, [DISCONTINUED] acetaminophen **OR** acetaminophen, colchicine, glucose, dextrose, glucagon (rDNA), dextrose    I/O last 3 completed shifts: In: 13 [I.V.:13]  Out: 1200 [Urine:1200]    I/O this shift:  In: -   Out: 300 [Urine:300]      Intake/Output Summary (Last 24 hours) at 11/18/2020 1523  Last data filed at 11/18/2020 1501  Gross per 24 hour   Intake 13 ml   Output 1500 ml   Net -1487 ml       Patient Vitals for the past 96 hrs (Last 3 readings):   Weight   11/18/20 0600 (!) 302 lb 6.4 oz (137.2 kg)   11/17/20 0518 (!) 306 lb 14.4 oz (139.2 kg)   11/16/20 1006 (!) 307 lb 8 oz (139.5 kg)         Recent Labs     11/16/20  0500 11/17/20  0428 11/18/20  0524   WBC 12.0* 12.6* 10.8   HGB 10.4* 9.0* 9.0*   HCT 31.2* 26.8* 26.8*   MCV 78.4* 77.9* 78.1*    309 324     Recent Labs     11/16/20  0500 11/17/20  0428 11/18/20  0524    139 143   K 3.6 3.4* 3.4*    103 107   CO2 21* 21* 24   BUN 15 21 23   CREATININE 1.2 1.8* 1.4*   PHOS 2.4* 3.7 3.6     Recent Labs     11/16/20  0500 11/17/20  0428 11/18/20  0524   PROT 7.5 7.5 7.8   ALKPHOS 164* 142* 115   * 163* 140*   AST 80* 69* 48*   BILITOT 0.6 0.7 0.6     Recent Labs     11/16/20  0820 11/18/20  0524   INR 1.2 1.2     CPK:  Lab Results   Component Value Date    CKTOTAL 98 10/08/2017   ABGs: No results found for: PHART, PO2ART, HSK3EZL  INR:   Recent Labs     11/16/20  0820 11/18/20  0524   INR 1.2 1.2         -----------------------------------------------------------------  RAD:   Results for orders placed during the hospital encounter of 10/26/20   XR CHEST PORTABLE    Narrative EXAMINATION:  ONE XRAY VIEW OF THE CHEST  11/18/2020 7:39 am  COMPARISON:  11/17/2020 and 11/16/2020  HISTORY:  ORDERING SYSTEM PROVIDED HISTORY: SOB  TECHNOLOGIST PROVIDED HISTORY:  Reason for exam:->SOB  What reading provider will be dictating this exam?->CRC  FINDINGS:  There is no interval change in the multifocal bilateral significant airspace  disease. There is no right or left pleural effusion.   The cardiac silhouette is at upper limits of normal.  There is stable  position of the left internal jugular central venous catheter. Impression 1. No interval change in extensive bilateral multifocal airspace disease.        Micro:  Vent Information  $Ventilation: $Subsequent Day  Skin Assessment: Other (see comment/note)  Equipment ID: [id number]  Equipment Changed: (S) Vent Circuit  Vent Type: [id number]  Vent Mode: AC/VC  Vt Ordered: 480 mL  Rate Set: 16 bmp  Peak Flow: 60 L/min  Pressure Support: 0 cmH20  FiO2 : 50 %  SpO2: 99 %  SpO2/FiO2 ratio: 198  PaO2/FiO2 ratio: 169  Sensitivity: 3  PEEP/CPAP: 8  I Time/ I Time %: 0 s  Humidification Source: Heated wire  Humidification Temp: 37  Humidification Temp Measured: 36.5  Circuit Condensation: Drained  Mask Type: Full face mask  Mask Size: Large    Additional Respiratory  Assessments  Pulse: 84  Resp: 20  SpO2: 99 %  Position: Semi-Lockwood's  Humidification Source: Heated wire  Humidification Temp: 37  Circuit Condensation: Drained  Oral Care Completed?: Yes  Oral Care: Teeth brushed, Suction toothette  Subglottic Suction Done?: Yes  Airway Type: Trachial  Airway Size: 8  Cuff Pressure (cm H2O): (MLT)  Skin barrier applied: Yes    Objective:   Vitals:   Vitals:    20 1349   BP: (!) 170/80   Pulse: 84   Resp: 20   Temp: 98.1 °F (36.7 °C)   SpO2: 99%      TEMP:Current: Temp: 98.1 °F (36.7 °C)  Max: Temp  Av.6 °F (37 °C)  Min: 97.4 °F (36.3 °C)  Max: 99.8 °F (37.7 °C)    BP Range: Systolic (60HNZ), TFU:204 , Min:131 , JIK:343     Diastolic (09PGW), EFY:33, Min:62, Max:110           General appearance: obese on mechanical ventilation , appears stated age awakens to touch  In no acute distress  Skin: No rashes or lesions  HEENT: mucous membranes are moist tracheostomy  thick bloody secretions around tracheostomy site  neck: No JVD  Lungs: symmetrical expansion, coarse breath sounds to auscultation, no use of accessory muscles  Heart: S1S2 no murmurs, tachycardic  Abdomen: soft, non tender, distended obese PEG tube  Extremities: no peripheral edema  Neurologic:  alert following commands moving all extremities  Affect: Calm          Assessment:   Patient Active Problem List:     60-y.o M with history of DM, hypothyroid, COPD, HTN, HLD presented on 10/26 with shortness of breath for 1 week.  Saturations were 70% at PCPs.  Saturation 65% on room air in ER  Patient agitated for CT scan refused to lay flat  10/27 RRT refused BiPAP became combative.  Patient transferred to ICU  10/28 intubated and sedated.  CT chest showing dense multifocal airspace disease  10/30 Ultrasound lower extremities no DVT, ultrasound kidneys no hydronephrosis  10/31 bilateral infiltrates left effusion.  On 50% +8 of PEEP.  Chest x-ray showing right-sided improving with left base atelectasis. Covid negative x2  11/8 patient extubated and reintubated the same day  11/10 ultrasound lower extremities no DVT  11/11 PEG tube placed unable to place tracheostomy due to scar tissue.  Neck CT showing calcification at level of critical thyroid membrane  11/12 tracheostomy placed by ENT. Patient aspirated  11/13 propofol DC'd  11/15 Precedex and fentanyl drips DC'd  11/16 off sedation hard time waking up being seen by neurology.  CT head negative. Chest x-ray right perihilar infiltrate. 11/17 emesis x3. Had bright blood and clots coming from ENT. ENT evaluated with no evidence of bleeding. Neurologically improving moving all extremities. Repeat bleeding from trach site in the evening        1. Hemoptysis / bleeding at trach site  2. Drop of hemoglobin from 10.4  - 9  3. acute hypoxemic respiratory failure on mechanical ventilation s/p trach 11/12 8 cuffed Shiley trach proximal XLT  4. Metabolic encephalopathy history of EtOH abuse with agitation on Depakote, Librium dose decreased to every 12 and Seroquel DC'd significantly improved  5. Anemia  6. HAP   7. HANS moderate ( AHI 17 on 4/18/18 requires BIPAP 16/12)  8. History of reactive airway disease  9.  CT 3/14/2018 showing pulmonary nodule stable from 10/2017  10. Acute renal failure improving - nephrology on board Baseline creatinine 1.9 on 5/2019  11. Diabetes with elevated blood sugars  12. Obesity  13. EF 86% LVH diastolic dysfunction  14. chronic lymphedema  15. History of gout on colchicine  16. Hypothyroid  17. H/o respiratory failure: 7/8/2017 pt was  transferred from [facility] for acute respiratory failure after lap cholecystectomy, lap umbilical hernia repair, and lap liver biopsy (fatty liver).  He had been extubated postop but had laryngospasm requiring reintubation, complicated by negative pressure flash pulmonary edema, and required tracheostomy 8/10/2017.  Patient has staph aureus pneumonia and C. difficile colitis. Isaac Hamm was then transferred to [facility] hospital where he was weaned off the ventilator and decannulated.     Plan:      · For PICC line   · Wean FiO2 still on 50% / 8 of PEEP - wean FIO2 and PEEP ,   · Have decreased his FiO2 to 40% and +5 of PEEP rate to 12   ·   · PSV Start pressure support weaning as tolerated  · check proBNP tomorrow  · Elevated LFTs may be due to Zosyn continue Zosyn as per ID to complete another 2 days  · Check stool for occult blood  · PICC line to be placed  · LTACH evaluation and placement   Tiesha SureshFormerly Kittitas Valley Community HospitalNILO

## 2020-11-18 NOTE — PLAN OF CARE
Problem: Restraint Use - Nonviolent/Non-Self-Destructive Behavior:  Goal: Absence of restraint-related injury  Description: Absence of restraint-related injury  11/18/2020 0746 by Austin Ho RN  Outcome: Met This Shift     Problem: Restraint Use - Nonviolent/Non-Self-Destructive Behavior:  Goal: Absence of restraint indications  Description: Absence of restraint indications  11/18/2020 0746 by Austin Ho RN  Outcome: Not Met This Shift  11/17/2020 2212 by Oliver Rod RN  Outcome: Not Met This Shift

## 2020-11-18 NOTE — PROGRESS NOTES
Problem: Restraint Use - Nonviolent/Non-Self-Destructive Behavior:  Goal: Absence of restraint-related injury  Description: Absence of restraint-related injury  Outcome: Met This Shift     Problem: Skin Integrity:  Goal: Will show no infection signs and symptoms  Description: Will show no infection signs and symptoms  Outcome: Met This Shift  Goal: Absence of new skin breakdown  Description: Absence of new skin breakdown  Outcome: Met This Shift     Problem: Respiratory:  Goal: Complications related to the disease process, condition or treatment will be avoided or minimized  Description: Complications related to the disease process, condition or treatment will be avoided or minimized  Outcome: Met This Shift     Problem: Injury - Risk of, Physical Injury:  Goal: Absence of physical injury  Description: Absence of physical injury  Outcome: Met This Shift     Problem: Mood - Altered:  Goal: Mood stable  Description: Mood stable  Outcome: Met This Shift  Goal: Absence of abusive behavior  Description: Absence of abusive behavior  Outcome: Met This Shift     Problem: Sleep Pattern Disturbance:  Goal: Appears well-rested  Description: Appears well-rested  Outcome: Met This Shift     Problem: Pain:  Goal: Pain level will decrease  Description: Pain level will decrease  Outcome: Ongoing  Goal: Control of acute pain  Description: Control of acute pain  Outcome: Ongoing  Goal: Control of chronic pain  Description: Control of chronic pain  Outcome: Ongoing     Problem: Respiratory:  Goal: Ability to maintain a clear airway will improve  Description: Ability to maintain a clear airway will improve  Outcome: Ongoing  Goal: Ability to maintain adequate ventilation will improve  Description: Ability to maintain adequate ventilation will improve  Outcome: Ongoing     Problem: Confusion - Acute:  Goal: Absence of continued neurological deterioration signs and symptoms  Description: Absence of continued neurological deterioration signs and symptoms  Outcome: Ongoing     Problem: Discharge Planning:  Goal: Ability to perform activities of daily living will improve  Description: Ability to perform activities of daily living will improve  Outcome: Ongoing     Problem: Mood - Altered:  Goal: Verbalizations of feeling emotionally comfortable while being cared for will increase  Description: Verbalizations of feeling emotionally comfortable while being cared for will increase  Outcome: Ongoing     Problem: Psychomotor Activity - Altered:  Goal: Absence of psychomotor disturbance signs and symptoms  Description: Absence of psychomotor disturbance signs and symptoms  Outcome: Ongoing     Problem: Sensory Perception - Impaired:  Goal: Demonstrations of improved sensory functioning will increase  Description: Demonstrations of improved sensory functioning will increase  Outcome: Ongoing  Goal: Decrease in sensory misperception frequency  Description: Decrease in sensory misperception frequency  Outcome: Ongoing  Goal: Able to refrain from responding to false sensory perceptions  Description: Able to refrain from responding to false sensory perceptions  Outcome: Ongoing  Goal: Demonstrates accurate environmental perceptions  Description: Demonstrates accurate environmental perceptions  Outcome: Ongoing  Goal: Able to distinguish between reality-based and nonreality-based thinking  Description: Able to distinguish between reality-based and nonreality-based thinking  Outcome: Ongoing  Goal: Able to interrupt nonreality-based thinking  Description: Able to interrupt nonreality-based thinking  Outcome: Ongoing     Problem: Restraint Use - Nonviolent/Non-Self-Destructive Behavior:  Goal: Absence of restraint indications  Description: Absence of restraint indications  Outcome: Not Met This Shift     Problem: Confusion - Acute:  Goal: Mental status will be restored to baseline  Description: Mental status will be restored to baseline  Outcome: Not Met This Shift     Problem: Discharge Planning:  Goal: Participates in care planning  Description: Participates in care planning  Outcome: Not Met This Shift

## 2020-11-19 NOTE — PROGRESS NOTES
When restraints released pt attempts to pull at lines and tubes. Continued need for b/l soft wrist restraints for pt safety.

## 2020-11-19 NOTE — PLAN OF CARE
Problem: Pain:  Goal: Pain level will decrease  Description: Pain level will decrease  Outcome: Met This Shift  Goal: Control of acute pain  Description: Control of acute pain  Outcome: Met This Shift  Goal: Control of chronic pain  Description: Control of chronic pain  Outcome: Met This Shift     Problem: Restraint Use - Nonviolent/Non-Self-Destructive Behavior:  Goal: Absence of restraint-related injury  Description: Absence of restraint-related injury  11/19/2020 0515 by Amira Pagan RN  Outcome: Met This Shift  11/18/2020 2322 by Shweta Sierra RN  Outcome: Met This Shift  11/18/2020 1759 by Shweta Sierra RN  Outcome: Met This Shift     Problem: Skin Integrity:  Goal: Will show no infection signs and symptoms  Description: Will show no infection signs and symptoms  Outcome: Met This Shift  Goal: Absence of new skin breakdown  Description: Absence of new skin breakdown  Outcome: Met This Shift     Problem: Respiratory:  Goal: Complications related to the disease process, condition or treatment will be avoided or minimized  Description: Complications related to the disease process, condition or treatment will be avoided or minimized  Outcome: Met This Shift     Problem: Injury - Risk of, Physical Injury:  Goal: Absence of physical injury  Description: Absence of physical injury  Outcome: Met This Shift  Goal: Will remain free from falls  Description: Will remain free from falls  Outcome: Met This Shift     Problem: Mood - Altered:  Goal: Absence of abusive behavior  Description: Absence of abusive behavior  Outcome: Met This Shift     Problem: Psychomotor Activity - Altered:  Goal: Absence of psychomotor disturbance signs and symptoms  Description: Absence of psychomotor disturbance signs and symptoms  Outcome: Met This Shift     Problem: Respiratory:  Goal: Ability to maintain a clear airway will improve  Description: Ability to maintain a clear airway will improve  Outcome: Ongoing  Goal: Ability to maintain adequate ventilation will improve  Description: Ability to maintain adequate ventilation will improve  Outcome: Ongoing     Problem: Restraint Use - Nonviolent/Non-Self-Destructive Behavior:  Goal: Absence of restraint indications  Description: Absence of restraint indications  11/19/2020 0515 by Suyapa Aggarwal RN  Outcome: Not Met This Shift  11/18/2020 2322 by Mary Kate Reyes RN  Outcome: Not Met This Shift  11/18/2020 1759 by Mary Kate Reyes RN  Outcome: Not Met This Shift     Problem: Confusion - Acute:  Goal: Absence of continued neurological deterioration signs and symptoms  Description: Absence of continued neurological deterioration signs and symptoms  Outcome: Not Met This Shift  Goal: Mental status will be restored to baseline  Description: Mental status will be restored to baseline  Outcome: Not Met This Shift     Problem: Discharge Planning:  Goal: Ability to perform activities of daily living will improve  Description: Ability to perform activities of daily living will improve  Outcome: Not Met This Shift  Goal: Participates in care planning  Description: Participates in care planning  Outcome: Not Met This Shift     Problem: Mood - Altered:  Goal: Mood stable  Description: Mood stable  Outcome: Not Met This Shift  Goal: Verbalizations of feeling emotionally comfortable while being cared for will increase  Description: Verbalizations of feeling emotionally comfortable while being cared for will increase  Outcome: Not Met This Shift     Problem: Sensory Perception - Impaired:  Goal: Demonstrations of improved sensory functioning will increase  Description: Demonstrations of improved sensory functioning will increase  Outcome: Not Met This Shift  Goal: Decrease in sensory misperception frequency  Description: Decrease in sensory misperception frequency  Outcome: Not Met This Shift  Goal: Able to refrain from responding to false sensory perceptions  Description: Able to refrain from responding to false sensory perceptions  Outcome: Not Met This Shift  Goal: Demonstrates accurate environmental perceptions  Description: Demonstrates accurate environmental perceptions  Outcome: Not Met This Shift  Goal: Able to distinguish between reality-based and nonreality-based thinking  Description: Able to distinguish between reality-based and nonreality-based thinking  Outcome: Not Met This Shift  Goal: Able to interrupt nonreality-based thinking  Description: Able to interrupt nonreality-based thinking  Outcome: Not Met This Shift     Problem: Sleep Pattern Disturbance:  Goal: Appears well-rested  Description: Appears well-rested  Outcome: Not Met This Shift

## 2020-11-19 NOTE — PROGRESS NOTES
Patient's bilateral soft wrist and ankle restraints removed to perform ROM, assess skin, reposition patient, and assess need for continued use. Patient continues to reach for trach and other lines/tubes, as well as kick out legs and slide self down in bed once restraints are removed. Patient repositioned and bilateral soft wrist and ankle restraints reapplied. Will continue to assess for continued need.

## 2020-11-19 NOTE — PROGRESS NOTES
Nephrology Progress Note  Patient's Name: Zach Ruiz  5:07 PM  11/19/2020        Reason for Consult: Acute kidney  Requesting Physician:  Jv Guzman MD    Chief Complaint: Shortness of breath  History Obtained From:  EHR; spouse    History of Present Ilness:    Zach Ruiz is a 61 y.o. male with a history of COPD, hypertension, hyperlipidemia and diabetes mellitus. He presented to ED 2 days ago with complaints of shortness of breath. According to patient's spouse he had been complaining of shortness of breath over the course of several weeks. This has gotten progressively worse. He went to see his PCP on the day of admission. In office at the time pulse oximeter obtained revealed his oxygen saturation to be in the 70s. He was instructed to proceed to ED for further evaluation. On presentation to ED his initial vital signs showed a blood pressure of 168/71, temperature 97.5 and pulse of 93. His pulse oximeter at the time was noted to be 98% on room air. Laboratory data was significant for a BUN of 11, creatinine 1.1, WBC of 8.3. Rapid COVID-19 testing was negative. A chest x-ray performed revealed right infrahilar and basilar infiltrate concerning for pneumonia. A CTA was ordered but patient declined because of his inability to lay flat. Patient was given ceftriaxone and doxycycline followed by admission to telemetry. 2 days ago an RRT was called as patient was noted to be increasingly more in respiratory distress. He was transferred to MICU and intubated. Laboratory data yesterday showed worsening serum creatinine to 2.8. This a.m. further worsening to 3.6. He has been nonoliguric. Hence renal consult. 10/30: N new acute issues overnight; remains intubated  10/31: Polyuric , concerned for possible DI; no other acute issues overnight  11/1: agitated overnight ; remains intubated  11/2: pt seen and examined.  Chart reviewed, pt intubated  11/3: pt seen and examined in icu, pt CO2 23 11/19/2020    CO2 24 11/18/2020    CO2 21 (L) 11/17/2020    CREATININE 1.1 11/19/2020    CREATININE 1.4 (H) 11/18/2020    CREATININE 1.8 (H) 11/17/2020    BUN 18 11/19/2020    BUN 23 11/18/2020    BUN 21 11/17/2020    GLUCOSE 125 (H) 11/19/2020    GLUCOSE 113 (H) 11/18/2020    GLUCOSE 145 (H) 11/17/2020    PHOS 3.6 11/18/2020    PHOS 3.7 11/17/2020    PHOS 2.4 (L) 11/16/2020    WBC 9.9 11/19/2020    WBC 10.8 11/18/2020    WBC 12.6 (H) 11/17/2020    HGB 9.5 (L) 11/19/2020    HGB 9.0 (L) 11/18/2020    HGB 9.0 (L) 11/17/2020    HCT 28.7 (L) 11/19/2020    HCT 26.8 (L) 11/18/2020    HCT 26.8 (L) 11/17/2020    MCV 79.7 (L) 11/19/2020     11/19/2020         Imaging:  Xr Chest Portable    Result Date: 10/27/2020  EXAMINATION: ONE XRAY VIEW OF THE CHEST 10/27/2020 7:58 pm COMPARISON: October 26, 2020 HISTORY: ORDERING SYSTEM PROVIDED HISTORY: SOB TECHNOLOGIST PROVIDED HISTORY: Reason for exam:->SOB What reading provider will be dictating this exam?->CRC FINDINGS: There is mild cardiomegaly. There is patchy perihilar and bibasilar atelectasis/infiltrates. Tiny pleural effusions are suspected. Progressive bibasilar atelectasis/infiltrates concerning for pneumonia. Underlying CHF is considered. Xr Chest Portable    Result Date: 10/26/2020  EXAMINATION: ONE XRAY VIEW OF THE CHEST 10/26/2020 4:11 pm COMPARISON: 05/26/2019 HISTORY: ORDERING SYSTEM PROVIDED HISTORY: SOB TECHNOLOGIST PROVIDED HISTORY: Reason for exam:->SOB What reading provider will be dictating this exam?->CRC FINDINGS: Cardiac size appears stable. Discoid airspace disease seen at the left lung base which may represent atelectasis or scarring. Right infrahilar and basilar infiltrate is identified. No pneumothorax is identified. No acute osseous abnormality is identified. Right infrahilar and basilar infiltrate concerning for pneumonia. Left basilar atelectasis or scarring. Assessment/Plans  1.  Acute kidney injury  Baseline 1.8 from 9/2019, 1.1 in 5/2019  hemodynamically mediated due to the combination of bradycardia,  diuretic and ARB use;  exacerbated by IV contrast use   Renal function back to baseline      2. Acute hypoxic respiratory failure  due to multilobar pneumonia  gradual wean  Completed course of antibiotics  Failed extubation 11/8  S/P trach 11/12      3. H/O ETOH  abuse    4. Hypernatremia  imrpoved on lower dose bumex    5.   Hypokalemia due to brisk urine output  Supplement as needed    Not much else to add from a Renal standpoint   Will sign off                Brodie Garvin MD  5:07 PM  11/19/2020

## 2020-11-19 NOTE — PROGRESS NOTES
Called floor and spoke with Hannah Razo RN. Requested their assistance in getting the patient down to IR by 0745 for an 0800 table time. Patient will need to come with respiratory as he is on a trach vent.

## 2020-11-19 NOTE — CARE COORDINATION
Referrals pending to [facility]1 [facility] and [facility]. Spoke to wife and she is willing to apply for long term care medicaid. If [facility] can accept, will initiate precert when pt is restraint free for 24 hrs. Wet Covid ordered and given to nurse to complete.  Will follow up in AM    Estrellita Nava RN  [facility] Case Management  [phone]

## 2020-11-19 NOTE — PROGRESS NOTES
Chief Complaint   Patient presents with    Shortness of Breath     for a few weeks. O2 sat in 70s at [facility], placed on 4L and now 95%. SUBJECTIVE:  Patient is tolerating medications. No reported adverse drug reactions. Tmax 100.8 overnight. BP much improved. Periods of agitation. Does not communicate or follow commands     Trach to vent - 50% FiO2, 96% SpO2, PEEP 8  Bleeding around trach site - evaluated by ENT         Review of systems:  As stated above in the chief complaint, otherwise negative.     Medications:  Scheduled Meds:   [START ON 11/20/2020] chlordiazePOXIDE  25 mg Oral Daily    piperacillin-tazobactam  3.375 g Intravenous Q8H    sodium chloride  25 mL Intravenous Q8H    QUEtiapine  50 mg Oral BID    thiamine (VITAMIN B1) IVPB  250 mg Intravenous Q24H    divalproex  250 mg Oral 3 times per day    heparin flush  3 mL Intravenous 2 times per day    senna  2 tablet Oral Nightly    insulin lispro  0-18 Units Subcutaneous Q4H    carvedilol  3.125 mg Oral BID WC    folic acid  1 mg Intravenous Daily    [Held by provider] insulin glargine  20 Units Subcutaneous Nightly    sodium chloride (Inhalant)  2 mL Nebulization Q4H    heparin (porcine)  7,500 Units Subcutaneous Q8H    pantoprazole  40 mg Intravenous Daily    And    sodium chloride (PF)  10 mL Intravenous Daily    sodium chloride flush  10 mL Intravenous 2 times per day    Arformoterol Tartrate  15 mcg Nebulization BID    chlorhexidine  15 mL Mouth/Throat BID    amLODIPine  10 mg Oral Daily    [Held by provider] aspirin  81 mg Oral Daily    levothyroxine  50 mcg Oral Daily    [Held by provider] losartan  100 mg Oral Daily    budesonide  0.5 mg Nebulization BID    ipratropium-albuterol  1 ampule Inhalation Q4H WA     Continuous Infusions:   dextrose       PRN Meds:trimethobenzamide, labetalol, midazolam, sodium chloride flush, heparin flush, polyethylene glycol, perflutren lipid microspheres, acetaminophen, BILITOT 0.5 11/19/2020    ALKPHOS 118 11/19/2020    AST 43 11/19/2020     11/19/2020     Lab Results   Component Value Date    CRP 1.4 (H) 11/02/2020    CRP 2.8 (H) 09/04/2017    CRP 10.5 (H) 08/28/2017     Lab Results   Component Value Date    SEDRATE 135 (H) 09/04/2017    SEDRATE 150 (H) 08/28/2017    SEDRATE 141 (H) 08/21/2017     Radiology:  CXR- 11/3- Multifocal bilateral pulmonary infiltrates more prominent within the right lung. CXR- 11/4- worsening b/l infiltrates, worse on left today with possible small L sided effusion   CXR- 11/5 - Stable b/l infiltrates   CXR- 11/6 - Expiratory film, image otherwise appears overall stable. CXR- 11/16 - stable right parahilar pulmonary opacity which may represent   atelectasis or pneumonia. Microbiology:   Lab Results   Component Value Date    BC 5 Days no growth 11/09/2020    BC 5 Days no growth 10/29/2020    BC  10/26/2020     This organism was isolated in one set. Susceptibility testing is not routinely done as this  organism frequently represents skin contamination. Additional testing can be ordered by calling the  Microbiology Department.       ORG Staphylococcus aureus 10/29/2020    ORG Staphylococcus coagulase-negative 10/26/2020    ORG Coagulase Negative Staphylococci 08/26/2017     Lab Results   Component Value Date    BLOODCULT2 5 Days no growth 11/09/2020    BLOODCULT2 5 Days no growth 10/29/2020    BLOODCULT2 5 Days no growth 10/26/2020    ORG Staphylococcus aureus 10/29/2020    ORG Staphylococcus coagulase-negative 10/26/2020    ORG Coagulase Negative Staphylococci 08/26/2017     No results found for: WNDABS  Smear, Respiratory   Date Value Ref Range Status   11/09/2020   Final    Group 6: <25 PMN's/LPF and <25 Epithelial cells/LPF  Moderate Polymorphonuclear leukocytes  Rare Epithelial cells  No organisms seen       No results found for: MPNEUMO, CLAMYDCU, LABLEGI, AFBCX, FUNGSM, LABFUNG  CULTURE, RESPIRATORY   Date Value Ref Range Status 11/09/2020 Growth not present  Oral Pharyngeal Tiesha absent    Final     Culture Catheter Tip   Date Value Ref Range Status   08/26/2017 <15 colonies  Final     No results found for: BFCS  No results found for: CXSURG  Urine Culture, Routine   Date Value Ref Range Status   11/09/2020 Growth not present  Final   10/28/2020 Growth not present  Final   09/16/2019 Growth not present  Final     MRSA Culture Only   Date Value Ref Range Status   11/09/2020 Methicillin resistant Staph aureus not isolated  Final   10/30/2020 Methicillin resistant Staph aureus not isolated  Final   07/28/2017 Methicillin resistant Staph aureus not isolated  Final          Assessment:  · Acute on chronic hypoxic respiratory failure likely 2/2 pneumonia and volume overload  - resolving   · Bacterial pneumonia, likely aspiration pneumonia but after extubation patient had to be reintubated rule out HCAP  · Shock, sepsis- resolved, blood cultures negative   · Alcohol withdrawal       Plan:    · Continue Zosyn for aspiration pneumonia plus HCAP - day 10 of zosyn, day 8/of 10 from the trach   · Repeat respiratory & blood cultures - no growth  · 11/19/2020 PICC line placed  · Will follow with you    Xander Brody  3:01 PM  11/19/2020

## 2020-11-19 NOTE — PROGRESS NOTES
Patient was identified via 2 patient identifiers and arrived to pre-work up area in stable condition via bed with respiratory, Chika lAex RN from the floor, and transport. The patient's medical history, allergies, medications, labs, and NPO status were all reviewed and verified. The patient has been NPO since midnight. The patient was safely transferred and secured to the IR table and monitors were applied to the patient. Droplet precautions were taken by IR staff. The patient was prepped and draped sterilely for an image-guided peripherally inserted central line possibly tunneled line placement. A tunneled MedComp 6French dual lumen pro-line CT with cuff catheter was placed in the right IJ. Line length was 29 cm with 0 cm of catheter exposed. Blood return was noted, the purple and the white ports were flushed, and the tip of the catheter was confirmed to be in the mid-right atrium via fluoroscopy. Lines were flushed and adapters were attached. Catheter was sutured in place and then site was cleaned and a central line dressing was applied to the catheter site. Post-procedure orders were placed and report was called to the floor; I spoke with Chika Alex RN. The patient left the procedure room in stable condition, vital signs at baseline. Dressing clean, dry, and intact. Patient transported back to floor under the supervision of floor RN, respiratory staff, and transportation.

## 2020-11-19 NOTE — PROGRESS NOTES
Physical Therapy  PT SESSION ATTEMPT     Date:2020  Patient Name: Earnest Moffett  MRN: [id number]  : 1960  Room: [address] [place] [address]-A     Attempted PT session this date:    [] unavailable due to other medical staff currently with pt   [] on hold per nursing staff   [] on hold per nursing staff secondary to lab / radiology results    [] declined Physical Therapy this date. Benefits of participation in therapy reviewed with pt.    [] off unit   [x] Other:   Met with patient in room. Attempted to complete formal evaluation. Patient demonstrating inconsistent command following at this time with some times able to follow with yes/no answers d/t trach, and other times attempting to speak in sentences that are incoherent at this time. Attempted to reposition to sitting position in bed with patient not tolerating and unsafe/inappropriate to attempt further transfers. Will reattempt formal evaluation at a later time once patient is following commands for transfers appropriately.     Peggy Cartagena, PT, DPT  [id number]  Xenia Quintana, SPT

## 2020-11-19 NOTE — PROGRESS NOTES
Pulmonary Progress Note  11/19/2020 2:45 PM  Subjective:   Admit Date: 10/26/2020  PCP: Payton Oscar MD  Interval History: Appears stable more sleepy today    Diet: Diet NPO, After Midnight Exceptions are: Sips with Meds      Data:   Scheduled Meds:    [START ON 11/20/2020] chlordiazePOXIDE  25 mg Oral Daily    piperacillin-tazobactam  3.375 g Intravenous Q8H    sodium chloride  25 mL Intravenous Q8H    QUEtiapine  50 mg Oral BID    thiamine (VITAMIN B1) IVPB  250 mg Intravenous Q24H    divalproex  250 mg Oral 3 times per day    heparin flush  3 mL Intravenous 2 times per day    senna  2 tablet Oral Nightly    insulin lispro  0-18 Units Subcutaneous Q4H    carvedilol  3.125 mg Oral BID WC    folic acid  1 mg Intravenous Daily    [Held by provider] insulin glargine  20 Units Subcutaneous Nightly    sodium chloride (Inhalant)  2 mL Nebulization Q4H    heparin (porcine)  7,500 Units Subcutaneous Q8H    pantoprazole  40 mg Intravenous Daily    And    sodium chloride (PF)  10 mL Intravenous Daily    sodium chloride flush  10 mL Intravenous 2 times per day    Arformoterol Tartrate  15 mcg Nebulization BID    chlorhexidine  15 mL Mouth/Throat BID    amLODIPine  10 mg Oral Daily    [Held by provider] aspirin  81 mg Oral Daily    levothyroxine  50 mcg Oral Daily    [Held by provider] losartan  100 mg Oral Daily    budesonide  0.5 mg Nebulization BID    ipratropium-albuterol  1 ampule Inhalation Q4H WA       Continuous Infusions:    dextrose         PRN Meds: trimethobenzamide, labetalol, midazolam, sodium chloride flush, heparin flush, polyethylene glycol, perflutren lipid microspheres, acetaminophen, polyvinyl alcohol, hydrALAZINE, sodium chloride flush, [DISCONTINUED] acetaminophen **OR** acetaminophen, colchicine, glucose, dextrose, glucagon (rDNA), dextrose    I/O last 3 completed shifts:  In: -   Out: 700 [Urine:700]    I/O this shift:  In: -   Out: 150 [Urine:150]      Intake/Output Summary (Last 24 hours) at 11/19/2020 1445  Last data filed at 11/19/2020 1426  Gross per 24 hour   Intake --   Output 850 ml   Net -850 ml       Patient Vitals for the past 96 hrs (Last 3 readings):   Weight   11/18/20 0600 (!) 302 lb 6.4 oz (137.2 kg)   11/17/20 0518 (!) 306 lb 14.4 oz (139.2 kg)   11/16/20 1006 (!) 307 lb 8 oz (139.5 kg)         Recent Labs     11/17/20  0428 11/18/20  0524 11/19/20  0501   WBC 12.6* 10.8 9.9   HGB 9.0* 9.0* 9.5*   HCT 26.8* 26.8* 28.7*   MCV 77.9* 78.1* 79.7*    324 344     Recent Labs     11/17/20  0428 11/18/20  0524 11/19/20  0501    143 147*   K 3.4* 3.4* 3.6    107 108*   CO2 21* 24 23   BUN 21 23 18   CREATININE 1.8* 1.4* 1.1   PHOS 3.7 3.6  --      Recent Labs     11/17/20 0428 11/18/20  0524 11/19/20  0501   PROT 7.5 7.8 7.8   ALKPHOS 142* 115 118   * 140* 126*   AST 69* 48* 43*   BILITOT 0.7 0.6 0.5     Recent Labs     11/18/20 0524   INR 1.2     CPK:  Lab Results   Component Value Date    CKTOTAL 98 10/08/2017      INR:   Recent Labs     11/18/20 0524   INR 1.2         -----------------------------------------------------------------  RAD:   Results for orders placed during the hospital encounter of 10/26/20   XR CHEST PORTABLE    Narrative EXAMINATION:  ONE XRAY VIEW OF THE CHEST  11/19/2020 9:56 am  COMPARISON:  November 15-18  HISTORY:  ORDERING SYSTEM PROVIDED HISTORY: SOB  TECHNOLOGIST PROVIDED HISTORY:  Reason for exam:->SOB  What reading provider will be dictating this exam?->CRC  FINDINGS:  Some improvement of the bilateral discrete ground-glass densities throughout  both lungs predominant in the perihilar regions. The can be an indication  for volume overload. No pleural effusions are seen. The heart is normal  size. Impression Some improvement in pattern that can represent volume overload since the  November 18.        Micro:  Vent Information  $Ventilation: $Subsequent Day  Skin Assessment: Other (see comment/note)  Equipment ID: [id number]  Equipment Changed: (S) Humidification  Vent Type: 840  Vent Mode: AC/VC  Vt Ordered: 480 mL  Rate Set: 12 bmp  Peak Flow: 60 L/min  Pressure Support: 0 cmH20  FiO2 : 40 %  SpO2: 94 %  SpO2/FiO2 ratio: 235  PaO2/FiO2 ratio: 169  Sensitivity: 3  PEEP/CPAP: 5  I Time/ I Time %: 0 s  Humidification Source: Heated wire  Humidification Temp: 37  Humidification Temp Measured: 37  Circuit Condensation: Drained  Mask Type: Full face mask  Mask Size: Large    Additional Respiratory  Assessments  Pulse: 77  Resp: 20  SpO2: 94 %  Position: Semi-Lockwood's  Humidification Source: Heated wire  Humidification Temp: 37  Circuit Condensation: Drained  Oral Care Completed?: Yes  Oral Care: Teeth brushed, Suction toothette  Subglottic Suction Done?: Yes  Airway Type: Trachial  Airway Size: 8(xlt)  Cuff Pressure (cm H2O): 29 cm H2O  Skin barrier applied: Yes    Objective:   Vitals:   Vitals:    20 1400   BP: (!) 123/59   Pulse: 77   Resp: 20   Temp: 97 °F (36.1 °C)   SpO2: 94%      TEMP:Current: Temp: 97 °F (36.1 °C)  Max: Temp  Av.8 °F (36.6 °C)  Min: 96.9 °F (36.1 °C)  Max: 98.6 °F (37 °C)    BP Range: Systolic (21MKB), OBM:809 , Min:123 , QDR:061     Diastolic (26SVJ), CNW:84, Min:59, Max:121       General appearance: obese on mechanical ventilation , appears stated age awakens to touch  In no acute distress  Skin: No rashes or lesions  HEENT: mucous membranes are moist tracheostomy less secretions around tracheostomy site  neck: No JVD  Lungs: symmetrical expansion, coarse breath sounds to auscultation, no use of accessory muscles  Heart: S1S2 no murmurs, tachycardic  Abdomen: soft, non tender, distended obese PEG tube  Extremities: no peripheral edema  Neurologic:  alert following commands moving all extremities  Affect: Calm          Assessment:   Patient Active Problem List:     60-y.o M with history of DM, hypothyroid, COPD, HTN, HLD presented on 10/26 with shortness of breath for 1 week.  Saturations were 70% at PCPs.  Saturation 65% on room air in ER  Patient agitated for CT scan refused to lay flat  10/27 RRT refused BiPAP became combative.  Patient transferred to ICU  10/28 intubated and sedated.  CT chest showing dense multifocal airspace disease  10/30 Ultrasound lower extremities no DVT, ultrasound kidneys no hydronephrosis  10/31 bilateral infiltrates left effusion.  On 50% +8 of PEEP.  Chest x-ray showing right-sided improving with left base atelectasis. Covid negative x2  11/8 patient extubated and reintubated the same day  11/10 ultrasound lower extremities no DVT  11/11 PEG tube placed unable to place tracheostomy due to scar tissue.  Neck CT showing calcification at level of critical thyroid membrane  11/12 tracheostomy placed by ENT. Patient aspirated  11/13 propofol DC'd  11/15 Precedex and fentanyl drips DC'd  11/16 off sedation hard time waking up being seen by neurology.  CT head negative. Chest x-ray right perihilar infiltrate. 11/17 emesis x3.  Had bright blood and clots coming from ENT.  ENT evaluated with no evidence of bleeding.  Neurologically improving moving all extremities. Repeat bleeding from trach site in the evening. Decreased to 40% +5  11/19 PICC line placed. Chest x-ray improvement     1. Hemoptysis / bleeding at trach site  2. Drop of hemoglobin from 10.4  - 9  3. acute hypoxemic respiratory failure on mechanical ventilation s/p trach 11/12 8 cuffed Shiley trach proximal XLT  4. Metabolic encephalopathy history of EtOH abuse with agitation on ( Depakote, Librium being weaned off and Seroquel  )significantly improved  5. Anemia  6. HAP   7. HANS moderate ( AHI 17 on 4/18/18 requires BIPAP 16/12) noncompliant with CPAP  8. History of reactive airway disease  9. CT 3/14/2018 showing pulmonary nodule stable from 10/2017  10. Acute renal failure improving - nephrology on board Baseline creatinine 1.9 on 5/2019  11. Diabetes with elevated blood sugars  12. Obesity  13.  EF 73% LVH diastolic dysfunction  14. chronic lymphedema  15. History of gout on colchicine  16. Hypothyroid  17. H/o respiratory failure: 7/8/2017 pt was  transferred from [facility] for acute respiratory failure after lap cholecystectomy, lap umbilical hernia repair, and lap liver biopsy (fatty liver).  He had been extubated postop but had laryngospasm requiring reintubation, complicated by negative pressure flash pulmonary edema, and required tracheostomy 8/10/2017.  Patient has staph aureus pneumonia and C. difficile colitis. Mauricio Dallas was then transferred to [facility] where he was weaned off the ventilator and decannulated.     Plan:      · Increase free water  · Wean to 35%  · Start pressure support weaning    · elevated LFTs may be due to Zosyn continue Zosyn as per ID to complete another 2 days  ·   · LTACH evaluation and placement   TERESA Amin

## 2020-11-19 NOTE — PLAN OF CARE
Problem: Restraint Use - Nonviolent/Non-Self-Destructive Behavior:  Goal: Absence of restraint-related injury  Description: Absence of restraint-related injury  11/18/2020 2322 by Breanna Capone RN  Outcome: Met This Shift     Problem: Restraint Use - Nonviolent/Non-Self-Destructive Behavior:  Goal: Absence of restraint indications  Description: Absence of restraint indications  11/18/2020 2322 by Breanna Capone, RN  Outcome: Not Met This Shift

## 2020-11-19 NOTE — PROGRESS NOTES
Occupational Therapy  Attempted OT eval at b/s, however, pt was off the unit for procedure. Will attempt assessment at a later time.   Thank you for this referral. Donna Nayak, MOT, OTR/L  # [phone]

## 2020-11-19 NOTE — PROGRESS NOTES
Patient repositioned in bed, ROM performed and skin/circulation assessed. Patient immediately reaches for trach once bilateral soft wrist restraints removed. Patient very agitated and restless in bed. Frequently kicking his legs up and over the bed rails, shifting himself in the bed despite bilateral soft wrist restraints. Perfect serve message sent to Dr. Arjun Diaz to request new restraint order be placed for bilateral soft wrist restraints and new order for bilateral soft ankle restraint.

## 2020-11-19 NOTE — PLAN OF CARE
Problem: Pain:  Goal: Pain level will decrease  Description: Pain level will decrease  11/19/2020 1825 by Calixto Suárez RN  Outcome: Met This Shift  11/19/2020 0515 by Mike Finch RN  Outcome: Met This Shift  Goal: Control of acute pain  Description: Control of acute pain  11/19/2020 1825 by Calixto Suárez RN  Outcome: Met This Shift  11/19/2020 0515 by Mike Finch RN  Outcome: Met This Shift  Goal: Control of chronic pain  Description: Control of chronic pain  11/19/2020 0515 by Mike Finch RN  Outcome: Met This Shift     Problem: Restraint Use - Nonviolent/Non-Self-Destructive Behavior:  Goal: Absence of restraint-related injury  Description: Absence of restraint-related injury  11/19/2020 1825 by Calixto Suárez RN  Outcome: Met This Shift  11/19/2020 0515 by Mike Finch RN  Outcome: Met This Shift     Problem: Skin Integrity:  Goal: Will show no infection signs and symptoms  Description: Will show no infection signs and symptoms  11/19/2020 1825 by Calixto Suárez RN  Outcome: Met This Shift  11/19/2020 0515 by Mike Finch RN  Outcome: Met This Shift  Goal: Absence of new skin breakdown  Description: Absence of new skin breakdown  11/19/2020 1825 by Calixto Suárez RN  Outcome: Met This Shift  11/19/2020 0515 by Mike Finch RN  Outcome: Met This Shift     Problem: Respiratory:  Goal: Ability to maintain adequate ventilation will improve  Description: Ability to maintain adequate ventilation will improve  11/19/2020 1825 by Calixto Suárez RN  Outcome: Met This Shift  11/19/2020 0515 by Mike Finch RN  Outcome: Ongoing  Goal: Complications related to the disease process, condition or treatment will be avoided or minimized  Description: Complications related to the disease process, condition or treatment will be avoided or minimized  11/19/2020 1825 by Calixto Suárez RN  Outcome: Met This Shift  11/19/2020 0515 by Najma Martinez RN  Outcome: Met This Shift     Problem: Injury - Risk of, Physical Injury:  Goal: Absence of physical injury  Description: Absence of physical injury  11/19/2020 0515 by Najma Martinez RN  Outcome: Met This Shift  Goal: Will remain free from falls  Description: Will remain free from falls  11/19/2020 0515 by Najma Martinez RN  Outcome: Met This Shift     Problem: Mood - Altered:  Goal: Absence of abusive behavior  Description: Absence of abusive behavior  11/19/2020 0515 by Najma Martinez RN  Outcome: Met This Shift     Problem: Psychomotor Activity - Altered:  Goal: Absence of psychomotor disturbance signs and symptoms  Description: Absence of psychomotor disturbance signs and symptoms  11/19/2020 0515 by Najma Martinez RN  Outcome: Met This Shift     Problem: Sleep Pattern Disturbance:  Goal: Appears well-rested  Description: Appears well-rested  11/19/2020 1825 by Evy Pascal RN  Outcome: Met This Shift  11/19/2020 0515 by Najma Martinez RN  Outcome: Not Met This Shift

## 2020-11-19 NOTE — PROGRESS NOTES
Perfect serve message sent to Dr. Carolyn Sargent to request COVID test per case management for discharge planning. Notified of remaining ICU orders still in [facility] to review.

## 2020-11-20 NOTE — PLAN OF CARE
Problem: Pain:  Goal: Pain level will decrease  Description: Pain level will decrease  11/20/2020 0416 by Annalee Hdz RN  Outcome: Met This Shift  11/19/2020 1825 by Dafne Keating RN  Outcome: Met This Shift  Goal: Control of acute pain  Description: Control of acute pain  11/20/2020 0416 by Annalee Hdz RN  Outcome: Met This Shift  11/19/2020 1825 by Dafne Keating RN  Outcome: Met This Shift  Goal: Control of chronic pain  Description: Control of chronic pain  Outcome: Met This Shift     Problem: Restraint Use - Nonviolent/Non-Self-Destructive Behavior:  Goal: Absence of restraint-related injury  Description: Absence of restraint-related injury  11/20/2020 0416 by Annalee Hdz RN  Outcome: Met This Shift  11/19/2020 1825 by Dafne Keating RN  Outcome: Met This Shift     Problem: Skin Integrity:  Goal: Will show no infection signs and symptoms  Description: Will show no infection signs and symptoms  11/20/2020 0416 by Annalee Hdz RN  Outcome: Met This Shift  11/19/2020 1825 by Dafne Keating RN  Outcome: Met This Shift  Goal: Absence of new skin breakdown  Description: Absence of new skin breakdown  11/20/2020 0416 by Annalee Hdz RN  Outcome: Met This Shift  11/19/2020 1825 by Dafne Keating RN  Outcome: Met This Shift     Problem: Respiratory:  Goal: Ability to maintain a clear airway will improve  Description: Ability to maintain a clear airway will improve  11/20/2020 0416 by Annalee Hdz RN  Outcome: Met This Shift  11/19/2020 1825 by Dafne Keating RN  Outcome: Ongoing  Goal: Ability to maintain adequate ventilation will improve  Description: Ability to maintain adequate ventilation will improve  11/20/2020 0416 by Annalee Hdz RN  Outcome: Met This Shift  11/19/2020 1825 by Dafne Keating RN  Outcome: Met This Shift  Goal: Complications related to the disease process, condition or treatment will be avoided or minimized  Description: Complications related to the disease process, condition or treatment will be avoided or minimized  11/20/2020 0416 by Kathleen López RN  Outcome: Met This Shift  11/19/2020 1825 by Owen Guardado RN  Outcome: Met This Shift     Problem: Confusion - Acute:  Goal: Absence of continued neurological deterioration signs and symptoms  Description: Absence of continued neurological deterioration signs and symptoms  11/20/2020 0416 by Kathleen López RN  Outcome: Met This Shift  11/19/2020 1825 by Owen Guardado RN  Outcome: Ongoing     Problem: Injury - Risk of, Physical Injury:  Goal: Absence of physical injury  Description: Absence of physical injury  Outcome: Met This Shift  Goal: Will remain free from falls  Description: Will remain free from falls  Outcome: Met This Shift     Problem: Psychomotor Activity - Altered:  Goal: Absence of psychomotor disturbance signs and symptoms  Description: Absence of psychomotor disturbance signs and symptoms  Outcome: Met This Shift     Problem: Sensory Perception - Impaired:  Goal: Decrease in sensory misperception frequency  Description: Decrease in sensory misperception frequency  Outcome: Met This Shift  Goal: Able to refrain from responding to false sensory perceptions  Description: Able to refrain from responding to false sensory perceptions  Outcome: Met This Shift  Goal: Able to distinguish between reality-based and nonreality-based thinking  Description: Able to distinguish between reality-based and nonreality-based thinking  Outcome: Met This Shift  Goal: Able to interrupt nonreality-based thinking  Description: Able to interrupt nonreality-based thinking  Outcome: Met This Shift     Problem: Confusion - Acute:  Goal: Mental status will be restored to baseline  Description: Mental status will be restored to baseline  11/20/2020 0416 by Kathleen López RN  Outcome: Ongoing  11/19/2020 1825 by Owen Guardado

## 2020-11-20 NOTE — PROGRESS NOTES
Patient's bilateral soft wrist and ankle restraints removed to assess skin, ROM, and need for continued use. Patient continues to reach for trach, lines and tubes when restraints are off. Kicks up legs and attempts to move self down in bed. Unable to redirect patient. Patient repositioned and bilateral soft wrist and ankle restraints reapplied. Will continue to monitor for continued need. Perfect serve message sent to Dr. Darvin Casanova for new restraint order.

## 2020-11-20 NOTE — PROGRESS NOTES
Bilateral wrist restraints removed to reposition and clean patient, patient immediately reaches up toward trach. ROM performed and skin/circulation rechecked before bilateral soft wrist and ankle restraints reapplied.

## 2020-11-20 NOTE — PROGRESS NOTES
Pulmonary Progress Note  11/20/2020 1:54 PM  Subjective:   Admit Date: 10/26/2020  PCP: Darlene Lanier MD  Interval History: Looks like he is trying to wake up more    Diet: DIET TUBE FEED CONTINUOUS/CYCLIC NPO; Diabetic 1.5; Gastrostomy; Continuous; 25; 65; 23; Exceptions are: Sips with Meds      Data:   Scheduled Meds:    hydrALAZINE  10 mg Oral 3 times per day    chlordiazePOXIDE  25 mg Oral Daily    piperacillin-tazobactam  3.375 g Intravenous Q8H    sodium chloride  25 mL Intravenous Q8H    QUEtiapine  50 mg Oral BID    thiamine (VITAMIN B1) IVPB  250 mg Intravenous Q24H    divalproex  250 mg Oral 3 times per day    heparin flush  3 mL Intravenous 2 times per day    senna  2 tablet Oral Nightly    insulin lispro  0-18 Units Subcutaneous Q4H    carvedilol  3.125 mg Oral BID WC    folic acid  1 mg Intravenous Daily    [Held by provider] insulin glargine  20 Units Subcutaneous Nightly    sodium chloride (Inhalant)  2 mL Nebulization Q4H    heparin (porcine)  7,500 Units Subcutaneous Q8H    pantoprazole  40 mg Intravenous Daily    And    sodium chloride (PF)  10 mL Intravenous Daily    sodium chloride flush  10 mL Intravenous 2 times per day    Arformoterol Tartrate  15 mcg Nebulization BID    chlorhexidine  15 mL Mouth/Throat BID    amLODIPine  10 mg Oral Daily    [Held by provider] aspirin  81 mg Oral Daily    levothyroxine  50 mcg Oral Daily    [Held by provider] losartan  100 mg Oral Daily    budesonide  0.5 mg Nebulization BID    ipratropium-albuterol  1 ampule Inhalation Q4H WA       Continuous Infusions:    dextrose         PRN Meds: HYDROcodone 5 mg - acetaminophen, trimethobenzamide, labetalol, midazolam, sodium chloride flush, heparin flush, polyethylene glycol, perflutren lipid microspheres, acetaminophen, polyvinyl alcohol, hydrALAZINE, sodium chloride flush, [DISCONTINUED] acetaminophen **OR** acetaminophen, colchicine, glucose, dextrose, glucagon (rDNA), dextrose    I/O last 3 completed shifts: In: 943 [I.V.:270; NG/GT:393; IV Piggyback:166]  Out: 150 [Urine:150]    No intake/output data recorded. Intake/Output Summary (Last 24 hours) at 11/20/2020 1354  Last data filed at 11/20/2020 8995  Gross per 24 hour   Intake 829 ml   Output 150 ml   Net 679 ml       Patient Vitals for the past 96 hrs (Last 3 readings):   Weight   11/18/20 0600 (!) 302 lb 6.4 oz (137.2 kg)   11/17/20 0518 (!) 306 lb 14.4 oz (139.2 kg)         Recent Labs     11/18/20 0524 11/19/20  0501 11/20/20  0446   WBC 10.8 9.9 8.3   HGB 9.0* 9.5* 8.9*   HCT 26.8* 28.7* 26.7*   MCV 78.1* 79.7* 78.8*    344 374     Recent Labs     11/18/20 0524 11/19/20  0501 11/20/20  0446    147* 142   K 3.4* 3.6 3.6    108* 108*   CO2 24 23 26   BUN 23 18 18   CREATININE 1.4* 1.1 1.0   PHOS 3.6  --   --      Recent Labs     11/18/20 0524 11/19/20  0501 11/20/20  0446   PROT 7.8 7.8 7.6   ALKPHOS 115 118 113   * 126* 112*   AST 48* 43* 41*   BILITOT 0.6 0.5 0.4     Recent Labs     11/18/20 0524   INR 1.2       CPK:  Lab Results   Component Value Date    CKTOTAL 98 10/08/2017      INR:   Recent Labs     11/18/20 0524   INR 1.2         -----------------------------------------------------------------  RAD:   Results for orders placed during the hospital encounter of 10/26/20   XR CHEST PORTABLE    Narrative EXAMINATION:  ONE XRAY VIEW OF THE CHEST  11/20/2020 7:55 am  COMPARISON:  19 November 2020  HISTORY:  ORDERING SYSTEM PROVIDED HISTORY: SOB  TECHNOLOGIST PROVIDED HISTORY:  Reason for exam:->SOB  What reading provider will be dictating this exam?->CRC  FINDINGS:  Central venous catheter on the right terminates in the right atrium proper. This is a stable finding. A left-sided central venous catheter is been  removed. There is a stable tracheostomy catheter. There are opacities in  both lungs which may represent infiltrate and/or atelectasis. Aeration  appears minimally worse.     Impression Mildly increasing infiltrate and/or atelectasis bilaterally. Please note,  the right central venous catheter terminates in the right atrium proper.        Micro:Vent Information  $Ventilation: $Subsequent Day  Skin Assessment: Other (see comment/note)  Equipment ID: [id number]  Equipment Changed: (S) Humidification  Vent Type: 840  Vent Mode: AC/VC  Vt Ordered: 480 mL  Rate Set: 12 bmp  Peak Flow: 60 L/min  Pressure Support: 0 cmH20  FiO2 : 35 %  SpO2: 98 %  SpO2/FiO2 ratio: 280  PaO2/FiO2 ratio: 169  Sensitivity: 3  PEEP/CPAP: 5  I Time/ I Time %: 0 s  Humidification Source: Heated wire  Humidification Temp: 37  Humidification Temp Measured: 36.3  Circuit Condensation: Drained  Mask Type: Full face mask  Mask Size: Large    Additional Respiratory  Assessments  Pulse: 99  Resp: 20  SpO2: 98 %  Position: Semi-Lockwood's  Humidification Source: Heated wire  Humidification Temp: 37  Circuit Condensation: Drained  Oral Care Completed?: Yes  Oral Care: Teeth brushed, Suction toothette  Subglottic Suction Done?: Yes  Airway Type: Trachial  Airway Size: 8  Cuff Pressure (cm H2O): 29 cm H2O  Skin barrier applied: Yes    Objective:   Vitals:   Vitals:    20 1343   BP:    Pulse:    Resp:    Temp:    SpO2: 98%      TEMP:Current: Temp: 97.4 °F (36.3 °C)  Max: Temp  Av.8 °F (36.6 °C)  Min: 97 °F (36.1 °C)  Max: 98.4 °F (36.9 °C)    BP Range: Systolic (77HVZ), IAP:299 , Min:123 , SOBIA:583     Diastolic (38SIG), TXB:01, Min:59, Max:113    General appearance: obese on mechanical ventilation , appears stated age awakens to touch  In no acute distress  Skin: No rashes or lesions  HEENT: mucous membranes are moist tracheostomy less secretions around tracheostomy site  neck: No JVD  Lungs: symmetrical expansion, coarse breath sounds to auscultation, no use of accessory muscles  Heart: S1S2 no murmurs, tachycardic  Abdomen: soft, non tender, distended obese PEG tube  Extremities: no peripheral edema  Neurologic:  alert following commands moving all extremities  Affect: Calm          Assessment:   Patient Active Problem List:     60-y.o M with history of DM, hypothyroid, COPD, HTN, HLD presented on 10/26 with shortness of breath for 1 week.  Saturations were 70% at PCPs.  Saturation 65% on room air in ER  Patient agitated for CT scan refused to lay flat  10/27 RRT refused BiPAP became combative.  Patient transferred to ICU  10/28 intubated and sedated.  CT chest showing dense multifocal airspace disease  10/30 Ultrasound lower extremities no DVT, ultrasound kidneys no hydronephrosis  10/31 bilateral infiltrates left effusion.  On 50% +8 of PEEP.  Chest x-ray showing right-sided improving with left base atelectasis. Covid negative x2  11/8 patient extubated and reintubated the same day  11/10 ultrasound lower extremities no DVT  11/11 PEG tube placed unable to place tracheostomy due to scar tissue.  Neck CT showing calcification at level of critical thyroid membrane  11/12 tracheostomy placed by ENT.  Patient aspirated  11/13 propofol DC'd  11/15 Precedex and fentanyl drips DC'd  11/16 off sedation hard time waking up being seen by neurology.  CT head negative. Chest x-ray right perihilar infiltrate. 11/17 emesis x3.  Had bright blood and clots coming from ENT.  ENT evaluated with no evidence of bleeding.  Neurologically improving moving all extremities.  Repeat bleeding from trach site in the evening. Decreased to 40% +5  11/19 PICC line placed. Chest x-ray improvement  11/20 now down to 35% chest x-ray with increasing infiltrates      1. Hemoptysis / bleeding at trach site  2. Drop of hemoglobin from 10.4  - 9  3. acute hypoxemic respiratory failure on mechanical ventilation s/p trach 11/12 8 cuffed Shiley trach proximal XLT  4. Metabolic encephalopathy history of EtOH abuse with agitation on ( Depakote, Librium being weaned off and Seroquel  )significantly improved  5. Anemia  6. HAP   7.  HANS moderate ( AHI 17 on 4/18/18 requires BIPAP 16/12) noncompliant with CPAP  8. History of reactive airway disease  9. CT 3/14/2018 showing pulmonary nodule stable from 10/2017  10. Acute renal failure improving - nephrology on board Baseline creatinine 1.9 on 5/2019  11. Diabetes with elevated blood sugars  12. Obesity  13. EF 08% LVH diastolic dysfunction  14. chronic lymphedema  15. History of gout on colchicine  16. Hypothyroid  17. H/o respiratory failure: 7/8/2017 pt was  transferred from [address] for acute respiratory failure after lap cholecystectomy, lap umbilical hernia repair, and lap liver biopsy (fatty liver).  He had been extubated postop but had laryngospasm requiring reintubation, complicated by negative pressure flash pulmonary edema, and required tracheostomy 8/10/2017.  Patient has staph aureus pneumonia and C. difficile colitis. Quentin Whitaker was then transferred to [facility] hospital where he was weaned off the ventilator and decannulated.     Plan:      · Increase free water  · Start pressure support weaning  · Weaning off Librium  · Episodes of hypertension with agitation  · LTACH evaluation and placement     TERESA Amin

## 2020-11-20 NOTE — PROGRESS NOTES
Occupational Therapy  OCCUPATIONAL THERAPY INITIAL EVALUATION        Date:2020  Patient Name: Jasper Guerrero  MRN: [id number]  : 1960  Room: [address] [place] [address]A    Referring Physician:  Saurabh Molina MD    Evaluating OT:  Ferine Severe, MOT, OTR/L #[id number]    AM-PAC Daily Activity Raw Score:    Recommended Adaptive Equipment:  TBD as pt progresses     Reason for Admission:  Pt was admitted 10-26-20 w/ SOB. Altered Mental status  10-28-20 intubated/sedated  20 PEG  20 Open Trach/Bronch    Diagnosis:     1. Acute respiratory failure with hypoxia ([address]    2. Pneumonia due to organism    3. Encounter for nasogastric (NG) tube placement    4. Long term (current) use of antibiotics         Pertinent Medical History:  CHF, COPD, DM, HTN, Lymphedema, OA, DM     Precautions:  Falls  Trach/PEG  Zamorano/Fecal Mgmt System  4 pt Restraints    Pt was a poor historian - Little information obtained from chart    Home Living: Pt lives with his wife in a 2-story house. Bathroom setup:  ?? Equipment owned:  None    Available Family Assist:  ?? Prior Level of Function:  IND with ADLs, IADLs, Transfers and Mobility using No ADfor ambulation.    Driving:  ?  Occupation:  ?    Pain Level:  No indication of pain this session;  Nsg Notified   Additional Complaints:  None indicated - some restlessness, no agitation    Vitals/Lab Values:  WFL O2 sats remained > 94% for duration of session on Trach    Cognition: A & O x 1 - person only - reported current month as \"July or August\", current year as \"\" unable to report current month    Able to Follow 2/5 Simple Commands w/ Max VCs/tactile cues   Memory:  poor   Sequencing:  poor   Problem solving:  poor   Judgement/safety:  poor  Additional Comments:  Pt was somewhat restless, attempting to change position in bed, did not become agitated, required Close SUP w/ Restraints released - pulled at clothing - did not reach for lines        Functional Assessment: Initial Eval Status  Date: 11-20-20 Treatment Status  Date: Short Term/Long Term Goals  Treatment frequency: PRN 3-day Trial    Feeding NPO/DEP      NA   Grooming Max A    Hand-over-hand assist for safety w/ lines to wash face w/ R Hand, Mod VCs  Mod A   UB Dressing Dep    Max of 2 for bed mobility to facilitate task + Max A/VCs to thread UEs into garment bed level    Max A   LB Dressing Dep    Max A to don socks  Max A of 2 for bed mobility + Max A of 1 for simulated clothing adjustment in supine, Max VCs    Max A of 2   Bathing NT      Max A of 2   Toileting Dep    Zamorano Catheter  Fecal Mgmt System  TBD   Bed Mobility  Rolling: Max A of 2  Repositioning:  Max A of 2   Supine to Sit:  NT    Sit to Supine:  NT     Unsafe to attempt to transfer to EOB d/t inability to consistently follow commands, restlessness, recent agitation    Max A of 2   Functional Transfers Sit to stand:  NT  Stand to sit:  NT      NA   Functional Mobility NT        NA   Balance NT       Activity Tolerance Poor(+)         Visual/  Perceptual WFL  Glasses:  None at b/s      Hearing WFL  Hearing Aids         Hand dominance: Right    UE ROM: RUE:  WFL      LUE:  WFL    Strength: RUE: grossly 5/5 - observed     LUE: grossly 5/5     Strength:  WFL Navi UEs    Fine Motor Coordination:  WFL difficult to assess    Sensation:  Denies numbness or tingling Navi UEs  Tone:  WFL Navi UEs  Edema:  None Noted                            Comments: Upon arrival, patient was found in semi-supine in 4 point restraints. He was agreeable to participate in therapeutic ax. No Family present during session. Received permission from RN prior to engaging pt in OT services. At the end of the session, patient was properly positioned in Semi-Supine w/ TAPS System turned toward his Right. Call light and phone within reach, all lines and tubes intact. Oriented pt to call bell.   Made all appropriate Environmental Modifications to facilitate pt's level of IND and safety. All needs met. Bed Alarm activated. Overall patient demonstrated decreased independence and safety during completion of ADL/functional transfer/mobility tasks. Pt would benefit from continued skilled OT to increase safety and independence with completion of ADL/IADL tasks for functional independence and quality of life. Treatment:      Provided Skilled SUP/Assist w/ Pt safety, Proper Positioning, ADLs, Functional Transfers and Functional Mobility as noted above, as well as set up and clean up for session. Skilled monitoring of Vitals and pts response to treatment.   Consulted RN    Education:      Provided Pt/Family ed re: Purpose of OT services;  OT Plan of Care;     ADL-  Instruction/training on use of DME/AD/Adaptive equip/techs to improve safety/IND with Functional Ax   Mobility-  Instruction/training on safety and improved independence with bed mobility   Activity tolerance - Instruction/training on energy conservation/work simplification, techs to increase endurance for completion of ADLs    Neuromuscular Reeducation to facilitate balance/righting reactions for increased function with ADLs tasks   Cognitive retraining -  Oriented pt to current Date, Place and Situation; Cues for safety during Functional Ax for safety, sequencing, problem solving, improved safety awareness   Skilled positioning/alignment for Pain Mgmt, Skin Integrity, Edema Control, safety   Skilled monitoring of Vitals during session and pt's response   Techs for improved Safety/Safety Awareness w/ Functional Activity/Mobility   Energy Conservation Techs   Recommendations for Continued Participation in OT services during Hospitalization and at D/C - SNF   Neuromuscular Facilitation of UE Functional movement/ROM   Therapeutic Exercises- Instruction on BUE ROM exercises to improve strength and function of BUE for improved indep with ADLs    Pt and/or Family verbalized/demonstrated a Poor(+) understanding of education provided. Will Review PRN. Assessment of current deficits   Functional mobility [x]  ADLs [x] Strength [x]  Cognition [x]  Functional transfers  [x] IADLs [x] Safety Awareness [x]  Endurance [x]  Fine Motor Coordination [x] Balance [x] Vision/perception [x] Sensation []   Gross Motor Coordination [x] ROM [x] Delirium [x]                  Motor Control []      Plan of Care: OT 3-day trial PRN   [x] ADL retraining/AE, Equipment Needs/Recommendations   [x] Energy Conservation Techniques/Strategies      [x] Neuromuscular Re-Education      [x] Functional Transfer Training         [x] Functional Mobility Training          [x] Cognitive Re-Training          [x] Splinting/Positioning Needs           [x] Therapeutic Activity   [x]Therapeutic Exercise   [x] Visual/Perceptual   [x] Delirium Prevention/Treatment   [x] Positioning to Improve Functional [place], Safety, and Skin Integrity   [x] Patient and/or Family Education to Increase Safety and Functional [place]   [x] Environmental Modifications  [x] Compensatory techniques for ADLs   [x] Other:       Pt would benefit from continued skilled OT services to increase safety and independence with completion of ADL/IADL tasks for functional independence and quality of life. Pt/Family actively participated in the establishment of goals. Rehab Potential:  Limited for established goals    Patient / Family Goal:  Not stated at this time     Patient and/or Family were instructed on Functional Diagnosis, Prognosis/Goals and OT Plan of Care. Demonstrated Poor(+) understanding. Evaluation Time includes thorough review of current medical information, gathering information on past medical history/social history and prior level of function, completion of standardized testing/informal observation of tasks, assessment of data and education on plan of care and goals.      Eval Complexity: High  Profile and History - High  Assessment of Occupational Performance and Identification of Deficits - High  Clinical Decision Making - High     Time In:  1113              Time Out:  1155  Total Treatment Time:  32 mins      Treatment Charges: Mins Units   OT Eval Low 76469     OT Eval Medium 82116     OT Eval High 97167 X 1   OT Re-Eval [id number]     Therapeutic Ex  51910     Therapeutic Activities 33789     ADL/Self Care 89215 32 2   Neuro Re-ed 28542     Orthotic manage/training  08712     Non-Billable Time     Total Timed Treatment 32 [address] [place], [place], OTR/L  # [id number]

## 2020-11-20 NOTE — PROGRESS NOTES
Patient seen and examined  Trach stoma c/d/i with dried mucous around the faceplate and ties  Tracheal stoma area cleaned   Xeroform guaze changed and replaced around the stoma   Continue with local skin care   Trach stays in place  Few cc's of air deflated from balloon as it was very inflated- deflation helps if TIF present

## 2020-11-20 NOTE — PROGRESS NOTES
Returned call to patient's spouse Tobias Kidd who was requesting an update on the patient. Update given. She is requesting an update from the physician as well. Dr. Salcido Rg notified via perfect serve message.

## 2020-11-20 NOTE — PROGRESS NOTES
Chief Complaint   Patient presents with    Shortness of Breath     for a few weeks. [facility] and now 95%. SUBJECTIVE:  Patient is tolerating medications. No reported adverse drug reactions. Afebrile. Hypertensive again. Periods of agitation & aggression toward staff. Trach to vent - 35% FiO2, 99% SpO2, PEEP 5           Review of systems:  As stated above in the chief complaint, otherwise negative.     Medications:  Scheduled Meds:   chlordiazePOXIDE  25 mg Oral Daily    piperacillin-tazobactam  3.375 g Intravenous Q8H    sodium chloride  25 mL Intravenous Q8H    QUEtiapine  50 mg Oral BID    thiamine (VITAMIN B1) IVPB  250 mg Intravenous Q24H    divalproex  250 mg Oral 3 times per day    heparin flush  3 mL Intravenous 2 times per day    senna  2 tablet Oral Nightly    insulin lispro  0-18 Units Subcutaneous Q4H    carvedilol  3.125 mg Oral BID WC    folic acid  1 mg Intravenous Daily    [Held by provider] insulin glargine  20 Units Subcutaneous Nightly    sodium chloride (Inhalant)  2 mL Nebulization Q4H    heparin (porcine)  7,500 Units Subcutaneous Q8H    pantoprazole  40 mg Intravenous Daily    And    sodium chloride (PF)  10 mL Intravenous Daily    sodium chloride flush  10 mL Intravenous 2 times per day    Arformoterol Tartrate  15 mcg Nebulization BID    chlorhexidine  15 mL Mouth/Throat BID    amLODIPine  10 mg Oral Daily    [Held by provider] aspirin  81 mg Oral Daily    levothyroxine  50 mcg Oral Daily    [Held by provider] losartan  100 mg Oral Daily    budesonide  0.5 mg Nebulization BID    ipratropium-albuterol  1 ampule Inhalation Q4H WA     Continuous Infusions:   dextrose       PRN Meds:HYDROcodone 5 mg - acetaminophen, trimethobenzamide, labetalol, midazolam, sodium chloride flush, heparin flush, polyethylene glycol, perflutren lipid microspheres, acetaminophen, polyvinyl alcohol, hydrALAZINE, sodium chloride flush, [DISCONTINUED] acetaminophen **OR** acetaminophen, colchicine, glucose, dextrose, glucagon (rDNA), dextrose    OBJECTIVE:  BP (!) 189/94   Pulse 99   Temp 97.4 °F (36.3 °C) (Temporal)   Resp 20   Ht 5' 8\" (1.727 m)   Wt (!) 302 lb 6.4 oz (137.2 kg)   SpO2 99%   BMI 45.98 kg/m²   Temp  Av.8 °F (36.6 °C)  Min: 97 °F (36.1 °C)  Max: 98.4 °F (36.9 °C)  Constitutional: awake, does not follow commands, does not communicate, agitation  Skin: Warm and dry. No rashes were noted. HEENT: Round and reactive pupils. Moist mucous membranes. No ulcerations or thrush. Chest: Trach - bleeding around site- improving, symmetrical expansion. Some coarseness right & left upper lobes. Cardiovascular: S1 and S2 are rhythmic and regular. Systolic murmurs appreciated.    Abdomen: Hyperactive bowel sounds, obese abdomen, PEG, FMS system   Extremities: No clubbing, no cyanosis, + Lymphedema left lower extremity- improving   Lines: PICC line right IJ 2020     Laboratory and Tests Review:  Lab Results   Component Value Date    WBC 8.3 2020    WBC 9.9 2020    WBC 10.8 2020    HGB 8.9 (L) 2020    HCT 26.7 (L) 2020    MCV 78.8 (L) 2020     2020     Lab Results   Component Value Date    NEUTROABS 5.45 2020    NEUTROABS 6.38 2020    NEUTROABS 6.61 2020     No results found for: [facility]  Lab Results   Component Value Date     (H) 2020    AST 41 (H) 2020    ALKPHOS 113 2020    BILITOT 0.4 2020     Lab Results   Component Value Date     2020    K 3.6 2020    K 4.0 10/27/2020     2020    CO2 26 2020    BUN 18 2020    CREATININE 1.0 2020    CREATININE 1.1 2020    CREATININE 1.4 2020    GFRAA >60 2020    LABGLOM >60 2020    GLUCOSE 173 2020    PROT 7.6 2020    LABALBU 3.5 2020    CALCIUM 9.5 2020    BILITOT 0.4 2020    ALKPHOS 113 11/20/2020    AST 41 11/20/2020     11/20/2020     Lab Results   Component Value Date    CRP 1.4 (H) 11/02/2020    CRP 2.8 (H) 09/04/2017    CRP 10.5 (H) 08/28/2017     Lab Results   Component Value Date    SEDRATE 135 (H) 09/04/2017    SEDRATE 150 (H) 08/28/2017    SEDRATE 141 (H) 08/21/2017     Radiology:  CXR- 11/3- Multifocal bilateral pulmonary infiltrates more prominent within the right lung. CXR- 11/4- worsening b/l infiltrates, worse on left today with possible small L sided effusion   CXR- 11/5 - Stable b/l infiltrates   CXR- 11/6 - Expiratory film, image otherwise appears overall stable. CXR- 11/16 - stable right parahilar pulmonary opacity which may represent   atelectasis or pneumonia. Microbiology:   Lab Results   Component Value Date    BC 5 Days no growth 11/09/2020    BC 5 Days no growth 10/29/2020    BC  10/26/2020     This organism was isolated in one set. Susceptibility testing is not routinely done as this  organism frequently represents skin contamination. Additional testing can be ordered by calling the  Microbiology Department.       ORG Staphylococcus aureus 10/29/2020    ORG Staphylococcus coagulase-negative 10/26/2020    ORG Coagulase Negative Staphylococci 08/26/2017     Lab Results   Component Value Date    BLOODCULT2 5 Days no growth 11/09/2020    BLOODCULT2 5 Days no growth 10/29/2020    BLOODCULT2 5 Days no growth 10/26/2020    ORG Staphylococcus aureus 10/29/2020    ORG Staphylococcus coagulase-negative 10/26/2020    ORG Coagulase Negative Staphylococci 08/26/2017     No results found for: WNDABS  Smear, Respiratory   Date Value Ref Range Status   11/09/2020   Final    Group 6: <25 PMN's/LPF and <25 Epithelial cells/LPF  Moderate Polymorphonuclear leukocytes  Rare Epithelial cells  No organisms seen       No results found for: MPNEUMO, CLAMYDCU, LABLEGI, AFBCX, FUNGSM, LABFUNG  CULTURE, RESPIRATORY   Date Value Ref Range Status   11/09/2020 Growth not present  Oral Pharyngeal Tiesha absent    Final     Culture Catheter Tip   Date Value Ref Range Status   08/26/2017 <15 colonies  Final     No results found for: BFCS  No results found for: CXSURG  Urine Culture, Routine   Date Value Ref Range Status   11/09/2020 Growth not present  Final   10/28/2020 Growth not present  Final   09/16/2019 Growth not present  Final     MRSA Culture Only   Date Value Ref Range Status   11/09/2020 Methicillin resistant Staph aureus not isolated  Final   10/30/2020 Methicillin resistant Staph aureus not isolated  Final   07/28/2017 Methicillin resistant Staph aureus not isolated  Final          Assessment:  · Acute on chronic hypoxic respiratory failure likely 2/2 pneumonia and volume overload  - resolving   · Bacterial pneumonia, likely aspiration pneumonia but after extubation patient had to be reintubated rule out HCAP  · Shock, sepsis- resolved, blood cultures negative   · Alcohol withdrawal       Plan:    · Continue Zosyn for aspiration pneumonia plus HCAP - day 9 of 10 from the difficult trach insertion    · Repeat respiratory & blood cultures - no growth   · 11/19/2020 PICC line placed  · Will follow with you    [place]  1:35 PM  11/20/2020   Pt seen and examined. Above discussed agree with advanced practice nurse. Labs, cultures, and radiographs reviewed. Face to Face encounter occurred. Changes made as necessary.      Blease Oppenheim, MD Allergic Rx Abdominal Pain, N/V/D

## 2020-11-20 NOTE — PROGRESS NOTES
PT SEEN AND EXAMINED. intubated, in pic. BP labile. More agitated. S/p peg/trach, some bleeding around trach  Chart reviewed. meds reviewed. D/w nursing + family as available. EXAM: IN GENERAL, NAD. Dayron Confer ROS NEGx10 EXCEPT:   BP (!) 189/94   Pulse 99   Temp 97.4 °F (36.3 °C) (Temporal)   Resp 20   Ht 5' 8\" (1.727 m)   Wt (!) 302 lb 6.4 oz (137.2 kg)   SpO2 99%   BMI 45.98 kg/m²   GEN:  NAD. HEENT: NCAT. NECK: NO JVD. TRACH MIDLINE. NO BRUITS. NO THYROMEGALY. LUNGS: CTA BL NO RALES, RHONCHI OR WHEEZES. GOOD EXCURSION. CV: Regular rate and rhythm, NO Murmurs, Rubs, Or gallops  ABD: Soft. Nontender. Normal bowel sounds.  No organomegaly  EXT:No clubbing cyanosis or edema  Neuro: Labs/data reviewedLABS: CBC with Differential:    Lab Results   Component Value Date    WBC 8.3 11/20/2020    RBC 3.39 11/20/2020    HGB 8.9 11/20/2020    HCT 26.7 11/20/2020     11/20/2020    MCV 78.8 11/20/2020    MCH 26.3 11/20/2020    MCHC 33.3 11/20/2020    RDW 19.5 11/20/2020    NRBC 0.9 11/16/2020    SEGSPCT 73 01/26/2014    METASPCT 1.8 11/15/2020    LYMPHOPCT 20.0 11/20/2020    MONOPCT 7.3 11/20/2020    MYELOPCT 0.9 11/15/2020    BASOPCT 0.5 11/20/2020    MONOSABS 0.60 11/20/2020    LYMPHSABS 1.65 11/20/2020    EOSABS 0.45 11/20/2020    BASOSABS 0.04 11/20/2020     Platelets:    Lab Results   Component Value Date     11/20/2020     CMP:    Lab Results   Component Value Date     11/20/2020    K 3.6 11/20/2020    K 4.0 10/27/2020     11/20/2020    CO2 26 11/20/2020    BUN 18 11/20/2020    CREATININE 1.0 11/20/2020    GFRAA >60 11/20/2020    LABGLOM >60 11/20/2020    GLUCOSE 173 11/20/2020    PROT 7.6 11/20/2020    LABALBU 3.5 11/20/2020    CALCIUM 9.5 11/20/2020    BILITOT 0.4 11/20/2020    ALKPHOS 113 11/20/2020    AST 41 11/20/2020     11/20/2020     Magnesium:    Lab Results   Component Value Date    MG 1.7 11/18/2020     LDH:    Lab Results   Component Value Date     10/28/2020 PT/INR:    Lab Results   Component Value Date    PROTIME 14.0 11/18/2020    INR 1.2 11/18/2020     Last 3 Troponin:    Lab Results   Component Value Date    TROPONINI <0.01 10/26/2020    TROPONINI <0.01 05/26/2019    TROPONINI <0.01 05/26/2019     ABG:    Lab Results   Component Value Date    PH 7.396 11/19/2020    PCO2 39.0 11/19/2020    PO2 80.2 11/19/2020    HCO3 23.4 11/19/2020    BE -1.3 11/19/2020    O2SAT 95.0 11/19/2020     IRON:    Lab Results   Component Value Date    IRON 95 11/05/2020     IMAGING    Xr Chest Portable    Result Date: 10/27/2020  EXAMINATION: ONE XRAY VIEW OF THE CHEST 10/27/2020 7:58 pm COMPARISON: October 26, 2020 HISTORY: ORDERING SYSTEM PROVIDED HISTORY: SOB TECHNOLOGIST PROVIDED HISTORY: Reason for exam:->SOB What reading provider will be dictating this exam?->CRC FINDINGS: There is mild cardiomegaly. There is patchy perihilar and bibasilar atelectasis/infiltrates. Tiny pleural effusions are suspected. Progressive bibasilar atelectasis/infiltrates concerning for pneumonia. Underlying CHF is considered. Xr Chest Portable    Result Date: 10/26/2020  EXAMINATION: ONE XRAY VIEW OF THE CHEST 10/26/2020 4:11 pm COMPARISON: 05/26/2019 HISTORY: ORDERING SYSTEM PROVIDED HISTORY: SOB TECHNOLOGIST PROVIDED HISTORY: Reason for exam:->SOB What reading provider will be dictating this exam?->CRC FINDINGS: Cardiac size appears stable. Discoid airspace disease seen at the left lung base which may represent atelectasis or scarring. Right infrahilar and basilar infiltrate is identified. No pneumothorax is identified. No acute osseous abnormality is identified. Right infrahilar and basilar infiltrate concerning for pneumonia. Left basilar atelectasis or scarring.        Medications:    Scheduled Meds:   chlordiazePOXIDE  25 mg Oral Daily    piperacillin-tazobactam  3.375 g Intravenous Q8H    sodium chloride  25 mL Intravenous Q8H    QUEtiapine  50 mg Oral BID    thiamine (VITAMIN B1) IVPB  250 mg Intravenous Q24H    divalproex  250 mg Oral 3 times per day    heparin flush  3 mL Intravenous 2 times per day    senna  2 tablet Oral Nightly    insulin lispro  0-18 Units Subcutaneous Q4H    carvedilol  3.125 mg Oral BID WC    folic acid  1 mg Intravenous Daily    [Held by provider] insulin glargine  20 Units Subcutaneous Nightly    sodium chloride (Inhalant)  2 mL Nebulization Q4H    heparin (porcine)  7,500 Units Subcutaneous Q8H    pantoprazole  40 mg Intravenous Daily    And    sodium chloride (PF)  10 mL Intravenous Daily    sodium chloride flush  10 mL Intravenous 2 times per day    Arformoterol Tartrate  15 mcg Nebulization BID    chlorhexidine  15 mL Mouth/Throat BID    amLODIPine  10 mg Oral Daily    [Held by provider] aspirin  81 mg Oral Daily    levothyroxine  50 mcg Oral Daily    [Held by provider] losartan  100 mg Oral Daily    budesonide  0.5 mg Nebulization BID    ipratropium-albuterol  1 ampule Inhalation Q4H WA       Continuous Infusions:   dextrose         PRN Meds:HYDROcodone 5 mg - acetaminophen, trimethobenzamide, labetalol, midazolam, sodium chloride flush, heparin flush, polyethylene glycol, perflutren lipid microspheres, acetaminophen, polyvinyl alcohol, hydrALAZINE, sodium chloride flush, [DISCONTINUED] acetaminophen **OR** acetaminophen, colchicine, glucose, dextrose, glucagon (rDNA), dextrose    A/P:      Patient Active Problem List   Diagnosis    Hyperlipidemia    Type 2 diabetes mellitus without complication, without long-term current use of insulin (HCC)    Atypical chest pain    Essential hypertension    Chronic acquired lymphedema    Aortic valve disease    Morbid obesity due to excess calories (HCC)    Abdominal pain    Acute respiratory failure with hypoxia (HCC)    Acute pulmonary edema (HCC)    Congestive heart failure with LV diastolic dysfunction, NYHA class 3 (HCC)    Obstructive sleep apnea    Acquired hypothyroidism    Chronic diastolic heart failure (HCC)    Nonrheumatic aortic valve stenosis    ACE-inhibitor cough    Pneumonia due to organism    Acute gout of right knee   Acute encephalopathy 2/2 Acute hypoxic hypercapnic respiratory failure.         Acute hypoxic hypercapnic respiratory failure 2/2 MSSA pneumonia versus COPD versus ADHF versus chronic OHS  Pt has Hx chronic HANS not on CPAP at home. ABG showed pH 7.188/91.2/78.9/33 on RRT. currently intubated. - Continue to monitor respiratory status, wean down FiO2 as tolerated. - Pro  on presentation.   - US dup LE negative for DVT. CTA negative for PE. COVID -19 negative x2  - Continue breathing treatment  - Monitor daily CXR. CBC.   - Pulmonology consulted, further recommendations appreciated.     NANCY stage III likely pre renal 2/2 diuretic use, contrast agent vs? cardiorenal syndrome. -   - Nephrology consulted. Further recommendations appreciated. - Continue monitor daily BMP, renal function. Monitor I/O. Avoid nephrotoxicity. · Bacterial pneumonia, likely aspiration pneumonia vs MSSA   · Shock, sepsis- resolved   · ATBx per id  Alcohol withdrawal .    Hx HFpEF, EF 65%  cta noted   needs ltac  Wean per pulm/ccm  S/p Peg/trach PER ENT  Monitor HR  Add hydralazine     amlodipine    Cont restraints for pt safety  Wean librium- q12 hr x 3 days then qd x 3 days then off  Change seroquel to 50 mg bid  I called [employer] for peer to peer- they denied. I dw Dr Christin Norman- as director of [employer]- she will try again.  Try to get him out of restraints as that was  A sticking point for [employer],

## 2020-11-20 NOTE — PLAN OF CARE
process, condition or treatment will be avoided or minimized  Description: Complications related to the disease process, condition or treatment will be avoided or minimized  11/20/2020 0416 by Genoveva Lux RN  Outcome: Met This Shift     Problem: Injury - Risk of, Physical Injury:  Goal: Absence of physical injury  Description: Absence of physical injury  11/20/2020 0416 by Genoveva Lux RN  Outcome: Met This Shift  Goal: Will remain free from falls  Description: Will remain free from falls  11/20/2020 0416 by Genoveva Lux RN  Outcome: Met This Shift     Problem: Psychomotor Activity - Altered:  Goal: Absence of psychomotor disturbance signs and symptoms  Description: Absence of psychomotor disturbance signs and symptoms  11/20/2020 0416 by Genoveva Lux RN  Outcome: Met This Shift     Problem: Sensory Perception - Impaired:  Goal: Decrease in sensory misperception frequency  Description: Decrease in sensory misperception frequency  11/20/2020 0416 by Genoveva Lux RN  Outcome: Met This Shift  Goal: Able to refrain from responding to false sensory perceptions  Description: Able to refrain from responding to false sensory perceptions  11/20/2020 0416 by Genoveva Lux RN  Outcome: Met This Shift  Goal: Able to distinguish between reality-based and nonreality-based thinking  Description: Able to distinguish between reality-based and nonreality-based thinking  11/20/2020 0416 by Genoveva Lux RN  Outcome: Met This Shift  Goal: Able to interrupt nonreality-based thinking  Description: Able to interrupt nonreality-based thinking  11/20/2020 0416 by Genoveva Lux RN  Outcome: Met This Shift

## 2020-11-21 NOTE — PROGRESS NOTES
PT SEEN AND EXAMINED. intubated, in pic. BP labile. More agitated. S/p peg/trach, INCREASED COREG AND HYDRALAZINE- CREAT IS UP. Chart reviewed. meds reviewed. D/w nursing + family as available. EXAM: IN GENERAL, NAD. Charanjit Snare ROS NEGx10 EXCEPT:   BP (!) 106/52   Pulse 88   Temp 98.1 °F (36.7 °C) (Temporal)   Resp 18   Ht 5' 8\" (1.727 m)   Wt (!) 302 lb 6.4 oz (137.2 kg)   SpO2 100%   BMI 45.98 kg/m²   GEN:  NAD. HEENT: NCAT. NECK: NO JVD. TRACH MIDLINE. NO BRUITS. NO THYROMEGALY. LUNGS: CTA BL NO RALES, RHONCHI OR WHEEZES. GOOD EXCURSION. CV: Regular rate and rhythm, NO Murmurs, Rubs, Or gallops  ABD: Soft. Nontender. Normal bowel sounds.  No organomegaly  EXT:No clubbing cyanosis or edema  Neuro: Labs/data reviewedLABS: CBC with Differential:    Lab Results   Component Value Date    WBC 10.7 11/21/2020    RBC 3.42 11/21/2020    HGB 8.9 11/21/2020    HCT 26.7 11/21/2020     11/21/2020    MCV 78.1 11/21/2020    MCH 26.0 11/21/2020    MCHC 33.3 11/21/2020    RDW 19.8 11/21/2020    NRBC 0.9 11/16/2020    SEGSPCT 73 01/26/2014    METASPCT 1.8 11/15/2020    LYMPHOPCT 15.7 11/21/2020    MONOPCT 7.9 11/21/2020    MYELOPCT 0.9 11/15/2020    BASOPCT 0.3 11/21/2020    MONOSABS 0.85 11/21/2020    LYMPHSABS 1.69 11/21/2020    EOSABS 0.27 11/21/2020    BASOSABS 0.03 11/21/2020     Platelets:    Lab Results   Component Value Date     11/21/2020     CMP:    Lab Results   Component Value Date     11/21/2020    K 3.9 11/21/2020    K 4.0 10/27/2020     11/21/2020    CO2 25 11/21/2020    BUN 28 11/21/2020    CREATININE 2.5 11/21/2020    GFRAA 32 11/21/2020    LABGLOM 32 11/21/2020    GLUCOSE 223 11/21/2020    PROT 8.0 11/21/2020    LABALBU 3.5 11/21/2020    CALCIUM 9.7 11/21/2020    BILITOT 0.3 11/21/2020    ALKPHOS 125 11/21/2020    AST 58 11/21/2020     11/21/2020     Magnesium:    Lab Results   Component Value Date    MG 1.7 11/18/2020     LDH:    Lab Results   Component Value Date  10/28/2020     PT/INR:    Lab Results   Component Value Date    PROTIME 14.0 11/18/2020    INR 1.2 11/18/2020     Last 3 Troponin:    Lab Results   Component Value Date    TROPONINI <0.01 10/26/2020    TROPONINI <0.01 05/26/2019    TROPONINI <0.01 05/26/2019     ABG:    Lab Results   Component Value Date    PH 7.396 11/19/2020    PCO2 39.0 11/19/2020    PO2 80.2 11/19/2020    HCO3 23.4 11/19/2020    BE -1.3 11/19/2020    O2SAT 95.0 11/19/2020     IRON:    Lab Results   Component Value Date    IRON 95 11/05/2020     IMAGING    Xr Chest Portable    Result Date: 10/27/2020  EXAMINATION: ONE XRAY VIEW OF THE CHEST 10/27/2020 7:58 pm COMPARISON: October 26, 2020 HISTORY: ORDERING SYSTEM PROVIDED HISTORY: SOB TECHNOLOGIST PROVIDED HISTORY: Reason for exam:->SOB What reading provider will be dictating this exam?->CRC FINDINGS: There is mild cardiomegaly. There is patchy perihilar and bibasilar atelectasis/infiltrates. Tiny pleural effusions are suspected. Progressive bibasilar atelectasis/infiltrates concerning for pneumonia. Underlying CHF is considered. Xr Chest Portable    Result Date: 10/26/2020  EXAMINATION: ONE XRAY VIEW OF THE CHEST 10/26/2020 4:11 pm COMPARISON: 05/26/2019 HISTORY: ORDERING SYSTEM PROVIDED HISTORY: SOB TECHNOLOGIST PROVIDED HISTORY: Reason for exam:->SOB What reading provider will be dictating this exam?->CRC FINDINGS: Cardiac size appears stable. Discoid airspace disease seen at the left lung base which may represent atelectasis or scarring. Right infrahilar and basilar infiltrate is identified. No pneumothorax is identified. No acute osseous abnormality is identified. Right infrahilar and basilar infiltrate concerning for pneumonia. Left basilar atelectasis or scarring.        Medications:    Scheduled Meds:   hydrALAZINE  50 mg PEG Tube 3 times per day    carvedilol  12.5 mg Oral BID WC    chlordiazePOXIDE  25 mg Oral Daily    piperacillin-tazobactam  3.375 g Intravenous Q8H    sodium chloride  25 mL Intravenous Q8H    QUEtiapine  50 mg Oral BID    thiamine (VITAMIN B1) IVPB  250 mg Intravenous Q24H    divalproex  250 mg Oral 3 times per day    heparin flush  3 mL Intravenous 2 times per day    senna  2 tablet Oral Nightly    insulin lispro  0-18 Units Subcutaneous Y6E    folic acid  1 mg Intravenous Daily    [Held by provider] insulin glargine  20 Units Subcutaneous Nightly    sodium chloride (Inhalant)  2 mL Nebulization Q4H    heparin (porcine)  7,500 Units Subcutaneous Q8H    pantoprazole  40 mg Intravenous Daily    And    sodium chloride (PF)  10 mL Intravenous Daily    sodium chloride flush  10 mL Intravenous 2 times per day    Arformoterol Tartrate  15 mcg Nebulization BID    chlorhexidine  15 mL Mouth/Throat BID    amLODIPine  10 mg Oral Daily    [Held by provider] aspirin  81 mg Oral Daily    levothyroxine  50 mcg Oral Daily    [Held by provider] losartan  100 mg Oral Daily    budesonide  0.5 mg Nebulization BID    ipratropium-albuterol  1 ampule Inhalation Q4H WA       Continuous Infusions:   sodium chloride      dextrose         PRN Meds:HYDROcodone 5 mg - acetaminophen, labetalol, trimethobenzamide, midazolam, sodium chloride flush, heparin flush, polyethylene glycol, perflutren lipid microspheres, acetaminophen, polyvinyl alcohol, hydrALAZINE, sodium chloride flush, [DISCONTINUED] acetaminophen **OR** acetaminophen, colchicine, glucose, dextrose, glucagon (rDNA), dextrose    A/P:      Patient Active Problem List   Diagnosis    Hyperlipidemia    Type 2 diabetes mellitus without complication, without long-term current use of insulin (HCC)    Atypical chest pain    Essential hypertension    Chronic acquired lymphedema    Aortic valve disease    Morbid obesity due to excess calories (HCC)    Abdominal pain    Acute respiratory failure with hypoxia (HCC)    Acute pulmonary edema (HCC)    Congestive heart failure with LV diastolic dysfunction, NYHA class 3 (HCC)    Obstructive sleep apnea    Acquired hypothyroidism    Chronic diastolic heart failure (HCC)    Nonrheumatic aortic valve stenosis    ACE-inhibitor cough    Pneumonia due to organism    Acute gout of right knee   Acute encephalopathy 2/2 Acute hypoxic hypercapnic respiratory failure.         Acute hypoxic hypercapnic respiratory failure 2/2 MSSA pneumonia versus COPD versus ADHF versus chronic OHS  Pt has Hx chronic HANS not on CPAP at home. ABG showed pH 7.188/91.2/78.9/33 on RRT. currently intubated. - Continue to monitor respiratory status, wean down FiO2 as tolerated. - Pro  on presentation.   - US dup LE negative for DVT. CTA negative for PE. COVID -19 negative x2  - Continue breathing treatment  - Monitor daily CXR. CBC.   - Pulmonology consulted, further recommendations appreciated.     NANCY stage III likely pre renal 2/2 diuretic use, contrast agent vs? cardiorenal syndrome. -   - Nephrology consulted. Further recommendations appreciated. - Continue monitor daily BMP, renal function. Monitor I/O. Avoid nephrotoxicity. · Bacterial pneumonia, likely aspiration pneumonia vs MSSA   · Shock, sepsis- resolved   · ATBx per id  Alcohol withdrawal .    Hx HFpEF, EF 65%  cta noted   needs ltac  Wean per pulm/ccm  S/p Peg/trach PER ENT  Monitor HR  DECR hydralazine  INCR FREE H20   amlodipine    Cont restraints for pt safety  Wean librium- q12 hr x 3 days then qd x 3 days then off  Change seroquel to 50 mg bid  I called [employer] for peer to peer- they denied. I dw Dr Beba Disla- as director of [employer]- she will try again.  Try to get him out of restraints as that was  A sticking point for [employer],

## 2020-11-21 NOTE — PROGRESS NOTES
Nephrology Progress Note  Patient's Name: Mary Mcgovern  12:26 PM  11/21/2020        Reason for Consult: Acute kidney  Requesting Physician:  Orion Rosario MD    Chief Complaint: Shortness of breath  History Obtained From:  EHR; spouse    History of Present Ilness:    Mary Mcgovern is a 61 y.o. male with a history of COPD, hypertension, hyperlipidemia and diabetes mellitus. He presented to ED 2 days ago with complaints of shortness of breath. According to patient's spouse he had been complaining of shortness of breath over the course of several weeks. This has gotten progressively worse. He went to see his PCP on the day of admission. In office at the time pulse oximeter obtained revealed his oxygen saturation to be in the 70s. He was instructed to proceed to ED for further evaluation. On presentation to ED his initial vital signs showed a blood pressure of 168/71, temperature 97.5 and pulse of 93. His pulse oximeter at the time was noted to be 98% on room air. Laboratory data was significant for a BUN of 11, creatinine 1.1, WBC of 8.3. Rapid COVID-19 testing was negative. A chest x-ray performed revealed right infrahilar and basilar infiltrate concerning for pneumonia. A CTA was ordered but patient declined because of his inability to lay flat. Patient was given ceftriaxone and doxycycline followed by admission to telemetry. 2 days ago an RRT was called as patient was noted to be increasingly more in respiratory distress. He was transferred to MICU and intubated. Laboratory data yesterday showed worsening serum creatinine to 2.8. This a.m. further worsening to 3.6. He has been nonoliguric. Hence renal consult. 10/30: N new acute issues overnight; remains intubated  10/31: Polyuric , concerned for possible DI; no other acute issues overnight  11/1: agitated overnight ; remains intubated  11/2: pt seen and examined.  Chart reviewed, pt intubated  11/3: pt seen and examined in icu, pt intubated  11/4: pt seen and examined in icu, no overnight events, intubated  11/5: pt seen in room ,remains intubated  11/6: pt seen in room, intubated. 11/7: pt seen in room, intubated, chart reviewed  11/8:pt seen in room, remains intuibated  11/9: pt seen in room, reintubated yesterday  11/10: pt seen in icu, intubated.    11/12: pt getting trach today  11/13: sp trach peg  11/14: No new problems reported from overnight  11/15: Remains trach/intubated; periods of agitation  11/16: emesis, agitation this am  11/17: Patient responding to some simple commands today  11/18: No new c/o  11/19: No further bleeding from trach stoma  11/21 :pt seen in room, no cp or sob    Allergies:  Bee venom and Shellfish-derived products    Current Medications:    0.9 % sodium chloride infusion, Continuous  carvedilol (COREG) tablet 6.25 mg, BID WC  hydrALAZINE (APRESOLINE) tablet 25 mg, 3 times per day  HYDROcodone-acetaminophen (NORCO) 5-325 MG per tablet 1 tablet, Q6H PRN  labetalol (NORMODYNE;TRANDATE) injection 10 mg, Q4H PRN  piperacillin-tazobactam (ZOSYN) 3.375 g in dextrose 5 % 100 mL IVPB extended infusion (mini-bag), Q8H  0.9 % sodium chloride infusion admixture, Q8H  QUEtiapine (SEROQUEL) tablet 50 mg, BID  trimethobenzamide (TIGAN) injection 200 mg, Q6H PRN  thiamine (B-1) 250 mg in sodium chloride 0.9 % 100 mL IVPB, Q24H  midazolam (VERSED) injection 2 mg, Q4H PRN  divalproex (DEPAKOTE SPRINKLE) capsule 250 mg, 3 times per day  sodium chloride flush 0.9 % injection 10 mL, PRN  heparin flush 100 UNIT/ML injection 300 Units, 2 times per day  heparin flush 100 UNIT/ML injection 300 Units, PRN  senna (SENOKOT) tablet 17.2 mg, Nightly  polyethylene glycol (GLYCOLAX) packet 17 g, Daily PRN  perflutren lipid microspheres (DEFINITY) injection 1.65 mg, ONCE PRN  insulin lispro (HUMALOG) injection vial 0-18 Units, Q4H  acetaminophen (TYLENOL) 160 MG/5ML solution 403 mg, T5J PRN  folic acid injection 1 mg, Daily  polyvinyl alcohol (LIQUIFILM TEARS) 1.4 % ophthalmic solution 1 drop, Q4H PRN  [Held by provider] insulin glargine (LANTUS) injection vial 20 Units, Nightly  sodium chloride (Inhalant) 3 % nebulizer solution 2 mL, Q4H  hydrALAZINE (APRESOLINE) injection 10 mg, Q4H PRN  heparin (porcine) injection 7,500 Units, Q8H  pantoprazole (PROTONIX) injection 40 mg, Daily    And  sodium chloride (PF) 0.9 % injection 10 mL, Daily  sodium chloride flush 0.9 % injection 10 mL, 2 times per day  sodium chloride flush 0.9 % injection 10 mL, PRN  acetaminophen (TYLENOL) suppository 650 mg, Q6H PRN  Arformoterol Tartrate (BROVANA) nebulizer solution 15 mcg, BID  chlorhexidine (PERIDEX) 0.12 % solution 15 mL, BID  amLODIPine (NORVASC) tablet 10 mg, Daily  [Held by provider] aspirin chewable tablet 81 mg, Daily  colchicine (COLCRYS) tablet 0.6 mg, BID PRN  levothyroxine (SYNTHROID) tablet 50 mcg, Daily  [Held by provider] losartan (COZAAR) tablet 100 mg, Daily  budesonide (PULMICORT) nebulizer suspension 500 mcg, BID  ipratropium-albuterol (DUONEB) nebulizer solution 1 ampule, Q4H WA  glucose (GLUTOSE) 40 % oral gel 15 g, PRN  dextrose 50 % IV solution, PRN  glucagon (rDNA) injection 1 mg, PRN  dextrose 5 % solution, PRN      Physical exam:  Vitals:    11/21/20 1218   BP:    Pulse:    Resp:    Temp:    SpO2: 100%           General: Intubated/trach; awake  Eyes: PERRL. No sclera icterus. No conjunctival injection. ENT: No discharge. Pharynx clear. Neck: Tracheostomy/intubated  Lungs: Coarse breath sounds  CV: Regular rate. Regular rhythm. No murmur or rub. .   Abd:  Non-distended. No masses. No organmegaly. Normal bowel sounds. Skin: Warm and dry. No nodule on exposed extremities. No rash on exposed extremities.   Ext: No cyanosis, clubbing, edema; 2+ pitting bilateral lower extremity edema L>R  Neuro: awake; follows some simple commands      Data:   Labs:  Lab Results   Component Value Date     11/21/2020     11/20/2020     (H) 11/19/2020    K 3.9 11/21/2020    K 3.6 11/20/2020    K 3.6 11/19/2020     11/21/2020    CO2 25 11/21/2020    CO2 26 11/20/2020    CO2 23 11/19/2020    CREATININE 2.5 (H) 11/21/2020    CREATININE 1.0 11/20/2020    CREATININE 1.1 11/19/2020    BUN 28 (H) 11/21/2020    BUN 18 11/20/2020    BUN 18 11/19/2020    GLUCOSE 223 (H) 11/21/2020    GLUCOSE 173 (H) 11/20/2020    GLUCOSE 125 (H) 11/19/2020    PHOS 3.6 11/18/2020    PHOS 3.7 11/17/2020    PHOS 2.4 (L) 11/16/2020    WBC 10.7 11/21/2020    WBC 8.3 11/20/2020    WBC 9.9 11/19/2020    HGB 8.9 (L) 11/21/2020    HGB 8.9 (L) 11/20/2020    HGB 9.5 (L) 11/19/2020    HCT 26.7 (L) 11/21/2020    HCT 26.7 (L) 11/20/2020    HCT 28.7 (L) 11/19/2020    MCV 78.1 (L) 11/21/2020     11/21/2020         Imaging:  Xr Chest Portable    Result Date: 10/27/2020  EXAMINATION: ONE XRAY VIEW OF THE CHEST 10/27/2020 7:58 pm COMPARISON: October 26, 2020 HISTORY: ORDERING SYSTEM PROVIDED HISTORY: SOB TECHNOLOGIST PROVIDED HISTORY: Reason for exam:->SOB What reading provider will be dictating this exam?->CRC FINDINGS: There is mild cardiomegaly. There is patchy perihilar and bibasilar atelectasis/infiltrates. Tiny pleural effusions are suspected. Progressive bibasilar atelectasis/infiltrates concerning for pneumonia. Underlying CHF is considered. Xr Chest Portable    Result Date: 10/26/2020  EXAMINATION: ONE XRAY VIEW OF THE CHEST 10/26/2020 4:11 pm COMPARISON: 05/26/2019 HISTORY: ORDERING SYSTEM PROVIDED HISTORY: SOB TECHNOLOGIST PROVIDED HISTORY: Reason for exam:->SOB What reading provider will be dictating this exam?->CRC FINDINGS: Cardiac size appears stable. Discoid airspace disease seen at the left lung base which may represent atelectasis or scarring. Right infrahilar and basilar infiltrate is identified. No pneumothorax is identified. No acute osseous abnormality is identified. Right infrahilar and basilar infiltrate concerning for pneumonia.  Left basilar atelectasis or scarring. Assessment/Plans    1. Acute kidney injury  Baseline 1.8 from 9/2019, 1.1 in 5/2019  hemodynamically mediated from bradycardia/diuretic/ARB   exacerbated by IV contrast use   Renal function back to baseline but spiked today  Trial of ivf  Dc cozaar which has been on hold    2. Acute hypoxic respiratory failure  due to multilobar pneumonia  gradual wean  Completed course of antibiotics  Failed extubation 11/8  S/P trach 11/12  Neg 7 L    3. H/O ETOH  abuse    4. Hypernatremia  imrpoved on lower dose bumex    5.   Hypokalemia due to brisk urine output  Supplement as needed          Luis Alberto Zaman MD  12:26 PM  11/21/2020

## 2020-11-21 NOTE — PROGRESS NOTES
acetaminophen **OR** acetaminophen, colchicine, glucose, dextrose, glucagon (rDNA), dextrose    OBJECTIVE:  BP (!) 106/52   Pulse 88   Temp 98.1 °F (36.7 °C) (Temporal)   Resp 18   Ht 5' 8\" (1.727 m)   Wt (!) 302 lb 6.4 oz (137.2 kg)   SpO2 100%   BMI 45.98 kg/m²   Temp  Av.9 °F (36.6 °C)  Min: 97.2 °F (36.2 °C)  Max: 98.4 °F (36.9 °C)  Constitutional: awake, does not follow commands, does not communicate, agitation  Skin: Warm and dry. No rashes were noted. HEENT: Round and reactive pupils. Moist mucous membranes. No ulcerations or thrush. Chest: Trach - bleeding around site- improving, symmetrical expansion. Some coarseness right & left upper lobes - improving   Cardiovascular: S1 and S2 are rhythmic and regular. Systolic murmurs appreciated.    Abdomen: Hyperactive bowel sounds, obese abdomen, PEG, FMS system   Extremities: No clubbing, no cyanosis, + Lymphedema left lower extremity- improving   Lines: PICC line right IJ 2020     Laboratory and Tests Review:  Lab Results   Component Value Date    WBC 10.7 2020    WBC 8.3 2020    WBC 9.9 2020    HGB 8.9 (L) 2020    HCT 26.7 (L) 2020    MCV 78.1 (L) 2020     2020     Lab Results   Component Value Date    NEUTROABS 7.82 (H) 2020    NEUTROABS 5.45 2020    NEUTROABS 6.38 2020     No results found for: [facility]  Lab Results   Component Value Date     (H) 2020    AST 58 (H) 2020    ALKPHOS 125 2020    BILITOT 0.3 2020     Lab Results   Component Value Date     2020    K 3.9 2020    K 4.0 10/27/2020     2020    CO2 25 2020    BUN 28 2020    CREATININE 2.5 2020    CREATININE 1.0 2020    CREATININE 1.1 2020    GFRAA 32 2020    LABGLOM 32 2020    GLUCOSE 223 2020    PROT 8.0 2020    LABALBU 3.5 2020    CALCIUM 9.7 2020    BILITOT 0.3 2020    ALKPHOS 125 11/21/2020    AST 58 11/21/2020     11/21/2020     Lab Results   Component Value Date    CRP 1.4 (H) 11/02/2020    CRP 2.8 (H) 09/04/2017    CRP 10.5 (H) 08/28/2017     Lab Results   Component Value Date    SEDRATE 135 (H) 09/04/2017    SEDRATE 150 (H) 08/28/2017    SEDRATE 141 (H) 08/21/2017     Radiology:  CXR- 11/3- Multifocal bilateral pulmonary infiltrates more prominent within the right lung. CXR- 11/4- worsening b/l infiltrates, worse on left today with possible small L sided effusion   CXR- 11/5 - Stable b/l infiltrates   CXR- 11/6 - Expiratory film, image otherwise appears overall stable. CXR- 11/16 - stable right parahilar pulmonary opacity which may represent   atelectasis or pneumonia. Microbiology:   Lab Results   Component Value Date    BC 5 Days no growth 11/09/2020    BC 5 Days no growth 10/29/2020    BC  10/26/2020     This organism was isolated in one set. Susceptibility testing is not routinely done as this  organism frequently represents skin contamination. Additional testing can be ordered by calling the  Microbiology Department.       ORG Staphylococcus aureus 10/29/2020    ORG Staphylococcus coagulase-negative 10/26/2020    ORG Coagulase Negative Staphylococci 08/26/2017     Lab Results   Component Value Date    BLOODCULT2 5 Days no growth 11/09/2020    BLOODCULT2 5 Days no growth 10/29/2020    BLOODCULT2 5 Days no growth 10/26/2020    ORG Staphylococcus aureus 10/29/2020    ORG Staphylococcus coagulase-negative 10/26/2020    ORG Coagulase Negative Staphylococci 08/26/2017     No results found for: WNDABS  Smear, Respiratory   Date Value Ref Range Status   11/09/2020   Final    Group 6: <25 PMN's/LPF and <25 Epithelial cells/LPF  Moderate Polymorphonuclear leukocytes  Rare Epithelial cells  No organisms seen       No results found for: MPNEUMO, CLAMYDCU, LABLEGI, AFBCX, FUNGSM, LABFUNG  CULTURE, RESPIRATORY   Date Value Ref Range Status   11/09/2020 Growth not present  Oral Pharyngeal Tiesha absent    Final     Culture Catheter Tip   Date Value Ref Range Status   08/26/2017 <15 colonies  Final     No results found for: BFCS  No results found for: CXSURG  Urine Culture, Routine   Date Value Ref Range Status   11/09/2020 Growth not present  Final   10/28/2020 Growth not present  Final   09/16/2019 Growth not present  Final     MRSA Culture Only   Date Value Ref Range Status   11/09/2020 Methicillin resistant Staph aureus not isolated  Final   10/30/2020 Methicillin resistant Staph aureus not isolated  Final   07/28/2017 Methicillin resistant Staph aureus not isolated  Final          Assessment:  · Acute on chronic hypoxic respiratory failure likely 2/2 pneumonia and volume overload  - resolving   · Bacterial pneumonia, likely aspiration pneumonia but after extubation patient had to be reintubated rule out HCAP  · Shock, sepsis- resolved, blood cultures negative   · Alcohol withdrawal       Plan:    · Continue Zosyn for aspiration pneumonia plus HCAP - day 10 of 10 from the difficult trach insertion  - stop after todays dose  · Repeat respiratory & blood cultures - no growth   · 11/19/2020 PICC line placed  · Will follow with you  · Monitor labs - BUN & creatinine increased today 28 & 2.5 - fluids were started     Karen Bland  12:42 PM  11/21/2020     Pt seen and examined. Above discussed agree with advanced practice nurse. Labs, cultures, and radiographs reviewed. Face to Face encounter occurred. Changes made as necessary.      Verona De La Torre MD

## 2020-11-21 NOTE — PROGRESS NOTES
Patient placed on Spontaneous Mode on Ventilator at this time. Initially started patient at a PS of 10 but had to increase to PS of 12 due to low Tidal Volumes. Current Settings: Pressure support 12 PEEP +5 35% FiO2. Tolerating well. HR 83, RR 22, SpO2 100%.      11/21/20 0951   Vent Information   Equipment Changed Humidification   Vent Mode PS   Pressure Ordered 12   FiO2  35 %   SpO2 100 %   SpO2/FiO2 ratio 285.71   PEEP/CPAP 5   Humidification Source Heated wire   Humidification Temp 37   Humidification Temp Measured 36.9   Circuit Condensation Drained   Vent Patient Data   Peak Inspiratory Pressure 17 cmH2O   Mean Airway Pressure 8.9 cmH20   Rate Measured 22 br/min   Vt Exhaled 312 mL   Minute Volume 6.75 Liters   I:E Ratio 1:2.4

## 2020-11-21 NOTE — PROGRESS NOTES
Pulmonary Progress Note  11/21/2020 7:28 AM  Subjective:   Admit Date: 10/26/2020  PCP: Luisa Gilliam MD  Interval History: More sedated this morning    Diet: DIET TUBE FEED CONTINUOUS/CYCLIC NPO; Diabetic 1.5; Gastrostomy; Continuous; 25; 65; 23; Exceptions are: Sips with Meds      Data:   Scheduled Meds:    hydrALAZINE  50 mg PEG Tube 3 times per day    carvedilol  12.5 mg Oral BID WC    chlordiazePOXIDE  25 mg Oral Daily    piperacillin-tazobactam  3.375 g Intravenous Q8H    sodium chloride  25 mL Intravenous Q8H    QUEtiapine  50 mg Oral BID    thiamine (VITAMIN B1) IVPB  250 mg Intravenous Q24H    divalproex  250 mg Oral 3 times per day    heparin flush  3 mL Intravenous 2 times per day    senna  2 tablet Oral Nightly    insulin lispro  0-18 Units Subcutaneous Y7A    folic acid  1 mg Intravenous Daily    [Held by provider] insulin glargine  20 Units Subcutaneous Nightly    sodium chloride (Inhalant)  2 mL Nebulization Q4H    heparin (porcine)  7,500 Units Subcutaneous Q8H    pantoprazole  40 mg Intravenous Daily    And    sodium chloride (PF)  10 mL Intravenous Daily    sodium chloride flush  10 mL Intravenous 2 times per day    Arformoterol Tartrate  15 mcg Nebulization BID    chlorhexidine  15 mL Mouth/Throat BID    amLODIPine  10 mg Oral Daily    [Held by provider] aspirin  81 mg Oral Daily    levothyroxine  50 mcg Oral Daily    [Held by provider] losartan  100 mg Oral Daily    budesonide  0.5 mg Nebulization BID    ipratropium-albuterol  1 ampule Inhalation Q4H WA       Continuous Infusions:    dextrose         PRN Meds: HYDROcodone 5 mg - acetaminophen, labetalol, trimethobenzamide, midazolam, sodium chloride flush, heparin flush, polyethylene glycol, perflutren lipid microspheres, acetaminophen, polyvinyl alcohol, hydrALAZINE, sodium chloride flush, [DISCONTINUED] acetaminophen **OR** acetaminophen, colchicine, glucose, dextrose, glucagon (rDNA), dextrose    I/O last 3 completed shifts: In: 2069 [NG/GT:1779; IV Piggyback:290]  Out: 1970 [Urine:770; Stool:1200]    No intake/output data recorded. Intake/Output Summary (Last 24 hours) at 11/21/2020 8083  Last data filed at 11/21/2020 0311  Gross per 24 hour   Intake 2069 ml   Output 1970 ml   Net 99 ml       Patient Vitals for the past 96 hrs (Last 3 readings):   Weight   11/18/20 0600 (!) 302 lb 6.4 oz (137.2 kg)         Recent Labs     11/19/20  0501 11/20/20  0446 11/21/20  0410   WBC 9.9 8.3 10.7   HGB 9.5* 8.9* 8.9*   HCT 28.7* 26.7* 26.7*   MCV 79.7* 78.8* 78.1*    374 383     Recent Labs     11/19/20  0501 11/20/20  0446 11/21/20  0410   * 142 142   K 3.6 3.6 3.9   * 108* 105   CO2 23 26 25   BUN 18 18 28*   CREATININE 1.1 1.0 2.5*     Recent Labs     11/19/20  0501 11/20/20  0446 11/21/20  0410   PROT 7.8 7.6 8.0   ALKPHOS 118 113 125   * 112* 132*   AST 43* 41* 58*   BILITOT 0.5 0.4 0.3     CPK:  Lab Results   Component Value Date    CKTOTAL 98 10/08/2017       -----------------------------------------------------------------  RAD:   Results for orders placed during the hospital encounter of 10/26/20   XR CHEST PORTABLE    Narrative EXAMINATION:  ONE XRAY VIEW OF THE CHEST  11/20/2020 7:55 am  COMPARISON:  19 November 2020  HISTORY:  ORDERING SYSTEM PROVIDED HISTORY: SOB  TECHNOLOGIST PROVIDED HISTORY:  Reason for exam:->SOB  What reading provider will be dictating this exam?->CRC  FINDINGS:  Central venous catheter on the right terminates in the right atrium proper. This is a stable finding. A left-sided central venous catheter is been  removed. There is a stable tracheostomy catheter. There are opacities in  both lungs which may represent infiltrate and/or atelectasis. Aeration  appears minimally worse. Impression Mildly increasing infiltrate and/or atelectasis bilaterally. Please note,  the right central venous catheter terminates in the right atrium proper.        Micro:  Vent Information  $Ventilation: $Subsequent Day  Skin Assessment: Other (see comment/note)  Equipment ID: [id number]  Equipment Changed: (S) Humidification  Vent Type: 840  Vent Mode: AC/VC  Vt Ordered: 480 mL  Rate Set: 12 bmp  Peak Flow: 60 L/min  Pressure Support: 0 cmH20  FiO2 : 35 %  SpO2: 97 %  SpO2/FiO2 ratio: 274.29  PaO2/FiO2 ratio: 169  Sensitivity: 3  PEEP/CPAP: 5  I Time/ I Time %: 0 s  Humidification Source: Heated wire  Humidification Temp: 37  Humidification Temp Measured: 37.2  Circuit Condensation: Drained  Mask Type: Full face mask  Mask Size: Large    Additional Respiratory  Assessments  Pulse: 91  Resp: 20  SpO2: 97 %  Position: Semi-Lockwood's  Humidification Source: Heated wire  Humidification Temp: 37  Circuit Condensation: Drained  Oral Care Completed?: Yes  Oral Care: Teeth brushed, Suction toothette  Subglottic Suction Done?: Yes  Airway Type: Trachial  Airway Size: 8  Cuff Pressure (cm H2O): 29 cm H2O  Skin barrier applied: Yes    Objective:   Vitals:   Vitals:    20 0600   BP: (!) 178/86   Pulse: 91   Resp: 20   Temp: 98.1 °F (36.7 °C)   SpO2: 97%      TEMP:Current: Temp: 98.1 °F (36.7 °C)  Max: Temp  Av.9 °F (36.6 °C)  Min: 97.2 °F (36.2 °C)  Max: 98.4 °F (36.9 °C)    BP Range: Systolic (83TMK), RRC:832 , Min:173 , MFF:607     Diastolic (73GMY), BSD:92, Min:80, Max:103    General appearance: obese on mechanical ventilation , appears stated age awakens to touch  In no acute distress  Skin: No rashes or lesions  HEENT: mucous membranes are moist tracheostomy no bleeding noted less secretions  neck: No JVD  Lungs: symmetrical expansion, coarse breath sounds to auscultation, no use of accessory muscles  Heart: S1S2 no murmurs, tachycardic  Abdomen: soft, non tender, distended obese PEG tube  Extremities: no peripheral edema  Neurologic:  alert following commands moving all extremities  Affect: Calm          Assessment:   Patient Active Problem List:     60-y.o M with history of DM, hypothyroid, COPD, HTN, HLD presented on 10/26 with shortness of breath for 1 week.  Saturations were 70% at PCPs.  Saturation 65% on room air in ER  Patient agitated for CT scan refused to lay flat  10/27 RRT refused BiPAP became combative.  Patient transferred to ICU  10/28 intubated and sedated.  CT chest showing dense multifocal airspace disease  10/30 Ultrasound lower extremities no DVT, ultrasound kidneys no hydronephrosis  10/31 bilateral infiltrates left effusion.  On 50% +8 of PEEP.  Chest x-ray showing right-sided improving with left base atelectasis. Covid negative x2  11/8 patient extubated and reintubated the same day  11/10 ultrasound lower extremities no DVT  11/11 PEG tube placed unable to place tracheostomy due to scar tissue.  Neck CT showing calcification at level of critical thyroid membrane  11/12 tracheostomy placed by ENT.  Patient aspirated  11/13 propofol DC'd  11/15 Precedex and fentanyl drips DC'd  11/16 off sedation hard time waking up being seen by neurology.  CT head negative. Chest x-ray right perihilar infiltrate. 11/17 emesis x3.  Had bright blood and clots coming from ENT.  ENT evaluated with no evidence of bleeding.  Neurologically improving moving all extremities.  Repeat bleeding from trach site in the evening.  Decreased to 40% +5  11/19 PICC line placed.  Chest x-ray improvement  11/20 now down to 35% chest x-ray with increasing infiltrates  7/21 worsening renal function   1. Hemoptysis / bleeding at trach site  2. Drop of hemoglobin from 10.4  - 9  3. acute hypoxemic respiratory failure on mechanical ventilation s/p trach 11/12 8 cuffed Shiley trach proximal XLT  4. Metabolic encephalopathy history of EtOH abuse with agitation on ( Depakote, Librium being weaned off and Seroquel  )significantly improved  5. Acute renal failure Baseline creatinine 1.9 on 5/2019  6. Anemia  7. HAP   8. HANS moderate ( AHI 17 on 4/18/18 requires BIPAP 16/12) noncompliant with CPAP  9.  History of reactive airway disease  10. CT 3/14/2018 showing pulmonary nodule stable from 10/2017  11. 12. Diabetes with elevated blood sugars  13. Obesity  14. EF 94% LVH diastolic dysfunction  15. chronic lymphedema  16. History of gout on colchicine  17. Hypothyroid  18. H/o respiratory failure: 7/8/2017 pt was  transferred [facility] for acute respiratory failure after lap cholecystectomy, lap umbilical hernia repair, and lap liver biopsy (fatty liver).  He had been extubated postop but had laryngospasm requiring reintubation, complicated by negative pressure flash pulmonary edema, and required tracheostomy 8/10/2017. Klaudia Roblero has staph aureus pneumonia and C. difficile colitis. Klaudia Roblero was then transferred to [facility] hospital where he was weaned off the ventilator and decannulated.     Plan:      · Increase free water  · IV fluids for today  · Follow-up BUN/creatinine tomorrow  · Start pressure support weaning  · Weaning off Librium  · Episodes of hypertension with agitation adjust blood pressure medicines  · LTACH evaluation and placement   · Day 10 of Zosyn DC as per ID should be able to DC   ·   TERESA Amin

## 2020-11-22 NOTE — PROGRESS NOTES
PT SEEN AND EXAMINED. intubated, in pic. BP labile. S/p peg/trach,  Chart reviewed. meds reviewed. D/w nursing + family as available. EXAM: IN GENERAL, SUKHI Boyd ROS NEGx10 EXCEPT:   BP (!) 175/78   Pulse 90   Temp 97.8 °F (36.6 °C) (Temporal)   Resp 20   Ht 5' 8\" (1.727 m)   Wt (!) 302 lb 6.4 oz (137.2 kg)   SpO2 99%   BMI 45.98 kg/m²   GEN:  NAD. HEENT: NCAT. NECK: NO JVD. TRACH MIDLINE. NO BRUITS. NO THYROMEGALY. LUNGS: CTA BL NO RALES, RHONCHI OR WHEEZES. GOOD EXCURSION. CV: Regular rate and rhythm, NO Murmurs, Rubs, Or gallops  ABD: Soft. Nontender. Normal bowel sounds.  No organomegaly  EXT:No clubbing cyanosis or edema  Neuro: Labs/data reviewedLABS: CBC with Differential:    Lab Results   Component Value Date    WBC 10.1 11/22/2020    RBC 3.21 11/22/2020    HGB 8.4 11/22/2020    HCT 25.5 11/22/2020     11/22/2020    MCV 79.4 11/22/2020    MCH 26.2 11/22/2020    MCHC 32.9 11/22/2020    RDW 19.9 11/22/2020    NRBC 0.9 11/16/2020    SEGSPCT 73 01/26/2014    METASPCT 1.8 11/15/2020    LYMPHOPCT 29.2 11/22/2020    MONOPCT 6.2 11/22/2020    MYELOPCT 0.9 11/15/2020    BASOPCT 0.4 11/22/2020    MONOSABS 0.63 11/22/2020    LYMPHSABS 2.95 11/22/2020    EOSABS 0.92 11/22/2020    BASOSABS 0.04 11/22/2020     Platelets:    Lab Results   Component Value Date     11/22/2020     CMP:    Lab Results   Component Value Date     11/22/2020    K 4.3 11/22/2020    K 4.0 10/27/2020     11/22/2020    CO2 24 11/22/2020    BUN 38 11/22/2020    CREATININE 2.2 11/22/2020    GFRAA 37 11/22/2020    LABGLOM 37 11/22/2020    GLUCOSE 141 11/22/2020    PROT 6.2 11/22/2020    LABALBU 3.4 11/22/2020    CALCIUM 8.9 11/22/2020    BILITOT <0.2 11/22/2020    ALKPHOS 110 11/22/2020    AST 52 11/22/2020     11/22/2020     Magnesium:    Lab Results   Component Value Date    MG 1.7 11/18/2020     LDH:    Lab Results   Component Value Date     10/28/2020     PT/INR:    Lab Results   Component Value Date    PROTIME 14.0 11/18/2020    INR 1.2 11/18/2020     Last 3 Troponin:    Lab Results   Component Value Date    TROPONINI <0.01 10/26/2020    TROPONINI <0.01 05/26/2019    TROPONINI <0.01 05/26/2019     ABG:    Lab Results   Component Value Date    PH 7.371 11/22/2020    PCO2 40.2 11/22/2020    PO2 72.8 11/22/2020    HCO3 22.8 11/22/2020    BE -2.3 11/22/2020    O2SAT 92.5 11/22/2020     IRON:    Lab Results   Component Value Date    IRON 95 11/05/2020     IMAGING    Xr Chest Portable    Result Date: 10/27/2020  EXAMINATION: ONE XRAY VIEW OF THE CHEST 10/27/2020 7:58 pm COMPARISON: October 26, 2020 HISTORY: ORDERING SYSTEM PROVIDED HISTORY: SOB TECHNOLOGIST PROVIDED HISTORY: Reason for exam:->SOB What reading provider will be dictating this exam?->CRC FINDINGS: There is mild cardiomegaly. There is patchy perihilar and bibasilar atelectasis/infiltrates. Tiny pleural effusions are suspected. Progressive bibasilar atelectasis/infiltrates concerning for pneumonia. Underlying CHF is considered. Xr Chest Portable    Result Date: 10/26/2020  EXAMINATION: ONE XRAY VIEW OF THE CHEST 10/26/2020 4:11 pm COMPARISON: 05/26/2019 HISTORY: ORDERING SYSTEM PROVIDED HISTORY: SOB TECHNOLOGIST PROVIDED HISTORY: Reason for exam:->SOB What reading provider will be dictating this exam?->CRC FINDINGS: Cardiac size appears stable. Discoid airspace disease seen at the left lung base which may represent atelectasis or scarring. Right infrahilar and basilar infiltrate is identified. No pneumothorax is identified. No acute osseous abnormality is identified. Right infrahilar and basilar infiltrate concerning for pneumonia. Left basilar atelectasis or scarring.        Medications:    Scheduled Meds:   carvedilol  6.25 mg Oral BID WC    hydrALAZINE  25 mg PEG Tube 3 times per day    QUEtiapine  50 mg Oral BID    thiamine (VITAMIN B1) IVPB  250 mg Intravenous Q24H    divalproex  250 mg Oral 3 times per day    heparin flush  3 mL Intravenous 2 times per day    senna  2 tablet Oral Nightly    insulin lispro  0-18 Units Subcutaneous B0A    folic acid  1 mg Intravenous Daily    [Held by provider] insulin glargine  20 Units Subcutaneous Nightly    sodium chloride (Inhalant)  2 mL Nebulization Q4H    heparin (porcine)  7,500 Units Subcutaneous Q8H    pantoprazole  40 mg Intravenous Daily    And    sodium chloride (PF)  10 mL Intravenous Daily    sodium chloride flush  10 mL Intravenous 2 times per day    Arformoterol Tartrate  15 mcg Nebulization BID    chlorhexidine  15 mL Mouth/Throat BID    amLODIPine  10 mg Oral Daily    [Held by provider] aspirin  81 mg Oral Daily    levothyroxine  50 mcg Oral Daily    budesonide  0.5 mg Nebulization BID    ipratropium-albuterol  1 ampule Inhalation Q4H WA       Continuous Infusions:   sodium chloride 75 mL/hr at 11/21/20 1244    dextrose         PRN Meds:HYDROcodone 5 mg - acetaminophen, labetalol, trimethobenzamide, midazolam, sodium chloride flush, heparin flush, polyethylene glycol, perflutren lipid microspheres, acetaminophen, polyvinyl alcohol, hydrALAZINE, sodium chloride flush, [DISCONTINUED] acetaminophen **OR** acetaminophen, colchicine, glucose, dextrose, glucagon (rDNA), dextrose    A/P:      Patient Active Problem List   Diagnosis    Hyperlipidemia    Type 2 diabetes mellitus without complication, without long-term current use of insulin (HCC)    Atypical chest pain    Essential hypertension    Chronic acquired lymphedema    Aortic valve disease    Morbid obesity due to excess calories (HCC)    Abdominal pain    Acute respiratory failure with hypoxia (HCC)    Acute pulmonary edema (HCC)    Congestive heart failure with LV diastolic dysfunction, NYHA class 3 (HCC)    Obstructive sleep apnea    Acquired hypothyroidism    Chronic diastolic heart failure (HCC)    Nonrheumatic aortic valve stenosis    ACE-inhibitor cough    Pneumonia due to organism   

## 2020-11-22 NOTE — PROGRESS NOTES
Chief Complaint   Patient presents with    Shortness of Breath     for a few weeks[facility] and now 95%. SUBJECTIVE:  Patient is tolerating medications. No reported adverse drug reactions. Afebrile. Hypertensive again, somewhat improving. Mentation improving - agitation is improved  Trach to vent - 35% FiO2, 100% SpO2, PEEP 5            Review of systems:  As stated above in the chief complaint, otherwise negative.     Medications:  Scheduled Meds:   carvedilol  6.25 mg Oral BID WC    hydrALAZINE  25 mg PEG Tube 3 times per day    QUEtiapine  50 mg Oral BID    thiamine (VITAMIN B1) IVPB  250 mg Intravenous Q24H    divalproex  250 mg Oral 3 times per day    heparin flush  3 mL Intravenous 2 times per day    senna  2 tablet Oral Nightly    insulin lispro  0-18 Units Subcutaneous X8M    folic acid  1 mg Intravenous Daily    [Held by provider] insulin glargine  20 Units Subcutaneous Nightly    sodium chloride (Inhalant)  2 mL Nebulization Q4H    heparin (porcine)  7,500 Units Subcutaneous Q8H    pantoprazole  40 mg Intravenous Daily    And    sodium chloride (PF)  10 mL Intravenous Daily    sodium chloride flush  10 mL Intravenous 2 times per day    Arformoterol Tartrate  15 mcg Nebulization BID    chlorhexidine  15 mL Mouth/Throat BID    amLODIPine  10 mg Oral Daily    [Held by provider] aspirin  81 mg Oral Daily    levothyroxine  50 mcg Oral Daily    budesonide  0.5 mg Nebulization BID    ipratropium-albuterol  1 ampule Inhalation Q4H WA     Continuous Infusions:   sodium chloride 75 mL/hr at 11/21/20 1244    dextrose       PRN Meds:HYDROcodone 5 mg - acetaminophen, labetalol, trimethobenzamide, midazolam, sodium chloride flush, heparin flush, polyethylene glycol, perflutren lipid microspheres, acetaminophen, polyvinyl alcohol, hydrALAZINE, sodium chloride flush, [DISCONTINUED] acetaminophen **OR** acetaminophen, colchicine, glucose, dextrose, glucagon (rDNA), dextrose    OBJECTIVE:  BP (!) 171/77   Pulse 92   Temp 97.7 °F (36.5 °C) (Temporal)   Resp 20   Ht 5' 8\" (1.727 m)   Wt (!) 302 lb 6.4 oz (137.2 kg)   SpO2 92%   BMI 45.98 kg/m²   Temp  Av °F (36.7 °C)  Min: 97 °F (36.1 °C)  Max: 99.1 °F (37.3 °C)  Constitutional: awake, alert, followed simple commands, much less agitated   Skin: Warm and dry. No rashes were noted. HEENT: Round and reactive pupils. Moist mucous membranes. No ulcerations or thrush. Chest: Trach - bleeding around site- improving, symmetrical expansion. Some coarseness right & left upper lobes - improving   Cardiovascular: S1 and S2 are rhythmic and regular. Systolic murmurs appreciated.    Abdomen: Hyperactive bowel sounds, obese abdomen, PEG, FMS system   Extremities: No clubbing, no cyanosis, + Lymphedema left lower extremity- improving   Lines: PICC line right IJ 2020     Laboratory and Tests Review:  Lab Results   Component Value Date    WBC 10.1 2020    WBC 10.7 2020    WBC 8.3 2020    HGB 8.4 (L) 2020    HCT 25.5 (L) 2020    MCV 79.4 (L) 2020     2020     Lab Results   Component Value Date    NEUTROABS 5.48 2020    NEUTROABS 7.82 (H) 2020    NEUTROABS 5.45 2020     No results found for: [facility]  Lab Results   Component Value Date     (H) 2020    AST 52 (H) 2020    ALKPHOS 110 2020    BILITOT <0.2 2020     Lab Results   Component Value Date     2020    K 4.3 2020    K 4.0 10/27/2020     2020    CO2 24 2020    BUN 38 2020    CREATININE 2.2 2020    CREATININE 2.5 2020    CREATININE 1.0 2020    GFRAA 37 2020    LABGLOM 37 2020    GLUCOSE 141 2020    PROT 6.2 2020    LABALBU 3.4 2020    CALCIUM 8.9 2020    BILITOT <0.2 2020    ALKPHOS 110 2020    AST 52 2020     2020     Lab Results Component Value Date    CRP 1.4 (H) 11/02/2020    CRP 2.8 (H) 09/04/2017    CRP 10.5 (H) 08/28/2017     Lab Results   Component Value Date    SEDRATE 135 (H) 09/04/2017    SEDRATE 150 (H) 08/28/2017    SEDRATE 141 (H) 08/21/2017     Radiology:  CXR- 11/3- Multifocal bilateral pulmonary infiltrates more prominent within the right lung. CXR- 11/4- worsening b/l infiltrates, worse on left today with possible small L sided effusion   CXR- 11/5 - Stable b/l infiltrates   CXR- 11/6 - Expiratory film, image otherwise appears overall stable. CXR- 11/16 - stable right parahilar pulmonary opacity which may represent   atelectasis or pneumonia. Microbiology:   Lab Results   Component Value Date    BC 5 Days no growth 11/09/2020    BC 5 Days no growth 10/29/2020    BC  10/26/2020     This organism was isolated in one set. Susceptibility testing is not routinely done as this  organism frequently represents skin contamination. Additional testing can be ordered by calling the  Microbiology Department.       ORG Staphylococcus aureus 10/29/2020    ORG Staphylococcus coagulase-negative 10/26/2020    ORG Coagulase Negative Staphylococci 08/26/2017     Lab Results   Component Value Date    BLOODCULT2 5 Days no growth 11/09/2020    BLOODCULT2 5 Days no growth 10/29/2020    BLOODCULT2 5 Days no growth 10/26/2020    ORG Staphylococcus aureus 10/29/2020    ORG Staphylococcus coagulase-negative 10/26/2020    ORG Coagulase Negative Staphylococci 08/26/2017     No results found for: WNDABS  Smear, Respiratory   Date Value Ref Range Status   11/09/2020   Final    Group 6: <25 PMN's/LPF and <25 Epithelial cells/LPF  Moderate Polymorphonuclear leukocytes  Rare Epithelial cells  No organisms seen       No results found for: MPNEUMO, CLAMYDCU, LABLEGI, AFBCX, FUNGSM, LABFUNG  CULTURE, RESPIRATORY   Date Value Ref Range Status   11/09/2020 Growth not present  Oral Pharyngeal Tiesha absent    Final     Culture Catheter Tip   Date Value Ref Range Status   08/26/2017 <15 colonies  Final     No results found for: BFCS  No results found for: CXSURG  Urine Culture, Routine   Date Value Ref Range Status   11/09/2020 Growth not present  Final   10/28/2020 Growth not present  Final   09/16/2019 Growth not present  Final     MRSA Culture Only   Date Value Ref Range Status   11/09/2020 Methicillin resistant Staph aureus not isolated  Final   10/30/2020 Methicillin resistant Staph aureus not isolated  Final   07/28/2017 Methicillin resistant Staph aureus not isolated  Final          Assessment:  · Acute on chronic hypoxic respiratory failure likely 2/2 pneumonia and volume overload  - resolving   · Bacterial pneumonia, likely aspiration pneumonia but after extubation patient had to be reintubated rule out HCAP  · Shock, sepsis- resolved, blood cultures negative   · Alcohol withdrawal       Plan:    · Completed 10 day course of zosyn post trach insertion; follow off antibiotcs for now  · Repeat respiratory & blood cultures - no growth   · 11/19/2020 PICC line placed  · Will follow with you  · Monitor labs - creatinine improving today 2.2     Alber Newman  11:42 AM  11/22/2020     Pt seen and examined. Above discussed agree with advanced practice nurse. Labs, cultures, and radiographs reviewed. Face to Face encounter occurred. Changes made as necessary.      Luzma Kline MD

## 2020-11-22 NOTE — PROGRESS NOTES
Pulmonary Progress Note  11/22/2020 7:03 AM  Subjective:   Admit Date: 10/26/2020  PCP: Alma Bond MD  Interval History: on Vent, opens eyes to name but not following commands. 4pt Restraints. Diet: DIET TUBE FEED CONTINUOUS/CYCLIC NPO; Diabetic 1.5; Gastrostomy; Continuous; 25; 65; 23; Exceptions are: Sips with Meds      Data:   Scheduled Meds:    carvedilol  6.25 mg Oral BID WC    hydrALAZINE  25 mg PEG Tube 3 times per day    QUEtiapine  50 mg Oral BID    thiamine (VITAMIN B1) IVPB  250 mg Intravenous Q24H    divalproex  250 mg Oral 3 times per day    heparin flush  3 mL Intravenous 2 times per day    senna  2 tablet Oral Nightly    insulin lispro  0-18 Units Subcutaneous B1F    folic acid  1 mg Intravenous Daily    [Held by provider] insulin glargine  20 Units Subcutaneous Nightly    sodium chloride (Inhalant)  2 mL Nebulization Q4H    heparin (porcine)  7,500 Units Subcutaneous Q8H    pantoprazole  40 mg Intravenous Daily    And    sodium chloride (PF)  10 mL Intravenous Daily    sodium chloride flush  10 mL Intravenous 2 times per day    Arformoterol Tartrate  15 mcg Nebulization BID    chlorhexidine  15 mL Mouth/Throat BID    amLODIPine  10 mg Oral Daily    [Held by provider] aspirin  81 mg Oral Daily    levothyroxine  50 mcg Oral Daily    budesonide  0.5 mg Nebulization BID    ipratropium-albuterol  1 ampule Inhalation Q4H WA       Continuous Infusions:    sodium chloride 75 mL/hr at 11/21/20 1244    dextrose         PRN Meds: HYDROcodone 5 mg - acetaminophen, labetalol, trimethobenzamide, midazolam, sodium chloride flush, heparin flush, polyethylene glycol, perflutren lipid microspheres, acetaminophen, polyvinyl alcohol, hydrALAZINE, sodium chloride flush, [DISCONTINUED] acetaminophen **OR** acetaminophen, colchicine, glucose, dextrose, glucagon (rDNA), dextrose    I/O last 3 completed shifts:   In: 5682 [I.V.:1327; NG/GT:1843; IV Piggyback:300]  Out: 5249 [Urine:1500; Stool:800]    No intake/output data recorded. Intake/Output Summary (Last 24 hours) at 11/22/2020 0703  Last data filed at 11/22/2020 4771  Gross per 24 hour   Intake 3470 ml   Output 2300 ml   Net 1170 ml       No data found. Recent Labs     11/20/20 0446 11/21/20  0410 11/22/20  0539   WBC 8.3 10.7 10.1   HGB 8.9* 8.9* 8.4*   HCT 26.7* 26.7* 25.5*   MCV 78.8* 78.1* 79.4*    383 366     Recent Labs     11/20/20 0446 11/21/20  0410 11/22/20  0539    142 145   K 3.6 3.9 4.3   * 105 110*   CO2 26 25 24   BUN 18 28* 38*   CREATININE 1.0 2.5* 2.2*     Recent Labs     11/20/20 0446 11/21/20  0410 11/22/20  0539   PROT 7.6 8.0 6.2*   ALKPHOS 113 125 110   * 132* 123*   AST 41* 58* 52*   BILITOT 0.4 0.3 <0.2     CPK:  Lab Results   Component Value Date    CKTOTAL 98 10/08/2017       -----------------------------------------------------------------  RAD:   Results for orders placed during the hospital encounter of 10/26/20   XR CHEST PORTABLE    Narrative EXAMINATION:  ONE XRAY VIEW OF THE CHEST  11/20/2020 7:55 am  COMPARISON:  19 November 2020  HISTORY:  ORDERING SYSTEM PROVIDED HISTORY: SOB  TECHNOLOGIST PROVIDED HISTORY:  Reason for exam:->SOB  What reading provider will be dictating this exam?->CRC  FINDINGS:  Central venous catheter on the right terminates in the right atrium proper. This is a stable finding. A left-sided central venous catheter is been  removed. There is a stable tracheostomy catheter. There are opacities in  both lungs which may represent infiltrate and/or atelectasis. Aeration  appears minimally worse. Impression Mildly increasing infiltrate and/or atelectasis bilaterally. Please note,  the right central venous catheter terminates in the right atrium proper.        Micro:  Vent Information  $Ventilation: $Subsequent Day  Skin Assessment: Other (see comment/note)  Equipment ID: [id number]  Equipment Changed: (S) Humidification  Vent Type: 840  Vent Mode: PS  Vt Ordered: 480 mL  Pressure Ordered: 12  Rate Set: 12 bmp  Peak Flow: 60 L/min  Pressure Support: 12 cmH20  FiO2 : 35 %  SpO2: 98 %  SpO2/FiO2 ratio: 282.86  PaO2/FiO2 ratio: 169  Sensitivity: 3  PEEP/CPAP: 5  I Time/ I Time %: 0 s  Humidification Source: Heated wire  Humidification Temp: 37  Humidification Temp Measured: 37.1  Circuit Condensation: Drained  Mask Type: Full face mask  Mask Size: Large    Additional Respiratory  Assessments  Pulse: 82  Resp: 18  SpO2: 98 %  Position: Semi-Lockwood's  Humidification Source: Heated wire  Humidification Temp: 37  Circuit Condensation: Drained  Oral Care Completed?: Yes  Oral Care: Teeth brushed, Suction toothette  Subglottic Suction Done?: Yes  Airway Type: Trachial  Airway Size: 8  Cuff Pressure (cm H2O): 29 cm H2O  Skin barrier applied: Yes    Objective:   Vitals:   Vitals:    20 0600   BP: 136/64   Pulse: 82   Resp: 18   Temp: 98.2 °F (36.8 °C)   SpO2: 98%      TEMP:Current: Temp: 98.2 °F (36.8 °C)  Max: Temp  Av.6 °F (36.4 °C)  Min: 96.7 °F (35.9 °C)  Max: 98.3 °F (36.8 °C)    BP Range: Systolic (04VCN), UGM:151 , Min:106 , XJV:133     Diastolic (61DDL), CRD:16, Min:52, Max:66    General appearance: obese on mechanical ventilation , appears stated age awakens to name, no acute distress  Skin: No rashes or lesions  HEENT: mucous membranes are moist, tracheostomy no bleeding noted   neck: No JVD  Lungs: symmetrical expansion, dmiinished breath sounds to auscultation, no use of accessory muscles  Heart: RRR, S1S2 2/6 JUAN  Abdomen: soft, non tender, distended obese PEG tube  Extremities: no peripheral edema  Neurologic:  Oens eyes to name, not following commands  Affect: Calm          Assessment:   Patient Active Problem List:     59-y.o M with history of DM, hypothyroid, COPD, HTN, HLD presented on 10/26 with shortness of breath for 1 week.  Saturations were 70% at PCPs.  Saturation 65% on room air in ER  Patient agitated for CT scan refused to lay dysfunction  15. chronic lymphedema  16. History of gout on colchicine  17. Hypothyroid  18. H/o respiratory failure: 7/8/2017 pt was  transferred from [facility] for acute respiratory failure after lap cholecystectomy, lap umbilical hernia repair, and lap liver biopsy (fatty liver). He had been extubated postop but had laryngospasm requiring reintubation, complicated by negative pressure flash pulmonary edema, and required tracheostomy 8/10/2017.  Patient has staph aureus pneumonia and C. difficile colitis. Christi Goltz was then transferred to [facility] where he was weaned off the ventilator and decannulated.     Plan:      · Continue Free water  · Cr minimally improved  · Continue PS 12, 35%, P5, becomes tachypneic at times per RRT  · Will check gases this am  · Episodes of hypertension with agitation adjust blood pressure medicines  · LTACH evaluation and placement   · Continue brovana/pulmicort      Leesa Kay CNP    Attending Attestation Note:    Patient seen and examined with NP. I agree with above.     In addition, the following apply:    - PSV 12, PEEP 5, FiO2 35%, wean as able   - Continue brovana/pulmicort  - LTACH placement   - COVID negative on 11/19/2020    Margot Elliott  11/22/2020  6:05 PM

## 2020-11-22 NOTE — PROGRESS NOTES
Nephrology Progress Note  Patient's Name: Jasper Guerrero  12:13 PM  11/22/2020        Reason for Consult: Acute kidney  Requesting Physician:  Rancho Norris MD    Chief Complaint: Shortness of breath  History Obtained From:  EHR; spouse    History of Present Ilness:    Jasper Guerrero is a 61 y.o. male with a history of COPD, hypertension, hyperlipidemia and diabetes mellitus. He presented to ED 2 days ago with complaints of shortness of breath. According to patient's spouse he had been complaining of shortness of breath over the course of several weeks. This has gotten progressively worse. He went to see his PCP on the day of admission. In office at the time pulse oximeter obtained revealed his oxygen saturation to be in the 70s. He was instructed to proceed to ED for further evaluation. On presentation to ED his initial vital signs showed a blood pressure of 168/71, temperature 97.5 and pulse of 93. His pulse oximeter at the time was noted to be 98% on room air. Laboratory data was significant for a BUN of 11, creatinine 1.1, WBC of 8.3. Rapid COVID-19 testing was negative. A chest x-ray performed revealed right infrahilar and basilar infiltrate concerning for pneumonia. A CTA was ordered but patient declined because of his inability to lay flat. Patient was given ceftriaxone and doxycycline followed by admission to telemetry. 2 days ago an RRT was called as patient was noted to be increasingly more in respiratory distress. He was transferred to MICU and intubated. Laboratory data yesterday showed worsening serum creatinine to 2.8. This a.m. further worsening to 3.6. He has been nonoliguric. Hence renal consult. 10/30: N new acute issues overnight; remains intubated  10/31: Polyuric , concerned for possible DI; no other acute issues overnight  11/1: agitated overnight ; remains intubated  11/2: pt seen and examined.  Chart reviewed, pt intubated  11/3: pt seen and examined in icu, pt intubated  11/4: pt seen and examined in icu, no overnight events, intubated  11/5: pt seen in room ,remains intubated  11/6: pt seen in room, intubated. 11/7: pt seen in room, intubated, chart reviewed  11/8:pt seen in room, remains intuibated  11/9: pt seen in room, reintubated yesterday  11/10: pt seen in icu, intubated.    11/12: pt getting trach today  11/13: sp trach peg  11/14: No new problems reported from overnight  11/15: Remains trach/intubated; periods of agitation  11/16: emesis, agitation this am  11/17: Patient responding to some simple commands today  11/18: No new c/o  11/19: No further bleeding from trach stoma  11/21 :pt seen in room, no cp or sob  11/22: pt seen in room, trach on vent    Allergies:  Bee venom and Shellfish-derived products    Current Medications:    0.9 % sodium chloride infusion, Continuous  carvedilol (COREG) tablet 6.25 mg, BID WC  hydrALAZINE (APRESOLINE) tablet 25 mg, 3 times per day  HYDROcodone-acetaminophen (NORCO) 5-325 MG per tablet 1 tablet, Q6H PRN  labetalol (NORMODYNE;TRANDATE) injection 10 mg, Q4H PRN  QUEtiapine (SEROQUEL) tablet 50 mg, BID  trimethobenzamide (TIGAN) injection 200 mg, Q6H PRN  thiamine (B-1) 250 mg in sodium chloride 0.9 % 100 mL IVPB, Q24H  midazolam (VERSED) injection 2 mg, Q4H PRN  divalproex (DEPAKOTE SPRINKLE) capsule 250 mg, 3 times per day  sodium chloride flush 0.9 % injection 10 mL, PRN  heparin flush 100 UNIT/ML injection 300 Units, 2 times per day  heparin flush 100 UNIT/ML injection 300 Units, PRN  senna (SENOKOT) tablet 17.2 mg, Nightly  polyethylene glycol (GLYCOLAX) packet 17 g, Daily PRN  perflutren lipid microspheres (DEFINITY) injection 1.65 mg, ONCE PRN  insulin lispro (HUMALOG) injection vial 0-18 Units, Q4H  acetaminophen (TYLENOL) 160 MG/5ML solution 193 mg, F5Q PRN  folic acid injection 1 mg, Daily  polyvinyl alcohol (LIQUIFILM TEARS) 1.4 % ophthalmic solution 1 drop, Q4H PRN  [Held by provider] insulin glargine (LANTUS) injection vial 20 Units, Nightly  sodium chloride (Inhalant) 3 % nebulizer solution 2 mL, Q4H  hydrALAZINE (APRESOLINE) injection 10 mg, Q4H PRN  heparin (porcine) injection 7,500 Units, Q8H  pantoprazole (PROTONIX) injection 40 mg, Daily    And  sodium chloride (PF) 0.9 % injection 10 mL, Daily  sodium chloride flush 0.9 % injection 10 mL, 2 times per day  sodium chloride flush 0.9 % injection 10 mL, PRN  acetaminophen (TYLENOL) suppository 650 mg, Q6H PRN  Arformoterol Tartrate (BROVANA) nebulizer solution 15 mcg, BID  chlorhexidine (PERIDEX) 0.12 % solution 15 mL, BID  amLODIPine (NORVASC) tablet 10 mg, Daily  [Held by provider] aspirin chewable tablet 81 mg, Daily  colchicine (COLCRYS) tablet 0.6 mg, BID PRN  levothyroxine (SYNTHROID) tablet 50 mcg, Daily  budesonide (PULMICORT) nebulizer suspension 500 mcg, BID  ipratropium-albuterol (DUONEB) nebulizer solution 1 ampule, Q4H WA  glucose (GLUTOSE) 40 % oral gel 15 g, PRN  dextrose 50 % IV solution, PRN  glucagon (rDNA) injection 1 mg, PRN  dextrose 5 % solution, PRN      Physical exam:  Vitals:    11/22/20 1200   BP: (!) 175/78   Pulse: 90   Resp: 20   Temp: 97.8 °F (36.6 °C)   SpO2: 99%           General: Intubated/trach; awake  Eyes: PERRL. No sclera icterus. No conjunctival injection. ENT: No discharge. Pharynx clear. Neck: Tracheostomy/intubated  Lungs: Coarse breath sounds  CV: Regular rate. Regular rhythm. No murmur or rub. .   Abd:  Non-distended. No masses. No organmegaly. Normal bowel sounds. Skin: Warm and dry. No nodule on exposed extremities. No rash on exposed extremities.   Ext: No cyanosis, clubbing, edema; 2+ pitting bilateral lower extremity edema L>R  Neuro: awake; follows some simple commands      Data:   Labs:  Lab Results   Component Value Date     11/22/2020     11/21/2020     11/20/2020    K 4.3 11/22/2020    K 3.9 11/21/2020    K 3.6 11/20/2020     (H) 11/22/2020    CO2 24 11/22/2020    CO2 25 11/21/2020    CO2 26 11/20/2020    CREATININE 2.2 (H) 11/22/2020    CREATININE 2.5 (H) 11/21/2020    CREATININE 1.0 11/20/2020    BUN 38 (H) 11/22/2020    BUN 28 (H) 11/21/2020    BUN 18 11/20/2020    GLUCOSE 141 (H) 11/22/2020    GLUCOSE 223 (H) 11/21/2020    GLUCOSE 173 (H) 11/20/2020    PHOS 3.6 11/18/2020    PHOS 3.7 11/17/2020    PHOS 2.4 (L) 11/16/2020    WBC 10.1 11/22/2020    WBC 10.7 11/21/2020    WBC 8.3 11/20/2020    HGB 8.4 (L) 11/22/2020    HGB 8.9 (L) 11/21/2020    HGB 8.9 (L) 11/20/2020    HCT 25.5 (L) 11/22/2020    HCT 26.7 (L) 11/21/2020    HCT 26.7 (L) 11/20/2020    MCV 79.4 (L) 11/22/2020     11/22/2020         Imaging:  Xr Chest Portable    Result Date: 10/27/2020  EXAMINATION: ONE XRAY VIEW OF THE CHEST 10/27/2020 7:58 pm COMPARISON: October 26, 2020 HISTORY: ORDERING SYSTEM PROVIDED HISTORY: SOB TECHNOLOGIST PROVIDED HISTORY: Reason for exam:->SOB What reading provider will be dictating this exam?->CRC FINDINGS: There is mild cardiomegaly. There is patchy perihilar and bibasilar atelectasis/infiltrates. Tiny pleural effusions are suspected. Progressive bibasilar atelectasis/infiltrates concerning for pneumonia. Underlying CHF is considered. Xr Chest Portable    Result Date: 10/26/2020  EXAMINATION: ONE XRAY VIEW OF THE CHEST 10/26/2020 4:11 pm COMPARISON: 05/26/2019 HISTORY: ORDERING SYSTEM PROVIDED HISTORY: SOB TECHNOLOGIST PROVIDED HISTORY: Reason for exam:->SOB What reading provider will be dictating this exam?->CRC FINDINGS: Cardiac size appears stable. Discoid airspace disease seen at the left lung base which may represent atelectasis or scarring. Right infrahilar and basilar infiltrate is identified. No pneumothorax is identified. No acute osseous abnormality is identified. Right infrahilar and basilar infiltrate concerning for pneumonia. Left basilar atelectasis or scarring. Assessment/Plans    1.  Acute kidney injury on ckd 3 a  Baseline 1.8 from 9/2019, 1.1 in 5/2019  hemodynamically mediated from bradycardia/diuretic/ARB   exacerbated by IV contrast use   Renal function back to baseline but spiked 1>2.5>2.2  Trial of ivf  Dc'd cozaar which has been on hold    2. Acute hypoxic respiratory failure  due to multilobar pneumonia  gradual wean  Completed course of antibiotics  Failed extubation 11/8  S/P trach 11/12  Neg 5.4 L    3. H/O ETOH  abuse    4. Hypernatremia  imrpoved off bumex    5.   Hypokalemia due to brisk urine output  Supplement as needed          Burt Roy MD  12:13 PM  11/22/2020

## 2020-11-22 NOTE — PLAN OF CARE
Problem: Pain:  Description: Pain management should include both nonpharmacologic and pharmacologic interventions.   Goal: Pain level will decrease  Description: Pain level will decrease  Outcome: Met This Shift     Problem: Restraint Use - Nonviolent/Non-Self-Destructive Behavior:  Goal: Absence of restraint indications  Description: Absence of restraint indications  Outcome: Not Met This Shift  Goal: Absence of restraint-related injury  Description: Absence of restraint-related injury  Outcome: Met This Shift

## 2020-11-22 NOTE — PROGRESS NOTES
4 point soft restraints removed, ROM performed, no signs of injury noted. Patient not following commands, trying to get up, and pulling at trach. 4 point restraints reapplied for patients safety, will continue to monitor closely.

## 2020-11-23 NOTE — PROGRESS NOTES
Nephrology Progress Note  Patient's Name: Kash Noble  6:03 PM  11/23/2020        Reason for Consult: Acute kidney  Requesting Physician:  Rose Quijano MD    Chief Complaint: Shortness of breath  History Obtained From:  EHR; spouse    History of Present Ilness:    Kash Noble is a 61 y.o. male with a history of COPD, hypertension, hyperlipidemia and diabetes mellitus. He presented to ED 2 days ago with complaints of shortness of breath. According to patient's spouse he had been complaining of shortness of breath over the course of several weeks. This has gotten progressively worse. He went to see his PCP on the day of admission. In office at the time pulse oximeter obtained revealed his oxygen saturation to be in the 70s. He was instructed to proceed to ED for further evaluation. On presentation to ED his initial vital signs showed a blood pressure of 168/71, temperature 97.5 and pulse of 93. His pulse oximeter at the time was noted to be 98% on room air. Laboratory data was significant for a BUN of 11, creatinine 1.1, WBC of 8.3. Rapid COVID-19 testing was negative. A chest x-ray performed revealed right infrahilar and basilar infiltrate concerning for pneumonia. A CTA was ordered but patient declined because of his inability to lay flat. Patient was given ceftriaxone and doxycycline followed by admission to telemetry. 2 days ago an RRT was called as patient was noted to be increasingly more in respiratory distress. He was transferred to MICU and intubated. Laboratory data yesterday showed worsening serum creatinine to 2.8. This a.m. further worsening to 3.6. He has been nonoliguric. Hence renal consult. 10/30: N new acute issues overnight; remains intubated  10/31: Polyuric , concerned for possible DI; no other acute issues overnight  11/1: agitated overnight ; remains intubated  11/2: pt seen and examined.  Chart reviewed, pt intubated  11/3: pt seen and examined in icu, pt intubated  11/4: pt seen and examined in icu, no overnight events, intubated  11/5: pt seen in room ,remains intubated  11/6: pt seen in room, intubated. 11/7: pt seen in room, intubated, chart reviewed  11/8:pt seen in room, remains intuibated  11/9: pt seen in room, reintubated yesterday  11/10: pt seen in icu, intubated.    11/12: pt getting trach today  11/13: sp trach peg  11/14: No new problems reported from overnight  11/15: Remains trach/intubated; periods of agitation  11/16: emesis, agitation this am  11/17: Patient responding to some simple commands today  11/18: No new c/o  11/19: No further bleeding from trach stoma  11/21 :pt seen in room, no cp or sob  11/22: pt seen in room, trach on vent  11/23: No complaints; patient actually verbalizing a little appropriately    Allergies:  Bee venom and Shellfish-derived products    Current Medications:    QUEtiapine (SEROQUEL) tablet 25 mg, BID  0.9 % sodium chloride infusion, Continuous  carvedilol (COREG) tablet 6.25 mg, BID WC  hydrALAZINE (APRESOLINE) tablet 25 mg, 3 times per day  HYDROcodone-acetaminophen (NORCO) 5-325 MG per tablet 1 tablet, Q6H PRN  labetalol (NORMODYNE;TRANDATE) injection 10 mg, Q4H PRN  trimethobenzamide (TIGAN) injection 200 mg, Q6H PRN  thiamine (B-1) 250 mg in sodium chloride 0.9 % 100 mL IVPB, Q24H  midazolam (VERSED) injection 2 mg, Q4H PRN  divalproex (DEPAKOTE SPRINKLE) capsule 250 mg, 3 times per day  sodium chloride flush 0.9 % injection 10 mL, PRN  heparin flush 100 UNIT/ML injection 300 Units, 2 times per day  heparin flush 100 UNIT/ML injection 300 Units, PRN  senna (SENOKOT) tablet 17.2 mg, Nightly  polyethylene glycol (GLYCOLAX) packet 17 g, Daily PRN  perflutren lipid microspheres (DEFINITY) injection 1.65 mg, ONCE PRN  insulin lispro (HUMALOG) injection vial 0-18 Units, Q4H  acetaminophen (TYLENOL) 160 MG/5ML solution 264 mg, I5C PRN  folic acid injection 1 mg, Daily  polyvinyl alcohol (LIQUIFILM TEARS) 1.4 % ophthalmic solution 1 drop, Q4H PRN  [Held by provider] insulin glargine (LANTUS) injection vial 20 Units, Nightly  sodium chloride (Inhalant) 3 % nebulizer solution 2 mL, Q4H  hydrALAZINE (APRESOLINE) injection 10 mg, Q4H PRN  heparin (porcine) injection 7,500 Units, Q8H  pantoprazole (PROTONIX) injection 40 mg, Daily    And  sodium chloride (PF) 0.9 % injection 10 mL, Daily  sodium chloride flush 0.9 % injection 10 mL, 2 times per day  sodium chloride flush 0.9 % injection 10 mL, PRN  acetaminophen (TYLENOL) suppository 650 mg, Q6H PRN  Arformoterol Tartrate (BROVANA) nebulizer solution 15 mcg, BID  chlorhexidine (PERIDEX) 0.12 % solution 15 mL, BID  amLODIPine (NORVASC) tablet 10 mg, Daily  [Held by provider] aspirin chewable tablet 81 mg, Daily  colchicine (COLCRYS) tablet 0.6 mg, BID PRN  levothyroxine (SYNTHROID) tablet 50 mcg, Daily  budesonide (PULMICORT) nebulizer suspension 500 mcg, BID  ipratropium-albuterol (DUONEB) nebulizer solution 1 ampule, Q4H WA  glucose (GLUTOSE) 40 % oral gel 15 g, PRN  dextrose 50 % IV solution, PRN  glucagon (rDNA) injection 1 mg, PRN  dextrose 5 % solution, PRN      Physical exam:  Vitals:    11/23/20 1630   BP: (!) 170/80   Pulse: 92   Resp: 17   Temp: 98.5 °F (36.9 °C)   SpO2: 99%           General: Intubated/trach; awake  Eyes: PERRL. No sclera icterus. No conjunctival injection. ENT: No discharge. Pharynx clear. Neck: Tracheostomy/intubated  Lungs: Coarse breath sounds  CV: Regular rate. Regular rhythm. No murmur or rub. .   Abd:  Non-distended. No masses. No organmegaly. Normal bowel sounds. Skin: Warm and dry. No nodule on exposed extremities. No rash on exposed extremities.   Ext: No cyanosis, clubbing, edema; 2+ pitting bilateral lower extremity edema L>R  Neuro: awake; follows some simple commands      Data:   Labs:  Lab Results   Component Value Date     (H) 11/23/2020     11/22/2020     11/21/2020    K 4.1 11/23/2020    K 4.3 11/22/2020    K 3.9 11/21/2020  (H) 11/23/2020    CO2 23 11/23/2020    CO2 24 11/22/2020    CO2 25 11/21/2020    CREATININE 1.4 (H) 11/23/2020    CREATININE 2.2 (H) 11/22/2020    CREATININE 2.5 (H) 11/21/2020    BUN 30 (H) 11/23/2020    BUN 38 (H) 11/22/2020    BUN 28 (H) 11/21/2020    GLUCOSE 130 (H) 11/23/2020    GLUCOSE 141 (H) 11/22/2020    GLUCOSE 223 (H) 11/21/2020    PHOS 3.6 11/18/2020    PHOS 3.7 11/17/2020    PHOS 2.4 (L) 11/16/2020    WBC 9.0 11/23/2020    WBC 10.1 11/22/2020    WBC 10.7 11/21/2020    HGB 7.8 (L) 11/23/2020    HGB 8.4 (L) 11/22/2020    HGB 8.9 (L) 11/21/2020    HCT 23.8 (L) 11/23/2020    HCT 25.5 (L) 11/22/2020    HCT 26.7 (L) 11/21/2020    MCV 79.6 (L) 11/23/2020     11/23/2020         Imaging:  Xr Chest Portable    Result Date: 10/27/2020  EXAMINATION: ONE XRAY VIEW OF THE CHEST 10/27/2020 7:58 pm COMPARISON: October 26, 2020 HISTORY: ORDERING SYSTEM PROVIDED HISTORY: SOB TECHNOLOGIST PROVIDED HISTORY: Reason for exam:->SOB What reading provider will be dictating this exam?->CRC FINDINGS: There is mild cardiomegaly. There is patchy perihilar and bibasilar atelectasis/infiltrates. Tiny pleural effusions are suspected. Progressive bibasilar atelectasis/infiltrates concerning for pneumonia. Underlying CHF is considered. Xr Chest Portable    Result Date: 10/26/2020  EXAMINATION: ONE XRAY VIEW OF THE CHEST 10/26/2020 4:11 pm COMPARISON: 05/26/2019 HISTORY: ORDERING SYSTEM PROVIDED HISTORY: SOB TECHNOLOGIST PROVIDED HISTORY: Reason for exam:->SOB What reading provider will be dictating this exam?->CRC FINDINGS: Cardiac size appears stable. Discoid airspace disease seen at the left lung base which may represent atelectasis or scarring. Right infrahilar and basilar infiltrate is identified. No pneumothorax is identified. No acute osseous abnormality is identified. Right infrahilar and basilar infiltrate concerning for pneumonia. Left basilar atelectasis or scarring. Assessment/Plans    1. Acute kidney injury on ckd 3 a  Baseline 1.8 from 9/2019, 1.1 in 5/2019  hemodynamically mediated from bradycardia/diuretic/ARB   exacerbated by IV contrast use   Renal function back to baseline but spiked 1>2.5>2.2  Dc'd cozaar which has been on hold    2. Acute hypoxic respiratory failure  due to multilobar pneumonia  gradual wean  Completed course of antibiotics  Failed extubation 11/8  S/P trach 11/12  Neg 5.4 L    3. H/O ETOH  abuse    4. Hypernatremia  imrpoved off bumex  Continue to monitor    5.   Hypokalemia due to brisk urine output  Supplement as needed          Chema Hampton MD  6:03 PM  11/23/2020

## 2020-11-23 NOTE — PROGRESS NOTES
Pulmonary Progress Note  11/23/2020 10:33 AM  Subjective:   Admit Date: 10/26/2020  PCP: Peng Talavera MD  Interval History: on Vent, opens eyes to name but not following commands. Tolerates pressure support 12 and PEEP of 5, tidal volume between 300-400s  Diet: DIET TUBE FEED CONTINUOUS/CYCLIC NPO; Diabetic 1.5; Gastrostomy; Continuous; 25; 65; 23; Exceptions are: Sips with Meds      Data:   Scheduled Meds:    carvedilol  6.25 mg Oral BID WC    hydrALAZINE  25 mg PEG Tube 3 times per day    QUEtiapine  50 mg Oral BID    thiamine (VITAMIN B1) IVPB  250 mg Intravenous Q24H    divalproex  250 mg Oral 3 times per day    heparin flush  3 mL Intravenous 2 times per day    senna  2 tablet Oral Nightly    insulin lispro  0-18 Units Subcutaneous U7X    folic acid  1 mg Intravenous Daily    [Held by provider] insulin glargine  20 Units Subcutaneous Nightly    sodium chloride (Inhalant)  2 mL Nebulization Q4H    heparin (porcine)  7,500 Units Subcutaneous Q8H    pantoprazole  40 mg Intravenous Daily    And    sodium chloride (PF)  10 mL Intravenous Daily    sodium chloride flush  10 mL Intravenous 2 times per day    Arformoterol Tartrate  15 mcg Nebulization BID    chlorhexidine  15 mL Mouth/Throat BID    amLODIPine  10 mg Oral Daily    [Held by provider] aspirin  81 mg Oral Daily    levothyroxine  50 mcg Oral Daily    budesonide  0.5 mg Nebulization BID    ipratropium-albuterol  1 ampule Inhalation Q4H WA       Continuous Infusions:    sodium chloride 75 mL/hr at 11/23/20 0914    dextrose         PRN Meds: HYDROcodone 5 mg - acetaminophen, labetalol, trimethobenzamide, midazolam, sodium chloride flush, heparin flush, polyethylene glycol, perflutren lipid microspheres, acetaminophen, polyvinyl alcohol, hydrALAZINE, sodium chloride flush, [DISCONTINUED] acetaminophen **OR** acetaminophen, colchicine, glucose, dextrose, glucagon (rDNA), dextrose    I/O last 3 completed shifts:   In: 1957 [I.V.:1194; NG/GT:763]  Out: 900 [Urine:900]    I/O this shift:  In: 320 [I.V.:200; NG/GT:120]  Out: -       Intake/Output Summary (Last 24 hours) at 11/23/2020 1033  Last data filed at 11/23/2020 0914  Gross per 24 hour   Intake 2277 ml   Output 900 ml   Net 1377 ml       Patient Vitals for the past 96 hrs (Last 3 readings):   Weight   11/23/20 0523 (!) 310 lb (140.6 kg)         Recent Labs     11/21/20 0410 11/22/20 0539 11/23/20 0520   WBC 10.7 10.1 9.0   HGB 8.9* 8.4* 7.8*   HCT 26.7* 25.5* 23.8*   MCV 78.1* 79.4* 79.6*    366 351     Recent Labs     11/21/20 0410 11/22/20 0539 11/23/20  0520    145 148*   K 3.9 4.3 4.1    110* 115*   CO2 25 24 23   BUN 28* 38* 30*   CREATININE 2.5* 2.2* 1.4*     Recent Labs     11/21/20 0410 11/22/20 0539 11/23/20  0520   PROT 8.0 6.2* 6.7   ALKPHOS 125 110 91   * 123* 114*   AST 58* 52* 45*   BILITOT 0.3 <0.2 <0.2     CPK:  Lab Results   Component Value Date    CKTOTAL 98 10/08/2017       -----------------------------------------------------------------  RAD:   Improving                    Micro:  Vent Information  $Ventilation: $Subsequent Day  Skin Assessment: Other (see comment/note)  Equipment ID: [id number]  Equipment Changed: (S) Humidification  Vent Type: [id number]  Vent Mode: PS  Vt Ordered: 480 mL  Pressure Ordered: 12  Rate Set: 12 bmp  Peak Flow: 60 L/min  Pressure Support: 12 cmH20  FiO2 : 35 %  SpO2: 96 %  SpO2/FiO2 ratio: 274.29  PaO2/FiO2 ratio: 169  Sensitivity: 3  PEEP/CPAP: 5  I Time/ I Time %: 0 s  Humidification Source: Heated wire  Humidification Temp: 37  Humidification Temp Measured: 37.1  Circuit Condensation: Drained  Mask Type: Full face mask  Mask Size: Large    Additional Respiratory  Assessments  Pulse: 84  Resp: 20  SpO2: 96 %  Position: Semi-Lockwood's  Humidification Source: Heated wire  Humidification Temp: 37  Circuit Condensation: Drained  Oral Care Completed?: Yes  Oral Care: Mouth moisturizer  Subglottic Suction Done?: Yes  Airway [address]  11/16 off sedation hard time waking up being seen by neurology.  CT head negative. Chest x-ray right perihilar infiltrate. 11/17 emesis x3.  Had bright blood and clots coming from ENT.  ENT evaluated with no evidence of bleeding.  Neurologically improving moving all extremities.  Repeat bleeding from trach site in the evening.  Decreased to 40% +5  11/19 PICC line placed.  Chest x-ray improvement  11/20 now down to 35% chest x-ray with increasing infiltrates  7/21 worsening renal function   1. Hemoptysis / bleeding at trach site  2. Drop of hemoglobin from 10.4  - 9  3. acute hypoxemic respiratory failure on mechanical ventilation s/p trach 11/12 8 cuffed Shiley trach proximal XLT  4. Metabolic encephalopathy history of EtOH abuse with agitation on ( Depakote, and Seroquel  )significantly improved  5. Acute renal failure Baseline creatinine 1.9 on 5/2019  6. Anemia  7. HAP   8. HANS moderate ( AHI 17 on 4/18/18 requires BIPAP 16/12) noncompliant with CPAP  9. History of reactive airway disease  10. CT 3/14/2018 showing pulmonary nodule stable from 10/2017  11. 12. Diabetes with elevated blood sugars  13. Obesity  14. EF 84% LVH diastolic dysfunction  15. chronic lymphedema  16. History of gout on colchicine  17. Hypothyroid  18. H/o respiratory failure: 7/8/2017 pt was  transferred from [facility] for acute respiratory failure after lap cholecystectomy, lap umbilical hernia repair, and lap liver biopsy (fatty liver). Patient was then transferred to [facility] hospital where he was weaned off the ventilator and decannulated.     Plan:      · Continue Free water  · Cr minimally improved  · Continue PS 12, 35%, P5, becomes tachypneic at times per RRT  · ABG better  · Episodes of hypertension with agitation adjust blood pressure medicines  · Favor to cut down Seroquel gradually then valproic acid  · LTACH evaluation and placement   · Continue brovana/pulmicort          Siri Allred  11/23/2020  10:33 AM

## 2020-11-23 NOTE — CARE COORDINATION
Spoke to Angelia with [facility], they can start precert as long as wife has initiated the [facility] application. I called wife to confirm she spoke with the  (Tadeo Whitaker), left message. Will await her call back. [facility] also following and able to accept. Covid 11/19 (-). Hens and ambulance form on soft chart.  CM to follow up and initiate [address] Heidi Schofield, RN  [facility] Case Management  [phone]

## 2020-11-23 NOTE — PROGRESS NOTES
PT SEEN AND EXAMINED. intubated, in pic. BP labile. S/p peg/trach,  Chart reviewed. meds reviewed. D/w nursing + family as available. EXAM: IN GENERAL, NAD. Rupinder Davis ROS NEGx10 EXCEPT: out of restraints. More cooperative. BP (!) 160/78   Pulse 84   Temp 97.1 °F (36.2 °C) (Temporal)   Resp 20   Ht 5' 8\" (1.727 m)   Wt (!) 310 lb (140.6 kg)   SpO2 96%   BMI 47.14 kg/m²   GEN:  NAD. HEENT: NCAT. NECK: NO JVD. TRACH MIDLINE. NO BRUITS. NO THYROMEGALY. LUNGS: CTA BL NO RALES, RHONCHI OR WHEEZES. GOOD EXCURSION. CV: Regular rate and rhythm, NO Murmurs, Rubs, Or gallops  ABD: Soft. Nontender. Normal bowel sounds.  No organomegaly  EXT:No clubbing cyanosis or edema  Neuro: Labs/data reviewedLABS: CBC with Differential:    Lab Results   Component Value Date    WBC 9.0 11/23/2020    RBC 2.99 11/23/2020    HGB 7.8 11/23/2020    HCT 23.8 11/23/2020     11/23/2020    MCV 79.6 11/23/2020    MCH 26.1 11/23/2020    MCHC 32.8 11/23/2020    RDW 19.9 11/23/2020    NRBC 0.9 11/16/2020    SEGSPCT 73 01/26/2014    METASPCT 1.8 11/15/2020    LYMPHOPCT 31.7 11/23/2020    MONOPCT 6.6 11/23/2020    MYELOPCT 0.9 11/15/2020    BASOPCT 0.3 11/23/2020    MONOSABS 0.60 11/23/2020    LYMPHSABS 2.86 11/23/2020    EOSABS 0.91 11/23/2020    BASOSABS 0.03 11/23/2020     Platelets:    Lab Results   Component Value Date     11/23/2020     CMP:    Lab Results   Component Value Date     11/23/2020    K 4.1 11/23/2020    K 4.0 10/27/2020     11/23/2020    CO2 23 11/23/2020    BUN 30 11/23/2020    CREATININE 1.4 11/23/2020    GFRAA >60 11/23/2020    LABGLOM >60 11/23/2020    GLUCOSE 130 11/23/2020    PROT 6.7 11/23/2020    LABALBU 3.3 11/23/2020    CALCIUM 8.9 11/23/2020    BILITOT <0.2 11/23/2020    ALKPHOS 91 11/23/2020    AST 45 11/23/2020     11/23/2020     Magnesium:    Lab Results   Component Value Date    MG 1.7 11/18/2020     LDH:    Lab Results   Component Value Date     10/28/2020     PT/INR: Lab Results   Component Value Date    PROTIME 14.0 11/18/2020    INR 1.2 11/18/2020     Last 3 Troponin:    Lab Results   Component Value Date    TROPONINI <0.01 10/26/2020    TROPONINI <0.01 05/26/2019    TROPONINI <0.01 05/26/2019     ABG:    Lab Results   Component Value Date    PH 7.371 11/22/2020    PCO2 40.2 11/22/2020    PO2 72.8 11/22/2020    HCO3 22.8 11/22/2020    BE -2.3 11/22/2020    O2SAT 92.5 11/22/2020     IRON:    Lab Results   Component Value Date    IRON 95 11/05/2020     IMAGING    Xr Chest Portable    Result Date: 10/27/2020  EXAMINATION: ONE XRAY VIEW OF THE CHEST 10/27/2020 7:58 pm COMPARISON: October 26, 2020 HISTORY: ORDERING SYSTEM PROVIDED HISTORY: SOB TECHNOLOGIST PROVIDED HISTORY: Reason for exam:->SOB What reading provider will be dictating this exam?->CRC FINDINGS: There is mild cardiomegaly. There is patchy perihilar and bibasilar atelectasis/infiltrates. Tiny pleural effusions are suspected. Progressive bibasilar atelectasis/infiltrates concerning for pneumonia. Underlying CHF is considered. Xr Chest Portable    Result Date: 10/26/2020  EXAMINATION: ONE XRAY VIEW OF THE CHEST 10/26/2020 4:11 pm COMPARISON: 05/26/2019 HISTORY: ORDERING SYSTEM PROVIDED HISTORY: SOB TECHNOLOGIST PROVIDED HISTORY: Reason for exam:->SOB What reading provider will be dictating this exam?->CRC FINDINGS: Cardiac size appears stable. Discoid airspace disease seen at the left lung base which may represent atelectasis or scarring. Right infrahilar and basilar infiltrate is identified. No pneumothorax is identified. No acute osseous abnormality is identified. Right infrahilar and basilar infiltrate concerning for pneumonia. Left basilar atelectasis or scarring.        Medications:    Scheduled Meds:   QUEtiapine  25 mg Oral BID    carvedilol  6.25 mg Oral BID WC    hydrALAZINE  25 mg PEG Tube 3 times per day    thiamine (VITAMIN B1) IVPB  250 mg Intravenous Q24H    divalproex  250 mg Oral 3 times per day    heparin flush  3 mL Intravenous 2 times per day    senna  2 tablet Oral Nightly    insulin lispro  0-18 Units Subcutaneous I1J    folic acid  1 mg Intravenous Daily    [Held by provider] insulin glargine  20 Units Subcutaneous Nightly    sodium chloride (Inhalant)  2 mL Nebulization Q4H    heparin (porcine)  7,500 Units Subcutaneous Q8H    pantoprazole  40 mg Intravenous Daily    And    sodium chloride (PF)  10 mL Intravenous Daily    sodium chloride flush  10 mL Intravenous 2 times per day    Arformoterol Tartrate  15 mcg Nebulization BID    chlorhexidine  15 mL Mouth/Throat BID    amLODIPine  10 mg Oral Daily    [Held by provider] aspirin  81 mg Oral Daily    levothyroxine  50 mcg Oral Daily    budesonide  0.5 mg Nebulization BID    ipratropium-albuterol  1 ampule Inhalation Q4H WA       Continuous Infusions:   sodium chloride 75 mL/hr at 11/23/20 0914    dextrose         PRN Meds:HYDROcodone 5 mg - acetaminophen, labetalol, trimethobenzamide, midazolam, sodium chloride flush, heparin flush, polyethylene glycol, perflutren lipid microspheres, acetaminophen, polyvinyl alcohol, hydrALAZINE, sodium chloride flush, [DISCONTINUED] acetaminophen **OR** acetaminophen, colchicine, glucose, dextrose, glucagon (rDNA), dextrose    A/P:      Patient Active Problem List   Diagnosis    Hyperlipidemia    Type 2 diabetes mellitus without complication, without long-term current use of insulin (HCC)    Atypical chest pain    Essential hypertension    Chronic acquired lymphedema    Aortic valve disease    Morbid obesity due to excess calories (HCC)    Abdominal pain    Acute respiratory failure with hypoxia (HCC)    Acute pulmonary edema (HCC)    Congestive heart failure with LV diastolic dysfunction, NYHA class 3 (HCC)    Obstructive sleep apnea    Acquired hypothyroidism    Chronic diastolic heart failure (HCC)    Nonrheumatic aortic valve stenosis    ACE-inhibitor cough    Pneumonia due to organism    Acute gout of right knee   Acute encephalopathy 2/2 Acute hypoxic hypercapnic respiratory failure.         Acute hypoxic hypercapnic respiratory failure 2/2 MSSA pneumonia versus COPD versus ADHF versus chronic OHS  Pt has Hx chronic HANS not on CPAP at home. ABG showed pH 7.188/91.2/78.9/33 on RRT. currently intubated. - Continue to monitor respiratory status, wean down FiO2 as tolerated. - Pro  on presentation.   - US dup LE negative for DVT. CTA negative for PE. COVID -19 negative x2  - Continue breathing treatment  - Monitor daily CXR. CBC.   - Pulmonology consulted, further recommendations appreciated.     NANCY stage III likely pre renal 2/2 diuretic use, contrast agent vs? cardiorenal syndrome. -   - Nephrology consulted. Further recommendations appreciated. - Continue monitor daily BMP, renal function. Monitor I/O. Avoid nephrotoxicity.       · Bacterial pneumonia, likely aspiration pneumonia vs MSSA   · Shock, sepsis- resolved   · ATBx per id  Alcohol withdrawal .    Hx HFpEF, EF 65%  cta noted   needs ltac  Wean per pulm/ccm  S/p Peg/trach PER ENT  Monitor HR  DECR hydralazine  INCR FREE H20   amlodipine    · Completed 10 day course of zosyn post trach insertion; follow off antibiotcs for now  · Repeat respiratory & blood cultures - no growth   · 11/19/2020 PICC line placed  · Dc restraints  · Monitor labs - creatinine improving - 1.4

## 2020-11-23 NOTE — PROGRESS NOTES
Chief Complaint   Patient presents with    Shortness of Breath     for a few weeks. O2 sat in 70s at [facility], placed on 4L and now 95%. SUBJECTIVE:  Patient is tolerating medications. No reported adverse drug reactions. Afebrile. Mentation much improved today. Out of soft restraints  Trach to vent - 35% FiO2, 100% SpO2, PEEP 5  Patient called for her needs and understands his need to stay out of restraints         Review of systems:  As stated above in the chief complaint, otherwise negative.     Medications:  Scheduled Meds:   QUEtiapine  25 mg Oral BID    carvedilol  6.25 mg Oral BID WC    hydrALAZINE  25 mg PEG Tube 3 times per day    thiamine (VITAMIN B1) IVPB  250 mg Intravenous Q24H    divalproex  250 mg Oral 3 times per day    heparin flush  3 mL Intravenous 2 times per day    senna  2 tablet Oral Nightly    insulin lispro  0-18 Units Subcutaneous Q9X    folic acid  1 mg Intravenous Daily    [Held by provider] insulin glargine  20 Units Subcutaneous Nightly    sodium chloride (Inhalant)  2 mL Nebulization Q4H    heparin (porcine)  7,500 Units Subcutaneous Q8H    pantoprazole  40 mg Intravenous Daily    And    sodium chloride (PF)  10 mL Intravenous Daily    sodium chloride flush  10 mL Intravenous 2 times per day    Arformoterol Tartrate  15 mcg Nebulization BID    chlorhexidine  15 mL Mouth/Throat BID    amLODIPine  10 mg Oral Daily    [Held by provider] aspirin  81 mg Oral Daily    levothyroxine  50 mcg Oral Daily    budesonide  0.5 mg Nebulization BID    ipratropium-albuterol  1 ampule Inhalation Q4H WA     Continuous Infusions:   sodium chloride 75 mL/hr at 11/23/20 0914    dextrose       PRN Meds:HYDROcodone 5 mg - acetaminophen, labetalol, trimethobenzamide, midazolam, sodium chloride flush, heparin flush, polyethylene glycol, perflutren lipid microspheres, acetaminophen, polyvinyl alcohol, hydrALAZINE, sodium chloride flush, [DISCONTINUED] acetaminophen **OR** acetaminophen, colchicine, glucose, dextrose, glucagon (rDNA), dextrose    OBJECTIVE:  BP (!) 160/78   Pulse 84   Temp 97.1 °F (36.2 °C) (Temporal)   Resp 20   Ht 5' 8\" (1.727 m)   Wt (!) 310 lb (140.6 kg)   SpO2 96%   BMI 47.14 kg/m²   Temp  Av.7 °F (36.5 °C)  Min: 97.1 °F (36.2 °C)  Max: 98.2 °F (36.8 °C)  Constitutional: awake, alert, followed simple commands, much more alert  Skin: Warm and dry. No rashes were noted. HEENT: Round and reactive pupils. Moist mucous membranes. No ulcerations or thrush. Chest: Trach - bleeding around site- improving, symmetrical expansion. Some coarseness right upper lobe. Left is clear. diminished bibasilar   Cardiovascular: S1 and S2 are rhythmic and regular. Systolic murmurs appreciated.    Abdomen: Hyperactive bowel sounds, obese abdomen, PEG, FMS system   Extremities: No clubbing, no cyanosis, + Lymphedema left lower extremity- improving   Lines: PICC line right IJ 2020     Laboratory and Tests Review:  Lab Results   Component Value Date    WBC 9.0 2020    WBC 10.1 2020    WBC 10.7 2020    HGB 7.8 (L) 2020    HCT 23.8 (L) 2020    MCV 79.6 (L) 2020     2020     Lab Results   Component Value Date    NEUTROABS 4.55 2020    NEUTROABS 5.48 2020    NEUTROABS 7.82 (H) 2020     No results found for: [facility]  Lab Results   Component Value Date     (H) 2020    AST 45 (H) 2020    ALKPHOS 91 2020    BILITOT <0.2 2020     Lab Results   Component Value Date     2020    K 4.1 2020    K 4.0 10/27/2020     2020    CO2 23 2020    BUN 30 2020    CREATININE 1.4 2020    CREATININE 2.2 2020    CREATININE 2.5 2020    GFRAA >60 2020    LABGLOM >60 2020    GLUCOSE 130 2020    PROT 6.7 2020    LABALBU 3.3 2020    CALCIUM 8.9 2020    BILITOT <0.2 2020    ALKPHOS 91 2020    AST 45 Range Status   11/09/2020 Growth not present  Oral Pharyngeal Tiesha absent    Final     Culture Catheter Tip   Date Value Ref Range Status   08/26/2017 <15 colonies  Final     No results found for: BFCS  No results found for: CXSURG  Urine Culture, Routine   Date Value Ref Range Status   11/09/2020 Growth not present  Final   10/28/2020 Growth not present  Final   09/16/2019 Growth not present  Final     MRSA Culture Only   Date Value Ref Range Status   11/09/2020 Methicillin resistant Staph aureus not isolated  Final   10/30/2020 Methicillin resistant Staph aureus not isolated  Final   07/28/2017 Methicillin resistant Staph aureus not isolated  Final          Assessment:  · Acute on chronic hypoxic respiratory failure likely 2/2 pneumonia and volume overload  - resolving   · Bacterial pneumonia, likely aspiration pneumonia but after extubation patient had to be reintubated rule out HCAP  · Shock, sepsis- resolved, blood cultures negative   · Alcohol withdrawal       Plan:    · Completed 10 day course of zosyn post trach insertion; follow off antibiotcs for now  · Repeat respiratory & blood cultures - no growth   · 11/19/2020 PICC line placed  · Will follow with you  · Monitor labs - creatinine improving - 1.4    Preston Cullen  11:16 AM  11/23/2020       Pt seen and examined. Above discussed agree with advanced practice nurse. Labs, cultures, and radiographs reviewed. Face to Face encounter occurred. Changes made as necessary.      Edison Jackman MD

## 2020-11-24 NOTE — PROGRESS NOTES
Pulmonary Progress Note  11/24/2020 10:48 AM  Subjective:   Admit Date: 10/26/2020  PCP: Alyana Harrington MD  Interval History: on Vent, no events overnight. Looks more alert and awake this morning, getting ready for his physical therapy.   Currently on pressure support of 10 and PEEP of 5, tidal volume between 400-500 cc     diet: DIET TUBE FEED CONTINUOUS/CYCLIC NPO; Diabetic 1.5; Gastrostomy; Continuous; 25; 65; 23; Exceptions are: Sips with Meds      Data:   Scheduled Meds:    QUEtiapine  25 mg Oral BID    carvedilol  6.25 mg Oral BID WC    hydrALAZINE  25 mg PEG Tube 3 times per day    thiamine (VITAMIN B1) IVPB  250 mg Intravenous Q24H    divalproex  250 mg Oral 3 times per day    heparin flush  3 mL Intravenous 2 times per day    senna  2 tablet Oral Nightly    insulin lispro  0-18 Units Subcutaneous J2X    folic acid  1 mg Intravenous Daily    [Held by provider] insulin glargine  20 Units Subcutaneous Nightly    sodium chloride (Inhalant)  2 mL Nebulization Q4H    heparin (porcine)  7,500 Units Subcutaneous Q8H    pantoprazole  40 mg Intravenous Daily    And    sodium chloride (PF)  10 mL Intravenous Daily    sodium chloride flush  10 mL Intravenous 2 times per day    Arformoterol Tartrate  15 mcg Nebulization BID    chlorhexidine  15 mL Mouth/Throat BID    amLODIPine  10 mg Oral Daily    [Held by provider] aspirin  81 mg Oral Daily    levothyroxine  50 mcg Oral Daily    budesonide  0.5 mg Nebulization BID    ipratropium-albuterol  1 ampule Inhalation Q4H WA       Continuous Infusions:    sodium chloride Stopped (11/23/20 1638)    dextrose         PRN Meds: mineral oil-hydrophilic petrolatum, HYDROcodone 5 mg - acetaminophen, labetalol, trimethobenzamide, midazolam, sodium chloride flush, heparin flush, polyethylene glycol, perflutren lipid microspheres, acetaminophen, polyvinyl alcohol, hydrALAZINE, sodium chloride flush, [DISCONTINUED] acetaminophen **OR** acetaminophen, colchicine, glucose, dextrose, glucagon (rDNA), dextrose    I/O last 3 completed shifts: In: 1913 [I.V.:558; NG/GT:1255; IV Piggyback:100]  Out: 3150 [Urine:2550; Stool:600]    I/O this shift:   In: 90 [NG/GT:90]  Out: -       Intake/Output Summary (Last 24 hours) at 11/24/2020 1048  Last data filed at 11/24/2020 0904  Gross per 24 hour   Intake 1683 ml   Output 3150 ml   Net -1467 ml       Patient Vitals for the past 96 hrs (Last 3 readings):   Weight   11/23/20 0523 (!) 310 lb (140.6 kg)         Recent Labs     11/22/20  0539 11/23/20 0520 11/24/20 0620   WBC 10.1 9.0 9.7   HGB 8.4* 7.8* 7.9*   HCT 25.5* 23.8* 24.4*   MCV 79.4* 79.6* 80.5    351 354     Recent Labs     11/22/20  0539 11/23/20  0520 11/24/20 0620    148* 147*   K 4.3 4.1 4.0   * 115* 111*   CO2 24 23 27   BUN 38* 30* 21   CREATININE 2.2* 1.4* 1.2     Recent Labs     11/22/20  0539 11/23/20 0520 11/24/20 0620   PROT 6.2* 6.7 7.1   ALKPHOS 110 91 109   * 114* 120*   AST 52* 45* 46*   BILITOT <0.2 <0.2 <0.2     CPK:  Lab Results   Component Value Date    CKTOTAL 98 10/08/2017       -----------------------------------------------------------------  RAD:   Improving                    Micro:  Vent Information  $Ventilation: $Subsequent Day  Skin Assessment: Other (see comment/note)  Equipment ID: [id number]  Equipment Changed: (S) Humidification  Vent Type: 840  Vent Mode: PS  Vt Ordered: 480 mL  Pressure Ordered: 12  Rate Set: 12 bmp  Peak Flow: 60 L/min  Pressure Support: 10 cmH20  FiO2 : 35 %  SpO2: 99 %  SpO2/FiO2 ratio: 282.86  PaO2/FiO2 ratio: 169  Sensitivity: 3  PEEP/CPAP: 5  I Time/ I Time %: 0 s  Humidification Source: Heated wire  Humidification Temp: 37  Humidification Temp Measured: 37  Circuit Condensation: Drained  Mask Type: Full face mask  Mask Size: Large    Additional Respiratory  Assessments  Pulse: 88  Resp: 18  SpO2: 99 %  Position: Semi-Lockwood's  Humidification Source: Heated wire  Humidification Temp: 37  Circuit trach proximal XLT  4. Metabolic encephalopathy history of EtOH abuse with agitation on ( Depakote, and Seroquel  )significantly improved  5. Acute renal failure Baseline creatinine 1.9 on 5/2019  6. Anemia  7. HAP   8. HANS moderate ( AHI 17 on 4/18/18 requires BIPAP 16/12) noncompliant with CPAP  9. History of reactive airway disease  10. CT 3/14/2018 showing pulmonary nodule stable from 10/2017  11. 12. Diabetes with elevated blood sugars  13. Obesity  14. EF 70% LVH diastolic dysfunction  15. chronic lymphedema  16. History of gout on colchicine  17. Hypothyroid  18. H/o respiratory failure: 7/8/2017 pt was  transferred from [facility] for acute respiratory failure after lap cholecystectomy, lap umbilical hernia repair, and lap liver biopsy (fatty liver). Patient was then transferred to [facility] hospital where he was weaned off the ventilator and decannulated.     Plan:      · Continue PS 10, 35%, P5, TOI trials soon  · Episodes of hypertension with agitation adjust blood pressure medicines  · Favor to cut down Seroquel gradually (in a.m. will change to nightly for 2 nights)  then valproic acid  · LTACH evaluation and placement   · Continue brovana/pulmicort          Avalos Hash  11/24/2020  10:48 AM

## 2020-11-24 NOTE — PROGRESS NOTES
Chief Complaint   Patient presents with    Shortness of Breath     for a few weeks. O2 sat in 70s at [facility], placed on 4L and now 95%. SUBJECTIVE:  Patient is tolerating medications. No reported adverse drug reactions. Afebrile. Mentation much improved today. Out of soft restraints, telesitter monitor at bedside. Patient is much more alert & cooperative. Trach to vent - 35% FiO2, 100% SpO2, PEEP 5           Review of systems:  As stated above in the chief complaint, otherwise negative.     Medications:  Scheduled Meds:   QUEtiapine  25 mg Oral BID    carvedilol  6.25 mg Oral BID WC    hydrALAZINE  25 mg PEG Tube 3 times per day    thiamine (VITAMIN B1) IVPB  250 mg Intravenous Q24H    divalproex  250 mg Oral 3 times per day    heparin flush  3 mL Intravenous 2 times per day    senna  2 tablet Oral Nightly    insulin lispro  0-18 Units Subcutaneous B4V    folic acid  1 mg Intravenous Daily    [Held by provider] insulin glargine  20 Units Subcutaneous Nightly    sodium chloride (Inhalant)  2 mL Nebulization Q4H    heparin (porcine)  7,500 Units Subcutaneous Q8H    pantoprazole  40 mg Intravenous Daily    And    sodium chloride (PF)  10 mL Intravenous Daily    sodium chloride flush  10 mL Intravenous 2 times per day    Arformoterol Tartrate  15 mcg Nebulization BID    chlorhexidine  15 mL Mouth/Throat BID    amLODIPine  10 mg Oral Daily    [Held by provider] aspirin  81 mg Oral Daily    levothyroxine  50 mcg Oral Daily    budesonide  0.5 mg Nebulization BID    ipratropium-albuterol  1 ampule Inhalation Q4H WA     Continuous Infusions:   sodium chloride Stopped (11/23/20 1638)    dextrose       PRN Meds:mineral oil-hydrophilic petrolatum, HYDROcodone 5 mg - acetaminophen, labetalol, trimethobenzamide, midazolam, sodium chloride flush, heparin flush, polyethylene glycol, perflutren lipid microspheres, acetaminophen, polyvinyl alcohol, hydrALAZINE, sodium chloride flush, [DISCONTINUED] acetaminophen **OR** acetaminophen, colchicine, glucose, dextrose, glucagon (rDNA), dextrose    OBJECTIVE:  BP (!) 144/62   Pulse 88   Temp 97.9 °F (36.6 °C) (Temporal)   Resp 18   Ht 5' 8\" (1.727 m)   Wt (!) 310 lb (140.6 kg)   SpO2 99%   BMI 47.14 kg/m²   Temp  Av.2 °F (36.8 °C)  Min: 97.9 °F (36.6 °C)  Max: 98.5 °F (36.9 °C)  Constitutional: awake, alert, following simple commands, much more alert & cooperative  Skin: Warm and dry. No rashes were noted. HEENT: Round and reactive pupils. Moist mucous membranes. No ulcerations or thrush. Chest: Trach - bleeding around site- improving, symmetrical expansion. Some coarseness right upper lobe. Left is clear. diminished bibasilar   Cardiovascular: S1 and S2 are rhythmic and regular. Systolic murmurs appreciated.    Abdomen: Hyperactive bowel sounds, obese abdomen, PEG, FMS system   Extremities: No clubbing, no cyanosis, + Lymphedema left lower extremity- improving   Lines: PICC line right IJ 2020     Laboratory and Tests Review:  Lab Results   Component Value Date    WBC 9.7 2020    WBC 9.0 2020    WBC 10.1 2020    HGB 7.9 (L) 2020    HCT 24.4 (L) 2020    MCV 80.5 2020     2020     Lab Results   Component Value Date    NEUTROABS 5.53 2020    NEUTROABS 4.55 2020    NEUTROABS 5.48 2020     No results found for: [facility]  Lab Results   Component Value Date     (H) 2020    AST 46 (H) 2020    ALKPHOS 109 2020    BILITOT <0.2 2020     Lab Results   Component Value Date     2020    K 4.0 2020    K 4.0 10/27/2020     2020    CO2 27 2020    BUN 21 2020    CREATININE 1.2 2020    CREATININE 1.4 2020    CREATININE 2.2 2020    GFRAA >60 2020    LABGLOM >60 2020    GLUCOSE 144 2020    PROT 7.1 2020    LABALBU 3.2 2020    CALCIUM 9.1 2020    BILITOT <0.2 11/24/2020    ALKPHOS 109 11/24/2020    AST 46 11/24/2020     11/24/2020     Lab Results   Component Value Date    CRP 1.4 (H) 11/02/2020    CRP 2.8 (H) 09/04/2017    CRP 10.5 (H) 08/28/2017     Lab Results   Component Value Date    SEDRATE 135 (H) 09/04/2017    SEDRATE 150 (H) 08/28/2017    SEDRATE 141 (H) 08/21/2017     Radiology:  CXR- 11/3- Multifocal bilateral pulmonary infiltrates more prominent within the right lung. CXR- 11/4- worsening b/l infiltrates, worse on left today with possible small L sided effusion   CXR- 11/5 - Stable b/l infiltrates   CXR- 11/6 - Expiratory film, image otherwise appears overall stable. CXR- 11/16 - stable right parahilar pulmonary opacity which may represent   atelectasis or pneumonia. CXR- 11/23/2020- bilateral infiltrates more prominent on the left. Microbiology:   Lab Results   Component Value Date    BC 5 Days no growth 11/09/2020    BC 5 Days no growth 10/29/2020    BC  10/26/2020     This organism was isolated in one set. Susceptibility testing is not routinely done as this  organism frequently represents skin contamination. Additional testing can be ordered by calling the  Microbiology Department.       ORG Staphylococcus aureus 10/29/2020    ORG Staphylococcus coagulase-negative 10/26/2020    ORG Coagulase Negative Staphylococci 08/26/2017     Lab Results   Component Value Date    BLOODCULT2 5 Days no growth 11/09/2020    BLOODCULT2 5 Days no growth 10/29/2020    BLOODCULT2 5 Days no growth 10/26/2020    ORG Staphylococcus aureus 10/29/2020    ORG Staphylococcus coagulase-negative 10/26/2020    ORG Coagulase Negative Staphylococci 08/26/2017     No results found for: WNDABS  Smear, Respiratory   Date Value Ref Range Status   11/09/2020   Final    Group 6: <25 PMN's/LPF and <25 Epithelial cells/LPF  Moderate Polymorphonuclear leukocytes  Rare Epithelial cells  No organisms seen       No results found for: Jacek Mello, AFBCX, FUNGSM,

## 2020-11-24 NOTE — PROGRESS NOTES
monitoring as noted above. Pt's/ family goals   1. Return home. Patient and or family understand(s) diagnosis, prognosis, and plan of care. yes    PLAN OF CARE:    Current Treatment Recommendations     [x] Strengthening     [x] ROM   [x] Balance Training   [x] Endurance Training   [x] Transfer Training   [x] Gait Training   [x] Stair Training   [x] Positioning   [x] Safety and Education Training   [x] Patient/Caregiver Education   [] HEP  [] Other     PT care will be provided in accordance with the objectives noted above. The above treatment recommendations will be utilized to address deficits described above in order to restore pt's prior level of function and/or achieve modified functional independence with adaptive strategies. Frequency of treatments: 2-5x/week x 1-2 weeks. Time in  0945  Time out  1015    Total Treatment Time  25 minutes     Evaluation Time includes thorough review of current medical information, gathering information on past medical history/social history and prior level of function, completion of standardized testing/informal observation of tasks, assessment of data and education on plan of care and goals.     CPT codes:  [] Low Complexity PT evaluation 71195  [x] Moderate Complexity PT evaluation 29629  [] High Complexity PT evaluation 93765  [] PT Re-evaluation 78281  [] Gait training 62171 0 minutes  [] Manual therapy 53892 0 minutes  [x] Therapeutic activities 27735 25 minutes  [] Therapeutic exercises 65533 0 minutes  [] Neuromuscular reeducation 45497 0 minutes     Elena Grant, PT, DPT  [id number]

## 2020-11-24 NOTE — PROGRESS NOTES
Nephrology Progress Note  Patient's Name: Margarita Crystal  3:59 PM  11/24/2020        Reason for Consult: Acute kidney  Requesting Physician:  Gerard Borja MD    Chief Complaint: Shortness of breath  History Obtained From:  EHR; spouse    History of Present Ilness:    Margarita Crystal is a 61 y.o. male with a history of COPD, hypertension, hyperlipidemia and diabetes mellitus. He presented to ED 2 days ago with complaints of shortness of breath. According to patient's spouse he had been complaining of shortness of breath over the course of several weeks. This has gotten progressively worse. He went to see his PCP on the day of admission. In office at the time pulse oximeter obtained revealed his oxygen saturation to be in the 70s. He was instructed to proceed to ED for further evaluation. On presentation to ED his initial vital signs showed a blood pressure of 168/71, temperature 97.5 and pulse of 93. His pulse oximeter at the time was noted to be 98% on room air. Laboratory data was significant for a BUN of 11, creatinine 1.1, WBC of 8.3. Rapid COVID-19 testing was negative. A chest x-ray performed revealed right infrahilar and basilar infiltrate concerning for pneumonia. A CTA was ordered but patient declined because of his inability to lay flat. Patient was given ceftriaxone and doxycycline followed by admission to telemetry. 2 days ago an RRT was called as patient was noted to be increasingly more in respiratory distress. He was transferred to MICU and intubated. Laboratory data yesterday showed worsening serum creatinine to 2.8. This a.m. further worsening to 3.6. He has been nonoliguric. Hence renal consult. 10/30: N new acute issues overnight; remains intubated  10/31: Polyuric , concerned for possible DI; no other acute issues overnight  11/1: agitated overnight ; remains intubated  11/2: pt seen and examined.  Chart reviewed, pt intubated  11/3: pt seen and examined in icu, pt intubated  11/4: pt seen and examined in icu, no overnight events, intubated  11/5: pt seen in room ,remains intubated  11/6: pt seen in room, intubated. 11/7: pt seen in room, intubated, chart reviewed  11/8:pt seen in room, remains intuibated  11/9: pt seen in room, reintubated yesterday  11/10: pt seen in icu, intubated. 11/12: pt getting trach today  11/13: sp trach peg  11/14: No new problems reported from overnight  11/15: Remains trach/intubated; periods of agitation  11/16: emesis, agitation this am  11/17: Patient responding to some simple commands today  11/18: No new c/o  11/19: No further bleeding from trach stoma  11/21 :pt seen in room, no cp or sob  11/22: pt seen in room, trach on vent  11/23: No complaints; patient actually verbalizing a little appropriately  11/24/2020- awake and alert. On vent. Able to mouth words, appropriate, gives thumbs up.      Allergies:  Bee venom and Shellfish-derived products    Current Medications:    mineral oil-hydrophilic petrolatum (HYDROPHOR) ointment, BID PRN  QUEtiapine (SEROQUEL) tablet 25 mg, BID  0.9 % sodium chloride infusion, Continuous  carvedilol (COREG) tablet 6.25 mg, BID WC  hydrALAZINE (APRESOLINE) tablet 25 mg, 3 times per day  HYDROcodone-acetaminophen (NORCO) 5-325 MG per tablet 1 tablet, Q6H PRN  labetalol (NORMODYNE;TRANDATE) injection 10 mg, Q4H PRN  trimethobenzamide (TIGAN) injection 200 mg, Q6H PRN  thiamine (B-1) 250 mg in sodium chloride 0.9 % 100 mL IVPB, Q24H  midazolam (VERSED) injection 2 mg, Q4H PRN  divalproex (DEPAKOTE SPRINKLE) capsule 250 mg, 3 times per day  sodium chloride flush 0.9 % injection 10 mL, PRN  heparin flush 100 UNIT/ML injection 300 Units, 2 times per day  heparin flush 100 UNIT/ML injection 300 Units, PRN  senna (SENOKOT) tablet 17.2 mg, Nightly  polyethylene glycol (GLYCOLAX) packet 17 g, Daily PRN  perflutren lipid microspheres (DEFINITY) injection 1.65 mg, ONCE PRN  insulin lispro (HUMALOG) injection vial 0-18 Units, Q4H  acetaminophen (TYLENOL) 160 MG/5ML solution 906 mg, Z8O PRN  folic acid injection 1 mg, Daily  polyvinyl alcohol (LIQUIFILM TEARS) 1.4 % ophthalmic solution 1 drop, Q4H PRN  [Held by provider] insulin glargine (LANTUS) injection vial 20 Units, Nightly  sodium chloride (Inhalant) 3 % nebulizer solution 2 mL, Q4H  hydrALAZINE (APRESOLINE) injection 10 mg, Q4H PRN  heparin (porcine) injection 7,500 Units, Q8H  pantoprazole (PROTONIX) injection 40 mg, Daily    And  sodium chloride (PF) 0.9 % injection 10 mL, Daily  sodium chloride flush 0.9 % injection 10 mL, 2 times per day  sodium chloride flush 0.9 % injection 10 mL, PRN  acetaminophen (TYLENOL) suppository 650 mg, Q6H PRN  Arformoterol Tartrate (BROVANA) nebulizer solution 15 mcg, BID  chlorhexidine (PERIDEX) 0.12 % solution 15 mL, BID  amLODIPine (NORVASC) tablet 10 mg, Daily  [Held by provider] aspirin chewable tablet 81 mg, Daily  colchicine (COLCRYS) tablet 0.6 mg, BID PRN  levothyroxine (SYNTHROID) tablet 50 mcg, Daily  budesonide (PULMICORT) nebulizer suspension 500 mcg, BID  ipratropium-albuterol (DUONEB) nebulizer solution 1 ampule, Q4H WA  glucose (GLUTOSE) 40 % oral gel 15 g, PRN  dextrose 50 % IV solution, PRN  glucagon (rDNA) injection 1 mg, PRN  dextrose 5 % solution, PRN      Physical exam:  Vitals:    11/24/20 1506   BP: 137/78   Pulse: 92   Resp: 16   Temp: 96.9 °F (36.1 °C)   SpO2: 100%           General: Intubated/trach; awake  Eyes: PERRL. No sclera icterus. No conjunctival injection. ENT: No discharge. Pharynx clear. Neck: Tracheostomy/vent  Lungs: Coarse breath sounds  CV: S1 S2 no S3 or rub  Abd:  Non-distended. No masses. No organmegaly. Normal bowel sounds. Skin: Warm and dry. No nodule on exposed extremities. No rash on exposed extremities.   Ext: No cyanosis, clubbing, edema; 1+ bilateral lower extremity edema L>R  Neuro: awake; follows some simple commands      Data:   Labs:  Lab Results   Component Value Date     (H) 11/24/2020     (H) 11/23/2020     11/22/2020    K 4.0 11/24/2020    K 4.1 11/23/2020    K 4.3 11/22/2020     (H) 11/24/2020    CO2 27 11/24/2020    CO2 23 11/23/2020    CO2 24 11/22/2020    CREATININE 1.2 11/24/2020    CREATININE 1.4 (H) 11/23/2020    CREATININE 2.2 (H) 11/22/2020    BUN 21 11/24/2020    BUN 30 (H) 11/23/2020    BUN 38 (H) 11/22/2020    GLUCOSE 144 (H) 11/24/2020    GLUCOSE 130 (H) 11/23/2020    GLUCOSE 141 (H) 11/22/2020    PHOS 3.6 11/18/2020    PHOS 3.7 11/17/2020    PHOS 2.4 (L) 11/16/2020    WBC 9.7 11/24/2020    WBC 9.0 11/23/2020    WBC 10.1 11/22/2020    HGB 7.9 (L) 11/24/2020    HGB 7.8 (L) 11/23/2020    HGB 8.4 (L) 11/22/2020    HCT 24.4 (L) 11/24/2020    HCT 23.8 (L) 11/23/2020    HCT 25.5 (L) 11/22/2020    MCV 80.5 11/24/2020     11/24/2020         Imaging:  Xr Chest Portable    Result Date: 10/27/2020  EXAMINATION: ONE XRAY VIEW OF THE CHEST 10/27/2020 7:58 pm COMPARISON: October 26, 2020 HISTORY: ORDERING SYSTEM PROVIDED HISTORY: SOB TECHNOLOGIST PROVIDED HISTORY: Reason for exam:->SOB What reading provider will be dictating this exam?->CRC FINDINGS: There is mild cardiomegaly. There is patchy perihilar and bibasilar atelectasis/infiltrates. Tiny pleural effusions are suspected. Progressive bibasilar atelectasis/infiltrates concerning for pneumonia. Underlying CHF is considered. Xr Chest Portable    Result Date: 10/26/2020  EXAMINATION: ONE XRAY VIEW OF THE CHEST 10/26/2020 4:11 pm COMPARISON: 05/26/2019 HISTORY: ORDERING SYSTEM PROVIDED HISTORY: SOB TECHNOLOGIST PROVIDED HISTORY: Reason for exam:->SOB What reading provider will be dictating this exam?->CRC FINDINGS: Cardiac size appears stable. Discoid airspace disease seen at the left lung base which may represent atelectasis or scarring. Right infrahilar and basilar infiltrate is identified. No pneumothorax is identified. No acute osseous abnormality is identified.      Right infrahilar and

## 2020-11-24 NOTE — PROGRESS NOTES
PT SEEN AND EXAMINED. intubated, in pic. BP labile. S/p peg/trach,  Chart reviewed. meds reviewed. D/w nursing + family as available. EXAM: IN GENERAL, SUKHI [facility] ROS NEGx10 EXCEPT: out of restraints. More cooperative. BP (!) 143/67   Pulse 91   Temp 98.5 °F (36.9 °C) (Temporal)   Resp 17   Ht 5' 8\" (1.727 m)   Wt (!) 310 lb (140.6 kg)   SpO2 99%   BMI 47.14 kg/m²   GEN:  NAD. HEENT: NCAT. NECK: NO JVD. TRACH MIDLINE. NO BRUITS. NO THYROMEGALY. LUNGS: CTA BL NO RALES, RHONCHI OR WHEEZES. GOOD EXCURSION. CV: Regular rate and rhythm, NO Murmurs, Rubs, Or gallops  ABD: Soft. Nontender. Normal bowel sounds.  No organomegaly  EXT:No clubbing cyanosis or edema  Neuro: Labs/data reviewedLABS: CBC with Differential:    Lab Results   Component Value Date    WBC 9.7 11/24/2020    RBC 3.03 11/24/2020    HGB 7.9 11/24/2020    HCT 24.4 11/24/2020     11/24/2020    MCV 80.5 11/24/2020    MCH 26.1 11/24/2020    MCHC 32.4 11/24/2020    RDW 20.3 11/24/2020    NRBC 0.9 11/16/2020    SEGSPCT 73 01/26/2014    METASPCT 1.8 11/15/2020    LYMPHOPCT 31.7 11/23/2020    MONOPCT 6.6 11/23/2020    MYELOPCT 0.9 11/15/2020    BASOPCT 0.3 11/23/2020    MONOSABS 0.60 11/23/2020    LYMPHSABS 2.86 11/23/2020    EOSABS 0.91 11/23/2020    BASOSABS 0.03 11/23/2020     Platelets:    Lab Results   Component Value Date     11/24/2020     CMP:    Lab Results   Component Value Date     11/24/2020    K 4.0 11/24/2020    K 4.0 10/27/2020     11/24/2020    CO2 27 11/24/2020    BUN 21 11/24/2020    CREATININE 1.2 11/24/2020    GFRAA >60 11/24/2020    LABGLOM >60 11/24/2020    GLUCOSE 144 11/24/2020    PROT 7.1 11/24/2020    LABALBU 3.2 11/24/2020    CALCIUM 9.1 11/24/2020    BILITOT <0.2 11/24/2020    ALKPHOS 109 11/24/2020    AST 46 11/24/2020     11/24/2020     Magnesium:    Lab Results   Component Value Date    MG 1.7 11/18/2020     LDH:    Lab Results   Component Value Date     10/28/2020     PT/INR: Lab Results   Component Value Date    PROTIME 14.0 11/18/2020    INR 1.2 11/18/2020     Last 3 Troponin:    Lab Results   Component Value Date    TROPONINI <0.01 10/26/2020    TROPONINI <0.01 05/26/2019    TROPONINI <0.01 05/26/2019     ABG:    Lab Results   Component Value Date    PH 7.371 11/22/2020    PCO2 40.2 11/22/2020    PO2 72.8 11/22/2020    HCO3 22.8 11/22/2020    BE -2.3 11/22/2020    O2SAT 92.5 11/22/2020     IRON:    Lab Results   Component Value Date    IRON 95 11/05/2020     IMAGING    Xr Chest Portable    Result Date: 10/27/2020  EXAMINATION: ONE XRAY VIEW OF THE CHEST 10/27/2020 7:58 pm COMPARISON: October 26, 2020 HISTORY: ORDERING SYSTEM PROVIDED HISTORY: SOB TECHNOLOGIST PROVIDED HISTORY: Reason for exam:->SOB What reading provider will be dictating this exam?->CRC FINDINGS: There is mild cardiomegaly. There is patchy perihilar and bibasilar atelectasis/infiltrates. Tiny pleural effusions are suspected. Progressive bibasilar atelectasis/infiltrates concerning for pneumonia. Underlying CHF is considered. Xr Chest Portable    Result Date: 10/26/2020  EXAMINATION: ONE XRAY VIEW OF THE CHEST 10/26/2020 4:11 pm COMPARISON: 05/26/2019 HISTORY: ORDERING SYSTEM PROVIDED HISTORY: SOB TECHNOLOGIST PROVIDED HISTORY: Reason for exam:->SOB What reading provider will be dictating this exam?->CRC FINDINGS: Cardiac size appears stable. Discoid airspace disease seen at the left lung base which may represent atelectasis or scarring. Right infrahilar and basilar infiltrate is identified. No pneumothorax is identified. No acute osseous abnormality is identified. Right infrahilar and basilar infiltrate concerning for pneumonia. Left basilar atelectasis or scarring.        Medications:    Scheduled Meds:   QUEtiapine  25 mg Oral BID    carvedilol  6.25 mg Oral BID WC    hydrALAZINE  25 mg PEG Tube 3 times per day    thiamine (VITAMIN B1) IVPB  250 mg Intravenous Q24H    divalproex  250 mg Oral 3 times per day    heparin flush  3 mL Intravenous 2 times per day    senna  2 tablet Oral Nightly    insulin lispro  0-18 Units Subcutaneous A8P    folic acid  1 mg Intravenous Daily    [Held by provider] insulin glargine  20 Units Subcutaneous Nightly    sodium chloride (Inhalant)  2 mL Nebulization Q4H    heparin (porcine)  7,500 Units Subcutaneous Q8H    pantoprazole  40 mg Intravenous Daily    And    sodium chloride (PF)  10 mL Intravenous Daily    sodium chloride flush  10 mL Intravenous 2 times per day    Arformoterol Tartrate  15 mcg Nebulization BID    chlorhexidine  15 mL Mouth/Throat BID    amLODIPine  10 mg Oral Daily    [Held by provider] aspirin  81 mg Oral Daily    levothyroxine  50 mcg Oral Daily    budesonide  0.5 mg Nebulization BID    ipratropium-albuterol  1 ampule Inhalation Q4H WA       Continuous Infusions:   sodium chloride Stopped (11/23/20 8328)    dextrose         PRN Meds:HYDROcodone 5 mg - acetaminophen, labetalol, trimethobenzamide, midazolam, sodium chloride flush, heparin flush, polyethylene glycol, perflutren lipid microspheres, acetaminophen, polyvinyl alcohol, hydrALAZINE, sodium chloride flush, [DISCONTINUED] acetaminophen **OR** acetaminophen, colchicine, glucose, dextrose, glucagon (rDNA), dextrose    A/P:      Patient Active Problem List   Diagnosis    Hyperlipidemia    Type 2 diabetes mellitus without complication, without long-term current use of insulin (HCC)    Atypical chest pain    Essential hypertension    Chronic acquired lymphedema    Aortic valve disease    Morbid obesity due to excess calories (HCC)    Abdominal pain    Acute respiratory failure with hypoxia (HCC)    Acute pulmonary edema (HCC)    Congestive heart failure with LV diastolic dysfunction, NYHA class 3 (HCC)    Obstructive sleep apnea    Acquired hypothyroidism    Chronic diastolic heart failure (HCC)    Nonrheumatic aortic valve stenosis    ACE-inhibitor cough    Pneumonia due to organism    Acute gout of right knee   Acute encephalopathy 2/2 Acute hypoxic hypercapnic respiratory failure. - cleared         Acute hypoxic hypercapnic respiratory failure 2/2 MSSA pneumonia versus COPD versus ADHF versus chronic OHS  Pt has Hx chronic HANS not on CPAP at home. ABG showed pH 7.188/91.2/78.9/33 on RRT. currently intubated. - Continue to monitor respiratory status, wean down FiO2 as tolerated. - Pro  on presentation.   - US dup LE negative for DVT. CTA negative for PE. COVID -19 negative x2  - Continue breathing treatment  - Monitor daily CXR. CBC.   - Pulmonology consulted, further recommendations appreciated.     NANCY stage III likely pre renal 2/2 diuretic use, contrast agent vs? cardiorenal syndrome. -   - Nephrology consulted. Further recommendations appreciated. - Continue monitor daily BMP, renal function. Monitor I/O. Avoid nephrotoxicity.       · Bacterial pneumonia, likely aspiration pneumonia vs MSSA   · Shock, sepsis- resolved   · ATBx per id  Alcohol withdrawal .    Hx HFpEF, EF 65%  cta noted   needs ltac  Wean per pulm/ccm  S/p Peg/trach PER ENT  Monitor HR  DECR hydralazine  INCR FREE H20   amlodipine    · Completed 10 day course of zosyn post trach insertion; follow off antibiotcs for now  · Repeat respiratory & blood cultures - no growth   · 11/19/2020 PICC line placed  · Dc restraints  · Monitor labs - creatinine improving - 1.2

## 2020-11-24 NOTE — PROGRESS NOTES
Occupational Therapy  OT BEDSIDE TREATMENT NOTE      Date:2020  Patient Name: Dennis Maravilla  MRN: [id number]  : 1960  Room: [address] [place] [address]A     Referring Physician:  Erick Bess MD     Evaluating OT:  MONET Yung, OTR/L #[id number]     AM-PAC Daily Activity Raw Score:    Recommended Adaptive Equipment:  TBD as pt progresses      Reason for Admission:  Pt was admitted 10-26-20 w/ SOB. Altered Mental status  10-28-20 intubated/sedated  20 PEG  20 Open Trach/Bronch     Diagnosis:     1. Acute respiratory failure with hypoxia ([facility] [address]    2. Pneumonia due to organism    3. Encounter for nasogastric (NG) tube placement    4. Long term (current) use of antibiotics       Pertinent Medical History:  CHF, COPD, DM, HTN, Lymphedema, OA, DM      Precautions:  Falls  Trach/PEG  Zamorano/Fecal Mgmt System       Pt was a poor historian - Little information obtained from chart     Home Living: Pt lives with his wife in a 2-story house. Bathroom setup:  ?? Equipment owned:  None     Available Family Assist:  ??     Prior Level of Function:  IND with ADLs, IADLs, Transfers and Mobility using No ADfor ambulation. Driving:  ?  Occupation:  ?     Pain Level:  No indication of pain this session  Additional Complaints:  Pt showing increased response.      Vitals/Lab Values:  WFL O2 sats remained > 99% for duration of session on Trach     Cognition: A & O x 1; still showing some confusion stating he just drove yesterday.            Able to Follow 2/5 Simple Commands w/ Max VCs/tactile cues              Memory:  poor              Sequencing:  poor+              Problem solving:  poor+              Judgement/safety:  poor  Additional Comments:No restraints present - did not reach for lines                 Functional Assessment:    Initial Eval Status  Date: 20 Treatment Status  Date: 20 Short Term/Long Term Goals  Treatment frequency: PRN 3-day Trial    Feeding NPO/DEP        PEG tube NA Grooming Max A     Hand-over-hand assist for safety w/ lines to wash face w/ R Hand, Mod VCs  Max A;    Pt able to wash face with use of B hands. [place] for washing head and completing mouth hygiene. Mod A   UB Dressing Dep     Max of 2 for bed mobility to facilitate task + Max A/VCs to thread UEs into garment bed level     Mod A; To perform araceli/doff of gown with some limitations due to line management. Max A   LB Dressing Dep     Max A to don socks  Max A of 2 for bed mobility + Max A of 1 for simulated clothing adjustment in supine, Max VCs     Dep; To araceli /doff socks. Max A of 2   Bathing NT        Dep; Pt requires Dep A to complete most areas of bathing with increased time. Max A of 2   Toileting Dep     Zamorano Catheter  Fecal Mgmt System  Dep; Zamorano Catheter  Fecal Mgmt System     TBD   Bed Mobility  Rolling: Max A of 2  Repositioning:  Max A of 2   Supine to Sit:  NT    Sit to Supine:  NT      Unsafe to attempt to transfer to EOB d/t inability to consistently follow commands, restlessness, recent agitation   Rolling: Max A  Repositioning:  Max A   Supine to Sit:  Max A   Sit to Supine:  Max A;    2 person assist required due to line management and some confusion still present . Max A of 2   Functional Transfers Sit to stand:  NT  Stand to sit:  NT      Sit to stand: Mod A of 2  Stand to sit:  Mod A of 2;    Pt able to follow commands with improvements shown this date. Pt able to complete sit to stands approximately 3 times with verbal prompting on proper hand placement, Mod A and assistance of another person for safety of line management. NA   Functional Mobility NT           Side stepping: Mod A x 2 with w/w.   NA   Balance NT     Fair     Activity Tolerance Poor(+)        Fair+     Visual/  Perceptual WFL  Glasses:  None at b/s        Hearing [facility]  Hearing Aids            Hand dominance: Right     UE ROM:        RUE:     WFL                                          LUE: WFL     Strength:        RUE:    grossly 5/5 - observed                                      LUE:    grossly 5/5      Strength:  WFL Navi UEs     Fine Motor Coordination:  WFL difficult to assess     Sensation:  Denies numbness or tingling Navi UEs  Tone:  WFL Navi UEs  Edema:  None Noted                            Comments: Upon arrival, patient was found in semi-supine. He was agreeable to participate in therapeutic ax.  No Family present during session. Received permission from RN prior to engaging pt in OT services.       At the end of the session, patient was properly positioned in Semi-Supine. Call light and phone within reach, all lines and tubes intact. Oriented pt to call bell. Made all appropriate Environmental Modifications to facilitate pt's level of IND and safety. All needs met. Bed Alarm activated.            Overall patient demonstrated decreased independence and safety during completion of ADL/functional transfer/mobility tasks.   Pt would benefit from continued skilled OT to increase safety and independence with completion of ADL/IADL tasks for functional independence and quality of life.     Time In: 0945  Time Out:  1025  Total Treatment Time: 40 mins       Treatment Charges: Mins Units   OT Eval Low 60107       OT Eval Medium 36370     OT Eval High 04838     OT Re-Eval 65890     Therapeutic Ex  72937     Therapeutic Activities 83540 37 1   ADL/Self Care 92250 14 1   Neuro Re-ed 84535       Orthotic manage/training  25411       Non-Billable Time 15  0     Lydia Banegas [address], [address]

## 2020-11-25 NOTE — PROGRESS NOTES
intubated  11/4: pt seen and examined in icu, no overnight events, intubated  11/5: pt seen in room ,remains intubated  11/6: pt seen in room, intubated. 11/7: pt seen in room, intubated, chart reviewed  11/8:pt seen in room, remains intuibated  11/9: pt seen in room, reintubated yesterday  11/10: pt seen in icu, intubated. 11/12: pt getting trach today  11/13: sp trach peg  11/14: No new problems reported from overnight  11/15: Remains trach/intubated; periods of agitation  11/16: emesis, agitation this am  11/17: Patient responding to some simple commands today  11/18: No new c/o  11/19: No further bleeding from trach stoma  11/21 :pt seen in room, no cp or sob  11/22: pt seen in room, trach on vent  11/23: No complaints; patient actually verbalizing a little appropriately  11/24/2020- awake and alert. On vent.  Able to mouth words, appropriate, gives thumbs up.  11/25: No new problems reported from overnight; no bleeding from trach stoma     Allergies:  Bee venom and Shellfish-derived products    Current Medications:    meropenem (MERREM) 1 g in sodium chloride 0.9 % 100 mL IVPB (mini-bag), Once    Followed by  meropenem (MERREM) 1 g in sodium chloride 0.9 % 100 mL IVPB (mini-bag), Q8H  mineral oil-hydrophilic petrolatum (HYDROPHOR) ointment, BID PRN  QUEtiapine (SEROQUEL) tablet 25 mg, BID  0.9 % sodium chloride infusion, Continuous  carvedilol (COREG) tablet 6.25 mg, BID WC  hydrALAZINE (APRESOLINE) tablet 25 mg, 3 times per day  HYDROcodone-acetaminophen (NORCO) 5-325 MG per tablet 1 tablet, Q6H PRN  labetalol (NORMODYNE;TRANDATE) injection 10 mg, Q4H PRN  trimethobenzamide (TIGAN) injection 200 mg, Q6H PRN  thiamine (B-1) 250 mg in sodium chloride 0.9 % 100 mL IVPB, Q24H  midazolam (VERSED) injection 2 mg, Q4H PRN  divalproex (DEPAKOTE SPRINKLE) capsule 250 mg, 3 times per day  sodium chloride flush 0.9 % injection 10 mL, PRN  heparin flush 100 UNIT/ML injection 300 Units, 2 times per day  heparin flush 100 UNIT/ML injection 300 Units, PRN  senna (SENOKOT) tablet 17.2 mg, Nightly  polyethylene glycol (GLYCOLAX) packet 17 g, Daily PRN  perflutren lipid microspheres (DEFINITY) injection 1.65 mg, ONCE PRN  insulin lispro (HUMALOG) injection vial 0-18 Units, Q4H  acetaminophen (TYLENOL) 160 MG/5ML solution 188 mg, P1R PRN  folic acid injection 1 mg, Daily  polyvinyl alcohol (LIQUIFILM TEARS) 1.4 % ophthalmic solution 1 drop, Q4H PRN  [Held by provider] insulin glargine (LANTUS) injection vial 20 Units, Nightly  sodium chloride (Inhalant) 3 % nebulizer solution 2 mL, Q4H  hydrALAZINE (APRESOLINE) injection 10 mg, Q4H PRN  heparin (porcine) injection 7,500 Units, Q8H  pantoprazole (PROTONIX) injection 40 mg, Daily    And  sodium chloride (PF) 0.9 % injection 10 mL, Daily  sodium chloride flush 0.9 % injection 10 mL, 2 times per day  sodium chloride flush 0.9 % injection 10 mL, PRN  acetaminophen (TYLENOL) suppository 650 mg, Q6H PRN  Arformoterol Tartrate (BROVANA) nebulizer solution 15 mcg, BID  chlorhexidine (PERIDEX) 0.12 % solution 15 mL, BID  amLODIPine (NORVASC) tablet 10 mg, Daily  [Held by provider] aspirin chewable tablet 81 mg, Daily  colchicine (COLCRYS) tablet 0.6 mg, BID PRN  levothyroxine (SYNTHROID) tablet 50 mcg, Daily  budesonide (PULMICORT) nebulizer suspension 500 mcg, BID  ipratropium-albuterol (DUONEB) nebulizer solution 1 ampule, Q4H WA  glucose (GLUTOSE) 40 % oral gel 15 g, PRN  dextrose 50 % IV solution, PRN  glucagon (rDNA) injection 1 mg, PRN  dextrose 5 % solution, PRN      Physical exam:  Vitals:    11/25/20 0815   BP: (!) 174/83   Pulse: 102   Resp: 22   Temp: 98.6 °F (37 °C)   SpO2:            General: Intubated/trach; awake  Eyes: PERRL. No sclera icterus. No conjunctival injection. ENT: No discharge. Pharynx clear. Neck: Tracheostomy/vent  Lungs: Coarse breath sounds  CV: S1 S2 no S3 or rub  Abd:  Non-distended. No masses. No organmegaly. Normal bowel sounds. Skin: Warm and dry.  No nodule on exposed extremities. No rash on exposed extremities. Ext: No cyanosis, clubbing, edema; 1+ bilateral lower extremity edema L>R  Neuro: awake; follows some simple commands      Data:   Labs:  Lab Results   Component Value Date     11/25/2020     (H) 11/24/2020     (H) 11/23/2020    K 4.8 11/25/2020    K 4.0 11/24/2020    K 4.1 11/23/2020     11/25/2020    CO2 23 11/25/2020    CO2 27 11/24/2020    CO2 23 11/23/2020    CREATININE 1.3 (H) 11/25/2020    CREATININE 1.2 11/24/2020    CREATININE 1.4 (H) 11/23/2020    BUN 21 11/25/2020    BUN 21 11/24/2020    BUN 30 (H) 11/23/2020    GLUCOSE 167 (H) 11/25/2020    GLUCOSE 144 (H) 11/24/2020    GLUCOSE 130 (H) 11/23/2020    PHOS 2.4 (L) 11/25/2020    PHOS 3.6 11/18/2020    PHOS 3.7 11/17/2020    WBC 16.9 (H) 11/25/2020    WBC 9.7 11/24/2020    WBC 9.0 11/23/2020    HGB 8.4 (L) 11/25/2020    HGB 7.9 (L) 11/24/2020    HGB 7.8 (L) 11/23/2020    HCT 25.3 (L) 11/25/2020    HCT 24.4 (L) 11/24/2020    HCT 23.8 (L) 11/23/2020    MCV 80.1 11/25/2020     11/25/2020         Imaging:  Xr Chest Portable    Result Date: 10/27/2020  EXAMINATION: ONE XRAY VIEW OF THE CHEST 10/27/2020 7:58 pm COMPARISON: October 26, 2020 HISTORY: ORDERING SYSTEM PROVIDED HISTORY: SOB TECHNOLOGIST PROVIDED HISTORY: Reason for exam:->SOB What reading provider will be dictating this exam?->CRC FINDINGS: There is mild cardiomegaly. There is patchy perihilar and bibasilar atelectasis/infiltrates. Tiny pleural effusions are suspected. Progressive bibasilar atelectasis/infiltrates concerning for pneumonia. Underlying CHF is considered. Xr Chest Portable    Result Date: 10/26/2020  EXAMINATION: ONE XRAY VIEW OF THE CHEST 10/26/2020 4:11 pm COMPARISON: 05/26/2019 HISTORY: ORDERING SYSTEM PROVIDED HISTORY: SOB TECHNOLOGIST PROVIDED HISTORY: Reason for exam:->SOB What reading provider will be dictating this exam?->CRC FINDINGS: Cardiac size appears stable.   Discoid airspace disease seen at the left lung base which may represent atelectasis or scarring. Right infrahilar and basilar infiltrate is identified. No pneumothorax is identified. No acute osseous abnormality is identified. Right infrahilar and basilar infiltrate concerning for pneumonia. Left basilar atelectasis or scarring. Assessment/Plans    1. Acute kidney injury on ckd 3 a  Baseline 1.8 from 9/2019, 1.1 in 5/2019  hemodynamically mediated from bradycardia/diuretic/ARB   exacerbated by IV contrast use   Renal function back to baseline at 1.2     2. Acute hypoxic respiratory failure  due to multilobar pneumonia  gradual wean  Completed course of antibiotics  Failed extubation 11/8  S/P trach 11/12      3. H/O ETOH  abuse    4.  Hypernatremia  imrpoved off bumex  Receiving free water via PEG  Continue to monitor        Cassie Hollis MD  2:19 PM  11/25/2020

## 2020-11-25 NOTE — PROGRESS NOTES
Comprehensive Nutrition Assessment    Type and Reason for Visit:  Reassess    Nutrition Recommendations/Plan: Continue current EN as ordered. No new recommendations at this time. Will monitor/follow     Nutrition Assessment:  Pt remains w/ trach to vent & PEG for nutrition. Will continue current EN as ordered.     Malnutrition Assessment:  Malnutrition Status:  No malnutrition        Estimated Daily Nutrient Needs:  Energy (kcal):  PS10- 2712-5548; Weight Used for Energy Requirements:  Current     Protein (g):  1.5-2.0= 110-140; Weight Used for Protein Requirements:  Ideal        Fluid (ml/day):  2100ml; Method Used for Fluid Requirements:  1 ml/kcal      Nutrition Related Findings:  -I/os, oriented x2, trach to vent, PEG w/ TF,. diarrhea w/ FMS, active bs, +1/2 generalized edema,      Wounds:  Multiple, Surgical Incision       Current Nutrition Therapies:    Current Tube Feeding (TF) Orders:  · Feeding Route: PEG  · Formula: 1.5 Diabetic  Current TF & Flush Orders Provides: 2340 kcals, 128 gm protein, 1184 ml free water    Anthropometric Measures:  · Height: 5' 8\" (172.7 cm)  · Current Body Weight: 290 lb (131.5 kg)   · Admission Body Weight: 334 lb (151.5 kg)(10/29 actual)    · Usual Body Weight: 318 lb (144.2 kg)(12/2019 per EMR, no method)     · Ideal Body Weight: 154 lbs; % Ideal Body Weight 188.3 %   · BMI: 44.1  · BMI Categories: Obese Class 3 (BMI 40.0 or greater)(noted wt loss since admit, current fluids - at this time)       Nutrition Diagnosis:   · Inadequate oral intake related to impaired respiratory function as evidenced by NPO or clear liquid status due to medical condition, intubation, nutrition support - enteral nutrition      Nutrition Interventions:   Food and/or Nutrient Delivery:  Continue Current Tube Feeding  Nutrition Education/Counseling:  No recommendation at this time   Coordination of Nutrition Care:  Continue to monitor while inpatient    Goals:  En to goal w/tolerance       Nutrition Monitoring and Evaluation:   Food/Nutrient Intake Outcomes:  Enteral Nutrition Intake/Tolerance  Physical Signs/Symptoms Outcomes:  Biochemical Data, Diarrhea, GI Status, Fluid Status or Edema, Hemodynamic Status, Nutrition Focused Physical Findings, Weight, Skin     Discharge Planning:     Too soon to determine     Electronically signed by Tali Cutler RD, LD on 11/25/20 at 1:22 PM EST    Contact: x [phone]

## 2020-11-25 NOTE — PROGRESS NOTES
Pulmonary Progress Note  11/25/2020 11:36 AM  Subjective:   Admit Date: 10/26/2020  PCP: Keshai Hines MD  Interval History: FiO2 increased up to 90% overnight, febrile 102 degrees. Pulmonary was not notified, unfortunately.      diet: DIET TUBE FEED CONTINUOUS/CYCLIC NPO; Diabetic 1.5; Gastrostomy; Continuous; 25; 65; 23; Exceptions are: Sips with Meds      Data:   Scheduled Meds:    meropenem  1 g Intravenous Once    Followed by    meropenem  1 g Intravenous Q8H    QUEtiapine  25 mg Oral BID    carvedilol  6.25 mg Oral BID WC    hydrALAZINE  25 mg PEG Tube 3 times per day    thiamine (VITAMIN B1) IVPB  250 mg Intravenous Q24H    divalproex  250 mg Oral 3 times per day    heparin flush  3 mL Intravenous 2 times per day    senna  2 tablet Oral Nightly    insulin lispro  0-18 Units Subcutaneous P1J    folic acid  1 mg Intravenous Daily    [Held by provider] insulin glargine  20 Units Subcutaneous Nightly    sodium chloride (Inhalant)  2 mL Nebulization Q4H    heparin (porcine)  7,500 Units Subcutaneous Q8H    pantoprazole  40 mg Intravenous Daily    And    sodium chloride (PF)  10 mL Intravenous Daily    sodium chloride flush  10 mL Intravenous 2 times per day    Arformoterol Tartrate  15 mcg Nebulization BID    chlorhexidine  15 mL Mouth/Throat BID    amLODIPine  10 mg Oral Daily    [Held by provider] aspirin  81 mg Oral Daily    levothyroxine  50 mcg Oral Daily    budesonide  0.5 mg Nebulization BID    ipratropium-albuterol  1 ampule Inhalation Q4H WA       Continuous Infusions:    sodium chloride Stopped (11/23/20 1638)    dextrose         PRN Meds: mineral oil-hydrophilic petrolatum, HYDROcodone 5 mg - acetaminophen, labetalol, trimethobenzamide, midazolam, sodium chloride flush, heparin flush, polyethylene glycol, perflutren lipid microspheres, acetaminophen, polyvinyl alcohol, hydrALAZINE, sodium chloride flush, [DISCONTINUED] acetaminophen **OR** acetaminophen, colchicine, glucose, dextrose, glucagon (rDNA), dextrose    I/O last 3 completed shifts:   In: 1700 [NG/GT:1600; IV Piggyback:100]  Out: 9420 [Urine:2725; Stool:850]    I/O this shift:  In: 30 [I.V.:30]  Out: -       Intake/Output Summary (Last 24 hours) at 11/25/2020 1136  Last data filed at 11/25/2020 1115  Gross per 24 hour   Intake 1600 ml   Output 2975 ml   Net -1375 ml       Patient Vitals for the past 96 hrs (Last 3 readings):   Weight   11/23/20 0523 (!) 310 lb (140.6 kg)         Recent Labs     11/23/20 0520 11/24/20 0620 11/25/20  0600   WBC 9.0 9.7 16.9*   HGB 7.8* 7.9* 8.4*   HCT 23.8* 24.4* 25.3*   MCV 79.6* 80.5 80.1    354 347     Recent Labs     11/23/20 0520 11/24/20 0620 11/25/20  0600   * 147* 144   K 4.1 4.0 4.8   * 111* 107   CO2 23 27 23   BUN 30* 21 21   CREATININE 1.4* 1.2 1.3*   PHOS  --   --  2.4*     Recent Labs     11/23/20 0520 11/24/20 0620 11/25/20  0600   PROT 6.7 7.1 7.2   ALKPHOS 91 109 112   * 120* 107*   AST 45* 46* 39   BILITOT <0.2 <0.2 0.2     CPK:  Lab Results   Component Value Date    CKTOTAL 98 10/08/2017       -----------------------------------------------------------------  RAD:   Improving                    Micro:  Vent Information  $Ventilation: $Subsequent Day  Skin Assessment: Clean, dry, & intact  Equipment ID: [id number]  Equipment Changed: (S) Humidification  Vent Type: 840  Vent Mode: AC/VC  Vt Ordered: 480 mL  Pressure Ordered: 12  Rate Set: 12 bmp  Peak Flow: 60 L/min  Pressure Support: 10 cmH20  FiO2 : 90 %  SpO2: 97 %  SpO2/FiO2 ratio: 107.78  PaO2/FiO2 ratio: 169  Sensitivity: 3  PEEP/CPAP: 5  I Time/ I Time %: 0 s  Humidification Source: Heated wire  Humidification Temp: 37  Humidification Temp Measured: 36.2  Circuit Condensation: Drained  Mask Type: Full face mask  Mask Size: Large    Additional Respiratory  Assessments  Pulse: 102  Resp: 22  SpO2: 97 %  Position: Semi-Lockwood's  Humidification Source: Heated wire  Humidification Temp: 37  Circuit Condensation: Drained  Oral Care Completed?: Yes  Oral Care: Teeth brushed  Subglottic Suction Done?: Yes  Airway Type: Trachial  Airway Size: 8  Cuff Pressure (cm H2O): 29 cm H2O  Skin barrier applied: Yes    Objective:   Vitals:   Vitals:    20 0815   BP: (!) 174/83   Pulse: 102   Resp: 22   Temp: 98.6 °F (37 °C)   SpO2:       TEMP:Current: Temp: 98.6 °F (37 °C)  Max: Temp  Av.8 °F (37.7 °C)  Min: 96.9 °F (36.1 °C)  Max: 102.9 °F (39.4 °C)    BP Range: Systolic (05XTD), SGZ:999 , Min:129 , WOL:651     Diastolic (64EJL), IUV:23, Min:65, Max:83    General appearance: obese on mechanical ventilation , appears stated age awakens to name, no acute distress  Skin: No rashes or lesions  HEENT: mucous membranes are moist, tracheostomy no bleeding noted. Secretions 1+  neck: No JVD  Lungs: symmetrical expansion, dmiinished breath sounds to auscultation, no use of accessory muscles  Heart: RRR, S1S2 2/6 JUAN  Abdomen: soft, non tender, distended obese PEG tube  Extremities: no peripheral edema  Neurologic:  Oens eyes to name, not following commands  Affect: Calm          Assessment:   Patient Active Problem List:     59-y.o M with history of DM, hypothyroid, COPD, HTN, HLD presented on 10/26 with shortness of breath for 1 week.  Saturations were 70% at PCPs.  Saturation 65% on room air in ER  Patient agitated for CT scan refused to lay flat  10/27 RRT refused BiPAP became combative.  Patient transferred to ICU  10/28 intubated and sedated.  CT chest showing dense multifocal airspace disease      1. Hemoptysis / bleeding at trach site  2. Drop of hemoglobin from 10.4  - 9  3. acute hypoxemic respiratory failure on mechanical ventilation s/p trach  8 cuffed Shiley trach proximal XLT  4. Metabolic encephalopathy history of EtOH abuse with agitation on ( Depakote, and Seroquel  )significantly improved  5. Acute renal failure Baseline creatinine 1.9 on 2019  6. Anemia  7. HAP   8.  HANS moderate ( AHI 17 on 4/18/18 requires BIPAP 16/12) noncompliant with CPAP  9. History of reactive airway disease  10. CT 3/14/2018 showing pulmonary nodule stable from 10/2017  11. 12. Diabetes with elevated blood sugars  13. Obesity  14. EF 05% LVH diastolic dysfunction  15. chronic lymphedema  16. History of gout on colchicine  17. Hypothyroid  18. H/o respiratory failure: 7/8/2017 pt was  transferred from [facility] for acute respiratory failure after lap cholecystectomy, lap umbilical hernia repair, and lap liver biopsy (fatty liver). Patient was then transferred to [facility] hospital where he was weaned off the ventilator and decannulated.   19. Ventilator associated pneumonia     Plan:      · Will increase PEEP, with a hope to wean FiO2  · We will panculture  · Favor to start broad-spectrum antibiotics as per ID consideration  · Episodes of hypertension with agitation adjust blood pressure medicines  · Favor to cut down Seroquel gradually ( will change to nightly for 2 nights)  then valproic acid  · Continue brovana/pulmicort  · Discussed with ID          Vale Buckley  11/25/2020  11:36 AM

## 2020-11-25 NOTE — PROGRESS NOTES
10/28/2020     PT/INR:    Lab Results   Component Value Date    PROTIME 14.0 11/18/2020    INR 1.2 11/18/2020     Last 3 Troponin:    Lab Results   Component Value Date    TROPONINI <0.01 10/26/2020    TROPONINI <0.01 05/26/2019    TROPONINI <0.01 05/26/2019     ABG:    Lab Results   Component Value Date    PH 7.371 11/22/2020    PCO2 40.2 11/22/2020    PO2 72.8 11/22/2020    HCO3 22.8 11/22/2020    BE -2.3 11/22/2020    O2SAT 92.5 11/22/2020     IRON:    Lab Results   Component Value Date    IRON 95 11/05/2020     IMAGING    Xr Chest Portable    Result Date: 10/27/2020  EXAMINATION: ONE XRAY VIEW OF THE CHEST 10/27/2020 7:58 pm COMPARISON: October 26, 2020 HISTORY: ORDERING SYSTEM PROVIDED HISTORY: SOB TECHNOLOGIST PROVIDED HISTORY: Reason for exam:->SOB What reading provider will be dictating this exam?->CRC FINDINGS: There is mild cardiomegaly. There is patchy perihilar and bibasilar atelectasis/infiltrates. Tiny pleural effusions are suspected. Progressive bibasilar atelectasis/infiltrates concerning for pneumonia. Underlying CHF is considered. Xr Chest Portable    Result Date: 10/26/2020  EXAMINATION: ONE XRAY VIEW OF THE CHEST 10/26/2020 4:11 pm COMPARISON: 05/26/2019 HISTORY: ORDERING SYSTEM PROVIDED HISTORY: SOB TECHNOLOGIST PROVIDED HISTORY: Reason for exam:->SOB What reading provider will be dictating this exam?->CRC FINDINGS: Cardiac size appears stable. Discoid airspace disease seen at the left lung base which may represent atelectasis or scarring. Right infrahilar and basilar infiltrate is identified. No pneumothorax is identified. No acute osseous abnormality is identified. Right infrahilar and basilar infiltrate concerning for pneumonia. Left basilar atelectasis or scarring.        Medications:    Scheduled Meds:   meropenem  1 g Intravenous Once    Followed by    meropenem  1 g Intravenous Q8H    QUEtiapine  25 mg Oral BID    carvedilol  6.25 mg Oral BID WC    hydrALAZINE  25 mg PEG Tube 3 times per day    thiamine (VITAMIN B1) IVPB  250 mg Intravenous Q24H    divalproex  250 mg Oral 3 times per day    heparin flush  3 mL Intravenous 2 times per day    senna  2 tablet Oral Nightly    insulin lispro  0-18 Units Subcutaneous N3D    folic acid  1 mg Intravenous Daily    [Held by provider] insulin glargine  20 Units Subcutaneous Nightly    sodium chloride (Inhalant)  2 mL Nebulization Q4H    heparin (porcine)  7,500 Units Subcutaneous Q8H    pantoprazole  40 mg Intravenous Daily    And    sodium chloride (PF)  10 mL Intravenous Daily    sodium chloride flush  10 mL Intravenous 2 times per day    Arformoterol Tartrate  15 mcg Nebulization BID    chlorhexidine  15 mL Mouth/Throat BID    amLODIPine  10 mg Oral Daily    [Held by provider] aspirin  81 mg Oral Daily    levothyroxine  50 mcg Oral Daily    budesonide  0.5 mg Nebulization BID    ipratropium-albuterol  1 ampule Inhalation Q4H WA       Continuous Infusions:   sodium chloride Stopped (11/23/20 1638)    dextrose         PRN Meds:mineral oil-hydrophilic petrolatum, HYDROcodone 5 mg - acetaminophen, labetalol, trimethobenzamide, midazolam, sodium chloride flush, heparin flush, polyethylene glycol, perflutren lipid microspheres, acetaminophen, polyvinyl alcohol, hydrALAZINE, sodium chloride flush, [DISCONTINUED] acetaminophen **OR** acetaminophen, colchicine, glucose, dextrose, glucagon (rDNA), dextrose    A/P:      Patient Active Problem List   Diagnosis    Hyperlipidemia    Type 2 diabetes mellitus without complication, without long-term current use of insulin (HCC)    Atypical chest pain    Essential hypertension    Chronic acquired lymphedema    Aortic valve disease    Morbid obesity due to excess calories (HCC)    Abdominal pain    Acute respiratory failure with hypoxia (HCC)    Acute pulmonary edema (HCC)    Congestive heart failure with LV diastolic dysfunction, NYHA class 3 ([facility] [address]    Obstructive sleep apnea    Acquired hypothyroidism    Chronic diastolic heart failure (HCC)    Nonrheumatic aortic valve stenosis    ACE-inhibitor cough    Pneumonia due to organism    Acute gout of right knee   Acute encephalopathy 2/2 Acute hypoxic hypercapnic respiratory failure. - cleared         Acute hypoxic hypercapnic respiratory failure 2/2 MSSA pneumonia versus COPD versus ADHF versus chronic OHS  Pt has Hx chronic HANS not on CPAP at home. ABG showed pH 7.188/91.2/78.9/33 on RRT. currently intubated. - Continue to monitor respiratory status, wean down FiO2 as tolerated. - Pro  on presentation.   - US dup LE negative for DVT. CTA negative for PE. COVID -19 negative x2  - Continue breathing treatment  - Monitor daily CXR. CBC.   - Pulmonology consulted, further recommendations appreciated.     NANCY stage III likely pre renal 2/2 diuretic use, contrast agent vs? cardiorenal syndrome. -   - Nephrology consulted. Further recommendations appreciated. - Continue monitor daily BMP, renal function. Monitor I/O. Avoid nephrotoxicity.       · Bacterial pneumonia, likely aspiration pneumonia vs MSSA   · Shock, sepsis- resolved   · ATBx per id  Alcohol withdrawal .    Hx HFpEF, EF 65%  cta noted   needs ltac  Wean per pulm/ccm  S/p Peg/trach PER ENT  Monitor HR  DECR hydralazine  INCR FREE H20   amlodipine    · Completed 10 day course of zosyn post trach insertion;BACK ON MERREM PER ID for now  · Repeat respiratory & blood cultures - no growth   · 11/19/2020 PICC line placed  · Dc restraints  · Monitor labs - creatinine improving - 1.3

## 2020-11-25 NOTE — PROGRESS NOTES
Chief Complaint   Patient presents with    Shortness of Breath     for a few weeks. [facility] and now 95%. SUBJECTIVE:  Patient is tolerating medications. No reported adverse drug reactions. Spiking temps overnight. TMax 102.9. - temp trending down. Increasing oxygen requirements. Trach to vent - 90% FiO2, 97% SpO2, PEEP 5           Review of systems:  As stated above in the chief complaint, otherwise negative.     Medications:  Scheduled Meds:   meropenem  1 g Intravenous Once    Followed by    meropenem  1 g Intravenous Q8H    QUEtiapine  25 mg Oral BID    carvedilol  6.25 mg Oral BID WC    hydrALAZINE  25 mg PEG Tube 3 times per day    thiamine (VITAMIN B1) IVPB  250 mg Intravenous Q24H    divalproex  250 mg Oral 3 times per day    heparin flush  3 mL Intravenous 2 times per day    senna  2 tablet Oral Nightly    insulin lispro  0-18 Units Subcutaneous Z8L    folic acid  1 mg Intravenous Daily    [Held by provider] insulin glargine  20 Units Subcutaneous Nightly    sodium chloride (Inhalant)  2 mL Nebulization Q4H    heparin (porcine)  7,500 Units Subcutaneous Q8H    pantoprazole  40 mg Intravenous Daily    And    sodium chloride (PF)  10 mL Intravenous Daily    sodium chloride flush  10 mL Intravenous 2 times per day    Arformoterol Tartrate  15 mcg Nebulization BID    chlorhexidine  15 mL Mouth/Throat BID    amLODIPine  10 mg Oral Daily    [Held by provider] aspirin  81 mg Oral Daily    levothyroxine  50 mcg Oral Daily    budesonide  0.5 mg Nebulization BID    ipratropium-albuterol  1 ampule Inhalation Q4H WA     Continuous Infusions:   sodium chloride Stopped (11/23/20 1638)    dextrose       PRN Meds:mineral oil-hydrophilic petrolatum, HYDROcodone 5 mg - acetaminophen, labetalol, trimethobenzamide, midazolam, sodium chloride flush, heparin flush, polyethylene glycol, perflutren lipid microspheres, acetaminophen, polyvinyl alcohol, ALKPHOS 112 11/25/2020    AST 39 11/25/2020     11/25/2020     Lab Results   Component Value Date    CRP 1.4 (H) 11/02/2020    CRP 2.8 (H) 09/04/2017    CRP 10.5 (H) 08/28/2017     Lab Results   Component Value Date    SEDRATE 135 (H) 09/04/2017    SEDRATE 150 (H) 08/28/2017    SEDRATE 141 (H) 08/21/2017     Radiology:  CXR- 11/3- Multifocal bilateral pulmonary infiltrates more prominent within the right lung. CXR- 11/4- worsening b/l infiltrates, worse on left today with possible small L sided effusion   CXR- 11/5 - Stable b/l infiltrates   CXR- 11/6 - Expiratory film, image otherwise appears overall stable. CXR- 11/16 - stable right parahilar pulmonary opacity which may represent   atelectasis or pneumonia. CXR- 11/23/2020- bilateral infiltrates more prominent on the left. CXR 11/25/2020 - progressing consolidation bilaterally. Microbiology:   Lab Results   Component Value Date    BC 5 Days no growth 11/09/2020    BC 5 Days no growth 10/29/2020    BC  10/26/2020     This organism was isolated in one set. Susceptibility testing is not routinely done as this  organism frequently represents skin contamination. Additional testing can be ordered by calling the  Microbiology Department.       ORG Staphylococcus aureus 10/29/2020    ORG Staphylococcus coagulase-negative 10/26/2020    ORG Coagulase Negative Staphylococci 08/26/2017     Lab Results   Component Value Date    BLOODCULT2 5 Days no growth 11/09/2020    BLOODCULT2 5 Days no growth 10/29/2020    BLOODCULT2 5 Days no growth 10/26/2020    ORG Staphylococcus aureus 10/29/2020    ORG Staphylococcus coagulase-negative 10/26/2020    ORG Coagulase Negative Staphylococci 08/26/2017     No results found for: WNDABS  Smear, Respiratory   Date Value Ref Range Status   11/09/2020   Final    Group 6: <25 PMN's/LPF and <25 Epithelial cells/LPF  Moderate Polymorphonuclear leukocytes  Rare Epithelial cells  No organisms seen       No results found for: MPNEUMO, CLAMYDCU, LABLEGI, AFBCX, FUNGSM, LABFUNG  CULTURE, RESPIRATORY   Date Value Ref Range Status   11/09/2020 Growth not present  Oral Pharyngeal Tiesha absent    Final     Culture Catheter Tip   Date Value Ref Range Status   08/26/2017 <15 colonies  Final     No results found for: BFCS  No results found for: CXSURG  Urine Culture, Routine   Date Value Ref Range Status   11/09/2020 Growth not present  Final   10/28/2020 Growth not present  Final   09/16/2019 Growth not present  Final     MRSA Culture Only   Date Value Ref Range Status   11/09/2020 Methicillin resistant Staph aureus not isolated  Final   10/30/2020 Methicillin resistant Staph aureus not isolated  Final   07/28/2017 Methicillin resistant Staph aureus not isolated  Final          Assessment:  · Acute on chronic hypoxic respiratory failure likely 2/2 pneumonia and volume overload  - resolving   · Bacterial pneumonia, likely aspiration pneumonia but after extubation patient had to be reintubated rule out HCAP  · Shock, sepsis- resolved, blood cultures negative   · Alcohol withdrawal   · Leukocytosis - 16.9. Plan:    · Spiking temps overnight. start merrem  · recheck sputum  · CXR - worsening   · Check serum osmolarity   · 11/19/2020 PICC line placed  · Will follow with you  · Monitor labs - creatinine improving - 1.3    Clement Fret  11:36 AM  11/25/2020     Pt seen and examined. Above discussed agree with advanced practice nurse. Labs, cultures, and radiographs reviewed. Face to Face encounter occurred. Changes made as necessary.      Delilah Nieves MD

## 2020-11-26 NOTE — PROGRESS NOTES
Nephrology Progress Note  Patient's Name: Rell Archer  9:52 AM  11/26/2020        Reason for Consult: Acute kidney  Requesting Physician:  Chaparrita hBat MD    Chief Complaint: Shortness of breath  History Obtained From:  EHR; spouse    History of Present Ilness:    Rell Archer is a 61 y.o. male with a history of COPD, hypertension, hyperlipidemia and diabetes mellitus. He presented to ED 2 days ago with complaints of shortness of breath. According to patient's spouse he had been complaining of shortness of breath over the course of several weeks. This has gotten progressively worse. He went to see his PCP on the day of admission. In office at the time pulse oximeter obtained revealed his oxygen saturation to be in the 70s. He was instructed to proceed to ED for further evaluation. On presentation to ED his initial vital signs showed a blood pressure of 168/71, temperature 97.5 and pulse of 93. His pulse oximeter at the time was noted to be 98% on room air. Laboratory data was significant for a BUN of 11, creatinine 1.1, WBC of 8.3. Rapid COVID-19 testing was negative. A chest x-ray performed revealed right infrahilar and basilar infiltrate concerning for pneumonia. A CTA was ordered but patient declined because of his inability to lay flat. Patient was given ceftriaxone and doxycycline followed by admission to telemetry. 2 days ago an RRT was called as patient was noted to be increasingly more in respiratory distress. He was transferred to MICU and intubated. Laboratory data yesterday showed worsening serum creatinine to 2.8. This a.m. further worsening to 3.6. He has been nonoliguric. Hence renal consult. 10/30: N new acute issues overnight; remains intubated  10/31: Polyuric , concerned for possible DI; no other acute issues overnight  11/1: agitated overnight ; remains intubated  11/2: pt seen and examined.  Chart reviewed, pt intubated  11/3: pt seen and examined in icu, pt 17.2 mg, Nightly  polyethylene glycol (GLYCOLAX) packet 17 g, Daily PRN  perflutren lipid microspheres (DEFINITY) injection 1.65 mg, ONCE PRN  acetaminophen (TYLENOL) 160 MG/5ML solution 129 mg, X1I PRN  folic acid injection 1 mg, Daily  polyvinyl alcohol (LIQUIFILM TEARS) 1.4 % ophthalmic solution 1 drop, Q4H PRN  sodium chloride (Inhalant) 3 % nebulizer solution 2 mL, Q4H  hydrALAZINE (APRESOLINE) injection 10 mg, Q4H PRN  heparin (porcine) injection 7,500 Units, Q8H  pantoprazole (PROTONIX) injection 40 mg, Daily    And  sodium chloride (PF) 0.9 % injection 10 mL, Daily  sodium chloride flush 0.9 % injection 10 mL, 2 times per day  sodium chloride flush 0.9 % injection 10 mL, PRN  acetaminophen (TYLENOL) suppository 650 mg, Q6H PRN  Arformoterol Tartrate (BROVANA) nebulizer solution 15 mcg, BID  chlorhexidine (PERIDEX) 0.12 % solution 15 mL, BID  amLODIPine (NORVASC) tablet 10 mg, Daily  [Held by provider] aspirin chewable tablet 81 mg, Daily  colchicine (COLCRYS) tablet 0.6 mg, BID PRN  levothyroxine (SYNTHROID) tablet 50 mcg, Daily  budesonide (PULMICORT) nebulizer suspension 500 mcg, BID  ipratropium-albuterol (DUONEB) nebulizer solution 1 ampule, Q4H WA  glucose (GLUTOSE) 40 % oral gel 15 g, PRN  dextrose 50 % IV solution, PRN  glucagon (rDNA) injection 1 mg, PRN  dextrose 5 % solution, PRN      Physical exam:  Vitals:    11/26/20 0845   BP: (!) 151/71   Pulse: 97   Resp: 27   Temp: 97.5 °F (36.4 °C)   SpO2: 94%           General: Intubated/trach; awake  Eyes: PERRL. No sclera icterus. No conjunctival injection. ENT: No discharge. Pharynx clear. Neck: Tracheostomy/vent  Lungs: Coarse breath sounds  CV: S1 S2 no S3 or rub  Abd:  Non-distended. No masses. No organmegaly. Normal bowel sounds. Skin: Warm and dry. No nodule on exposed extremities. No rash on exposed extremities.   Ext: No cyanosis, clubbing, edema; 1+ bilateral lower extremity edema L>R  Neuro: awake; follows some simple commands      Data: Labs:  Lab Results   Component Value Date     11/26/2020     11/26/2020     11/26/2020    K 4.5 11/26/2020    K 4.3 11/26/2020    K 4.27 11/26/2020     11/26/2020    CO2 26 11/26/2020    CO2 27 11/26/2020    CO2 26 11/26/2020    CREATININE 1.2 11/26/2020    CREATININE 1.2 11/26/2020    CREATININE 1.2 11/26/2020    BUN 19 11/26/2020    BUN 19 11/26/2020    BUN 20 11/26/2020    GLUCOSE 169 (H) 11/26/2020    GLUCOSE 136 (H) 11/26/2020    GLUCOSE 293 (H) 11/26/2020    PHOS 2.4 (L) 11/25/2020    PHOS 3.6 11/18/2020    PHOS 3.7 11/17/2020    WBC 12.3 (H) 11/26/2020    WBC 16.9 (H) 11/25/2020    WBC 9.7 11/24/2020    HGB 7.5 (L) 11/26/2020    HGB 8.4 (L) 11/25/2020    HGB 7.9 (L) 11/24/2020    HCT 23.1 (L) 11/26/2020    HCT 25.3 (L) 11/25/2020    HCT 24.4 (L) 11/24/2020    MCV 81.1 11/26/2020     11/26/2020         Imaging:  Xr Chest Portable    Result Date: 10/27/2020  EXAMINATION: ONE XRAY VIEW OF THE CHEST 10/27/2020 7:58 pm COMPARISON: October 26, 2020 HISTORY: ORDERING SYSTEM PROVIDED HISTORY: SOB TECHNOLOGIST PROVIDED HISTORY: Reason for exam:->SOB What reading provider will be dictating this exam?->CRC FINDINGS: There is mild cardiomegaly. There is patchy perihilar and bibasilar atelectasis/infiltrates. Tiny pleural effusions are suspected. Progressive bibasilar atelectasis/infiltrates concerning for pneumonia. Underlying CHF is considered. Xr Chest Portable    Result Date: 10/26/2020  EXAMINATION: ONE XRAY VIEW OF THE CHEST 10/26/2020 4:11 pm COMPARISON: 05/26/2019 HISTORY: ORDERING SYSTEM PROVIDED HISTORY: SOB TECHNOLOGIST PROVIDED HISTORY: Reason for exam:->SOB What reading provider will be dictating this exam?->CRC FINDINGS: Cardiac size appears stable. Discoid airspace disease seen at the left lung base which may represent atelectasis or scarring. Right infrahilar and basilar infiltrate is identified. No pneumothorax is identified.   No acute osseous abnormality is identified. Right infrahilar and basilar infiltrate concerning for pneumonia. Left basilar atelectasis or scarring. Assessment/Plans    1. Acute kidney injury on ckd 3 a  Baseline 1.8 from 9/2019, 1.1 in 5/2019  hemodynamically mediated from bradycardia/diuretic/ARB   exacerbated by IV contrast use   Renal function back to baseline at 1.2     2. Acute hypoxic respiratory failure  due to multilobar pneumonia  S/P trach 11/12      3. H/O ETOH  abuse    4.  Hypernatremia  imrpoved off bumex  Receiving free water via PEG  Continue to monitor        Seferino Melgar MD  9:52 AM  11/26/2020

## 2020-11-26 NOTE — PROGRESS NOTES
(!) 151/71   Pulse 97   Temp 97.5 °F (36.4 °C) (Temporal)   Resp 27   Ht 5' 8\" (1.727 m)   Wt (!) 310 lb (140.6 kg)   SpO2 94%   BMI 47.14 kg/m²   Temp  Av.8 °F (36.6 °C)  Min: 97.5 °F (36.4 °C)  Max: 98.2 °F (36.8 °C)  Constitutional: awake, alert, following simple commands  Skin: Warm and dry. No rashes were noted. HEENT: Round and reactive pupils. Moist mucous membranes. No ulcerations or thrush. Chest: Trach to vent, symmetrical expansion. Some coarseness right & left upper lobes. Cardiovascular: S1 and S2 are rhythmic and regular. Systolic murmurs appreciated.    Abdomen: Hyperactive bowel sounds, obese abdomen, PEG, FMS system   Extremities: No clubbing, no cyanosis, + Lymphedema left lower extremity- improving   Lines: PICC line right IJ 2020     Laboratory and Tests Review:  Lab Results   Component Value Date    WBC 12.3 (H) 2020    WBC 16.9 (H) 2020    WBC 9.7 2020    HGB 7.5 (L) 2020    HCT 23.1 (L) 2020    MCV 81.1 2020     2020     Lab Results   Component Value Date    NEUTROABS 9.98 (H) 2020    NEUTROABS 13.64 (H) 2020    NEUTROABS 5.53 2020     No results found for: [facility]  Lab Results   Component Value Date    ALT 70 (H) 2020    AST 18 2020    ALKPHOS 96 2020    BILITOT 0.2 2020     Lab Results   Component Value Date     2020    K 4.5 2020    K 4.0 10/27/2020     2020    CO2 26 2020    BUN 19 2020    CREATININE 1.2 2020    CREATININE 1.2 2020    CREATININE 1.2 2020    GFRAA >60 2020    LABGLOM >60 2020    GLUCOSE 169 2020    PROT 6.8 2020    LABALBU 3.1 2020    CALCIUM 9.1 2020    BILITOT 0.2 2020    ALKPHOS 96 2020    AST 18 2020    ALT 70 2020     Lab Results   Component Value Date    CRP 1.4 (H) 2020    CRP 2.8 (H) 2017    CRP 10.5 (H) 2017     Lab Results   Component Value Date    SEDRATE 135 (H) 09/04/2017    SEDRATE 150 (H) 08/28/2017    SEDRATE 141 (H) 08/21/2017     Radiology:  CXR- 11/3- Multifocal bilateral pulmonary infiltrates more prominent within the right lung. CXR- 11/4- worsening b/l infiltrates, worse on left today with possible small L sided effusion   CXR- 11/5 - Stable b/l infiltrates   CXR- 11/6 - Expiratory film, image otherwise appears overall stable. CXR- 11/16 - stable right parahilar pulmonary opacity which may represent   atelectasis or pneumonia. CXR- 11/23/2020- bilateral infiltrates more prominent on the left. CXR 11/25/2020 - progressing consolidation bilaterally. Microbiology:   Lab Results   Component Value Date    BC 5 Days no growth 11/09/2020    BC 5 Days no growth 10/29/2020    BC  10/26/2020     This organism was isolated in one set. Susceptibility testing is not routinely done as this  organism frequently represents skin contamination. Additional testing can be ordered by calling the  Microbiology Department.       ORG Staphylococcus aureus 11/25/2020    ORG Staphylococcus aureus 10/29/2020    ORG Staphylococcus coagulase-negative 10/26/2020     Lab Results   Component Value Date    BLOODCULT2 5 Days no growth 11/09/2020    BLOODCULT2 5 Days no growth 10/29/2020    BLOODCULT2 5 Days no growth 10/26/2020    ORG Staphylococcus aureus 11/25/2020    ORG Staphylococcus aureus 10/29/2020    ORG Staphylococcus coagulase-negative 10/26/2020     No results found for: WNDABS  Smear, Respiratory   Date Value Ref Range Status   11/25/2020   Final    Group 5: >25 PMN's/LPF and <10 Epithelial cells/LPF  Abundant Polymorphonuclear leukocytes  Rare Epithelial cells  Abundant Gram positive diplococci  Abundant Gram positive cocci in clusters       No results found for: MPNEUMO, CLAMYDCU, LABLEGI, AFBCX, FUNGSM, LABFUNG  CULTURE, RESPIRATORY   Date Value Ref Range Status   11/25/2020 Oral Pharyngeal Tiesha reduced (A) Preliminary   11/25/2020 Heavy growth  Sensitivity to follow    Preliminary     Culture Catheter Tip   Date Value Ref Range Status   08/26/2017 <15 colonies  Final     No results found for: BFCS  No results found for: CXSURG  Urine Culture, Routine   Date Value Ref Range Status   11/09/2020 Growth not present  Final   10/28/2020 Growth not present  Final   09/16/2019 Growth not present  Final     MRSA Culture Only   Date Value Ref Range Status   11/09/2020 Methicillin resistant Staph aureus not isolated  Final   10/30/2020 Methicillin resistant Staph aureus not isolated  Final   07/28/2017 Methicillin resistant Staph aureus not isolated  Final          Assessment:  · Acute on chronic hypoxic respiratory failure likely 2/2 pneumonia and volume overload  - resolving   · Bacterial pneumonia, likely aspiration pneumonia but after extubation patient had to be reintubated rule out HCAP  · Shock, sepsis- resolved, blood cultures negative   · Alcohol withdrawal   · Leukocytosis - 16.9. Plan:    · continue merrem  · recheck sputum-staph aureus sensitivity pending and oral fora  · CXR - worsening yesterday with increased oxygen need  · Oxygen needs improved today  · Check serum osmolarity -310  · 11/19/2020 PICC line placed  · Will follow with you  · Monitor labs - creatinine improving - 1.2      Electronically signed by KATHERINE Mauricio CNP on 11/26/2020 at 11:00 AM    Pt seen and examined. Above discussed agree with advanced practice nurse. Labs, cultures, and radiographs reviewed. Face to Face encounter occurred. Changes made as necessary.      Dianelys Cosme MD

## 2020-11-26 NOTE — PROGRESS NOTES
Pulmonary Progress Note  11/26/2020 3:58 PM  Subjective:   Admit Date: 10/26/2020  PCP: Emma Martin MD  Interval History: Patient tolerating PSV 10, PEEP 5, FiO2 50% at this time. diet: DIET TUBE FEED CONTINUOUS/CYCLIC NPO; Diabetic 1.5; Gastrostomy; Continuous; 25; 65; 23; Exceptions are: Sips with Meds    Data:   Scheduled Meds:    ampicillin-sulbactam  3 g Intravenous 4x Daily    QUEtiapine  25 mg Oral BID    carvedilol  6.25 mg Oral BID WC    hydrALAZINE  25 mg PEG Tube 3 times per day    thiamine (VITAMIN B1) IVPB  250 mg Intravenous Q24H    divalproex  250 mg Oral 3 times per day    heparin flush  3 mL Intravenous 2 times per day    senna  2 tablet Oral Nightly    folic acid  1 mg Intravenous Daily    sodium chloride (Inhalant)  2 mL Nebulization Q4H    heparin (porcine)  7,500 Units Subcutaneous Q8H    pantoprazole  40 mg Intravenous Daily    And    sodium chloride (PF)  10 mL Intravenous Daily    sodium chloride flush  10 mL Intravenous 2 times per day    Arformoterol Tartrate  15 mcg Nebulization BID    chlorhexidine  15 mL Mouth/Throat BID    amLODIPine  10 mg Oral Daily    [Held by provider] aspirin  81 mg Oral Daily    levothyroxine  50 mcg Oral Daily    budesonide  0.5 mg Nebulization BID    ipratropium-albuterol  1 ampule Inhalation Q4H WA       Continuous Infusions:    sodium chloride Stopped (11/23/20 1638)    dextrose         PRN Meds: mineral oil-hydrophilic petrolatum, HYDROcodone 5 mg - acetaminophen, labetalol, trimethobenzamide, midazolam, sodium chloride flush, heparin flush, polyethylene glycol, perflutren lipid microspheres, acetaminophen, polyvinyl alcohol, hydrALAZINE, sodium chloride flush, [DISCONTINUED] acetaminophen **OR** acetaminophen, colchicine, glucose, dextrose, glucagon (rDNA), dextrose    I/O last 3 completed shifts:   In: 538 [I.V.:30; NG/GT:508]  Out: 950 [Urine:950]    I/O this shift:  In: -   Out: 350 [Urine:350]      Intake/Output Summary (Last 24 hours) at 11/26/2020 1558  Last data filed at 11/26/2020 1514  Gross per 24 hour   Intake 528 ml   Output 1300 ml   Net -772 ml       Patient Vitals for the past 96 hrs (Last 3 readings):   Weight   11/23/20 0523 (!) 310 lb (140.6 kg)         Recent Labs     11/24/20 0620 11/25/20  0600 11/26/20  0500   WBC 9.7 16.9* 12.3*   HGB 7.9* 8.4* 7.5*   HCT 24.4* 25.3* 23.1*   MCV 80.5 80.1 81.1    347 298     Recent Labs     11/25/20  0600 11/26/20  0110 11/26/20  0112 11/26/20  0255 11/26/20  0500    138  --  142 142   K 4.8 4.4 4.27 4.3 4.5    104  --  106 104   CO2 23 26  --  27 26   BUN 21 20  --  19 19   CREATININE 1.3* 1.2  --  1.2 1.2   PHOS 2.4*  --   --   --   --      Recent Labs     11/24/20 0620 11/25/20  0600 11/26/20  0500   PROT 7.1 7.2 6.8   ALKPHOS 109 112 96   * 107* 70*   AST 46* 39 18   BILITOT <0.2 0.2 0.2     CPK:  Lab Results   Component Value Date    CKTOTAL 98 10/08/2017       -----------------------------------------------------------------  RAD:   Improving                    Micro:  Vent Information  $Ventilation: $Subsequent Day  Skin Assessment: Clean, dry, & intact  Equipment ID: [id number]  Equipment Changed: (S) Humidification  Vent Type: 840  Vent Mode: PS  Vt Ordered: 480 mL  Pressure Ordered: 12  Rate Set: 12 bmp  Peak Flow: 55 L/min  Pressure Support: 10 cmH20  FiO2 : 50 %  SpO2: 98 %  SpO2/FiO2 ratio: 196  PaO2/FiO2 ratio: 169  Sensitivity: 3  PEEP/CPAP: 5  I Time/ I Time %: 0 s  Humidification Source: Heated wire  Humidification Temp: 37  Humidification Temp Measured: 36.3  Circuit Condensation: Drained  Mask Type: Full face mask  Mask Size: Large    Additional Respiratory  Assessments  Pulse: 95  Resp: 25  SpO2: 98 %  Position: Semi-Lockwood's  Humidification Source: Heated wire  Humidification Temp: 37  Circuit Condensation: Drained  Oral Care Completed?: Yes  Oral Care: Teeth brushed  Subglottic Suction Done?: Yes  Airway Type: Trachial  Airway Size: 8  Cuff Pressure (cm H2O): 29 cm H2O  Skin barrier applied: Yes    Objective:   Vitals:   Vitals:    20 1445   BP: 123/60   Pulse: 95   Resp: 25   Temp: 98.7 °F (37.1 °C)   SpO2: 98%      TEMP:Current: Temp: 98.7 °F (37.1 °C)  Max: Temp  Av °F (36.7 °C)  Min: 97.5 °F (36.4 °C)  Max: 98.7 °F (37.1 °C)    BP Range: Systolic (34ZLM), PAJ:949 , Min:123 , VYO:924     Diastolic (56LSN), HOV:73, Min:60, Max:84    General appearance: obese on mechanical ventilation , appears stated age awakens to name, no acute distress  Skin: No rashes or lesions  HEENT: mucous membranes are moist, tracheostomy no bleeding noted. Secretions 1+  neck: No JVD  Lungs: symmetrical expansion, dmiinished breath sounds to auscultation, no use of accessory muscles  Heart: RRR, S1S2 2/6 JUAN  Abdomen: soft, non tender, distended obese PEG tube  Extremities: no peripheral edema  Neurologic:  Oens eyes to name, not following commands  Affect: Calm          Assessment:   Patient Active Problem List:     59-y.o M with history of DM, hypothyroid, COPD, HTN, HLD presented on 10/26 with shortness of breath for 1 week.  Saturations were 70% at PCPs.  Saturation 65% on room air in ER  Patient agitated for CT scan refused to lay flat  10/27 RRT refused BiPAP became combative.  Patient transferred to ICU  10/28 intubated and sedated.  CT chest showing dense multifocal airspace disease      1. Hemoptysis / bleeding at trach site  2. Drop of hemoglobin from 10.4  - 9  3. acute hypoxemic respiratory failure on mechanical ventilation s/p trach  8 cuffed Shiley trach proximal XLT  4. Metabolic encephalopathy history of EtOH abuse with agitation on ( Depakote, and Seroquel  )significantly improved  5. Acute renal failure Baseline creatinine 1.9 on 2019  6. Anemia  7. HAP   8. HANS moderate ( AHI 17 on 18 requires BIPAP ) noncompliant with CPAP  9. History of reactive airway disease  10.  CT 3/14/2018 showing pulmonary nodule stable from 10/2017  11. 12. Diabetes with elevated blood sugars  13. Obesity  14. EF 72% LVH diastolic dysfunction  15. chronic lymphedema  16. History of gout on colchicine  17. Hypothyroid  18. H/o respiratory failure: 7/8/2017 pt was  transferred from [facility] for acute respiratory failure after lap cholecystectomy, lap umbilical hernia repair, and lap liver biopsy (fatty liver). Patient was then transferred to [facility] hospital where he was weaned off the ventilator and decannulated. 19. Ventilator associated pneumonia  20.  Alcohol Withdrawal      Plan:      · Will increase PEEP, with a hope to wean FiO2  · We will panculture  · Favor to start broad-spectrum antibiotics as per ID consideration  · Episodes of hypertension with agitation adjust blood pressure medicines  · Favor to cut down Seroquel gradually ( will change to nightly for 2 nights)  then valproic acid  · Continue brovana/pulmicort  · Meropenem per ID, recheck sputum-staph aureus sensitivity pending and oral fora    PSV 10, PEEP 5, FiO2 50% tolerated at this time  Supportive care and weaning to continue  Bloody secretions at trach insertion site noted    Allyssa Meyer  11/26/2020  3:58 PM

## 2020-11-26 NOTE — SIGNIFICANT EVENT
Rapid Response Team Note  Date of event: 11/26/2020   Time of event: Rossy Miller 61y.o. year old male   YOB: 1960   Admit date:  10/26/2020   [facility]: [facility]   Witnessed? : []Yes  [] No  Monitored? : []Yes  [] No  Code status: [x] Full  [] DNR-CCA  []DNR-CC  ______________________________________________________________________  Reason for RRT:    [] RR < 8     [] RR > 28   [] SpO2 <90%   [] HR < 40 bpm   [] HR > 130 bpm  [] SBP < 90 mmHg    [] SpO2 <90%   [] LOC   [] Seizures    [] Significant Bleeding Event    [x] Other: Wrong dose of insulin given    Subjective:   CTSP regarding the above event: When I arrived at the room I was informed that the RRT was called because the nurse Collin Kos and given 7.5 mL of insulin lispro instead of giving 7.5 mL of heparin to the patient. It amounts to giving 75 units of lispro. At the time patient's blood glucose was 127. I corrected with 50 g of D50 bolus and started the patient on D10 GTT at 100 cc/h with POCT BG every 15 minutes. Correction with D50 25 g if BG less than 140. Given the half-life of lispro, I have instructed the nursing staff to especially watch out for hypoglycemic episodes in 2 hours. Patient blood glucose the next couple of checks was in 250s. Patient denies any dizziness diaphoresis tachycardia. Ordered a BMP. Potassium was 4.3. Will monitor and replace as needed.     Objective:   Vital signs: Temp: 98.7/BP: 180/75/RR: 12 /HR: 92%  Initial Condition:  Conscious   [x] Yes  [] No     Breathing [x] Yes  [] No     Pulse  [x] Yes  [] No    Airway:   [x] Open/ Clear     Intervention: [] None  [] Pooled secretions     [] Suctioned  [] Stridor      [] Intubation    Lungs:   [x] Symmetrical chest rise/ CTABL Intervention: [] None  [] Use of accessory muscles    [] NIV (CPAP/BiPAP)  [] Cyanosis      [] Nasal Oxygen/Mask  [] Wheezing       [] ABG             [] CXR  [] Other:     Circulation:   Rhythm:  [x] Sinus [] Other: Intervention: [] None            [] IV Access  [] Peripheral              [] Central            [] EKG            [] Cardioversion            [] Defibrillation     Capillary Refill:  [] > 2 seconds [] < 2 seconds    Neurologic:   [] NIHSS      [] Pupillary Response:     Response to pain:   [] Yes  [] No  Follow commands:  [] Yes  [] No  Facial asymmetry:  [] Yes  [] No  Motor strength:  [] Equal  [] Focal deficit:  Diagnostic Test:  Blood Sugar:  127 mg/dL  EKG:      ABG:      Lab Results   Component Value Date    PH 7.393 11/26/2020    PCO2 43.1 11/26/2020    PO2 90.3 11/26/2020    HCO3 25.7 11/26/2020    O2SAT 96.1 11/26/2020     Medication(s) Given:  []  Narcan (Naloxone)   []  Romazicon (Flumazenil)    []  Breathing Treatment    []  Steroids (Prednisone, Solu-Medrol)  []  Adenosine  [] Cardiac Medicines:     Infusion(s):   [] Normal Saline    Amount: 100 cc/hr      [] Lactate Ringers      [] Other: D10    Imaging:   [] CXR:   [] Normal         [] Pneumothorax         [] Pulmonary Edema  [] Infiltrate          [] CT Head  [] Normal          [] ICB          [] [address]          [] Other:     Laboratory Tests:     CBC:   Lab Results   Component Value Date    WBC 16.9 11/25/2020    RBC 3.16 11/25/2020    HGB 8.4 11/25/2020    HCT 25.3 11/25/2020    MCV 80.1 11/25/2020    MCH 26.6 11/25/2020    MCHC 33.2 11/25/2020    RDW 19.9 11/25/2020     11/25/2020    MPV 11.0 11/25/2020     BMP:    Lab Results   Component Value Date     11/26/2020    K 4.3 11/26/2020    K 4.0 10/27/2020     11/26/2020    CO2 27 11/26/2020    BUN 19 11/26/2020    LABALBU 3.5 11/25/2020    CREATININE 1.2 11/26/2020    CALCIUM 8.9 11/26/2020    GFRAA >60 11/26/2020    LABGLOM >60 11/26/2020    GLUCOSE 136 11/26/2020         Teams Assessment and Plan:   1. Medication error ? Nurse gave her 35 units of lispro insulin giving 7500 units of heparin. Corrected with D50 50 g bolus followed by D10 gtt. 100 cc/h  .  POCT BG 15 minutes with hypoglycemia protocol   Water for potassium replace as needed   2. Possible hypoglycemia 2/2 iatrogenic  ?  ? Disposition:  [x] No transfer   [] Transfer to monitor floor  [] Transfer to: [] MICU [] NICU [] CCU [] SICU    Patients family updated: [] Yes  [] No   Discussed with:  [] Critical Care Intensivist:         [x] Primary Care Provider: Jerilyn Tavares MD      [] Other: Dr. Ruben Gillis?     Yamilka Barreto MD PGY-2  11/26/2020 4:01 AM  Attending Moe Ureña MD

## 2020-11-27 NOTE — PROGRESS NOTES
Pulmonary Progress Note  11/27/2020 10:43 AM  Subjective:   Admit Date: 10/26/2020  PCP: Will Patterson MD  Interval History: Afebrile  Patient tolerating PSV 12, PEEP 5, FiO2 40% at this time. diet: DIET TUBE FEED CONTINUOUS/CYCLIC NPO; Diabetic 1.5; Gastrostomy; Continuous; 25; 65; 23; Exceptions are: Sips with Meds    Data:   Scheduled Meds:    ampicillin-sulbactam  3 g Intravenous 4x Daily    QUEtiapine  25 mg Oral BID    carvedilol  6.25 mg Oral BID WC    hydrALAZINE  25 mg PEG Tube 3 times per day    thiamine (VITAMIN B1) IVPB  250 mg Intravenous Q24H    divalproex  250 mg Oral 3 times per day    heparin flush  3 mL Intravenous 2 times per day    senna  2 tablet Oral Nightly    folic acid  1 mg Intravenous Daily    sodium chloride (Inhalant)  2 mL Nebulization Q4H    heparin (porcine)  7,500 Units Subcutaneous Q8H    pantoprazole  40 mg Intravenous Daily    And    sodium chloride (PF)  10 mL Intravenous Daily    sodium chloride flush  10 mL Intravenous 2 times per day    Arformoterol Tartrate  15 mcg Nebulization BID    chlorhexidine  15 mL Mouth/Throat BID    amLODIPine  10 mg Oral Daily    [Held by provider] aspirin  81 mg Oral Daily    levothyroxine  50 mcg Oral Daily    budesonide  0.5 mg Nebulization BID    ipratropium-albuterol  1 ampule Inhalation Q4H WA       Continuous Infusions:    sodium chloride Stopped (11/23/20 1638)    dextrose         PRN Meds: mineral oil-hydrophilic petrolatum, HYDROcodone 5 mg - acetaminophen, labetalol, trimethobenzamide, midazolam, sodium chloride flush, heparin flush, polyethylene glycol, perflutren lipid microspheres, acetaminophen, polyvinyl alcohol, hydrALAZINE, sodium chloride flush, [DISCONTINUED] acetaminophen **OR** acetaminophen, colchicine, glucose, dextrose, glucagon (rDNA), dextrose    I/O last 3 completed shifts: In: 1106 [I.V.:100; NG/GT:1006]  Out: 1250 [Urine:1050; Stool:200]    No intake/output data recorded.       Intake/Output Summary (Last 24 hours) at 11/27/2020 1043  Last data filed at 11/27/2020 3176  Gross per 24 hour   Intake 1106 ml   Output 1250 ml   Net -144 ml       Patient Vitals for the past 96 hrs (Last 3 readings):   Weight   11/27/20 0600 (!) 332 lb 9.6 oz (150.9 kg)         Recent Labs     11/25/20  0600 11/26/20  0500 11/27/20  0458   WBC 16.9* 12.3* 11.5   HGB 8.4* 7.5* 7.4*   HCT 25.3* 23.1* 22.5*   MCV 80.1 81.1 80.1    298 310     Recent Labs     11/25/20  0600 11/26/20  0255 11/26/20  0500 11/27/20  0458      < > 142 142 140   K 4.8   < > 4.3 4.5 4.5      < > 106 104 102   CO2 23   < > 27 26 29   BUN 21   < > 19 19 20   CREATININE 1.3*   < > 1.2 1.2 1.2   PHOS 2.4*  --   --   --   --     < > = values in this interval not displayed.      Recent Labs     11/25/20  0600 11/26/20  0500 11/27/20  0458   PROT 7.2 6.8 7.2   ALKPHOS 112 96 99   * 70* 53*   AST 39 18 14   BILITOT 0.2 0.2 0.2     CPK:  Lab Results   Component Value Date    CKTOTAL 98 10/08/2017       -----------------------------------------------------------------  RAD:   Improving                    Micro:  Vent Information  $Ventilation: $Subsequent Day  Skin Assessment: Clean, dry, & intact  Equipment ID: [id number]  Equipment Changed: (S) Humidification  Vent Type: 840  Vent Mode: PS  Vt Ordered: 480 mL  Pressure Ordered: 12  Rate Set: 12 bmp  Peak Flow: 55 L/min  Pressure Support: 12 cmH20  FiO2 : 40 %  SpO2: 99 %  SpO2/FiO2 ratio: 247.5  PaO2/FiO2 ratio: 169  Sensitivity: 3  PEEP/CPAP: 5  I Time/ I Time %: 0 s  Humidification Source: Heated wire  Humidification Temp: 37  Humidification Temp Measured: 37.8  Circuit Condensation: Drained  Mask Type: Full face mask  Mask Size: Large    Additional Respiratory  Assessments  Pulse: 101  Resp: 22  SpO2: 99 %  Position: Semi-Lockwood's  Humidification Source: Heated wire  Humidification Temp: 37  Circuit Condensation: Drained  Oral Care Completed?: Yes  Oral Care: Teeth brushed  Subglottic elevated blood sugars  11. Obesity  12. EF 29% LVH diastolic dysfunction  13. chronic lymphedema  14. History of gout on colchicine  15. Hypothyroid  16. H/o respiratory failure: 7/8/2017 pt was  transferred from [facility] for acute respiratory failure after lap cholecystectomy, lap umbilical hernia repair, and lap liver biopsy (fatty liver). Patient was then transferred to [facility] hospital where he was weaned off the ventilator and decannulated. 17. Ventilator associated pneumonia  18. Alcohol Withdrawal      Plan:      · Will cut down PS to 10/PEEP5. · We will panculture.  Sputum with MRSA  ·  antibiotics (Unasyn) as per ID consideration  · Favor to cut down Seroquel gradually ( will change to nightly for 2 nights)  then valproic acid  · Continue brovana/pulmicort  Siri Allred  11/27/2020  10:43 AM

## 2020-11-27 NOTE — PLAN OF CARE
Problem: Skin Integrity:  Goal: Will show no infection signs and symptoms  Description: Will show no infection signs and symptoms  Outcome: Met This Shift  Goal: Absence of new skin breakdown  Description: Absence of new skin breakdown  Outcome: Met This Shift     Problem: Injury - Risk of, Physical Injury:  Goal: Absence of physical injury  Description: Absence of physical injury  Outcome: Met This Shift  Goal: Will remain free from falls  Description: Will remain free from falls  Outcome: Met This Shift

## 2020-11-27 NOTE — PROGRESS NOTES
Chief Complaint   Patient presents with    Shortness of Breath     for a few weeks. O2 sat in 70s at [facility], placed on 4L and now 95%. SUBJECTIVE:  Patient is tolerating medications. No reported adverse drug reactions. TMax 99.2. overnight. decreasing oxygen requirements. Trach to vent - 40% FiO2, 99% SpO2, PEEP 5  Looks better today  Positive blood cultures 4/4 gram positive cocci         Review of systems:  As stated above in the chief complaint, otherwise negative.     Medications:  Scheduled Meds:   [START ON 11/28/2020] QUEtiapine  25 mg Oral Nightly    ampicillin-sulbactam  3 g Intravenous 4x Daily    carvedilol  6.25 mg Oral BID WC    hydrALAZINE  25 mg PEG Tube 3 times per day    thiamine (VITAMIN B1) IVPB  250 mg Intravenous Q24H    divalproex  250 mg Oral 3 times per day    heparin flush  3 mL Intravenous 2 times per day    senna  2 tablet Oral Nightly    folic acid  1 mg Intravenous Daily    sodium chloride (Inhalant)  2 mL Nebulization Q4H    heparin (porcine)  7,500 Units Subcutaneous Q8H    pantoprazole  40 mg Intravenous Daily    And    sodium chloride (PF)  10 mL Intravenous Daily    sodium chloride flush  10 mL Intravenous 2 times per day    Arformoterol Tartrate  15 mcg Nebulization BID    chlorhexidine  15 mL Mouth/Throat BID    amLODIPine  10 mg Oral Daily    [Held by provider] aspirin  81 mg Oral Daily    levothyroxine  50 mcg Oral Daily    budesonide  0.5 mg Nebulization BID    ipratropium-albuterol  1 ampule Inhalation Q4H WA     Continuous Infusions:   sodium chloride Stopped (11/23/20 1638)    dextrose       PRN Meds:mineral oil-hydrophilic petrolatum, HYDROcodone 5 mg - acetaminophen, labetalol, trimethobenzamide, midazolam, sodium chloride flush, heparin flush, polyethylene glycol, perflutren lipid microspheres, acetaminophen, polyvinyl alcohol, hydrALAZINE, sodium chloride flush, [DISCONTINUED] acetaminophen **OR** acetaminophen, colchicine, glucose, dextrose, glucagon (rDNA), dextrose    OBJECTIVE:  /69   Pulse 101   Temp 99.2 °F (37.3 °C) (Temporal)   Resp 22   Ht 5' 8\" (1.727 m)   Wt (!) 332 lb 9.6 oz (150.9 kg)   SpO2 99%   BMI 50.57 kg/m²   Temp  Av.8 °F (37.1 °C)  Min: 98.5 °F (36.9 °C)  Max: 99.2 °F (37.3 °C)  Constitutional: awake, alert, following simple commands  Skin: Warm and dry. No rashes were noted. HEENT: Round and reactive pupils. Moist mucous membranes. No ulcerations; positive for severe thrush. Chest: Trach to vent, symmetrical expansion. Some coarseness right & left upper lobes. Cardiovascular: S1 and S2 are rhythmic and regular. Systolic murmurs appreciated.    Abdomen: Hyperactive bowel sounds, obese abdomen, PEG, FMS system   Extremities: No clubbing, no cyanosis, + Lymphedema left lower extremity- improving   Lines: PICC line right IJ 2020     Laboratory and Tests Review:  Lab Results   Component Value Date    WBC 11.5 2020    WBC 12.3 (H) 2020    WBC 16.9 (H) 2020    HGB 7.4 (L) 2020    HCT 22.5 (L) 2020    MCV 80.1 2020     2020     Lab Results   Component Value Date    NEUTROABS 8.19 (H) 2020    NEUTROABS 9.98 (H) 2020    NEUTROABS 13.64 (H) 2020     No results found for: [facility]  Lab Results   Component Value Date    ALT 53 (H) 2020    AST 14 2020    ALKPHOS 99 2020    BILITOT 0.2 2020     Lab Results   Component Value Date     2020    K 4.5 2020    K 4.0 10/27/2020     2020    CO2 29 2020    BUN 20 2020    CREATININE 1.2 2020    CREATININE 1.2 2020    CREATININE 1.2 2020    GFRAA >60 2020    LABGLOM >60 2020    GLUCOSE 147 2020    PROT 7.2 2020    LABALBU 3.2 2020    CALCIUM 8.9 2020    BILITOT 0.2 2020    ALKPHOS 99 2020    AST 14 2020    ALT 53 2020     Lab Results   Component Value Date CRP 1.4 (H) 11/02/2020    CRP 2.8 (H) 09/04/2017    CRP 10.5 (H) 08/28/2017     Lab Results   Component Value Date    SEDRATE 135 (H) 09/04/2017    SEDRATE 150 (H) 08/28/2017    SEDRATE 141 (H) 08/21/2017     Radiology: reviewed  CXR- 11/3- Multifocal bilateral pulmonary infiltrates more prominent within the right lung. CXR- 11/4- worsening b/l infiltrates, worse on left today with possible small L sided effusion   CXR- 11/5 - Stable b/l infiltrates   CXR- 11/6 - Expiratory film, image otherwise appears overall stable. CXR- 11/16 - stable right parahilar pulmonary opacity which may represent   atelectasis or pneumonia. CXR- 11/23/2020- bilateral infiltrates more prominent on the left. CXR 11/25/2020 - progressing consolidation bilaterally.         Microbiology:   Lab Results   Component Value Date    [facility]  11/25/2020     Gram stain performed from blood culture bottle media  Gram positive cocci in clusters      BC 5 Days no growth 11/09/2020    BC 5 Days no growth 10/29/2020    ORG Staphylococcus aureus 11/25/2020    ORG Staphylococcus aureus 10/29/2020    ORG Staphylococcus coagulase-negative 10/26/2020     Lab Results   Component Value Date    BLOODCULT2 24 Hours no growth 11/25/2020    BLOODCULT2 5 Days no growth 11/09/2020    BLOODCULT2 5 Days no growth 10/29/2020    ORG Staphylococcus aureus 11/25/2020    ORG Staphylococcus aureus 10/29/2020    ORG Staphylococcus coagulase-negative 10/26/2020     No results found for: WNDABS  Smear, Respiratory   Date Value Ref Range Status   11/25/2020   Final    Group 5: >25 PMN's/LPF and <10 Epithelial cells/LPF  Abundant Polymorphonuclear leukocytes  Rare Epithelial cells  Abundant Gram positive diplococci  Abundant Gram positive cocci in clusters       No results found for: MPNEUMO, CLAMYDCU, LABLEGI, AFBCX, FUNGSM, LABFUNG  CULTURE, RESPIRATORY   Date Value Ref Range Status   11/25/2020 Oral Pharyngeal Tiesha absent (A)  Final   11/25/2020   Final    Heavy growth  Methicillin resistant Staph aureus isolated. Most Methicillin  resistant Staphylococcus are usually resistant to multiple  antibiotics including other B-Lactams, Aminoglycosides,  Macrolides, Clindamycin and Tetracycline. Contact isolation  is indicated. This isolate is presumed to be resistant based on the  detection of inducible Clindamycin resistance. Clindamycin  may still be effective in some patients. Culture Catheter Tip   Date Value Ref Range Status   08/26/2017 <15 colonies  Final     No results found for: BFCS  No results found for: CXSURG  Urine Culture, Routine   Date Value Ref Range Status   11/09/2020 Growth not present  Final   10/28/2020 Growth not present  Final   09/16/2019 Growth not present  Final     MRSA Culture Only   Date Value Ref Range Status   11/09/2020 Methicillin resistant Staph aureus not isolated  Final   10/30/2020 Methicillin resistant Staph aureus not isolated  Final   07/28/2017 Methicillin resistant Staph aureus not isolated  Final          Assessment:  · Acute on chronic hypoxic respiratory failure likely 2/2 pneumonia and volume overload  - resolving   · Bacterial pneumonia, likely aspiration pneumonia but after extubation patient had to be reintubated rule out HCAP  · Shock, sepsis- resolved  · Alcohol withdrawal   · Leukocytosis improving - 11.5 today  · 4/4 bloods positive-  MRSA as he is growing MRSA in his sputum. · Thrush -esophagitis      Plan:    · Stop unasyn. · Pharmacy to dose vancomycin  · Add zyvox for the MRSA in the sputum and right lung pneumonia  · Diflucan & nystatin for thrush. Thrush and probable esophagitis  · AARON ordered - patient had echo 2 weeks ago & the valves were not clearly visualized likely due to his body habitus.    · sputum- MRSA and oral fora  · Oxygen needs improved today  · 11/19/2020 PICC line placed  · Follow-up blood cultures ordered  · Will follow with you  · Monitor labs    Electronically signed by Coleman Rousseau APRN - CNP on 11/27/2020 at 11:03 AM      Pt seen and examined. Above discussed agree with advanced practice nurse. Labs, cultures, and radiographs reviewed. Face to Face encounter occurred. Changes made as necessary.      Blease Oppenheim, MD

## 2020-11-27 NOTE — PLAN OF CARE
Problem: Injury - Risk of, Physical Injury:  Goal: Absence of physical injury  Description: Absence of physical injury  Outcome: Met This Shift  Goal: Will remain free from falls  Description: Will remain free from falls  Outcome: Met This Shift

## 2020-11-27 NOTE — PROGRESS NOTES
PT SEEN AND EXAMINED. intubated, in pic. BP lBETTER. S/p peg/trach,  Chart reviewed. meds reviewed. D/w nursing + family as available. AFEBRILE  EXAM: IN GENERAL, NAD. Benedetta Ou ROS NEGx10 EXCEPT: out of restraints. More cooperative. /62   Pulse 98   Temp 99.2 °F (37.3 °C) (Temporal)   Resp 21   Ht 5' 8\" (1.727 m)   Wt (!) 310 lb (140.6 kg)   SpO2 96%   BMI 47.14 kg/m²   GEN:  NAD. HEENT: NCAT. NECK: NO JVD. TRACH MIDLINE. NO BRUITS. NO THYROMEGALY. LUNGS: CTA BL NO RALES, RHONCHI OR WHEEZES. GOOD EXCURSION. CV: Regular rate and rhythm, NO Murmurs, Rubs, Or gallops  ABD: Soft. Nontender. Normal bowel sounds.  No organomegaly  EXT:No clubbing cyanosis or edema  Neuro: Labs/data reviewedLABS: CBC with Differential:    Lab Results   Component Value Date    WBC 11.5 11/27/2020    RBC 2.81 11/27/2020    HGB 7.4 11/27/2020    HCT 22.5 11/27/2020     11/27/2020    MCV 80.1 11/27/2020    MCH 26.3 11/27/2020    MCHC 32.9 11/27/2020    RDW 19.9 11/27/2020    NRBC 0.9 11/16/2020    SEGSPCT 73 01/26/2014    METASPCT 1.8 11/15/2020    LYMPHOPCT 18.2 11/27/2020    MONOPCT 5.0 11/27/2020    MYELOPCT 0.9 11/15/2020    BASOPCT 0.2 11/27/2020    MONOSABS 0.58 11/27/2020    LYMPHSABS 2.09 11/27/2020    EOSABS 0.55 11/27/2020    BASOSABS 0.02 11/27/2020     Platelets:    Lab Results   Component Value Date     11/27/2020     CMP:    Lab Results   Component Value Date     11/27/2020    K 4.5 11/27/2020    K 4.0 10/27/2020     11/27/2020    CO2 29 11/27/2020    BUN 20 11/27/2020    CREATININE 1.2 11/27/2020    GFRAA >60 11/27/2020    LABGLOM >60 11/27/2020    GLUCOSE 147 11/27/2020    PROT 7.2 11/27/2020    LABALBU 3.2 11/27/2020    CALCIUM 8.9 11/27/2020    BILITOT 0.2 11/27/2020    ALKPHOS 99 11/27/2020    AST 14 11/27/2020    ALT 53 11/27/2020     Magnesium:    Lab Results   Component Value Date    MG 1.8 11/25/2020     LDH:    Lab Results   Component Value Date     10/28/2020 PT/INR:    Lab Results   Component Value Date    PROTIME 14.0 11/18/2020    INR 1.2 11/18/2020     Last 3 Troponin:    Lab Results   Component Value Date    TROPONINI <0.01 10/26/2020    TROPONINI <0.01 05/26/2019    TROPONINI <0.01 05/26/2019     ABG:    Lab Results   Component Value Date    PH 7.393 11/26/2020    PCO2 43.1 11/26/2020    PO2 90.3 11/26/2020    HCO3 25.7 11/26/2020    BE 0.7 11/26/2020    O2SAT 96.1 11/26/2020     IRON:    Lab Results   Component Value Date    IRON 95 11/05/2020     IMAGING    Xr Chest Portable    Result Date: 10/27/2020  EXAMINATION: ONE XRAY VIEW OF THE CHEST 10/27/2020 7:58 pm COMPARISON: October 26, 2020 HISTORY: ORDERING SYSTEM PROVIDED HISTORY: SOB TECHNOLOGIST PROVIDED HISTORY: Reason for exam:->SOB What reading provider will be dictating this exam?->CRC FINDINGS: There is mild cardiomegaly. There is patchy perihilar and bibasilar atelectasis/infiltrates. Tiny pleural effusions are suspected. Progressive bibasilar atelectasis/infiltrates concerning for pneumonia. Underlying CHF is considered. Xr Chest Portable    Result Date: 10/26/2020  EXAMINATION: ONE XRAY VIEW OF THE CHEST 10/26/2020 4:11 pm COMPARISON: 05/26/2019 HISTORY: ORDERING SYSTEM PROVIDED HISTORY: SOB TECHNOLOGIST PROVIDED HISTORY: Reason for exam:->SOB What reading provider will be dictating this exam?->CRC FINDINGS: Cardiac size appears stable. Discoid airspace disease seen at the left lung base which may represent atelectasis or scarring. Right infrahilar and basilar infiltrate is identified. No pneumothorax is identified. No acute osseous abnormality is identified. Right infrahilar and basilar infiltrate concerning for pneumonia. Left basilar atelectasis or scarring.        Medications:    Scheduled Meds:   ampicillin-sulbactam  3 g Intravenous 4x Daily    QUEtiapine  25 mg Oral BID    carvedilol  6.25 mg Oral BID WC    hydrALAZINE  25 mg PEG Tube 3 times per day    thiamine (VITAMIN  Acute gout of right knee   Acute encephalopathy 2/2 Acute hypoxic hypercapnic respiratory failure. - cleared         Acute hypoxic hypercapnic respiratory failure 2/2 MSSA pneumonia versus COPD versus ADHF versus chronic OHS  Pt has Hx chronic HANS not on CPAP at home. ABG showed pH 7.188/91.2/78.9/33 on RRT. currently intubated. - Continue to monitor respiratory status, wean down FiO2 as tolerated. - Pro  on presentation.   - US dup LE negative for DVT. CTA negative for PE. COVID -19 negative x2  - Continue breathing treatment  - Monitor daily CXR. CBC.   - Pulmonology consulted, further recommendations appreciated.     NANCY stage III likely pre renal 2/2 diuretic use, contrast agent vs? cardiorenal syndrome. -   - Nephrology consulted. Further recommendations appreciated. - Continue monitor daily BMP, renal function. Monitor I/O. Avoid nephrotoxicity.       · Bacterial pneumonia, likely aspiration pneumonia vs MSSA   · Shock, sepsis- resolved   · ATBx per id  Alcohol withdrawal .    Hx HFpEF, EF 65%  cta noted   needs ltac  Wean per pulm/ccm  S/p Peg/trach PER ENT  Monitor HR  DECR hydralazine  INCR FREE H20   amlodipine    · Completed 10 day course of zosyn post trach insertion;BACK ON MERREM PER ID for now  · Repeat respiratory & blood cultures - no growth   · 11/19/2020 PICC line placed  · Dc restraints  · Monitor labs - creatinine improving -   · DC WHEN OK WITH OTHERS

## 2020-11-27 NOTE — PROGRESS NOTES
Nephrology Progress Note  Patient's Name: Earnest Moffett  3:11 PM  11/27/2020        Reason for Consult: Acute kidney  Requesting Physician:  Justin Mendoza MD    Chief Complaint: Shortness of breath  History Obtained From:  EHR; spouse    History of Present Ilness:    Earnest Moffett is a 61 y.o. male with a history of COPD, hypertension, hyperlipidemia and diabetes mellitus. He presented to ED 2 days ago with complaints of shortness of breath. According to patient's spouse he had been complaining of shortness of breath over the course of several weeks. This has gotten progressively worse. He went to see his PCP on the day of admission. In office at the time pulse oximeter obtained revealed his oxygen saturation to be in the 70s. He was instructed to proceed to ED for further evaluation. On presentation to ED his initial vital signs showed a blood pressure of 168/71, temperature 97.5 and pulse of 93. His pulse oximeter at the time was noted to be 98% on room air. Laboratory data was significant for a BUN of 11, creatinine 1.1, WBC of 8.3. Rapid COVID-19 testing was negative. A chest x-ray performed revealed right infrahilar and basilar infiltrate concerning for pneumonia. A CTA was ordered but patient declined because of his inability to lay flat. Patient was given ceftriaxone and doxycycline followed by admission to telemetry. 2 days ago an RRT was called as patient was noted to be increasingly more in respiratory distress. He was transferred to MICU and intubated. Laboratory data yesterday showed worsening serum creatinine to 2.8. This a.m. further worsening to 3.6. He has been nonoliguric. Hence renal consult.       11/15: Remains trach/intubated; periods of agitation  11/16: emesis, agitation this am  11/17: Patient responding to some simple commands today  11/18: No new c/o  11/19: No further bleeding from trach stoma  11/21 :pt seen in room, no cp or sob  11/22: pt seen in room, trach on vent  11/23: No complaints; patient actually verbalizing a little appropriately  11/24/2020- awake and alert. On vent.  Able to mouth words, appropriate, gives thumbs up.  11/25: No new problems reported from overnight; no bleeding from trach stoma   11/26: No new c/o  11/27: Intubated; trach; no new problems reported      Allergies:  Bee venom and Shellfish-derived products    Current Medications:    [START ON 11/28/2020] QUEtiapine (SEROQUEL) tablet 25 mg, Nightly  linezolid (ZYVOX) 100 MG/5ML suspension 600 mg, 2 times per day  vancomycin 1.5 g in dextrose 5% 300 mL IVPB, Q12H  [START ON 11/28/2020] lansoprazole suspension SUSP 30 mg, QAM AC  fluconazole (DIFLUCAN) tablet 200 mg, Daily  nystatin (MYCOSTATIN) 306020 UNIT/ML suspension 500,000 Units, 4x Daily  [START ON 11/28/2020] vitamin B-1 (THIAMINE) tablet 100 mg, Daily  mineral oil-hydrophilic petrolatum (HYDROPHOR) ointment, BID PRN  0.9 % sodium chloride infusion, Continuous  carvedilol (COREG) tablet 6.25 mg, BID WC  hydrALAZINE (APRESOLINE) tablet 25 mg, 3 times per day  HYDROcodone-acetaminophen (NORCO) 5-325 MG per tablet 1 tablet, Q6H PRN  labetalol (NORMODYNE;TRANDATE) injection 10 mg, Q4H PRN  trimethobenzamide (TIGAN) injection 200 mg, Q6H PRN  midazolam (VERSED) injection 2 mg, Q4H PRN  divalproex (DEPAKOTE SPRINKLE) capsule 250 mg, 3 times per day  sodium chloride flush 0.9 % injection 10 mL, PRN  heparin flush 100 UNIT/ML injection 300 Units, 2 times per day  heparin flush 100 UNIT/ML injection 300 Units, PRN  senna (SENOKOT) tablet 17.2 mg, Nightly  polyethylene glycol (GLYCOLAX) packet 17 g, Daily PRN  perflutren lipid microspheres (DEFINITY) injection 1.65 mg, ONCE PRN  acetaminophen (TYLENOL) 160 MG/5ML solution 991 mg, T7K PRN  folic acid injection 1 mg, Daily  polyvinyl alcohol (LIQUIFILM TEARS) 1.4 % ophthalmic solution 1 drop, Q4H PRN  sodium chloride (Inhalant) 3 % nebulizer solution 2 mL, Q4H  hydrALAZINE (APRESOLINE) injection 10 mg, Q4H PRN  heparin (porcine) injection 7,500 Units, Q8H  sodium chloride flush 0.9 % injection 10 mL, 2 times per day  sodium chloride flush 0.9 % injection 10 mL, PRN  acetaminophen (TYLENOL) suppository 650 mg, Q6H PRN  Arformoterol Tartrate (BROVANA) nebulizer solution 15 mcg, BID  chlorhexidine (PERIDEX) 0.12 % solution 15 mL, BID  amLODIPine (NORVASC) tablet 10 mg, Daily  [Held by provider] aspirin chewable tablet 81 mg, Daily  colchicine (COLCRYS) tablet 0.6 mg, BID PRN  levothyroxine (SYNTHROID) tablet 50 mcg, Daily  budesonide (PULMICORT) nebulizer suspension 500 mcg, BID  ipratropium-albuterol (DUONEB) nebulizer solution 1 ampule, Q4H WA  glucose (GLUTOSE) 40 % oral gel 15 g, PRN  dextrose 50 % IV solution, PRN  glucagon (rDNA) injection 1 mg, PRN  dextrose 5 % solution, PRN      Physical exam:  Vitals:    11/27/20 0815   BP: 139/69   Pulse: 101   Resp: 22   Temp:    SpO2: 99%           General: Intubated/trach; awake  Eyes: PERRL. No sclera icterus. No conjunctival injection. ENT: No discharge. Pharynx clear. Neck: Tracheostomy/vent  Lungs: Coarse breath sounds  CV: S1 S2 no S3 or rub  Abd:  Non-distended. No masses. No organmegaly. Normal bowel sounds. Skin: Warm and dry. No nodule on exposed extremities. No rash on exposed extremities.   Ext: No cyanosis, clubbing, edema; 1+ bilateral lower extremity edema L>R  Neuro: awake; follows commands appropriately; no problems overnight      Data:   Labs:  Lab Results   Component Value Date     11/27/2020     11/26/2020     11/26/2020    K 4.5 11/27/2020    K 4.5 11/26/2020    K 4.3 11/26/2020     11/27/2020    CO2 29 11/27/2020    CO2 26 11/26/2020    CO2 27 11/26/2020    CREATININE 1.2 11/27/2020    CREATININE 1.2 11/26/2020    CREATININE 1.2 11/26/2020    BUN 20 11/27/2020    BUN 19 11/26/2020    BUN 19 11/26/2020    GLUCOSE 147 (H) 11/27/2020    GLUCOSE 169 (H) 11/26/2020    GLUCOSE 136 (H) 11/26/2020    PHOS 2.4 (L) 11/25/2020 PHOS 3.6 11/18/2020    PHOS 3.7 11/17/2020    WBC 11.5 11/27/2020    WBC 12.3 (H) 11/26/2020    WBC 16.9 (H) 11/25/2020    HGB 7.4 (L) 11/27/2020    HGB 7.5 (L) 11/26/2020    HGB 8.4 (L) 11/25/2020    HCT 22.5 (L) 11/27/2020    HCT 23.1 (L) 11/26/2020    HCT 25.3 (L) 11/25/2020    MCV 80.1 11/27/2020     11/27/2020         Imaging:  Xr Chest Portable    Result Date: 10/27/2020  EXAMINATION: ONE XRAY VIEW OF THE CHEST 10/27/2020 7:58 pm COMPARISON: October 26, 2020 HISTORY: ORDERING SYSTEM PROVIDED HISTORY: SOB TECHNOLOGIST PROVIDED HISTORY: Reason for exam:->SOB What reading provider will be dictating this exam?->CRC FINDINGS: There is mild cardiomegaly. There is patchy perihilar and bibasilar atelectasis/infiltrates. Tiny pleural effusions are suspected. Progressive bibasilar atelectasis/infiltrates concerning for pneumonia. Underlying CHF is considered. Xr Chest Portable    Result Date: 10/26/2020  EXAMINATION: ONE XRAY VIEW OF THE CHEST 10/26/2020 4:11 pm COMPARISON: 05/26/2019 HISTORY: ORDERING SYSTEM PROVIDED HISTORY: SOB TECHNOLOGIST PROVIDED HISTORY: Reason for exam:->SOB What reading provider will be dictating this exam?->CRC FINDINGS: Cardiac size appears stable. Discoid airspace disease seen at the left lung base which may represent atelectasis or scarring. Right infrahilar and basilar infiltrate is identified. No pneumothorax is identified. No acute osseous abnormality is identified. Right infrahilar and basilar infiltrate concerning for pneumonia. Left basilar atelectasis or scarring. Assessment/Plans    1. Acute kidney injury on ckd 3 a  Baseline 1.8 from 9/2019, 1.1 in 5/2019  hemodynamically mediated from bradycardia/diuretic/ARB   exacerbated by IV contrast use   Renal function back to baseline at 1.2     2. Acute hypoxic respiratory failure  due to multilobar pneumonia  S/P trach 11/12      3. H/O ETOH  abuse    4.  Hypernatremia  Now resolved  Receiving free water via PEG  Continue to monitor     Not much else to add from a Renal standpoint  Will sign off       Chema Hampton MD  3:11 PM  11/27/2020

## 2020-11-27 NOTE — PROGRESS NOTES
Occupational Therapy  OT BEDSIDE TREATMENT NOTE      Date:2020  Patient Name: Loc Ruiz  MRN: [id number]  : 1960  Room: [address] [place] [address]A     Referring Physician:  Lake Love MD     Evaluating OT:  MONET Sales, OTR/L #[id number]     AM-PAC Daily Activity Raw Score:    Recommended Adaptive Equipment:  w/w; TBA     Reason for Admission:  Pt was admitted 10-26-20 w/ SOB. Altered Mental status  10-28-20 intubated/sedated  20 PEG  20 Open Trach/Bronch     Diagnosis:     1. Acute respiratory failure with hypoxia ([facility] [address]    2. Pneumonia due to organism    3. Encounter for nasogastric (NG) tube placement    4. Long term (current) use of antibiotics       Pertinent Medical History:  CHF, COPD, DM, HTN, Lymphedema, OA, DM      Precautions:  Falls  Trach/PEG  Zamorano/Fecal Mgmt System       Pt was a poor historian - Little information obtained from chart     Home Living: Pt lives with his wife in a 2-story house. Bathroom setup:  ?? Equipment owned:  None     Available Family Assist:  ??     Prior Level of Function:  IND with ADLs, IADLs, Transfers and Mobility using No ADfor ambulation. Driving:  ?  Occupation:  ?     Pain Level:  No indication of pain this session  Additional Complaints:  Pt showing increased response.      Vitals/Lab Values:  WFL O2 sats remained > 99% for duration of session on Trach     Cognition: A & O x 2; Pt stating he wants to run this date.            Able to Follow 2/5 Simple Commands w/ Max VCs/tactile cues              Memory:  poor              Sequencing:  poor+              Problem solving:  poor+              Judgement/safety:  poor  Additional Comments:No restraints present - did not reach for lines                 Functional Assessment:    Initial Eval Status  Date: 20 Treatment Status  Date: 20 Short Term/Long Term Goals  Treatment frequency: PRN 3-day Trial    Feeding NPO/DEP        PEG tube NA   Grooming Max A     Hand-over-hand assist for safety w/ lines to wash face w/ R Hand, Mod VCs  Mod A;    Pt able to wash face and brushing mouth out with use of B hands due to limitation in RUE. Mod A   UB Dressing Dep     Max of 2 for bed mobility to facilitate task + Max A/VCs to thread UEs into garment bed level     Mod A; To perform araceli/doff of gown with some limitations due to line management. Max A   LB Dressing Dep     Max A to don socks  Max A of 2 for bed mobility + Max A of 1 for simulated clothing adjustment in supine, Max VCs     Dep; To araceli /doff socks. Max A of 2   Bathing NT        Dep; Pt able to assist with UB bathing with Mod A and Dep A to complete other areas of bathing with increased time while sitting up on the EOB. Max A of 2   Toileting Dep     Zamorano Catheter  Fecal Mgmt System  Dep; Zamorano Catheter  Fecal Mgmt System       TBD   Bed Mobility  Rolling: Max A of 2  Repositioning:  Max A of 2   Supine to Sit:  NT    Sit to Supine:  NT      Unsafe to attempt to transfer to EOB d/t inability to consistently follow commands, restlessness, recent agitation   Rolling: Mod A  Repositioning: Mod A   Supine to Sit:  Min A   Sit to Supine: Mod A;    Pt able to transfer from supine to sitting position with verbal prompts to increase safety and perform line management. Max A of 2   Functional Transfers Sit to stand:  NT  Stand to sit:  NT      Sit to stand: Min A  Stand to sit: Min A;    Pt able to follow commands with improvements shown this date. Pt able to complete sit to stands approximately 5 times with verbal prompting on proper hand placement, Min A and assistance for line management. NA   Functional Mobility NT           Side stepping: Min A x 3 with walker and verbal prompting to improve safety of lines. NA   Balance NT     Fair+     Activity Tolerance Poor(+)        Fair+;     Pt showing increased participation and motivation to complete ADL tasks and functional transfers.      Visual/  Perceptual WFL  Glasses:  None at b/s        Hearing WFL  Hearing Aids            Hand dominance: Right     UE ROM:        RUE:     WFL                                          LUE:     WFL     Strength:        RUE:    grossly 5/5 - observed                                      LUE:    grossly 5/5      Strength:  WFL Navi UEs     Fine Motor Coordination:  WFL difficult to assess     Sensation:  Denies numbness or tingling Navi UEs  Tone:  WFL Navi UEs  Edema:  None Noted                            Comments: Upon arrival, patient was found in semi-supine. He was agreeable to participate in therapeutic ax.  No Family present during session. Received permission from RN prior to engaging pt in OT services.       At the end of the session, patient was properly positioned in Semi-Supine slightly on left side with pillows to support UB, assisted pt with TV remote, call light and phone within reach, all lines and tubes intact. Oriented pt to call bell. Made all appropriate Environmental Modifications to facilitate pt's level of IND and safety. All needs met. Bed Alarm activated.            Overall patient demonstrated decreased independence and safety during completion of ADL/functional transfer/mobility tasks.   Pt would benefit from continued skilled OT to increase safety and independence with completion of ADL/IADL tasks for functional independence and quality of life.     Time In: 1402  Time Out:  1455  Total Treatment Time: 53 mins       Treatment Charges: Mins Units   OT Eval Low 66368       OT Eval Medium 88138     OT Eval High 00070     OT Re-Eval 94071     Therapeutic Ex  32333     Therapeutic Activities 32404 00 2   ADL/Self Care 73036 25 2   Neuro Re-ed 92701     Orthotic manage/training  90978     Non-Billable Time       Mike Banegas 46, [facility]

## 2020-11-27 NOTE — PROGRESS NOTES
Pharmacy Consultation Note  (Antibiotic Dosing and Monitoring)    Initial consult date: 2020  Consulting physician: RADHA Spears  Drug(s): vancomycin  Indication: PNA   Age/Gender IBW DW  Allergy Information   60 y.o./M; 172.7 cm, 150.9 kg 68.4 kg 101.4 kg  Bee venom and Shellfish-derived products             Date  WBC BUN/CR Drug/Dose Time   Given Level(s)   (Time) Comments    11.5 20/1.2 vancomycin 1500 mg q12h <1300>                                  Estimated Creatinine Clearance: 94 mL/min (based on SCr of 1.2 mg/dL). Intake/Output Summary (Last 24 hours) at 2020 1207  Last data filed at 2020 5362  Gross per 24 hour   Intake 1106 ml   Output 1250 ml   Net -144 ml       Temp max: Temp (24hrs), Av.8 °F (37.1 °C), Min:98.5 °F (36.9 °C), Max:99.2 °F (37.3 °C)      Cultures:  available culture and sensitivity results were reviewed in EPIC    TRAAS: Staph aureus ( @ 1020)    Blood (set #1): GPC clusters ( @ 1100)    Assessment:  · Consulted by RADHA Haro to dose/monitor vancomycin. · Goal trough level:  15-20 mCg/mL. · 61 yo/M admitted 10/26/2020 for acute respiratory failure. S/P intubation but now with Trach. · Also S/P MSSA PNA on vanco 10/30 -  when switched to nafcillin. Has also received doxycycyline, pip-tazo, ceftriaxone, levofloxacin, amp/sulb, azithromycin, and meropenem. · : ID started PO linezolid and IV vancomycin today. Plan:  · Start vancomycin 1500 mg q12h. · Will check vancomycin trough level when steady state is attained if vanco continues. · Pharmacist will follow and monitor/adjust dosing as necessary.     Nell Mckeon, PharmD  2020  12:17 PM  Pager: [phone]

## 2020-11-28 NOTE — PROGRESS NOTES
Pulmonary Progress Note  11/28/2020 7:31 AM  Subjective:   Admit Date: 10/26/2020  PCP: Chaparrita Bhat MD  Interval History: Alert and follows commands. Denies SOB, cough. Patient tolerating PSV 10, PEEP 5, FiO2 40% at this time. Complains of abd pain. No issues per nursing with TF, no high residuals.      diet: DIET TUBE FEED CONTINUOUS/CYCLIC NPO; Diabetic 1.5; Gastrostomy; Continuous; 25; 65; 23; Exceptions are: Sips with Meds  Diet NPO, After Midnight Exceptions are: Sips with Meds    Data:   Scheduled Meds:    QUEtiapine  25 mg Oral Nightly    linezolid  600 mg Per G Tube 2 times per day    vancomycin  1,500 mg Intravenous Q12H    lansoprazole  30 mg Per G Tube QAM AC    fluconazole  200 mg Oral Daily    nystatin  5 mL Oral 4x Daily    vitamin B-1  100 mg PEG Tube Daily    carvedilol  6.25 mg Oral BID WC    hydrALAZINE  25 mg PEG Tube 3 times per day    divalproex  250 mg Oral 3 times per day    heparin flush  3 mL Intravenous 2 times per day    senna  2 tablet Oral Nightly    folic acid  1 mg Intravenous Daily    sodium chloride (Inhalant)  2 mL Nebulization Q4H    heparin (porcine)  7,500 Units Subcutaneous Q8H    sodium chloride flush  10 mL Intravenous 2 times per day    Arformoterol Tartrate  15 mcg Nebulization BID    chlorhexidine  15 mL Mouth/Throat BID    amLODIPine  10 mg Oral Daily    [Held by provider] aspirin  81 mg Oral Daily    levothyroxine  50 mcg Oral Daily    budesonide  0.5 mg Nebulization BID    ipratropium-albuterol  1 ampule Inhalation Q4H WA       Continuous Infusions:    sodium chloride Stopped (11/23/20 1638)    dextrose         PRN Meds: mineral oil-hydrophilic petrolatum, HYDROcodone 5 mg - acetaminophen, labetalol, trimethobenzamide, midazolam, sodium chloride flush, heparin flush, polyethylene glycol, perflutren lipid microspheres, acetaminophen, polyvinyl alcohol, hydrALAZINE, sodium chloride flush, [DISCONTINUED] acetaminophen **OR** acetaminophen, colchicine, glucose, dextrose, glucagon (rDNA), dextrose    I/O last 3 completed shifts: In: 0106 [NG/GT:1555]  Out: 1600 [Urine:1600]    No intake/output data recorded.       Intake/Output Summary (Last 24 hours) at 11/28/2020 0731  Last data filed at 11/28/2020 0550  Gross per 24 hour   Intake 1555 ml   Output 1600 ml   Net -45 ml       Patient Vitals for the past 96 hrs (Last 3 readings):   Weight   11/27/20 0600 (!) 332 lb 9.6 oz (150.9 kg)         Recent Labs     11/26/20  0500 11/27/20  0458 11/28/20  0430   WBC 12.3* 11.5 11.4   HGB 7.5* 7.4* 7.2*   HCT 23.1* 22.5* 21.6*   MCV 81.1 80.1 79.1*    310 324     Recent Labs     11/26/20  0500 11/27/20  0458 11/28/20  0430    140 138   K 4.5 4.5 4.6    102 99   CO2 26 29 31*   BUN 19 20 17   CREATININE 1.2 1.2 1.1     Recent Labs     11/26/20  0500 11/27/20  0458 11/28/20  0430   PROT 6.8 7.2 7.1   ALKPHOS 96 99 96   ALT 70* 53* 44*   AST 18 14 17   BILITOT 0.2 0.2 0.2     CPK:  Lab Results   Component Value Date    CKTOTAL 98 10/08/2017       -----------------------------------------------------------------  RAD:   Improving                    Micro:  Vent Information  $Ventilation: $Subsequent Day  Skin Assessment: Clean, dry, & intact  Equipment ID: [id number]  Equipment Changed: Humidification  Vent Type: 840  Vent Mode: PS  Vt Ordered: 480 mL  Pressure Ordered: 12  Rate Set: 12 bmp  Peak Flow: 60 L/min  Pressure Support: 10 cmH20  FiO2 : 40 %  SpO2: 94 %  SpO2/FiO2 ratio: 235  PaO2/FiO2 ratio: 169  Sensitivity: 3  PEEP/CPAP: 5  I Time/ I Time %: 0 s  Humidification Source: Heated wire  Humidification Temp: 37  Humidification Temp Measured: 37  Circuit Condensation: Drained  Mask Type: Tracheostomy  Mask Size: Large    Additional Respiratory  Assessments  Pulse: 95  Resp: 20  SpO2: 94 %  Position: Semi-Lockwood's  Humidification Source: Heated wire  Humidification Temp: 37  Circuit Condensation: Drained  Oral Care Completed?: Yes  Oral Care: Mouth swabbed  Subglottic Suction Done?: Yes  Airway Type: Trachial  Airway Size: 8  Cuff Pressure (cm H2O): 29 cm H2O  Skin barrier applied: Yes    Objective:   Vitals:   Vitals:    20 2328   BP: 127/66   Pulse: 95   Resp: 20   Temp: 97.6 °F (36.4 °C)   SpO2: 94%      TEMP:Current: Temp: 97.6 °F (36.4 °C)  Max: Temp  Av.2 °F (36.8 °C)  Min: 97.6 °F (36.4 °C)  Max: 98.5 °F (36.9 °C)    BP Range: Systolic (95JNI), QTD:581 , Min:127 , GMC:402     Diastolic (71SJC), TNJ:10, Min:66, Max:75    General appearance: obese on mechanical ventilation , appears stated age, follows simple commands, no acute distress  Skin: No rashes or lesions  HEENT: mucous membranes are moist, tracheostomy no bleeding noted. neck: No JVD  Lungs: symmetrical expansion, coarse breath sounds to auscultation, no use of accessory muscles  Heart: RRR, S1S2 2/6 JUAN  Abdomen: soft, tender on palpations +BS, distended, obese, PEG tube  Extremities: no peripheral edema  Neurologic:  Oens eyes to name,  following commands, shakes head yes/no appropriately. Affect: Calm          Assessment:   Patient Active Problem List:     59-y.o M with history of DM, hypothyroid, COPD, HTN, HLD presented on 10/26 with shortness of breath for 1 week.  Saturations were 70% at PCPs.  Saturation 65% on room air in ER  Patient agitated for CT scan refused to lay flat  10/27 RRT refused BiPAP became combative.  Patient transferred to ICU  10/28 intubated and sedated.  CT chest showing dense multifocal airspace disease      1. acute hypoxemic respiratory failure on mechanical ventilation s/p trach  8 cuffed Shiley trach proximal XLT  2. Metabolic encephalopathy history of EtOH abuse with agitation on ( Depakote, and Seroquel  )significantly improved  3. Acute renal failure Baseline creatinine 1.9 on 2019  4. Anemia  5. HAP   6. HANS moderate ( AHI 17 on 18 requires BIPAP ) noncompliant with CPAP  7. History of reactive airway disease  8.  CT 3/14/2018 showing pulmonary nodule stable from 10/2017  9. 10. Diabetes with elevated blood sugars  11. Obesity  12. EF 89% LVH diastolic dysfunction  13. chronic lymphedema  14. History of gout on colchicine  15. Hypothyroid  16. H/o respiratory failure: 7/8/2017 pt was  transferred from [facility] for acute respiratory failure after lap cholecystectomy, lap umbilical hernia repair, and lap liver biopsy (fatty liver). Patient was then transferred to [facility] hospital where he was weaned off the ventilator and decannulated. 17. Ventilator associated pneumonia  18. Alcohol Withdrawal   19. Abd discomfort     Plan:      · Will continue PS 10/PEEP5, doing well. · ID following for +bld cultures, MRSA sputum, RL pna, on Vanc and Zyvox  · Continue to wean off Seroquel gradually (will change to nightly for 2 nights)  then valproic acid. · Continue brovana/pulmicort  · Renal function at baseline, renal s/o  · ? Abd pain from Zyvox, if does not improve may want KUB.       KATHERINE Christy-CNP  11/28/2020  7:31 AM

## 2020-11-28 NOTE — PROGRESS NOTES
Subjective: The patient is awake and alert. No problems overnight. Denies chest pain, angina, and dyspnea. Denies abdominal pain. Tolerating diet. No nausea or vomiting. Objective:  Pt is resting in nad   /66   Pulse 95   Temp 97.6 °F (36.4 °C) (Temporal)   Resp 20   Ht 5' 8\" (1.727 m)   Wt (!) 332 lb 9.6 oz (150.9 kg)   SpO2 94%   BMI 50.57 kg/m²   HEENT no adenopathy no bruits  Trach is midline   Heart:  RRR, no murmurs, gallops, or rubs. Lungs:  CTA bilaterally, no wheeze, rales or rhonchi  Abd: bowel sounds present, nontender, nondistended, no masses  Extrem:  No clubbing, cyanosis, or edema  WBC/Hgb/Hct/Plts:  11.4/7.2/21.6/324 (11/28 9715) basic metabolic panel     Assessment:    Patient Active Problem List   Diagnosis    Hyperlipidemia    Type 2 diabetes mellitus without complication, without long-term current use of insulin (HCC)    Atypical chest pain    Essential hypertension    Chronic acquired lymphedema    Aortic valve disease    Morbid obesity due to excess calories (HCC)    Abdominal pain    Acute respiratory failure with hypoxia (HCC)    Acute pulmonary edema (HCC)    Congestive heart failure with LV diastolic dysfunction, NYHA class 3 (HCC)    Obstructive sleep apnea    Acquired hypothyroidism    Chronic diastolic heart failure (HCC)    Nonrheumatic aortic valve stenosis    ACE-inhibitor cough    Pneumonia due to organism    Acute gout of right knee       Plan:     To SNF soon, there are issues with them accepting on weekend  Cont Pulmonary toilet and weaning       Katy Ramos  6:18 AM  11/28/2020

## 2020-11-28 NOTE — PROGRESS NOTES
[facility]                                                   PROGRESS NOTE            C/C : Pneumonia, respiratory failure      Pt is awake , alert  On Ventilator   Denies fever and chills  Report SOB   Afebrile           Medications:  Scheduled Meds:   QUEtiapine  25 mg Oral Nightly    linezolid  600 mg Per G Tube 2 times per day    vancomycin  1,500 mg Intravenous Q12H    lansoprazole  30 mg Per G Tube QAM AC    fluconazole  200 mg Oral Daily    nystatin  5 mL Oral 4x Daily    vitamin B-1  100 mg PEG Tube Daily    carvedilol  6.25 mg Oral BID WC    hydrALAZINE  25 mg PEG Tube 3 times per day    divalproex  250 mg Oral 3 times per day    heparin flush  3 mL Intravenous 2 times per day    senna  2 tablet Oral Nightly    folic acid  1 mg Intravenous Daily    sodium chloride (Inhalant)  2 mL Nebulization Q4H    heparin (porcine)  7,500 Units Subcutaneous Q8H    sodium chloride flush  10 mL Intravenous 2 times per day    Arformoterol Tartrate  15 mcg Nebulization BID    chlorhexidine  15 mL Mouth/Throat BID    amLODIPine  10 mg Oral Daily    [Held by provider] aspirin  81 mg Oral Daily    levothyroxine  50 mcg Oral Daily    budesonide  0.5 mg Nebulization BID    ipratropium-albuterol  1 ampule Inhalation Q4H WA     Continuous Infusions:   sodium chloride Stopped (11/23/20 1638)    dextrose       PRN Meds:mineral oil-hydrophilic petrolatum, HYDROcodone 5 mg - acetaminophen, labetalol, trimethobenzamide, midazolam, sodium chloride flush, heparin flush, polyethylene glycol, perflutren lipid microspheres, acetaminophen, polyvinyl alcohol, hydrALAZINE, sodium chloride flush, [DISCONTINUED] acetaminophen **OR** acetaminophen, colchicine, glucose, dextrose, glucagon (rDNA), dextrose          REVIEW OF SYSTEMS:    CONSTITUTIONAL: Denies fever and chills . EYES:  No double vision or drainage from eyes, ears or throat. HEENT:  No neck stiffness. No dysphagia.  No drainage from eyes, ears or throat  RESPIRATORY:  SOB . CARDIOVASCULAR:  No chest pain, palpitations, orthopnea or dyspnea on exertion. GASTROINTESTINAL: loose stool    GENITOURINARY: Indwelling Zamorano  INTEGUMENT/BREAST:  No rash or breast masses. HEMATOLOGIC/LYMPHATIC:  No lymphadenopathy or blood dyscrasics. ENDOCRINE:  No polyuria or polydipsia or temperature intolerance.    MUSCULOSKELETAL:  No myalgia or arthralgia. NEUROLOGICAL:  weakness      OBJECTIVE:  /66   Pulse 95   Temp 97.6 °F (36.4 °C) (Temporal)   Resp 20   Ht 5' 8\" (1.727 m)   Wt (!) 332 lb 9.6 oz (150.9 kg)   SpO2 94%   BMI 50.57 kg/m²   Temp  Av.2 °F (36.8 °C)  Min: 97.6 °F (36.4 °C)  Max: 98.5 °F (36.9 °C)     Constitutional: Awake and alert, on the Vent , FiO2 40 % , PEEP 5   Skin: Warm and dry. No rashes were noted. HEENT: Np pallor, no thrush, tracheostomy OK   Chest:  Scattered rhonchi    Cardiovascular: S1 and S2 are rhythmic and regular. Systolic murmurs appreciated.    Abdomen: Hyperactive bowel sounds, obese abdomen, PEG, FMS system-loose stool    Extremities: No clubbing, no cyanosis, + Lymphedema left lower extremity- improving   Lines: PICC line right chest     Laboratory and Tests Review:  Lab Results   Component Value Date    WBC 11.4 2020    WBC 11.5 2020    WBC 12.3 (H) 2020    HGB 7.2 (L) 2020    HCT 21.6 (L) 2020    MCV 79.1 (L) 2020     2020     Lab Results   Component Value Date    NEUTROABS 7.42 (H) 2020    NEUTROABS 8.19 (H) 2020    NEUTROABS 9.98 (H) 2020     No results found for: [facility]  Lab Results   Component Value Date    ALT 44 (H) 2020    AST 17 2020    ALKPHOS 96 2020    BILITOT 0.2 2020     Lab Results   Component Value Date     2020    K 4.6 2020    K 4.0 10/27/2020    CL 99 2020    CO2 31 2020    BUN 17 2020    CREATININE 1.1 2020    CREATININE 1.2 2020    CREATININE 1.2 11/26/2020    GFRAA >60 11/28/2020    LABGLOM >60 11/28/2020    GLUCOSE 142 11/28/2020    PROT 7.1 11/28/2020    LABALBU 3.1 11/28/2020    CALCIUM 9.0 11/28/2020    BILITOT 0.2 11/28/2020    ALKPHOS 96 11/28/2020    AST 17 11/28/2020    ALT 44 11/28/2020     Lab Results   Component Value Date    CRP 1.4 (H) 11/02/2020    CRP 2.8 (H) 09/04/2017    CRP 10.5 (H) 08/28/2017     Lab Results   Component Value Date    SEDRATE 135 (H) 09/04/2017    SEDRATE 150 (H) 08/28/2017    SEDRATE 141 (H) 08/21/2017     Radiology: reviewed  CXR- 11/3- Multifocal bilateral pulmonary infiltrates more prominent within the right lung. CXR- 11/4- worsening b/l infiltrates, worse on left today with possible small L sided effusion   CXR- 11/5 - Stable b/l infiltrates   CXR- 11/6 - Expiratory film, image otherwise appears overall stable. CXR- 11/16 - stable right parahilar pulmonary opacity which may represent   atelectasis or pneumonia. CXR- 11/23/2020- bilateral infiltrates more prominent on the left. CXR 11/25/2020 - progressing consolidation bilaterally. Microbiology:   Lab Results   Component Value Date    [facility]  11/25/2020     Gram stain performed from blood culture bottle media  Gram positive cocci in [facility]  11/25/2020     No antibiotic susceptibility studies performed. Plates will be held 10  days. Contact the laboratory, [phone], if further workup is  desired.       BC 5 Days no growth 11/09/2020    ORG Staphylococcus coagulase-negative 11/25/2020    ORG Staphylococcus aureus 11/25/2020    ORG Staphylococcus aureus 10/29/2020     Lab Results   Component Value Date    BLOODCULT2 24 Hours no growth 11/25/2020    BLOODCULT2 5 Days no growth 11/09/2020    BLOODCULT2 5 Days no growth 10/29/2020    ORG Staphylococcus coagulase-negative 11/25/2020    ORG Staphylococcus aureus 11/25/2020    ORG Staphylococcus aureus 10/29/2020     No results found for: WNDABS  Smear, Respiratory   Date Value Ref Range Status 11/25/2020   Final    Group 5: >25 PMN's/LPF and <10 Epithelial cells/LPF  Abundant Polymorphonuclear leukocytes  Rare Epithelial cells  Abundant Gram positive diplococci  Abundant Gram positive cocci in clusters       No results found for: MPNEUMO, CLAMYDCU, LABLEGI, AFBCX, FUNGSM, LABFUNG  CULTURE, RESPIRATORY   Date Value Ref Range Status   11/25/2020 Oral Pharyngeal Tiesha absent (A)  Final   11/25/2020   Final    Heavy growth  Methicillin resistant Staph aureus isolated. Most Methicillin  resistant Staphylococcus are usually resistant to multiple  antibiotics including other B-Lactams, Aminoglycosides,  Macrolides, Clindamycin and Tetracycline. Contact isolation  is indicated. This isolate is presumed to be resistant based on the  detection of inducible Clindamycin resistance. Clindamycin  may still be effective in some patients. Culture Catheter Tip   Date Value Ref Range Status   08/26/2017 <15 colonies  Final     No results found for: BFCS  No results found for: CXSURG  Urine Culture, Routine   Date Value Ref Range Status   11/09/2020 Growth not present  Final   10/28/2020 Growth not present  Final   09/16/2019 Growth not present  Final     MRSA Culture Only   Date Value Ref Range Status   11/09/2020 Methicillin resistant Staph aureus not isolated  Final   10/30/2020 Methicillin resistant Staph aureus not isolated  Final   07/28/2017 Methicillin resistant Staph aureus not isolated  Final          Assessment:  · Acute on chronic hypoxic respiratory failure likely 2/2 pneumonia and volume overload  - resolving   · Bacterial pneumonia, likely aspiration pneumonia but after extubation patient had to be reintubated rule out HCAP  · Shock, sepsis- resolved  · Alcohol withdrawal   · Leukocytosis improving - 11.5 today  · CONS bacteremia   · MRSA in his sputum.    · Thrush -probable esophagitis      Plan:    · Vancomycin  1500 mg IV q 12 hrs ( pharmacy following )        Zyvox for the MRSA in the sputum and right lung pneumonia  · Diflucan & nystatin for thrush. is  · AARON ordered - patient had echo 2 weeks ago & the valves were not clearly visualized likely due to his body habitus.    · Follow up blood cx   · Contact isolation       Electronically signed by Jeff Hines MD on 11/28/2020 at 7:46 AM

## 2020-11-29 NOTE — PROGRESS NOTES
Occupational Therapy  OT BEDSIDE TREATMENT NOTE      Date:2020  Patient Name: Rell Archer  MRN: [id number]  : 1960  Room: [address] [place] [address]A     Referring Conor Headley MD     Evaluating OT: Giuliano Reese, MONET, OTR/L #[id number]     AM-PAC Daily Activity Raw Score:    Recommended Adaptive Equipment:  w/w; TBA     Reason for Admission:  Pt was admitted 10-26-20 w/ SOB.  Altered Mental status  10-28-20 intubated/sedated  20 PEG  20 Open Trach/Bronch     Diagnosis:     1. Acute respiratory failure with hypoxia ([facility] [address]    2. Pneumonia due to organism    3. Encounter for nasogastric (NG) tube placement    4. Long term (current) use of antibiotics       Pertinent Medical History:  CHF, COPD, DM, HTN, Lymphedema, OA, DM      Precautions:  Falls  Trach/PEG  Zamorano/Fecal Mgmt System       Pt was a poor historian - Little information obtained from chart     Home Living: Pt lives with his wife in a 2-story house.     Bathroom setup:  ??     Equipment owned:  None     Available Family Assist:  ??     Prior Level of Function:  IND with ADLs, IADLs, Transfers and Mobility using No ADfor ambulation.    Driving:  ?  Occupation:  ?     Pain Level:  No indication of pain this session  Additional Complaints:  Pt showing increased response.      Vitals/Lab Values:  WFL O2 sats remained > 99% for duration of session on Trach     Cognition: A & O: Grossly with ability to follow multi- step commands                Functional Assessment:    Initial Eval Status  Date: 20 Treatment Status  Date: 20 Short Term/Long Term Goals  Treatment frequency: PRN 3-day Trial    Feeding NPO/DEP        PEG tube NA   Grooming Max A     Hand-over-hand assist for safety w/ lines to wash face w/ R Hand, Mod VCs  Mod A;     Simulated    Mod A   UB Dressing Dep     Max of 2 for bed mobility to facilitate task + Max A/VCs to thread UEs into garment bed level     Min A;     To manage gown    Max A   LB Dressing

## 2020-11-29 NOTE — PLAN OF CARE
Problem: Skin Integrity:  Goal: Will show no infection signs and symptoms  Description: Will show no infection signs and symptoms  Outcome: Met This Shift  Goal: Absence of new skin breakdown  Description: Absence of new skin breakdown  Outcome: Met This Shift     Problem: Respiratory:  Goal: Ability to maintain a clear airway will improve  Description: Ability to maintain a clear airway will improve  Outcome: Met This Shift  Goal: Ability to maintain adequate ventilation will improve  Description: Ability to maintain adequate ventilation will improve  Outcome: Met This Shift  Goal: Complications related to the disease process, condition or treatment will be avoided or minimized  Description: Complications related to the disease process, condition or treatment will be avoided or minimized  Outcome: Met This Shift     Problem: Confusion - Acute:  Goal: Absence of continued neurological deterioration signs and symptoms  Description: Absence of continued neurological deterioration signs and symptoms  Outcome: Met This Shift  Goal: Mental status will be restored to baseline  Description: Mental status will be restored to baseline  Outcome: Met This Shift     Problem: Discharge Planning:  Goal: Ability to perform activities of daily living will improve  Description: Ability to perform activities of daily living will improve  Outcome: Met This Shift  Goal: Participates in care planning  Description: Participates in care planning  Outcome: Met This Shift     Problem: Injury - Risk of, Physical Injury:  Goal: Absence of physical injury  Description: Absence of physical injury  Outcome: Met This Shift  Goal: Will remain free from falls  Description: Will remain free from falls  Outcome: Met This Shift     Problem: Mood - Altered:  Goal: Mood stable  Description: Mood stable  Outcome: Met This Shift  Goal: Absence of abusive behavior  Description: Absence of abusive behavior  Outcome: Met This Shift     Problem: Psychomotor Activity - Altered:  Goal: Absence of psychomotor disturbance signs and symptoms  Description: Absence of psychomotor disturbance signs and symptoms  Outcome: Met This Shift     Problem: Sensory Perception - Impaired:  Goal: Demonstrations of improved sensory functioning will increase  Description: Demonstrations of improved sensory functioning will increase  Outcome: Met This Shift  Goal: Decrease in sensory misperception frequency  Description: Decrease in sensory misperception frequency  Outcome: Met This Shift  Goal: Able to refrain from responding to false sensory perceptions  Description: Able to refrain from responding to false sensory perceptions  Outcome: Met This Shift  Goal: Demonstrates accurate environmental perceptions  Description: Demonstrates accurate environmental perceptions  Outcome: Met This Shift  Goal: Able to distinguish between reality-based and nonreality-based thinking  Description: Able to distinguish between reality-based and nonreality-based thinking  Outcome: Met This Shift  Goal: Able to interrupt nonreality-based thinking  Description: Able to interrupt nonreality-based thinking  Outcome: Met This Shift     Problem: Pain:  Goal: Pain level will decrease  Description: Pain level will decrease  Outcome: Not Met This Shift  Goal: Control of acute pain  Description: Control of acute pain  Outcome: Not Met This Shift  Goal: Control of chronic pain  Description: Control of chronic pain  Outcome: Not Met This Shift     Problem: Mood - Altered:  Goal: Verbalizations of feeling emotionally comfortable while being cared for will increase  Description: Verbalizations of feeling emotionally comfortable while being cared for will increase  Outcome: Not Met This Shift     Problem: Sleep Pattern Disturbance:  Goal: Appears well-rested  Description: Appears well-rested  Outcome: Not Met This Shift

## 2020-11-29 NOTE — PROGRESS NOTES
Patient soiled on pad around FMS, refusing to changed/cleaned. Rn attempted to discuss rationale for refusal, pt continued to refuse. Report given to daytime RN.

## 2020-11-29 NOTE — PROGRESS NOTES
[facility]                                                   PROGRESS NOTE            C/C : Pneumonia, respiratory failure      Pt is awake , alert  On Ventilator   Denies fever and chills  Report abdomen pain   Afebrile           Medications:  Scheduled Meds:   QUEtiapine  25 mg Oral Nightly    linezolid  600 mg Per G Tube 2 times per day    vancomycin  1,500 mg Intravenous Q12H    lansoprazole  30 mg Per G Tube QAM AC    fluconazole  200 mg Oral Daily    nystatin  5 mL Oral 4x Daily    vitamin B-1  100 mg PEG Tube Daily    carvedilol  6.25 mg Oral BID WC    hydrALAZINE  25 mg PEG Tube 3 times per day    divalproex  250 mg Oral 3 times per day    heparin flush  3 mL Intravenous 2 times per day    senna  2 tablet Oral Nightly    folic acid  1 mg Intravenous Daily    sodium chloride (Inhalant)  2 mL Nebulization Q4H    heparin (porcine)  7,500 Units Subcutaneous Q8H    sodium chloride flush  10 mL Intravenous 2 times per day    Arformoterol Tartrate  15 mcg Nebulization BID    chlorhexidine  15 mL Mouth/Throat BID    amLODIPine  10 mg Oral Daily    [Held by provider] aspirin  81 mg Oral Daily    levothyroxine  50 mcg Oral Daily    budesonide  0.5 mg Nebulization BID    ipratropium-albuterol  1 ampule Inhalation Q4H WA     Continuous Infusions:   sodium chloride Stopped (11/23/20 1638)    dextrose       PRN Meds:mineral oil-hydrophilic petrolatum, HYDROcodone 5 mg - acetaminophen, labetalol, trimethobenzamide, midazolam, sodium chloride flush, heparin flush, polyethylene glycol, perflutren lipid microspheres, acetaminophen, polyvinyl alcohol, hydrALAZINE, sodium chloride flush, [DISCONTINUED] acetaminophen **OR** acetaminophen, colchicine, glucose, dextrose, glucagon (rDNA), dextrose          REVIEW OF SYSTEMS:    CONSTITUTIONAL: Denies fever and chills . EYES:  No double vision or drainage from eyes, ears or throat. HEENT:  No neck stiffness. No dysphagia.  No drainage from eyes, ears or throat  RESPIRATORY:  SOB . CARDIOVASCULAR:  No chest pain, palpitations, orthopnea or dyspnea on exertion. GASTROINTESTINAL: abdomen pain   GENITOURINARY: Indwelling Zamorano  INTEGUMENT/BREAST:  No rash or breast masses. HEMATOLOGIC/LYMPHATIC:  No lymphadenopathy or blood dyscrasics. ENDOCRINE:  No polyuria or polydipsia or temperature intolerance.    MUSCULOSKELETAL:  No myalgia or arthralgia. NEUROLOGICAL:  weakness      OBJECTIVE:  /71   Pulse 84   Temp 98.8 °F (37.1 °C) (Temporal)   Resp 18   Ht 5' 8\" (1.727 m)   Wt (!) 332 lb 9.6 oz (150.9 kg)   SpO2 98%   BMI 50.57 kg/m²   Temp  Av.8 °F (37.1 °C)  Min: 98.8 °F (37.1 °C)  Max: 98.8 °F (37.1 °C)     Constitutional: Awake and alert, on the Vent , FiO2 40 % , PEEP 5   Skin: Warm and dry. No rashes were noted. HEENT: Np pallor, no thrush, tracheostomy OK   Chest:  Scattered rhonchi    Cardiovascular: S1 and S2 are rhythmic and regular. Systolic murmurs appreciated.    Abdomen: soft, mild distention, tender ,  PEG- OK,  FMS system-loose stool    Extremities:  + Lymphedema left lower extremity- improving   Lines: PICC line right chest      Laboratory and Tests Review:  Lab Results   Component Value Date    WBC 10.5 2020    WBC 11.4 2020    WBC 11.5 2020    HGB 7.2 (L) 2020    HCT 22.4 (L) 2020    MCV 80.3 2020     2020     Lab Results   Component Value Date    NEUTROABS 5.81 2020    NEUTROABS 7.42 (H) 2020    NEUTROABS 8.19 (H) 2020     No results found for: [facility]  Lab Results   Component Value Date    ALT 36 2020    AST 15 2020    ALKPHOS 88 2020    BILITOT <0.2 2020     Lab Results   Component Value Date     2020    K 4.5 2020    K 4.0 10/27/2020     2020    CO2 27 2020    BUN 17 2020    CREATININE 1.1 2020    CREATININE 1.1 2020    CREATININE 1.2 2020    GFRAA >60 2020 LABGLOM >60 11/29/2020    GLUCOSE 144 11/29/2020    PROT 7.2 11/29/2020    LABALBU 3.4 11/29/2020    CALCIUM 9.3 11/29/2020    BILITOT <0.2 11/29/2020    ALKPHOS 88 11/29/2020    AST 15 11/29/2020    ALT 36 11/29/2020     Lab Results   Component Value Date    CRP 1.4 (H) 11/02/2020    CRP 2.8 (H) 09/04/2017    CRP 10.5 (H) 08/28/2017     Lab Results   Component Value Date    SEDRATE 135 (H) 09/04/2017    SEDRATE 150 (H) 08/28/2017    SEDRATE 141 (H) 08/21/2017     Radiology: reviewed  CXR- 11/3- Multifocal bilateral pulmonary infiltrates more prominent within the right lung. CXR- 11/4- worsening b/l infiltrates, worse on left today with possible small L sided effusion   CXR- 11/5 - Stable b/l infiltrates   CXR- 11/6 - Expiratory film, image otherwise appears overall stable. CXR- 11/16 - stable right parahilar pulmonary opacity which may represent   atelectasis or pneumonia. CXR- 11/23/2020- bilateral infiltrates more prominent on the left. CXR 11/25/2020 - progressing consolidation bilaterally. Microbiology:   Lab Results   Component Value Date    [facility]  11/25/2020     Gram stain performed from blood culture bottle media  Gram positive cocci in [facility]  11/25/2020     No antibiotic susceptibility studies performed. Plates will be held 10  days. Contact the laboratory, [phone], if further workup is  desired.       BC 5 Days no growth 11/09/2020    ORG Staphylococcus coagulase-negative 11/25/2020    ORG Staphylococcus aureus 11/25/2020    ORG Staphylococcus aureus 10/29/2020     Lab Results   Component Value Date    BLOODCULT2 24 Hours no growth 11/25/2020    BLOODCULT2 5 Days no growth 11/09/2020    BLOODCULT2 5 Days no growth 10/29/2020    ORG Staphylococcus coagulase-negative 11/25/2020    ORG Staphylococcus aureus 11/25/2020    ORG Staphylococcus aureus 10/29/2020     No results found for: WNDABS  Smear, Respiratory   Date Value Ref Range Status   11/25/2020   Final    Group 5: >25 PMN's/LPF and <10 Epithelial cells/LPF  Abundant Polymorphonuclear leukocytes  Rare Epithelial cells  Abundant Gram positive diplococci  Abundant Gram positive cocci in clusters       No results found for: MPNEUMO, CLAMYDCU, LABLEGI, AFBCX, FUNGSM, LABFUNG  CULTURE, RESPIRATORY   Date Value Ref Range Status   11/25/2020 Oral Pharyngeal Tiesha absent (A)  Final   11/25/2020   Final    Heavy growth  Methicillin resistant Staph aureus isolated. Most Methicillin  resistant Staphylococcus are usually resistant to multiple  antibiotics including other B-Lactams, Aminoglycosides,  Macrolides, Clindamycin and Tetracycline. Contact isolation  is indicated. This isolate is presumed to be resistant based on the  detection of inducible Clindamycin resistance. Clindamycin  may still be effective in some patients. Culture Catheter Tip   Date Value Ref Range Status   08/26/2017 <15 colonies  Final     No results found for: BFCS  No results found for: CXSURG  Urine Culture, Routine   Date Value Ref Range Status   11/09/2020 Growth not present  Final   10/28/2020 Growth not present  Final   09/16/2019 Growth not present  Final     MRSA Culture Only   Date Value Ref Range Status   11/09/2020 Methicillin resistant Staph aureus not isolated  Final   10/30/2020 Methicillin resistant Staph aureus not isolated  Final   07/28/2017 Methicillin resistant Staph aureus not isolated  Final          Assessment:  · Acute on chronic hypoxic respiratory failure likely 2/2 pneumonia and volume overload  - resolving   · Bacterial pneumonia, likely aspiration pneumonia but after extubation patient had to be reintubated rule out HCAP  · Shock, sepsis- resolved  · Alcohol withdrawal   · Leukocytosis improving - 11.5 today  · CONS bacteremia   · MRSA in his sputum.    · Thrush -probable esophagitis      Plan:    · Vancomycin  1500 mg IV q 12 hrs ( pharmacy following )        Zyvox for the MRSA in the sputum and right lung pneumonia  · Diflucan & nystatin for thrush.  is  · AARON ordered -   · CT abdomen and pelvis for abdomen pain (no contrast -NANCY just resolved )   · Follow up blood cx   · Contact isolation       Electronically signed by Jeff Hines MD on 11/29/2020 at 8:36 AM

## 2020-11-29 NOTE — PROGRESS NOTES
Physical Therapy  Facility/Department: [address] PICU  Daily Treatment Note  NAME: Bharath Marie  : 1960  MRN: [id number]    Date of Service: 2020   Referring Provider:  Louann Kim MD     Evaluating PT:  Tima Leong PT, DPT [id number]     Room #:  9396/5707-C  Diagnosis:  Acute Respiratory Failure with Hypoxia  PMHx/PSHx:  HTN, HLD, HANS, depression, Gouty arthritis, anxiety, OA, obesity, lymphedema, CHF, hypothyroidism, DM, COPD  Procedure/Surgery:  Intubated 10/28/2020, tracheostomy and peg tube insertion 2020  Precautions:  Falls, trach with ventilator, PEG tube, impulsive, confusion, FMS, wisdom, TSM  Equipment Needs:  TBD     SUBJECTIVE:  Unable to obtain social history at this time.     OBJECTIVE:    Initial Evaluation  Date: 2020 Treatment Date: 2020 Short Term/ Long Term   Goals   AM-PAC 6 Clicks   82/04     Was pt agreeable to Eval/treatment? yes  yes     Does pt have pain? No c/o pain  mild pain in abdomen     Bed Mobility  Rolling: maxA  Supine to sit: maxA  Sit to supine: maxA  Scooting: maxA Rolling: Angeli  Supine to sit: ModA  Sit to supine: Angeli  Scooting: Angeli Rolling: Angeli  Supine to sit: Angeli  Sit to supine: Angeli  Scooting: Angeli   Transfers Sit to stand: maxA  Stand to sit: maxA  Stand pivot: NT Sit to stand: ModA  Stand to sit: ModA  Stand pivot: NT Sit to stand: Angeli  Stand to sit: Angeli   Stand pivot: Angeli front [address]   Ambulation    side steps at EOB 5 feet x 4 reps front [address] Angeli  3' laterally ModA [address]  25 feet with front [address] [employer] negotiation: ascended and descended NT   TBD   ROM BUE:  Per OT note  BLE:  WNL       Strength BUE:  Per OT note  BLE:  Grossly 4-/5       Balance Sitting EOB:  SBA/Angeli  Dynamic Standing:  Min/modA front [address]  Sitting EOB:  Angeli  Dynamic Standing:  Angeli [address] Sitting EOB:  Independent  Dynamic Standing:  Angeli frnot [address]      Pt is A & O x 3.    Sensation:  Pt denies numbness and tingling to extremities  Edema: unremarkable    Patient education  Pt educated on role of PT    Patient response to education:   Pt verbalized understanding Pt demonstrated skill Pt requires further education in this area   x x x     ASSESSMENT:    Comments:  Pt received in supine agreeable to PT. Pt requiring assistance and cues for bed mobility and functional transfers. Pt educated on proper positioning and sequencing for proper hand placement to facilitate improved balance at EOB. Pt session limited as pt to go for CT. Patient would benefit from continued skilled PT to maximize functional mobility independence. Treatment:  Patient practiced and was instructed in the following treatment:     Bed mobility- verbal cues to facilitate independence   Functional transfers-Verbal cues for proper positioning and sequencing to perform transfers safely with maximum independence.  Gait training-Verbal cues for proper positioning and sequencing using assistive device to maximize functional mobility independence. PLAN:      PLAN OF CARE:    Current Treatment Recommendations     [x] Strengthening     [x] ROM   [x] Balance Training   [x] Endurance Training   [x] Transfer Training   [x] Gait Training   [] Stair Training   [x] Positioning   [x] Safety and Education Training   [x] Patient/Caregiver Education   [] HEP  [] Other       Patient is making good progress towards established goals. Will continue with current POC.       Time in  1220   Time out  1235    Total Treatment Time  15 minutes     CPT codes:  [] Gait training 00687 0 minutes  [] Manual therapy 72759 0 minutes  [x] Therapeutic activities 43098 15 minutes  [] Therapeutic exercises 28560 0 minutes  [] Neuromuscular reeducation 08565 0 minutes    Randi Moseley PT, DPT  [id number]

## 2020-11-29 NOTE — PROGRESS NOTES
Verbal permission obtained from patient's wife, Irine Soulier for [facility]) Cardiology to perform Transesophageal Echocardiogram.

## 2020-11-29 NOTE — PROGRESS NOTES
Subjective: The patient is awake and alert. No problems overnight. Denies chest pain, angina, and dyspnea. Denies abdominal pain. Tolerating diet. No nausea or vomiting. Objective:  Pt is resting in nad   BP (!) 170/77   Pulse 84   Temp 98.8 °F (37.1 °C) (Temporal)   Resp 18   Ht 5' 8\" (1.727 m)   Wt (!) 332 lb 9.6 oz (150.9 kg)   SpO2 98%   BMI 50.57 kg/m²   HEENT no adenopathy no bruits  Trach is midline   Heart:  RRR, no murmurs, gallops, or rubs.   Lungs:  Diminished in all fields   Abd: bowel sounds present, nontender, nondistended, no masses  Extrem:  No clubbing, cyanosis, or edema  WBC/Hgb/Hct/Plts:  10.5/7.2/22.4/344 (11/29 0816) basic metabolic panel     Assessment:    Patient Active Problem List   Diagnosis    Hyperlipidemia    Type 2 diabetes mellitus without complication, without long-term current use of insulin (HCC)    Atypical chest pain    Essential hypertension    Chronic acquired lymphedema    Aortic valve disease    Morbid obesity due to excess calories ([address]    Abdominal pain    Acute respiratory failure with hypoxia (HCC)    Acute pulmonary edema (HCC)    Congestive heart failure with LV diastolic dysfunction, NYHA class 3 (HCC)    Obstructive sleep apnea    Acquired hypothyroidism    Chronic diastolic heart failure (HCC)    Nonrheumatic aortic valve stenosis    ACE-inhibitor cough    Pneumonia due to organism    Acute gout of right knee       Plan:    AARON is scheduled  Cont abx per ID team  Pulmonary toilet  Discharge to [facility] marta Lorenzo  6:31 AM  11/29/2020

## 2020-11-29 NOTE — PROGRESS NOTES
Pulmonary Progress Note  11/29/2020 7:20 AM  Subjective:   Admit Date: 10/26/2020  PCP: Gerard Borja MD  Interval History: Alert and follows commands. Denies SOB, cough. Patient on [facility] 12/480/40%/5 overnight as RRT states he becomes tachypneic with lower Vt. Will change back to PSV 10, PEEP 5, FiO2 40% this am. Complains of abd pain and nursing is giving norco, no high residuals with TF. Discussed abd pain with Dr Migeul Angel Elliott on the unit.   He will order CT of abd.     diet: DIET TUBE FEED CONTINUOUS/CYCLIC NPO; Diabetic 1.5; Gastrostomy; Continuous; 25; 65; 23; Exceptions are: Sips with Meds  Diet NPO, After Midnight Exceptions are: Sips with Meds    Data:   Scheduled Meds:    QUEtiapine  25 mg Oral Nightly    linezolid  600 mg Per G Tube 2 times per day    vancomycin  1,500 mg Intravenous Q12H    lansoprazole  30 mg Per G Tube QAM AC    fluconazole  200 mg Oral Daily    nystatin  5 mL Oral 4x Daily    vitamin B-1  100 mg PEG Tube Daily    carvedilol  6.25 mg Oral BID WC    hydrALAZINE  25 mg PEG Tube 3 times per day    divalproex  250 mg Oral 3 times per day    heparin flush  3 mL Intravenous 2 times per day    senna  2 tablet Oral Nightly    folic acid  1 mg Intravenous Daily    sodium chloride (Inhalant)  2 mL Nebulization Q4H    heparin (porcine)  7,500 Units Subcutaneous Q8H    sodium chloride flush  10 mL Intravenous 2 times per day    Arformoterol Tartrate  15 mcg Nebulization BID    chlorhexidine  15 mL Mouth/Throat BID    amLODIPine  10 mg Oral Daily    [Held by provider] aspirin  81 mg Oral Daily    levothyroxine  50 mcg Oral Daily    budesonide  0.5 mg Nebulization BID    ipratropium-albuterol  1 ampule Inhalation Q4H WA       Continuous Infusions:    sodium chloride Stopped (11/23/20 1638)    dextrose         PRN Meds: mineral oil-hydrophilic petrolatum, HYDROcodone 5 mg - acetaminophen, labetalol, trimethobenzamide, midazolam, sodium chloride flush, heparin flush, polyethylene glycol, perflutren lipid microspheres, acetaminophen, polyvinyl alcohol, hydrALAZINE, sodium chloride flush, [DISCONTINUED] acetaminophen **OR** acetaminophen, colchicine, glucose, dextrose, glucagon (rDNA), dextrose    I/O last 3 completed shifts:  In: -   Out: 1425 [Urine:1425]    No intake/output data recorded.       Intake/Output Summary (Last 24 hours) at 11/29/2020 0720  Last data filed at 11/28/2020 2110  Gross per 24 hour   Intake --   Output 1425 ml   Net -1425 ml       Patient Vitals for the past 96 hrs (Last 3 readings):   Weight   11/27/20 0600 (!) 332 lb 9.6 oz (150.9 kg)         Recent Labs     11/27/20 0458 11/28/20  0430 11/29/20  0508   WBC 11.5 11.4 10.5   HGB 7.4* 7.2* 7.2*   HCT 22.5* 21.6* 22.4*   MCV 80.1 79.1* 80.3    324 344     Recent Labs     11/27/20 0458 11/28/20  0430 11/29/20  0508    138 140   K 4.5 4.6 4.5    99 100   CO2 29 31* 27   BUN 20 17 17   CREATININE 1.2 1.1 1.1     Recent Labs     11/27/20 0458 11/28/20  0430 11/29/20  0508   PROT 7.2 7.1 7.2   ALKPHOS 99 96 88   ALT 53* 44* 36   AST 14 17 15   BILITOT 0.2 0.2 <0.2     CPK:  Lab Results   Component Value Date    CKTOTAL 98 10/08/2017       -----------------------------------------------------------------  RAD:   Improving                    Micro:  Vent Information  $Ventilation: $Subsequent Day  Skin Assessment: Clean, dry, & intact  Equipment ID: [id number]  Equipment Changed: Humidification  Vent Type: 840  Vent Mode: AC/VC  Vt Ordered: 480 mL  Pressure Ordered: (S) 12  Rate Set: 12 bmp  Peak Flow: 60 L/min  Pressure Support: 10 cmH20  FiO2 : 40 %  SpO2: 98 %  SpO2/FiO2 ratio: 245  PaO2/FiO2 ratio: 169  Sensitivity: 3  PEEP/CPAP: 5  I Time/ I Time %: 0 s  Humidification Source: Heated wire  Humidification Temp: 37  Humidification Temp Measured: 36.9  Circuit Condensation: Drained  Mask Type: Tracheostomy  Mask Size: Large    Additional Respiratory  Assessments  Pulse: 84  Resp: 18  SpO2: 98 %  Position: Semi-Lockwood's  Humidification Source: Heated wire  Humidification Temp: 37  Circuit Condensation: Drained  Oral Care Completed?: Yes  Oral Care: Mouth swabbed  Subglottic Suction Done?: Yes  Airway Type: Trachial  Airway Size: 8  Cuff Pressure (cm H2O): 29 cm H2O  Skin barrier applied: Yes    Objective:   Vitals:   Vitals:    20 0638   BP: 138/71   Pulse:    Resp:    Temp:    SpO2:       TEMP:Current: Temp: 98.8 °F (37.1 °C)  Max: Temp  Av.5 °F (36.9 °C)  Min: 98.1 °F (36.7 °C)  Max: 98.8 °F (37.1 °C)    BP Range: Systolic (39PMN), DPP:435 , Min:132 , LMJ:716     Diastolic (97MHF), DYM:61, Min:68, Max:77    General appearance: obese on mechanical ventilation , appears stated age, follows simple commands, no acute distress  Skin: No rashes or lesions  HEENT: mucous membranes are moist, tracheostomy no bleeding noted. neck: No JVD  Lungs: symmetrical expansion, coarse breath sounds to auscultation which clear with cough, no use of accessory muscles  Heart: RRR, S1S2 2/6 JUAN  Abdomen: soft, tender on palpations +BS, distended, obese, PEG tube  Extremities: no peripheral edema  Neurologic:  following commands, shakes head yes/no appropriately. Affect: Calm          Assessment:   Patient Active Problem List:     59-y.o M with history of DM, hypothyroid, COPD, HTN, HLD presented on 10/26 with shortness of breath for 1 week.  Saturations were 70% at PCPs.  Saturation 65% on room air in ER  Patient agitated for CT scan refused to lay flat  10/27 RRT refused BiPAP became combative.  Patient transferred to ICU  10/28 intubated and sedated.  CT chest showing dense multifocal airspace disease      1. acute hypoxemic respiratory failure on mechanical ventilation s/p trach  8 cuffed Shiley trach proximal XLT  2. Metabolic encephalopathy history of EtOH abuse with agitation on ( Depakote, and Seroquel  )significantly improved  3. Acute renal failure Baseline creatinine 1.9 on 2019  4. Anemia  5. HAP   6.  HANS moderate ( AHI 17 on 4/18/18 requires BIPAP 16/12) noncompliant with CPAP  7. History of reactive airway disease  8. CT 3/14/2018 showing pulmonary nodule stable from 10/2017  9. 10. Diabetes with elevated blood sugars  11. Obesity  12. EF 45% LVH diastolic dysfunction  13. chronic lymphedema  14. History of gout on colchicine  15. Hypothyroid  16. H/o respiratory failure: 7/8/2017 pt was  transferred from [facility] for acute respiratory failure after lap cholecystectomy, lap umbilical hernia repair, and lap liver biopsy (fatty liver). Patient was then transferred to [facility] where he was weaned off the ventilator and decannulated. 17. Ventilator associated pneumonia  18. Alcohol Withdrawal   19. Abd pain     Plan:      · Will continue PS 10/PEEP5, attempt to leave on PS if tolerates with gasses in am. AC 12/480/40%/5 overnight if needed. · ID following for +bld cultures, MRSA sputum, RL pna, on Vanc and Zyvox  · CT of the abd - discussed with ID and will order  · Continue to wean off Seroquel gradually (d/c 11/30)  then valproic acid.    · Continue brovana/pulmicort  · Renal function at baseline, renal s/o      Kyla Cabot, APRN-CNP  11/29/2020  7:20 AM

## 2020-11-29 NOTE — PROGRESS NOTES
Pharmacy Consultation Note  (Antibiotic Dosing and Monitoring)    Initial consult date: 2020  Consulting physician: RADHA Tee  Drug(s): vancomycin  Indication: PNA   Age/Gender IBW DW  Allergy Information   60 y.o./M; 172.7 cm, 150.9 kg 68.4 kg 101.4 kg  Bee venom and Shellfish-derived products             Date  WBC BUN/CR Drug/Dose Time   Given Level(s)   (Time) Comments    11.5 20/1.2 vancomycin 1500 mg q12h 1502      11.4 17/1.1 vancomycin 1500 mg q12h 0114  1406 Trough @ 2325 = 16.2 mcg/mL     10.5 17/1.1 vancomycin 1500 mg q12h 0112  <1300>                Estimated Creatinine Clearance: 102 mL/min (based on SCr of 1.1 mg/dL). Intake/Output Summary (Last 24 hours) at 2020 0829  Last data filed at 2020 0645  Gross per 24 hour   Intake --   Output 1800 ml   Net -1800 ml       Temp max: Temp (24hrs), Av.8 °F (37.1 °C), Min:98.8 °F (37.1 °C), Max:98.8 °F (37.1 °C)      Cultures:  available culture and sensitivity results were reviewed in EPIC    TRAAS: MRSA ( @ 1020)    Blood (set #1): CoNS    Assessment:  · Consulted by RADHA Allred to dose/monitor vancomycin. · Goal trough level:  15-20 mCg/mL. · 59 yo/M admitted 10/26/2020 for acute respiratory failure. S/P intubation but now with Trach. · Also S/P MSSA PNA on vanco 10/30 -  when switched to nafcillin. Has also received doxycycyline, pip-tazo, ceftriaxone, levofloxacin, amp/sulb, azithromycin, and meropenem. · : ID started PO linezolid and IV vancomycin today. · : Trough @ 2325 = 16.2 mcg/mL (around 9.5 hr from previous dose)    Plan:  · Continue vancomycin 1500 mg q12h. · Pharmacist will follow and monitor/adjust dosing as necessary.     Twin Cheney PharmD, BCPS 2020 8:29 AM

## 2020-11-30 NOTE — PLAN OF CARE
Problem: Confusion - Acute:  Goal: Absence of continued neurological deterioration signs and symptoms  Description: Absence of continued neurological deterioration signs and symptoms  Outcome: Met This Shift  Goal: Mental status will be restored to baseline  Description: Mental status will be restored to baseline  Outcome: Met This Shift     Problem: Discharge Planning:  Goal: Ability to perform activities of daily living will improve  Description: Ability to perform activities of daily living will improve  Outcome: Met This Shift  Goal: Participates in care planning  Description: Participates in care planning  Outcome: Met This Shift

## 2020-11-30 NOTE — PROGRESS NOTES
543 10/28/2020     PT/INR:    Lab Results   Component Value Date    PROTIME 14.0 11/18/2020    INR 1.2 11/18/2020     Last 3 Troponin:    Lab Results   Component Value Date    TROPONINI <0.01 10/26/2020    TROPONINI <0.01 05/26/2019    TROPONINI <0.01 05/26/2019     ABG:    Lab Results   Component Value Date    PH 7.393 11/26/2020    PCO2 43.1 11/26/2020    PO2 90.3 11/26/2020    HCO3 25.7 11/26/2020    BE 0.7 11/26/2020    O2SAT 96.1 11/26/2020     IRON:    Lab Results   Component Value Date    IRON 95 11/05/2020     IMAGING    Ct Abdomen Pelvis Wo Contrast Additional Contrast? None    Result Date: 11/29/2020  EXAMINATION: CT OF THE ABDOMEN AND PELVIS WITHOUT CONTRAST 11/29/2020 1:09 pm TECHNIQUE: CT of the abdomen and pelvis was performed without the administration of intravenous contrast. Multiplanar reformatted images are provided for review. Dose modulation, iterative reconstruction, and/or weight based adjustment of the mA/kV was utilized to reduce the radiation dose to as low as reasonably achievable. COMPARISON: September 16, 2019 HISTORY: ORDERING SYSTEM PROVIDED HISTORY: Abdomen pain TECHNOLOGIST PROVIDED HISTORY: Reason for exam:->Abdomen pain Additional Contrast?->None What reading provider will be dictating this exam?->CRC FINDINGS: The lung bases demonstrate patchy multifocal infiltrates. 3-5 mm nonspecific nodules are identified in the right lung base. There is hepatomegaly with liver measuring 19 cm with fatty infiltration. Gallbladder is absent. A G-tube is noted. Spleen, pancreas, and adrenals are normal.  1-9 mm nonobstructing bilateral kidney stones are present. 2.5 cm cystic lesion is noted in the right kidney. Degenerative changes are identified in the lumbosacral spine. Pelvis. The urinary bladder is contracted with a Zamorano catheter and wall thickening. Diffusely dilated fluid-filled small bowel loops and colon are noted.   Scattered diverticulosis of the descending and sigmoid colon radiation dose to as low as reasonably achievable. COMPARISON: August 27, 2017 HISTORY: ORDERING SYSTEM PROVIDED HISTORY: AMS TECHNOLOGIST PROVIDED HISTORY: Reason for exam:->AMS Has a \"code stroke\" or \"stroke alert\" been called? ->No What reading provider will be dictating this exam?->CRC FINDINGS: BRAIN/VENTRICLES: There is no acute intracranial hemorrhage, mass effect or midline shift. No abnormal extra-axial fluid collection. The gray-white differentiation is maintained without evidence of an acute infarct. There is no evidence of hydrocephalus. ORBITS: The visualized portion of the orbits demonstrate no acute abnormality. SINUSES: The visualized paranasal sinuses and mastoid air cells demonstrate no acute abnormality. SOFT TISSUES/SKULL:  No acute abnormality of the visualized skull or soft tissues. No acute intracranial abnormality. Ct Soft Tissue Neck Wo Contrast    Result Date: 11/11/2020  EXAMINATION: CT OF THE NECK WITHOUT CONTRAST  11/11/2020 TECHNIQUE: CT of the neck was performed without the administration of intravenous contrast. Multiplanar reformatted images are provided for review. Dose modulation, iterative reconstruction, and/or weight based adjustment of the mA/kV was utilized to reduce the radiation dose to as low as reasonably achievable. COMPARISON: CTA chest October 28, 2020. CTA neck February 9, 2017. HISTORY: ORDERING SYSTEM PROVIDED HISTORY: unable to be trached today, evaluate for trach calcification TECHNOLOGIST PROVIDED HISTORY: Please include entire trachea in window Reason for exam:->unable to be trached today, evaluate for trach calcification What reading provider will be dictating this exam?->CRC FINDINGS: PHARYNX/LARYNX:  The palatine tonsils are normal in appearance. The tongue is normal in appearance. The valleculae, epiglottis, aryepiglottic folds and pyriform sinuses appear unremarkable. The true and false vocal cords are normal in appearance.   No mass or abscess is seen. OTHER: There is calcification at the level of the cricothyroid membrane. There is defect in the trachea anteriorly below the cricoid with overlying soft tissue swelling and soft tissue gas contiguous with the skin anteriorly. Tracheostomy tube is in place. SALIVARY GLANDS/THYROID:  The parotid and submandibular glands appear unremarkable. The thyroid gland appears unremarkable. LYMPH NODES:  No cervical or supraclavicular lymphadenopathy is seen. SOFT TISSUES:  No appreciable soft tissue swelling or mass is seen. BRAIN/ORBITS/SINUSES:  The visualized portion of the intracranial contents appear unremarkable, paranasal sinuses and mastoid air cells demonstrate no acute abnormality. Opacification of the bilateral sphenoid sinuses. LUNG APICES/SUPERIOR MEDIASTINUM:  Patchy densities in the bilateral lung apices are again noted. No superior mediastinal lymphadenopathy or mass. The visualized portion of the trachea appears unremarkable. BONES: Multilevel degenerative changes. 1.  There is calcification at the level of the cricothyroid membrane. 2.  Defect in the trachea anteriorly below the cricoid with overlying soft tissue swelling and soft tissue gas contiguous with the skin anteriorly likely related to recent procedure.      Ir Venogram Upper Extremity Right    Result Date: 2020  Patient MRN:  [id number] : 1960 Age: 61 years Gender: Male Order Date:  2020 7:45 AM EXAM: IR VENOGRAM UPPER EXTREMITY RIGHT, IR SPARKLE CATH PLACE VENOUS 2ND+ ORDER, IR TUNNELED CVC PLACE WO SQ PORT/PUMP > 5 YEARS, IR ULTRASOUND GUIDANCE VASCULAR ACCESS, IR FLUORO GUIDED CVA DEVICE PLMT/REPLACE/REMOVAL NUMBER OF IMAGES:  14 INDICATION: Z79.2 Long term (current) use of antibiotics Long term (current) use of antibiotics What reading provider will be dictating this exam?->[facility] COMPARISON: None FLUORO TIME: 00:04:26 DAP: 1965.9 uGym2 AK: 77.1 mGy PICC Catheter Placement and Upper Extremity Venous Ultrasound, the right basilic vein was accessed with ultrasound guidance. IV contrast was injected which demonstrated extravasation into the soft tissue. The decision was then made to place a right tunneled IJ PICC. Using direct real-time ultrasound imaging vascular access was obtained to the right IJ. An image was stored and copy was transmitted to PACS. Subsequently with real-time guidance a needle was inserted intravascular and through the needle a wire. A subcutaneous tunnel was then created along the right upper chest. Subsequently under fluoroscopic guidance, a system of needles, catheters and guidewires were utilized to place a guidewire and catheter in the venous system and the catheter was advanced and appropriately placed at the level of the junction of the superior vena cava and right atrium. Time out occurred at 08:33 AM. Patient received 4 minutes and 26 seconds of fluoroscopy. A fluoroscopic image of the position of the catheter tip was saved in PACS. Successful uncomplicated placement of a right IJ tunneled PICC catheter. Xr Chest Portable    Result Date: 11/25/2020  EXAMINATION: ONE XRAY VIEW OF THE CHEST 11/25/2020 8:08 am COMPARISON: One-view chest x-ray 11/24/2020 HISTORY: ORDERING SYSTEM PROVIDED HISTORY: resp dist TECHNOLOGIST PROVIDED HISTORY: Reason for exam:->resp dist What reading provider will be dictating this exam?->CRC FINDINGS: There is significant interval increase in pulmonary consolidation, most notably in the upper-mid right lung and also in the left lung base. The cardiomediastinal silhouette is within normal limits for AP technique. A right internal jugular central venous catheter appears to terminate in the region of the right atrium. There is partial obscuration of the left costophrenic sulcus, which could be secondary to consolidation or small pleural effusion. There is evidence of a tracheostomy tube.  Degenerative/arthritic changes are present in the spine and the right acromioclavicular joint. EKG leads overlie the chest.    1. There is interval progression of bilateral consolidation, compatible with worsening pneumonia. 2. A right internal jugular central venous catheter appears to terminate in the region of the right atrium. May consider withdrawal by approximately 4 cm to ensure non atrial in location. Xr Chest Portable    Result Date: 11/24/2020  EXAMINATION: ONE XRAY VIEW OF THE CHEST 11/24/2020 9:54 am COMPARISON: November 23, 2020 HISTORY: ORDERING SYSTEM PROVIDED HISTORY: SOB TECHNOLOGIST PROVIDED HISTORY: Reason for exam:->SOB What reading provider will be dictating this exam?->CRC FINDINGS: Right IJ approach central venous catheter again identified in unchanged position. Tracheostomy again identified. The cardiac silhouette is enlarged but stable in size. There are bilateral airspace opacities, without interval change. No pleural effusions or pneumothorax. No interval change in bilateral airspace opacities. Xr Chest Portable    Result Date: 11/23/2020  EXAMINATION: ONE XRAY VIEW OF THE CHEST 11/23/2020 7:40 am COMPARISON: 11/20/2020 HISTORY: ORDERING SYSTEM PROVIDED HISTORY: SOB TECHNOLOGIST PROVIDED HISTORY: Reason for exam:->SOB What reading provider will be dictating this exam?->CRC FINDINGS: There is stable position of the tracheostomy tube. There is a right subclavian central venous catheter. Patchy multifocal pulmonary infiltrates are noted. More prominent within the left lung. 1. There are patchy multifocal bilateral pulmonary infiltrates more prominent within the left lower lobe. Xr Chest Portable    Result Date: 11/22/2020  EXAMINATION: ONE XRAY VIEW OF THE CHEST 11/22/2020 8:09 am COMPARISON: 11/21/2020 HISTORY: ORDERING SYSTEM PROVIDED HISTORY: SOB TECHNOLOGIST PROVIDED HISTORY: Reason for exam:->SOB What reading provider will be dictating this exam?->CRC FINDINGS: There is no interval change in the position of the tracheostomy tube. There is improved aeration of the lungs when compared to the prior study. Patchy per infiltrates persist within the lung bases. 1. Partial interval clearing of the multifocal pulmonary infiltrates within the upper lobes. 2. Persistent small infiltrates within the lung bases. Xr Chest Portable    Result Date: 11/21/2020  EXAMINATION: ONE XRAY VIEW OF THE CHEST 11/21/2020 7:47 am COMPARISON: November 17-20 HISTORY: ORDERING SYSTEM PROVIDED HISTORY: SOB TECHNOLOGIST PROVIDED HISTORY: Reason for exam:->SOB What reading provider will be dictating this exam?->CRC FINDINGS: Tracheostomy tube in good position. The right internal jugular central venous catheter tip in the right atrium. Heart appears to be mildly enlarged. The bilateral vague ill-defined infiltrates with ground-glass opacities are seen. They are more prominent on the right lung. There is no pleural effusions. No significant changes since the previous study of November 20. Xr Chest Portable    Result Date: 11/20/2020  EXAMINATION: ONE XRAY VIEW OF THE CHEST 11/20/2020 7:55 am COMPARISON: 19 November 2020 HISTORY: ORDERING SYSTEM PROVIDED HISTORY: SOB TECHNOLOGIST PROVIDED HISTORY: Reason for exam:->SOB What reading provider will be dictating this exam?->CRC FINDINGS: Central venous catheter on the right terminates in the right atrium proper. This is a stable finding. A left-sided central venous catheter is been removed. There is a stable tracheostomy catheter. There are opacities in both lungs which may represent infiltrate and/or atelectasis. Aeration appears minimally worse. Mildly increasing infiltrate and/or atelectasis bilaterally. Please note, the right central venous catheter terminates in the right atrium proper.     Xr Chest Portable    Result Date: 11/19/2020  EXAMINATION: ONE XRAY VIEW OF THE CHEST 11/19/2020 9:56 am COMPARISON: November 15-18 HISTORY: ORDERING SYSTEM PROVIDED HISTORY: SOB TECHNOLOGIST PROVIDED HISTORY: Reason for exam:->SOB What reading provider will be dictating this exam?->CRC FINDINGS: Some improvement of the bilateral discrete ground-glass densities throughout both lungs predominant in the perihilar regions. The can be an indication for volume overload. No pleural effusions are seen. The heart is normal size. Some improvement in pattern that can represent volume overload since the November 18. Xr Chest Portable    Result Date: 11/18/2020  EXAMINATION: ONE XRAY VIEW OF THE CHEST 11/18/2020 7:39 am COMPARISON: 11/17/2020 and 11/16/2020 HISTORY: ORDERING SYSTEM PROVIDED HISTORY: SOB TECHNOLOGIST PROVIDED HISTORY: Reason for exam:->SOB What reading provider will be dictating this exam?->CRC FINDINGS: There is no interval change in the multifocal bilateral significant airspace disease. There is no right or left pleural effusion. The cardiac silhouette is at upper limits of normal.  There is stable position of the left internal jugular central venous catheter. 1. No interval change in extensive bilateral multifocal airspace disease. Xr Chest Portable    Result Date: 11/17/2020  EXAMINATION: ONE XRAY VIEW OF THE CHEST 11/17/2020 5:58 pm COMPARISON: November 16, 2020. HISTORY: ORDERING SYSTEM PROVIDED HISTORY: SOB TECHNOLOGIST PROVIDED HISTORY: Reason for exam:->SOB What reading provider will be dictating this exam?->CRC FINDINGS: There are bilateral patchy infiltrates. Left IJ line catheter tip in the region of superior vena cava. Tracheostomy to, unchanged. The heart is mildly enlarged. There is blunting of the left costophrenic angle. Bilateral patchy infiltrates, no significant interval change.     Xr Chest Portable    Result Date: 11/16/2020  EXAMINATION: ONE XRAY VIEW OF THE CHEST 11/16/2020 9:04 am COMPARISON: 11/15/2020 HISTORY: ORDERING SYSTEM PROVIDED HISTORY: SOB TECHNOLOGIST PROVIDED HISTORY: Reason for exam:->SOB What reading provider will be dictating this exam?->CRC FINDINGS: A right parahilar pulmonary opacity is seen, which allowing for the differences in the degree of inspiratory effort, are likely stable as of yesterday. In the lower left lung is suboptimally visualized due to underpenetration. Within this limitation, no definite left lung opacity is seen. No pneumothorax or pleural effusion is seen. The cardiac silhouette is enlarged, which may be in part or entirely due to technique. The tracheostomy tube appears grossly well positioned. The left internal jugular vein central venous catheter is well positioned, with the tip overlying the upper SVC. Grossly stable right parahilar pulmonary opacity which may represent atelectasis or pneumonia. The life-support lines and tubes are well positioned. Xr Chest Portable    Result Date: 11/15/2020  EXAMINATION: ONE XRAY VIEW OF THE CHEST 11/15/2020 7:52 am COMPARISON: 11/14/2020 HISTORY: ORDERING SYSTEM PROVIDED HISTORY: SOB TECHNOLOGIST PROVIDED HISTORY: Reason for exam:->SOB What reading provider will be dictating this exam?->CRC FINDINGS: Tracheostomy tube appears unchanged. Left internal jugular central venous catheter with catheter tip not well visualized but possibly in the central left brachiocephalic vein or SVC. Stable cardiomediastinal silhouette with persistent bilateral airspace opacities, right worse than left. Possible small left pleural effusion. No pneumothorax. Stable bilateral airspace opacities, right worse than left. Xr Chest Portable    Result Date: 11/14/2020  EXAMINATION: ONE XRAY VIEW OF THE CHEST 11/14/2020 7:54 am COMPARISON: Comparison November 11-13 HISTORY: ORDERING SYSTEM PROVIDED HISTORY: SOB TECHNOLOGIST PROVIDED HISTORY: Reason for exam:->SOB What reading provider will be dictating this exam?->CRC FINDINGS: Tracheostomy tube in good position. Heart appears to be mildly enlarged.   The Bilateral infiltrates observed predominant in the right lung as seen previously, more limited to the base on exam:->ett placement post surgery What reading provider will be dictating this exam?->CRC FINDINGS: Medical support devices: Endotracheal tube terminates in the midthoracic trachea. Left internal jugular central venous catheter with distal tip projecting near the junction of the brachiocephalic veins. Lungs: Persistent low lung volumes with ill-defined perihilar opacities. Retrocardiac and left basilar opacity persist.  No obvious pneumothorax. Cardiomediastinal silhouette and pulmonary vascularity: Atherosclerotic disease in the aortic arch. Cardiac silhouette appears mildly prominent. Central interstitial edema noted. Osseous and soft tissue structures: No acute findings. 1.  Medical support devices as above. 2.  Persistent atherosclerotic disease, cardiomegaly, and central interstitial edema. 3.  Persistent retrocardiac and left basilar opacity. Xr Chest Portable    Result Date: 11/11/2020  EXAMINATION: ONE XRAY VIEW OF THE CHEST 11/11/2020 8:17 am COMPARISON: 11/10/2020 HISTORY: ORDERING SYSTEM PROVIDED HISTORY: SOB TECHNOLOGIST PROVIDED HISTORY: Reason for exam:->SOB What reading provider will be dictating this exam?->CRC FINDINGS: Left lower lobe infiltrate and small effusion are unchanged. Right basilar infiltrate medially is unchanged. Lines/tubes are appropriate. No interval change     Xr Chest Portable    Result Date: 11/10/2020  EXAMINATION: ONE XRAY VIEW OF THE CHEST 11/10/2020 7:49 am COMPARISON: 11/09/2020 HISTORY: ORDERING SYSTEM PROVIDED HISTORY: SOB TECHNOLOGIST PROVIDED HISTORY: Reason for exam:->SOB What reading provider will be dictating this exam?->CRC FINDINGS: Right lower lobe infiltrate is unchanged. There is some worsening of left lower lobe infiltrate. ET and left-sided central line are appropriate.  Upper lobes are normal.     Some worsening of left lower lobe infiltrate     Xr Chest Portable    Result Date: 11/9/2020  EXAMINATION: ONE XRAY VIEW OF THE CHEST 11/9/2020 8:17 am COMPARISON: 11/08/2020 HISTORY: ORDERING SYSTEM PROVIDED HISTORY: SOB TECHNOLOGIST PROVIDED HISTORY: Reason for exam:->SOB What reading provider will be dictating this exam?->CRC FINDINGS: Multifocal bilateral pulmonary infiltrates are noted more prominent within the right lower lobe. The endotracheal tube and left internal jugular central venous catheter are unchanged in position. There is an NG tube within the stomach. 1. Multifocal bilateral pulmonary infiltrates slightly improved when compared with the patient's prior study of 11/08/2020. Xr Chest Portable    Result Date: 11/8/2020  EXAMINATION: ONE XRAY VIEW OF THE CHEST 11/8/2020 12:48 pm COMPARISON: Chest radiograph 5 hours prior HISTORY: ORDERING SYSTEM PROVIDED HISTORY: intubated, verification of tube placement TECHNOLOGIST PROVIDED HISTORY: Reason for exam:->intubated, verification of tube placement What reading provider will be dictating this exam?->CRC FINDINGS: Endotracheal tube terminates approximately 3 cm from the ellie. Left IJ central venous catheter terminates in SVC. The enteric feeding tube is partially visualized in the stomach. The cardiomediastinal silhouette is stable in size. There is worsening of the right sided airspace opacities. Mild improvement in left lower lung airspace opacity likely representing atelectasis. No pneumothorax. Endotracheal tube terminates approximately 3 cm from the ellie. New right-sided airspace opacities likely representing atelectasis. Mild interval improvement in left lower lung airspace opacity. Xr Chest Portable    Result Date: 11/8/2020  EXAMINATION: ONE XRAY VIEW OF THE CHEST 11/8/2020 7:37 am COMPARISON: 11/07/2020 HISTORY: ORDERING SYSTEM PROVIDED HISTORY: SOB TECHNOLOGIST PROVIDED HISTORY: Reason for exam:->SOB What reading provider will be dictating this exam?->CRC FINDINGS: The endotracheal tube, nasogastric tube, and left IJ catheter are stable. Stable cardiac silhouette. dictating this exam?->CRC FINDINGS: Lines and tubes are stable. Heart size and mediastinal contours are unchanged. The lungs are stable. No change. Xr Chest Portable    Result Date: 11/5/2020  EXAMINATION: ONE XRAY VIEW OF THE CHEST 11/5/2020 8:09 am COMPARISON: 11/04/2020 HISTORY: ORDERING SYSTEM PROVIDED HISTORY: SOB TECHNOLOGIST PROVIDED HISTORY: Reason for exam:->SOB What reading provider will be dictating this exam?->CRC FINDINGS: Lines and tubes are stable. Heart size and mediastinal contours are unchanged. The lungs are stable. No change. Xr Chest Portable    Result Date: 11/4/2020  EXAMINATION: ONE XRAY VIEW OF THE CHEST 11/4/2020 8:02 am COMPARISON: 11/03/2020 HISTORY: ORDERING SYSTEM PROVIDED HISTORY: SOB TECHNOLOGIST PROVIDED HISTORY: Reason for exam:->SOB What reading provider will be dictating this exam?->CRC FINDINGS: Heart is enlarged. There are significant bilateral infiltrates worse in the left lower lobe. These appear unchanged. There may be a small left effusion. Lines/tubes are appropriate. No interval change     Xr Chest Portable    Result Date: 11/3/2020  EXAMINATION: ONE XRAY VIEW OF THE CHEST 11/3/2020 8:05 am COMPARISON: None. HISTORY: ORDERING SYSTEM PROVIDED HISTORY: SOB TECHNOLOGIST PROVIDED HISTORY: Reason for exam:->SOB What reading provider will be dictating this exam?->CRC FINDINGS: The heart is mildly enlarged. Multifocal bilateral pulmonary infiltrates are noted more prominent within the right lung. There is stable position of the endotracheal tube and NG tube. There is no pneumothorax. Multifocal bilateral pulmonary infiltrates more prominent within the right lung. Overall there is no interval change when compared with the prior study of 1 day earlier.      Xr Chest Portable    Result Date: 11/3/2020  EXAMINATION: ONE XRAY VIEW OF THE CHEST 11/3/2020 8:43 am COMPARISON: 3 November 2020 HISTORY: ORDERING SYSTEM PROVIDED HISTORY: s/p ETT exchange TECHNOLOGIST PROVIDED HISTORY: Reason for exam:->s/p ETT exchange What reading provider will be dictating this exam?->CRC FINDINGS: Stable right-sided central venous catheter, endotracheal and nasogastric tubes. Opacity in the right lung is minimally progressive and may relate to any combination of atelectasis and/or infiltrate. Aeration on the left is stable. Mildly worsening aeration on the right which may relate any combination of infiltrate and/or atelectasis. Stable findings on the left. Xr Chest Portable    Result Date: 11/2/2020  EXAMINATION: ONE XRAY VIEW OF THE CHEST 11/2/2020 7:48 am COMPARISON: 11/01/2020 HISTORY: ORDERING SYSTEM PROVIDED HISTORY: SOB TECHNOLOGIST PROVIDED HISTORY: Reason for exam:->SOB What reading provider will be dictating this exam?->CRC FINDINGS: Significant left lower lobe infiltrate and pleural effusion are unchanged. Right basilar atelectasis/infiltrate appears mildly worse. Lines/tubes are appropriate. Slight worsening of right basilar atelectasis/infiltrate     Xr Chest Portable    Result Date: 11/1/2020  EXAMINATION: ONE XRAY VIEW OF THE CHEST 11/1/2020 7:39 am COMPARISON: 10/31/2020 HISTORY: ORDERING SYSTEM PROVIDED HISTORY: SOB TECHNOLOGIST PROVIDED HISTORY: Reason for exam:->SOB What reading provider will be dictating this exam?->CRC FINDINGS: Cardiac mediastinal silhouettes appear similar to the previous examination. Persistent left basilar consolidation and pleural effusion, with multifocal infiltrates seen throughout the remainder of the lungs bilaterally, with slightly decreased consolidation in the right lung base. No pneumothorax is identified. Enteric catheter and right central venous catheter are again identified, not significantly changed.      Left basilar consolidation and pleural effusion, with multifocal infiltrates seen throughout the remainder of the lungs, similar to the previous examination with exception of mild improved consolidation at the right lung base. Ir Tunneled Cvc Place Wo Sq Port/pump > 5 Years    Result Date: 2020  Patient MRN:  [id number] : 1960 Age: 61 years Gender: Male Order Date:  2020 7:45 AM EXAM: IR VENOGRAM UPPER EXTREMITY RIGHT, IR SPARKLE CATH PLACE VENOUS 2ND+ ORDER, IR TUNNELED CVC PLACE WO SQ PORT/PUMP > 5 YEARS, IR ULTRASOUND GUIDANCE VASCULAR ACCESS, IR FLUORO GUIDED CVA DEVICE PLMT/REPLACE/REMOVAL NUMBER OF IMAGES:  14 INDICATION: Z79.2 Long term (current) use of antibiotics Long term (current) use of antibiotics What reading provider will be dictating this exam?->[facility] COMPARISON: None FLUORO TIME: 00:04:26 DAP: 1965.9 uGym2 AK: 77.1 mGy PICC Catheter Placement and Upper Extremity Venous Ultrasound, Procedure: After obtaining informed consent, and following the routine sterile preparation and drape, the right basilic vein was accessed with ultrasound guidance. IV contrast was injected which demonstrated extravasation into the soft tissue. The decision was then made to place a right tunneled IJ PICC. Using direct real-time ultrasound imaging vascular access was obtained to the right IJ. An image was stored and copy was transmitted to PACS. Subsequently with real-time guidance a needle was inserted intravascular and through the needle a wire. A subcutaneous tunnel was then created along the right upper chest. Subsequently under fluoroscopic guidance, a system of needles, catheters and guidewires were utilized to place a guidewire and catheter in the venous system and the catheter was advanced and appropriately placed at the level of the junction of the superior vena cava and right atrium. Time out occurred at 08:33 AM. Patient received 4 minutes and 26 seconds of fluoroscopy. A fluoroscopic image of the position of the catheter tip was saved in PACS. Successful uncomplicated placement of a right IJ tunneled PICC catheter.     Ir Sparkle Cath Place Venous 2nd+ Order    Result Date: 2020  Patient MRN: [id number] : 1960 Age: 61 years Gender: Male Order Date:  2020 7:45 AM EXAM: IR VENOGRAM UPPER EXTREMITY RIGHT, IR SPARKLE CATH PLACE VENOUS 2ND+ ORDER, IR TUNNELED CVC PLACE WO SQ PORT/PUMP > 5 YEARS, IR ULTRASOUND GUIDANCE VASCULAR ACCESS, IR FLUORO GUIDED CVA DEVICE PLMT/REPLACE/REMOVAL NUMBER OF IMAGES:  14 INDICATION: Z79.2 Long term (current) use of antibiotics Long term (current) use of antibiotics What reading provider will be dictating this exam?->[facility] COMPARISON: None FLUORO TIME: 00:04:26 DAP: 1965.9 uGym2 AK: 77.1 mGy PICC Catheter Placement and Upper Extremity Venous Ultrasound, Procedure: After obtaining informed consent, and following the routine sterile preparation and drape, the right basilic vein was accessed with ultrasound guidance. IV contrast was injected which demonstrated extravasation into the soft tissue. The decision was then made to place a right tunneled IJ PICC. Using direct real-time ultrasound imaging vascular access was obtained to the right IJ. An image was stored and copy was transmitted to PACS. Subsequently with real-time guidance a needle was inserted intravascular and through the needle a wire. A subcutaneous tunnel was then created along the right upper chest. Subsequently under fluoroscopic guidance, a system of needles, catheters and guidewires were utilized to place a guidewire and catheter in the venous system and the catheter was advanced and appropriately placed at the level of the junction of the superior vena cava and right atrium. Time out occurred at 08:33 AM. Patient received 4 minutes and 26 seconds of fluoroscopy. A fluoroscopic image of the position of the catheter tip was saved in PACS. Successful uncomplicated placement of a right IJ tunneled PICC catheter.     Xr Abdomen For Ng/og/ne Tube Placement    Result Date: 2020  EXAMINATION: ONE SUPINE XRAY VIEW(S) OF THE ABDOMEN 2020 9:40 pm COMPARISON: 2020 HISTORY: ORDERING SYSTEM PROVIDED HISTORY: OG placement TECHNOLOGIST PROVIDED HISTORY: Reason for exam:->OG placement Portable? ->Yes What reading provider will be dictating this exam?->[facility] FINDINGS: OG tube tip in the body of the stomach. The upper abdomen is unremarkable. No gross free air. Of OG tube tip in the body of the stomach. Xr Abdomen For Ng/og/ne Tube Placement    Result Date: 2020  EXAMINATION: ONE SUPINE XRAY VIEW(S) OF THE ABDOMEN 2020 8:27 pm COMPARISON: None. HISTORY: ORDERING SYSTEM PROVIDED HISTORY: Confirmation of course of NG/OG/NE tube and location of tip of tube TECHNOLOGIST PROVIDED HISTORY: Reason for exam:->Confirmation of course of NG/OG/NE tube and location of tip of tube Portable? ->Yes What reading provider will be dictating this exam?->[facility] FINDINGS: Nasogastric/orogastric tube tip in the fundus of the stomach. Nonobstructive bowel gas pattern. No gross free air. Nasogastric/orogastric tube tip in the fundus of the stomach. Xr Chest Abdomen Ng Placement    Result Date: 2020  EXAMINATION: ONE SUPINE XRAY VIEW(S) OF THE ABDOMEN 2020 1:01 pm COMPARISON: 2020 HISTORY: ORDERING SYSTEM PROVIDED HISTORY: Encounter for nasogastric (NG) tube placement TECHNOLOGIST PROVIDED HISTORY: Reason for exam:->Encounter for nasogastric (NG) tube placement What reading provider will be dictating this exam?->[facility] FINDINGS: The nasogastric tube terminates in the gastric body. There is a nonobstructive bowel gas pattern. The lung bases are clear. Degenerative changes involve the lumbar spine. 1. Nasogastric tube terminating in the gastric body.      Ir Ultrasound Guidance Vascular Access    Result Date: 2020  Patient MRN:  [id number] : 1960 Age: 61 years Gender: Male Order Date:  2020 7:45 AM EXAM: IR VENOGRAM UPPER EXTREMITY RIGHT, IR SPARKLE CATH PLACE VENOUS 2ND+ ORDER, IR TUNNELED CVC PLACE WO SQ PORT/PUMP > 5 YEARS, IR ULTRASOUND GUIDANCE VASCULAR ACCESS, IR FLUORO GUIDED CVA DEVICE PLMT/REPLACE/REMOVAL NUMBER OF IMAGES:  14 INDICATION: Z79.2 Long term (current) use of antibiotics Long term (current) use of antibiotics What reading provider will be dictating this exam?->[facility] COMPARISON: None FLUORO TIME: 00:04:26 DAP: 1965.9 uGym2 AK: 77.1 mGy PICC Catheter Placement and Upper Extremity Venous Ultrasound, Procedure: After obtaining informed consent, and following the routine sterile preparation and drape, the right basilic vein was accessed with ultrasound guidance. IV contrast was injected which demonstrated extravasation into the soft tissue. The decision was then made to place a right tunneled IJ PICC. Using direct real-time ultrasound imaging vascular access was obtained to the right IJ. An image was stored and copy was transmitted to PACS. Subsequently with real-time guidance a needle was inserted intravascular and through the needle a wire. A subcutaneous tunnel was then created along the right upper chest. Subsequently under fluoroscopic guidance, a system of needles, catheters and guidewires were utilized to place a guidewire and catheter in the venous system and the catheter was advanced and appropriately placed at the level of the junction of the superior vena cava and right atrium. Time out occurred at 08:33 AM. Patient received 4 minutes and 26 seconds of fluoroscopy. A fluoroscopic image of the position of the catheter tip was saved in PACS. Successful uncomplicated placement of a right IJ tunneled PICC catheter.     Us Dup Lower Extremities Bilateral Venous    Result Date: 11/10/2020  Patient MRN:  [id number] : 1960 Age: 61 years Gender: Male Order Date:  11/10/2020 2:55 PM EXAM: US DUP LOWER EXTREMITIES BILATERAL VENOUS NUMBER OF IMAGES:  62 INDICATION:  r/o DVT r/o DVT What reading provider will be dictating this exam?->[facility] COMPARISON: 10/29/2020 Within the visualized vessels, there is no evidence for deep venous thrombosis There is good compressibility, there is good augmentation, there is good color flow.      Within the visualized vessels there is no evidence for deep venous thrombosis       DIET:  Diet NPO, After Midnight Exceptions are: Sips with Meds    Medications:    Scheduled Meds:   linezolid  600 mg Per G Tube 2 times per day    vancomycin  1,500 mg Intravenous Q12H    lansoprazole  30 mg Per G Tube QAM AC    fluconazole  200 mg Oral Daily    nystatin  5 mL Oral 4x Daily    vitamin B-1  100 mg PEG Tube Daily    carvedilol  6.25 mg Oral BID WC    hydrALAZINE  25 mg PEG Tube 3 times per day    divalproex  250 mg Oral 3 times per day    heparin flush  3 mL Intravenous 2 times per day    senna  2 tablet Oral Nightly    folic acid  1 mg Intravenous Daily    sodium chloride (Inhalant)  2 mL Nebulization Q4H    heparin (porcine)  7,500 Units Subcutaneous Q8H    sodium chloride flush  10 mL Intravenous 2 times per day    Arformoterol Tartrate  15 mcg Nebulization BID    chlorhexidine  15 mL Mouth/Throat BID    amLODIPine  10 mg Oral Daily    [Held by provider] aspirin  81 mg Oral Daily    levothyroxine  50 mcg Oral Daily    budesonide  0.5 mg Nebulization BID    ipratropium-albuterol  1 ampule Inhalation Q4H WA       Continuous Infusions:   sodium chloride Stopped (11/23/20 1638)    dextrose         PRN Meds:mineral oil-hydrophilic petrolatum, HYDROcodone 5 mg - acetaminophen, labetalol, trimethobenzamide, midazolam, sodium chloride flush, heparin flush, polyethylene glycol, perflutren lipid microspheres, acetaminophen, polyvinyl alcohol, hydrALAZINE, sodium chloride flush, [DISCONTINUED] acetaminophen **OR** acetaminophen, colchicine, glucose, dextrose, glucagon (rDNA), dextrose    A/P:      Patient Active Problem List   Diagnosis    Hyperlipidemia    Type 2 diabetes mellitus without complication, without long-term current use of insulin (HCC)    Atypical chest pain    Essential hypertension    Chronic acquired lymphedema    Aortic valve disease    Morbid obesity due to excess calories (HCC)    Abdominal pain    Acute respiratory failure with hypoxia (HCC)    Acute pulmonary edema (HCC)    Congestive heart failure with LV diastolic dysfunction, NYHA class 3 (HCC)    Obstructive sleep apnea    Acquired hypothyroidism    Chronic diastolic heart failure (HCC)    Nonrheumatic aortic valve stenosis    ACE-inhibitor cough    Pneumonia due to organism    Acute gout of right knee   ·   Acute on chronic hypoxic respiratory failure likely 2/2 pneumonia and volume overload  - resolving   · Bacterial pneumonia, likely aspiration pneumonia but after extubation patient had to be reintubated rule out HCAP  · Shock, sepsis- resolved  · Alcohol withdrawal   · Leukocytosis improving -   · MRSA in his sputum. · Thrush -probable esophagitis        Plan:    · Vancomycin  1500 mg IV q 12 hrs ( pharmacy following )        Zyvox for the MRSA in the sputum and right lung pneumonia  · Diflucan & nystatin for thrush.  is  · AARON ordered -   · CT abdomen and pelvis for abdomen pain (no contrast -NANCY just resolved )   Follow up blood cx

## 2020-11-30 NOTE — ANESTHESIA PRE PROCEDURE
Department of Anesthesiology  Preprocedure Note       Name:  Earnest Moffett   Age:  61 y.o.  :  1960                                          MRN:  [id number]         Date:  2020      Surgeon: Eliot Reynolds  Procedure: AARON  Medications prior to admission:   Prior to Admission medications    Medication Sig Start Date End Date Taking?  Authorizing Provider   fluconazole (DIFLUCAN) 200 MG tablet Take 1 tablet by mouth daily for 14 days 12/1/20 12/15/20 Yes Kali Long MD   nystatin (MYCOSTATIN) 205632 UNIT/ML suspension Take 5 mLs by mouth 4 times daily for 11 days 20 Yes Kali Long MD   linezolid (ZYVOX) 100 MG/5ML suspension 30 mLs by Per G Tube route every 12 hours for 16 days 20 Yes Kali Long MD   carvedilol (COREG) 25 MG tablet Take 0.5 tablets by mouth 2 times daily 20   Justin Mendoza MD   olmesartan (BENICAR) 20 MG tablet Take 1 tablet by mouth daily 20   Justin Mendoza MD   losartan (COZAAR) 100 MG tablet Take 1 tablet by mouth daily 20   Justin Mendoza MD   amLODIPine (NORVASC) 10 MG tablet Take 1 tablet by mouth daily 20   Justin Mendoza MD   metFORMIN (GLUCOPHAGE-XR) 500 MG extended release tablet TAKE 1 TABLET BY MOUTH EVERY DAY WITH BREAKFAST 20   Justin Mendoza MD   levothyroxine (SYNTHROID) 50 MCG tablet TAKE 1 TABLET BY MOUTH EVERY DAY 20   Justin Mendoza MD   atorvastatin (LIPITOR) 40 MG tablet Take 1 tablet by mouth daily 20   Justin Mendoza MD   omeprazole (PRILOSEC) 40 MG delayed release capsule TAKE 1 CAPSULE BY MOUTH EVERY DAY 19   Justin Mendoza MD   ondansetron (ZOFRAN-ODT) 4 MG disintegrating tablet Take 1 tablet by mouth every 8 hours as needed for Nausea Ok to substitute for standard Zofran non ODT if cost prohibitive 19   Crystal Blanchard MD   aspirin 81 MG tablet Take 81 mg by mouth daily    Historical Provider, MD   fluticasone-salmeterol (ADVAIR DISKUS) 100-50 MCG/DOSE diskus inhaler Inhale 1 puff into the lungs every 12 hours 5/31/19   Lindsay Noble MD   colchicine (COLCRYS) 0.6 MG tablet Take 1 tablet by mouth 2 times daily as needed for Pain 5/6/19   Lindsay Noble MD   Handicap Placard MISC by Does not apply route For 5 years 10/3/18   Lindsay Noble MD   Compression Stockings MISC Chronic left leg swelling. 2/21/17   Shakila Gill MD       Current medications:    Current Facility-Administered Medications   Medication Dose Route Frequency Provider Last Rate Last Dose    linezolid (ZYVOX) 100 MG/5ML suspension 600 mg  600 mg Per G Tube 2 times per day [place] Dura, APRN - CNP   600 mg at 11/30/20 0908    lansoprazole suspension SUSP 30 mg  30 mg [facility] G [facility] Sci[facility], RPH   30 mg at 11/30/20 0530    fluconazole (DIFLUCAN) tablet 200 mg  200 mg Oral Daily Juan Carlos Dura, APRN - CNP   200 mg at 11/30/20 4820    nystatin (MYCOSTATIN) 026236 UNIT/ML suspension 500,000 Units  5 mL Oral 4x Daily Juan Carlos Dura, APRN - CNP   500,000 Units at 11/30/20 1339    vitamin B-1 (THIAMINE) tablet 100 mg  100 mg PEG Tube Daily Lindsay Noble MD   100 mg at 11/30/20 0904    mineral oil-hydrophilic petrolatum (HYDROPHOR) ointment   Topical BID PRN Lindsay Noble MD        0.9 % sodium chloride infusion   Intravenous Continuous James Box MD   Stopped at 11/23/20 1638    carvedilol (COREG) tablet 6.25 mg  6.25 mg Oral BID  Lindsay Noble MD   6.25 mg at 11/30/20 0904    hydrALAZINE (APRESOLINE) tablet 25 mg  25 mg PEG Tube 3 times per day Lindsay Noble MD   25 mg at 11/30/20 1339    HYDROcodone-acetaminophen (NORCO) 5-325 MG per tablet 1 tablet  1 tablet Oral Q6H PRN Lindsay Noble MD   1 tablet at 11/30/20 0933    labetalol (NORMODYNE;TRANDATE) injection 10 mg  10 mg Intravenous Q4H PRN Lindsay Noble MD   10 mg at 11/21/20 0413    trimethobenzamide (TIGAN) injection 200 mg  200 mg Intramuscular Q6H PRN Lindsay Noble MD   200 mg at 11/16/20 2049    midazolam (VERSED) injection 2 mg  2 mg Intravenous Q4H PRN [facility] DO   2 mg at 11/28/20 2152    divalproex (DEPAKOTE SPRINKLE) capsule 250 mg  250 mg Oral 3 times per day [facility] DO   250 mg at 11/30/20 1339    sodium chloride flush 0.9 % injection 10 mL  10 mL Intravenous PRN Herlene Gross Jr., DO   10 mL at 11/25/20 1213    heparin flush 100 UNIT/ML injection 300 Units  3 mL Intravenous 2 times per day [facility] DO   300 Units at 11/30/20 3984    heparin flush 100 UNIT/ML injection 300 Units  3 mL Intracatheter PRN [facility] DO   300 Units at 11/25/20 1213    senna (SENOKOT) tablet 17.2 mg  2 tablet Oral Nightly [facility] DO   17.2 mg at 11/29/20 2117    polyethylene glycol (GLYCOLAX) packet 17 g  17 g Oral Daily PRN [facility] DO        perflutren lipid microspheres (DEFINITY) injection 1.65 mg  1.5 mL Intravenous ONCE PRN [facility] DO        acetaminophen (TYLENOL) 160 MG/5ML solution 650 mg  650 mg Oral Q6H PRN [facility] DO   650 mg at 26/75/03 5167    folic acid injection 1 mg  1 mg Intravenous Daily [facility] DO   1 mg at 11/30/20 7923    polyvinyl alcohol (LIQUIFILM TEARS) 1.4 % ophthalmic solution 1 drop  1 drop Both Eyes Q4H PRN [facility] DO   1 drop at 11/02/20 1526    sodium chloride (Inhalant) 3 % nebulizer solution 2 mL  2 mL Nebulization Q4H [facility] DO   2 mL at 11/30/20 1216    hydrALAZINE (APRESOLINE) injection 10 mg  10 mg Intravenous Q4H PRN Herlene Gross ., DO   10 mg at 11/25/20 1651    heparin (porcine) injection 7,500 Units  7,500 Units Subcutaneous Q8H [facility] DO   7,500 Units at 11/30/20 4284    sodium chloride flush 0.9 % injection 10 mL  10 mL Intravenous 2 times per day Bruna Rudd., DO   10 mL at 11/30/20 0872    sodium chloride flush 0.9 % injection 10 mL  10 mL Intravenous PRN Bruna Rudd., DO   10 mL at 11/26/20 1442    acetaminophen (TYLENOL) suppository 650 mg  650 mg Rectal Q6H PRN Chandler Obie., DO        Arformoterol Tartrate [facility]) nebulizer solution 15 mcg  15 mcg Nebulization BID Azul Norwood Jr., DO   15 mcg at 11/30/20 0806    chlorhexidine (PERIDEX) 0.12 % solution 15 mL  15 mL Mouth/Throat BID Debe Filter Joetta Habermann., DO   15 mL at 11/30/20 5605    amLODIPine (NORVASC) tablet 10 mg  10 mg Oral Daily Debe Filter Joetta Habermann., DO   10 mg at 11/30/20 2564    [Held by provider] aspirin chewable tablet 81 mg  81 mg Oral Daily Chandler Ragland., DO   81 mg at 11/16/20 6267    colchicine (COLCRYS) tablet 0.6 mg  0.6 mg Oral BID PRN Azul Norwood Jr., DO   0.6 mg at 11/23/20 0900    levothyroxine (SYNTHROID) tablet 50 mcg  50 mcg Oral Daily Debe Filter Joetta Habermann., DO   50 mcg at 11/30/20 0530    budesonide (PULMICORT) nebulizer suspension 500 mcg  0.5 mg Nebulization BID Azul Norwood Jr., DO   500 mcg at 11/30/20 8372    ipratropium-albuterol (DUONEB) nebulizer solution 1 ampule  1 ampule Inhalation Q4H [place] Azul Norwood Jr., DO   1 ampule at 11/30/20 1216    glucose (GLUTOSE) 40 % oral gel 15 g  15 g Oral PRN Azul Norwood Jr., DO        dextrose 50 % IV solution  12.5 g Intravenous PRN Azul Norwood Jr., DO   25 g at 11/26/20 9230    glucagon (rDNA) injection 1 mg  1 mg Intramuscular PRN [place]., DO        dextrose 5 % solution  100 mL/hr Intravenous PRN [place]., DO           Allergies:     Allergies   Allergen Reactions    Bee Venom Anaphylaxis    Shellfish-Derived Products Anaphylaxis     Only crab legs       Problem List:    Patient Active Problem List   Diagnosis Code    Hyperlipidemia E78.5    Type 2 diabetes mellitus without complication, without long-term current use of insulin (HCC) E11.9    Atypical chest pain R07.89    Essential hypertension I10    Chronic acquired lymphedema I89.0    Aortic valve disease I35.9    Morbid obesity due to excess calories ([facility]) E66.01    Abdominal pain R10.9    Acute respiratory failure with hypoxia (HCC) J96.01    Acute pulmonary edema (HCC) J81.0    Congestive heart failure with LV diastolic dysfunction, NYHA class 3 (HCC) I50.30    Obstructive sleep apnea G47.33    Acquired hypothyroidism E03.9    Chronic diastolic heart failure (HCC) I50.32    Nonrheumatic aortic valve stenosis I35.0    ACE-inhibitor cough R05, T46.4X5A    Pneumonia due to organism J18.9    Acute gout of right knee M10.9       Past Medical History:        Diagnosis Date    Acquired hypothyroidism 9/26/2017    Anxiety     Congestive heart failure with LV diastolic dysfunction, NYHA class 3 (HCC)     COPD (chronic obstructive pulmonary disease) ([facility])     Depression     DM (diabetes mellitus) ([address]     Essential hypertension 6/1/2016    Gouty arthritis 2006    Hyperlipidemia     Hypertension     Lymphedema     Obesity     Obstructive sleep apnea 8/17/2009    Obstructive sleep apnea 9/26/2017    Osteoarthritis     Type 2 diabetes mellitus without complication, without long-term current use of insulin ([facility] [address] 1/15/2014       Past Surgical History:        Procedure Laterality Date    BRONCHOSCOPY  08/10/2017    ECHO COMPL W DOP COLOR FLOW  6/21/2013         EXTERNAL EAR SURGERY  6/2/2009    keloid left ear    FOOT SURGERY  1990    Left foot    GASTROSTOMY TUBE PLACEMENT  08/10/2017    IR TUNNELED CATHETER PLACEMENT GREATER THAN 5 YEARS  11/19/2020    IR TUNNELED CATHETER PLACEMENT GREATER THAN 5 YEARS 11/19/2020 Katia Patel MD [facility]    TRACHEOSTOMY N/A 11/11/2020    TRACHEOTOMY performed by Jean Paul Troncoso MD at [address] [place] [address] N/A 11/12/2020    OPEN TRACHEOTOMY, BRONCHOSCOPY, DIRECT LARYNGOSCOPY performed by Alexei Rasmussen DO at [facility]  08/10/2017    UPPER GASTROINTESTINAL ENDOSCOPY N/A 11/11/2020    EGD ESOPHAGOGASTRODUODENOSCOPY PEG TUBE INSERTION performed by Jean Paul Troncoso MD at [address] [facility] [employer] History:    Social History     Tobacco Use    Smoking status: Former Smoker     Packs/day: 0.10     Years: 10.00     Pack years: 1.00     Types: Cigarettes     Last attempt to quit: 2/19/2005     Years since quitting: 15.7    Smokeless tobacco: Former User     Types: Chew     Quit date: 1/1/1990   Substance Use Topics    Alcohol use: Yes     Frequency: 2-3 times a week     Drinks per session: 1 or 2     Binge frequency: Never     Comment: socially                                Counseling given: Not Answered      Vital Signs (Current):   Vitals:    11/30/20 0520 11/30/20 0531 11/30/20 0756 11/30/20 1334   BP: 139/74  (!) 155/70 (!) 143/75   Pulse: 91  90 79   Resp: 20  22 16   Temp: 36.7 °C (98 °F)  36.1 °C (97 °F) 36.6 °C (97.8 °F)   TempSrc: Temporal  Temporal    SpO2:   95% 95%   Weight:  (!) 335 lb 1.6 oz (152 kg)     Height:                                                  BP Readings from Last 3 Encounters:   11/30/20 (!) 143/75   11/12/20 114/66   11/11/20 131/71       NPO Status:  > 12 hours                                                                                BMI:   Wt Readings from Last 3 Encounters:   11/30/20 (!) 335 lb 1.6 oz (152 kg)   06/15/20 (!) 326 lb (147.9 kg)   03/02/20 (!) 322 lb (146.1 kg)     Body mass index is 50.95 kg/m².     CBC:   Lab Results   Component Value Date    WBC 10.9 11/30/2020    RBC 2.81 11/30/2020    HGB 7.3 11/30/2020    HCT 22.4 11/30/2020    MCV 79.7 11/30/2020    RDW 19.6 11/30/2020     11/30/2020       CMP:   Lab Results   Component Value Date     11/30/2020    K 4.4 11/30/2020    K 4.0 10/27/2020    CL 99 11/30/2020    CO2 32 11/30/2020    BUN 15 11/30/2020    CREATININE 1.0 11/30/2020    GFRAA >60 11/30/2020    LABGLOM >60 11/30/2020    GLUCOSE 121 11/30/2020    PROT 7.1 11/30/2020    CALCIUM 9.3 11/30/2020    BILITOT <0.2 11/30/2020    ALKPHOS 85 11/30/2020    AST 13 11/30/2020

## 2020-11-30 NOTE — ANESTHESIA POSTPROCEDURE EVALUATION
Department of Anesthesiology  Postprocedure Note    Patient: Rosemary Martino  MRN: [id number]  YOB: 1960  Date of evaluation: 11/30/2020  Time:  5:06 PM     Procedure Summary     Date:  11/30/20 Room / Location:  [facility]    Anesthesia Start:  6697 Anesthesia Stop:  8152    Procedure:  AARON (BEDSIDE) Diagnosis:      Scheduled Providers:   Responsible Provider:  Kelly Alvarado MD    Anesthesia Type:  MAC ASA Status:  4          Anesthesia Type: MAC    Erik Phase I:      Erik Phase II: Erik Score: 7    Last vitals: Reviewed and per EMR flowsheets.        Anesthesia Post Evaluation    Patient location during evaluation: PACU  Level of consciousness: sleepy but conscious  Airway patency: patent  Nausea & Vomiting: no vomiting and no nausea  Complications: no  Cardiovascular status: hemodynamically stable  Respiratory status: acceptable  Hydration status: stable

## 2020-11-30 NOTE — PROGRESS NOTES
CARDIOVASCULAR:  No chest pain, palpitations, orthopnea or dyspnea on exertion. GASTROINTESTINAL: abdomen pain   GENITOURINARY: Indwelling Zamorano  INTEGUMENT/BREAST:  No rash or breast masses. HEMATOLOGIC/LYMPHATIC:  No lymphadenopathy or blood dyscrasics. ENDOCRINE:  No polyuria or polydipsia or temperature intolerance.    MUSCULOSKELETAL:  No myalgia or arthralgia. NEUROLOGICAL:  weakness      OBJECTIVE:  BP (!) 155/70   Pulse 90   Temp 97 °F (36.1 °C)   Resp 22   Ht 5' 8\" (1.727 m)   Wt (!) 335 lb 1.6 oz (152 kg)   SpO2 95%   BMI 50.95 kg/m²   Temp  Av °F (36.7 °C)  Min: 97 °F (36.1 °C)  Max: 99.3 °F (37.4 °C)     Constitutional: Awake and alert, on the Vent , FiO2 40 % , PEEP 5   Skin: Warm and dry. No rashes were noted. HEENT: Np pallor, no thrush, tracheostomy OK   Chest:  Scattered rhonchi    Cardiovascular: S1 and S2 are rhythmic and regular. Systolic murmurs appreciated.    Abdomen: soft, mild distention, tender ,  PEG- OK,  FMS system-loose stool    Extremities:  + Lymphedema left lower extremity- improving   Lines: PICC line right chest      Laboratory and Tests Review:  Lab Results   Component Value Date    WBC 10.9 2020    WBC 10.5 2020    WBC 11.4 2020    HGB 7.3 (L) 2020    HCT 22.4 (L) 2020    MCV 79.7 (L) 2020     2020     Lab Results   Component Value Date    NEUTROABS 5.44 2020    NEUTROABS 5.81 2020    NEUTROABS 7.42 (H) 2020     No results found for: CRPHS  Lab Results   Component Value Date    ALT 30 2020    AST 13 2020    ALKPHOS 85 2020    BILITOT <0.2 2020     Lab Results   Component Value Date     2020    K 4.4 2020    K 4.0 10/27/2020    CL 99 2020    CO2 32 2020    BUN 15 2020    CREATININE 1.0 2020    CREATININE 1.1 2020    CREATININE 1.1 2020    GFRAA >60 2020    LABGLOM >60 2020    GLUCOSE 121 2020    PROT 7.1 11/30/2020    LABALBU 3.4 11/30/2020    CALCIUM 9.3 11/30/2020    BILITOT <0.2 11/30/2020    ALKPHOS 85 11/30/2020    AST 13 11/30/2020    ALT 30 11/30/2020     Lab Results   Component Value Date    CRP 1.4 (H) 11/02/2020    CRP 2.8 (H) 09/04/2017    CRP 10.5 (H) 08/28/2017     Lab Results   Component Value Date    SEDRATE 135 (H) 09/04/2017    SEDRATE 150 (H) 08/28/2017    SEDRATE 141 (H) 08/21/2017     Radiology: reviewed  CXR- 11/3- Multifocal bilateral pulmonary infiltrates more prominent within the right lung. CXR- 11/4- worsening b/l infiltrates, worse on left today with possible small L sided effusion   CXR- 11/5 - Stable b/l infiltrates   CXR- 11/6 - Expiratory film, image otherwise appears overall stable. CXR- 11/16 - stable right parahilar pulmonary opacity which may represent   atelectasis or pneumonia. CXR- 11/23/2020- bilateral infiltrates more prominent on the left. CXR 11/25/2020 - progressing consolidation bilaterally. Microbiology:   Lab Results   Component Value Date    [facility]  11/25/2020     Gram stain performed from blood culture bottle media  Gram positive cocci in [facility]  11/25/2020     No antibiotic susceptibility studies performed. Plates will be held 10  days. Contact the laboratory, [phone], if further workup is  desired.       BC 5 Days no growth 11/09/2020    ORG Staphylococcus coagulase-negative 11/25/2020    ORG Staphylococcus aureus 11/25/2020    ORG Staphylococcus aureus 10/29/2020     Lab Results   Component Value Date    BLOODCULT2 24 Hours no growth 11/25/2020    BLOODCULT2 5 Days no growth 11/09/2020    BLOODCULT2 5 Days no growth 10/29/2020    ORG Staphylococcus coagulase-negative 11/25/2020    ORG Staphylococcus aureus 11/25/2020    ORG Staphylococcus aureus 10/29/2020     No results found for: WNDABS  Smear, Respiratory   Date Value Ref Range Status   11/25/2020   Final    Group 5: >25 PMN's/LPF and <10 Epithelial cells/LPF  Abundant Polymorphonuclear leukocytes  Rare Epithelial cells  Abundant Gram positive diplococci  Abundant Gram positive cocci in clusters       No results found for: MPNEUMO, CLAMYDCU, LABLEGI, AFBCX, FUNGSM, LABFUNG  CULTURE, RESPIRATORY   Date Value Ref Range Status   11/25/2020 Oral Pharyngeal Tiesha absent (A)  Final   11/25/2020   Final    Heavy growth  Methicillin resistant Staph aureus isolated. Most Methicillin  resistant Staphylococcus are usually resistant to multiple  antibiotics including other B-Lactams, Aminoglycosides,  Macrolides, Clindamycin and Tetracycline. Contact isolation  is indicated. This isolate is presumed to be resistant based on the  detection of inducible Clindamycin resistance. Clindamycin  may still be effective in some patients. Culture Catheter Tip   Date Value Ref Range Status   08/26/2017 <15 colonies  Final     No results found for: BFCS  No results found for: CXSURG  Urine Culture, Routine   Date Value Ref Range Status   11/09/2020 Growth not present  Final   10/28/2020 Growth not present  Final   09/16/2019 Growth not present  Final     MRSA Culture Only   Date Value Ref Range Status   11/09/2020 Methicillin resistant Staph aureus not isolated  Final   10/30/2020 Methicillin resistant Staph aureus not isolated  Final   07/28/2017 Methicillin resistant Staph aureus not isolated  Final          Assessment:  · Acute on chronic hypoxic respiratory failure likely 2/2 pneumonia and volume overload  - resolving   · Bacterial pneumonia, likely aspiration pneumonia but after extubation patient had to be reintubated rule out HCAP  · Shock, sepsis- resolved  · Alcohol withdrawal   · Leukocytosis improving - 11.5 today  · CONS bacteremia   · MRSA in his sputum. · Thrush -probable esophagitis      Plan:    · Stop vancomycin         Zyvox for the MRSA in the sputum and right lung pneumonia  · Diflucan & nystatin for thrush.  is  · AARON ordered -   · CT abdomen and pelvis for abdomen pain (no contrast -NANCY just resolved )   · Follow up blood cx   · Contact isolation       Electronically signed by Aaliyah Roche MD on 11/30/2020 at 9:49 AM

## 2020-11-30 NOTE — CARE COORDINATION
Spoke to [address] with [address]. Therapy updates are posted. She is going to initiate precert today. Pt is scheduled for a bedside AARON today. IV Vanc, PO Diflucan and PO Zyvox continues. Vent FiO2 40%. Covid 11/28 (-). HENS and ambulance form on soft chart. CM to follow and plan transport when auth obtained.      Geoffrey Mendoza, RN  [facility] Case Management  [phone]

## 2020-11-30 NOTE — PROGRESS NOTES
Pharmacy Consultation Note  (Antibiotic Dosing and Monitoring)    Initial consult date: 11-  Consulting physician: RADHA Hodgson  Drug(s): vancomycin  Indication: PNA      Vancomycin has been discontinued and changed to linezolid  [address] to [address] Physician to re-consult pharmacy if future dosing is needed    Thank you for the consult,    Bianca Cui, PharmD, BCPS 11/30/2020 11:39 AM

## 2020-11-30 NOTE — PROGRESS NOTES
Pulmonary Progress Note  11/30/2020 2:52 PM  Subjective:   Admit Date: 10/26/2020  PCP: Adalid Philip MD  Interval History: Alert and follows commands. Denies SOB, cough. Patient on [facility] 12/480/40%/5 overnight as RRT states he becomes tachypneic with lower Vt. Will change back to PSV 10, PEEP 5, FiO2 40% this am. Complains of abd pain and nursing is giving norco, no high residuals with TF.     diet: Diet NPO, After Midnight Exceptions are: Sips with Meds    Data:   Scheduled Meds:    linezolid  600 mg Per G Tube 2 times per day    lansoprazole  30 mg Per G Tube QAM AC    fluconazole  200 mg Oral Daily    nystatin  5 mL Oral 4x Daily    vitamin B-1  100 mg PEG Tube Daily    carvedilol  6.25 mg Oral BID WC    hydrALAZINE  25 mg PEG Tube 3 times per day    divalproex  250 mg Oral 3 times per day    heparin flush  3 mL Intravenous 2 times per day    senna  2 tablet Oral Nightly    folic acid  1 mg Intravenous Daily    sodium chloride (Inhalant)  2 mL Nebulization Q4H    heparin (porcine)  7,500 Units Subcutaneous Q8H    sodium chloride flush  10 mL Intravenous 2 times per day    Arformoterol Tartrate  15 mcg Nebulization BID    chlorhexidine  15 mL Mouth/Throat BID    amLODIPine  10 mg Oral Daily    [Held by provider] aspirin  81 mg Oral Daily    levothyroxine  50 mcg Oral Daily    budesonide  0.5 mg Nebulization BID    ipratropium-albuterol  1 ampule Inhalation Q4H WA       Continuous Infusions:    sodium chloride Stopped (11/23/20 1638)    dextrose         PRN Meds: mineral oil-hydrophilic petrolatum, HYDROcodone 5 mg - acetaminophen, labetalol, trimethobenzamide, midazolam, sodium chloride flush, heparin flush, polyethylene glycol, perflutren lipid microspheres, acetaminophen, polyvinyl alcohol, hydrALAZINE, sodium chloride flush, [DISCONTINUED] acetaminophen **OR** acetaminophen, colchicine, glucose, dextrose, glucagon (rDNA), dextrose    I/O last 3 completed shifts:   In: 267 [NG/GT:724]  Out: 2950 [Urine:2050; Stool:900]    No intake/output data recorded.       Intake/Output Summary (Last 24 hours) at 11/30/2020 1452  Last data filed at 11/30/2020 0555  Gross per 24 hour   Intake 433 ml   Output 2150 ml   Net -1717 ml       Patient Vitals for the past 96 hrs (Last 3 readings):   Weight   11/30/20 0531 (!) 335 lb 1.6 oz (152 kg)   11/29/20 0800 (!) 335 lb (152 kg)   11/27/20 0600 (!) 332 lb 9.6 oz (150.9 kg)         Recent Labs     11/28/20  0430 11/29/20  0508 11/30/20  0514   WBC 11.4 10.5 10.9   HGB 7.2* 7.2* 7.3*   HCT 21.6* 22.4* 22.4*   MCV 79.1* 80.3 79.7*    344 359     Recent Labs     11/28/20  0430 11/29/20  0508 11/30/20  0514    140 139   K 4.6 4.5 4.4   CL 99 100 99   CO2 31* 27 32*   BUN 17 17 15   CREATININE 1.1 1.1 1.0     Recent Labs     11/28/20  0430 11/29/20  0508 11/30/20  0514   PROT 7.1 7.2 7.1   ALKPHOS 96 88 85   ALT 44* 36 30   AST 17 15 13   BILITOT 0.2 <0.2 <0.2     CPK:  Lab Results   Component Value Date    CKTOTAL 98 10/08/2017       -----------------------------------------------------------------  RAD:   Improving                    Micro:  Vent Information  $Ventilation: $Subsequent Day  Skin Assessment: Clean, dry, & intact  Equipment ID: [id number]  Equipment Changed: (S) Vent Circuit  Vent Type: 840  Vent Mode: PS  Vt Ordered: 480 mL  Pressure Ordered: (S) 12  Rate Set: 12 bmp  Peak Flow: 60 L/min  Pressure Support: 10 cmH20  FiO2 : 40 %  SpO2: 95 %  SpO2/FiO2 ratio: 237.5  PaO2/FiO2 ratio: 169  Sensitivity: 3  PEEP/CPAP: 5  I Time/ I Time %: 0 s  Humidification Source: Heated wire  Humidification Temp: 37  Humidification Temp Measured: 37  Circuit Condensation: Drained  Mask Type: Tracheostomy  Mask Size: Large    Additional Respiratory  Assessments  Pulse: 79  Resp: 16  SpO2: 95 %  Position: Semi-Lockwood's  Humidification Source: Heated wire  Humidification Temp: 37  Circuit Condensation: Drained  Oral Care Completed?: Yes  Oral Care: Mouth swabbed, Mouth suctioned  Subglottic Suction Done?: Yes  Airway Type: Trachial  Airway Size: 8  Cuff Pressure (cm H2O): 29 cm H2O  Skin barrier applied: Yes    Objective:   Vitals:   Vitals:    20 1334   BP: (!) 143/75   Pulse: 79   Resp: 16   Temp: 97.8 °F (36.6 °C)   SpO2: 95%      TEMP:Current: Temp: 97.8 °F (36.6 °C)  Max: Temp  Av.1 °F (36.7 °C)  Min: 97 °F (36.1 °C)  Max: 99.3 °F (37.4 °C)    BP Range: Systolic (71NDH), DXZ:728 , Min:139 , PAE:519     Diastolic (21WSB), KBY:37, Min:66, Max:75    General appearance: obese on mechanical ventilation , appears stated age, follows simple commands, no acute distress  Skin: No rashes or lesions  HEENT: mucous membranes are moist, tracheostomy no bleeding noted. neck: No JVD  Lungs: symmetrical expansion, coarse breath sounds to auscultation which clear with cough, no use of accessory muscles  Heart: RRR, S1S2 2/6 JUAN  Abdomen: soft, tender on palpations +BS, distended, obese, PEG tube  Extremities: no peripheral edema  Neurologic:  following commands, shakes head yes/no appropriately. Affect: Calm          Assessment:   Patient Active Problem List:     59-y.o M with history of DM, hypothyroid, COPD, HTN, HLD presented on 10/26 with shortness of breath for 1 week.  Saturations were 70% at PCPs.  Saturation 65% on room air in ER  Patient agitated for CT scan refused to lay flat  10/27 RRT refused BiPAP became combative.  Patient transferred to ICU  10/28 intubated and sedated.  CT chest showing dense multifocal airspace disease      1. acute hypoxemic respiratory failure on mechanical ventilation s/p trach  8 cuffed Shiley trach proximal XLT  2. Metabolic encephalopathy history of EtOH abuse with agitation on ( Depakote, and Seroquel  )significantly improved  3. Acute renal failure Baseline creatinine 1.9 on 2019  4. Anemia  5. HAP   6. HANS moderate ( AHI 17 on 18 requires BIPAP ) noncompliant with CPAP  7. History of reactive airway disease  8.  CT 3/14/2018 showing pulmonary nodule stable from 10/2017  9. 10. Diabetes with elevated blood sugars  11. Obesity  12. EF 47% LVH diastolic dysfunction  13. chronic lymphedema  14. History of gout on colchicine  15. Hypothyroid  16. H/o respiratory failure: 7/8/2017 pt was  transferred from [facility] for acute respiratory failure after lap cholecystectomy, lap umbilical hernia repair, and lap liver biopsy (fatty liver). Patient was then transferred to [facility] hospital where he was weaned off the ventilator and decannulated. 17. Ventilator associated pneumonia  18. Alcohol Withdrawal   19. Abd pain     Plan:      · Will continue PS 10/PEEP5, attempt to leave on PS if tolerates with gasses in am. AC 12/480/40%/5 overnight if needed. · ID following for +bld cultures, MRSA sputum, RL pna, on Vanc and Zyvox  · CT of the abd - discussed with ID and will order  · Continue to wean off Seroquel gradually (d/c 11/30)  then valproic acid.    · Continue brovana/pulmicort  · Renal function at baseline, renal s/o    - supportive care to continue  - await LTACH placement, it is pending at this time    [facility]  11/30/2020  2:52 PM

## 2020-11-30 NOTE — PROCEDURES
PRELIMINARY TRANSESOPHAGEAL ECHOCARDIOGRAPHY REPORT    Indications for study:  Bacteremia    Study performed using (Sedation): Per anesthesia    Complications: None    Preliminary findings: Normal LV function, normal RV function, no hemodynamically significant valve disease, no evidence of vegetations, no left atrial or left atrial appendage thrombus (no evidence of spontaneous echo contrast), no intraatrial shunting on bubble study, no significant atherosclerosis of the aorta. For patient status during procedure, refer to intra-procedure sedation flowsheet. The complete AARON report will follow - see \"CARDIOLOGY\" Section in [address] [place] [address] Chau Niño.  Devante Crabtree, [address] [place] [address] Cardiology

## 2020-12-01 NOTE — DISCHARGE INSTR - COC
Continuity of Care Form    Patient Name: Jeri Atwood   :  1960  MRN:  [id number]    Admit date:  10/26/2020  Discharge date:  2020    Code Status Order: Full  Advance Directives:   [facility] Documentation     Date/Time Healthcare Directive Type of Healthcare Directive Copy in [address] Agent's Name Healthcare Agent's Phone Number    10/27/20 0915  No, patient does not have an advance directive for healthcare treatment -- -- -- -- --          Admitting Physician:  Mercy Morgan MD  PCP: Mercy Morgan MD    Discharging Nurse: Jaz Bentley RN  [address] Unit/Room#: 4510/4510-A  Discharging Unit Phone Number: [phone]    Emergency Contact:   Extended Emergency Contact Information  Primary Emergency Contact: [place]  Address: [place], [address] [place] [address] Phone: [phone]  Relation: Child  Secondary Emergency Contact: Melissa Overcast  Address: [address] [place] [address] Phone: [phone]  Work Phone: [phone]  Relation: Spouse    Past Surgical History:  Past Surgical History:   Procedure Laterality Date    BRONCHOSCOPY  08/10/2017    ECHO COMPL W DOP COLOR FLOW  2013         EXTERNAL EAR SURGERY  2009    keloid left ear    FOOT SURGERY      Left foot    GASTROSTOMY TUBE PLACEMENT  08/10/2017    IR TUNNELED CATHETER PLACEMENT GREATER THAN 5 YEARS  2020    IR TUNNELED CATHETER PLACEMENT GREATER THAN 5 YEARS 2020 Caron Rich MD [facility]    TRACHEOSTOMY N/A 2020    TRACHEOTOMY performed by Shelley Mari MD at [address] [place] [address] 2020    OPEN TRACHEOTOMY, BRONCHOSCOPY, DIRECT LARYNGOSCOPY performed by Laura Ley DO at [facility]  08/10/2017    UPPER GASTROINTESTINAL ENDOSCOPY N/A 2020    EGD ESOPHAGOGASTRODUODENOSCOPY PEG TUBE INSERTION performed by Elizabeth Hartley MD at [address]       Immunization History:   Immunization History   Administered Date(s) Administered    Influenza Virus Vaccine 01/05/2016    Influenza, Renée Half, IM, PF (6 mo and older Fluzone, Flulaval, Fluarix, and 3 yrs and older Afluria) 10/03/2018    Pneumococcal Polysaccharide ([id number]) 01/05/2016    Tdap (Boostrix, Adacel) 09/16/2014       Active Problems:  Patient Active Problem List   Diagnosis Code    Hyperlipidemia E78.5    Type 2 diabetes mellitus without complication, without long-term current use of insulin ([facility] [address] E11.9    Atypical chest pain R07.89    Essential hypertension I10    Chronic acquired lymphedema I89.0    Aortic valve disease I35.9    Morbid obesity due to excess calories ([address] E66.01    Abdominal pain R10.9    Acute respiratory failure with hypoxia ([address] J96.01    Acute pulmonary edema (HCC) J81.0    Congestive heart failure with LV diastolic dysfunction, NYHA class 3 ([address] I50.30    Obstructive sleep apnea G47.33    Acquired hypothyroidism E03.9    Chronic diastolic heart failure ([address] I50.32    Nonrheumatic aortic valve stenosis I35.0    ACE-inhibitor cough R05, T46.4X5A    Pneumonia due to organism J18.9    Acute gout of right knee M10.9       Isolation/Infection:   Isolation          Contact        Patient Infection Status     Infection Onset Added Last Indicated Last Indicated By Review Planned Expiration Resolved Resolved By    MRSA 11/25/20 11/27/20 11/25/20 Culture, Respiratory        Sputum 11/25/20    Resolved    COVID-19 Rule Out 11/27/20 11/28/20 11/28/20 COVID-19 (Ordered)   11/28/20 Rule-Out Test Resulted    COVID-19 Rule Out 11/19/20 11/19/20 11/19/20 Covid-19 Ambulatory (Ordered)   11/21/20 Rule-Out Test Resulted    C-diff Rule Out 11/14/20 11/14/20 11/15/20 CLOSTRIDIUM DIFFICILE EIA (Ordered)   11/15/20 Rule-Out Test Resulted    COVID-19 Rule Out 10/29/20 10/29/20 10/29/20 COVID-19 (Ordered)   11/02/20 Gailen Proud, RN    Test cancelled-Kadi Miller RN  10/29/20 1519   10/28/20-Covid test result -Not Detected    COVID-19 Rule Out 10/28/20 10/28/20 10/28/20 Respiratory Panel, Molecular, with COVID-19 (Restricted: peds pts or suitable admitted adults) (Ordered)   10/29/20 Rule-Out Test Resulted    COVID-19 Rule Out 10/26/20 10/26/20 10/26/20 COVID-19 (Ordered)   10/26/20 Rule-Out Test Resulted          Nurse Assessment:  Last Vital Signs: BP (!) 164/68   Pulse 87   Temp 99 °F (37.2 °C) (Temporal)   Resp 18   Ht 5' 8\" (1.727 m)   Wt (!) 335 lb 1.6 oz (152 kg)   SpO2 100%   BMI 50.95 kg/m²     Last documented pain score (0-10 scale): Pain Level: 8  Last Weight:   Wt Readings from Last 1 Encounters:   11/30/20 (!) 335 lb 1.6 oz (152 kg)     Mental Status:  oriented, alert and history of confusion, has been AXOX4    IV Access:  - PICC - site  right IJ, insertion date: 11/19/2020    Nursing Mobility/ADLs:  Walking   Assisted  Transfer  Assisted  Bathing  Dependent  Dressing  Dependent  Toileting  Dependent  Feeding  Dependent  Med Admin  Dependent  Med Delivery   crushed    Wound Care Documentation and Therapy:        Elimination:  Continence:   · Bowel: No  · Bladder: No  Urinary Catheter: Insertion Date: 11/19/2020   Colostomy/Ileostomy/Ileal Conduit: No  [REMOVED] Fecal Management System-Stool Appearance: Watery  Fecal Management System-Stool Appearance: Watery  [REMOVED] Fecal Management System-Stool Color: Brown  Fecal Management System-Stool Color: Brown  [REMOVED] Fecal Management System-Stool Amount: Medium  Fecal Management System-Stool Amount: Medium     FECAL MANAGEMENT SYSTEM REMOVED 12/3/2020    Date of Last BM: 12/2/2020    Intake/Output Summary (Last 24 hours) at 12/1/2020 1132  Last data filed at 12/1/2020 0647  Gross per 24 hour   Intake 1150 ml   Output 1550 ml   Net -400 ml     I/O last 3 completed shifts:   In: 1150 [I.V.:200; NG/GT:950]  Out: 1550 [Urine:1550]    Safety Concerns: At Risk for Falls and Aspiration Risk    Impairments/Disabilities:      Speech    Nutrition Therapy:  Current Nutrition Therapy:   - Tube Feedings:  Diabetic    Routes of Feeding: Gastrostomy Tube  Liquids: No Liquids  Daily Fluid Restriction: no  Last Modified Barium Swallow with Video (Video Swallowing Test): not done    Treatments at the Time of Hospital Discharge:   Respiratory Treatments: BROVANA/ PULMICORT BID, DUONEB/ SODIUM CHLORIDE Q6H2  Oxygen Therapy:  ***  Ventilator:    - Ventilator Settings:    Vt Ordered: 480 mL  Rate Set: 12 bmp  FiO2 : 40 %    PEEP/CPAP: 5  Pressure Support: 10 cmH20    Rehab Therapies: Physical Therapy, Occupational Therapy and Speech/Language Therapy  Weight Bearing Status/Restrictions: No weight bearing restirctions  Other Medical Equipment (for information only, NOT a DME order):  hospital bed  Other Treatments: ***    Patient's personal belongings (please select all that are sent with patient):  None    RN SIGNATURE:  Electronically signed by Evan Addison RN on 12/2/20 at 11:09 AM EST    CASE MANAGEMENT/SOCIAL WORK SECTION    Inpatient Status Date: ***    Readmission Risk Assessment Score:  Readmission Risk              Risk of Unplanned Readmission:        28           Discharging to Facility/ Agency   · Name:   · Address:  · Phone:  · Fax:    Dialysis Facility (if applicable)   · Name:  · Address:  · Dialysis Schedule:  · Phone:  · Fax:    / signature: {Esignature:[id number]}    PHYSICIAN SECTION    Prognosis: {Prognosis:2195601442}    Condition at Discharge: [facility] Patient Condition:108367340}    Rehab Potential (if transferring to Rehab): {Prognosis:7454458480}    Recommended Labs or Other Treatments After Discharge: ***    Physician Certification: I certify the above information and transfer of Nilesh Sanders  is necessary for the continuing treatment of the diagnosis listed and that he requires {Admit to Appropriate Level of Care:42800} for {GREATER/LESS:575757834} 30 days.      Update Admission H&P: {CHP DME Changes in OGABQ:999327423}    PHYSICIAN SIGNATURE:  {Esignature:[id number]}

## 2020-12-01 NOTE — DISCHARGE SUMMARY
Physician Discharge Summary     Patient ID:  Zach Ruiz  [id number]  15 y.o.  1960    Admit date: 10/26/2020    Discharge date and time: 12/3/20  Admitting Physician: Jv Guzman MD     Discharge Physician: Jv Guzman      Admission Diagnoses: Acute respiratory failure with hypoxia ([address] [J96.01]  Acute respiratory failure with hypoxia ([address] [J96.01]    Discharge Diagnoses:   · Acute on chronic hypoxic respiratory failure likely 2/2 pneumonia and volume overload  - resolving   · Bacterial pneumonia, likely aspiration pneumonia but after extubation patient had to be reintubated rule out HCAP  · Shock, sepsis- resolved  · Alcohol withdrawal   · Leukocytosis improving - 11.5 today  · CONS bacteremia -treated  · MRSA pneumonia on treatment  · Thrush -probable esophagitis-on treatment  NANCY- RESOLVED  S/P PEG, TRACH    Admission Condition: poor    Discharged Condition: fair    Indication for Admission: Acute respiratory failure with hypoxia ([address] [J96.01]  Acute respiratory failure with hypoxia ([address] [J96.01]    Hospital Course: PT ADMITTED TO ICU, NOT ABLE TO BE WEANED. GEN SURG ATTEMPTED TRACH, BUT SCAR TISSUE REQUIRED ENT TO PLACE TRACH. ALSO HAD PEG. IT WAS FELT HE WENT THRU ETOH WITHDRAWALS IN THE ICU. NANCY RESOLVED.      Consults: cardiology, pulmonary/intensive care, ID, nephrology, general surgery and ENT    Significant Diagnostic Studies: labs: +BLOOD CX, AARON WAS NEG,     Lab Results   Component Value Date     12/01/2020    K 4.7 12/01/2020    CL 99 12/01/2020    CO2 27 12/01/2020    BUN 15 12/01/2020    CREATININE 1.1 12/01/2020    GLUCOSE 141 (H) 12/01/2020    CALCIUM 9.6 12/01/2020    PROT 7.5 12/01/2020    LABALBU 3.5 12/01/2020    BILITOT <0.2 12/01/2020    ALKPHOS 98 12/01/2020    AST 16 12/01/2020    ALT 28 12/01/2020    LABGLOM >60 12/01/2020    GFRAA >60 12/01/2020          Lab Results   Component Value Date    WBC 11.4 12/01/2020    HGB 7.9 (L) 12/01/2020    HCT 23.8 (L) 12/01/2020 MCV 79.9 (L) 12/01/2020     12/01/2020        Ct Abdomen Pelvis Wo Contrast Additional Contrast? None    Result Date: 11/29/2020  EXAMINATION: CT OF THE ABDOMEN AND PELVIS WITHOUT CONTRAST 11/29/2020 1:09 pm TECHNIQUE: CT of the abdomen and pelvis was performed without the administration of intravenous contrast. Multiplanar reformatted images are provided for review. Dose modulation, iterative reconstruction, and/or weight based adjustment of the mA/kV was utilized to reduce the radiation dose to as low as reasonably achievable. COMPARISON: September 16, 2019 HISTORY: ORDERING SYSTEM PROVIDED HISTORY: Abdomen pain TECHNOLOGIST PROVIDED HISTORY: Reason for exam:->Abdomen pain Additional Contrast?->None What reading provider will be dictating this exam?->CRC FINDINGS: The lung bases demonstrate patchy multifocal infiltrates. 3-5 mm nonspecific nodules are identified in the right lung base. There is hepatomegaly with liver measuring 19 cm with fatty infiltration. Gallbladder is absent. A G-tube is noted. Spleen, pancreas, and adrenals are normal.  1-9 mm nonobstructing bilateral kidney stones are present. 2.5 cm cystic lesion is noted in the right kidney. Degenerative changes are identified in the lumbosacral spine. Pelvis. The urinary bladder is contracted with a Zamorano catheter and wall thickening. Diffusely dilated fluid-filled small bowel loops and colon are noted. Scattered diverticulosis of the descending and sigmoid colon are noted. There is mild presacral edema. The appendix is normal.    Dilated fluid-filled small bowel loops and colon likely diffuse ileus. Enterocolitis is less likely. Multifocal patchy bibasilar infiltrates concerning for pneumonia. 3-5 mm nonspecific pulmonary nodules. Consider surveillance. Collapsed urinary bladder with wall thickening. Cystitis has to be excluded.     Xr Abdomen (kub) (single Ap View)    Result Date: 11/10/2020  EXAMINATION: ONE SUPINE XRAY VIEW(S) OF THE ABDOMEN 11/10/2020 2:05 pm COMPARISON: November 8, 2020 HISTORY: ORDERING SYSTEM PROVIDED HISTORY: evaluate bowel TECHNOLOGIST PROVIDED HISTORY: Reason for exam:->evaluate bowel What reading provider will be dictating this exam?->CRC FINDINGS: NG tube tip is in the gastric lumen. There is no free air or bowel wall pneumatosis. No dilated segments of bowel are evident. NG tube tip in the gastric lumen. Xr Abdomen (kub) (single Ap View)    Result Date: 11/2/2020  EXAMINATION: ONE SUPINE XRAY VIEW(S) OF THE ABDOMEN 11/2/2020 2:53 pm COMPARISON: October 28, 2020. Cat Velasquez HISTORY: ORDERING SYSTEM PROVIDED HISTORY: abd distention TECHNOLOGIST PROVIDED HISTORY: Reason for exam:->abd distention What reading provider will be dictating this exam?->CRC FINDINGS: There is an enteric tube in place with the distal tip overlying the mid to distal gastric body. There is a nonspecific intestinal bowel gas pattern with no abnormally dilated loops of small bowel identified. There is no significant fecal residual evident. There is no obvious organomegaly or abnormal soft tissue calcification. Nonobstructive bowel gas pattern. Ct Head Wo Contrast    Result Date: 11/16/2020  EXAMINATION: CT OF THE HEAD WITHOUT CONTRAST  11/16/2020 2:36 pm TECHNIQUE: CT of the head was performed without the administration of intravenous contrast. Dose modulation, iterative reconstruction, and/or weight based adjustment of the mA/kV was utilized to reduce the radiation dose to as low as reasonably achievable. COMPARISON: August 27, 2017 HISTORY: ORDERING SYSTEM PROVIDED HISTORY: AMS TECHNOLOGIST PROVIDED HISTORY: Reason for exam:->AMS Has a \"code stroke\" or \"stroke alert\" been called? ->No What reading provider will be dictating this exam?->CRC FINDINGS: BRAIN/VENTRICLES: There is no acute intracranial hemorrhage, mass effect or midline shift. No abnormal extra-axial fluid collection.   The gray-white differentiation is maintained without evidence of an acute infarct. There is no evidence of hydrocephalus. ORBITS: The visualized portion of the orbits demonstrate no acute abnormality. SINUSES: The visualized paranasal sinuses and mastoid air cells demonstrate no acute abnormality. SOFT TISSUES/SKULL:  No acute abnormality of the visualized skull or soft tissues. No acute intracranial abnormality. Ct Soft Tissue Neck Wo Contrast    Result Date: 11/11/2020  EXAMINATION: CT OF THE NECK WITHOUT CONTRAST  11/11/2020 TECHNIQUE: CT of the neck was performed without the administration of intravenous contrast. Multiplanar reformatted images are provided for review. Dose modulation, iterative reconstruction, and/or weight based adjustment of the mA/kV was utilized to reduce the radiation dose to as low as reasonably achievable. COMPARISON: CTA chest October 28, 2020. CTA neck February 9, 2017. HISTORY: ORDERING SYSTEM PROVIDED HISTORY: unable to be trached today, evaluate for trach calcification TECHNOLOGIST PROVIDED HISTORY: Please include entire trachea in window Reason for exam:->unable to be trached today, evaluate for trach calcification What reading provider will be dictating this exam?->CRC FINDINGS: PHARYNX/LARYNX:  The palatine tonsils are normal in appearance. The tongue is normal in appearance. The valleculae, epiglottis, aryepiglottic folds and pyriform sinuses appear unremarkable. The true and false vocal cords are normal in appearance. No mass or abscess is seen. OTHER: There is calcification at the level of the cricothyroid membrane. There is defect in the trachea anteriorly below the cricoid with overlying soft tissue swelling and soft tissue gas contiguous with the skin anteriorly. Tracheostomy tube is in place. SALIVARY GLANDS/THYROID:  The parotid and submandibular glands appear unremarkable. The thyroid gland appears unremarkable. LYMPH NODES:  No cervical or supraclavicular lymphadenopathy is seen.  SOFT TISSUES:  No appreciable soft tissue swelling or mass is seen. BRAIN/ORBITS/SINUSES:  The visualized portion of the intracranial contents appear unremarkable, paranasal sinuses and mastoid air cells demonstrate no acute abnormality. Opacification of the bilateral sphenoid sinuses. LUNG APICES/SUPERIOR MEDIASTINUM:  Patchy densities in the bilateral lung apices are again noted. No superior mediastinal lymphadenopathy or mass. The visualized portion of the trachea appears unremarkable. BONES: Multilevel degenerative changes. 1.  There is calcification at the level of the cricothyroid membrane. 2.  Defect in the trachea anteriorly below the cricoid with overlying soft tissue swelling and soft tissue gas contiguous with the skin anteriorly likely related to recent procedure. Ir Venogram Upper Extremity Right    Result Date: 2020  Patient MRN:  [id number] : 1960 Age: 61 years Gender: Male Order Date:  2020 7:45 AM EXAM: IR VENOGRAM UPPER EXTREMITY RIGHT, IR SPARKLE CATH PLACE VENOUS 2ND+ ORDER, IR TUNNELED CVC PLACE WO SQ PORT/PUMP > 5 YEARS, IR ULTRASOUND GUIDANCE VASCULAR ACCESS, IR FLUORO GUIDED CVA DEVICE PLMT/REPLACE/REMOVAL NUMBER OF IMAGES:  14 INDICATION: Z79.2 Long term (current) use of antibiotics Long term (current) use of antibiotics What reading provider will be dictating this exam?->[facility] COMPARISON: None FLUORO TIME: 00:04:26 DAP: 1965.9 uGym2 AK: 77.1 mGy PICC Catheter Placement and Upper Extremity Venous Ultrasound, Procedure: After obtaining informed consent, and following the routine sterile preparation and drape, the right basilic vein was accessed with ultrasound guidance. IV contrast was injected which demonstrated extravasation into the soft tissue. The decision was then made to place a right tunneled IJ PICC. Using direct real-time ultrasound imaging vascular access was obtained to the right IJ. An image was stored and copy was transmitted to PACS.  Subsequently with real-time guidance a needle was inserted intravascular and through the needle a wire. A subcutaneous tunnel was then created along the right upper chest. Subsequently under fluoroscopic guidance, a system of needles, catheters and guidewires were utilized to place a guidewire and catheter in the venous system and the catheter was advanced and appropriately placed at the level of the junction of the superior vena cava and right atrium. Time out occurred at 08:33 AM. Patient received 4 minutes and 26 seconds of fluoroscopy. A fluoroscopic image of the position of the catheter tip was saved in PACS. Successful uncomplicated placement of a right IJ tunneled PICC catheter. Ir Helen Ohms Device Plmt/replace/removal    Result Date: 2020  Patient MRN:  [id number] : 1960 Age: 61 years Gender: Male Order Date:  2020 7:45 AM EXAM: IR VENOGRAM UPPER EXTREMITY RIGHT, IR SPARKLE CATH PLACE VENOUS 2ND+ ORDER, IR TUNNELED CVC PLACE WO SQ PORT/PUMP > 5 YEARS, IR ULTRASOUND GUIDANCE VASCULAR ACCESS, IR FLUORO GUIDED CVA DEVICE PLMT/REPLACE/REMOVAL NUMBER OF IMAGES:  14 INDICATION: Z79.2 Long term (current) use of antibiotics Long term (current) use of antibiotics What reading provider will be dictating this exam?->[facility] COMPARISON: None FLUORO TIME: 00:04:26 DAP: 1965.9 uGym2 AK: 77.1 mGy PICC Catheter Placement and Upper Extremity Venous Ultrasound, Procedure: After obtaining informed consent, and following the routine sterile preparation and drape, the right basilic vein was accessed with ultrasound guidance. IV contrast was injected which demonstrated extravasation into the soft tissue. The decision was then made to place a right tunneled IJ PICC. Using direct real-time ultrasound imaging vascular access was obtained to the right IJ. An image was stored and copy was transmitted to PACS. Subsequently with real-time guidance a needle was inserted intravascular and through the needle a wire.  A subcutaneous tunnel was then created along the right upper chest. Subsequently under fluoroscopic guidance, a system of needles, catheters and guidewires were utilized to place a guidewire and catheter in the venous system and the catheter was advanced and appropriately placed at the level of the junction of the superior vena cava and right atrium. Time out occurred at 08:33 AM. Patient received 4 minutes and 26 seconds of fluoroscopy. A fluoroscopic image of the position of the catheter tip was saved in PACS. Successful uncomplicated placement of a right IJ tunneled PICC catheter. Xr Chest Portable    Result Date: 11/25/2020  EXAMINATION: ONE XRAY VIEW OF THE CHEST 11/25/2020 8:08 am COMPARISON: One-view chest x-ray 11/24/2020 HISTORY: ORDERING SYSTEM PROVIDED HISTORY: resp dist TECHNOLOGIST PROVIDED HISTORY: Reason for exam:->resp dist What reading provider will be dictating this exam?->CRC FINDINGS: There is significant interval increase in pulmonary consolidation, most notably in the upper-mid right lung and also in the left lung base. The cardiomediastinal silhouette is within normal limits for AP technique. A right internal jugular central venous catheter appears to terminate in the region of the right atrium. There is partial obscuration of the left costophrenic sulcus, which could be secondary to consolidation or small pleural effusion. There is evidence of a tracheostomy tube. Degenerative/arthritic changes are present in the spine and the right acromioclavicular joint. EKG leads overlie the chest.    1. There is interval progression of bilateral consolidation, compatible with worsening pneumonia. 2. A right internal jugular central venous catheter appears to terminate in the region of the right atrium. May consider withdrawal by approximately 4 cm to ensure non atrial in location.     Xr Chest Portable    Result Date: 11/24/2020  EXAMINATION: ONE XRAY VIEW OF THE CHEST 11/24/2020 9:54 am COMPARISON: November 23, 2020 HISTORY: ORDERING SYSTEM PROVIDED HISTORY: SOB TECHNOLOGIST PROVIDED HISTORY: Reason for exam:->SOB What reading provider will be dictating this exam?->CRC FINDINGS: Right IJ approach central venous catheter again identified in unchanged position. Tracheostomy again identified. The cardiac silhouette is enlarged but stable in size. There are bilateral airspace opacities, without interval change. No pleural effusions or pneumothorax. No interval change in bilateral airspace opacities. Xr Chest Portable    Result Date: 11/23/2020  EXAMINATION: ONE XRAY VIEW OF THE CHEST 11/23/2020 7:40 am COMPARISON: 11/20/2020 HISTORY: ORDERING SYSTEM PROVIDED HISTORY: SOB TECHNOLOGIST PROVIDED HISTORY: Reason for exam:->SOB What reading provider will be dictating this exam?->CRC FINDINGS: There is stable position of the tracheostomy tube. There is a right subclavian central venous catheter. Patchy multifocal pulmonary infiltrates are noted. More prominent within the left lung. 1. There are patchy multifocal bilateral pulmonary infiltrates more prominent within the left lower lobe. Xr Chest Portable    Result Date: 11/22/2020  EXAMINATION: ONE XRAY VIEW OF THE CHEST 11/22/2020 8:09 am COMPARISON: 11/21/2020 HISTORY: ORDERING SYSTEM PROVIDED HISTORY: SOB TECHNOLOGIST PROVIDED HISTORY: Reason for exam:->SOB What reading provider will be dictating this exam?->CRC FINDINGS: There is no interval change in the position of the tracheostomy tube. There is improved aeration of the lungs when compared to the prior study. Patchy per infiltrates persist within the lung bases. 1. Partial interval clearing of the multifocal pulmonary infiltrates within the upper lobes. 2. Persistent small infiltrates within the lung bases.     Xr Chest Portable    Result Date: 11/21/2020  EXAMINATION: ONE XRAY VIEW OF THE CHEST 11/21/2020 7:47 am COMPARISON: November 17-20 HISTORY: ORDERING SYSTEM PROVIDED HISTORY: SOB TECHNOLOGIST PROVIDED HISTORY: Reason for exam:->SOB What reading provider will be dictating this exam?->CRC FINDINGS: Tracheostomy tube in good position. The right internal jugular central venous catheter tip in the right atrium. Heart appears to be mildly enlarged. The bilateral vague ill-defined infiltrates with ground-glass opacities are seen. They are more prominent on the right lung. There is no pleural effusions. No significant changes since the previous study of November 20. Xr Chest Portable    Result Date: 11/20/2020  EXAMINATION: ONE XRAY VIEW OF THE CHEST 11/20/2020 7:55 am COMPARISON: 19 November 2020 HISTORY: ORDERING SYSTEM PROVIDED HISTORY: SOB TECHNOLOGIST PROVIDED HISTORY: Reason for exam:->SOB What reading provider will be dictating this exam?->CRC FINDINGS: Central venous catheter on the right terminates in the right atrium proper. This is a stable finding. A left-sided central venous catheter is been removed. There is a stable tracheostomy catheter. There are opacities in both lungs which may represent infiltrate and/or atelectasis. Aeration appears minimally worse. Mildly increasing infiltrate and/or atelectasis bilaterally. Please note, the right central venous catheter terminates in the right atrium proper. Xr Chest Portable    Result Date: 11/19/2020  EXAMINATION: ONE XRAY VIEW OF THE CHEST 11/19/2020 9:56 am COMPARISON: November 15-18 HISTORY: ORDERING SYSTEM PROVIDED HISTORY: SOB TECHNOLOGIST PROVIDED HISTORY: Reason for exam:->SOB What reading provider will be dictating this exam?->CRC FINDINGS: Some improvement of the bilateral discrete ground-glass densities throughout both lungs predominant in the perihilar regions. The can be an indication for volume overload. No pleural effusions are seen. The heart is normal size. Some improvement in pattern that can represent volume overload since the November 18.     Xr Chest Portable    Result Date: 11/18/2020  EXAMINATION: ONE XRAY VIEW OF internal jugular vein central venous catheter is well positioned, with the tip overlying the upper SVC. Grossly stable right parahilar pulmonary opacity which may represent atelectasis or pneumonia. The life-support lines and tubes are well positioned. Xr Chest Portable    Result Date: 11/15/2020  EXAMINATION: ONE XRAY VIEW OF THE CHEST 11/15/2020 7:52 am COMPARISON: 11/14/2020 HISTORY: ORDERING SYSTEM PROVIDED HISTORY: SOB TECHNOLOGIST PROVIDED HISTORY: Reason for exam:->SOB What reading provider will be dictating this exam?->CRC FINDINGS: Tracheostomy tube appears unchanged. Left internal jugular central venous catheter with catheter tip not well visualized but possibly in the central left brachiocephalic vein or SVC. Stable cardiomediastinal silhouette with persistent bilateral airspace opacities, right worse than left. Possible small left pleural effusion. No pneumothorax. Stable bilateral airspace opacities, right worse than left. Xr Chest Portable    Result Date: 11/14/2020  EXAMINATION: ONE XRAY VIEW OF THE CHEST 11/14/2020 7:54 am COMPARISON: Comparison November 11-13 HISTORY: ORDERING SYSTEM PROVIDED HISTORY: SOB TECHNOLOGIST PROVIDED HISTORY: Reason for exam:->SOB What reading provider will be dictating this exam?->CRC FINDINGS: Tracheostomy tube in good position. Heart appears to be mildly enlarged. The Bilateral infiltrates observed predominant in the right lung as seen previously, more limited to the base on the left. Some degree of atelectasis appears to be also present in left lower lobe. Left internal jugular central venous catheter tip in the SVC.      No significant change since November 13 study     Xr Chest Portable    Result Date: 11/13/2020  EXAMINATION: ONE X-RAY VIEW OF THE CHEST 11/13/2020 8:01 am COMPARISON: 11/12/2020 HISTORY: ORDERING SYSTEM PROVIDED HISTORY: SOB TECHNOLOGIST PROVIDED HISTORY: Reason for exam:->SOB What reading provider will be dictating this exam?->CRC FINDINGS: Patchy bilateral pulmonary infiltrates are noted unchanged when compared to the prior study. Note is made of a tracheostomy tube and left internal jugular central venous catheter. 1. There is no interval change in the bilateral multifocal patchy pulmonary infiltrates. 2. Probable small left pleural effusion. Xr Chest Portable    Result Date: 11/12/2020  EXAMINATION: ONE XRAY VIEW OF THE CHEST 11/12/2020 7:58 am COMPARISON: November 11, 2020 HISTORY: ORDERING SYSTEM PROVIDED HISTORY: SOB TECHNOLOGIST PROVIDED HISTORY: Reason for exam:->SOB What reading provider will be dictating this exam?->CRC FINDINGS: ETT tip is approximately 2.5 cm above the ellie. Cardiac silhouette is mildly enlarged. Left IJ catheter tip overlies the mid SVC. Persistent left base/retrocardiac opacity with small left pleural effusion. There is a right base bandlike opacity. 1. Persistent multi lobar airspace opacities most pronounced in the left base. To lesser degree, there is also an opacity in the right base. Differential includes atelectasis and pneumonia. Compared to the prior exam, there has been no significant interval change. 2. Small left pleural effusion. Xr Chest Portable    Result Date: 11/11/2020  EXAMINATION: ONE XRAY VIEW OF THE CHEST 11/11/2020 4:20 pm COMPARISON: Chest series from the same day at 08:02 HISTORY: [facility]: ett placement post surgery TECHNOLOGIST PROVIDED HISTORY: Reason for exam:->ett placement post surgery What reading provider will be dictating this exam?->CRC FINDINGS: Medical support devices: Endotracheal tube terminates in the midthoracic trachea. Left internal jugular central venous catheter with distal tip projecting near the junction of the brachiocephalic veins. Lungs: Persistent low lung volumes with ill-defined perihilar opacities. Retrocardiac and left basilar opacity persist.  No obvious pneumothorax.  Cardiomediastinal silhouette and pulmonary vascularity: Atherosclerotic disease in the aortic arch. Cardiac silhouette appears mildly prominent. Central interstitial edema noted. Osseous and soft tissue structures: No acute findings. 1.  Medical support devices as above. 2.  Persistent atherosclerotic disease, cardiomegaly, and central interstitial edema. 3.  Persistent retrocardiac and left basilar opacity. Xr Chest Portable    Result Date: 11/11/2020  EXAMINATION: ONE XRAY VIEW OF THE CHEST 11/11/2020 8:17 am COMPARISON: 11/10/2020 HISTORY: ORDERING SYSTEM PROVIDED HISTORY: SOB TECHNOLOGIST PROVIDED HISTORY: Reason for exam:->SOB What reading provider will be dictating this exam?->CRC FINDINGS: Left lower lobe infiltrate and small effusion are unchanged. Right basilar infiltrate medially is unchanged. Lines/tubes are appropriate. No interval change     Xr Chest Portable    Result Date: 11/10/2020  EXAMINATION: ONE XRAY VIEW OF THE CHEST 11/10/2020 7:49 am COMPARISON: 11/09/2020 HISTORY: ORDERING SYSTEM PROVIDED HISTORY: SOB TECHNOLOGIST PROVIDED HISTORY: Reason for exam:->SOB What reading provider will be dictating this exam?->CRC FINDINGS: Right lower lobe infiltrate is unchanged. There is some worsening of left lower lobe infiltrate. ET and left-sided central line are appropriate. Upper lobes are normal.     Some worsening of left lower lobe infiltrate     Xr Chest Portable    Result Date: 11/9/2020  EXAMINATION: ONE XRAY VIEW OF THE CHEST 11/9/2020 8:17 am COMPARISON: 11/08/2020 HISTORY: ORDERING SYSTEM PROVIDED HISTORY: SOB TECHNOLOGIST PROVIDED HISTORY: Reason for exam:->SOB What reading provider will be dictating this exam?->CRC FINDINGS: Multifocal bilateral pulmonary infiltrates are noted more prominent within the right lower lobe. The endotracheal tube and left internal jugular central venous catheter are unchanged in position. There is an NG tube within the stomach.      1. Multifocal bilateral pulmonary infiltrates slightly improved when compared with the patient's prior study of 11/08/2020. Xr Chest Portable    Result Date: 11/8/2020  EXAMINATION: ONE XRAY VIEW OF THE CHEST 11/8/2020 12:48 pm COMPARISON: Chest radiograph 5 hours prior HISTORY: ORDERING SYSTEM PROVIDED HISTORY: intubated, verification of tube placement TECHNOLOGIST PROVIDED HISTORY: Reason for exam:->intubated, verification of tube placement What reading provider will be dictating this exam?->CRC FINDINGS: Endotracheal tube terminates approximately 3 cm from the ellie. Left IJ central venous catheter terminates in SVC. The enteric feeding tube is partially visualized in the stomach. The cardiomediastinal silhouette is stable in size. There is worsening of the right sided airspace opacities. Mild improvement in left lower lung airspace opacity likely representing atelectasis. No pneumothorax. Endotracheal tube terminates approximately 3 cm from the ellie. New right-sided airspace opacities likely representing atelectasis. Mild interval improvement in left lower lung airspace opacity. Xr Chest Portable    Result Date: 11/8/2020  EXAMINATION: ONE XRAY VIEW OF THE CHEST 11/8/2020 7:37 am COMPARISON: 11/07/2020 HISTORY: ORDERING SYSTEM PROVIDED HISTORY: SOB TECHNOLOGIST PROVIDED HISTORY: Reason for exam:->SOB What reading provider will be dictating this exam?->CRC FINDINGS: The endotracheal tube, nasogastric tube, and left IJ catheter are stable. Stable cardiac silhouette. Patchy bilateral airspace opacities are stable. No effusion or pneumothorax. No acute osseous abnormality. Patchy bilateral pulmonary airspace opacities are stable.      Xr Chest Portable    Result Date: 11/7/2020  EXAMINATION: ONE XRAY VIEW OF THE CHEST 11/7/2020 3:40 pm COMPARISON: November 7, 2020 HISTORY: ORDERING SYSTEM PROVIDED HISTORY: s/p left ij placement TECHNOLOGIST PROVIDED HISTORY: Reason for exam:->s/p left ij placement What reading provider will be dictating this exam?->CRC FINDINGS: Endotracheal tube is 2.7 cm above the ellie. Right and left central venous catheters are present with distal tips at location of superior vena cava. NG tube courses below the diaphragm. Redemonstration of interstitial and hazy opacities bilaterally. There is improved aeration in lung bases. The heart is normal size. No pneumothorax. 1.  Improved aeration in lung bases. 2.  Interstitial and hazy opacities persist throughout both lungs. 3.  Endotracheal tube is 2.7 cm above ellie. Xr Chest Portable    Result Date: 11/7/2020  EXAMINATION: ONE XRAY VIEW OF THE CHEST 11/7/2020 7:53 am COMPARISON: November 6, 2020 HISTORY: ORDERING SYSTEM PROVIDED HISTORY: SOB TECHNOLOGIST PROVIDED HISTORY: Reason for exam:->SOB What reading provider will be dictating this exam?->CRC FINDINGS: Endotracheal tube is approximately 1.8 cm above the ellie. Right IJ central venous catheter is present with distal tip of location of SVC. Satisfactory position of NG tube. Stable interstitial and hazy opacities bilaterally notable in lung bases. No pneumothorax. There are low lung volumes. Stable chest radiograph with interstitial pulmonary edema or bilateral pneumonia. Xr Chest Portable    Result Date: 11/6/2020  EXAMINATION: ONE XRAY VIEW OF THE CHEST 11/6/2020 8:07 am COMPARISON: 11/05/2020 HISTORY: ORDERING SYSTEM PROVIDED HISTORY: SOB TECHNOLOGIST PROVIDED HISTORY: Reason for exam:->SOB What reading provider will be dictating this exam?->CRC FINDINGS: Lines and tubes are stable. Heart size and mediastinal contours are unchanged. The lungs are stable. No change. Xr Chest Portable    Result Date: 11/5/2020  EXAMINATION: ONE XRAY VIEW OF THE CHEST 11/5/2020 8:09 am COMPARISON: 11/04/2020 HISTORY: ORDERING SYSTEM PROVIDED HISTORY: SOB TECHNOLOGIST PROVIDED HISTORY: Reason for exam:->SOB What reading provider will be dictating this exam?->CRC FINDINGS: Lines and tubes are stable. Heart size and mediastinal contours are unchanged. The lungs are stable. No change. Xr Chest Portable    Result Date: 11/4/2020  EXAMINATION: ONE XRAY VIEW OF THE CHEST 11/4/2020 8:02 am COMPARISON: 11/03/2020 HISTORY: ORDERING SYSTEM PROVIDED HISTORY: SOB TECHNOLOGIST PROVIDED HISTORY: Reason for exam:->SOB What reading provider will be dictating this exam?->CRC FINDINGS: Heart is enlarged. There are significant bilateral infiltrates worse in the left lower lobe. These appear unchanged. There may be a small left effusion. Lines/tubes are appropriate. No interval change     Xr Chest Portable    Result Date: 11/3/2020  EXAMINATION: ONE XRAY VIEW OF THE CHEST 11/3/2020 8:05 am COMPARISON: None. HISTORY: ORDERING SYSTEM PROVIDED HISTORY: SOB TECHNOLOGIST PROVIDED HISTORY: Reason for exam:->SOB What reading provider will be dictating this exam?->CRC FINDINGS: The heart is mildly enlarged. Multifocal bilateral pulmonary infiltrates are noted more prominent within the right lung. There is stable position of the endotracheal tube and NG tube. There is no pneumothorax. Multifocal bilateral pulmonary infiltrates more prominent within the right lung. Overall there is no interval change when compared with the prior study of 1 day earlier. Xr Chest Portable    Result Date: 11/3/2020  EXAMINATION: ONE XRAY VIEW OF THE CHEST 11/3/2020 8:43 am COMPARISON: 3 November 2020 HISTORY: ORDERING SYSTEM PROVIDED HISTORY: s/p ETT exchange TECHNOLOGIST PROVIDED HISTORY: Reason for exam:->s/p ETT exchange What reading provider will be dictating this exam?->CRC FINDINGS: Stable right-sided central venous catheter, endotracheal and nasogastric tubes. Opacity in the right lung is minimally progressive and may relate to any combination of atelectasis and/or infiltrate. Aeration on the left is stable. Mildly worsening aeration on the right which may relate any combination of infiltrate and/or atelectasis.   Stable findings on the left. Xr Chest Portable    Result Date: 2020  EXAMINATION: ONE XRAY VIEW OF THE CHEST 2020 7:48 am COMPARISON: 2020 HISTORY: ORDERING SYSTEM PROVIDED HISTORY: SOB TECHNOLOGIST PROVIDED HISTORY: Reason for exam:->SOB What reading provider will be dictating this exam?->CRC FINDINGS: Significant left lower lobe infiltrate and pleural effusion are unchanged. Right basilar atelectasis/infiltrate appears mildly worse. Lines/tubes are appropriate. Slight worsening of right basilar atelectasis/infiltrate     Ir Tunneled Cvc Place Wo Sq Port/pump > 5 Years    Result Date: 2020  Patient MRN:  [id number] : 1960 Age: 61 years Gender: Male Order Date:  2020 7:45 AM EXAM: IR VENOGRAM UPPER EXTREMITY RIGHT, IR SPARKLE CATH PLACE VENOUS 2ND+ ORDER, IR TUNNELED CVC PLACE WO SQ PORT/PUMP > 5 YEARS, IR ULTRASOUND GUIDANCE VASCULAR ACCESS, IR FLUORO GUIDED CVA DEVICE PLMT/REPLACE/REMOVAL NUMBER OF IMAGES:  14 INDICATION: Z79.2 Long term (current) use of antibiotics Long term (current) use of antibiotics What reading provider will be dictating this exam?->[facility] COMPARISON: None FLUORO TIME: 00:04:26 DAP: 1965.9 uGym2 AK: 77.1 mGy PICC Catheter Placement and Upper Extremity Venous Ultrasound, Procedure: After obtaining informed consent, and following the routine sterile preparation and drape, the right basilic vein was accessed with ultrasound guidance. IV contrast was injected which demonstrated extravasation into the soft tissue. The decision was then made to place a right tunneled IJ PICC. Using direct real-time ultrasound imaging vascular access was obtained to the right IJ. An image was stored and copy was transmitted to PACS. Subsequently with real-time guidance a needle was inserted intravascular and through the needle a wire.  A subcutaneous tunnel was then created along the right upper chest. Subsequently under fluoroscopic guidance, a system of needles, catheters and guidewires were utilized to place a guidewire and catheter in the venous system and the catheter was advanced and appropriately placed at the level of the junction of the superior vena cava and right atrium. Time out occurred at 08:33 AM. Patient received 4 minutes and 26 seconds of fluoroscopy. A fluoroscopic image of the position of the catheter tip was saved in PACS. Successful uncomplicated placement of a right IJ tunneled PICC catheter. Ir Sparkle Cath Place Venous 2nd+ Order    Result Date: 2020  Patient MRN:  [id number] : 1960 Age: 61 years Gender: Male Order Date:  2020 7:45 AM EXAM: IR VENOGRAM UPPER EXTREMITY RIGHT, IR SPARKLE CATH PLACE VENOUS 2ND+ ORDER, IR TUNNELED CVC PLACE WO SQ PORT/PUMP > 5 YEARS, IR ULTRASOUND GUIDANCE VASCULAR ACCESS, IR FLUORO GUIDED CVA DEVICE PLMT/REPLACE/REMOVAL NUMBER OF IMAGES:  14 INDICATION: Z79.2 Long term (current) use of antibiotics Long term (current) use of antibiotics What reading provider will be dictating this exam?->[facility] COMPARISON: None FLUORO TIME: 00:04:26 DAP: 1965.9 uGym2 AK: 77.1 mGy PICC Catheter Placement and Upper Extremity Venous Ultrasound, Procedure: After obtaining informed consent, and following the routine sterile preparation and drape, the right basilic vein was accessed with ultrasound guidance. IV contrast was injected which demonstrated extravasation into the soft tissue. The decision was then made to place a right tunneled IJ PICC. Using direct real-time ultrasound imaging vascular access was obtained to the right IJ. An image was stored and copy was transmitted to PACS. Subsequently with real-time guidance a needle was inserted intravascular and through the needle a wire.  A subcutaneous tunnel was then created along the right upper chest. Subsequently under fluoroscopic guidance, a system of needles, catheters and guidewires were utilized to place a guidewire and catheter in the venous system and the catheter was advanced and appropriately placed at the level of the junction of the superior vena cava and right atrium. Time out occurred at 08:33 AM. Patient received 4 minutes and 26 seconds of fluoroscopy. A fluoroscopic image of the position of the catheter tip was saved in PACS. Successful uncomplicated placement of a right IJ tunneled PICC catheter. Xr Abdomen For Ng/og/ne Tube Placement    Result Date: 11/7/2020  EXAMINATION: ONE SUPINE XRAY VIEW(S) OF THE ABDOMEN 11/7/2020 9:40 pm COMPARISON: 11/02/2020 HISTORY: ORDERING SYSTEM PROVIDED HISTORY: OG placement TECHNOLOGIST PROVIDED HISTORY: Reason for exam:->OG placement Portable? ->Yes What reading provider will be dictating this exam?->CRC FINDINGS: OG tube tip in the body of the stomach. The upper abdomen is unremarkable. No gross free air. Of OG tube tip in the body of the stomach. Xr Abdomen For Ng/og/ne Tube Placement    Result Date: 11/5/2020  EXAMINATION: ONE SUPINE XRAY VIEW(S) OF THE ABDOMEN 11/5/2020 8:27 pm COMPARISON: None. HISTORY: ORDERING SYSTEM PROVIDED HISTORY: Confirmation of course of NG/OG/NE tube and location of tip of tube TECHNOLOGIST PROVIDED HISTORY: Reason for exam:->Confirmation of course of NG/OG/NE tube and location of tip of tube Portable? ->Yes What reading provider will be dictating this exam?->CRC FINDINGS: Nasogastric/orogastric tube tip in the fundus of the stomach. Nonobstructive bowel gas pattern. No gross free air. Nasogastric/orogastric tube tip in the fundus of the stomach. Xr Chest Abdomen Ng Placement    Result Date: 11/8/2020  EXAMINATION: ONE SUPINE XRAY VIEW(S) OF THE ABDOMEN 11/8/2020 1:01 pm COMPARISON: 11/07/2020 HISTORY: ORDERING SYSTEM PROVIDED HISTORY: Encounter for nasogastric (NG) tube placement TECHNOLOGIST PROVIDED HISTORY: Reason for exam:->Encounter for nasogastric (NG) tube placement What reading provider will be dictating this exam?->CRC FINDINGS: The nasogastric tube terminates in the gastric body. There is a nonobstructive bowel gas pattern. The lung bases are clear. Degenerative changes involve the lumbar spine. 1. Nasogastric tube terminating in the gastric body. Ir Ultrasound Guidance Vascular Access    Result Date: 2020  Patient MRN:  [id number] : 1960 Age: 61 years Gender: Male Order Date:  2020 7:45 AM EXAM: IR VENOGRAM UPPER EXTREMITY RIGHT, IR SPARKLE CATH PLACE VENOUS 2ND+ ORDER, IR TUNNELED CVC PLACE WO SQ PORT/PUMP > 5 YEARS, IR ULTRASOUND GUIDANCE VASCULAR ACCESS, IR FLUORO GUIDED CVA DEVICE PLMT/REPLACE/REMOVAL NUMBER OF IMAGES:  14 INDICATION: Z79.2 Long term (current) use of antibiotics Long term (current) use of antibiotics What reading provider will be dictating this exam?->[facility] COMPARISON: None FLUORO TIME: 00:04:26 DAP: 1965.9 uGym2 AK: 77.1 mGy PICC Catheter Placement and Upper Extremity Venous Ultrasound, Procedure: After obtaining informed consent, and following the routine sterile preparation and drape, the right basilic vein was accessed with ultrasound guidance. IV contrast was injected which demonstrated extravasation into the soft tissue. The decision was then made to place a right tunneled IJ PICC. Using direct real-time ultrasound imaging vascular access was obtained to the right IJ. An image was stored and copy was transmitted to PACS. Subsequently with real-time guidance a needle was inserted intravascular and through the needle a wire. A subcutaneous tunnel was then created along the right upper chest. Subsequently under fluoroscopic guidance, a system of needles, catheters and guidewires were utilized to place a guidewire and catheter in the venous system and the catheter was advanced and appropriately placed at the level of the junction of the superior vena cava and right atrium. Time out occurred at 08:33 AM. Patient received 4 minutes and 26 seconds of fluoroscopy.  A fluoroscopic image of the position of the catheter tip was saved in organomegaly   Genitalia:    Normal male without lesion, discharge or tenderness   Rectal:    Normal tone, normal prostate, no masses or tenderness;    guaiac negative stool   Extremities:   Extremities normal, atraumatic, no cyanosis or edema   Pulses:   2+ and symmetric all extremities   Skin:   Skin color, texture, turgor normal, no rashes or lesions   Lymph nodes:   Cervical, supraclavicular, and axillary nodes normal   Neurologic:   CNII-XII intact. Normal strength, sensation and reflexes       throughout       Disposition: long term care facility    Patient Instructions:      Medication List      START taking these medications    Arformoterol Tartrate 15 MCG/2ML Nebu  Commonly known as:  BROVANA  Take 2 mLs by nebulization 2 times daily     budesonide 0.5 MG/2ML nebulizer suspension  Commonly known as:  PULMICORT  Take 2 mLs by nebulization 2 times daily     chlorhexidine 0.12 % solution  Commonly known as:  PERIDEX  Take 15 mLs by mouth 2 times daily for 14 days     divalproex 125 MG capsule  Commonly known as:  DEPAKOTE SPRINKLE  Take 2 capsules by mouth every 8 hours     fluconazole 200 MG tablet  Commonly known as:  DIFLUCAN  Take 1 tablet by mouth daily for 14 days     glucose 40 % Gel  Commonly known as:  GLUTOSE  Take 37.5 mLs by mouth as needed (low bs)     heparin (porcine) 75732 UNIT/ML injection  Inject 0.75 mLs into the skin every 8 hours     HYDROcodone-acetaminophen 5-325 MG per tablet  Commonly known as:  NORCO  Take 1 tablet by mouth every 6 hours as needed for Pain for up to 3 days.      ipratropium-albuterol 0.5-2.5 (3) MG/3ML Soln nebulizer solution  Commonly known as:  DUONEB  Inhale 3 mLs into the lungs every 4 hours (while awake)     lansoprazole 3 MG/ML Susp  10 mLs by Per G Tube route every morning (before breakfast)  Start taking on:  December 2, 2020     linezolid 100 MG/5ML suspension  Commonly known as:  ZYVOX  30 mLs by Per G Tube route every 12 hours for 16 days     mineral oil-hydrophilic petrolatum ointment  Apply topically as needed. nystatin 879544 UNIT/ML suspension  Commonly known as:  MYCOSTATIN  Take 5 mLs by mouth 4 times daily for 11 days     polyethylene glycol 17 g packet  Commonly known as:  GLYCOLAX  Take 17 g by mouth daily as needed for Constipation     polyvinyl alcohol 1.4 % ophthalmic solution  Commonly known as:  LIQUIFILM TEARS  Place 1 drop into both eyes every 4 hours as needed for Dry Eyes     senna 8.6 MG tablet  Commonly known as:  SENOKOT  Take 2 tablets by mouth nightly     sodium chloride (Inhalant) 3 % nebulizer solution  Take 2 mLs by nebulization every 4 hours     thiamine 100 MG tablet  1 tablet by PEG Tube route daily  Start taking on:  December 2, 2020        CHANGE how you take these medications    carvedilol 6.25 MG tablet  Commonly known as:  COREG  Take 1 tablet by mouth 2 times daily (with meals)  What changed:    · medication strength  · how much to take  · when to take this     levothyroxine 50 MCG tablet  Commonly known as:  SYNTHROID  Take 1 tablet by mouth daily  Start taking on:  December 2, 2020  What changed:  See the new instructions.         CONTINUE taking these medications    amLODIPine 10 MG tablet  Commonly known as:  NORVASC  Take 1 tablet by mouth daily     colchicine 0.6 MG tablet  Commonly known as:  Colcrys  Take 1 tablet by mouth 2 times daily as needed for Pain        STOP taking these medications    aspirin 81 MG tablet     atorvastatin 40 MG tablet  Commonly known as:  LIPITOR     Compression Stockings Misc     fluticasone-salmeterol 100-50 MCG/DOSE diskus inhaler  Commonly known as:  Advair Diskus     Handicap Placard Misc     losartan 100 MG tablet  Commonly known as:  COZAAR     metFORMIN 500 MG extended release tablet  Commonly known as:  GLUCOPHAGE-XR     olmesartan 20 MG tablet  Commonly known as:  BENICAR     omeprazole 40 MG delayed release capsule  Commonly known as:  PRILOSEC     ondansetron 4 MG disintegrating tablet  Commonly known as:  Zofran ODT           Where to Get Your Medications      These medications were sent to [facility]/pharmacy #[id number]- [place], [place] - [address] Juanita Daniels [phone] Robert Fordtequila [phone]  [address] [facility] [address]    Phone:  [phone]   · levothyroxine 50 MCG tablet  · mineral oil-hydrophilic petrolatum ointment  · polyethylene glycol 17 g packet  · polyvinyl alcohol 1.4 % ophthalmic solution  · senna 8.6 MG tablet  · thiamine 100 MG tablet     You can get these medications from any pharmacy    Bring a paper prescription for each of these medications  · Arformoterol Tartrate 15 MCG/2ML Nebu  · budesonide 0.5 MG/2ML nebulizer suspension  · carvedilol 6.25 MG tablet  · chlorhexidine 0.12 % solution  · divalproex 125 MG capsule  · fluconazole 200 MG tablet  · glucose 40 % Gel  · heparin (porcine) 56041 UNIT/ML injection  · lansoprazole 3 MG/ML Susp  · linezolid 100 MG/5ML suspension  · nystatin 476677 UNIT/ML suspension     Information about where to get these medications is not yet available    Ask your nurse or doctor about these medications  · HYDROcodone-acetaminophen 5-325 MG per tablet  · ipratropium-albuterol 0.5-2.5 (3) MG/3ML Soln nebulizer solution  · sodium chloride (Inhalant) 3 % nebulizer solution        Activity: activity as tolerated  Diet: {diet: DIET TUBE FEED CONTINUOUS/CYCLIC NPO; Diabetic 1.5; Gastrostomy; Continuous; 25; 65; 23; Exceptions are: Sips with Meds  Wound Care: keep wound clean and dry    Follow-up with DR in 2 days.   Greater than 35 minutes spent on discharge preparations, examining patient, discussing with patient and coordinating with nurses and staff    Signed:  Ana Wu  12/1/2020  11:59 AM

## 2020-12-01 NOTE — PROGRESS NOTES
[facility]                                                   PROGRESS NOTE            C/C : Pneumonia, respiratory failure      Pt is awake , alert  On Ventilator 40% FiO2  Denies fever and chills  Report abdomen pain   Afebrile           Medications:  Scheduled Meds:   linezolid  600 mg Per G Tube 2 times per day    lansoprazole  30 mg Per G Tube QAM AC    fluconazole  200 mg Oral Daily    nystatin  5 mL Oral 4x Daily    vitamin B-1  100 mg PEG Tube Daily    carvedilol  6.25 mg Oral BID WC    hydrALAZINE  25 mg PEG Tube 3 times per day    divalproex  250 mg Oral 3 times per day    heparin flush  3 mL Intravenous 2 times per day    senna  2 tablet Oral Nightly    folic acid  1 mg Intravenous Daily    sodium chloride (Inhalant)  2 mL Nebulization Q4H    heparin (porcine)  7,500 Units Subcutaneous Q8H    sodium chloride flush  10 mL Intravenous 2 times per day    Arformoterol Tartrate  15 mcg Nebulization BID    chlorhexidine  15 mL Mouth/Throat BID    amLODIPine  10 mg Oral Daily    [Held by provider] aspirin  81 mg Oral Daily    levothyroxine  50 mcg Oral Daily    budesonide  0.5 mg Nebulization BID    ipratropium-albuterol  1 ampule Inhalation Q4H WA     Continuous Infusions:   sodium chloride Stopped (11/23/20 1638)    dextrose       PRN Meds:mineral oil-hydrophilic petrolatum, HYDROcodone 5 mg - acetaminophen, labetalol, trimethobenzamide, midazolam, sodium chloride flush, heparin flush, polyethylene glycol, perflutren lipid microspheres, acetaminophen, polyvinyl alcohol, hydrALAZINE, sodium chloride flush, [DISCONTINUED] acetaminophen **OR** acetaminophen, colchicine, glucose, dextrose, glucagon (rDNA), dextrose          REVIEW OF SYSTEMS:    CONSTITUTIONAL: Denies fever and chills . EYES:  No double vision or drainage from eyes, ears or throat. HEENT:  No neck stiffness. No dysphagia. No drainage from eyes, ears or throat  RESPIRATORY:  SOB .    CARDIOVASCULAR:  No chest pain, palpitations, orthopnea or dyspnea on exertion. GASTROINTESTINAL: abdomen pain   GENITOURINARY: Indwelling Zamorano  INTEGUMENT/BREAST:  No rash or breast masses. HEMATOLOGIC/LYMPHATIC:  No lymphadenopathy or blood dyscrasics. ENDOCRINE:  No polyuria or polydipsia or temperature intolerance.    MUSCULOSKELETAL:  No myalgia or arthralgia. NEUROLOGICAL:  weakness      OBJECTIVE:  BP (!) 164/68   Pulse 87   Temp 99 °F (37.2 °C) (Temporal)   Resp 18   Ht 5' 8\" (1.727 m)   Wt (!) 335 lb 1.6 oz (152 kg)   SpO2 100%   BMI 50.95 kg/m²   Temp  Av.2 °F (36.8 °C)  Min: 97.7 °F (36.5 °C)  Max: 99 °F (37.2 °C)     Constitutional: Awake and alert, on the Vent , FiO2 40 % , PEEP 5   Skin: Warm and dry. No rashes were noted. HEENT: Np pallor, no thrush, tracheostomy OK   Chest:  Scattered rhonchi    Cardiovascular: S1 and S2 are rhythmic and regular. Systolic murmurs appreciated.    Abdomen: soft, mild distention, tender ,  PEG- OK,  FMS system-loose stool    Extremities:  + Lymphedema left lower extremity- improving   Lines: PICC line right chest      Laboratory and Tests Review:  Lab Results   Component Value Date    WBC 11.4 2020    WBC 10.9 2020    WBC 10.5 2020    HGB 7.9 (L) 2020    HCT 23.8 (L) 2020    MCV 79.9 (L) 2020     2020     Lab Results   Component Value Date    NEUTROABS 5.61 2020    NEUTROABS 5.44 2020    NEUTROABS 5.81 2020     No results found for: CRPHS  Lab Results   Component Value Date    ALT 28 2020    AST 16 2020    ALKPHOS 98 2020    BILITOT <0.2 2020     Lab Results   Component Value Date     2020    K 4.7 2020    K 4.0 10/27/2020    CL 99 2020    CO2 27 2020    BUN 15 2020    CREATININE 1.1 2020    CREATININE 1.0 2020    CREATININE 1.1 2020    GFRAA >60 2020    LABGLOM >60 2020    GLUCOSE 141 2020    PROT 7.5 2020 LABALBU 3.5 12/01/2020    CALCIUM 9.6 12/01/2020    BILITOT <0.2 12/01/2020    ALKPHOS 98 12/01/2020    AST 16 12/01/2020    ALT 28 12/01/2020     Lab Results   Component Value Date    CRP 1.4 (H) 11/02/2020    CRP 2.8 (H) 09/04/2017    CRP 10.5 (H) 08/28/2017     Lab Results   Component Value Date    SEDRATE 135 (H) 09/04/2017    SEDRATE 150 (H) 08/28/2017    SEDRATE 141 (H) 08/21/2017     Radiology: reviewed  CXR- 11/3- Multifocal bilateral pulmonary infiltrates more prominent within the right lung. CXR- 11/4- worsening b/l infiltrates, worse on left today with possible small L sided effusion   CXR- 11/5 - Stable b/l infiltrates   CXR- 11/6 - Expiratory film, image otherwise appears overall stable. CXR- 11/16 - stable right parahilar pulmonary opacity which may represent   atelectasis or pneumonia. CXR- 11/23/2020- bilateral infiltrates more prominent on the left. CXR 11/25/2020 - progressing consolidation bilaterally. Microbiology:   Lab Results   Component Value Date    [facility]  11/25/2020     No antibiotic susceptibility studies performed. Plates will be held 10  days. Contact the laboratory, [phone], if further workup is  desired.       BC 5 Days no growth 11/09/2020    BC 5 Days no growth 10/29/2020    ORG Staphylococcus coagulase-negative 11/25/2020    ORG Staphylococcus aureus 11/25/2020    ORG Staphylococcus aureus 10/29/2020     Lab Results   Component Value Date    BLOODCULT2 5 Days no growth 11/25/2020    BLOODCULT2 5 Days no growth 11/09/2020    BLOODCULT2 5 Days no growth 10/29/2020    ORG Staphylococcus coagulase-negative 11/25/2020    ORG Staphylococcus aureus 11/25/2020    ORG Staphylococcus aureus 10/29/2020     No results found for: WNDABS  Smear, Respiratory   Date Value Ref Range Status   11/25/2020   Final    Group 5: >25 PMN's/LPF and <10 Epithelial cells/LPF  Abundant Polymorphonuclear leukocytes  Rare Epithelial cells  Abundant Gram positive diplococci  Abundant Gram · Contact isolation -okay to discharge from ID point of view      Electronically signed by Neva Crabtree MD on 12/1/2020 at 10:54 AM

## 2020-12-01 NOTE — PROGRESS NOTES
Occupational Therapy  OT BEDSIDE TREATMENT NOTE      Date:2020  Patient Name: Bharath Marie  MRN: [id number]  : 1960  Room: [address] [place] [address]-A     Referring Shonna Flanagan MD     Evaluating OT: EktaMONET Wilcox, OTR/L #[id number]     AM-PAC Daily Activity Raw Score:    Recommended Adaptive Equipment:  w/w; TBA     Reason for Admission:  Pt was admitted 10-26-20 w/ SOB.  Altered Mental status  10-28-20 intubated/sedated  20 PEG  20 Open Trach/Bronch     Diagnosis:     1. Acute respiratory failure with hypoxia ([address]    2. Pneumonia due to organism    3. Encounter for nasogastric (NG) tube placement    4. Long term (current) use of antibiotics       Pertinent Medical History:  CHF, COPD, DM, HTN, Lymphedema, OA, DM      Precautions:  Falls  Trach/PEG  Zamorano/Fecal Mgmt System       Pt was a poor historian - Little information obtained from chart     Home Living: Pt lives with his wife in a 2-story house.     Bathroom setup:  ??     Equipment owned:  None     Prior Level of Function:  IND with ADLs, IADLs, Transfers and Mobility using No ADfor ambulation. Driving:  ?  Occupation:  ?     Pain Level:  pt c/o pain in LLE; nursing notified.  Patient c/o hx of gout.     Cognition: A & O: Grossly with ability to follow multi- step commands                Functional Assessment:    Initial Eval Status  Date: 20 Treatment Status  Date: 20 Short Term/Long Term Goals  Treatment frequency: PRN 3-day Trial    Feeding NPO/DEP        PEG tube NA   Grooming Max A     Hand-over-hand assist for safety w/ lines to wash face w/ R Hand, Mod VCs  Min A;     To wash face/head and to apply deodorant.     Mod A   UB Dressing Dep     Max of 2 for bed mobility to facilitate task + Max A/VCs to thread UEs into garment bed level     Min A;     To araceli/doff gown with increased time and verbal prompts for proper donning around line management.     Max A   LB Dressing Dep     Max A to don socks  Max A of 2 for bed mobility + Max A of 1 for simulated clothing adjustment in supine, Max VCs    Dep A;     To araceli/doff socks with increased time due to pain in LLE.    Max A of 2   Bathing NT       UE: Min A  LE: Max A    Max A of 2   Toileting Dep     Zamorano Catheter  Fecal Mgmt System Dep  A     Zamorano Catheter  Fecal Mgmt System          TBD   Bed Mobility  Rolling:  Max A of 2  Repositioning:  Max A of 2   Supine to Sit:  NT    Sit to Supine:  NT      Unsafe to attempt to transfer to EOB d/t inability to consistently follow commands, restlessness, recent agitation   Supine to sit: SBA   Sit to supine: Min A    Max A of 2   Functional Transfers Sit to stand:  NT  Stand to sit:  NT      Sit to stand: Mod x 2  From EOB with increased assistance this date due to pain in LLE.     NA   Functional Mobility NT           N/T;     Due to pain in left foot. NA   Balance NT     Fair     Activity Tolerance Poor(+)        Fair+           Visual/  Perceptual WFL  Glasses:  None at b/s   WFL     Hearing WFL  Hearing Aids    WFL       Comments: Upon arrival pt was supine in bed. At end of session pt was properly positioned in supine with HOB elevated, alarm on, all lines and tubes intact and call light within reach. Education: Safe transfer training, safety during ADLs and AD management to maximize independence with functional tasks. · Pt has made good progress towards set goals.      Treatment Charges: Mins Units   Ther Ex  [id number]     Manual Therapy Amada Shanks [phone] [id number]   ADL/Home Mgt 12000 27 2   Neuro Re-ed [id number]     Group Therapy      Orthotic manage/training  [id number]     Non-Billable Time       Time In: 1340  Time Out: 1420  Total Time: 40 mins    Aly Banegas [address], [address]

## 2020-12-01 NOTE — PROGRESS NOTES
543 10/28/2020     PT/INR:    Lab Results   Component Value Date    PROTIME 14.0 11/18/2020    INR 1.2 11/18/2020     Last 3 Troponin:    Lab Results   Component Value Date    TROPONINI <0.01 10/26/2020    TROPONINI <0.01 05/26/2019    TROPONINI <0.01 05/26/2019     ABG:    Lab Results   Component Value Date    PH 7.393 11/26/2020    PCO2 43.1 11/26/2020    PO2 90.3 11/26/2020    HCO3 25.7 11/26/2020    BE 0.7 11/26/2020    O2SAT 96.1 11/26/2020     IRON:    Lab Results   Component Value Date    IRON 95 11/05/2020     IMAGING    Ct Abdomen Pelvis Wo Contrast Additional Contrast? None    Result Date: 11/29/2020  EXAMINATION: CT OF THE ABDOMEN AND PELVIS WITHOUT CONTRAST 11/29/2020 1:09 pm TECHNIQUE: CT of the abdomen and pelvis was performed without the administration of intravenous contrast. Multiplanar reformatted images are provided for review. Dose modulation, iterative reconstruction, and/or weight based adjustment of the mA/kV was utilized to reduce the radiation dose to as low as reasonably achievable. COMPARISON: September 16, 2019 HISTORY: ORDERING SYSTEM PROVIDED HISTORY: Abdomen pain TECHNOLOGIST PROVIDED HISTORY: Reason for exam:->Abdomen pain Additional Contrast?->None What reading provider will be dictating this exam?->CRC FINDINGS: The lung bases demonstrate patchy multifocal infiltrates. 3-5 mm nonspecific nodules are identified in the right lung base. There is hepatomegaly with liver measuring 19 cm with fatty infiltration. Gallbladder is absent. A G-tube is noted. Spleen, pancreas, and adrenals are normal.  1-9 mm nonobstructing bilateral kidney stones are present. 2.5 cm cystic lesion is noted in the right kidney. Degenerative changes are identified in the lumbosacral spine. Pelvis. The urinary bladder is contracted with a Zamorano catheter and wall thickening. Diffusely dilated fluid-filled small bowel loops and colon are noted.   Scattered diverticulosis of the descending and sigmoid colon are noted. There is mild presacral edema. The appendix is normal.    Dilated fluid-filled small bowel loops and colon likely diffuse ileus. Enterocolitis is less likely. Multifocal patchy bibasilar infiltrates concerning for pneumonia. 3-5 mm nonspecific pulmonary nodules. Consider surveillance. Collapsed urinary bladder with wall thickening. Cystitis has to be excluded. Xr Abdomen (kub) (single Ap View)    Result Date: 11/10/2020  EXAMINATION: ONE SUPINE XRAY VIEW(S) OF THE ABDOMEN 11/10/2020 2:05 pm COMPARISON: November 8, 2020 HISTORY: ORDERING SYSTEM PROVIDED HISTORY: evaluate bowel TECHNOLOGIST PROVIDED HISTORY: Reason for exam:->evaluate bowel What reading provider will be dictating this exam?->CRC FINDINGS: NG tube tip is in the gastric lumen. There is no free air or bowel wall pneumatosis. No dilated segments of bowel are evident. NG tube tip in the gastric lumen. Xr Abdomen (kub) (single Ap View)    Result Date: 11/2/2020  EXAMINATION: ONE SUPINE XRAY VIEW(S) OF THE ABDOMEN 11/2/2020 2:53 pm COMPARISON: October 28, 2020. Oracio Reese HISTORY: ORDERING SYSTEM PROVIDED HISTORY: abd distention TECHNOLOGIST PROVIDED HISTORY: Reason for exam:->abd distention What reading provider will be dictating this exam?->CRC FINDINGS: There is an enteric tube in place with the distal tip overlying the mid to distal gastric body. There is a nonspecific intestinal bowel gas pattern with no abnormally dilated loops of small bowel identified. There is no significant fecal residual evident. There is no obvious organomegaly or abnormal soft tissue calcification. Nonobstructive bowel gas pattern.      Ct Head Wo Contrast    Result Date: 11/16/2020  EXAMINATION: CT OF THE HEAD WITHOUT CONTRAST  11/16/2020 2:36 pm TECHNIQUE: CT of the head was performed without the administration of intravenous contrast. Dose modulation, iterative reconstruction, and/or weight based adjustment of the mA/kV was utilized to reduce the is seen. OTHER: There is calcification at the level of the cricothyroid membrane. There is defect in the trachea anteriorly below the cricoid with overlying soft tissue swelling and soft tissue gas contiguous with the skin anteriorly. Tracheostomy tube is in place. SALIVARY GLANDS/THYROID:  The parotid and submandibular glands appear unremarkable. The thyroid gland appears unremarkable. LYMPH NODES:  No cervical or supraclavicular lymphadenopathy is seen. SOFT TISSUES:  No appreciable soft tissue swelling or mass is seen. BRAIN/ORBITS/SINUSES:  The visualized portion of the intracranial contents appear unremarkable, paranasal sinuses and mastoid air cells demonstrate no acute abnormality. Opacification of the bilateral sphenoid sinuses. LUNG APICES/SUPERIOR MEDIASTINUM:  Patchy densities in the bilateral lung apices are again noted. No superior mediastinal lymphadenopathy or mass. The visualized portion of the trachea appears unremarkable. BONES: Multilevel degenerative changes. 1.  There is calcification at the level of the cricothyroid membrane. 2.  Defect in the trachea anteriorly below the cricoid with overlying soft tissue swelling and soft tissue gas contiguous with the skin anteriorly likely related to recent procedure.      Ir Venogram Upper Extremity Right    Result Date: 2020  Patient MRN:  [id number] : 1960 Age: 61 years Gender: Male Order Date:  2020 7:45 AM EXAM: IR VENOGRAM UPPER EXTREMITY RIGHT, IR SPARKLE CATH PLACE VENOUS 2ND+ ORDER, IR TUNNELED CVC PLACE WO SQ PORT/PUMP > 5 YEARS, IR ULTRASOUND GUIDANCE VASCULAR ACCESS, IR FLUORO GUIDED CVA DEVICE PLMT/REPLACE/REMOVAL NUMBER OF IMAGES:  14 INDICATION: Z79.2 Long term (current) use of antibiotics Long term (current) use of antibiotics What reading provider will be dictating this exam?->[facility] COMPARISON: None FLUORO TIME: 00:04:26 DAP: 1965.9 uGym2 AK: 77.1 mGy PICC Catheter Placement and Upper Extremity Venous Ultrasound, the right basilic vein was accessed with ultrasound guidance. IV contrast was injected which demonstrated extravasation into the soft tissue. The decision was then made to place a right tunneled IJ PICC. Using direct real-time ultrasound imaging vascular access was obtained to the right IJ. An image was stored and copy was transmitted to PACS. Subsequently with real-time guidance a needle was inserted intravascular and through the needle a wire. A subcutaneous tunnel was then created along the right upper chest. Subsequently under fluoroscopic guidance, a system of needles, catheters and guidewires were utilized to place a guidewire and catheter in the venous system and the catheter was advanced and appropriately placed at the level of the junction of the superior vena cava and right atrium. Time out occurred at 08:33 AM. Patient received 4 minutes and 26 seconds of fluoroscopy. A fluoroscopic image of the position of the catheter tip was saved in PACS. Successful uncomplicated placement of a right IJ tunneled PICC catheter. Xr Chest Portable    Result Date: 11/25/2020  EXAMINATION: ONE XRAY VIEW OF THE CHEST 11/25/2020 8:08 am COMPARISON: One-view chest x-ray 11/24/2020 HISTORY: ORDERING SYSTEM PROVIDED HISTORY: resp dist TECHNOLOGIST PROVIDED HISTORY: Reason for exam:->resp dist What reading provider will be dictating this exam?->CRC FINDINGS: There is significant interval increase in pulmonary consolidation, most notably in the upper-mid right lung and also in the left lung base. The cardiomediastinal silhouette is within normal limits for AP technique. A right internal jugular central venous catheter appears to terminate in the region of the right atrium. There is partial obscuration of the left costophrenic sulcus, which could be secondary to consolidation or small pleural effusion. There is evidence of a tracheostomy tube.  Degenerative/arthritic changes are present in the spine and the right There is improved aeration of the lungs when compared to the prior study. Patchy per infiltrates persist within the lung bases. 1. Partial interval clearing of the multifocal pulmonary infiltrates within the upper lobes. 2. Persistent small infiltrates within the lung bases. Xr Chest Portable    Result Date: 11/21/2020  EXAMINATION: ONE XRAY VIEW OF THE CHEST 11/21/2020 7:47 am COMPARISON: November 17-20 HISTORY: ORDERING SYSTEM PROVIDED HISTORY: SOB TECHNOLOGIST PROVIDED HISTORY: Reason for exam:->SOB What reading provider will be dictating this exam?->CRC FINDINGS: Tracheostomy tube in good position. The right internal jugular central venous catheter tip in the right atrium. Heart appears to be mildly enlarged. The bilateral vague ill-defined infiltrates with ground-glass opacities are seen. They are more prominent on the right lung. There is no pleural effusions. No significant changes since the previous study of November 20. Xr Chest Portable    Result Date: 11/20/2020  EXAMINATION: ONE XRAY VIEW OF THE CHEST 11/20/2020 7:55 am COMPARISON: 19 November 2020 HISTORY: ORDERING SYSTEM PROVIDED HISTORY: SOB TECHNOLOGIST PROVIDED HISTORY: Reason for exam:->SOB What reading provider will be dictating this exam?->CRC FINDINGS: Central venous catheter on the right terminates in the right atrium proper. This is a stable finding. A left-sided central venous catheter is been removed. There is a stable tracheostomy catheter. There are opacities in both lungs which may represent infiltrate and/or atelectasis. Aeration appears minimally worse. Mildly increasing infiltrate and/or atelectasis bilaterally. Please note, the right central venous catheter terminates in the right atrium proper.     Xr Chest Portable    Result Date: 11/19/2020  EXAMINATION: ONE XRAY VIEW OF THE CHEST 11/19/2020 9:56 am COMPARISON: November 15-18 HISTORY: ORDERING SYSTEM PROVIDED HISTORY: SOB TECHNOLOGIST PROVIDED HISTORY: Reason for exam:->SOB What reading provider will be dictating this exam?->CRC FINDINGS: Some improvement of the bilateral discrete ground-glass densities throughout both lungs predominant in the perihilar regions. The can be an indication for volume overload. No pleural effusions are seen. The heart is normal size. Some improvement in pattern that can represent volume overload since the November 18. Xr Chest Portable    Result Date: 11/18/2020  EXAMINATION: ONE XRAY VIEW OF THE CHEST 11/18/2020 7:39 am COMPARISON: 11/17/2020 and 11/16/2020 HISTORY: ORDERING SYSTEM PROVIDED HISTORY: SOB TECHNOLOGIST PROVIDED HISTORY: Reason for exam:->SOB What reading provider will be dictating this exam?->CRC FINDINGS: There is no interval change in the multifocal bilateral significant airspace disease. There is no right or left pleural effusion. The cardiac silhouette is at upper limits of normal.  There is stable position of the left internal jugular central venous catheter. 1. No interval change in extensive bilateral multifocal airspace disease. Xr Chest Portable    Result Date: 11/17/2020  EXAMINATION: ONE XRAY VIEW OF THE CHEST 11/17/2020 5:58 pm COMPARISON: November 16, 2020. HISTORY: ORDERING SYSTEM PROVIDED HISTORY: SOB TECHNOLOGIST PROVIDED HISTORY: Reason for exam:->SOB What reading provider will be dictating this exam?->CRC FINDINGS: There are bilateral patchy infiltrates. Left IJ line catheter tip in the region of superior vena cava. Tracheostomy to, unchanged. The heart is mildly enlarged. There is blunting of the left costophrenic angle. Bilateral patchy infiltrates, no significant interval change.     Xr Chest Portable    Result Date: 11/16/2020  EXAMINATION: ONE XRAY VIEW OF THE CHEST 11/16/2020 9:04 am COMPARISON: 11/15/2020 HISTORY: ORDERING SYSTEM PROVIDED HISTORY: SOB TECHNOLOGIST PROVIDED HISTORY: Reason for exam:->SOB What reading provider will be dictating this exam?->CRC FINDINGS: A right parahilar pulmonary opacity is seen, which allowing for the differences in the degree of inspiratory effort, are likely stable as of yesterday. In the lower left lung is suboptimally visualized due to underpenetration. Within this limitation, no definite left lung opacity is seen. No pneumothorax or pleural effusion is seen. The cardiac silhouette is enlarged, which may be in part or entirely due to technique. The tracheostomy tube appears grossly well positioned. The left internal jugular vein central venous catheter is well positioned, with the tip overlying the upper SVC. Grossly stable right parahilar pulmonary opacity which may represent atelectasis or pneumonia. The life-support lines and tubes are well positioned. Xr Chest Portable    Result Date: 11/15/2020  EXAMINATION: ONE XRAY VIEW OF THE CHEST 11/15/2020 7:52 am COMPARISON: 11/14/2020 HISTORY: ORDERING SYSTEM PROVIDED HISTORY: SOB TECHNOLOGIST PROVIDED HISTORY: Reason for exam:->SOB What reading provider will be dictating this exam?->CRC FINDINGS: Tracheostomy tube appears unchanged. Left internal jugular central venous catheter with catheter tip not well visualized but possibly in the central left brachiocephalic vein or SVC. Stable cardiomediastinal silhouette with persistent bilateral airspace opacities, right worse than left. Possible small left pleural effusion. No pneumothorax. Stable bilateral airspace opacities, right worse than left. Xr Chest Portable    Result Date: 11/14/2020  EXAMINATION: ONE XRAY VIEW OF THE CHEST 11/14/2020 7:54 am COMPARISON: Comparison November 11-13 HISTORY: ORDERING SYSTEM PROVIDED HISTORY: SOB TECHNOLOGIST PROVIDED HISTORY: Reason for exam:->SOB What reading provider will be dictating this exam?->CRC FINDINGS: Tracheostomy tube in good position. Heart appears to be mildly enlarged.   The Bilateral infiltrates observed predominant in the right lung as seen previously, more limited to the base on the left. Some degree of atelectasis appears to be also present in left lower lobe. Left internal jugular central venous catheter tip in the SVC. No significant change since November 13 study     Xr Chest Portable    Result Date: 11/13/2020  EXAMINATION: ONE X-RAY VIEW OF THE CHEST 11/13/2020 8:01 am COMPARISON: 11/12/2020 HISTORY: ORDERING SYSTEM PROVIDED HISTORY: SOB TECHNOLOGIST PROVIDED HISTORY: Reason for exam:->SOB What reading provider will be dictating this exam?->CRC FINDINGS: Patchy bilateral pulmonary infiltrates are noted unchanged when compared to the prior study. Note is made of a tracheostomy tube and left internal jugular central venous catheter. 1. There is no interval change in the bilateral multifocal patchy pulmonary infiltrates. 2. Probable small left pleural effusion. Xr Chest Portable    Result Date: 11/12/2020  EXAMINATION: ONE XRAY VIEW OF THE CHEST 11/12/2020 7:58 am COMPARISON: November 11, 2020 HISTORY: ORDERING SYSTEM PROVIDED HISTORY: SOB TECHNOLOGIST PROVIDED HISTORY: Reason for exam:->SOB What reading provider will be dictating this exam?->CRC FINDINGS: ETT tip is approximately 2.5 cm above the ellie. Cardiac silhouette is mildly enlarged. Left IJ catheter tip overlies the mid SVC. Persistent left base/retrocardiac opacity with small left pleural effusion. There is a right base bandlike opacity. 1. Persistent multi lobar airspace opacities most pronounced in the left base. To lesser degree, there is also an opacity in the right base. Differential includes atelectasis and pneumonia. Compared to the prior exam, there has been no significant interval change. 2. Small left pleural effusion.      Xr Chest Portable    Result Date: 11/11/2020  EXAMINATION: ONE XRAY VIEW OF THE CHEST 11/11/2020 4:20 pm COMPARISON: Chest series from the same day at 08:02 HISTORY: [facility]: ett placement post surgery TECHNOLOGIST PROVIDED HISTORY: Reason for am COMPARISON: 11/08/2020 HISTORY: ORDERING SYSTEM PROVIDED HISTORY: SOB TECHNOLOGIST PROVIDED HISTORY: Reason for exam:->SOB What reading provider will be dictating this exam?->CRC FINDINGS: Multifocal bilateral pulmonary infiltrates are noted more prominent within the right lower lobe. The endotracheal tube and left internal jugular central venous catheter are unchanged in position. There is an NG tube within the stomach. 1. Multifocal bilateral pulmonary infiltrates slightly improved when compared with the patient's prior study of 11/08/2020. Xr Chest Portable    Result Date: 11/8/2020  EXAMINATION: ONE XRAY VIEW OF THE CHEST 11/8/2020 12:48 pm COMPARISON: Chest radiograph 5 hours prior HISTORY: ORDERING SYSTEM PROVIDED HISTORY: intubated, verification of tube placement TECHNOLOGIST PROVIDED HISTORY: Reason for exam:->intubated, verification of tube placement What reading provider will be dictating this exam?->CRC FINDINGS: Endotracheal tube terminates approximately 3 cm from the ellie. Left IJ central venous catheter terminates in SVC. The enteric feeding tube is partially visualized in the stomach. The cardiomediastinal silhouette is stable in size. There is worsening of the right sided airspace opacities. Mild improvement in left lower lung airspace opacity likely representing atelectasis. No pneumothorax. Endotracheal tube terminates approximately 3 cm from the ellie. New right-sided airspace opacities likely representing atelectasis. Mild interval improvement in left lower lung airspace opacity. Xr Chest Portable    Result Date: 11/8/2020  EXAMINATION: ONE XRAY VIEW OF THE CHEST 11/8/2020 7:37 am COMPARISON: 11/07/2020 HISTORY: ORDERING SYSTEM PROVIDED HISTORY: SOB TECHNOLOGIST PROVIDED HISTORY: Reason for exam:->SOB What reading provider will be dictating this exam?->CRC FINDINGS: The endotracheal tube, nasogastric tube, and left IJ catheter are stable. Stable cardiac silhouette. Patchy bilateral airspace opacities are stable. No effusion or pneumothorax. No acute osseous abnormality. Patchy bilateral pulmonary airspace opacities are stable. Xr Chest Portable    Result Date: 11/7/2020  EXAMINATION: ONE XRAY VIEW OF THE CHEST 11/7/2020 3:40 pm COMPARISON: November 7, 2020 HISTORY: ORDERING SYSTEM PROVIDED HISTORY: s/p left ij placement TECHNOLOGIST PROVIDED HISTORY: Reason for exam:->s/p left ij placement What reading provider will be dictating this exam?->CRC FINDINGS: Endotracheal tube is 2.7 cm above the ellie. Right and left central venous catheters are present with distal tips at location of superior vena cava. NG tube courses below the diaphragm. Redemonstration of interstitial and hazy opacities bilaterally. There is improved aeration in lung bases. The heart is normal size. No pneumothorax. 1.  Improved aeration in lung bases. 2.  Interstitial and hazy opacities persist throughout both lungs. 3.  Endotracheal tube is 2.7 cm above ellie. Xr Chest Portable    Result Date: 11/7/2020  EXAMINATION: ONE XRAY VIEW OF THE CHEST 11/7/2020 7:53 am COMPARISON: November 6, 2020 HISTORY: ORDERING SYSTEM PROVIDED HISTORY: SOB TECHNOLOGIST PROVIDED HISTORY: Reason for exam:->SOB What reading provider will be dictating this exam?->CRC FINDINGS: Endotracheal tube is approximately 1.8 cm above the ellie. Right IJ central venous catheter is present with distal tip of location of SVC. Satisfactory position of NG tube. Stable interstitial and hazy opacities bilaterally notable in lung bases. No pneumothorax. There are low lung volumes. Stable chest radiograph with interstitial pulmonary edema or bilateral pneumonia.      Xr Chest Portable    Result Date: 11/6/2020  EXAMINATION: ONE XRAY VIEW OF THE CHEST 11/6/2020 8:07 am COMPARISON: 11/05/2020 HISTORY: ORDERING SYSTEM PROVIDED HISTORY: SOB TECHNOLOGIST PROVIDED HISTORY: Reason for exam:->SOB What reading provider will be dictating this exam?->CRC FINDINGS: Lines and tubes are stable. Heart size and mediastinal contours are unchanged. The lungs are stable. No change. Xr Chest Portable    Result Date: 11/5/2020  EXAMINATION: ONE XRAY VIEW OF THE CHEST 11/5/2020 8:09 am COMPARISON: 11/04/2020 HISTORY: ORDERING SYSTEM PROVIDED HISTORY: SOB TECHNOLOGIST PROVIDED HISTORY: Reason for exam:->SOB What reading provider will be dictating this exam?->CRC FINDINGS: Lines and tubes are stable. Heart size and mediastinal contours are unchanged. The lungs are stable. No change. Xr Chest Portable    Result Date: 11/4/2020  EXAMINATION: ONE XRAY VIEW OF THE CHEST 11/4/2020 8:02 am COMPARISON: 11/03/2020 HISTORY: ORDERING SYSTEM PROVIDED HISTORY: SOB TECHNOLOGIST PROVIDED HISTORY: Reason for exam:->SOB What reading provider will be dictating this exam?->CRC FINDINGS: Heart is enlarged. There are significant bilateral infiltrates worse in the left lower lobe. These appear unchanged. There may be a small left effusion. Lines/tubes are appropriate. No interval change     Xr Chest Portable    Result Date: 11/3/2020  EXAMINATION: ONE XRAY VIEW OF THE CHEST 11/3/2020 8:05 am COMPARISON: None. HISTORY: ORDERING SYSTEM PROVIDED HISTORY: SOB TECHNOLOGIST PROVIDED HISTORY: Reason for exam:->SOB What reading provider will be dictating this exam?->CRC FINDINGS: The heart is mildly enlarged. Multifocal bilateral pulmonary infiltrates are noted more prominent within the right lung. There is stable position of the endotracheal tube and NG tube. There is no pneumothorax. Multifocal bilateral pulmonary infiltrates more prominent within the right lung. Overall there is no interval change when compared with the prior study of 1 day earlier.      Xr Chest Portable    Result Date: 11/3/2020  EXAMINATION: ONE XRAY VIEW OF THE CHEST 11/3/2020 8:43 am COMPARISON: 3 November 2020 HISTORY: ORDERING SYSTEM PROVIDED HISTORY: s/p ETT exchange TECHNOLOGIST PROVIDED HISTORY: Reason for exam:->s/p ETT exchange What reading provider will be dictating this exam?->CRC FINDINGS: Stable right-sided central venous catheter, endotracheal and nasogastric tubes. Opacity in the right lung is minimally progressive and may relate to any combination of atelectasis and/or infiltrate. Aeration on the left is stable. Mildly worsening aeration on the right which may relate any combination of infiltrate and/or atelectasis. Stable findings on the left. Xr Chest Portable    Result Date: 2020  EXAMINATION: ONE XRAY VIEW OF THE CHEST 2020 7:48 am COMPARISON: 2020 HISTORY: ORDERING SYSTEM PROVIDED HISTORY: SOB TECHNOLOGIST PROVIDED HISTORY: Reason for exam:->SOB What reading provider will be dictating this exam?->CRC FINDINGS: Significant left lower lobe infiltrate and pleural effusion are unchanged. Right basilar atelectasis/infiltrate appears mildly worse. Lines/tubes are appropriate. Slight worsening of right basilar atelectasis/infiltrate     Ir Tunneled Cvc Place Wo Sq Port/pump > 5 Years    Result Date: 2020  Patient MRN:  [id number] : 1960 Age: 61 years Gender: Male Order Date:  2020 7:45 AM EXAM: IR VENOGRAM UPPER EXTREMITY RIGHT, IR SPARKLE CATH PLACE VENOUS 2ND+ ORDER, IR TUNNELED CVC PLACE WO SQ PORT/PUMP > 5 YEARS, IR ULTRASOUND GUIDANCE VASCULAR ACCESS, IR FLUORO GUIDED CVA DEVICE PLMT/REPLACE/REMOVAL NUMBER OF IMAGES:  14 INDICATION: Z79.2 Long term (current) use of antibiotics Long term (current) use of antibiotics What reading provider will be dictating this exam?->[facility] COMPARISON: None FLUORO TIME: 00:04:26 DAP: 1965.9 uGym2 AK: 77.1 mGy PICC Catheter Placement and Upper Extremity Venous Ultrasound, Procedure: After obtaining informed consent, and following the routine sterile preparation and drape, the right basilic vein was accessed with ultrasound guidance.  IV contrast was injected which demonstrated extravasation into the soft tissue. The decision was then made to place a right tunneled IJ PICC. Using direct real-time ultrasound imaging vascular access was obtained to the right IJ. An image was stored and copy was transmitted to PACS. Subsequently with real-time guidance a needle was inserted intravascular and through the needle a wire. A subcutaneous tunnel was then created along the right upper chest. Subsequently under fluoroscopic guidance, a system of needles, catheters and guidewires were utilized to place a guidewire and catheter in the venous system and the catheter was advanced and appropriately placed at the level of the junction of the superior vena cava and right atrium. Time out occurred at 08:33 AM. Patient received 4 minutes and 26 seconds of fluoroscopy. A fluoroscopic image of the position of the catheter tip was saved in PACS. Successful uncomplicated placement of a right IJ tunneled PICC catheter. Ir Sparkle Cath Place Venous 2nd+ Order    Result Date: 2020  Patient MRN:  [id number] : 1960 Age: 61 years Gender: Male Order Date:  2020 7:45 AM EXAM: IR VENOGRAM UPPER EXTREMITY RIGHT, IR SPARKLE CATH PLACE VENOUS 2ND+ ORDER, IR TUNNELED CVC PLACE WO SQ PORT/PUMP > 5 YEARS, IR ULTRASOUND GUIDANCE VASCULAR ACCESS, IR FLUORO GUIDED CVA DEVICE PLMT/REPLACE/REMOVAL NUMBER OF IMAGES:  14 INDICATION: Z79.2 Long term (current) use of antibiotics Long term (current) use of antibiotics What reading provider will be dictating this exam?->[facility] COMPARISON: None FLUORO TIME: 00:04:26 DAP: 1965.9 uGym2 AK: 77.1 mGy PICC Catheter Placement and Upper Extremity Venous Ultrasound, Procedure: After obtaining informed consent, and following the routine sterile preparation and drape, the right basilic vein was accessed with ultrasound guidance. IV contrast was injected which demonstrated extravasation into the soft tissue. The decision was then made to place a right tunneled IJ PICC.  Using direct real-time ultrasound imaging vascular access was obtained to the right IJ. An image was stored and copy was transmitted to PACS. Subsequently with real-time guidance a needle was inserted intravascular and through the needle a wire. A subcutaneous tunnel was then created along the right upper chest. Subsequently under fluoroscopic guidance, a system of needles, catheters and guidewires were utilized to place a guidewire and catheter in the venous system and the catheter was advanced and appropriately placed at the level of the junction of the superior vena cava and right atrium. Time out occurred at 08:33 AM. Patient received 4 minutes and 26 seconds of fluoroscopy. A fluoroscopic image of the position of the catheter tip was saved in PACS. Successful uncomplicated placement of a right IJ tunneled PICC catheter. Xr Abdomen For Ng/og/ne Tube Placement    Result Date: 11/7/2020  EXAMINATION: ONE SUPINE XRAY VIEW(S) OF THE ABDOMEN 11/7/2020 9:40 pm COMPARISON: 11/02/2020 HISTORY: ORDERING SYSTEM PROVIDED HISTORY: OG placement TECHNOLOGIST PROVIDED HISTORY: Reason for exam:->OG placement Portable? ->Yes What reading provider will be dictating this exam?->[facility] FINDINGS: OG tube tip in the body of the stomach. The upper abdomen is unremarkable. No gross free air. Of OG tube tip in the body of the stomach. Xr Abdomen For Ng/og/ne Tube Placement    Result Date: 11/5/2020  EXAMINATION: ONE SUPINE XRAY VIEW(S) OF THE ABDOMEN 11/5/2020 8:27 pm COMPARISON: None. HISTORY: ORDERING SYSTEM PROVIDED HISTORY: Confirmation of course of NG/OG/NE tube and location of tip of tube TECHNOLOGIST PROVIDED HISTORY: Reason for exam:->Confirmation of course of NG/OG/NE tube and location of tip of tube Portable? ->Yes What reading provider will be dictating this exam?->[facility] FINDINGS: Nasogastric/orogastric tube tip in the fundus of the stomach. Nonobstructive bowel gas pattern. No gross free air.      Nasogastric/orogastric tube tip in the fundus of the stomach. Xr Chest Abdomen Ng Placement    Result Date: 2020  EXAMINATION: ONE SUPINE XRAY VIEW(S) OF THE ABDOMEN 2020 1:01 pm COMPARISON: 2020 HISTORY: ORDERING SYSTEM PROVIDED HISTORY: Encounter for nasogastric (NG) tube placement TECHNOLOGIST PROVIDED HISTORY: Reason for exam:->Encounter for nasogastric (NG) tube placement What reading provider will be dictating this exam?->[facility] FINDINGS: The nasogastric tube terminates in the gastric body. There is a nonobstructive bowel gas pattern. The lung bases are clear. Degenerative changes involve the lumbar spine. 1. Nasogastric tube terminating in the gastric body. Ir Ultrasound Guidance Vascular Access    Result Date: 2020  Patient MRN:  [id number] : 1960 Age: 61 years Gender: Male Order Date:  2020 7:45 AM EXAM: IR VENOGRAM UPPER EXTREMITY RIGHT, IR SPARKLE CATH PLACE VENOUS 2ND+ ORDER, IR TUNNELED CVC PLACE WO SQ PORT/PUMP > 5 YEARS, IR ULTRASOUND GUIDANCE VASCULAR ACCESS, IR FLUORO GUIDED CVA DEVICE PLMT/REPLACE/REMOVAL NUMBER OF IMAGES:  14 INDICATION: Z79.2 Long term (current) use of antibiotics Long term (current) use of antibiotics What reading provider will be dictating this exam?->[facility] COMPARISON: None FLUORO TIME: 00:04:26 DAP: 1965.9 uGym2 AK: 77.1 mGy PICC Catheter Placement and Upper Extremity Venous Ultrasound, Procedure: After obtaining informed consent, and following the routine sterile preparation and drape, the right basilic vein was accessed with ultrasound guidance. IV contrast was injected which demonstrated extravasation into the soft tissue. The decision was then made to place a right tunneled IJ PICC. Using direct real-time ultrasound imaging vascular access was obtained to the right IJ. An image was stored and copy was transmitted to PACS. Subsequently with real-time guidance a needle was inserted intravascular and through the needle a wire.  A subcutaneous tunnel was then created along the right upper chest. Subsequently under fluoroscopic guidance, a system of needles, catheters and guidewires were utilized to place a guidewire and catheter in the venous system and the catheter was advanced and appropriately placed at the level of the junction of the superior vena cava and right atrium. Time out occurred at 08:33 AM. Patient received 4 minutes and 26 seconds of fluoroscopy. A fluoroscopic image of the position of the catheter tip was saved in PACS. Successful uncomplicated placement of a right IJ tunneled PICC catheter. Us Dup Lower Extremities Bilateral Venous    Result Date: 11/10/2020  Patient MRN:  [id number] : 1960 Age: 61 years Gender: Male Order Date:  11/10/2020 2:55 PM EXAM: US DUP LOWER EXTREMITIES BILATERAL VENOUS NUMBER OF IMAGES:  62 INDICATION:  r/o DVT r/o DVT What reading provider will be dictating this exam?->[facility] COMPARISON: 10/29/2020 Within the visualized vessels, there is no evidence for deep venous thrombosis There is good compressibility, there is good augmentation, there is good color flow.      Within the visualized vessels there is no evidence for deep venous thrombosis       DIET:  DIET TUBE FEED CONTINUOUS/CYCLIC NPO; Diabetic 1.5; Gastrostomy; Continuous; 25; 65; 23; Exceptions are: Sips with Meds    Medications:    Scheduled Meds:   linezolid  600 mg Per G Tube 2 times per day    lansoprazole  30 mg Per G Tube QAM AC    fluconazole  200 mg Oral Daily    nystatin  5 mL Oral 4x Daily    vitamin B-1  100 mg PEG Tube Daily    carvedilol  6.25 mg Oral BID WC    hydrALAZINE  25 mg PEG Tube 3 times per day    divalproex  250 mg Oral 3 times per day    heparin flush  3 mL Intravenous 2 times per day    senna  2 tablet Oral Nightly    folic acid  1 mg Intravenous Daily    sodium chloride (Inhalant)  2 mL Nebulization Q4H    heparin (porcine)  7,500 Units Subcutaneous Q8H    sodium chloride flush  10 mL Intravenous 2 times per day    Arformoterol Tartrate  15 mcg Nebulization BID    chlorhexidine  15 mL Mouth/Throat BID    amLODIPine  10 mg Oral Daily    [Held by provider] aspirin  81 mg Oral Daily    levothyroxine  50 mcg Oral Daily    budesonide  0.5 mg Nebulization BID    ipratropium-albuterol  1 ampule Inhalation Q4H WA       Continuous Infusions:   sodium chloride Stopped (11/23/20 1648)    dextrose         PRN Meds:mineral oil-hydrophilic petrolatum, HYDROcodone 5 mg - acetaminophen, labetalol, trimethobenzamide, midazolam, sodium chloride flush, heparin flush, polyethylene glycol, perflutren lipid microspheres, acetaminophen, polyvinyl alcohol, hydrALAZINE, sodium chloride flush, [DISCONTINUED] acetaminophen **OR** acetaminophen, colchicine, glucose, dextrose, glucagon (rDNA), dextrose    A/P:      Patient Active Problem List   Diagnosis    Hyperlipidemia    Type 2 diabetes mellitus without complication, without long-term current use of insulin (HCC)    Atypical chest pain    Essential hypertension    Chronic acquired lymphedema    Aortic valve disease    Morbid obesity due to excess calories (HCC)    Abdominal pain    Acute respiratory failure with hypoxia (HCC)    Acute pulmonary edema (HCC)    Congestive heart failure with LV diastolic dysfunction, NYHA class 3 (HCC)    Obstructive sleep apnea    Acquired hypothyroidism    Chronic diastolic heart failure (HCC)    Nonrheumatic aortic valve stenosis    ACE-inhibitor cough    Pneumonia due to organism    Acute gout of right knee    eric neg  Will dc  >35 min spent on discharge preparations, including  Today's exam/note, medication reconciliation, discussing with nursing/family if available and the patient

## 2020-12-01 NOTE — CARE COORDINATION
Received a call from [address] with [facility] that they can no longer accept patient. Spoke to mgr Jaya Hodges and she also discussed case with [facility]. They can not accept, will need to re choice the wife andmake new referrals. Left message for Daly Waite to review Vent/Nathaniel list. Discharge will need to be cancelled at this time. Covid 11/28 (-). HENS and ambulance form on soft chart.      1515--Left message with [facility] to see if they have vent beds available    Segundo Cannon RN  [facility] SURGICAL [facility] Case Management  [phone]

## 2020-12-02 NOTE — PROGRESS NOTES
[facility]                                                   PROGRESS NOTE            C/C : Pneumonia, respiratory failure      Pt is awake , alert  On Ventilator 40% FiO2  Denies fever and chills  Report abdomen pain   Afebrile           Medications:  Scheduled Meds:   linezolid  600 mg Per G Tube 2 times per day    lansoprazole  30 mg Per G Tube QAM AC    fluconazole  200 mg Oral Daily    nystatin  5 mL Oral 4x Daily    vitamin B-1  100 mg PEG Tube Daily    carvedilol  6.25 mg Oral BID WC    hydrALAZINE  25 mg PEG Tube 3 times per day    divalproex  250 mg Oral 3 times per day    heparin flush  3 mL Intravenous 2 times per day    senna  2 tablet Oral Nightly    folic acid  1 mg Intravenous Daily    sodium chloride (Inhalant)  2 mL Nebulization Q4H    heparin (porcine)  7,500 Units Subcutaneous Q8H    sodium chloride flush  10 mL Intravenous 2 times per day    Arformoterol Tartrate  15 mcg Nebulization BID    chlorhexidine  15 mL Mouth/Throat BID    amLODIPine  10 mg Oral Daily    [Held by provider] aspirin  81 mg Oral Daily    levothyroxine  50 mcg Oral Daily    budesonide  0.5 mg Nebulization BID    ipratropium-albuterol  1 ampule Inhalation Q4H WA     Continuous Infusions:   sodium chloride Stopped (11/23/20 1638)    dextrose       PRN Meds:mineral oil-hydrophilic petrolatum, HYDROcodone 5 mg - acetaminophen, labetalol, trimethobenzamide, midazolam, sodium chloride flush, heparin flush, polyethylene glycol, perflutren lipid microspheres, acetaminophen, polyvinyl alcohol, hydrALAZINE, sodium chloride flush, [DISCONTINUED] acetaminophen **OR** acetaminophen, colchicine, glucose, dextrose, glucagon (rDNA), dextrose          REVIEW OF SYSTEMS:    CONSTITUTIONAL: Denies fever and chills . EYES:  No double vision or drainage from eyes, ears or throat. HEENT:  No neck stiffness. No dysphagia. No drainage from eyes, ears or throat  RESPIRATORY:  SOB .    CARDIOVASCULAR:  No chest pain, palpitations, orthopnea or dyspnea on exertion. GASTROINTESTINAL: abdomen pain   GENITOURINARY: Indwelling Zamorano  INTEGUMENT/BREAST:  No rash or breast masses. HEMATOLOGIC/LYMPHATIC:  No lymphadenopathy or blood dyscrasics. ENDOCRINE:  No polyuria or polydipsia or temperature intolerance.    MUSCULOSKELETAL:  No myalgia or arthralgia. NEUROLOGICAL:  weakness      OBJECTIVE:  BP (!) 142/62   Pulse 90   Temp 97.7 °F (36.5 °C)   Resp 16   Ht 5' 8\" (1.727 m)   Wt (!) 335 lb (152 kg)   SpO2 100%   BMI 50.94 kg/m²   Temp  Av.5 °F (36.9 °C)  Min: 97.7 °F (36.5 °C)  Max: 98.9 °F (37.2 °C)     Constitutional: Awake and alert, on the Vent , FiO2 40 % , PEEP 5   Skin: Warm and dry. No rashes were noted. HEENT: Np pallor, no thrush, tracheostomy OK   Chest:  Scattered rhonchi    Cardiovascular: S1 and S2 are rhythmic and regular. Systolic murmurs appreciated.    Abdomen: soft, mild distention, tender ,  PEG- OK,  FMS system-loose stool    Extremities:  + Lymphedema left lower extremity- improving   Lines: PICC line right chest      Laboratory and Tests Review:  Lab Results   Component Value Date    WBC 10.6 2020    WBC 11.4 2020    WBC 10.9 2020    HGB 7.8 (L) 2020    HCT 23.9 (L) 2020    MCV 81.0 2020     (H) 2020     Lab Results   Component Value Date    NEUTROABS 5.59 2020    NEUTROABS 5.61 2020    NEUTROABS 5.44 2020     No results found for: CRPHS  Lab Results   Component Value Date    ALT 23 2020    AST 13 2020    ALKPHOS 102 2020    BILITOT <0.2 2020     Lab Results   Component Value Date     2020    K 4.5 2020    K 4.0 10/27/2020    CL 98 2020    CO2 33 2020    BUN 15 2020    CREATININE 1.1 2020    CREATININE 1.1 2020    CREATININE 1.0 2020    GFRAA >60 2020    LABGLOM >60 2020    GLUCOSE 147 2020    PROT 7.6 2020    LABALBU 3.4 12/02/2020    CALCIUM 9.4 12/02/2020    BILITOT <0.2 12/02/2020    ALKPHOS 102 12/02/2020    AST 13 12/02/2020    ALT 23 12/02/2020     Lab Results   Component Value Date    CRP 1.4 (H) 11/02/2020    CRP 2.8 (H) 09/04/2017    CRP 10.5 (H) 08/28/2017     Lab Results   Component Value Date    SEDRATE 135 (H) 09/04/2017    SEDRATE 150 (H) 08/28/2017    SEDRATE 141 (H) 08/21/2017     Radiology: reviewed  CXR- 11/3- Multifocal bilateral pulmonary infiltrates more prominent within the right lung. CXR- 11/4- worsening b/l infiltrates, worse on left today with possible small L sided effusion   CXR- 11/5 - Stable b/l infiltrates   CXR- 11/6 - Expiratory film, image otherwise appears overall stable. CXR- 11/16 - stable right parahilar pulmonary opacity which may represent   atelectasis or pneumonia. CXR- 11/23/2020- bilateral infiltrates more prominent on the left. CXR 11/25/2020 - progressing consolidation bilaterally. Microbiology:   Lab Results   Component Value Date    [facility]  11/25/2020     No antibiotic susceptibility studies performed. Plates will be held 10  days. Contact the laboratory, [phone], if further workup is  desired.       BC 5 Days no growth 11/09/2020    BC 5 Days no growth 10/29/2020    ORG Staphylococcus coagulase-negative 11/25/2020    ORG Staphylococcus aureus 11/25/2020    ORG Staphylococcus aureus 10/29/2020     Lab Results   Component Value Date    BLOODCULT2 5 Days no growth 11/25/2020    BLOODCULT2 5 Days no growth 11/09/2020    BLOODCULT2 5 Days no growth 10/29/2020    ORG Staphylococcus coagulase-negative 11/25/2020    ORG Staphylococcus aureus 11/25/2020    ORG Staphylococcus aureus 10/29/2020     No results found for: WNDABS  Smear, Respiratory   Date Value Ref Range Status   11/25/2020   Final    Group 5: >25 PMN's/LPF and <10 Epithelial cells/LPF  Abundant Polymorphonuclear leukocytes  Rare Epithelial cells  Abundant Gram positive diplococci  Abundant Gram positive cocci in clusters       No results found for: MPNEUMO, CLAMYDCU, LABLEGI, AFBCX, FUNGSM, LABFUNG  CULTURE, RESPIRATORY   Date Value Ref Range Status   11/25/2020 Oral Pharyngeal Tiesha absent (A)  Final   11/25/2020   Final    Heavy growth  Methicillin resistant Staph aureus isolated. Most Methicillin  resistant Staphylococcus are usually resistant to multiple  antibiotics including other B-Lactams, Aminoglycosides,  Macrolides, Clindamycin and Tetracycline. Contact isolation  is indicated. This isolate is presumed to be resistant based on the  detection of inducible Clindamycin resistance. Clindamycin  may still be effective in some patients. Culture Catheter Tip   Date Value Ref Range Status   08/26/2017 <15 colonies  Final     No results found for: BFCS  No results found for: CXSURG  Urine Culture, Routine   Date Value Ref Range Status   11/09/2020 Growth not present  Final   10/28/2020 Growth not present  Final   09/16/2019 Growth not present  Final     MRSA Culture Only   Date Value Ref Range Status   11/09/2020 Methicillin resistant Staph aureus not isolated  Final   10/30/2020 Methicillin resistant Staph aureus not isolated  Final   07/28/2017 Methicillin resistant Staph aureus not isolated  Final          Assessment:  · Acute on chronic hypoxic respiratory failure likely 2/2 pneumonia and volume overload  - resolving   · Bacterial pneumonia, likely aspiration pneumonia but after extubation patient had to be reintubated rule out HCAP  · Shock, sepsis- resolved  · Alcohol withdrawal   · Leukocytosis improving - 11.5 today  · CONS bacteremia -treated  · MRSA pneumonia on treatment  · Thrush -probable esophagitis-on treatment      Plan:      · Zyvox for the MRSA in the sputum and right lung pneumonia day 6 of 21 days  · Diflucan & nystatin for thrush.   14 total days  · AARON  -nondiagnostic  · CT abdomen and pelvis for abdomen pain (no contrast -NANCY just resolved )   · Follow up blood cx   · Contact isolation -okay to discharge from ID point of view      Electronically signed by Deanna Menon MD on 12/2/2020 at 10:14 AM

## 2020-12-02 NOTE — PROGRESS NOTES
Denies dyspnea on cpap ps 10. Mild abdominal discomfort noted.   Vent settings:Vent Information  $Ventilation: $Subsequent Day  Skin Assessment: Clean, dry, & intact  Equipment ID: [id number]  Equipment Changed: (S) Humidification  Vent Type: 840  Vent Mode: PS  Vt Ordered: 480 mL  Pressure Ordered: (S) 12  Rate Set: 12 bmp  Peak Flow: 60 L/min  Pressure Support: 10 cmH20  FiO2 : 40 %  SpO2: 97 %  SpO2/FiO2 ratio: 242.5  PaO2/FiO2 ratio: 169  Sensitivity: 3  PEEP/CPAP: 5  I Time/ I Time %: 0 s  Humidification Source: Heated wire  Humidification Temp: 37  Humidification Temp Measured: 36.8  Circuit Condensation: Drained  Mask Type: Tracheostomy  Mask Size: Large  Additional Respiratory  Assessments  Pulse: 87  Resp: 16  SpO2: 97 %  Position: Semi-Lockwood's  Humidification Source: Heated wire  Humidification Temp: 37  Circuit Condensation: Drained  Oral Care Completed?: Yes  Oral Care: Teeth brushed, Suction toothette, Mouth suctioned, Mouth moisturizer, Mouth swabbed  Subglottic Suction Done?: Yes  Airway Type: Trachial  Airway Size: 8  Cuff Pressure (cm H2O): 29 cm H2O  Skin barrier applied: Yes      Current Facility-Administered Medications:     linezolid (ZYVOX) 100 MG/5ML suspension 600 mg, 600 mg, Per G Tube, 2 times per day, [place] Le[place], APRN - CNP, 600 mg at 12/02/20 8998    lansoprazole suspension SUSP 30 mg, 30 mg, Per G Tube, QAM Janet CORDERO Sear, RPH, 30 mg at 12/02/20 1193    fluconazole (DIFLUCAN) tablet 200 mg, 200 mg, Oral, Daily, [place] Elvis, APRN - CNP, 200 mg at 12/02/20 4531    nystatin (MYCOSTATIN) 247556 UNIT/ML suspension 500,000 Units, 5 mL, Oral, 4x Daily, [place] Elvis, APRN - CNP, 500,000 Units at 12/02/20 1157    vitamin B-1 (THIAMINE) tablet 100 mg, 100 mg, PEG Tube, Daily, Enoch Wu MD, 100 mg at 12/02/20 0823    mineral oil-hydrophilic petrolatum (HYDROPHOR) ointment, , Topical, BID PRN, Enoch Wu MD    0.9 % sodium chloride infusion, , Intravenous, Continuous, Tiesha Altone Blaire., DO, 1 drop at 11/02/20 1526    sodium chloride (Inhalant) 3 % nebulizer solution 2 mL, 2 mL, Nebulization, Q4H, Neema Reyes Amina., DO, 2 mL at 12/02/20 1124    hydrALAZINE (APRESOLINE) injection 10 mg, 10 mg, Intravenous, Q4H PRN, Bruna Rudd., DO, 10 mg at 11/25/20 1651    heparin (porcine) injection 7,500 Units, 7,500 Units, Subcutaneous, Q8H, Bruna Rudd., DO, 7,500 Units at 12/02/20 7387    sodium chloride flush 0.9 % injection 10 mL, 10 mL, Intravenous, 2 times per day, Bruna Rudd., DO, 10 mL at 12/02/20 0827    sodium chloride flush 0.9 % injection 10 mL, 10 mL, Intravenous, PRN, Neema Reyes Amina., DO, 10 mL at 11/26/20 1442    [DISCONTINUED] acetaminophen (TYLENOL) tablet 650 mg, 650 mg, Oral, Q6H PRN, 650 mg at 11/08/20 1703 **OR** acetaminophen (TYLENOL) suppository 650 mg, 650 mg, Rectal, Q6H PRN, Bruna Rudd., DO    Arformoterol Tartrate [facility]) nebulizer solution 15 mcg, 15 mcg, Nebulization, BID, Neema Reyes Amina., DO, 15 mcg at 12/02/20 5734    chlorhexidine (PERIDEX) 0.12 % solution 15 mL, 15 mL, Mouth/Throat, BID, Alotne Blaire., DO, 15 mL at 12/02/20 8655    amLODIPine (NORVASC) tablet 10 mg, 10 mg, Oral, Daily, Bruna Rudd., DO, 10 mg at 12/02/20 7771    [Held by provider] aspirin chewable tablet 81 mg, 81 mg, Oral, Daily, Bruna Rudd., DO, 81 mg at 11/16/20 0970    colchicine (COLCRYS) tablet 0.6 mg, 0.6 mg, Oral, BID PRN, Neema Dhillonh., DO, 0.6 mg at 12/02/20 4293    levothyroxine (SYNTHROID) tablet 50 mcg, 50 mcg, Oral, Daily, Bruna Nice, DO, 50 mcg at 12/02/20 4716    budesonide (PULMICORT) nebulizer suspension 500 mcg, 0.5 mg, Nebulization, BID, Bruna Nice, DO, 500 mcg at 12/02/20 2672    ipratropium-albuterol (DUONEB) nebulizer solution 1 ampule, 1 ampule, Inhalation, Q4H WA, Bruna Nice, DO, 1 ampule at 12/02/20 1124    glucose (GLUTOSE) 40 % oral gel 15 g, 15 g, Oral, PRN, Nikolas Herman., DO    dextrose 50 % IV solution, 12.5 g, Intravenous, PRN, David Duvall., DO, 25 g at 11/26/20 0607    glucagon (rDNA) injection 1 mg, 1 mg, Intramuscular, PRN, David Duvall., DO    dextrose 5 % solution, 100 mL/hr, Intravenous, PRN, David Marvin Jr., DO  BP (!) 116/58   Pulse 87   Temp 98.1 °F (36.7 °C) (Temporal)   Resp 16   Ht 5' 8\" (1.727 m)   Wt (!) 335 lb (152 kg)   SpO2 97%   BMI 50.94 kg/m²     General:  Awake and alert, no distress  Neck: Trach midline  Chest: Symmetric, no accessory use  Heart: RRR II/VI holosystolic murmur at LLSB  Lungs: Diminished but clear  Abdomen: Obese, soft, nt  Extremities: No edema    Intake/Output Summary (Last 24 hours) at 12/2/2020 1440  Last data filed at 12/2/2020 1419  Gross per 24 hour   Intake 1431 ml   Output 1200 ml   Net 231 ml     CBC with Differential:    Lab Results   Component Value Date    WBC 10.6 12/02/2020    RBC 2.95 12/02/2020    HGB 7.8 12/02/2020    HCT 23.9 12/02/2020     12/02/2020    MCV 81.0 12/02/2020    MCH 26.4 12/02/2020    MCHC 32.6 12/02/2020    RDW 19.8 12/02/2020    NRBC 0.9 11/16/2020    SEGSPCT 73 01/26/2014    METASPCT 1.8 11/15/2020    LYMPHOPCT 29.1 12/02/2020    MONOPCT 11.5 12/02/2020    MYELOPCT 0.9 11/15/2020    BASOPCT 0.4 12/02/2020    MONOSABS 1.22 12/02/2020    LYMPHSABS 3.09 12/02/2020    EOSABS 0.48 12/02/2020    BASOSABS 0.04 12/02/2020     CMP:    Lab Results   Component Value Date     12/02/2020    K 4.5 12/02/2020    K 4.0 10/27/2020    CL 98 12/02/2020    CO2 33 12/02/2020    BUN 15 12/02/2020    CREATININE 1.1 12/02/2020    GFRAA >60 12/02/2020    LABGLOM >60 12/02/2020    GLUCOSE 147 12/02/2020    PROT 7.6 12/02/2020    LABALBU 3.4 12/02/2020    CALCIUM 9.4 12/02/2020    BILITOT <0.2 12/02/2020    ALKPHOS 102 12/02/2020    AST 13 12/02/2020    ALT 23 12/02/2020        IMPRESSION:   Patient Active Problem List   Diagnosis    Hyperlipidemia  Type 2 diabetes mellitus without complication, without long-term current use of insulin (HCC)    Atypical chest pain    Essential hypertension    Chronic acquired lymphedema    Aortic valve disease    Morbid obesity due to excess calories (HCC)    Abdominal pain    Acute respiratory failure with hypoxia (HCC)    Acute pulmonary edema (HCC)    Congestive heart failure with LV diastolic dysfunction, NYHA class 3 (HCC)    Obstructive sleep apnea    Acquired hypothyroidism    Chronic diastolic heart failure (HCC)    Nonrheumatic aortic valve stenosis    ACE-inhibitor cough    Pneumonia due to organism    Acute gout of right knee   Tolerating PS today well so will drop to 8 then 5 then to atms. Mildly anemic but stable. [facility]  12/2/2020  2:40 PM

## 2020-12-02 NOTE — CARE COORDINATION
Spoke to Ruth with [facility], they can now accept and have a bed. She needs to update insurance for precert. Medicaid application faxed to Debra Lambert @ [phone]. Wife updated. Will set up transport when auth obtained.      Laura Garcia, RN  [facility] Case Management  [phone]

## 2020-12-02 NOTE — PROGRESS NOTES
numbness and tingling to extremities  Edema:  Mild B LE edema noted    Therapeutic Exercises:    Educated in completion of B UE ROM and B LE ankle pumps/quad sets between sessions  Sit<>Stand from EOB x4 (see comments)    Patient education  Pt educated on role of therapy, safety with mobility, improved transfer technique    Patient response to education:   Pt verbalized understanding Pt demonstrated skill Pt requires further education in this area   yes yes yes     ASSESSMENT:    Comments:  RN clears pt for therapy this PM. Pt resting semi-supine upon arrival, agreeable to PT session. Pt completed bed mobility with cues for avoidance of shearing on buttocks due to presence of FMS, requiring assistance only for line management. Pt reported dizziness upon initial sitting that resolved in 2 minutes of static upright sitting. Pt attempted sit>stand from EOB without successful clearance of buttocks with first attempt with B UEs placed on EOB; only willing to attempt second time with B UE support on FWW. Bed height elevated approx 6\" and modified technique with single UE on walker and single UE on bed rail to complete additional sit>stand with Merle, bed progressively lowered approximately 3\" for next transfer, then final transfer at lowest bed height all completed with Min A. Pt demonstrates slow transition of L UE from bed rail to walker requiring max cues and manual assist for R lateral weight shift to complete task. Pt maintains L UE in abducted and externally rotated position due to pain in L foot. Pt able to take 3 small steps laterally then posteriorly with high level of difficulty noted and manual assist required for walker stabilization and weight shifting laterally. Additional amb limited by pain L foot. Pt returned to semi-supine upon completion of session with all needs in reach and lines/tubes remaining intact.     Treatment:  Patient practiced and was instructed in the following treatment:     Bed mobility: cues for avoidance of shearing and sequencing, manual assist for line management only   Transfer training: cues for B UE placement and safety, cues for maintaining B LEs within boundaries of walker with fair- results, cues for improved transition of hands on/off walker, manual assist for walker stabilization and lift/lower as noted above   Gait training: cues for improved B LE placement within boundary of walker, manual assist for lateral weight shifting and balance, hands on assistance for walker progression    PLAN:    Patient is making good progress towards established goals. Will continue with current POC.         PLAN:    Time in  1221  Time out  1300    Total Treatment Time  39    CPT codes:  [] Gait training 17123 0 minutes  [] Manual therapy 51547 0 minutes  [x] Therapeutic activities 92736 39 minutes  [] Therapeutic exercises 95411 0 minutes  [] Neuromuscular reeducation 19503 0 minutes      Cherylene Downs, PT, DPT  [id number]

## 2020-12-02 NOTE — PROGRESS NOTES
PT SEEN AND EXAMINED. Chart reviewed. meds reviewed. D/w nursing + family as available. EXAM: IN GENERAL, NAD. AWAKE AND ALERT. ROS NEGx10 EXCEPT: he is frustrated. Wants to dc- it was delayed bc of facility. BP (!) 142/62   Pulse 90   Temp 97.7 °F (36.5 °C)   Resp 16   Ht 5' 8\" (1.727 m)   Wt (!) 335 lb (152 kg)   SpO2 99%   BMI 50.94 kg/m²   GEN: A+O NAD. HEENT: NCAT. EOMI. JOE  NECK: NO JVD. TRACH MIDLINE. NO BRUITS. NO THYROMEGALY. LUNGS: CTA BL NO RALES, RHONCHI OR WHEEZES. GOOD EXCURSION. CV: Regular rate and rhythm, NO Murmurs, Rubs, Or gallops  ABD: Soft. Nontender. Normal bowel sounds. No organomegaly  EXT:No clubbing cyanosis or edema  Neuro: Alert and oriented x 3. No focal motor deficits. No sensory deficits. Reflexes appear intact.   Labs/data reviewedLABS: CBC with Differential:    Lab Results   Component Value Date    WBC 10.6 12/02/2020    RBC 2.95 12/02/2020    HGB 7.8 12/02/2020    HCT 23.9 12/02/2020     12/02/2020    MCV 81.0 12/02/2020    MCH 26.4 12/02/2020    MCHC 32.6 12/02/2020    RDW 19.8 12/02/2020    NRBC 0.9 11/16/2020    SEGSPCT 73 01/26/2014    METASPCT 1.8 11/15/2020    LYMPHOPCT 29.1 12/02/2020    MONOPCT 11.5 12/02/2020    MYELOPCT 0.9 11/15/2020    BASOPCT 0.4 12/02/2020    MONOSABS 1.22 12/02/2020    LYMPHSABS 3.09 12/02/2020    EOSABS 0.48 12/02/2020    BASOSABS 0.04 12/02/2020     Platelets:    Lab Results   Component Value Date     12/02/2020     CMP:    Lab Results   Component Value Date     12/02/2020    K 4.5 12/02/2020    K 4.0 10/27/2020    CL 98 12/02/2020    CO2 33 12/02/2020    BUN 15 12/02/2020    CREATININE 1.1 12/02/2020    GFRAA >60 12/02/2020    LABGLOM >60 12/02/2020    GLUCOSE 147 12/02/2020    PROT 7.6 12/02/2020    LABALBU 3.4 12/02/2020    CALCIUM 9.4 12/02/2020    BILITOT <0.2 12/02/2020    ALKPHOS 102 12/02/2020    AST 13 12/02/2020    ALT 23 12/02/2020     Magnesium:    Lab Results   Component Value Date    MG 1.8 11/25/2020     LDH:    Lab Results   Component Value Date     10/28/2020     PT/INR:    Lab Results   Component Value Date    PROTIME 14.0 11/18/2020    INR 1.2 11/18/2020     Last 3 Troponin:    Lab Results   Component Value Date    TROPONINI <0.01 10/26/2020    TROPONINI <0.01 05/26/2019    TROPONINI <0.01 05/26/2019     ABG:    Lab Results   Component Value Date    PH 7.393 11/26/2020    PCO2 43.1 11/26/2020    PO2 90.3 11/26/2020    HCO3 25.7 11/26/2020    BE 0.7 11/26/2020    O2SAT 96.1 11/26/2020     IRON:    Lab Results   Component Value Date    IRON 95 11/05/2020     IMAGING    Ct Abdomen Pelvis Wo Contrast Additional Contrast? None    Result Date: 11/29/2020  EXAMINATION: CT OF THE ABDOMEN AND PELVIS WITHOUT CONTRAST 11/29/2020 1:09 pm TECHNIQUE: CT of the abdomen and pelvis was performed without the administration of intravenous contrast. Multiplanar reformatted images are provided for review. Dose modulation, iterative reconstruction, and/or weight based adjustment of the mA/kV was utilized to reduce the radiation dose to as low as reasonably achievable. COMPARISON: September 16, 2019 HISTORY: ORDERING SYSTEM PROVIDED HISTORY: Abdomen pain TECHNOLOGIST PROVIDED HISTORY: Reason for exam:->Abdomen pain Additional Contrast?->None What reading provider will be dictating this exam?->CRC FINDINGS: The lung bases demonstrate patchy multifocal infiltrates. 3-5 mm nonspecific nodules are identified in the right lung base. There is hepatomegaly with liver measuring 19 cm with fatty infiltration. Gallbladder is absent. A G-tube is noted. Spleen, pancreas, and adrenals are normal.  1-9 mm nonobstructing bilateral kidney stones are present. 2.5 cm cystic lesion is noted in the right kidney. Degenerative changes are identified in the lumbosacral spine. Pelvis. The urinary bladder is contracted with a Zamorano catheter and wall thickening.   Diffusely dilated fluid-filled small bowel loops and colon are noted.  Scattered diverticulosis of the descending and sigmoid colon are noted. There is mild presacral edema. The appendix is normal.    Dilated fluid-filled small bowel loops and colon likely diffuse ileus. Enterocolitis is less likely. Multifocal patchy bibasilar infiltrates concerning for pneumonia. 3-5 mm nonspecific pulmonary nodules. Consider surveillance. Collapsed urinary bladder with wall thickening. Cystitis has to be excluded. Xr Abdomen (kub) (single Ap View)    Result Date: 11/10/2020  EXAMINATION: ONE SUPINE XRAY VIEW(S) OF THE ABDOMEN 11/10/2020 2:05 pm COMPARISON: November 8, 2020 HISTORY: ORDERING SYSTEM PROVIDED HISTORY: evaluate bowel TECHNOLOGIST PROVIDED HISTORY: Reason for exam:->evaluate bowel What reading provider will be dictating this exam?->CRC FINDINGS: NG tube tip is in the gastric lumen. There is no free air or bowel wall pneumatosis. No dilated segments of bowel are evident. NG tube tip in the gastric lumen. Xr Abdomen (kub) (single Ap View)    Result Date: 11/2/2020  EXAMINATION: ONE SUPINE XRAY VIEW(S) OF THE ABDOMEN 11/2/2020 2:53 pm COMPARISON: October 28, 2020. Óscar  HISTORY: ORDERING SYSTEM PROVIDED HISTORY: abd distention TECHNOLOGIST PROVIDED HISTORY: Reason for exam:->abd distention What reading provider will be dictating this exam?->CRC FINDINGS: There is an enteric tube in place with the distal tip overlying the mid to distal gastric body. There is a nonspecific intestinal bowel gas pattern with no abnormally dilated loops of small bowel identified. There is no significant fecal residual evident. There is no obvious organomegaly or abnormal soft tissue calcification. Nonobstructive bowel gas pattern.      Ct Head Wo Contrast    Result Date: 11/16/2020  EXAMINATION: CT OF THE HEAD WITHOUT CONTRAST  11/16/2020 2:36 pm TECHNIQUE: CT of the head was performed without the administration of intravenous contrast. Dose modulation, iterative reconstruction, and/or false vocal cords are normal in appearance. No mass or abscess is seen. OTHER: There is calcification at the level of the cricothyroid membrane. There is defect in the trachea anteriorly below the cricoid with overlying soft tissue swelling and soft tissue gas contiguous with the skin anteriorly. Tracheostomy tube is in place. SALIVARY GLANDS/THYROID:  The parotid and submandibular glands appear unremarkable. The thyroid gland appears unremarkable. LYMPH NODES:  No cervical or supraclavicular lymphadenopathy is seen. SOFT TISSUES:  No appreciable soft tissue swelling or mass is seen. BRAIN/ORBITS/SINUSES:  The visualized portion of the intracranial contents appear unremarkable, paranasal sinuses and mastoid air cells demonstrate no acute abnormality. Opacification of the bilateral sphenoid sinuses. LUNG APICES/SUPERIOR MEDIASTINUM:  Patchy densities in the bilateral lung apices are again noted. No superior mediastinal lymphadenopathy or mass. The visualized portion of the trachea appears unremarkable. BONES: Multilevel degenerative changes. 1.  There is calcification at the level of the cricothyroid membrane. 2.  Defect in the trachea anteriorly below the cricoid with overlying soft tissue swelling and soft tissue gas contiguous with the skin anteriorly likely related to recent procedure.      Ir Venogram Upper Extremity Right    Result Date: 2020  Patient MRN:  [id number] : 1960 Age: 61 years Gender: Male Order Date:  2020 7:45 AM EXAM: IR VENOGRAM UPPER EXTREMITY RIGHT, IR SPARKLE CATH PLACE VENOUS 2ND+ ORDER, IR TUNNELED CVC PLACE WO SQ PORT/PUMP > 5 YEARS, IR ULTRASOUND GUIDANCE VASCULAR ACCESS, IR FLUORO GUIDED CVA DEVICE PLMT/REPLACE/REMOVAL NUMBER OF IMAGES:  14 INDICATION: Z79.2 Long term (current) use of antibiotics Long term (current) use of antibiotics What reading provider will be dictating this exam?->[facility] COMPARISON: None FLUORO TIME: 00:04:26 DAP: 1965.9 uGym2 AK: 77.1 mGy PICC Catheter Placement and Upper Extremity Venous Ultrasound, Procedure: After obtaining informed consent, and following the routine sterile preparation and drape, the right basilic vein was accessed with ultrasound guidance. IV contrast was injected which demonstrated extravasation into the soft tissue. The decision was then made to place a right tunneled IJ PICC. Using direct real-time ultrasound imaging vascular access was obtained to the right IJ. An image was stored and copy was transmitted to PACS. Subsequently with real-time guidance a needle was inserted intravascular and through the needle a wire. A subcutaneous tunnel was then created along the right upper chest. Subsequently under fluoroscopic guidance, a system of needles, catheters and guidewires were utilized to place a guidewire and catheter in the venous system and the catheter was advanced and appropriately placed at the level of the junction of the superior vena cava and right atrium. Time out occurred at 08:33 AM. Patient received 4 minutes and 26 seconds of fluoroscopy. A fluoroscopic image of the position of the catheter tip was saved in PACS. Successful uncomplicated placement of a right IJ tunneled PICC catheter.     Ir Danna [place] Device Plmt/replace/removal    Result Date: 2020  Patient MRN:  [id number] : 1960 Age: 61 years Gender: Male Order Date:  2020 7:45 AM EXAM: IR VENOGRAM UPPER EXTREMITY RIGHT, IR SPARKLE CATH PLACE VENOUS 2ND+ ORDER, IR TUNNELED CVC PLACE WO SQ PORT/PUMP > 5 YEARS, IR ULTRASOUND GUIDANCE VASCULAR ACCESS, IR FLUORO GUIDED CVA DEVICE PLMT/REPLACE/REMOVAL NUMBER OF IMAGES:  14 INDICATION: Z79.2 Long term (current) use of antibiotics Long term (current) use of antibiotics What reading provider will be dictating this exam?->[facility] COMPARISON: None FLUORO TIME: 00:04:26 DAP: 1965.9 uGym2 AK: 77.1 mGy PICC Catheter Placement and Upper Extremity Venous Ultrasound, Procedure: After obtaining informed consent, and following the routine sterile preparation and drape, the right basilic vein was accessed with ultrasound guidance. IV contrast was injected which demonstrated extravasation into the soft tissue. The decision was then made to place a right tunneled IJ PICC. Using direct real-time ultrasound imaging vascular access was obtained to the right IJ. An image was stored and copy was transmitted to PACS. Subsequently with real-time guidance a needle was inserted intravascular and through the needle a wire. A subcutaneous tunnel was then created along the right upper chest. Subsequently under fluoroscopic guidance, a system of needles, catheters and guidewires were utilized to place a guidewire and catheter in the venous system and the catheter was advanced and appropriately placed at the level of the junction of the superior vena cava and right atrium. Time out occurred at 08:33 AM. Patient received 4 minutes and 26 seconds of fluoroscopy. A fluoroscopic image of the position of the catheter tip was saved in PACS. Successful uncomplicated placement of a right IJ tunneled PICC catheter. Xr Chest Portable    Result Date: 11/25/2020  EXAMINATION: ONE XRAY VIEW OF THE CHEST 11/25/2020 8:08 am COMPARISON: One-view chest x-ray 11/24/2020 HISTORY: ORDERING SYSTEM PROVIDED HISTORY: resp dist TECHNOLOGIST PROVIDED HISTORY: Reason for exam:->resp dist What reading provider will be dictating this exam?->CRC FINDINGS: There is significant interval increase in pulmonary consolidation, most notably in the upper-mid right lung and also in the left lung base. The cardiomediastinal silhouette is within normal limits for AP technique. A right internal jugular central venous catheter appears to terminate in the region of the right atrium. There is partial obscuration of the left costophrenic sulcus, which could be secondary to consolidation or small pleural effusion. There is evidence of a tracheostomy tube. Degenerative/arthritic changes are present in the spine and the right acromioclavicular joint. EKG leads overlie the chest.    1. There is interval progression of bilateral consolidation, compatible with worsening pneumonia. 2. A right internal jugular central venous catheter appears to terminate in the region of the right atrium. May consider withdrawal by approximately 4 cm to ensure non atrial in location. Xr Chest Portable    Result Date: 11/24/2020  EXAMINATION: ONE XRAY VIEW OF THE CHEST 11/24/2020 9:54 am COMPARISON: November 23, 2020 HISTORY: ORDERING SYSTEM PROVIDED HISTORY: SOB TECHNOLOGIST PROVIDED HISTORY: Reason for exam:->SOB What reading provider will be dictating this exam?->CRC FINDINGS: Right IJ approach central venous catheter again identified in unchanged position. Tracheostomy again identified. The cardiac silhouette is enlarged but stable in size. There are bilateral airspace opacities, without interval change. No pleural effusions or pneumothorax. No interval change in bilateral airspace opacities. Xr Chest Portable    Result Date: 11/23/2020  EXAMINATION: ONE XRAY VIEW OF THE CHEST 11/23/2020 7:40 am COMPARISON: 11/20/2020 HISTORY: ORDERING SYSTEM PROVIDED HISTORY: SOB TECHNOLOGIST PROVIDED HISTORY: Reason for exam:->SOB What reading provider will be dictating this exam?->CRC FINDINGS: There is stable position of the tracheostomy tube. There is a right subclavian central venous catheter. Patchy multifocal pulmonary infiltrates are noted. More prominent within the left lung. 1. There are patchy multifocal bilateral pulmonary infiltrates more prominent within the left lower lobe.     Xr Chest Portable    Result Date: 11/22/2020  EXAMINATION: ONE XRAY VIEW OF THE CHEST 11/22/2020 8:09 am COMPARISON: 11/21/2020 HISTORY: ORDERING SYSTEM PROVIDED HISTORY: SOB TECHNOLOGIST PROVIDED HISTORY: Reason for exam:->SOB What reading provider will be dictating this exam?->CRC FINDINGS: There is no interval change in the position of the tracheostomy tube. There is improved aeration of the lungs when compared to the prior study. Patchy per infiltrates persist within the lung bases. 1. Partial interval clearing of the multifocal pulmonary infiltrates within the upper lobes. 2. Persistent small infiltrates within the lung bases. Xr Chest Portable    Result Date: 11/21/2020  EXAMINATION: ONE XRAY VIEW OF THE CHEST 11/21/2020 7:47 am COMPARISON: November 17-20 HISTORY: ORDERING SYSTEM PROVIDED HISTORY: SOB TECHNOLOGIST PROVIDED HISTORY: Reason for exam:->SOB What reading provider will be dictating this exam?->CRC FINDINGS: Tracheostomy tube in good position. The right internal jugular central venous catheter tip in the right atrium. Heart appears to be mildly enlarged. The bilateral vague ill-defined infiltrates with ground-glass opacities are seen. They are more prominent on the right lung. There is no pleural effusions. No significant changes since the previous study of November 20. Xr Chest Portable    Result Date: 11/20/2020  EXAMINATION: ONE XRAY VIEW OF THE CHEST 11/20/2020 7:55 am COMPARISON: 19 November 2020 HISTORY: ORDERING SYSTEM PROVIDED HISTORY: SOB TECHNOLOGIST PROVIDED HISTORY: Reason for exam:->SOB What reading provider will be dictating this exam?->CRC FINDINGS: Central venous catheter on the right terminates in the right atrium proper. This is a stable finding. A left-sided central venous catheter is been removed. There is a stable tracheostomy catheter. There are opacities in both lungs which may represent infiltrate and/or atelectasis. Aeration appears minimally worse. Mildly increasing infiltrate and/or atelectasis bilaterally. Please note, the right central venous catheter terminates in the right atrium proper.     Xr Chest Portable    Result Date: 11/19/2020  EXAMINATION: ONE XRAY VIEW OF THE CHEST 11/19/2020 9:56 am COMPARISON: November 15-18 HISTORY: ORDERING SYSTEM PROVIDED HISTORY: SOB TECHNOLOGIST PROVIDED HISTORY: Reason for exam:->SOB What reading provider will be dictating this exam?->CRC FINDINGS: Some improvement of the bilateral discrete ground-glass densities throughout both lungs predominant in the perihilar regions. The can be an indication for volume overload. No pleural effusions are seen. The heart is normal size. Some improvement in pattern that can represent volume overload since the November 18. Xr Chest Portable    Result Date: 11/18/2020  EXAMINATION: ONE XRAY VIEW OF THE CHEST 11/18/2020 7:39 am COMPARISON: 11/17/2020 and 11/16/2020 HISTORY: ORDERING SYSTEM PROVIDED HISTORY: SOB TECHNOLOGIST PROVIDED HISTORY: Reason for exam:->SOB What reading provider will be dictating this exam?->CRC FINDINGS: There is no interval change in the multifocal bilateral significant airspace disease. There is no right or left pleural effusion. The cardiac silhouette is at upper limits of normal.  There is stable position of the left internal jugular central venous catheter. 1. No interval change in extensive bilateral multifocal airspace disease. Xr Chest Portable    Result Date: 11/17/2020  EXAMINATION: ONE XRAY VIEW OF THE CHEST 11/17/2020 5:58 pm COMPARISON: November 16, 2020. HISTORY: ORDERING SYSTEM PROVIDED HISTORY: SOB TECHNOLOGIST PROVIDED HISTORY: Reason for exam:->SOB What reading provider will be dictating this exam?->CRC FINDINGS: There are bilateral patchy infiltrates. Left IJ line catheter tip in the region of superior vena cava. Tracheostomy to, unchanged. The heart is mildly enlarged. There is blunting of the left costophrenic angle. Bilateral patchy infiltrates, no significant interval change.     Xr Chest Portable    Result Date: 11/16/2020  EXAMINATION: ONE XRAY VIEW OF THE CHEST 11/16/2020 9:04 am COMPARISON: 11/15/2020 HISTORY: ORDERING SYSTEM PROVIDED HISTORY: SOB TECHNOLOGIST PROVIDED HISTORY: Reason for exam:->SOB What reading provider will be dictating this exam?->CRC FINDINGS: A right parahilar pulmonary opacity is seen, which allowing for the differences in the degree of inspiratory effort, are likely stable as of yesterday. In the lower left lung is suboptimally visualized due to underpenetration. Within this limitation, no definite left lung opacity is seen. No pneumothorax or pleural effusion is seen. The cardiac silhouette is enlarged, which may be in part or entirely due to technique. The tracheostomy tube appears grossly well positioned. The left internal jugular vein central venous catheter is well positioned, with the tip overlying the upper SVC. Grossly stable right parahilar pulmonary opacity which may represent atelectasis or pneumonia. The life-support lines and tubes are well positioned. Xr Chest Portable    Result Date: 11/15/2020  EXAMINATION: ONE XRAY VIEW OF THE CHEST 11/15/2020 7:52 am COMPARISON: 11/14/2020 HISTORY: ORDERING SYSTEM PROVIDED HISTORY: SOB TECHNOLOGIST PROVIDED HISTORY: Reason for exam:->SOB What reading provider will be dictating this exam?->CRC FINDINGS: Tracheostomy tube appears unchanged. Left internal jugular central venous catheter with catheter tip not well visualized but possibly in the central left brachiocephalic vein or SVC. Stable cardiomediastinal silhouette with persistent bilateral airspace opacities, right worse than left. Possible small left pleural effusion. No pneumothorax. Stable bilateral airspace opacities, right worse than left. Xr Chest Portable    Result Date: 11/14/2020  EXAMINATION: ONE XRAY VIEW OF THE CHEST 11/14/2020 7:54 am COMPARISON: Comparison November 11-13 HISTORY: ORDERING SYSTEM PROVIDED HISTORY: SOB TECHNOLOGIST PROVIDED HISTORY: Reason for exam:->SOB What reading provider will be dictating this exam?->CRC FINDINGS: Tracheostomy tube in good position. Heart appears to be mildly enlarged.   The Bilateral infiltrates observed predominant in the right lung as seen previously, more limited to the base on the left. Some degree of atelectasis appears to be also present in left lower lobe. Left internal jugular central venous catheter tip in the SVC. No significant change since November 13 study     Xr Chest Portable    Result Date: 11/13/2020  EXAMINATION: ONE X-RAY VIEW OF THE CHEST 11/13/2020 8:01 am COMPARISON: 11/12/2020 HISTORY: ORDERING SYSTEM PROVIDED HISTORY: SOB TECHNOLOGIST PROVIDED HISTORY: Reason for exam:->SOB What reading provider will be dictating this exam?->CRC FINDINGS: Patchy bilateral pulmonary infiltrates are noted unchanged when compared to the prior study. Note is made of a tracheostomy tube and left internal jugular central venous catheter. 1. There is no interval change in the bilateral multifocal patchy pulmonary infiltrates. 2. Probable small left pleural effusion. Xr Chest Portable    Result Date: 11/12/2020  EXAMINATION: ONE XRAY VIEW OF THE CHEST 11/12/2020 7:58 am COMPARISON: November 11, 2020 HISTORY: ORDERING SYSTEM PROVIDED HISTORY: SOB TECHNOLOGIST PROVIDED HISTORY: Reason for exam:->SOB What reading provider will be dictating this exam?->CRC FINDINGS: ETT tip is approximately 2.5 cm above the ellie. Cardiac silhouette is mildly enlarged. Left IJ catheter tip overlies the mid SVC. Persistent left base/retrocardiac opacity with small left pleural effusion. There is a right base bandlike opacity. 1. Persistent multi lobar airspace opacities most pronounced in the left base. To lesser degree, there is also an opacity in the right base. Differential includes atelectasis and pneumonia. Compared to the prior exam, there has been no significant interval change. 2. Small left pleural effusion.      Xr Chest Portable    Result Date: 11/11/2020  EXAMINATION: ONE XRAY VIEW OF THE CHEST 11/11/2020 4:20 pm COMPARISON: Chest series from the same day at 08:02 HISTORY: [id number] HISTORY: ett placement post surgery TECHNOLOGIST PROVIDED HISTORY: Reason for exam:->ett placement post surgery What reading provider will be dictating this exam?->CRC FINDINGS: Medical support devices: Endotracheal tube terminates in the midthoracic trachea. Left internal jugular central venous catheter with distal tip projecting near the junction of the brachiocephalic veins. Lungs: Persistent low lung volumes with ill-defined perihilar opacities. Retrocardiac and left basilar opacity persist.  No obvious pneumothorax. Cardiomediastinal silhouette and pulmonary vascularity: Atherosclerotic disease in the aortic arch. Cardiac silhouette appears mildly prominent. Central interstitial edema noted. Osseous and soft tissue structures: No acute findings. 1.  Medical support devices as above. 2.  Persistent atherosclerotic disease, cardiomegaly, and central interstitial edema. 3.  Persistent retrocardiac and left basilar opacity. Xr Chest Portable    Result Date: 11/11/2020  EXAMINATION: ONE XRAY VIEW OF THE CHEST 11/11/2020 8:17 am COMPARISON: 11/10/2020 HISTORY: ORDERING SYSTEM PROVIDED HISTORY: SOB TECHNOLOGIST PROVIDED HISTORY: Reason for exam:->SOB What reading provider will be dictating this exam?->CRC FINDINGS: Left lower lobe infiltrate and small effusion are unchanged. Right basilar infiltrate medially is unchanged. Lines/tubes are appropriate. No interval change     Xr Chest Portable    Result Date: 11/10/2020  EXAMINATION: ONE XRAY VIEW OF THE CHEST 11/10/2020 7:49 am COMPARISON: 11/09/2020 HISTORY: ORDERING SYSTEM PROVIDED HISTORY: SOB TECHNOLOGIST PROVIDED HISTORY: Reason for exam:->SOB What reading provider will be dictating this exam?->CRC FINDINGS: Right lower lobe infiltrate is unchanged. There is some worsening of left lower lobe infiltrate. ET and left-sided central line are appropriate.  Upper lobes are normal.     Some worsening of left lower lobe infiltrate     Xr Chest Portable    Result Date: 11/9/2020  EXAMINATION: ONE XRAY VIEW OF THE CHEST 11/9/2020 8:17 am COMPARISON: 11/08/2020 HISTORY: ORDERING SYSTEM PROVIDED HISTORY: SOB TECHNOLOGIST PROVIDED HISTORY: Reason for exam:->SOB What reading provider will be dictating this exam?->CRC FINDINGS: Multifocal bilateral pulmonary infiltrates are noted more prominent within the right lower lobe. The endotracheal tube and left internal jugular central venous catheter are unchanged in position. There is an NG tube within the stomach. 1. Multifocal bilateral pulmonary infiltrates slightly improved when compared with the patient's prior study of 11/08/2020. Xr Chest Portable    Result Date: 11/8/2020  EXAMINATION: ONE XRAY VIEW OF THE CHEST 11/8/2020 12:48 pm COMPARISON: Chest radiograph 5 hours prior HISTORY: ORDERING SYSTEM PROVIDED HISTORY: intubated, verification of tube placement TECHNOLOGIST PROVIDED HISTORY: Reason for exam:->intubated, verification of tube placement What reading provider will be dictating this exam?->CRC FINDINGS: Endotracheal tube terminates approximately 3 cm from the ellie. Left IJ central venous catheter terminates in SVC. The enteric feeding tube is partially visualized in the stomach. The cardiomediastinal silhouette is stable in size. There is worsening of the right sided airspace opacities. Mild improvement in left lower lung airspace opacity likely representing atelectasis. No pneumothorax. Endotracheal tube terminates approximately 3 cm from the ellie. New right-sided airspace opacities likely representing atelectasis. Mild interval improvement in left lower lung airspace opacity.      Xr Chest Portable    Result Date: 11/8/2020  EXAMINATION: ONE XRAY VIEW OF THE CHEST 11/8/2020 7:37 am COMPARISON: 11/07/2020 HISTORY: ORDERING SYSTEM PROVIDED HISTORY: SOB TECHNOLOGIST PROVIDED HISTORY: Reason for exam:->SOB What reading provider will be dictating this exam?->CRC FINDINGS: The endotracheal tube, nasogastric tube, and left IJ catheter are stable. Stable cardiac silhouette. Patchy bilateral airspace opacities are stable. No effusion or pneumothorax. No acute osseous abnormality. Patchy bilateral pulmonary airspace opacities are stable. Xr Chest Portable    Result Date: 11/7/2020  EXAMINATION: ONE XRAY VIEW OF THE CHEST 11/7/2020 3:40 pm COMPARISON: November 7, 2020 HISTORY: ORDERING SYSTEM PROVIDED HISTORY: s/p left ij placement TECHNOLOGIST PROVIDED HISTORY: Reason for exam:->s/p left ij placement What reading provider will be dictating this exam?->CRC FINDINGS: Endotracheal tube is 2.7 cm above the ellie. Right and left central venous catheters are present with distal tips at location of superior vena cava. NG tube courses below the diaphragm. Redemonstration of interstitial and hazy opacities bilaterally. There is improved aeration in lung bases. The heart is normal size. No pneumothorax. 1.  Improved aeration in lung bases. 2.  Interstitial and hazy opacities persist throughout both lungs. 3.  Endotracheal tube is 2.7 cm above ellie. Xr Chest Portable    Result Date: 11/7/2020  EXAMINATION: ONE XRAY VIEW OF THE CHEST 11/7/2020 7:53 am COMPARISON: November 6, 2020 HISTORY: ORDERING SYSTEM PROVIDED HISTORY: SOB TECHNOLOGIST PROVIDED HISTORY: Reason for exam:->SOB What reading provider will be dictating this exam?->CRC FINDINGS: Endotracheal tube is approximately 1.8 cm above the ellie. Right IJ central venous catheter is present with distal tip of location of SVC. Satisfactory position of NG tube. Stable interstitial and hazy opacities bilaterally notable in lung bases. No pneumothorax. There are low lung volumes. Stable chest radiograph with interstitial pulmonary edema or bilateral pneumonia.      Xr Chest Portable    Result Date: 11/6/2020  EXAMINATION: ONE XRAY VIEW OF THE CHEST 11/6/2020 8:07 am COMPARISON: 11/05/2020 HISTORY: [address] PROVIDED HISTORY: SOB TECHNOLOGIST PROVIDED HISTORY: Reason for exam:->SOB What reading provider will be dictating this exam?->CRC FINDINGS: Lines and tubes are stable. Heart size and mediastinal contours are unchanged. The lungs are stable. No change. Xr Chest Portable    Result Date: 11/5/2020  EXAMINATION: ONE XRAY VIEW OF THE CHEST 11/5/2020 8:09 am COMPARISON: 11/04/2020 HISTORY: ORDERING SYSTEM PROVIDED HISTORY: SOB TECHNOLOGIST PROVIDED HISTORY: Reason for exam:->SOB What reading provider will be dictating this exam?->CRC FINDINGS: Lines and tubes are stable. Heart size and mediastinal contours are unchanged. The lungs are stable. No change. Xr Chest Portable    Result Date: 11/4/2020  EXAMINATION: ONE XRAY VIEW OF THE CHEST 11/4/2020 8:02 am COMPARISON: 11/03/2020 HISTORY: ORDERING SYSTEM PROVIDED HISTORY: SOB TECHNOLOGIST PROVIDED HISTORY: Reason for exam:->SOB What reading provider will be dictating this exam?->CRC FINDINGS: Heart is enlarged. There are significant bilateral infiltrates worse in the left lower lobe. These appear unchanged. There may be a small left effusion. Lines/tubes are appropriate. No interval change     Xr Chest Portable    Result Date: 11/3/2020  EXAMINATION: ONE XRAY VIEW OF THE CHEST 11/3/2020 8:05 am COMPARISON: None. HISTORY: ORDERING SYSTEM PROVIDED HISTORY: SOB TECHNOLOGIST PROVIDED HISTORY: Reason for exam:->SOB What reading provider will be dictating this exam?->CRC FINDINGS: The heart is mildly enlarged. Multifocal bilateral pulmonary infiltrates are noted more prominent within the right lung. There is stable position of the endotracheal tube and NG tube. There is no pneumothorax. Multifocal bilateral pulmonary infiltrates more prominent within the right lung. Overall there is no interval change when compared with the prior study of 1 day earlier.      Xr Chest Portable    Result Date: 11/3/2020  EXAMINATION: ONE XRAY VIEW OF THE CHEST 11/3/2020 8:43 am COMPARISON: 3 November 2020 HISTORY: ORDERING SYSTEM PROVIDED HISTORY: s/p ETT exchange TECHNOLOGIST PROVIDED HISTORY: Reason for exam:->s/p ETT exchange What reading provider will be dictating this exam?->CRC FINDINGS: Stable right-sided central venous catheter, endotracheal and nasogastric tubes. Opacity in the right lung is minimally progressive and may relate to any combination of atelectasis and/or infiltrate. Aeration on the left is stable. Mildly worsening aeration on the right which may relate any combination of infiltrate and/or atelectasis. Stable findings on the left. Xr Chest Portable    Result Date: 2020  EXAMINATION: ONE XRAY VIEW OF THE CHEST 2020 7:48 am COMPARISON: 2020 HISTORY: ORDERING SYSTEM PROVIDED HISTORY: SOB TECHNOLOGIST PROVIDED HISTORY: Reason for exam:->SOB What reading provider will be dictating this exam?->CRC FINDINGS: Significant left lower lobe infiltrate and pleural effusion are unchanged. Right basilar atelectasis/infiltrate appears mildly worse. Lines/tubes are appropriate.      Slight worsening of right basilar atelectasis/infiltrate     Ir Tunneled Cvc Place Wo Sq Port/pump > 5 Years    Result Date: 2020  Patient MRN:  [id number] : 1960 Age: 61 years Gender: Male Order Date:  2020 7:45 AM EXAM: IR VENOGRAM UPPER EXTREMITY RIGHT, IR SPARKLE CATH PLACE VENOUS 2ND+ ORDER, IR TUNNELED CVC PLACE WO SQ PORT/PUMP > 5 YEARS, IR ULTRASOUND GUIDANCE VASCULAR ACCESS, IR FLUORO GUIDED CVA DEVICE PLMT/REPLACE/REMOVAL NUMBER OF IMAGES:  14 INDICATION: Z79.2 Long term (current) use of antibiotics Long term (current) use of antibiotics What reading provider will be dictating this exam?->[facility] COMPARISON: None FLUORO TIME: 00:04:26 DAP: 1965.9 uGym2 AK: 77.1 mGy PICC Catheter Placement and Upper Extremity Venous Ultrasound, Procedure: After obtaining informed consent, and following the routine sterile preparation and drape, the right basilic vein was accessed with ultrasound guidance. IV contrast was injected which demonstrated extravasation into the soft tissue. The decision was then made to place a right tunneled IJ PICC. Using direct real-time ultrasound imaging vascular access was obtained to the right IJ. An image was stored and copy was transmitted to PACS. Subsequently with real-time guidance a needle was inserted intravascular and through the needle a wire. A subcutaneous tunnel was then created along the right upper chest. Subsequently under fluoroscopic guidance, a system of needles, catheters and guidewires were utilized to place a guidewire and catheter in the venous system and the catheter was advanced and appropriately placed at the level of the junction of the superior vena cava and right atrium. Time out occurred at 08:33 AM. Patient received 4 minutes and 26 seconds of fluoroscopy. A fluoroscopic image of the position of the catheter tip was saved in PACS. Successful uncomplicated placement of a right IJ tunneled PICC catheter. Ir Sparkle Cath Place Venous 2nd+ Order    Result Date: 2020  Patient MRN:  [id number] : 1960 Age: 61 years Gender: Male Order Date:  2020 7:45 AM EXAM: IR VENOGRAM UPPER EXTREMITY RIGHT, IR SPARKLE CATH PLACE VENOUS 2ND+ ORDER, IR TUNNELED CVC PLACE WO SQ PORT/PUMP > 5 YEARS, IR ULTRASOUND GUIDANCE VASCULAR ACCESS, IR FLUORO GUIDED CVA DEVICE PLMT/REPLACE/REMOVAL NUMBER OF IMAGES:  14 INDICATION: Z79.2 Long term (current) use of antibiotics Long term (current) use of antibiotics What reading provider will be dictating this exam?->[facility] COMPARISON: None FLUORO TIME: 00:04:26 DAP: 1965.9 uGym2 AK: 77.1 mGy PICC Catheter Placement and Upper Extremity Venous Ultrasound, Procedure: After obtaining informed consent, and following the routine sterile preparation and drape, the right basilic vein was accessed with ultrasound guidance.  IV contrast was injected which demonstrated extravasation into the soft tissue. The decision was then made to place a right tunneled IJ PICC. Using direct real-time ultrasound imaging vascular access was obtained to the right IJ. An image was stored and copy was transmitted to PACS. Subsequently with real-time guidance a needle was inserted intravascular and through the needle a wire. A subcutaneous tunnel was then created along the right upper chest. Subsequently under fluoroscopic guidance, a system of needles, catheters and guidewires were utilized to place a guidewire and catheter in the venous system and the catheter was advanced and appropriately placed at the level of the junction of the superior vena cava and right atrium. Time out occurred at 08:33 AM. Patient received 4 minutes and 26 seconds of fluoroscopy. A fluoroscopic image of the position of the catheter tip was saved in PACS. Successful uncomplicated placement of a right IJ tunneled PICC catheter. Xr Abdomen For Ng/og/ne Tube Placement    Result Date: 11/7/2020  EXAMINATION: ONE SUPINE XRAY VIEW(S) OF THE ABDOMEN 11/7/2020 9:40 pm COMPARISON: 11/02/2020 HISTORY: ORDERING SYSTEM PROVIDED HISTORY: OG placement TECHNOLOGIST PROVIDED HISTORY: Reason for exam:->OG placement Portable? ->Yes What reading provider will be dictating this exam?->CRC FINDINGS: OG tube tip in the body of the stomach. The upper abdomen is unremarkable. No gross free air. Of OG tube tip in the body of the stomach. Xr Abdomen For Ng/og/ne Tube Placement    Result Date: 11/5/2020  EXAMINATION: ONE SUPINE XRAY VIEW(S) OF THE ABDOMEN 11/5/2020 8:27 pm COMPARISON: None. HISTORY: ORDERING SYSTEM PROVIDED HISTORY: Confirmation of course of NG/OG/NE tube and location of tip of tube TECHNOLOGIST PROVIDED HISTORY: Reason for exam:->Confirmation of course of NG/OG/NE tube and location of tip of tube Portable? ->Yes What reading provider will be dictating this exam?->CRC FINDINGS: Nasogastric/orogastric tube tip in the fundus of the stomach. Nonobstructive bowel gas pattern. No gross free air. Nasogastric/orogastric tube tip in the fundus of the stomach. Xr Chest Abdomen Ng Placement    Result Date: 2020  EXAMINATION: ONE SUPINE XRAY VIEW(S) OF THE ABDOMEN 2020 1:01 pm COMPARISON: 2020 HISTORY: ORDERING SYSTEM PROVIDED HISTORY: Encounter for nasogastric (NG) tube placement TECHNOLOGIST PROVIDED HISTORY: Reason for exam:->Encounter for nasogastric (NG) tube placement What reading provider will be dictating this exam?->CRC FINDINGS: The nasogastric tube terminates in the gastric body. There is a nonobstructive bowel gas pattern. The lung bases are clear. Degenerative changes involve the lumbar spine. 1. Nasogastric tube terminating in the gastric body. Ir Ultrasound Guidance Vascular Access    Result Date: 2020  Patient MRN:  [id number] : 1960 Age: 61 years Gender: Male Order Date:  2020 7:45 AM EXAM: IR VENOGRAM UPPER EXTREMITY RIGHT, IR SPARKLE CATH PLACE VENOUS 2ND+ ORDER, IR TUNNELED CVC PLACE WO SQ PORT/PUMP > 5 YEARS, IR ULTRASOUND GUIDANCE VASCULAR ACCESS, IR FLUORO GUIDED CVA DEVICE PLMT/REPLACE/REMOVAL NUMBER OF IMAGES:  14 INDICATION: Z79.2 Long term (current) use of antibiotics Long term (current) use of antibiotics What reading provider will be dictating this exam?->[facility] COMPARISON: None FLUORO TIME: 00:04:26 DAP: 1965.9 uGym2 AK: 77.1 mGy PICC Catheter Placement and Upper Extremity Venous Ultrasound, Procedure: After obtaining informed consent, and following the routine sterile preparation and drape, the right basilic vein was accessed with ultrasound guidance. IV contrast was injected which demonstrated extravasation into the soft tissue. The decision was then made to place a right tunneled IJ PICC. Using direct real-time ultrasound imaging vascular access was obtained to the right IJ. An image was stored and copy was transmitted to PACS.  Subsequently with real-time guidance a needle was inserted intravascular and through the needle a wire. A subcutaneous tunnel was then created along the right upper chest. Subsequently under fluoroscopic guidance, a system of needles, catheters and guidewires were utilized to place a guidewire and catheter in the venous system and the catheter was advanced and appropriately placed at the level of the junction of the superior vena cava and right atrium. Time out occurred at 08:33 AM. Patient received 4 minutes and 26 seconds of fluoroscopy. A fluoroscopic image of the position of the catheter tip was saved in PACS. Successful uncomplicated placement of a right IJ tunneled PICC catheter. Us Dup Lower Extremities Bilateral Venous    Result Date: 11/10/2020  Patient MRN:  [id number] : 1960 Age: 61 years Gender: Male Order Date:  11/10/2020 2:55 PM EXAM: US DUP LOWER EXTREMITIES BILATERAL VENOUS NUMBER OF IMAGES:  62 INDICATION:  r/o DVT r/o DVT What reading provider will be dictating this exam?->[facility] COMPARISON: 10/29/2020 Within the visualized vessels, there is no evidence for deep venous thrombosis There is good compressibility, there is good augmentation, there is good color flow.      Within the visualized vessels there is no evidence for deep venous thrombosis       DIET:  DIET TUBE FEED CONTINUOUS/CYCLIC NPO; Diabetic 1.5; Gastrostomy; Continuous; 25; 65; 23; Exceptions are: Sips with Meds    Medications:    Scheduled Meds:   linezolid  600 mg Per G Tube 2 times per day    lansoprazole  30 mg Per G Tube QAM AC    fluconazole  200 mg Oral Daily    nystatin  5 mL Oral 4x Daily    vitamin B-1  100 mg PEG Tube Daily    carvedilol  6.25 mg Oral BID WC    hydrALAZINE  25 mg PEG Tube 3 times per day    divalproex  250 mg Oral 3 times per day    heparin flush  3 mL Intravenous 2 times per day    senna  2 tablet Oral Nightly    folic acid  1 mg Intravenous Daily    sodium chloride (Inhalant)  2 mL Nebulization Q4H    heparin (porcine)

## 2020-12-03 NOTE — PROGRESS NOTES
orthopnea or dyspnea on exertion. GASTROINTESTINAL: abdomen pain   GENITOURINARY: Indwelling Zamorano  INTEGUMENT/BREAST:  No rash or breast masses. HEMATOLOGIC/LYMPHATIC:  No lymphadenopathy or blood dyscrasics. ENDOCRINE:  No polyuria or polydipsia or temperature intolerance.    MUSCULOSKELETAL:  No myalgia or arthralgia. NEUROLOGICAL:  weakness      OBJECTIVE:  BP (!) 148/72   Pulse 85   Temp 97.1 °F (36.2 °C) (Temporal)   Resp 16   Ht 5' 8\" (1.727 m)   Wt (!) 324 lb (147 kg)   SpO2 99%   BMI 49.26 kg/m²   Temp  Av.2 °F (36.8 °C)  Min: 97.1 °F (36.2 °C)  Max: 98.9 °F (37.2 °C)     Constitutional: Awake and alert, on the Vent , FiO2 40 % , PEEP 5   Skin: Warm and dry. No rashes were noted. HEENT: Np pallor, no thrush, tracheostomy OK   Chest:  CTA  Cardiovascular: S1 and S2 are rhythmic and regular. Systolic murmurs appreciated.    Abdomen: soft, mild distention, tender ,  PEG- OK,  FMS system-loose stool    Extremities:  + Lymphedema left lower extremity- improving   Lines: PICC line right chest      Laboratory and Tests Review:  Lab Results   Component Value Date    WBC 7.9 2020    WBC 10.6 2020    WBC 11.4 2020    HGB 7.4 (L) 2020    HCT 22.8 (L) 2020    MCV 80.9 2020     2020     Lab Results   Component Value Date    NEUTROABS 3.72 2020    NEUTROABS 5.59 2020    NEUTROABS 5.61 2020     No results found for: [facility]  Lab Results   Component Value Date    ALT 19 2020    AST 12 2020    ALKPHOS 96 2020    BILITOT <0.2 2020     Lab Results   Component Value Date     2020    K 4.6 2020    K 4.0 10/27/2020    CL 99 2020    CO2 33 2020    BUN 15 2020    CREATININE 1.1 2020    CREATININE 1.1 2020    CREATININE 1.1 2020    GFRAA >60 2020    LABGLOM >60 2020    GLUCOSE 135 2020    PROT 7.4 2020    LABALBU 3.4 2020    CALCIUM 9.4 12/03/2020    BILITOT <0.2 12/03/2020    ALKPHOS 96 12/03/2020    AST 12 12/03/2020    ALT 19 12/03/2020     Lab Results   Component Value Date    CRP 1.4 (H) 11/02/2020    CRP 2.8 (H) 09/04/2017    CRP 10.5 (H) 08/28/2017     Lab Results   Component Value Date    SEDRATE 135 (H) 09/04/2017    SEDRATE 150 (H) 08/28/2017    SEDRATE 141 (H) 08/21/2017     Radiology: reviewed  CXR- 11/3- Multifocal bilateral pulmonary infiltrates more prominent within the right lung. CXR- 11/4- worsening b/l infiltrates, worse on left today with possible small L sided effusion   CXR- 11/5 - Stable b/l infiltrates   CXR- 11/6 - Expiratory film, image otherwise appears overall stable. CXR- 11/16 - stable right parahilar pulmonary opacity which may represent   atelectasis or pneumonia. CXR- 11/23/2020- bilateral infiltrates more prominent on the left. CXR 11/25/2020 - progressing consolidation bilaterally. Microbiology:   Lab Results   Component Value Date    [facility]  11/25/2020     No antibiotic susceptibility studies performed. Plates will be held 10  days. Contact the laboratory, [phone], if further workup is  desired.       BC 5 Days no growth 11/09/2020    BC 5 Days no growth 10/29/2020    ORG Staphylococcus coagulase-negative 11/25/2020    ORG Staphylococcus aureus 11/25/2020    ORG Staphylococcus aureus 10/29/2020     Lab Results   Component Value Date    BLOODCULT2 5 Days no growth 11/25/2020    BLOODCULT2 5 Days no growth 11/09/2020    BLOODCULT2 5 Days no growth 10/29/2020    ORG Staphylococcus coagulase-negative 11/25/2020    ORG Staphylococcus aureus 11/25/2020    ORG Staphylococcus aureus 10/29/2020     No results found for: WNDABS  Smear, Respiratory   Date Value Ref Range Status   11/25/2020   Final    Group 5: >25 PMN's/LPF and <10 Epithelial cells/LPF  Abundant Polymorphonuclear leukocytes  Rare Epithelial cells  Abundant Gram positive diplococci  Abundant Gram positive cocci in clusters       No results found for: Carnella Leticia, LABLEGI, AFBCX, FUNGSM, LABFUNG  CULTURE, RESPIRATORY   Date Value Ref Range Status   11/25/2020 Oral Pharyngeal Tiesha absent (A)  Final   11/25/2020   Final    Heavy growth  Methicillin resistant Staph aureus isolated. Most Methicillin  resistant Staphylococcus are usually resistant to multiple  antibiotics including other B-Lactams, Aminoglycosides,  Macrolides, Clindamycin and Tetracycline. Contact isolation  is indicated. This isolate is presumed to be resistant based on the  detection of inducible Clindamycin resistance. Clindamycin  may still be effective in some patients. Culture Catheter Tip   Date Value Ref Range Status   08/26/2017 <15 colonies  Final     No results found for: BFCS  No results found for: CXSURG  Urine Culture, Routine   Date Value Ref Range Status   11/09/2020 Growth not present  Final   10/28/2020 Growth not present  Final   09/16/2019 Growth not present  Final     MRSA Culture Only   Date Value Ref Range Status   11/09/2020 Methicillin resistant Staph aureus not isolated  Final   10/30/2020 Methicillin resistant Staph aureus not isolated  Final   07/28/2017 Methicillin resistant Staph aureus not isolated  Final          Assessment:  · Acute on chronic hypoxic respiratory failure likely 2/2 pneumonia and volume overload  - resolving   · Bacterial pneumonia, likely aspiration pneumonia but after extubation patient had to be reintubated rule out HCAP  · Shock, sepsis- resolved  · Alcohol withdrawal   · Leukocytosis improving - 7.9 today  · CONS bacteremia -treated  · MRSA pneumonia on treatment  · Thrush -probable esophagitis-on treatment      Plan:      · Zyvox for the MRSA in the sputum and right lung pneumonia day 7 of 21 days  · Diflucan & nystatin for thrush.   14 total days  · AARON  -nondiagnostic  · CT abdomen and pelvis for abdomen pain (no contrast -NANCY just resolved )   · Follow up blood cx   · Contact isolation -  · okay to discharge from ID point of view-med rec done  · awaiting for precert for nursing facility. Possible d/c today      Electronically signed by KATHERINE Villafuerte CNP on 12/3/2020 at 11:58 AM        Pt seen and examined. Above discussed agree with advanced practice nurse. Labs, cultures, and radiographs reviewed. Face to Face encounter occurred. Changes made as necessary.      Crys Rodríguez MD

## 2020-12-03 NOTE — CARE COORDINATION
[facility] arranged for 1630  in the event auth is obtained. Will notify spouse if auth obtained for [facility] today. Ambulance on soft chart. Discharge backed up to 8pm per facility request so that their RT is available to accept patient. Spouse aware of discharge. Auth obtained. Facility aware of discharge time. For questions I can be reached at [phone].  Gamal Moreno [facility]

## 2020-12-03 NOTE — PROGRESS NOTES
Pulmonary Progress Note  12/3/2020 3:30 PM  Subjective:   Admit Date: 10/26/2020  PCP: Javi Reyes MD  Interval History: Alert and follows commands. Denies SOB, cough. Patient on [facility] 12/480/40%/5 overnight as RRT states he becomes tachypneic with lower Vt.  Will change back to PSV 8, PEEP 5, FiO2 40% this am. Complains of abd pain and nursing is giving norco, no high residuals with TF.     diet: DIET TUBE FEED CONTINUOUS/CYCLIC NPO; Diabetic 1.5; Gastrostomy; Continuous; 25; 65; 23; Exceptions are: Sips with Meds    Data:   Scheduled Meds:    linezolid  600 mg Per G Tube 2 times per day    lansoprazole  30 mg Per G Tube QAM AC    fluconazole  200 mg Oral Daily    nystatin  5 mL Oral 4x Daily    vitamin B-1  100 mg PEG Tube Daily    carvedilol  6.25 mg Oral BID WC    hydrALAZINE  25 mg PEG Tube 3 times per day    divalproex  250 mg Oral 3 times per day    heparin flush  3 mL Intravenous 2 times per day    senna  2 tablet Oral Nightly    folic acid  1 mg Intravenous Daily    sodium chloride (Inhalant)  2 mL Nebulization Q4H    heparin (porcine)  7,500 Units Subcutaneous Q8H    sodium chloride flush  10 mL Intravenous 2 times per day    Arformoterol Tartrate  15 mcg Nebulization BID    chlorhexidine  15 mL Mouth/Throat BID    amLODIPine  10 mg Oral Daily    [Held by provider] aspirin  81 mg Oral Daily    levothyroxine  50 mcg Oral Daily    budesonide  0.5 mg Nebulization BID    ipratropium-albuterol  1 ampule Inhalation Q4H WA       Continuous Infusions:    sodium chloride Stopped (11/23/20 1638)    dextrose         PRN Meds: mineral oil-hydrophilic petrolatum, HYDROcodone 5 mg - acetaminophen, labetalol, trimethobenzamide, midazolam, sodium chloride flush, heparin flush, polyethylene glycol, perflutren lipid microspheres, acetaminophen, polyvinyl alcohol, hydrALAZINE, sodium chloride flush, [DISCONTINUED] acetaminophen **OR** acetaminophen, colchicine, glucose, dextrose, glucagon (rDNA), dextrose    I/O last 3 completed shifts: In: 215 [I.V.:20; NG/GT:195]  Out: 2050 [Urine:2050]    No intake/output data recorded.       Intake/Output Summary (Last 24 hours) at 12/3/2020 1530  Last data filed at 12/3/2020 1440  Gross per 24 hour   Intake 215 ml   Output 2050 ml   Net -1835 ml       Patient Vitals for the past 96 hrs (Last 3 readings):   Weight   12/03/20 0822 (!) 324 lb (147 kg)   12/02/20 0600 (!) 335 lb (152 kg)   11/30/20 0531 (!) 335 lb 1.6 oz (152 kg)         Recent Labs     12/01/20  0630 12/02/20  0520 12/03/20  0345   WBC 11.4 10.6 7.9   HGB 7.9* 7.8* 7.4*   HCT 23.8* 23.9* 22.8*   MCV 79.9* 81.0 80.9    456* 419     Recent Labs     12/01/20  0630 12/02/20  0520 12/03/20  0345    139 141   K 4.7 4.5 4.6   CL 99 98 99   CO2 27 33* 33*   BUN 15 15 15   CREATININE 1.1 1.1 1.1     Recent Labs     12/01/20  0630 12/02/20  0520 12/03/20  0345   PROT 7.5 7.6 7.4   ALKPHOS 98 102 96   ALT 28 23 19   AST 16 13 12   BILITOT <0.2 <0.2 <0.2     CPK:  Lab Results   Component Value Date    CKTOTAL 98 10/08/2017       -----------------------------------------------------------------  RAD:   Improving                    Micro:  Vent Information  $Ventilation: $Subsequent Day  Skin Assessment: Clean, dry, & intact  Equipment ID: [id number]  Equipment Changed: (S) Humidification  Vent Type: 840  Vent Mode: VS  Vt Ordered: 480 mL  Pressure Ordered: (S) 12  Rate Set: 12 bmp  Peak Flow: 60 L/min  Pressure Support: 10 cmH20  FiO2 : 40 %  SpO2: 99 %  SpO2/FiO2 ratio: 245  PaO2/FiO2 ratio: 169  Sensitivity: 2  PEEP/CPAP: 5  I Time/ I Time %: 0 s  Humidification Source: Heated wire  Humidification Temp: 37  Humidification Temp Measured: 37  Circuit Condensation: Drained  Mask Type: Tracheostomy  Mask Size: Large    Additional Respiratory  Assessments  Pulse: 85  Resp: 16  SpO2: 99 %  Position: Semi-Lockwood's  Humidification Source: Heated wire  Humidification Temp: 37  Circuit Condensation: Drained  Oral Care Completed?: Yes  Oral Care: Teeth brushed, Suction toothette, Mouth suctioned, Mouth moisturizer, Mouth swabbed  Subglottic Suction Done?: Yes  Airway Type: Trachial  Airway Size: 8(xlt)  Cuff Pressure (cm H2O): 29 cm H2O  Skin barrier applied: Yes    Objective:   Vitals:   Vitals:    20 1400   BP: (!) 180/72   Pulse:    Resp:    Temp:    SpO2:       TEMP:Current: Temp: 97.1 °F (36.2 °C)  Max: Temp  Av.2 °F (36.8 °C)  Min: 97.1 °F (36.2 °C)  Max: 98.9 °F (37.2 °C)    BP Range: Systolic (73MSK), AQO:758 , Min:134 , YQF:103     Diastolic (56CLB), DYK:32, Min:61, Max:72    General appearance: obese on mechanical ventilation , appears stated age, follows simple commands, no acute distress  Skin: No rashes or lesions  HEENT: mucous membranes are moist, tracheostomy no bleeding noted. neck: No JVD  Lungs: symmetrical expansion, coarse breath sounds to auscultation which clear with cough, no use of accessory muscles  Heart: RRR, S1S2 2/6 JUAN  Abdomen: soft, tender on palpations +BS, distended, obese, PEG tube  Extremities: no peripheral edema  Neurologic:  following commands, shakes head yes/no appropriately. Affect: Calm          Assessment:   Patient Active Problem List:     59-y.o M with history of DM, hypothyroid, COPD, HTN, HLD presented on 10/26 with shortness of breath for 1 week.  Saturations were 70% at PCPs.  Saturation 65% on room air in ER  Patient agitated for CT scan refused to lay flat  10/27 RRT refused BiPAP became combative.  Patient transferred to ICU  10/28 intubated and sedated.  CT chest showing dense multifocal airspace disease      1. acute hypoxemic respiratory failure on mechanical ventilation s/p trach  8 cuffed Shiley trach proximal XLT  2. Metabolic encephalopathy history of EtOH abuse with agitation on ( Depakote, and Seroquel  )significantly improved  3. Acute renal failure Baseline creatinine 1.9 on 2019  4. Anemia  5. HAP   6.  HANS moderate ( AHI 17 on 18 requires

## 2020-12-09 PROBLEM — I26.99 BILATERAL PULMONARY EMBOLISM (HCC): Status: ACTIVE | Noted: 2020-01-01

## 2020-12-09 NOTE — ED NOTES
Bed: 06  Expected date:   Expected time:   Means of arrival:   Comments:  ems     Prasanth Sharma RN  12/09/20 [id number]

## 2020-12-09 NOTE — CONSULTS
[address] [place] [address] [facility]    Consultation Note     Admit Date: 12/9/2020 12:19 AM    Reason for Consult:   Continuation of antibiotics    Attending Physician:  No att. providers found     Chief Complaint: Chest pain    HISTORY OF PRESENT ILLNESS:   The patient is a 61 y.o. black man known to the Infectious Diseases service. The patient is in the emergency room admitted with chest pain. He is at a SNF and was short of breath sent to the hospital and found to have bilateral segmental pulmonary emboli. He had pneumonia in the past but CAT scan showed that the parenchyma was improving. I had seen him originally on 11/2/2020 for hypoxia. He grew MRSA from sputum and spent a long time on the ventilator and subsequently had a trach. He was subsequently discharged on 11/30/2020 on Zyvox for completion of his therapy on 12/17/2020 as well as Diflucan for serious thrush issues.    His current BUN and creatinine 17 and 1.5 his white count is 8.3 with a hemoglobin of 7.9  Past Medical History:        Diagnosis Date    Acquired hypothyroidism 9/26/2017    Anxiety     Congestive heart failure with LV diastolic dysfunction, NYHA class 3 (HCC)     COPD (chronic obstructive pulmonary disease) ([address]     Depression     DM (diabetes mellitus) ([address]     Essential hypertension 6/1/2016    Gouty arthritis 2006    Hyperlipidemia     Hypertension     Lymphedema     Obesity     Obstructive sleep apnea 8/17/2009    Obstructive sleep apnea 9/26/2017    Osteoarthritis     Type 2 diabetes mellitus without complication, without long-term current use of insulin ([address] 1/15/2014     Past Surgical History:        Procedure Laterality Date    BRONCHOSCOPY  08/10/2017    ECHO COMPL W DOP COLOR FLOW  6/21/2013         EXTERNAL EAR SURGERY  6/2/2009    keloid left ear    FOOT SURGERY  1990    Left foot    GASTROSTOMY TUBE PLACEMENT  08/10/2017    IR TUNNELED CATHETER PLACEMENT GREATER THAN 5 YEARS Medical: Not on file     Non-medical: Not on file   Tobacco Use    Smoking status: Former Smoker     Packs/day: 0.10     Years: 10.00     Pack years: 1.00     Types: Cigarettes     Last attempt to quit: 2/19/2005     Years since quitting: 15.8    Smokeless tobacco: Former User     Types: Chew     Quit date: 1/1/1990   Substance and Sexual Activity    Alcohol use: Yes     Frequency: 2-3 times a week     Drinks per session: 1 or 2     Binge frequency: Never     Comment: socially    Drug use: Not Currently     Comment: past use; none x 30 years    Sexual activity: Not Currently     Partners: Female   Lifestyle    Physical activity     Days per week: Not on file     Minutes per session: Not on file    Stress: Not on file   Relationships    Social connections     Talks on phone: Not on file     Gets together: Not on file     Attends [employer] service: Not on file     Active member of club or organization: Not on file     Attends meetings of clubs or organizations: Not on file     Relationship status: Not on file    Intimate partner violence     Fear of current or ex partner: Not on file     Emotionally abused: Not on file     Physically abused: Not on file     Forced sexual activity: Not on file   Other Topics Concern    Not on file   Social History Narrative    Drinks 3 cups of coffee daily. Tobacco: No  Alcohol: No  Pets: No  Travel: No    Family History:       Problem Relation Age of Onset    Alzheimer's Disease Mother     Heart Disease Father         Heart arrhythmia    Heart Attack Father    . Otherwise non-pertinent to the chief complaint. REVIEW OF SYSTEMS:    CONSTITUTIONAL:  No chills, fevers or night sweats. No loss of weight. EYES:  No double vision or drainage from eyes, ears or throat. HEENT:  No neck stiffness. No dysphagia. No drainage from eyes, ears or throat  RESPIRATORY:  No cough, productive sputum or hemoptysis.   Trach  CARDIOVASCULAR: Atypical chest pain, palpitations, positive orthopnea positive dyspnea on exertion. GASTROINTESTINAL:  No nausea, vomiting, diarrhea or constipation or hematochezia   GENITOURINARY:  No frequency burning dysuria or hematuria. INTEGUMENT/BREAST:  No rash or breast masses. HEMATOLOGIC/LYMPHATIC:  No lymphadenopathy or blood dyscrasics. ALLERGIC/IMMUNOLOGIC:  No anaphylaxis. ENDOCRINE:  No polyuria or polydipsia or temperature intolerance. MUSCULOSKELETAL:  No myalgia or arthralgia. Full ROM. NEUROLOGICAL:  No focal motor sensory deficit. BEHAVIOR/PSYCH:  No psychosis. PHYSICAL EXAM:    Vitals:    BP (!) 164/94   Pulse 80   Temp 98.1 °F (36.7 °C) (Oral)   Resp 12   Ht 5' 9\" (1.753 m)   Wt (!) 324 lb (147 kg)   SpO2 100%   BMI 47.85 kg/m²   Constitutional: The patient is awake, alert, and oriented. Trach mask  Skin: Warm and dry. No rashes were noted. No jaundice. HEENT: Eyes show round, and reactive pupils. Moist mucous membranes, no ulcerations, significant improvement in the thrush. Neck: Supple to movements. No lymphadenopathy. Chest: No use of accessory muscles to breathe. Symmetrical expansion. Auscultation reveals no wheezing, crackles, or rhonchi. Poor air exchange  Cardiovascular: S1 and S2 are rhythmic and regular. No murmurs appreciated. Abdomen: Positive bowel sounds to auscultation. Benign to palpation. No masses felt. No hepatosplenomegaly. PEG  Genitourinary: Male  Extremities: No clubbing, no cyanosis, no edema.   Musculoskeletal: Equal and symmetrical  Neurological: No focal  Lines: peripheral      CBC+dif:  Recent Labs     12/09/20  0039 12/09/20  0433   WBC 7.2 8.3   HGB 8.4* 7.9*   HCT 25.7* 23.8*   MCV 80.8 80.4    354   NEUTROABS 3.40  --      Lab Results   Component Value Date    CRP 1.4 (H) 11/02/2020    CRP 2.8 (H) 09/04/2017    CRP 10.5 (H) 08/28/2017     No results found for: [facility]  Lab Results   Component Value Date    SEDRATE 135 (H) 09/04/2017    SEDRATE 150 (H) 08/28/2017    SEDRATE 141 (H) 08/21/2017     Lab Results   Component Value Date    ALT 45 (H) 12/09/2020    AST 27 12/09/2020    ALKPHOS 90 12/09/2020    BILITOT <0.2 12/09/2020     Lab Results   Component Value Date     12/09/2020    K 4.4 12/09/2020    K 4.0 10/27/2020    CL 93 12/09/2020    CO2 32 12/09/2020    BUN 17 12/09/2020    CREATININE 1.5 12/09/2020    GFRAA 58 12/09/2020    LABGLOM 58 12/09/2020    GLUCOSE 109 12/09/2020    PROT 7.6 12/09/2020    LABALBU 3.7 12/09/2020    CALCIUM 9.6 12/09/2020    BILITOT <0.2 12/09/2020    ALKPHOS 90 12/09/2020    AST 27 12/09/2020    ALT 45 12/09/2020       Lab Results   Component Value Date    PROTIME 14.0 11/18/2020    INR 1.2 11/18/2020       Lab Results   Component Value Date    TSH 4.340 10/27/2020       Lab Results   Component Value Date    NITRITE neg 08/30/2019    COLORU Yellow 12/09/2020    PHUR 5.5 12/09/2020    LABCAST FEW 04/11/2016    WBCUA 1-3 11/25/2020    RBCUA 2-5 11/25/2020    BACTERIA NONE SEEN 11/25/2020    CLARITYU Clear 12/09/2020    SPECGRAV 1.015 12/09/2020    LEUKOCYTESUR Negative 12/09/2020    UROBILINOGEN 0.2 12/09/2020    BILIRUBINUR Negative 12/09/2020    BILIRUBINUR neg 08/30/2019    BLOODU Negative 12/09/2020    GLUCOSEU Negative 12/09/2020    AMORPHOUS FEW 11/25/2020       Lab Results   Component Value Date    TDX3IMS 26.1 11/09/2020     Radiology:  CTA CHEST W CONTRAST   Final Result   1. Bilateral pulmonary emboli. 2. Improving bilateral airspace opacities. 3. Fat stranding adjacent to the urinary bladder may indicate cystitis. Critical results were called by Dr. Ciro Sierra MD to Penny Rhoades on   12/9/2020 at 03:23. CT ABDOMEN PELVIS W IV CONTRAST Additional Contrast? None   Final Result   1. Bilateral pulmonary emboli. 2. Improving bilateral airspace opacities. 3. Fat stranding adjacent to the urinary bladder may indicate cystitis. Critical results were called by Dr. Ciro Sierra MD to Penny Rhoades on   12/9/2020 at 03:23. XR CHEST PORTABLE   Final Result   Almost complete resolution of previously noted areas of airspace disease in   the left mid lung and right upper lobe. US LOWER EXTREMITY BILATERAL VEIN MAPPING W DVT    (Results Pending)       Microbiology:  Pending  No results for input(s): BC in the last 72 hours. No results for input(s): ORG in the last 72 hours. No results for input(s): [place] in the last 72 hours. No results for input(s): STREPNEUMAGU in the last 72 hours. No results for input(s): LP1UAG in the last 72 hours. No results for input(s): ASO in the last 72 hours. No results for input(s): CULTRESP in the last 72 hours. Assessment:  · Continue Zyvox until 12/17/2020 for completion of treatment of MRSA pneumonia-improved  · Continue Diflucan for treatment of thrush and esophagitis-improved  · Shortness of breath associated with PE    Plan:    · Cont Zyvox p.o. and Diflucan  · Check cultures  · Anticoagulation as per pulmonary  · Baseline ESR, CRP  · Monitor labs  · Will follow with you    Thank you for having us see this patient in consultation. I will be discussing this case with the treating physicians.       Electronically signed by Jacqueline Sam MD on 12/9/2020 at 2:28 PM

## 2020-12-09 NOTE — ED NOTES
Pt put on call light, found standing at side of bed, trach mask off, SpO2 45%. Dr. Jenkins Dominic and respiratory notified.       Palak Sharif, CHASE  12/09/20 1390

## 2020-12-09 NOTE — ED NOTES
Radiology Procedure Waiver   Name: Kash Noble  : 1960  MRN: [id number]    Date:  20    Time: 2:03 AM EST    Benefits of immediately proceeding with Radiology exam(s) without pre-testing outweigh the risks or are not indicated as specified below and therefore the following is/are being waived:    [] Pregnancy test   [] Patients LMP on-time and regular.   [] Patient had Tubal Ligation or has other Contraception Device. [] Patient  is Menopausal or Premenarcheal.    [] Patient had Full or Partial Hysterectomy. [x] Protocol for Iodine allergy    [] MRI Questionnaire     [] BUN/Creatinine   [] Patient age w/no hx of renal dysfunction. [] Patient on Dialysis. [] Recent Normal Labs.   Electronically signed by Chun Ontiveros DO on 20 at 2:03 AM EST               Chun Ontiveros DO  20 [id number]

## 2020-12-09 NOTE — ED PROVIDER NOTES
[facility]  Department of Emergency Medicine   ED  Encounter Note  Admit Date/RoomTime: 2020 12:19 AM  ED Room: 4523/4523-A    NAME: Loc Ruiz  : 1960  MRN: [id number]     Chief Complaint:  Chest Pain (sharp 10/10 CP worse when coughing onset 2 days. C/O cough and chills @ night. hx heart and respiratory failure, trach mask on 6L. )    History of Present Illness          Loc Ruiz is a 61 y.o. old male who presents to the emergency department for evaluation of chest pain, shortness of breath and abdominal pain. I actually saw the patient a couple months ago after he was transferred here for respiratory distress status post cholecystectomy. He does have history of prior alcohol abuse as well. Unfortunately, he suffered multiple complications including pneumonia and was intubated during his hospital stay. He eventually ended up with a tracheostomy and is coming in from the nursing home [facility]. He is currently on a trach mask saturating well. He has a right IJ tunneled PICC in his right chest wall. He has been on Zyvox  for MRSA in the sputum and right lung. Peggy ESCALERA   Pertinent positives and negatives are stated within HPI, all other systems reviewed and are negative. Past Medical History:  has a past medical history of Acquired hypothyroidism, Anxiety, Congestive heart failure with LV diastolic dysfunction, NYHA class 3 (HCC), COPD (chronic obstructive pulmonary disease) ([address] Depression, DM (diabetes mellitus) ([address] Essential hypertension, Gouty arthritis, Hyperlipidemia, Hypertension, Lymphedema, Obesity, Obstructive sleep apnea, Obstructive sleep apnea, Osteoarthritis, and Type 2 diabetes mellitus without complication, without long-term current use of insulin ([address] Surgical History:  has a past surgical history that includes Foot surgery (); External ear surgery (2009); ECHO Compl W Dop Color Flow (2013);  Gastrostomy tube placement (08/10/2017); Tracheostomy tube placement (08/10/2017); bronchoscopy (08/10/2017); tracheostomy (N/A, 11/11/2020); Upper gastrointestinal endoscopy (N/A, 11/11/2020); tracheostomy (N/A, 11/12/2020); and IR TUNNELED CVC PLACE WO SQ PORT/PUMP > 5 YEARS (11/19/2020). Social History:  reports that he quit smoking about 15 years ago. His smoking use included cigarettes. He has a 1.00 pack-year smoking history. He quit smokeless tobacco use about 30 years ago. His smokeless tobacco use included chew. He reports current alcohol use. He reports previous drug use. Family History: family history includes Alzheimer's Disease in his mother; Heart Attack in his father; Heart Disease in his father. Allergies: Bee venom and Shellfish-derived products    Physical Exam   Oxygen Saturation Interpretation: Abnormal - but at baseline. ED Triage Vitals [12/09/20 0025]   BP Temp Temp Source Pulse Resp SpO2 Height Weight   (!) 142/72 99.2 °F (37.3 °C) Oral 86 22 99 % 5' 9\" (1.753 m) (!) 324 lb (147 kg)         General Appearance/Constitutional:  Alert, development consistent with age, NAD. HEENT:  NC/NT. PERRLA. Airway patent. Neck:  Normal ROM. Supple. Respiratory: Scattered rhonchi, no stridor, mild tachypnea  CV:  Regular rate and rhythm, normal heart sounds, without pathological murmurs, ectopy, gallops, or rubs. Chest:  Symmetrical without visible rash or tenderness. GI:  Abdomen Soft, diffuse tenderness to palpation, worse in the left upper quadrant, good bowel sounds. No firm or pulsatile mass. Back:  No costovertebral tenderness. Extremities: No tenderness. 1+ leg edema  Lymphatics: no lymphadenopathy noted  Integument:  Normal turgor. Warm, dry, without visible rash, unless noted elsewhere. Neurological:  Oriented. Motor functions intact.    Psychiatric:  Affect normal.    Lab / Imaging Results   (All laboratory and radiology results have been personally reviewed by myself)  Labs:  Results for orders placed or performed during the hospital encounter of 12/09/20   Culture, Blood 2    Specimen: Blood   Result Value Ref Range    Culture, Blood 2 24 Hours no growth    Culture, Blood 1    Specimen: Blood   Result Value Ref Range    Blood Culture, Routine 24 Hours no growth    Respiratory Panel, Molecular, with COVID-19 (Restricted: peds pts or suitable admitted adults)   Result Value Ref Range    Adenovirus by PCR Not Detected Not Detected    Bordetella parapertussis by PCR Not Detected Not Detected    Bordetella pertussis by PCR Not Detected Not Detected    Chlamydophilia pneumoniae by PCR Not Detected Not Detected    Coronavirus 229E by PCR Not Detected Not Detected    Coronavirus HKU1 by PCR Not Detected Not Detected    Coronavirus NL63 by PCR Not Detected Not Detected    Coronavirus OC43 by PCR Not Detected Not Detected    SARS-CoV-2, PCR Not Detected Not Detected    Human Metapneumovirus by PCR Not Detected Not Detected    Human Rhinovirus/Enterovirus by PCR Not Detected Not Detected    Influenza A by PCR Not Detected Not Detected    Influenza B by PCR Not Detected Not Detected    Mycoplasma pneumoniae by PCR Not Detected Not Detected    Parainfluenza Virus 1 by PCR Not Detected Not Detected    Parainfluenza Virus 2 by PCR Not Detected Not Detected    Parainfluenza Virus 3 by PCR Not Detected Not Detected    Parainfluenza Virus 4 by PCR Not Detected Not Detected    Respiratory Syncytial Virus by PCR Not Detected Not Detected   Urinalysis   Result Value Ref Range    Color, UA Yellow Straw/Yellow    Clarity, UA Clear Clear    Glucose, Ur Negative Negative mg/dL    Bilirubin Urine Negative Negative    Ketones, Urine Negative Negative mg/dL    Specific Gravity, UA 1.015 1.005 - 1.030    Blood, Urine Negative Negative    pH, UA 5.5 5.0 - 9.0    Protein, UA Negative Negative mg/dL    Urobilinogen, Urine 0.2 <2.0 E.U./dL    Nitrite, Urine Negative Negative    Leukocyte Esterase, Urine Negative Negative   CBC Auto Differential   Result Value Ref Range    WBC 7.2 4.5 - 11.5 E9/L    RBC 3.18 (L) 3.80 - 5.80 E12/L    Hemoglobin 8.4 (L) 12.5 - 16.5 g/dL    Hematocrit 25.7 (L) 37.0 - 54.0 %    MCV 80.8 80.0 - 99.9 fL    MCH 26.4 26.0 - 35.0 pg    MCHC 32.7 32.0 - 34.5 %    RDW 19.7 (H) 11.5 - 15.0 fL    Platelets 926 747 - 200 E9/L    MPV 9.3 7.0 - 12.0 fL    Neutrophils % 46.9 43.0 - 80.0 %    Immature Granulocytes % 0.3 0.0 - 5.0 %    Lymphocytes % 39.8 20.0 - 42.0 %    Monocytes % 9.3 2.0 - 12.0 %    Eosinophils % 3.3 0.0 - 6.0 %    Basophils % 0.4 0.0 - 2.0 %    Neutrophils Absolute 3.40 1.80 - 7.30 E9/L    Immature Granulocytes # 0.02 E9/L    Lymphocytes Absolute 2.88 1.50 - 4.00 E9/L    Monocytes Absolute 0.67 0.10 - 0.95 E9/L    Eosinophils Absolute 0.24 0.05 - 0.50 E9/L    Basophils Absolute 0.03 0.00 - 0.20 E9/L   Comprehensive Metabolic Panel   Result Value Ref Range    Sodium 134 132 - 146 mmol/L    Potassium 4.4 3.5 - 5.0 mmol/L    Chloride 93 (L) 98 - 107 mmol/L    CO2 32 (H) 22 - 29 mmol/L    Anion Gap 9 7 - 16 mmol/L    Glucose 109 (H) 74 - 99 mg/dL    BUN 17 8 - 23 mg/dL    CREATININE 1.5 (H) 0.7 - 1.2 mg/dL    GFR Non-African American 58 >=60 mL/min/1.73    GFR African American 58     Calcium 9.6 8.6 - 10.2 mg/dL    Total Protein 7.6 6.4 - 8.3 g/dL    Alb 3.7 3.5 - 5.2 g/dL    Total Bilirubin <0.2 0.0 - 1.2 mg/dL    Alkaline Phosphatase 90 40 - 129 U/L    ALT 45 (H) 0 - 40 U/L    AST 27 0 - 39 U/L   Brain Natriuretic Peptide   Result Value Ref Range    Pro-BNP 33 0 - 125 pg/mL   Lipase   Result Value Ref Range    Lipase 53 13 - 60 U/L   Lactic Acid, Plasma   Result Value Ref Range    Lactic Acid 0.7 0.5 - 2.2 mmol/L   CBC   Result Value Ref Range    WBC 8.3 4.5 - 11.5 E9/L    RBC 2.96 (L) 3.80 - 5.80 E12/L    Hemoglobin 7.9 (L) 12.5 - 16.5 g/dL    Hematocrit 23.8 (L) 37.0 - 54.0 %    MCV 80.4 80.0 - 99.9 fL    MCH 26.7 26.0 - 35.0 pg    MCHC 33.2 32.0 - 34.5 %    RDW 19.7 (H) 11.5 - 15.0 fL    Platelets 911 130 - 450 E9/L    MPV 9.1 7.0 - 12.0 fL   APTT   Result Value Ref Range    aPTT 29.2 24.5 - 35.1 sec   APTT   Result Value Ref Range    aPTT >240.0 (H) 24.5 - 35.1 sec   APTT   Result Value Ref Range    aPTT >240.0 (H) 24.5 - 35.1 sec   APTT   Result Value Ref Range    aPTT 166.1 (H) 24.5 - 35.1 sec   Procalcitonin   Result Value Ref Range    Procalcitonin 0.12 (H) 0.00 - 0.08 ng/mL   CBC auto differential   Result Value Ref Range    WBC 5.6 4.5 - 11.5 E9/L    RBC 3.13 (L) 3.80 - 5.80 E12/L    Hemoglobin 8.2 (L) 12.5 - 16.5 g/dL    Hematocrit 25.1 (L) 37.0 - 54.0 %    MCV 80.2 80.0 - 99.9 fL    MCH 26.2 26.0 - 35.0 pg    MCHC 32.7 32.0 - 34.5 %    RDW 19.6 (H) 11.5 - 15.0 fL    Platelets 967 974 - 734 E9/L    MPV 9.5 7.0 - 12.0 fL    Neutrophils % 44.4 43.0 - 80.0 %    Immature Granulocytes % 0.4 0.0 - 5.0 %    Lymphocytes % 41.3 20.0 - 42.0 %    Monocytes % 10.0 2.0 - 12.0 %    Eosinophils % 3.4 0.0 - 6.0 %    Basophils % 0.5 0.0 - 2.0 %    Neutrophils Absolute 2.48 1.80 - 7.30 E9/L    Immature Granulocytes # 0.02 E9/L    Lymphocytes Absolute 2.31 1.50 - 4.00 E9/L    Monocytes Absolute 0.56 0.10 - 0.95 E9/L    Eosinophils Absolute 0.19 0.05 - 0.50 E9/L    Basophils Absolute 0.03 0.00 - 0.20 E9/L   APTT   Result Value Ref Range    aPTT 78.3 (H) 24.5 - 35.1 sec   EKG 12 Lead   Result Value Ref Range    Ventricular Rate 83 BPM    Atrial Rate 83 BPM    P-R Interval 156 ms    QRS Duration 92 ms    Q-T Interval 372 ms    QTc Calculation (Bazett) 437 ms    P Axis 65 degrees    R Axis -27 degrees    T Axis 31 degrees       Imaging: All Radiology results interpreted by Radiologist unless otherwise noted. US DUP LOWER EXTREMITIES BILATERAL VENOUS   Final Result   Within the visualized vessels there is no evidence for deep venous   thrombosis      CTA CHEST W CONTRAST   Final Result   1. Bilateral pulmonary emboli. 2. Improving bilateral airspace opacities.    3. Fat stranding adjacent to the urinary bladder may indicate cystitis. Critical results were called by Dr. Beverley Ramirez MD to Rozina Uribe on   12/9/2020 at 03:23. CT ABDOMEN PELVIS W IV CONTRAST Additional Contrast? None   Final Result   1. Bilateral pulmonary emboli. 2. Improving bilateral airspace opacities. 3. Fat stranding adjacent to the urinary bladder may indicate cystitis. Critical results were called by Dr. Beverley Ramirez MD to Rozina Uribe on   12/9/2020 at 03:23. XR CHEST PORTABLE   Final Result   Almost complete resolution of previously noted areas of airspace disease in   the left mid lung and right upper lobe. EKG #1:  Interpreted by emergency department physician unless otherwise noted. Time:  00:27    Rate: 83  Rhythm: Sinus rhythm. Interpretation: Normal sinus rhythm.     ED Course / Medical Decision Making     Medications   heparin (porcine) injection 10,000 Units (has no administration in time range)   heparin (porcine) injection 5,000 Units (has no administration in time range)   heparin 25,000 units in dextrose 5% 250 mL infusion (8.28 Units/kg/hr × 147 kg Intravenous New Bag 12/10/20 1433)   amLODIPine (NORVASC) tablet 10 mg (10 mg Oral Given 12/10/20 0738)   Arformoterol Tartrate (BROVANA) nebulizer solution 15 mcg (15 mcg Nebulization Given 12/10/20 0807)   budesonide (PULMICORT) nebulizer suspension 500 mcg (500 mcg Nebulization Given 12/10/20 0807)   carvedilol (COREG) tablet 6.25 mg (6.25 mg Oral Given 12/10/20 0738)   chlorhexidine (PERIDEX) 0.12 % solution 15 mL (15 mLs Mouth/Throat Given 12/10/20 0738)   colchicine (COLCRYS) tablet 0.6 mg (has no administration in time range)   divalproex (DEPAKOTE SPRINKLE) capsule 250 mg (250 mg Oral Given 12/10/20 1311)   fluconazole (DIFLUCAN) tablet 200 mg (200 mg Oral Given 12/10/20 0738)   ipratropium-albuterol (DUONEB) nebulizer solution 3 mL (3 mLs Inhalation Given 12/10/20 1310)   lansoprazole suspension SUSP 30 mg (30 mg Per G Tube Given 12/10/20 0614)   levothyroxine (SYNTHROID) tablet 50 mcg (50 mcg Oral Given 12/10/20 0738)   polyvinyl alcohol (LIQUIFILM TEARS) 1.4 % ophthalmic solution 1 drop (has no administration in time range)   senna (SENOKOT) tablet 17.2 mg (17.2 mg Oral Not Given 12/9/20 2145)   vitamin B-1 (THIAMINE) tablet 100 mg (100 mg Oral Given 12/10/20 0738)   sodium chloride flush 0.9 % injection 10 mL (10 mLs Intravenous Given 12/10/20 0827)   sodium chloride flush 0.9 % injection 10 mL (has no administration in time range)   acetaminophen (TYLENOL) tablet 650 mg (has no administration in time range)     Or   acetaminophen (TYLENOL) suppository 650 mg (has no administration in time range)   polyethylene glycol (GLYCOLAX) packet 17 g (has no administration in time range)   promethazine (PHENERGAN) tablet 12.5 mg (has no administration in time range)     Or   ondansetron (ZOFRAN) injection 4 mg (has no administration in time range)   linezolid (ZYVOX) tablet 600 mg (600 mg Oral Given 12/10/20 0744)   oxyCODONE-acetaminophen (PERCOCET) 5-325 MG per tablet 1 tablet (has no administration in time range)   diphenhydrAMINE (BENADRYL) tablet 25 mg (has no administration in time range)   fentaNYL (SUBLIMAZE) injection 100 mcg (100 mcg Intravenous Given 12/9/20 0041)   iopamidol (ISOVUE-370) 76 % injection 90 mL (90 mLs Intravenous Given 12/9/20 0256)   heparin (porcine) injection 10,000 Units (10,000 Units Intravenous Given 12/9/20 0441)   morphine sulfate (PF) injection 4 mg (4 mg Intravenous Given 12/9/20 0621)   morphine sulfate (PF) injection 4 mg (4 mg Intravenous Given 12/9/20 0711)        Re-Evaluations:  12/9/20      Time: 2:30 AM   Patient was found by a nurse standing at the side of the bed. His trach mask had been pulled off. I went in and the patient's pulse ox was in the 40s. He was bagged up to 100%. Respiratory arrived and suctioned him. Patient was placed back on trach mask and maintained good oxygen saturations.   He was advised to stay in bed and leave his trach mask on. Consultations:             IP CONSULT TO INTERNAL MEDICINE  IP CONSULT TO PULMONOLOGY  IP CONSULT TO INFECTIOUS DISEASES    Procedures:   none    MDM: Patient presents to the ED for evaluation of chest pain and shortness of breath. He was recently admitted for respiratory failure and has a tracheostomy. He is currently stable on trach mask. He was negative for COVID-19. He was found to have new bilateral pulmonary emboli. Patient is currently not anticoagulated. He was placed on a heparin drip and admitted for further management. CRITICAL CARE:   30 MINUTES. Please note that the withdrawal or failure to initiate urgent interventions for this patient would likely result in a life threatening deterioration or permanent disability. Accordingly this patient received the above mentioned time, excluding separately billable procedures. Plan of Care/Counseling:  I reviewed today's visit with the patient in addition to providing specific details for the plan of care and counseling regarding the diagnosis and prognosis. Questions are answered at this time and are agreeable with the plan. Assessment      1. Bilateral pulmonary embolism ([address]      This patient's ED course included: a personal history and physicial examination  This patient has remained hemodynamically stable during their ED course. Plan   Admission Full to Telemetry Unit. Patient condition is stable. New Medications     Current Discharge Medication List        Electronically signed by Carter Thomas DO   DD: 12/9/20  **This report was transcribed using voice recognition software. Every effort was made to ensure accuracy; however, inadvertent computerized transcription errors may be present.   END OF PROVIDER NOTE        Carter Thomas DO  12/10/20 [id number]

## 2020-12-09 NOTE — CONSULTS
[address] NOTE    Patient: Marla Olszewski  MRN: [id number]  : 1960    Encounter Date: 2020  Encounter Time: 2:05 PM     Date of Admission: .2020 12:19 AM    Consulting Physician:  Primary Care Physician:      Sondra Osborn MD     [phone]    PROBLEM LIST:  Patient Active Problem List   Diagnosis    Hyperlipidemia    Type 2 diabetes mellitus without complication, without long-term current use of insulin ([address]    Atypical chest pain    Essential hypertension    Chronic acquired lymphedema    Aortic valve disease    Morbid obesity due to excess calories ([address]    Abdominal pain    Acute respiratory failure with hypoxia ([address]    Acute pulmonary edema (HCC)    Congestive heart failure with LV diastolic dysfunction, NYHA class 3 ([address]    Obstructive sleep apnea    Acquired hypothyroidism    Chronic diastolic heart failure (HCC)    Nonrheumatic aortic valve stenosis    ACE-inhibitor cough    Pneumonia due to organism    Acute gout of right knee    Bilateral pulmonary embolism ([address]     Reason for Consultation: Pulmonary Embolism     HPI:   Mr. Mandie Caceres is a 60 y/o [facility] American male with past medical history noted for recent hospital stay for respiratory failure and protracted mechanical ventilation that required trach placement 2020. Patient was at [facility] and was short of breath and sent to hospital where he was found to have B/L lower segmental pulmonary embolism. He had 7/10 chest pains with no radiation and no exacerbating factors. He was in hospital previously with prolonged ETOH withdrawal course and intubation. Last hospital stay was 10/26/2020 to 12/3/2020. At this time patient on heparin drip with trach [address] 8 XLT proximal in place. He is awake, alert, can phonate and follows commands.      PAST MEDICAL HISTORY:   Past Medical History:   Diagnosis Date    Acquired hypothyroidism 9/26/2017    Anxiety     Congestive heart failure with LV diastolic dysfunction, NYHA class 3 [facility]     COPD (chronic obstructive pulmonary disease) [facility]     Depression     DM (diabetes mellitus) ([facility]     Essential hypertension 6/1/2016    Gouty arthritis 2006    Hyperlipidemia     Hypertension     Lymphedema     Obesity     Obstructive sleep apnea 8/17/2009    Obstructive sleep apnea 9/26/2017    Osteoarthritis     Type 2 diabetes mellitus without complication, without long-term current use of insulin ([facility] 1/15/2014     PAST SURGICAL HISTORY:   Past Surgical History:   Procedure Laterality Date    BRONCHOSCOPY  08/10/2017    ECHO COMPL W DOP COLOR FLOW  6/21/2013         EXTERNAL EAR SURGERY  6/2/2009    keloid left ear    FOOT SURGERY  1990    Left foot    GASTROSTOMY TUBE PLACEMENT  08/10/2017    IR TUNNELED CATHETER PLACEMENT GREATER THAN 5 YEARS  11/19/2020    IR TUNNELED CATHETER PLACEMENT GREATER THAN 5 YEARS 11/19/2020 Aylin Calderon MD [facility]    TRACHEOSTOMY N/A 11/11/2020    TRACHEOTOMY performed by Travis Laughlin MD at [address] [place] [address] N/A 11/12/2020    OPEN TRACHEOTOMY, BRONCHOSCOPY, DIRECT LARYNGOSCOPY performed by Vinh More DO at [facility]  08/10/2017    UPPER GASTROINTESTINAL ENDOSCOPY N/A 11/11/2020    EGD ESOPHAGOGASTRODUODENOSCOPY PEG TUBE INSERTION performed by Travis Laughlin MD at [facility] HISTORY:   Family History   Problem Relation Age of Onset    Alzheimer's Disease Mother     Heart Disease Father         Heart arrhythmia    Heart Attack Father        SOCIAL HISTORY:   Social History     Socioeconomic History    Marital status: Legally      Spouse name: Not on file    Number of children: Not on file    Years of education: Not on file    Highest education level: Not on file   Occupational History    Not on file   Social Needs    Financial resource strain: Not on file  Food insecurity     Worry: Not on file     Inability: Not on file    Transportation needs     Medical: Not on file     Non-medical: Not on file   Tobacco Use    Smoking status: Former Smoker     Packs/day: 0.10     Years: 10.00     Pack years: 1.00     Types: Cigarettes     Last attempt to quit: 2/19/2005     Years since quitting: 15.8    Smokeless tobacco: Former User     Types: Chew     Quit date: 1/1/1990   Substance and Sexual Activity    Alcohol use: Yes     Frequency: 2-3 times a week     Drinks per session: 1 or 2     Binge frequency: Never     Comment: socially    Drug use: Not Currently     Comment: past use; none x 30 years    Sexual activity: Not Currently     Partners: Female   Lifestyle    Physical activity     Days per week: Not on file     Minutes per session: Not on file    Stress: Not on file   Relationships    Social connections     Talks on phone: Not on file     Gets together: Not on file     Attends Oriental orthodox service: Not on file     Active member of club or organization: Not on file     Attends meetings of clubs or organizations: Not on file     Relationship status: Not on file    Intimate partner violence     Fear of current or ex partner: Not on file     Emotionally abused: Not on file     Physically abused: Not on file     Forced sexual activity: Not on file   Other Topics Concern    Not on file   Social History Narrative    Drinks 3 cups of coffee daily.       Social History     Tobacco Use   Smoking Status Former Smoker    Packs/day: 0.10    Years: 10.00    Pack years: 1.00    Types: Cigarettes    Last attempt to quit: 2/19/2005    Years since quitting: 15.8   Smokeless Tobacco Former User    Types: Jacek Polkbrad Quit date: 1/1/1990     Social History     Substance and Sexual Activity   Alcohol Use Yes    Frequency: 2-3 times a week    Drinks per session: 1 or 2    Binge frequency: Never    Comment: socially     Social History     Substance and Sexual Activity   Drug Use Not Currently    Comment: past use; none x 30 years     HOME MEDICATIONS:  Prior to Admission medications    Medication Sig Start Date End Date Taking? Authorizing Provider   Arformoterol Tartrate (BROVANA) 15 MCG/2ML NEBU Take 2 mLs by nebulization 2 times daily 12/1/20   Gill Vasquez MD   budesonide (PULMICORT) 0.5 MG/2ML nebulizer suspension Take 2 mLs by nebulization 2 times daily 12/1/20   Gill Vasquez MD   ipratropium-albuterol (DUONEB) 0.5-2.5 (3) MG/3ML SOLN nebulizer solution Inhale 3 mLs into the lungs every 4 hours (while awake) 12/1/20   Gill Vasquez MD   Heparin Sodium, Porcine, (HEPARIN, PORCINE,) 76417 UNIT/ML injection Inject 0.75 mLs into the skin every 8 hours 12/1/20   Gill Vasquez MD   divalproex (DEPAKOTE SPRINKLE) 125 MG capsule Take 2 capsules by mouth every 8 hours 12/1/20   Gill Vasquez MD   glucose (GLUTOSE) 40 % GEL Take 37.5 mLs by mouth as needed (low bs) 12/1/20   Gill Vasquez MD   carvedilol (COREG) 6.25 MG tablet Take 1 tablet by mouth 2 times daily (with meals) 12/1/20   Gill Vasquez MD   sodium chloride, Inhalant, 3 % nebulizer solution Take 2 mLs by nebulization every 4 hours 12/1/20   Gill Vasquez MD   mineral oil-hydrophilic petrolatum (HYDROPHOR) ointment Apply topically as needed.  12/1/20   Gill Vasquez MD   polyethylene glycol (GLYCOLAX) 17 g packet Take 17 g by mouth daily as needed for Constipation 12/1/20 12/31/20  Gill Vasquez MD   senna (SENOKOT) 8.6 MG tablet Take 2 tablets by mouth nightly 12/1/20 12/31/20  Gill Vasquez MD   chlorhexidine (PERIDEX) 0.12 % solution Take 15 mLs by mouth 2 times daily for 14 days 12/1/20 12/15/20  Gill Vasquez MD   polyvinyl alcohol (LIQUIFILM TEARS) 1.4 % ophthalmic solution Place 1 drop into both eyes every 4 hours as needed for Dry Eyes 12/1/20 12/31/20  Gill Vasquez MD   levothyroxine (SYNTHROID) 50 MCG tablet Take 1 tablet by mouth daily 12/2/20   Glil Vasquez MD   lansoprazole 3 MG/ML SUSP 10 mLs by Per G Tube route every morning (before breakfast) 12/2/20   Emma Martin MD   vitamin B-1 100 MG tablet 1 tablet by PEG Tube route daily 12/2/20   Emma Martin MD   fluconazole (DIFLUCAN) 200 MG tablet Take 1 tablet by mouth daily for 14 days 12/1/20 12/15/20  Xander Brody MD   nystatin (MYCOSTATIN) 263120 UNIT/ML suspension Take 5 mLs by mouth 4 times daily for 11 days 11/30/20 12/11/20  Xander Brody MD   linezolid (ZYVOX) 100 MG/5ML suspension 30 mLs by Per G Tube route every 12 hours for 16 days 11/30/20 12/16/20  Xander Brody MD   amLODIPine (NORVASC) 10 MG tablet Take 1 tablet by mouth daily 5/19/20   Emma Martin MD   colchicine (COLCRYS) 0.6 MG tablet Take 1 tablet by mouth 2 times daily as needed for Pain 5/6/19   Emma Martin MD       CURRENT MEDICATIONS:  Current Facility-Administered Medications: heparin (porcine) injection 10,000 Units, 10,000 Units, Intravenous, PRN  heparin (porcine) injection 5,000 Units, 5,000 Units, Intravenous, PRN  heparin 25,000 units in dextrose 5% 250 mL infusion, 14.28 Units/kg/hr, Intravenous, Continuous  amLODIPine (NORVASC) tablet 10 mg, 10 mg, Oral, Daily  Arformoterol Tartrate (BROVANA) nebulizer solution 15 mcg, 15 mcg, Nebulization, BID  budesonide (PULMICORT) nebulizer suspension 500 mcg, 0.5 mg, Nebulization, BID  carvedilol (COREG) tablet 6.25 mg, 6.25 mg, Oral, BID WC  chlorhexidine (PERIDEX) 0.12 % solution 15 mL, 15 mL, Mouth/Throat, BID  colchicine (COLCRYS) tablet 0.6 mg, 0.6 mg, Oral, BID PRN  divalproex (DEPAKOTE SPRINKLE) capsule 250 mg, 250 mg, Oral, 3 times per day  fluconazole (DIFLUCAN) tablet 200 mg, 200 mg, Oral, Daily  ipratropium-albuterol (DUONEB) nebulizer solution 3 mL, 3 mL, Inhalation, Q4H WA  [START ON 12/10/2020] lansoprazole suspension SUSP 30 mg, 30 mg, Per G Tube, QAM AC  levothyroxine (SYNTHROID) tablet 50 mcg, 50 mcg, Oral, Daily  polyvinyl alcohol (LIQUIFILM TEARS) 1.4 % ophthalmic solution 1 drop, 1 drop, Both Eyes, Q4H PRN  senna (SENOKOT) tablet 17.2 mg, 2 tablet, Oral, Nightly  vitamin B-1 (THIAMINE) tablet 100 mg, 100 mg, Oral, Daily  sodium chloride flush 0.9 % injection 10 mL, 10 mL, Intravenous, 2 times per day  sodium chloride flush 0.9 % injection 10 mL, 10 mL, Intravenous, PRN  acetaminophen (TYLENOL) tablet 650 mg, 650 mg, Oral, Q6H PRN **OR** acetaminophen (TYLENOL) suppository 650 mg, 650 mg, Rectal, Q6H PRN  polyethylene glycol (GLYCOLAX) packet 17 g, 17 g, Oral, Daily PRN  promethazine (PHENERGAN) tablet 12.5 mg, 12.5 mg, Oral, Q6H PRN **OR** ondansetron (ZOFRAN) injection 4 mg, 4 mg, Intravenous, Q6H PRN  HYDROcodone-acetaminophen (NORCO) 5-325 MG per tablet 1 tablet, 1 tablet, Oral, Q4H PRN    IV MEDICATIONS:   heparin (PORCINE) Infusion Stopped (12/09/20 2090)       ALLERGIES:  Allergies   Allergen Reactions    Bee Venom Anaphylaxis    Shellfish-Derived Products Anaphylaxis     Only crab legs       REVIEW OF SYSTEMS:  General ROS:     - Positive For:     - Negative For: chills, fatigue, fever, malaise, night sweats or sleep disturbance   ENT ROS:      - Positive For:     - Negative For: epistaxis, headaches, sinus pain, sneezing or sore throat   Allergy and Immunology ROS:     - Negative For: nasal congestion, postnasal drip or seasonal allergies   Hematological and Lymphatic ROS:      - Negative For: bleeding problems, bruising, fatigue, night sweats or pallor   Respiratory ROS:      - Positive For:       - Negative For: hemoptysis, pleuritic type chest pains  Cardiovascular ROS:      - Positive For:      - Negative For: chest pain, dyspnea on exertion, irregular heartbeat, loss of consciousness, murmur, orthopnea or palpitations   Gastrointestinal ROS:      - Positive For:     - Negative For: abdominal pain, appetite loss, blood in stools, change in bowel habits, change in stools, constipation, diarrhea, hematemesis, melena, nausea/vomiting or stool incontinence   Genito-Urinary ROS:      - Negative For: change in urinary stream, dysuria, hematuria or incontinence   Musculoskeletal ROS:      - Negative For: joint pain, joint stiffness, joint swelling or muscle pain   Neurological ROS:     - Negative For: behavioral changes, confusion, dizziness, headaches, impaired coordination/balance or memory loss   Dermatological ROS:      - Negative For: hair changes, lumps, mole changes, nail changes or pruritus    PHYSICAL EXAMINATION:     VITAL SIGNS:  BP (!) 164/94   Pulse 80   Temp 98.1 °F (36.7 °C) (Oral)   Resp 12   Ht 5' 9\" (1.753 m)   Wt (!) 324 lb (147 kg)   SpO2 100%   BMI 47.85 kg/m²   Wt Readings from Last 3 Encounters:   20 (!) 324 lb (147 kg)   20 (!) 324 lb (147 kg)   06/15/20 (!) 326 lb (147.9 kg)     Temp Readings from Last 3 Encounters:   20 98.1 °F (36.7 °C) (Oral)   20 98.4 °F (36.9 °C) (Temporal)   19 98.6 °F (37 °C) (Oral)     TMAX:  BP Readings from Last 3 Encounters:   20 (!) 164/94   20 (!) 185/72   20 (!) 93/53     Pulse Readings from Last 3 Encounters:   20 80   20 87   06/15/20 80       CURRENT PULSE OXIMETRY: SpO2: 100 %  24HR PULSE OXIMETRY RANGE: SpO2  Av.8 %  Min: 96 %  Max: 100 %  CVP:      ________________________________________________________________________    VENTILATOR SETTINGS (if applicable):         Vent Information  Equipment Changed: Other (comment)(inner cannula)  FiO2 : 50 %  SpO2: 100 %  SpO2/FiO2 ratio: 192  Additional Respiratory  Assessments  Pulse: 80  Resp: 12  SpO2: 100 %  ETCO2:  Peak Inspiratory Pressure:  End-Inspiratory Plateau Pressure:    ABG:  No results for input(s): PH, PO2, PCO2, HCO3, BE, O2SAT, METHB, O2HB, COHB, O2CON, HHB, THB in the last 72 hours. FiO2 : 50 %     ________________________________________________________________________    IV ACCESS:    NUTRITION: Diet NPO Effective Now    INTAKE/OUTPUTS:  No intake/output data recorded.   No intake or output data in the 24 hours ending 11/25/2020     Ca/Phos:   Lab Results   Component Value Date    CALCIUM 9.6 12/09/2020    PHOS 2.4 (L) 11/25/2020     Amylase: No results found for: AMYLASE  Lipase:   Lab Results   Component Value Date    LIPASE 53 12/09/2020     LIVER PROFILE:   Recent Labs     12/09/20  0039   AST 27   ALT 45*   LIPASE 53   BILITOT <0.2   ALKPHOS 90       PT/INR: No results for input(s): PROTIME, INR in the last 72 hours. APTT:   Recent Labs     12/09/20  0433 12/09/20  1136   APTT 29.2 >240.0*       Cardiac Enzymes:  Lab Results   Component Value Date    CKTOTAL 98 10/08/2017    CKMB 1.1 10/08/2017    TROPONINI <0.01 10/26/2020       Hgb A1C:   Lab Results   Component Value Date    LABA1C 6.8 06/15/2020     No results found for: EAG  IVAN:   Lab Results   Component Value Date    IVAN NEGATIVE 10/08/2017     ESR:   Lab Results   Component Value Date    SEDRATE 135 (H) 09/04/2017     CRP:   Lab Results   Component Value Date    CRP 1.4 (H) 11/02/2020     D Dimer:   Lab Results   Component Value Date    DDIMER 269 10/27/2020     Folate and B12:   Lab Results   Component Value Date    LDXJEULZ64 5253 (H) 11/05/2020   ,   Lab Results   Component Value Date    FOLATE >20.0 11/05/2020       Lactic Acid:   Lab Results   Component Value Date    LACTA 0.7 12/09/2020     Ammonia:   Cortisol:  Thyroid Studies:  Lab Results   Component Value Date    TSH 4.340 (H) 10/27/2020     CTA CHEST W CONTRAST   Final Result   1. Bilateral pulmonary emboli. 2. Improving bilateral airspace opacities. 3. Fat stranding adjacent to the urinary bladder may indicate cystitis. Critical results were called by Dr. Georgia Gudino MD to Kalpana Chen on   12/9/2020 at 03:23. CT ABDOMEN PELVIS W IV CONTRAST Additional Contrast? None   Final Result   1. Bilateral pulmonary emboli. 2. Improving bilateral airspace opacities. 3. Fat stranding adjacent to the urinary bladder may indicate cystitis.    Critical results were called by Dr. Georgia Gudino MD to

## 2020-12-09 NOTE — ED NOTES
Blood cultures obtained from left hand, per policy. Set one of two drawn at this time.                Lady Natalya RN  12/09/20 [id number]

## 2020-12-09 NOTE — H&P
Zulema Teresa is a 61 y.o. male  Chief Complaint   Patient presents with    Chest Pain     sharp 10/10 CP worse when coughing onset 2 days. C/O cough and chills @ night. hx heart and respiratory failure, trach mask on 6L. HPI  Pt seen earlier, he is still c/o cp. He denies sob, n/v/d/c, fever/chills or sweats. No current facility-administered medications on file prior to encounter. Current Outpatient Medications on File Prior to Encounter   Medication Sig Dispense Refill    Arformoterol Tartrate (BROVANA) 15 MCG/2ML NEBU Take 2 mLs by nebulization 2 times daily 120 mL 3    budesonide (PULMICORT) 0.5 MG/2ML nebulizer suspension Take 2 mLs by nebulization 2 times daily 60 ampule 3    ipratropium-albuterol (DUONEB) 0.5-2.5 (3) MG/3ML SOLN nebulizer solution Inhale 3 mLs into the lungs every 4 hours (while awake) 360 mL 0    Heparin Sodium, Porcine, (HEPARIN, PORCINE,) 38656 UNIT/ML injection Inject 0.75 mLs into the skin every 8 hours 1 vial 0    divalproex (DEPAKOTE SPRINKLE) 125 MG capsule Take 2 capsules by mouth every 8 hours 60 capsule 3    glucose (GLUTOSE) 40 % GEL Take 37.5 mLs by mouth as needed (low bs) 45 g 1    carvedilol (COREG) 6.25 MG tablet Take 1 tablet by mouth 2 times daily (with meals) 60 tablet 3    sodium chloride, Inhalant, 3 % nebulizer solution Take 2 mLs by nebulization every 4 hours 300 mL 0    mineral oil-hydrophilic petrolatum (HYDROPHOR) ointment Apply topically as needed.  1 Tube 0    polyethylene glycol (GLYCOLAX) 17 g packet Take 17 g by mouth daily as needed for Constipation 527 g 1    senna (SENOKOT) 8.6 MG tablet Take 2 tablets by mouth nightly 60 tablet 0    chlorhexidine (PERIDEX) 0.12 % solution Take 15 mLs by mouth 2 times daily for 14 days 420 mL 0    polyvinyl alcohol (LIQUIFILM TEARS) 1.4 % ophthalmic solution Place 1 drop into both eyes every 4 hours as needed for Dry Eyes 1 Bottle 4    levothyroxine (SYNTHROID) 50 MCG tablet Take 1 tablet by mouth daily 30 tablet 3    lansoprazole 3 MG/ML SUSP 10 mLs by Per G Tube route every morning (before breakfast) 300 mL 0    vitamin B-1 100 MG tablet 1 tablet by PEG Tube route daily 30 tablet 3    fluconazole (DIFLUCAN) 200 MG tablet Take 1 tablet by mouth daily for 14 days 14 tablet 0    nystatin (MYCOSTATIN) 305304 UNIT/ML suspension Take 5 mLs by mouth 4 times daily for 11 days 220 mL 0    linezolid (ZYVOX) 100 MG/5ML suspension 30 mLs by Per G Tube route every 12 hours for 16 days 960 mL 0    amLODIPine (NORVASC) 10 MG tablet Take 1 tablet by mouth daily 90 tablet 0    colchicine (COLCRYS) 0.6 MG tablet Take 1 tablet by mouth 2 times daily as needed for Pain 60 tablet 3     Past Medical History:   Diagnosis Date    Acquired hypothyroidism 9/26/2017    Anxiety     Congestive heart failure with LV diastolic dysfunction, NYHA class 3 ([address]     [facility]     Depression     DM (diabetes mellitus) [facility]     Essential hypertension 6/1/2016    Gouty arthritis 2006    Hyperlipidemia     Hypertension     Lymphedema     Obesity     Obstructive sleep apnea 8/17/2009    Obstructive sleep apnea 9/26/2017    Osteoarthritis     Type 2 diabetes mellitus without complication, without long-term current use of insulin ([address] 1/15/2014     Past Surgical History:   Procedure Laterality Date    BRONCHOSCOPY  08/10/2017    ECHO COMPL W DOP COLOR FLOW  6/21/2013         EXTERNAL EAR SURGERY  6/2/2009    keloid left ear    FOOT SURGERY  1990    Left foot    GASTROSTOMY TUBE PLACEMENT  08/10/2017    IR TUNNELED CATHETER PLACEMENT GREATER THAN 5 YEARS  11/19/2020    IR TUNNELED CATHETER PLACEMENT GREATER THAN 5 YEARS 11/19/2020 Antonino Forman MD [facility]    TRACHEOSTOMY N/A 11/11/2020    TRACHEOTOMY performed by Patricia Sabillon MD at [facility] N/A 11/12/2020    OPEN TRACHEOTOMY, BRONCHOSCOPY, DIRECT LARYNGOSCOPY performed by Lovely [address] [facility],  at [facility]  08/10/2017    UPPER GASTROINTESTINAL ENDOSCOPY N/A 11/11/2020    EGD ESOPHAGOGASTRODUODENOSCOPY PEG TUBE INSERTION performed by Shelley Mari MD at [facility] History     Socioeconomic History    Marital status: Legally      Spouse name: Not on file    Number of children: Not on file    Years of education: Not on file    Highest education level: Not on file   Occupational History    Not on file   Social Needs    Financial resource strain: Not on file    Food insecurity     Worry: Not on file     Inability: Not on file    Transportation needs     Medical: Not on file     Non-medical: Not on file   Tobacco Use    Smoking status: Former Smoker     Packs/day: 0.10     Years: 10.00     Pack years: 1.00     Types: Cigarettes     Last attempt to quit: 2/19/2005     Years since quitting: 15.8    Smokeless tobacco: Former User     Types: Chew     Quit date: 1/1/1990   Substance and Sexual Activity    Alcohol use: Yes     Frequency: 2-3 times a week     Drinks per session: 1 or 2     Binge frequency: Never     Comment: socially    Drug use: Not Currently     Comment: past use; none x 30 years    Sexual activity: Not Currently     Partners: Female   Lifestyle    Physical activity     Days per week: Not on file     Minutes per session: Not on file    Stress: Not on file   Relationships    Social connections     Talks on phone: Not on file     Gets together: Not on file     Attends Christianity service: Not on file     Active member of club or organization: Not on file     Attends meetings of clubs or organizations: Not on file     Relationship status: Not on file    Intimate partner violence     Fear of current or ex partner: Not on file     Emotionally abused: Not on file     Physically abused: Not on file     Forced sexual activity: Not on file   Other Topics Concern    Not on file   Social History Narrative    Drinks 3 cups of coffee daily. Family History   Problem Relation Age of Onset    Alzheimer's Disease Mother     Heart Disease Father         Heart arrhythmia    Heart Attack Father          ROS  Patient positive for  Chest pain   Patient denies any fever, chills, night sweats, weight changes   Denies headache, visual changes,   Denies dysphagia, odynophagia dysphonia,   Denies SOB, cough, sputum production,   Denies pressure, orthopnea, palpitations,   Denies abd pain, N/V/D/C/melena, hematochezia,   Denies urinary frequency, urgency, dysuria, hematuria,   Denies any acute muscle aches, paresthesias, weakness, seizure, syncopal episodes, Denies depression, anxiety.   OBJECTIVE  Vitals:    12/09/20 1100   BP: (!) 161/85   Pulse: 72   Resp: 11   Temp:    SpO2: 100%     Head: Trach is midline  PERRL, EOMIx2, no icterus, conjunctival injection  Neck: No JVD, carotid bruits, LAD, thyroid non-palpable  Heart: RRR with no murmurs, rubs, gallops  Lungs: CTA B/L, no W/R/R  Abd: soft, NT, ND, BS+, no G/R, no HSM  Ext: No C/C/E, pulses intact distally B/L  Neuro: CN 2-12 grossly intact with no focal deficits    ASSESSMENT  PE  Chronic Resp failure  DM2  HTN  PLAN    Admit  Consult Pulmonary  Start anticoagulation, he is on Heparin drip now  Monitor labs

## 2020-12-09 NOTE — ED NOTES
Spoke to Dr Sabina Rojas about PTT result, no new orders at this time.       Abdi Mahoney RN  12/09/20 [id number]

## 2020-12-10 NOTE — PROGRESS NOTES
Patient refusing tube feed d/t diarrhea.   Patient states will start tube feed in the AM.  Commode ordered

## 2020-12-10 NOTE — PROGRESS NOTES
[address] [place] [address] [facility]  Progress Note    SUBJECTIVE:  Chief Complaint   Patient presents with    Chest Pain     sharp 10/10 CP worse when coughing onset 2 days. C/O cough and chills @ night. hx heart and respiratory failure, trach mask on 6L. Patient is tolerating medications. No reported adverse drug reactions. No nausea, vomiting, diarrhea. Afebrile. 50% FiO2 97% to trach mask     Review of systems:  As stated above in the chief complaint, otherwise negative. Medications:  Scheduled Meds:   amLODIPine  10 mg Oral Daily    Arformoterol Tartrate  15 mcg Nebulization BID    budesonide  0.5 mg Nebulization BID    carvedilol  6.25 mg Oral BID WC    chlorhexidine  15 mL Mouth/Throat BID    divalproex  250 mg Oral 3 times per day    fluconazole  200 mg Oral Daily    ipratropium-albuterol  3 mL Inhalation Q4H WA    lansoprazole  30 mg Per G Tube QAM AC    levothyroxine  50 mcg Oral Daily    senna  2 tablet Oral Nightly    thiamine  100 mg Oral Daily    sodium chloride flush  10 mL Intravenous 2 times per day    linezolid  600 mg Oral 2 times per day     Continuous Infusions:   heparin (PORCINE) Infusion 8.28 Units/kg/hr (12/10/20 0037)     PRN Meds:heparin (porcine), heparin (porcine), colchicine, polyvinyl alcohol, sodium chloride flush, acetaminophen **OR** acetaminophen, polyethylene glycol, promethazine **OR** ondansetron, HYDROcodone 5 mg - acetaminophen    OBJECTIVE:  BP (!) 167/60   Pulse 82   Temp 97.1 °F (36.2 °C)   Resp 16   Ht 5' 9\" (1.753 m)   Wt (!) 324 lb (147 kg)   SpO2 97%   BMI 47.85 kg/m²   Temp  Av.6 °F (36.4 °C)  Min: 97.1 °F (36.2 °C)  Max: 98 °F (36.7 °C)  Constitutional: The patient is awake, alert, and oriented. NAD, following commands  Skin: Warm and dry. No rashes were noted. HEENT: Round and reactive pupils. Moist mucous membranes. No ulcerations. Ellene Fang is present but is much improved. Neck: Supple to movements.  Trach with trach mask  Chest: No use of accessory muscles to breathe. Symmetrical expansion. No wheezing, crackles or rhonchi. Poor air exchange. Cardiovascular: S1 and S2 are rhythmic and regular. No murmurs appreciated. Abdomen: Positive bowel sounds to auscultation. Benign to palpation. No masses felt. No hepatosplenomegaly. Extremities: No clubbing, no cyanosis, no edema. Lines: PICC 11/19/2020     Laboratory and Tests Review:  Lab Results   Component Value Date    WBC 5.6 12/10/2020    WBC 8.3 12/09/2020    WBC 7.2 12/09/2020    HGB 8.2 (L) 12/10/2020    HCT 25.1 (L) 12/10/2020    MCV 80.2 12/10/2020     12/10/2020     Lab Results   Component Value Date    NEUTROABS 2.48 12/10/2020    NEUTROABS 3.40 12/09/2020    NEUTROABS 3.72 12/03/2020     No results found for: [facility]  Lab Results   Component Value Date    ALT 45 (H) 12/09/2020    AST 27 12/09/2020    ALKPHOS 90 12/09/2020    BILITOT <0.2 12/09/2020     Lab Results   Component Value Date     12/09/2020    K 4.4 12/09/2020    K 4.0 10/27/2020    CL 93 12/09/2020    CO2 32 12/09/2020    BUN 17 12/09/2020    CREATININE 1.5 12/09/2020    CREATININE 1.1 12/03/2020    CREATININE 1.1 12/02/2020    GFRAA 58 12/09/2020    LABGLOM 58 12/09/2020    GLUCOSE 109 12/09/2020    PROT 7.6 12/09/2020    LABALBU 3.7 12/09/2020    CALCIUM 9.6 12/09/2020    BILITOT <0.2 12/09/2020    ALKPHOS 90 12/09/2020    AST 27 12/09/2020    ALT 45 12/09/2020     Lab Results   Component Value Date    CRP 1.4 (H) 11/02/2020    CRP 2.8 (H) 09/04/2017    CRP 10.5 (H) 08/28/2017     Lab Results   Component Value Date    SEDRATE 135 (H) 09/04/2017    SEDRATE 150 (H) 08/28/2017    SEDRATE 141 (H) 08/21/2017     Radiology:  reviewed    Microbiology:   Lab Results   Component Value Date    BC 24 Hours no growth 12/09/2020    BC  11/25/2020     No antibiotic susceptibility studies performed. Plates will be held 10  days. Contact the laboratory, [phone], if further workup is  desired. BC 5 Days no growth 11/09/2020    ORG Staphylococcus coagulase-negative 11/25/2020    ORG Staphylococcus aureus 11/25/2020    ORG Staphylococcus aureus 10/29/2020     Lab Results   Component Value Date    BLOODCULT2 24 Hours no growth 12/09/2020    BLOODCULT2 5 Days no growth 11/25/2020    BLOODCULT2 5 Days no growth 11/09/2020    ORG Staphylococcus coagulase-negative 11/25/2020    ORG Staphylococcus aureus 11/25/2020    ORG Staphylococcus aureus 10/29/2020     No results found for: WNDABS  Smear, Respiratory   Date Value Ref Range Status   11/25/2020   Final    Group 5: >25 PMN's/LPF and <10 Epithelial cells/LPF  Abundant Polymorphonuclear leukocytes  Rare Epithelial cells  Abundant Gram positive diplococci  Abundant Gram positive cocci in clusters       No results found for: MPNEUMO, CLAMYDCU, LABLEGI, AFBCX, FUNGSM, LABFUNG  CULTURE, RESPIRATORY   Date Value Ref Range Status   11/25/2020 Oral Pharyngeal Tiesha absent (A)  Final   11/25/2020   Final    Heavy growth  Methicillin resistant Staph aureus isolated. Most Methicillin  resistant Staphylococcus are usually resistant to multiple  antibiotics including other B-Lactams, Aminoglycosides,  Macrolides, Clindamycin and Tetracycline. Contact isolation  is indicated. This isolate is presumed to be resistant based on the  detection of inducible Clindamycin resistance. Clindamycin  may still be effective in some patients.        Culture Catheter Tip   Date Value Ref Range Status   08/26/2017 <15 colonies  Final     No results found for: BFCS  No results found for: CXSURG  Urine Culture, Routine   Date Value Ref Range Status   11/09/2020 Growth not present  Final   10/28/2020 Growth not present  Final   09/16/2019 Growth not present  Final     MRSA Culture Only   Date Value Ref Range Status   11/09/2020 Methicillin resistant Staph aureus not isolated  Final   10/30/2020 Methicillin resistant Staph aureus not isolated  Final   07/28/2017 Methicillin resistant Staph aureus not isolated  Final     Recent Labs     12/09/20  1435   PROCAL 0.12*       ASSESSMENT:  · Continue Zyvox until 12/17/2020 for completion of treatment of MRSA pneumonia-improved  · Continue Diflucan for treatment of thrush and esophagitis-improved  · Shortness of breath associated with PE    PLAN:  · Continue zyvox until 12/17/2020 & diflucan  · Anticoagulation per primary for PE  · Check final cultures  · Monitor labs    Thomas Maldonado  1:38 PM  12/10/2020     Pt seen and examined. Above discussed agree with advanced practice nurse. Labs, cultures, and radiographs reviewed. Face to Face encounter occurred. Changes made as necessary.      Dion Godoy MD

## 2020-12-10 NOTE — ED NOTES
[facility]ra [facility] called to notify that she contacted the floor nurse to send staff to assist with transport and no staff arrived.  This nurse called floor and spoke with charge nurse, charge nurse stated they would send staff to Joby Raymond RN  12/09/20 9420

## 2020-12-10 NOTE — PROGRESS NOTES
[address] PROGRESS NOTE    Patient: Erna Teran  MRN: [id number]  : 1960    Encounter Date: 12/10/2020  Encounter Time: 3:44 PM     Date of Admission: .2020 12:19 AM    Consulting Physician:  Primary Care Physician:     Leonides Bonds MD     [phone]    Reason for Consultation: Pulmonary Embolism     Problem List:  Patient Active Problem List   Diagnosis    Hyperlipidemia    Type 2 diabetes mellitus without complication, without long-term current use of insulin ([address]    Atypical chest pain    Essential hypertension    Chronic acquired lymphedema    Aortic valve disease    Morbid obesity due to excess calories ([address]    Abdominal pain    Acute respiratory failure with hypoxia ([address]    Acute pulmonary edema ([address]    Congestive heart failure with LV diastolic dysfunction, NYHA class 3 ([address]    Obstructive sleep apnea    Acquired hypothyroidism    Chronic diastolic heart failure (HCC)    Nonrheumatic aortic valve stenosis    ACE-inhibitor cough    Pneumonia due to organism    Acute gout of right knee    Bilateral pulmonary embolism ([address]     SUBJECTIVE: Patient seen and examined. Overnight the patient had no shortness of breath, cough, increased work of breathing. HOME MEDICATIONS:  Prior to Admission medications    Medication Sig Start Date End Date Taking?  Authorizing Provider   hydrALAZINE (APRESOLINE) 25 MG tablet Take 25 mg by mouth 3 times daily g tube   Yes Historical Provider, MD   insulin glargine (LANTUS) 100 UNIT/ML injection vial Inject 20 Units into the skin nightly   Yes Historical Provider, MD   insulin lispro (HUMALOG) 100 UNIT/ML injection vial Inject into the skin 3 times daily (before meals)   Yes Historical Provider, MD   Arformoterol Tartrate (BROVANA) 15 MCG/2ML NEBU Take 2 mLs by nebulization 2 times daily 20   Leonides Bonds MD   budesonide (PULMICORT) 0.5 MG/2ML nebulizer suspension Take 2 mLs by nebulization 2 times daily 12/1/20   Payton Oscar MD   ipratropium-albuterol (DUONEB) 0.5-2.5 (3) MG/3ML SOLN nebulizer solution Inhale 3 mLs into the lungs every 4 hours (while awake) 12/1/20   Payton Oscar MD   Heparin Sodium, Porcine, (HEPARIN, PORCINE,) 97724 UNIT/ML injection Inject 0.75 mLs into the skin every 8 hours 12/1/20   Payton Oscar MD   divalproex (DEPAKOTE SPRINKLE) 125 MG capsule Take 2 capsules by mouth every 8 hours 12/1/20   Payton Oscar MD   glucose (GLUTOSE) 40 % GEL Take 37.5 mLs by mouth as needed (low bs) 12/1/20   Payton Oscar MD   carvedilol (COREG) 6.25 MG tablet Take 1 tablet by mouth 2 times daily (with meals) 12/1/20   Payton Oscar MD   sodium chloride, Inhalant, 3 % nebulizer solution Take 2 mLs by nebulization every 4 hours 12/1/20   Payton Oscar MD   mineral oil-hydrophilic petrolatum (HYDROPHOR) ointment Apply topically as needed.  12/1/20   Payton Oscar MD   polyethylene glycol (GLYCOLAX) 17 g packet Take 17 g by mouth daily as needed for Constipation 12/1/20 12/31/20  Payton Oscar MD   senna (SENOKOT) 8.6 MG tablet Take 2 tablets by mouth nightly 12/1/20 12/31/20  Payton Oscar MD   chlorhexidine (PERIDEX) 0.12 % solution Take 15 mLs by mouth 2 times daily for 14 days 12/1/20 12/15/20  Payton Oscar MD   polyvinyl alcohol (LIQUIFILM TEARS) 1.4 % ophthalmic solution Place 1 drop into both eyes every 4 hours as needed for Dry Eyes 12/1/20 12/31/20  Payton Oscar MD   levothyroxine (SYNTHROID) 50 MCG tablet Take 1 tablet by mouth daily 12/2/20   Payton Oscar MD   lansoprazole 3 MG/ML SUSP 10 mLs by Per G Tube route every morning (before breakfast) 12/2/20   Payton Oscar MD   vitamin B-1 100 MG tablet 1 tablet by PEG Tube route daily 12/2/20   Payton Oscar MD   fluconazole (DIFLUCAN) 200 MG tablet Take 1 tablet by mouth daily for 14 days 12/1/20 12/15/20  Jacqueline Sam MD   nystatin (MYCOSTATIN) 691191 UNIT/ML suspension Take 5 mLs by mouth 4 times daily for 11 days 11/30/20 12/11/20  Favio Kiran MD   linezolid (ZYVOX) 100 MG/5ML suspension 30 mLs by Per G Tube route every 12 hours for 16 days 11/30/20 12/16/20  Favio Kiran MD   amLODIPine (NORVASC) 10 MG tablet Take 1 tablet by mouth daily 5/19/20   Mercy Morgan MD   colchicine (COLCRYS) 0.6 MG tablet Take 1 tablet by mouth 2 times daily as needed for Pain 5/6/19   Mercy Morgan MD       CURRENT MEDICATIONS:  Current Facility-Administered Medications: oxyCODONE-acetaminophen (PERCOCET) 5-325 MG per tablet 1 tablet, 1 tablet, Oral, Q4H PRN  diphenhydrAMINE (BENADRYL) tablet 25 mg, 25 mg, Oral, Q6H PRN  heparin (porcine) injection 10,000 Units, 10,000 Units, Intravenous, PRN  heparin (porcine) injection 5,000 Units, 5,000 Units, Intravenous, PRN  heparin 25,000 units in dextrose 5% 250 mL infusion, 14.28 Units/kg/hr, Intravenous, Continuous  amLODIPine (NORVASC) tablet 10 mg, 10 mg, Oral, Daily  Arformoterol Tartrate (BROVANA) nebulizer solution 15 mcg, 15 mcg, Nebulization, BID  budesonide (PULMICORT) nebulizer suspension 500 mcg, 0.5 mg, Nebulization, BID  carvedilol (COREG) tablet 6.25 mg, 6.25 mg, Oral, BID WC  chlorhexidine (PERIDEX) 0.12 % solution 15 mL, 15 mL, Mouth/Throat, BID  colchicine (COLCRYS) tablet 0.6 mg, 0.6 mg, Oral, BID PRN  divalproex (DEPAKOTE SPRINKLE) capsule 250 mg, 250 mg, Oral, 3 times per day  fluconazole (DIFLUCAN) tablet 200 mg, 200 mg, Oral, Daily  ipratropium-albuterol (DUONEB) nebulizer solution 3 mL, 3 mL, Inhalation, Q4H WA  lansoprazole suspension SUSP 30 mg, 30 mg, Per G Tube, QAM AC  levothyroxine (SYNTHROID) tablet 50 mcg, 50 mcg, Oral, Daily  polyvinyl alcohol (LIQUIFILM TEARS) 1.4 % ophthalmic solution 1 drop, 1 drop, Both Eyes, Q4H PRN  senna (SENOKOT) tablet 17.2 mg, 2 tablet, Oral, Nightly  vitamin B-1 (THIAMINE) tablet 100 mg, 100 mg, Oral, Daily  sodium chloride flush 0.9 % injection 10 mL, 10 mL, Intravenous, 2 times per day  sodium chloride PULSE OXIMETRY RANGE: SpO2  Av.3 %  Min: 97 %  Max: 100 %  CVP:      ________________________________________________________________________    VENTILATOR SETTINGS (if applicable):         Vent Information  Equipment Changed: Other (comment)(inner cannula)  FiO2 : 50 %  SpO2: 100 %  SpO2/FiO2 ratio: 200  Additional Respiratory  Assessments  Pulse: 82  Resp: 22  SpO2: 100 %  ETCO2:  Peak Inspiratory Pressure:  End-Inspiratory Plateau Pressure:    ABG:  No results for input(s): PH, PO2, PCO2, HCO3, BE, O2SAT, METHB, O2HB, COHB, O2CON, HHB, THB in the last 72 hours. FiO2 : 50 %     ________________________________________________________________________    IV ACCESS:    NUTRITION: DIET TUBE FEED CONTINUOUS/CYCLIC NPO; Diabetic; Gastrostomy; Continuous; 25; 65; 24    INTAKE/OUTPUTS:  I/O last 3 completed shifts:   In: 123 [I.V.:123]  Out: 240 [Urine:240]    Intake/Output Summary (Last 24 hours) at 12/10/2020 1544  Last data filed at 12/10/2020 1145  Gross per 24 hour   Intake 123 ml   Output 240 ml   Net -117 ml     General Appearance: alert and oriented to person, place and time, well-developed and   well-nourished, in no acute distress   Eyes: pupils equal, round, and reactive to light, extraocular eye movements intact, conjunctivae normal and sclera anicteric   Neck: neck supple and non tender without mass, no thyromegaly, no thyroid nodules and no cervical adenopathy   - trach intact with no focal erythema, erosion, ulceration or bleed noted  - trach Shiley 8 XLT proximal   Pulmonary/Chest: decreased breath sounds, no accessory muscles of inspiration, no focal wheezes  Cardiovascular: normal rate, regular rhythm, normal S1 and S2, no murmurs, rubs, clicks or gallops, distal pulses intact, no carotid bruits, no murmurs, no gallops, no carotid bruits and no JVD   Abdomen: soft, non-tender, non-distended, normal bowel sounds, no masses or organomegaly   Extremities: no cyanosis, no clubbing, no edema  Musculoskeletal: normal range of motion, no joint swelling, deformity or tenderness   Neurologic: reflexes normal and symmetric, no cranial nerve deficit noted    LABS/IMAGING:    CBC:  Lab Results   Component Value Date    WBC 5.6 12/10/2020    HGB 8.2 (L) 12/10/2020    HCT 25.1 (L) 12/10/2020    MCV 80.2 12/10/2020     12/10/2020    LYMPHOPCT 41.3 12/10/2020    RBC 3.13 (L) 12/10/2020    MCH 26.2 12/10/2020    MCHC 32.7 12/10/2020    RDW 19.6 (H) 12/10/2020    NEUTOPHILPCT 44.4 12/10/2020    MONOPCT 10.0 12/10/2020    BASOPCT 0.5 12/10/2020    NEUTROABS 2.48 12/10/2020    LYMPHSABS 2.31 12/10/2020    MONOSABS 0.56 12/10/2020    EOSABS 0.19 12/10/2020    BASOSABS 0.03 12/10/2020       Recent Labs     12/10/20  0504 12/09/20  0433 12/09/20  0039   WBC 5.6 8.3 7.2   HGB 8.2* 7.9* 8.4*   HCT 25.1* 23.8* 25.7*   MCV 80.2 80.4 80.8    354 384       BMP:   Recent Labs     12/09/20  0039      K 4.4   CL 93*   CO2 32*   BUN 17   CREATININE 1.5*       MG:   Lab Results   Component Value Date    MG 1.8 11/25/2020     Ca/Phos:   Lab Results   Component Value Date    CALCIUM 9.6 12/09/2020    PHOS 2.4 (L) 11/25/2020     Amylase: No results found for: AMYLASE  Lipase:   Lab Results   Component Value Date    LIPASE 53 12/09/2020     LIVER PROFILE:   Recent Labs     12/09/20  0039   AST 27   ALT 45*   LIPASE 53   BILITOT <0.2   ALKPHOS 90       PT/INR: No results for input(s): PROTIME, INR in the last 72 hours.   APTT:   Recent Labs     12/09/20  1435 12/09/20  2230 12/10/20  0650   APTT >240.0* 166.1* 78.3*       Cardiac Enzymes:  Lab Results   Component Value Date    CKTOTAL 98 10/08/2017    CKMB 1.1 10/08/2017    TROPONINI <0.01 10/26/2020       Hgb A1C:   Lab Results   Component Value Date    LABA1C 6.8 06/15/2020     No results found for: EAG  IVAN:   Lab Results   Component Value Date    IVAN NEGATIVE 10/08/2017     ESR:   Lab Results   Component Value Date    SEDRATE 135 (H) 09/04/2017     CRP: Lab Results   Component Value Date    CRP 1.4 (H) 11/02/2020     D Dimer:   Lab Results   Component Value Date    DDIMER 269 10/27/2020     Folate and B12:   Lab Results   Component Value Date    ZXBMWKPB74 6377 (H) 11/05/2020   ,   Lab Results   Component Value Date    FOLATE >20.0 11/05/2020       Lactic Acid:   Lab Results   Component Value Date    LACTA 0.7 12/09/2020     Ammonia:   Cortisol:  Thyroid Studies:  Lab Results   Component Value Date    TSH 4.340 (H) 10/27/2020     CTA CHEST W CONTRAST   Final Result   1. Bilateral pulmonary emboli. 2. Improving bilateral airspace opacities. 3. Fat stranding adjacent to the urinary bladder may indicate cystitis. Critical results were called by Dr. Frank Chapman MD to [facility] on   12/9/2020 at 03:23.       CT ABDOMEN PELVIS W IV CONTRAST Additional Contrast? None   Final Result   1. Bilateral pulmonary emboli. 2. Improving bilateral airspace opacities. 3. Fat stranding adjacent to the urinary bladder may indicate cystitis. Critical results were called by Dr. Frank Chapman MD to [facility] on   12/9/2020 at 03:23.       XR CHEST PORTABLE   Final Result   Almost complete resolution of previously noted areas of airspace disease in   the left mid lung and right upper lobe.           ASSESSMENT:  1.) B/L Pulmonary Embolism - subsegmental, not massive   2.) Acute hypoxemic respiratory failure on mechanical ventilation s/p trach 11/12/2020 with Shiley 8 Proximal XLT  3.) Metabolic encephalopathy history of EtOH abuse with agitation on ( Depakote, and Seroquel  )significantly improved  4.) HANS moderate ( AHI 17 on 4/18/18 requires BIPAP 16/12) noncompliant with CPAP  5.) History of Reactive Airway Disease Syndreom (RADS)  6.) Stable Pulmonary Nodules - radiographically stable since CT 10/2017 to 3/2018  7.) Diabetes Mellitus  8.) Obesity  9.) EF 46% LVH diastolic dysfunction  10.) Chronic Lymphedema  11.) History of Gout - on colchicine  12.) Hypothyroidism  13.) H/o respiratory failure: 7/8/2017 pt was  transferred from [facility] for acute respiratory failure after lap cholecystectomy, lap umbilical hernia repair, and lap liver biopsy (fatty liver). Patient was then transferred to [facility] hospital where he was weaned off the ventilator and decannulated. 14.) Ventilator associated pneumonia - treated   15.) Alcohol Withdrawal - improved  16.) Abdominal Pains - resolved     PLAN:  1.) trach care/suction/suport  2.) COVID negative   3.) heparin drip  4.) PT/OT     - doppler US legs negative  - thrombophilia panel   - full respiratory panel negative  - linezolid and diflucan per ID  - heparin drip to continue and if Hgb stable then look to transition to eliquis 12/11/2020  - supportive care to continue at this time     Thank you No att. providers found very much for allowing me to see this patient in consultation and follow up.     Care reviewed with nursing staff, medical and surgical specialty care, primary care and the patient's family as available. Restraints are ordered when the patient can do harm to him/herself by pulling out devices.     Dalia Tolbert M.D.

## 2020-12-10 NOTE — ED NOTES
Pt put in for transport to floor and floor notified. Transport arrived to take pt to ultrasound. This nurse instructed that the pt will go to floor when test is done. Pt transported to ultrasound by this nurse. Staff in ultrasound stated they would call a staff member to Ultrasound to transport pt back to floor.       Aaron Griffin RN  12/09/20 8515

## 2020-12-10 NOTE — CARE COORDINATION
Transition of Care-Call placed to wife Ervin Field [phone] in regards to discharge planning, spoke to Angelia with [facility], they are unable to accept patient back. Left message with wife to choice for another [facility] that can accommodate KAROLINA subramanian following.     Jatinder PALACIOSN, RN  [facility]   [phone]

## 2020-12-11 NOTE — CARE COORDINATION
PT/OT:Transition of Care-Call placed to wife Bar Workman [phone] regarding discharge plan, patient was at [facility] prior to this admission, however unable to accept patient back at this time. Dicussed facilities that accept tracheostomy's and wife had no preference wanted the best place that will accept. Referral to [facility] sent to [facility], waiting to hear back for acceptance. [facility] is also reviewing case, may be able to accept. Covid 12/9-negative. CM following.     Carlos Manuel PALACIOSN, RN  [facility]   [phone]

## 2020-12-11 NOTE — PROGRESS NOTES
[address] [place] [address] [facility]  Progress Note    SUBJECTIVE:  Chief Complaint   Patient presents with    Chest Pain     sharp 10/10 CP worse when coughing onset 2 days. C/O cough and chills @ night. hx heart and respiratory failure, trach mask on 6L. Patient is tolerating medications. No reported adverse drug reactions. No nausea, vomiting, diarrhea. Afebrile. 50% FiO2 96% to trach mask     Review of systems:  As stated above in the chief complaint, otherwise negative. Medications:  Scheduled Meds:   valproic acid  250 mg Per G Tube 3 times per day    amLODIPine  10 mg Oral Daily    Arformoterol Tartrate  15 mcg Nebulization BID    budesonide  0.5 mg Nebulization BID    carvedilol  6.25 mg Oral BID WC    chlorhexidine  15 mL Mouth/Throat BID    fluconazole  200 mg Oral Daily    ipratropium-albuterol  3 mL Inhalation Q4H WA    lansoprazole  30 mg Per G Tube QAM AC    levothyroxine  50 mcg Oral Daily    senna  2 tablet Oral Nightly    thiamine  100 mg Oral Daily    sodium chloride flush  10 mL Intravenous 2 times per day    linezolid  600 mg Oral 2 times per day     Continuous Infusions:   heparin (PORCINE) Infusion 6.28 Units/kg/hr (20 0653)     PRN Meds:oxyCODONE-acetaminophen, diphenhydrAMINE, heparin (porcine), heparin (porcine), colchicine, polyvinyl alcohol, sodium chloride flush, acetaminophen **OR** acetaminophen, polyethylene glycol, promethazine **OR** ondansetron    OBJECTIVE:  BP (!) 159/69   Pulse 72   Temp 97 °F (36.1 °C) (Temporal)   Resp 18   Ht 5' 9\" (1.753 m)   Wt (!) 324 lb (147 kg)   SpO2 96%   BMI 47.85 kg/m²   Temp  Av.5 °F (36.4 °C)  Min: 97 °F (36.1 °C)  Max: 98.1 °F (36.7 °C)  Constitutional: The patient is awake, alert, and oriented. NAD, resting comfortably  Skin: Warm and dry. No rashes were noted. HEENT: Round and reactive pupils. Moist mucous membranes. No ulcerations. Lovetta Tj is present but is much improved.    Neck: Supple to movements. Trach with trach mask  Chest: No use of accessory muscles to breathe. Symmetrical expansion. No wheezing, crackles or rhonchi. Poor air exchange. Cardiovascular: S1 and S2 are rhythmic and regular. No murmurs appreciated. Abdomen: Positive bowel sounds to auscultation. Benign to palpation. No masses felt. No hepatosplenomegaly. Extremities: No clubbing, no cyanosis, no edema. Lines: PICC 11/19/2020     Laboratory and Tests Review:  Lab Results   Component Value Date    WBC 6.3 12/11/2020    WBC 5.6 12/10/2020    WBC 8.3 12/09/2020    HGB 8.3 (L) 12/11/2020    HCT 25.5 (L) 12/11/2020    MCV 81.5 12/11/2020     12/11/2020     Lab Results   Component Value Date    NEUTROABS 2.48 12/10/2020    NEUTROABS 3.40 12/09/2020    NEUTROABS 3.72 12/03/2020     No results found for: CRP  Lab Results   Component Value Date    ALT 45 (H) 12/09/2020    AST 27 12/09/2020    ALKPHOS 90 12/09/2020    BILITOT <0.2 12/09/2020     Lab Results   Component Value Date     12/09/2020    K 4.4 12/09/2020    K 4.0 10/27/2020    CL 93 12/09/2020    CO2 32 12/09/2020    BUN 17 12/09/2020    CREATININE 1.5 12/09/2020    CREATININE 1.1 12/03/2020    CREATININE 1.1 12/02/2020    GFRAA 58 12/09/2020    LABGLOM 58 12/09/2020    GLUCOSE 109 12/09/2020    PROT 7.6 12/09/2020    LABALBU 3.7 12/09/2020    CALCIUM 9.6 12/09/2020    BILITOT <0.2 12/09/2020    ALKPHOS 90 12/09/2020    AST 27 12/09/2020    ALT 45 12/09/2020     Lab Results   Component Value Date    CRP 1.4 (H) 11/02/2020    CRP 2.8 (H) 09/04/2017    CRP 10.5 (H) 08/28/2017     Lab Results   Component Value Date    SEDRATE 135 (H) 09/04/2017    SEDRATE 150 (H) 08/28/2017    SEDRATE 141 (H) 08/21/2017     Radiology:  reviewed    Microbiology:   Lab Results   Component Value Date    BC 24 Hours no growth 12/09/2020    BC  11/25/2020     No antibiotic susceptibility studies performed. Plates will be held 10  days.   Contact the laboratory, [phone], if further workup is  desired. BC 5 Days no growth 11/09/2020    ORG Staphylococcus coagulase-negative 11/25/2020    ORG Staphylococcus aureus 11/25/2020    ORG Staphylococcus aureus 10/29/2020     Lab Results   Component Value Date    BLOODCULT2 24 Hours no growth 12/09/2020    BLOODCULT2 5 Days no growth 11/25/2020    BLOODCULT2 5 Days no growth 11/09/2020    ORG Staphylococcus coagulase-negative 11/25/2020    ORG Staphylococcus aureus 11/25/2020    ORG Staphylococcus aureus 10/29/2020     No results found for: WNDABS  Smear, Respiratory   Date Value Ref Range Status   11/25/2020   Final    Group 5: >25 PMN's/LPF and <10 Epithelial cells/LPF  Abundant Polymorphonuclear leukocytes  Rare Epithelial cells  Abundant Gram positive diplococci  Abundant Gram positive cocci in clusters       No results found for: MPNEUMO, CLAMYDCU, LABLEGI, AFBCX, FUNGSM, LABFUNG  CULTURE, RESPIRATORY   Date Value Ref Range Status   11/25/2020 Oral Pharyngeal Tiesha absent (A)  Final   11/25/2020   Final    Heavy growth  Methicillin resistant Staph aureus isolated. Most Methicillin  resistant Staphylococcus are usually resistant to multiple  antibiotics including other B-Lactams, Aminoglycosides,  Macrolides, Clindamycin and Tetracycline. Contact isolation  is indicated. This isolate is presumed to be resistant based on the  detection of inducible Clindamycin resistance. Clindamycin  may still be effective in some patients.        Culture Catheter Tip   Date Value Ref Range Status   08/26/2017 <15 colonies  Final     No results found for: BFCS  No results found for: CXSURG  Urine Culture, Routine   Date Value Ref Range Status   11/09/2020 Growth not present  Final   10/28/2020 Growth not present  Final   09/16/2019 Growth not present  Final     MRSA Culture Only   Date Value Ref Range Status   11/09/2020 Methicillin resistant Staph aureus not isolated  Final   10/30/2020 Methicillin resistant Staph aureus not isolated  Final 07/28/2017 Methicillin resistant Staph aureus not isolated  Final     Recent Labs     12/09/20  1435   PROCAL 0.12*       ASSESSMENT:  · Continue Zyvox until 12/17/2020 for completion of treatment of MRSA pneumonia-improved  · Continue Diflucan for treatment of thrush and esophagitis-improved  · Shortness of breath associated with PE    PLAN:  · Continue zyvox until 12/17/2020 & diflucan  · Anticoagulation per primary for PE  · PICC should be removed prior to discharge  · Check final cultures  · Monitor labs    Elkin Aponte  12:31 PM  12/11/2020     Pt seen and examined. Above discussed agree with advanced practice nurse. Labs, cultures, and radiographs reviewed. Face to Face encounter occurred. Changes made as necessary.      Awa Caputo MD

## 2020-12-11 NOTE — PROGRESS NOTES
Comprehensive Nutrition Assessment    Type and Reason for Visit:  Initial, Positive Nutrition Screen    Nutrition Recommendations/Plan: Continue NPO. Modify tube feeding to a formula w/ lower fat content for better GI tolerance    TF recommendation:  1.5 Calorie w/ Fiber @ 60 ml/hr x 23 hrs/day (holding 30 min before/after synthroid) + 2 protein modulars daily  Will provide 1380 ml TV, 2070 kcals, 88 gm pro, 1049 ml free water (2270 kcals, 140 gm pro w/ 2 pro mods daily)    Nutrition Assessment:  Pt is at nutritional risk d/t s/p recent trach/PEG x 1 month ago, however currently noted refusing TF d/t diarrhea. Pending SLP eval. Noted hx cholecystectomy.  Will provide updated TF recs w/ lower fat content to promote GI tolerance    Malnutrition Assessment:  Malnutrition Status:  Insufficient data    Context:  Acute Illness     Findings of the 6 clinical characteristics of malnutrition:  Energy Intake:  Mild decrease in energy intake (Comment)  Weight Loss:  Unable to assess     Body Fat Loss:  No significant body fat loss     Muscle Mass Loss:  No significant muscle mass loss    Fluid Accumulation:  No significant fluid accumulation     Strength:  Not Performed    Estimated Daily Nutrient Needs:  Energy (kcal):  8287-6912; Weight Used for Energy Requirements:  Current     Protein (g):  145-160; Weight Used for Protein Requirements:  Ideal(2-2.2)        Fluid (ml/day):  ; Method Used for Fluid Requirements:  1 ml/kcal      Nutrition Related Findings:  pt alert, active BS, diarrhea, trach/PEG, +2 non-pitting edema, fluids WDL      Wounds:  Surgical Incision(trach site)       Current Nutrition Therapies:    DIET TUBE FEED CONTINUOUS/CYCLIC NPO; Diabetic; Gastrostomy; Continuous; 25; 65; 24  Current Tube Feeding (TF) Orders:  · Feeding Route: PEG  · Formula: Diabetic  · Orders Provides: pt refusing TF at this time  · Goal TF & Flush Orders Provides: @65 ml/hr; 1560 ml TV, 1872 kcals, 94 gm pro, 1256 ml free

## 2020-12-11 NOTE — PROGRESS NOTES
SPEECH/LANGUAGE PATHOLOGY  VIDEOFLUOROSCOPIC STUDY OF SWALLOWING [place] [place]      PATIENT NAME:  Lazara Zaragoza      :  1960          TODAY'S DATE:  2020 ROOM:  85/8935-U      SUMMARY OF EVALUATION     DYSPHAGIA DIAGNOSIS:  Minimal pharyngeal dysphagia      DIET RECOMMENDATIONS:  Regular consistency solids with  thin liquids     FEEDING RECOMMENDATIONS:     Assistance level:  No assistance needed      Compensatory strategies recommended: No strategies are recommended at this time    THERAPY RECOMMENDATIONS:      Dysphagia therapy is not recommended                  PROCEDURE     Consistencies Administered During the Evaluation   Liquids: thin liquid and nectar thick liquid   Solids:  pureed foods and solid foods      Method of Intake:   cup, straw, spoon  Self fed, Fed by clinician      Position:   Seated, upright, Lateral plane    Current Respiratory Status   tracheostomy with room air                RESULTS     ORAL STAGE       The oral stage of swallowing was within functional limits        PHARYNGEAL STAGE           ONSET TIME     Delayed initiation of the pharyngeal swallow was noted with swallow reflex triggered at the level of the vallecula       PHARYNGEAL RESIDUALS          Vallecula/Pharyngeal Wall           No significant residuals were noted in the vallecula      Pyriform Sinuses      No significant residuals were noted in the pyriform sinuses    LARYNGEAL PENETRATION     Laryngeal penetration was not present during this evaluation                 ASPIRATION    Aspiration was not present during this evaluation         COMPENSATORY STRATEGIES       Compensatory strategies were not attempted      STRUCTURAL/FUNCTIONAL ANOMALIES       No structural/functional anomalies were noted      CERVICAL ESOPHAGEAL STAGE :        The cervical esophagus appeared adequate                              CPT code:  55382  dysphagia study      [x]The admitting diagnosis and active problem list, as listed below have been reviewed prior to initiation of this evaluation.      ADMITTING DIAGNOSIS: Bilateral pulmonary embolism (HCC) [I26.99]  Bilateral pulmonary embolism ([address] [I26.99]     ACTIVE PROBLEM LIST:   Patient Active Problem List   Diagnosis    Hyperlipidemia    Type 2 diabetes mellitus without complication, without long-term current use of insulin ([address]    Atypical chest pain    Essential hypertension    Chronic acquired lymphedema    Aortic valve disease    Morbid obesity due to excess calories (HCC)    Abdominal pain    Acute respiratory failure with hypoxia (HCC)    Acute pulmonary edema (HCC)    Congestive heart failure with LV diastolic dysfunction, NYHA class 3 (HCC)    Obstructive sleep apnea    Acquired hypothyroidism    Chronic diastolic heart failure (HCC)    Nonrheumatic aortic valve stenosis    ACE-inhibitor cough    Pneumonia due to organism    Acute gout of right knee    Bilateral pulmonary embolism ([address]

## 2020-12-11 NOTE — PROGRESS NOTES
[address] PROGRESS NOTE    Patient: Kashmir Snyder  MRN: [id number]  : 1960    Encounter Date: 2020  Encounter Time: 2:55 PM     Date of Admission: .2020 12:19 AM    Consulting Physician:  Primary Care Physician:     Fracisco Liu MD     [phone]    Reason for Consultation: Pulmonary Embolism     Problem List:  Patient Active Problem List   Diagnosis    Hyperlipidemia    Type 2 diabetes mellitus without complication, without long-term current use of insulin ([address]    Atypical chest pain    Essential hypertension    Chronic acquired lymphedema    Aortic valve disease    Morbid obesity due to excess calories ([address]    Abdominal pain    Acute respiratory failure with hypoxia ([address]    Acute pulmonary edema ([address]    Congestive heart failure with LV diastolic dysfunction, NYHA class 3 ([address]    Obstructive sleep apnea    Acquired hypothyroidism    Chronic diastolic heart failure (HCC)    Nonrheumatic aortic valve stenosis    ACE-inhibitor cough    Pneumonia due to organism    Acute gout of right knee    Bilateral pulmonary embolism ([address]     SUBJECTIVE: Patient seen and examined. Overnight the patient had no shortness of breath, cough, increased work of breathing. HOME MEDICATIONS:  Prior to Admission medications    Medication Sig Start Date End Date Taking?  Authorizing Provider   hydrALAZINE (APRESOLINE) 25 MG tablet Take 25 mg by mouth 3 times daily g tube   Yes Historical Provider, MD   insulin glargine (LANTUS) 100 UNIT/ML injection vial Inject 20 Units into the skin nightly   Yes Historical Provider, MD   insulin lispro (HUMALOG) 100 UNIT/ML injection vial Inject into the skin 3 times daily (before meals)   Yes Historical Provider, MD   Arformoterol Tartrate (BROVANA) 15 MCG/2ML NEBU Take 2 mLs by nebulization 2 times daily 20   Fracisco Liu MD   budesonide (PULMICORT) 0.5 MG/2ML nebulizer suspension Take 2 mLs by nebulization 2 times daily 12/1/20   Charly Jenkins MD   ipratropium-albuterol (DUONEB) 0.5-2.5 (3) MG/3ML SOLN nebulizer solution Inhale 3 mLs into the lungs every 4 hours (while awake) 12/1/20   Charly Jenkins MD   Heparin Sodium, Porcine, (HEPARIN, PORCINE,) 70216 UNIT/ML injection Inject 0.75 mLs into the skin every 8 hours 12/1/20   Charly Jenkins MD   divalproex (DEPAKOTE SPRINKLE) 125 MG capsule Take 2 capsules by mouth every 8 hours 12/1/20   Charly Jenkins MD   glucose (GLUTOSE) 40 % GEL Take 37.5 mLs by mouth as needed (low bs) 12/1/20   Charly Jenkins MD   carvedilol (COREG) 6.25 MG tablet Take 1 tablet by mouth 2 times daily (with meals) 12/1/20   Charly Jenkins MD   sodium chloride, Inhalant, 3 % nebulizer solution Take 2 mLs by nebulization every 4 hours 12/1/20   Charly Jenkins MD   mineral oil-hydrophilic petrolatum (HYDROPHOR) ointment Apply topically as needed.  12/1/20   Charly Jenkins MD   polyethylene glycol (GLYCOLAX) 17 g packet Take 17 g by mouth daily as needed for Constipation 12/1/20 12/31/20  Charly Jenkins MD   senna (SENOKOT) 8.6 MG tablet Take 2 tablets by mouth nightly 12/1/20 12/31/20  Charly Jenkins MD   chlorhexidine (PERIDEX) 0.12 % solution Take 15 mLs by mouth 2 times daily for 14 days 12/1/20 12/15/20  Charly Jenkins MD   polyvinyl alcohol (LIQUIFILM TEARS) 1.4 % ophthalmic solution Place 1 drop into both eyes every 4 hours as needed for Dry Eyes 12/1/20 12/31/20  Charly Jenkins MD   levothyroxine (SYNTHROID) 50 MCG tablet Take 1 tablet by mouth daily 12/2/20   Charly Jenkins MD   lansoprazole 3 MG/ML SUSP 10 mLs by Per G Tube route every morning (before breakfast) 12/2/20   Charly Jenkins MD   vitamin B-1 100 MG tablet 1 tablet by PEG Tube route daily 12/2/20   Charly Jenkins MD   fluconazole (DIFLUCAN) 200 MG tablet Take 1 tablet by mouth daily for 14 days 12/1/20 12/15/20  Alesha Howard MD   nystatin (MYCOSTATIN) 457738 UNIT/ML suspension Take 5 mLs by mouth 4 times daily for 11 days 11/30/20 12/11/20  mEa Merchant MD   linezolid (ZYVOX) 100 MG/5ML suspension 30 mLs by Per G Tube route every 12 hours for 16 days 11/30/20 12/16/20  Ema Merchant MD   amLODIPine (NORVASC) 10 MG tablet Take 1 tablet by mouth daily 5/19/20   Javi Reyes MD   colchicine (COLCRYS) 0.6 MG tablet Take 1 tablet by mouth 2 times daily as needed for Pain 5/6/19   Javi Reyes MD       CURRENT MEDICATIONS:  Current Facility-Administered Medications: oxyCODONE-acetaminophen (PERCOCET) 5-325 MG per tablet 1 tablet, 1 tablet, Oral, Q4H PRN  diphenhydrAMINE (BENADRYL) tablet 25 mg, 25 mg, Oral, Q6H PRN  valproic acid (DEPACON) 250 MG/5ML oral solution 250 mg, 250 mg, Per G Tube, 3 times per day  heparin (porcine) injection 10,000 Units, 10,000 Units, Intravenous, PRN  heparin (porcine) injection 5,000 Units, 5,000 Units, Intravenous, PRN  heparin 25,000 units in dextrose 5% 250 mL infusion, 14.28 Units/kg/hr, Intravenous, Continuous  amLODIPine (NORVASC) tablet 10 mg, 10 mg, Oral, Daily  Arformoterol Tartrate (BROVANA) nebulizer solution 15 mcg, 15 mcg, Nebulization, BID  budesonide (PULMICORT) nebulizer suspension 500 mcg, 0.5 mg, Nebulization, BID  carvedilol (COREG) tablet 6.25 mg, 6.25 mg, Oral, BID WC  chlorhexidine (PERIDEX) 0.12 % solution 15 mL, 15 mL, Mouth/Throat, BID  colchicine (COLCRYS) tablet 0.6 mg, 0.6 mg, Oral, BID PRN  fluconazole (DIFLUCAN) tablet 200 mg, 200 mg, Oral, Daily  ipratropium-albuterol (DUONEB) nebulizer solution 3 mL, 3 mL, Inhalation, Q4H WA  lansoprazole suspension SUSP 30 mg, 30 mg, Per G Tube, QAM AC  levothyroxine (SYNTHROID) tablet 50 mcg, 50 mcg, Oral, Daily  polyvinyl alcohol (LIQUIFILM TEARS) 1.4 % ophthalmic solution 1 drop, 1 drop, Both Eyes, Q4H PRN  senna (SENOKOT) tablet 17.2 mg, 2 tablet, Oral, Nightly  vitamin B-1 (THIAMINE) tablet 100 mg, 100 mg, Oral, Daily  sodium chloride flush 0.9 % injection 10 mL, 10 mL, Intravenous, 2 times per day  sodium chloride flush 0.9 % injection 10 mL, 10 mL, Intravenous, PRN  acetaminophen (TYLENOL) tablet 650 mg, 650 mg, Oral, Q6H PRN **OR** acetaminophen (TYLENOL) suppository 650 mg, 650 mg, Rectal, Q6H PRN  polyethylene glycol (GLYCOLAX) packet 17 g, 17 g, Oral, Daily PRN  promethazine (PHENERGAN) tablet 12.5 mg, 12.5 mg, Oral, Q6H PRN **OR** ondansetron (ZOFRAN) injection 4 mg, 4 mg, Intravenous, Q6H PRN  linezolid (ZYVOX) tablet 600 mg, 600 mg, Oral, 2 times per day    ALLERGIES:  Allergies   Allergen Reactions    Bee Venom Anaphylaxis    Shellfish-Derived Products Anaphylaxis     Only crab legs       REVIEW OF SYSTEMS:  General ROS: Negative For: chills, fatigue, fever, malaise, night sweats or sleep disturbance   ENT ROS: Negative For: epistaxis, headaches, sinus pain, sneezing or sore throat   Respiratory ROS: Negative For: hemoptysis, pleuritic type chest pains  Cardiovascular ROS: Negative For: chest pain, dyspnea on exertion, irregular heartbeat, loss of consciousness, murmur, orthopnea or palpitations   Gastrointestinal ROS: Negative For: abdominal pain, appetite loss, blood in stools, change in bowel habits, change in stools, constipation, diarrhea, hematemesis, melena, nausea/vomiting or stool incontinence     OBJECTIVE:  PHYSICAL EXAMINATION:     VITAL SIGNS:  BP (!) 159/69   Pulse 72   Temp 97 °F (36.1 °C) (Temporal)   Resp 18   Ht 5' 9\" (1.753 m)   Wt (!) 324 lb (147 kg)   SpO2 96%   BMI 47.85 kg/m²   Wt Readings from Last 3 Encounters:   12/09/20 (!) 324 lb (147 kg)   12/03/20 (!) 324 lb (147 kg)   06/15/20 (!) 326 lb (147.9 kg)     Temp Readings from Last 3 Encounters:   12/11/20 97 °F (36.1 °C) (Temporal)   12/03/20 98.4 °F (36.9 °C) (Temporal)   09/16/19 98.6 °F (37 °C) (Oral)     TMAX:  BP Readings from Last 3 Encounters:   12/11/20 (!) 159/69   12/03/20 (!) 185/72   11/30/20 (!) 93/53     Pulse Readings from Last 3 Encounters:   12/11/20 72   12/03/20 87   06/15/20 80       CURRENT PULSE OXIMETRY: SpO2: 96 %  24HR PULSE OXIMETRY RANGE: SpO2  Av %  Min: 96 %  Max: 100 %  CVP:      ________________________________________________________________________    VENTILATOR SETTINGS (if applicable):         Vent Information  Equipment Changed: Other (comment)(inner cannula)  FiO2 : 50 %  SpO2: 96 %  SpO2/FiO2 ratio: 192  Additional Respiratory  Assessments  Pulse: 72  Resp: 18  SpO2: 96 %  ETCO2:  Peak Inspiratory Pressure:  End-Inspiratory Plateau Pressure:    ABG:  No results for input(s): PH, PO2, PCO2, HCO3, BE, O2SAT, METHB, O2HB, COHB, O2CON, HHB, THB in the last 72 hours. FiO2 : 50 %     ________________________________________________________________________    IV ACCESS:    NUTRITION: DIET TUBE FEED CONTINUOUS/CYCLIC NPO; Diabetic; Gastrostomy; Continuous; 25; 65; 24    INTAKE/OUTPUTS:  I/O last 3 completed shifts:   In: 352 [I.V.:352]  Out: 920 [Urine:920]    Intake/Output Summary (Last 24 hours) at 2020 1455  Last data filed at 2020 1040  Gross per 24 hour   Intake 372 ml   Output 680 ml   Net -308 ml     General Appearance: alert and oriented to person, place and time, well-developed and   well-nourished, in no acute distress   Eyes: pupils equal, round, and reactive to light, extraocular eye movements intact, conjunctivae normal and sclera anicteric   Neck: neck supple and non tender without mass, no thyromegaly, no thyroid nodules and no cervical adenopathy   - trach intact with no focal erythema, erosion, ulceration or bleed noted  - trach [address] 8 XLT proximal   Pulmonary/Chest: decreased breath sounds, no accessory muscles of inspiration, no focal wheezes  Cardiovascular: normal rate, regular rhythm, normal S1 and S2, no murmurs, rubs, clicks or gallops, distal pulses intact, no carotid bruits, no murmurs, no gallops, no carotid bruits and no JVD   Abdomen: soft, non-tender, non-distended, normal bowel sounds, no masses or organomegaly   Extremities: no cyanosis, no CRP:   Lab Results   Component Value Date    CRP 1.4 (H) 11/02/2020     D Dimer:   Lab Results   Component Value Date    DDIMER 269 10/27/2020     Folate and B12:   Lab Results   Component Value Date    FCAQXOOR56 0803 (H) 11/05/2020   ,   Lab Results   Component Value Date    FOLATE >20.0 11/05/2020       Lactic Acid:   Lab Results   Component Value Date    LACTA 0.7 12/09/2020     Ammonia:   Cortisol:  Thyroid Studies:  Lab Results   Component Value Date    TSH 4.340 (H) 10/27/2020     CTA CHEST W CONTRAST   Final Result   1. Bilateral pulmonary emboli. 2. Improving bilateral airspace opacities. 3. Fat stranding adjacent to the urinary bladder may indicate cystitis. Critical results were called by Dr. Kadi Linda MD to Jennifer Range on   12/9/2020 at 03:23.       CT ABDOMEN PELVIS W IV CONTRAST Additional Contrast? None   Final Result   1. Bilateral pulmonary emboli. 2. Improving bilateral airspace opacities. 3. Fat stranding adjacent to the urinary bladder may indicate cystitis. Critical results were called by Dr. Kadi Linda MD to [place] Range on   12/9/2020 at 03:23.       XR CHEST PORTABLE   Final Result   Almost complete resolution of previously noted areas of airspace disease in   the left mid lung and right upper lobe.           ASSESSMENT:  1.) B/L Pulmonary Embolism - subsegmental, not massive   2.) Acute hypoxemic respiratory failure on mechanical ventilation s/p trach 11/12/2020 with Shiley 8 Proximal XLT  3.) Metabolic encephalopathy history of EtOH abuse with agitation on ( Depakote, and Seroquel  )significantly improved  4.) HANS moderate ( AHI 17 on 4/18/18 requires BIPAP 16/12) noncompliant with CPAP  5.) History of Reactive Airway Disease Syndreom (RADS)  6.) Stable Pulmonary Nodules - radiographically stable since CT 10/2017 to 3/2018  7.) Diabetes Mellitus  8.) Obesity  9.) EF 08% LVH diastolic dysfunction  10.) Chronic Lymphedema  11.) History of Gout - on colchicine  12.) Hypothyroidism  13.) H/o respiratory failure: 7/8/2017 pt [facility] after lap cholecystectomy, lap umbilical hernia repair, and lap liver biopsy (fatty liver). Patient was then transferred to [facility] hospital where he was weaned off the ventilator and decannulated. 14.) Ventilator associated pneumonia - treated   15.) Alcohol Withdrawal - improved  16.) Abdominal Pains - resolved     PLAN:  1.) trach care/suction/suport  2.) COVID negative   3.) heparin drip  4.) PT/OT     - doppler US legs negative   - thrombophilia panel pending   - full respiratory panel negative  - linezolid and diflucan per ID  - heparin drip to eliquis 10 mg PO BID tonight   - supportive care to continue at this time  - look to downsize trach soon to [facility] with placement of speaking valve for patient      Thank you No att. providers found very much for allowing me to see this patient in consultation and follow up.     Care reviewed with nursing staff, medical and surgical specialty care, primary care and the patient's family as available. Restraints are ordered when the patient can do harm to him/herself by pulling out devices.     Js Casanova M.D.

## 2020-12-11 NOTE — PROGRESS NOTES
Patient refusing tube feedings currently. Patient wants to wait for speech to work with patient. Will reevaluate.

## 2020-12-11 NOTE — PROGRESS NOTES
Subjective: The patient is awake and alert. No problems overnight. Denies chest pain, angina, and dyspnea. Denies abdominal pain. Tolerating diet. No nausea or vomiting. He states he is feeling better, but does get airway irritation easily. Objective:  Pt is aox3 in nad   BP (!) 118/55   Pulse 78   Temp 97.2 °F (36.2 °C) (Temporal)   Resp 18   Ht 5' 9\" (1.753 m)   Wt (!) 324 lb (147 kg)   SpO2 100%   BMI 47.85 kg/m²   HEENT no adenopathy no bruits  Trach is midline   Heart:  RRR, no murmurs, gallops, or rubs.   Lungs:  CTA bilaterally, no wheeze, rales or rhonchi  Abd: bowel sounds present, nontender, nondistended, no masses  Extrem:  No clubbing, cyanosis, or edema  WBC/Hgb/Hct/Plts:  6.3/8.3/25.5/345 (12/11 9112) basic metabolic panel     Assessment:    Patient Active Problem List   Diagnosis    Hyperlipidemia    Type 2 diabetes mellitus without complication, without long-term current use of insulin ([address]    Atypical chest pain    Essential hypertension    Chronic acquired lymphedema    Aortic valve disease    Morbid obesity due to excess calories ([address]    Abdominal pain    Acute respiratory failure with hypoxia (HCC)    Acute pulmonary edema (HCC)    Congestive heart failure with LV diastolic dysfunction, NYHA class 3 ([address]    Obstructive sleep apnea    Acquired hypothyroidism    Chronic diastolic heart failure (HCC)    Nonrheumatic aortic valve stenosis    ACE-inhibitor cough    Pneumonia due to organism    Acute gout of right knee    Bilateral pulmonary embolism ([address]       Plan:  Anticoagulation per Pulmonary  Work with Speech, he may be ready for swallow study   Work with PT        Josh Elenita  7:42 AM  12/11/2020

## 2020-12-12 NOTE — PROGRESS NOTES
PT SEEN AND EXAMINED. Chart reviewed. meds reviewed. D/w nursing + family as available. EXAM: IN GENERAL, NAD. AWAKE AND ALERT. ROS NEGx10 EXCEPT:   BP (!) 142/89   Pulse 85   Temp 97.5 °F (36.4 °C) (Temporal)   Resp 18   Ht 5' 9\" (1.753 m)   Wt (!) 324 lb (147 kg)   SpO2 100%   BMI 47.85 kg/m²   GEN: A+O NAD. HEENT: NCAT. EOMI. JOE  NECK: NO JVD. TRACH MIDLINE. NO BRUITS. NO THYROMEGALY. LUNGS: CTA BL NO RALES, RHONCHI OR WHEEZES. GOOD EXCURSION. CV: Regular rate and rhythm, NO Murmurs, Rubs, Or gallops  ABD: Soft. Nontender. Normal bowel sounds. No organomegaly  EXT:No clubbing cyanosis or edema  Neuro: Alert and oriented x 3. No focal motor deficits. No sensory deficits. Reflexes appear intact.   Labs/data reviewedLABS: CBC with Differential:    Lab Results   Component Value Date    WBC 6.3 12/11/2020    RBC 3.13 12/11/2020    HGB 8.3 12/11/2020    HCT 25.5 12/11/2020     12/11/2020    MCV 81.5 12/11/2020    MCH 26.5 12/11/2020    MCHC 32.5 12/11/2020    RDW 19.4 12/11/2020    NRBC 0.9 11/16/2020    SEGSPCT 73 01/26/2014    METASPCT 1.8 11/15/2020    LYMPHOPCT 41.3 12/10/2020    MONOPCT 10.0 12/10/2020    MYELOPCT 0.9 11/15/2020    BASOPCT 0.5 12/10/2020    MONOSABS 0.56 12/10/2020    LYMPHSABS 2.31 12/10/2020    EOSABS 0.19 12/10/2020    BASOSABS 0.03 12/10/2020     Platelets:    Lab Results   Component Value Date     12/11/2020     CMP:    Lab Results   Component Value Date     12/09/2020    K 4.4 12/09/2020    K 4.0 10/27/2020    CL 93 12/09/2020    CO2 32 12/09/2020    BUN 17 12/09/2020    CREATININE 1.5 12/09/2020    GFRAA 58 12/09/2020    LABGLOM 58 12/09/2020    GLUCOSE 109 12/09/2020    PROT 7.6 12/09/2020    LABALBU 3.7 12/09/2020    CALCIUM 9.6 12/09/2020    BILITOT <0.2 12/09/2020    ALKPHOS 90 12/09/2020    AST 27 12/09/2020    ALT 45 12/09/2020     Magnesium:    Lab Results   Component Value Date    MG 1.8 11/25/2020     LDH:    Lab Results   Component Value Date  10/28/2020     PT/INR:    Lab Results   Component Value Date    PROTIME 14.0 11/18/2020    INR 1.2 11/18/2020     Last 3 Troponin:    Lab Results   Component Value Date    TROPONINI <0.01 10/26/2020    TROPONINI <0.01 05/26/2019    TROPONINI <0.01 05/26/2019     ABG:    Lab Results   Component Value Date    PH 7.393 11/26/2020    PCO2 43.1 11/26/2020    PO2 90.3 11/26/2020    HCO3 25.7 11/26/2020    BE 0.7 11/26/2020    O2SAT 96.1 11/26/2020     IRON:    Lab Results   Component Value Date    IRON 95 11/05/2020     IMAGING    Echo Transesophageal    Result Date: 11/30/2020  Transesophageal Echocardiography Report (AARON)   Demographics   Patient Name        Kalani Bryan Gender               Male   Medical Record      [id number]        Room Number          5790  Number   Account #           [de-identified]       Procedure Date       11/30/2020   Corporate ID                        Ordering Physician   Adele Alcantar MD   Accession Number    [id number]      Referring Physician   Date of Birth       1960      Sonographer   Age                 61 year(s)      Interpreting         Adele Alcantar MD                                      Physician                                       Any Other  Procedure Type of Study   AARON procedure:Transesophageal Echo (AARON). Procedure Date Date: 11/30/2020 Start: 04:21 PM Height: 68 inches Weight: 335 pounds BSA: 2.54 m^2 BMI: 50.94 kg/m^2 Allergies   - Bee/Wasp stings. - Shellfish. Findings   Left Ventricle  Normal left ventricular size and systolic function. Right Ventricle  Normal right ventricular size and function. Left Atrium  Normal sized left atrium. There is no left atrial appendage thrombus. Agitated saline injected for shunt evaluation. No evidence of patent foramen ovale. Right Atrium  Normal right atrium size. Mitral Valve  Structurally normal mitral valve. Physiologic and/or trace mitral regurgitation is present.    Tricuspid Valve  The tricuspid valve appears structurally normal.   Aortic Valve  Structurally normal aortic valve. Pulmonic Valve  Pulmonic valve is structurally normal.   Pericardial Effusion  No pericardial effusion. Aorta  Aortic root dimension within normal limits. No significant plaque noted in the descending aorta. Miscellaneous  No valvular vegetation or vegetation of venous catheter. Conclusions   Summary  Normal left ventricular size and systolic function. Physiologic and/or trace mitral regurgitation is present. No valvular vegetation or vegetation of venous catheter. Signature   ----------------------------------------------------------------  Electronically signed by Byron Oliver MD(Interpreting  physician) on 11/30/2020 04:56 PM  ----------------------------------------------------------------  [email or web address] [id number]    Ct Abdomen Pelvis Wo Contrast Additional Contrast? None    Result Date: 11/29/2020  EXAMINATION: CT OF THE ABDOMEN AND PELVIS WITHOUT CONTRAST 11/29/2020 1:09 pm TECHNIQUE: CT of the abdomen and pelvis was performed without the administration of intravenous contrast. Multiplanar reformatted images are provided for review. Dose modulation, iterative reconstruction, and/or weight based adjustment of the mA/kV was utilized to reduce the radiation dose to as low as reasonably achievable. COMPARISON: September 16, 2019 HISTORY: ORDERING SYSTEM PROVIDED HISTORY: Abdomen pain TECHNOLOGIST PROVIDED HISTORY: Reason for exam:->Abdomen pain Additional Contrast?->None What reading provider will be dictating this exam?->CRC FINDINGS: The lung bases demonstrate patchy multifocal infiltrates. 3-5 mm nonspecific nodules are identified in the right lung base. There is hepatomegaly with liver measuring 19 cm with fatty infiltration. Gallbladder is absent. A G-tube is noted.   Spleen, pancreas, and adrenals are normal.  1-9 mm nonobstructing bilateral kidney stones are present. 2.5 cm cystic lesion is noted in the right kidney. Degenerative changes are identified in the lumbosacral spine. Pelvis. The urinary bladder is contracted with a Zamorano catheter and wall thickening. Diffusely dilated fluid-filled small bowel loops and colon are noted. Scattered diverticulosis of the descending and sigmoid colon are noted. There is mild presacral edema. The appendix is normal.    Dilated fluid-filled small bowel loops and colon likely diffuse ileus. Enterocolitis is less likely. Multifocal patchy bibasilar infiltrates concerning for pneumonia. 3-5 mm nonspecific pulmonary nodules. Consider surveillance. Collapsed urinary bladder with wall thickening. Cystitis has to be excluded. Ct Head Wo Contrast    Result Date: 11/16/2020  EXAMINATION: CT OF THE HEAD WITHOUT CONTRAST  11/16/2020 2:36 pm TECHNIQUE: CT of the head was performed without the administration of intravenous contrast. Dose modulation, iterative reconstruction, and/or weight based adjustment of the mA/kV was utilized to reduce the radiation dose to as low as reasonably achievable. COMPARISON: August 27, 2017 HISTORY: ORDERING SYSTEM PROVIDED HISTORY: AMS TECHNOLOGIST PROVIDED HISTORY: Reason for exam:->AMS Has a \"code stroke\" or \"stroke alert\" been called? ->No What reading provider will be dictating this exam?->CRC FINDINGS: BRAIN/VENTRICLES: There is no acute intracranial hemorrhage, mass effect or midline shift. No abnormal extra-axial fluid collection. The gray-white differentiation is maintained without evidence of an acute infarct. There is no evidence of hydrocephalus. ORBITS: The visualized portion of the orbits demonstrate no acute abnormality. SINUSES: The visualized paranasal sinuses and mastoid air cells demonstrate no acute abnormality. SOFT TISSUES/SKULL:  No acute abnormality of the visualized skull or soft tissues.      No acute intracranial abnormality. Ct Abdomen Pelvis W Iv Contrast Additional Contrast? None    Result Date: 12/9/2020  EXAMINATION: CTA OF THE CHEST; CT OF THE ABDOMEN AND PELVIS WITH CONTRAST 12/9/2020 2:57 am TECHNIQUE: CTA of the chest was performed after the administration of intravenous contrast.  Multiplanar reformatted images are provided for review. MIP images are provided for review. Dose modulation, iterative reconstruction, and/or weight based adjustment of the mA/kV was utilized to reduce the radiation dose to as low as reasonably achievable.; CT of the abdomen and pelvis was performed with the administration of intravenous contrast. Multiplanar reformatted images are provided for review. Dose modulation, iterative reconstruction, and/or weight based adjustment of the mA/kV was utilized to reduce the radiation dose to as low as reasonably achievable. COMPARISON: 11/29/2020 and 10/28/2020 HISTORY: ORDERING SYSTEM PROVIDED HISTORY: r/o PE TECHNOLOGIST PROVIDED HISTORY: Reason for exam:->r/o PE What reading provider will be dictating this exam?->CRC; ORDERING SYSTEM PROVIDED HISTORY: Abdominal pain TECHNOLOGIST PROVIDED HISTORY: Additional Contrast?->None Reason for exam:->Abdominal pain What reading provider will be dictating this exam?->CRC Chest pain FINDINGS: Pulmonary Arteries: Evaluation is compromised by motion artifact. Despite this limitation pulmonary emboli are identified both upper lobes and the right lower lobe and probably in the middle lobe. Main pulmonary artery is normal in caliber. Mediastinum: Mediastinal and bilateral hilar adenopathy is again noted. The heart and pericardium demonstrate no acute abnormality. There is no acute abnormality of the thoracic aorta. Tracheostomy tube is noted. Lungs/pleura: There is no pneumothorax or pleural effusion. There has been interval improved in bilateral airspace opacities. Organs: The liver, spleen, pancreas, adrenal glands and kidneys are unremarkable. wire. A subcutaneous tunnel was then created along the right upper chest. Subsequently under fluoroscopic guidance, a system of needles, catheters and guidewires were utilized to place a guidewire and catheter in the venous system and the catheter was advanced and appropriately placed at the level of the junction of the superior vena cava and right atrium. Time out occurred at 08:33 AM. Patient received 4 minutes and 26 seconds of fluoroscopy. A fluoroscopic image of the position of the catheter tip was saved in PACS. Successful uncomplicated placement of a right IJ tunneled PICC catheter. Ir Daphene Se Device Plmt/replace/removal    Result Date: 2020  Patient MRN:  [id number] : 1960 Age: 61 years Gender: Male Order Date:  2020 7:45 AM EXAM: IR VENOGRAM UPPER EXTREMITY RIGHT, IR SPARKLE CATH PLACE VENOUS 2ND+ ORDER, IR TUNNELED CVC PLACE WO SQ PORT/PUMP > 5 YEARS, IR ULTRASOUND GUIDANCE VASCULAR ACCESS, IR FLUORO GUIDED CVA DEVICE PLMT/REPLACE/REMOVAL NUMBER OF IMAGES:  14 INDICATION: Z79.2 Long term (current) use of antibiotics Long term (current) use of antibiotics What reading provider will be dictating this exam?->[facility] COMPARISON: None FLUORO TIME: 00:04:26 DAP: 1965.9 uGym2 AK: 77.1 mGy PICC Catheter Placement and Upper Extremity Venous Ultrasound, Procedure: After obtaining informed consent, and following the routine sterile preparation and drape, the right basilic vein was accessed with ultrasound guidance. IV contrast was injected which demonstrated extravasation into the soft tissue. The decision was then made to place a right tunneled IJ PICC. Using direct real-time ultrasound imaging vascular access was obtained to the right IJ. An image was stored and copy was transmitted to PACS. Subsequently with real-time guidance a needle was inserted intravascular and through the needle a wire.  A subcutaneous tunnel was then created along the right upper chest. Subsequently under fluoroscopic guidance, a system of needles, catheters and guidewires were utilized to place a guidewire and catheter in the venous system and the catheter was advanced and appropriately placed at the level of the junction of the superior vena cava and right atrium. Time out occurred at 08:33 AM. Patient received 4 minutes and 26 seconds of fluoroscopy. A fluoroscopic image of the position of the catheter tip was saved in PACS. Successful uncomplicated placement of a right IJ tunneled PICC catheter. Xr Chest Portable    Result Date: 12/9/2020  EXAMINATION: ONE XRAY VIEW OF THE CHEST 12/9/2020 12:07 am COMPARISON: November 25 HISTORY: ORDERING SYSTEM PROVIDED HISTORY: chest pain TECHNOLOGIST PROVIDED HISTORY: Reason for exam:->chest pain What reading provider will be dictating this exam?->CRC FINDINGS: Central line and tracheostomy cannula, unchanged since the prior examination. Cardiac silhouette at the upper limits of normal. Almost complete resolution of previously noted areas of airspace disease in the left mid lung and right upper lobe. Pulmonary vasculature within normal limits. Almost complete resolution of previously noted areas of airspace disease in the left mid lung and right upper lobe. Xr Chest Portable    Result Date: 11/25/2020  EXAMINATION: ONE XRAY VIEW OF THE CHEST 11/25/2020 8:08 am COMPARISON: One-view chest x-ray 11/24/2020 HISTORY: ORDERING SYSTEM PROVIDED HISTORY: resp dist TECHNOLOGIST PROVIDED HISTORY: Reason for exam:->resp dist What reading provider will be dictating this exam?->CRC FINDINGS: There is significant interval increase in pulmonary consolidation, most notably in the upper-mid right lung and also in the left lung base. The cardiomediastinal silhouette is within normal limits for AP technique. A right internal jugular central venous catheter appears to terminate in the region of the right atrium.   There is partial obscuration of the left costophrenic sulcus, which PROVIDED HISTORY: Reason for exam:->SOB What reading provider will be dictating this exam?->CRC FINDINGS: There is no interval change in the position of the tracheostomy tube. There is improved aeration of the lungs when compared to the prior study. Patchy per infiltrates persist within the lung bases. 1. Partial interval clearing of the multifocal pulmonary infiltrates within the upper lobes. 2. Persistent small infiltrates within the lung bases. Xr Chest Portable    Result Date: 11/21/2020  EXAMINATION: ONE XRAY VIEW OF THE CHEST 11/21/2020 7:47 am COMPARISON: November 17-20 HISTORY: ORDERING SYSTEM PROVIDED HISTORY: SOB TECHNOLOGIST PROVIDED HISTORY: Reason for exam:->SOB What reading provider will be dictating this exam?->CRC FINDINGS: Tracheostomy tube in good position. The right internal jugular central venous catheter tip in the right atrium. Heart appears to be mildly enlarged. The bilateral vague ill-defined infiltrates with ground-glass opacities are seen. They are more prominent on the right lung. There is no pleural effusions. No significant changes since the previous study of November 20. Xr Chest Portable    Result Date: 11/20/2020  EXAMINATION: ONE XRAY VIEW OF THE CHEST 11/20/2020 7:55 am COMPARISON: 19 November 2020 HISTORY: ORDERING SYSTEM PROVIDED HISTORY: SOB TECHNOLOGIST PROVIDED HISTORY: Reason for exam:->SOB What reading provider will be dictating this exam?->CRC FINDINGS: Central venous catheter on the right terminates in the right atrium proper. This is a stable finding. A left-sided central venous catheter is been removed. There is a stable tracheostomy catheter. There are opacities in both lungs which may represent infiltrate and/or atelectasis. Aeration appears minimally worse. Mildly increasing infiltrate and/or atelectasis bilaterally. Please note, the right central venous catheter terminates in the right atrium proper.     Xr Chest Portable    Result Date: 11/19/2020  EXAMINATION: ONE XRAY VIEW OF THE CHEST 11/19/2020 9:56 am COMPARISON: November 15-18 HISTORY: ORDERING SYSTEM PROVIDED HISTORY: SOB TECHNOLOGIST PROVIDED HISTORY: Reason for exam:->SOB What reading provider will be dictating this exam?->CRC FINDINGS: Some improvement of the bilateral discrete ground-glass densities throughout both lungs predominant in the perihilar regions. The can be an indication for volume overload. No pleural effusions are seen. The heart is normal size. Some improvement in pattern that can represent volume overload since the November 18. Xr Chest Portable    Result Date: 11/18/2020  EXAMINATION: ONE XRAY VIEW OF THE CHEST 11/18/2020 7:39 am COMPARISON: 11/17/2020 and 11/16/2020 HISTORY: ORDERING SYSTEM PROVIDED HISTORY: SOB TECHNOLOGIST PROVIDED HISTORY: Reason for exam:->SOB What reading provider will be dictating this exam?->CRC FINDINGS: There is no interval change in the multifocal bilateral significant airspace disease. There is no right or left pleural effusion. The cardiac silhouette is at upper limits of normal.  There is stable position of the left internal jugular central venous catheter. 1. No interval change in extensive bilateral multifocal airspace disease. Xr Chest Portable    Result Date: 11/17/2020  EXAMINATION: ONE XRAY VIEW OF THE CHEST 11/17/2020 5:58 pm COMPARISON: November 16, 2020. HISTORY: ORDERING SYSTEM PROVIDED HISTORY: SOB TECHNOLOGIST PROVIDED HISTORY: Reason for exam:->SOB What reading provider will be dictating this exam?->CRC FINDINGS: There are bilateral patchy infiltrates. Left IJ line catheter tip in the region of superior vena cava. Tracheostomy to, unchanged. The heart is mildly enlarged. There is blunting of the left costophrenic angle. Bilateral patchy infiltrates, no significant interval change.     Xr Chest Portable    Result Date: 11/16/2020  EXAMINATION: ONE XRAY VIEW OF THE CHEST 11/16/2020 9:04 am COMPARISON: 11/15/2020 HISTORY: ORDERING SYSTEM PROVIDED HISTORY: SOB TECHNOLOGIST PROVIDED HISTORY: Reason for exam:->SOB What reading provider will be dictating this exam?->CRC FINDINGS: A right parahilar pulmonary opacity is seen, which allowing for the differences in the degree of inspiratory effort, are likely stable as of yesterday. In the lower left lung is suboptimally visualized due to underpenetration. Within this limitation, no definite left lung opacity is seen. No pneumothorax or pleural effusion is seen. The cardiac silhouette is enlarged, which may be in part or entirely due to technique. The tracheostomy tube appears grossly well positioned. The left internal jugular vein central venous catheter is well positioned, with the tip overlying the upper SVC. Grossly stable right parahilar pulmonary opacity which may represent atelectasis or pneumonia. The life-support lines and tubes are well positioned. Xr Chest Portable    Result Date: 11/15/2020  EXAMINATION: ONE XRAY VIEW OF THE CHEST 11/15/2020 7:52 am COMPARISON: 11/14/2020 HISTORY: ORDERING SYSTEM PROVIDED HISTORY: SOB TECHNOLOGIST PROVIDED HISTORY: Reason for exam:->SOB What reading provider will be dictating this exam?->CRC FINDINGS: Tracheostomy tube appears unchanged. Left internal jugular central venous catheter with catheter tip not well visualized but possibly in the central left brachiocephalic vein or SVC. Stable cardiomediastinal silhouette with persistent bilateral airspace opacities, right worse than left. Possible small left pleural effusion. No pneumothorax. Stable bilateral airspace opacities, right worse than left.      Xr Chest Portable    Result Date: 11/14/2020  EXAMINATION: ONE XRAY VIEW OF THE CHEST 11/14/2020 7:54 am COMPARISON: Comparison November 11-13 HISTORY: ORDERING SYSTEM PROVIDED HISTORY: SOB TECHNOLOGIST PROVIDED HISTORY: Reason for exam:->SOB What reading provider will be dictating this exam?->CRC FINDINGS: decision was then made to place a right tunneled IJ PICC. Using direct real-time ultrasound imaging vascular access was obtained to the right IJ. An image was stored and copy was transmitted to PACS. Subsequently with real-time guidance a needle was inserted intravascular and through the needle a wire. A subcutaneous tunnel was then created along the right upper chest. Subsequently under fluoroscopic guidance, a system of needles, catheters and guidewires were utilized to place a guidewire and catheter in the venous system and the catheter was advanced and appropriately placed at the level of the junction of the superior vena cava and right atrium. Time out occurred at 08:33 AM. Patient received 4 minutes and 26 seconds of fluoroscopy. A fluoroscopic image of the position of the catheter tip was saved in PACS. Successful uncomplicated placement of a right IJ tunneled PICC catheter. Cta Chest W Contrast    Result Date: 12/9/2020  EXAMINATION: CTA OF THE CHEST; CT OF THE ABDOMEN AND PELVIS WITH CONTRAST 12/9/2020 2:57 am TECHNIQUE: CTA of the chest was performed after the administration of intravenous contrast.  Multiplanar reformatted images are provided for review. MIP images are provided for review. Dose modulation, iterative reconstruction, and/or weight based adjustment of the mA/kV was utilized to reduce the radiation dose to as low as reasonably achievable.; CT of the abdomen and pelvis was performed with the administration of intravenous contrast. Multiplanar reformatted images are provided for review. Dose modulation, iterative reconstruction, and/or weight based adjustment of the mA/kV was utilized to reduce the radiation dose to as low as reasonably achievable.  COMPARISON: 11/29/2020 and 10/28/2020 HISTORY: ORDERING SYSTEM PROVIDED HISTORY: r/o PE TECHNOLOGIST PROVIDED HISTORY: Reason for exam:->r/o PE What reading provider will be dictating this exam?->CRC; ORDERING SYSTEM PROVIDED HISTORY: Abdominal pain TECHNOLOGIST PROVIDED HISTORY: Additional Contrast?->None Reason for exam:->Abdominal pain What reading provider will be dictating this exam?->CRC Chest pain FINDINGS: Pulmonary Arteries: Evaluation is compromised by motion artifact. Despite this limitation pulmonary emboli are identified both upper lobes and the right lower lobe and probably in the middle lobe. Main pulmonary artery is normal in caliber. Mediastinum: Mediastinal and bilateral hilar adenopathy is again noted. The heart and pericardium demonstrate no acute abnormality. There is no acute abnormality of the thoracic aorta. Tracheostomy tube is noted. Lungs/pleura: There is no pneumothorax or pleural effusion. There has been interval improved in bilateral airspace opacities. Organs: The liver, spleen, pancreas, adrenal glands and kidneys are unremarkable. Adjacent cysts are identified in the lower pole of the right kidney. GI/Bowel: Percutaneous gastrostomy tube remains in place. Small bowel caliber is normal.  The appendix is normal.  The colon is unremarkable. Pelvis: The urinary bladder is grossly negative though there is minimal adjacent fat stranding. Peritoneum/Retroperitoneum: No adenopathy, mesenteric stranding or free fluid. Aortic caliber is normal. Soft Tissues/Bones: No acute bone or soft tissue abnormality. 1. Bilateral pulmonary emboli. 2. Improving bilateral airspace opacities. 3. Fat stranding adjacent to the urinary bladder may indicate cystitis. Critical results were called by Dr. Mikhail Castro MD to Sean Wilkerson on 2020 at 03:23.     Ir Sparkle Cath Place Venous 2nd+ Order    Result Date: 2020  Patient MRN:  [id number] : 1960 Age: 61 years Gender: Male Order Date:  2020 7:45 AM EXAM: IR VENOGRAM UPPER EXTREMITY RIGHT, IR SPARKLE CATH PLACE VENOUS 2ND+ ORDER, IR TUNNELED CVC PLACE WO SQ PORT/PUMP > 5 YEARS, IR ULTRASOUND GUIDANCE VASCULAR ACCESS, IR FLUORO GUIDED CVA DEVICE PLMT/REPLACE/REMOVAL NUMBER OF IMAGES:  14 INDICATION: Z79.2 Long term (current) use of antibiotics Long term (current) use of antibiotics What reading provider will be dictating this exam?->[facility] COMPARISON: None FLUORO TIME: 00:04:26 DAP: 1965.9 uGym2 AK: 77.1 mGy PICC Catheter Placement and Upper Extremity Venous Ultrasound, Procedure: After obtaining informed consent, and following the routine sterile preparation and drape, the right basilic vein was accessed with ultrasound guidance. IV contrast was injected which demonstrated extravasation into the soft tissue. The decision was then made to place a right tunneled IJ PICC. Using direct real-time ultrasound imaging vascular access was obtained to the right IJ. An image was stored and copy was transmitted to PACS. Subsequently with real-time guidance a needle was inserted intravascular and through the needle a wire. A subcutaneous tunnel was then created along the right upper chest. Subsequently under fluoroscopic guidance, a system of needles, catheters and guidewires were utilized to place a guidewire and catheter in the venous system and the catheter was advanced and appropriately placed at the level of the junction of the superior vena cava and right atrium. Time out occurred at 08:33 AM. Patient received 4 minutes and 26 seconds of fluoroscopy. A fluoroscopic image of the position of the catheter tip was saved in PACS. Successful uncomplicated placement of a right IJ tunneled PICC catheter.     Ir Ultrasound Guidance Vascular Access    Result Date: 2020  Patient MRN:  [id number] : 1960 Age: 61 years Gender: Male Order Date:  2020 7:45 AM EXAM: IR VENOGRAM UPPER EXTREMITY RIGHT, IR SPARKLE CATH PLACE VENOUS 2ND+ ORDER, IR TUNNELED CVC PLACE WO SQ PORT/PUMP > 5 YEARS, IR ULTRASOUND GUIDANCE VASCULAR ACCESS, IR FLUORO GUIDED CVA DEVICE PLMT/REPLACE/REMOVAL NUMBER OF IMAGES:  14 INDICATION: Z79.2 Long term (current) use of antibiotics Long term (current) use of antibiotics What reading provider will be dictating this exam?->[facility] COMPARISON: None FLUORO TIME: 00:04:26 DAP: 1965.9 uGym2 AK: 77.1 mGy PICC Catheter Placement and Upper Extremity Venous Ultrasound, Procedure: After obtaining informed consent, and following the routine sterile preparation and drape, the right basilic vein was accessed with ultrasound guidance. IV contrast was injected which demonstrated extravasation into the soft tissue. The decision was then made to place a right tunneled IJ PICC. Using direct real-time ultrasound imaging vascular access was obtained to the right IJ. An image was stored and copy was transmitted to PACS. Subsequently with real-time guidance a needle was inserted intravascular and through the needle a wire. A subcutaneous tunnel was then created along the right upper chest. Subsequently under fluoroscopic guidance, a system of needles, catheters and guidewires were utilized to place a guidewire and catheter in the venous system and the catheter was advanced and appropriately placed at the level of the junction of the superior vena cava and right atrium. Time out occurred at 08:33 AM. Patient received 4 minutes and 26 seconds of fluoroscopy. A fluoroscopic image of the position of the catheter tip was saved in PACS. Successful uncomplicated placement of a right IJ tunneled PICC catheter. Fluoroscopy Modified Barium Swallow With Video    Result Date: 12/11/2020  EXAMINATION: MODIFIED BARIUM SWALLOW WAS PERFORMED IN CONJUNCTION WITH SPEECH PATHOLOGY SERVICES TECHNIQUE: Fluoroscopic evaluation of the swallowing mechanism was performed with multiple consistency of barium product. FLUOROSCOPY DOSE AND TYPE OR TIME AND EXPOSURES: Fluoroscopy time 1.5 minutes.   Dose area product 16 mGy COMPARISON: None HISTORY: ORDERING SYSTEM PROVIDED HISTORY: evaluation for possible PO diet TECHNOLOGIST PROVIDED HISTORY: Reason for exam:->evaluation for possible PO diet What reading provider will be dictating this exam?->[facility] Dysphagia FINDINGS: Oral phase of swallowing was grossly within normal limits. There was a pharyngeal delay and laryngeal elevation was reduced. No retention was seen in the vallecular or piriform sinuses. No evidence of laryngeal penetration or aspiration. No evidence of aspiration or laryngeal penetration. Please see separate speech pathology report for full discussion of findings and recommendations. Us Dup Lower Extremities Bilateral Venous    Result Date: 2020  Patient MRN:  [id number] : 1960 Age: 61 years Gender: Male Order Date:  2020 7:56 PM EXAM: US DUP LOWER EXTREMITIES BILATERAL VENOUS NUMBER OF IMAGES:  37 INDICATION:  swelling, lymphedema, PE, r/o DVT swelling, lymphedema, PE, r/o DVT What reading provider will be dictating this exam?->[facility] COMPARISON: None Within the visualized vessels, there is no evidence for deep venous thrombosis There is good compressibility, there is good augmentation, there is good color flow.      Within the visualized vessels there is no evidence for deep venous thrombosis      DIET:  DIET GENERAL;    Medications:    Scheduled Meds:   apixaban  10 mg Oral BID    valproic acid  250 mg Per G Tube 3 times per day    amLODIPine  10 mg Oral Daily    Arformoterol Tartrate  15 mcg Nebulization BID    budesonide  0.5 mg Nebulization BID    carvedilol  6.25 mg Oral BID WC    chlorhexidine  15 mL Mouth/Throat BID    fluconazole  200 mg Oral Daily    ipratropium-albuterol  3 mL Inhalation Q4H WA    lansoprazole  30 mg Per G Tube QAM AC    levothyroxine  50 mcg Oral Daily    senna  2 tablet Oral Nightly    thiamine  100 mg Oral Daily    sodium chloride flush  10 mL Intravenous 2 times per day    linezolid  600 mg Oral 2 times per day       Continuous Infusions:    PRN Meds:oxyCODONE-acetaminophen, diphenhydrAMINE, colchicine, polyvinyl alcohol, sodium chloride flush, acetaminophen **OR** acetaminophen, polyethylene glycol, promethazine **OR** ondansetron    A/P:      Patient Active Problem List   Diagnosis    Hyperlipidemia    Type 2 diabetes mellitus without complication, without long-term current use of insulin ([facility] [address]    Atypical chest pain    Essential hypertension    Chronic acquired lymphedema    Aortic valve disease    Morbid obesity due to excess calories (HCC)    Abdominal pain    Acute respiratory failure with hypoxia (HCC)    Acute pulmonary edema (HCC)    Congestive heart failure with LV diastolic dysfunction, NYHA class 3 (HCC)    Obstructive sleep apnea    Acquired hypothyroidism    Chronic diastolic heart failure (HCC)    Nonrheumatic aortic valve stenosis    ACE-inhibitor cough    Pneumonia due to organism    Acute gout of right knee    Bilateral pulmonary embolism (HCC)    1.) B/L Pulmonary Embolism - subsegmental, not massive   2.) Acute hypoxemic respiratory failure on mechanical ventilation s/p trach 11/12/2020 with [id number] XLT  3.) Metabolic encephalopathy history of EtOH abuse with agitation on ( Depakote, and Seroquel  )significantly improved  4.) HANS moderate ( AHI 17 on 4/18/18 requires BIPAP 16/12) noncompliant with CPAP  5. ) History of Reactive Airway Disease Syndreom (RADS)  6.) Stable Pulmonary Nodules - radiographically stable since CT 10/2017 to 3/2018  7.) Diabetes Mellitus  8.) Obesity  9.) EF 74% LVH diastolic dysfunction  10.) Chronic Lymphedema  11. ) History of Gout - on colchicine  12. ) Hypothyroidism  13. ) H/o respiratory failure: 7/8/2017 pt was  transferred from [facility] for acute respiratory failure after lap cholecystectomy, lap umbilical hernia repair, and lap liver biopsy (fatty liver). Patient was then transferred to [facility] hospital where he was weaned off the ventilator and decannulated. 14.) Ventilator associated pneumonia - treated   15. ) Alcohol Withdrawal - improved  16. ) Abdominal Pains - resolved    PLAN:  ELIQUIS FOR PE      I can be reached though [facility] or [facility] [place] at [phone]

## 2020-12-12 NOTE — PROGRESS NOTES
[address] PROGRESS NOTE    Patient: Laila Luz  MRN: [id number]  : 1960    Encounter Date: 2020  Encounter Time: 12:02 PM     Date of Admission: .2020 12:19 AM    Consulting Physician:  Primary Care Physician:     Jerilyn Tavares MD     [phone]    Reason for Consultation: Pulmonary Embolism     Problem List:  Patient Active Problem List   Diagnosis    Hyperlipidemia    Type 2 diabetes mellitus without complication, without long-term current use of insulin ([address]    Atypical chest pain    Essential hypertension    Chronic acquired lymphedema    Aortic valve disease    Morbid obesity due to excess calories ([address]    Abdominal pain    Acute respiratory failure with hypoxia ([address]    Acute pulmonary edema ([address]    Congestive heart failure with LV diastolic dysfunction, NYHA class 3 ([address]    Obstructive sleep apnea    Acquired hypothyroidism    Chronic diastolic heart failure (HCC)    Nonrheumatic aortic valve stenosis    ACE-inhibitor cough    Pneumonia due to organism    Acute gout of right knee    Bilateral pulmonary embolism ([address]     SUBJECTIVE: Patient seen and examined. Overnight the patient had no shortness of breath, cough, increased work of breathing. HOME MEDICATIONS:  Prior to Admission medications    Medication Sig Start Date End Date Taking?  Authorizing Provider   hydrALAZINE (APRESOLINE) 25 MG tablet Take 25 mg by mouth 3 times daily g tube   Yes Historical Provider, MD   insulin glargine (LANTUS) 100 UNIT/ML injection vial Inject 20 Units into the skin nightly   Yes Historical Provider, MD   insulin lispro (HUMALOG) 100 UNIT/ML injection vial Inject into the skin 3 times daily (before meals)   Yes Historical Provider, MD   Arformoterol Tartrate (BROVANA) 15 MCG/2ML NEBU Take 2 mLs by nebulization 2 times daily 20   Jerilyn Tavares MD   budesonide (PULMICORT) 0.5 MG/2ML nebulizer suspension Take 2 mLs by nebulization 2 times daily 12/1/20   Darlene Lanier MD   ipratropium-albuterol (DUONEB) 0.5-2.5 (3) MG/3ML SOLN nebulizer solution Inhale 3 mLs into the lungs every 4 hours (while awake) 12/1/20   Darlene Lanier MD   Heparin Sodium, Porcine, (HEPARIN, PORCINE,) 27662 UNIT/ML injection Inject 0.75 mLs into the skin every 8 hours 12/1/20   Darlene Lanier MD   divalproex (DEPAKOTE SPRINKLE) 125 MG capsule Take 2 capsules by mouth every 8 hours 12/1/20   Darlene Lanier MD   glucose (GLUTOSE) 40 % GEL Take 37.5 mLs by mouth as needed (low bs) 12/1/20   Darlene Lanier MD   carvedilol (COREG) 6.25 MG tablet Take 1 tablet by mouth 2 times daily (with meals) 12/1/20   Darlene Lanier MD   sodium chloride, Inhalant, 3 % nebulizer solution Take 2 mLs by nebulization every 4 hours 12/1/20   Darlene Lanier MD   mineral oil-hydrophilic petrolatum (HYDROPHOR) ointment Apply topically as needed.  12/1/20   Darlene Lanier MD   polyethylene glycol (GLYCOLAX) 17 g packet Take 17 g by mouth daily as needed for Constipation 12/1/20 12/31/20  Darlene Lanier MD   senna (SENOKOT) 8.6 MG tablet Take 2 tablets by mouth nightly 12/1/20 12/31/20  Darlene Lanier MD   chlorhexidine (PERIDEX) 0.12 % solution Take 15 mLs by mouth 2 times daily for 14 days 12/1/20 12/15/20  Darlene Lanier MD   polyvinyl alcohol (LIQUIFILM TEARS) 1.4 % ophthalmic solution Place 1 drop into both eyes every 4 hours as needed for Dry Eyes 12/1/20 12/31/20  Darlene Lanier MD   levothyroxine (SYNTHROID) 50 MCG tablet Take 1 tablet by mouth daily 12/2/20   Darlene Lanier MD   lansoprazole 3 MG/ML SUSP 10 mLs by Per G Tube route every morning (before breakfast) 12/2/20   Darlene Lanier MD   vitamin B-1 100 MG tablet 1 tablet by PEG Tube route daily 12/2/20   Darlene Lanier MD   fluconazole (DIFLUCAN) 200 MG tablet Take 1 tablet by mouth daily for 14 days 12/1/20 12/15/20  Ross Rubalcava MD   linezolid (ZYVOX) 100 MG/5ML suspension 30 mLs by Per G Tube route every 12 hours for 16 days 11/30/20 12/16/20  Mukesh Teresa MD   amLODIPine (NORVASC) 10 MG tablet Take 1 tablet by mouth daily 5/19/20   Niurka Chase MD   colchicine (COLCRYS) 0.6 MG tablet Take 1 tablet by mouth 2 times daily as needed for Pain 5/6/19   Niurka Chase MD       CURRENT MEDICATIONS:  Current Facility-Administered Medications: apixaban (ELIQUIS) tablet 10 mg, 10 mg, Oral, BID  oxyCODONE-acetaminophen (PERCOCET) 5-325 MG per tablet 1 tablet, 1 tablet, Oral, Q4H PRN  diphenhydrAMINE (BENADRYL) tablet 25 mg, 25 mg, Oral, Q6H PRN  valproic acid (DEPACON) 250 MG/5ML oral solution 250 mg, 250 mg, Per G Tube, 3 times per day  amLODIPine (NORVASC) tablet 10 mg, 10 mg, Oral, Daily  Arformoterol Tartrate (BROVANA) nebulizer solution 15 mcg, 15 mcg, Nebulization, BID  budesonide (PULMICORT) nebulizer suspension 500 mcg, 0.5 mg, Nebulization, BID  carvedilol (COREG) tablet 6.25 mg, 6.25 mg, Oral, BID WC  chlorhexidine (PERIDEX) 0.12 % solution 15 mL, 15 mL, Mouth/Throat, BID  colchicine (COLCRYS) tablet 0.6 mg, 0.6 mg, Oral, BID PRN  fluconazole (DIFLUCAN) tablet 200 mg, 200 mg, Oral, Daily  ipratropium-albuterol (DUONEB) nebulizer solution 3 mL, 3 mL, Inhalation, Q4H WA  lansoprazole suspension SUSP 30 mg, 30 mg, Per G Tube, QAM AC  levothyroxine (SYNTHROID) tablet 50 mcg, 50 mcg, Oral, Daily  polyvinyl alcohol (LIQUIFILM TEARS) 1.4 % ophthalmic solution 1 drop, 1 drop, Both Eyes, Q4H PRN  senna (SENOKOT) tablet 17.2 mg, 2 tablet, Oral, Nightly  vitamin B-1 (THIAMINE) tablet 100 mg, 100 mg, Oral, Daily  sodium chloride flush 0.9 % injection 10 mL, 10 mL, Intravenous, 2 times per day  sodium chloride flush 0.9 % injection 10 mL, 10 mL, Intravenous, PRN  acetaminophen (TYLENOL) tablet 650 mg, 650 mg, Oral, Q6H PRN **OR** acetaminophen (TYLENOL) suppository 650 mg, 650 mg, Rectal, Q6H PRN  polyethylene glycol (GLYCOLAX) packet 17 g, 17 g, Oral, Daily PRN  promethazine (PHENERGAN) tablet 12.5 mg, 12.5 mg, Oral, Q6H PRN **OR** ondansetron (ZOFRAN) injection 4 mg, 4 mg, Intravenous, Q6H PRN  linezolid (ZYVOX) tablet 600 mg, 600 mg, Oral, 2 times per day    ALLERGIES:  Allergies   Allergen Reactions    Bee Venom Anaphylaxis    Shellfish-Derived Products Anaphylaxis     Only crab legs       REVIEW OF SYSTEMS:  General ROS: Negative For: chills, fatigue, fever, malaise, night sweats or sleep disturbance   ENT ROS: Negative For: epistaxis, headaches, sinus pain, sneezing or sore throat   Respiratory ROS: Negative For: hemoptysis, pleuritic type chest pains  Cardiovascular ROS: Negative For: chest pain, dyspnea on exertion, irregular heartbeat, loss of consciousness, murmur, orthopnea or palpitations   Gastrointestinal ROS: Negative For: abdominal pain, appetite loss, blood in stools, change in bowel habits, change in stools, constipation, diarrhea, hematemesis, melena, nausea/vomiting or stool incontinence     OBJECTIVE:  PHYSICAL EXAMINATION:     VITAL SIGNS:  BP (!) 142/89   Pulse 85   Temp 97.5 °F (36.4 °C) (Temporal)   Resp 18   Ht 5' 9\" (1.753 m)   Wt (!) 324 lb (147 kg)   SpO2 100%   BMI 47.85 kg/m²   Wt Readings from Last 3 Encounters:   20 (!) 324 lb (147 kg)   20 (!) 324 lb (147 kg)   06/15/20 (!) 326 lb (147.9 kg)     Temp Readings from Last 3 Encounters:   20 97.5 °F (36.4 °C) (Temporal)   20 98.4 °F (36.9 °C) (Temporal)   19 98.6 °F (37 °C) (Oral)     TMAX:  BP Readings from Last 3 Encounters:   20 (!) 142/89   20 (!) 185/72   20 (!) 93/53     Pulse Readings from Last 3 Encounters:   20 85   20 87   06/15/20 80       CURRENT PULSE OXIMETRY: SpO2: 100 %  24HR PULSE OXIMETRY RANGE: SpO2  Av %  Min: 100 %  Max: 100 %  CVP:      ________________________________________________________________________    VENTILATOR SETTINGS (if applicable):         Vent Information  Equipment Changed:  Other (comment)(inner cannula)  FiO2 : 50 %  SpO2: 100 %  SpO2/FiO2 ratio: 200  Additional Respiratory  Assessments  Pulse: 85  Resp: 18  SpO2: 100 %  ETCO2:  Peak Inspiratory Pressure:  End-Inspiratory Plateau Pressure:    ABG:  No results for input(s): PH, PO2, PCO2, HCO3, BE, O2SAT, METHB, O2HB, COHB, O2CON, HHB, THB in the last 72 hours. FiO2 : 50 %     ________________________________________________________________________    IV ACCESS:    NUTRITION: DIET GENERAL;    INTAKE/OUTPUTS:  I/O last 3 completed shifts:   In: 20 [NG/GT:20]  Out: 250 [Urine:250]    Intake/Output Summary (Last 24 hours) at 12/12/2020 1202  Last data filed at 12/12/2020 0501  Gross per 24 hour   Intake --   Output 250 ml   Net -250 ml     General Appearance: alert and oriented to person, place and time, well-developed and   well-nourished, in no acute distress   Eyes: pupils equal, round, and reactive to light, extraocular eye movements intact, conjunctivae normal and sclera anicteric   Neck: neck supple and non tender without mass, no thyromegaly, no thyroid nodules and no cervical adenopathy   - trach intact with no focal erythema, erosion, ulceration or bleed noted  - trach Shiley 8 XLT proximal   Pulmonary/Chest: decreased breath sounds, no accessory muscles of inspiration, no focal wheezes  Cardiovascular: normal rate, regular rhythm, normal S1 and S2, no murmurs, rubs, clicks or gallops, distal pulses intact, no carotid bruits, no murmurs, no gallops, no carotid bruits and no JVD   Abdomen: soft, non-tender, non-distended, normal bowel sounds, no masses or organomegaly   Extremities: no cyanosis, no clubbing, no edema  Musculoskeletal: normal range of motion, no joint swelling, deformity or tenderness   Neurologic: reflexes normal and symmetric, no cranial nerve deficit noted    LABS/IMAGING:    CBC:  Lab Results   Component Value Date    WBC 6.3 12/11/2020    HGB 8.3 (L) 12/11/2020    HCT 25.5 (L) 12/11/2020    MCV 81.5 12/11/2020     12/11/2020    LYMPHOPCT 41.3 12/10/2020 RBC 3.13 (L) 12/11/2020    MCH 26.5 12/11/2020    MCHC 32.5 12/11/2020    RDW 19.4 (H) 12/11/2020    NEUTOPHILPCT 44.4 12/10/2020    MONOPCT 10.0 12/10/2020    BASOPCT 0.5 12/10/2020    NEUTROABS 2.48 12/10/2020    LYMPHSABS 2.31 12/10/2020    MONOSABS 0.56 12/10/2020    EOSABS 0.19 12/10/2020    BASOSABS 0.03 12/10/2020       Recent Labs     12/11/20  0600 12/10/20  0504 12/09/20  0433   WBC 6.3 5.6 8.3   HGB 8.3* 8.2* 7.9*   HCT 25.5* 25.1* 23.8*   MCV 81.5 80.2 80.4    364 354       BMP:   No results for input(s): NA, K, CL, CO2, PHOS, BUN, CREATININE in the last 72 hours. Invalid input(s): CA    MG:   Lab Results   Component Value Date    MG 1.8 11/25/2020     Ca/Phos:   Lab Results   Component Value Date    CALCIUM 9.6 12/09/2020    PHOS 2.4 (L) 11/25/2020     Amylase: No results found for: AMYLASE  Lipase:   Lab Results   Component Value Date    LIPASE 53 12/09/2020     LIVER PROFILE:   No results for input(s): AST, ALT, LIPASE, BILIDIR, BILITOT, ALKPHOS in the last 72 hours. Invalid input(s): AMYLASE,  ALB    PT/INR: No results for input(s): PROTIME, INR in the last 72 hours.   APTT:   Recent Labs     12/10/20  0650 12/11/20  0600 12/11/20  1400   APTT 78.3* 83.7* 67.9*       Cardiac Enzymes:  Lab Results   Component Value Date    CKTOTAL 98 10/08/2017    CKMB 1.1 10/08/2017    TROPONINI <0.01 10/26/2020       Hgb A1C:   Lab Results   Component Value Date    LABA1C 6.8 06/15/2020     No results found for: EAG  IVAN:   Lab Results   Component Value Date    IVAN NEGATIVE 10/08/2017     ESR:   Lab Results   Component Value Date    SEDRATE 135 (H) 09/04/2017     CRP:   Lab Results   Component Value Date    CRP 1.4 (H) 11/02/2020     D Dimer:   Lab Results   Component Value Date    DDIMER 269 10/27/2020     Folate and B12:   Lab Results   Component Value Date    [id number] 1982 (H) 11/05/2020   ,   Lab Results   Component Value Date    FOLATE >20.0 11/05/2020       Lactic Acid:   Lab Results   Component Value Date    LACTA 0.7 12/09/2020     Ammonia:   Cortisol:  Thyroid Studies:  Lab Results   Component Value Date    TSH 4.340 (H) 10/27/2020     CTA CHEST W CONTRAST   Final Result   1. Bilateral pulmonary emboli. 2. Improving bilateral airspace opacities. 3. Fat stranding adjacent to the urinary bladder may indicate cystitis. Critical results were called by Dr. Mariangel Little MD to Jenelle Ran on   12/9/2020 at 03:23.       CT ABDOMEN PELVIS W IV CONTRAST Additional Contrast? None   Final Result   1. Bilateral pulmonary emboli. 2. Improving bilateral airspace opacities. 3. Fat stranding adjacent to the urinary bladder may indicate cystitis. Critical results were called by Dr. Mariangel Little MD to [facility] Ran on   12/9/2020 at 03:23.       XR CHEST PORTABLE   Final Result   Almost complete resolution of previously noted areas of airspace disease in   the left mid lung and right upper lobe.        ASSESSMENT:  1.) B/L Pulmonary Embolism - subsegmental, not massive   2.) Acute hypoxemic respiratory failure on mechanical ventilation s/p trach 11/12/2020 with Martinaley 8 Proximal XLT  3.) Metabolic encephalopathy history of EtOH abuse with agitation on ( Depakote, and Seroquel  )significantly improved  4.) HANS moderate ( AHI 17 on 4/18/18 requires BIPAP 16/12) noncompliant with CPAP  5.) History of Reactive Airway Disease Syndreom (RADS)  6.) Stable Pulmonary Nodules - radiographically stable since CT 10/2017 to 3/2018  7.) Diabetes Mellitus  8.) Obesity  9.) EF 50% LVH diastolic dysfunction  10.) Chronic Lymphedema  11.) History of Gout - on colchicine  12.) Hypothyroidism  13.) H/o respiratory failure: 7/8/2017 pt was  transferred from [facility] for acute respiratory failure after lap cholecystectomy, lap umbilical hernia repair, and lap liver biopsy (fatty liver). Patient was then transferred to [facility] hospital where he was weaned off the ventilator and decannulated.   14.) Ventilator associated pneumonia - treated   15.) Alcohol Withdrawal - improved  16.) Abdominal Pains - resolved     PLAN:  1.) trach care/suction/suport  2.) COVID negative   3.) heparin drip  4.) PT/OT     - doppler US legs negative   - thrombophilia panel pending   - full respiratory panel negative  - linezolid and diflucan per ID  - eliquis BID  - supportive care to continue at this time  - trach to remain in place at this time and defer any trach changes to ENT     Thank you No att. providers found very much for allowing me to see this patient in consultation and follow up.     Care reviewed with nursing staff, medical and surgical specialty care, primary care and the patient's family as available. Restraints are ordered when the patient can do harm to him/herself by pulling out devices.     Cecilia Myrick M.D.

## 2020-12-12 NOTE — PROGRESS NOTES
[address] [place] [address] [facility]  Progress Note    C/C : MRSA pneumonia   '    Denies fever and chills  Denies SOB   Reports cough   Afebrile       Current Facility-Administered Medications   Medication Dose Route Frequency Provider Last Rate Last Admin    apixaban (ELIQUIS) tablet 10 mg  10 mg Oral BID June Gant MD   10 mg at 12/12/20 1119    oxyCODONE-acetaminophen (PERCOCET) 5-325 MG per tablet 1 tablet  1 tablet Oral Q4H PRN Oris Tiesha, DO   1 tablet at 12/12/20 1129    diphenhydrAMINE (BENADRYL) tablet 25 mg  25 mg Oral Q6H PRN Oris Tiesha, DO   25 mg at 12/11/20 4424    valproic acid (DEPACON) 250 MG/5ML oral solution 250 mg  250 mg Per G Tube 3 times per day Oris Tiesha, DO   250 mg at 12/12/20 0500    amLODIPine (NORVASC) tablet 10 mg  10 mg Oral Daily Oris Tiesha, DO   10 mg at 12/12/20 1121    Arformoterol Tartrate (BROVANA) nebulizer solution 15 mcg  15 mcg Nebulization BID Oris Tiesha, DO   15 mcg at 12/12/20 0825    budesonide (PULMICORT) nebulizer suspension 500 mcg  0.5 mg Nebulization BID Oris Tiesha, DO   500 mcg at 12/12/20 0825    carvedilol (COREG) tablet 6.25 mg  6.25 mg Oral BID WC Oris Itesha, DO   6.25 mg at 12/12/20 1121    chlorhexidine (PERIDEX) 0.12 % solution 15 mL  15 mL Mouth/Throat BID Oris Tiesha, DO   15 mL at 12/12/20 1122    colchicine (COLCRYS) tablet 0.6 mg  0.6 mg Oral BID PRN Oris Tiesha, DO        fluconazole (DIFLUCAN) tablet 200 mg  200 mg Oral Daily Oris Tiesha, DO   200 mg at 12/12/20 1121    ipratropium-albuterol (DUONEB) nebulizer solution 3 mL  3 mL Inhalation Q4H WA Oris Tiesha, DO   3 mL at 12/12/20 0825    lansoprazole suspension SUSP 30 mg  30 mg Per G Tube QAM AC Oris Tiesha, DO   30 mg at 12/12/20 0501    levothyroxine (SYNTHROID) tablet 50 mcg  50 mcg Oral Daily Oris Tiesha, DO   50 mcg at 12/12/20 1119    polyvinyl alcohol (LIQUIFILM TEARS) 1.4 % ophthalmic solution 1 drop  1 drop Both Eyes Q4H PRN Janalyn Aid, DO        senna (SENOKOT) tablet 17.2 mg  2 tablet Oral Nightly Janalyn Aid, DO   17.2 mg at 20    vitamin B-1 (THIAMINE) tablet 100 mg  100 mg Oral Daily Janalyn Aid, DO   100 mg at 20 1121    sodium chloride flush 0.9 % injection 10 mL  10 mL Intravenous 2 times per day Janalyn Aid, DO   10 mL at 20 1138    sodium chloride flush 0.9 % injection 10 mL  10 mL Intravenous PRN Janalyn Aid, DO        acetaminophen (TYLENOL) tablet 650 mg  650 mg Oral Q6H PRN Janalyn Aid, DO        Or    acetaminophen (TYLENOL) suppository 650 mg  650 mg Rectal Q6H PRN Janalyn Aid, DO        polyethylene glycol (GLYCOLAX) packet 17 g  17 g Oral Daily PRN Janalyn Aid, DO        promethazine (PHENERGAN) tablet 12.5 mg  12.5 mg Oral Q6H PRN Janalyn Aid, DO        Or    ondansetron [facility]) injection 4 mg  4 mg Intravenous Q6H PRN Janalyn Aid, DO        linezolid (ZYVOX) tablet 600 mg  600 mg Oral 2 times per day Edward Chanel MD   600 mg at 20 1121         Review of systems:  As stated above in the chief complaint, otherwise negative. OBJECTIVE:  BP (!) 142/89   Pulse 85   Temp 97.5 °F (36.4 °C) (Temporal)   Resp 18   Ht 5' 9\" (1.753 m)   Wt (!) 324 lb (147 kg)   SpO2 100%   BMI 47.85 kg/m²   Temp  Av.5 °F (36.4 °C)  Min: 97.5 °F (36.4 °C)  Max: 97.5 °F (36.4 °C)  Constitutional: The patient is awake, alert, and oriented. NAD, resting comfortably  Skin: Warm and dry. No rashes were noted. HEENT: Round and reactive pupils. Moist mucous membranes. No ulcerations. Johnnette Sprain is present but is much improved. Neck: Supple to movements. Trach with trach mask  Chest: No use of accessory muscles to breathe. Symmetrical expansion. No wheezing, crackles or rhonchi. Poor air exchange. Cardiovascular: S1 and S2 are rhythmic and regular. No murmurs appreciated. Abdomen: Positive bowel sounds to auscultation.  Benign to palpation. No masses felt. No hepatosplenomegaly. Extremities: No clubbing, no cyanosis, no edema. Lines: PICC 11/19/2020     Laboratory and Tests Review:  Lab Results   Component Value Date    WBC 6.3 12/11/2020    WBC 5.6 12/10/2020    WBC 8.3 12/09/2020    HGB 8.3 (L) 12/11/2020    HCT 25.5 (L) 12/11/2020    MCV 81.5 12/11/2020     12/11/2020     Lab Results   Component Value Date    NEUTROABS 2.48 12/10/2020    NEUTROABS 3.40 12/09/2020    NEUTROABS 3.72 12/03/2020     No results found for: [facility]  Lab Results   Component Value Date    ALT 45 (H) 12/09/2020    AST 27 12/09/2020    ALKPHOS 90 12/09/2020    BILITOT <0.2 12/09/2020     Lab Results   Component Value Date     12/09/2020    K 4.4 12/09/2020    K 4.0 10/27/2020    CL 93 12/09/2020    CO2 32 12/09/2020    BUN 17 12/09/2020    CREATININE 1.5 12/09/2020    CREATININE 1.1 12/03/2020    CREATININE 1.1 12/02/2020    GFRAA 58 12/09/2020    LABGLOM 58 12/09/2020    GLUCOSE 109 12/09/2020    PROT 7.6 12/09/2020    LABALBU 3.7 12/09/2020    CALCIUM 9.6 12/09/2020    BILITOT <0.2 12/09/2020    ALKPHOS 90 12/09/2020    AST 27 12/09/2020    ALT 45 12/09/2020     Lab Results   Component Value Date    CRP 1.4 (H) 11/02/2020    CRP 2.8 (H) 09/04/2017    CRP 10.5 (H) 08/28/2017     Lab Results   Component Value Date    SEDRATE 135 (H) 09/04/2017    SEDRATE 150 (H) 08/28/2017    SEDRATE 141 (H) 08/21/2017     Radiology:  reviewed    Microbiology:   Lab Results   Component Value Date    BC 24 Hours no growth 12/09/2020    BC  11/25/2020     No antibiotic susceptibility studies performed. Plates will be held 10  days. Contact the laboratory, [phone], if further workup is  desired.       BC 5 Days no growth 11/09/2020    ORG Staphylococcus coagulase-negative 11/25/2020    ORG Staphylococcus aureus 11/25/2020    ORG Staphylococcus aureus 10/29/2020     Lab Results   Component Value Date    BLOODCULT2 24 Hours no growth 12/09/2020    BLOODCULT2 5 Days no growth 11/25/2020    BLOODCULT2 5 Days no growth 11/09/2020    ORG Staphylococcus coagulase-negative 11/25/2020    ORG Staphylococcus aureus 11/25/2020    ORG Staphylococcus aureus 10/29/2020     No results found for: WNDABS  Smear, Respiratory   Date Value Ref Range Status   11/25/2020   Final    Group 5: >25 PMN's/LPF and <10 Epithelial cells/LPF  Abundant Polymorphonuclear leukocytes  Rare Epithelial cells  Abundant Gram positive diplococci  Abundant Gram positive cocci in clusters       No results found for: MPNEUMO, CLAMYDCU, LABLEGI, AFBCX, FUNGSM, LABFUNG  CULTURE, RESPIRATORY   Date Value Ref Range Status   11/25/2020 Oral Pharyngeal Tiesha absent (A)  Final   11/25/2020   Final    Heavy growth  Methicillin resistant Staph aureus isolated. Most Methicillin  resistant Staphylococcus are usually resistant to multiple  antibiotics including other B-Lactams, Aminoglycosides,  Macrolides, Clindamycin and Tetracycline. Contact isolation  is indicated. This isolate is presumed to be resistant based on the  detection of inducible Clindamycin resistance. Clindamycin  may still be effective in some patients.        Culture Catheter Tip   Date Value Ref Range Status   08/26/2017 <15 colonies  Final     No results found for: BFCS  No results found for: CXSURG  Urine Culture, Routine   Date Value Ref Range Status   11/09/2020 Growth not present  Final   10/28/2020 Growth not present  Final   09/16/2019 Growth not present  Final     MRSA Culture Only   Date Value Ref Range Status   11/09/2020 Methicillin resistant Staph aureus not isolated  Final   10/30/2020 Methicillin resistant Staph aureus not isolated  Final   07/28/2017 Methicillin resistant Staph aureus not isolated  Final     Recent Labs     12/09/20  1435   PROCAL 0.12*       ASSESSMENT:  ·  MRSA pneumonia-improved  · Continue Diflucan for treatment of thrush and esophagitis-improved  · Shortness of breath associated with PE    PLAN:  · Continue zyvox until 12/17/2020 & diflucan  · Anticoagulation per primary for PE  · PICC should be removed prior to discharge  · Check final cultures  · Monitor labs    Quinton Kulkarni  11:41 AM  12/12/2020

## 2020-12-13 NOTE — PROGRESS NOTES
Physical Therapy  Physical Therapy Initial Assessment   Name: Josselyn Bellamy  : 1960  MRN: [id number]    Referring Provider:  Alma Bond MD    Date of Service: 2020    Evaluating PT:  Radha Martinez, PT, DPT [id number]    Room #:  0392/3333-W  Diagnosis:  Bilateral PE  PMHx/PSHx:  HTN, HLD, HANS, depression, gout, anxiety, OA, obesity, lymphedema, CHF, Hypothyroidism, DM, COPD, tracheostomy 2020  Precautions:  Falls, trach, O2 via trach mask, impulsive  Equipment Needs:  Front [employer]    SUBJECTIVE:  Pt from [facility]. Recently discharged from acute care for respiratory failure. Pt's speech is limited d/t trach so unable to obtain home setup at this time. OBJECTIVE:   Initial Evaluation  Date: 2020 Treatment Short Term/ Long Term   Goals   AM-PAC 6 Clicks      Was pt agreeable to Eval/treatment? yes     Does pt have pain? No c/o pain     Bed Mobility  Rolling: SBA  Supine to sit: SBA  Sit to supine: SBA  Scooting: SBA  Rolling: Independent  Supine to sit: Independent  Sit to supine: Independent  Scooting: Independent   Transfers Sit to stand: SBA  Stand to sit: SBA  Stand pivot: Angeli front [employer]  Sit to stand: Independent  Stand to sit: Independent  Stand pivot: Alberto front [employer]   Ambulation    35 feet with front [employer] Angeli  150 feet with front [employer] Alberto   Stair negotiation: ascended and descended  NT  TBD   ROM BUE:  Per OT note  BLE:  WNL     Strength BUE:  Per OT ntoe  BLE:  WNL     Balance Sitting EOB:  SBA  Dynamic Standing:  Agneli front [employer]  Sitting EOB:  Independent  Dynamic Standing:  Alberto front [employer]     Pt is A & O x 3  Sensation:  Pt denies numbness and tingling to extremities  Edema:  unremarkable    Vitals:  SPo2 monitored throughout session. Pt on 10L via tach mask 100%. Gradually weaned down to RA and maintained 97%  Ambulating RA 82%  Seated recovery RA 87%  Seated recovery 2L 99%    Patient education  Pt educated on role of PT intervention.   Pt educated on safety in room with utilization of call light for assistance with mobility. Pt educated on importance of maximizing OOB time by transferring to bedside chair for meals and ambulating to bathroom/transferring to bedside commode with assistance from nursing and therapy staff to increase functional activity tolerance and overall functional independence. Patient response to education:   Pt verbalized understanding Pt demonstrated skill Pt requires further education in this area   yes yes yes     ASSESSMENT:    Comments:  RN cleared pt for activity prior to session. Pt received supine in bed and agreeable to PT intervention at this time. Pt performed all functional mobility as noted above. Pt presents with generalized deconditioning as he has rapidly decrease SPo2 with ambulation. Pt also mildly unsteady with ambulation. At end of session, pt transferred to bedside chair and left with all needs met and call light in reach. Pt requires continued skilled PT intervention for the purposes of addressing mobility and activity tolerance deficits described above to increase functional independence. Treatment:  Patient practiced and was instructed in the following treatment:     Therapeutic Activities Completed:  o Functional mobility as noted above:   - Bed mobility: SBA all aspects.    - Transfer training: sit<>stand SBA. Cues for proper hand placement for safety. Stand pivot with front [employer] Angeli. Cues for proper sequencing when turning to sit with front [employer].   - Ambulation: 35 feet with front [employer] Angeli x 3 reps. Seated rest required due to decreased SPO2. o Skilled repositioning in seated position for comfort and good postuer for improved cardiorespiratory function. o Skilled vitals monitoring. o Pt education as noted above. Pt's/ family goals   1. Return home. Patient and or family understand(s) diagnosis, prognosis, and plan of care.   yes    PLAN OF CARE:    Current Treatment Recommendations     [x] Strengthening [x] ROM   [x] Balance Training   [x] Endurance Training   [x] Transfer Training   [x] Gait Training   [x] Stair Training   [x] Positioning   [x] Safety and Education Training   [x] Patient/Caregiver Education    [] HEP  [] Other     PT care will be provided in accordance with the objectives noted above. The above treatment recommendations will be utilized to address deficits described above in order to restore pt's prior level of function and/or achieve modified functional independence with adaptive strategies. Frequency of treatments: 2-5x/week x 1-2 weeks. Time in  0916  Time out  0940    Total Treatment Time  15 minutes     Evaluation Time includes thorough review of current medical information, gathering information on past medical history/social history and prior level of function, completion of standardized testing/informal observation of tasks, assessment of data and education on plan of care and goals.     CPT codes:  [] Low Complexity PT evaluation 04039  [x] Moderate Complexity PT evaluation 29964  [] High Complexity PT evaluation 42657  [] PT Re-evaluation 30285  [] Gait training 57568 0 minutes  [] Manual therapy 42323 0 minutes  [x] Therapeutic activities 51766 15 minutes  [] Therapeutic exercises 57789 0 minutes  [] Neuromuscular reeducation 84998 0 minutes     Tylor Dean PT, DPT  [id number]

## 2020-12-13 NOTE — PROGRESS NOTES
[address] PROGRESS NOTE    Patient: Jeri Atwood  MRN: [id number]  : 1960    Encounter Date: 2020  Encounter Time: 11:58 AM     Date of Admission: .2020 12:19 AM    Consulting Physician:  Primary Care Physician:     Mercy Morgan MD     [phone]    Reason for Consultation: Pulmonary Embolism     Problem List:  Patient Active Problem List   Diagnosis    Hyperlipidemia    Type 2 diabetes mellitus without complication, without long-term current use of insulin ([address]    Atypical chest pain    Essential hypertension    Chronic acquired lymphedema    Aortic valve disease    Morbid obesity due to excess calories ([address]    Abdominal pain    Acute respiratory failure with hypoxia ([address]    Acute pulmonary edema ([address]    Congestive heart failure with LV diastolic dysfunction, NYHA class 3 ([address]    Obstructive sleep apnea    Acquired hypothyroidism    Chronic diastolic heart failure (HCC)    Nonrheumatic aortic valve stenosis    ACE-inhibitor cough    Pneumonia due to organism    Acute gout of right knee    Bilateral pulmonary embolism ([address]     SUBJECTIVE:Sitting up in bedside chair on TOI 50%. Denies dyspnea or cough. Ready to go home per patient       HOME MEDICATIONS:  Prior to Admission medications    Medication Sig Start Date End Date Taking?  Authorizing Provider   hydrALAZINE (APRESOLINE) 25 MG tablet Take 25 mg by mouth 3 times daily g tube   Yes Historical Provider, MD   insulin glargine (LANTUS) 100 UNIT/ML injection vial Inject 20 Units into the skin nightly   Yes Historical Provider, MD   insulin lispro (HUMALOG) 100 UNIT/ML injection vial Inject into the skin 3 times daily (before meals)   Yes Historical Provider, MD   Arformoterol Tartrate (BROVANA) 15 MCG/2ML NEBU Take 2 mLs by nebulization 2 times daily 20   Mercy Morgan MD   budesonide (PULMICORT) 0.5 MG/2ML nebulizer suspension Take 2 mLs by nebulization 2 times daily 12/1/20   Lindsay Noble MD   ipratropium-albuterol (DUONEB) 0.5-2.5 (3) MG/3ML SOLN nebulizer solution Inhale 3 mLs into the lungs every 4 hours (while awake) 12/1/20   Lindsay Noble MD   Heparin Sodium, Porcine, (HEPARIN, PORCINE,) 91672 UNIT/ML injection Inject 0.75 mLs into the skin every 8 hours 12/1/20   Lindsay Noble MD   divalproex (DEPAKOTE SPRINKLE) 125 MG capsule Take 2 capsules by mouth every 8 hours 12/1/20   Lindsay Noble MD   glucose (GLUTOSE) 40 % GEL Take 37.5 mLs by mouth as needed (low bs) 12/1/20   Lindsay Noble MD   carvedilol (COREG) 6.25 MG tablet Take 1 tablet by mouth 2 times daily (with meals) 12/1/20   Lindsay Noble MD   sodium chloride, Inhalant, 3 % nebulizer solution Take 2 mLs by nebulization every 4 hours 12/1/20   Lindsay Noble MD   mineral oil-hydrophilic petrolatum (HYDROPHOR) ointment Apply topically as needed.  12/1/20   Lindsay Noble MD   polyethylene glycol (GLYCOLAX) 17 g packet Take 17 g by mouth daily as needed for Constipation 12/1/20 12/31/20  Lindsay Noble MD   senna (SENOKOT) 8.6 MG tablet Take 2 tablets by mouth nightly 12/1/20 12/31/20  Lindsay Noble MD   chlorhexidine (PERIDEX) 0.12 % solution Take 15 mLs by mouth 2 times daily for 14 days 12/1/20 12/15/20  Lindsay Noble MD   polyvinyl alcohol (LIQUIFILM TEARS) 1.4 % ophthalmic solution Place 1 drop into both eyes every 4 hours as needed for Dry Eyes 12/1/20 12/31/20  Lindsay Noble MD   levothyroxine (SYNTHROID) 50 MCG tablet Take 1 tablet by mouth daily 12/2/20   Lindsay Noble MD   lansoprazole 3 MG/ML SUSP 10 mLs by Per G Tube route every morning (before breakfast) 12/2/20   Lindsay Noble MD   vitamin B-1 100 MG tablet 1 tablet by PEG Tube route daily 12/2/20   Lindsay Noble MD   fluconazole (DIFLUCAN) 200 MG tablet Take 1 tablet by mouth daily for 14 days 12/1/20 12/15/20  Eliu Fields MD   linezolid (ZYVOX) 100 MG/5ML suspension 30 mLs by Per G Tube route every 12 hours for 16 days 11/30/20 12/16/20  Favio Kiran MD   amLODIPine (NORVASC) 10 MG tablet Take 1 tablet by mouth daily 5/19/20   Mercy Morgan MD   colchicine (COLCRYS) 0.6 MG tablet Take 1 tablet by mouth 2 times daily as needed for Pain 5/6/19   Mercy Morgan MD       CURRENT MEDICATIONS:  Current Facility-Administered Medications: apixaban (ELIQUIS) tablet 10 mg, 10 mg, Oral, BID  oxyCODONE-acetaminophen (PERCOCET) 5-325 MG per tablet 1 tablet, 1 tablet, Oral, Q4H PRN  diphenhydrAMINE (BENADRYL) tablet 25 mg, 25 mg, Oral, Q6H PRN  valproic acid (DEPACON) 250 MG/5ML oral solution 250 mg, 250 mg, Per G Tube, 3 times per day  amLODIPine (NORVASC) tablet 10 mg, 10 mg, Oral, Daily  Arformoterol Tartrate (BROVANA) nebulizer solution 15 mcg, 15 mcg, Nebulization, BID  budesonide (PULMICORT) nebulizer suspension 500 mcg, 0.5 mg, Nebulization, BID  carvedilol (COREG) tablet 6.25 mg, 6.25 mg, Oral, BID WC  chlorhexidine (PERIDEX) 0.12 % solution 15 mL, 15 mL, Mouth/Throat, BID  colchicine (COLCRYS) tablet 0.6 mg, 0.6 mg, Oral, BID PRN  fluconazole (DIFLUCAN) tablet 200 mg, 200 mg, Oral, Daily  ipratropium-albuterol (DUONEB) nebulizer solution 3 mL, 3 mL, Inhalation, Q4H WA  lansoprazole suspension SUSP 30 mg, 30 mg, Per G Tube, QAM AC  levothyroxine (SYNTHROID) tablet 50 mcg, 50 mcg, Oral, Daily  polyvinyl alcohol (LIQUIFILM TEARS) 1.4 % ophthalmic solution 1 drop, 1 drop, Both Eyes, Q4H PRN  senna (SENOKOT) tablet 17.2 mg, 2 tablet, Oral, Nightly  vitamin B-1 (THIAMINE) tablet 100 mg, 100 mg, Oral, Daily  sodium chloride flush 0.9 % injection 10 mL, 10 mL, Intravenous, 2 times per day  sodium chloride flush 0.9 % injection 10 mL, 10 mL, Intravenous, PRN  acetaminophen (TYLENOL) tablet 650 mg, 650 mg, Oral, Q6H PRN **OR** acetaminophen (TYLENOL) suppository 650 mg, 650 mg, Rectal, Q6H PRN  polyethylene glycol (GLYCOLAX) packet 17 g, 17 g, Oral, Daily PRN  promethazine (PHENERGAN) tablet 12.5 mg, 12.5 mg, Oral, Q6H PRN **OR** ondansetron (ZOFRAN) injection 4 mg, 4 mg, Intravenous, Q6H PRN  linezolid (ZYVOX) tablet 600 mg, 600 mg, Oral, 2 times per day    ALLERGIES:  Allergies   Allergen Reactions    Bee Venom Anaphylaxis    Shellfish-Derived Products Anaphylaxis     Only crab legs         OBJECTIVE:  PHYSICAL EXAMINATION:     VITAL SIGNS:  BP (!) 152/74   Pulse 84   Temp 97.2 °F (36.2 °C) (Temporal)   Resp 20   Ht 5' 9\" (1.753 m)   Wt (!) 324 lb (147 kg)   SpO2 100%   BMI 47.85 kg/m²   Wt Readings from Last 3 Encounters:   20 (!) 324 lb (147 kg)   20 (!) 324 lb (147 kg)   06/15/20 (!) 326 lb (147.9 kg)     Temp Readings from Last 3 Encounters:   20 97.2 °F (36.2 °C) (Temporal)   20 98.4 °F (36.9 °C) (Temporal)   19 98.6 °F (37 °C) (Oral)     TMAX:  BP Readings from Last 3 Encounters:   20 (!) 152/74   20 (!) 185/72   20 (!) 93/53     Pulse Readings from Last 3 Encounters:   20 84   20 87   06/15/20 80       CURRENT PULSE OXIMETRY: SpO2: 100 %  24HR PULSE OXIMETRY RANGE: SpO2  Av.6 %  Min: 98 %  Max: 100 %  CVP:      ________________________________________________________________________           NUTRITION: DIET GENERAL;    INTAKE/OUTPUTS:  No intake/output data recorded. Intake/Output Summary (Last 24 hours) at 2020 1158  Last data filed at 2020 1115  Gross per 24 hour   Intake 480 ml   Output 600 ml   Net -120 ml     General Appearance: Alert, in NAD  Eyes: PERRL   Neck: neck supple, thick   - trach Shiley 8 XLT proximal   Pulmonary/Chest: decreased breath sounds, no accessory muscles of inspiration, no focal wheezes  Cardiovascular: normal rate, regular rhythm, normal S1 and S2, no murmurs,   Abdomen: soft, non-tender, non-distended, normal bowel sounds obese    Extremities: no cyanosis, no clubbing, no edema  Musculoskeletal: normal range of motion, no joint swelling, deformity or tenderness   Neurologic: Alert ox3, phonates over trach.  PATTI, follows commands     LABS/IMAGING:    CBC:  Lab Results   Component Value Date    WBC 5.7 12/13/2020    HGB 9.2 (L) 12/13/2020    HCT 28.6 (L) 12/13/2020    MCV 80.8 12/13/2020     12/13/2020    LYMPHOPCT 41.3 12/10/2020    RBC 3.54 (L) 12/13/2020    MCH 26.0 12/13/2020    MCHC 32.2 12/13/2020    RDW 18.8 (H) 12/13/2020    NEUTOPHILPCT 44.4 12/10/2020    MONOPCT 10.0 12/10/2020    BASOPCT 0.5 12/10/2020    NEUTROABS 2.48 12/10/2020    LYMPHSABS 2.31 12/10/2020    MONOSABS 0.56 12/10/2020    EOSABS 0.19 12/10/2020    BASOSABS 0.03 12/10/2020       Recent Labs     12/13/20  0600 12/11/20  0600 12/10/20  0504   WBC 5.7 6.3 5.6   HGB 9.2* 8.3* 8.2*   HCT 28.6* 25.5* 25.1*   MCV 80.8 81.5 80.2    345 364       BMP:   Results for Delbert Dodge (MRN [id number]) as of 12/13/2020 12:01   Ref. Range 12/9/2020 00:39   Sodium Latest Ref Range: 132 - 146 mmol/L 134   Potassium Latest Ref Range: 3.5 - 5.0 mmol/L 4.4   Chloride Latest Ref Range: 98 - 107 mmol/L 93 (L)   CO2 Latest Ref Range: 22 - 29 mmol/L 32 (H)   BUN Latest Ref Range: 8 - 23 mg/dL 17   Creatinine Latest Ref Range: 0.7 - 1.2 mg/dL 1.5 (H)   Anion Gap Latest Ref Range: 7 - 16 mmol/L 9   GFR Non- Latest Ref Range: >=60 mL/min/1.73 58   GFR  Unknown 58   Lactic Acid Latest Ref Range: 0.5 - 2.2 mmol/L 0.7   Glucose Latest Ref Range: 74 - 99 mg/dL 109 (H)   Calcium Latest Ref Range: 8.6 - 10.2 mg/dL 9.6   Total Protein Latest Ref Range: 6.4 - 8.3 g/dL 7.6   Pro-BNP Latest Ref Range: 0 - 125 pg/mL 33   Albumin Latest Ref Range: 3.5 - 5.2 g/dL 3.7   Alk Phos Latest Ref Range: 40 - 129 U/L 90   ALT Latest Ref Range: 0 - 40 U/L 45 (H)   AST Latest Ref Range: 0 - 39 U/L 27   Bilirubin Latest Ref Range: 0.0 - 1.2 mg/dL <0.2   Lipase Latest Ref Range: 13 - 60 U/L 53         PT/INR: No results for input(s): PROTIME, INR in the last 72 hours.   APTT:   Recent Labs     12/11/20  0600 12/11/20  1400   APTT 83.7* 67.9*         CTA CHEST W CONTRAST   Final Result   1. Bilateral pulmonary emboli. 2. Improving bilateral airspace opacities. 3. Fat stranding adjacent to the urinary bladder may indicate cystitis. Critical results were called by Dr. Remedios López MD to Magali Akers on   12/9/2020 at 03:23.       CT ABDOMEN PELVIS W IV CONTRAST Additional Contrast? None   Final Result   1. Bilateral pulmonary emboli. 2. Improving bilateral airspace opacities. 3. Fat stranding adjacent to the urinary bladder may indicate cystitis. Critical results were called by Dr. Remedios López MD to Magali Akers on   12/9/2020 at 03:23.       XR CHEST PORTABLE   Final Result   Almost complete resolution of previously noted areas of airspace disease in   the left mid lung and right upper lobe.        ASSESSMENT:  1.) B/L Pulmonary Embolism - subsegmental, not massive   2.) Acute hypoxemic respiratory failure on mechanical ventilation s/p trach 11/12/2020 with Shiley 8 Proximal XLT  3.) Metabolic encephalopathy history of EtOH abuse with agitation on ( Depakote, and Seroquel  )significantly improved  4.) HANS moderate ( AHI 17 on 4/18/18 requires BIPAP 16/12) noncompliant with CPAP  5.) History of Reactive Airway Disease Syndreom (RADS)  6.) Stable Pulmonary Nodules - radiographically stable since CT 10/2017 to 3/2018  7.) Diabetes Mellitus  8.) Obesity  9.) EF 99% LVH diastolic dysfunction  10.) Chronic Lymphedema  11.) History of Gout - on colchicine  12.) Hypothyroidism  13.) H/o respiratory failure: 7/8/2017 pt was  transferred from [facility] for acute respiratory failure after lap cholecystectomy, lap umbilical hernia repair, and lap liver biopsy (fatty liver). Patient was then transferred to [facility] where he was weaned off the ventilator and decannulated.   14.) Ventilator associated pneumonia - treated   15.) Alcohol Withdrawal - improved  16.) Abdominal Pains - resolved     PLAN:  1.) trach care/suction/suport  2.) COVID negative   3.) heparin drip---> now on eliquis 10mg po bid x 7 days followed by 5mg BID  4.) PT/OT   5.)thrombophilia panel pending ,   6.) full respiratory panel negative  7.)inezolid and diflucan per ID-for MRSA pneumonia   8.) Continue brovana, budesonide, duonebs  - trach to remain in place at this time and defer any trach changes to ENT  -per RN patient took TOI off to pivot from bed to chair with PT and POX dropped quickly into the 80's, recovered easily with TOI 50%.  Keep POX 90-95%, titrate as needed or wean down Fio2 if able      Sara Toro [facility]

## 2020-12-13 NOTE — PROGRESS NOTES
Physician Progress Note      PATIENT:               Delbert Dodge  CSN #:                  [id number]  :                       1960  ADMIT DATE:       10/26/2020 3:39 PM  [address] [place] DATE:        12/3/2020 10:02 PM  RESPONDING  PROVIDER #:        Susanna Lagos MD          QUERY TEXT:    Pt admitted with Acute on chronic resp failure with hypoxia on 10/26 . Pt   noted to have Sepsis  ID consult .  If possible, please document in   progress notes and discharge summary the present on admission of Sepsis :    The medical record reflects the following:  Risk Factors: HFpEF, antonella, dm,  Clinical Indicators:  ID consult Bacterial pneumonia, likely aspiration   pneumonia vs MSSA   Shock, sepsis requiring pressors , On admission-WBC 7.2-no   lactic acid obtained , pro c 0.11-no T no tachycardia no ^ RR-->  pro c   0.57, Per dc summary Acute on chronic hypoxic respiratory failure likely 2/2   pneumonia and volume overload  - resolving  -      Bacterial pneumonia, likely aspiration pneumonia but after extubation   patient had to be reintubated rule out HCAP  -      Shock, sepsis- resolved , CONS trated MRSA pna on treatment , thrush ,   BC 10/26, 10/29,   neg, 10/29 resp cx light growth staph,  resp cx no   organisms ,  bc  CoNS,  resp cx MRSA  Treatment: ID consult ,Stop Nafcillin, start Unasyn for aspiration pneumonia   and Levaquin 750 Q48 , labs, tests and monitoring  Thank you, Daniela Tijerina RN BSN CCDS  contact number [phone]  Options provided:  -- Yes, Sepsis was present at the time of the order to admit to the hospital  -- No, Sepsis was not present on admission and developed during the inpatient   stay  -- Other - I will add my own diagnosis  -- Disagree - Not applicable / Not valid  -- Disagree - Clinically unable to determine / Unknown  -- Refer to Clinical Documentation Reviewer    PROVIDER RESPONSE TEXT: Yes, Sepsis was present at the time of the order to admit to the hospital.    Query created by: Vanessa Alcantar on 12/9/2020 9:46 AM      Electronically signed by:  Albertina Dorman MD 12/13/2020 7:32 AM

## 2020-12-13 NOTE — PROGRESS NOTES
obstructive pulmonary disease) ([facility]     Depression     DM [facility]diabetes mellitus) [facility]     Essential hypertension 6/1/2016    Gouty arthritis 2006    Hyperlipidemia     Hypertension     Lymphedema     Obesity     Obstructive sleep apnea 8/17/2009    Obstructive sleep apnea 9/26/2017    Osteoarthritis     Type 2 diabetes mellitus without complication, without long-term current use of insulin ([facility] 1/15/2014      Precautions:  Falls, Isolation for MRSA in sputum, Regular diet/thin liquids per Speech, trach, O2 via trach mask at 12 L     Home Living: 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 Bathroom setup: walk in shower per pt. Equipment owned: none per pt. Prior Level of Function: Pt. States was Ind. with ADLs , Ind. with IADLs; ambulated without A.D. Driving: was active  Occupation: pt. States retired     Pain Level: 6/10 headache  Cognition: A&O: to self, place, month; Follows 1-2 step directions   Memory:  fair   Sequencing:  fair   Problem solving:  fair   Judgement/safety:  Fair- pt. able demo use of call light  * Pt. Speech limited but mouths words, uses gestures, answers are appropriate, pt. pleasant & agreeable to work with therapy to VIPTALON better. \"     Functional Assessment:   Initial Eval Status  Date: 12- Treatment Status  Date: Short Term Goals = Long Term Goals  Treatment frequency: 3-5 days   Feeding SBA/ set-up  Per Speech pt. Is currently on thin liquids & regular solids  Mod I    Grooming SBA seated in bedside chair  Modified [place]    UB Dressing Min A seated   Supervision    LB Dressing Mod A overall. Seated level to araceli/doff B socks with increased time for rest breaks  Supervision    Bathing Mod A with sim. task  Stand by Assist    Toileting NT  Supervision    Bed Mobility  Supine to sit:NT  Sit to supine: NT  Pt.  Received sitting up in bedside chair   Supine to sit: Supervision   Sit to supine: Supervision training, role of OT, E.C. techniques, treatment plan, recs. , prec. Eval Complexity: moderate    · mod Complexity  · History: Expanded chart review of medical records and additional review of physical, cognitive, or psychosocial history related to current functional performance  · Exam: 3+ performance deficits  · Assistance/Modification: Min/mod assistance or modifications required to perform tasks. May have comorbidities that affect occupational performance. Assessment of current deficits:    Functional mobility [x]  ADLs [x] Strength [x]  Cognition []  Functional transfers  [x] IADLs [x] Safety Awareness [x]  Endurance [x]  Fine Motor Coordination [] Balance [x] Vision/perception [] Sensation []   Gross Motor Coordination [] ROM [] Delirium []                  Motor Control []   Plan of Care: 2-4 days/week for 1-2 weeks PRN   ADL retraining/adapted techniques and AE recommendations to increase functional independence within precautions                    Energy conservation techniques to improve tolerance for selfcare routine   Functional transfer/mobility training/DME recommendations for increased independence, safety and fall prevention         Patient/family education to increase safety and functional independence             Environmental modifications for safe mobility and completion of ADLs                    Therapeutic activity to improve functional performance during ADLs. Therapeutic exercise to improve tolerance and functional strength for ADLs   Balance retraining/tolerance tasks for facilitation of postural control with dynamic challenges during ADLs . Positioning to improve functional independence    Rehab Potential:  Good for established goals     Patient / Family Goal: to get stronger, wants to go to Rehab      Patient and/or family were instructed on functional diagnosis, prognosis/goals and OT plan of care. Demonstrated fair/good understanding. Eval Complexity: moderate      Time In: 11:20  Time Out: 11:42  Total Treatment Time: 12    Min Units   OT Eval Low 12735       OT Eval Medium 97166  x 1   OT Eval High 87964       OT Re-Eval [id number]       Therapeutic Ex 58133       Therapeutic Activities 98154  8  1   ADL/Self Care 41931  4     Orthotic Management 72900       Neuro Re-Ed 78622       Non-Billable Time          Evaluation Time includes thorough review of current medical information, gathering information on past medical history/social history and prior level of function, completion of standardized testing/informal observation of tasks, assessment of data and education on plan of care and goals. Debra Rock, OTR/L   License #  [id number]

## 2020-12-13 NOTE — PROGRESS NOTES
[address] [place] [address] [facility]  Progress Note    C/C : MRSA pneumonia   '    Denies fever and chills  Denies SOB   Reports cough   Afebrile       Current Facility-Administered Medications   Medication Dose Route Frequency Provider Last Rate Last Admin    apixaban (ELIQUIS) tablet 10 mg  10 mg Oral BID Ora Kaiser MD   10 mg at 12/13/20 0936    oxyCODONE-acetaminophen (PERCOCET) 5-325 MG per tablet 1 tablet  1 tablet Oral Q4H PRN Gwyndolyn Guerita, DO   1 tablet at 12/13/20 0523    diphenhydrAMINE (BENADRYL) tablet 25 mg  25 mg Oral Q6H PRN Gwyndolyn Guerita, DO   25 mg at 12/11/20 0715    valproic acid (DEPACON) 250 MG/5ML oral solution 250 mg  250 mg Per G Tube 3 times per day Gwyndolyn Guerita, DO   250 mg at 12/13/20 0516    amLODIPine (NORVASC) tablet 10 mg  10 mg Oral Daily Gwyndolyn Guerita, DO   10 mg at 12/13/20 3090    Arformoterol Tartrate (BROVANA) nebulizer solution 15 mcg  15 mcg Nebulization BID Gwyndolyn Guerita, DO   15 mcg at 12/13/20 1178    budesonide (PULMICORT) nebulizer suspension 500 mcg  0.5 mg Nebulization BID Gwyndolyn Guerita, DO   500 mcg at 12/13/20 7618    carvedilol (COREG) tablet 6.25 mg  6.25 mg Oral BID WC Gwyndolyn Guerita, DO   6.25 mg at 12/13/20 0745    chlorhexidine (PERIDEX) 0.12 % solution 15 mL  15 mL Mouth/Throat BID Gwyndolyn Guerita, DO   15 mL at 12/13/20 0935    colchicine (COLCRYS) tablet 0.6 mg  0.6 mg Oral BID PRN Gwyndolyn Guerita, DO   0.6 mg at 12/13/20 0935    fluconazole (DIFLUCAN) tablet 200 mg  200 mg Oral Daily Gwyndolyn Guerita, DO   200 mg at 12/13/20 0935    ipratropium-albuterol (DUONEB) nebulizer solution 3 mL  3 mL Inhalation Q4H WA Gwyndolyn Guerita, DO   3 mL at 12/13/20 7610    lansoprazole suspension SUSP 30 mg  30 mg Per G Tube QAM AC Gwyndolyn Guerita, DO   30 mg at 12/13/20 0516    levothyroxine (SYNTHROID) tablet 50 mcg  50 mcg Oral Daily Addydolylouie AlbertsGuerita, DO   50 mcg at 12/13/20 0936    polyvinyl alcohol (LIQUIFILM TEARS) 1.4 % ophthalmic solution 1 drop  1 drop Both Eyes Q4H PRN Larena Sartell, DO        senna (SENOKOT) tablet 17.2 mg  2 tablet Oral Nightly Larena Sartell, DO   17.2 mg at 20    vitamin B-1 (THIAMINE) tablet 100 mg  100 mg Oral Daily Larena Sartell, DO   100 mg at 20 3461    sodium chloride flush 0.9 % injection 10 mL  10 mL Intravenous 2 times per day Larena Sartell, DO   10 mL at 20 9422    sodium chloride flush 0.9 % injection 10 mL  10 mL Intravenous PRN Larena Sartell, DO        acetaminophen (TYLENOL) tablet 650 mg  650 mg Oral Q6H PRN Larena Sartell, DO        Or    acetaminophen (TYLENOL) suppository 650 mg  650 mg Rectal Q6H PRN Larena Sartell, DO        polyethylene glycol (GLYCOLAX) packet 17 g  17 g Oral Daily PRN Larena Sartell, DO        promethazine (PHENERGAN) tablet 12.5 mg  12.5 mg Oral Q6H PRN Larena Sartell, DO        Or    [facility] injection 4 mg  4 mg Intravenous Q6H PRN Larena Sartell, DO        linezolid (ZYVOX) tablet 600 mg  600 mg Oral 2 times per day Navya Lopez MD   600 mg at 20 0935         Review of systems:  As stated above in the chief complaint, otherwise negative. OBJECTIVE:  BP (!) 152/74   Pulse 88   Temp 97.2 °F (36.2 °C) (Temporal)   Resp 10   Ht 5' 9\" (1.753 m)   Wt (!) 324 lb (147 kg)   SpO2 100%   BMI 47.85 kg/m²   Temp  Av °F (36.7 °C)  Min: 97.2 °F (36.2 °C)  Max: 98.8 °F (37.1 °C)     Constitutional: Awake, alert, no distress   Skin: Warm and dry. No rashes were noted. HEENT: Round and reactive pupils. Moist mucous membranes. No ulcerations. Willia Bending is present but is much improved. Neck: Supple to movements. Trach with trach mask  Chest: Fairly clear bilaterally   Cardiovascular: S1 and S2 are rhythmic and regular. No murmurs appreciated. Abdomen: Positive bowel sounds to auscultation. Benign to palpation. No masses felt. No hepatosplenomegaly. Extremities: No clubbing, no cyanosis, no edema.   Lines: PICC ORG Staphylococcus aureus 11/25/2020    ORG Staphylococcus aureus 10/29/2020     No results found for: WNDABS  Smear, Respiratory   Date Value Ref Range Status   11/25/2020   Final    Group 5: >25 PMN's/LPF and <10 Epithelial cells/LPF  Abundant Polymorphonuclear leukocytes  Rare Epithelial cells  Abundant Gram positive diplococci  Abundant Gram positive cocci in clusters       No results found for: MPNEUMO, CLAMYDCU, LABLEGI, AFBCX, FUNGSM, LABFUNG  CULTURE, RESPIRATORY   Date Value Ref Range Status   11/25/2020 Oral Pharyngeal Tiesha absent (A)  Final   11/25/2020   Final    Heavy growth  Methicillin resistant Staph aureus isolated. Most Methicillin  resistant Staphylococcus are usually resistant to multiple  antibiotics including other B-Lactams, Aminoglycosides,  Macrolides, Clindamycin and Tetracycline. Contact isolation  is indicated. This isolate is presumed to be resistant based on the  detection of inducible Clindamycin resistance. Clindamycin  may still be effective in some patients. Culture Catheter Tip   Date Value Ref Range Status   08/26/2017 <15 colonies  Final     No results found for: BFCS  No results found for: CXSURG  Urine Culture, Routine   Date Value Ref Range Status   11/09/2020 Growth not present  Final   10/28/2020 Growth not present  Final   09/16/2019 Growth not present  Final     MRSA Culture Only   Date Value Ref Range Status   11/09/2020 Methicillin resistant Staph aureus not isolated  Final   10/30/2020 Methicillin resistant Staph aureus not isolated  Final   07/28/2017 Methicillin resistant Staph aureus not isolated  Final     No results for input(s): PROCAL in the last 72 hours.     ASSESSMENT:  ·  MRSA pneumonia-improved  · Continue Diflucan for treatment of thrush and esophagitis-improved  · Shortness of breath associated with PE    PLAN:  · Continue zyvox until 12/17/2020 & diflucan 200 mg po q 24 hrs   · PICC should be removed prior to discharge  · Check final cultures  · Monitor labs    Quinton Kulkarni  9:54 AM  12/13/2020

## 2020-12-13 NOTE — PROGRESS NOTES
PT SEEN AND EXAMINED. Chart reviewed. meds reviewed. D/w nursing + family as available. EXAM: IN GENERAL, NAD. AWAKE AND ALERT. ROS NEGx10 EXCEPT:   BP (!) 152/74   Pulse 84   Temp 97.2 °F (36.2 °C) (Temporal)   Resp 20   Ht 5' 9\" (1.753 m)   Wt (!) 324 lb (147 kg)   SpO2 100%   BMI 47.85 kg/m²   GEN: A+O NAD. HEENT: NCAT. EOMI. JOE  NECK: NO JVD. TRACH MIDLINE. NO BRUITS. NO THYROMEGALY. LUNGS: CTA BL NO RALES, RHONCHI OR WHEEZES. GOOD EXCURSION. CV: Regular rate and rhythm, NO Murmurs, Rubs, Or gallops  ABD: Soft. Nontender. Normal bowel sounds. No organomegaly  EXT:No clubbing cyanosis or edema  Neuro: Alert and oriented x 3. No focal motor deficits. No sensory deficits. Reflexes appear intact.   Labs/data reviewedLABS: CBC with Differential:    Lab Results   Component Value Date    WBC 5.7 12/13/2020    RBC 3.54 12/13/2020    HGB 9.2 12/13/2020    HCT 28.6 12/13/2020     12/13/2020    MCV 80.8 12/13/2020    MCH 26.0 12/13/2020    MCHC 32.2 12/13/2020    RDW 18.8 12/13/2020    NRBC 0.9 11/16/2020    SEGSPCT 73 01/26/2014    METASPCT 1.8 11/15/2020    LYMPHOPCT 41.3 12/10/2020    MONOPCT 10.0 12/10/2020    MYELOPCT 0.9 11/15/2020    BASOPCT 0.5 12/10/2020    MONOSABS 0.56 12/10/2020    LYMPHSABS 2.31 12/10/2020    EOSABS 0.19 12/10/2020    BASOSABS 0.03 12/10/2020     Platelets:    Lab Results   Component Value Date     12/13/2020     CMP:    Lab Results   Component Value Date     12/09/2020    K 4.4 12/09/2020    K 4.0 10/27/2020    CL 93 12/09/2020    CO2 32 12/09/2020    BUN 17 12/09/2020    CREATININE 1.5 12/09/2020    GFRAA 58 12/09/2020    LABGLOM 58 12/09/2020    GLUCOSE 109 12/09/2020    PROT 7.6 12/09/2020    LABALBU 3.7 12/09/2020    CALCIUM 9.6 12/09/2020    BILITOT <0.2 12/09/2020    ALKPHOS 90 12/09/2020    AST 27 12/09/2020    ALT 45 12/09/2020     Magnesium:    Lab Results   Component Value Date    MG 1.8 11/25/2020     LDH:    Lab Results   Component Value Date  10/28/2020     PT/INR:    Lab Results   Component Value Date    PROTIME 14.0 11/18/2020    INR 1.2 11/18/2020     Last 3 Troponin:    Lab Results   Component Value Date    TROPONINI <0.01 10/26/2020    TROPONINI <0.01 05/26/2019    TROPONINI <0.01 05/26/2019     ABG:    Lab Results   Component Value Date    PH 7.393 11/26/2020    PCO2 43.1 11/26/2020    PO2 90.3 11/26/2020    HCO3 25.7 11/26/2020    BE 0.7 11/26/2020    O2SAT 96.1 11/26/2020     IRON:    Lab Results   Component Value Date    IRON 95 11/05/2020     IMAGING    Echo Transesophageal    Result Date: 11/30/2020  Transesophageal Echocardiography Report (AARON)   Demographics   Patient Name        Miranda Leonard Gender               Male   Medical Record      [id number]        Room Number          8129  Number   Account #           [de-identified]       Procedure Date       11/30/2020   Corporate ID                        Ordering Physician   Paula Peña MD   Accession Number    [id number]      Referring Physician   Date of Birth       1960      Sonographer   Age                 61 year(s)      Interpreting         Paula Peña MD                                      Physician                                       Any Other  Procedure Type of Study   AARON procedure:Transesophageal Echo (AARON). Procedure Date Date: 11/30/2020 Start: 04:21 PM Height: 68 inches Weight: 335 pounds BSA: 2.54 m^2 BMI: 50.94 kg/m^2 Allergies   - Bee/Wasp stings. - Shellfish. Findings   Left Ventricle  Normal left ventricular size and systolic function. Right Ventricle  Normal right ventricular size and function. Left Atrium  Normal sized left atrium. There is no left atrial appendage thrombus. Agitated saline injected for shunt evaluation. No evidence of patent foramen ovale. Right Atrium  Normal right atrium size. Mitral Valve  Structurally normal mitral valve. Physiologic and/or trace mitral regurgitation is present.    Tricuspid Valve  The tricuspid valve appears structurally normal.   Aortic Valve  Structurally normal aortic valve. Pulmonic Valve  Pulmonic valve is structurally normal.   Pericardial Effusion  No pericardial effusion. Aorta  Aortic root dimension within normal limits. No significant plaque noted in the descending aorta. Miscellaneous  No valvular vegetation or vegetation of venous catheter. Conclusions   Summary  Normal left ventricular size and systolic function. Physiologic and/or trace mitral regurgitation is present. No valvular vegetation or vegetation of venous catheter. Signature   ----------------------------------------------------------------  Electronically signed by Sherif Gutierrez MD(Interpreting  physician) on 11/30/2020 04:56 PM  ----------------------------------------------------------------  [email or web address] [id number]    Ct Abdomen Pelvis Wo Contrast Additional Contrast? None    Result Date: 11/29/2020  EXAMINATION: CT OF THE ABDOMEN AND PELVIS WITHOUT CONTRAST 11/29/2020 1:09 pm TECHNIQUE: CT of the abdomen and pelvis was performed without the administration of intravenous contrast. Multiplanar reformatted images are provided for review. Dose modulation, iterative reconstruction, and/or weight based adjustment of the mA/kV was utilized to reduce the radiation dose to as low as reasonably achievable. COMPARISON: September 16, 2019 HISTORY: ORDERING SYSTEM PROVIDED HISTORY: Abdomen pain TECHNOLOGIST PROVIDED HISTORY: Reason for exam:->Abdomen pain Additional Contrast?->None What reading provider will be dictating this exam?->CRC FINDINGS: The lung bases demonstrate patchy multifocal infiltrates. 3-5 mm nonspecific nodules are identified in the right lung base. There is hepatomegaly with liver measuring 19 cm with fatty infiltration. Gallbladder is absent. A G-tube is noted.   Spleen, pancreas, and adrenals are normal.  1-9 mm nonobstructing bilateral kidney stones are present. 2.5 cm cystic lesion is noted in the right kidney. Degenerative changes are identified in the lumbosacral spine. Pelvis. The urinary bladder is contracted with a Zamorano catheter and wall thickening. Diffusely dilated fluid-filled small bowel loops and colon are noted. Scattered diverticulosis of the descending and sigmoid colon are noted. There is mild presacral edema. The appendix is normal.    Dilated fluid-filled small bowel loops and colon likely diffuse ileus. Enterocolitis is less likely. Multifocal patchy bibasilar infiltrates concerning for pneumonia. 3-5 mm nonspecific pulmonary nodules. Consider surveillance. Collapsed urinary bladder with wall thickening. Cystitis has to be excluded. Ct Head Wo Contrast    Result Date: 11/16/2020  EXAMINATION: CT OF THE HEAD WITHOUT CONTRAST  11/16/2020 2:36 pm TECHNIQUE: CT of the head was performed without the administration of intravenous contrast. Dose modulation, iterative reconstruction, and/or weight based adjustment of the mA/kV was utilized to reduce the radiation dose to as low as reasonably achievable. COMPARISON: August 27, 2017 HISTORY: ORDERING SYSTEM PROVIDED HISTORY: AMS TECHNOLOGIST PROVIDED HISTORY: Reason for exam:->AMS Has a \"code stroke\" or \"stroke alert\" been called? ->No What reading provider will be dictating this exam?->CRC FINDINGS: BRAIN/VENTRICLES: There is no acute intracranial hemorrhage, mass effect or midline shift. No abnormal extra-axial fluid collection. The gray-white differentiation is maintained without evidence of an acute infarct. There is no evidence of hydrocephalus. ORBITS: The visualized portion of the orbits demonstrate no acute abnormality. SINUSES: The visualized paranasal sinuses and mastoid air cells demonstrate no acute abnormality. SOFT TISSUES/SKULL:  No acute abnormality of the visualized skull or soft tissues.      No acute intracranial Adjacent cysts are identified in the lower pole of the right kidney. GI/Bowel: Percutaneous gastrostomy tube remains in place. Small bowel caliber is normal.  The appendix is normal.  The colon is unremarkable. Pelvis: The urinary bladder is grossly negative though there is minimal adjacent fat stranding. Peritoneum/Retroperitoneum: No adenopathy, mesenteric stranding or free fluid. Aortic caliber is normal. Soft Tissues/Bones: No acute bone or soft tissue abnormality. 1. Bilateral pulmonary emboli. 2. Improving bilateral airspace opacities. 3. Fat stranding adjacent to the urinary bladder may indicate cystitis. Critical results were called by Dr. Isatu Burris MD to Toña Sender on 2020 at 03:23. Ir Venogram Upper Extremity Right    Result Date: 2020  Patient MRN:  [id number] : 1960 Age: 61 years Gender: Male Order Date:  2020 7:45 AM EXAM: IR VENOGRAM UPPER EXTREMITY RIGHT, IR SPARKLE CATH PLACE VENOUS 2ND+ ORDER, IR TUNNELED CVC PLACE WO SQ PORT/PUMP > 5 YEARS, IR ULTRASOUND GUIDANCE VASCULAR ACCESS, IR FLUORO GUIDED CVA DEVICE PLMT/REPLACE/REMOVAL NUMBER OF IMAGES:  14 INDICATION: Z79.2 Long term (current) use of antibiotics Long term (current) use of antibiotics What reading provider will be dictating this exam?->[facility] COMPARISON: None FLUORO TIME: 00:04:26 DAP: 1965.9 uGym2 AK: 77.1 mGy PICC Catheter Placement and Upper Extremity Venous Ultrasound, Procedure: After obtaining informed consent, and following the routine sterile preparation and drape, the right basilic vein was accessed with ultrasound guidance. IV contrast was injected which demonstrated extravasation into the soft tissue. The decision was then made to place a right tunneled IJ PICC. Using direct real-time ultrasound imaging vascular access was obtained to the right IJ. An image was stored and copy was transmitted to PACS.  Subsequently with real-time guidance a needle was inserted intravascular and through the needle a wire. A subcutaneous tunnel was then created along the right upper chest. Subsequently under fluoroscopic guidance, a system of needles, catheters and guidewires were utilized to place a guidewire and catheter in the venous system and the catheter was advanced and appropriately placed at the level of the junction of the superior vena cava and right atrium. Time out occurred at 08:33 AM. Patient received 4 minutes and 26 seconds of fluoroscopy. A fluoroscopic image of the position of the catheter tip was saved in PACS. Successful uncomplicated placement of a right IJ tunneled PICC catheter. Ir Lynn Brink Device Plmt/replace/removal    Result Date: 2020  Patient MRN:  [id number] : 1960 Age: 61 years Gender: Male Order Date:  2020 7:45 AM EXAM: IR VENOGRAM UPPER EXTREMITY RIGHT, IR SPARKLE CATH PLACE VENOUS 2ND+ ORDER, IR TUNNELED CVC PLACE WO SQ PORT/PUMP > 5 YEARS, IR ULTRASOUND GUIDANCE VASCULAR ACCESS, IR FLUORO GUIDED CVA DEVICE PLMT/REPLACE/REMOVAL NUMBER OF IMAGES:  14 INDICATION: Z79.2 Long term (current) use of antibiotics Long term (current) use of antibiotics What reading provider will be dictating this exam?->[facility] COMPARISON: None FLUORO TIME: 00:04:26 DAP: 1965.9 uGym2 AK: 77.1 mGy PICC Catheter Placement and Upper Extremity Venous Ultrasound, Procedure: After obtaining informed consent, and following the routine sterile preparation and drape, the right basilic vein was accessed with ultrasound guidance. IV contrast was injected which demonstrated extravasation into the soft tissue. The decision was then made to place a right tunneled IJ PICC. Using direct real-time ultrasound imaging vascular access was obtained to the right IJ. An image was stored and copy was transmitted to PACS. Subsequently with real-time guidance a needle was inserted intravascular and through the needle a wire.  A subcutaneous tunnel was then created along the right upper chest. Subsequently under fluoroscopic guidance, a system of needles, catheters and guidewires were utilized to place a guidewire and catheter in the venous system and the catheter was advanced and appropriately placed at the level of the junction of the superior vena cava and right atrium. Time out occurred at 08:33 AM. Patient received 4 minutes and 26 seconds of fluoroscopy. A fluoroscopic image of the position of the catheter tip was saved in PACS. Successful uncomplicated placement of a right IJ tunneled PICC catheter. Xr Chest Portable    Result Date: 12/9/2020  EXAMINATION: ONE XRAY VIEW OF THE CHEST 12/9/2020 12:07 am COMPARISON: November 25 HISTORY: ORDERING SYSTEM PROVIDED HISTORY: chest pain TECHNOLOGIST PROVIDED HISTORY: Reason for exam:->chest pain What reading provider will be dictating this exam?->CRC FINDINGS: Central line and tracheostomy cannula, unchanged since the prior examination. Cardiac silhouette at the upper limits of normal. Almost complete resolution of previously noted areas of airspace disease in the left mid lung and right upper lobe. Pulmonary vasculature within normal limits. Almost complete resolution of previously noted areas of airspace disease in the left mid lung and right upper lobe. Xr Chest Portable    Result Date: 11/25/2020  EXAMINATION: ONE XRAY VIEW OF THE CHEST 11/25/2020 8:08 am COMPARISON: One-view chest x-ray 11/24/2020 HISTORY: ORDERING SYSTEM PROVIDED HISTORY: resp dist TECHNOLOGIST PROVIDED HISTORY: Reason for exam:->resp dist What reading provider will be dictating this exam?->CRC FINDINGS: There is significant interval increase in pulmonary consolidation, most notably in the upper-mid right lung and also in the left lung base. The cardiomediastinal silhouette is within normal limits for AP technique. A right internal jugular central venous catheter appears to terminate in the region of the right atrium.   There is partial obscuration of the left costophrenic sulcus, which could be secondary to consolidation or small pleural effusion. There is evidence of a tracheostomy tube. Degenerative/arthritic changes are present in the spine and the right acromioclavicular joint. EKG leads overlie the chest.    1. There is interval progression of bilateral consolidation, compatible with worsening pneumonia. 2. A right internal jugular central venous catheter appears to terminate in the region of the right atrium. May consider withdrawal by approximately 4 cm to ensure non atrial in location. Xr Chest Portable    Result Date: 11/24/2020  EXAMINATION: ONE XRAY VIEW OF THE CHEST 11/24/2020 9:54 am COMPARISON: November 23, 2020 HISTORY: ORDERING SYSTEM PROVIDED HISTORY: SOB TECHNOLOGIST PROVIDED HISTORY: Reason for exam:->SOB What reading provider will be dictating this exam?->CRC FINDINGS: Right IJ approach central venous catheter again identified in unchanged position. Tracheostomy again identified. The cardiac silhouette is enlarged but stable in size. There are bilateral airspace opacities, without interval change. No pleural effusions or pneumothorax. No interval change in bilateral airspace opacities. Xr Chest Portable    Result Date: 11/23/2020  EXAMINATION: ONE XRAY VIEW OF THE CHEST 11/23/2020 7:40 am COMPARISON: 11/20/2020 HISTORY: ORDERING SYSTEM PROVIDED HISTORY: SOB TECHNOLOGIST PROVIDED HISTORY: Reason for exam:->SOB What reading provider will be dictating this exam?->CRC FINDINGS: There is stable position of the tracheostomy tube. There is a right subclavian central venous catheter. Patchy multifocal pulmonary infiltrates are noted. More prominent within the left lung. 1. There are patchy multifocal bilateral pulmonary infiltrates more prominent within the left lower lobe.     Xr Chest Portable    Result Date: 11/22/2020  EXAMINATION: ONE XRAY VIEW OF THE CHEST 11/22/2020 8:09 am COMPARISON: 11/21/2020 HISTORY: ORDERING SYSTEM PROVIDED HISTORY: SOB TECHNOLOGIST PROVIDED HISTORY: Reason for exam:->SOB What reading provider will be dictating this exam?->CRC FINDINGS: There is no interval change in the position of the tracheostomy tube. There is improved aeration of the lungs when compared to the prior study. Patchy per infiltrates persist within the lung bases. 1. Partial interval clearing of the multifocal pulmonary infiltrates within the upper lobes. 2. Persistent small infiltrates within the lung bases. Xr Chest Portable    Result Date: 11/21/2020  EXAMINATION: ONE XRAY VIEW OF THE CHEST 11/21/2020 7:47 am COMPARISON: November 17-20 HISTORY: ORDERING SYSTEM PROVIDED HISTORY: SOB TECHNOLOGIST PROVIDED HISTORY: Reason for exam:->SOB What reading provider will be dictating this exam?->CRC FINDINGS: Tracheostomy tube in good position. The right internal jugular central venous catheter tip in the right atrium. Heart appears to be mildly enlarged. The bilateral vague ill-defined infiltrates with ground-glass opacities are seen. They are more prominent on the right lung. There is no pleural effusions. No significant changes since the previous study of November 20. Xr Chest Portable    Result Date: 11/20/2020  EXAMINATION: ONE XRAY VIEW OF THE CHEST 11/20/2020 7:55 am COMPARISON: 19 November 2020 HISTORY: ORDERING SYSTEM PROVIDED HISTORY: SOB TECHNOLOGIST PROVIDED HISTORY: Reason for exam:->SOB What reading provider will be dictating this exam?->CRC FINDINGS: Central venous catheter on the right terminates in the right atrium proper. This is a stable finding. A left-sided central venous catheter is been removed. There is a stable tracheostomy catheter. There are opacities in both lungs which may represent infiltrate and/or atelectasis. Aeration appears minimally worse. Mildly increasing infiltrate and/or atelectasis bilaterally. Please note, the right central venous catheter terminates in the right atrium proper.     Xr Chest Portable    Result Date: 11/19/2020  EXAMINATION: ONE XRAY VIEW OF THE CHEST 11/19/2020 9:56 am COMPARISON: November 15-18 HISTORY: ORDERING SYSTEM PROVIDED HISTORY: SOB TECHNOLOGIST PROVIDED HISTORY: Reason for exam:->SOB What reading provider will be dictating this exam?->CRC FINDINGS: Some improvement of the bilateral discrete ground-glass densities throughout both lungs predominant in the perihilar regions. The can be an indication for volume overload. No pleural effusions are seen. The heart is normal size. Some improvement in pattern that can represent volume overload since the November 18. Xr Chest Portable    Result Date: 11/18/2020  EXAMINATION: ONE XRAY VIEW OF THE CHEST 11/18/2020 7:39 am COMPARISON: 11/17/2020 and 11/16/2020 HISTORY: ORDERING SYSTEM PROVIDED HISTORY: SOB TECHNOLOGIST PROVIDED HISTORY: Reason for exam:->SOB What reading provider will be dictating this exam?->CRC FINDINGS: There is no interval change in the multifocal bilateral significant airspace disease. There is no right or left pleural effusion. The cardiac silhouette is at upper limits of normal.  There is stable position of the left internal jugular central venous catheter. 1. No interval change in extensive bilateral multifocal airspace disease. Xr Chest Portable    Result Date: 11/17/2020  EXAMINATION: ONE XRAY VIEW OF THE CHEST 11/17/2020 5:58 pm COMPARISON: November 16, 2020. HISTORY: ORDERING SYSTEM PROVIDED HISTORY: SOB TECHNOLOGIST PROVIDED HISTORY: Reason for exam:->SOB What reading provider will be dictating this exam?->CRC FINDINGS: There are bilateral patchy infiltrates. Left IJ line catheter tip in the region of superior vena cava. Tracheostomy to, unchanged. The heart is mildly enlarged. There is blunting of the left costophrenic angle. Bilateral patchy infiltrates, no significant interval change.     Xr Chest Portable    Result Date: 11/16/2020  EXAMINATION: ONE XRAY VIEW OF THE CHEST 11/16/2020 9:04 am COMPARISON: 11/15/2020 HISTORY: ORDERING SYSTEM PROVIDED HISTORY: SOB TECHNOLOGIST PROVIDED HISTORY: Reason for exam:->SOB What reading provider will be dictating this exam?->CRC FINDINGS: A right parahilar pulmonary opacity is seen, which allowing for the differences in the degree of inspiratory effort, are likely stable as of yesterday. In the lower left lung is suboptimally visualized due to underpenetration. Within this limitation, no definite left lung opacity is seen. No pneumothorax or pleural effusion is seen. The cardiac silhouette is enlarged, which may be in part or entirely due to technique. The tracheostomy tube appears grossly well positioned. The left internal jugular vein central venous catheter is well positioned, with the tip overlying the upper SVC. Grossly stable right parahilar pulmonary opacity which may represent atelectasis or pneumonia. The life-support lines and tubes are well positioned. Xr Chest Portable    Result Date: 11/15/2020  EXAMINATION: ONE XRAY VIEW OF THE CHEST 11/15/2020 7:52 am COMPARISON: 11/14/2020 HISTORY: ORDERING SYSTEM PROVIDED HISTORY: SOB TECHNOLOGIST PROVIDED HISTORY: Reason for exam:->SOB What reading provider will be dictating this exam?->CRC FINDINGS: Tracheostomy tube appears unchanged. Left internal jugular central venous catheter with catheter tip not well visualized but possibly in the central left brachiocephalic vein or SVC. Stable cardiomediastinal silhouette with persistent bilateral airspace opacities, right worse than left. Possible small left pleural effusion. No pneumothorax. Stable bilateral airspace opacities, right worse than left.      Xr Chest Portable    Result Date: 11/14/2020  EXAMINATION: ONE XRAY VIEW OF THE CHEST 11/14/2020 7:54 am COMPARISON: Comparison November 11-13 HISTORY: ORDERING SYSTEM PROVIDED HISTORY: SOB TECHNOLOGIST PROVIDED HISTORY: Reason for exam:->SOB What reading provider will be dictating this exam?->CRC FINDINGS: Tracheostomy tube in good position. Heart appears to be mildly enlarged. The Bilateral infiltrates observed predominant in the right lung as seen previously, more limited to the base on the left. Some degree of atelectasis appears to be also present in left lower lobe. Left internal jugular central venous catheter tip in the SVC. No significant change since  study     Xr Chest Portable    Result Date: 2020  EXAMINATION: ONE X-RAY VIEW OF THE CHEST 2020 8:01 am COMPARISON: 2020 HISTORY: ORDERING SYSTEM PROVIDED HISTORY: SOB TECHNOLOGIST PROVIDED HISTORY: Reason for exam:->SOB What reading provider will be dictating this exam?->CRC FINDINGS: Patchy bilateral pulmonary infiltrates are noted unchanged when compared to the prior study. Note is made of a tracheostomy tube and left internal jugular central venous catheter. 1. There is no interval change in the bilateral multifocal patchy pulmonary infiltrates. 2. Probable small left pleural effusion. Ir Tunneled Cvc Place Wo Sq Port/pump > 5 Years    Result Date: 2020  Patient MRN:  [id number] : 1960 Age: 61 years Gender: Male Order Date:  2020 7:45 AM EXAM: IR VENOGRAM UPPER EXTREMITY RIGHT, IR SPARKLE CATH PLACE VENOUS 2ND+ ORDER, IR TUNNELED CVC PLACE WO SQ PORT/PUMP > 5 YEARS, IR ULTRASOUND GUIDANCE VASCULAR ACCESS, IR FLUORO GUIDED CVA DEVICE PLMT/REPLACE/REMOVAL NUMBER OF IMAGES:  14 INDICATION: Z79.2 Long term (current) use of antibiotics Long term (current) use of antibiotics What reading provider will be dictating this exam?->[facility] COMPARISON: None FLUORO TIME: 00:04:26 DAP: 1965.9 uGym2 AK: 77.1 mGy PICC Catheter Placement and Upper Extremity Venous Ultrasound, Procedure: After obtaining informed consent, and following the routine sterile preparation and drape, the right basilic vein was accessed with ultrasound guidance. IV contrast was injected which demonstrated extravasation into the soft tissue.  The decision was then made to place a right tunneled IJ PICC. Using direct real-time ultrasound imaging vascular access was obtained to the right IJ. An image was stored and copy was transmitted to PACS. Subsequently with real-time guidance a needle was inserted intravascular and through the needle a wire. A subcutaneous tunnel was then created along the right upper chest. Subsequently under fluoroscopic guidance, a system of needles, catheters and guidewires were utilized to place a guidewire and catheter in the venous system and the catheter was advanced and appropriately placed at the level of the junction of the superior vena cava and right atrium. Time out occurred at 08:33 AM. Patient received 4 minutes and 26 seconds of fluoroscopy. A fluoroscopic image of the position of the catheter tip was saved in PACS. Successful uncomplicated placement of a right IJ tunneled PICC catheter. Cta Chest W Contrast    Result Date: 12/9/2020  EXAMINATION: CTA OF THE CHEST; CT OF THE ABDOMEN AND PELVIS WITH CONTRAST 12/9/2020 2:57 am TECHNIQUE: CTA of the chest was performed after the administration of intravenous contrast.  Multiplanar reformatted images are provided for review. MIP images are provided for review. Dose modulation, iterative reconstruction, and/or weight based adjustment of the mA/kV was utilized to reduce the radiation dose to as low as reasonably achievable.; CT of the abdomen and pelvis was performed with the administration of intravenous contrast. Multiplanar reformatted images are provided for review. Dose modulation, iterative reconstruction, and/or weight based adjustment of the mA/kV was utilized to reduce the radiation dose to as low as reasonably achievable.  COMPARISON: 11/29/2020 and 10/28/2020 HISTORY: ORDERING SYSTEM PROVIDED HISTORY: r/o PE TECHNOLOGIST PROVIDED HISTORY: Reason for exam:->r/o PE What reading provider will be dictating this exam?->CRC; ORDERING SYSTEM PROVIDED HISTORY: Abdominal pain TECHNOLOGIST PROVIDED HISTORY: Additional Contrast?->None Reason for exam:->Abdominal pain What reading provider will be dictating this exam?->CRC Chest pain FINDINGS: Pulmonary Arteries: Evaluation is compromised by motion artifact. Despite this limitation pulmonary emboli are identified both upper lobes and the right lower lobe and probably in the middle lobe. Main pulmonary artery is normal in caliber. Mediastinum: Mediastinal and bilateral hilar adenopathy is again noted. The heart and pericardium demonstrate no acute abnormality. There is no acute abnormality of the thoracic aorta. Tracheostomy tube is noted. Lungs/pleura: There is no pneumothorax or pleural effusion. There has been interval improved in bilateral airspace opacities. Organs: The liver, spleen, pancreas, adrenal glands and kidneys are unremarkable. Adjacent cysts are identified in the lower pole of the right kidney. GI/Bowel: Percutaneous gastrostomy tube remains in place. Small bowel caliber is normal.  The appendix is normal.  The colon is unremarkable. Pelvis: The urinary bladder is grossly negative though there is minimal adjacent fat stranding. Peritoneum/Retroperitoneum: No adenopathy, mesenteric stranding or free fluid. Aortic caliber is normal. Soft Tissues/Bones: No acute bone or soft tissue abnormality. 1. Bilateral pulmonary emboli. 2. Improving bilateral airspace opacities. 3. Fat stranding adjacent to the urinary bladder may indicate cystitis. Critical results were called by Dr. Cortney MD to Genesis Jimenez on 2020 at 03:23.     Ir Danielle Cath Place Venous 2nd+ Order    Result Date: 2020  Patient MRN:  [id number] : 1960 Age: 61 years Gender: Male Order Date:  2020 7:45 AM EXAM: IR VENOGRAM UPPER EXTREMITY RIGHT, IR DANIELLE CATH PLACE VENOUS 2ND+ ORDER, IR TUNNELED CVC PLACE WO SQ PORT/PUMP > 5 YEARS, IR ULTRASOUND GUIDANCE VASCULAR ACCESS, IR FLUORO GUIDED CVA DEVICE PLMT/REPLACE/REMOVAL NUMBER OF IMAGES:  14 INDICATION: Z79.2 Long term (current) use of antibiotics Long term (current) use of antibiotics What reading provider will be dictating this exam?->[facility] COMPARISON: None FLUORO TIME: 00:04:26 DAP: 1965.9 uGym2 AK: 77.1 mGy PICC Catheter Placement and Upper Extremity Venous Ultrasound, Procedure: After obtaining informed consent, and following the routine sterile preparation and drape, the right basilic vein was accessed with ultrasound guidance. IV contrast was injected which demonstrated extravasation into the soft tissue. The decision was then made to place a right tunneled IJ PICC. Using direct real-time ultrasound imaging vascular access was obtained to the right IJ. An image was stored and copy was transmitted to PACS. Subsequently with real-time guidance a needle was inserted intravascular and through the needle a wire. A subcutaneous tunnel was then created along the right upper chest. Subsequently under fluoroscopic guidance, a system of needles, catheters and guidewires were utilized to place a guidewire and catheter in the venous system and the catheter was advanced and appropriately placed at the level of the junction of the superior vena cava and right atrium. Time out occurred at 08:33 AM. Patient received 4 minutes and 26 seconds of fluoroscopy. A fluoroscopic image of the position of the catheter tip was saved in PACS. Successful uncomplicated placement of a right IJ tunneled PICC catheter.     Ir Ultrasound Guidance Vascular Access    Result Date: 2020  Patient MRN:  [id number] : 1960 Age: 61 years Gender: Male Order Date:  2020 7:45 AM EXAM: IR VENOGRAM UPPER EXTREMITY RIGHT, IR SPARKLE CATH PLACE VENOUS 2ND+ ORDER, IR TUNNELED CVC PLACE WO SQ PORT/PUMP > 5 YEARS, IR ULTRASOUND GUIDANCE VASCULAR ACCESS, IR FLUORO GUIDED CVA DEVICE PLMT/REPLACE/REMOVAL NUMBER OF IMAGES:  14 INDICATION: Z79.2 Long term (current) use of antibiotics Long term (current) use of antibiotics What reading provider will be dictating this exam?->[facility] COMPARISON: None FLUORO TIME: 00:04:26 DAP: 1965.9 uGym2 AK: 77.1 mGy PICC Catheter Placement and Upper Extremity Venous Ultrasound, Procedure: After obtaining informed consent, and following the routine sterile preparation and drape, the right basilic vein was accessed with ultrasound guidance. IV contrast was injected which demonstrated extravasation into the soft tissue. The decision was then made to place a right tunneled IJ PICC. Using direct real-time ultrasound imaging vascular access was obtained to the right IJ. An image was stored and copy was transmitted to PACS. Subsequently with real-time guidance a needle was inserted intravascular and through the needle a wire. A subcutaneous tunnel was then created along the right upper chest. Subsequently under fluoroscopic guidance, a system of needles, catheters and guidewires were utilized to place a guidewire and catheter in the venous system and the catheter was advanced and appropriately placed at the level of the junction of the superior vena cava and right atrium. Time out occurred at 08:33 AM. Patient received 4 minutes and 26 seconds of fluoroscopy. A fluoroscopic image of the position of the catheter tip was saved in PACS. Successful uncomplicated placement of a right IJ tunneled PICC catheter. Fluoroscopy Modified Barium Swallow With Video    Result Date: 12/11/2020  EXAMINATION: MODIFIED BARIUM SWALLOW WAS PERFORMED IN CONJUNCTION WITH SPEECH PATHOLOGY SERVICES TECHNIQUE: Fluoroscopic evaluation of the swallowing mechanism was performed with multiple consistency of barium product. FLUOROSCOPY DOSE AND TYPE OR TIME AND EXPOSURES: Fluoroscopy time 1.5 minutes.   Dose area product 16 mGy COMPARISON: None HISTORY: ORDERING SYSTEM PROVIDED HISTORY: evaluation for possible PO diet TECHNOLOGIST PROVIDED HISTORY: Reason for exam:->evaluation for possible PO diet What reading provider will be dictating this exam?->[facility] Dysphagia FINDINGS: Oral phase of swallowing was grossly within normal limits. There was a pharyngeal delay and laryngeal elevation was reduced. No retention was seen in the vallecular or piriform sinuses. No evidence of laryngeal penetration or aspiration. No evidence of aspiration or laryngeal penetration. Please see separate speech pathology report for full discussion of findings and recommendations. Us Dup Lower Extremities Bilateral Venous    Result Date: 2020  Patient MRN:  [id number] : 1960 Age: 61 years Gender: Male Order Date:  2020 7:56 PM EXAM: US DUP LOWER EXTREMITIES BILATERAL VENOUS NUMBER OF IMAGES:  37 INDICATION:  swelling, lymphedema, PE, r/o DVT swelling, lymphedema, PE, r/o DVT What reading provider will be dictating this exam?->[facility] COMPARISON: None Within the visualized vessels, there is no evidence for deep venous thrombosis There is good compressibility, there is good augmentation, there is good color flow.      Within the visualized vessels there is no evidence for deep venous thrombosis      DIET:  DIET GENERAL;    Medications:    Scheduled Meds:   apixaban  10 mg Oral BID    valproic acid  250 mg Per G Tube 3 times per day    amLODIPine  10 mg Oral Daily    Arformoterol Tartrate  15 mcg Nebulization BID    budesonide  0.5 mg Nebulization BID    carvedilol  6.25 mg Oral BID WC    chlorhexidine  15 mL Mouth/Throat BID    fluconazole  200 mg Oral Daily    ipratropium-albuterol  3 mL Inhalation Q4H WA    lansoprazole  30 mg Per G Tube QAM AC    levothyroxine  50 mcg Oral Daily    senna  2 tablet Oral Nightly    thiamine  100 mg Oral Daily    sodium chloride flush  10 mL Intravenous 2 times per day    linezolid  600 mg Oral 2 times per day       Continuous Infusions:    PRN Meds:oxyCODONE-acetaminophen, diphenhydrAMINE, colchicine, polyvinyl alcohol, sodium chloride flush, acetaminophen **OR** acetaminophen, polyethylene glycol, promethazine **OR** ondansetron    A/P:      Patient Active Problem List   Diagnosis    Hyperlipidemia    Type 2 diabetes mellitus without complication, without long-term current use of insulin ([address]    Atypical chest pain    Essential hypertension    Chronic acquired lymphedema    Aortic valve disease    Morbid obesity due to excess calories (HCC)    Abdominal pain    Acute respiratory failure with hypoxia (HCC)    Acute pulmonary edema (HCC)    Congestive heart failure with LV diastolic dysfunction, NYHA class 3 (HCC)    Obstructive sleep apnea    Acquired hypothyroidism    Chronic diastolic heart failure (HCC)    Nonrheumatic aortic valve stenosis    ACE-inhibitor cough    Pneumonia due to organism    Acute gout of right knee    Bilateral pulmonary embolism (HCC)    1.) B/L Pulmonary Embolism - subsegmental, not massive   2.) Acute hypoxemic respiratory failure on mechanical ventilation s/p trach 11/12/2020 with IDWRJN 5 OAPFEALY XLT  3.) Metabolic encephalopathy history of EtOH abuse with agitation on ( Depakote, and Seroquel  )significantly improved  4.) HANS moderate ( AHI 17 on 4/18/18 requires BIPAP 16/12) noncompliant with CPAP  5. ) History of Reactive Airway Disease Syndreom (RADS)  6.) Stable Pulmonary Nodules - radiographically stable since CT 10/2017 to 3/2018  7.) Diabetes Mellitus  8.) Obesity  9.) EF 45% LVH diastolic dysfunction  10.) Chronic Lymphedema  11. ) History of Gout - on colchicine  12. ) Hypothyroidism  13. ) H/o respiratory failure: 7/8/2017 pt was  transferred from [email or web address] for acute respiratory failure after lap cholecystectomy, lap umbilical hernia repair, and lap liver biopsy (fatty liver). Patient was then transferred to [facility] where he was weaned off the ventilator and decannulated. 14.) Ventilator associated pneumonia - treated   15. ) Alcohol Withdrawal - improved  16. ) Abdominal Pains - resolved    PLAN:  ELIQUIS FOR PE  INCR ACTIVITY      I can

## 2020-12-14 NOTE — PROGRESS NOTES
Subjective: The patient is awake and alert. No problems overnight. Denies chest pain, angina, and dyspnea. Denies abdominal pain. Tolerating diet. No nausea or vomiting. Objective:  Pt is aox3 in nad   BP (!) 148/83   Pulse 84   Temp 96.9 °F (36.1 °C) (Temporal)   Resp 20   Ht 5' 9\" (1.753 m)   Wt (!) 324 lb (147 kg)   SpO2 100%   BMI 47.85 kg/m²   HEENT no adenopathy no bruits  Trach is midline   Heart:  RRR, no murmurs, gallops, or rubs.   Lungs:  CTA bilaterally, no wheeze, rales or rhonchi  Abd: bowel sounds present, nontender, nondistended, no masses  Extrem:  No clubbing, cyanosis, or edema  WBC/Hgb/Hct/Plts:  5.7/9.2/28.6/291 (12/13 4473) basic metabolic panel     Assessment:    Patient Active Problem List   Diagnosis    Hyperlipidemia    Type 2 diabetes mellitus without complication, without long-term current use of insulin (HCC)    Atypical chest pain    Essential hypertension    Chronic acquired lymphedema    Aortic valve disease    Morbid obesity due to excess calories (HCC)    Abdominal pain    Acute respiratory failure with hypoxia (HCC)    Acute pulmonary edema (HCC)    Congestive heart failure with LV diastolic dysfunction, NYHA class 3 (HCC)    Obstructive sleep apnea    Acquired hypothyroidism    Chronic diastolic heart failure (HCC)    Nonrheumatic aortic valve stenosis    ACE-inhibitor cough    Pneumonia due to organism    Acute gout of right knee    Bilateral pulmonary embolism ([facility] [place] [facility] [address]       Plan:    Cont weaning  He is asking to go to [address] [place] [address] and not [facility]  6:49 AM  12/14/2020

## 2020-12-14 NOTE — PROGRESS NOTES
Pulmonary Progress Note  12/14/2020 2:18 PM  Subjective:   Admit Date: 12/9/2020  PCP: Alayna Harrington MD  Interval History: Patient resting in bed coughing occasionally    Diet: DIET GENERAL;  SOB is: Mild  Cough: None  Wheezing: None  chest pain: None    Data:   Scheduled Meds:    apixaban  10 mg Oral BID    valproic acid  250 mg Per G Tube 3 times per day    amLODIPine  10 mg Oral Daily    Arformoterol Tartrate  15 mcg Nebulization BID    budesonide  0.5 mg Nebulization BID    carvedilol  6.25 mg Oral BID WC    chlorhexidine  15 mL Mouth/Throat BID    fluconazole  200 mg Oral Daily    ipratropium-albuterol  3 mL Inhalation Q4H WA    lansoprazole  30 mg Per G Tube QAM AC    levothyroxine  50 mcg Oral Daily    senna  2 tablet Oral Nightly    thiamine  100 mg Oral Daily    sodium chloride flush  10 mL Intravenous 2 times per day    linezolid  600 mg Oral 2 times per day       Continuous Infusions:     PRN Meds: oxyCODONE-acetaminophen, diphenhydrAMINE, colchicine, polyvinyl alcohol, sodium chloride flush, acetaminophen **OR** acetaminophen, polyethylene glycol, promethazine **OR** ondansetron    I/O last 3 completed shifts: In: 1320 [P.O.:1320]  Out: 2000 [Urine:2000]    No intake/output data recorded. Intake/Output Summary (Last 24 hours) at 12/14/2020 1418  Last data filed at 12/13/2020 1836  Gross per 24 hour   Intake 360 ml   Output 600 ml   Net -240 ml       No data found.       Recent Labs     12/13/20  0600   WBC 5.7   HGB 9.2*   HCT 28.6*   MCV 80.8        CPK:  Lab Results   Component Value Date    CKTOTAL 98 10/08/2017          -----------------------------------------------------------------  RAD:   Results for orders placed during the hospital encounter of 12/09/20   XR CHEST PORTABLE    Narrative EXAMINATION:  ONE XRAY VIEW OF THE CHEST  12/9/2020 12:07 am  COMPARISON:  November 25  HISTORY:  ORDERING SYSTEM PROVIDED HISTORY: chest pain  TECHNOLOGIST PROVIDED HISTORY:  Reason tracheostomy due to scar tissue.  Neck CT showing calcification at level of critical thyroid membrane 11/12 tracheostomy placed by ENT.  Patient aspirated post trach. Patient underwent withdrawal of alcohol required medications  11/17 emesis x3.  Had bright blood and clots coming from ENT.  ENT evaluated with no evidence of bleeding.  Neurologically improving moving all extremities.  Repeat bleeding from trach site in the evening.  Decreased to 40% +5. Patient was treated for MRSA pneumonia right lower lobe. Present with Zyvox AARON was nondiagnostic. Patient was transferred to [address] [place] [address]. Patient was being weaned to pressure support    12/9 presented back with chest pain. Patient on TOI  Chest x-ray showing almost complete resolution of left midlung right upper lobe infiltrate  CTA positive for bilateral PE bilateral airspace disease  CT abdomen with fat stranding adjacent to the urinary bladder indicating cystitis  Ultrasound lower extremities showed no DVT  Covid negative  12/11 passed swallow study  12/14 patient on 40% FiO2 via trach mask. .  On 6 L    1. Bilateral PE now on Eliquis  2. MRSA pneumonia treated till 12/17 finishing course of antibiotics from last admission on oral Zyvox  3. Chronic anemia  4. acute hypoxemic respiratory failure on mechanical ventilation s/p trach 11/12 8 cuffed Shiley trach proximal XLT  5. HANS moderate ( AHI 17 on 4/18/18 requires BIPAP 16/12) noncompliant with CPAP now cured with tracheostomy  6. History of reactive airway disease  7. CT 3/14/2018 showing pulmonary nodule stable from 10/2017  8. Diabetes with elevated blood sugars  9. Obesity  10. EF 51% LVH diastolic dysfunction  11. chronic lymphedema  12. History of gout on colchicine  13. Hypothyroid  14. H/o respiratory failure: 7/8/2017 pt was  transferred from [facility] for acute respiratory failure after lap cholecystectomy, lap umbilical hernia repair, and lap liver biopsy (fatty liver).  He had been extubated postop but had laryngospasm requiring reintubation, complicated by negative pressure flash pulmonary edema, and required tracheostomy 8/10/2017.  Patient has staph aureus pneumonia and C. difficile colitis. Juan Bacon was then transferred to [facility] hospital where he was weaned off the ventilator and decannulated.     Plan:   1. Oxygen to be weaned to 6 L. Desaturates when he moves  2. On Eliquis treatment doses  3. Patient does desaturate when he moves  4. Speech to place Passy-Anne valve  5. Would downsize to a size 6 endotracheal tube. .  With the difficulty that they had put in the endotracheal tube and we may want to consult surgery to do this.   Capping trials can be initiated    Chuy Chino

## 2020-12-14 NOTE — PROGRESS NOTES
[address] [place] [address] [facility]  Progress Note    SUBJECTIVE:  Chief Complaint   Patient presents with    Chest Pain     sharp 10/10 CP worse when coughing onset 2 days. C/O cough and chills @ night. hx heart and respiratory failure, trach mask on 6L. Patient is tolerating medications. No reported adverse drug reactions. No nausea, vomiting, diarrhea. Afebrile. 40% FiO2 100% to trach mask  Passed swallow eval. Eating lunch. Review of systems:  As stated above in the chief complaint, otherwise negative. Medications:  Scheduled Meds:   apixaban  10 mg Oral BID    valproic acid  250 mg Per G Tube 3 times per day    amLODIPine  10 mg Oral Daily    Arformoterol Tartrate  15 mcg Nebulization BID    budesonide  0.5 mg Nebulization BID    carvedilol  6.25 mg Oral BID WC    chlorhexidine  15 mL Mouth/Throat BID    fluconazole  200 mg Oral Daily    ipratropium-albuterol  3 mL Inhalation Q4H WA    lansoprazole  30 mg Per G Tube QAM AC    levothyroxine  50 mcg Oral Daily    senna  2 tablet Oral Nightly    thiamine  100 mg Oral Daily    sodium chloride flush  10 mL Intravenous 2 times per day    linezolid  600 mg Oral 2 times per day     Continuous Infusions:    PRN Meds:oxyCODONE-acetaminophen, diphenhydrAMINE, colchicine, polyvinyl alcohol, sodium chloride flush, acetaminophen **OR** acetaminophen, polyethylene glycol, promethazine **OR** ondansetron    OBJECTIVE:  BP (!) 140/55 Comment: manueal  Pulse 85   Temp 98.5 °F (36.9 °C) (Oral)   Resp 22   Ht 5' 9\" (1.753 m)   Wt (!) 324 lb (147 kg)   SpO2 100%   BMI 47.85 kg/m²   Temp  Av.1 °F (36.2 °C)  Min: 96 °F (35.6 °C)  Max: 98.5 °F (36.9 °C)  Constitutional: The patient is awake, alert, and oriented. NAD, resting comfortably  Skin: Warm and dry. No rashes were noted. HEENT: Round and reactive pupils. Moist mucous membranes. No ulcerations. Azul Edman is present but is much improved. Neck: Supple to movements. Trach with trach mask  Chest: No use of accessory muscles to breathe. Symmetrical expansion. No wheezing, crackles or rhonchi. Poor air exchange bilaterally   Cardiovascular: S1 and S2 are rhythmic and regular. No murmurs appreciated. Abdomen: Positive bowel sounds to auscultation. Benign to palpation. No masses felt. No hepatosplenomegaly. Extremities: No clubbing, no cyanosis, no edema. Lines: PICC 11/19/2020 right chest    Laboratory and Tests Review:  Lab Results   Component Value Date    WBC 5.7 12/13/2020    WBC 6.3 12/11/2020    WBC 5.6 12/10/2020    HGB 9.2 (L) 12/13/2020    HCT 28.6 (L) 12/13/2020    MCV 80.8 12/13/2020     12/13/2020     Lab Results   Component Value Date    NEUTROABS 2.48 12/10/2020    NEUTROABS 3.40 12/09/2020    NEUTROABS 3.72 12/03/2020     No results found for: [facility]  Lab Results   Component Value Date    ALT 45 (H) 12/09/2020    AST 27 12/09/2020    ALKPHOS 90 12/09/2020    BILITOT <0.2 12/09/2020     Lab Results   Component Value Date     12/09/2020    K 4.4 12/09/2020    K 4.0 10/27/2020    CL 93 12/09/2020    CO2 32 12/09/2020    BUN 17 12/09/2020    CREATININE 1.5 12/09/2020    CREATININE 1.1 12/03/2020    CREATININE 1.1 12/02/2020    GFRAA 58 12/09/2020    LABGLOM 58 12/09/2020    GLUCOSE 109 12/09/2020    PROT 7.6 12/09/2020    LABALBU 3.7 12/09/2020    CALCIUM 9.6 12/09/2020    BILITOT <0.2 12/09/2020    ALKPHOS 90 12/09/2020    AST 27 12/09/2020    ALT 45 12/09/2020     Lab Results   Component Value Date    CRP 1.4 (H) 11/02/2020    CRP 2.8 (H) 09/04/2017    CRP 10.5 (H) 08/28/2017     Lab Results   Component Value Date    SEDRATE 135 (H) 09/04/2017    SEDRATE 150 (H) 08/28/2017    SEDRATE 141 (H) 08/21/2017     Radiology:  reviewed    Microbiology:   Lab Results   Component Value Date    BC 5 Days no growth 12/09/2020    BC  11/25/2020     No antibiotic susceptibility studies performed. Plates will be held 10  days.   Contact the laboratory, [phone], 07/28/2017 Methicillin resistant Staph aureus not isolated  Final     No results for input(s): PROCAL in the last 72 hours. ASSESSMENT:  · MRSA pneumonia-improved   · thrush and esophagitis-improved on diflucan   · Shortness of breath associated with PE    PLAN:  · Continue zyvox until 12/17/2020 & diflucan 200mg PO daily  · PICC should be removed prior to discharge  · Check final cultures  · Monitor labs    Elkin Aponte  1:43 PM  12/14/2020     Pt seen and examined. Above discussed agree with advanced practice nurse. Labs, cultures, and radiographs reviewed. Face to Face encounter occurred. Changes made as necessary.      Awa Caputo MD

## 2020-12-14 NOTE — CONSULTS
HABITS:  The patient quit smoking in 1987. REVIEW OF SYSTEMS:  Systems were reviewed in Epic and there is no other  information available other than detailed above. PHYSICAL EXAMINATION:  GENERAL:  Morbidly obese,  male, in no acute distress  with trach mask. HEENT:  Head:  Normocephalic, atraumatic. Eyes:  Pupils are equal,  round, and reactive to light. NECK:  There is a trach in place with a trach mask. LUNGS:  Scattered rales and rhonchi. HEART:  Regular rate and rhythm. S1, S2 normal.  No murmurs or gallops. ABDOMEN:  Bowel sounds normal.  Soft, nontender. No masses or  organomegaly. EXTREMITIES:  2+ edema. MENTAL STATUS:  Alert. He is interacting and following commands well. NEUROMUSCULAR:  4+/5 strength throughout and actually close to normal.    PROBLEM LIST:  1.  Bilateral pulmonary emboli. 2.  Chronic respiratory failure with a trach. 3.  Recurrent pneumonia. 4.  Morbid obesity. 5.  Hypertension. 6.  Type 2 diabetes mellitus. 7.  Aortic stenosis. RECOMMENDATIONS:  At this point, I do not think the patient would have  the endurance for acute rehab. He might be a better candidate for  [facility]. Possible trach wean if he is stable enough and then transfer to  rehab when he is made further progress from a respiratory standpoint. I  will follow and see what the plan is in that regard.         Tashia Samayoa MD    D: 12/14/2020 8:51:08       T: 12/14/2020 8:56:42     [id number]  Job#: [id number]     Doc#: [id number]    CC:

## 2020-12-15 NOTE — PROGRESS NOTES
Dr. Miguel Angel Brewer consulted. Resident notified.  Electronically signed by Jennifer Telles RN on 12/15/2020 at 3:05 PM

## 2020-12-15 NOTE — PROGRESS NOTES
Pulmonary Progress Note  12/15/2020 1:32 PM  Subjective:   Admit Date: 12/9/2020  PCP: Kacie Pedraza MD  Interval History: Patient patient was downsized    Diet: DIET GENERAL;  SOB is: None  Cough: Moderate  Wheezing: None  chest pain: None    Data:   Scheduled Meds:    busPIRone  5 mg Oral TID    apixaban  10 mg Oral BID    valproic acid  250 mg Per G Tube 3 times per day    amLODIPine  10 mg Oral Daily    Arformoterol Tartrate  15 mcg Nebulization BID    budesonide  0.5 mg Nebulization BID    carvedilol  6.25 mg Oral BID WC    chlorhexidine  15 mL Mouth/Throat BID    fluconazole  200 mg Oral Daily    ipratropium-albuterol  3 mL Inhalation Q4H WA    lansoprazole  30 mg Per G Tube QAM AC    levothyroxine  50 mcg Oral Daily    senna  2 tablet Oral Nightly    thiamine  100 mg Oral Daily    sodium chloride flush  10 mL Intravenous 2 times per day    linezolid  600 mg Oral 2 times per day       Continuous Infusions:     PRN Meds: oxyCODONE-acetaminophen, diphenhydrAMINE, colchicine, polyvinyl alcohol, sodium chloride flush, acetaminophen **OR** acetaminophen, polyethylene glycol, promethazine **OR** ondansetron    No intake/output data recorded. I/O this shift:  In: 200 [P.O.:200]  Out: -       Intake/Output Summary (Last 24 hours) at 12/15/2020 1332  Last data filed at 12/15/2020 1300  Gross per 24 hour   Intake 200 ml   Output --   Net 200 ml       No data found.       Recent Labs     12/13/20  0600 12/15/20  0550   WBC 5.7 5.9   HGB 9.2* 8.5*   HCT 28.6* 26.5*   MCV 80.8 81.3    252     CPK:  Lab Results   Component Value Date    CKTOTAL 98 10/08/2017          -----------------------------------------------------------------  RAD:   Results for orders placed during the hospital encounter of 12/09/20   XR CHEST PORTABLE    Narrative EXAMINATION:  ONE XRAY VIEW OF THE CHEST  12/9/2020 12:07 am  COMPARISON:  November 25  HISTORY:  ORDERING SYSTEM PROVIDED HISTORY: chest pain  TECHNOLOGIST PROVIDED HISTORY:  Reason for exam:->chest pain  What reading provider will be dictating this exam?->CRC  FINDINGS:  Central line and tracheostomy cannula, unchanged since the prior examination. Cardiac silhouette at the upper limits of normal.  Almost complete resolution of previously noted areas of airspace disease in  the left mid lung and right upper lobe. Pulmonary vasculature within normal limits. Impression Almost complete resolution of previously noted areas of airspace disease in  the left mid lung and right upper lobe. Micro: Additional Respiratory  Assessments  Pulse: 88  Resp: 18  SpO2: 99 %    Objective:   Vitals:   Vitals:    12/15/20 1300   BP: (!) 140/78   Pulse: 88   Resp: 18   Temp: 99.1 °F (37.3 °C)   SpO2: 99%      TEMP:Current: Temp: 99.1 °F (37.3 °C)  Max: Temp  Av.9 °F (37.2 °C)  Min: 97.7 °F (36.5 °C)  Max: 99.5 °F (37.5 °C)    BP Range: Systolic (46IEY), PRH:010 , Min:122 , ZBC:111     Diastolic (96BTO), OXK:97, Min:70, Max:78    On 10 L saturating 100%.   Now down to 6 L moderately obese  Is able to speak around the trach  General appearance: alert, appears stated age and cooperative  In no acute distress  Skin: No rashes or lesions  HEENT: mucous membranes are moist  Neck: No JVD  tracheostomy tube   lungs: symmetrical expansion, clear to auscultation, no use of accessory muscles  Heart: S1S2 no murmurs,  Abdomen: soft, non tender, PEG tube obese  Extremities: no peripheral edema  Neurologic: Alert, follows commands awake alert responsive  Affect: pleasant        Assessment:   Patient Active Problem List:  59-y.o M with history of DM, hypothyroid, COPD, HTN, HLD presented on      10/26 -12/3 admitted with SOB 10/27 RRT refused BiPAP became combative.  10/28 intubated and sedated.  CT chest showing dense multifocal airspace disease  patient extubated and reintubated the same day  PEG tube placed unable to place tracheostomy due to scar tissue.  Neck CT showing calcification at level of critical thyroid membrane 11/12 tracheostomy placed by ENT.  Patient aspirated post trach. Patient underwent withdrawal of alcohol required medications  11/17 emesis x3.  Had bright blood and clots coming from ENT.  ENT evaluated with no evidence of bleeding.  Neurologically improving moving all extremities.  Repeat bleeding from trach site in the evening.  Decreased to 40% +5. Patient was treated for MRSA pneumonia right lower lobe. Present with Zyvox AARON was nondiagnostic. Patient was transferred to [facility]. Patient was being weaned to pressure support     12/9 presented back with chest pain. Patient on TOI  Chest x-ray showing almost complete resolution of left midlung right upper lobe infiltrate  CTA positive for bilateral PE bilateral airspace disease  CT abdomen with fat stranding adjacent to the urinary bladder indicating cystitis  Ultrasound lower extremities showed no DVT  Covid negative  12/11 passed swallow study  12/14 patient on 40% FiO2 via trach mask. .  On 6 L  12/15 downsized to #6. With Passy-Anne valve patient coughed.     1. Bilateral PE now on Eliquis  2. MRSA pneumonia treated till 12/17 finishing course of antibiotics from last admission on oral Zyvox  3. Chronic anemia  4. acute hypoxemic respiratory failure on mechanical ventilation s/p trach 11/12 8 cuffed Shiley trach proximal XLT12/15 6 cuffed trach  5. HANS moderate ( AHI 17 on 4/18/18 requires BIPAP 16/12) noncompliant with CPAP now cured with tracheostomy  6. History of reactive airway disease  7. CT 3/14/2018 showing pulmonary nodule stable from 10/2017  8. Diabetes with elevated blood sugars  9. Obesity  10. EF 59% LVH diastolic dysfunction  11. chronic lymphedema  12. History of gout on colchicine  13. Hypothyroid  14.  H/o respiratory failure: 7/8/2017 pt was  transferred from [facility] for acute respiratory failure after lap cholecystectomy, lap umbilical hernia repair, and lap liver biopsy (fatty liver). He had been extubated postop but had laryngospasm requiring reintubation, complicated by negative pressure flash pulmonary edema, and required tracheostomy 8/10/2017.  Patient has staph aureus pneumonia and C. difficile colitis. Charline Hampton was then transferred to [facility] where he was weaned off the ventilator and decannulated.     Plan:   1. Oxygen to be weaned to 6 L. Desaturates when he moves  2. On Eliquis treatment doses  3. Passy-Anne valve   4. Discussed with him about keeping the trach in versus decannulated as he was noncompliant with CPAP  5.  Capping trials can be initiated he can be kept for longer periods during the day and uncapped at night as he has HANS  6. Okay to transfer      [place] Matti

## 2020-12-15 NOTE — PROGRESS NOTES
[address] [place] [address] [facility]  Progress Note    SUBJECTIVE:  Chief Complaint   Patient presents with    Chest Pain     sharp 10/10 CP worse when coughing onset 2 days. C/O cough and chills @ night. hx heart and respiratory failure, trach mask on 6L. Patient is tolerating medications. No reported adverse drug reactions. No nausea, vomiting, diarrhea. Afebrile. 6L to trach mask 99% SpO2. Sitting up on edge of bed. Review of systems:  As stated above in the chief complaint, otherwise negative. Medications:  Scheduled Meds:   busPIRone  5 mg Oral TID    apixaban  10 mg Oral BID    valproic acid  250 mg Per G Tube 3 times per day    amLODIPine  10 mg Oral Daily    Arformoterol Tartrate  15 mcg Nebulization BID    budesonide  0.5 mg Nebulization BID    carvedilol  6.25 mg Oral BID WC    chlorhexidine  15 mL Mouth/Throat BID    fluconazole  200 mg Oral Daily    ipratropium-albuterol  3 mL Inhalation Q4H WA    lansoprazole  30 mg Per G Tube QAM AC    levothyroxine  50 mcg Oral Daily    senna  2 tablet Oral Nightly    thiamine  100 mg Oral Daily    sodium chloride flush  10 mL Intravenous 2 times per day    linezolid  600 mg Oral 2 times per day     Continuous Infusions:    PRN Meds:oxyCODONE-acetaminophen, diphenhydrAMINE, colchicine, polyvinyl alcohol, sodium chloride flush, acetaminophen **OR** acetaminophen, polyethylene glycol, promethazine **OR** ondansetron    OBJECTIVE:  BP (!) 140/78   Pulse 88   Temp 99.1 °F (37.3 °C) (Oral)   Resp 18   Ht 5' 9\" (1.753 m)   Wt (!) 324 lb (147 kg)   SpO2 99%   BMI 47.85 kg/m²   Temp  Av.9 °F (37.2 °C)  Min: 97.7 °F (36.5 °C)  Max: 99.5 °F (37.5 °C)  Constitutional: The patient is awake, alert, and oriented. NAD, sitting up on edge of bed  Skin: Warm and dry. No rashes were noted. HEENT: Round and reactive pupils. Moist mucous membranes. No ulcerations. NO thrush it is resolved. Neck: Supple to movements.  Lylia Binning with trach mask  Chest: No use of accessory muscles to breathe. Symmetrical expansion. No wheezing, crackles or rhonchi. Aeration improved. Diminished right base. Cardiovascular: S1 and S2 are rhythmic and regular. No murmurs appreciated. Abdomen: Positive bowel sounds to auscultation. Benign to palpation. No masses felt. No hepatosplenomegaly. Extremities: No clubbing, no cyanosis, no edema. Lines: PICC 11/19/2020 right chest    Laboratory and Tests Review:  Lab Results   Component Value Date    WBC 5.9 12/15/2020    WBC 5.7 12/13/2020    WBC 6.3 12/11/2020    HGB 8.5 (L) 12/15/2020    HCT 26.5 (L) 12/15/2020    MCV 81.3 12/15/2020     12/15/2020     Lab Results   Component Value Date    NEUTROABS 2.48 12/10/2020    NEUTROABS 3.40 12/09/2020    NEUTROABS 3.72 12/03/2020     No results found for: [facility]  Lab Results   Component Value Date    ALT 45 (H) 12/09/2020    AST 27 12/09/2020    ALKPHOS 90 12/09/2020    BILITOT <0.2 12/09/2020     Lab Results   Component Value Date     12/09/2020    K 4.4 12/09/2020    K 4.0 10/27/2020    CL 93 12/09/2020    CO2 32 12/09/2020    BUN 17 12/09/2020    CREATININE 1.5 12/09/2020    CREATININE 1.1 12/03/2020    CREATININE 1.1 12/02/2020    GFRAA 58 12/09/2020    LABGLOM 58 12/09/2020    GLUCOSE 109 12/09/2020    PROT 7.6 12/09/2020    LABALBU 3.7 12/09/2020    CALCIUM 9.6 12/09/2020    BILITOT <0.2 12/09/2020    ALKPHOS 90 12/09/2020    AST 27 12/09/2020    ALT 45 12/09/2020     Lab Results   Component Value Date    CRP 1.4 (H) 11/02/2020    CRP 2.8 (H) 09/04/2017    CRP 10.5 (H) 08/28/2017     Lab Results   Component Value Date    SEDRATE 135 (H) 09/04/2017    SEDRATE 150 (H) 08/28/2017    SEDRATE 141 (H) 08/21/2017     Radiology:  reviewed    Microbiology:   Lab Results   Component Value Date    BC 5 Days no growth 12/09/2020    BC  11/25/2020     No antibiotic susceptibility studies performed. Plates will be held 10  days.   Contact the laboratory, [phone], if further workup is  desired. BC 5 Days no growth 11/09/2020    ORG Staphylococcus coagulase-negative 11/25/2020    ORG Staphylococcus aureus 11/25/2020    ORG Staphylococcus aureus 10/29/2020     Lab Results   Component Value Date    BLOODCULT2 5 Days no growth 12/09/2020    BLOODCULT2 5 Days no growth 11/25/2020    BLOODCULT2 5 Days no growth 11/09/2020    ORG Staphylococcus coagulase-negative 11/25/2020    ORG Staphylococcus aureus 11/25/2020    ORG Staphylococcus aureus 10/29/2020     No results found for: WNDABS  Smear, Respiratory   Date Value Ref Range Status   11/25/2020   Final    Group 5: >25 PMN's/LPF and <10 Epithelial cells/LPF  Abundant Polymorphonuclear leukocytes  Rare Epithelial cells  Abundant Gram positive diplococci  Abundant Gram positive cocci in clusters       No results found for: MPNEUMO, CLAMYDCU, LABLEGI, AFBCX, FUNGSM, LABFUNG  CULTURE, RESPIRATORY   Date Value Ref Range Status   11/25/2020 Oral Pharyngeal Tiesha absent (A)  Final   11/25/2020   Final    Heavy growth  Methicillin resistant Staph aureus isolated. Most Methicillin  resistant Staphylococcus are usually resistant to multiple  antibiotics including other B-Lactams, Aminoglycosides,  Macrolides, Clindamycin and Tetracycline. Contact isolation  is indicated. This isolate is presumed to be resistant based on the  detection of inducible Clindamycin resistance. Clindamycin  may still be effective in some patients.        Culture Catheter Tip   Date Value Ref Range Status   08/26/2017 <15 colonies  Final     No results found for: BFCS  No results found for: CXSURG  Urine Culture, Routine   Date Value Ref Range Status   11/09/2020 Growth not present  Final   10/28/2020 Growth not present  Final   09/16/2019 Growth not present  Final     MRSA Culture Only   Date Value Ref Range Status   11/09/2020 Methicillin resistant Staph aureus not isolated  Final   10/30/2020 Methicillin resistant Staph aureus not isolated Final   07/28/2017 Methicillin resistant Staph aureus not isolated  Final     No results for input(s): PROCAL in the last 72 hours. ASSESSMENT:  · MRSA pneumonia-improved   · thrush and esophagitis-improved on diflucan   · Shortness of breath associated with PE    PLAN:  · Continue zyvox until 12/17/2020 & diflucan 200mg PO daily- could likely stop when zyvox stopped. · PICC should be removed prior to discharge  · Check final cultures  · Monitor labs    Emelda Koyanagi  1:54 PM  12/15/2020     Pt seen and examined. Above discussed agree with advanced practice nurse. Labs, cultures, and radiographs reviewed. Face to Face encounter occurred. Changes made as necessary.      Gilmer Guevara MD

## 2020-12-15 NOTE — PROGRESS NOTES
OT BEDSIDE TREATMENT NOTE      Date:12/15/2020  Patient Name: Lamberto Rubio  MRN: [id number]  : 1960  Room: [address] [place] [address]     Per OT Eval:   Evaluating OT: Gabrielle Rock OTR/L   License #  [id number]      AM-PAC Daily Activity Raw Score:      Recommended Adaptive Equipment:  TBD      Diagnosis:  Bilateral PE, MRSA Pneumonia     Surgery:   Past Surgical History         Past Surgical History:   Procedure Laterality Date    BRONCHOSCOPY   08/10/2017    ECHO COMPL W DOP COLOR FLOW   2013          EXTERNAL EAR SURGERY   2009     keloid left ear    FOOT SURGERY        Left foot    GASTROSTOMY TUBE PLACEMENT   08/10/2017    IR TUNNELED CATHETER PLACEMENT GREATER THAN 5 YEARS   2020     IR TUNNELED CATHETER PLACEMENT GREATER THAN 5 YEARS 2020 Santi Ortiz MD [facility]ED[facility]    TRACHEOSTOMY N/A 2020     TRACHEOTOMY performed by Bertin Godwin MD at [address] [place] [address] N/A 2020     OPEN TRACHEOTOMY, BRONCHOSCOPY, DIRECT LARYNGOSCOPY performed by Sylvia Urrutia DO at [facility]   08/10/2017    UPPER GASTROINTESTINAL ENDOSCOPY N/A 2020     EGD ESOPHAGOGASTRODUODENOSCOPY PEG TUBE INSERTION performed by Bertin Godwin MD at [address] [place] [address]         Pertinent Medical History:   Past Medical History        Past Medical History:   Diagnosis Date    Acquired hypothyroidism 2017    Anxiety      Congestive heart failure with LV diastolic dysfunction, NYHA class 3 ([address]      COPD (chronic obstructive pulmonary disease) ([address]      Depression      DM (diabetes mellitus) ([address]      Essential hypertension 2016    Gouty arthritis 2006    Hyperlipidemia      Hypertension      Lymphedema      Obesity      Obstructive sleep apnea 2009    Obstructive sleep apnea 2017    Osteoarthritis      Type 2 diabetes mellitus without complication, without long-term current use of insulin ([address] 1/15/2014 Precautions:  Falls, Isolation for MRSA in sputum, Regular diet/thin liquids per Speech, trach, O2 via trach mask at 12 L     Home Living: 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 Bathroom setup: walk in shower per pt. Equipment owned: none per pt.     Prior Level of Function: Pt. States was Ind. with ADLs , Ind. with IADLs; ambulated without A.D. Driving: was active  Occupation: pt. States retired      Pain Level: Pt did not complain of pain this session  Cognition: A&O: to self, place, month; Follows 1-2 step directions              Memory:  fair              Sequencing:  fair              Problem solving:  fair              Judgement/safety:  Fair- pt. able demo use of call light  * Pt. Speech limited but mouths words, uses gestures, answers are appropriate, pt. pleasant & agreeable to work with therapy to [place] better. \"                Functional Assessment:    Initial Eval Status  Date: 12- Treatment Status  Date: 12/15/20 Short Term Goals = Long Term Goals  Treatment frequency: 3-5 days   Feeding SBA/ set-up  Per Speech pt. Is currently on thin liquids & regular solids SBA Mod I    Grooming SBA seated in bedside chair SBA  Pt simulated task of washing face, applying deodorant Modified [place]    UB Dressing Min A seated   Angeli  Araceli/doff hospital gown as jacket Supervision    LB Dressing Mod A overall. Seated level to araceli/doff B socks with increased time for rest breaks  Angeli/CGA  Pt donned pants, CGA to stand and pull pants  Over hips    Pt donned/doffed hospital socks bending at the hips, and using of cross over technique Supervision    Bathing Mod A with sim. task  Angeli  Simulated Task Stand by Assist    Toileting NT  DNT Supervision    Bed Mobility  Supine to sit:NT  Sit to supine: NT  Pt.  Received sitting up in bedside chair  DNT  Pt seated upright in chair upon arrival and at end of session Supine to sit: Supervision   Sit to supine: Supervision    Functional Transfers Min A with sit <> stand using ww  CGA  Sit to Stand  Stand to Sit Supervision    Functional Mobility Min A with ww short distance in room  [facility]  Pt ambulated short household distance in room with no device Supervision    Balance Sitting:     Static:  Sup    Dynamic:CGA  Standing: Min A  Sitting:  Supervision    Standing:  CGA     Activity Tolerance Fair- lt. Ax.  spO2 pre ax. 98%  Dropped to 92% during seated ADLs & standing ax. HR remained WFL throughout  Fair-  Monitoring of O2 Fair+ with mod. Ax. Visual/  Perceptual Glasses: yes          Safety Awareness Fair/-                     good      Hand dominance: R       Strength ROM Additional Info:    RUE  4/5  WFL good  and wfl FMC/dexterity noted during ADL tasks      LUE 4/5  WFL good  and wfl FMC/dexterity noted during ADL tasks         Hearing: WFL   Sensation:  Pt. c/o no numbness/ tingling B UE  Tone: WFL   Edema: none present B UEs         Education:  Pt was educated through out treatment regarding proper technique & safety with functional transfers & mobility, techniques to assist with LB dressing task to improve safety & prevent falls and allow pt to return home safely. Comments: Upon arrival pt seated upright in chair, agreeable to therapy. Pt completed of transfers, functional mobility, and ADL tasks this session. Monitoring of O2 stats throughout session, cueing on deep breathing techniques and taking of rest breaks as needed. At end of session, pt seated upright in chair, all lines and tubes intact, call light within reach. · Pt has made fair progress towards set goals.    · Continue with current plan of care focusing on increasing of independency with ADL tasks      Treatment Time In: 11:53am            Treatment Time Out: 12:03pm               Treatment Charges: Mins Units   Ther Ex  03040     Manual Therapy [address] [place] [address]     ADL/Home Mgt 88282 10 1   Neuro Re-ed 28207 Group Therapy      Orthotic manage/training  [id number]     Non-Billable Time     Total Timed Treatment 10 1        Vivienne GUTIERREZ/SHERMAN [id number]

## 2020-12-15 NOTE — PROGRESS NOTES
Physical Therapy  Treatment Note   Name: Earnest Moffett  : 1960  MRN: [id number]    Referring Provider:  Justin Mendoza MD    Date of Service: 12/15/2020    Evaluating PT:  Elena Grant, PT, DPT [id number]    Room #:  8545/9428-J  Diagnosis:  Bilateral PE  PMHx/PSHx:  HTN, HLD, HANS, depression, gout, anxiety, OA, obesity, lymphedema, CHF, Hypothyroidism, DM, COPD, tracheostomy 2020  Precautions:  Falls, trach, O2 via trach mask, impulsive  Equipment Needs:  Front [employer]    SUBJECTIVE:  Pt from [facility]. Recently discharged from acute care for respiratory failure. Pt's speech is limited d/t trach so unable to obtain home setup at this time. OBJECTIVE:   Initial Evaluation  Date: 2020 Treatment  Date: 12/15/2020 Short Term/ Long Term   Goals   AM-PAC 6 Clicks 32 33/45    Was pt agreeable to Eval/treatment? yes yes    Does pt have pain? No c/o pain No c/o pain    Bed Mobility  Rolling: SBA  Supine to sit: SBA  Sit to supine: SBA  Scooting: SBA SBA all aspects Rolling: Independent  Supine to sit: Independent  Sit to supine: Independent  Scooting: Independent   Transfers Sit to stand: SBA  Stand to sit: SBA  Stand pivot: Angeli front [employer] Sit<>stand: SBA  Stand pivot: Angeli no AD Sit to stand: Independent  Stand to sit:  Independent  Stand pivot: Alberto front [employer]   Ambulation    35 feet with front [employer] Angeli 40 feet with no AD Angeli 150 feet with front [employer] Alberto   Stair negotiation: ascended and descended  NT NT TBD   ROM BUE:  Per OT note  BLE:  WNL     Strength BUE:  Per OT ntoe  BLE:  WNL     Balance Sitting EOB:  SBA  Dynamic Standing:  Angeli front [employer] Sitting EOB: SBA  Dynamic standing: Angeli no AD Sitting EOB:  Independent  Dynamic Standing:  Alberto front [employer]     Pt is A & O x 4  Sensation:  Pt denies numbness and tingling to extremities  Edema:  unremarkable    Vitals:  Spo2 monitored throughout session  Pt on 10L via trach mask at 100%  Gradually weaned to RA and maintained SPo2 99%  With ambulation on RA pt desaturated to 84%  Pt transferred to bedside chair and returned to 99% on 10L trach mask    Patient education  Pt educated on role of PT intervention. Pt educated on safety in room with utilization of call light for assistance with mobility. Patient response to education:   Pt verbalized understanding Pt demonstrated skill Pt requires further education in this area   yes yes yes     ASSESSMENT:    Comments:  RN cleared pt for activity prior to session. Pt received supine in bed and agreeable to PT intervention at this time. Pt performed all functional mobility as noted above. Pt's greatest limitation continues to be activity tolerance as he desaturates quickly with activity. Pt demonstrating good strength and dynamic balance. At end of session, pt transferred to bedside chair and left with all needs met and call light in reach. Pt requires continued skilled PT intervention for the purposes of increasing functional activity tolerance to restore functional independence. Treatment:  Patient practiced and was instructed in the following treatment:     Therapeutic Activities Completed:  o Functional mobility as noted above:   - Bed mobility: SBA all aspects.   - Transfer training: SBA/Angeli for safety. Pt able to complete without AD and without LOB. - Ambulation: 40 feet with no AD Angeli for safety and steadying assistance. Pt ambulates quickly and moves impulsively. o Skilled repositioning in seated position for comfort.  o Pt education as noted above.  o Skilled vitals monitoring    PLAN:    Patient is making good progress towards established goals. Will continue with current POC.       Time in  1147  Time out  1155    Total Treatment Time  8 minutes     CPT codes:  [] Gait training 79235 0 minutes  [] Manual therapy 36858 0 minutes  [x] Therapeutic activities 46518 8 minutes  [] Therapeutic exercises 82273 0 minutes  [] Neuromuscular reeducation 92705 0 minutes    Eloise Burr, PT, SARAHT  [id number]

## 2020-12-15 NOTE — DISCHARGE INSTR - COC
Continuity of Care Form    Patient Name: Jasper Guerrero   :  1960  MRN:  [id number]    Admit date:  2020  Discharge date:  2020    Code Status Order: Full Code   Advance Directives:   Advance Care Flowsheet Documentation       Date/Time Healthcare Directive Type of Healthcare Directive Copy in [address] Agent's Name Healthcare Agent's Phone Number    [phone]  No, patient does not have an advance directive for healthcare treatment -- -- -- -- --            Admitting Physician:  Rancho Norris MD  PCP: Rancho Norris MD    Discharging Nurse: [facility] Unit/Room#: 2016/6185-Q  Discharging Unit Phone Number: [phone]    Emergency Contact:   Extended Emergency Contact Information  Primary Emergency Contact: Jesse Record  Address: Saqib Sargent, [address] [place] [address] Phone: [phone]  Relation: Child  Secondary Emergency Contact: Hair More  Address: [address] [place] [address] Phone: [phone]  Work Phone: [phone]  Relation: Spouse    Past Surgical History:  Past Surgical History:   Procedure Laterality Date    BRONCHOSCOPY  08/10/2017    ECHO COMPL W DOP COLOR FLOW  2013         EXTERNAL EAR SURGERY  2009    keloid left ear    FOOT SURGERY      Left foot    GASTROSTOMY TUBE PLACEMENT  08/10/2017    IR TUNNELED CATHETER PLACEMENT GREATER THAN 5 YEARS  2020    IR TUNNELED CATHETER PLACEMENT GREATER THAN 5 YEARS 2020 June Zacarias MD [facility]ED[facility]    TRACHEOSTOMY N/A 2020    TRACHEOTOMY performed by Marti Herrera MD at [address] [place] [address] 2020    OPEN TRACHEOTOMY, BRONCHOSCOPY, DIRECT LARYNGOSCOPY performed by Julio Cesar Quick DO at [facility]  08/10/2017    UPPER GASTROINTESTINAL ENDOSCOPY N/A 2020    EGD ESOPHAGOGASTRODUODENOSCOPY PEG TUBE INSERTION performed by Dian Grande MD at [address]       Immunization History:   Immunization History   Administered Date(s) Administered    Influenza Virus Vaccine 01/05/2016    Influenza, Azalee Fees, IM, PF (6 mo and older Fluzone, Flulaval, Fluarix, and 3 yrs and older Afluria) 10/03/2018    Pneumococcal Polysaccharide (Rhehcjiyd74) 01/05/2016    Tdap (Boostrix, Adacel) 09/16/2014       Active Problems:  Patient Active Problem List   Diagnosis Code    Hyperlipidemia E78.5    Type 2 diabetes mellitus without complication, without long-term current use of insulin ([facility] [address] [facility] E11.9    Atypical chest pain R07.89    Essential hypertension I10    Chronic acquired lymphedema I89.0    Aortic valve disease I35.9    Morbid obesity due to excess calories ([address] E66.01    Abdominal pain R10.9    Acute respiratory failure with hypoxia ([address] J96.01    Acute pulmonary edema ([address] J81.0    Congestive heart failure with LV diastolic dysfunction, NYHA class 3 ([address] I50.30    Obstructive sleep apnea G47.33    Acquired hypothyroidism E03.9    Chronic diastolic heart failure ([address] I50.32    Nonrheumatic aortic valve stenosis I35.0    ACE-inhibitor cough R05, T46.4X5A    Pneumonia due to organism J18.9    Acute gout of right knee M10.9    Bilateral pulmonary embolism (HCC) I26.99       Isolation/Infection:   Isolation            Contact          Patient Infection Status       Infection Onset Added Last Indicated Last Indicated By Review Planned Expiration Resolved Resolved By    COVID-19 Rule Out 12/14/20 12/14/20 12/14/20 Covid-19 Ambulatory (Ordered) 12/21/20 12/28/20      MRSA 11/25/20 11/27/20 11/25/20 Culture, Respiratory        Sputum 11/25/20    Resolved    COVID-19 Rule Out 12/09/20 12/09/20 12/09/20 Respiratory Panel, Molecular, with COVID-19 (Restricted: peds pts or suitable admitted adults) (Ordered)   12/09/20 Rule-Out Test Resulted    COVID-19 Rule Out 11/27/20 11/28/20 11/28/20 COVID-19 (Ordered) 11/28/20 Rule-Out Test Resulted    COVID-19 Rule Out 11/19/20 11/19/20 11/19/20 Covid-19 Ambulatory (Ordered)   11/21/20 Rule-Out Test Resulted    C-diff Rule Out 11/14/20 11/14/20 11/15/20 CLOSTRIDIUM DIFFICILE EIA (Ordered)   11/15/20 Rule-Out Test Resulted    COVID-19 Rule Out 10/29/20 10/29/20 10/29/20 COVID-19 (Ordered)   11/02/20 Erinn Hinojosa RN    Test cancelled-Kadi Rahman RN  10/29/20 2239   10/28/20-Covid test result -Not Detected    COVID-19 Rule Out 10/28/20 10/28/20 10/28/20 Respiratory Panel, Molecular, with COVID-19 (Restricted: peds pts or suitable admitted adults) (Ordered)   10/29/20 Rule-Out Test Resulted    COVID-19 Rule Out 10/26/20 10/26/20 10/26/20 COVID-19 (Ordered)   10/26/20 Rule-Out Test Resulted            Nurse Assessment:  Last Vital Signs: BP (!) 150/70   Pulse 99   Temp 99.5 °F (37.5 °C) (Oral)   Resp 20   Ht 5' 9\" (1.753 m)   Wt (!) 324 lb (147 kg)   SpO2 100%   BMI 47.85 kg/m²     Last documented pain score (0-10 scale): Pain Level: 8  Last Weight:   Wt Readings from Last 1 Encounters:   12/09/20 (!) 324 lb (147 kg)     Mental Status:  oriented and alert    IV Access:  PICC in the right chest removed 12/17/2020    Nursing Mobility/ADLs:  Walking   Independent  Transfer  Independent  Bathing  Assisted  Dressing  Assisted  Toileting  Independent  Feeding  [address]  Assisted  Med Delivery   whole    Wound Care Documentation and Therapy:        Elimination:  Continence:   · Bowel: Yes  · Bladder: Yes  Urinary Catheter: None   Colostomy/Ileostomy/Ileal Conduit: No       Date of Last BM: 12/17/2020    Safety Concerns:     Aspiration Risk    Impairments/Disabilities:      None    Nutrition Therapy:  Current Nutrition Therapy:   - Oral Diet:  General    Routes of Feeding: Oral  Liquids:  Thin Liquids  Daily Fluid Restriction: no  Last Modified Barium Swallow with Video (Video Swallowing Test): not done    Treatments at the Time of Hospital Discharge: Respiratory Treatments: Arformoterol Tartrate (BROVANA) nebulizer solution 15 mcg  BID; budesonide (PULMICORT) nebulizer suspension 500 mcg BID; ipratropium-albuterol (DUONEB) nebulizer solution 3 mL       Oxygen Therapy:  trach mask 40% FiO2 6L nasal canula  Ventilator:    - No ventilator support    Rehab Therapies: Physical Therapy, Occupational Therapy and Speech/Language Therapy  Weight Bearing Status/Restrictions: No weight bearing restirctions  Other Medical Equipment (for information only, NOT a DME order):  bedside commode and hospital bed  Other Treatments: ***    Patient's personal belongings (please select all that are sent with patient):  None    RN SIGNATURE:  Electronically signed by Rosemary Cadena RN on 12/17/20 at 12:27 PM EST    CASE MANAGEMENT/SOCIAL WORK SECTION    Inpatient Status Date:     Readmission Risk Assessment Score:  Readmission Risk              Risk of Unplanned Readmission:        22           Discharging to Facility/ Agency   · Name:   · Address:  · Phone:  · Fax:    Dialysis Facility (if applicable)   · Name:  · Address:  · Dialysis Schedule:  · Phone:  · Fax:    / signature: Electronically signed by Rosemary Cadena RN on 12/17/20 at 12:27 PM EST    PHYSICIAN SECTION    Prognosis: {Prognosis:0895996239}    Condition at Discharge: [facility] Patient Condition:[id number]}    Rehab Potential (if transferring to Rehab): {Prognosis:5641264721}    Recommended Labs or Other Treatments After Discharge: Continue zyvox until 12/17/2020 & diflucan 200mg PO daily- stop when zyvox stopped. eliquis 10 mg bid thru 12/18 then 5 mg bid. Physician Certification: I certify the above information and transfer of Earnest Moffett  is necessary for the continuing treatment of the diagnosis listed and that he requires [facility] for less 30 days.      Update Admission H&P: No change in H&P    PHYSICIAN SIGNATURE:  Electronically signed by Justin Mendoza MD on 12/17/20 at

## 2020-12-15 NOTE — PROGRESS NOTES
PT SEEN AND EXAMINED. Chart reviewed. meds reviewed. D/w nursing + family as available. EXAM: IN GENERAL, NAD. AWAKE AND ALERT. ROS NEGx10 EXCEPT:   BP (!) 150/70   Pulse 99   Temp 99.5 °F (37.5 °C) (Oral)   Resp 20   Ht 5' 9\" (1.753 m)   Wt (!) 324 lb (147 kg)   SpO2 100%   BMI 47.85 kg/m²   GEN: A+O NAD. HEENT: NCAT. EOMI. JOE  NECK: NO JVD. TRACH MIDLINE. NO BRUITS. NO THYROMEGALY. LUNGS: CTA BL NO RALES, RHONCHI OR WHEEZES. GOOD EXCURSION. CV: Regular rate and rhythm, NO Murmurs, Rubs, Or gallops  ABD: Soft. Nontender. Normal bowel sounds. No organomegaly  EXT:No clubbing cyanosis or edema  Neuro: Alert and oriented x 3. No focal motor deficits. No sensory deficits. Reflexes appear intact.   Labs/data reviewedLABS: CBC with Differential:    Lab Results   Component Value Date    WBC 5.9 12/15/2020    RBC 3.26 12/15/2020    HGB 8.5 12/15/2020    HCT 26.5 12/15/2020     12/15/2020    MCV 81.3 12/15/2020    MCH 26.1 12/15/2020    MCHC 32.1 12/15/2020    RDW 18.8 12/15/2020    NRBC 0.9 11/16/2020    SEGSPCT 73 01/26/2014    METASPCT 1.8 11/15/2020    LYMPHOPCT 41.3 12/10/2020    MONOPCT 10.0 12/10/2020    MYELOPCT 0.9 11/15/2020    BASOPCT 0.5 12/10/2020    MONOSABS 0.56 12/10/2020    LYMPHSABS 2.31 12/10/2020    EOSABS 0.19 12/10/2020    BASOSABS 0.03 12/10/2020     Platelets:    Lab Results   Component Value Date     12/15/2020     CMP:    Lab Results   Component Value Date     12/09/2020    K 4.4 12/09/2020    K 4.0 10/27/2020    CL 93 12/09/2020    CO2 32 12/09/2020    BUN 17 12/09/2020    CREATININE 1.5 12/09/2020    GFRAA 58 12/09/2020    LABGLOM 58 12/09/2020    GLUCOSE 109 12/09/2020    PROT 7.6 12/09/2020    LABALBU 3.7 12/09/2020    CALCIUM 9.6 12/09/2020    BILITOT <0.2 12/09/2020    ALKPHOS 90 12/09/2020    AST 27 12/09/2020    ALT 45 12/09/2020     Magnesium:    Lab Results   Component Value Date    MG 1.8 11/25/2020     LDH:    Lab Results   Component Value Date    LDH 543 10/28/2020     PT/INR:    Lab Results   Component Value Date    PROTIME 14.0 11/18/2020    INR 1.2 11/18/2020     Last 3 Troponin:    Lab Results   Component Value Date    TROPONINI <0.01 10/26/2020    TROPONINI <0.01 05/26/2019    TROPONINI <0.01 05/26/2019     ABG:    Lab Results   Component Value Date    PH 7.393 11/26/2020    PCO2 43.1 11/26/2020    PO2 90.3 11/26/2020    HCO3 25.7 11/26/2020    BE 0.7 11/26/2020    O2SAT 96.1 11/26/2020     IRON:    Lab Results   Component Value Date    IRON 95 11/05/2020     IMAGING    Echo Transesophageal    Result Date: 11/30/2020  Transesophageal Echocardiography Report (AARON)   Demographics   Patient Name        Daniel Augustine Gender               Male   Medical Record      [id number]        Room Number          7915  Number   Account #           [de-identified]       Procedure Date       11/30/2020   Corporate ID                        Ordering Physician   Odessa Richard MD   Accession Number    [id number]      Referring Physician   Date of Birth       1960      Sonographer   Age                 61 year(s)      Interpreting         Odessa Richard MD                                      Physician                                       Any Other  Procedure Type of Study   AARON procedure:Transesophageal Echo (AARON). Procedure Date Date: 11/30/2020 Start: 04:21 PM Height: 68 inches Weight: 335 pounds BSA: 2.54 m^2 BMI: 50.94 kg/m^2 Allergies   - Bee/Wasp stings. - Shellfish. Findings   Left Ventricle  Normal left ventricular size and systolic function. Right Ventricle  Normal right ventricular size and function. Left Atrium  Normal sized left atrium. There is no left atrial appendage thrombus. Agitated saline injected for shunt evaluation. No evidence of patent foramen ovale. Right Atrium  Normal right atrium size. Mitral Valve  Structurally normal mitral valve. Physiologic and/or trace mitral regurgitation is present.    Tricuspid Valve  The tricuspid valve appears structurally normal.   Aortic Valve  Structurally normal aortic valve. Pulmonic Valve  Pulmonic valve is structurally normal.   Pericardial Effusion  No pericardial effusion. Aorta  Aortic root dimension within normal limits. No significant plaque noted in the descending aorta. Miscellaneous  No valvular vegetation or vegetation of venous catheter. Conclusions   Summary  Normal left ventricular size and systolic function. Physiologic and/or trace mitral regurgitation is present. No valvular vegetation or vegetation of venous catheter. Signature   ----------------------------------------------------------------  Electronically signed by Terrance Blake MD(Interpreting  physician) on 11/30/2020 04:56 PM  ----------------------------------------------------------------  [email or web address] [id number]    Ct Abdomen Pelvis Wo Contrast Additional Contrast? None    Result Date: 11/29/2020  EXAMINATION: CT OF THE ABDOMEN AND PELVIS WITHOUT CONTRAST 11/29/2020 1:09 pm TECHNIQUE: CT of the abdomen and pelvis was performed without the administration of intravenous contrast. Multiplanar reformatted images are provided for review. Dose modulation, iterative reconstruction, and/or weight based adjustment of the mA/kV was utilized to reduce the radiation dose to as low as reasonably achievable. COMPARISON: September 16, 2019 HISTORY: ORDERING SYSTEM PROVIDED HISTORY: Abdomen pain TECHNOLOGIST PROVIDED HISTORY: Reason for exam:->Abdomen pain Additional Contrast?->None What reading provider will be dictating this exam?->CRC FINDINGS: The lung bases demonstrate patchy multifocal infiltrates. 3-5 mm nonspecific nodules are identified in the right lung base. There is hepatomegaly with liver measuring 19 cm with fatty infiltration. Gallbladder is absent. A G-tube is noted.   Spleen, pancreas, and adrenals abnormality. Ct Abdomen Pelvis W Iv Contrast Additional Contrast? None    Result Date: 12/9/2020  EXAMINATION: CTA OF THE CHEST; CT OF THE ABDOMEN AND PELVIS WITH CONTRAST 12/9/2020 2:57 am TECHNIQUE: CTA of the chest was performed after the administration of intravenous contrast.  Multiplanar reformatted images are provided for review. MIP images are provided for review. Dose modulation, iterative reconstruction, and/or weight based adjustment of the mA/kV was utilized to reduce the radiation dose to as low as reasonably achievable.; CT of the abdomen and pelvis was performed with the administration of intravenous contrast. Multiplanar reformatted images are provided for review. Dose modulation, iterative reconstruction, and/or weight based adjustment of the mA/kV was utilized to reduce the radiation dose to as low as reasonably achievable. COMPARISON: 11/29/2020 and 10/28/2020 HISTORY: ORDERING SYSTEM PROVIDED HISTORY: r/o PE TECHNOLOGIST PROVIDED HISTORY: Reason for exam:->r/o PE What reading provider will be dictating this exam?->CRC; ORDERING SYSTEM PROVIDED HISTORY: Abdominal pain TECHNOLOGIST PROVIDED HISTORY: Additional Contrast?->None Reason for exam:->Abdominal pain What reading provider will be dictating this exam?->CRC Chest pain FINDINGS: Pulmonary Arteries: Evaluation is compromised by motion artifact. Despite this limitation pulmonary emboli are identified both upper lobes and the right lower lobe and probably in the middle lobe. Main pulmonary artery is normal in caliber. Mediastinum: Mediastinal and bilateral hilar adenopathy is again noted. The heart and pericardium demonstrate no acute abnormality. There is no acute abnormality of the thoracic aorta. Tracheostomy tube is noted. Lungs/pleura: There is no pneumothorax or pleural effusion. There has been interval improved in bilateral airspace opacities. Organs: The liver, spleen, pancreas, adrenal glands and kidneys are unremarkable. Adjacent cysts are identified in the lower pole of the right kidney. GI/Bowel: Percutaneous gastrostomy tube remains in place. Small bowel caliber is normal.  The appendix is normal.  The colon is unremarkable. Pelvis: The urinary bladder is grossly negative though there is minimal adjacent fat stranding. Peritoneum/Retroperitoneum: No adenopathy, mesenteric stranding or free fluid. Aortic caliber is normal. Soft Tissues/Bones: No acute bone or soft tissue abnormality. 1. Bilateral pulmonary emboli. 2. Improving bilateral airspace opacities. 3. Fat stranding adjacent to the urinary bladder may indicate cystitis. Critical results were called by Dr. Pablo Denson MD to Karen Feliciano on 2020 at 03:23. Ir Venogram Upper Extremity Right    Result Date: 2020  Patient MRN:  [id number] : 1960 Age: 61 years Gender: Male Order Date:  2020 7:45 AM EXAM: IR VENOGRAM UPPER EXTREMITY RIGHT, IR SPARKLE CATH PLACE VENOUS 2ND+ ORDER, IR TUNNELED CVC PLACE WO SQ PORT/PUMP > 5 YEARS, IR ULTRASOUND GUIDANCE VASCULAR ACCESS, IR FLUORO GUIDED CVA DEVICE PLMT/REPLACE/REMOVAL NUMBER OF IMAGES:  14 INDICATION: Z79.2 Long term (current) use of antibiotics Long term (current) use of antibiotics What reading provider will be dictating this exam?->[facility] COMPARISON: None FLUORO TIME: 00:04:26 DAP: 1965.9 uGym2 AK: 77.1 mGy PICC Catheter Placement and Upper Extremity Venous Ultrasound, Procedure: After obtaining informed consent, and following the routine sterile preparation and drape, the right basilic vein was accessed with ultrasound guidance. IV contrast was injected which demonstrated extravasation into the soft tissue. The decision was then made to place a right tunneled IJ PICC. Using direct real-time ultrasound imaging vascular access was obtained to the right IJ. An image was stored and copy was transmitted to PACS.  Subsequently with real-time guidance a needle was inserted intravascular and through the needle a wire. A subcutaneous tunnel was then created along the right upper chest. Subsequently under fluoroscopic guidance, a system of needles, catheters and guidewires were utilized to place a guidewire and catheter in the venous system and the catheter was advanced and appropriately placed at the level of the junction of the superior vena cava and right atrium. Time out occurred at 08:33 AM. Patient received 4 minutes and 26 seconds of fluoroscopy. A fluoroscopic image of the position of the catheter tip was saved in PACS. Successful uncomplicated placement of a right IJ tunneled PICC catheter. Ir Laverta Atif Device Plmt/replace/removal    Result Date: 2020  Patient MRN:  [id number] : 1960 Age: 61 years Gender: Male Order Date:  2020 7:45 AM EXAM: IR VENOGRAM UPPER EXTREMITY RIGHT, IR SPARKLE CATH PLACE VENOUS 2ND+ ORDER, IR TUNNELED CVC PLACE WO SQ PORT/PUMP > 5 YEARS, IR ULTRASOUND GUIDANCE VASCULAR ACCESS, IR FLUORO GUIDED CVA DEVICE PLMT/REPLACE/REMOVAL NUMBER OF IMAGES:  14 INDICATION: Z79.2 Long term (current) use of antibiotics Long term (current) use of antibiotics What reading provider will be dictating this exam?->[facility] COMPARISON: None FLUORO TIME: 00:04:26 DAP: 1965.9 uGym2 AK: 77.1 mGy PICC Catheter Placement and Upper Extremity Venous Ultrasound, Procedure: After obtaining informed consent, and following the routine sterile preparation and drape, the right basilic vein was accessed with ultrasound guidance. IV contrast was injected which demonstrated extravasation into the soft tissue. The decision was then made to place a right tunneled IJ PICC. Using direct real-time ultrasound imaging vascular access was obtained to the right IJ. An image was stored and copy was transmitted to PACS. Subsequently with real-time guidance a needle was inserted intravascular and through the needle a wire.  A subcutaneous tunnel was then created along the right upper chest. Subsequently under fluoroscopic guidance, a system of needles, catheters and guidewires were utilized to place a guidewire and catheter in the venous system and the catheter was advanced and appropriately placed at the level of the junction of the superior vena cava and right atrium. Time out occurred at 08:33 AM. Patient received 4 minutes and 26 seconds of fluoroscopy. A fluoroscopic image of the position of the catheter tip was saved in PACS. Successful uncomplicated placement of a right IJ tunneled PICC catheter. Xr Chest Portable    Result Date: 12/9/2020  EXAMINATION: ONE XRAY VIEW OF THE CHEST 12/9/2020 12:07 am COMPARISON: November 25 HISTORY: ORDERING SYSTEM PROVIDED HISTORY: chest pain TECHNOLOGIST PROVIDED HISTORY: Reason for exam:->chest pain What reading provider will be dictating this exam?->CRC FINDINGS: Central line and tracheostomy cannula, unchanged since the prior examination. Cardiac silhouette at the upper limits of normal. Almost complete resolution of previously noted areas of airspace disease in the left mid lung and right upper lobe. Pulmonary vasculature within normal limits. Almost complete resolution of previously noted areas of airspace disease in the left mid lung and right upper lobe. Xr Chest Portable    Result Date: 11/25/2020  EXAMINATION: ONE XRAY VIEW OF THE CHEST 11/25/2020 8:08 am COMPARISON: One-view chest x-ray 11/24/2020 HISTORY: ORDERING SYSTEM PROVIDED HISTORY: resp dist TECHNOLOGIST PROVIDED HISTORY: Reason for exam:->resp dist What reading provider will be dictating this exam?->CRC FINDINGS: There is significant interval increase in pulmonary consolidation, most notably in the upper-mid right lung and also in the left lung base. The cardiomediastinal silhouette is within normal limits for AP technique. A right internal jugular central venous catheter appears to terminate in the region of the right atrium.   There is partial obscuration of the left costophrenic sulcus, which could be secondary to consolidation or small pleural effusion. There is evidence of a tracheostomy tube. Degenerative/arthritic changes are present in the spine and the right acromioclavicular joint. EKG leads overlie the chest.    1. There is interval progression of bilateral consolidation, compatible with worsening pneumonia. 2. A right internal jugular central venous catheter appears to terminate in the region of the right atrium. May consider withdrawal by approximately 4 cm to ensure non atrial in location. Xr Chest Portable    Result Date: 11/24/2020  EXAMINATION: ONE XRAY VIEW OF THE CHEST 11/24/2020 9:54 am COMPARISON: November 23, 2020 HISTORY: ORDERING SYSTEM PROVIDED HISTORY: SOB TECHNOLOGIST PROVIDED HISTORY: Reason for exam:->SOB What reading provider will be dictating this exam?->CRC FINDINGS: Right IJ approach central venous catheter again identified in unchanged position. Tracheostomy again identified. The cardiac silhouette is enlarged but stable in size. There are bilateral airspace opacities, without interval change. No pleural effusions or pneumothorax. No interval change in bilateral airspace opacities. Xr Chest Portable    Result Date: 11/23/2020  EXAMINATION: ONE XRAY VIEW OF THE CHEST 11/23/2020 7:40 am COMPARISON: 11/20/2020 HISTORY: ORDERING SYSTEM PROVIDED HISTORY: SOB TECHNOLOGIST PROVIDED HISTORY: Reason for exam:->SOB What reading provider will be dictating this exam?->CRC FINDINGS: There is stable position of the tracheostomy tube. There is a right subclavian central venous catheter. Patchy multifocal pulmonary infiltrates are noted. More prominent within the left lung. 1. There are patchy multifocal bilateral pulmonary infiltrates more prominent within the left lower lobe.     Xr Chest Portable    Result Date: 11/22/2020  EXAMINATION: ONE XRAY VIEW OF THE CHEST 11/22/2020 8:09 am COMPARISON: 11/21/2020 HISTORY: ORDERING SYSTEM PROVIDED HISTORY: SOB TECHNOLOGIST 11/19/2020  EXAMINATION: ONE XRAY VIEW OF THE CHEST 11/19/2020 9:56 am COMPARISON: November 15-18 HISTORY: ORDERING SYSTEM PROVIDED HISTORY: SOB TECHNOLOGIST PROVIDED HISTORY: Reason for exam:->SOB What reading provider will be dictating this exam?->CRC FINDINGS: Some improvement of the bilateral discrete ground-glass densities throughout both lungs predominant in the perihilar regions. The can be an indication for volume overload. No pleural effusions are seen. The heart is normal size. Some improvement in pattern that can represent volume overload since the November 18. Xr Chest Portable    Result Date: 11/18/2020  EXAMINATION: ONE XRAY VIEW OF THE CHEST 11/18/2020 7:39 am COMPARISON: 11/17/2020 and 11/16/2020 HISTORY: ORDERING SYSTEM PROVIDED HISTORY: SOB TECHNOLOGIST PROVIDED HISTORY: Reason for exam:->SOB What reading provider will be dictating this exam?->CRC FINDINGS: There is no interval change in the multifocal bilateral significant airspace disease. There is no right or left pleural effusion. The cardiac silhouette is at upper limits of normal.  There is stable position of the left internal jugular central venous catheter. 1. No interval change in extensive bilateral multifocal airspace disease. Xr Chest Portable    Result Date: 11/17/2020  EXAMINATION: ONE XRAY VIEW OF THE CHEST 11/17/2020 5:58 pm COMPARISON: November 16, 2020. HISTORY: ORDERING SYSTEM PROVIDED HISTORY: SOB TECHNOLOGIST PROVIDED HISTORY: Reason for exam:->SOB What reading provider will be dictating this exam?->CRC FINDINGS: There are bilateral patchy infiltrates. Left IJ line catheter tip in the region of superior vena cava. Tracheostomy to, unchanged. The heart is mildly enlarged. There is blunting of the left costophrenic angle. Bilateral patchy infiltrates, no significant interval change.     Xr Chest Portable    Result Date: 11/16/2020  EXAMINATION: ONE XRAY VIEW OF THE CHEST 11/16/2020 9:04 am COMPARISON: Tracheostomy tube in good position. Heart appears to be mildly enlarged. The Bilateral infiltrates observed predominant in the right lung as seen previously, more limited to the base on the left. Some degree of atelectasis appears to be also present in left lower lobe. Left internal jugular central venous catheter tip in the SVC. No significant change since  study     Xr Chest Portable    Result Date: 2020  EXAMINATION: ONE X-RAY VIEW OF THE CHEST 2020 8:01 am COMPARISON: 2020 HISTORY: ORDERING SYSTEM PROVIDED HISTORY: SOB TECHNOLOGIST PROVIDED HISTORY: Reason for exam:->SOB What reading provider will be dictating this exam?->CRC FINDINGS: Patchy bilateral pulmonary infiltrates are noted unchanged when compared to the prior study. Note is made of a tracheostomy tube and left internal jugular central venous catheter. 1. There is no interval change in the bilateral multifocal patchy pulmonary infiltrates. 2. Probable small left pleural effusion. Ir Tunneled Cvc Place Wo Sq Port/pump > 5 Years    Result Date: 2020  Patient MRN:  [id number] : 1960 Age: 61 years Gender: Male Order Date:  2020 7:45 AM EXAM: IR VENOGRAM UPPER EXTREMITY RIGHT, IR SPARKLE CATH PLACE VENOUS 2ND+ ORDER, IR TUNNELED CVC PLACE WO SQ PORT/PUMP > 5 YEARS, IR ULTRASOUND GUIDANCE VASCULAR ACCESS, IR FLUORO GUIDED CVA DEVICE PLMT/REPLACE/REMOVAL NUMBER OF IMAGES:  14 INDICATION: Z79.2 Long term (current) use of antibiotics Long term (current) use of antibiotics What reading provider will be dictating this exam?->[facility] COMPARISON: None FLUORO TIME: 00:04:26 DAP: 1965.9 uGym2 AK: 77.1 mGy PICC Catheter Placement and Upper Extremity Venous Ultrasound, Procedure: After obtaining informed consent, and following the routine sterile preparation and drape, the right basilic vein was accessed with ultrasound guidance. IV contrast was injected which demonstrated extravasation into the soft tissue.  The decision was then made to place a right tunneled IJ PICC. Using direct real-time ultrasound imaging vascular access was obtained to the right IJ. An image was stored and copy was transmitted to PACS. Subsequently with real-time guidance a needle was inserted intravascular and through the needle a wire. A subcutaneous tunnel was then created along the right upper chest. Subsequently under fluoroscopic guidance, a system of needles, catheters and guidewires were utilized to place a guidewire and catheter in the venous system and the catheter was advanced and appropriately placed at the level of the junction of the superior vena cava and right atrium. Time out occurred at 08:33 AM. Patient received 4 minutes and 26 seconds of fluoroscopy. A fluoroscopic image of the position of the catheter tip was saved in PACS. Successful uncomplicated placement of a right IJ tunneled PICC catheter. Cta Chest W Contrast    Result Date: 12/9/2020  EXAMINATION: CTA OF THE CHEST; CT OF THE ABDOMEN AND PELVIS WITH CONTRAST 12/9/2020 2:57 am TECHNIQUE: CTA of the chest was performed after the administration of intravenous contrast.  Multiplanar reformatted images are provided for review. MIP images are provided for review. Dose modulation, iterative reconstruction, and/or weight based adjustment of the mA/kV was utilized to reduce the radiation dose to as low as reasonably achievable.; CT of the abdomen and pelvis was performed with the administration of intravenous contrast. Multiplanar reformatted images are provided for review. Dose modulation, iterative reconstruction, and/or weight based adjustment of the mA/kV was utilized to reduce the radiation dose to as low as reasonably achievable.  COMPARISON: 11/29/2020 and 10/28/2020 HISTORY: ORDERING SYSTEM PROVIDED HISTORY: r/o PE TECHNOLOGIST PROVIDED HISTORY: Reason for exam:->r/o PE What reading provider will be dictating this exam?->CRC; ORDERING SYSTEM PROVIDED HISTORY: Abdominal pain TECHNOLOGIST PROVIDED HISTORY: Additional Contrast?->None Reason for exam:->Abdominal pain What reading provider will be dictating this exam?->CRC Chest pain FINDINGS: Pulmonary Arteries: Evaluation is compromised by motion artifact. Despite this limitation pulmonary emboli are identified both upper lobes and the right lower lobe and probably in the middle lobe. Main pulmonary artery is normal in caliber. Mediastinum: Mediastinal and bilateral hilar adenopathy is again noted. The heart and pericardium demonstrate no acute abnormality. There is no acute abnormality of the thoracic aorta. Tracheostomy tube is noted. Lungs/pleura: There is no pneumothorax or pleural effusion. There has been interval improved in bilateral airspace opacities. Organs: The liver, spleen, pancreas, adrenal glands and kidneys are unremarkable. Adjacent cysts are identified in the lower pole of the right kidney. GI/Bowel: Percutaneous gastrostomy tube remains in place. Small bowel caliber is normal.  The appendix is normal.  The colon is unremarkable. Pelvis: The urinary bladder is grossly negative though there is minimal adjacent fat stranding. Peritoneum/Retroperitoneum: No adenopathy, mesenteric stranding or free fluid. Aortic caliber is normal. Soft Tissues/Bones: No acute bone or soft tissue abnormality. 1. Bilateral pulmonary emboli. 2. Improving bilateral airspace opacities. 3. Fat stranding adjacent to the urinary bladder may indicate cystitis. Critical results were called by Dr. Isatu Burris MD to [facility] Sender on 2020 at 03:23.     Ir Sparkle Cath Place Venous 2nd+ Order    Result Date: 2020  Patient MRN:  [id number] : 1960 Age: 61 years Gender: Male Order Date:  2020 7:45 AM EXAM: IR VENOGRAM UPPER EXTREMITY RIGHT, IR SPARKLE CATH PLACE VENOUS 2ND+ ORDER, IR TUNNELED CVC PLACE WO SQ PORT/PUMP > 5 YEARS, IR ULTRASOUND GUIDANCE VASCULAR ACCESS, IR FLUORO GUIDED CVA DEVICE PLMT/REPLACE/REMOVAL NUMBER OF IMAGES:  14 INDICATION: Z79.2 Long term (current) use of antibiotics Long term (current) use of antibiotics What reading provider will be dictating this exam?->[facility] COMPARISON: None FLUORO TIME: 00:04:26 DAP: 1965.9 uGym2 AK: 77.1 mGy PICC Catheter Placement and Upper Extremity Venous Ultrasound, Procedure: After obtaining informed consent, and following the routine sterile preparation and drape, the right basilic vein was accessed with ultrasound guidance. IV contrast was injected which demonstrated extravasation into the soft tissue. The decision was then made to place a right tunneled IJ PICC. Using direct real-time ultrasound imaging vascular access was obtained to the right IJ. An image was stored and copy was transmitted to PACS. Subsequently with real-time guidance a needle was inserted intravascular and through the needle a wire. A subcutaneous tunnel was then created along the right upper chest. Subsequently under fluoroscopic guidance, a system of needles, catheters and guidewires were utilized to place a guidewire and catheter in the venous system and the catheter was advanced and appropriately placed at the level of the junction of the superior vena cava and right atrium. Time out occurred at 08:33 AM. Patient received 4 minutes and 26 seconds of fluoroscopy. A fluoroscopic image of the position of the catheter tip was saved in PACS. Successful uncomplicated placement of a right IJ tunneled PICC catheter.     Ir Ultrasound Guidance Vascular Access    Result Date: 2020  Patient MRN:  [id number] : 1960 Age: 61 years Gender: Male Order Date:  2020 7:45 AM EXAM: IR VENOGRAM UPPER EXTREMITY RIGHT, IR SPARKLE CATH PLACE VENOUS 2ND+ ORDER, IR TUNNELED CVC PLACE WO SQ PORT/PUMP > 5 YEARS, IR ULTRASOUND GUIDANCE VASCULAR ACCESS, IR FLUORO GUIDED CVA DEVICE PLMT/REPLACE/REMOVAL NUMBER OF IMAGES:  14 INDICATION: Z79.2 Long term (current) use of antibiotics Long term (current) use of antibiotics What reading provider will be dictating this exam?->[facility] COMPARISON: None FLUORO TIME: 00:04:26 DAP: 1965.9 uGym2 AK: 77.1 mGy PICC Catheter Placement and Upper Extremity Venous Ultrasound, Procedure: After obtaining informed consent, and following the routine sterile preparation and drape, the right basilic vein was accessed with ultrasound guidance. IV contrast was injected which demonstrated extravasation into the soft tissue. The decision was then made to place a right tunneled IJ PICC. Using direct real-time ultrasound imaging vascular access was obtained to the right IJ. An image was stored and copy was transmitted to PACS. Subsequently with real-time guidance a needle was inserted intravascular and through the needle a wire. A subcutaneous tunnel was then created along the right upper chest. Subsequently under fluoroscopic guidance, a system of needles, catheters and guidewires were utilized to place a guidewire and catheter in the venous system and the catheter was advanced and appropriately placed at the level of the junction of the superior vena cava and right atrium. Time out occurred at 08:33 AM. Patient received 4 minutes and 26 seconds of fluoroscopy. A fluoroscopic image of the position of the catheter tip was saved in PACS. Successful uncomplicated placement of a right IJ tunneled PICC catheter. Fluoroscopy Modified Barium Swallow With Video    Result Date: 12/11/2020  EXAMINATION: MODIFIED BARIUM SWALLOW WAS PERFORMED IN CONJUNCTION WITH SPEECH PATHOLOGY SERVICES TECHNIQUE: Fluoroscopic evaluation of the swallowing mechanism was performed with multiple consistency of barium product. FLUOROSCOPY DOSE AND TYPE OR TIME AND EXPOSURES: Fluoroscopy time 1.5 minutes.   Dose area product 16 mGy COMPARISON: None HISTORY: ORDERING SYSTEM PROVIDED HISTORY: evaluation for possible PO diet TECHNOLOGIST PROVIDED HISTORY: Reason for exam:->evaluation for possible PO diet What reading provider will be dictating this exam?->[facility] Dysphagia FINDINGS: Oral phase of swallowing was grossly within normal limits. There was a pharyngeal delay and laryngeal elevation was reduced. No retention was seen in the vallecular or piriform sinuses. No evidence of laryngeal penetration or aspiration. No evidence of aspiration or laryngeal penetration. Please see separate speech pathology report for full discussion of findings and recommendations. Us Dup Lower Extremities Bilateral Venous    Result Date: 2020  Patient MRN:  [id number] : 1960 Age: 61 years Gender: Male Order Date:  2020 7:56 PM EXAM: US DUP LOWER EXTREMITIES BILATERAL VENOUS NUMBER OF IMAGES:  37 INDICATION:  swelling, lymphedema, PE, r/o DVT swelling, lymphedema, PE, r/o DVT What reading provider will be dictating this exam?->[facility] COMPARISON: None Within the visualized vessels, there is no evidence for deep venous thrombosis There is good compressibility, there is good augmentation, there is good color flow.      Within the visualized vessels there is no evidence for deep venous thrombosis      DIET:  DIET GENERAL;    Medications:    Scheduled Meds:   busPIRone  5 mg Oral TID    apixaban  10 mg Oral BID    valproic acid  250 mg Per G Tube 3 times per day    amLODIPine  10 mg Oral Daily    Arformoterol Tartrate  15 mcg Nebulization BID    budesonide  0.5 mg Nebulization BID    carvedilol  6.25 mg Oral BID WC    chlorhexidine  15 mL Mouth/Throat BID    fluconazole  200 mg Oral Daily    ipratropium-albuterol  3 mL Inhalation Q4H WA    lansoprazole  30 mg Per G Tube QAM AC    levothyroxine  50 mcg Oral Daily    senna  2 tablet Oral Nightly    thiamine  100 mg Oral Daily    sodium chloride flush  10 mL Intravenous 2 times per day    linezolid  600 mg Oral 2 times per day       Continuous Infusions:    PRN Meds:oxyCODONE-acetaminophen, diphenhydrAMINE, colchicine, polyvinyl alcohol, sodium chloride flush, acetaminophen **OR** acetaminophen, polyethylene glycol, promethazine **OR** ondansetron    A/P:      Patient Active Problem List   Diagnosis    Hyperlipidemia    Type 2 diabetes mellitus without complication, without long-term current use of insulin ([facility] [address]    Atypical chest pain    Essential hypertension    Chronic acquired lymphedema    Aortic valve disease    Morbid obesity due to excess calories (HCC)    Abdominal pain    Acute respiratory failure with hypoxia (HCC)    Acute pulmonary edema (HCC)    Congestive heart failure with LV diastolic dysfunction, NYHA class 3 (HCC)    Obstructive sleep apnea    Acquired hypothyroidism    Chronic diastolic heart failure (HCC)    Nonrheumatic aortic valve stenosis    ACE-inhibitor cough    Pneumonia due to organism    Acute gout of right knee    Bilateral pulmonary embolism (HCC)    1.) B/L Pulmonary Embolism - subsegmental, not massive   2.) Acute hypoxemic respiratory failure on mechanical ventilation s/p trach 11/12/2020 with BBEPIJ 8 YODRTQQR XLT  3.) Metabolic encephalopathy history of EtOH abuse with agitation on ( Depakote, and Seroquel  )significantly improved  4.) HANS moderate ( AHI 17 on 4/18/18 requires BIPAP 16/12) noncompliant with CPAP  5. ) History of Reactive Airway Disease Syndreom (RADS)  6.) Stable Pulmonary Nodules - radiographically stable since CT 10/2017 to 3/2018  7.) Diabetes Mellitus  8.) Obesity  9.) EF 19% LVH diastolic dysfunction  10.) Chronic Lymphedema  11. ) History of Gout - on colchicine  12. ) Hypothyroidism  13. ) H/o respiratory failure: 7/8/2017 pt was  transferred [facility] for acute respiratory failure after lap cholecystectomy, lap umbilical hernia repair, and lap liver biopsy (fatty liver). Patient was then transferred to [facility] hospital where he was weaned off the ventilator and decannulated. 14.) Ventilator associated pneumonia - treated   15. ) Alcohol Withdrawal - improved  16. ) Abdominal Pains - resolved    PLAN:  ELIQUIS FOR

## 2020-12-15 NOTE — PROGRESS NOTES
SPEECH LANGUAGE PATHOLOGY  DAILY PROGRESS NOTE        PATIENT NAME:  Ellen Cid      :  1960          TODAY'S DATE:  12/15/2020 ROOM:  [id number]    Consult received for assessment of integrity of voice following PMV placement. Chart reviewed. RN cleared Pt for participation in assessment?: Yes-- RN present for entire assessmet    Patient seen upright in bed. No significant amount of copious secretions noted from trach site. Currently has Shiley #6 XLT, cuffed (deflated upon arrival)/unfenestrated trach tube in place with trach mask. RN reported that cuff was deflated upon entering room; SLP placed warning sticker on  balloon to ensure cuff is always deflated when PMV in place. SLP placed PMV on outer cannula without incident. Patient was immediately able to achieve purposeful voicing of adequate intensity with strained quality for a variety of structured/unstructured verbal tasks. No distress noted, SpO2 99 and HR 85-90 . When PMV removed, Pt did present with mild to moderate amount of backflow (release of air from trache site secondary to incomplete exhalation with PMV in place). Pt acknowledged he heard it; education provided r/t cause of backflow. RN reported that fenestrated trach may be placed-- SLP suspect that it will allow for increased voicing. Completed education with patient and nursing staff -- valve should only be worn once secretions are diminished. Excessive secretions will compromise the integrity of the valve. Do NOT wear valve while sleeping. Encourage placement of valve for meals if appropriate. Nursing aware. Cuff left deflated (as it was upon entering room) after completion of session. Education completed with RN and Pt. Recommend that Pt wear valve with SLP only. Provided extensive education to Pt r/t POC with PMV, benefits of use, and explained how it improves overal respiratory system. Pt voiced he was nervous, re-assurance provided.  Will cont        CPT code(s) [id number]  voice prosthesis eval  [id number]  voice prosthesis modification  Total minutes :  25 minutes

## 2020-12-16 NOTE — PROGRESS NOTES
[address] [place] [address] [facility]  Progress Note    SUBJECTIVE:  Chief Complaint   Patient presents with    Chest Pain     sharp 10/10 CP worse when coughing onset 2 days. C/O cough and chills @ night. hx heart and respiratory failure, trach mask on 6L. Patient is tolerating medications. No reported adverse drug reactions. No nausea, vomiting, diarrhea. Afebrile. 40% FiO2 10L via trach mask 100% SpO2. Having some anxiety today. Working with speech therapy. Review of systems:  As stated above in the chief complaint, otherwise negative. Medications:  Scheduled Meds:   apixaban  10 mg Oral BID    valproic acid  250 mg Per G Tube 3 times per day    amLODIPine  10 mg Oral Daily    Arformoterol Tartrate  15 mcg Nebulization BID    budesonide  0.5 mg Nebulization BID    carvedilol  6.25 mg Oral BID WC    chlorhexidine  15 mL Mouth/Throat BID    fluconazole  200 mg Oral Daily    ipratropium-albuterol  3 mL Inhalation Q4H WA    lansoprazole  30 mg Per G Tube QAM AC    levothyroxine  50 mcg Oral Daily    senna  2 tablet Oral Nightly    thiamine  100 mg Oral Daily    sodium chloride flush  10 mL Intravenous 2 times per day    linezolid  600 mg Oral 2 times per day     Continuous Infusions:    PRN Meds:ALPRAZolam, oxyCODONE-acetaminophen, diphenhydrAMINE, colchicine, polyvinyl alcohol, sodium chloride flush, acetaminophen **OR** acetaminophen, polyethylene glycol, promethazine **OR** ondansetron    OBJECTIVE:  BP (!) 155/71   Pulse 101   Temp 96.7 °F (35.9 °C) (Temporal)   Resp 13   Ht 5' 9\" (1.753 m)   Wt (!) 324 lb (147 kg)   SpO2 100%   BMI 47.85 kg/m²   Temp  Av °F (36.7 °C)  Min: 96.5 °F (35.8 °C)  Max: 99.1 °F (37.3 °C)  Constitutional: The patient is awake, alert, and oriented. NAD. Working with speech with the speaking valve  Skin: Warm and dry. No rashes were noted. HEENT: Round and reactive pupils. Moist mucous membranes. No ulcerations.  NO thrush-- it is resolved. Neck: Supple to movements. Trach with trach mask  Chest: No use of accessory muscles to breathe. Symmetrical expansion. No wheezing, crackles or rhonchi. Aeration improved. Diminished right base. Cardiovascular: S1 and S2 are rhythmic and regular. No murmurs appreciated. Abdomen: Positive bowel sounds to auscultation. Benign to palpation. No masses felt. No hepatosplenomegaly. Extremities: No clubbing, no cyanosis, no edema. Lines: PICC 11/19/2020 right chest    Laboratory and Tests Review:  Lab Results   Component Value Date    WBC 5.9 12/15/2020    WBC 5.7 12/13/2020    WBC 6.3 12/11/2020    HGB 8.5 (L) 12/15/2020    HCT 26.5 (L) 12/15/2020    MCV 81.3 12/15/2020     12/15/2020     Lab Results   Component Value Date    NEUTROABS 2.48 12/10/2020    NEUTROABS 3.40 12/09/2020    NEUTROABS 3.72 12/03/2020     No results found for: [facility]  Lab Results   Component Value Date    ALT 45 (H) 12/09/2020    AST 27 12/09/2020    ALKPHOS 90 12/09/2020    BILITOT <0.2 12/09/2020     Lab Results   Component Value Date     12/09/2020    K 4.4 12/09/2020    K 4.0 10/27/2020    CL 93 12/09/2020    CO2 32 12/09/2020    BUN 17 12/09/2020    CREATININE 1.5 12/09/2020    CREATININE 1.1 12/03/2020    CREATININE 1.1 12/02/2020    GFRAA 58 12/09/2020    LABGLOM 58 12/09/2020    GLUCOSE 109 12/09/2020    PROT 7.6 12/09/2020    LABALBU 3.7 12/09/2020    CALCIUM 9.6 12/09/2020    BILITOT <0.2 12/09/2020    ALKPHOS 90 12/09/2020    AST 27 12/09/2020    ALT 45 12/09/2020     Lab Results   Component Value Date    CRP 1.4 (H) 11/02/2020    CRP 2.8 (H) 09/04/2017    CRP 10.5 (H) 08/28/2017     Lab Results   Component Value Date    SEDRATE 135 (H) 09/04/2017    SEDRATE 150 (H) 08/28/2017    SEDRATE 141 (H) 08/21/2017     Radiology:  reviewed    Microbiology:   Lab Results   Component Value Date    BC 5 Days no growth 12/09/2020    BC  11/25/2020     No antibiotic susceptibility studies performed.   Plates will be held resistant Staph aureus not isolated  Final   07/28/2017 Methicillin resistant Staph aureus not isolated  Final     No results for input(s): PROCAL in the last 72 hours. ASSESSMENT:  · MRSA pneumonia-improved   · thrush and esophagitis-improved on diflucan   · Shortness of breath associated with PE    PLAN:  · Continue zyvox until 12/17/2020 & diflucan 200mg PO daily- stop when zyvox stopped. · For discharge tomorrow. · PICC should be removed prior to discharge  · Check final cultures  · Monitor labs    Hernesto Magallanes  12:56 PM  12/16/2020   Pt seen and examined. Above discussed agree with advanced practice nurse. Labs, cultures, and radiographs reviewed. Face to Face encounter occurred. Changes made as necessary.      Berta Salcido MD

## 2020-12-16 NOTE — CARE COORDINATION
Spoke to [facility] trach supplies and plans to accept tomorrow when supplies in order. Requesting a spare trach be sent with patient at discharge. Wife aware of plan and agreeable.  updated. Covid 12/14 (-).  Ambulance form on soft chart    Nieves Hirsch RN  [facility] Case Management  [phone]

## 2020-12-16 NOTE — PROGRESS NOTES
Patient is not to be downsized or decannulated. He was a very difficult trach placement and is noncompliant with his CPAP. Likely will have tracheostomy for life. Patient can see Dr. Bianca Ahumada in a few weeks for trach change.      Electronically signed by Angelique Castillo DO on 12/16/2020 at 9:06 AM

## 2020-12-16 NOTE — PROGRESS NOTES
SPEECH LANGUAGE PATHOLOGY  DAILY PROGRESS NOTE        PATIENT NAME:  Bharath Marie      :  1960          TODAY'S DATE:  2020 ROOM:  2725/4259-X    Pt seen for [address] training. Pt politely declined placement this state, as it made him nervous and he communicated \"I hate this thing\" in reference to his trach. Per ENT not, Pt is not to be de-cannulated or downsized (suspect will not tolerate fenestrated trach with that either- defer to ENT). Pt edu'd by SLP and SW that trach will remain in at this time, and SLP provided further edu r/t benefits of PMV use for respiratory system and swallow function however Pt still politely declining. Is able to complete phonation with digital occlusion, and weak phonation around trache. Will cont POC.         CPT code(s) [id number]  voice prosthesis modification  Total minutes :  15 minutes

## 2020-12-16 NOTE — PROGRESS NOTES
Pulmonary Progress Note  12/16/2020 12:45 PM  Subjective:   Admit Date: 12/9/2020  PCP: Alma Bond MD  Interval History: Says he is ready to think about wearing the BiPAP on his face at night. He is going to think it over with his family    Diet: DIET GENERAL;  SOB is: Mild  Cough: Moderate  Wheezing: None  chest pain: None    Data:   Scheduled Meds:    apixaban  10 mg Oral BID    valproic acid  250 mg Per G Tube 3 times per day    amLODIPine  10 mg Oral Daily    Arformoterol Tartrate  15 mcg Nebulization BID    budesonide  0.5 mg Nebulization BID    carvedilol  6.25 mg Oral BID WC    chlorhexidine  15 mL Mouth/Throat BID    fluconazole  200 mg Oral Daily    ipratropium-albuterol  3 mL Inhalation Q4H WA    lansoprazole  30 mg Per G Tube QAM AC    levothyroxine  50 mcg Oral Daily    senna  2 tablet Oral Nightly    thiamine  100 mg Oral Daily    sodium chloride flush  10 mL Intravenous 2 times per day    linezolid  600 mg Oral 2 times per day       Continuous Infusions:     PRN Meds: ALPRAZolam, oxyCODONE-acetaminophen, diphenhydrAMINE, colchicine, polyvinyl alcohol, sodium chloride flush, acetaminophen **OR** acetaminophen, polyethylene glycol, promethazine **OR** ondansetron    I/O last 3 completed shifts: In: 300 [P.O.:300]  Out: 450 [Urine:450]    I/O this shift:  In: -   Out: 400 [Urine:400]      Intake/Output Summary (Last 24 hours) at 12/16/2020 1245  Last data filed at 12/16/2020 0926  Gross per 24 hour   Intake 300 ml   Output 850 ml   Net -550 ml       No data found.       Recent Labs     12/15/20  0550   WBC 5.9   HGB 8.5*   HCT 26.5*   MCV 81.3        CPK:  Lab Results   Component Value Date    CKTOTAL 98 10/08/2017       -----------------------------------------------------------------  RAD:   Results for orders placed during the hospital encounter of 12/09/20   XR CHEST PORTABLE    Narrative EXAMINATION:  ONE XRAY VIEW OF THE CHEST  12/9/2020 12:07 am  COMPARISON:  November showing dense multifocal airspace disease 11/8 patient extubated and reintubated the same day 11/11 PEG tube placed unable to place tracheostomy due to scar tissue.  Neck CT showing calcification at level of critical thyroid membrane 11/12 tracheostomy placed by ENT. Mauricio Dallas aspirated post trach.  Patient underwent withdrawal of alcohol required medications  11/17 emesis x3.  Had bright blood and clots coming from ENT.  ENT evaluated with no evidence of bleeding.  Neurologically improving moving all extremities.  Repeat bleeding from trach site in the evening.  Decreased to 40% +5.  Patient was treated for MRSA pneumonia right lower lobe.  Present with Zyvox AARON was nondiagnostic.  Patient was transferred to [address] was being weaned to pressure support     12/9 presented back with chest pain.  Patient on TOI  Chest x-ray showing almost complete resolution of left midlung right upper lobe infiltrate  CTA positive for bilateral PE bilateral airspace disease  CT abdomen with fat stranding adjacent to the urinary bladder indicating cystitis  Ultrasound lower extremities showed no DVT  Covid negative  12/11 passed swallow study  12/14 patient on 40% FiO2 via trach mask. .  On 6 L  12/15 With Passy-Anne valve patient coughed.     1. Bilateral PE now on Eliquis  2. MRSA pneumonia treated till 12/17 finishing course of antibiotics from last admission on oral Zyvox  3. Chronic anemia  4. acute hypoxemic respiratory failure on mechanical ventilation s/p trach 11/12 8 cuffed Shiley trach proximal XLT12/15 6 cuffed trach  5. HANS moderate ( AHI 17 on 4/18/18 requires BIPAP 16/12) noncompliant with CPAP now cured with tracheostomy  6. History of reactive airway disease  7. CT 3/14/2018 showing pulmonary nodule stable from 10/2017  8. Diabetes with elevated blood sugars  9. Obesity  10. EF 77% LVH diastolic dysfunction  11. chronic lymphedema  12. History of gout on colchicine  13. Hypothyroid  14.  H/o respiratory failure: 7/8/2017 pt was  transferred from [facility] for acute respiratory failure after lap cholecystectomy, lap umbilical hernia repair, and lap liver biopsy (fatty liver). He had been extubated postop but had laryngospasm requiring reintubation, complicated by negative pressure flash pulmonary edema, and required tracheostomy 8/10/2017.  Patient has staph aureus pneumonia and C. difficile colitis. Nicholas Khalil was then transferred to [facility] where he was weaned off the ventilator and decannulated.     Plan:   1. Not to be downsized due to history of HANS   2. oxygen to be weaned to 6 L.  Desaturates when he moves  3. On Eliquis treatment doses  4. Passy-Anne valve  or trach can be plugged for speaking. 5. He was instructed to unplug the trach at night  6. Discussed with him about keeping the trach in versus decannulated as he was noncompliant with CPAP  7. Capping trials can be initiated he can be kept for longer periods during the day and uncapped at night as he has HANS  8.  Okay to transfer     TERESA Amin

## 2020-12-16 NOTE — PROGRESS NOTES
GENERAL SURGERY  DAILY PROGRESS NOTE  12/16/2020    Subjective:  General surgery is declining this consult for trach exchange and will defer to our ENT colleagues because they placed the trach in the person to begin with. Thank you. Please call with any questions.     Electronically signed by Ani Gil DO on 12/16/2020 at 7:36 AM

## 2020-12-16 NOTE — PROGRESS NOTES
PT SEEN AND EXAMINED. Chart reviewed. meds reviewed. D/w nursing + family as available. EXAM: IN GENERAL, NAD. AWAKE AND ALERT. ROS NEGx10 EXCEPT: feels very nervous. buspar made him nauseated. Wants to shower. Ready for dc, once trach downsized. BP (!) 151/84   Pulse 82   Temp 98.6 °F (37 °C) (Oral)   Resp 18   Ht 5' 9\" (1.753 m)   Wt (!) 324 lb (147 kg)   SpO2 100%   BMI 47.85 kg/m²   GEN: A+O NAD. HEENT: NCAT. EOMI. JOE  NECK: NO JVD. TRACH MIDLINE. NO BRUITS. NO THYROMEGALY. LUNGS: CTA BL NO RALES, RHONCHI OR WHEEZES. GOOD EXCURSION. CV: Regular rate and rhythm, NO Murmurs, Rubs, Or gallops  ABD: Soft. Nontender. Normal bowel sounds. No organomegaly  EXT:No clubbing cyanosis or edema  Neuro: Alert and oriented x 3. No focal motor deficits. No sensory deficits. Reflexes appear intact.   Labs/data reviewedLABS: CBC with Differential:    Lab Results   Component Value Date    WBC 5.9 12/15/2020    RBC 3.26 12/15/2020    HGB 8.5 12/15/2020    HCT 26.5 12/15/2020     12/15/2020    MCV 81.3 12/15/2020    MCH 26.1 12/15/2020    MCHC 32.1 12/15/2020    RDW 18.8 12/15/2020    NRBC 0.9 11/16/2020    SEGSPCT 73 01/26/2014    METASPCT 1.8 11/15/2020    LYMPHOPCT 41.3 12/10/2020    MONOPCT 10.0 12/10/2020    MYELOPCT 0.9 11/15/2020    BASOPCT 0.5 12/10/2020    MONOSABS 0.56 12/10/2020    LYMPHSABS 2.31 12/10/2020    EOSABS 0.19 12/10/2020    BASOSABS 0.03 12/10/2020     Platelets:    Lab Results   Component Value Date     12/15/2020     CMP:    Lab Results   Component Value Date     12/09/2020    K 4.4 12/09/2020    K 4.0 10/27/2020    CL 93 12/09/2020    CO2 32 12/09/2020    BUN 17 12/09/2020    CREATININE 1.5 12/09/2020    GFRAA 58 12/09/2020    LABGLOM 58 12/09/2020    GLUCOSE 109 12/09/2020    PROT 7.6 12/09/2020    LABALBU 3.7 12/09/2020    CALCIUM 9.6 12/09/2020    BILITOT <0.2 12/09/2020    ALKPHOS 90 12/09/2020    AST 27 12/09/2020    ALT 45 12/09/2020     Magnesium:    Lab Results Component Value Date    MG 1.8 11/25/2020     LDH:    Lab Results   Component Value Date     10/28/2020     PT/INR:    Lab Results   Component Value Date    PROTIME 14.0 11/18/2020    INR 1.2 11/18/2020     Last 3 Troponin:    Lab Results   Component Value Date    TROPONINI <0.01 10/26/2020    TROPONINI <0.01 05/26/2019    TROPONINI <0.01 05/26/2019     ABG:    Lab Results   Component Value Date    PH 7.393 11/26/2020    PCO2 43.1 11/26/2020    PO2 90.3 11/26/2020    HCO3 25.7 11/26/2020    BE 0.7 11/26/2020    O2SAT 96.1 11/26/2020     IRON:    Lab Results   Component Value Date    IRON 95 11/05/2020     IMAGING    Echo Transesophageal    Result Date: 11/30/2020  Transesophageal Echocardiography Report (AARON)   Demographics   Patient Name        Fredy Burks Gender               Male   Medical Record      [id number]        Room Number          0528  Number   Account #           [de-identified]       Procedure Date       11/30/2020   Corporate ID                        Ordering Physician   Lee Pagan MD   Accession Number    [id number]      Referring Physician   Date of Birth       1960      Sonographer   Age                 61 year(s)      Interpreting         Lee Pagan MD                                      Physician                                       Any Other  Procedure Type of Study   AARON procedure:Transesophageal Echo (AARON). Procedure Date Date: 11/30/2020 Start: 04:21 PM Height: 68 inches Weight: 335 pounds BSA: 2.54 m^2 BMI: 50.94 kg/m^2 Allergies   - Bee/Wasp stings. - Shellfish. Findings   Left Ventricle  Normal left ventricular size and systolic function. Right Ventricle  Normal right ventricular size and function. Left Atrium  Normal sized left atrium. There is no left atrial appendage thrombus. Agitated saline injected for shunt evaluation. No evidence of patent foramen ovale. Right Atrium  Normal right atrium size. Mitral Valve  Structurally normal mitral valve. Physiologic and/or trace mitral regurgitation is present. Tricuspid Valve  The tricuspid valve appears structurally normal.   Aortic Valve  Structurally normal aortic valve. Pulmonic Valve  Pulmonic valve is structurally normal.   Pericardial Effusion  No pericardial effusion. Aorta  Aortic root dimension within normal limits. No significant plaque noted in the descending aorta. Miscellaneous  No valvular vegetation or vegetation of venous catheter. Conclusions   Summary  Normal left ventricular size and systolic function. Physiologic and/or trace mitral regurgitation is present. No valvular vegetation or vegetation of venous catheter. Signature   ----------------------------------------------------------------  Electronically signed by Olga Ortega MD(Interpreting  physician) on 11/30/2020 04:56 PM  ----------------------------------------------------------------  [email or web address] [id number]    Ct Abdomen Pelvis Wo Contrast Additional Contrast? None    Result Date: 11/29/2020  EXAMINATION: CT OF THE ABDOMEN AND PELVIS WITHOUT CONTRAST 11/29/2020 1:09 pm TECHNIQUE: CT of the abdomen and pelvis was performed without the administration of intravenous contrast. Multiplanar reformatted images are provided for review. Dose modulation, iterative reconstruction, and/or weight based adjustment of the mA/kV was utilized to reduce the radiation dose to as low as reasonably achievable. COMPARISON: September 16, 2019 HISTORY: ORDERING SYSTEM PROVIDED HISTORY: Abdomen pain TECHNOLOGIST PROVIDED HISTORY: Reason for exam:->Abdomen pain Additional Contrast?->None What reading provider will be dictating this exam?->CRC FINDINGS: The lung bases demonstrate patchy multifocal infiltrates. 3-5 mm nonspecific nodules are identified in the right lung base.   There is hepatomegaly with liver measuring 19 cm with fatty infiltration. Gallbladder is absent. A G-tube is noted. Spleen, pancreas, and adrenals are normal.  1-9 mm nonobstructing bilateral kidney stones are present. 2.5 cm cystic lesion is noted in the right kidney. Degenerative changes are identified in the lumbosacral spine. Pelvis. The urinary bladder is contracted with a Zamorano catheter and wall thickening. Diffusely dilated fluid-filled small bowel loops and colon are noted. Scattered diverticulosis of the descending and sigmoid colon are noted. There is mild presacral edema. The appendix is normal.    Dilated fluid-filled small bowel loops and colon likely diffuse ileus. Enterocolitis is less likely. Multifocal patchy bibasilar infiltrates concerning for pneumonia. 3-5 mm nonspecific pulmonary nodules. Consider surveillance. Collapsed urinary bladder with wall thickening. Cystitis has to be excluded. Ct Head Wo Contrast    Result Date: 11/16/2020  EXAMINATION: CT OF THE HEAD WITHOUT CONTRAST  11/16/2020 2:36 pm TECHNIQUE: CT of the head was performed without the administration of intravenous contrast. Dose modulation, iterative reconstruction, and/or weight based adjustment of the mA/kV was utilized to reduce the radiation dose to as low as reasonably achievable. COMPARISON: August 27, 2017 HISTORY: ORDERING SYSTEM PROVIDED HISTORY: AMS TECHNOLOGIST PROVIDED HISTORY: Reason for exam:->AMS Has a \"code stroke\" or \"stroke alert\" been called? ->No What reading provider will be dictating this exam?->CRC FINDINGS: BRAIN/VENTRICLES: There is no acute intracranial hemorrhage, mass effect or midline shift. No abnormal extra-axial fluid collection. The gray-white differentiation is maintained without evidence of an acute infarct. There is no evidence of hydrocephalus. ORBITS: The visualized portion of the orbits demonstrate no acute abnormality. SINUSES: The visualized paranasal sinuses and mastoid air cells demonstrate no acute abnormality.  SOFT TISSUES/SKULL:  No acute abnormality of the visualized skull or soft tissues. No acute intracranial abnormality. Ct Abdomen Pelvis W Iv Contrast Additional Contrast? None    Result Date: 12/9/2020  EXAMINATION: CTA OF THE CHEST; CT OF THE ABDOMEN AND PELVIS WITH CONTRAST 12/9/2020 2:57 am TECHNIQUE: CTA of the chest was performed after the administration of intravenous contrast.  Multiplanar reformatted images are provided for review. MIP images are provided for review. Dose modulation, iterative reconstruction, and/or weight based adjustment of the mA/kV was utilized to reduce the radiation dose to as low as reasonably achievable.; CT of the abdomen and pelvis was performed with the administration of intravenous contrast. Multiplanar reformatted images are provided for review. Dose modulation, iterative reconstruction, and/or weight based adjustment of the mA/kV was utilized to reduce the radiation dose to as low as reasonably achievable. COMPARISON: 11/29/2020 and 10/28/2020 HISTORY: ORDERING SYSTEM PROVIDED HISTORY: r/o PE TECHNOLOGIST PROVIDED HISTORY: Reason for exam:->r/o PE What reading provider will be dictating this exam?->CRC; ORDERING SYSTEM PROVIDED HISTORY: Abdominal pain TECHNOLOGIST PROVIDED HISTORY: Additional Contrast?->None Reason for exam:->Abdominal pain What reading provider will be dictating this exam?->CRC Chest pain FINDINGS: Pulmonary Arteries: Evaluation is compromised by motion artifact. Despite this limitation pulmonary emboli are identified both upper lobes and the right lower lobe and probably in the middle lobe. Main pulmonary artery is normal in caliber. Mediastinum: Mediastinal and bilateral hilar adenopathy is again noted. The heart and pericardium demonstrate no acute abnormality. There is no acute abnormality of the thoracic aorta. Tracheostomy tube is noted. Lungs/pleura: There is no pneumothorax or pleural effusion.   There has been interval improved in bilateral PACS. Subsequently with real-time guidance a needle was inserted intravascular and through the needle a wire. A subcutaneous tunnel was then created along the right upper chest. Subsequently under fluoroscopic guidance, a system of needles, catheters and guidewires were utilized to place a guidewire and catheter in the venous system and the catheter was advanced and appropriately placed at the level of the junction of the superior vena cava and right atrium. Time out occurred at 08:33 AM. Patient received 4 minutes and 26 seconds of fluoroscopy. A fluoroscopic image of the position of the catheter tip was saved in PACS. Successful uncomplicated placement of a right IJ tunneled PICC catheter. Ir Ashutosh Lunch Device Plmt/replace/removal    Result Date: 2020  Patient MRN:  [id number] : 1960 Age: 61 years Gender: Male Order Date:  2020 7:45 AM EXAM: IR VENOGRAM UPPER EXTREMITY RIGHT, IR SPARKLE CATH PLACE VENOUS 2ND+ ORDER, IR TUNNELED CVC PLACE WO SQ PORT/PUMP > 5 YEARS, IR ULTRASOUND GUIDANCE VASCULAR ACCESS, IR FLUORO GUIDED CVA DEVICE PLMT/REPLACE/REMOVAL NUMBER OF IMAGES:  14 INDICATION: Z79.2 Long term (current) use of antibiotics Long term (current) use of antibiotics What reading provider will be dictating this exam?->[facility] COMPARISON: None FLUORO TIME: 00:04:26 DAP: 1965.9 uGym2 AK: 77.1 mGy PICC Catheter Placement and Upper Extremity Venous Ultrasound, Procedure: After obtaining informed consent, and following the routine sterile preparation and drape, the right basilic vein was accessed with ultrasound guidance. IV contrast was injected which demonstrated extravasation into the soft tissue. The decision was then made to place a right tunneled IJ PICC. Using direct real-time ultrasound imaging vascular access was obtained to the right IJ. An image was stored and copy was transmitted to PACS.  Subsequently with real-time guidance a needle was inserted intravascular and through the needle a wire. A subcutaneous tunnel was then created along the right upper chest. Subsequently under fluoroscopic guidance, a system of needles, catheters and guidewires were utilized to place a guidewire and catheter in the venous system and the catheter was advanced and appropriately placed at the level of the junction of the superior vena cava and right atrium. Time out occurred at 08:33 AM. Patient received 4 minutes and 26 seconds of fluoroscopy. A fluoroscopic image of the position of the catheter tip was saved in PACS. Successful uncomplicated placement of a right IJ tunneled PICC catheter. Xr Chest Portable    Result Date: 12/9/2020  EXAMINATION: ONE XRAY VIEW OF THE CHEST 12/9/2020 12:07 am COMPARISON: November 25 HISTORY: ORDERING SYSTEM PROVIDED HISTORY: chest pain TECHNOLOGIST PROVIDED HISTORY: Reason for exam:->chest pain What reading provider will be dictating this exam?->CRC FINDINGS: Central line and tracheostomy cannula, unchanged since the prior examination. Cardiac silhouette at the upper limits of normal. Almost complete resolution of previously noted areas of airspace disease in the left mid lung and right upper lobe. Pulmonary vasculature within normal limits. Almost complete resolution of previously noted areas of airspace disease in the left mid lung and right upper lobe. Xr Chest Portable    Result Date: 11/25/2020  EXAMINATION: ONE XRAY VIEW OF THE CHEST 11/25/2020 8:08 am COMPARISON: One-view chest x-ray 11/24/2020 HISTORY: ORDERING SYSTEM PROVIDED HISTORY: resp dist TECHNOLOGIST PROVIDED HISTORY: Reason for exam:->resp dist What reading provider will be dictating this exam?->CRC FINDINGS: There is significant interval increase in pulmonary consolidation, most notably in the upper-mid right lung and also in the left lung base. The cardiomediastinal silhouette is within normal limits for AP technique.  A right internal jugular central venous catheter appears to terminate in the region of the right atrium. There is partial obscuration of the left costophrenic sulcus, which could be secondary to consolidation or small pleural effusion. There is evidence of a tracheostomy tube. Degenerative/arthritic changes are present in the spine and the right acromioclavicular joint. EKG leads overlie the chest.    1. There is interval progression of bilateral consolidation, compatible with worsening pneumonia. 2. A right internal jugular central venous catheter appears to terminate in the region of the right atrium. May consider withdrawal by approximately 4 cm to ensure non atrial in location. Xr Chest Portable    Result Date: 11/24/2020  EXAMINATION: ONE XRAY VIEW OF THE CHEST 11/24/2020 9:54 am COMPARISON: November 23, 2020 HISTORY: ORDERING SYSTEM PROVIDED HISTORY: SOB TECHNOLOGIST PROVIDED HISTORY: Reason for exam:->SOB What reading provider will be dictating this exam?->CRC FINDINGS: Right IJ approach central venous catheter again identified in unchanged position. Tracheostomy again identified. The cardiac silhouette is enlarged but stable in size. There are bilateral airspace opacities, without interval change. No pleural effusions or pneumothorax. No interval change in bilateral airspace opacities. Xr Chest Portable    Result Date: 11/23/2020  EXAMINATION: ONE XRAY VIEW OF THE CHEST 11/23/2020 7:40 am COMPARISON: 11/20/2020 HISTORY: ORDERING SYSTEM PROVIDED HISTORY: SOB TECHNOLOGIST PROVIDED HISTORY: Reason for exam:->SOB What reading provider will be dictating this exam?->CRC FINDINGS: There is stable position of the tracheostomy tube. There is a right subclavian central venous catheter. Patchy multifocal pulmonary infiltrates are noted. More prominent within the left lung. 1. There are patchy multifocal bilateral pulmonary infiltrates more prominent within the left lower lobe.     Xr Chest Portable    Result Date: 11/22/2020  EXAMINATION: ONE XRAY VIEW OF THE CHEST 11/22/2020 8:09 am COMPARISON: 11/21/2020 HISTORY: ORDERING SYSTEM PROVIDED HISTORY: SOB TECHNOLOGIST PROVIDED HISTORY: Reason for exam:->SOB What reading provider will be dictating this exam?->CRC FINDINGS: There is no interval change in the position of the tracheostomy tube. There is improved aeration of the lungs when compared to the prior study. Patchy per infiltrates persist within the lung bases. 1. Partial interval clearing of the multifocal pulmonary infiltrates within the upper lobes. 2. Persistent small infiltrates within the lung bases. Xr Chest Portable    Result Date: 11/21/2020  EXAMINATION: ONE XRAY VIEW OF THE CHEST 11/21/2020 7:47 am COMPARISON: November 17-20 HISTORY: ORDERING SYSTEM PROVIDED HISTORY: SOB TECHNOLOGIST PROVIDED HISTORY: Reason for exam:->SOB What reading provider will be dictating this exam?->CRC FINDINGS: Tracheostomy tube in good position. The right internal jugular central venous catheter tip in the right atrium. Heart appears to be mildly enlarged. The bilateral vague ill-defined infiltrates with ground-glass opacities are seen. They are more prominent on the right lung. There is no pleural effusions. No significant changes since the previous study of November 20. Xr Chest Portable    Result Date: 11/20/2020  EXAMINATION: ONE XRAY VIEW OF THE CHEST 11/20/2020 7:55 am COMPARISON: 19 November 2020 HISTORY: ORDERING SYSTEM PROVIDED HISTORY: SOB TECHNOLOGIST PROVIDED HISTORY: Reason for exam:->SOB What reading provider will be dictating this exam?->CRC FINDINGS: Central venous catheter on the right terminates in the right atrium proper. This is a stable finding. A left-sided central venous catheter is been removed. There is a stable tracheostomy catheter. There are opacities in both lungs which may represent infiltrate and/or atelectasis. Aeration appears minimally worse. Mildly increasing infiltrate and/or atelectasis bilaterally.   Please note, the right central venous catheter terminates in the right atrium proper. Xr Chest Portable    Result Date: 11/19/2020  EXAMINATION: ONE XRAY VIEW OF THE CHEST 11/19/2020 9:56 am COMPARISON: November 15-18 HISTORY: ORDERING SYSTEM PROVIDED HISTORY: SOB TECHNOLOGIST PROVIDED HISTORY: Reason for exam:->SOB What reading provider will be dictating this exam?->CRC FINDINGS: Some improvement of the bilateral discrete ground-glass densities throughout both lungs predominant in the perihilar regions. The can be an indication for volume overload. No pleural effusions are seen. The heart is normal size. Some improvement in pattern that can represent volume overload since the November 18. Xr Chest Portable    Result Date: 11/18/2020  EXAMINATION: ONE XRAY VIEW OF THE CHEST 11/18/2020 7:39 am COMPARISON: 11/17/2020 and 11/16/2020 HISTORY: ORDERING SYSTEM PROVIDED HISTORY: SOB TECHNOLOGIST PROVIDED HISTORY: Reason for exam:->SOB What reading provider will be dictating this exam?->CRC FINDINGS: There is no interval change in the multifocal bilateral significant airspace disease. There is no right or left pleural effusion. The cardiac silhouette is at upper limits of normal.  There is stable position of the left internal jugular central venous catheter. 1. No interval change in extensive bilateral multifocal airspace disease. Xr Chest Portable    Result Date: 11/17/2020  EXAMINATION: ONE XRAY VIEW OF THE CHEST 11/17/2020 5:58 pm COMPARISON: November 16, 2020. HISTORY: ORDERING SYSTEM PROVIDED HISTORY: SOB TECHNOLOGIST PROVIDED HISTORY: Reason for exam:->SOB What reading provider will be dictating this exam?->CRC FINDINGS: There are bilateral patchy infiltrates. Left IJ line catheter tip in the region of superior vena cava. Tracheostomy to, unchanged. The heart is mildly enlarged. There is blunting of the left costophrenic angle. Bilateral patchy infiltrates, no significant interval change.     Xr Chest Portable    Result Date: 11/16/2020  EXAMINATION: ONE XRAY VIEW OF THE CHEST 11/16/2020 9:04 am COMPARISON: 11/15/2020 HISTORY: ORDERING SYSTEM PROVIDED HISTORY: SOB TECHNOLOGIST PROVIDED HISTORY: Reason for exam:->SOB What reading provider will be dictating this exam?->CRC FINDINGS: A right parahilar pulmonary opacity is seen, which allowing for the differences in the degree of inspiratory effort, are likely stable as of yesterday. In the lower left lung is suboptimally visualized due to underpenetration. Within this limitation, no definite left lung opacity is seen. No pneumothorax or pleural effusion is seen. The cardiac silhouette is enlarged, which may be in part or entirely due to technique. The tracheostomy tube appears grossly well positioned. The left internal jugular vein central venous catheter is well positioned, with the tip overlying the upper SVC. Grossly stable right parahilar pulmonary opacity which may represent atelectasis or pneumonia. The life-support lines and tubes are well positioned. Xr Chest Portable    Result Date: 11/15/2020  EXAMINATION: ONE XRAY VIEW OF THE CHEST 11/15/2020 7:52 am COMPARISON: 11/14/2020 HISTORY: ORDERING SYSTEM PROVIDED HISTORY: SOB TECHNOLOGIST PROVIDED HISTORY: Reason for exam:->SOB What reading provider will be dictating this exam?->CRC FINDINGS: Tracheostomy tube appears unchanged. Left internal jugular central venous catheter with catheter tip not well visualized but possibly in the central left brachiocephalic vein or SVC. Stable cardiomediastinal silhouette with persistent bilateral airspace opacities, right worse than left. Possible small left pleural effusion. No pneumothorax. Stable bilateral airspace opacities, right worse than left.      Xr Chest Portable    Result Date: 11/14/2020  EXAMINATION: ONE XRAY VIEW OF THE CHEST 11/14/2020 7:54 am COMPARISON: Comparison November 11-13 HISTORY: ORDERING SYSTEM PROVIDED HISTORY: SOB TECHNOLOGIST PROVIDED HISTORY: Reason for exam:->SOB What reading provider will be dictating this exam?->CRC FINDINGS: Tracheostomy tube in good position. Heart appears to be mildly enlarged. The Bilateral infiltrates observed predominant in the right lung as seen previously, more limited to the base on the left. Some degree of atelectasis appears to be also present in left lower lobe. Left internal jugular central venous catheter tip in the SVC. No significant change since  study     Xr Chest Portable    Result Date: 2020  EXAMINATION: ONE X-RAY VIEW OF THE CHEST 2020 8:01 am COMPARISON: 2020 HISTORY: ORDERING SYSTEM PROVIDED HISTORY: SOB TECHNOLOGIST PROVIDED HISTORY: Reason for exam:->SOB What reading provider will be dictating this exam?->CRC FINDINGS: Patchy bilateral pulmonary infiltrates are noted unchanged when compared to the prior study. Note is made of a tracheostomy tube and left internal jugular central venous catheter. 1. There is no interval change in the bilateral multifocal patchy pulmonary infiltrates. 2. Probable small left pleural effusion. Ir Tunneled Cvc Place Wo Sq Port/pump > 5 Years    Result Date: 2020  Patient MRN:  [id number] : 1960 Age: 61 years Gender: Male Order Date:  2020 7:45 AM EXAM: IR VENOGRAM UPPER EXTREMITY RIGHT, IR SPARKLE CATH PLACE VENOUS 2ND+ ORDER, IR TUNNELED CVC PLACE WO SQ PORT/PUMP > 5 YEARS, IR ULTRASOUND GUIDANCE VASCULAR ACCESS, IR FLUORO GUIDED CVA DEVICE PLMT/REPLACE/REMOVAL NUMBER OF IMAGES:  14 INDICATION: Z79.2 Long term (current) use of antibiotics Long term (current) use of antibiotics What reading provider will be dictating this exam?->[facility] COMPARISON: None FLUORO TIME: 00:04:26 DAP: 1965.9 uGym2 AK: 77.1 mGy PICC Catheter Placement and Upper Extremity Venous Ultrasound, Procedure: After obtaining informed consent, and following the routine sterile preparation and drape, the right basilic vein was accessed with ultrasound guidance.  IV contrast was injected which demonstrated extravasation into the soft tissue. The decision was then made to place a right tunneled IJ PICC. Using direct real-time ultrasound imaging vascular access was obtained to the right IJ. An image was stored and copy was transmitted to PACS. Subsequently with real-time guidance a needle was inserted intravascular and through the needle a wire. A subcutaneous tunnel was then created along the right upper chest. Subsequently under fluoroscopic guidance, a system of needles, catheters and guidewires were utilized to place a guidewire and catheter in the venous system and the catheter was advanced and appropriately placed at the level of the junction of the superior vena cava and right atrium. Time out occurred at 08:33 AM. Patient received 4 minutes and 26 seconds of fluoroscopy. A fluoroscopic image of the position of the catheter tip was saved in PACS. Successful uncomplicated placement of a right IJ tunneled PICC catheter. Cta Chest W Contrast    Result Date: 12/9/2020  EXAMINATION: CTA OF THE CHEST; CT OF THE ABDOMEN AND PELVIS WITH CONTRAST 12/9/2020 2:57 am TECHNIQUE: CTA of the chest was performed after the administration of intravenous contrast.  Multiplanar reformatted images are provided for review. MIP images are provided for review. Dose modulation, iterative reconstruction, and/or weight based adjustment of the mA/kV was utilized to reduce the radiation dose to as low as reasonably achievable.; CT of the abdomen and pelvis was performed with the administration of intravenous contrast. Multiplanar reformatted images are provided for review. Dose modulation, iterative reconstruction, and/or weight based adjustment of the mA/kV was utilized to reduce the radiation dose to as low as reasonably achievable.  COMPARISON: 11/29/2020 and 10/28/2020 HISTORY: ORDERING SYSTEM PROVIDED HISTORY: r/o PE TECHNOLOGIST PROVIDED HISTORY: Reason for exam:->r/o PE What reading provider will be dictating this exam?->CRC; ORDERING SYSTEM PROVIDED HISTORY: Abdominal pain TECHNOLOGIST PROVIDED HISTORY: Additional Contrast?->None Reason for exam:->Abdominal pain What reading provider will be dictating this exam?->CRC Chest pain FINDINGS: Pulmonary Arteries: Evaluation is compromised by motion artifact. Despite this limitation pulmonary emboli are identified both upper lobes and the right lower lobe and probably in the middle lobe. Main pulmonary artery is normal in caliber. Mediastinum: Mediastinal and bilateral hilar adenopathy is again noted. The heart and pericardium demonstrate no acute abnormality. There is no acute abnormality of the thoracic aorta. Tracheostomy tube is noted. Lungs/pleura: There is no pneumothorax or pleural effusion. There has been interval improved in bilateral airspace opacities. Organs: The liver, spleen, pancreas, adrenal glands and kidneys are unremarkable. Adjacent cysts are identified in the lower pole of the right kidney. GI/Bowel: Percutaneous gastrostomy tube remains in place. Small bowel caliber is normal.  The appendix is normal.  The colon is unremarkable. Pelvis: The urinary bladder is grossly negative though there is minimal adjacent fat stranding. Peritoneum/Retroperitoneum: No adenopathy, mesenteric stranding or free fluid. Aortic caliber is normal. Soft Tissues/Bones: No acute bone or soft tissue abnormality. 1. Bilateral pulmonary emboli. 2. Improving bilateral airspace opacities. 3. Fat stranding adjacent to the urinary bladder may indicate cystitis. Critical results were called by Dr. Hugo Bonilla MD to [facility] [place] on 2020 at 03:23.     Ir Sparkle Cath Place Venous 2nd+ Order    Result Date: 2020  Patient MRN:  [id number] : 1960 Age: 61 years Gender: Male Order Date:  2020 7:45 AM EXAM: IR VENOGRAM UPPER EXTREMITY RIGHT, IR SPARKLE CATH PLACE VENOUS 2ND+ ORDER, IR TUNNELED CVC PLACE WO SQ PORT/PUMP > 5 YEARS, IR ULTRASOUND GUIDANCE VASCULAR ACCESS, IR FLUORO GUIDED CVA DEVICE PLMT/REPLACE/REMOVAL NUMBER OF IMAGES:  14 INDICATION: Z79.2 Long term (current) use of antibiotics Long term (current) use of antibiotics What reading provider will be dictating this exam?->[facility] COMPARISON: None FLUORO TIME: 00:04:26 DAP: 1965.9 uGym2 AK: 77.1 mGy PICC Catheter Placement and Upper Extremity Venous Ultrasound, Procedure: After obtaining informed consent, and following the routine sterile preparation and drape, the right basilic vein was accessed with ultrasound guidance. IV contrast was injected which demonstrated extravasation into the soft tissue. The decision was then made to place a right tunneled IJ PICC. Using direct real-time ultrasound imaging vascular access was obtained to the right IJ. An image was stored and copy was transmitted to PACS. Subsequently with real-time guidance a needle was inserted intravascular and through the needle a wire. A subcutaneous tunnel was then created along the right upper chest. Subsequently under fluoroscopic guidance, a system of needles, catheters and guidewires were utilized to place a guidewire and catheter in the venous system and the catheter was advanced and appropriately placed at the level of the junction of the superior vena cava and right atrium. Time out occurred at 08:33 AM. Patient received 4 minutes and 26 seconds of fluoroscopy. A fluoroscopic image of the position of the catheter tip was saved in PACS. Successful uncomplicated placement of a right IJ tunneled PICC catheter.     Ir Ultrasound Guidance Vascular Access    Result Date: 2020  Patient MRN:  [id number] : 1960 Age: 61 years Gender: Male Order Date:  2020 7:45 AM EXAM: IR VENOGRAM UPPER EXTREMITY RIGHT, IR SPARKLE CATH PLACE VENOUS 2ND+ ORDER, IR TUNNELED CVC PLACE WO SQ PORT/PUMP > 5 YEARS, IR ULTRASOUND GUIDANCE VASCULAR ACCESS, IR FLUORO GUIDED CVA DEVICE PLMT/REPLACE/REMOVAL NUMBER OF IMAGES:  14 INDICATION: Z79.2 Long term (current) use of antibiotics Long term (current) use of antibiotics What reading provider will be dictating this exam?->[facility] COMPARISON: None FLUORO TIME: 00:04:26 DAP: 1965.9 uGym2 AK: 77.1 mGy PICC Catheter Placement and Upper Extremity Venous Ultrasound, Procedure: After obtaining informed consent, and following the routine sterile preparation and drape, the right basilic vein was accessed with ultrasound guidance. IV contrast was injected which demonstrated extravasation into the soft tissue. The decision was then made to place a right tunneled IJ PICC. Using direct real-time ultrasound imaging vascular access was obtained to the right IJ. An image was stored and copy was transmitted to PACS. Subsequently with real-time guidance a needle was inserted intravascular and through the needle a wire. A subcutaneous tunnel was then created along the right upper chest. Subsequently under fluoroscopic guidance, a system of needles, catheters and guidewires were utilized to place a guidewire and catheter in the venous system and the catheter was advanced and appropriately placed at the level of the junction of the superior vena cava and right atrium. Time out occurred at 08:33 AM. Patient received 4 minutes and 26 seconds of fluoroscopy. A fluoroscopic image of the position of the catheter tip was saved in PACS. Successful uncomplicated placement of a right IJ tunneled PICC catheter. Fluoroscopy Modified Barium Swallow With Video    Result Date: 12/11/2020  EXAMINATION: MODIFIED BARIUM SWALLOW WAS PERFORMED IN CONJUNCTION WITH SPE[facility]CH [facility] TECHNIQUE: Fluoroscopic evaluation of the swallowing mechanism was performed with multiple consistency of barium product. FLUOROSCOPY DOSE AND TYPE OR TIME AND EXPOSURES: Fluoroscopy time 1.5 minutes.   Dose area product 16 mGy COMPARISON: None HISTORY: ORDERING SYSTEM PROVIDED HISTORY: evaluation for possible PO diet TECHNOLOGIST PROVIDED HISTORY: Reason for exam:->evaluation for possible PO diet What reading provider will be dictating this exam?->[facility] Dysphagia FINDINGS: Oral phase of swallowing was grossly within normal limits. There was a pharyngeal delay and laryngeal elevation was reduced. No retention was seen in the vallecular or piriform sinuses. No evidence of laryngeal penetration or aspiration. No evidence of aspiration or laryngeal penetration. Please see separate speech pathology report for full discussion of findings and recommendations. Us Dup Lower Extremities Bilateral Venous    Result Date: 2020  Patient MRN:  [id number] : 1960 Age: 61 years Gender: Male Order Date:  2020 7:56 PM EXAM: US DUP LOWER EXTREMITIES BILATERAL VENOUS NUMBER OF IMAGES:  37 INDICATION:  swelling, lymphedema, PE, r/o DVT swelling, lymphedema, PE, r/o DVT What reading provider will be dictating this exam?->[facility] COMPARISON: None Within the visualized vessels, there is no evidence for deep venous thrombosis There is good compressibility, there is good augmentation, there is good color flow.      Within the visualized vessels there is no evidence for deep venous thrombosis      DIET:  DIET GENERAL;    Medications:    Scheduled Meds:   apixaban  10 mg Oral BID    valproic acid  250 mg Per G Tube 3 times per day    amLODIPine  10 mg Oral Daily    Arformoterol Tartrate  15 mcg Nebulization BID    budesonide  0.5 mg Nebulization BID    carvedilol  6.25 mg Oral BID WC    chlorhexidine  15 mL Mouth/Throat BID    fluconazole  200 mg Oral Daily    ipratropium-albuterol  3 mL Inhalation Q4H WA    lansoprazole  30 mg Per G Tube QAM AC    levothyroxine  50 mcg Oral Daily    senna  2 tablet Oral Nightly    thiamine  100 mg Oral Daily    sodium chloride flush  10 mL Intravenous 2 times per day    linezolid  600 mg Oral 2 times per day       Continuous Infusions:    PRN Meds:ALPRAZolam, oxyCODONE-acetaminophen, diphenhydrAMINE, colchicine, polyvinyl alcohol, sodium chloride flush, acetaminophen **OR** acetaminophen, polyethylene glycol, promethazine **OR** ondansetron    A/P:      Patient Active Problem List   Diagnosis    Hyperlipidemia    Type 2 diabetes mellitus without complication, without long-term current use of insulin ([facility] [address]    Atypical chest pain    Essential hypertension    Chronic acquired lymphedema    Aortic valve disease    Morbid obesity due to excess calories (HCC)    Abdominal pain    Acute respiratory failure with hypoxia (HCC)    Acute pulmonary edema (HCC)    Congestive heart failure with LV diastolic dysfunction, NYHA class 3 ([address]    Obstructive sleep apnea    Acquired hypothyroidism    Chronic diastolic heart failure (HCC)    Nonrheumatic aortic valve stenosis    ACE-inhibitor cough    Pneumonia due to organism    Acute gout of right knee    Bilateral pulmonary embolism (HCC)    1.) B/L Pulmonary Embolism - subsegmental, not massive   2.) Acute hypoxemic respiratory failure on mechanical ventilation s/p trach 11/12/2020 with HEEDPE 6 [id number] XLT  3.) Metabolic encephalopathy history of EtOH abuse with agitation on ( Depakote, and Seroquel  )significantly improved  4.) HANS moderate ( AHI 17 on 4/18/18 requires BIPAP 16/12) noncompliant with CPAP  5. ) History of Reactive Airway Disease Syndreom (RADS)  6.) Stable Pulmonary Nodules - radiographically stable since CT 10/2017 to 3/2018  7.) Diabetes Mellitus  8.) Obesity  9.) EF 69% LVH diastolic dysfunction  10.) Chronic Lymphedema  11. ) History of Gout - on colchicine  12. ) Hypothyroidism  13. ) H/o respiratory failure: 7/8/2017 pt was  transferred from [facility] for acute respiratory failure after lap cholecystectomy, lap umbilical hernia repair, and lap liver biopsy (fatty liver). Patient was then transferred to [facility] hospital where he was weaned off the ventilator and decannulated. 14.) Ventilator associated pneumonia - treated   15. ) Alcohol Withdrawal - improved  16. ) Abdominal Pains - resolved    PLAN:  ELIQUIS FOR PE  INCR ACTIVITY  Xanax for anxiety  Downsize trach  May shower w/assistance    DC planning    I can be reached though [facility] or [facility] at [phone]

## 2020-12-17 PROBLEM — F41.9 ANXIETY: Status: ACTIVE | Noted: 2020-01-01

## 2020-12-17 NOTE — CARE COORDINATION
Discharge order noted on chart. Pt discharging to [facility]. [facility] faxed per their request. Transport set up with [facility] @ 1pm. Nurse, facility and wife notified. Covid 12/14 (-). Ambulance form on soft chart.      Suma Lopez RN  [facility] Case Management  [phone]

## 2020-12-17 NOTE — PROGRESS NOTES
Speech Language Pathology      NAME:  Rell Archer  :  1960  DATE: 2020  ROOM:  4863/1411-Y    Pt deferred PMV placement again this date. Reported he will work with SLP at [facility].      Bilateral pulmonary embolism (HCC) [I26.99]  Bilateral pulmonary embolism ([address] [I26.99]

## 2020-12-17 NOTE — DISCHARGE SUMMARY
Physician Discharge Summary     Patient ID:  Marla Olszewski  [id number]  79 y.o.  1960    Admit date: 12/9/2020    Discharge date and time: 12/17/2020  Admitting Physician: Sondra Osborn MD     Discharge Physician: Sondra Osborn      Admission Diagnoses: Bilateral pulmonary embolism ([address] [I26.99]  Bilateral pulmonary embolism ([address] [I26.99]    Discharge Diagnoses:   Pulmonary embolism- BL  Acute on chronic resp failure  Metabolic encephalopathy history of EtOH abuse with agitation on ( Depakote, and Seroquel  )significantly improved   HANS moderate ( AHI 17 on 4/18/18 requires BIPAP 16/12) noncompliant with CPAP   History of Reactive Airway Disease Syndreom (RADS)   Stable Pulmonary Nodules - radiographically stable since CT 10/2017 to 3/2018   Diabetes Mellitus   Obesity   EF 92% LVH diastolic dysfunction   Chronic Lymphedema   History of Gout - on colchicine   Hypothyroidism  Anxiety    Admission Condition: poor    Discharged Condition: fair    Indication for Admission: Bilateral pulmonary embolism ([address] [I26.99]  Bilateral pulmonary embolism ([address] [I26.99]    Hospital Course: pt heparinized- then changed to eliquis. . Passed swallow study.      Consults: pulmonary/intensive care and ID    Significant Diagnostic Studies: angiography: cta and swallow eval    Lab Results   Component Value Date     12/09/2020    K 4.4 12/09/2020    CL 93 (L) 12/09/2020    CO2 32 (H) 12/09/2020    BUN 17 12/09/2020    CREATININE 1.5 (H) 12/09/2020    GLUCOSE 109 (H) 12/09/2020    CALCIUM 9.6 12/09/2020    PROT 7.6 12/09/2020    LABALBU 3.7 12/09/2020    BILITOT <0.2 12/09/2020    ALKPHOS 90 12/09/2020    AST 27 12/09/2020    ALT 45 (H) 12/09/2020    LABGLOM 58 12/09/2020    GFRAA 58 12/09/2020          Lab Results   Component Value Date    WBC 6.1 12/17/2020    HGB 8.8 (L) 12/17/2020    HCT 27.1 (L) 12/17/2020    MCV 79.7 (L) 12/17/2020     12/17/2020        Echo Transesophageal    Result Date: 11/30/2020  Transesophageal Echocardiography Report (AARON)   Demographics   Patient Name        Arsalan Pope Gender               Male   Medical Record      [id number]        Room Number          1074  Number   Account #           [de-identified]       Procedure Date       11/30/2020   Corporate ID                        Ordering Physician   Layo Norris MD   Accession Number    [id number]      Referring Physician   Date of Birth       1960      Sonographer   Age                 61 year(s)      Interpreting         Layo Norris MD                                      Physician                                       Any Other  Procedure Type of Study   AARON procedure:Transesophageal Echo (AARON). Procedure Date Date: 11/30/2020 Start: 04:21 PM Height: 68 inches Weight: 335 pounds BSA: 2.54 m^2 BMI: 50.94 kg/m^2 Allergies   - Bee/Wasp stings. - Shellfish. Findings   Left Ventricle  Normal left ventricular size and systolic function. Right Ventricle  Normal right ventricular size and function. Left Atrium  Normal sized left atrium. There is no left atrial appendage thrombus. Agitated saline injected for shunt evaluation. No evidence of patent foramen ovale. Right Atrium  Normal right atrium size. Mitral Valve  Structurally normal mitral valve. Physiologic and/or trace mitral regurgitation is present. Tricuspid Valve  The tricuspid valve appears structurally normal.   Aortic Valve  Structurally normal aortic valve. Pulmonic Valve  Pulmonic valve is structurally normal.   Pericardial Effusion  No pericardial effusion. Aorta  Aortic root dimension within normal limits. No significant plaque noted in the descending aorta. Miscellaneous  No valvular vegetation or vegetation of venous catheter. Conclusions   Summary  Normal left ventricular size and systolic function. Physiologic and/or trace mitral regurgitation is present. No valvular vegetation or vegetation of venous catheter.    Signature ----------------------------------------------------------------  Electronically signed by Bennett Park MD(Interpreting  physician) on 11/30/2020 04:56 PM  ----------------------------------------------------------------  [email or web address] [id number]    Ct Abdomen Pelvis Wo Contrast Additional Contrast? None    Result Date: 11/29/2020  EXAMINATION: CT OF THE ABDOMEN AND PELVIS WITHOUT CONTRAST 11/29/2020 1:09 pm TECHNIQUE: CT of the abdomen and pelvis was performed without the administration of intravenous contrast. Multiplanar reformatted images are provided for review. Dose modulation, iterative reconstruction, and/or weight based adjustment of the mA/kV was utilized to reduce the radiation dose to as low as reasonably achievable. COMPARISON: September 16, 2019 HISTORY: ORDERING SYSTEM PROVIDED HISTORY: Abdomen pain TECHNOLOGIST PROVIDED HISTORY: Reason for exam:->Abdomen pain Additional Contrast?->None What reading provider will be dictating this exam?->CRC FINDINGS: The lung bases demonstrate patchy multifocal infiltrates. 3-5 mm nonspecific nodules are identified in the right lung base. There is hepatomegaly with liver measuring 19 cm with fatty infiltration. Gallbladder is absent. A G-tube is noted. Spleen, pancreas, and adrenals are normal.  1-9 mm nonobstructing bilateral kidney stones are present. 2.5 cm cystic lesion is noted in the right kidney. Degenerative changes are identified in the lumbosacral spine. Pelvis. The urinary bladder is contracted with a Zamorano catheter and wall thickening. Diffusely dilated fluid-filled small bowel loops and colon are noted. Scattered diverticulosis of the descending and sigmoid colon are noted. There is mild presacral edema. The appendix is normal.    Dilated fluid-filled small bowel loops and colon likely diffuse ileus.  Enterocolitis is less likely. Multifocal patchy bibasilar infiltrates concerning for pneumonia. 3-5 mm nonspecific pulmonary nodules. Consider surveillance. Collapsed urinary bladder with wall thickening. Cystitis has to be excluded. Ct Abdomen Pelvis W Iv Contrast Additional Contrast? None    Result Date: 12/9/2020  EXAMINATION: CTA OF THE CHEST; CT OF THE ABDOMEN AND PELVIS WITH CONTRAST 12/9/2020 2:57 am TECHNIQUE: CTA of the chest was performed after the administration of intravenous contrast.  Multiplanar reformatted images are provided for review. MIP images are provided for review. Dose modulation, iterative reconstruction, and/or weight based adjustment of the mA/kV was utilized to reduce the radiation dose to as low as reasonably achievable.; CT of the abdomen and pelvis was performed with the administration of intravenous contrast. Multiplanar reformatted images are provided for review. Dose modulation, iterative reconstruction, and/or weight based adjustment of the mA/kV was utilized to reduce the radiation dose to as low as reasonably achievable. COMPARISON: 11/29/2020 and 10/28/2020 HISTORY: ORDERING SYSTEM PROVIDED HISTORY: r/o PE TECHNOLOGIST PROVIDED HISTORY: Reason for exam:->r/o PE What reading provider will be dictating this exam?->CRC; ORDERING SYSTEM PROVIDED HISTORY: Abdominal pain TECHNOLOGIST PROVIDED HISTORY: Additional Contrast?->None Reason for exam:->Abdominal pain What reading provider will be dictating this exam?->CRC Chest pain FINDINGS: Pulmonary Arteries: Evaluation is compromised by motion artifact. Despite this limitation pulmonary emboli are identified both upper lobes and the right lower lobe and probably in the middle lobe. Main pulmonary artery is normal in caliber. Mediastinum: Mediastinal and bilateral hilar adenopathy is again noted. The heart and pericardium demonstrate no acute abnormality. There is no acute abnormality of the thoracic aorta. Tracheostomy tube is noted.  Lungs/pleura: vascular access was obtained to the right IJ. An image was stored and copy was transmitted to PACS. Subsequently with real-time guidance a needle was inserted intravascular and through the needle a wire. A subcutaneous tunnel was then created along the right upper chest. Subsequently under fluoroscopic guidance, a system of needles, catheters and guidewires were utilized to place a guidewire and catheter in the venous system and the catheter was advanced and appropriately placed at the level of the junction of the superior vena cava and right atrium. Time out occurred at 08:33 AM. Patient received 4 minutes and 26 seconds of fluoroscopy. A fluoroscopic image of the position of the catheter tip was saved in PACS. Successful uncomplicated placement of a right IJ tunneled PICC catheter. Ir Lynn Brink Device Plmt/replace/removal    Result Date: 2020  Patient MRN:  [id number] : 1960 Age: 61 years Gender: Male Order Date:  2020 7:45 AM EXAM: IR VENOGRAM UPPER EXTREMITY RIGHT, IR SPARKLE CATH PLACE VENOUS 2ND+ ORDER, IR TUNNELED CVC PLACE WO SQ PORT/PUMP > 5 YEARS, IR ULTRASOUND GUIDANCE VASCULAR ACCESS, IR FLUORO GUIDED CVA DEVICE PLMT/REPLACE/REMOVAL NUMBER OF IMAGES:  14 INDICATION: Z79.2 Long term (current) use of antibiotics Long term (current) use of antibiotics What reading provider will be dictating this exam?->[facility] COMPARISON: None FLUORO TIME: 00:04:26 DAP: 1965.9 uGym2 AK: 77.1 mGy PICC Catheter Placement and Upper Extremity Venous Ultrasound, Procedure: After obtaining informed consent, and following the routine sterile preparation and drape, the right basilic vein was accessed with ultrasound guidance. IV contrast was injected which demonstrated extravasation into the soft tissue. The decision was then made to place a right tunneled IJ PICC. Using direct real-time ultrasound imaging vascular access was obtained to the right IJ. An image was stored and copy was transmitted to PACS. Chest Portable    Result Date: 11/22/2020  EXAMINATION: ONE XRAY VIEW OF THE CHEST 11/22/2020 8:09 am COMPARISON: 11/21/2020 HISTORY: ORDERING SYSTEM PROVIDED HISTORY: SOB TECHNOLOGIST PROVIDED HISTORY: Reason for exam:->SOB What reading provider will be dictating this exam?->CRC FINDINGS: There is no interval change in the position of the tracheostomy tube. There is improved aeration of the lungs when compared to the prior study. Patchy per infiltrates persist within the lung bases. 1. Partial interval clearing of the multifocal pulmonary infiltrates within the upper lobes. 2. Persistent small infiltrates within the lung bases. Xr Chest Portable    Result Date: 11/21/2020  EXAMINATION: ONE XRAY VIEW OF THE CHEST 11/21/2020 7:47 am COMPARISON: November 17-20 HISTORY: ORDERING SYSTEM PROVIDED HISTORY: SOB TECHNOLOGIST PROVIDED HISTORY: Reason for exam:->SOB What reading provider will be dictating this exam?->CRC FINDINGS: Tracheostomy tube in good position. The right internal jugular central venous catheter tip in the right atrium. Heart appears to be mildly enlarged. The bilateral vague ill-defined infiltrates with ground-glass opacities are seen. They are more prominent on the right lung. There is no pleural effusions. No significant changes since the previous study of November 20. Xr Chest Portable    Result Date: 11/20/2020  EXAMINATION: ONE XRAY VIEW OF THE CHEST 11/20/2020 7:55 am COMPARISON: 19 November 2020 HISTORY: ORDERING SYSTEM PROVIDED HISTORY: SOB TECHNOLOGIST PROVIDED HISTORY: Reason for exam:->SOB What reading provider will be dictating this exam?->CRC FINDINGS: Central venous catheter on the right terminates in the right atrium proper. This is a stable finding. A left-sided central venous catheter is been removed. There is a stable tracheostomy catheter. There are opacities in both lungs which may represent infiltrate and/or atelectasis. Aeration appears minimally worse.     Mildly increasing infiltrate and/or atelectasis bilaterally. Please note, the right central venous catheter terminates in the right atrium proper. Xr Chest Portable    Result Date: 11/19/2020  EXAMINATION: ONE XRAY VIEW OF THE CHEST 11/19/2020 9:56 am COMPARISON: November 15-18 HISTORY: ORDERING SYSTEM PROVIDED HISTORY: SOB TECHNOLOGIST PROVIDED HISTORY: Reason for exam:->SOB What reading provider will be dictating this exam?->CRC FINDINGS: Some improvement of the bilateral discrete ground-glass densities throughout both lungs predominant in the perihilar regions. The can be an indication for volume overload. No pleural effusions are seen. The heart is normal size. Some improvement in pattern that can represent volume overload since the November 18. Xr Chest Portable    Result Date: 11/18/2020  EXAMINATION: ONE XRAY VIEW OF THE CHEST 11/18/2020 7:39 am COMPARISON: 11/17/2020 and 11/16/2020 HISTORY: ORDERING SYSTEM PROVIDED HISTORY: SOB TECHNOLOGIST PROVIDED HISTORY: Reason for exam:->SOB What reading provider will be dictating this exam?->CRC FINDINGS: There is no interval change in the multifocal bilateral significant airspace disease. There is no right or left pleural effusion. The cardiac silhouette is at upper limits of normal.  There is stable position of the left internal jugular central venous catheter. 1. No interval change in extensive bilateral multifocal airspace disease. Xr Chest Portable    Result Date: 11/17/2020  EXAMINATION: ONE XRAY VIEW OF THE CHEST 11/17/2020 5:58 pm COMPARISON: November 16, 2020. HISTORY: ORDERING SYSTEM PROVIDED HISTORY: SOB TECHNOLOGIST PROVIDED HISTORY: Reason for exam:->SOB What reading provider will be dictating this exam?->CRC FINDINGS: There are bilateral patchy infiltrates. Left IJ line catheter tip in the region of superior vena cava. Tracheostomy to, unchanged. The heart is mildly enlarged. There is blunting of the left costophrenic angle.     Bilateral 12/9/2020  EXAMINATION: CTA OF THE CHEST; CT OF THE ABDOMEN AND PELVIS WITH CONTRAST 12/9/2020 2:57 am TECHNIQUE: CTA of the chest was performed after the administration of intravenous contrast.  Multiplanar reformatted images are provided for review. MIP images are provided for review. Dose modulation, iterative reconstruction, and/or weight based adjustment of the mA/kV was utilized to reduce the radiation dose to as low as reasonably achievable.; CT of the abdomen and pelvis was performed with the administration of intravenous contrast. Multiplanar reformatted images are provided for review. Dose modulation, iterative reconstruction, and/or weight based adjustment of the mA/kV was utilized to reduce the radiation dose to as low as reasonably achievable. COMPARISON: 11/29/2020 and 10/28/2020 HISTORY: ORDERING SYSTEM PROVIDED HISTORY: r/o PE TECHNOLOGIST PROVIDED HISTORY: Reason for exam:->r/o PE What reading provider will be dictating this exam?->CRC; ORDERING SYSTEM PROVIDED HISTORY: Abdominal pain TECHNOLOGIST PROVIDED HISTORY: Additional Contrast?->None Reason for exam:->Abdominal pain What reading provider will be dictating this exam?->CRC Chest pain FINDINGS: Pulmonary Arteries: Evaluation is compromised by motion artifact. Despite this limitation pulmonary emboli are identified both upper lobes and the right lower lobe and probably in the middle lobe. Main pulmonary artery is normal in caliber. Mediastinum: Mediastinal and bilateral hilar adenopathy is again noted. The heart and pericardium demonstrate no acute abnormality. There is no acute abnormality of the thoracic aorta. Tracheostomy tube is noted. Lungs/pleura: There is no pneumothorax or pleural effusion. There has been interval improved in bilateral airspace opacities. Organs: The liver, spleen, pancreas, adrenal glands and kidneys are unremarkable. Adjacent cysts are identified in the lower pole of the right kidney.  GI/Bowel: Percutaneous gastrostomy tube remains in place. Small bowel caliber is normal.  The appendix is normal.  The colon is unremarkable. Pelvis: The urinary bladder is grossly negative though there is minimal adjacent fat stranding. Peritoneum/Retroperitoneum: No adenopathy, mesenteric stranding or free fluid. Aortic caliber is normal. Soft Tissues/Bones: No acute bone or soft tissue abnormality. 1. Bilateral pulmonary emboli. 2. Improving bilateral airspace opacities. 3. Fat stranding adjacent to the urinary bladder may indicate cystitis. Critical results were called by Dr. Hugo Bonilla MD to Kaye Mao on 2020 at 03:23. Ir Sparkle Cath Place Venous 2nd+ Order    Result Date: 2020  Patient MRN:  [id number] : 1960 Age: 61 years Gender: Male Order Date:  2020 7:45 AM EXAM: IR VENOGRAM UPPER EXTREMITY RIGHT, IR SPARKLE CATH PLACE VENOUS 2ND+ ORDER, IR TUNNELED CVC PLACE WO SQ PORT/PUMP > 5 YEARS, IR ULTRASOUND GUIDANCE VASCULAR ACCESS, IR FLUORO GUIDED CVA DEVICE PLMT/REPLACE/REMOVAL NUMBER OF IMAGES:  14 INDICATION: Z79.2 Long term (current) use of antibiotics Long term (current) use of antibiotics What reading provider will be dictating this exam?->[facility] COMPARISON: None FLUORO TIME: 00:04:26 DAP: 1965.9 uGym2 AK: 77.1 mGy PICC Catheter Placement and Upper Extremity Venous Ultrasound, Procedure: After obtaining informed consent, and following the routine sterile preparation and drape, the right basilic vein was accessed with ultrasound guidance. IV contrast was injected which demonstrated extravasation into the soft tissue. The decision was then made to place a right tunneled IJ PICC. Using direct real-time ultrasound imaging vascular access was obtained to the right IJ. An image was stored and copy was transmitted to PACS. Subsequently with real-time guidance a needle was inserted intravascular and through the needle a wire.  A subcutaneous tunnel was then created along the right upper chest. Subsequently under fluoroscopic guidance, a system of needles, catheters and guidewires were utilized to place a guidewire and catheter in the venous system and the catheter was advanced and appropriately placed at the level of the junction of the superior vena cava and right atrium. Time out occurred at 08:33 AM. Patient received 4 minutes and 26 seconds of fluoroscopy. A fluoroscopic image of the position of the catheter tip was saved in PACS. Successful uncomplicated placement of a right IJ tunneled PICC catheter. Ir Ultrasound Guidance Vascular Access    Result Date: 2020  Patient MRN:  [id number] : 1960 Age: 61 years Gender: Male Order Date:  2020 7:45 AM EXAM: IR VENOGRAM UPPER EXTREMITY RIGHT, IR SPARKLE CATH PLACE VENOUS 2ND+ ORDER, IR TUNNELED CVC PLACE WO SQ PORT/PUMP > 5 YEARS, IR ULTRASOUND GUIDANCE VASCULAR ACCESS, IR FLUORO GUIDED CVA DEVICE PLMT/REPLACE/REMOVAL NUMBER OF IMAGES:  14 INDICATION: Z79.2 Long term (current) use of antibiotics Long term (current) use of antibiotics What reading provider will be dictating this exam?->[facility] COMPARISON: None FLUORO TIME: 00:04:26 DAP: 1965.9 uGym2 AK: 77.1 mGy PICC Catheter Placement and Upper Extremity Venous Ultrasound, Procedure: After obtaining informed consent, and following the routine sterile preparation and drape, the right basilic vein was accessed with ultrasound guidance. IV contrast was injected which demonstrated extravasation into the soft tissue. The decision was then made to place a right tunneled IJ PICC. Using direct real-time ultrasound imaging vascular access was obtained to the right IJ. An image was stored and copy was transmitted to PACS. Subsequently with real-time guidance a needle was inserted intravascular and through the needle a wire.  A subcutaneous tunnel was then created along the right upper chest. Subsequently under fluoroscopic guidance, a system of needles, catheters and guidewires were utilized to place a guidewire and catheter in the venous system and the catheter was advanced and appropriately placed at the level of the junction of the superior vena cava and right atrium. Time out occurred at 08:33 AM. Patient received 4 minutes and 26 seconds of fluoroscopy. A fluoroscopic image of the position of the catheter tip was saved in PACS. Successful uncomplicated placement of a right IJ tunneled PICC catheter. Fluoroscopy Modified Barium Swallow With Video    Result Date: 2020  EXAMINATION: MODIFIED BARIUM SWALLOW WAS PERFORMED IN CONJUNCTION WITH SPEECH PATHOLOGY SERVICES TECHNIQUE: Fluoroscopic evaluation of the swallowing mechanism was performed with multiple consistency of barium product. FLUOROSCOPY DOSE AND TYPE OR TIME AND EXPOSURES: Fluoroscopy time 1.5 minutes. Dose area product 16 mGy COMPARISON: None HISTORY: ORDERING SYSTEM PROVIDED HISTORY: evaluation for possible PO diet TECHNOLOGIST PROVIDED HISTORY: Reason for exam:->evaluation for possible PO diet What reading provider will be dictating this exam?->CRC Dysphagia FINDINGS: Oral phase of swallowing was grossly within normal limits. There was a pharyngeal delay and laryngeal elevation was reduced. No retention was seen in the vallecular or piriform sinuses. No evidence of laryngeal penetration or aspiration. No evidence of aspiration or laryngeal penetration. Please see separate speech pathology report for full discussion of findings and recommendations.     Us Dup Lower Extremities Bilateral Venous    Result Date: 2020  Patient MRN:  [id number] : 1960 Age: 61 years Gender: Male Order Date:  2020 7:56 PM EXAM: US DUP LOWER EXTREMITIES BILATERAL VENOUS NUMBER OF IMAGES:  37 INDICATION:  swelling, lymphedema, PE, r/o DVT swelling, lymphedema, PE, r/o DVT What reading provider will be dictating this exam?->[facility] COMPARISON: None Within the visualized vessels, there is no evidence for deep venous thrombosis There is good compressibility, there is good augmentation, there is good color flow. Within the visualized vessels there is no evidence for deep venous thrombosis      Outstanding Order Results     Date and Time Order Name Status Description    12/9/2020 1445 Thrombophilia Panel In process           Treatments: antibiotics: zyvoxx and diflucan- done on 12/17    Discharge Exam:  BP (!) 152/71   Pulse 80   Temp 97.8 °F (36.6 °C) (Temporal)   Resp 20   Ht 5' 9\" (1.753 m)   Wt (!) 324 lb (147 kg)   SpO2 99%   BMI 47.85 kg/m²     General Appearance:    Alert, cooperative, no distress, appears stated age   Head:    Normocephalic, without obvious abnormality, atraumatic   Eyes:    PERRL, conjunctiva/corneas clear, EOM's intact, fundi     benign, both eyes        Ears:    Normal TM's and external ear canals, both ears   Nose:   Nares normal, septum midline, mucosa normal, no drainage    or sinus tenderness   Throat:   Lips, mucosa, and tongue normal; teeth and gums normal   Neck:   Supple, symmetrical, trachea midline, no adenopathy;        thyroid:  No enlargement/tenderness/nodules; no carotid    bruit or JVD   Back:     Symmetric, no curvature, ROM normal, no CVA tenderness   Lungs:     Clear to auscultation bilaterally, respirations unlabored   Chest wall:    No tenderness or deformity   Heart:    Regular rate and rhythm, S1 and S2 normal, no murmur, rub   or gallop   Abdomen:     Soft, non-tender, bowel sounds active all four quadrants,     no masses, no organomegaly   Genitalia:    Normal male without lesion, discharge or tenderness   Rectal:    Normal tone, normal prostate, no masses or tenderness;    guaiac negative stool   Extremities:   Extremities normal, atraumatic, no cyanosis or edema   Pulses:   2+ and symmetric all extremities   Skin:   Skin color, texture, turgor normal, no rashes or lesions   Lymph nodes:   Cervical, supraclavicular, and axillary nodes normal   Neurologic:   CNII-XII intact.  Normal strength, sensation and reflexes       throughout       Disposition: [facility]    Patient Instructions:      Medication List      START taking these medications    apixaban 5 MG Tabs tablet  Commonly known as: ELIQUIS  Take 2 tablets by mouth 2 times daily for 2 days Then 5 mg bid     diphenhydrAMINE 25 MG tablet  Commonly known as: BENADRYL  Take 1 tablet by mouth every 6 hours as needed for Itching        CONTINUE taking these medications    amLODIPine 10 MG tablet  Commonly known as: NORVASC  Take 1 tablet by mouth daily     Arformoterol Tartrate 15 MCG/2ML Nebu  Commonly known as: BROVANA  Take 2 mLs by nebulization 2 times daily     budesonide 0.5 MG/2ML nebulizer suspension  Commonly known as: PULMICORT  Take 2 mLs by nebulization 2 times daily     carvedilol 6.25 MG tablet  Commonly known as: COREG  Take 1 tablet by mouth 2 times daily (with meals)     chlorhexidine 0.12 % solution  Commonly known as: PERIDEX  Take 15 mLs by mouth 2 times daily for 14 days     colchicine 0.6 MG tablet  Commonly known as: Colcrys  Take 1 tablet by mouth 2 times daily as needed for Pain     divalproex 125 MG capsule  Commonly known as: DEPAKOTE SPRINKLE  Take 2 capsules by mouth every 8 hours     fluconazole 200 MG tablet  Commonly known as: DIFLUCAN  Take 1 tablet by mouth daily for 14 days     glucose 40 % Gel  Commonly known as: GLUTOSE  Take 37.5 mLs by mouth as needed (low bs)     insulin glargine 100 UNIT/ML injection vial  Commonly known as: LANTUS     insulin lispro 100 UNIT/ML injection vial  Commonly known as: HUMALOG     ipratropium-albuterol 0.5-2.5 (3) MG/3ML Soln nebulizer solution  Commonly known as: DUONEB  Inhale 3 mLs into the lungs every 4 hours (while awake)     lansoprazole 3 MG/ML Susp  10 mLs by Per G Tube route every morning (before breakfast)     levothyroxine 50 MCG tablet  Commonly known as: SYNTHROID  Take 1 tablet by mouth daily     polyethylene glycol 17 g packet  Commonly known as: GLYCOLAX  Take 17 g by mouth daily as needed for Constipation     polyvinyl alcohol 1.4 % ophthalmic solution  Commonly known as: LIQUIFILM TEARS  Place 1 drop into both eyes every 4 hours as needed for Dry Eyes     senna 8.6 MG tablet  Commonly known as: SENOKOT  Take 2 tablets by mouth nightly     sodium chloride (Inhalant) 3 % nebulizer solution  Take 2 mLs by nebulization every 4 hours     thiamine 100 MG tablet  1 tablet by PEG Tube route daily        STOP taking these medications    heparin (porcine) 34979 UNIT/ML injection     hydrALAZINE 25 MG tablet  Commonly known as: APRESOLINE     linezolid 100 MG/5ML suspension  Commonly known as: ZYVOX     mineral oil-hydrophilic petrolatum ointment     nystatin 318631 UNIT/ML suspension  Commonly known as: MYCOSTATIN           Where to Get Your Medications      These medications were sent to [facility], [address] [address]    Phone: [phone]   · chlorhexidine 0.12 % solution  · fluconazole 200 MG tablet  · polyethylene glycol 17 g packet     You can get these medications from any pharmacy    You don't need a prescription for these medications  · diphenhydrAMINE 25 MG tablet     Information about where to get these medications is not yet available    Ask your nurse or doctor about these medications  · apixaban 5 MG Tabs tablet        Activity: activity as tolerated  Diet: {diet: DIET GENERAL;  Wound Care: keep wound clean and dry    Follow-up with DR in 2 days  .   Greater than 35 minutes spent on discharge preparations, examining patient, discussing with patient and coordinating with nurses and staff    Signed:  Payton Oscar  12/17/2020  8:22 AM

## 2020-12-17 NOTE — PROGRESS NOTES
[address] [place] [address] [facility]  Progress Note    SUBJECTIVE:  Chief Complaint   Patient presents with    Chest Pain     sharp 10/10 CP worse when coughing onset 2 days. C/O cough and chills @ night. hx heart and respiratory failure, trach mask on 6L. Patient is tolerating medications. No reported adverse drug reactions. No nausea, vomiting, diarrhea. Afebrile. 40% FiO2 10L via trach mask 100% SpO2. Having some anxiety today. Working with speech therapy. Review of systems:  As stated above in the chief complaint, otherwise negative. Medications:  Scheduled Meds:   apixaban  10 mg Oral BID    valproic acid  250 mg Per G Tube 3 times per day    amLODIPine  10 mg Oral Daily    Arformoterol Tartrate  15 mcg Nebulization BID    budesonide  0.5 mg Nebulization BID    carvedilol  6.25 mg Oral BID WC    chlorhexidine  15 mL Mouth/Throat BID    fluconazole  200 mg Oral Daily    ipratropium-albuterol  3 mL Inhalation Q4H WA    lansoprazole  30 mg Per G Tube QAM AC    levothyroxine  50 mcg Oral Daily    senna  2 tablet Oral Nightly    thiamine  100 mg Oral Daily    sodium chloride flush  10 mL Intravenous 2 times per day    linezolid  600 mg Oral 2 times per day     Continuous Infusions:    PRN Meds:ALPRAZolam, oxyCODONE-acetaminophen, diphenhydrAMINE, colchicine, polyvinyl alcohol, sodium chloride flush, acetaminophen **OR** acetaminophen, polyethylene glycol, promethazine **OR** ondansetron    OBJECTIVE:  BP (!) 152/71   Pulse 80   Temp 97.8 °F (36.6 °C) (Temporal)   Resp 20   Ht 5' 9\" (1.753 m)   Wt (!) 324 lb (147 kg)   SpO2 99%   BMI 47.85 kg/m²   Temp  Av.6 °F (36.4 °C)  Min: 96.8 °F (36 °C)  Max: 98.4 °F (36.9 °C)  Constitutional: The patient is awake, alert, and oriented. NAD. Working with speech with the speaking valve  Skin: Warm and dry. No rashes were noted. HEENT: Round and reactive pupils. Moist mucous membranes. No ulcerations.  NO thrush-- it is resolved. Neck: Supple to movements. Trach with trach mask  Chest: No use of accessory muscles to breathe. Symmetrical expansion. No wheezing, crackles or rhonchi. Aeration improved. Diminished right base. Cardiovascular: S1 and S2 are rhythmic and regular. No murmurs appreciated. Abdomen: Positive bowel sounds to auscultation. Benign to palpation. No masses felt. No hepatosplenomegaly. Extremities: No clubbing, no cyanosis, no edema. Lines: PICC 11/19/2020 right chest    Laboratory and Tests Review:  Lab Results   Component Value Date    WBC 6.1 12/17/2020    WBC 5.9 12/15/2020    WBC 5.7 12/13/2020    HGB 8.8 (L) 12/17/2020    HCT 27.1 (L) 12/17/2020    MCV 79.7 (L) 12/17/2020     12/17/2020     Lab Results   Component Value Date    NEUTROABS 2.48 12/10/2020    NEUTROABS 3.40 12/09/2020    NEUTROABS 3.72 12/03/2020     No results found for: CRP  Lab Results   Component Value Date    ALT 45 (H) 12/09/2020    AST 27 12/09/2020    ALKPHOS 90 12/09/2020    BILITOT <0.2 12/09/2020     Lab Results   Component Value Date     12/09/2020    K 4.4 12/09/2020    K 4.0 10/27/2020    CL 93 12/09/2020    CO2 32 12/09/2020    BUN 17 12/09/2020    CREATININE 1.5 12/09/2020    CREATININE 1.1 12/03/2020    CREATININE 1.1 12/02/2020    GFRAA 58 12/09/2020    LABGLOM 58 12/09/2020    GLUCOSE 109 12/09/2020    PROT 7.6 12/09/2020    LABALBU 3.7 12/09/2020    CALCIUM 9.6 12/09/2020    BILITOT <0.2 12/09/2020    ALKPHOS 90 12/09/2020    AST 27 12/09/2020    ALT 45 12/09/2020     Lab Results   Component Value Date    CRP 1.4 (H) 11/02/2020    CRP 2.8 (H) 09/04/2017    CRP 10.5 (H) 08/28/2017     Lab Results   Component Value Date    SEDRATE 135 (H) 09/04/2017    SEDRATE 150 (H) 08/28/2017    SEDRATE 141 (H) 08/21/2017     Radiology:  reviewed    Microbiology:   Lab Results   Component Value Date    BC 5 Days no growth 12/09/2020    BC  11/25/2020     No antibiotic susceptibility studies performed.   Plates will be held 10  days. Contact the laboratory, [phone], if further workup is  desired. BC 5 Days no growth 11/09/2020    ORG Staphylococcus coagulase-negative 11/25/2020    ORG Staphylococcus aureus 11/25/2020    ORG Staphylococcus aureus 10/29/2020     Lab Results   Component Value Date    BLOODCULT2 5 Days no growth 12/09/2020    BLOODCULT2 5 Days no growth 11/25/2020    BLOODCULT2 5 Days no growth 11/09/2020    ORG Staphylococcus coagulase-negative 11/25/2020    ORG Staphylococcus aureus 11/25/2020    ORG Staphylococcus aureus 10/29/2020     No results found for: WNDABS  Smear, Respiratory   Date Value Ref Range Status   11/25/2020   Final    Group 5: >25 PMN's/LPF and <10 Epithelial cells/LPF  Abundant Polymorphonuclear leukocytes  Rare Epithelial cells  Abundant Gram positive diplococci  Abundant Gram positive cocci in clusters       No results found for: MPNEUMO, CLAMYDCU, LABLEGI, AFBCX, FUNGSM, LABFUNG  CULTURE, RESPIRATORY   Date Value Ref Range Status   11/25/2020 Oral Pharyngeal Tiesha absent (A)  Final   11/25/2020   Final    Heavy growth  Methicillin resistant Staph aureus isolated. Most Methicillin  resistant Staphylococcus are usually resistant to multiple  antibiotics including other B-Lactams, Aminoglycosides,  Macrolides, Clindamycin and Tetracycline. Contact isolation  is indicated. This isolate is presumed to be resistant based on the  detection of inducible Clindamycin resistance. Clindamycin  may still be effective in some patients.        Culture Catheter Tip   Date Value Ref Range Status   08/26/2017 <15 colonies  Final     No results found for: BFCS  No results found for: CXSURG  Urine Culture, Routine   Date Value Ref Range Status   11/09/2020 Growth not present  Final   10/28/2020 Growth not present  Final   09/16/2019 Growth not present  Final     MRSA Culture Only   Date Value Ref Range Status   11/09/2020 Methicillin resistant Staph aureus not isolated  Final   10/30/2020 Methicillin

## 2020-12-17 NOTE — PLAN OF CARE
Problem: Pain:  Goal: Pain level will decrease  Description: Pain level will decrease  12/17/2020 0709 by Eliu Holden RN  Outcome: Met This Shift  12/17/2020 0637 by Eliu Holden RN  Outcome: Met This Shift  Goal: Control of acute pain  Description: Control of acute pain  12/17/2020 0709 by Eliu Holden RN  Outcome: Met This Shift  12/17/2020 0637 by Eliu Holden RN  Outcome: Met This Shift     Problem: Skin Integrity:  Goal: Will show no infection signs and symptoms  Description: Will show no infection signs and symptoms  12/17/2020 0709 by Eliu Holden RN  Outcome: Met This Shift  12/17/2020 0637 by Eliu Holden RN  Outcome: Met This Shift  Goal: Absence of new skin breakdown  Description: Absence of new skin breakdown  12/17/2020 0709 by Eliu Holden RN  Outcome: Met This Shift  12/17/2020 0637 by Eliu Holden RN  Outcome: Met This Shift     Problem: Falls - Risk of:  Goal: Will remain free from falls  Description: Will remain free from falls  Outcome: Met This Shift  Goal: Absence of physical injury  Description: Absence of physical injury  Outcome: Met This Shift

## 2020-12-17 NOTE — PLAN OF CARE
Problem: Pain:  Goal: Pain level will decrease  Description: Pain level will decrease  Outcome: Met This Shift  Goal: Control of acute pain  Description: Control of acute pain  Outcome: Met This Shift     Problem: Skin Integrity:  Goal: Will show no infection signs and symptoms  Description: Will show no infection signs and symptoms  Outcome: Met This Shift  Goal: Absence of new skin breakdown  Description: Absence of new skin breakdown  Outcome: Met This Shift     Problem: Falls - Risk of:  Goal: Will remain free from falls  Description: Will remain free from falls  Outcome: Met This Shift  Goal: Absence of physical injury  Description: Absence of physical injury  Outcome: Met This Shift

## 2020-12-18 NOTE — TELEPHONE ENCOUNTER
Nurse from facility called stated patient had surgery 11/12/20 with Dr. Devorah Paige, he has a peg tube and trach and they need to schedule him for a post op appt, they can be reached at [phone] speak to any nurse on rehab 2.

## 2020-12-24 NOTE — ED NOTES
Bed: 07  Expected date:   Expected time:   Means of arrival:   Comments:  MIKEL Saldivar, CHASE  12/24/20 1240

## 2020-12-24 NOTE — ED NOTES
Spoke with [facility], they stated that they could not handle the patient this morning with his panic attack, and would now be able to have patient back at facility and would like patient to be sent back to facility. Vincent Soto is aware of this.       Eulogio Mclean RN  12/24/20 0491

## 2020-12-24 NOTE — ED PROVIDER NOTES
Department of Emergency Medicine   ED  Provider Note  Admit Date/RoomTime: 12/24/2020  1:35 PM  ED Room: 07/07          History of Present Illness:  12/24/20, Time: 1:49 PM EST  Chief Complaint   Patient presents with    Panic Attack     Pt sent from [facility] for a panic attack. (makenna coley ran out of his xanax) Pt presents with some confusion that ems stated happens when he's like this                 Delmer Jones is a 61 y.o. male presenting to the ED for panic attack. Patient presents from a nursing facility. Apparently the nursing facility ran out of his Xanax. He became very anxious today, they were not able to provide any medications as he does not have any left, so they sent in for evaluation. Patient denies any suicidal homicidal ideation. He says he just feels very anxious. Denies fever, chills, cough, sputum, nausea, vomiting, change in bowel bladder, or any other symptoms or complaints. Review of Systems:   Pertinent positives and negatives are stated within HPI, all other systems reviewed and are negative.        --------------------------------------------- PAST HISTORY ---------------------------------------------  Past Medical History:  has a past medical history of Acquired hypothyroidism, Anxiety, Congestive heart failure with LV diastolic dysfunction, NYHA class 3 ([address] COPD (chronic obstructive pulmonary disease) ([facility] Depression, DM (diabetes mellitus) ([facility] Essential hypertension, Gouty arthritis, Hyperlipidemia, Hypertension, Lymphedema, Obesity, Obstructive sleep apnea, Obstructive sleep apnea, Osteoarthritis, and Type 2 diabetes mellitus without complication, without long-term current use of insulin ([address] Past Surgical History:  has a past surgical history that includes Foot surgery (1990); External ear surgery (6/2/2009); ECHO Compl W Dop Color Flow (6/21/2013); Gastrostomy tube placement (08/10/2017);  Tracheostomy tube placement (08/10/2017); bronchoscopy (08/10/2017); tracheostomy (N/A, 11/11/2020); Upper gastrointestinal endoscopy (N/A, 11/11/2020); tracheostomy (N/A, 11/12/2020); and IR TUNNELED CVC PLACE WO SQ PORT/PUMP > 5 YEARS (11/19/2020). Social History:  reports that he quit smoking about 15 years ago. His smoking use included cigarettes. He has a 1.00 pack-year smoking history. He quit smokeless tobacco use about 31 years ago. His smokeless tobacco use included chew. He reports current alcohol use. He reports previous drug use. Family History: family history includes Alzheimer's Disease in his mother; Heart Attack in his father; Heart Disease in his father. . Unless otherwise noted, family history is non contributory    The patients home medications have been reviewed. Allergies: Bee venom and Shellfish-derived products        ---------------------------------------------------PHYSICAL EXAM--------------------------------------    Constitutional/General: Alert and oriented x3  Head: Normocephalic and atraumatic  Eyes: PERRL, EOMI, sclera non icteric  Mouth: Oropharynx clear, handling secretions, no trismus, no asymmetry of the posterior oropharynx or uvular edema  Neck: Supple, full ROM, no stridor, no meningeal signs, trach intact  Respiratory: Lungs clear to auscultation bilaterally, no wheezes, rales, or rhonchi. Not in respiratory distress  Cardiovascular:  Regular rate. Regular rhythm. 2+ distal pulses. Equal extremity pulses. Chest: No chest wall tenderness  GI:  Abdomen Soft, Non tender, Non distended. No rebound, guarding, or rigidity. No pulsatile masses. Musculoskeletal: Moves all extremities x 4. Warm and well perfused, no clubbing, cyanosis, or edema. Capillary refill <3 seconds  Integument: skin warm and dry. No rashes.    Neurologic: GCS 15, no focal deficits, symmetric strength 5/5 in the upper and lower extremities bilaterally  Psychiatric: Normal Affect          -------------------------------------------------- RESULTS -------------------------------------------------  I have personally reviewed all laboratory and imaging results for this patient. Results are listed below.      LABS: (Lab results interpreted by me)  Results for orders placed or performed during the hospital encounter of 12/24/20   CBC Auto Differential   Result Value Ref Range    WBC 7.3 4.5 - 11.5 E9/L    RBC 3.53 (L) 3.80 - 5.80 E12/L    Hemoglobin 9.3 (L) 12.5 - 16.5 g/dL    Hematocrit 28.0 (L) 37.0 - 54.0 %    MCV 79.3 (L) 80.0 - 99.9 fL    MCH 26.3 26.0 - 35.0 pg    MCHC 33.2 32.0 - 34.5 %    RDW 18.7 (H) 11.5 - 15.0 fL    Platelets 207 274 - 817 E9/L    MPV 10.0 7.0 - 12.0 fL    Neutrophils % 50.6 43.0 - 80.0 %    Immature Granulocytes % 0.3 0.0 - 5.0 %    Lymphocytes % 37.9 20.0 - 42.0 %    Monocytes % 8.2 2.0 - 12.0 %    Eosinophils % 2.6 0.0 - 6.0 %    Basophils % 0.4 0.0 - 2.0 %    Neutrophils Absolute 3.70 1.80 - 7.30 E9/L    Immature Granulocytes # 0.02 E9/L    Lymphocytes Absolute 2.77 1.50 - 4.00 E9/L    Monocytes Absolute 0.60 0.10 - 0.95 E9/L    Eosinophils Absolute 0.19 0.05 - 0.50 E9/L    Basophils Absolute 0.03 0.00 - 0.20 E9/L   Comprehensive Metabolic Panel   Result Value Ref Range    Sodium 136 132 - 146 mmol/L    Potassium 4.7 3.5 - 5.0 mmol/L    Chloride 98 98 - 107 mmol/L    CO2 23 22 - 29 mmol/L    Anion Gap 15 7 - 16 mmol/L    Glucose 136 (H) 74 - 99 mg/dL    BUN 10 8 - 23 mg/dL    CREATININE 1.2 0.7 - 1.2 mg/dL    GFR Non-African American >60 >=60 mL/min/1.73    GFR African American >60     Calcium 10.0 8.6 - 10.2 mg/dL    Total Protein 7.8 6.4 - 8.3 g/dL    Alb 4.2 3.5 - 5.2 g/dL    Total Bilirubin 0.3 0.0 - 1.2 mg/dL    Alkaline Phosphatase 107 40 - 129 U/L    ALT 65 (H) 0 - 40 U/L    AST 39 0 - 39 U/L   Serum Drug Screen   Result Value Ref Range    Ethanol Lvl <10 mg/dL    Acetaminophen Level <5.0 (L) 10.0 - 50.1 mcg/mL    Salicylate, Serum <9.4 0.0 - 30.0 mg/dL    TCA Scrn NEGATIVE Cutoff:300 ng/mL   Urine Drug Screen   Result Value Ref Range    Amphetamine Screen, Urine NOT DETECTED Negative <1000 ng/mL    Barbiturate Screen, Ur NOT DETECTED Negative < 200 ng/mL    Benzodiazepine Screen, Urine NOT DETECTED Negative < 200 ng/mL    Cannabinoid Scrn, Ur NOT DETECTED Negative < 50ng/mL    Cocaine Metabolite Screen, Urine NOT DETECTED Negative < 300 ng/mL    Opiate Scrn, Ur NOT DETECTED Negative < 300ng/mL    PCP Screen, Urine NOT DETECTED Negative < 25 ng/mL    Methadone Screen, Urine NOT DETECTED Negative <300 ng/mL    Oxycodone Urine POSITIVE (A) Negative <100 ng/mL    FENTANYL SCREEN, URINE NOT DETECTED Negative <1 ng/mL    Drug Screen Comment: see below    ,       RADIOLOGY:  Interpreted by Radiologist unless otherwise specified  No orders to display         EKG Interpretation  Interpreted by emergency department physician, Dr. Layo Guerra           ------------------------- NURSING NOTES AND VITALS REVIEWED ---------------------------   The nursing notes within the ED encounter and vital signs as below have been reviewed by myself  BP (!) 140/65   Pulse 76   Temp 99.3 °F (37.4 °C) (Oral)   Resp 22   SpO2 100%     Oxygen Saturation Interpretation: Normal    The patients available past medical records and past encounters were reviewed. ------------------------------ ED COURSE/MEDICAL DECISION MAKING----------------------  Medications   LORazepam (ATIVAN) injection 2 mg (2 mg Intravenous Given 12/24/20 1409)         Medical Decision Making:    Patient received Ativan. Labs reviewed. Discussed with the wife, she would like him to return back to the facility. She says he has done this multiple times when he does not have his anxiety medication. Discussed with the nursing home, they are also comfortable taking him back, they will arrange for him to receive his medications. Patient was discharged. Counseling:    The emergency provider has spoken with the patient and discussed todays results, in addition to providing specific details for the plan of care and counseling regarding the diagnosis and prognosis. Questions are answered at this time and they are agreeable with the plan.       --------------------------------- IMPRESSION AND DISPOSITION ---------------------------------    IMPRESSION  1. Anxiety state        DISPOSITION  Disposition: Discharge to nursing home  Patient condition is stable        NOTE: This report was transcribed using voice recognition software.  Every effort was made to ensure accuracy; however, inadvertent computerized transcription errors may be present       Nazia Vivar MD  12/24/20 8761

## 2021-01-01 ENCOUNTER — APPOINTMENT (OUTPATIENT)
Dept: CT IMAGING | Age: 61
DRG: 206 | End: 2021-01-01
Payer: MEDICARE

## 2021-01-01 ENCOUNTER — HOSPITAL ENCOUNTER (OUTPATIENT)
Age: 61
Setting detail: OUTPATIENT SURGERY
Discharge: OTHER INSTITUTION | End: 2021-01-18
Attending: OTOLARYNGOLOGY | Admitting: OTOLARYNGOLOGY
Payer: MEDICARE

## 2021-01-01 ENCOUNTER — TELEPHONE (OUTPATIENT)
Dept: CARE COORDINATION | Age: 61
End: 2021-01-01

## 2021-01-01 ENCOUNTER — APPOINTMENT (OUTPATIENT)
Dept: GENERAL RADIOLOGY | Age: 61
DRG: 207 | End: 2021-01-01
Payer: MEDICARE

## 2021-01-01 ENCOUNTER — TELEPHONE (OUTPATIENT)
Dept: ADMINISTRATIVE | Age: 61
End: 2021-01-01

## 2021-01-01 ENCOUNTER — APPOINTMENT (OUTPATIENT)
Dept: GENERAL RADIOLOGY | Age: 61
End: 2021-01-01
Payer: MEDICARE

## 2021-01-01 ENCOUNTER — HOSPITAL ENCOUNTER (EMERGENCY)
Age: 61
Discharge: HOME | End: 2021-08-16
Attending: EMERGENCY MEDICINE
Payer: MEDICARE

## 2021-01-01 ENCOUNTER — TELEPHONE (OUTPATIENT)
Dept: ENT CLINIC | Age: 61
End: 2021-01-01

## 2021-01-01 ENCOUNTER — APPOINTMENT (OUTPATIENT)
Dept: CT IMAGING | Age: 61
DRG: 193 | End: 2021-01-01
Payer: MEDICARE

## 2021-01-01 ENCOUNTER — OFFICE VISIT (OUTPATIENT)
Dept: ENT CLINIC | Age: 61
End: 2021-01-01
Payer: MEDICARE

## 2021-01-01 ENCOUNTER — APPOINTMENT (OUTPATIENT)
Dept: ULTRASOUND IMAGING | Age: 61
DRG: 193 | End: 2021-01-01
Payer: MEDICARE

## 2021-01-01 ENCOUNTER — ANESTHESIA EVENT (OUTPATIENT)
Dept: OPERATING ROOM | Age: 61
End: 2021-01-01

## 2021-01-01 ENCOUNTER — ANESTHESIA (OUTPATIENT)
Dept: OPERATING ROOM | Age: 61
End: 2021-01-01
Payer: MEDICARE

## 2021-01-01 ENCOUNTER — HOSPITAL ENCOUNTER (INPATIENT)
Age: 61
LOS: 4 days | Discharge: HOME | DRG: 177 | End: 2021-11-01
Attending: EMERGENCY MEDICINE | Admitting: INTERNAL MEDICINE
Payer: MEDICARE

## 2021-01-01 ENCOUNTER — HOSPITAL ENCOUNTER (INPATIENT)
Age: 61
LOS: 2 days | Discharge: HOME HEALTH | DRG: 193 | End: 2021-04-07
Attending: EMERGENCY MEDICINE | Admitting: INTERNAL MEDICINE
Payer: MEDICARE

## 2021-01-01 ENCOUNTER — CARE COORDINATION (OUTPATIENT)
Dept: CASE MANAGEMENT | Age: 61
End: 2021-01-01

## 2021-01-01 ENCOUNTER — APPOINTMENT (OUTPATIENT)
Dept: GENERAL RADIOLOGY | Age: 61
DRG: 206 | End: 2021-01-01
Payer: MEDICARE

## 2021-01-01 ENCOUNTER — ANESTHESIA EVENT (OUTPATIENT)
Dept: OPERATING ROOM | Age: 61
End: 2021-01-01
Payer: MEDICARE

## 2021-01-01 ENCOUNTER — ANESTHESIA (OUTPATIENT)
Dept: OPERATING ROOM | Age: 61
End: 2021-01-01

## 2021-01-01 ENCOUNTER — HOSPITAL ENCOUNTER (EMERGENCY)
Age: 61
Discharge: HOME | End: 2021-05-03
Attending: EMERGENCY MEDICINE
Payer: MEDICARE

## 2021-01-01 ENCOUNTER — APPOINTMENT (OUTPATIENT)
Dept: GENERAL RADIOLOGY | Age: 61
DRG: 193 | End: 2021-01-01
Payer: MEDICARE

## 2021-01-01 ENCOUNTER — APPOINTMENT (OUTPATIENT)
Dept: ULTRASOUND IMAGING | Age: 61
DRG: 207 | End: 2021-01-01
Payer: MEDICARE

## 2021-01-01 ENCOUNTER — HOSPITAL ENCOUNTER (OUTPATIENT)
Dept: ULTRASOUND IMAGING | Age: 61
Discharge: HOME | End: 2021-08-25
Payer: MEDICARE

## 2021-01-01 ENCOUNTER — HOSPITAL ENCOUNTER (INPATIENT)
Age: 61
LOS: 1 days | Discharge: HOME | DRG: 206 | End: 2021-02-26
Attending: EMERGENCY MEDICINE | Admitting: INTERNAL MEDICINE
Payer: MEDICARE

## 2021-01-01 ENCOUNTER — APPOINTMENT (OUTPATIENT)
Dept: ULTRASOUND IMAGING | Age: 61
DRG: 206 | End: 2021-01-01
Payer: MEDICARE

## 2021-01-01 ENCOUNTER — TELEPHONE (OUTPATIENT)
Dept: SURGERY | Age: 61
End: 2021-01-01

## 2021-01-01 ENCOUNTER — HOSPITAL ENCOUNTER (EMERGENCY)
Age: 61
Discharge: HOME | End: 2021-04-28
Attending: EMERGENCY MEDICINE
Payer: MEDICARE

## 2021-01-01 ENCOUNTER — HOSPITAL ENCOUNTER (INPATIENT)
Age: 61
LOS: 6 days | DRG: 207 | End: 2021-11-15
Attending: EMERGENCY MEDICINE | Admitting: INTERNAL MEDICINE
Payer: MEDICARE

## 2021-01-01 ENCOUNTER — HOSPITAL ENCOUNTER (EMERGENCY)
Age: 61
Discharge: HOME | End: 2021-03-06
Attending: EMERGENCY MEDICINE
Payer: MEDICARE

## 2021-01-01 ENCOUNTER — HOSPITAL ENCOUNTER (EMERGENCY)
Age: 61
Discharge: HOME | End: 2021-05-08
Attending: EMERGENCY MEDICINE
Payer: MEDICARE

## 2021-01-01 ENCOUNTER — APPOINTMENT (OUTPATIENT)
Dept: GENERAL RADIOLOGY | Age: 61
DRG: 177 | End: 2021-01-01
Payer: MEDICARE

## 2021-01-01 ENCOUNTER — OFFICE VISIT (OUTPATIENT)
Dept: SURGERY | Age: 61
End: 2021-01-01
Payer: MEDICARE

## 2021-01-01 VITALS
DIASTOLIC BLOOD PRESSURE: 76 MMHG | TEMPERATURE: 97.9 F | HEIGHT: 69 IN | HEART RATE: 78 BPM | WEIGHT: 295 LBS | RESPIRATION RATE: 22 BRPM | SYSTOLIC BLOOD PRESSURE: 151 MMHG | BODY MASS INDEX: 43.69 KG/M2 | OXYGEN SATURATION: 100 %

## 2021-01-01 VITALS
RESPIRATION RATE: 18 BRPM | HEIGHT: 69 IN | TEMPERATURE: 98.1 F | OXYGEN SATURATION: 100 % | WEIGHT: 280 LBS | DIASTOLIC BLOOD PRESSURE: 82 MMHG | BODY MASS INDEX: 41.47 KG/M2 | HEART RATE: 97 BPM | SYSTOLIC BLOOD PRESSURE: 191 MMHG

## 2021-01-01 VITALS
RESPIRATION RATE: 22 BRPM | OXYGEN SATURATION: 95 % | HEIGHT: 70 IN | SYSTOLIC BLOOD PRESSURE: 158 MMHG | DIASTOLIC BLOOD PRESSURE: 96 MMHG | TEMPERATURE: 95.5 F | WEIGHT: 286 LBS | HEART RATE: 76 BPM | BODY MASS INDEX: 40.94 KG/M2

## 2021-01-01 VITALS
TEMPERATURE: 96.8 F | HEART RATE: 79 BPM | WEIGHT: 280 LBS | OXYGEN SATURATION: 96 % | RESPIRATION RATE: 18 BRPM | DIASTOLIC BLOOD PRESSURE: 67 MMHG | SYSTOLIC BLOOD PRESSURE: 146 MMHG | BODY MASS INDEX: 41.47 KG/M2 | HEIGHT: 69 IN

## 2021-01-01 VITALS — HEIGHT: 70 IN | BODY MASS INDEX: 39.8 KG/M2 | TEMPERATURE: 97.3 F | WEIGHT: 278 LBS

## 2021-01-01 VITALS
SYSTOLIC BLOOD PRESSURE: 55 MMHG | OXYGEN SATURATION: 1 % | TEMPERATURE: 101.6 F | WEIGHT: 304.68 LBS | HEART RATE: 90 BPM | BODY MASS INDEX: 43.62 KG/M2 | RESPIRATION RATE: 14 BRPM | HEIGHT: 70 IN

## 2021-01-01 VITALS
WEIGHT: 281 LBS | OXYGEN SATURATION: 100 % | SYSTOLIC BLOOD PRESSURE: 130 MMHG | BODY MASS INDEX: 41.62 KG/M2 | TEMPERATURE: 97.3 F | RESPIRATION RATE: 18 BRPM | HEART RATE: 99 BPM | DIASTOLIC BLOOD PRESSURE: 74 MMHG | HEIGHT: 69 IN

## 2021-01-01 VITALS
SYSTOLIC BLOOD PRESSURE: 145 MMHG | BODY MASS INDEX: 43.69 KG/M2 | RESPIRATION RATE: 18 BRPM | HEART RATE: 71 BPM | OXYGEN SATURATION: 92 % | HEIGHT: 69 IN | DIASTOLIC BLOOD PRESSURE: 67 MMHG | WEIGHT: 295 LBS

## 2021-01-01 VITALS
RESPIRATION RATE: 18 BRPM | BODY MASS INDEX: 41.52 KG/M2 | DIASTOLIC BLOOD PRESSURE: 66 MMHG | HEART RATE: 60 BPM | TEMPERATURE: 98.1 F | WEIGHT: 290 LBS | HEIGHT: 70 IN | OXYGEN SATURATION: 97 % | SYSTOLIC BLOOD PRESSURE: 141 MMHG

## 2021-01-01 VITALS
DIASTOLIC BLOOD PRESSURE: 68 MMHG | TEMPERATURE: 98.4 F | RESPIRATION RATE: 20 BRPM | WEIGHT: 288 LBS | HEART RATE: 70 BPM | HEIGHT: 69 IN | BODY MASS INDEX: 42.65 KG/M2 | OXYGEN SATURATION: 98 % | SYSTOLIC BLOOD PRESSURE: 128 MMHG

## 2021-01-01 VITALS — HEIGHT: 69 IN | TEMPERATURE: 96.4 F | WEIGHT: 280 LBS | BODY MASS INDEX: 41.47 KG/M2

## 2021-01-01 VITALS
HEART RATE: 103 BPM | SYSTOLIC BLOOD PRESSURE: 149 MMHG | OXYGEN SATURATION: 93 % | DIASTOLIC BLOOD PRESSURE: 90 MMHG | RESPIRATION RATE: 18 BRPM | TEMPERATURE: 98 F

## 2021-01-01 VITALS — TEMPERATURE: 98.3 F

## 2021-01-01 VITALS
OXYGEN SATURATION: 100 % | DIASTOLIC BLOOD PRESSURE: 86 MMHG | RESPIRATION RATE: 18 BRPM | WEIGHT: 286 LBS | BODY MASS INDEX: 40.94 KG/M2 | HEIGHT: 70 IN | SYSTOLIC BLOOD PRESSURE: 157 MMHG | HEART RATE: 93 BPM

## 2021-01-01 VITALS — SYSTOLIC BLOOD PRESSURE: 130 MMHG | DIASTOLIC BLOOD PRESSURE: 66 MMHG | OXYGEN SATURATION: 98 %

## 2021-01-01 DIAGNOSIS — J44.1 COPD EXACERBATION (HCC): Primary | ICD-10-CM

## 2021-01-01 DIAGNOSIS — R06.89 DYSPNEA AND RESPIRATORY ABNORMALITIES: Primary | ICD-10-CM

## 2021-01-01 DIAGNOSIS — Z79.01 CHRONIC ANTICOAGULATION: ICD-10-CM

## 2021-01-01 DIAGNOSIS — R06.00 DYSPNEA, UNSPECIFIED TYPE: ICD-10-CM

## 2021-01-01 DIAGNOSIS — J96.21 ACUTE ON CHRONIC RESPIRATORY FAILURE WITH HYPOXIA (HCC): Primary | ICD-10-CM

## 2021-01-01 DIAGNOSIS — R06.03 RESPIRATORY DISTRESS: ICD-10-CM

## 2021-01-01 DIAGNOSIS — J95.09 TRACHEOSTOMY OBSTRUCTION (HCC): Primary | ICD-10-CM

## 2021-01-01 DIAGNOSIS — R06.02 SHORTNESS OF BREATH: Primary | ICD-10-CM

## 2021-01-01 DIAGNOSIS — F41.1 ANXIETY STATE: ICD-10-CM

## 2021-01-01 DIAGNOSIS — J81.0 ACUTE PULMONARY EDEMA (HCC): ICD-10-CM

## 2021-01-01 DIAGNOSIS — R22.43 LOCALIZED SWELLING, MASS AND LUMP, LOWER LIMB, BILATERAL: ICD-10-CM

## 2021-01-01 DIAGNOSIS — U07.1 COVID-19: ICD-10-CM

## 2021-01-01 DIAGNOSIS — Z93.1 PRESENCE OF EXTERNALLY REMOVABLE PERCUTANEOUS ENDOSCOPIC GASTROSTOMY (PEG) TUBE (HCC): Primary | ICD-10-CM

## 2021-01-01 DIAGNOSIS — Z93.0 TRACHEOSTOMY DEPENDENCE (HCC): ICD-10-CM

## 2021-01-01 DIAGNOSIS — R06.02 SHORTNESS OF BREATH: ICD-10-CM

## 2021-01-01 DIAGNOSIS — G47.33 OSA (OBSTRUCTIVE SLEEP APNEA): Primary | ICD-10-CM

## 2021-01-01 DIAGNOSIS — J95.00 TRACHEOSTOMY COMPLICATION, UNSPECIFIED COMPLICATION TYPE (HCC): ICD-10-CM

## 2021-01-01 DIAGNOSIS — N17.9 AKI (ACUTE KIDNEY INJURY) (HCC): ICD-10-CM

## 2021-01-01 DIAGNOSIS — I46.9 CARDIAC ARREST (HCC): ICD-10-CM

## 2021-01-01 DIAGNOSIS — T17.500A MUCUS PLUGGING OF BRONCHI: Primary | ICD-10-CM

## 2021-01-01 DIAGNOSIS — F41.9 ANXIETY: ICD-10-CM

## 2021-01-01 DIAGNOSIS — R06.00 DYSPNEA AND RESPIRATORY ABNORMALITIES: ICD-10-CM

## 2021-01-01 DIAGNOSIS — R51.9 HEADACHE, UNSPECIFIED HEADACHE TYPE: Primary | ICD-10-CM

## 2021-01-01 DIAGNOSIS — R06.00 DYSPNEA AND RESPIRATORY ABNORMALITIES: Primary | ICD-10-CM

## 2021-01-01 DIAGNOSIS — J98.8 AIRWAY COMPROMISE: ICD-10-CM

## 2021-01-01 DIAGNOSIS — R06.89 HYPERCAPNEMIA: ICD-10-CM

## 2021-01-01 DIAGNOSIS — R06.89 DYSPNEA AND RESPIRATORY ABNORMALITIES: ICD-10-CM

## 2021-01-01 LAB
AADO2: 278.2 MMHG
AADO2: 368.1 MMHG
AADO2: 518.1 MMHG
AADO2: 557.5 MMHG
AADO2: 558.5 MMHG
AADO2: 565.2 MMHG
AADO2: 565.5 MMHG
AADO2: 581 MMHG
AADO2: 583.2 MMHG
AADO2: 586.2 MMHG
AADO2: 586.2 MMHG
AADO2: 600.2 MMHG
ALBUMIN SERPL-MCNC: 2.3 G/DL (ref 3.5–5.2)
ALBUMIN SERPL-MCNC: 3.4 G/DL (ref 3.5–5.2)
ALBUMIN SERPL-MCNC: 3.8 G/DL (ref 3.5–5.2)
ALBUMIN SERPL-MCNC: 4.3 G/DL (ref 3.5–5.2)
ALBUMIN SERPL-MCNC: 4.5 G/DL (ref 3.5–5.2)
ALBUMIN SERPL-MCNC: 4.5 G/DL (ref 3.5–5.2)
ALBUMIN SERPL-MCNC: 4.7 G/DL (ref 3.5–5.2)
ALP BLD-CCNC: 130 U/L (ref 40–129)
ALP BLD-CCNC: 73 U/L (ref 40–129)
ALP BLD-CCNC: 77 U/L (ref 40–129)
ALP BLD-CCNC: 86 U/L (ref 40–129)
ALP BLD-CCNC: 90 U/L (ref 40–129)
ALP BLD-CCNC: 94 U/L (ref 40–129)
ALP BLD-CCNC: 95 U/L (ref 40–129)
ALT SERPL-CCNC: 11 U/L (ref 0–40)
ALT SERPL-CCNC: 15 U/L (ref 0–40)
ALT SERPL-CCNC: 17 U/L (ref 0–40)
ALT SERPL-CCNC: 17 U/L (ref 0–40)
ALT SERPL-CCNC: 30 U/L (ref 0–40)
ALT SERPL-CCNC: 39 U/L (ref 0–40)
ALT SERPL-CCNC: 8 U/L (ref 0–40)
ANION GAP SERPL CALCULATED.3IONS-SCNC: 10 MMOL/L (ref 7–16)
ANION GAP SERPL CALCULATED.3IONS-SCNC: 11 MMOL/L (ref 7–16)
ANION GAP SERPL CALCULATED.3IONS-SCNC: 12 MMOL/L (ref 7–16)
ANION GAP SERPL CALCULATED.3IONS-SCNC: 13 MMOL/L (ref 7–16)
ANION GAP SERPL CALCULATED.3IONS-SCNC: 15 MMOL/L (ref 7–16)
ANION GAP SERPL CALCULATED.3IONS-SCNC: 18 MMOL/L (ref 7–16)
ANION GAP SERPL CALCULATED.3IONS-SCNC: 7 MMOL/L (ref 7–16)
ANION GAP SERPL CALCULATED.3IONS-SCNC: 9 MMOL/L (ref 7–16)
ANION GAP SERPL CALCULATED.3IONS-SCNC: 9 MMOL/L (ref 7–16)
ANISOCYTOSIS: ABNORMAL
APTT: 26.3 SEC (ref 24.5–35.1)
APTT: 35.8 SEC (ref 24.5–35.1)
APTT: 35.9 SEC (ref 24.5–35.1)
APTT: 41.3 SEC (ref 24.5–35.1)
APTT: 49.1 SEC (ref 24.5–35.1)
APTT: 50.1 SEC (ref 24.5–35.1)
APTT: 53.1 SEC (ref 24.5–35.1)
APTT: 54.4 SEC (ref 24.5–35.1)
APTT: 58.2 SEC (ref 24.5–35.1)
APTT: >240 SEC (ref 24.5–35.1)
AST SERPL-CCNC: 12 U/L (ref 0–39)
AST SERPL-CCNC: 13 U/L (ref 0–39)
AST SERPL-CCNC: 20 U/L (ref 0–39)
AST SERPL-CCNC: 24 U/L (ref 0–39)
AST SERPL-CCNC: 36 U/L (ref 0–39)
AST SERPL-CCNC: 51 U/L (ref 0–39)
AST SERPL-CCNC: 95 U/L (ref 0–39)
B.E.: -0.3 MMOL/L (ref -3–3)
B.E.: -10 MMOL/L (ref -3–3)
B.E.: -11.7 MMOL/L (ref -3–3)
B.E.: 0.2 MMOL/L (ref -3–3)
B.E.: 0.3 MMOL/L (ref -3–3)
B.E.: 0.6 MMOL/L (ref -3–3)
B.E.: 1.3 MMOL/L (ref -3–3)
B.E.: 1.3 MMOL/L (ref -3–3)
B.E.: 1.5 MMOL/L (ref -3–3)
B.E.: 2.1 MMOL/L (ref -3–3)
B.E.: 2.1 MMOL/L (ref -3–3)
B.E.: 2.8 MMOL/L (ref -3–3)
B.E.: 3.2 MMOL/L (ref -3–3)
B.E.: 3.4 MMOL/L (ref -3–3)
B.E.: 3.9 MMOL/L (ref -3–3)
BACTERIA: ABNORMAL /HPF
BACTERIA: ABNORMAL /HPF
BACTERIA: NORMAL /HPF
BASOPHILS ABSOLUTE: 0 E9/L (ref 0–0.2)
BASOPHILS ABSOLUTE: 0.01 E9/L (ref 0–0.2)
BASOPHILS ABSOLUTE: 0.02 E9/L (ref 0–0.2)
BASOPHILS ABSOLUTE: 0.03 E9/L (ref 0–0.2)
BASOPHILS ABSOLUTE: 0.03 E9/L (ref 0–0.2)
BASOPHILS ABSOLUTE: 0.04 E9/L (ref 0–0.2)
BASOPHILS RELATIVE PERCENT: 0 % (ref 0–2)
BASOPHILS RELATIVE PERCENT: 0 % (ref 0–2)
BASOPHILS RELATIVE PERCENT: 0.1 % (ref 0–2)
BASOPHILS RELATIVE PERCENT: 0.2 % (ref 0–2)
BASOPHILS RELATIVE PERCENT: 0.3 % (ref 0–2)
BASOPHILS RELATIVE PERCENT: 0.4 % (ref 0–2)
BASOPHILS RELATIVE PERCENT: 0.4 % (ref 0–2)
BILIRUB SERPL-MCNC: 0.2 MG/DL (ref 0–1.2)
BILIRUB SERPL-MCNC: 0.2 MG/DL (ref 0–1.2)
BILIRUB SERPL-MCNC: 0.3 MG/DL (ref 0–1.2)
BILIRUB SERPL-MCNC: 0.5 MG/DL (ref 0–1.2)
BILIRUB SERPL-MCNC: 0.8 MG/DL (ref 0–1.2)
BILIRUB SERPL-MCNC: 1.1 MG/DL (ref 0–1.2)
BILIRUB SERPL-MCNC: <0.2 MG/DL (ref 0–1.2)
BILIRUBIN DIRECT: 0.6 MG/DL (ref 0–0.3)
BILIRUBIN URINE: NEGATIVE
BILIRUBIN, INDIRECT: 0.5 MG/DL (ref 0–1)
BLOOD CULTURE, ROUTINE: ABNORMAL
BLOOD CULTURE, ROUTINE: NORMAL
BLOOD CULTURE, ROUTINE: NORMAL
BLOOD, URINE: ABNORMAL
BLOOD, URINE: ABNORMAL
BLOOD, URINE: NEGATIVE
BOTTLE TYPE: ABNORMAL
BUN BLDV-MCNC: 10 MG/DL (ref 8–23)
BUN BLDV-MCNC: 11 MG/DL (ref 6–23)
BUN BLDV-MCNC: 11 MG/DL (ref 8–23)
BUN BLDV-MCNC: 12 MG/DL (ref 6–23)
BUN BLDV-MCNC: 12 MG/DL (ref 8–23)
BUN BLDV-MCNC: 13 MG/DL (ref 8–23)
BUN BLDV-MCNC: 13 MG/DL (ref 8–23)
BUN BLDV-MCNC: 17 MG/DL (ref 6–23)
BUN BLDV-MCNC: 17 MG/DL (ref 8–23)
BUN BLDV-MCNC: 18 MG/DL (ref 6–23)
BUN BLDV-MCNC: 18 MG/DL (ref 6–23)
BUN BLDV-MCNC: 20 MG/DL (ref 6–23)
BUN BLDV-MCNC: 23 MG/DL (ref 6–23)
BUN BLDV-MCNC: 24 MG/DL (ref 6–23)
BUN BLDV-MCNC: 24 MG/DL (ref 6–23)
BUN BLDV-MCNC: 29 MG/DL (ref 6–23)
BUN BLDV-MCNC: 9 MG/DL (ref 6–23)
C DIFF TOXIN/ANTIGEN: NORMAL
C-REACTIVE PROTEIN: 12.1 MG/DL (ref 0–0.4)
C-REACTIVE PROTEIN: 19.5 MG/DL (ref 0–0.4)
C-REACTIVE PROTEIN: 24.7 MG/DL (ref 0–0.4)
CALCIUM IONIZED: 1.06 MMOL/L (ref 1.15–1.33)
CALCIUM SERPL-MCNC: 7.3 MG/DL (ref 8.6–10.2)
CALCIUM SERPL-MCNC: 7.6 MG/DL (ref 8.6–10.2)
CALCIUM SERPL-MCNC: 7.6 MG/DL (ref 8.6–10.2)
CALCIUM SERPL-MCNC: 8.2 MG/DL (ref 8.6–10.2)
CALCIUM SERPL-MCNC: 8.2 MG/DL (ref 8.6–10.2)
CALCIUM SERPL-MCNC: 8.4 MG/DL (ref 8.6–10.2)
CALCIUM SERPL-MCNC: 9.1 MG/DL (ref 8.6–10.2)
CALCIUM SERPL-MCNC: 9.2 MG/DL (ref 8.6–10.2)
CALCIUM SERPL-MCNC: 9.3 MG/DL (ref 8.6–10.2)
CALCIUM SERPL-MCNC: 9.4 MG/DL (ref 8.6–10.2)
CALCIUM SERPL-MCNC: 9.5 MG/DL (ref 8.6–10.2)
CALCIUM SERPL-MCNC: 9.6 MG/DL (ref 8.6–10.2)
CALCIUM SERPL-MCNC: 9.6 MG/DL (ref 8.6–10.2)
CALCIUM SERPL-MCNC: 9.7 MG/DL (ref 8.6–10.2)
CALCIUM SERPL-MCNC: 9.7 MG/DL (ref 8.6–10.2)
CALCIUM SERPL-MCNC: 9.9 MG/DL (ref 8.6–10.2)
CANDIDA ALBICANS BY PCR: NOT DETECTED
CANDIDA GLABRATA BY PCR: NOT DETECTED
CANDIDA KRUSEI BY PCR: NOT DETECTED
CANDIDA PARAPSILOSIS BY PCR: NOT DETECTED
CANDIDA TROPICALIS BY PCR: NOT DETECTED
CHLORIDE BLD-SCNC: 100 MMOL/L (ref 98–107)
CHLORIDE BLD-SCNC: 100 MMOL/L (ref 98–107)
CHLORIDE BLD-SCNC: 101 MMOL/L (ref 98–107)
CHLORIDE BLD-SCNC: 102 MMOL/L (ref 98–107)
CHLORIDE BLD-SCNC: 103 MMOL/L (ref 98–107)
CHLORIDE BLD-SCNC: 104 MMOL/L (ref 98–107)
CHLORIDE BLD-SCNC: 105 MMOL/L (ref 98–107)
CHLORIDE BLD-SCNC: 105 MMOL/L (ref 98–107)
CHLORIDE BLD-SCNC: 106 MMOL/L (ref 98–107)
CHLORIDE BLD-SCNC: 106 MMOL/L (ref 98–107)
CHLORIDE BLD-SCNC: 95 MMOL/L (ref 98–107)
CHLORIDE BLD-SCNC: 96 MMOL/L (ref 98–107)
CHLORIDE BLD-SCNC: 97 MMOL/L (ref 98–107)
CHLORIDE BLD-SCNC: 97 MMOL/L (ref 98–107)
CHLORIDE BLD-SCNC: 99 MMOL/L (ref 98–107)
CHLORIDE URINE RANDOM: 40 MMOL/L
CHLORIDE URINE RANDOM: 43 MMOL/L
CLARITY: CLEAR
CO2: 22 MMOL/L (ref 22–29)
CO2: 23 MMOL/L (ref 22–29)
CO2: 23 MMOL/L (ref 22–29)
CO2: 24 MMOL/L (ref 22–29)
CO2: 24 MMOL/L (ref 22–29)
CO2: 25 MMOL/L (ref 22–29)
CO2: 25 MMOL/L (ref 22–29)
CO2: 26 MMOL/L (ref 22–29)
CO2: 27 MMOL/L (ref 22–29)
CO2: 28 MMOL/L (ref 22–29)
CO2: 30 MMOL/L (ref 22–29)
CO2: 31 MMOL/L (ref 22–29)
CO2: 32 MMOL/L (ref 22–29)
COHB: 0 % (ref 0–1.5)
COHB: 0.1 % (ref 0–1.5)
COHB: 0.2 % (ref 0–1.5)
COHB: 0.2 % (ref 0–1.5)
COHB: 0.3 % (ref 0–1.5)
COHB: 0.5 % (ref 0–1.5)
COHB: 0.6 % (ref 0–1.5)
COHB: 0.8 % (ref 0–1.5)
COHB: 1 % (ref 0–1.5)
COLOR: YELLOW
CREAT SERPL-MCNC: 1 MG/DL (ref 0.7–1.2)
CREAT SERPL-MCNC: 1 MG/DL (ref 0.7–1.2)
CREAT SERPL-MCNC: 1.1 MG/DL (ref 0.7–1.2)
CREAT SERPL-MCNC: 1.2 MG/DL (ref 0.7–1.2)
CREAT SERPL-MCNC: 1.4 MG/DL (ref 0.7–1.2)
CREAT SERPL-MCNC: 1.5 MG/DL (ref 0.7–1.2)
CREAT SERPL-MCNC: 1.6 MG/DL (ref 0.7–1.2)
CREAT SERPL-MCNC: 1.7 MG/DL (ref 0.7–1.2)
CREAT SERPL-MCNC: 1.7 MG/DL (ref 0.7–1.2)
CREAT SERPL-MCNC: 2.1 MG/DL (ref 0.7–1.2)
CREAT SERPL-MCNC: 3 MG/DL (ref 0.7–1.2)
CREATININE URINE: 145 MG/DL (ref 40–278)
CREATININE URINE: 41 MG/DL (ref 40–278)
CRITICAL: ABNORMAL
CULTURE, BLOOD 2: NORMAL
CULTURE, RESPIRATORY: ABNORMAL
D DIMER: 3084 NG/ML DDU
D DIMER: 3279 NG/ML DDU
D DIMER: 3397 NG/ML DDU
D DIMER: 4853 NG/ML DDU
D DIMER: <200 NG/ML DDU
DATE ANALYZED: ABNORMAL
DATE OF COLLECTION: ABNORMAL
EKG ATRIAL RATE: 110 BPM
EKG ATRIAL RATE: 113 BPM
EKG ATRIAL RATE: 137 BPM
EKG ATRIAL RATE: 65 BPM
EKG ATRIAL RATE: 73 BPM
EKG ATRIAL RATE: 80 BPM
EKG ATRIAL RATE: 82 BPM
EKG ATRIAL RATE: 88 BPM
EKG P AXIS: 43 DEGREES
EKG P AXIS: 43 DEGREES
EKG P AXIS: 46 DEGREES
EKG P AXIS: 53 DEGREES
EKG P AXIS: 55 DEGREES
EKG P AXIS: 59 DEGREES
EKG P AXIS: 66 DEGREES
EKG P AXIS: 70 DEGREES
EKG P-R INTERVAL: 142 MS
EKG P-R INTERVAL: 154 MS
EKG P-R INTERVAL: 166 MS
EKG P-R INTERVAL: 170 MS
EKG P-R INTERVAL: 170 MS
EKG P-R INTERVAL: 172 MS
EKG P-R INTERVAL: 182 MS
EKG P-R INTERVAL: 182 MS
EKG Q-T INTERVAL: 294 MS
EKG Q-T INTERVAL: 326 MS
EKG Q-T INTERVAL: 326 MS
EKG Q-T INTERVAL: 356 MS
EKG Q-T INTERVAL: 372 MS
EKG Q-T INTERVAL: 374 MS
EKG Q-T INTERVAL: 378 MS
EKG Q-T INTERVAL: 398 MS
EKG QRS DURATION: 70 MS
EKG QRS DURATION: 76 MS
EKG QRS DURATION: 86 MS
EKG QRS DURATION: 88 MS
EKG QRS DURATION: 90 MS
EKG QRS DURATION: 90 MS
EKG QTC CALCULATION (BAZETT): 409 MS
EKG QTC CALCULATION (BAZETT): 413 MS
EKG QTC CALCULATION (BAZETT): 430 MS
EKG QTC CALCULATION (BAZETT): 431 MS
EKG QTC CALCULATION (BAZETT): 441 MS
EKG QTC CALCULATION (BAZETT): 441 MS
EKG QTC CALCULATION (BAZETT): 443 MS
EKG QTC CALCULATION (BAZETT): 447 MS
EKG R AXIS: -21 DEGREES
EKG R AXIS: -22 DEGREES
EKG R AXIS: -26 DEGREES
EKG R AXIS: -39 DEGREES
EKG R AXIS: -39 DEGREES
EKG R AXIS: -40 DEGREES
EKG R AXIS: -40 DEGREES
EKG R AXIS: -6 DEGREES
EKG T AXIS: 26 DEGREES
EKG T AXIS: 31 DEGREES
EKG T AXIS: 37 DEGREES
EKG T AXIS: 38 DEGREES
EKG T AXIS: 44 DEGREES
EKG T AXIS: 58 DEGREES
EKG T AXIS: 73 DEGREES
EKG T AXIS: 80 DEGREES
EKG VENTRICULAR RATE: 110 BPM
EKG VENTRICULAR RATE: 113 BPM
EKG VENTRICULAR RATE: 137 BPM
EKG VENTRICULAR RATE: 65 BPM
EKG VENTRICULAR RATE: 73 BPM
EKG VENTRICULAR RATE: 80 BPM
EKG VENTRICULAR RATE: 82 BPM
EKG VENTRICULAR RATE: 88 BPM
ENTEROBACTER CLOACAE COMPLEX BY PCR: NOT DETECTED
ENTEROBACTERALES BY PCR: NOT DETECTED
ENTEROCOCCUS BY PCR: NOT DETECTED
EOSINOPHILS ABSOLUTE: 0 E9/L (ref 0.05–0.5)
EOSINOPHILS ABSOLUTE: 0.07 E9/L (ref 0.05–0.5)
EOSINOPHILS ABSOLUTE: 0.11 E9/L (ref 0.05–0.5)
EOSINOPHILS ABSOLUTE: 0.12 E9/L (ref 0.05–0.5)
EOSINOPHILS ABSOLUTE: 0.12 E9/L (ref 0.05–0.5)
EOSINOPHILS ABSOLUTE: 0.14 E9/L (ref 0.05–0.5)
EOSINOPHILS ABSOLUTE: 0.15 E9/L (ref 0.05–0.5)
EOSINOPHILS ABSOLUTE: 0.16 E9/L (ref 0.05–0.5)
EOSINOPHILS ABSOLUTE: 0.19 E9/L (ref 0.05–0.5)
EOSINOPHILS ABSOLUTE: 0.2 E9/L (ref 0.05–0.5)
EOSINOPHILS ABSOLUTE: 0.46 E9/L (ref 0.05–0.5)
EOSINOPHILS RELATIVE PERCENT: 0 % (ref 0–6)
EOSINOPHILS RELATIVE PERCENT: 0.1 % (ref 0–6)
EOSINOPHILS RELATIVE PERCENT: 0.2 % (ref 0–6)
EOSINOPHILS RELATIVE PERCENT: 1.1 % (ref 0–6)
EOSINOPHILS RELATIVE PERCENT: 1.1 % (ref 0–6)
EOSINOPHILS RELATIVE PERCENT: 1.4 % (ref 0–6)
EOSINOPHILS RELATIVE PERCENT: 1.5 % (ref 0–6)
EOSINOPHILS RELATIVE PERCENT: 1.5 % (ref 0–6)
EOSINOPHILS RELATIVE PERCENT: 1.7 % (ref 0–6)
EOSINOPHILS RELATIVE PERCENT: 2 % (ref 0–6)
EOSINOPHILS RELATIVE PERCENT: 2.1 % (ref 0–6)
EOSINOPHILS RELATIVE PERCENT: 2.5 % (ref 0–6)
EOSINOPHILS RELATIVE PERCENT: 2.5 % (ref 0–6)
EOSINOPHILS RELATIVE PERCENT: 2.8 % (ref 0–6)
EOSINOPHILS RELATIVE PERCENT: 3.5 % (ref 0–6)
EPITHELIAL CELLS, UA: ABNORMAL /HPF
ESCHERICHIA COLI BY PCR: NOT DETECTED
FIBRINOGEN: >700 MG/DL (ref 225–540)
FIO2: 100 %
FIO2: 70 %
FOLATE: >20 NG/ML (ref 4.8–24.2)
GFR AFRICAN AMERICAN: 26
GFR AFRICAN AMERICAN: 39
GFR AFRICAN AMERICAN: 50
GFR AFRICAN AMERICAN: 50
GFR AFRICAN AMERICAN: 53
GFR AFRICAN AMERICAN: 58
GFR AFRICAN AMERICAN: >60
GFR NON-AFRICAN AMERICAN: 26 ML/MIN/1.73
GFR NON-AFRICAN AMERICAN: 39 ML/MIN/1.73
GFR NON-AFRICAN AMERICAN: 50 ML/MIN/1.73
GFR NON-AFRICAN AMERICAN: 50 ML/MIN/1.73
GFR NON-AFRICAN AMERICAN: 53 ML/MIN/1.73
GFR NON-AFRICAN AMERICAN: 58 ML/MIN/1.73
GFR NON-AFRICAN AMERICAN: >60 ML/MIN/1.73
GLUCOSE BLD-MCNC: 107 MG/DL (ref 74–99)
GLUCOSE BLD-MCNC: 112 MG/DL (ref 74–99)
GLUCOSE BLD-MCNC: 112 MG/DL (ref 74–99)
GLUCOSE BLD-MCNC: 118 MG/DL (ref 74–99)
GLUCOSE BLD-MCNC: 119 MG/DL (ref 74–99)
GLUCOSE BLD-MCNC: 123 MG/DL (ref 74–99)
GLUCOSE BLD-MCNC: 128 MG/DL (ref 74–99)
GLUCOSE BLD-MCNC: 131 MG/DL (ref 74–99)
GLUCOSE BLD-MCNC: 143 MG/DL (ref 74–99)
GLUCOSE BLD-MCNC: 145 MG/DL (ref 74–99)
GLUCOSE BLD-MCNC: 146 MG/DL (ref 74–99)
GLUCOSE BLD-MCNC: 154 MG/DL (ref 74–99)
GLUCOSE BLD-MCNC: 161 MG/DL (ref 74–99)
GLUCOSE BLD-MCNC: 167 MG/DL (ref 74–99)
GLUCOSE BLD-MCNC: 177 MG/DL (ref 74–99)
GLUCOSE BLD-MCNC: 186 MG/DL (ref 74–99)
GLUCOSE BLD-MCNC: 194 MG/DL (ref 74–99)
GLUCOSE BLD-MCNC: 279 MG/DL (ref 74–99)
GLUCOSE BLD-MCNC: 69 MG/DL (ref 74–99)
GLUCOSE BLD-MCNC: 96 MG/DL (ref 74–99)
GLUCOSE URINE: 250 MG/DL
GLUCOSE URINE: NEGATIVE MG/DL
GLUCOSE URINE: NEGATIVE MG/DL
HAEMOPHILUS INFLUENZAE BY PCR: NOT DETECTED
HCO3: 16.8 MMOL/L (ref 22–26)
HCO3: 19.4 MMOL/L (ref 22–26)
HCO3: 22.6 MMOL/L (ref 22–26)
HCO3: 24.6 MMOL/L (ref 22–26)
HCO3: 24.7 MMOL/L (ref 22–26)
HCO3: 25.7 MMOL/L (ref 22–26)
HCO3: 25.8 MMOL/L (ref 22–26)
HCO3: 26.1 MMOL/L (ref 22–26)
HCO3: 27.6 MMOL/L (ref 22–26)
HCO3: 27.7 MMOL/L (ref 22–26)
HCO3: 27.9 MMOL/L (ref 22–26)
HCO3: 28.4 MMOL/L (ref 22–26)
HCO3: 28.4 MMOL/L (ref 22–26)
HCO3: 30.7 MMOL/L (ref 22–26)
HCO3: 33 MMOL/L (ref 22–26)
HCT VFR BLD CALC: 21.6 % (ref 37–54)
HCT VFR BLD CALC: 24.1 % (ref 37–54)
HCT VFR BLD CALC: 24.4 % (ref 37–54)
HCT VFR BLD CALC: 24.9 % (ref 37–54)
HCT VFR BLD CALC: 25.5 % (ref 37–54)
HCT VFR BLD CALC: 25.7 % (ref 37–54)
HCT VFR BLD CALC: 26.8 % (ref 37–54)
HCT VFR BLD CALC: 31.3 % (ref 37–54)
HCT VFR BLD CALC: 31.7 % (ref 37–54)
HCT VFR BLD CALC: 32.2 % (ref 37–54)
HCT VFR BLD CALC: 32.7 % (ref 37–54)
HCT VFR BLD CALC: 32.9 % (ref 37–54)
HCT VFR BLD CALC: 33.8 % (ref 37–54)
HCT VFR BLD CALC: 34.1 % (ref 37–54)
HCT VFR BLD CALC: 34.9 % (ref 37–54)
HCT VFR BLD CALC: 35.1 % (ref 37–54)
HCT VFR BLD CALC: 35.3 % (ref 37–54)
HCT VFR BLD CALC: 36.2 % (ref 37–54)
HCT VFR BLD CALC: 36.6 % (ref 37–54)
HEMOGLOBIN: 10.3 G/DL (ref 12.5–16.5)
HEMOGLOBIN: 10.4 G/DL (ref 12.5–16.5)
HEMOGLOBIN: 10.4 G/DL (ref 12.5–16.5)
HEMOGLOBIN: 10.5 G/DL (ref 12.5–16.5)
HEMOGLOBIN: 10.7 G/DL (ref 12.5–16.5)
HEMOGLOBIN: 10.9 G/DL (ref 12.5–16.5)
HEMOGLOBIN: 11.2 G/DL (ref 12.5–16.5)
HEMOGLOBIN: 11.4 G/DL (ref 12.5–16.5)
HEMOGLOBIN: 11.8 G/DL (ref 12.5–16.5)
HEMOGLOBIN: 11.8 G/DL (ref 12.5–16.5)
HEMOGLOBIN: 6.7 G/DL (ref 12.5–16.5)
HEMOGLOBIN: 7.8 G/DL (ref 12.5–16.5)
HEMOGLOBIN: 8.1 G/DL (ref 12.5–16.5)
HEMOGLOBIN: 8.3 G/DL (ref 12.5–16.5)
HEMOGLOBIN: 8.4 G/DL (ref 12.5–16.5)
HEMOGLOBIN: 8.4 G/DL (ref 12.5–16.5)
HEMOGLOBIN: 8.5 G/DL (ref 12.5–16.5)
HHB: 0.5 % (ref 0–5)
HHB: 1.7 % (ref 0–5)
HHB: 10.5 % (ref 0–5)
HHB: 12.1 % (ref 0–5)
HHB: 13.7 % (ref 0–5)
HHB: 2.8 % (ref 0–5)
HHB: 3.2 % (ref 0–5)
HHB: 30.9 % (ref 0–5)
HHB: 33.8 % (ref 0–5)
HHB: 36.5 % (ref 0–5)
HHB: 37.8 % (ref 0–5)
HHB: 4.6 % (ref 0–5)
HHB: 46.6 % (ref 0–5)
HHB: 5 % (ref 0–5)
HHB: 9.1 % (ref 0–5)
HYPOCHROMIA: ABNORMAL
IMMATURE GRANULOCYTES #: 0.01 E9/L
IMMATURE GRANULOCYTES #: 0.02 E9/L
IMMATURE GRANULOCYTES #: 0.04 E9/L
IMMATURE GRANULOCYTES #: 0.06 E9/L
IMMATURE GRANULOCYTES #: 0.09 E9/L
IMMATURE GRANULOCYTES #: 0.11 E9/L
IMMATURE GRANULOCYTES #: 0.13 E9/L
IMMATURE GRANULOCYTES #: 0.14 E9/L
IMMATURE GRANULOCYTES #: 0.17 E9/L
IMMATURE GRANULOCYTES %: 0.2 % (ref 0–5)
IMMATURE GRANULOCYTES %: 0.2 % (ref 0–5)
IMMATURE GRANULOCYTES %: 0.3 % (ref 0–5)
IMMATURE GRANULOCYTES %: 0.5 % (ref 0–5)
IMMATURE GRANULOCYTES %: 0.6 % (ref 0–5)
IMMATURE GRANULOCYTES %: 0.9 % (ref 0–5)
IMMATURE GRANULOCYTES %: 1.4 % (ref 0–5)
IMMATURE GRANULOCYTES %: 1.4 % (ref 0–5)
IMMATURE GRANULOCYTES %: 1.5 % (ref 0–5)
IMMATURE GRANULOCYTES %: 1.7 % (ref 0–5)
INR BLD: 1.2
INR BLD: 1.4
KETONES, URINE: NEGATIVE MG/DL
KLEBSIELLA OXYTOCA BY PCR: NOT DETECTED
KLEBSIELLA PNEUMONIAE GROUP BY PCR: NOT DETECTED
LAB: ABNORMAL
LACTATE DEHYDROGENASE: 553 U/L (ref 135–225)
LACTIC ACID, SEPSIS: 0.8 MMOL/L (ref 0.5–1.9)
LACTIC ACID, SEPSIS: 1.2 MMOL/L (ref 0.5–1.9)
LACTIC ACID, SEPSIS: 1.6 MMOL/L (ref 0.5–1.9)
LACTIC ACID: 0.7 MMOL/L (ref 0.5–2.2)
LACTIC ACID: 1.3 MMOL/L (ref 0.5–2.2)
LACTIC ACID: 7.7 MMOL/L (ref 0.5–2.2)
LEUKOCYTE ESTERASE, URINE: ABNORMAL
LEUKOCYTE ESTERASE, URINE: NEGATIVE
LEUKOCYTE ESTERASE, URINE: NEGATIVE
LIPASE: 37 U/L (ref 13–60)
LISTERIA MONOCYTOGENES BY PCR: NOT DETECTED
LYMPHOCYTES ABSOLUTE: 1.04 E9/L (ref 1.5–4)
LYMPHOCYTES ABSOLUTE: 1.06 E9/L (ref 1.5–4)
LYMPHOCYTES ABSOLUTE: 1.18 E9/L (ref 1.5–4)
LYMPHOCYTES ABSOLUTE: 1.84 E9/L (ref 1.5–4)
LYMPHOCYTES ABSOLUTE: 1.87 E9/L (ref 1.5–4)
LYMPHOCYTES ABSOLUTE: 2.01 E9/L (ref 1.5–4)
LYMPHOCYTES ABSOLUTE: 2.14 E9/L (ref 1.5–4)
LYMPHOCYTES ABSOLUTE: 2.18 E9/L (ref 1.5–4)
LYMPHOCYTES ABSOLUTE: 2.43 E9/L (ref 1.5–4)
LYMPHOCYTES ABSOLUTE: 2.52 E9/L (ref 1.5–4)
LYMPHOCYTES ABSOLUTE: 2.56 E9/L (ref 1.5–4)
LYMPHOCYTES ABSOLUTE: 2.59 E9/L (ref 1.5–4)
LYMPHOCYTES ABSOLUTE: 3.01 E9/L (ref 1.5–4)
LYMPHOCYTES ABSOLUTE: 3.11 E9/L (ref 1.5–4)
LYMPHOCYTES ABSOLUTE: 3.19 E9/L (ref 1.5–4)
LYMPHOCYTES ABSOLUTE: 3.35 E9/L (ref 1.5–4)
LYMPHOCYTES ABSOLUTE: 3.47 E9/L (ref 1.5–4)
LYMPHOCYTES ABSOLUTE: 3.83 E9/L (ref 1.5–4)
LYMPHOCYTES RELATIVE PERCENT: 15.2 % (ref 20–42)
LYMPHOCYTES RELATIVE PERCENT: 16.2 % (ref 20–42)
LYMPHOCYTES RELATIVE PERCENT: 16.7 % (ref 20–42)
LYMPHOCYTES RELATIVE PERCENT: 18.3 % (ref 20–42)
LYMPHOCYTES RELATIVE PERCENT: 18.8 % (ref 20–42)
LYMPHOCYTES RELATIVE PERCENT: 19.2 % (ref 20–42)
LYMPHOCYTES RELATIVE PERCENT: 20.3 % (ref 20–42)
LYMPHOCYTES RELATIVE PERCENT: 22.6 % (ref 20–42)
LYMPHOCYTES RELATIVE PERCENT: 23.4 % (ref 20–42)
LYMPHOCYTES RELATIVE PERCENT: 27.1 % (ref 20–42)
LYMPHOCYTES RELATIVE PERCENT: 27.9 % (ref 20–42)
LYMPHOCYTES RELATIVE PERCENT: 30 % (ref 20–42)
LYMPHOCYTES RELATIVE PERCENT: 42.6 % (ref 20–42)
LYMPHOCYTES RELATIVE PERCENT: 43 % (ref 20–42)
LYMPHOCYTES RELATIVE PERCENT: 44.7 % (ref 20–42)
LYMPHOCYTES RELATIVE PERCENT: 45.1 % (ref 20–42)
LYMPHOCYTES RELATIVE PERCENT: 45.3 % (ref 20–42)
LYMPHOCYTES RELATIVE PERCENT: 46.2 % (ref 20–42)
Lab: ABNORMAL
MAGNESIUM: 2.1 MG/DL (ref 1.6–2.6)
MAGNESIUM: 2.1 MG/DL (ref 1.6–2.6)
MAGNESIUM: 2.3 MG/DL (ref 1.6–2.6)
MAGNESIUM: 2.3 MG/DL (ref 1.6–2.6)
MAGNESIUM: 2.5 MG/DL (ref 1.6–2.6)
MCH RBC QN AUTO: 23.5 PG (ref 26–35)
MCH RBC QN AUTO: 23.5 PG (ref 26–35)
MCH RBC QN AUTO: 23.6 PG (ref 26–35)
MCH RBC QN AUTO: 23.8 PG (ref 26–35)
MCH RBC QN AUTO: 24 PG (ref 26–35)
MCH RBC QN AUTO: 24 PG (ref 26–35)
MCH RBC QN AUTO: 24.1 PG (ref 26–35)
MCH RBC QN AUTO: 24.1 PG (ref 26–35)
MCH RBC QN AUTO: 24.2 PG (ref 26–35)
MCH RBC QN AUTO: 24.2 PG (ref 26–35)
MCH RBC QN AUTO: 24.4 PG (ref 26–35)
MCH RBC QN AUTO: 24.4 PG (ref 26–35)
MCH RBC QN AUTO: 24.5 PG (ref 26–35)
MCH RBC QN AUTO: 24.7 PG (ref 26–35)
MCH RBC QN AUTO: 24.7 PG (ref 26–35)
MCH RBC QN AUTO: 24.8 PG (ref 26–35)
MCH RBC QN AUTO: 24.9 PG (ref 26–35)
MCH RBC QN AUTO: 25.3 PG (ref 26–35)
MCH RBC QN AUTO: 25.3 PG (ref 26–35)
MCHC RBC AUTO-ENTMCNC: 31 % (ref 32–34.5)
MCHC RBC AUTO-ENTMCNC: 31.7 % (ref 32–34.5)
MCHC RBC AUTO-ENTMCNC: 31.8 % (ref 32–34.5)
MCHC RBC AUTO-ENTMCNC: 31.9 % (ref 32–34.5)
MCHC RBC AUTO-ENTMCNC: 32.2 % (ref 32–34.5)
MCHC RBC AUTO-ENTMCNC: 32.2 % (ref 32–34.5)
MCHC RBC AUTO-ENTMCNC: 32.3 % (ref 32–34.5)
MCHC RBC AUTO-ENTMCNC: 32.3 % (ref 32–34.5)
MCHC RBC AUTO-ENTMCNC: 32.4 % (ref 32–34.5)
MCHC RBC AUTO-ENTMCNC: 32.5 % (ref 32–34.5)
MCHC RBC AUTO-ENTMCNC: 32.5 % (ref 32–34.5)
MCHC RBC AUTO-ENTMCNC: 32.6 % (ref 32–34.5)
MCHC RBC AUTO-ENTMCNC: 32.7 % (ref 32–34.5)
MCHC RBC AUTO-ENTMCNC: 32.7 % (ref 32–34.5)
MCHC RBC AUTO-ENTMCNC: 32.8 % (ref 32–34.5)
MCHC RBC AUTO-ENTMCNC: 32.9 % (ref 32–34.5)
MCHC RBC AUTO-ENTMCNC: 32.9 % (ref 32–34.5)
MCHC RBC AUTO-ENTMCNC: 33.8 % (ref 32–34.5)
MCHC RBC AUTO-ENTMCNC: 34 % (ref 32–34.5)
MCV RBC AUTO: 70.9 FL (ref 80–99.9)
MCV RBC AUTO: 72.4 FL (ref 80–99.9)
MCV RBC AUTO: 73 FL (ref 80–99.9)
MCV RBC AUTO: 73 FL (ref 80–99.9)
MCV RBC AUTO: 73.2 FL (ref 80–99.9)
MCV RBC AUTO: 73.3 FL (ref 80–99.9)
MCV RBC AUTO: 73.7 FL (ref 80–99.9)
MCV RBC AUTO: 74.1 FL (ref 80–99.9)
MCV RBC AUTO: 74.2 FL (ref 80–99.9)
MCV RBC AUTO: 74.3 FL (ref 80–99.9)
MCV RBC AUTO: 75.3 FL (ref 80–99.9)
MCV RBC AUTO: 75.5 FL (ref 80–99.9)
MCV RBC AUTO: 75.6 FL (ref 80–99.9)
MCV RBC AUTO: 76.1 FL (ref 80–99.9)
MCV RBC AUTO: 77 FL (ref 80–99.9)
MCV RBC AUTO: 77.1 FL (ref 80–99.9)
MCV RBC AUTO: 77.8 FL (ref 80–99.9)
MCV RBC AUTO: 78 FL (ref 80–99.9)
MCV RBC AUTO: 78.5 FL (ref 80–99.9)
METER GLUCOSE: 105 MG/DL (ref 74–99)
METER GLUCOSE: 106 MG/DL (ref 74–99)
METER GLUCOSE: 108 MG/DL (ref 74–99)
METER GLUCOSE: 111 MG/DL (ref 74–99)
METER GLUCOSE: 118 MG/DL (ref 74–99)
METER GLUCOSE: 120 MG/DL (ref 74–99)
METER GLUCOSE: 122 MG/DL (ref 74–99)
METER GLUCOSE: 124 MG/DL (ref 74–99)
METER GLUCOSE: 126 MG/DL (ref 74–99)
METER GLUCOSE: 132 MG/DL (ref 74–99)
METER GLUCOSE: 159 MG/DL (ref 74–99)
METER GLUCOSE: 169 MG/DL (ref 74–99)
METER GLUCOSE: 179 MG/DL (ref 74–99)
METER GLUCOSE: 202 MG/DL (ref 74–99)
METER GLUCOSE: 95 MG/DL (ref 74–99)
METHB: 0.1 % (ref 0–1.5)
METHB: 0.2 % (ref 0–1.5)
METHB: 0.3 % (ref 0–1.5)
METHB: 0.3 % (ref 0–1.5)
METHB: 0.4 % (ref 0–1.5)
METHB: 0.5 % (ref 0–1.5)
METHB: 0.7 % (ref 0–1.5)
METHICILLIN RESISTANCE MECA/C  BY PCR: NOT DETECTED
MICROALBUMIN UR-MCNC: 12.1 MG/L
MICROALBUMIN/CREAT UR-RTO: 29.5 (ref 0–30)
MODE: ABNORMAL
MODE: AC
MONOCYTES ABSOLUTE: 0.14 E9/L (ref 0.1–0.95)
MONOCYTES ABSOLUTE: 0.34 E9/L (ref 0.1–0.95)
MONOCYTES ABSOLUTE: 0.37 E9/L (ref 0.1–0.95)
MONOCYTES ABSOLUTE: 0.39 E9/L (ref 0.1–0.95)
MONOCYTES ABSOLUTE: 0.39 E9/L (ref 0.1–0.95)
MONOCYTES ABSOLUTE: 0.42 E9/L (ref 0.1–0.95)
MONOCYTES ABSOLUTE: 0.45 E9/L (ref 0.1–0.95)
MONOCYTES ABSOLUTE: 0.46 E9/L (ref 0.1–0.95)
MONOCYTES ABSOLUTE: 0.48 E9/L (ref 0.1–0.95)
MONOCYTES ABSOLUTE: 0.51 E9/L (ref 0.1–0.95)
MONOCYTES ABSOLUTE: 0.52 E9/L (ref 0.1–0.95)
MONOCYTES ABSOLUTE: 0.54 E9/L (ref 0.1–0.95)
MONOCYTES ABSOLUTE: 0.63 E9/L (ref 0.1–0.95)
MONOCYTES ABSOLUTE: 0.73 E9/L (ref 0.1–0.95)
MONOCYTES ABSOLUTE: 0.75 E9/L (ref 0.1–0.95)
MONOCYTES ABSOLUTE: 0.76 E9/L (ref 0.1–0.95)
MONOCYTES ABSOLUTE: 0.79 E9/L (ref 0.1–0.95)
MONOCYTES ABSOLUTE: 0.89 E9/L (ref 0.1–0.95)
MONOCYTES RELATIVE PERCENT: 1.9 % (ref 2–12)
MONOCYTES RELATIVE PERCENT: 10.2 % (ref 2–12)
MONOCYTES RELATIVE PERCENT: 10.3 % (ref 2–12)
MONOCYTES RELATIVE PERCENT: 10.5 % (ref 2–12)
MONOCYTES RELATIVE PERCENT: 12.4 % (ref 2–12)
MONOCYTES RELATIVE PERCENT: 2.6 % (ref 2–12)
MONOCYTES RELATIVE PERCENT: 3.8 % (ref 2–12)
MONOCYTES RELATIVE PERCENT: 3.9 % (ref 2–12)
MONOCYTES RELATIVE PERCENT: 4.3 % (ref 2–12)
MONOCYTES RELATIVE PERCENT: 4.5 % (ref 2–12)
MONOCYTES RELATIVE PERCENT: 5 % (ref 2–12)
MONOCYTES RELATIVE PERCENT: 5.5 % (ref 2–12)
MONOCYTES RELATIVE PERCENT: 5.9 % (ref 2–12)
MONOCYTES RELATIVE PERCENT: 6.1 % (ref 2–12)
MONOCYTES RELATIVE PERCENT: 6.4 % (ref 2–12)
MONOCYTES RELATIVE PERCENT: 6.7 % (ref 2–12)
MONOCYTES RELATIVE PERCENT: 7.3 % (ref 2–12)
MONOCYTES RELATIVE PERCENT: 8.8 % (ref 2–12)
NEISSERIA MENINGITIDIS BY PCR: NOT DETECTED
NEUTROPHILS ABSOLUTE: 2.5 E9/L (ref 1.8–7.3)
NEUTROPHILS ABSOLUTE: 2.99 E9/L (ref 1.8–7.3)
NEUTROPHILS ABSOLUTE: 3.3 E9/L (ref 1.8–7.3)
NEUTROPHILS ABSOLUTE: 3.33 E9/L (ref 1.8–7.3)
NEUTROPHILS ABSOLUTE: 3.58 E9/L (ref 1.8–7.3)
NEUTROPHILS ABSOLUTE: 3.86 E9/L (ref 1.8–7.3)
NEUTROPHILS ABSOLUTE: 4.38 E9/L (ref 1.8–7.3)
NEUTROPHILS ABSOLUTE: 4.95 E9/L (ref 1.8–7.3)
NEUTROPHILS ABSOLUTE: 5.33 E9/L (ref 1.8–7.3)
NEUTROPHILS ABSOLUTE: 5.45 E9/L (ref 1.8–7.3)
NEUTROPHILS ABSOLUTE: 5.73 E9/L (ref 1.8–7.3)
NEUTROPHILS ABSOLUTE: 5.89 E9/L (ref 1.8–7.3)
NEUTROPHILS ABSOLUTE: 6.06 E9/L (ref 1.8–7.3)
NEUTROPHILS ABSOLUTE: 7.17 E9/L (ref 1.8–7.3)
NEUTROPHILS ABSOLUTE: 7.25 E9/L (ref 1.8–7.3)
NEUTROPHILS ABSOLUTE: 8.37 E9/L (ref 1.8–7.3)
NEUTROPHILS ABSOLUTE: 9.2 E9/L (ref 1.8–7.3)
NEUTROPHILS ABSOLUTE: 9.3 E9/L (ref 1.8–7.3)
NEUTROPHILS RELATIVE PERCENT: 42.5 % (ref 43–80)
NEUTROPHILS RELATIVE PERCENT: 43.2 % (ref 43–80)
NEUTROPHILS RELATIVE PERCENT: 44.1 % (ref 43–80)
NEUTROPHILS RELATIVE PERCENT: 44.4 % (ref 43–80)
NEUTROPHILS RELATIVE PERCENT: 44.8 % (ref 43–80)
NEUTROPHILS RELATIVE PERCENT: 47.9 % (ref 43–80)
NEUTROPHILS RELATIVE PERCENT: 61 % (ref 43–80)
NEUTROPHILS RELATIVE PERCENT: 64.1 % (ref 43–80)
NEUTROPHILS RELATIVE PERCENT: 65.3 % (ref 43–80)
NEUTROPHILS RELATIVE PERCENT: 68.9 % (ref 43–80)
NEUTROPHILS RELATIVE PERCENT: 69.4 % (ref 43–80)
NEUTROPHILS RELATIVE PERCENT: 71.3 % (ref 43–80)
NEUTROPHILS RELATIVE PERCENT: 73.2 % (ref 43–80)
NEUTROPHILS RELATIVE PERCENT: 73.2 % (ref 43–80)
NEUTROPHILS RELATIVE PERCENT: 73.7 % (ref 43–80)
NEUTROPHILS RELATIVE PERCENT: 73.8 % (ref 43–80)
NEUTROPHILS RELATIVE PERCENT: 75.7 % (ref 43–80)
NEUTROPHILS RELATIVE PERCENT: 82.9 % (ref 43–80)
NITRITE, URINE: NEGATIVE
NUCLEATED RED BLOOD CELLS: 0.9 /100 WBC
NUCLEATED RED BLOOD CELLS: 1 /100 WBC
O2 CONTENT: 10.2 ML/DL
O2 CONTENT: 12 ML/DL
O2 CONTENT: 12.5 ML/DL
O2 CONTENT: 12.7 ML/DL
O2 CONTENT: 13.1 ML/DL
O2 CONTENT: 13.2 ML/DL
O2 CONTENT: 13.5 ML/DL
O2 CONTENT: 15.1 ML/DL
O2 CONTENT: 15.9 ML/DL
O2 CONTENT: 17.8 ML/DL
O2 CONTENT: 6.7 ML/DL
O2 CONTENT: 6.7 ML/DL
O2 CONTENT: 8.9 ML/DL
O2 SATURATION: 53.1 % (ref 92–98.5)
O2 SATURATION: 61.9 % (ref 92–98.5)
O2 SATURATION: 63.1 % (ref 92–98.5)
O2 SATURATION: 66 % (ref 92–98.5)
O2 SATURATION: 68.9 % (ref 92–98.5)
O2 SATURATION: 86.1 % (ref 92–98.5)
O2 SATURATION: 87.8 % (ref 92–98.5)
O2 SATURATION: 89.4 % (ref 92–98.5)
O2 SATURATION: 90.8 % (ref 92–98.5)
O2 SATURATION: 95 % (ref 92–98.5)
O2 SATURATION: 95.4 % (ref 92–98.5)
O2 SATURATION: 96.8 % (ref 92–98.5)
O2 SATURATION: 97.2 % (ref 92–98.5)
O2 SATURATION: 98.3 % (ref 92–98.5)
O2 SATURATION: 99.5 % (ref 92–98.5)
O2HB: 52.7 % (ref 94–97)
O2HB: 61.3 % (ref 94–97)
O2HB: 62.5 % (ref 94–97)
O2HB: 65.5 % (ref 94–97)
O2HB: 68.4 % (ref 94–97)
O2HB: 85 % (ref 94–97)
O2HB: 87 % (ref 94–97)
O2HB: 89 % (ref 94–97)
O2HB: 89.5 % (ref 94–97)
O2HB: 94.4 % (ref 94–97)
O2HB: 95 % (ref 94–97)
O2HB: 96.1 % (ref 94–97)
O2HB: 96.2 % (ref 94–97)
O2HB: 97.6 % (ref 94–97)
O2HB: 99 % (ref 94–97)
OPERATOR ID: 1210
OPERATOR ID: 1632
OPERATOR ID: 2593
OPERATOR ID: 2593
OPERATOR ID: 421
OPERATOR ID: 7294
OPERATOR ID: 882
OPERATOR ID: 882
OPERATOR ID: 914
OPERATOR ID: 914
OPERATOR ID: ABNORMAL
ORDER NUMBER: ABNORMAL
ORGANISM: ABNORMAL
OVALOCYTES: ABNORMAL
PATIENT TEMP: 37 C
PCO2: 24.7 MMHG (ref 35–45)
PCO2: 38.1 MMHG (ref 35–45)
PCO2: 38.2 MMHG (ref 35–45)
PCO2: 39.4 MMHG (ref 35–45)
PCO2: 40 MMHG (ref 35–45)
PCO2: 40.6 MMHG (ref 35–45)
PCO2: 46.7 MMHG (ref 35–45)
PCO2: 47.5 MMHG (ref 35–45)
PCO2: 47.6 MMHG (ref 35–45)
PCO2: 53 MMHG (ref 35–45)
PCO2: 58.2 MMHG (ref 35–45)
PCO2: 58.9 MMHG (ref 35–45)
PCO2: 62.8 MMHG (ref 35–45)
PCO2: 63 MMHG (ref 35–45)
PCO2: 80.5 MMHG (ref 35–45)
PDW BLD-RTO: 17.9 FL (ref 11.5–15)
PDW BLD-RTO: 18 FL (ref 11.5–15)
PDW BLD-RTO: 18.1 FL (ref 11.5–15)
PDW BLD-RTO: 18.4 FL (ref 11.5–15)
PDW BLD-RTO: 18.5 FL (ref 11.5–15)
PDW BLD-RTO: 18.7 FL (ref 11.5–15)
PDW BLD-RTO: 18.7 FL (ref 11.5–15)
PDW BLD-RTO: 18.8 FL (ref 11.5–15)
PDW BLD-RTO: 18.8 FL (ref 11.5–15)
PDW BLD-RTO: 18.9 FL (ref 11.5–15)
PDW BLD-RTO: 19 FL (ref 11.5–15)
PDW BLD-RTO: 19.1 FL (ref 11.5–15)
PDW BLD-RTO: 19.3 FL (ref 11.5–15)
PDW BLD-RTO: 19.4 FL (ref 11.5–15)
PDW BLD-RTO: 19.5 FL (ref 11.5–15)
PDW BLD-RTO: 19.6 FL (ref 11.5–15)
PDW BLD-RTO: 19.7 FL (ref 11.5–15)
PEEP/CPAP: 10 CMH2O
PEEP/CPAP: 15 CMH2O
PEEP/CPAP: 15 CMH2O
PEEP/CPAP: 18 CMH2O
PFO2: 0.4 MMHG/%
PFO2: 0.42 MMHG/%
PFO2: 0.44 MMHG/%
PFO2: 0.49 MMHG/%
PFO2: 0.58 MMHG/%
PFO2: 0.62 MMHG/%
PFO2: 0.68 MMHG/%
PFO2: 0.84 MMHG/%
PFO2: 0.89 MMHG/%
PFO2: 1.11 MMHG/%
PFO2: 1.24 MMHG/%
PFO2: 3.7 MMHG/%
PH BLOOD GAS: 7.11 (ref 7.35–7.45)
PH BLOOD GAS: 7.12 (ref 7.35–7.45)
PH BLOOD GAS: 7.23 (ref 7.35–7.45)
PH BLOOD GAS: 7.26 (ref 7.35–7.45)
PH BLOOD GAS: 7.3 (ref 7.35–7.45)
PH BLOOD GAS: 7.33 (ref 7.35–7.45)
PH BLOOD GAS: 7.38 (ref 7.35–7.45)
PH BLOOD GAS: 7.39 (ref 7.35–7.45)
PH BLOOD GAS: 7.4 (ref 7.35–7.45)
PH BLOOD GAS: 7.42 (ref 7.35–7.45)
PH BLOOD GAS: 7.42 (ref 7.35–7.45)
PH BLOOD GAS: 7.43 (ref 7.35–7.45)
PH BLOOD GAS: 7.43 (ref 7.35–7.45)
PH BLOOD GAS: 7.45 (ref 7.35–7.45)
PH BLOOD GAS: 7.58 (ref 7.35–7.45)
PH UA: 6 (ref 5–9)
PH UA: 6 (ref 5–9)
PH UA: 7.5 (ref 5–9)
PHOSPHORUS: 3.8 MG/DL (ref 2.5–4.5)
PHOSPHORUS: 4.7 MG/DL (ref 2.5–4.5)
PHOSPHORUS: 5 MG/DL (ref 2.5–4.5)
PHOSPHORUS: 7.2 MG/DL (ref 2.5–4.5)
PLATELET # BLD: 176 E9/L (ref 130–450)
PLATELET # BLD: 198 E9/L (ref 130–450)
PLATELET # BLD: 222 E9/L (ref 130–450)
PLATELET # BLD: 238 E9/L (ref 130–450)
PLATELET # BLD: 239 E9/L (ref 130–450)
PLATELET # BLD: 245 E9/L (ref 130–450)
PLATELET # BLD: 246 E9/L (ref 130–450)
PLATELET # BLD: 246 E9/L (ref 130–450)
PLATELET # BLD: 250 E9/L (ref 130–450)
PLATELET # BLD: 258 E9/L (ref 130–450)
PLATELET # BLD: 269 E9/L (ref 130–450)
PLATELET # BLD: 272 E9/L (ref 130–450)
PLATELET # BLD: 273 E9/L (ref 130–450)
PLATELET # BLD: 282 E9/L (ref 130–450)
PLATELET # BLD: 300 E9/L (ref 130–450)
PLATELET # BLD: 311 E9/L (ref 130–450)
PLATELET # BLD: 313 E9/L (ref 130–450)
PLATELET # BLD: 317 E9/L (ref 130–450)
PLATELET # BLD: 376 E9/L (ref 130–450)
PMV BLD AUTO: 8.4 FL (ref 7–12)
PMV BLD AUTO: 8.6 FL (ref 7–12)
PMV BLD AUTO: 8.9 FL (ref 7–12)
PMV BLD AUTO: 9 FL (ref 7–12)
PMV BLD AUTO: 9 FL (ref 7–12)
PMV BLD AUTO: 9.1 FL (ref 7–12)
PMV BLD AUTO: 9.2 FL (ref 7–12)
PMV BLD AUTO: 9.3 FL (ref 7–12)
PMV BLD AUTO: 9.4 FL (ref 7–12)
PMV BLD AUTO: 9.6 FL (ref 7–12)
PMV BLD AUTO: 9.7 FL (ref 7–12)
PMV BLD AUTO: 9.9 FL (ref 7–12)
PO2: 111 MMHG (ref 75–100)
PO2: 135.6 MMHG (ref 75–100)
PO2: 369.8 MMHG (ref 75–100)
PO2: 40.2 MMHG (ref 75–100)
PO2: 42.3 MMHG (ref 75–100)
PO2: 44.2 MMHG (ref 75–100)
PO2: 44.2 MMHG (ref 75–100)
PO2: 48.8 MMHG (ref 75–100)
PO2: 58.1 MMHG (ref 75–100)
PO2: 62.4 MMHG (ref 75–100)
PO2: 63.5 MMHG (ref 75–100)
PO2: 67.5 MMHG (ref 75–100)
PO2: 84.4 MMHG (ref 75–100)
PO2: 87 MMHG (ref 75–100)
PO2: 88.9 MMHG (ref 75–100)
POIKILOCYTES: ABNORMAL
POLYCHROMASIA: ABNORMAL
POTASSIUM REFLEX MAGNESIUM: 3.7 MMOL/L (ref 3.5–5)
POTASSIUM REFLEX MAGNESIUM: 3.8 MMOL/L (ref 3.5–5)
POTASSIUM REFLEX MAGNESIUM: 3.8 MMOL/L (ref 3.5–5)
POTASSIUM REFLEX MAGNESIUM: 3.9 MMOL/L (ref 3.5–5)
POTASSIUM REFLEX MAGNESIUM: 4 MMOL/L (ref 3.5–5)
POTASSIUM REFLEX MAGNESIUM: 4 MMOL/L (ref 3.5–5)
POTASSIUM REFLEX MAGNESIUM: 4.1 MMOL/L (ref 3.5–5)
POTASSIUM REFLEX MAGNESIUM: 4.2 MMOL/L (ref 3.5–5)
POTASSIUM REFLEX MAGNESIUM: 4.2 MMOL/L (ref 3.5–5)
POTASSIUM REFLEX MAGNESIUM: 4.5 MMOL/L (ref 3.5–5)
POTASSIUM REFLEX MAGNESIUM: 4.6 MMOL/L (ref 3.5–5)
POTASSIUM REFLEX MAGNESIUM: 4.6 MMOL/L (ref 3.5–5)
POTASSIUM REFLEX MAGNESIUM: 4.9 MMOL/L (ref 3.5–5)
POTASSIUM SERPL-SCNC: 3.3 MMOL/L (ref 3.5–5)
POTASSIUM SERPL-SCNC: 4 MMOL/L (ref 3.5–5)
POTASSIUM SERPL-SCNC: 4.3 MMOL/L (ref 3.5–5)
POTASSIUM SERPL-SCNC: 4.7 MMOL/L (ref 3.5–5)
POTASSIUM SERPL-SCNC: 4.9 MMOL/L (ref 3.5–5)
POTASSIUM SERPL-SCNC: 5.2 MMOL/L (ref 3.5–5)
POTASSIUM SERPL-SCNC: 5.41 MMOL/L (ref 3.5–5)
POTASSIUM SERPL-SCNC: 5.5 MMOL/L (ref 3.5–5)
POTASSIUM, UR: 14.8 MMOL/L
POTASSIUM, UR: 41.7 MMOL/L
PRO-BNP: 121 PG/ML (ref 0–125)
PRO-BNP: 48 PG/ML (ref 0–125)
PRO-BNP: 50 PG/ML (ref 0–125)
PRO-BNP: 53 PG/ML (ref 0–125)
PRO-BNP: 81 PG/ML (ref 0–125)
PROCALCITONIN: 0.06 NG/ML (ref 0–0.08)
PROCALCITONIN: 0.07 NG/ML (ref 0–0.08)
PROCALCITONIN: 0.41 NG/ML (ref 0–0.08)
PROCALCITONIN: 1.33 NG/ML (ref 0–0.08)
PROTEIN UA: >=300 MG/DL
PROTEIN UA: ABNORMAL MG/DL
PROTEIN UA: NEGATIVE MG/DL
PROTEUS SPECIES BY PCR: NOT DETECTED
PROTHROMBIN TIME: 12.9 SEC (ref 9.3–12.4)
PROTHROMBIN TIME: 15 SEC (ref 9.3–12.4)
PSEUDOMONAS AERUGINOSA BY PCR: NOT DETECTED
RBC # BLD: 2.84 E12/L (ref 3.8–5.8)
RBC # BLD: 3.2 E12/L (ref 3.8–5.8)
RBC # BLD: 3.44 E12/L (ref 3.8–5.8)
RBC # BLD: 3.44 E12/L (ref 3.8–5.8)
RBC # BLD: 3.47 E12/L (ref 3.8–5.8)
RBC # BLD: 3.48 E12/L (ref 3.8–5.8)
RBC # BLD: 3.61 E12/L (ref 3.8–5.8)
RBC # BLD: 4.18 E12/L (ref 3.8–5.8)
RBC # BLD: 4.23 E12/L (ref 3.8–5.8)
RBC # BLD: 4.24 E12/L (ref 3.8–5.8)
RBC # BLD: 4.29 E12/L (ref 3.8–5.8)
RBC # BLD: 4.33 E12/L (ref 3.8–5.8)
RBC # BLD: 4.5 E12/L (ref 3.8–5.8)
RBC # BLD: 4.55 E12/L (ref 3.8–5.8)
RBC # BLD: 4.62 E12/L (ref 3.8–5.8)
RBC # BLD: 4.63 E12/L (ref 3.8–5.8)
RBC # BLD: 4.66 E12/L (ref 3.8–5.8)
RBC # BLD: 4.67 E12/L (ref 3.8–5.8)
RBC # BLD: 4.96 E12/L (ref 3.8–5.8)
RBC UA: ABNORMAL /HPF (ref 0–2)
RBC UA: ABNORMAL /HPF (ref 0–2)
RBC UA: NORMAL /HPF (ref 0–2)
RI(T): 10.04
RI(T): 12.01
RI(T): 13.15
RI(T): 1336 %
RI(T): 14.93
RI(T): 4.23
RI(T): 467 %
RI(T): 6.28
RI(T): 6.7
RI(T): 75 %
RI(T): 826 %
RI(T): 9.39
RR MECHANICAL: 18 B/MIN
RR MECHANICAL: 20 B/MIN
RR MECHANICAL: 20 B/MIN
RR MECHANICAL: 24 B/MIN
SARS-COV-2, NAAT: DETECTED
SARS-COV-2, NAAT: DETECTED
SARS-COV-2, NAAT: NOT DETECTED
SCHISTOCYTES: ABNORMAL
SERRATIA MARCESCENS BY PCR: NOT DETECTED
SMEAR, RESPIRATORY: ABNORMAL
SMEAR, RESPIRATORY: ABNORMAL
SODIUM BLD-SCNC: 131 MMOL/L (ref 132–146)
SODIUM BLD-SCNC: 134 MMOL/L (ref 132–146)
SODIUM BLD-SCNC: 135 MMOL/L (ref 132–146)
SODIUM BLD-SCNC: 137 MMOL/L (ref 132–146)
SODIUM BLD-SCNC: 138 MMOL/L (ref 132–146)
SODIUM BLD-SCNC: 139 MMOL/L (ref 132–146)
SODIUM BLD-SCNC: 140 MMOL/L (ref 132–146)
SODIUM BLD-SCNC: 140 MMOL/L (ref 132–146)
SODIUM BLD-SCNC: 141 MMOL/L (ref 132–146)
SODIUM BLD-SCNC: 141 MMOL/L (ref 132–146)
SODIUM BLD-SCNC: 142 MMOL/L (ref 132–146)
SODIUM BLD-SCNC: 142 MMOL/L (ref 132–146)
SODIUM BLD-SCNC: 143 MMOL/L (ref 132–146)
SODIUM BLD-SCNC: 144 MMOL/L (ref 132–146)
SODIUM BLD-SCNC: 146 MMOL/L (ref 132–146)
SODIUM URINE: 43 MMOL/L
SODIUM URINE: 56 MMOL/L
SOURCE OF BLOOD CULTURE: ABNORMAL
SOURCE, BLOOD GAS: ABNORMAL
SPECIFIC GRAVITY UA: 1.02 (ref 1–1.03)
SPECIFIC GRAVITY UA: <=1.005 (ref 1–1.03)
SPECIFIC GRAVITY UA: >=1.03 (ref 1–1.03)
STAPHYLOCOCCUS AUREUS BY PCR: NOT DETECTED
STAPHYLOCOCCUS SPECIES BY PCR: DETECTED
STOMATOCYTES: ABNORMAL
STOMATOCYTES: ABNORMAL
STREPTOCOCCUS AGALACTIAE BY PCR: NOT DETECTED
STREPTOCOCCUS PNEUMONIAE BY PCR: NOT DETECTED
STREPTOCOCCUS PYOGENES  BY PCR: NOT DETECTED
STREPTOCOCCUS SPECIES BY PCR: NOT DETECTED
TARGET CELLS: ABNORMAL
TEAR DROP CELLS: ABNORMAL
THB: 10.1 G/DL (ref 11.5–16.5)
THB: 10.6 G/DL (ref 11.5–16.5)
THB: 10.7 G/DL (ref 11.5–16.5)
THB: 10.8 G/DL (ref 11.5–16.5)
THB: 11.8 G/DL (ref 11.5–16.5)
THB: 12.1 G/DL (ref 11.5–16.5)
THB: 12.6 G/DL (ref 11.5–16.5)
THB: 7.5 G/DL (ref 11.5–16.5)
THB: 8.5 G/DL (ref 11.5–16.5)
THB: 8.9 G/DL (ref 11.5–16.5)
THB: 9 G/DL (ref 11.5–16.5)
THB: 9.3 G/DL (ref 11.5–16.5)
THB: 9.6 G/DL (ref 11.5–16.5)
THB: 9.6 G/DL (ref 11.5–16.5)
THB: 9.8 G/DL (ref 11.5–16.5)
TIME ANALYZED: 1124
TIME ANALYZED: 131
TIME ANALYZED: 1326
TIME ANALYZED: 1408
TIME ANALYZED: 1533
TIME ANALYZED: 1539
TIME ANALYZED: 1619
TIME ANALYZED: 2229
TIME ANALYZED: 317
TIME ANALYZED: 346
TIME ANALYZED: 459
TIME ANALYZED: 516
TIME ANALYZED: 546
TIME ANALYZED: 623
TIME ANALYZED: 908
TOTAL PROTEIN: 6.1 G/DL (ref 6.4–8.3)
TOTAL PROTEIN: 7.2 G/DL (ref 6.4–8.3)
TOTAL PROTEIN: 7.7 G/DL (ref 6.4–8.3)
TOTAL PROTEIN: 8 G/DL (ref 6.4–8.3)
TOTAL PROTEIN: 8.4 G/DL (ref 6.4–8.3)
TOTAL PROTEIN: 8.5 G/DL (ref 6.4–8.3)
TOTAL PROTEIN: 8.9 G/DL (ref 6.4–8.3)
TRIGL SERPL-MCNC: 338 MG/DL (ref 0–149)
TROPONIN, HIGH SENSITIVITY: 28 NG/L (ref 0–11)
TROPONIN, HIGH SENSITIVITY: 33 NG/L (ref 0–11)
TROPONIN, HIGH SENSITIVITY: 33 NG/L (ref 0–11)
TROPONIN, HIGH SENSITIVITY: 38 NG/L (ref 0–11)
TROPONIN, HIGH SENSITIVITY: 47 NG/L (ref 0–11)
TROPONIN: <0.01 NG/ML (ref 0–0.03)
TSH SERPL DL<=0.05 MIU/L-ACNC: 2.15 UIU/ML (ref 0.27–4.2)
UREA NITROGEN, UR: 164 MG/DL (ref 800–1666)
UREA NITROGEN, UR: 611 MG/DL (ref 800–1666)
URINE CULTURE, ROUTINE: NORMAL
UROBILINOGEN, URINE: 0.2 E.U./DL
UROBILINOGEN, URINE: 0.2 E.U./DL
UROBILINOGEN, URINE: 4 E.U./DL
VITAMIN B-12: 550 PG/ML (ref 211–946)
VT MECHANICAL: 450 ML
VT MECHANICAL: 480 ML
VT MECHANICAL: 500 ML
WBC # BLD: 11.4 E9/L (ref 4.5–11.5)
WBC # BLD: 12.1 E9/L (ref 4.5–11.5)
WBC # BLD: 13.1 E9/L (ref 4.5–11.5)
WBC # BLD: 5.8 E9/L (ref 4.5–11.5)
WBC # BLD: 6.4 E9/L (ref 4.5–11.5)
WBC # BLD: 6.5 E9/L (ref 4.5–11.5)
WBC # BLD: 6.7 E9/L (ref 4.5–11.5)
WBC # BLD: 6.9 E9/L (ref 4.5–11.5)
WBC # BLD: 7.3 E9/L (ref 4.5–11.5)
WBC # BLD: 7.4 E9/L (ref 4.5–11.5)
WBC # BLD: 7.5 E9/L (ref 4.5–11.5)
WBC # BLD: 7.9 E9/L (ref 4.5–11.5)
WBC # BLD: 8.1 E9/L (ref 4.5–11.5)
WBC # BLD: 8.1 E9/L (ref 4.5–11.5)
WBC # BLD: 8.5 E9/L (ref 4.5–11.5)
WBC # BLD: 9.2 E9/L (ref 4.5–11.5)
WBC # BLD: 9.3 E9/L (ref 4.5–11.5)
WBC # BLD: 9.7 E9/L (ref 4.5–11.5)
WBC # BLD: 9.9 E9/L (ref 4.5–11.5)
WBC UA: ABNORMAL /HPF (ref 0–5)
WBC UA: ABNORMAL /HPF (ref 0–5)
WBC UA: NORMAL /HPF (ref 0–5)

## 2021-01-01 PROCEDURE — 6360000002 HC RX W HCPCS: Performed by: INTERNAL MEDICINE

## 2021-01-01 PROCEDURE — 6360000002 HC RX W HCPCS

## 2021-01-01 PROCEDURE — 2580000003 HC RX 258: Performed by: INTERNAL MEDICINE

## 2021-01-01 PROCEDURE — C9113 INJ PANTOPRAZOLE SODIUM, VIA: HCPCS | Performed by: INTERNAL MEDICINE

## 2021-01-01 PROCEDURE — G0378 HOSPITAL OBSERVATION PER HR: HCPCS

## 2021-01-01 PROCEDURE — 87150 DNA/RNA AMPLIFIED PROBE: CPT

## 2021-01-01 PROCEDURE — 71045 X-RAY EXAM CHEST 1 VIEW: CPT

## 2021-01-01 PROCEDURE — 84133 ASSAY OF URINE POTASSIUM: CPT

## 2021-01-01 PROCEDURE — 2500000003 HC RX 250 WO HCPCS: Performed by: EMERGENCY MEDICINE

## 2021-01-01 PROCEDURE — 80053 COMPREHEN METABOLIC PANEL: CPT

## 2021-01-01 PROCEDURE — 1200000000 HC SEMI PRIVATE

## 2021-01-01 PROCEDURE — 84484 ASSAY OF TROPONIN QUANT: CPT

## 2021-01-01 PROCEDURE — 99233 SBSQ HOSP IP/OBS HIGH 50: CPT | Performed by: OTOLARYNGOLOGY

## 2021-01-01 PROCEDURE — 2700000000 HC OXYGEN THERAPY PER DAY

## 2021-01-01 PROCEDURE — 85730 THROMBOPLASTIN TIME PARTIAL: CPT

## 2021-01-01 PROCEDURE — 99291 CRITICAL CARE FIRST HOUR: CPT | Performed by: INTERNAL MEDICINE

## 2021-01-01 PROCEDURE — 87040 BLOOD CULTURE FOR BACTERIA: CPT

## 2021-01-01 PROCEDURE — 2580000003 HC RX 258: Performed by: EMERGENCY MEDICINE

## 2021-01-01 PROCEDURE — 6370000000 HC RX 637 (ALT 250 FOR IP): Performed by: INTERNAL MEDICINE

## 2021-01-01 PROCEDURE — 93970 EXTREMITY STUDY: CPT

## 2021-01-01 PROCEDURE — 86140 C-REACTIVE PROTEIN: CPT

## 2021-01-01 PROCEDURE — 85027 COMPLETE CBC AUTOMATED: CPT

## 2021-01-01 PROCEDURE — 94664 DEMO&/EVAL PT USE INHALER: CPT

## 2021-01-01 PROCEDURE — 94640 AIRWAY INHALATION TREATMENT: CPT

## 2021-01-01 PROCEDURE — 99284 EMERGENCY DEPT VISIT MOD MDM: CPT

## 2021-01-01 PROCEDURE — 82436 ASSAY OF URINE CHLORIDE: CPT

## 2021-01-01 PROCEDURE — 31502 CHANGE OF WINDPIPE AIRWAY: CPT

## 2021-01-01 PROCEDURE — 6360000002 HC RX W HCPCS: Performed by: EMERGENCY MEDICINE

## 2021-01-01 PROCEDURE — 85378 FIBRIN DEGRADE SEMIQUANT: CPT

## 2021-01-01 PROCEDURE — 85384 FIBRINOGEN ACTIVITY: CPT

## 2021-01-01 PROCEDURE — 96374 THER/PROPH/DIAG INJ IV PUSH: CPT

## 2021-01-01 PROCEDURE — 83690 ASSAY OF LIPASE: CPT

## 2021-01-01 PROCEDURE — 83735 ASSAY OF MAGNESIUM: CPT

## 2021-01-01 PROCEDURE — 80076 HEPATIC FUNCTION PANEL: CPT

## 2021-01-01 PROCEDURE — 94003 VENT MGMT INPAT SUBQ DAY: CPT

## 2021-01-01 PROCEDURE — 85025 COMPLETE CBC W/AUTO DIFF WBC: CPT

## 2021-01-01 PROCEDURE — 97161 PT EVAL LOW COMPLEX 20 MIN: CPT | Performed by: PHYSICAL THERAPIST

## 2021-01-01 PROCEDURE — 6360000002 HC RX W HCPCS: Performed by: NURSE ANESTHETIST, CERTIFIED REGISTERED

## 2021-01-01 PROCEDURE — 82962 GLUCOSE BLOOD TEST: CPT

## 2021-01-01 PROCEDURE — 36592 COLLECT BLOOD FROM PICC: CPT

## 2021-01-01 PROCEDURE — 2500000003 HC RX 250 WO HCPCS: Performed by: INTERNAL MEDICINE

## 2021-01-01 PROCEDURE — 93970 EXTREMITY STUDY: CPT | Performed by: RADIOLOGY

## 2021-01-01 PROCEDURE — 2580000003 HC RX 258

## 2021-01-01 PROCEDURE — 99283 EMERGENCY DEPT VISIT LOW MDM: CPT

## 2021-01-01 PROCEDURE — 93005 ELECTROCARDIOGRAM TRACING: CPT | Performed by: INTERNAL MEDICINE

## 2021-01-01 PROCEDURE — 36415 COLL VENOUS BLD VENIPUNCTURE: CPT

## 2021-01-01 PROCEDURE — 83880 ASSAY OF NATRIURETIC PEPTIDE: CPT

## 2021-01-01 PROCEDURE — 2140000000 HC CCU INTERMEDIATE R&B

## 2021-01-01 PROCEDURE — 82570 ASSAY OF URINE CREATININE: CPT

## 2021-01-01 PROCEDURE — 93005 ELECTROCARDIOGRAM TRACING: CPT | Performed by: EMERGENCY MEDICINE

## 2021-01-01 PROCEDURE — 31502 CHANGE OF WINDPIPE AIRWAY: CPT | Performed by: OTOLARYNGOLOGY

## 2021-01-01 PROCEDURE — 84145 PROCALCITONIN (PCT): CPT

## 2021-01-01 PROCEDURE — 97530 THERAPEUTIC ACTIVITIES: CPT | Performed by: PHYSICAL THERAPIST

## 2021-01-01 PROCEDURE — 99291 CRITICAL CARE FIRST HOUR: CPT

## 2021-01-01 PROCEDURE — 80048 BASIC METABOLIC PNL TOTAL CA: CPT

## 2021-01-01 PROCEDURE — 93010 ELECTROCARDIOGRAM REPORT: CPT | Performed by: INTERNAL MEDICINE

## 2021-01-01 PROCEDURE — 31500 INSERT EMERGENCY AIRWAY: CPT

## 2021-01-01 PROCEDURE — 7100000010 HC PHASE II RECOVERY - FIRST 15 MIN: Performed by: OTOLARYNGOLOGY

## 2021-01-01 PROCEDURE — 2500000003 HC RX 250 WO HCPCS

## 2021-01-01 PROCEDURE — 7100000011 HC PHASE II RECOVERY - ADDTL 15 MIN: Performed by: OTOLARYNGOLOGY

## 2021-01-01 PROCEDURE — 6360000004 HC RX CONTRAST MEDICATION: Performed by: RADIOLOGY

## 2021-01-01 PROCEDURE — 96365 THER/PROPH/DIAG IV INF INIT: CPT

## 2021-01-01 PROCEDURE — 99221 1ST HOSP IP/OBS SF/LOW 40: CPT | Performed by: OTOLARYNGOLOGY

## 2021-01-01 PROCEDURE — 82805 BLOOD GASES W/O2 SATURATION: CPT

## 2021-01-01 PROCEDURE — 2060000000 HC ICU INTERMEDIATE R&B

## 2021-01-01 PROCEDURE — 51702 INSERT TEMP BLADDER CATH: CPT

## 2021-01-01 PROCEDURE — 70450 CT HEAD/BRAIN W/O DYE: CPT

## 2021-01-01 PROCEDURE — 87635 SARS-COV-2 COVID-19 AMP PRB: CPT

## 2021-01-01 PROCEDURE — 6360000002 HC RX W HCPCS: Performed by: STUDENT IN AN ORGANIZED HEALTH CARE EDUCATION/TRAINING PROGRAM

## 2021-01-01 PROCEDURE — 96372 THER/PROPH/DIAG INJ SC/IM: CPT

## 2021-01-01 PROCEDURE — 99223 1ST HOSP IP/OBS HIGH 75: CPT | Performed by: OTOLARYNGOLOGY

## 2021-01-01 PROCEDURE — 2709999900 HC NON-CHARGEABLE SUPPLY: Performed by: OTOLARYNGOLOGY

## 2021-01-01 PROCEDURE — 2580000003 HC RX 258: Performed by: NURSE ANESTHETIST, CERTIFIED REGISTERED

## 2021-01-01 PROCEDURE — 83605 ASSAY OF LACTIC ACID: CPT

## 2021-01-01 PROCEDURE — 3600000002 HC SURGERY LEVEL 2 BASE: Performed by: OTOLARYNGOLOGY

## 2021-01-01 PROCEDURE — 87088 URINE BACTERIA CULTURE: CPT

## 2021-01-01 PROCEDURE — 6370000000 HC RX 637 (ALT 250 FOR IP): Performed by: EMERGENCY MEDICINE

## 2021-01-01 PROCEDURE — 2000000000 HC ICU R&B

## 2021-01-01 PROCEDURE — 89220 SPUTUM SPECIMEN COLLECTION: CPT

## 2021-01-01 PROCEDURE — 83615 LACTATE (LD) (LDH) ENZYME: CPT

## 2021-01-01 PROCEDURE — 96375 TX/PRO/DX INJ NEW DRUG ADDON: CPT

## 2021-01-01 PROCEDURE — 74018 RADEX ABDOMEN 1 VIEW: CPT

## 2021-01-01 PROCEDURE — 96361 HYDRATE IV INFUSION ADD-ON: CPT

## 2021-01-01 PROCEDURE — 99213 OFFICE O/P EST LOW 20 MIN: CPT | Performed by: OTOLARYNGOLOGY

## 2021-01-01 PROCEDURE — 84300 ASSAY OF URINE SODIUM: CPT

## 2021-01-01 PROCEDURE — 71046 X-RAY EXAM CHEST 2 VIEWS: CPT

## 2021-01-01 PROCEDURE — 84100 ASSAY OF PHOSPHORUS: CPT

## 2021-01-01 PROCEDURE — 36556 INSERT NON-TUNNEL CV CATH: CPT

## 2021-01-01 PROCEDURE — 87206 SMEAR FLUORESCENT/ACID STAI: CPT

## 2021-01-01 PROCEDURE — 87186 SC STD MICRODIL/AGAR DIL: CPT

## 2021-01-01 PROCEDURE — 5A1955Z RESPIRATORY VENTILATION, GREATER THAN 96 CONSECUTIVE HOURS: ICD-10-PCS | Performed by: INTERNAL MEDICINE

## 2021-01-01 PROCEDURE — 87070 CULTURE OTHR SPECIMN AEROBIC: CPT

## 2021-01-01 PROCEDURE — 96367 TX/PROPH/DG ADDL SEQ IV INF: CPT

## 2021-01-01 PROCEDURE — 84443 ASSAY THYROID STIM HORMONE: CPT

## 2021-01-01 PROCEDURE — 6370000000 HC RX 637 (ALT 250 FOR IP): Performed by: CLINICAL NURSE SPECIALIST

## 2021-01-01 PROCEDURE — 81001 URINALYSIS AUTO W/SCOPE: CPT

## 2021-01-01 PROCEDURE — 94002 VENT MGMT INPAT INIT DAY: CPT

## 2021-01-01 PROCEDURE — 92950 HEART/LUNG RESUSCITATION CPR: CPT

## 2021-01-01 PROCEDURE — 99221 1ST HOSP IP/OBS SF/LOW 40: CPT | Performed by: INTERNAL MEDICINE

## 2021-01-01 PROCEDURE — 6370000000 HC RX 637 (ALT 250 FOR IP): Performed by: STUDENT IN AN ORGANIZED HEALTH CARE EDUCATION/TRAINING PROGRAM

## 2021-01-01 PROCEDURE — 93005 ELECTROCARDIOGRAM TRACING: CPT | Performed by: NURSE PRACTITIONER

## 2021-01-01 PROCEDURE — 82607 VITAMIN B-12: CPT

## 2021-01-01 PROCEDURE — 76770 US EXAM ABDO BACK WALL COMP: CPT

## 2021-01-01 PROCEDURE — 99285 EMERGENCY DEPT VISIT HI MDM: CPT

## 2021-01-01 PROCEDURE — 85610 PROTHROMBIN TIME: CPT

## 2021-01-01 PROCEDURE — 2500000003 HC RX 250 WO HCPCS: Performed by: NURSE ANESTHETIST, CERTIFIED REGISTERED

## 2021-01-01 PROCEDURE — 3700000000 HC ANESTHESIA ATTENDED CARE: Performed by: OTOLARYNGOLOGY

## 2021-01-01 PROCEDURE — 87449 NOS EACH ORGANISM AG IA: CPT

## 2021-01-01 PROCEDURE — 99231 SBSQ HOSP IP/OBS SF/LOW 25: CPT | Performed by: NURSE PRACTITIONER

## 2021-01-01 PROCEDURE — 82330 ASSAY OF CALCIUM: CPT

## 2021-01-01 PROCEDURE — 71275 CT ANGIOGRAPHY CHEST: CPT

## 2021-01-01 PROCEDURE — 84540 ASSAY OF URINE/UREA-N: CPT

## 2021-01-01 PROCEDURE — 3600000012 HC SURGERY LEVEL 2 ADDTL 15MIN: Performed by: OTOLARYNGOLOGY

## 2021-01-01 PROCEDURE — 96376 TX/PRO/DX INJ SAME DRUG ADON: CPT

## 2021-01-01 PROCEDURE — 87077 CULTURE AEROBIC IDENTIFY: CPT

## 2021-01-01 PROCEDURE — 93005 ELECTROCARDIOGRAM TRACING: CPT | Performed by: PHYSICIAN ASSISTANT

## 2021-01-01 PROCEDURE — 87324 CLOSTRIDIUM AG IA: CPT

## 2021-01-01 PROCEDURE — 36600 WITHDRAWAL OF ARTERIAL BLOOD: CPT

## 2021-01-01 PROCEDURE — 2580000003 HC RX 258: Performed by: NURSE PRACTITIONER

## 2021-01-01 PROCEDURE — 84132 ASSAY OF SERUM POTASSIUM: CPT

## 2021-01-01 PROCEDURE — 93005 ELECTROCARDIOGRAM TRACING: CPT | Performed by: STUDENT IN AN ORGANIZED HEALTH CARE EDUCATION/TRAINING PROGRAM

## 2021-01-01 PROCEDURE — 97166 OT EVAL MOD COMPLEX 45 MIN: CPT

## 2021-01-01 PROCEDURE — 3700000001 HC ADD 15 MINUTES (ANESTHESIA): Performed by: OTOLARYNGOLOGY

## 2021-01-01 PROCEDURE — 82746 ASSAY OF FOLIC ACID SERUM: CPT

## 2021-01-01 PROCEDURE — 82044 UR ALBUMIN SEMIQUANTITATIVE: CPT

## 2021-01-01 PROCEDURE — 84478 ASSAY OF TRIGLYCERIDES: CPT

## 2021-01-01 PROCEDURE — 02HV33Z INSERTION OF INFUSION DEVICE INTO SUPERIOR VENA CAVA, PERCUTANEOUS APPROACH: ICD-10-PCS | Performed by: INTERNAL MEDICINE

## 2021-01-01 PROCEDURE — 99233 SBSQ HOSP IP/OBS HIGH 50: CPT | Performed by: INTERNAL MEDICINE

## 2021-01-01 PROCEDURE — 99213 OFFICE O/P EST LOW 20 MIN: CPT | Performed by: SURGERY

## 2021-01-01 RX ORDER — METOPROLOL TARTRATE 5 MG/5ML
INJECTION INTRAVENOUS
Status: DISPENSED
Start: 2021-01-01 | End: 2021-01-01

## 2021-01-01 RX ORDER — ONDANSETRON 2 MG/ML
4 INJECTION INTRAMUSCULAR; INTRAVENOUS EVERY 6 HOURS PRN
Status: DISCONTINUED | OUTPATIENT
Start: 2021-01-01 | End: 2021-01-01 | Stop reason: HOSPADM

## 2021-01-01 RX ORDER — ACETAMINOPHEN 325 MG/1
650 TABLET ORAL EVERY 6 HOURS PRN
Status: DISCONTINUED | OUTPATIENT
Start: 2021-01-01 | End: 2021-01-01 | Stop reason: HOSPADM

## 2021-01-01 RX ORDER — DEXAMETHASONE SODIUM PHOSPHATE 10 MG/ML
10 INJECTION INTRAMUSCULAR; INTRAVENOUS ONCE
Status: COMPLETED | OUTPATIENT
Start: 2021-01-01 | End: 2021-01-01

## 2021-01-01 RX ORDER — EPINEPHRINE 0.1 MG/ML
SYRINGE (ML) INJECTION
Status: DISCONTINUED
Start: 2021-01-01 | End: 2021-01-01 | Stop reason: HOSPADM

## 2021-01-01 RX ORDER — IPRATROPIUM BROMIDE AND ALBUTEROL SULFATE 2.5; .5 MG/3ML; MG/3ML
1 SOLUTION RESPIRATORY (INHALATION)
Status: COMPLETED | OUTPATIENT
Start: 2021-01-01 | End: 2021-01-01

## 2021-01-01 RX ORDER — ONDANSETRON 4 MG/1
4 TABLET, ORALLY DISINTEGRATING ORAL EVERY 8 HOURS PRN
Status: DISCONTINUED | OUTPATIENT
Start: 2021-01-01 | End: 2021-01-01 | Stop reason: HOSPADM

## 2021-01-01 RX ORDER — MIDAZOLAM HYDROCHLORIDE 2 MG/2ML
2 INJECTION, SOLUTION INTRAMUSCULAR; INTRAVENOUS
Status: DISCONTINUED | OUTPATIENT
Start: 2021-01-01 | End: 2021-01-01

## 2021-01-01 RX ORDER — DEXAMETHASONE SODIUM PHOSPHATE 10 MG/ML
6 INJECTION INTRAMUSCULAR; INTRAVENOUS ONCE
Status: COMPLETED | OUTPATIENT
Start: 2021-01-01 | End: 2021-01-01

## 2021-01-01 RX ORDER — SODIUM CHLORIDE 0.9 % (FLUSH) 0.9 %
10 SYRINGE (ML) INJECTION PRN
Status: DISCONTINUED | OUTPATIENT
Start: 2021-01-01 | End: 2021-01-01 | Stop reason: HOSPADM

## 2021-01-01 RX ORDER — PROMETHAZINE HYDROCHLORIDE 25 MG/1
12.5 TABLET ORAL EVERY 6 HOURS PRN
Status: DISCONTINUED | OUTPATIENT
Start: 2021-01-01 | End: 2021-01-01 | Stop reason: HOSPADM

## 2021-01-01 RX ORDER — OLMESARTAN MEDOXOMIL 20 MG/1
20 TABLET ORAL DAILY
Qty: 30 TABLET | Refills: 3 | Status: ON HOLD | OUTPATIENT
Start: 2021-01-01 | End: 2021-01-01 | Stop reason: HOSPADM

## 2021-01-01 RX ORDER — IPRATROPIUM BROMIDE AND ALBUTEROL SULFATE 2.5; .5 MG/3ML; MG/3ML
3 SOLUTION RESPIRATORY (INHALATION) ONCE
Status: COMPLETED | OUTPATIENT
Start: 2021-01-01 | End: 2021-01-01

## 2021-01-01 RX ORDER — DEXAMETHASONE 4 MG/1
6 TABLET ORAL DAILY
Status: DISCONTINUED | OUTPATIENT
Start: 2021-01-01 | End: 2021-01-01 | Stop reason: HOSPADM

## 2021-01-01 RX ORDER — LORAZEPAM 2 MG/ML
1 INJECTION INTRAMUSCULAR ONCE
Status: COMPLETED | OUTPATIENT
Start: 2021-01-01 | End: 2021-01-01

## 2021-01-01 RX ORDER — LORAZEPAM 0.5 MG/1
0.5 TABLET ORAL EVERY 6 HOURS PRN
Qty: 6 TABLET | Refills: 0
Start: 2021-01-01 | End: 2021-01-01

## 2021-01-01 RX ORDER — OXYCODONE HYDROCHLORIDE AND ACETAMINOPHEN 5; 325 MG/1; MG/1
1 TABLET ORAL EVERY 6 HOURS PRN
COMMUNITY
End: 2021-01-01

## 2021-01-01 RX ORDER — POLYETHYLENE GLYCOL 3350 17 G/17G
17 POWDER, FOR SOLUTION ORAL 2 TIMES DAILY
Status: DISCONTINUED | OUTPATIENT
Start: 2021-01-01 | End: 2021-01-01 | Stop reason: HOSPADM

## 2021-01-01 RX ORDER — EPINEPHRINE 0.1 MG/ML
1 SYRINGE (ML) INJECTION ONCE
Status: COMPLETED | OUTPATIENT
Start: 2021-01-01 | End: 2021-01-01

## 2021-01-01 RX ORDER — LEVOTHYROXINE SODIUM 0.05 MG/1
50 TABLET ORAL DAILY
Status: DISCONTINUED | OUTPATIENT
Start: 2021-01-01 | End: 2021-01-01 | Stop reason: HOSPADM

## 2021-01-01 RX ORDER — BUDESONIDE 0.5 MG/2ML
1 INHALANT ORAL 2 TIMES DAILY
Qty: 60 AMPULE | Refills: 0 | Status: SHIPPED | OUTPATIENT
Start: 2021-01-01 | End: 2021-01-01

## 2021-01-01 RX ORDER — CARVEDILOL 6.25 MG/1
12.5 TABLET ORAL 2 TIMES DAILY WITH MEALS
Status: DISCONTINUED | OUTPATIENT
Start: 2021-01-01 | End: 2021-01-01 | Stop reason: HOSPADM

## 2021-01-01 RX ORDER — BUDESONIDE 0.5 MG/2ML
1 INHALANT ORAL 2 TIMES DAILY
COMMUNITY
End: 2021-01-01 | Stop reason: SDUPTHER

## 2021-01-01 RX ORDER — FUROSEMIDE 10 MG/ML
40 INJECTION INTRAMUSCULAR; INTRAVENOUS ONCE
Status: COMPLETED | OUTPATIENT
Start: 2021-01-01 | End: 2021-01-01

## 2021-01-01 RX ORDER — COLCHICINE 0.6 MG/1
0.6 TABLET ORAL 2 TIMES DAILY PRN
Status: DISCONTINUED | OUTPATIENT
Start: 2021-01-01 | End: 2021-01-01 | Stop reason: HOSPADM

## 2021-01-01 RX ORDER — FOLIC ACID 5 MG/ML
1 INJECTION, SOLUTION INTRAMUSCULAR; INTRAVENOUS; SUBCUTANEOUS DAILY
Status: DISCONTINUED | OUTPATIENT
Start: 2021-01-01 | End: 2021-01-01 | Stop reason: HOSPADM

## 2021-01-01 RX ORDER — DEXAMETHASONE 6 MG/1
6 TABLET ORAL DAILY
Qty: 10 TABLET | Refills: 0 | Status: SHIPPED | OUTPATIENT
Start: 2021-01-01 | End: 2021-01-01

## 2021-01-01 RX ORDER — VITAMIN B COMPLEX
1000 TABLET ORAL DAILY
Status: DISCONTINUED | OUTPATIENT
Start: 2021-01-01 | End: 2021-01-01 | Stop reason: HOSPADM

## 2021-01-01 RX ORDER — POLYETHYLENE GLYCOL 3350 17 G/17G
17 POWDER, FOR SOLUTION ORAL DAILY PRN
Status: DISCONTINUED | OUTPATIENT
Start: 2021-01-01 | End: 2021-01-01 | Stop reason: HOSPADM

## 2021-01-01 RX ORDER — ATORVASTATIN CALCIUM 40 MG/1
40 TABLET, FILM COATED ORAL NIGHTLY
COMMUNITY

## 2021-01-01 RX ORDER — ACETYLCYSTEINE 200 MG/ML
600 SOLUTION ORAL; RESPIRATORY (INHALATION) ONCE
Status: DISCONTINUED | OUTPATIENT
Start: 2021-01-01 | End: 2021-01-01

## 2021-01-01 RX ORDER — PROPOFOL 10 MG/ML
INJECTION, EMULSION INTRAVENOUS
Status: COMPLETED
Start: 2021-01-01 | End: 2021-01-01

## 2021-01-01 RX ORDER — PANTOPRAZOLE SODIUM 40 MG/1
40 TABLET, DELAYED RELEASE ORAL
Status: DISCONTINUED | OUTPATIENT
Start: 2021-01-01 | End: 2021-01-01 | Stop reason: HOSPADM

## 2021-01-01 RX ORDER — SODIUM CHLORIDE 9 MG/ML
25 INJECTION, SOLUTION INTRAVENOUS PRN
Status: DISCONTINUED | OUTPATIENT
Start: 2021-01-01 | End: 2021-01-01 | Stop reason: HOSPADM

## 2021-01-01 RX ORDER — VECURONIUM BROMIDE 1 MG/ML
INJECTION, POWDER, LYOPHILIZED, FOR SOLUTION INTRAVENOUS
Status: COMPLETED
Start: 2021-01-01 | End: 2021-01-01

## 2021-01-01 RX ORDER — SODIUM CHLORIDE 9 MG/ML
INJECTION, SOLUTION INTRAVENOUS CONTINUOUS
Status: ACTIVE | OUTPATIENT
Start: 2021-01-01 | End: 2021-01-01

## 2021-01-01 RX ORDER — SODIUM CHLORIDE 0.9 % (FLUSH) 0.9 %
10 SYRINGE (ML) INJECTION EVERY 12 HOURS SCHEDULED
Status: DISCONTINUED | OUTPATIENT
Start: 2021-01-01 | End: 2021-01-01 | Stop reason: HOSPADM

## 2021-01-01 RX ORDER — VECURONIUM BROMIDE 1 MG/ML
10 INJECTION, POWDER, LYOPHILIZED, FOR SOLUTION INTRAVENOUS ONCE
Status: COMPLETED | OUTPATIENT
Start: 2021-01-01 | End: 2021-01-01

## 2021-01-01 RX ORDER — VITAMIN B COMPLEX
1000 TABLET ORAL DAILY
Status: DISCONTINUED | OUTPATIENT
Start: 2021-01-01 | End: 2021-01-01

## 2021-01-01 RX ORDER — MIDAZOLAM HYDROCHLORIDE 1 MG/ML
INJECTION INTRAMUSCULAR; INTRAVENOUS
Status: COMPLETED
Start: 2021-01-01 | End: 2021-01-01

## 2021-01-01 RX ORDER — LORAZEPAM 0.5 MG/1
0.5 TABLET ORAL EVERY 6 HOURS PRN
Status: DISCONTINUED | OUTPATIENT
Start: 2021-01-01 | End: 2021-01-01 | Stop reason: HOSPADM

## 2021-01-01 RX ORDER — HEPARIN SODIUM 10000 [USP'U]/100ML
5-30 INJECTION, SOLUTION INTRAVENOUS CONTINUOUS
Status: DISCONTINUED | OUTPATIENT
Start: 2021-01-01 | End: 2021-01-01 | Stop reason: HOSPADM

## 2021-01-01 RX ORDER — BUSPIRONE HYDROCHLORIDE 10 MG/1
15 TABLET ORAL 3 TIMES DAILY
Status: DISCONTINUED | OUTPATIENT
Start: 2021-01-01 | End: 2021-01-01 | Stop reason: HOSPADM

## 2021-01-01 RX ORDER — CARVEDILOL 25 MG/1
25 TABLET ORAL 2 TIMES DAILY
Status: DISCONTINUED | OUTPATIENT
Start: 2021-01-01 | End: 2021-01-01 | Stop reason: HOSPADM

## 2021-01-01 RX ORDER — DEXTROSE MONOHYDRATE 25 G/50ML
12.5 INJECTION, SOLUTION INTRAVENOUS PRN
Status: DISCONTINUED | OUTPATIENT
Start: 2021-01-01 | End: 2021-01-01 | Stop reason: HOSPADM

## 2021-01-01 RX ORDER — ARFORMOTEROL TARTRATE 15 UG/2ML
1 SOLUTION RESPIRATORY (INHALATION) 2 TIMES DAILY
COMMUNITY

## 2021-01-01 RX ORDER — HYDRALAZINE HYDROCHLORIDE 25 MG/1
25 TABLET, FILM COATED ORAL EVERY 8 HOURS SCHEDULED
Status: DISCONTINUED | OUTPATIENT
Start: 2021-01-01 | End: 2021-01-01 | Stop reason: HOSPADM

## 2021-01-01 RX ORDER — LANSOPRAZOLE
30 KIT
Status: DISCONTINUED | OUTPATIENT
Start: 2021-11-16 | End: 2021-01-01 | Stop reason: HOSPADM

## 2021-01-01 RX ORDER — PROPOFOL 10 MG/ML
5-50 INJECTION, EMULSION INTRAVENOUS
Status: DISCONTINUED | OUTPATIENT
Start: 2021-01-01 | End: 2021-01-01

## 2021-01-01 RX ORDER — LOSARTAN POTASSIUM 50 MG/1
50 TABLET ORAL DAILY
Status: DISCONTINUED | OUTPATIENT
Start: 2021-01-01 | End: 2021-01-01

## 2021-01-01 RX ORDER — HEPARIN SODIUM 10000 [USP'U]/ML
7500 INJECTION, SOLUTION INTRAVENOUS; SUBCUTANEOUS EVERY 8 HOURS
Status: DISCONTINUED | OUTPATIENT
Start: 2021-01-01 | End: 2021-01-01

## 2021-01-01 RX ORDER — GAUZE BANDAGE 2" X 2"
100 BANDAGE TOPICAL DAILY
Status: DISCONTINUED | OUTPATIENT
Start: 2021-01-01 | End: 2021-01-01 | Stop reason: HOSPADM

## 2021-01-01 RX ORDER — LORAZEPAM 0.5 MG/1
0.5 TABLET ORAL ONCE
Status: COMPLETED | OUTPATIENT
Start: 2021-01-01 | End: 2021-01-01

## 2021-01-01 RX ORDER — ALBUTEROL SULFATE 90 UG/1
2 AEROSOL, METERED RESPIRATORY (INHALATION) EVERY 4 HOURS PRN
Qty: 1 INHALER | Status: SHIPPED | OUTPATIENT
Start: 2021-01-01 | End: 2022-05-08

## 2021-01-01 RX ORDER — ASCORBIC ACID 500 MG
500 TABLET ORAL DAILY
Status: DISCONTINUED | OUTPATIENT
Start: 2021-01-01 | End: 2021-01-01 | Stop reason: HOSPADM

## 2021-01-01 RX ORDER — SODIUM CHLORIDE 0.9 % (FLUSH) 0.9 %
5-40 SYRINGE (ML) INJECTION PRN
Status: DISCONTINUED | OUTPATIENT
Start: 2021-01-01 | End: 2021-01-01 | Stop reason: HOSPADM

## 2021-01-01 RX ORDER — IPRATROPIUM BROMIDE AND ALBUTEROL SULFATE 2.5; .5 MG/3ML; MG/3ML
1 SOLUTION RESPIRATORY (INHALATION) 3 TIMES DAILY PRN
Status: DISCONTINUED | OUTPATIENT
Start: 2021-01-01 | End: 2021-01-01

## 2021-01-01 RX ORDER — KETAMINE HYDROCHLORIDE 10 MG/ML
INJECTION, SOLUTION INTRAMUSCULAR; INTRAVENOUS
Status: COMPLETED
Start: 2021-01-01 | End: 2021-01-01

## 2021-01-01 RX ORDER — CARVEDILOL 6.25 MG/1
12.5 TABLET ORAL EVERY 12 HOURS
Status: DISCONTINUED | OUTPATIENT
Start: 2021-01-01 | End: 2021-01-01

## 2021-01-01 RX ORDER — HYDROCODONE BITARTRATE AND ACETAMINOPHEN 5; 325 MG/1; MG/1
1 TABLET ORAL EVERY 6 HOURS PRN
Status: DISCONTINUED | OUTPATIENT
Start: 2021-01-01 | End: 2021-01-01

## 2021-01-01 RX ORDER — OLMESARTAN MEDOXOMIL 20 MG/1
20 TABLET ORAL DAILY
COMMUNITY
End: 2021-01-01

## 2021-01-01 RX ORDER — LORAZEPAM 2 MG/ML
INJECTION INTRAMUSCULAR
Status: COMPLETED
Start: 2021-01-01 | End: 2021-01-01

## 2021-01-01 RX ORDER — CARVEDILOL 6.25 MG/1
25 TABLET ORAL 2 TIMES DAILY WITH MEALS
Status: DISCONTINUED | OUTPATIENT
Start: 2021-01-01 | End: 2021-01-01

## 2021-01-01 RX ORDER — POLYETHYLENE GLYCOL 3350 17 G/17G
17 POWDER, FOR SOLUTION ORAL DAILY
Status: DISCONTINUED | OUTPATIENT
Start: 2021-01-01 | End: 2021-01-01

## 2021-01-01 RX ORDER — OLMESARTAN MEDOXOMIL 20 MG/1
20 TABLET ORAL DAILY
Status: DISCONTINUED | OUTPATIENT
Start: 2021-01-01 | End: 2021-01-01 | Stop reason: HOSPADM

## 2021-01-01 RX ORDER — CARVEDILOL 3.12 MG/1
3.12 TABLET ORAL EVERY 12 HOURS
Status: DISCONTINUED | OUTPATIENT
Start: 2021-01-01 | End: 2021-01-01 | Stop reason: HOSPADM

## 2021-01-01 RX ORDER — AMLODIPINE BESYLATE 10 MG/1
10 TABLET ORAL DAILY
Status: DISCONTINUED | OUTPATIENT
Start: 2021-01-01 | End: 2021-01-01 | Stop reason: HOSPADM

## 2021-01-01 RX ORDER — ASCORBIC ACID 500 MG
500 TABLET ORAL DAILY
Qty: 30 TABLET | Refills: 3 | Status: SHIPPED | OUTPATIENT
Start: 2021-01-01

## 2021-01-01 RX ORDER — ACETAMINOPHEN 650 MG/1
650 SUPPOSITORY RECTAL EVERY 6 HOURS PRN
Status: DISCONTINUED | OUTPATIENT
Start: 2021-01-01 | End: 2021-01-01 | Stop reason: HOSPADM

## 2021-01-01 RX ORDER — ALBUTEROL SULFATE 90 UG/1
2 AEROSOL, METERED RESPIRATORY (INHALATION) EVERY 4 HOURS PRN
Qty: 1 INHALER | Status: SHIPPED | OUTPATIENT
Start: 2021-01-01 | End: 2021-01-01 | Stop reason: SDUPTHER

## 2021-01-01 RX ORDER — PROPOFOL 10 MG/ML
INJECTION, EMULSION INTRAVENOUS PRN
Status: DISCONTINUED | OUTPATIENT
Start: 2021-01-01 | End: 2021-01-01 | Stop reason: SDUPTHER

## 2021-01-01 RX ORDER — FUROSEMIDE 10 MG/ML
INJECTION INTRAMUSCULAR; INTRAVENOUS
Status: COMPLETED
Start: 2021-01-01 | End: 2021-01-01

## 2021-01-01 RX ORDER — OLMESARTAN MEDOXOMIL 20 MG/1
20 TABLET ORAL DAILY
Status: ON HOLD | COMMUNITY
End: 2021-01-01 | Stop reason: SDUPTHER

## 2021-01-01 RX ORDER — DIPHENHYDRAMINE HYDROCHLORIDE 50 MG/ML
25 INJECTION INTRAMUSCULAR; INTRAVENOUS ONCE
Status: COMPLETED | OUTPATIENT
Start: 2021-01-01 | End: 2021-01-01

## 2021-01-01 RX ORDER — SODIUM CHLORIDE 9 MG/ML
INJECTION, SOLUTION INTRAVENOUS CONTINUOUS PRN
Status: DISCONTINUED | OUTPATIENT
Start: 2021-01-01 | End: 2021-01-01 | Stop reason: SDUPTHER

## 2021-01-01 RX ORDER — HYDRALAZINE HYDROCHLORIDE 20 MG/ML
10 INJECTION INTRAMUSCULAR; INTRAVENOUS EVERY 4 HOURS PRN
Status: DISCONTINUED | OUTPATIENT
Start: 2021-01-01 | End: 2021-01-01 | Stop reason: HOSPADM

## 2021-01-01 RX ORDER — SODIUM CHLORIDE 9 MG/ML
10 INJECTION INTRAVENOUS DAILY
Status: DISCONTINUED | OUTPATIENT
Start: 2021-01-01 | End: 2021-01-01

## 2021-01-01 RX ORDER — FENTANYL CITRATE 50 UG/ML
50 INJECTION, SOLUTION INTRAMUSCULAR; INTRAVENOUS ONCE
Status: COMPLETED | OUTPATIENT
Start: 2021-01-01 | End: 2021-01-01

## 2021-01-01 RX ORDER — ZINC SULFATE 50(220)MG
50 CAPSULE ORAL DAILY
Qty: 30 CAPSULE | Refills: 3 | COMMUNITY
Start: 2021-01-01

## 2021-01-01 RX ORDER — MIDAZOLAM HYDROCHLORIDE 2 MG/2ML
3 INJECTION, SOLUTION INTRAMUSCULAR; INTRAVENOUS ONCE
Status: DISCONTINUED | OUTPATIENT
Start: 2021-01-01 | End: 2021-01-01

## 2021-01-01 RX ORDER — BUPROPION HYDROCHLORIDE 150 MG/1
150 TABLET ORAL EVERY MORNING
COMMUNITY

## 2021-01-01 RX ORDER — BUDESONIDE 0.5 MG/2ML
0.5 INHALANT ORAL 2 TIMES DAILY
Status: DISCONTINUED | OUTPATIENT
Start: 2021-01-01 | End: 2021-01-01 | Stop reason: HOSPADM

## 2021-01-01 RX ORDER — PANTOPRAZOLE SODIUM 40 MG/10ML
40 INJECTION, POWDER, LYOPHILIZED, FOR SOLUTION INTRAVENOUS DAILY
Status: DISCONTINUED | OUTPATIENT
Start: 2021-01-01 | End: 2021-01-01

## 2021-01-01 RX ORDER — BUDESONIDE 0.5 MG/2ML
500 INHALANT ORAL 2 TIMES DAILY
Status: DISCONTINUED | OUTPATIENT
Start: 2021-01-01 | End: 2021-01-01 | Stop reason: HOSPADM

## 2021-01-01 RX ORDER — HYDRALAZINE HYDROCHLORIDE 20 MG/ML
5 INJECTION INTRAMUSCULAR; INTRAVENOUS EVERY 4 HOURS PRN
Status: DISCONTINUED | OUTPATIENT
Start: 2021-01-01 | End: 2021-01-01

## 2021-01-01 RX ORDER — DEXAMETHASONE SODIUM PHOSPHATE 10 MG/ML
6 INJECTION, SOLUTION INTRAMUSCULAR; INTRAVENOUS EVERY 12 HOURS
Status: DISCONTINUED | OUTPATIENT
Start: 2021-01-01 | End: 2021-01-01

## 2021-01-01 RX ORDER — ARFORMOTEROL TARTRATE 15 UG/2ML
15 SOLUTION RESPIRATORY (INHALATION) 2 TIMES DAILY
Status: DISCONTINUED | OUTPATIENT
Start: 2021-01-01 | End: 2021-01-01 | Stop reason: HOSPADM

## 2021-01-01 RX ORDER — BUDESONIDE 0.5 MG/2ML
0.5 INHALANT ORAL 2 TIMES DAILY
Qty: 60 AMPULE | Refills: 3 | Status: SHIPPED | OUTPATIENT
Start: 2021-01-01

## 2021-01-01 RX ORDER — DOXYCYCLINE HYCLATE 100 MG/1
100 CAPSULE ORAL EVERY 12 HOURS SCHEDULED
Qty: 20 CAPSULE | Refills: 0 | Status: SHIPPED | OUTPATIENT
Start: 2021-01-01 | End: 2021-01-01

## 2021-01-01 RX ORDER — FENTANYL CITRATE 50 UG/ML
INJECTION, SOLUTION INTRAMUSCULAR; INTRAVENOUS
Status: COMPLETED
Start: 2021-01-01 | End: 2021-01-01

## 2021-01-01 RX ORDER — HEPARIN SODIUM 1000 [USP'U]/ML
2000 INJECTION, SOLUTION INTRAVENOUS; SUBCUTANEOUS PRN
Status: DISCONTINUED | OUTPATIENT
Start: 2021-01-01 | End: 2021-01-01 | Stop reason: HOSPADM

## 2021-01-01 RX ORDER — ALBUTEROL SULFATE 90 UG/1
2 AEROSOL, METERED RESPIRATORY (INHALATION) EVERY 4 HOURS PRN
Status: DISCONTINUED | OUTPATIENT
Start: 2021-01-01 | End: 2021-01-01 | Stop reason: HOSPADM

## 2021-01-01 RX ORDER — SODIUM CHLORIDE 9 MG/ML
INJECTION, SOLUTION INTRAVENOUS CONTINUOUS
Status: DISCONTINUED | OUTPATIENT
Start: 2021-01-01 | End: 2021-01-01

## 2021-01-01 RX ORDER — FENTANYL CITRATE 50 UG/ML
100 INJECTION, SOLUTION INTRAMUSCULAR; INTRAVENOUS
Status: DISCONTINUED | OUTPATIENT
Start: 2021-01-01 | End: 2021-01-01

## 2021-01-01 RX ORDER — NICOTINE POLACRILEX 4 MG
15 LOZENGE BUCCAL PRN
Status: DISCONTINUED | OUTPATIENT
Start: 2021-01-01 | End: 2021-01-01 | Stop reason: HOSPADM

## 2021-01-01 RX ORDER — SENNA PLUS 8.6 MG/1
1 TABLET ORAL 2 TIMES DAILY
Status: DISCONTINUED | OUTPATIENT
Start: 2021-01-01 | End: 2021-01-01 | Stop reason: HOSPADM

## 2021-01-01 RX ORDER — ZINC SULFATE 50(220)MG
50 CAPSULE ORAL DAILY
Status: DISCONTINUED | OUTPATIENT
Start: 2021-01-01 | End: 2021-01-01 | Stop reason: HOSPADM

## 2021-01-01 RX ORDER — MORPHINE SULFATE 4 MG/ML
4 INJECTION, SOLUTION INTRAMUSCULAR; INTRAVENOUS ONCE
Status: COMPLETED | OUTPATIENT
Start: 2021-01-01 | End: 2021-01-01

## 2021-01-01 RX ORDER — MINERAL OIL AND WHITE PETROLATUM 150; 830 MG/G; MG/G
OINTMENT OPHTHALMIC EVERY 6 HOURS
Status: DISCONTINUED | OUTPATIENT
Start: 2021-01-01 | End: 2021-01-01 | Stop reason: HOSPADM

## 2021-01-01 RX ORDER — OXYCODONE HYDROCHLORIDE 5 MG/1
5 TABLET ORAL EVERY 4 HOURS PRN
Status: DISCONTINUED | OUTPATIENT
Start: 2021-01-01 | End: 2021-01-01 | Stop reason: HOSPADM

## 2021-01-01 RX ORDER — CHLORHEXIDINE GLUCONATE 0.12 MG/ML
15 RINSE ORAL 2 TIMES DAILY
Status: DISCONTINUED | OUTPATIENT
Start: 2021-01-01 | End: 2021-01-01 | Stop reason: HOSPADM

## 2021-01-01 RX ORDER — SODIUM CHLORIDE 0.9 % (FLUSH) 0.9 %
5-40 SYRINGE (ML) INJECTION EVERY 12 HOURS SCHEDULED
Status: DISCONTINUED | OUTPATIENT
Start: 2021-01-01 | End: 2021-01-01 | Stop reason: HOSPADM

## 2021-01-01 RX ORDER — CARVEDILOL 25 MG/1
25 TABLET ORAL 2 TIMES DAILY
Qty: 60 TABLET | Refills: 3 | Status: SHIPPED | OUTPATIENT
Start: 2021-01-01 | End: 2021-01-01

## 2021-01-01 RX ORDER — IPRATROPIUM BROMIDE AND ALBUTEROL SULFATE 2.5; .5 MG/3ML; MG/3ML
1 SOLUTION RESPIRATORY (INHALATION)
Status: DISCONTINUED | OUTPATIENT
Start: 2021-01-01 | End: 2021-01-01

## 2021-01-01 RX ORDER — IPRATROPIUM BROMIDE AND ALBUTEROL SULFATE 2.5; .5 MG/3ML; MG/3ML
1 SOLUTION RESPIRATORY (INHALATION)
Status: DISCONTINUED | OUTPATIENT
Start: 2021-01-01 | End: 2021-01-01 | Stop reason: HOSPADM

## 2021-01-01 RX ORDER — GLIMEPIRIDE 4 MG/1
4 TABLET ORAL
Status: ON HOLD | COMMUNITY
End: 2021-01-01 | Stop reason: HOSPADM

## 2021-01-01 RX ORDER — ONDANSETRON 2 MG/ML
4 INJECTION INTRAMUSCULAR; INTRAVENOUS ONCE
Status: COMPLETED | OUTPATIENT
Start: 2021-01-01 | End: 2021-01-01

## 2021-01-01 RX ORDER — BUSPIRONE HYDROCHLORIDE 15 MG/1
15 TABLET ORAL 3 TIMES DAILY
COMMUNITY

## 2021-01-01 RX ORDER — PREDNISONE 50 MG/1
50 TABLET ORAL DAILY
Qty: 5 TABLET | Refills: 0 | Status: SHIPPED | OUTPATIENT
Start: 2021-01-01 | End: 2021-01-01

## 2021-01-01 RX ORDER — ACETAMINOPHEN 500 MG
1000 TABLET ORAL ONCE
Status: COMPLETED | OUTPATIENT
Start: 2021-01-01 | End: 2021-01-01

## 2021-01-01 RX ORDER — DOXYCYCLINE HYCLATE 100 MG
100 TABLET ORAL 2 TIMES DAILY
Qty: 14 TABLET | Refills: 0 | Status: SHIPPED | OUTPATIENT
Start: 2021-01-01 | End: 2021-01-01

## 2021-01-01 RX ORDER — GAUZE BANDAGE 2" X 2"
100 BANDAGE TOPICAL DAILY
Status: DISCONTINUED | OUTPATIENT
Start: 2021-01-01 | End: 2021-01-01

## 2021-01-01 RX ORDER — IPRATROPIUM BROMIDE AND ALBUTEROL SULFATE 2.5; .5 MG/3ML; MG/3ML
3 SOLUTION RESPIRATORY (INHALATION)
Status: DISCONTINUED | OUTPATIENT
Start: 2021-01-01 | End: 2021-01-01 | Stop reason: HOSPADM

## 2021-01-01 RX ORDER — HEPARIN SODIUM 1000 [USP'U]/ML
4000 INJECTION, SOLUTION INTRAVENOUS; SUBCUTANEOUS PRN
Status: DISCONTINUED | OUTPATIENT
Start: 2021-01-01 | End: 2021-01-01 | Stop reason: HOSPADM

## 2021-01-01 RX ORDER — IPRATROPIUM BROMIDE AND ALBUTEROL SULFATE 2.5; .5 MG/3ML; MG/3ML
3 SOLUTION RESPIRATORY (INHALATION) ONCE
Status: DISCONTINUED | OUTPATIENT
Start: 2021-01-01 | End: 2021-01-01

## 2021-01-01 RX ORDER — ACETAMINOPHEN 325 MG/1
650 TABLET ORAL EVERY 6 HOURS PRN
Status: DISCONTINUED | OUTPATIENT
Start: 2021-01-01 | End: 2021-01-01

## 2021-01-01 RX ORDER — IBUPROFEN 600 MG/1
600 TABLET ORAL ONCE
Status: DISCONTINUED | OUTPATIENT
Start: 2021-01-01 | End: 2021-01-01

## 2021-01-01 RX ORDER — LORAZEPAM 1 MG/1
1 TABLET ORAL EVERY 6 HOURS PRN
Qty: 8 TABLET | Refills: 0 | Status: SHIPPED | OUTPATIENT
Start: 2021-01-01 | End: 2021-01-01

## 2021-01-01 RX ORDER — FENTANYL CITRATE 50 UG/ML
INJECTION, SOLUTION INTRAMUSCULAR; INTRAVENOUS PRN
Status: DISCONTINUED | OUTPATIENT
Start: 2021-01-01 | End: 2021-01-01 | Stop reason: SDUPTHER

## 2021-01-01 RX ORDER — LABETALOL HYDROCHLORIDE 5 MG/ML
20 INJECTION, SOLUTION INTRAVENOUS ONCE
Status: COMPLETED | OUTPATIENT
Start: 2021-01-01 | End: 2021-01-01

## 2021-01-01 RX ORDER — VASOPRESSIN 20 U/ML
INJECTION PARENTERAL
Status: DISCONTINUED
Start: 2021-01-01 | End: 2021-01-01 | Stop reason: HOSPADM

## 2021-01-01 RX ORDER — SODIUM CHLORIDE FOR INHALATION 3 %
2 VIAL, NEBULIZER (ML) INHALATION EVERY 4 HOURS
Status: DISCONTINUED | OUTPATIENT
Start: 2021-01-01 | End: 2021-01-01 | Stop reason: HOSPADM

## 2021-01-01 RX ORDER — ALPRAZOLAM 0.25 MG/1
0.5 TABLET ORAL NIGHTLY PRN
Status: DISCONTINUED | OUTPATIENT
Start: 2021-01-01 | End: 2021-01-01 | Stop reason: HOSPADM

## 2021-01-01 RX ORDER — DIVALPROEX SODIUM 125 MG/1
250 CAPSULE, COATED PELLETS ORAL EVERY 8 HOURS SCHEDULED
Status: DISCONTINUED | OUTPATIENT
Start: 2021-01-01 | End: 2021-01-01 | Stop reason: HOSPADM

## 2021-01-01 RX ORDER — ALBUTEROL SULFATE 90 UG/1
2 AEROSOL, METERED RESPIRATORY (INHALATION) EVERY 4 HOURS PRN
Status: DISCONTINUED | OUTPATIENT
Start: 2021-01-01 | End: 2021-01-01

## 2021-01-01 RX ORDER — CARVEDILOL 6.25 MG/1
6.25 TABLET ORAL 2 TIMES DAILY WITH MEALS
Status: DISCONTINUED | OUTPATIENT
Start: 2021-01-01 | End: 2021-01-01

## 2021-01-01 RX ORDER — OXYCODONE HYDROCHLORIDE AND ACETAMINOPHEN 5; 325 MG/1; MG/1
1 TABLET ORAL EVERY 6 HOURS PRN
Status: DISCONTINUED | OUTPATIENT
Start: 2021-01-01 | End: 2021-01-01

## 2021-01-01 RX ORDER — POLYETHYLENE GLYCOL 3350 17 G/17G
17 POWDER, FOR SOLUTION ORAL DAILY PRN
Status: DISCONTINUED | OUTPATIENT
Start: 2021-01-01 | End: 2021-01-01

## 2021-01-01 RX ORDER — BENZONATATE 100 MG/1
100 CAPSULE ORAL 3 TIMES DAILY
Status: DISCONTINUED | OUTPATIENT
Start: 2021-01-01 | End: 2021-01-01 | Stop reason: HOSPADM

## 2021-01-01 RX ORDER — LABETALOL HYDROCHLORIDE 5 MG/ML
10 INJECTION, SOLUTION INTRAVENOUS EVERY 4 HOURS PRN
Status: DISCONTINUED | OUTPATIENT
Start: 2021-01-01 | End: 2021-01-01 | Stop reason: HOSPADM

## 2021-01-01 RX ORDER — DOXYCYCLINE HYCLATE 100 MG/1
100 CAPSULE ORAL EVERY 12 HOURS SCHEDULED
Status: DISCONTINUED | OUTPATIENT
Start: 2021-01-01 | End: 2021-01-01 | Stop reason: HOSPADM

## 2021-01-01 RX ORDER — MIDAZOLAM HYDROCHLORIDE 1 MG/ML
INJECTION INTRAMUSCULAR; INTRAVENOUS PRN
Status: DISCONTINUED | OUTPATIENT
Start: 2021-01-01 | End: 2021-01-01 | Stop reason: SDUPTHER

## 2021-01-01 RX ORDER — 0.9 % SODIUM CHLORIDE 0.9 %
500 INTRAVENOUS SOLUTION INTRAVENOUS ONCE
Status: COMPLETED | OUTPATIENT
Start: 2021-01-01 | End: 2021-01-01

## 2021-01-01 RX ORDER — ACETAMINOPHEN 650 MG/1
650 SUPPOSITORY RECTAL EVERY 6 HOURS PRN
Status: DISCONTINUED | OUTPATIENT
Start: 2021-01-01 | End: 2021-01-01

## 2021-01-01 RX ORDER — BUSPIRONE HYDROCHLORIDE 15 MG/1
15 TABLET ORAL 2 TIMES DAILY
Status: ON HOLD | COMMUNITY
Start: 2021-01-01 | End: 2021-01-01 | Stop reason: HOSPADM

## 2021-01-01 RX ORDER — DEXTROSE MONOHYDRATE 50 MG/ML
100 INJECTION, SOLUTION INTRAVENOUS PRN
Status: DISCONTINUED | OUTPATIENT
Start: 2021-01-01 | End: 2021-01-01 | Stop reason: HOSPADM

## 2021-01-01 RX ORDER — KETAMINE HYDROCHLORIDE 10 MG/ML
INJECTION, SOLUTION INTRAMUSCULAR; INTRAVENOUS PRN
Status: DISCONTINUED | OUTPATIENT
Start: 2021-01-01 | End: 2021-01-01 | Stop reason: SDUPTHER

## 2021-01-01 RX ORDER — KETOROLAC TROMETHAMINE 30 MG/ML
30 INJECTION, SOLUTION INTRAMUSCULAR; INTRAVENOUS ONCE
Status: COMPLETED | OUTPATIENT
Start: 2021-01-01 | End: 2021-01-01

## 2021-01-01 RX ORDER — FENTANYL CITRATE 50 UG/ML
100 INJECTION, SOLUTION INTRAMUSCULAR; INTRAVENOUS ONCE
Status: COMPLETED | OUTPATIENT
Start: 2021-01-01 | End: 2021-01-01

## 2021-01-01 RX ORDER — MIDAZOLAM HYDROCHLORIDE 2 MG/2ML
4 INJECTION, SOLUTION INTRAMUSCULAR; INTRAVENOUS ONCE
Status: COMPLETED | OUTPATIENT
Start: 2021-01-01 | End: 2021-01-01

## 2021-01-01 RX ORDER — LOSARTAN POTASSIUM 50 MG/1
100 TABLET ORAL DAILY
Status: DISCONTINUED | OUTPATIENT
Start: 2021-01-01 | End: 2021-01-01 | Stop reason: HOSPADM

## 2021-01-01 RX ORDER — ACETYLCYSTEINE 100 MG/ML
4 SOLUTION ORAL; RESPIRATORY (INHALATION) ONCE
Status: COMPLETED | OUTPATIENT
Start: 2021-01-01 | End: 2021-01-01

## 2021-01-01 RX ORDER — ASCORBIC ACID 500 MG
500 TABLET ORAL DAILY
Status: DISCONTINUED | OUTPATIENT
Start: 2021-01-01 | End: 2021-01-01

## 2021-01-01 RX ORDER — ZINC SULFATE 50(220)MG
50 CAPSULE ORAL DAILY
Status: DISCONTINUED | OUTPATIENT
Start: 2021-01-01 | End: 2021-01-01

## 2021-01-01 RX ORDER — ALBUTEROL SULFATE 2.5 MG/3ML
2.5 SOLUTION RESPIRATORY (INHALATION) EVERY 4 HOURS PRN
Status: DISCONTINUED | OUTPATIENT
Start: 2021-01-01 | End: 2021-01-01 | Stop reason: HOSPADM

## 2021-01-01 RX ORDER — LOSARTAN POTASSIUM 50 MG/1
100 TABLET ORAL DAILY
Status: DISCONTINUED | OUTPATIENT
Start: 2021-01-01 | End: 2021-01-01

## 2021-01-01 RX ORDER — GUAIFENESIN/DEXTROMETHORPHAN 100-10MG/5
5 SYRUP ORAL 3 TIMES DAILY PRN
Qty: 120 ML | Refills: 0 | Status: SHIPPED | OUTPATIENT
Start: 2021-01-01 | End: 2021-01-01

## 2021-01-01 RX ORDER — BUSPIRONE HYDROCHLORIDE 10 MG/1
15 TABLET ORAL 3 TIMES DAILY
Status: DISCONTINUED | OUTPATIENT
Start: 2021-01-01 | End: 2021-01-01

## 2021-01-01 RX ORDER — BENZONATATE 100 MG/1
100 CAPSULE ORAL 3 TIMES DAILY
Qty: 21 CAPSULE | Refills: 0 | Status: SHIPPED | OUTPATIENT
Start: 2021-01-01 | End: 2021-01-01

## 2021-01-01 RX ORDER — METOCLOPRAMIDE HYDROCHLORIDE 5 MG/ML
10 INJECTION INTRAMUSCULAR; INTRAVENOUS ONCE
Status: COMPLETED | OUTPATIENT
Start: 2021-01-01 | End: 2021-01-01

## 2021-01-01 RX ORDER — CHOLECALCIFEROL (VITAMIN D3) 25 MCG
1000 TABLET ORAL DAILY
Qty: 60 TABLET | Refills: 0 | Status: SHIPPED | OUTPATIENT
Start: 2021-01-01

## 2021-01-01 RX ORDER — ATORVASTATIN CALCIUM 40 MG/1
40 TABLET, FILM COATED ORAL DAILY
Status: DISCONTINUED | OUTPATIENT
Start: 2021-01-01 | End: 2021-01-01 | Stop reason: HOSPADM

## 2021-01-01 RX ORDER — ROCURONIUM BROMIDE 10 MG/ML
INJECTION, SOLUTION INTRAVENOUS
Status: COMPLETED
Start: 2021-01-01 | End: 2021-01-01

## 2021-01-01 RX ORDER — ALPRAZOLAM 0.5 MG/1
0.5 TABLET ORAL NIGHTLY PRN
COMMUNITY

## 2021-01-01 RX ADMIN — Medication 10 ML: at 20:59

## 2021-01-01 RX ADMIN — OXYCODONE HYDROCHLORIDE AND ACETAMINOPHEN 500 MG: 500 TABLET ORAL at 08:18

## 2021-01-01 RX ADMIN — ARFORMOTEROL TARTRATE 15 MCG: 15 SOLUTION RESPIRATORY (INHALATION) at 20:28

## 2021-01-01 RX ADMIN — DEXAMETHASONE 6 MG: 4 TABLET ORAL at 08:19

## 2021-01-01 RX ADMIN — MIDAZOLAM 2 MG: 1 INJECTION INTRAMUSCULAR; INTRAVENOUS at 02:19

## 2021-01-01 RX ADMIN — IPRATROPIUM BROMIDE AND ALBUTEROL SULFATE 3 ML: .5; 3 SOLUTION RESPIRATORY (INHALATION) at 15:47

## 2021-01-01 RX ADMIN — SODIUM CHLORIDE, PRESERVATIVE FREE 10 ML: 5 INJECTION INTRAVENOUS at 09:21

## 2021-01-01 RX ADMIN — ACETAMINOPHEN 650 MG: 325 TABLET ORAL at 04:56

## 2021-01-01 RX ADMIN — SENNOSIDES 8.6 MG: 8.6 TABLET, COATED ORAL at 09:31

## 2021-01-01 RX ADMIN — BUSPIRONE HYDROCHLORIDE 15 MG: 10 TABLET ORAL at 09:19

## 2021-01-01 RX ADMIN — CARVEDILOL 12.5 MG: 6.25 TABLET, FILM COATED ORAL at 09:20

## 2021-01-01 RX ADMIN — MIDAZOLAM 10 MG/HR: 5 INJECTION INTRAMUSCULAR; INTRAVENOUS at 17:26

## 2021-01-01 RX ADMIN — Medication 200 MCG/HR: at 15:21

## 2021-01-01 RX ADMIN — LORAZEPAM 0.5 MG: 0.5 TABLET ORAL at 13:54

## 2021-01-01 RX ADMIN — HYDRALAZINE HYDROCHLORIDE 10 MG: 20 INJECTION INTRAMUSCULAR; INTRAVENOUS at 11:12

## 2021-01-01 RX ADMIN — PROPOFOL 50 MCG/KG/MIN: 10 INJECTION, EMULSION INTRAVENOUS at 12:19

## 2021-01-01 RX ADMIN — ARFORMOTEROL TARTRATE 15 MCG: 15 SOLUTION RESPIRATORY (INHALATION) at 09:56

## 2021-01-01 RX ADMIN — CARVEDILOL 25 MG: 25 TABLET, FILM COATED ORAL at 22:21

## 2021-01-01 RX ADMIN — BUSPIRONE HYDROCHLORIDE 15 MG: 10 TABLET ORAL at 20:04

## 2021-01-01 RX ADMIN — Medication 50 MG: at 07:47

## 2021-01-01 RX ADMIN — BUDESONIDE 500 MCG: 0.5 SUSPENSION RESPIRATORY (INHALATION) at 20:28

## 2021-01-01 RX ADMIN — HEPARIN SODIUM 2000 UNITS: 1000 INJECTION INTRAVENOUS; SUBCUTANEOUS at 08:19

## 2021-01-01 RX ADMIN — OXYCODONE 5 MG: 5 TABLET ORAL at 20:39

## 2021-01-01 RX ADMIN — AMLODIPINE BESYLATE 10 MG: 10 TABLET ORAL at 10:03

## 2021-01-01 RX ADMIN — HYDRALAZINE HYDROCHLORIDE 10 MG: 20 INJECTION INTRAMUSCULAR; INTRAVENOUS at 02:34

## 2021-01-01 RX ADMIN — AMLODIPINE BESYLATE 10 MG: 10 TABLET ORAL at 12:41

## 2021-01-01 RX ADMIN — APIXABAN 5 MG: 5 TABLET, FILM COATED ORAL at 23:01

## 2021-01-01 RX ADMIN — BUDESONIDE 500 MCG: 0.5 SUSPENSION RESPIRATORY (INHALATION) at 21:29

## 2021-01-01 RX ADMIN — Medication 200 MCG/HR: at 18:54

## 2021-01-01 RX ADMIN — VECURONIUM BROMIDE 10 MG: 1 INJECTION, POWDER, LYOPHILIZED, FOR SOLUTION INTRAVENOUS at 12:33

## 2021-01-01 RX ADMIN — HYDRALAZINE HYDROCHLORIDE 10 MG: 20 INJECTION INTRAMUSCULAR; INTRAVENOUS at 18:38

## 2021-01-01 RX ADMIN — ARFORMOTEROL TARTRATE 15 MCG: 15 SOLUTION RESPIRATORY (INHALATION) at 09:46

## 2021-01-01 RX ADMIN — Medication 175 MCG/HR: at 20:37

## 2021-01-01 RX ADMIN — BUDESONIDE 500 MCG: 0.5 SUSPENSION RESPIRATORY (INHALATION) at 20:31

## 2021-01-01 RX ADMIN — LORAZEPAM 0.5 MG: 0.5 TABLET ORAL at 07:37

## 2021-01-01 RX ADMIN — DEXAMETHASONE SODIUM PHOSPHATE 6 MG: 10 INJECTION INTRAMUSCULAR; INTRAVENOUS at 02:03

## 2021-01-01 RX ADMIN — LORAZEPAM 1 MG: 2 INJECTION INTRAMUSCULAR; INTRAVENOUS at 04:45

## 2021-01-01 RX ADMIN — Medication 150 MCG/HR: at 23:45

## 2021-01-01 RX ADMIN — Medication 10 ML: at 07:52

## 2021-01-01 RX ADMIN — ROCURONIUM BROMIDE: 50 INJECTION INTRAVENOUS at 01:10

## 2021-01-01 RX ADMIN — Medication 200 MCG/HR: at 21:30

## 2021-01-01 RX ADMIN — Medication 50 MG: at 09:20

## 2021-01-01 RX ADMIN — ALPRAZOLAM 0.5 MG: 0.25 TABLET ORAL at 20:57

## 2021-01-01 RX ADMIN — LORAZEPAM 1 MG: 2 INJECTION INTRAMUSCULAR at 04:45

## 2021-01-01 RX ADMIN — LORAZEPAM 0.5 MG: 0.5 TABLET ORAL at 15:53

## 2021-01-01 RX ADMIN — BUSPIRONE HYDROCHLORIDE 15 MG: 10 TABLET ORAL at 08:11

## 2021-01-01 RX ADMIN — ATORVASTATIN CALCIUM 40 MG: 40 TABLET, FILM COATED ORAL at 08:19

## 2021-01-01 RX ADMIN — ACETAMINOPHEN 650 MG: 325 TABLET ORAL at 14:17

## 2021-01-01 RX ADMIN — BUSPIRONE HYDROCHLORIDE 15 MG: 10 TABLET ORAL at 14:17

## 2021-01-01 RX ADMIN — MORPHINE SULFATE 4 MG: 4 INJECTION, SOLUTION INTRAMUSCULAR; INTRAVENOUS at 23:13

## 2021-01-01 RX ADMIN — POLYETHYLENE GLYCOL 3350 17 G: 17 POWDER, FOR SOLUTION ORAL at 07:51

## 2021-01-01 RX ADMIN — IPRATROPIUM BROMIDE AND ALBUTEROL SULFATE 3 ML: .5; 3 SOLUTION RESPIRATORY (INHALATION) at 06:21

## 2021-01-01 RX ADMIN — HEPARIN SODIUM 11.6 UNITS/KG/HR: 10000 INJECTION, SOLUTION INTRAVENOUS at 09:06

## 2021-01-01 RX ADMIN — DOXYCYCLINE HYCLATE 100 MG: 100 CAPSULE ORAL at 20:39

## 2021-01-01 RX ADMIN — BUDESONIDE 500 MCG: 0.5 SUSPENSION RESPIRATORY (INHALATION) at 09:46

## 2021-01-01 RX ADMIN — DEXAMETHASONE 6 MG: 4 TABLET ORAL at 14:00

## 2021-01-01 RX ADMIN — CARVEDILOL 25 MG: 25 TABLET, FILM COATED ORAL at 08:53

## 2021-01-01 RX ADMIN — MIDAZOLAM 8 MG/HR: 5 INJECTION INTRAMUSCULAR; INTRAVENOUS at 07:40

## 2021-01-01 RX ADMIN — Medication 200 MCG/HR: at 21:42

## 2021-01-01 RX ADMIN — HYDRALAZINE HYDROCHLORIDE 10 MG: 20 INJECTION INTRAMUSCULAR; INTRAVENOUS at 21:36

## 2021-01-01 RX ADMIN — LORAZEPAM 1 MG: 2 INJECTION INTRAMUSCULAR; INTRAVENOUS at 01:09

## 2021-01-01 RX ADMIN — HYDROCODONE BITARTRATE AND ACETAMINOPHEN 1 TABLET: 5; 325 TABLET ORAL at 21:09

## 2021-01-01 RX ADMIN — DIVALPROEX SODIUM 250 MG: 125 CAPSULE, COATED PELLETS ORAL at 14:24

## 2021-01-01 RX ADMIN — Medication 50 MG: at 14:00

## 2021-01-01 RX ADMIN — Medication 10 ML: at 20:58

## 2021-01-01 RX ADMIN — DIVALPROEX SODIUM 250 MG: 125 CAPSULE, COATED PELLETS ORAL at 23:21

## 2021-01-01 RX ADMIN — SODIUM CHLORIDE 2 MCG/KG/MIN: 9 INJECTION, SOLUTION INTRAVENOUS at 06:09

## 2021-01-01 RX ADMIN — Medication 1 MG: at 00:54

## 2021-01-01 RX ADMIN — AMLODIPINE BESYLATE 10 MG: 10 TABLET ORAL at 10:06

## 2021-01-01 RX ADMIN — OLMESARTAN MEDOXOMIL 20 MG: 20 TABLET ORAL at 09:33

## 2021-01-01 RX ADMIN — LABETALOL HYDROCHLORIDE 10 MG: 5 INJECTION INTRAVENOUS at 19:23

## 2021-01-01 RX ADMIN — MIDAZOLAM 2 MG: 1 INJECTION INTRAMUSCULAR; INTRAVENOUS at 09:09

## 2021-01-01 RX ADMIN — DOXYCYCLINE HYCLATE 100 MG: 100 CAPSULE ORAL at 08:17

## 2021-01-01 RX ADMIN — CARVEDILOL 25 MG: 25 TABLET, FILM COATED ORAL at 20:39

## 2021-01-01 RX ADMIN — FOLIC ACID 1 MG: 5 INJECTION, SOLUTION INTRAMUSCULAR; INTRAVENOUS; SUBCUTANEOUS at 09:14

## 2021-01-01 RX ADMIN — ARFORMOTEROL TARTRATE 15 MCG: 15 SOLUTION RESPIRATORY (INHALATION) at 20:30

## 2021-01-01 RX ADMIN — LEVOTHYROXINE SODIUM 50 MCG: 0.05 TABLET ORAL at 08:17

## 2021-01-01 RX ADMIN — Medication 1000 UNITS: at 09:19

## 2021-01-01 RX ADMIN — PANTOPRAZOLE SODIUM 40 MG: 40 TABLET, DELAYED RELEASE ORAL at 06:41

## 2021-01-01 RX ADMIN — Medication 100 MG: at 09:12

## 2021-01-01 RX ADMIN — IPRATROPIUM BROMIDE AND ALBUTEROL SULFATE 3 AMPULE: .5; 3 SOLUTION RESPIRATORY (INHALATION) at 01:31

## 2021-01-01 RX ADMIN — MIDAZOLAM 2 MG: 1 INJECTION INTRAMUSCULAR; INTRAVENOUS at 06:50

## 2021-01-01 RX ADMIN — CARVEDILOL 25 MG: 25 TABLET, FILM COATED ORAL at 20:45

## 2021-01-01 RX ADMIN — BUSPIRONE HYDROCHLORIDE 15 MG: 10 TABLET ORAL at 12:18

## 2021-01-01 RX ADMIN — LEVOTHYROXINE SODIUM 50 MCG: 0.05 TABLET ORAL at 09:21

## 2021-01-01 RX ADMIN — BUDESONIDE 500 MCG: 0.5 SUSPENSION RESPIRATORY (INHALATION) at 20:15

## 2021-01-01 RX ADMIN — MIDAZOLAM 2 MG: 1 INJECTION INTRAMUSCULAR; INTRAVENOUS at 21:07

## 2021-01-01 RX ADMIN — DEXAMETHASONE SODIUM PHOSPHATE 6 MG: 10 INJECTION, SOLUTION INTRAMUSCULAR; INTRAVENOUS at 16:52

## 2021-01-01 RX ADMIN — LORAZEPAM 0.5 MG: 0.5 TABLET ORAL at 16:05

## 2021-01-01 RX ADMIN — CARVEDILOL 25 MG: 25 TABLET, FILM COATED ORAL at 12:41

## 2021-01-01 RX ADMIN — OXYCODONE HYDROCHLORIDE AND ACETAMINOPHEN 500 MG: 500 TABLET ORAL at 09:00

## 2021-01-01 RX ADMIN — CARVEDILOL 12.5 MG: 6.25 TABLET, FILM COATED ORAL at 20:41

## 2021-01-01 RX ADMIN — POLYETHYLENE GLYCOL 3350 17 G: 17 POWDER, FOR SOLUTION ORAL at 01:14

## 2021-01-01 RX ADMIN — MIDAZOLAM 8 MG/HR: 5 INJECTION INTRAMUSCULAR; INTRAVENOUS at 09:20

## 2021-01-01 RX ADMIN — HEPARIN SODIUM 7500 UNITS: 10000 INJECTION INTRAVENOUS; SUBCUTANEOUS at 12:18

## 2021-01-01 RX ADMIN — MIDAZOLAM 2 MG: 1 INJECTION INTRAMUSCULAR; INTRAVENOUS at 12:17

## 2021-01-01 RX ADMIN — POLYETHYLENE GLYCOL 3350 17 G: 17 POWDER, FOR SOLUTION ORAL at 15:39

## 2021-01-01 RX ADMIN — Medication 10 ML: at 21:50

## 2021-01-01 RX ADMIN — BUSPIRONE HYDROCHLORIDE 15 MG: 10 TABLET ORAL at 10:01

## 2021-01-01 RX ADMIN — ENOXAPARIN SODIUM 120 MG: 120 INJECTION SUBCUTANEOUS at 12:08

## 2021-01-01 RX ADMIN — Medication 50 MG: at 09:00

## 2021-01-01 RX ADMIN — Medication 10 ML: at 09:23

## 2021-01-01 RX ADMIN — Medication 25 MCG/HR: at 02:05

## 2021-01-01 RX ADMIN — HYDRALAZINE HYDROCHLORIDE 10 MG: 20 INJECTION INTRAMUSCULAR; INTRAVENOUS at 08:47

## 2021-01-01 RX ADMIN — KETOROLAC TROMETHAMINE 30 MG: 30 INJECTION, SOLUTION INTRAMUSCULAR; INTRAVENOUS at 17:02

## 2021-01-01 RX ADMIN — Medication 200 MCG/HR: at 15:45

## 2021-01-01 RX ADMIN — BUDESONIDE 500 MCG: 0.5 SUSPENSION RESPIRATORY (INHALATION) at 18:50

## 2021-01-01 RX ADMIN — ARFORMOTEROL TARTRATE 15 MCG: 15 SOLUTION RESPIRATORY (INHALATION) at 21:29

## 2021-01-01 RX ADMIN — IPRATROPIUM BROMIDE AND ALBUTEROL SULFATE 1 AMPULE: .5; 2.5 SOLUTION RESPIRATORY (INHALATION) at 17:12

## 2021-01-01 RX ADMIN — MIDAZOLAM 8 MG/HR: 5 INJECTION INTRAMUSCULAR; INTRAVENOUS at 13:14

## 2021-01-01 RX ADMIN — OXYCODONE AND ACETAMINOPHEN 1 TABLET: 5; 325 TABLET ORAL at 14:25

## 2021-01-01 RX ADMIN — BUDESONIDE 500 MCG: 0.5 SUSPENSION RESPIRATORY (INHALATION) at 09:57

## 2021-01-01 RX ADMIN — PANTOPRAZOLE SODIUM 40 MG: 40 INJECTION, POWDER, FOR SOLUTION INTRAVENOUS at 09:20

## 2021-01-01 RX ADMIN — Medication 1000 UNITS: at 08:11

## 2021-01-01 RX ADMIN — APIXABAN 5 MG: 5 TABLET, FILM COATED ORAL at 20:24

## 2021-01-01 RX ADMIN — IOPAMIDOL 75 ML: 755 INJECTION, SOLUTION INTRAVENOUS at 01:16

## 2021-01-01 RX ADMIN — ARFORMOTEROL TARTRATE 15 MCG: 15 SOLUTION RESPIRATORY (INHALATION) at 20:46

## 2021-01-01 RX ADMIN — ONDANSETRON HYDROCHLORIDE 4 MG: 2 SOLUTION INTRAMUSCULAR; INTRAVENOUS at 21:34

## 2021-01-01 RX ADMIN — Medication 10 ML: at 20:24

## 2021-01-01 RX ADMIN — ALPRAZOLAM 0.5 MG: 0.25 TABLET ORAL at 20:23

## 2021-01-01 RX ADMIN — BUDESONIDE 500 MCG: 0.5 SUSPENSION RESPIRATORY (INHALATION) at 11:04

## 2021-01-01 RX ADMIN — BUDESONIDE 500 MCG: 0.5 SUSPENSION RESPIRATORY (INHALATION) at 09:39

## 2021-01-01 RX ADMIN — BUDESONIDE 500 MCG: 0.5 SUSPENSION RESPIRATORY (INHALATION) at 21:04

## 2021-01-01 RX ADMIN — Medication 10 ML: at 21:45

## 2021-01-01 RX ADMIN — DEXAMETHASONE 6 MG: 4 TABLET ORAL at 09:00

## 2021-01-01 RX ADMIN — SENNOSIDES 8.6 MG: 8.6 TABLET, COATED ORAL at 21:42

## 2021-01-01 RX ADMIN — IPRATROPIUM BROMIDE AND ALBUTEROL SULFATE 1 AMPULE: .5; 2.5 SOLUTION RESPIRATORY (INHALATION) at 17:01

## 2021-01-01 RX ADMIN — PROPOFOL 25 MCG/KG/MIN: 10 INJECTION, EMULSION INTRAVENOUS at 02:04

## 2021-01-01 RX ADMIN — BUSPIRONE HYDROCHLORIDE 15 MG: 10 TABLET ORAL at 13:20

## 2021-01-01 RX ADMIN — Medication 1000 UNITS: at 22:13

## 2021-01-01 RX ADMIN — SODIUM CHLORIDE SOLN NEBU 3% 2 ML: 3 NEBU SOLN at 04:30

## 2021-01-01 RX ADMIN — IPRATROPIUM BROMIDE AND ALBUTEROL SULFATE 1 AMPULE: .5; 2.5 SOLUTION RESPIRATORY (INHALATION) at 20:05

## 2021-01-01 RX ADMIN — SODIUM CHLORIDE, PRESERVATIVE FREE 10 ML: 5 INJECTION INTRAVENOUS at 08:12

## 2021-01-01 RX ADMIN — BUSPIRONE HYDROCHLORIDE 15 MG: 10 TABLET ORAL at 08:52

## 2021-01-01 RX ADMIN — ARFORMOTEROL TARTRATE 15 MCG: 15 SOLUTION RESPIRATORY (INHALATION) at 21:04

## 2021-01-01 RX ADMIN — IPRATROPIUM BROMIDE AND ALBUTEROL SULFATE 1 AMPULE: .5; 2.5 SOLUTION RESPIRATORY (INHALATION) at 12:04

## 2021-01-01 RX ADMIN — IPRATROPIUM BROMIDE AND ALBUTEROL SULFATE 1 AMPULE: .5; 2.5 SOLUTION RESPIRATORY (INHALATION) at 08:56

## 2021-01-01 RX ADMIN — Medication 10 ML: at 08:12

## 2021-01-01 RX ADMIN — Medication 100 MG: at 08:18

## 2021-01-01 RX ADMIN — BUSPIRONE HYDROCHLORIDE 15 MG: 10 TABLET ORAL at 23:03

## 2021-01-01 RX ADMIN — APIXABAN 5 MG: 5 TABLET, FILM COATED ORAL at 10:06

## 2021-01-01 RX ADMIN — ACETAMINOPHEN 1000 MG: 500 TABLET ORAL at 13:33

## 2021-01-01 RX ADMIN — CARVEDILOL 25 MG: 25 TABLET, FILM COATED ORAL at 20:23

## 2021-01-01 RX ADMIN — CARVEDILOL 25 MG: 25 TABLET, FILM COATED ORAL at 09:21

## 2021-01-01 RX ADMIN — OLMESARTAN MEDOXOMIL 20 MG: 20 TABLET ORAL at 09:41

## 2021-01-01 RX ADMIN — IPRATROPIUM BROMIDE AND ALBUTEROL SULFATE 1 AMPULE: .5; 2.5 SOLUTION RESPIRATORY (INHALATION) at 06:03

## 2021-01-01 RX ADMIN — LORAZEPAM 1 MG: 2 INJECTION INTRAMUSCULAR; INTRAVENOUS at 21:34

## 2021-01-01 RX ADMIN — BUSPIRONE HYDROCHLORIDE 15 MG: 10 TABLET ORAL at 21:49

## 2021-01-01 RX ADMIN — VANCOMYCIN HYDROCHLORIDE 1500 MG: 10 INJECTION, POWDER, LYOPHILIZED, FOR SOLUTION INTRAVENOUS at 05:31

## 2021-01-01 RX ADMIN — SODIUM CHLORIDE: 9 INJECTION, SOLUTION INTRAVENOUS at 09:09

## 2021-01-01 RX ADMIN — ACETAMINOPHEN 650 MG: 325 TABLET ORAL at 13:30

## 2021-01-01 RX ADMIN — SODIUM CHLORIDE: 9 INJECTION, SOLUTION INTRAVENOUS at 08:55

## 2021-01-01 RX ADMIN — SODIUM CHLORIDE, PRESERVATIVE FREE 10 ML: 5 INJECTION INTRAVENOUS at 09:14

## 2021-01-01 RX ADMIN — IOPAMIDOL 75 ML: 755 INJECTION, SOLUTION INTRAVENOUS at 11:57

## 2021-01-01 RX ADMIN — VECURONIUM BROMIDE FOR INJECTION 10 MG: 1 INJECTION, POWDER, LYOPHILIZED, FOR SOLUTION INTRAVENOUS at 12:33

## 2021-01-01 RX ADMIN — BUDESONIDE 500 MCG: 0.5 SUSPENSION RESPIRATORY (INHALATION) at 10:02

## 2021-01-01 RX ADMIN — IPRATROPIUM BROMIDE AND ALBUTEROL SULFATE 1 AMPULE: .5; 2.5 SOLUTION RESPIRATORY (INHALATION) at 10:39

## 2021-01-01 RX ADMIN — LEVOTHYROXINE SODIUM 50 MCG: 0.05 TABLET ORAL at 09:32

## 2021-01-01 RX ADMIN — CARVEDILOL 12.5 MG: 6.25 TABLET, FILM COATED ORAL at 09:13

## 2021-01-01 RX ADMIN — CARVEDILOL 6.25 MG: 6.25 TABLET, FILM COATED ORAL at 10:03

## 2021-01-01 RX ADMIN — DIVALPROEX SODIUM 250 MG: 125 CAPSULE, COATED PELLETS ORAL at 15:08

## 2021-01-01 RX ADMIN — APIXABAN 5 MG: 5 TABLET, FILM COATED ORAL at 21:30

## 2021-01-01 RX ADMIN — BUSPIRONE HYDROCHLORIDE 15 MG: 10 TABLET ORAL at 15:09

## 2021-01-01 RX ADMIN — OLMESARTAN MEDOXOMIL 20 MG: 20 TABLET ORAL at 13:52

## 2021-01-01 RX ADMIN — ATORVASTATIN CALCIUM 40 MG: 40 TABLET, FILM COATED ORAL at 09:00

## 2021-01-01 RX ADMIN — CARVEDILOL 25 MG: 25 TABLET, FILM COATED ORAL at 09:00

## 2021-01-01 RX ADMIN — BENZONATATE 100 MG: 100 CAPSULE ORAL at 15:07

## 2021-01-01 RX ADMIN — MIDAZOLAM 2 MG: 1 INJECTION INTRAMUSCULAR; INTRAVENOUS at 01:15

## 2021-01-01 RX ADMIN — APIXABAN 5 MG: 5 TABLET, FILM COATED ORAL at 12:41

## 2021-01-01 RX ADMIN — FENTANYL CITRATE 100 MCG: 50 INJECTION, SOLUTION INTRAMUSCULAR; INTRAVENOUS at 02:43

## 2021-01-01 RX ADMIN — ACETAMINOPHEN 650 MG: 325 TABLET ORAL at 01:24

## 2021-01-01 RX ADMIN — LORAZEPAM 0.5 MG: 0.5 TABLET ORAL at 09:41

## 2021-01-01 RX ADMIN — BUSPIRONE HYDROCHLORIDE 15 MG: 10 TABLET ORAL at 09:00

## 2021-01-01 RX ADMIN — LORAZEPAM 1 MG: 2 INJECTION INTRAMUSCULAR at 18:57

## 2021-01-01 RX ADMIN — IPRATROPIUM BROMIDE AND ALBUTEROL SULFATE 3 ML: .5; 3 SOLUTION RESPIRATORY (INHALATION) at 11:01

## 2021-01-01 RX ADMIN — OXYCODONE AND ACETAMINOPHEN 1 TABLET: 5; 325 TABLET ORAL at 22:22

## 2021-01-01 RX ADMIN — MIDAZOLAM 10 MG/HR: 5 INJECTION INTRAMUSCULAR; INTRAVENOUS at 02:55

## 2021-01-01 RX ADMIN — LABETALOL HYDROCHLORIDE 20 MG: 5 INJECTION INTRAVENOUS at 12:33

## 2021-01-01 RX ADMIN — PROPOFOL 200 MG: 10 INJECTION, EMULSION INTRAVENOUS at 09:14

## 2021-01-01 RX ADMIN — IPRATROPIUM BROMIDE AND ALBUTEROL SULFATE 1 AMPULE: .5; 2.5 SOLUTION RESPIRATORY (INHALATION) at 18:50

## 2021-01-01 RX ADMIN — MIDAZOLAM 2 MG: 1 INJECTION INTRAMUSCULAR; INTRAVENOUS at 21:36

## 2021-01-01 RX ADMIN — Medication 175 MCG/HR: at 10:33

## 2021-01-01 RX ADMIN — CARVEDILOL 25 MG: 25 TABLET, FILM COATED ORAL at 07:36

## 2021-01-01 RX ADMIN — ARFORMOTEROL TARTRATE 15 MCG: 15 SOLUTION RESPIRATORY (INHALATION) at 19:56

## 2021-01-01 RX ADMIN — ARFORMOTEROL TARTRATE 15 MCG: 15 SOLUTION RESPIRATORY (INHALATION) at 10:02

## 2021-01-01 RX ADMIN — POLYETHYLENE GLYCOL 3350 17 G: 17 POWDER, FOR SOLUTION ORAL at 10:34

## 2021-01-01 RX ADMIN — IPRATROPIUM BROMIDE AND ALBUTEROL SULFATE 1 AMPULE: .5; 3 SOLUTION RESPIRATORY (INHALATION) at 16:17

## 2021-01-01 RX ADMIN — BUDESONIDE 500 MCG: 0.5 SUSPENSION RESPIRATORY (INHALATION) at 21:26

## 2021-01-01 RX ADMIN — ACETAMINOPHEN 650 MG: 325 TABLET ORAL at 04:53

## 2021-01-01 RX ADMIN — PANTOPRAZOLE SODIUM 40 MG: 40 INJECTION, POWDER, FOR SOLUTION INTRAVENOUS at 08:12

## 2021-01-01 RX ADMIN — MIDAZOLAM 10 MG/HR: 5 INJECTION INTRAMUSCULAR; INTRAVENOUS at 23:20

## 2021-01-01 RX ADMIN — BUSPIRONE HYDROCHLORIDE 15 MG: 10 TABLET ORAL at 20:57

## 2021-01-01 RX ADMIN — BUDESONIDE 500 MCG: 0.5 SUSPENSION RESPIRATORY (INHALATION) at 08:40

## 2021-01-01 RX ADMIN — LEVOTHYROXINE SODIUM 50 MCG: 0.05 TABLET ORAL at 10:06

## 2021-01-01 RX ADMIN — HYDROCODONE BITARTRATE AND ACETAMINOPHEN 1 TABLET: 5; 325 TABLET ORAL at 09:21

## 2021-01-01 RX ADMIN — MIDAZOLAM 4 MG: 1 INJECTION INTRAMUSCULAR; INTRAVENOUS at 03:54

## 2021-01-01 RX ADMIN — BUSPIRONE HYDROCHLORIDE 15 MG: 10 TABLET ORAL at 07:47

## 2021-01-01 RX ADMIN — BUSPIRONE HYDROCHLORIDE 15 MG: 10 TABLET ORAL at 09:13

## 2021-01-01 RX ADMIN — IPRATROPIUM BROMIDE AND ALBUTEROL SULFATE 1 AMPULE: .5; 2.5 SOLUTION RESPIRATORY (INHALATION) at 20:46

## 2021-01-01 RX ADMIN — LORAZEPAM 0.5 MG: 0.5 TABLET ORAL at 02:44

## 2021-01-01 RX ADMIN — MIDAZOLAM 10 MG/HR: 5 INJECTION INTRAMUSCULAR; INTRAVENOUS at 16:52

## 2021-01-01 RX ADMIN — IPRATROPIUM BROMIDE AND ALBUTEROL SULFATE 1 AMPULE: .5; 2.5 SOLUTION RESPIRATORY (INHALATION) at 16:34

## 2021-01-01 RX ADMIN — ARFORMOTEROL TARTRATE 15 MCG: 15 SOLUTION RESPIRATORY (INHALATION) at 09:57

## 2021-01-01 RX ADMIN — HEPARIN SODIUM 9.58 UNITS/KG/HR: 10000 INJECTION, SOLUTION INTRAVENOUS at 12:50

## 2021-01-01 RX ADMIN — SODIUM CHLORIDE 500 ML: 9 INJECTION, SOLUTION INTRAVENOUS at 13:32

## 2021-01-01 RX ADMIN — IPRATROPIUM BROMIDE AND ALBUTEROL SULFATE 3 ML: .5; 3 SOLUTION RESPIRATORY (INHALATION) at 08:40

## 2021-01-01 RX ADMIN — HYDRALAZINE HYDROCHLORIDE 25 MG: 25 TABLET, FILM COATED ORAL at 06:19

## 2021-01-01 RX ADMIN — Medication 10 ML: at 09:00

## 2021-01-01 RX ADMIN — BUDESONIDE 500 MCG: 0.5 SUSPENSION RESPIRATORY (INHALATION) at 09:56

## 2021-01-01 RX ADMIN — SODIUM CHLORIDE, PRESERVATIVE FREE 10 ML: 5 INJECTION INTRAVENOUS at 07:52

## 2021-01-01 RX ADMIN — Medication 10 ML: at 22:22

## 2021-01-01 RX ADMIN — Medication 10 ML: at 20:41

## 2021-01-01 RX ADMIN — WATER 10 ML: 1 INJECTION INTRAMUSCULAR; INTRAVENOUS; SUBCUTANEOUS at 03:55

## 2021-01-01 RX ADMIN — DEXAMETHASONE SODIUM PHOSPHATE 10 MG: 10 INJECTION INTRAMUSCULAR; INTRAVENOUS at 14:11

## 2021-01-01 RX ADMIN — OXYCODONE AND ACETAMINOPHEN 1 TABLET: 5; 325 TABLET ORAL at 06:37

## 2021-01-01 RX ADMIN — IPRATROPIUM BROMIDE AND ALBUTEROL SULFATE 1 AMPULE: .5; 2.5 SOLUTION RESPIRATORY (INHALATION) at 20:15

## 2021-01-01 RX ADMIN — HYDROCODONE BITARTRATE AND ACETAMINOPHEN 1 TABLET: 5; 325 TABLET ORAL at 02:43

## 2021-01-01 RX ADMIN — DOXYCYCLINE HYCLATE 100 MG: 100 CAPSULE ORAL at 19:53

## 2021-01-01 RX ADMIN — LORAZEPAM 0.5 MG: 0.5 TABLET ORAL at 20:54

## 2021-01-01 RX ADMIN — KETAMINE HYDROCHLORIDE: 10 INJECTION, SOLUTION INTRAMUSCULAR; INTRAVENOUS at 01:15

## 2021-01-01 RX ADMIN — APIXABAN 5 MG: 5 TABLET, FILM COATED ORAL at 09:00

## 2021-01-01 RX ADMIN — OXYCODONE 5 MG: 5 TABLET ORAL at 15:53

## 2021-01-01 RX ADMIN — MIDAZOLAM HYDROCHLORIDE 2 MG: 1 INJECTION, SOLUTION INTRAMUSCULAR; INTRAVENOUS at 01:15

## 2021-01-01 RX ADMIN — BUDESONIDE 500 MCG: 0.5 SUSPENSION RESPIRATORY (INHALATION) at 20:05

## 2021-01-01 RX ADMIN — FENTANYL CITRATE 100 MCG: 0.05 INJECTION, SOLUTION INTRAMUSCULAR; INTRAVENOUS at 01:15

## 2021-01-01 RX ADMIN — CARVEDILOL 25 MG: 25 TABLET, FILM COATED ORAL at 20:54

## 2021-01-01 RX ADMIN — ARFORMOTEROL TARTRATE 15 MCG: 15 SOLUTION RESPIRATORY (INHALATION) at 21:26

## 2021-01-01 RX ADMIN — VECURONIUM BROMIDE 10 MG: 1 INJECTION, POWDER, LYOPHILIZED, FOR SOLUTION INTRAVENOUS at 05:24

## 2021-01-01 RX ADMIN — Medication 10 ML: at 08:18

## 2021-01-01 RX ADMIN — Medication 1000 UNITS: at 08:19

## 2021-01-01 RX ADMIN — SODIUM CHLORIDE, PRESERVATIVE FREE 10 ML: 5 INJECTION INTRAVENOUS at 08:17

## 2021-01-01 RX ADMIN — MIDAZOLAM 2 MG: 1 INJECTION INTRAMUSCULAR; INTRAVENOUS at 02:43

## 2021-01-01 RX ADMIN — HEPARIN SODIUM 11.6 UNITS/KG/HR: 10000 INJECTION, SOLUTION INTRAVENOUS at 18:58

## 2021-01-01 RX ADMIN — ARFORMOTEROL TARTRATE 15 MCG: 15 SOLUTION RESPIRATORY (INHALATION) at 09:39

## 2021-01-01 RX ADMIN — OXYCODONE HYDROCHLORIDE AND ACETAMINOPHEN 500 MG: 500 TABLET ORAL at 07:46

## 2021-01-01 RX ADMIN — SODIUM CHLORIDE SOLN NEBU 3% 2 ML: 3 NEBU SOLN at 08:40

## 2021-01-01 RX ADMIN — APIXABAN 5 MG: 5 TABLET, FILM COATED ORAL at 08:19

## 2021-01-01 RX ADMIN — OXYCODONE HYDROCHLORIDE AND ACETAMINOPHEN 500 MG: 500 TABLET ORAL at 08:19

## 2021-01-01 RX ADMIN — SODIUM CHLORIDE, PRESERVATIVE FREE 10 ML: 5 INJECTION INTRAVENOUS at 16:52

## 2021-01-01 RX ADMIN — MIDAZOLAM 9 MG/HR: 5 INJECTION INTRAMUSCULAR; INTRAVENOUS at 10:18

## 2021-01-01 RX ADMIN — OXYCODONE 5 MG: 5 TABLET ORAL at 00:22

## 2021-01-01 RX ADMIN — HYDRALAZINE HYDROCHLORIDE 10 MG: 20 INJECTION INTRAMUSCULAR; INTRAVENOUS at 22:16

## 2021-01-01 RX ADMIN — ARFORMOTEROL TARTRATE 15 MCG: 15 SOLUTION RESPIRATORY (INHALATION) at 10:39

## 2021-01-01 RX ADMIN — Medication 1000 UNITS: at 07:47

## 2021-01-01 RX ADMIN — Medication 50 MG: at 21:09

## 2021-01-01 RX ADMIN — IPRATROPIUM BROMIDE AND ALBUTEROL SULFATE 1 AMPULE: .5; 2.5 SOLUTION RESPIRATORY (INHALATION) at 21:27

## 2021-01-01 RX ADMIN — APIXABAN 5 MG: 5 TABLET, FILM COATED ORAL at 21:14

## 2021-01-01 RX ADMIN — AMLODIPINE BESYLATE 10 MG: 10 TABLET ORAL at 12:06

## 2021-01-01 RX ADMIN — ARFORMOTEROL TARTRATE 15 MCG: 15 SOLUTION RESPIRATORY (INHALATION) at 19:24

## 2021-01-01 RX ADMIN — HEPARIN SODIUM 2000 UNITS: 1000 INJECTION INTRAVENOUS; SUBCUTANEOUS at 06:40

## 2021-01-01 RX ADMIN — LABETALOL HYDROCHLORIDE 10 MG: 5 INJECTION INTRAVENOUS at 21:45

## 2021-01-01 RX ADMIN — BUDESONIDE 500 MCG: 0.5 SUSPENSION RESPIRATORY (INHALATION) at 10:08

## 2021-01-01 RX ADMIN — SODIUM CHLORIDE: 9 INJECTION, SOLUTION INTRAVENOUS at 16:35

## 2021-01-01 RX ADMIN — IPRATROPIUM BROMIDE AND ALBUTEROL SULFATE 3 ML: .5; 3 SOLUTION RESPIRATORY (INHALATION) at 19:24

## 2021-01-01 RX ADMIN — Medication 10 ML: at 21:14

## 2021-01-01 RX ADMIN — FUROSEMIDE 40 MG: 10 INJECTION, SOLUTION INTRAMUSCULAR; INTRAVENOUS at 03:55

## 2021-01-01 RX ADMIN — FOLIC ACID 1 MG: 5 INJECTION, SOLUTION INTRAMUSCULAR; INTRAVENOUS; SUBCUTANEOUS at 09:20

## 2021-01-01 RX ADMIN — Medication 50 MG: at 08:11

## 2021-01-01 RX ADMIN — CARVEDILOL 25 MG: 25 TABLET, FILM COATED ORAL at 20:57

## 2021-01-01 RX ADMIN — FUROSEMIDE 40 MG: 10 INJECTION, SOLUTION INTRAMUSCULAR; INTRAVENOUS at 15:09

## 2021-01-01 RX ADMIN — BUSPIRONE HYDROCHLORIDE 15 MG: 10 TABLET ORAL at 14:00

## 2021-01-01 RX ADMIN — ENOXAPARIN SODIUM 120 MG: 120 INJECTION SUBCUTANEOUS at 10:07

## 2021-01-01 RX ADMIN — Medication 10 ML: at 10:01

## 2021-01-01 RX ADMIN — Medication 10 ML: at 21:38

## 2021-01-01 RX ADMIN — LORAZEPAM 1 MG: 2 INJECTION INTRAMUSCULAR; INTRAVENOUS at 18:57

## 2021-01-01 RX ADMIN — DEXAMETHASONE SODIUM PHOSPHATE 6 MG: 10 INJECTION, SOLUTION INTRAMUSCULAR; INTRAVENOUS at 04:47

## 2021-01-01 RX ADMIN — PHENYLEPHRINE HYDROCHLORIDE 50 MCG: 10 INJECTION INTRAVENOUS at 09:40

## 2021-01-01 RX ADMIN — MIDAZOLAM 2 MG/HR: 5 INJECTION INTRAMUSCULAR; INTRAVENOUS at 22:05

## 2021-01-01 RX ADMIN — IPRATROPIUM BROMIDE AND ALBUTEROL SULFATE 1 AMPULE: .5; 3 SOLUTION RESPIRATORY (INHALATION) at 16:25

## 2021-01-01 RX ADMIN — Medication 150 MCG/HR: at 16:31

## 2021-01-01 RX ADMIN — DIVALPROEX SODIUM 250 MG: 125 CAPSULE, COATED PELLETS ORAL at 12:06

## 2021-01-01 RX ADMIN — DOXYCYCLINE 100 MG: 100 INJECTION, POWDER, LYOPHILIZED, FOR SOLUTION INTRAVENOUS at 08:03

## 2021-01-01 RX ADMIN — IPRATROPIUM BROMIDE AND ALBUTEROL SULFATE 3 ML: .5; 3 SOLUTION RESPIRATORY (INHALATION) at 04:30

## 2021-01-01 RX ADMIN — ARFORMOTEROL TARTRATE 15 MCG: 15 SOLUTION RESPIRATORY (INHALATION) at 06:02

## 2021-01-01 RX ADMIN — SODIUM CHLORIDE SOLN NEBU 3% 2 ML: 3 NEBU SOLN at 19:24

## 2021-01-01 RX ADMIN — ACETYLCYSTEINE 400 MG: 100 SOLUTION ORAL; RESPIRATORY (INHALATION) at 14:55

## 2021-01-01 RX ADMIN — CARVEDILOL 25 MG: 25 TABLET, FILM COATED ORAL at 08:17

## 2021-01-01 RX ADMIN — ENOXAPARIN SODIUM 120 MG: 120 INJECTION SUBCUTANEOUS at 01:44

## 2021-01-01 RX ADMIN — ACETAMINOPHEN 650 MG: 325 TABLET ORAL at 02:44

## 2021-01-01 RX ADMIN — HEPARIN SODIUM 7.6 UNITS/KG/HR: 10000 INJECTION, SOLUTION INTRAVENOUS at 15:39

## 2021-01-01 RX ADMIN — OXYCODONE HYDROCHLORIDE AND ACETAMINOPHEN 500 MG: 500 TABLET ORAL at 09:12

## 2021-01-01 RX ADMIN — ENOXAPARIN SODIUM 120 MG: 120 INJECTION SUBCUTANEOUS at 22:22

## 2021-01-01 RX ADMIN — ARFORMOTEROL TARTRATE 15 MCG: 15 SOLUTION RESPIRATORY (INHALATION) at 18:50

## 2021-01-01 RX ADMIN — ACETAMINOPHEN 650 MG: 325 TABLET ORAL at 10:33

## 2021-01-01 RX ADMIN — CARVEDILOL 25 MG: 25 TABLET, FILM COATED ORAL at 08:19

## 2021-01-01 RX ADMIN — BUDESONIDE 500 MCG: 0.5 SUSPENSION RESPIRATORY (INHALATION) at 21:08

## 2021-01-01 RX ADMIN — Medication 200 MCG/HR: at 08:54

## 2021-01-01 RX ADMIN — CARVEDILOL 25 MG: 25 TABLET, FILM COATED ORAL at 19:53

## 2021-01-01 RX ADMIN — Medication 10 ML: at 21:42

## 2021-01-01 RX ADMIN — PROPOFOL 25 MCG/KG/MIN: 10 INJECTION, EMULSION INTRAVENOUS at 06:42

## 2021-01-01 RX ADMIN — CARVEDILOL 12.5 MG: 6.25 TABLET, FILM COATED ORAL at 17:43

## 2021-01-01 RX ADMIN — LOSARTAN POTASSIUM 100 MG: 50 TABLET, FILM COATED ORAL at 08:11

## 2021-01-01 RX ADMIN — Medication 10 ML: at 10:09

## 2021-01-01 RX ADMIN — ALBUTEROL SULFATE 2 PUFF: 108 AEROSOL, METERED RESPIRATORY (INHALATION) at 23:03

## 2021-01-01 RX ADMIN — CARVEDILOL 12.5 MG: 6.25 TABLET, FILM COATED ORAL at 20:04

## 2021-01-01 RX ADMIN — DOXYCYCLINE HYCLATE 100 MG: 100 CAPSULE ORAL at 09:32

## 2021-01-01 RX ADMIN — CEFTRIAXONE SODIUM 1000 MG: 1 INJECTION, POWDER, FOR SOLUTION INTRAMUSCULAR; INTRAVENOUS at 08:01

## 2021-01-01 RX ADMIN — PROPOFOL 20 MCG/KG/MIN: 10 INJECTION, EMULSION INTRAVENOUS at 02:40

## 2021-01-01 RX ADMIN — Medication 10 ML: at 09:33

## 2021-01-01 RX ADMIN — Medication 10 ML: at 08:53

## 2021-01-01 RX ADMIN — SERTRALINE 50 MG: 50 TABLET, FILM COATED ORAL at 11:11

## 2021-01-01 RX ADMIN — VANCOMYCIN HYDROCHLORIDE 2000 MG: 10 INJECTION, POWDER, LYOPHILIZED, FOR SOLUTION INTRAVENOUS at 02:09

## 2021-01-01 RX ADMIN — Medication 1000 UNITS: at 14:00

## 2021-01-01 RX ADMIN — OXYCODONE AND ACETAMINOPHEN 1 TABLET: 5; 325 TABLET ORAL at 06:41

## 2021-01-01 RX ADMIN — Medication 10 ML: at 08:19

## 2021-01-01 RX ADMIN — AMLODIPINE BESYLATE 10 MG: 10 TABLET ORAL at 09:32

## 2021-01-01 RX ADMIN — LABETALOL HYDROCHLORIDE 10 MG: 5 INJECTION INTRAVENOUS at 09:31

## 2021-01-01 RX ADMIN — FENTANYL CITRATE 100 MCG: 0.05 INJECTION, SOLUTION INTRAMUSCULAR; INTRAVENOUS at 03:01

## 2021-01-01 RX ADMIN — HYDROCODONE BITARTRATE AND ACETAMINOPHEN 1 TABLET: 5; 325 TABLET ORAL at 15:16

## 2021-01-01 RX ADMIN — FENTANYL CITRATE 100 MCG: 50 INJECTION, SOLUTION INTRAMUSCULAR; INTRAVENOUS at 01:15

## 2021-01-01 RX ADMIN — ENOXAPARIN SODIUM 40 MG: 100 INJECTION SUBCUTANEOUS at 19:49

## 2021-01-01 RX ADMIN — OLMESARTAN MEDOXOMIL 20 MG: 20 TABLET ORAL at 09:00

## 2021-01-01 RX ADMIN — DIVALPROEX SODIUM 250 MG: 125 CAPSULE, COATED PELLETS ORAL at 06:30

## 2021-01-01 RX ADMIN — APIXABAN 5 MG: 5 TABLET, FILM COATED ORAL at 09:54

## 2021-01-01 RX ADMIN — APIXABAN 5 MG: 5 TABLET, FILM COATED ORAL at 20:57

## 2021-01-01 RX ADMIN — MIDAZOLAM 10 MG/HR: 5 INJECTION INTRAMUSCULAR; INTRAVENOUS at 02:59

## 2021-01-01 RX ADMIN — LORAZEPAM 0.5 MG: 0.5 TABLET ORAL at 02:43

## 2021-01-01 RX ADMIN — BUDESONIDE 500 MCG: 0.5 SUSPENSION RESPIRATORY (INHALATION) at 19:24

## 2021-01-01 RX ADMIN — AMLODIPINE BESYLATE 10 MG: 10 TABLET ORAL at 09:21

## 2021-01-01 RX ADMIN — BUDESONIDE 500 MCG: 0.5 SUSPENSION RESPIRATORY (INHALATION) at 20:46

## 2021-01-01 RX ADMIN — IPRATROPIUM BROMIDE AND ALBUTEROL SULFATE 3 AMPULE: .5; 3 SOLUTION RESPIRATORY (INHALATION) at 13:18

## 2021-01-01 RX ADMIN — CARVEDILOL 25 MG: 25 TABLET, FILM COATED ORAL at 21:14

## 2021-01-01 RX ADMIN — Medication 10 ML: at 20:04

## 2021-01-01 RX ADMIN — LORAZEPAM 0.5 MG: 0.5 TABLET ORAL at 09:32

## 2021-01-01 RX ADMIN — BUSPIRONE HYDROCHLORIDE 15 MG: 10 TABLET ORAL at 14:32

## 2021-01-01 RX ADMIN — LABETALOL HYDROCHLORIDE 10 MG: 5 INJECTION INTRAVENOUS at 06:12

## 2021-01-01 RX ADMIN — Medication 1000 UNITS: at 09:12

## 2021-01-01 RX ADMIN — Medication 175 MCG/HR: at 03:26

## 2021-01-01 RX ADMIN — LEVOTHYROXINE SODIUM 50 MCG: 0.05 TABLET ORAL at 12:08

## 2021-01-01 RX ADMIN — Medication 200 MCG/HR: at 10:44

## 2021-01-01 RX ADMIN — PANTOPRAZOLE SODIUM 40 MG: 40 INJECTION, POWDER, FOR SOLUTION INTRAVENOUS at 07:47

## 2021-01-01 RX ADMIN — Medication 1000 UNITS: at 09:00

## 2021-01-01 RX ADMIN — ALPRAZOLAM 0.5 MG: 0.25 TABLET ORAL at 21:14

## 2021-01-01 RX ADMIN — BUSPIRONE HYDROCHLORIDE 15 MG: 10 TABLET ORAL at 14:30

## 2021-01-01 RX ADMIN — DEXAMETHASONE SODIUM PHOSPHATE 6 MG: 10 INJECTION, SOLUTION INTRAMUSCULAR; INTRAVENOUS at 03:51

## 2021-01-01 RX ADMIN — IPRATROPIUM BROMIDE AND ALBUTEROL SULFATE 1 AMPULE: .5; 2.5 SOLUTION RESPIRATORY (INHALATION) at 13:42

## 2021-01-01 RX ADMIN — IPRATROPIUM BROMIDE AND ALBUTEROL SULFATE 3 ML: .5; 3 SOLUTION RESPIRATORY (INHALATION) at 06:40

## 2021-01-01 RX ADMIN — IPRATROPIUM BROMIDE AND ALBUTEROL SULFATE 3 AMPULE: .5; 3 SOLUTION RESPIRATORY (INHALATION) at 15:46

## 2021-01-01 RX ADMIN — PROPOFOL 40 MCG/KG/MIN: 10 INJECTION, EMULSION INTRAVENOUS at 02:31

## 2021-01-01 RX ADMIN — FOLIC ACID 1 MG: 5 INJECTION, SOLUTION INTRAMUSCULAR; INTRAVENOUS; SUBCUTANEOUS at 08:18

## 2021-01-01 RX ADMIN — Medication 100 MG: at 10:07

## 2021-01-01 RX ADMIN — CARVEDILOL 12.5 MG: 6.25 TABLET, FILM COATED ORAL at 08:18

## 2021-01-01 RX ADMIN — Medication 175 MCG/HR: at 01:04

## 2021-01-01 RX ADMIN — IPRATROPIUM BROMIDE AND ALBUTEROL SULFATE 3 ML: .5; 3 SOLUTION RESPIRATORY (INHALATION) at 20:28

## 2021-01-01 RX ADMIN — BUSPIRONE HYDROCHLORIDE 15 MG: 10 TABLET ORAL at 17:22

## 2021-01-01 RX ADMIN — MIDAZOLAM 10 MG/HR: 5 INJECTION INTRAMUSCULAR; INTRAVENOUS at 13:36

## 2021-01-01 RX ADMIN — OLMESARTAN MEDOXOMIL 20 MG: 20 TABLET ORAL at 08:18

## 2021-01-01 RX ADMIN — SODIUM CHLORIDE: 9 INJECTION, SOLUTION INTRAVENOUS at 03:14

## 2021-01-01 RX ADMIN — ARFORMOTEROL TARTRATE 15 MCG: 15 SOLUTION RESPIRATORY (INHALATION) at 20:15

## 2021-01-01 RX ADMIN — SODIUM CHLORIDE: 9 INJECTION, SOLUTION INTRAVENOUS at 02:08

## 2021-01-01 RX ADMIN — IPRATROPIUM BROMIDE AND ALBUTEROL SULFATE 3 AMPULE: .5; 3 SOLUTION RESPIRATORY (INHALATION) at 14:46

## 2021-01-01 RX ADMIN — VECURONIUM BROMIDE 10 MG: 1 INJECTION, POWDER, LYOPHILIZED, FOR SOLUTION INTRAVENOUS at 03:55

## 2021-01-01 RX ADMIN — DIPHENHYDRAMINE HYDROCHLORIDE 25 MG: 50 INJECTION, SOLUTION INTRAMUSCULAR; INTRAVENOUS at 05:50

## 2021-01-01 RX ADMIN — LABETALOL HYDROCHLORIDE 10 MG: 5 INJECTION INTRAVENOUS at 16:35

## 2021-01-01 RX ADMIN — SODIUM CHLORIDE SOLN NEBU 3% 2 ML: 3 NEBU SOLN at 06:20

## 2021-01-01 RX ADMIN — BUDESONIDE 500 MCG: 0.5 SUSPENSION RESPIRATORY (INHALATION) at 10:39

## 2021-01-01 RX ADMIN — SODIUM CHLORIDE: 9 INJECTION, SOLUTION INTRAVENOUS at 12:55

## 2021-01-01 RX ADMIN — BUDESONIDE 500 MCG: 0.5 SUSPENSION RESPIRATORY (INHALATION) at 09:51

## 2021-01-01 RX ADMIN — OXYCODONE 5 MG: 5 TABLET ORAL at 09:32

## 2021-01-01 RX ADMIN — Medication 50 MG: at 08:19

## 2021-01-01 RX ADMIN — ARFORMOTEROL TARTRATE 15 MCG: 15 SOLUTION RESPIRATORY (INHALATION) at 08:40

## 2021-01-01 RX ADMIN — SODIUM BICARBONATE 50 MEQ: 84 INJECTION, SOLUTION INTRAVENOUS at 00:55

## 2021-01-01 RX ADMIN — AMLODIPINE BESYLATE 10 MG: 10 TABLET ORAL at 07:37

## 2021-01-01 RX ADMIN — DOXYCYCLINE HYCLATE 100 MG: 100 CAPSULE ORAL at 07:37

## 2021-01-01 RX ADMIN — PANTOPRAZOLE SODIUM 40 MG: 40 TABLET, DELAYED RELEASE ORAL at 06:19

## 2021-01-01 RX ADMIN — ARFORMOTEROL TARTRATE 15 MCG: 15 SOLUTION RESPIRATORY (INHALATION) at 11:03

## 2021-01-01 RX ADMIN — BUSPIRONE HYDROCHLORIDE 15 MG: 10 TABLET ORAL at 21:14

## 2021-01-01 RX ADMIN — BUSPIRONE HYDROCHLORIDE 15 MG: 10 TABLET ORAL at 21:42

## 2021-01-01 RX ADMIN — IPRATROPIUM BROMIDE AND ALBUTEROL SULFATE 3 ML: .5; 3 SOLUTION RESPIRATORY (INHALATION) at 16:00

## 2021-01-01 RX ADMIN — OXYCODONE 5 MG: 5 TABLET ORAL at 14:51

## 2021-01-01 RX ADMIN — Medication 100 MG: at 09:19

## 2021-01-01 RX ADMIN — LORAZEPAM 0.5 MG: 0.5 TABLET ORAL at 10:39

## 2021-01-01 RX ADMIN — BUSPIRONE HYDROCHLORIDE 15 MG: 10 TABLET ORAL at 20:41

## 2021-01-01 RX ADMIN — CEFEPIME 2000 MG: 2 INJECTION, POWDER, FOR SOLUTION INTRAVENOUS at 02:04

## 2021-01-01 RX ADMIN — IPRATROPIUM BROMIDE AND ALBUTEROL SULFATE 1 AMPULE: .5; 2.5 SOLUTION RESPIRATORY (INHALATION) at 09:56

## 2021-01-01 RX ADMIN — AMLODIPINE BESYLATE 10 MG: 10 TABLET ORAL at 09:41

## 2021-01-01 RX ADMIN — CARVEDILOL 12.5 MG: 6.25 TABLET, FILM COATED ORAL at 10:06

## 2021-01-01 RX ADMIN — FUROSEMIDE 40 MG: 10 INJECTION INTRAMUSCULAR; INTRAVENOUS at 03:55

## 2021-01-01 RX ADMIN — IPRATROPIUM BROMIDE AND ALBUTEROL SULFATE 1 AMPULE: .5; 2.5 SOLUTION RESPIRATORY (INHALATION) at 16:41

## 2021-01-01 RX ADMIN — PROPOFOL 50 MCG/KG/MIN: 10 INJECTION, EMULSION INTRAVENOUS at 22:52

## 2021-01-01 RX ADMIN — PIPERACILLIN AND TAZOBACTAM 3375 MG: 3; .375 INJECTION, POWDER, LYOPHILIZED, FOR SOLUTION INTRAVENOUS at 04:56

## 2021-01-01 RX ADMIN — AMLODIPINE BESYLATE 10 MG: 10 TABLET ORAL at 08:17

## 2021-01-01 RX ADMIN — CARVEDILOL 25 MG: 25 TABLET, FILM COATED ORAL at 09:41

## 2021-01-01 RX ADMIN — Medication 100 MG: at 10:03

## 2021-01-01 RX ADMIN — ARFORMOTEROL TARTRATE 15 MCG: 15 SOLUTION RESPIRATORY (INHALATION) at 20:05

## 2021-01-01 RX ADMIN — LEVOTHYROXINE SODIUM 50 MCG: 0.05 TABLET ORAL at 10:02

## 2021-01-01 RX ADMIN — IPRATROPIUM BROMIDE AND ALBUTEROL SULFATE 3 AMPULE: .5; 3 SOLUTION RESPIRATORY (INHALATION) at 13:50

## 2021-01-01 RX ADMIN — IPRATROPIUM BROMIDE AND ALBUTEROL SULFATE 1 AMPULE: .5; 2.5 SOLUTION RESPIRATORY (INHALATION) at 09:52

## 2021-01-01 RX ADMIN — FENTANYL CITRATE 50 MCG: 50 INJECTION, SOLUTION INTRAMUSCULAR; INTRAVENOUS at 09:42

## 2021-01-01 RX ADMIN — Medication 175 MCG/HR: at 09:22

## 2021-01-01 RX ADMIN — LEVOTHYROXINE SODIUM 50 MCG: 0.05 TABLET ORAL at 12:41

## 2021-01-01 RX ADMIN — FUROSEMIDE 40 MG: 10 INJECTION, SOLUTION INTRAMUSCULAR; INTRAVENOUS at 09:31

## 2021-01-01 RX ADMIN — SODIUM CHLORIDE SOLN NEBU 3% 4 ML: 3 NEBU SOLN at 11:02

## 2021-01-01 RX ADMIN — Medication 10 ML: at 21:37

## 2021-01-01 RX ADMIN — Medication 200 MCG/HR: at 01:51

## 2021-01-01 RX ADMIN — OXYCODONE HYDROCHLORIDE AND ACETAMINOPHEN 500 MG: 500 TABLET ORAL at 09:20

## 2021-01-01 RX ADMIN — SODIUM CHLORIDE SOLN NEBU 3% 2 ML: 3 NEBU SOLN at 16:00

## 2021-01-01 RX ADMIN — OXYCODONE HYDROCHLORIDE AND ACETAMINOPHEN 500 MG: 500 TABLET ORAL at 14:00

## 2021-01-01 RX ADMIN — BUDESONIDE 500 MCG: 0.5 SUSPENSION RESPIRATORY (INHALATION) at 06:02

## 2021-01-01 RX ADMIN — LEVOTHYROXINE SODIUM 50 MCG: 0.05 TABLET ORAL at 07:36

## 2021-01-01 RX ADMIN — HEPARIN SODIUM 11.6 UNITS/KG/HR: 10000 INJECTION, SOLUTION INTRAVENOUS at 01:50

## 2021-01-01 RX ADMIN — FUROSEMIDE 40 MG: 10 INJECTION, SOLUTION INTRAMUSCULAR; INTRAVENOUS at 12:21

## 2021-01-01 RX ADMIN — OXYCODONE HYDROCHLORIDE AND ACETAMINOPHEN 500 MG: 500 TABLET ORAL at 08:25

## 2021-01-01 RX ADMIN — SODIUM CHLORIDE: 9 INJECTION, SOLUTION INTRAVENOUS at 23:30

## 2021-01-01 RX ADMIN — PANTOPRAZOLE SODIUM 40 MG: 40 INJECTION, POWDER, FOR SOLUTION INTRAVENOUS at 08:18

## 2021-01-01 RX ADMIN — MIDAZOLAM 9 MG/HR: 5 INJECTION INTRAMUSCULAR; INTRAVENOUS at 18:57

## 2021-01-01 RX ADMIN — Medication 10 ML: at 21:18

## 2021-01-01 RX ADMIN — FENTANYL CITRATE 50 MCG: 50 INJECTION, SOLUTION INTRAMUSCULAR; INTRAVENOUS at 19:19

## 2021-01-01 RX ADMIN — CARVEDILOL 25 MG: 25 TABLET, FILM COATED ORAL at 09:52

## 2021-01-01 RX ADMIN — IPRATROPIUM BROMIDE AND ALBUTEROL SULFATE 1 AMPULE: .5; 2.5 SOLUTION RESPIRATORY (INHALATION) at 13:46

## 2021-01-01 RX ADMIN — IPRATROPIUM BROMIDE AND ALBUTEROL SULFATE 1 AMPULE: .5; 3 SOLUTION RESPIRATORY (INHALATION) at 16:20

## 2021-01-01 RX ADMIN — Medication 100 MG: at 12:08

## 2021-01-01 RX ADMIN — Medication 50 MG: at 08:18

## 2021-01-01 RX ADMIN — PANTOPRAZOLE SODIUM 40 MG: 40 INJECTION, POWDER, FOR SOLUTION INTRAVENOUS at 09:13

## 2021-01-01 RX ADMIN — PROPOFOL 40 MCG/KG/MIN: 10 INJECTION, EMULSION INTRAVENOUS at 19:49

## 2021-01-01 RX ADMIN — IPRATROPIUM BROMIDE AND ALBUTEROL SULFATE 1 AMPULE: .5; 2.5 SOLUTION RESPIRATORY (INHALATION) at 16:55

## 2021-01-01 RX ADMIN — ATORVASTATIN CALCIUM 40 MG: 40 TABLET, FILM COATED ORAL at 09:54

## 2021-01-01 RX ADMIN — ARFORMOTEROL TARTRATE 15 MCG: 15 SOLUTION RESPIRATORY (INHALATION) at 21:08

## 2021-01-01 RX ADMIN — IPRATROPIUM BROMIDE AND ALBUTEROL SULFATE 1 AMPULE: .5; 2.5 SOLUTION RESPIRATORY (INHALATION) at 13:20

## 2021-01-01 RX ADMIN — Medication 200 MCG/HR: at 04:46

## 2021-01-01 RX ADMIN — FUROSEMIDE 40 MG: 10 INJECTION INTRAMUSCULAR; INTRAVENOUS at 12:21

## 2021-01-01 RX ADMIN — Medication 10 ML: at 07:36

## 2021-01-01 RX ADMIN — Medication 10 ML: at 19:54

## 2021-01-01 RX ADMIN — ACETAMINOPHEN 650 MG: 325 TABLET ORAL at 22:00

## 2021-01-01 RX ADMIN — ARFORMOTEROL TARTRATE 15 MCG: 15 SOLUTION RESPIRATORY (INHALATION) at 08:56

## 2021-01-01 RX ADMIN — IPRATROPIUM BROMIDE AND ALBUTEROL SULFATE 3 AMPULE: .5; 2.5 SOLUTION RESPIRATORY (INHALATION) at 16:10

## 2021-01-01 RX ADMIN — Medication 50 MG: at 09:19

## 2021-01-01 RX ADMIN — Medication 200 MCG/HR: at 03:57

## 2021-01-01 RX ADMIN — CARVEDILOL 12.5 MG: 6.25 TABLET, FILM COATED ORAL at 17:14

## 2021-01-01 RX ADMIN — ARFORMOTEROL TARTRATE 15 MCG: 15 SOLUTION RESPIRATORY (INHALATION) at 09:51

## 2021-01-01 RX ADMIN — Medication 10 ML: at 09:14

## 2021-01-01 RX ADMIN — Medication 125 MCG/HR: at 15:05

## 2021-01-01 RX ADMIN — MIDAZOLAM 10 MG/HR: 5 INJECTION INTRAMUSCULAR; INTRAVENOUS at 21:42

## 2021-01-01 RX ADMIN — DIVALPROEX SODIUM 250 MG: 125 CAPSULE, COATED PELLETS ORAL at 21:44

## 2021-01-01 RX ADMIN — OXYCODONE 5 MG: 5 TABLET ORAL at 21:09

## 2021-01-01 RX ADMIN — Medication 10 ML: at 20:45

## 2021-01-01 RX ADMIN — Medication 1000 UNITS: at 08:18

## 2021-01-01 RX ADMIN — LEVOTHYROXINE SODIUM 50 MCG: 0.05 TABLET ORAL at 09:41

## 2021-01-01 RX ADMIN — BUSPIRONE HYDROCHLORIDE 15 MG: 10 TABLET ORAL at 08:19

## 2021-01-01 RX ADMIN — Medication 10 ML: at 09:21

## 2021-01-01 RX ADMIN — KETAMINE HYDROCHLORIDE 150 MG: 10 INJECTION INTRAMUSCULAR; INTRAVENOUS at 09:14

## 2021-01-01 RX ADMIN — Medication 200 MCG/HR: at 09:20

## 2021-01-01 RX ADMIN — Medication 10 ML: at 10:07

## 2021-01-01 RX ADMIN — HEPARIN SODIUM 13.61 UNITS/KG/HR: 10000 INJECTION, SOLUTION INTRAVENOUS at 12:50

## 2021-01-01 RX ADMIN — BUSPIRONE HYDROCHLORIDE 15 MG: 10 TABLET ORAL at 22:13

## 2021-01-01 RX ADMIN — ARFORMOTEROL TARTRATE 15 MCG: 15 SOLUTION RESPIRATORY (INHALATION) at 07:58

## 2021-01-01 RX ADMIN — HYDRALAZINE HYDROCHLORIDE 25 MG: 25 TABLET, FILM COATED ORAL at 15:07

## 2021-01-01 RX ADMIN — SODIUM CHLORIDE SOLN NEBU 3% 2 ML: 3 NEBU SOLN at 20:28

## 2021-01-01 RX ADMIN — LOSARTAN POTASSIUM 100 MG: 50 TABLET, FILM COATED ORAL at 21:09

## 2021-01-01 RX ADMIN — DOXYCYCLINE HYCLATE 100 MG: 100 CAPSULE ORAL at 21:15

## 2021-01-01 RX ADMIN — Medication 10 ML: at 08:17

## 2021-01-01 RX ADMIN — CARVEDILOL 12.5 MG: 6.25 TABLET, FILM COATED ORAL at 07:47

## 2021-01-01 RX ADMIN — METOCLOPRAMIDE HYDROCHLORIDE 10 MG: 5 INJECTION INTRAMUSCULAR; INTRAVENOUS at 05:50

## 2021-01-01 RX ADMIN — SENNOSIDES 8.6 MG: 8.6 TABLET, COATED ORAL at 09:12

## 2021-01-01 RX ADMIN — HYDROCODONE BITARTRATE AND ACETAMINOPHEN 1 TABLET: 5; 325 TABLET ORAL at 15:08

## 2021-01-01 RX ADMIN — ATORVASTATIN CALCIUM 40 MG: 40 TABLET, FILM COATED ORAL at 08:53

## 2021-01-01 RX ADMIN — BUDESONIDE 500 MCG: 0.5 SUSPENSION RESPIRATORY (INHALATION) at 07:57

## 2021-01-01 RX ADMIN — BUDESONIDE 500 MCG: 0.5 SUSPENSION RESPIRATORY (INHALATION) at 08:56

## 2021-01-01 RX ADMIN — ARFORMOTEROL TARTRATE 15 MCG: 15 SOLUTION RESPIRATORY (INHALATION) at 10:08

## 2021-01-01 RX ADMIN — CARVEDILOL 12.5 MG: 6.25 TABLET, FILM COATED ORAL at 21:42

## 2021-01-01 RX ADMIN — Medication 150 MCG/HR: at 10:23

## 2021-01-01 RX ADMIN — ALPRAZOLAM 0.5 MG: 0.25 TABLET ORAL at 23:02

## 2021-01-01 RX ADMIN — BUSPIRONE HYDROCHLORIDE 15 MG: 10 TABLET ORAL at 08:18

## 2021-01-01 RX ADMIN — OXYCODONE 5 MG: 5 TABLET ORAL at 09:41

## 2021-01-01 RX ADMIN — CARVEDILOL 25 MG: 25 TABLET, FILM COATED ORAL at 09:31

## 2021-01-01 RX ADMIN — IPRATROPIUM BROMIDE AND ALBUTEROL SULFATE 1 AMPULE: .5; 2.5 SOLUTION RESPIRATORY (INHALATION) at 12:46

## 2021-01-01 RX ADMIN — BENZONATATE 100 MG: 100 CAPSULE ORAL at 10:07

## 2021-01-01 RX ADMIN — APIXABAN 5 MG: 5 TABLET, FILM COATED ORAL at 08:53

## 2021-01-01 RX ADMIN — ALBUTEROL SULFATE 2.5 MG: 2.5 SOLUTION RESPIRATORY (INHALATION) at 10:30

## 2021-01-01 RX ADMIN — IPRATROPIUM BROMIDE AND ALBUTEROL SULFATE 3 ML: .5; 3 SOLUTION RESPIRATORY (INHALATION) at 13:27

## 2021-01-01 RX ADMIN — FOLIC ACID 1 MG: 5 INJECTION, SOLUTION INTRAMUSCULAR; INTRAVENOUS; SUBCUTANEOUS at 15:46

## 2021-01-01 RX ADMIN — WATER: 1 INJECTION INTRAMUSCULAR; INTRAVENOUS; SUBCUTANEOUS at 12:34

## 2021-01-01 RX ADMIN — WATER 10 ML: 1 INJECTION INTRAMUSCULAR; INTRAVENOUS; SUBCUTANEOUS at 05:30

## 2021-01-01 RX ADMIN — BUSPIRONE HYDROCHLORIDE 15 MG: 10 TABLET ORAL at 20:23

## 2021-01-01 RX ADMIN — POLYETHYLENE GLYCOL 3350 17 G: 17 POWDER, FOR SOLUTION ORAL at 08:18

## 2021-01-01 ASSESSMENT — PAIN DESCRIPTION - LOCATION
LOCATION: CHEST;HEAD
LOCATION: CHEST
LOCATION: HEAD
LOCATION: HEAD;GENERALIZED
LOCATION: NECK
LOCATION: HEAD
LOCATION: HEAD

## 2021-01-01 ASSESSMENT — PULMONARY FUNCTION TESTS
PIF_VALUE: 53
PIF_VALUE: 49
PIF_VALUE: 48
PIF_VALUE: 57
PIF_VALUE: 55
PIF_VALUE: 37
PIF_VALUE: 47
PIF_VALUE: 45
PIF_VALUE: 40
PIF_VALUE: 38
PIF_VALUE: 45
PIF_VALUE: 48
PIF_VALUE: 39
PIF_VALUE: 50
PIF_VALUE: 34
PIF_VALUE: 31
PIF_VALUE: 45
PIF_VALUE: 43
PIF_VALUE: 52
PIF_VALUE: 43
PIF_VALUE: 36
PIF_VALUE: 42
PIF_VALUE: 26
PIF_VALUE: 50
PIF_VALUE: 43
PIF_VALUE: 6
PIF_VALUE: 25
PIF_VALUE: 16
PIF_VALUE: 49
PIF_VALUE: 42
PIF_VALUE: 50
PIF_VALUE: 49
PIF_VALUE: 34
PIF_VALUE: 50
PIF_VALUE: 47
PIF_VALUE: 37
PIF_VALUE: 42
PIF_VALUE: 42
PIF_VALUE: 43
PIF_VALUE: 51
PIF_VALUE: 51
PIF_VALUE: 58
PIF_VALUE: 42
PIF_VALUE: 47
PIF_VALUE: 38
PIF_VALUE: 48
PIF_VALUE: 43
PIF_VALUE: 21
PIF_VALUE: 39
PIF_VALUE: 45
PIF_VALUE: 38
PIF_VALUE: 47
PIF_VALUE: 54
PIF_VALUE: 45
PIF_VALUE: 22
PIF_VALUE: 46
PIF_VALUE: 47
PIF_VALUE: 18
PIF_VALUE: 38
PIF_VALUE: 46
PIF_VALUE: 57
PIF_VALUE: 36
PIF_VALUE: 54
PIF_VALUE: 49
PIF_VALUE: 39
PIF_VALUE: 47
PIF_VALUE: 41
PIF_VALUE: 47
PIF_VALUE: 2
PIF_VALUE: 43
PIF_VALUE: 1
PIF_VALUE: 46
PIF_VALUE: 46
PIF_VALUE: 47
PIF_VALUE: 47
PIF_VALUE: 48
PIF_VALUE: 35
PIF_VALUE: 37
PIF_VALUE: 42
PIF_VALUE: 53
PIF_VALUE: 35
PIF_VALUE: 48
PIF_VALUE: 50
PIF_VALUE: 43
PIF_VALUE: 46
PIF_VALUE: 44
PIF_VALUE: 36
PIF_VALUE: 56
PIF_VALUE: 39
PIF_VALUE: 1
PIF_VALUE: 46
PIF_VALUE: 41
PIF_VALUE: 38
PIF_VALUE: 46
PIF_VALUE: 44
PIF_VALUE: 33
PIF_VALUE: 53
PIF_VALUE: 46
PIF_VALUE: 36
PIF_VALUE: 70
PIF_VALUE: 38
PIF_VALUE: 57
PIF_VALUE: 45
PIF_VALUE: 47
PIF_VALUE: 43
PIF_VALUE: 48
PIF_VALUE: 36
PIF_VALUE: 52
PIF_VALUE: 41
PIF_VALUE: 22
PIF_VALUE: 57
PIF_VALUE: 41
PIF_VALUE: 48
PIF_VALUE: 47
PIF_VALUE: 43
PIF_VALUE: 37
PIF_VALUE: 46
PIF_VALUE: 0
PIF_VALUE: 55
PIF_VALUE: 44
PIF_VALUE: 49
PIF_VALUE: 46
PIF_VALUE: 47
PIF_VALUE: 37
PIF_VALUE: 57
PIF_VALUE: 50
PIF_VALUE: 36
PIF_VALUE: 47
PIF_VALUE: 47
PIF_VALUE: 43
PIF_VALUE: 19
PIF_VALUE: 60
PIF_VALUE: 36
PIF_VALUE: 33
PIF_VALUE: 35
PIF_VALUE: 41
PIF_VALUE: 61
PIF_VALUE: 44
PIF_VALUE: 0
PIF_VALUE: 36
PIF_VALUE: 54
PIF_VALUE: 46
PIF_VALUE: 53
PIF_VALUE: 40
PIF_VALUE: 53
PIF_VALUE: 41
PIF_VALUE: 43
PIF_VALUE: 52
PIF_VALUE: 58
PIF_VALUE: 47
PIF_VALUE: 50
PIF_VALUE: 47
PIF_VALUE: 46
PIF_VALUE: 53
PIF_VALUE: 35
PIF_VALUE: 42

## 2021-01-01 ASSESSMENT — ENCOUNTER SYMPTOMS
GASTROINTESTINAL NEGATIVE: 1
SHORTNESS OF BREATH: 1
GASTROINTESTINAL NEGATIVE: 1
RHINORRHEA: 0
CHOKING: 1
EYES NEGATIVE: 1
APNEA: 1
VOMITING: 0
SORE THROAT: 0
VOICE CHANGE: 1
ABDOMINAL PAIN: 0
EYE DISCHARGE: 0
BACK PAIN: 0
PHOTOPHOBIA: 0
CHEST TIGHTNESS: 1
EYE PAIN: 0
COUGH: 1
VOICE CHANGE: 1
VOICE CHANGE: 1
EYE DISCHARGE: 0
NAUSEA: 0
COUGH: 1
NAUSEA: 0
NAUSEA: 0
CHOKING: 1
COUGH: 1
CHEST TIGHTNESS: 0
DIARRHEA: 0
EYE PAIN: 0
DIARRHEA: 0
WHEEZING: 1
DIARRHEA: 0
VOMITING: 0
DIARRHEA: 0
CHEST TIGHTNESS: 1
ABDOMINAL DISTENTION: 0
EYE DISCHARGE: 0
COUGH: 1
GASTROINTESTINAL NEGATIVE: 1
BACK PAIN: 0
CHEST TIGHTNESS: 0
ABDOMINAL PAIN: 0
SHORTNESS OF BREATH: 1
BACK PAIN: 0
EYES NEGATIVE: 1
SORE THROAT: 0
SHORTNESS OF BREATH: 1
ABDOMINAL PAIN: 0
VOMITING: 0
EYE DISCHARGE: 0
EYE PAIN: 0
NAUSEA: 0
SHORTNESS OF BREATH: 1
SHORTNESS OF BREATH: 1
NAUSEA: 0
CHEST TIGHTNESS: 0
DIARRHEA: 0
WHEEZING: 1
COLOR CHANGE: 0
COLOR CHANGE: 0
COUGH: 1
COUGH: 0
EYES NEGATIVE: 1
ABDOMINAL PAIN: 0
RHINORRHEA: 0
CHOKING: 1
EYE PAIN: 0
PHOTOPHOBIA: 0
WHEEZING: 1
VOMITING: 0
COUGH: 1
BACK PAIN: 0
SHORTNESS OF BREATH: 1
SHORTNESS OF BREATH: 1
WHEEZING: 1
VOMITING: 0
COLOR CHANGE: 0
WHEEZING: 1
COUGH: 1
EYES NEGATIVE: 1
ABDOMINAL PAIN: 0
DIARRHEA: 0
VOMITING: 0
GASTROINTESTINAL NEGATIVE: 1
VOICE CHANGE: 1
APNEA: 1
CHOKING: 1
APNEA: 1
WHEEZING: 1
COUGH: 1
ABDOMINAL PAIN: 0
ABDOMINAL PAIN: 0
VOMITING: 0
VOMITING: 0
SHORTNESS OF BREATH: 1
COLOR CHANGE: 0
VOMITING: 0
APNEA: 1
CHEST TIGHTNESS: 0
SHORTNESS OF BREATH: 1

## 2021-01-01 ASSESSMENT — PAIN DESCRIPTION - ORIENTATION
ORIENTATION: LEFT
ORIENTATION: MID
ORIENTATION: ANTERIOR

## 2021-01-01 ASSESSMENT — PAIN SCALES - GENERAL
PAINLEVEL_OUTOF10: 0
PAINLEVEL_OUTOF10: 5
PAINLEVEL_OUTOF10: 0
PAINLEVEL_OUTOF10: 0
PAINLEVEL_OUTOF10: 10
PAINLEVEL_OUTOF10: 0
PAINLEVEL_OUTOF10: 0
PAINLEVEL_OUTOF10: 10
PAINLEVEL_OUTOF10: 0
PAINLEVEL_OUTOF10: 0
PAINLEVEL_OUTOF10: 8
PAINLEVEL_OUTOF10: 0
PAINLEVEL_OUTOF10: 0
PAINLEVEL_OUTOF10: 10
PAINLEVEL_OUTOF10: 8
PAINLEVEL_OUTOF10: 0
PAINLEVEL_OUTOF10: 8
PAINLEVEL_OUTOF10: 0
PAINLEVEL_OUTOF10: 10
PAINLEVEL_OUTOF10: 0
PAINLEVEL_OUTOF10: 8
PAINLEVEL_OUTOF10: 10
PAINLEVEL_OUTOF10: 0
PAINLEVEL_OUTOF10: 10
PAINLEVEL_OUTOF10: 7
PAINLEVEL_OUTOF10: 0
PAINLEVEL_OUTOF10: 10
PAINLEVEL_OUTOF10: 2
PAINLEVEL_OUTOF10: 0
PAINLEVEL_OUTOF10: 0
PAINLEVEL_OUTOF10: 3
PAINLEVEL_OUTOF10: 0
PAINLEVEL_OUTOF10: 9
PAINLEVEL_OUTOF10: 6
PAINLEVEL_OUTOF10: 0
PAINLEVEL_OUTOF10: 9
PAINLEVEL_OUTOF10: 4
PAINLEVEL_OUTOF10: 0
PAINLEVEL_OUTOF10: 5

## 2021-01-01 ASSESSMENT — PAIN DESCRIPTION - PAIN TYPE
TYPE: ACUTE PAIN
TYPE: ACUTE PAIN

## 2021-01-01 ASSESSMENT — PAIN DESCRIPTION - FREQUENCY
FREQUENCY: CONTINUOUS

## 2021-01-01 ASSESSMENT — PAIN DESCRIPTION - DESCRIPTORS
DESCRIPTORS: HEADACHE
DESCRIPTORS: THROBBING

## 2021-01-01 ASSESSMENT — PAIN - FUNCTIONAL ASSESSMENT: PAIN_FUNCTIONAL_ASSESSMENT: ACTIVITIES ARE NOT PREVENTED

## 2021-01-05 NOTE — TELEPHONE ENCOUNTER
Anabell Merino from [facility] called, they had a transportation issue this AM and were not able to transport Berino for his trach change with Dr Rosales Goes.   Please call Anabell Merino regarding r/s [phone]

## 2021-01-06 NOTE — PATIENT INSTRUCTIONS
Thank you for choosing our [employer] or [facility] practice. We are committed to your medical treatment and  care. If you need to reschedule or cancel your surgery or follow up  appointment, please call the surgery scheduler at [phone]. INSTRUCTIONS FOR SURGERY Trach Change    Nothing to eat or drink after midnight the night before surgery unless surgery is at [facility] or otherwise instructed by the hospital.    DO NOT TAKE ANY ASPIRIN PRODUCTS 7 days prior to surgery-unless required by your cardiologist or primary care physician. Tylenol only. No Advil, Motrin, Aleve, or Ibuprofen    Any illegal drugs in your system (including Marijuana even if legally prescribed) will result in your surgery being cancelled. Please be sure to check with our office or the hospital on time frame for the drugs to be out of your system. Should your insurance change at any time you must contact our office. Failure to do so may result in your surgery being rescheduled. If you need paperwork filled out for work, you must give the office 2 weeks to complete and submit the forms.       [address], [facility] will call you a couple days prior to your surgery and give you further instructions, if any questions call them at [phone]

## 2021-01-06 NOTE — PROGRESS NOTES
OTOLARYNGOLOGY  PROGRESS NOTE     Subjective:      Patient was seen in the hospital and was trached on 11/12/20. He was a difficulty trach due to calcification and deep neck. He has been doing well overall in nursing home. Here today for trach change w/o any supplies. On 4-5L of O2     Past Medical History:   Diagnosis Date    Acquired hypothyroidism 9/26/2017    Anxiety     Congestive heart failure with LV diastolic dysfunction, NYHA class 3 ([address]     COPD (chronic obstructive pulmonary disease) ([address]     Depression     DM (diabetes mellitus) ([address]     Essential hypertension 6/1/2016    Gouty arthritis 2006    Hyperlipidemia     Hypertension     Lymphedema     Obesity     Obstructive sleep apnea 8/17/2009    Obstructive sleep apnea 9/26/2017    Osteoarthritis     Type 2 diabetes mellitus without complication, without long-term current use of insulin ([address] 1/15/2014     Past Surgical History:   Procedure Laterality Date    BRONCHOSCOPY  08/10/2017    ECHO COMPL W DOP COLOR FLOW  6/21/2013         EXTERNAL EAR SURGERY  6/2/2009    keloid left ear    FOOT SURGERY  1990    Left foot    GASTROSTOMY TUBE PLACEMENT  08/10/2017    IR TUNNELED CATHETER PLACEMENT GREATER THAN 5 YEARS  11/19/2020    IR TUNNELED CATHETER PLACEMENT GREATER THAN 5 YEARS 11/19/2020 Candelaria Melendez MD [facility]    TRACHEOSTOMY N/A 11/11/2020    TRACHEOTOMY performed by Abisai Valencia MD at [address] [place] [address] N/A 11/12/2020    OPEN TRACHEOTOMY, BRONCHOSCOPY, DIRECT LARYNGOSCOPY performed by Giovanna Grossman DO at [facility]  08/10/2017    UPPER GASTROINTESTINAL ENDOSCOPY N/A 11/11/2020    EGD ESOPHAGOGASTRODUODENOSCOPY PEG TUBE INSERTION performed by Abisai Valencia MD at [address] [place] [address]     Review of Systems   Constitutional: Negative. Negative for appetite change. HENT: Positive for congestion and voice change. Eyes: Negative. Negative for pain, discharge and visual disturbance. Respiratory: Positive for apnea, cough, choking, shortness of breath and wheezing. Negative for chest tightness. Cardiovascular: Negative. Negative for chest pain, palpitations and leg swelling. Gastrointestinal: Negative. Negative for abdominal pain, diarrhea and vomiting. Endocrine: Negative for cold intolerance, heat intolerance and polydipsia. Genitourinary: Negative. Negative for dysuria, flank pain and hematuria. Musculoskeletal: Negative. Negative for arthralgias, gait problem and neck pain. Skin: Negative. Negative for color change, pallor and rash. Allergic/Immunologic: Negative for environmental allergies, food allergies and immunocompromised state. Neurological: Negative. Negative for dizziness, numbness and headaches. Hematological: Negative for adenopathy. Psychiatric/Behavioral: Negative. Negative for behavioral problems and hallucinations. All other systems reviewed and are negative. Objective:      Vitals:    01/06/21 0748   Temp: 97.3 °F (36.3 °C)     Physical Exam  Vitals signs reviewed. Constitutional:       Appearance: He is well-developed. HENT:      Head: Normocephalic and atraumatic. Right Ear: Hearing, tympanic membrane, ear canal and external ear normal.      Left Ear: Hearing, tympanic membrane, ear canal and external ear normal.      Nose: Nose normal.      Mouth/Throat:      Pharynx: Uvula midline. Eyes:      Conjunctiva/sclera: Conjunctivae normal.      Pupils: Pupils are equal, round, and reactive to light. Neck:      Musculoskeletal: Normal range of motion and neck supple. Comments: Trach in place - 8-0 cuffed proximal xlt  Cardiovascular:      Rate and Rhythm: Normal rate and regular rhythm. Heart sounds: Normal heart sounds. Pulmonary:      Effort: Pulmonary effort is normal.      Breath sounds: Normal breath sounds.    Abdominal: General: Bowel sounds are normal.      Palpations: Abdomen is soft. Neurological:      Mental Status: He is alert and oriented to person, place, and time.            GENERAL:  Laying in bed, awake, does not participate in exam, no apparent distress  HENT: Normocephalic, atraumatic,   NEURO: CN II-XII intact  EYES: No sclera icterus, pupils equal  LUNGS:  No increased work of breathing  CARDIOVASCULAR: Normal rate         Assessment/Plan:      61 y.o. male with pneumonia and HANS s/p tracheostomy      - continue 8-0 cuffed shiley trach proximal XLT   - Trach will be changed in the OR  - Follow up 1 week post op     I will also send the patient to sleep medicine for evaluation. Pt wants trach out. I discussed wth him about the dangers of removing the trach again and that he may not make it off the table at the next trach due to multiple trach attempts and difficult anatomy           Jean Pierre Genre  1960    I have discussed the case, including pertinent history and exam findings with the resident. I have seen and examined the patient and the key elements of the encounter have been performed by me. I agree with the assessment, plan and orders as documented by the  resident              Remainder of medical problems as per  resident note. Patient seen and examined. Agree with above exam, assessment and plan.       Electronically signed by Sandra Greenberg DO on 1/6/21 at 8:30 AM EST

## 2021-01-12 NOTE — PROGRESS NOTES
Spoke with Valentin Zavala, nurse at [facility].  They will obtain covid test today and fax result

## 2021-01-15 NOTE — PROGRESS NOTES
I have called N[facility] STONE, and [facility] several times to send the [facility] and information needed to complete the assessment. Messages to fax all information left with Jostin Cummings, and staff voicemail on [facility].

## 2021-01-15 NOTE — PROGRESS NOTES
Message left with Naveed at Dr. Rico Dover office  that I did not receive the COVID test, medical clearance or labs. I informed her again, if not received, the procedure needs cancelled per anesthesia. I called [facility] and spoke with Jennifer Vu, who stated Dr. Jules Sepulveda was doing the clearance, and an H&H was ordered today. Aashish Luo RN also  does not have the covid results, which were sent yesterday. I instructed her to notify Dr Satya Puente  before sending the patient without the required labs and clearance, and covid test on Monday. She verbalized understanding, and stated has been difficult coordinating everything, and Wynona Hurst their ADON , walked out and quit today.

## 2021-01-15 NOTE — PROGRESS NOTES
Spoke with KARI Nobles at [facility]. She states that Kelli Parks is alert and oriented, signs his own consents, is able to use a call light,  ambulates on his own, is not in isolation, and has no wounds. He has a # 8 cuffed shiley, and is on 6 L N/C per trache mask. He is a full code, and they are arranging transportation. Lauren Grant RN will fax COVID results when available. She also will send CBC and BMP reports, and will check on last dose of Eliquis with Dr. Marlee Herman.

## 2021-01-15 NOTE — PROGRESS NOTES
Updated Dr. Soto Going from anesthesia regarding abnormal CBC sent from [address] [place] [address], and updated on history. He stated that he wanted a medical clearance, and to be cancelled for Monday. He stated they need to determine if he needs transfused prior to the procedure. I spoke with Isaias Teixeira from Dr. Glenn Bran office, and updated her regarding the clearance and transfusion. She stated that she thought he was getting medical clearance yesterday, as well as labs. I have not received anything yet.

## 2021-01-15 NOTE — PROGRESS NOTES
CBC and BMP from [facility] faxed to Dr. Bowman Service for review. I notified Naveed at Dr. Dennise Garrison office  I was faxing, and will review with anesthesia.

## 2021-01-15 NOTE — PROGRESS NOTES
[facility] PRE-ADMISSION TESTING INSTRUCTIONS    The Preadmission Testing patient is instructed accordingly using the following criteria (check applicable):      GENERAL INSTRUCTIONS:    [x] Nothing by mouth after midnight, including gum, candy, mints or water    [x] You may brush your teeth, but do not swallow any water    [x] Take medications as instructed with 1-2 oz of water    [x] Stop herbal supplements and vitamins 5 days prior to procedure    [x] Follow preop dosing of blood thinners per physician instructions    [x] Do not take insulin or oral diabetic medications    [x] If diabetic and have low blood sugar or feel symptomatic, take 1-2oz apple juice or glucose tablets    [x] Bring inhalers day of surgery    [] Bring C-PAP/ Bi-Pap day of surgery    [] Bring urine specimen day of surgery    [x] Antibacterial Soap shower or bath AM of Surgery, no lotion, powders or creams to surgical site    [] Follow bowel prep as instructed per surgeon    [] No tobacco products within 24 hours of surgery     [] No alcohol or illegal drug use within 24 hours of surgery. [x] Jewelry, body piercing's, eyeglasses, contact lenses and dentures are not permitted into surgery (bring cases)      [] Please do not wear any nail polish or make up on the day of surgery    [] If not already done, you can expect a call from registration    [x] If surgeon requests a time change you will be notified the day prior to surgery  [] A caregiver or family member must remain with the patient during their stay if they are mentally handicapped, have dementia, disoriented or unable to use a call light or would be a safety concern if left unattended    [x] Please notify surgeon if you develop any illness between now and time of surgery (cold, cough, sore throat, fever, nausea, vomiting) or any signs of infections  including skin, wounds, and dental.    [x] Other instructions CHECK TEMP PRIOR TO LEAVING DAY OF SURGERY.   iF IT  OR GREATER, CALL [phone], FOR INSTRUCTIONS. A MASK MUST ALSO BE WORN.

## 2021-01-17 NOTE — H&P
OTOLARYNGOLOGY  PROGRESS NOTE     Subjective:      Patient was seen in the hospital and was trached on 11/12/20. He was a difficulty trach due to calcification and deep neck. He has been doing well overall in nursing home. Here today for trach change w/o any supplies. On 4-5L of O2     Past Medical History:   Diagnosis Date    Acquired hypothyroidism 9/26/2017    Anxiety     Congestive heart failure with LV diastolic dysfunction, NYHA class 3 ([address]     COPD (chronic obstructive pulmonary disease) ([address]     Depression     DM (diabetes mellitus) ([address]     Essential hypertension 6/1/2016    Gouty arthritis 2006    Hyperlipidemia     Hypertension     Lymphedema     Obesity     Obstructive sleep apnea 8/17/2009    Obstructive sleep apnea 9/26/2017    Osteoarthritis     Type 2 diabetes mellitus without complication, without long-term current use of insulin ([address] 1/15/2014     Past Surgical History:   Procedure Laterality Date    BRONCHOSCOPY  08/10/2017    ECHO COMPL W DOP COLOR FLOW  6/21/2013         EXTERNAL EAR SURGERY  6/2/2009    keloid left ear    FOOT SURGERY  1990    Left foot    GASTROSTOMY TUBE PLACEMENT  08/10/2017    IR TUNNELED CATHETER PLACEMENT GREATER THAN 5 YEARS  11/19/2020    IR TUNNELED CATHETER PLACEMENT GREATER THAN 5 YEARS 11/19/2020 Erika Cordoba MD [facility]    TRACHEOSTOMY N/A 11/11/2020    TRACHEOTOMY performed by Laurie Najera MD at [address] [place] [address] N/A 11/12/2020    OPEN TRACHEOTOMY, BRONCHOSCOPY, DIRECT LARYNGOSCOPY performed by Alecia Bojorquez DO at [facility]  08/10/2017    UPPER GASTROINTESTINAL ENDOSCOPY N/A 11/11/2020    EGD ESOPHAGOGASTRODUODENOSCOPY PEG TUBE INSERTION performed by Laurie Najera MD at [address] [place] [address]     Review of Systems   Constitutional: Negative. Negative for appetite change. HENT: Positive for congestion and voice change. Eyes: Negative.   Negative for pain, discharge and visual Neurological:      Mental Status: He is alert and oriented to person, place, and time.            GENERAL:  Laying in bed, awake, does not participate in exam, no apparent distress  HENT: Normocephalic, atraumatic,   NEURO: CN II-XII intact  EYES: No sclera icterus, pupils equal  LUNGS:  No increased work of breathing  CARDIOVASCULAR: Normal rate         Assessment/Plan:      61 y.o. male with pneumonia and HANS s/p tracheostomy      - continue 8-0 cuffed shiley trach proximal XLT   - Trach will be changed in the OR  - Follow up 1 week post op     I will also send the patient to sleep medicine for evaluation. Pt wants trach out. I discussed wth him about the dangers of removing the trach again and that he may not make it off the table at the next trach due to multiple trach attempts and difficult anatomy           [facility]  1960    I have discussed the case, including pertinent history and exam findings with the resident. I have seen and examined the patient and the key elements of the encounter have been performed by me. I agree with the assessment, plan and orders as documented by the  resident              Remainder of medical problems as per  resident note. Patient seen and examined. Agree with above exam, assessment and plan.       Electronically signed by Kelton Fuchs DO on 1/6/21 at 8:30 AM EST

## 2021-01-18 NOTE — H&P
Mandy Xie was seen and re-examined preoperatively today, January 18, 2021. There was no substantial change in his physical and medical status. Patient is fit for the proposed surgical procedure. All questions were appropriately addressed and had no further questions regarding the risks, benefits, and alternatives of the procedure. Mandy Xie and family wished to proceed.     Lois Treadwell DO  Resident Physician  [facility])  Otolaryngology Residency  1/18/2021  7:40 AM

## 2021-01-18 NOTE — PROGRESS NOTES
Report given to RN at [facility]. Discharge instructions provided, emergency inner cannula, obturator and trach ties sent with patient. Patient discharged to [facility] via [facility] in a WC and on trach mask with O2, prescriptions sent with patient,he and staff at the facility verbalized understanding of discharge instructions and have no further questions at this time.

## 2021-01-18 NOTE — ANESTHESIA PRE PROCEDURE
Department of Anesthesiology  Preprocedure Note       Name:  Tona Fritz   Age:  61 y.o.  :  1960                                          MRN:  [id number]         Date:  2021      Surgeon: Sumit Donovan    Procedure: TRACHEOSTOMY TUBE CHANGE    Medications prior to admission:   Prior to Admission medications    Medication Sig Start Date End Date Taking? Authorizing Provider   oxyCODONE-acetaminophen (PERCOCET) 5-325 MG per tablet Take 1 tablet by mouth every 6 hours as needed for Pain.    Yes Historical Provider, MD   insulin glargine (LANTUS) 100 UNIT/ML injection vial Inject 20 Units into the skin nightly   Yes Historical Provider, MD   insulin lispro (HUMALOG) 100 UNIT/ML injection vial Inject into the skin 3 times daily (before meals)   Yes Historical Provider, MD   Arformoterol Tartrate (BROVANA) 15 MCG/2ML NEBU Take 2 mLs by nebulization 2 times daily 20  Yes Enoch Wu MD   budesonide (PULMICORT) 0.5 MG/2ML nebulizer suspension Take 2 mLs by nebulization 2 times daily 20  Yes Enoch Wu MD   ipratropium-albuterol (DUONEB) 0.5-2.5 (3) MG/3ML SOLN nebulizer solution Inhale 3 mLs into the lungs every 4 hours (while awake) 20  Yes Shilo Shepard MD   divalproex (DEPAKOTE SPRINKLE) 125 MG capsule Take 2 capsules by mouth every 8 hours 20  Yes Shilo Shepard MD   carvedilol (COREG) 6.25 MG tablet Take 1 tablet by mouth 2 times daily (with meals) 20  Yes Shilo Shepard MD   levothyroxine (SYNTHROID) 50 MCG tablet Take 1 tablet by mouth daily 20  Yes Shilo Shepard MD   lansoprazole 3 MG/ML SUSP 10 mLs by Per G Tube route every morning (before breakfast) 20  Yes Shilo Shepard MD   vitamin B-1 100 MG tablet 1 tablet by PEG Tube route daily 20  Yes Shilo Shepard MD   amLODIPine (NORVASC) 10 MG tablet Take 1 tablet by mouth daily 20  Yes Shilo Shepard MD   colchicine (COLCRYS) 0.6 MG tablet Take 1 tablet by mouth 2 times daily as needed for Pain 19 Yes Kelly Walton MD   glucose (GLUTOSE) 40 % GEL Take 37.5 mLs by mouth as needed (low bs) 12/1/20   Kelly Walton MD   sodium chloride, Inhalant, 3 % nebulizer solution Take 2 mLs by nebulization every 4 hours 12/1/20   Kelly Walton MD       Current medications:    Current Facility-Administered Medications   Medication Dose Route Frequency Provider Last Rate Last Admin    sodium chloride flush 0.9 % injection 10 mL  10 mL Intravenous 2 times per day Estefania Sloan DO        sodium chloride flush 0.9 % injection 10 mL  10 mL Intravenous PRN Shaheed Gates DO           Allergies:     Allergies   Allergen Reactions    Bee Venom Anaphylaxis    Shellfish-Derived Products Anaphylaxis     Only crab legs       Problem List:    Patient Active Problem List   Diagnosis Code    Hyperlipidemia E78.5    Type 2 diabetes mellitus without complication, without long-term current use of insulin ([facility]) E11.9    Atypical chest pain R07.89    Essential hypertension I10    Chronic acquired lymphedema I89.0    Aortic valve disease I35.9    Morbid obesity due to excess calories ([facility]) E66.01    Abdominal pain R10.9    Acute respiratory failure with hypoxia ([facility]) J96.01    Acute pulmonary edema ([facility]) J81.0    Congestive heart failure with LV diastolic dysfunction, NYHA class 3 ([facility]) I50.30    Obstructive sleep apnea G47.33    Acquired hypothyroidism E03.9    Chronic diastolic heart failure ([facility]) I50.32    Nonrheumatic aortic valve stenosis I35.0    ACE-inhibitor cough R05, T46.4X5A    Pneumonia due to organism J18.9    Acute gout of right knee M10.9    Bilateral pulmonary embolism ([facility]) I26.99    Anxiety F41.9       Past Medical History:        Diagnosis Date    Acquired hypothyroidism 9/26/2017    Anxiety     Congestive heart failure with LV diastolic dysfunction, NYHA class 3 ([facility])     COPD (chronic obstructive pulmonary disease) ([facility])     Depression     DM [facility]di[facility] mellitus) [facility]     Essential hypertension 6/1/2016    Gouty arthritis 2006    Hyperlipidemia     Hypertension     Lymphedema     Obesity     Obstructive sleep apnea 8/17/2009    Obstructive sleep apnea 9/26/2017    Osteoarthritis     Type 2 diabetes mellitus without complication, without long-term current use of insulin [facility] 1/15/2014       Past Surgical History:        Procedure Laterality Date    BRONCHOSCOPY  08/10/2017    ECHO COMPL W DOP COLOR FLOW  6/21/2013         EXTERNAL EAR SURGERY  6/2/2009    keloid left ear    FOOT SURGERY  1990    Left foot    GASTROSTOMY TUBE PLACEMENT  08/10/2017    IR TUNNELED CATHETER PLACEMENT GREATER THAN 5 YEARS  11/19/2020    IR TUNNELED CATHETER PLACEMENT GREATER THAN 5 YEARS 11/19/2020 Sushant Wilkinson MD [facility]    TRACHEOSTOMY N/A 11/11/2020    TRACHEOTOMY performed by David Casillas MD at [address] [place] [address] N/A 11/12/2020    OPEN TRACHEOTOMY, BRONCHOSCOPY, DIRECT LARYNGOSCOPY performed by Yazmin Mariscal DO at [facility]  08/10/2017    UPPER GASTROINTESTINAL ENDOSCOPY N/A 11/11/2020    EGD ESOPHAGOGASTRODUODENOSCOPY PEG TUBE INSERTION performed by David Casillas MD at [address] [place] [address] History:    Social History     Tobacco Use    Smoking status: Former Smoker     Packs/day: 0.10     Years: 10.00     Pack years: 1.00     Types: Cigarettes     Quit date: 2/19/2005     Years since quitting: 15.9    Smokeless tobacco: Former User     Types: Chew     Quit date: 1/1/1990   Substance Use Topics    Alcohol use: Not Currently     Frequency: 2-3 times a week     Drinks per session: 1 or 2     Binge frequency: Never                                Counseling given: Not Answered      Vital Signs (Current):   Vitals:    01/15/21 0952 01/18/21 0800   BP:  (!) 160/74   Pulse:  77   Resp:  18   Temp:  97.8 °F (36.6 °C)   TempSrc:  Temporal   SpO2:  100%   Weight: 281 lb (127.5 kg)    Height: 5' 9\" (1.753 m) BP Readings from Last 3 Encounters:   01/18/21 (!) 160/74   12/24/20 (!) 144/76   12/17/20 (!) 152/71       NPO Status:                          greater than 8 hours                                                      BMI:   Wt Readings from Last 3 Encounters:   01/15/21 281 lb (127.5 kg)   01/06/21 278 lb (126.1 kg)   12/09/20 (!) 324 lb (147 kg)     Body mass index is 41.5 kg/m². CBC:   Lab Results   Component Value Date    WBC 7.3 12/24/2020    RBC 3.53 12/24/2020    HGB 9.3 12/24/2020    HCT 28.0 12/24/2020    MCV 79.3 12/24/2020    RDW 18.7 12/24/2020     12/24/2020       CMP:   Lab Results   Component Value Date     12/24/2020    K 4.7 12/24/2020    K 4.0 10/27/2020    CL 98 12/24/2020    CO2 23 12/24/2020    BUN 10 12/24/2020    CREATININE 1.2 12/24/2020    GFRAA >60 12/24/2020    LABGLOM >60 12/24/2020    GLUCOSE 136 12/24/2020    PROT 7.8 12/24/2020    CALCIUM 10.0 12/24/2020    BILITOT 0.3 12/24/2020    ALKPHOS 107 12/24/2020    AST 39 12/24/2020    ALT 65 12/24/2020       POC Tests: No results for input(s): POCGLU, POCNA, POCK, POCCL, POCBUN, POCHEMO, POCHCT in the last 72 hours. Coags:   Lab Results   Component Value Date    PROTIME 14.0 11/18/2020    INR 1.2 11/18/2020    APTT 67.9 12/11/2020       HCG (If Applicable): No results found for: PREGTESTUR, PREGSERUM, HCG, HCGQUANT     ABGs:   Lab Results   Component Value Date    VQG0UXN 26.1 11/09/2020        Type & Screen (If Applicable):  No results found for: LABABO, LABRH    Drug/Infectious Status (If Applicable):  No results found for: HIV, HEPCAB    COVID-19 Screening (If Applicable):   Lab Results   Component Value Date    COVID19 Not Detected 12/14/2020    COVID19 Not Detected 12/09/2020     Transthoracic Echo 10/28/2020   Findings      Left Ventricle   Left ventricular internal dimensions were normal in diastole and systole. Moderate left ventricular concentric hypertrophy noted.    Micro-bubble contrast injected to enhance left ventricular visualization. No regional wall motion abnormalities seen. Ejection fraction is visually estimated at 65%. Indeterminate diastolic function. Right Ventricle   The right ventricle was not clearly visualized. Left Atrium   The left atrium is borderline dilated. Interatrial septum appears intact. Right Atrium   Right atrium is not clearly visualized. Mitral Valve   Structurally normal mitral valve. Tricuspid Valve   The tricuspid valve appears structurally normal.   Mild tricuspid regurgitation. Aortic Valve   The aortic valve leaflets were not well visualized. The aortic valve appears moderately sclerotic. Pulmonic Valve   The pulmonic valve was not well visualized. Pericardial Effusion   No evidence of pericardial effusion. Aorta   Aortic root dimension within normal limits. Conclusions      Summary   Technically suboptimal and limited study. Left ventricular internal dimensions were normal in diastole and systole. Moderate left ventricular concentric hypertrophy noted. No regional wall motion abnormalities seen. The left atrium is borderline dilated. The aortic valve leaflets were not well visualized. The aortic valve appears moderately sclerotic. Mild tricuspid regurgitation.            Anesthesia Evaluation  Patient summary reviewed and Nursing notes reviewed no history of anesthetic complications:   Airway: Mallampati: Unable to assess / NA       Comment: Trach    Dental:      Comment: Trach in situ     Pulmonary:   (+) pneumonia:  COPD:  sleep apnea:  rhonchi,                            ROS comment: Tracheostomy in situ (placed by ENT 11/12/2020)  Mechanical ventilation   Hx of respiratory failure- previous vent dependent respiratory failure + tracheostomy following lap teena 2017    Cardiovascular:  Exercise tolerance: poor (<4 METS),   (+) hypertension:, CHF: diastolic, hyperlipidemia      NYHA Classification: III  ECG reviewed  Rhythm: regular    Echocardiogram reviewed                  Neuro/Psych:   (+) depression/anxiety    (-) psychiatric history           GI/Hepatic/Renal:   (+) morbid obesity          Endo/Other:    (+) DiabetesType II DM, , hypothyroidism (Acquired ), blood dyscrasia: anemia, arthritis (Gout ): OA., .                  ROS comment: EtOH abuse- hx of withdrawal  Abdominal:           Vascular: negative vascular ROS. Anesthesia Plan      MAC     ASA 4       Induction: intravenous. MIPS: Postoperative opioids intended and Prophylactic antiemetics administered. Anesthetic plan and risks discussed with patient. Plan discussed with CRNA.             [address],    1/18/2021

## 2021-01-18 NOTE — OP NOTE
ADMITTED PATIENT IN MED SURG FLOOR UNDER THE CARE OF DR. STUBBS. BELONGING LIST DONE. 
PATIENT ALERT ORIENTED, CONTINENT, SKIN INTACT, WIFE AT BEDSIDE, CONT ON PAIN MANAGEMENT. Operative Note      Patient: Kaitlin Huber  YOB: 1960  MRN: [id number]    Date of Procedure: 1/18/2021    Pre-Op Diagnosis: TRACH DEPENDENT    Post-Op Diagnosis: Same       Procedure(s):  TRACHEOTOMY TUBE CHANGE    Surgeon(s):  Niraj Ramos DO    Assistant:   Resident: Precious Milian DO; Mortimer Shoe, DO    Anesthesia: General    Estimated Blood Loss (mL): Minimal    Complications: None    Specimens:   * No specimens in log *    Implants:  * No implants in log *      Drains:   Gastrostomy/Enterostomy/Jejunostomy Percutaneous Endoscopic Gastrostomy (PEG) LUQ 1 20 fr (Active)       Findings: narrowed and deep tracheal stoma    Detailed Description of Procedure:     Procedure: The patient was brought to the operating room and placed supine on operative table. SCDs were placed. Anesthesia was induced without any complication. The 6-0 XLT trach cuff was deflated and the trach removed from the stoma. The stoma was healthy with no bleeding. The new trach Shiley XTL #6 was inserted into the stoma. The stoma was scoped and appeared healthy. Teola Fey ties were replaced. Pt tolerated procedure well.         Electronically signed by Precious Milian DO on 1/18/2021 at 9:28 AM

## 2021-01-18 NOTE — ANESTHESIA POSTPROCEDURE EVALUATION
Department of Anesthesiology  Postprocedure Note    Patient: Diana Miller  MRN: [id number]  YOB: 1960  Date of evaluation: 1/18/2021  Time:  11:01 AM     Procedure Summary     Date: 01/18/21 Room / Location: [facility] 01 / [facility]    Anesthesia Start: 8206 Anesthesia Stop: 9319    Procedure: TRACHEOTOMY TUBE CHANGE (N/A Throat) Diagnosis: [facility] DEPENDENT)    Surgeons: Luke Ramirez DO Responsible Provider: Edin Mendez DO    Anesthesia Type: MAC ASA Status: 4          Anesthesia Type: MAC    Erik Phase I: Erik Score: 10    Erik Phase II: Erik Score: 9    Last vitals: Reviewed and per EMR flowsheets.        Anesthesia Post Evaluation    Patient location during evaluation: PACU  Patient participation: complete - patient participated  Level of consciousness: awake and alert  Airway patency: patent  Nausea & Vomiting: no nausea and no vomiting  Complications: no  Cardiovascular status: hemodynamically stable  Respiratory status: acceptable  Hydration status: euvolemic

## 2021-02-04 NOTE — TELEPHONE ENCOUNTER
pt calling in needs to get an appt within a week to have his trach changed out, states that Dr Abram Rolon wants it changed once a month. I am unable to find an appt for pt. please contact him at the [phone] to advise, thank you.

## 2021-02-09 NOTE — TELEPHONE ENCOUNTER
Apt made 2/19- patient stated he believes he has a trach and will bring with him to next apt. If some reason he doesn't have trach he will call to reschedule.

## 2021-02-10 NOTE — TELEPHONE ENCOUNTER
Pts  Therese Primrose called in to see what exactly pt needs to bring to his trach change appt on 2/19/21 with Dr. Rina Doran. MA stated pt needs to bring a new Shiley XTL #6. She stated she sees pt tomorrow and will confirm that the new trach he has is the correct trach. She stated she will call our office if there is a problem.     Electronically signed by Kalpana Cruz CMA on 2/10/21 at 12:28 PM EST

## 2021-02-19 NOTE — PROGRESS NOTES
trahOTOLARYNGOLOGY  PROGRESS NOTE     Subjective:      Patient was seen in the hospital and was trached on 11/12/20. He was a difficulty trach due to calcification and deep neck. He is having some mild bloody mucous. Overall doing well with the trach in place.      Past Medical History:   Diagnosis Date    Acquired hypothyroidism 9/26/2017    Anxiety     Congestive heart failure with LV diastolic dysfunction, NYHA class 3 ([address]     COPD (chronic obstructive pulmonary disease) ([address]     Depression     DM (diabetes mellitus) ([address]     Essential hypertension 6/1/2016    Gouty arthritis 2006    Hyperlipidemia     Hypertension     Lymphedema     Obesity     Obstructive sleep apnea 8/17/2009    Obstructive sleep apnea 9/26/2017    Osteoarthritis     Type 2 diabetes mellitus without complication, without long-term current use of insulin ([address] 1/15/2014     Past Surgical History:   Procedure Laterality Date    BRONCHOSCOPY  08/10/2017    ECHO COMPL W DOP COLOR FLOW  6/21/2013         EXTERNAL EAR SURGERY  6/2/2009    keloid left ear    FOOT SURGERY  1990    Left foot    GASTROSTOMY TUBE PLACEMENT  08/10/2017    IR TUNNELED CATHETER PLACEMENT GREATER THAN 5 YEARS  11/19/2020    IR TUNNELED CATHETER PLACEMENT GREATER THAN 5 YEARS 11/19/2020 Spring Eckert MD [facility]    TRACHEOSTOMY N/A 11/11/2020    TRACHEOTOMY performed by Gladys Bustamante MD at [address] N/A 11/12/2020    OPEN TRACHEOTOMY, BRONCHOSCOPY, DIRECT LARYNGOSCOPY performed by Niraj Ramos DO at [address] [facility] N/A 1/18/2021    TRACHEOTOMY TUBE CHANGE performed by Niraj Ramos DO at [address]  08/10/2017    UPPER GASTROINTESTINAL ENDOSCOPY N/A 11/11/2020    EGD ESOPHAGOGASTRODUODENOSCOPY PEG TUBE INSERTION performed by Gladys Bustamante MD at [address]     Review of Systems   Constitutional: Negative. Negative for appetite change.    HENT: Positive for congestion and voice change. Eyes: Negative. Negative for pain, discharge and visual disturbance. Respiratory: Positive for apnea, cough, choking, shortness of breath and wheezing. Negative for chest tightness. Cardiovascular: Negative. Negative for chest pain, palpitations and leg swelling. Gastrointestinal: Negative. Negative for abdominal pain, diarrhea and vomiting. Endocrine: Negative for cold intolerance, heat intolerance and polydipsia. Genitourinary: Negative. Negative for dysuria, flank pain and hematuria. Musculoskeletal: Negative. Negative for arthralgias, gait problem and neck pain. Skin: Negative. Negative for color change, pallor and rash. Allergic/Immunologic: Negative for environmental allergies, food allergies and immunocompromised state. Neurological: Negative. Negative for dizziness, numbness and headaches. Hematological: Negative for adenopathy. Psychiatric/Behavioral: Negative. Negative for behavioral problems and hallucinations. All other systems reviewed and are negative. Objective:      Vitals:    02/19/21 1204   Temp: 96.4 °F (35.8 °C)     Physical Exam  Vitals signs reviewed. Constitutional:       Appearance: He is well-developed. HENT:      Head: Normocephalic and atraumatic. Right Ear: Hearing, tympanic membrane, ear canal and external ear normal.      Left Ear: Hearing, tympanic membrane, ear canal and external ear normal.      Nose: Nose normal.      Mouth/Throat:      Pharynx: Uvula midline. Eyes:      Conjunctiva/sclera: Conjunctivae normal.      Pupils: Pupils are equal, round, and reactive to light. Neck:      Musculoskeletal: Normal range of motion and neck supple. Comments: Trach in place - 6-0 uncuffed proximal xlt  Cardiovascular:      Rate and Rhythm: Normal rate and regular rhythm. Heart sounds: Normal heart sounds.    Pulmonary:      Effort: Pulmonary effort is normal.      Breath sounds: Normal breath sounds. Abdominal:      General: Bowel sounds are normal.      Palpations: Abdomen is soft. Neurological:      Mental Status: He is alert and oriented to person, place, and time.            Assessment/Plan:      61 y.o. male with pneumonia and HANS s/p tracheostomy      - continue 6-0 uncuffed shiley trach proximal XLT   - we need proper tracheostomy equipment, DME orders placed  - Follow up 1 week for trach change     I will also send the patient to sleep medicine for evaluation. Pt wants trach out. I discussed wth him about the dangers of removing the trach again and that he may not make it off the table at the next trach due to multiple trach attempts and difficult anatomy    Patient seen, examined, and plan discussed with  [facility]    Electronically signed by Briseyda Corado DO on 2/19/2021 at 12:21 PM            Júnior Barrios  1960    I have discussed the case, including pertinent history and exam findings with the resident. I have seen and examined the patient and the key elements of the encounter have been performed by me. I agree with the assessment, plan and orders as documented by the  resident              Remainder of medical problems as per  resident note. Patient seen and examined. Agree with above exam, assessment and plan.       Electronically signed by Dayanna Najera DO on 2/24/21 at 2:13 PM EST

## 2021-02-23 NOTE — ED NOTES
Bed: 10  Expected date: 2/23/21  Expected time:   Means of arrival: [facility]  Comments:  Mal Ortega [facility]  02/23/21 5865

## 2021-02-23 NOTE — ED PROVIDER NOTES
Gastrointestinal: Negative for abdominal distention, abdominal pain, diarrhea, nausea and vomiting. Genitourinary: Negative for dysuria. Musculoskeletal: Negative for back pain, neck pain and neck stiffness. Skin: Negative for pallor and rash. Allergic/Immunologic: Negative for immunocompromised state. Neurological: Negative for headaches. Psychiatric/Behavioral: Negative for confusion. Physical Exam  Vitals signs and nursing note reviewed. Constitutional:       General: He is not in acute distress. Appearance: Normal appearance. He is not ill-appearing. HENT:      Head: Normocephalic and atraumatic. Mouth/Throat:      Comments: Tracheostomy in place  Eyes:      General: No scleral icterus. Conjunctiva/sclera: Conjunctivae normal.      Pupils: Pupils are equal, round, and reactive to light. Neck:      Musculoskeletal: Normal range of motion and neck supple. No neck rigidity or muscular tenderness. Cardiovascular:      Rate and Rhythm: Normal rate and regular rhythm. Pulmonary:      Effort: Pulmonary effort is normal.      Breath sounds: Normal breath sounds. Abdominal:      General: Bowel sounds are normal. There is no distension. Palpations: Abdomen is soft. Tenderness: There is no abdominal tenderness. There is no guarding or rebound. Comments: PEG tube   Musculoskeletal:      Right lower leg: No edema. Left lower leg: No edema. Skin:     General: Skin is warm and dry. Capillary Refill: Capillary refill takes less than 2 seconds. Coloration: Skin is not pale. Findings: No erythema or rash. Neurological:      Mental Status: He is alert and oriented to person, place, and time.    Psychiatric:         Mood and Affect: Mood normal.          Procedures     MDM  Number of Diagnoses or Management Options  Shortness of breath  Diagnosis management comments: Mandy Xie is a 59-year-old male present emergency department concern for shortness of breath. Patient's chest x-ray was unremarkable. Patient not have an elevation in his BNP and did not appear to be in CHF. Patient's lungs were cleared to auscultation bilaterally. There was concern with patient's increased oxygen demand and tachycardia and recent PEs that patient did have a pulmonary embolism. Patient was given medication and attempted a CTA x4 while in emergency department. I did go with patient during the last CTA. Patient did become hypoxic when he moved his hands over his head. Patient was very short of breath and could not tolerate laying flat. Patient refused to try an additional time. Patient was given anticoagulation dose of Lovenox emergency department and will be admitted for observation and possible sedation and CT scan tomorrow as patient does not want to attempt another CT scan today. We are concerned that it is possible that patient failed Eliquis treatment and has additional PE. Discussed results and plan with patient he would like to ED admission. Patient is nontoxic with normal Sp02 on 6 L trach mask                  --------------------------------------------- PAST HISTORY ---------------------------------------------  Past Medical History:  has a past medical history of Acquired hypothyroidism, Anxiety, Congestive heart failure with LV diastolic dysfunction, NYHA class 3 (HCC), COPD (chronic obstructive pulmonary disease) ([facility] [address] Depression, DM (diabetes mellitus) ([facility] [address] Essential hypertension, Gouty arthritis, Hyperlipidemia, Hypertension, Lymphedema, Obesity, Obstructive sleep apnea, Obstructive sleep apnea, Osteoarthritis, and Type 2 diabetes mellitus without complication, without long-term current use of insulin ([facility] [address] Past Surgical History:  has a past surgical history that includes Foot surgery (1990); External ear surgery (6/2/2009); ECHO Compl W Dop Color Flow (6/21/2013); Gastrostomy tube placement (08/10/2017);  Tracheostomy tube placement (08/10/2017); bronchoscopy (08/10/2017); tracheostomy (N/A, 11/11/2020); Upper gastrointestinal endoscopy (N/A, 11/11/2020); tracheostomy (N/A, 11/12/2020); IR TUNNELED CVC PLACE WO SQ PORT/PUMP > 5 YEARS (11/19/2020); and tracheostomy tube change (N/A, 1/18/2021). Social History:  reports that he quit smoking about 16 years ago. His smoking use included cigarettes. He has a 1.00 pack-year smoking history. He quit smokeless tobacco use about 31 years ago. His smokeless tobacco use included chew. He reports previous alcohol use. He reports previous drug use. Family History: family history includes Alzheimer's Disease in his mother; Heart Attack in his father; Heart Disease in his father. The patients home medications have been reviewed.     Allergies: Bee venom and Shellfish-derived products    -------------------------------------------------- RESULTS -------------------------------------------------    LABS:  Results for orders placed or performed during the hospital encounter of 02/23/21   COVID-19, Rapid    Specimen: Nasopharyngeal Swab   Result Value Ref Range    SARS-CoV-2, NAAT Not Detected Not Detected   CBC auto differential   Result Value Ref Range    WBC 7.4 4.5 - 11.5 E9/L    RBC 4.50 3.80 - 5.80 E12/L    Hemoglobin 11.4 (L) 12.5 - 16.5 g/dL    Hematocrit 35.1 (L) 37.0 - 54.0 %    MCV 78.0 (L) 80.0 - 99.9 fL    MCH 25.3 (L) 26.0 - 35.0 pg    MCHC 32.5 32.0 - 34.5 %    RDW 18.1 (H) 11.5 - 15.0 fL    Platelets 251 486 - 476 E9/L    MPV 9.7 7.0 - 12.0 fL    Neutrophils % 44.8 43.0 - 80.0 %    Immature Granulocytes % 0.5 0.0 - 5.0 %    Lymphocytes % 45.1 (H) 20.0 - 42.0 %    Monocytes % 7.3 2.0 - 12.0 %    Eosinophils % 2.0 0.0 - 6.0 %    Basophils % 0.3 0.0 - 2.0 %    Neutrophils Absolute 3.33 1.80 - 7.30 E9/L    Immature Granulocytes # 0.04 E9/L    Lymphocytes Absolute 3.35 1.50 - 4.00 E9/L    Monocytes Absolute 0.54 0.10 - 0.95 E9/L    Eosinophils Absolute 0.15 0.05 - 0.50 E9/L    Basophils Absolute 0.02 0.00 - 0.20 E9/L   Comprehensive Metabolic Panel w/ Reflex to MG   Result Value Ref Range    Sodium 143 132 - 146 mmol/L    Potassium reflex Magnesium 4.0 3.5 - 5.0 mmol/L    Chloride 104 98 - 107 mmol/L    CO2 27 22 - 29 mmol/L    Anion Gap 12 7 - 16 mmol/L    Glucose 119 (H) 74 - 99 mg/dL    BUN 11 8 - 23 mg/dL    CREATININE 1.1 0.7 - 1.2 mg/dL    GFR Non-African American >60 >=60 mL/min/1.73    GFR African American >60     Calcium 9.6 8.6 - 10.2 mg/dL    Total Protein 8.5 (H) 6.4 - 8.3 g/dL    Albumin 4.5 3.5 - 5.2 g/dL    Total Bilirubin <0.2 0.0 - 1.2 mg/dL    Alkaline Phosphatase 77 40 - 129 U/L    ALT 8 0 - 40 U/L    AST 12 0 - 39 U/L   Troponin   Result Value Ref Range    Troponin <0.01 0.00 - 0.03 ng/mL   Brain Natriuretic Peptide   Result Value Ref Range    Pro-BNP 53 0 - 125 pg/mL       RADIOLOGY:  XR CHEST (2 VW)   Final Result   No definite radiographic evidence of acute cardiopulmonary disease      CTA CHEST W CONTRAST    (Results Pending)       EKG: This EKG is signed and interpreted by me. Rate: 88  Rhythm: Sinus  Interpretation: non-specific EKG  Comparison: no previous EKG available      ------------------------- NURSING NOTES AND VITALS REVIEWED ---------------------------  Date / Time Roomed:  2/23/2021  4:14 PM  ED Bed Assignment:  13/13    The nursing notes within the ED encounter and vital signs as below have been reviewed.      Patient Vitals for the past 24 hrs:   BP Temp Pulse Resp SpO2 Height Weight   02/24/21 0131 -- -- -- -- -- 5' 9\" (1.753 m) 280 lb (127 kg)   02/23/21 2300 132/86 -- 76 19 100 % -- --   02/23/21 2232 (!) 144/68 -- 79 18 99 % -- --   02/23/21 2209 (!) 161/77 -- -- -- 98 % -- --   02/23/21 2102 138/67 -- -- -- 95 % -- --   02/23/21 2032 (!) 142/63 -- -- -- 98 % -- --   02/23/21 2003 (!) 142/66 -- -- -- 100 % -- --   02/23/21 1933 (!) 143/73 -- 76 11 98 % -- --   02/23/21 1902 119/64 -- 77 12 99 % -- --   02/23/21 1832 123/64 -- 76 15 100 % -- -- 02/23/21 1826 129/60 -- 77 16 100 % -- --   02/23/21 1722 (!) 157/81 -- 88 24 99 % -- --   02/23/21 1700 (!) 143/65 -- 78 15 100 % -- --   02/23/21 1633 (!) 155/69 -- 80 16 100 % -- --   02/23/21 1630 (!) 169/87 -- 83 21 99 % -- --   02/23/21 1557 (!) 184/163 -- 128 -- (!) 88 % -- --   02/23/21 1547 -- 97.1 °F (36.2 °C) 91 24 96 % -- --       Oxygen Saturation Interpretation: Normal    ------------------------------------------ PROGRESS NOTES ------------------------------------------  Re-evaluation(s):  Time: 2am  Patients symptoms show no change  Repeat physical examination is not changed    Counseling:  I have spoken with the patient and discussed todays results, in addition to providing specific details for the plan of care and counseling regarding the diagnosis and prognosis. Their questions are answered at this time and they are agreeable with the plan of admission.    --------------------------------- ADDITIONAL PROVIDER NOTES ---------------------------------  Consultations:  Spoke with Dr. Kel Hebert. Discussed case. They will admit the patient. This patient's ED course included: a personal history and physicial examination, re-evaluation prior to disposition, multiple bedside re-evaluations, IV medications, cardiac monitoring, continuous pulse oximetry and complex medical decision making and emergency management    This patient has remained hemodynamically stable during their ED course. Diagnosis:  1. Shortness of breath        Disposition:  Patient's disposition: Admit to telemetry  Patient's condition is stable.           Morgan Thurston MD  Resident  02/24/21 [id number]

## 2021-02-24 PROBLEM — R06.02 SHORTNESS OF BREATH: Status: ACTIVE | Noted: 2021-01-01

## 2021-02-24 NOTE — CONSULTS
Pulmonary Consultation    Admit Date: 2/23/2021    Requesting Physician: Halima Villanueva MD    CC: Shortness of breath    HPI:  This is a 64 y.o. male who presented with history of DM, hypothyroid, COPD, HTN, HLD HANS noncompliant with history of tracheostomy placed 12/13 recently change 1/18 history of bilateral PE    2/23 patient presented with shortness of breath with chest pain. Saturations were 70% on room air patient was requiring 15 L of oxygen. Patient cannot lay flat for the CT scan. And was hyperventilating. This was attempted 4 times. Patient says that he just cannot lay flat and that he was coughing up blood was feeling sick with shortness of breath. He says is not due for another trach change until March. He believes that he had a lot of secretions in his airway.     PMH:    Past Medical History:   Diagnosis Date    Acquired hypothyroidism 9/26/2017    Anxiety     Congestive heart failure with LV diastolic dysfunction, NYHA class 3 (HCC)     COPD (chronic obstructive pulmonary disease) ([address]     Depression     DM (diabetes mellitus) ([address]     Essential hypertension 6/1/2016    Gouty arthritis 2006    Hyperlipidemia     Hypertension     Lymphedema     Obesity     Obstructive sleep apnea 8/17/2009    Obstructive sleep apnea 9/26/2017    Osteoarthritis     Type 2 diabetes mellitus without complication, without long-term current use of insulin ([address] 1/15/2014     PSH:   Past Surgical History:   Procedure Laterality Date    BRONCHOSCOPY  08/10/2017    ECHO COMPL W DOP COLOR FLOW  6/21/2013         EXTERNAL EAR SURGERY  6/2/2009    keloid left ear    FOOT SURGERY  1990    Left foot    GASTROSTOMY TUBE PLACEMENT  08/10/2017    IR TUNNELED CATHETER PLACEMENT GREATER THAN 5 YEARS  11/19/2020    IR TUNNELED CATHETER PLACEMENT GREATER THAN 5 YEARS 11/19/2020 Candelaria Melendez MD [facility]    TRACHEOSTOMY N/A 11/11/2020    TRACHEOTOMY performed by Abisai Valencia MD at [facility] OR    TRACHEOSTOMY N/A 11/12/2020    OPEN TRACHEOTOMY, BRONCHOSCOPY, DIRECT LARYNGOSCOPY performed by Dayanna Najera DO at [facility] N/A 1/18/2021    TRACHEOTOMY TUBE CHANGE performed by Dayanna Najera DO at [address]  08/10/2017    UPPER GASTROINTESTINAL ENDOSCOPY N/A 11/11/2020    EGD ESOPHAGOGASTRODUODENOSCOPY PEG TUBE INSERTION performed by Daron Trotter MD at [facility] OR          Medications:       amLODIPine  10 mg Oral Daily    Arformoterol Tartrate  15 mcg Nebulization BID    budesonide  0.5 mg Nebulization BID    carvedilol  6.25 mg Oral BID WC    divalproex  250 mg Oral 3 times per day    ipratropium-albuterol  3 mL Inhalation Q4H WA    levothyroxine  50 mcg Oral Daily    sodium chloride (Inhalant)  2 mL Nebulization Q4H    thiamine mononitrate  100 mg Oral Daily    [START ON 2/25/2021] pantoprazole  40 mg Oral QAM AC    sodium chloride flush  10 mL Intravenous 2 times per day    enoxaparin  1 mg/kg Subcutaneous BID    insulin lispro  0-6 Units Subcutaneous TID WC    insulin lispro  0-3 Units Subcutaneous Nightly       Allergies: Allergies   Allergen Reactions    Bee Venom Anaphylaxis    Shellfish-Derived Products Anaphylaxis     Only crab legs     Medications Prior to Admission:    Prescriptions Prior to Admission[]Expand by Default   Not in a hospital admission.        Allergies:  Bee venom and Shellfish-derived products     Social History:   TOBACCO:   reports that he quit smoking about 16 years ago. His smoking use included cigarettes. He has a 1.00 pack-year smoking history. He quit smokeless tobacco use about 31 years ago.   His smokeless tobacco use included chew.     Family History:   Family History[]Expand by Default             Problem Relation Age of Onset    Alzheimer's Disease Mother      Heart Disease Father           Heart arrhythmia    Heart Attack Father           REVIEW OF SYSTEMS:  CONSTITUTIONAL:  negative  EYES:  negative  HEENT:  negative  RESPIRATORY:  positive for  hemoptysis  CARDIOVASCULAR:  positive for  chest pain, dyspnea  GASTROINTESTINAL:  negative  GENITOURINARY:  negative  INTEGUMENT/BREAST:  negative  HEMATOLOGIC/LYMPHATIC:  negative  ALLERGIC/IMMUNOLOGIC:  negative  ENDOCRINE:  negative  MUSCULOSKELETAL:  negative  NEUROLOGICAL:  negative        Physical Examination    Vitals:  VITALS:  /64   Pulse 105   Temp 97.1 °F (36.2 °C)   Resp 16   Ht 5' 9\" (1.753 m)   Wt 280 lb (127 kg)   SpO2 100%   BMI 41.35 kg/m²   24HR INTAKE/OUTPUT:  No intake or output data in the 24 hours ending 02/24/21 1811      General: No distress. Alert. Moderately obese  Eyes: PERRL. No sclera icterus. ENT: No discharge. Pharynx clear. Nasal septum midline trach in place able to speak around trach  Neck: Trachea midline. Normal thyroid. no JVD  Resp: No accessory muscle use. No crackles. No wheezing. No rhonchi. Symmetrical exansion  CV: PMI non displaced. Regular rate. Regular rhythm. No mumur or rub. ABD: Non-tender. Non-distended. No organmegaly. Normal bowel sounds. Skin: Warm and dry. No rash on exposed extremities. Lymph: No cervical LAD. No supraclavicular LAD. Ext: No joint deformity. No clubbing. No cyanosis. No edema  Neuro: Awake. Follows commands. No focal deficits. Moves all ext     Lab Results:    CBC:   Recent Labs     02/23/21  1628   WBC 7.4   HGB 11.4*   HCT 35.1*   MCV 78.0*        BMP:   Recent Labs     02/23/21  1628 02/24/21  0536    143   K 4.0 4.6    105   CO2 27 31*   BUN 11 10   CREATININE 1.1 1.1      ALB:3,BILIDIR:3,BILITOT:3,ALKPHOS:3)@  PT/INR: No results for input(s): PROTIME, INR in the last 72 hours.     Cultures:  -    Films:  CXR 2/23 clear    Assessment/Plan:  64 y.o. male with history of DM, hypothyroid, COPD, HTN, HLD presented on      10/26 -12/3 admitted with SOB 10/27 RRT refused BiPAP became combative.  10/28 intubated and sedated.  CT chest showing dense multifocal airspace disease 11/8 patient extubated and reintubated the same day 11/11 PEG tube placed unable to place tracheostomy due to scar tissue.  Neck CT showing calcification at level of critical thyroid membrane 11/12 tracheostomy placed by ENT. Curry Hines aspirated post trach.  Patient underwent withdrawal of alcohol required medications  11/17 emesis x3.  Had bright blood and clots coming from ENT.  ENT evaluated with no evidence of bleeding.  Neurologically improving moving all extremities.  Repeat bleeding from trach site in the evening.  Decreased to 40% +5.  Patient was treated for MRSA pneumonia right lower lobe.  Present with Zyvox AARON was nondiagnostic.  Patient was transferred to [address] was being weaned to pressure support     12/9 -12/17 presented back with chest pain.  Patient on TOI  Chest x-ray showing almost complete resolution of left midlung right upper lobe infiltrate, CTA positive for bilateral PE bilateral airspace disease  CT abdomen with fat stranding adjacent to the urinary bladder indicating cystitis,Ultrasound lower extremities showed no DVT,Covid negative  12/11 passed swallow study. 12/14 patient on 40% FiO2 via trach mask. Dionne Poncho 6 L12/15 With Passy-Lathrop valve patient coughed. MRSA pneumonia treated till 12/17 finished course of antibiotics from last admission on oral Zyvox and Diflucan.      12/24 presents back to ER with a panic attack  apparently ran out of his Xanax. Patient received Ativan was discharged back to nursing home    1[facility]18 [facility] Was difficult due to calcifications in deep neck      2/23 patient presented with shortness of breath with chest pain. Saturations were 70% on RA CTA attempted 4 times.   Patient complains of shortness of breath with hemoptysis with mucus  Covid negative  Saturations 100% on 10 L  Chest x-ray negative    1. acute hypoxemic respiratory failure with trach placed 11/12 8 cuffed Shiley trach proximal XLT12/15 6 cuffed trach changed 1/18  2. Bilateral PE now on Lovenox   3. Chronic anemia  4. HANS ( AHI 17 on 4/18/18 requires BIPAP 16/12) noncompliant with CPAP now cured with tracheostomy  5. History of reactive airway disease  6. CT 3/14/2018 showing pulmonary nodule stable from 10/2017  7. Diabetes with elevated blood sugars  8. Obesity  9. EF 19% LVH diastolic dysfunction on echo 11/30/2020  10. chronic lymphedema  11. History of gout on colchicine  12. Hypothyroid  13. H/o respiratory failure: 7/8/2017 pt was  transferred from [place] for acute respiratory failure after lap cholecystectomy, lap umbilical hernia repair, and lap liver biopsy (fatty liver). He had been extubated postop but had laryngospasm requiring reintubation, complicated by negative pressure flash pulmonary edema, and required tracheostomy 8/10/2017.  Patient has staph aureus pneumonia and C. difficile colitis. Delaney Gonzalez was then transferred to [facility] where he was weaned off the ventilator and decannulated.     Plan:     Check D-dimer  Check ultrasound of lower extremities  Not to be downsized due to history of HANS   Patient says now that he cannot lay down to have the CT done. I put the head of the bed down and observed him for 5 minutes and he had no problem. If D-dimer is abnormal and ultrasound of the legs are clear we will set him up for a CT  Passy-Anne valve  or trach can be plugged for speaking. And to unplug the trach at night    Thanks for letting us see this patient in consultation. Please contact us with any questions.       Electronically signed by Kingsley Curiel MD on 2/24/2021 at 6:11 PM

## 2021-02-24 NOTE — PROGRESS NOTES
Pt refusing CT scan. Pt came to ct and the scan was attempted. Pt started hyperventilating and dry heaving with some sputum coming out of his trach and mouth. RN notified and she came and medicated the pt with zofran and ativan. Second attempt made and the pt started telling this tech he could not tolerate the exam he needed to sit up.

## 2021-02-24 NOTE — H&P
Department of Internal Medicine  Dr. Zuhair Oconnell History and Physical      CHIEF COMPLAINT:  SOB    Reason for Admission: THROMBOEMBOLISM      HISTORY OF PRESENT ILLNESS:      The patient is a 64 y.o. male with  who presents with the above problems. HE HAS H/O PE, AND IS ON ELIQUIS. YESTERDAY HE HAD SOB, HEMOPTYSIS AND CP AND CAME TO THE ER. BUT COULD NOT TOLERATE A CTA. THE LAST TIME HE WAS SEDATED FOR CTA, HE ENDED UP INTUBATED AFTER RRT WAS CALLED. HE HAS SIGNIFICANT PMHx AS BELOW.      Past Medical History:        Diagnosis Date    Acquired hypothyroidism 9/26/2017    Anxiety     Congestive heart failure with LV diastolic dysfunction, NYHA class 3 ([address]     COPD (chronic obstructive pulmonary disease) ([address]     Depression     DM (diabetes mellitus) ([address]     Essential hypertension 6/1/2016    Gouty arthritis 2006    Hyperlipidemia     Hypertension     Lymphedema     Obesity     Obstructive sleep apnea 8/17/2009    Obstructive sleep apnea 9/26/2017    Osteoarthritis     Type 2 diabetes mellitus without complication, without long-term current use of insulin ([address] 1/15/2014     Past Surgical History:        Procedure Laterality Date    BRONCHOSCOPY  08/10/2017    ECHO COMPL W DOP COLOR FLOW  6/21/2013         EXTERNAL EAR SURGERY  6/2/2009    keloid left ear    FOOT SURGERY  1990    Left foot    GASTROSTOMY TUBE PLACEMENT  08/10/2017    IR TUNNELED CATHETER PLACEMENT GREATER THAN 5 YEARS  11/19/2020    IR TUNNELED CATHETER PLACEMENT GREATER THAN 5 YEARS 11/19/2020 Yohannes Jiménez MD [facility]    TRACHEOSTOMY N/A 11/11/2020    TRACHEOTOMY performed by Washington Raphael MD at [facility] N/A 11/12/2020    OPEN TRACHEOTOMY, BRONCHOSCOPY, DIRECT LARYNGOSCOPY performed by Antony Chau DO at [facility] N/A 1/18/2021    TRACHEOTOMY TUBE CHANGE performed by Antony Chau DO at [address]  08/10/2017    02/23/2021     02/23/2021    MPV 9.7 02/23/2021     CMP:    Lab Results   Component Value Date     02/24/2021    K 4.6 02/24/2021     02/24/2021    CO2 31 02/24/2021    BUN 10 02/24/2021    CREATININE 1.1 02/24/2021    GFRAA >60 02/24/2021    LABGLOM >60 02/24/2021    GLUCOSE 112 02/24/2021    PROT 8.5 02/23/2021    LABALBU 4.5 02/23/2021    CALCIUM 9.1 02/24/2021    BILITOT <0.2 02/23/2021    ALKPHOS 77 02/23/2021    AST 12 02/23/2021    ALT 8 02/23/2021     LDH:    Lab Results   Component Value Date     10/28/2020     Warfarin PT/INR:  No components found for: Robson Beech  Last 3 Troponin:    Lab Results   Component Value Date    TROPONINI <0.01 02/23/2021    TROPONINI <0.01 10/26/2020    TROPONINI <0.01 05/26/2019     ABG:    Lab Results   Component Value Date    PH 7.393 11/26/2020    PCO2 43.1 11/26/2020    PO2 90.3 11/26/2020    HCO3 25.7 11/26/2020    BE 0.7 11/26/2020    O2SAT 96.1 11/26/2020     HgBA1c:    Lab Results   Component Value Date    LABA1C 6.8 06/15/2020     TSH:    Lab Results   Component Value Date    TSH 4.340 10/27/2020     VITAMIN B12: No components found for: B12  FOLATE:    Lab Results   Component Value Date    FOLATE >20.0 11/05/2020     IRON:    Lab Results   Component Value Date    IRON 95 11/05/2020     Iron Saturation:  No components found for: PERCENTFE  TIBC:    Lab Results   Component Value Date    TIBC 275 11/05/2020     FERRITIN:    Lab Results   Component Value Date    FERRITIN 335 11/05/2020     RPR:  No results found for: RPR  IVAN:    Lab Results   Component Value Date    IVAN NEGATIVE 10/08/2017     AMYLASE:  No results found for: AMYLASE  LIPASE:    Lab Results   Component Value Date    LIPASE 53 12/09/2020       IMAGING    Xr Chest (2 Vw)    Result Date: 2/23/2021  EXAMINATION: TWO XRAY VIEWS OF THE CHEST 2/23/2021 5:32 pm COMPARISON: Chest CT and portable chest 12/09/2020 HISTORY: ORDERING SYSTEM PROVIDED HISTORY: SOB TECHNOLOGIST PROVIDED HISTORY: Reason for exam:->SOB What reading provider will be dictating this exam?->CRC FINDINGS: Tracheostomy tube remains in place. Interval removal of right central venous catheter. No acute displaced fracture. Cardiac silhouette size is at upper limits of normal.  No evidence of vascular congestion. No pneumothorax or right pleural effusion. Stable slight scar in lateral left CP angle, left lung base region. Posterior left CP angle is clear. No new pulmonary infiltrate or consolidation.     No definite radiographic evidence of acute cardiopulmonary disease    ASSESSMENT AND PLAN:    CP, HEMOPTYSIS, H/O PE  R/O FAILURE OF ELIQUIS  START LOVENOX  PULM CS      I can be reached though [employer] or [facility] at [phone]

## 2021-02-24 NOTE — PROGRESS NOTES
Another attempt made at ct exam at 0130. Pt unable to tolerate exam and lying flat. Dr. Jennifer Hinds at bedside.  Pt went back to ED without exam.

## 2021-02-25 PROBLEM — Z86.718 H/O DEEP VENOUS THROMBOSIS: Status: ACTIVE | Noted: 2021-01-01

## 2021-02-25 NOTE — PLAN OF CARE
Problem: Falls - Risk of:  Goal: Will remain free from falls  Description: Will remain free from falls  2/25/2021 1858 by Jarad Carlson RN  Outcome: Met This Shift  2/25/2021 0521 by Dawood He RN  Outcome: Met This Shift  Goal: Absence of physical injury  Description: Absence of physical injury  2/25/2021 1858 by Jarad Carlson RN  Outcome: Met This Shift  2/25/2021 0521 by Dawood He RN  Outcome: Met This Shift     Problem: Pain:  Goal: Pain level will decrease  Description: Pain level will decrease  2/25/2021 1858 by Jarad Carlson RN  Outcome: Met This Shift  2/25/2021 0521 by Dawood He RN  Outcome: Met This Shift  Goal: Control of acute pain  Description: Control of acute pain  2/25/2021 1858 by Jarad Carlson RN  Outcome: Met This Shift  2/25/2021 0521 by Dawood He RN  Outcome: Met This Shift  Goal: Control of chronic pain  Description: Control of chronic pain  Outcome: Met This Shift

## 2021-02-25 NOTE — PROGRESS NOTES
PT SEEN AND EXAMINED. Chart reviewed. meds reviewed. D/w nursing + family as available. EXAM: IN GENERAL, NAD. AWAKE AND ALERT. ROS NEGx10 EXCEPT:   BP (!) 142/67   Pulse 71   Temp 97 °F (36.1 °C) (Temporal)   Resp 20   Ht 5' 9\" (1.753 m)   Wt 280 lb (127 kg)   SpO2 96%   BMI 41.35 kg/m²   GEN: A+O NAD. HEENT: NCAT. EOMI. JOE  NECK: NO JVD. TRACH MIDLINE. NO BRUITS. NO THYROMEGALY. LUNGS: CTA BL NO RALES, RHONCHI OR WHEEZES. GOOD EXCURSION. CV: Regular rate and rhythm, NO Murmurs, Rubs, Or gallops  ABD: Soft. Nontender. Normal bowel sounds. No organomegaly  EXT:No clubbing cyanosis or edema  Neuro: Alert and oriented x 3. No focal motor deficits. No sensory deficits. Reflexes appear intact.   Labs/data reviewedLABS: CBC with Differential:    Lab Results   Component Value Date    WBC 6.7 02/25/2021    RBC 4.23 02/25/2021    HGB 10.5 02/25/2021    HCT 32.9 02/25/2021     02/25/2021    MCV 77.8 02/25/2021    MCH 24.8 02/25/2021    MCHC 31.9 02/25/2021    RDW 17.9 02/25/2021    NRBC 0.9 11/16/2020    SEGSPCT 73 01/26/2014    METASPCT 1.8 11/15/2020    LYMPHOPCT 46.2 02/25/2021    MONOPCT 6.7 02/25/2021    MYELOPCT 0.9 11/15/2020    BASOPCT 0.3 02/25/2021    MONOSABS 0.45 02/25/2021    LYMPHSABS 3.11 02/25/2021    EOSABS 0.14 02/25/2021    BASOSABS 0.02 02/25/2021     Platelets:    Lab Results   Component Value Date     02/25/2021     CMP:    Lab Results   Component Value Date     02/25/2021    K 3.8 02/25/2021     02/25/2021    CO2 28 02/25/2021    BUN 12 02/25/2021    CREATININE 1.1 02/25/2021    GFRAA >60 02/25/2021    LABGLOM >60 02/25/2021    GLUCOSE 107 02/25/2021    PROT 8.5 02/23/2021    LABALBU 4.5 02/23/2021    CALCIUM 9.1 02/25/2021    BILITOT <0.2 02/23/2021    ALKPHOS 77 02/23/2021    AST 12 02/23/2021    ALT 8 02/23/2021     Magnesium:    Lab Results   Component Value Date    MG 1.8 11/25/2020     LDH:    Lab Results   Component Value Date     10/28/2020 PT/INR:    Lab Results   Component Value Date    PROTIME 14.0 11/18/2020    INR 1.2 11/18/2020     Last 3 Troponin:    Lab Results   Component Value Date    TROPONINI <0.01 02/23/2021    TROPONINI <0.01 10/26/2020    TROPONINI <0.01 05/26/2019     ABG:    Lab Results   Component Value Date    PH 7.393 11/26/2020    PCO2 43.1 11/26/2020    PO2 90.3 11/26/2020    HCO3 25.7 11/26/2020    BE 0.7 11/26/2020    O2SAT 96.1 11/26/2020     IRON:    Lab Results   Component Value Date    IRON 95 11/05/2020     IMAGING    Xr Chest (2 Vw)    Result Date: 2/23/2021  EXAMINATION: TWO XRAY VIEWS OF THE CHEST 2/23/2021 5:32 pm COMPARISON: Chest CT and portable chest 12/09/2020 HISTORY: ORDERING SYSTEM PROVIDED HISTORY: SOB TECHNOLOGIST PROVIDED HISTORY: Reason for exam:->SOB What reading provider will be dictating this exam?->[facility] FINDINGS: Tracheostomy tube remains in place. Interval removal of right central venous catheter. No acute displaced fracture. Cardiac silhouette size is at upper limits of normal.  No evidence of vascular congestion. No pneumothorax or right pleural effusion. Stable slight scar in lateral left CP angle, left lung base region. Posterior left CP angle is clear. No new pulmonary infiltrate or consolidation. No definite radiographic evidence of acute cardiopulmonary disease    Cta Chest W Contrast    Result Date: 2/25/2021  EXAMINATION: CTA OF THE CHEST 2/23/2021 9:33 pm TECHNIQUE: CTA of the chest was performed after the administration of intravenous contrast.  Multiplanar reformatted images are provided for review. MIP images are provided for review. Dose modulation, iterative reconstruction, and/or weight based adjustment of the mA/kV was utilized to reduce the radiation dose to as low as reasonably achievable.  COMPARISON: 12/09/2020 HISTORY: ORDERING SYSTEM PROVIDED HISTORY: hemoptysis, TECHNOLOGIST PROVIDED HISTORY: Reason for exam:->hemoptysis, Decision Support Exception->Emergency Medical Condition (MA) What reading provider will be dictating this exam?->CRC FINDINGS: Pulmonary Arteries: Pulmonary arteries are adequately opacified for evaluation. No evidence of intraluminal filling defect to suggest pulmonary embolism. Main pulmonary artery is normal in caliber. Mediastinum: No evidence of mediastinal lymphadenopathy. The heart is enlarged. There is no pericardial effusion. The thoracic aorta is normal caliber. Lungs/pleura: There is vague ground-glass infiltration of the right and left lungs. The appearance would favor that of mild CHF. COVID pneumonia is less likely but cannot be completely excluded. Upper Abdomen: Limited images of the upper abdomen are unremarkable. Soft Tissues/Bones: No acute bone or soft tissue abnormality. 1. There is no evidence of a pulmonary embolus. 2. Vague bilateral ground-glass pulmonary infiltrates. While these findings can represent CHF, COVID pneumonia cannot be excluded. Please note the more well-defined infiltrate seen within the right upper lobe on the prior study of 2020 has cleared. 3. The central airways are clear. There is no mucous plugging. Us Dup Lower Extremities Bilateral Venous    Result Date: 2021  Patient MRN:  [id number] : 1960 Age: 64 years Gender: Male Order Date:  2021 12:27 PM EXAM: US DUP LOWER EXTREMITIES BILATERAL VENOUS NUMBER OF IMAGES:  37 INDICATION:  r/o DVT r/o DVT What reading provider will be dictating this exam?->[facility] COMPARISON: None There is evidence for deep venous thrombosis There is otherwise good compressibility, there is good augmentation, there is good color flow.      There is evidence for deep venous thrombosis ALERT:  THIS IS AN ABNORMAL REPORT      DIET:  DIET CARB CONTROL;    Medications:    Scheduled Meds:   amLODIPine  10 mg Oral Daily    Arformoterol Tartrate  15 mcg Nebulization BID    budesonide  0.5 mg Nebulization BID    carvedilol  6.25 mg Oral BID WC    divalproex  250 mg Oral 3 times per day    ipratropium-albuterol  3 mL Inhalation Q4H WA    levothyroxine  50 mcg Oral Daily    sodium chloride (Inhalant)  2 mL Nebulization Q4H    thiamine mononitrate  100 mg Oral Daily    pantoprazole  40 mg Oral QAM AC    sodium chloride flush  10 mL Intravenous 2 times per day    enoxaparin  1 mg/kg Subcutaneous BID    insulin lispro  0-6 Units Subcutaneous TID WC    insulin lispro  0-3 Units Subcutaneous Nightly       Continuous Infusions:    PRN Meds:sodium chloride flush, colchicine, oxyCODONE-acetaminophen, sodium chloride flush, promethazine **OR** ondansetron, polyethylene glycol, acetaminophen **OR** acetaminophen    A/P:      Patient Active Problem List   Diagnosis    Hyperlipidemia    Type 2 diabetes mellitus without complication, without long-term current use of insulin (HCC)    Atypical chest pain    Essential hypertension    Chronic acquired lymphedema    Aortic valve disease    Morbid obesity due to excess calories (HCC)    Abdominal pain    Acute respiratory failure with hypoxia (HCC)    Acute pulmonary edema (HCC)    Congestive heart failure with LV diastolic dysfunction, NYHA class 3 (HCC)    HANS (obstructive sleep apnea)    Acquired hypothyroidism    Chronic diastolic heart failure (HCC)    Nonrheumatic aortic valve stenosis    ACE-inhibitor cough    Pneumonia due to organism    Acute gout of right knee    Bilateral pulmonary embolism (HCC)    Anxiety    Tracheostomy dependent (HCC)    Shortness of breath    d dimer- <200  Us is pending    PLAN:  W/u for PE in progress    I can be reached though [facility] or [facility] at [phone]

## 2021-02-25 NOTE — PROGRESS NOTES
Pulmonary Progress Note  2/25/2021 2:39 PM  Subjective:   Admit Date: 2/23/2021  PCP: Jihan Roth MD  Interval History: Complains of cough and shortness of breath    Diet: DIET CARB CONTROL;  SOB is: Mild  Cough: Mild  Wheezing: None  chest pain: None    Data:   Scheduled Meds:    amLODIPine  10 mg Oral Daily    Arformoterol Tartrate  15 mcg Nebulization BID    budesonide  0.5 mg Nebulization BID    carvedilol  6.25 mg Oral BID WC    divalproex  250 mg Oral 3 times per day    ipratropium-albuterol  3 mL Inhalation Q4H WA    levothyroxine  50 mcg Oral Daily    sodium chloride (Inhalant)  2 mL Nebulization Q4H    thiamine mononitrate  100 mg Oral Daily    pantoprazole  40 mg Oral QAM AC    sodium chloride flush  10 mL Intravenous 2 times per day    enoxaparin  1 mg/kg Subcutaneous BID    insulin lispro  0-6 Units Subcutaneous TID WC    insulin lispro  0-3 Units Subcutaneous Nightly       Continuous Infusions:     PRN Meds: sodium chloride flush, colchicine, oxyCODONE-acetaminophen, sodium chloride flush, promethazine **OR** ondansetron, polyethylene glycol, acetaminophen **OR** acetaminophen    No intake/output data recorded.     I/O this shift:  In: -   Out: 275 [Urine:275]      Intake/Output Summary (Last 24 hours) at 2/25/2021 1439  Last data filed at 2/25/2021 1137  Gross per 24 hour   Intake --   Output 275 ml   Net -275 ml       Patient Vitals for the past 96 hrs (Last 3 readings):   Weight   02/24/21 0131 280 lb (127 kg)         Recent Labs     02/23/21  1628 02/25/21  0725   WBC 7.4 6.7   HGB 11.4* 10.5*   HCT 35.1* 32.9*   MCV 78.0* 77.8*    282     Recent Labs     02/23/21  1628 02/24/21  0536 02/25/21  0725    143 139   K 4.0 4.6 3.8    105 102   CO2 27 31* 28   BUN 11 10 12   CREATININE 1.1 1.1 1.1     Recent Labs     02/23/21  1628   PROT 8.5*   ALKPHOS 77   ALT 8   AST 12   BILITOT <0.2       Recent Labs     02/23/21  1628   TROPONINI <0.01       Troponin:   Recent Labs 21  1628   TROPONINI <0.01     CPK:  Lab Results   Component Value Date    CKTOTAL 98 10/08/2017          -----------------------------------------------------------------  RAD:   Results for orders placed during the hospital encounter of 20   XR CHEST PORTABLE    Narrative EXAMINATION:  ONE XRAY VIEW OF THE CHEST  2020 12:07 am  COMPARISON:    HISTORY:  ORDERING SYSTEM PROVIDED HISTORY: chest pain  TECHNOLOGIST PROVIDED HISTORY:  Reason for exam:->chest pain  What reading provider will be dictating this exam?->[facility]  FINDINGS:  Central line and tracheostomy cannula, unchanged since the prior examination. Cardiac silhouette at the upper limits of normal.  Almost complete resolution of previously noted areas of airspace disease in  the left mid lung and right upper lobe. Pulmonary vasculature within normal limits. Impression Almost complete resolution of previously noted areas of airspace disease in  the left mid lung and right upper lobe. US DUP LOWER EXTREMITIES BILATERAL VENOUS [[id number]] Resulted: 21      Order Status: Completed Updated: 21     Addenda:         1.  Patient MRN:  [id number] : 1960 Age: 64 years Gender: Male Order Date:  2021 12:27 PM EXAM: US DUP LOWER EXTREMITIES BILATERAL VENOUS NUMBER OF IMAGES:  43 INDICATION:  r/o DVT   r/o DVT What reading provider will be dictating this exam?->[facility] COMPARISON: 2020 Within the visualized vessels, there is no evidence for deep venous thrombosis There is good compressibility, there is good augmentation, there is good color flow.   IMPRESSION: Within the visualized vessels there is no evidence for deep venous thrombosis  Signed: 21 by Sandeep Santiago MD     Narrative:       Patient MRN:  [id number]   : 1960   Age: 64 years   Gender: Male   Order Date:  2021 12:27 PM   EXAM: US DUP LOWER EXTREMITIES BILATERAL VENOUS   NUMBER OF IMAGES:  43   INDICATION:  r/o DVT   r/o DVT   What reading provider will be dictating this exam?->[facility]   COMPARISON: None   There is evidence for deep venous thrombosis   There is otherwise good compressibility, there is good augmentation,   there is good color flow.      Impression:       There is evidence for deep venous thrombosis   ALERT:  THIS IS AN ABNORMAL REPORT             Micro:  Vent Information  FiO2 : 28 %  SpO2: 96 %  SpO2/FiO2 ratio: 285.71    Additional Respiratory  Assessments  Pulse: 71  Resp: 20  SpO2: 96 %    Objective:   Vitals:   Vitals:    21 0830   BP: (!) 142/67   Pulse: 71   Resp: 20   Temp: 97 °F (36.1 °C)   SpO2: 96%      TEMP:Current: Temp: 97 °F (36.1 °C)  Max: Temp  Av.9 °F (36.1 °C)  Min: 96.7 °F (35.9 °C)  Max: 97.1 °F (36.2 °C)    BP Range: Systolic (40CWW), EOS:795 , Min:142 , MOV:973     Diastolic (34RPG), EFW:84, Min:67, Max:74      General appearance: alert, appears stated age and cooperative  In no acute distress  Skin: No rashes or lesions  HEENT: mucous membranes are moist  Neck: No JVD  Lungs: symmetrical expansion, clear to auscultation, no use of accessory muscles  Heart: S1S2 no murmurs,  Abdomen: soft, non tender,   Extremities: no peripheral edema  Neurologic: Alert, oriented times 3,  Affect: pleasant      Assessment:   Patient Active Problem List:  64 y.o. male with history of DM, hypothyroid, COPD, HTN, HLD presented on      10/26 -12/3 admitted with SOB 10/27 RRT refused BiPAP became combative.  10/28 intubated and sedated.  CT chest showing dense multifocal airspace disease  patient extubated and reintubated the same day  PEG tube placed unable to place tracheostomy due to scar tissue.  Neck CT showing calcification at level of critical thyroid membrane  tracheostomy placed by ENT.  Patient aspirated post trach.  Patient underwent withdrawal of alcohol required medications   emesis x3.  Had bright blood and clots coming from ENT.  ENT evaluated with no evidence of bleeding.  Neurologically improving moving all extremities.  Repeat bleeding from trach site in the evening.  Decreased to 40% +5.  Patient was treated for MRSA pneumonia right lower lobe.  Present with Zyvox AARON was nondiagnostic.  Patient was transferred to [address] was being weaned to pressure support     12/9 -12/17 presented back with chest pain.  Patient on TOI  Chest x-ray showing almost complete resolution of left midlung right upper lobe infiltrate, CTA positive for bilateral PE bilateral airspace disease  CT abdomen with fat stranding adjacent to the urinary bladder indicating cystitis,Ultrasound lower extremities showed no DVT,Covid negative  12/11 passed swallow study. 12/14 patient on 40% FiO2 via trach mask. Larinda Donate 6 L12/15 With [place] valve patient coughed. MRSA pneumonia treated till 12/17 finished course of antibiotics from last admission on oral Zyvox and Diflucan.      12/24 presents back to ER with a panic attack  apparently ran out of his Xanax. Patient received Ativan was discharged back to nursing home     1/18 patient was admitted for trach change Shiley extra long #6. Was difficult due to calcifications in deep neck      2/23 patient presented with shortness of breath with chest pain. Blood pressure on admission 184/163 Saturations were 70% on RA CTA attempted 4 times. Patient complains of shortness of breath with hemoptysis with mucus  Covid negative  Saturations 100% on 10 L  Chest x-ray negative  Low D dimer  U/s of legs DVT negative   CTA no PE   2/26 6 liters sats 96% BP elevated      1. acute hypoxemic respiratory failure with trach placed 11/12 8 cuffed Shiley trach proximal XLT12/15 6 cuffed trach changed 1/18  2. Acute on chronic diastolic heart failure EF 19% LVH diastolic dysfunction on echo 11/30/2020  3. H/o Bilateral PE 12/9 now on Lovenox   4. Chronic anemia  5.  AHNS ( AHI 17 on 4/18/18 requires BIPAP 16/12) noncompliant with CPAP now cured with tracheostomy  6. History of reactive airway disease  7. CT 3/14/2018 showing pulmonary nodule stable from 10/2017  8. Diabetes with elevated blood sugars  9. Obesity  10. chronic lymphedema  11. History of gout on colchicine  12. Hypothyroid  13. H/o respiratory failure: 7/8/2017 pt was  transferred from [facility] for acute respiratory failure after lap cholecystectomy, lap umbilical hernia repair, and lap liver biopsy (fatty liver). He had been extubated postop but had laryngospasm requiring reintubation, complicated by negative pressure flash pulmonary edema, and required tracheostomy 8/10/2017.  Patient has staph aureus pneumonia and C. difficile colitis. Racquel Whitmore was then transferred to [facility] where he was weaned off the ventilator and decannulated.     Plan:      Called radiology for the conflicting ultrasound report. restart Eliquis would continue for now  Probable diastolic heart failure  Blood pressure needs better controlled blood pressure still elevated today  He does have visiting nurses who come once a week and he has a cough that he can monitor his blood pressure at home. Meds can be adjusted as an outpatient  Will increase carvedilol for now and management as per PCP    Not to be downsized due to history of HANS   [place] valve  or trach can be plugged for speaking.   And to unplug the trach at night    [facility]ureshMulti[facility] Health[facility]

## 2021-02-25 NOTE — PROGRESS NOTES
Notified ultrasound that patient can go for ultrasound of legs without nurse and that he is 4 liters of oxygen now.

## 2021-02-25 NOTE — CARE COORDINATION
2/25/21 Transition of Care: Patient is alert and oriented. He is observation due to having shortness of breath at home with low pulse oximeter. He resides at home. He has an existing trach which gets changed at Dr Rasmussen's office and is due to change in March. He has oxygen trach mask at home. He says his oxygen is being weaned at home by [employer]. They see patient weekly per Brian Rodriguez at [facility]. His pcp is Dr Francoise Nelson and his pharmacy is [facility]. He has a history at [facility]. Plan is for patient to return home at discharge. Will follow for any home going needs.  Celestina Hamm RN CM

## 2021-02-26 NOTE — PROGRESS NOTES
PT SEEN AND EXAMINED. Chart reviewed. meds reviewed. D/w nursing + family as available. EXAM: IN GENERAL, NAD. AWAKE AND ALERT. ROS NEGx10 EXCEPT: c/o congestion  /70   Pulse 66   Temp 97.1 °F (36.2 °C) (Temporal)   Resp 16   Ht 5' 9\" (1.753 m)   Wt 280 lb (127 kg)   SpO2 97%   BMI 41.35 kg/m²   GEN: A+O NAD. HEENT: NCAT. EOMI. JOE  NECK: NO JVD. TRACH MIDLINE. NO BRUITS. NO THYROMEGALY. LUNGS: CTA BL NO RALES, RHONCHI OR WHEEZES. GOOD EXCURSION. CV: Regular rate and rhythm, NO Murmurs, Rubs, Or gallops  ABD: Soft. Nontender. Normal bowel sounds. No organomegaly  EXT:No clubbing cyanosis or edema  Neuro: Alert and oriented x 3. No focal motor deficits. No sensory deficits. Reflexes appear intact.   Labs/data reviewedLABS: CBC with Differential:    Lab Results   Component Value Date    WBC 6.7 02/25/2021    RBC 4.23 02/25/2021    HGB 10.5 02/25/2021    HCT 32.9 02/25/2021     02/25/2021    MCV 77.8 02/25/2021    MCH 24.8 02/25/2021    MCHC 31.9 02/25/2021    RDW 17.9 02/25/2021    NRBC 0.9 11/16/2020    SEGSPCT 73 01/26/2014    METASPCT 1.8 11/15/2020    LYMPHOPCT 46.2 02/25/2021    MONOPCT 6.7 02/25/2021    MYELOPCT 0.9 11/15/2020    BASOPCT 0.3 02/25/2021    MONOSABS 0.45 02/25/2021    LYMPHSABS 3.11 02/25/2021    EOSABS 0.14 02/25/2021    BASOSABS 0.02 02/25/2021     Platelets:    Lab Results   Component Value Date     02/25/2021     CMP:    Lab Results   Component Value Date     02/25/2021    K 3.8 02/25/2021     02/25/2021    CO2 28 02/25/2021    BUN 12 02/25/2021    CREATININE 1.1 02/25/2021    GFRAA >60 02/25/2021    LABGLOM >60 02/25/2021    GLUCOSE 107 02/25/2021    PROT 8.5 02/23/2021    LABALBU 4.5 02/23/2021    CALCIUM 9.1 02/25/2021    BILITOT <0.2 02/23/2021    ALKPHOS 77 02/23/2021    AST 12 02/23/2021    ALT 8 02/23/2021     Magnesium:    Lab Results   Component Value Date    MG 1.8 11/25/2020     LDH:    Lab Results   Component Value Date     10/28/2020     PT/INR:    Lab Results   Component Value Date    PROTIME 14.0 11/18/2020    INR 1.2 11/18/2020     Last 3 Troponin:    Lab Results   Component Value Date    TROPONINI <0.01 02/23/2021    TROPONINI <0.01 10/26/2020    TROPONINI <0.01 05/26/2019     ABG:    Lab Results   Component Value Date    PH 7.393 11/26/2020    PCO2 43.1 11/26/2020    PO2 90.3 11/26/2020    HCO3 25.7 11/26/2020    BE 0.7 11/26/2020    O2SAT 96.1 11/26/2020     IRON:    Lab Results   Component Value Date    IRON 95 11/05/2020     IMAGING    Xr Chest (2 Vw)    Result Date: 2/23/2021  EXAMINATION: TWO XRAY VIEWS OF THE CHEST 2/23/2021 5:32 pm COMPARISON: Chest CT and portable chest 12/09/2020 HISTORY: ORDERING SYSTEM PROVIDED HISTORY: SOB TECHNOLOGIST PROVIDED HISTORY: Reason for exam:->SOB What reading provider will be dictating this exam?->CRC FINDINGS: Tracheostomy tube remains in place. Interval removal of right central venous catheter. No acute displaced fracture. Cardiac silhouette size is at upper limits of normal.  No evidence of vascular congestion. No pneumothorax or right pleural effusion. Stable slight scar in lateral left CP angle, left lung base region. Posterior left CP angle is clear. No new pulmonary infiltrate or consolidation. No definite radiographic evidence of acute cardiopulmonary disease    Cta Chest W Contrast    Result Date: 2/25/2021  EXAMINATION: CTA OF THE CHEST 2/23/2021 9:33 pm TECHNIQUE: CTA of the chest was performed after the administration of intravenous contrast.  Multiplanar reformatted images are provided for review. MIP images are provided for review. Dose modulation, iterative reconstruction, and/or weight based adjustment of the mA/kV was utilized to reduce the radiation dose to as low as reasonably achievable.  COMPARISON: 12/09/2020 HISTORY: ORDERING SYSTEM PROVIDED HISTORY: hemoptysis, TECHNOLOGIST PROVIDED HISTORY: Reason for exam:->hemoptysis, Decision Support Exception->Emergency Medical Condition (MA) What reading provider will be dictating this exam?->CRC FINDINGS: Pulmonary Arteries: Pulmonary arteries are adequately opacified for evaluation. No evidence of intraluminal filling defect to suggest pulmonary embolism. Main pulmonary artery is normal in caliber. Mediastinum: No evidence of mediastinal lymphadenopathy. The heart is enlarged. There is no pericardial effusion. The thoracic aorta is normal caliber. Lungs/pleura: There is vague ground-glass infiltration of the right and left lungs. The appearance would favor that of mild CHF. COVID pneumonia is less likely but cannot be completely excluded. Upper Abdomen: Limited images of the upper abdomen are unremarkable. Soft Tissues/Bones: No acute bone or soft tissue abnormality. 1. There is no evidence of a pulmonary embolus. 2. Vague bilateral ground-glass pulmonary infiltrates. While these findings can represent CHF, COVID pneumonia cannot be excluded. Please note the more well-defined infiltrate seen within the right upper lobe on the prior study of 2020 has cleared. 3. The central airways are clear. There is no mucous plugging. Us Dup Lower Extremities Bilateral Venous    Result Date: 2021  Patient MRN:  [id number] : 1960 Age: 64 years Gender: Male Order Date:  2021 12:27 PM EXAM: US DUP LOWER EXTREMITIES BILATERAL VENOUS NUMBER OF IMAGES:  37 INDICATION:  r/o DVT r/o DVT What reading provider will be dictating this exam?->[facility] COMPARISON: None There is evidence for deep venous thrombosis There is otherwise good compressibility, there is good augmentation, there is good color flow.      There is evidence for deep venous thrombosis ALERT:  THIS IS AN ABNORMAL REPORT      DIET:  DIET CARB CONTROL;    Medications:    Scheduled Meds:   benzonatate  100 mg Oral TID    hydrALAZINE  25 mg Oral 3 times per day    carvedilol  12.5 mg Oral BID WC    apixaban  5 mg Oral BID    amLODIPine  10 mg Oral Daily  Arformoterol Tartrate  15 mcg Nebulization BID    budesonide  0.5 mg Nebulization BID    divalproex  250 mg Oral 3 times per day    ipratropium-albuterol  3 mL Inhalation Q4H WA    levothyroxine  50 mcg Oral Daily    sodium chloride (Inhalant)  2 mL Nebulization Q4H    thiamine mononitrate  100 mg Oral Daily    pantoprazole  40 mg Oral QAM AC    sodium chloride flush  10 mL Intravenous 2 times per day    insulin lispro  0-6 Units Subcutaneous TID WC    insulin lispro  0-3 Units Subcutaneous Nightly       Continuous Infusions:    PRN Meds:sodium chloride flush, colchicine, sodium chloride flush, promethazine **OR** ondansetron, polyethylene glycol, acetaminophen **OR** acetaminophen    A/P:      Patient Active Problem List   Diagnosis    Hyperlipidemia    Type 2 diabetes mellitus without complication, without long-term current use of insulin (HCC)    Atypical chest pain    Essential hypertension    Chronic acquired lymphedema    Aortic valve disease    Morbid obesity due to excess calories (HCC)    Abdominal pain    Acute respiratory failure with hypoxia (HCC)    Acute pulmonary edema (HCC)    Congestive heart failure with LV diastolic dysfunction, NYHA class 3 (HCC)    HANS (obstructive sleep apnea)    Acquired hypothyroidism    Chronic diastolic heart failure (HCC)    Nonrheumatic aortic valve stenosis    ACE-inhibitor cough    Pneumonia due to organism    Acute gout of right knee    Bilateral pulmonary embolism (HCC)    Anxiety    Tracheostomy dependent (HCC)    Shortness of breath    H/O deep venous thrombosis    d dimer- <200  Us neg  agg hydral  Check labs    PLAN:  Add tessalon  W/u for PE neg  Dc when ok c pulm  I can be reached though [facility] or [facility] [place] at [phone]

## 2021-02-26 NOTE — PROGRESS NOTES
Pulmonary Progress Note  2/26/2021 1:07 PM  Subjective:   Admit Date: 2/23/2021  PCP: Sebastien Reece MD  Interval History: doing ok   Diet: DIET CARB CONTROL;  SOB is: Mild  Cough: None  Wheezing: None  chest pain: None    Data:   Scheduled Meds:    benzonatate  100 mg Oral TID    hydrALAZINE  25 mg Oral 3 times per day    carvedilol  12.5 mg Oral BID WC    apixaban  5 mg Oral BID    amLODIPine  10 mg Oral Daily    Arformoterol Tartrate  15 mcg Nebulization BID    budesonide  0.5 mg Nebulization BID    divalproex  250 mg Oral 3 times per day    ipratropium-albuterol  3 mL Inhalation Q4H WA    levothyroxine  50 mcg Oral Daily    sodium chloride (Inhalant)  2 mL Nebulization Q4H    thiamine mononitrate  100 mg Oral Daily    pantoprazole  40 mg Oral QAM AC    sodium chloride flush  10 mL Intravenous 2 times per day    insulin lispro  0-6 Units Subcutaneous TID WC    insulin lispro  0-3 Units Subcutaneous Nightly       Continuous Infusions:     PRN Meds: sodium chloride flush, colchicine, sodium chloride flush, promethazine **OR** ondansetron, polyethylene glycol, acetaminophen **OR** acetaminophen    I/O last 3 completed shifts: In: 600 [P.O.:600]  Out: 275 [Urine:275]    No intake/output data recorded.       Intake/Output Summary (Last 24 hours) at 2/26/2021 1307  Last data filed at 2/25/2021 1601  Gross per 24 hour   Intake 120 ml   Output --   Net 120 ml       Patient Vitals for the past 96 hrs (Last 3 readings):   Weight   02/24/21 0131 280 lb (127 kg)         Recent Labs     02/23/21  1628 02/25/21  0725 02/26/21  0536   WBC 7.4 6.7 5.8   HGB 11.4* 10.5* 10.4*   HCT 35.1* 32.9* 32.7*   MCV 78.0* 77.8* 77.1*    282 272     Recent Labs     02/25/21  0725 02/26/21  0536 02/26/21  0904    140 142   K 3.8 4.1 4.7    101 106   CO2 28 30* 25   BUN 12 10 10   CREATININE 1.1 1.1 1.1     Recent Labs     02/23/21  1628   PROT 8.5*   ALKPHOS 77   ALT 8   AST 12   BILITOT <0.2     No results for input(s): INR, APTT in the last 72 hours. Recent Labs     21  1628   TROPONINI <0.01     No results for input(s): BNP in the last 72 hours. Troponin:   Recent Labs     21  1628   TROPONINI <0.01     CPK:  Lab Results   Component Value Date    CKTOTAL 98 10/08/2017          -----------------------------------------------------------------  RAD:   Results for orders placed during the hospital encounter of 20   XR CHEST PORTABLE    Narrative EXAMINATION:  ONE XRAY VIEW OF THE CHEST  2020 12:07 am  COMPARISON:    HISTORY:  ORDERING SYSTEM PROVIDED HISTORY: chest pain  TECHNOLOGIST PROVIDED HISTORY:  Reason for exam:->chest pain  What reading provider will be dictating this exam?->CRC  FINDINGS:  Central line and tracheostomy cannula, unchanged since the prior examination. Cardiac silhouette at the upper limits of normal.  Almost complete resolution of previously noted areas of airspace disease in  the left mid lung and right upper lobe. Pulmonary vasculature within normal limits. Impression Almost complete resolution of previously noted areas of airspace disease in  the left mid lung and right upper lobe.        Micro:  Vent Information  FiO2 : 28 %  SpO2: 96 %  SpO2/FiO2 ratio: 350    Additional Respiratory  Assessments  Pulse: 82  Resp: 18  SpO2: 96 %    Objective:   Vitals:   Vitals:    21 1206   BP:    Pulse:    Resp:    Temp:    SpO2: 96%      TEMP:Current: Temp: 96.8 °F (36 °C)  Max: Temp  Av.4 °F (36.3 °C)  Min: 96.8 °F (36 °C)  Max: 98.4 °F (36.9 °C)    BP Range: Systolic (27XQS), VLV:215 , Min:139 , QZR:616     Diastolic (60TZR), DRM:22, Min:70, Max:81       General appearance: alert, appears stated age and cooperative  In no acute distress  Skin: No rashes or lesions  HEENT: mucous membranes are moist  Neck: No JVD  Lungs: symmetrical expansion, clear to auscultation, no use of accessory muscles  Heart: S1S2 no murmurs,  Abdomen: soft, non tender, Extremities: no peripheral edema  Neurologic: Alert, oriented times 3,  Affect: pleasant        Assessment:   Patient Active Problem List:  64 y. o. male with history of DM, hypothyroid, COPD, HTN, HLD presented on      10/26 -12/3 admitted with SOB 10/27 RRT refused BiPAP became combative.  10/28 intubated and sedated.  CT chest showing dense multifocal airspace disease 11/8 patient extubated and reintubated the same day 11/11 PEG tube placed unable to place tracheostomy due to scar tissue.  Neck CT showing calcification at level of critical thyroid membrane 11/12 tracheostomy placed by ENT. Gina Blanco aspirated post trach.  Patient underwent withdrawal of alcohol required medications  11/17 emesis x3.  Had bright blood and clots coming from ENT.  ENT evaluated with no evidence of bleeding.  Neurologically improving moving all extremities.  Repeat bleeding from trach site in the evening.  Decreased to 40% +5.  Patient was treated for MRSA pneumonia right lower lobe.  Present with Zyvox AARON was nondiagnostic.  Patient was transferred to [address] was being weaned to pressure support     12/9 -12/17 presented back with chest pain.  Patient on TOI  Chest x-ray showing almost complete resolution of left midlung right upper lobe infiltrate, CTA positive for bilateral PE bilateral airspace disease  CT abdomen with fat stranding adjacent to the urinary bladder indicating cystitis,Ultrasound lower extremities showed no DVT,Covid negative  12/11 passed swallow study. 12/14 patient on 40% FiO2 via trach mask. [place] 6 L12/15 With [place] valve patient coughed.  MRSA pneumonia treated till 12/17 finished course of antibiotics from last admission on oral Zyvox and Diflucan.      12/24 presents back to ER with a panic attack  apparently ran out of his Xanax.  Patient received Ativan was discharged back to nursing home     1/18 patient was admitted for trach change Jp arreguin #6.  Was difficult due to calcifications in deep neck       patient presented with shortness of breath with chest pain. Blood pressure on admission 184/163 Saturations were 70% on RA CTA attempted 4 times.  Patient complains of shortness of breath with hemoptysis with mucus  Covid negative  Saturations 100% on 10 L  Chest x-ray negative  Low D dimer  U/s of legs DVT negative   CTA no PE    6 liters sats 96% BP elevated      1. acute hypoxemic respiratory failure with trach placed  8 cuffed Shiley trach proximal XLT12/15 6 cuffed trach changed   2. Acute on chronic diastolic heart failure EF 35% LVH diastolic dysfunction on echo 2020  3. H/o Bilateral PE  now on Lovenox   4. Chronic anemia  5. HANS ( AHI 17 on 18 requires BIPAP ) noncompliant with CPAP now cured with tracheostomy  6. History of reactive airway disease  7. CT 3/14/2018 showing pulmonary nodule stable from 10/2017  8. Diabetes with elevated blood sugars  9. Obesity  10. chronic lymphedema  11. History of gout on colchicine  12. Hypothyroid  13. H/o respiratory failure: 2017 pt was  transferred from [facility] for acute respiratory failure after lap cholecystectomy, lap umbilical hernia repair, and lap liver biopsy (fatty liver).  He had been extubated postop but had laryngospasm requiring reintubation, complicated by negative pressure flash pulmonary edema, and required tracheostomy 8/10/2017. John Miller has staph aureus pneumonia and C. difficile colitis. John Miller was then transferred to [facility] hospital where he was weaned off the ventilator and decannulated.     Plan:      OK for d/c     Called radiology for the conflicting ultrasound report.            Patient MRN:  [id number] : 1960 Age: 64 years Gender: Male Order Date:  2021 12:27 PM EXAM: US DUP LOWER EXTREMITIES BILATERAL VENOUS NUMBER OF IMAGES:  43 INDICATION:  r/o DVT   r/o DVT What reading provider will be dictating this exam?->[facility] COMPARISON: 2020 Within the

## 2021-02-26 NOTE — CARE COORDINATION
2/26/21 Update CM Note: Patient remains on telemetry. Plan is for patient to discharge today if ok with pulmonary. Spoke with Alberto Caldwell from [place] [id number] who will continue to follow with patient at home as before.  Ian Shine RN CM

## 2021-02-26 NOTE — DISCHARGE SUMMARY
upper limits of normal.  No evidence of vascular congestion. No pneumothorax or right pleural effusion. Stable slight scar in lateral left CP angle, left lung base region. Posterior left CP angle is clear. No new pulmonary infiltrate or consolidation. No definite radiographic evidence of acute cardiopulmonary disease    Cta Chest W Contrast    Result Date: 2/25/2021  EXAMINATION: CTA OF THE CHEST 2/23/2021 9:33 pm TECHNIQUE: CTA of the chest was performed after the administration of intravenous contrast.  Multiplanar reformatted images are provided for review. MIP images are provided for review. Dose modulation, iterative reconstruction, and/or weight based adjustment of the mA/kV was utilized to reduce the radiation dose to as low as reasonably achievable. COMPARISON: 12/09/2020 HISTORY: ORDERING SYSTEM PROVIDED HISTORY: hemoptysis, TECHNOLOGIST PROVIDED HISTORY: Reason for exam:->hemoptysis, Decision Support Exception->Emergency Medical Condition (MA) What reading provider will be dictating this exam?->CRC FINDINGS: Pulmonary Arteries: Pulmonary arteries are adequately opacified for evaluation. No evidence of intraluminal filling defect to suggest pulmonary embolism. Main pulmonary artery is normal in caliber. Mediastinum: No evidence of mediastinal lymphadenopathy. The heart is enlarged. There is no pericardial effusion. The thoracic aorta is normal caliber. Lungs/pleura: There is vague ground-glass infiltration of the right and left lungs. The appearance would favor that of mild CHF. COVID pneumonia is less likely but cannot be completely excluded. Upper Abdomen: Limited images of the upper abdomen are unremarkable. Soft Tissues/Bones: No acute bone or soft tissue abnormality. 1. There is no evidence of a pulmonary embolus. 2. Vague bilateral ground-glass pulmonary infiltrates. While these findings can represent CHF, COVID pneumonia cannot be excluded.   Please note the more well-defined infiltrate seen within the right upper lobe on the prior study of 2020 has cleared. 3. The central airways are clear. There is no mucous plugging. Us Dup Lower Extremities Bilateral Venous    Addendum Date: 2021    Patient MRN:  [id number] : 1960 Age: 64 years Gender: Male Order Date:  2021 12:27 PM EXAM: US DUP LOWER EXTREMITIES BILATERAL VENOUS NUMBER OF IMAGES:  37 INDICATION:  r/o DVT r/o DVT What reading provider will be dictating this exam?->[facility] COMPARISON: 2020 Within the visualized vessels, there is no evidence for deep venous thrombosis There is good compressibility, there is good augmentation, there is good color flow. IMPRESSION: Within the visualized vessels there is no evidence for deep venous thrombosis    Addendum Date: 2021    Patient MRN:  [id number] : 1960 Age: 64 years Gender: Male Order Date:  2021 12:27 PM EXAM: US DUP LOWER EXTREMITIES BILATERAL VENOUS NUMBER OF IMAGES:  37 INDICATION:  r/o DVT r/o DVT What reading provider will be dictating this exam?->[facility] COMPARISON: 2020 Within the visualized vessels, there is no evidence for deep venous thrombosis There is good compressibility, there is good augmentation, there is good color flow. IMPRESSION: Within the visualized vessels there is no evidence for deep venous thrombosis    Result Date: 2021  Patient MRN:  [id number] : 1960 Age: 64 years Gender: Male Order Date:  2021 12:27 PM EXAM: US DUP LOWER EXTREMITIES BILATERAL VENOUS NUMBER OF IMAGES:  37 INDICATION:  r/o DVT r/o DVT What reading provider will be dictating this exam?->[facility] COMPARISON: None There is evidence for deep venous thrombosis There is otherwise good compressibility, there is good augmentation, there is good color flow. There is evidence for deep venous thrombosis ALERT:  THIS IS AN ABNORMAL REPORT      Outstanding Order Results     No orders found from 2021 to 2021.           Treatments: PT STARTEDON LOVENOX UNTIL SEDATED FOR CTA    Discharge Exam:  BP (!) 146/67   Pulse 79   Temp 96.8 °F (36 °C) (Temporal)   Resp 18   Ht 5' 9\" (1.753 m)   Wt 280 lb (127 kg)   SpO2 96%   BMI 41.35 kg/m²     General Appearance:    Alert, cooperative, no distress, appears stated age   Head:    Normocephalic, without obvious abnormality, atraumatic   Eyes:    PERRL, conjunctiva/corneas clear, EOM's intact, fundi     benign, both eyes        Ears:    Normal TM's and external ear canals, both ears   Nose:   Nares normal, septum midline, mucosa normal, no drainage    or sinus tenderness   Throat:   Lips, mucosa, and tongue normal; teeth and gums normal   Neck:   Supple, symmetrical, trachea midline, no adenopathy;        thyroid:  No enlargement/tenderness/nodules; no carotid    bruit or JVD   Back:     Symmetric, no curvature, ROM normal, no CVA tenderness   Lungs:     Clear to auscultation bilaterally, respirations unlabored   Chest wall:    No tenderness or deformity   Heart:    Regular rate and rhythm, S1 and S2 normal, no murmur, rub   or gallop   Abdomen:     Soft, non-tender, bowel sounds active all four quadrants,     no masses, no organomegaly   Genitalia:    Normal male without lesion, discharge or tenderness   Rectal:    Normal tone, normal prostate, no masses or tenderness;    guaiac negative stool   Extremities:   Extremities normal, atraumatic, no cyanosis or edema   Pulses:   2+ and symmetric all extremities   Skin:   Skin color, texture, turgor normal, no rashes or lesions   Lymph nodes:   Cervical, supraclavicular, and axillary nodes normal   Neurologic:   CNII-XII intact.  Normal strength, sensation and reflexes       throughout       Disposition: home    Patient Instructions:      Medication List      START taking these medications    benzonatate 100 MG capsule  Commonly known as: TESSALON  Take 1 capsule by mouth 3 times daily for 7 days        CHANGE how you take these medications    carvedilol 25 MG tablet  Commonly known as: Coreg  Take 1 tablet by mouth 2 times daily  What changed:   · medication strength  · how much to take  · when to take this        CONTINUE taking these medications    amLODIPine 10 MG tablet  Commonly known as: NORVASC  Take 1 tablet by mouth daily     Arformoterol Tartrate 15 MCG/2ML Nebu  Commonly known as: BROVANA  Take 2 mLs by nebulization 2 times daily     budesonide 0.5 MG/2ML nebulizer suspension  Commonly known as: PULMICORT  Take 2 mLs by nebulization 2 times daily     colchicine 0.6 MG tablet  Commonly known as: Colcrys  Take 1 tablet by mouth 2 times daily as needed for Pain     Eliquis 5 MG Tabs tablet  Generic drug: apixaban     levothyroxine 50 MCG tablet  Commonly known as: SYNTHROID  Take 1 tablet by mouth daily     olmesartan 20 MG tablet  Commonly known as: BENICAR  Take 1 tablet by mouth daily     thiamine 100 MG tablet  1 tablet by PEG Tube route daily        STOP taking these medications    glimepiride 4 MG tablet  Commonly known as: AMARYL           Where to Get Your Medications      These medications were sent to [facility]/pharmacy #[id number], [place] - [address] [facility] [address] [facility] [phone]  [address] [place] [address] [place], [facility] [address]    Phone: [phone]   · benzonatate 100 MG capsule  · carvedilol 25 MG tablet  · olmesartan 20 MG tablet        Activity: activity as tolerated  Diet: {diet: DIET CARB CONTROL; Wound Care: keep wound clean and dry    Follow-up with DR in 1 week.     Greater than 35 minutes spent on discharge preparations, examining patient, discussing with patient and coordinating with nurses and staff    Signed:  Dajuan Box  2/26/2021  4:50 PM

## 2021-03-05 NOTE — TELEPHONE ENCOUNTER
Pt calling in states that he has an appt today at 12 noon and that he was to receive medical equipment in the mail for his trach and he has not received anything, pt said that he is experiencing trach clogging up sometimes, and wants to speak with someone from the office about his appt today please call pt at this number [phone]

## 2021-03-05 NOTE — TELEPHONE ENCOUNTER
Juan Carlos Sanders from [facility] is ordering trach for pt, pt will be seen in office on 3/9/21.     Electronically signed by Eryn Huston CMA on 3/5/21 at 2:27 PM EST

## 2021-03-07 NOTE — ED PROVIDER NOTES
HPI:  3/7/21,   Time: 10:35 AM CONOR Peters is a 64 y.o. male presenting to the ED for shortness of breath, beginning more than 1 day ago. The complaint has been intermittent, moderate in severity, and worsened by patient has a trach and is on a trach mask at home however he states he may have been out of oxygen and became dyspneic. He also appears very anxious. ROS:   Pertinent positives and negatives are stated within HPI, all other systems reviewed and are negative.  --------------------------------------------- PAST HISTORY ---------------------------------------------  Past Medical History:  has a past medical history of Acquired hypothyroidism, Anxiety, Congestive heart failure with LV diastolic dysfunction, NYHA class 3 ([facility] COPD (chronic obstructive pulmonary disease) ([facility] Depression, DM (diabetes mellitus) ([facility] Essential hypertension, Gouty arthritis, Hyperlipidemia, Hypertension, Lymphedema, Obesity, Obstructive sleep apnea, Obstructive sleep apnea, Osteoarthritis, and Type 2 diabetes mellitus without complication, without long-term current use of insulin ([facility] Past Surgical History:  has a past surgical history that includes Foot surgery (1990); External ear surgery (6/2/2009); ECHO Compl W Dop Color Flow (6/21/2013); Gastrostomy tube placement (08/10/2017); Tracheostomy tube placement (08/10/2017); bronchoscopy (08/10/2017); tracheostomy (N/A, 11/11/2020); Upper gastrointestinal endoscopy (N/A, 11/11/2020); tracheostomy (N/A, 11/12/2020); IR TUNNELED CVC PLACE WO SQ PORT/PUMP > 5 YEARS (11/19/2020); and tracheostomy tube change (N/A, 1/18/2021). Social History:  reports that he quit smoking about 16 years ago. His smoking use included cigarettes. He has a 1.00 pack-year smoking history. He quit smokeless tobacco use about 31 years ago. His smokeless tobacco use included chew. He reports previous alcohol use. He reports previous drug use.     Family History: family history includes Alzheimer's Disease in his mother; Heart Attack in his father; Heart Disease in his father. The patients home medications have been reviewed.     Allergies: Bee venom and Shellfish-derived products    -------------------------------------------------- RESULTS -------------------------------------------------  All laboratory and radiology results have been personally reviewed by myself   LABS:  Results for orders placed or performed during the hospital encounter of 03/06/21   COVID-19, Rapid   Result Value Ref Range    SARS-CoV-2, NAAT Not Detected Not Detected   CBC Auto Differential   Result Value Ref Range    WBC 11.4 4.5 - 11.5 E9/L    RBC 4.66 3.80 - 5.80 E12/L    Hemoglobin 11.8 (L) 12.5 - 16.5 g/dL    Hematocrit 36.6 (L) 37.0 - 54.0 %    MCV 78.5 (L) 80.0 - 99.9 fL    MCH 25.3 (L) 26.0 - 35.0 pg    MCHC 32.2 32.0 - 34.5 %    RDW 18.4 (H) 11.5 - 15.0 fL    Platelets 209 967 - 565 E9/L    MPV 8.9 7.0 - 12.0 fL    Neutrophils % 73.7 43.0 - 80.0 %    Immature Granulocytes % 0.5 0.0 - 5.0 %    Lymphocytes % 18.8 (L) 20.0 - 42.0 %    Monocytes % 5.5 2.0 - 12.0 %    Eosinophils % 1.1 0.0 - 6.0 %    Basophils % 0.4 0.0 - 2.0 %    Neutrophils Absolute 8.37 (H) 1.80 - 7.30 E9/L    Immature Granulocytes # 0.06 E9/L    Lymphocytes Absolute 2.14 1.50 - 4.00 E9/L    Monocytes Absolute 0.63 0.10 - 0.95 E9/L    Eosinophils Absolute 0.12 0.05 - 0.50 E9/L    Basophils Absolute 0.04 0.00 - 0.20 E9/L   Comprehensive Metabolic Panel   Result Value Ref Range    Sodium 141 132 - 146 mmol/L    Potassium 5.2 (H) 3.5 - 5.0 mmol/L    Chloride 100 98 - 107 mmol/L    CO2 30 (H) 22 - 29 mmol/L    Anion Gap 11 7 - 16 mmol/L    Glucose 131 (H) 74 - 99 mg/dL    BUN 10 8 - 23 mg/dL    CREATININE 1.0 0.7 - 1.2 mg/dL    GFR Non-African American >60 >=60 mL/min/1.73    GFR African American >60     Calcium 9.5 8.6 - 10.2 mg/dL    Total Protein 8.9 (H) 6.4 - 8.3 g/dL    Albumin 4.7 3.5 - 5.2 g/dL    Total Bilirubin 0.2 0.0 - 1.2 mg/dL Alkaline Phosphatase 95 40 - 129 U/L    ALT 11 0 - 40 U/L    AST 20 0 - 39 U/L   Troponin   Result Value Ref Range    Troponin <0.01 0.00 - 0.03 ng/mL   EKG 12 Lead   Result Value Ref Range    Ventricular Rate 82 BPM    Atrial Rate 82 BPM    P-R Interval 170 ms    QRS Duration 76 ms    Q-T Interval 378 ms    QTc Calculation (Bazett) 441 ms    P Axis 70 degrees    R Axis -40 degrees    T Axis 44 degrees       RADIOLOGY:  Interpreted by Radiologist.  XR CHEST PORTABLE   Final Result   No acute cardiopulmonary process. ------------------------- NURSING NOTES AND VITALS REVIEWED ---------------------------   The nursing notes within the ED encounter and vital signs as below have been reviewed. BP (!) 191/82   Pulse 97   Temp 98.1 °F (36.7 °C)   Resp 18   Ht 5' 9\" (1.753 m)   Wt 280 lb (127 kg)   SpO2 100%   BMI 41.35 kg/m²   Oxygen Saturation Interpretation: Improved after treatment      ---------------------------------------------------PHYSICAL EXAM--------------------------------------      Constitutional/General: Alert and oriented x3, well appearing, non toxic in NAD  Head: NC/AT  Eyes: PERRL, EOMI  Mouth: Oropharynx clear, handling secretions, no trismus  Neck: Supple, full ROM, no meningeal signs  Pulmonary: Lungs-patient has trach mask and on arrival was in moderate distress. Diminished breath sounds bilaterally. Some episodes of coughing. Improved significantly after aerosol treatment. Cardiovascular:  Regular rate and rhythm, no murmurs, gallops, or rubs. 2+ distal pulses  Abdomen: Soft, non tender, non distended,   Extremities: Moves all extremities x 4.  Warm and well perfused  Skin: warm and dry without rash  Neurologic: GCS 15,  Psych: Normal Affect/very anxious tearful at 1 point very upset over his current situations with his pulmonary status; was given a small dose of IV Ativan and this seemed to help improve him significantly.      ------------------------------ ED

## 2021-03-09 NOTE — TELEPHONE ENCOUNTER
Called patient to discuss trach equipment kalani from facility stated the trach was not in house have to place order will ship to home. Once patient receives trach will schedule appointment.

## 2021-03-09 NOTE — TELEPHONE ENCOUNTER
Pt states that he needs to speak with office to cancel his appt because the trach equipment has not been delivered to him yet.  But would like someone to call him back thank you

## 2021-03-22 NOTE — TELEPHONE ENCOUNTER
Spoke with patient states he did not receive a trach earnestine griffith stated a trach with supplies was delivered 3/13/2021 left on porch patient states never received a trach, tracking # [id number] per Viktor Smith at [facility]. Patient will call [phone] to debate trach being delivered.

## 2021-03-22 NOTE — TELEPHONE ENCOUNTER
MA LVM for patient stating that he needs to get in contact with [facility] from the case office in regard to where his trach supplies are. Patient is advised to call the office back once he receives information about his trach to schedule and appt for 3/30/21.     Electronically signed by Sailaja Smiley MA on 3/22/21 at 2:20 PM EDT

## 2021-03-23 NOTE — TELEPHONE ENCOUNTER
Patient called back into the office, patient states he is having some blood around the trach and lots of saliva, drainage. Patient was last seen in office 2/19/2021 patient has not had trach changed since surgery 1/18/2021. Patient was suppose to have a trach change 2/19/2021 however the trach was not delivered to his home. Dr. Rico Dover office contacted  on 2/19/2021 to order trach and stated [facility] would deliver trach by 3/8/2021. Due to the trach being custom, patient did not receive trach until 3/13/2021. Patient would like the trach changed ASAP due to discomfort, blood and lots of saliva and drainage. Advised Dr. Satya Puente would be back in office 3/30/2021. Patient would like to be seen ASAP. Any advice would be greatly appreciated.

## 2021-03-23 NOTE — TELEPHONE ENCOUNTER
Recommend waiting to be seen by Dr. Marlee Herman in the office as soon as possible.  If there are any acute airway emergency he could go to ER

## 2021-03-23 NOTE — TELEPHONE ENCOUNTER
Patient calling this morning. States he needs an appointment ASAP to have his trach removed. He is requesting to talk to office so he can get scheduled ASAP.

## 2021-03-30 NOTE — PROGRESS NOTES
Subjective:      Patient ID:  Diana Miller is a 64 y.o. male. HPI:    Patient presents today for recheck. Condition has been present for 1 month(s). He brought his trach today.  No passe callum valve      Past Medical History:   Diagnosis Date    Acquired hypothyroidism 9/26/2017    Anxiety     Congestive heart failure with LV diastolic dysfunction, NYHA class 3 ([address]     COPD (chronic obstructive pulmonary disease) ([address]     Depression     DM (diabetes mellitus) ([address]     Essential hypertension 6/1/2016    Gouty arthritis 2006    Hyperlipidemia     Hypertension     Lymphedema     Obesity     Obstructive sleep apnea 8/17/2009    Obstructive sleep apnea 9/26/2017    Osteoarthritis     Type 2 diabetes mellitus without complication, without long-term current use of insulin ([address] 1/15/2014     Past Surgical History:   Procedure Laterality Date    BRONCHOSCOPY  08/10/2017    ECHO COMPL W DOP COLOR FLOW  6/21/2013         EXTERNAL EAR SURGERY  6/2/2009    keloid left ear    FOOT SURGERY  1990    Left foot    GASTROSTOMY TUBE PLACEMENT  08/10/2017    IR TUNNELED CATHETER PLACEMENT GREATER THAN 5 YEARS  11/19/2020    IR TUNNELED CATHETER PLACEMENT GREATER THAN 5 YEARS 11/19/2020 Marisol Mas MD [facility] PROCEDURES    TRACHEOSTOMY N/A 11/11/2020    TRACHEOTOMY performed by Tre Cuevas MD at [facility] N/A 11/12/2020    OPEN TRACHEOTOMY, BRONCHOSCOPY, DIRECT LARYNGOSCOPY performed by Luke Ramirez DO at [address] [facility] N/A 1/18/2021    TRACHEOTOMY TUBE CHANGE performed by Luke Ramirez DO at [address]  08/10/2017    UPPER GASTROINTESTINAL ENDOSCOPY N/A 11/11/2020    EGD ESOPHAGOGASTRODUODENOSCOPY PEG TUBE INSERTION performed by Tre Cuevas MD at [facility] OR     Family History   Problem Relation Age of Onset    Alzheimer's Disease Mother     Heart Disease Father         Heart arrhythmia    Heart Attack Father      Social History     Socioeconomic History    Marital status: Legally      Spouse name: None    Number of children: None    Years of education: None    Highest education level: None   Occupational History    None   Social Needs    Financial resource strain: None    Food insecurity     Worry: None     Inability: None    Transportation needs     Medical: None     Non-medical: None   Tobacco Use    Smoking status: Never Smoker    Smokeless tobacco: Never Used   Substance and Sexual Activity    Alcohol use: Not Currently     Frequency: 2-3 times a week     Drinks per session: 1 or 2     Binge frequency: Never    Drug use: Not Currently     Comment: past use; none x 30 years    Sexual activity: Not Currently   Lifestyle    Physical activity     Days per week: None     Minutes per session: None    Stress: None   Relationships    Social connections     Talks on phone: None     Gets together: None     Attends Oriental orthodox service: None     Active member of club or organization: None     Attends meetings of clubs or organizations: None     Relationship status: None    Intimate partner violence     Fear of current or ex partner: None     Emotionally abused: None     Physically abused: None     Forced sexual activity: None   Other Topics Concern    None   Social History Narrative    Drinks 3 cups of coffee daily. Allergies   Allergen Reactions    Bee Venom Anaphylaxis    Shellfish-Derived Products Anaphylaxis     Only crab legs       Review of Systems   Constitutional: Negative. Negative for appetite change. HENT: Positive for congestion and voice change. Eyes: Negative. Negative for pain, discharge and visual disturbance. Respiratory: Positive for apnea, cough, choking, shortness of breath and wheezing. Negative for chest tightness. Cardiovascular: Negative. Negative for chest pain, palpitations and leg swelling. Gastrointestinal: Negative.   Negative for abdominal pain, diarrhea and vomiting. Endocrine: Negative for cold intolerance, heat intolerance and polydipsia. Genitourinary: Negative. Negative for dysuria, flank pain and hematuria. Musculoskeletal: Negative. Negative for arthralgias, gait problem and neck pain. Skin: Negative. Negative for color change, pallor and rash. Allergic/Immunologic: Negative for environmental allergies, food allergies and immunocompromised state. Neurological: Negative. Negative for dizziness, numbness and headaches. Hematological: Negative for adenopathy. Psychiatric/Behavioral: Negative. Negative for behavioral problems and hallucinations. All other systems reviewed and are negative. Objective:     Vitals:    03/30/21 1104   Temp: 98.3 °F (36.8 °C)     Physical Exam  Vitals signs reviewed. Constitutional:       Appearance: He is well-developed. HENT:      Head: Normocephalic and atraumatic. Right Ear: Hearing, tympanic membrane, ear canal and external ear normal.      Left Ear: Hearing, tympanic membrane, ear canal and external ear normal.      Nose: Nose normal.      Mouth/Throat:      Pharynx: Uvula midline. Eyes:      Conjunctiva/sclera: Conjunctivae normal.      Pupils: Pupils are equal, round, and reactive to light. Neck:      Musculoskeletal: Normal range of motion and neck supple. Comments: Trach in place - 6-0 uncuffed proximal xlt  Cardiovascular:      Rate and Rhythm: Normal rate and regular rhythm. Heart sounds: Normal heart sounds. Pulmonary:      Effort: Pulmonary effort is normal.      Breath sounds: Normal breath sounds. Abdominal:      General: Bowel sounds are normal.      Palpations: Abdomen is soft. Neurological:      Mental Status: He is alert and oriented to person, place, and time. Trach Change  Indication: tracheostomy     Procedure: The old trach ( #6 Shiley  cuffless unfenestrated) was  removed from the stoma.   The stoma was normal without bleeding. The new trach ( #6 Shiley  cuffless unfenestrated) was inserted into the stoma. Keary Cargo ties were replaced. Pt tolerated procedure well. Assessment:       Diagnosis Orders   1. HANS (obstructive sleep apnea)     2. Tracheostomy dependence ([facility]                Plan:      Pt need capping therapy if he wants to have the trach removed.    Follow up in 2 month(s)

## 2021-04-02 PROBLEM — R06.02 SOB (SHORTNESS OF BREATH): Status: ACTIVE | Noted: 2021-01-01

## 2021-04-02 NOTE — ED NOTES
Bed: 08  Expected date:   Expected time:   Means of arrival:   Comments:  Triage Jenna Sutton RN  04/02/21 0140

## 2021-04-02 NOTE — CONSULTS
Pulmonary Consultation    Admit Date: 4/2/2021  Requesting Physician: Katherin Flynn MD    CC:  Tracheal stoma bleeding  HPI:  64years old -American male known to [facility]. Patient is well-known history of respiratory failure since October 2020 when patient required prolonged hospitalization due to pneumonia and subsequent pulmonary embolism. Since then patient has been trach unable to take care of himself at home while on TOI. Patient had recent tracheostomy cannula replaced 3 days ago by the ENT service patient noticed to have bleeding since yesterday moderate amounts with difficult to expectorate and breathe appropriately, as a consequence he had to come to emergency department immediately, hospitalization was advised. Patient effectively chronic cough with yellowish phlegm for last 2 months however he acknowledged that he phlegm is getting thicker and darker for the last week or so, he denies fever he denies chills hemoptysis prior to this event. He denies chest pain. Information was confirmed by his wife at bedside.     PMH:    Past Medical History:   Diagnosis Date    Acquired hypothyroidism 9/26/2017    Anxiety     Congestive heart failure with LV diastolic dysfunction, NYHA class 3 ([facility])     COPD (chronic obstructive pulmonary disease) ([address]     Depression     DM (diabetes mellitus) ([address]     Essential hypertension 6/1/2016    Gouty arthritis 2006    Hyperlipidemia     Hypertension     Lymphedema     Obesity     Obstructive sleep apnea 8/17/2009    Obstructive sleep apnea 9/26/2017    Osteoarthritis     Type 2 diabetes mellitus without complication, without long-term current use of insulin ([address] 1/15/2014     PSH:   Past Surgical History:   Procedure Laterality Date    BRONCHOSCOPY  08/10/2017    ECHO COMPL W DOP COLOR FLOW  6/21/2013         EXTERNAL EAR SURGERY  6/2/2009    keloid left ear    FOOT SURGERY  1990    Left foot    GASTROSTOMY TUBE PLACEMENT  08/10/2017    IR TUNNELED CATHETER PLACEMENT GREATER THAN 5 YEARS  2020    IR TUNNELED CATHETER PLACEMENT GREATER THAN 5 YEARS 2020 Fidel Palacios MD [facility] PROC[facility]    TRACHEOSTOMY N/A 2020    TRACHEOTOMY performed by Kait Gant MD at [address] [facility] [address] N/A 2020    OPEN TRACHEOTOMY, BRONCHOSCOPY, DIRECT LARYNGOSCOPY performed by Oneil Fontenot DO at [address] [facility] N/A 2021    TRACHEOTOMY TUBE CHANGE performed by Oneil Fontenot DO at [address]  08/10/2017    UPPER GASTROINTESTINAL ENDOSCOPY N/A 2020    EGD ESOPHAGOGASTRODUODENOSCOPY PEG TUBE INSERTION performed by Kait Gant MD at [facility] OR          Respiratory ROS: positive for - cough Otherwise, a complete review of systems is undertaken and is negative. Social History:  · Alcohol:   Social drinker History   · Tobacco: Former smoker  · Employment: no silica or asbestos exposure  Medications:     sodium chloride        amLODIPine  10 mg Oral Daily    [Held by provider] apixaban  5 mg Oral BID    Arformoterol Tartrate  15 mcg Nebulization BID    budesonide  500 mcg Nebulization BID    carvedilol  25 mg Oral BID    levothyroxine  50 mcg Oral Daily    olmesartan  20 mg Oral Daily    sodium chloride flush  10 mL Intravenous 2 times per day         Vitals:  Tmax:  VITALS:  /76   Pulse 76   Temp 98.3 °F (36.8 °C) (Temporal)   Resp 16   Ht 5' 10\" (1.778 m)   Wt 286 lb (129.7 kg)   SpO2 98%   BMI 41.04 kg/m²   24HR INTAKE/OUTPUT:  No intake or output data in the 24 hours ending 21 1437  CURRENT PULSE OXIMETRY:  SpO2: 98 %  24HR PULSE OXIMETRY RANGE:  SpO2  Av.8 %  Min: 93 %  Max: 100 %        EXAM:  General: No distress. Alert. Obese  Eyes: PERRL. No sclera icterus. No conjunctival injection. ENT: No discharge. Pharynx clear. Tracheal cannula #6 nonfenestrated in place. No active bleeding is noted. Neck: Trachea midline.  Normal thyroid. Resp: No accessory muscle use. No crackles. No wheezing. No rhonchi. CV: Regular rate. Regular rhythm. No mumur or rub. ABD: Non-tender. Non-distended. No masses. No organmegaly. Normal bowel sounds. Skin: Warm and dry. No nodule on exposed extremities. No rash on exposed extremities. Lymph: No cervical LAD. No supraclavicular LAD. Ext: No joint deformity. No clubbing. No cyanosis. 1+ edema  Neuro: Awake. Follows commands. Positive pupils/gag/corneals. Normal pain response. Lab Results:  CBC:   Recent Labs     04/02/21 0208   WBC 8.5   HGB 11.4*   HCT 34.9*   MCV 75.5*        BMP:   Recent Labs     04/02/21 0208      K 4.0      CO2 30*   BUN 17   CREATININE 1.5*      ALB:3,BILIDIR:3,BILITOT:3,ALKPHOS:3)@  PT/INR:   Recent Labs     04/02/21 0208   PROTIME 12.9*   INR 1.2     Cultures:  -    ABG: noted  Films:  CXR : Nonacute disease      Assessment:  · Tracheal stoma bleeding. Could be trauma related due to recent replacement and patient was receiving full dose of anticoagulation. · Doubt pneumonia, procalcitonin to follow. Sputum to be obtained for Gram stain and culture. Patient may have mild bronchitis usually case patient would benefit only of a brief run of antibiotics like 5 days. · History of medically provoked pulmonary embolism December 2020. As per guidelines 3 to 6 months will be more than enough. · History of COPD  · History of HANS      Plan:  · Hold Eliquis for 48 hours, may restart with lower dose like 2.5 p.o. twice a day to complete 6 months, June 2021. If recurrence of the bleeding noticed favor to stop anticoagulation indefinitely. · Procalcitonin, Gram stain sputum to follow. · Continue home nebulizers  · Discussed with patient and wife      Thanks for letting us see this patient in consultation. Please contact us with any questions.     Dom Tsai M.D., F.[email or web address].                    pulp

## 2021-04-02 NOTE — ED PROVIDER NOTES
HPI:  4/2/21, Time: 1:48 AM EDT         Brian Duvall is a 64 y.o. male presenting to the ED for bleeding from tracheostomy site, beginning 1 day ago. The complaint has been persistent, moderate in severity, and worsened by nothing. Patient reports history of bleeding from tracheostomy site. Patient reporting having difficulty breathing. He does report some headache mainly frontal as well as posteriorly. Patient reporting no neck pain he reports no fever chills he reports no productive cough. Patient reports he had a tracheostomy since October due to his history of COPD history of pneumonia. Patient recently had tracheostomy changed within the past day or 2. Patient reporting no abdominal pain he reports no new leg pain. Patient does have history of heart failure. ROS:   Pertinent positives and negatives are stated within HPI, all other systems reviewed and are negative.  --------------------------------------------- PAST HISTORY ---------------------------------------------  Past Medical History:  has a past medical history of Acquired hypothyroidism, Anxiety, Congestive heart failure with LV diastolic dysfunction, NYHA class 3 ([facility] COPD (chronic obstructive pulmonary disease) ([facility] Depression, [facility]di[facility] mellitus) [facility] Essential hypertension, Gouty arthritis, Hyperlipidemia, Hypertension, Lymphedema, Obesity, Obstructive sleep apnea, Obstructive sleep apnea, Osteoarthritis, and Type 2 diabetes mellitus without complication, without long-term current use of insulin ([facility] Past Surgical History:  has a past surgical history that includes Foot surgery (1990); External ear surgery (6/2/2009); ECHO Compl W Dop Color Flow (6/21/2013); Gastrostomy tube placement (08/10/2017); Tracheostomy tube placement (08/10/2017); bronchoscopy (08/10/2017); tracheostomy (N/A, 11/11/2020); Upper gastrointestinal endoscopy (N/A, 11/11/2020); tracheostomy (N/A, 11/12/2020);  IR TUNNELED CVC PLACE WO SQ PORT/PUMP > 5 YEARS (11/19/2020); and tracheostomy tube change (N/A, 1/18/2021). Social History:  reports that he has never smoked. He has never used smokeless tobacco. He reports previous alcohol use. He reports previous drug use. Family History: family history includes Alzheimer's Disease in his mother; Heart Attack in his father; Heart Disease in his father. The patients home medications have been reviewed. Allergies: Bee venom and Shellfish-derived products    ---------------------------------------------------PHYSICAL EXAM--------------------------------------    Constitutional/General: Alert and oriented x3, mild distress  Head: Normocephalic and atraumatic  Eyes: PERRL, EOMI  Mouth: Oropharynx clear, handling secretions, no trismus  Neck: Supple, full ROM, non tender to palpation in the midline, no stridor, no crepitus, no meningeal signs  Pulmonary: Lungs rhonchi noted but appears to be from upper airway  Cardiovascular:  Regular rate. Regular rhythm. No murmurs, gallops, or rubs. 2+ distal pulses  Chest: no chest wall tenderness  Abdomen: Soft. Non tender. Non distended. +BS. No rebound, guarding, or rigidity. No pulsatile masses appreciated. Musculoskeletal: Moves all extremities x 4. Warm and well perfused, no clubbing, cyanosis, edema noted bilaterally  Skin: warm and dry. No rashes. Neurologic: GCS 15, CN 2-12 grossly intact, no focal deficits, symmetric strength 5/5 in the upper and lower extremities bilaterally  Psych: Normal Affect    -------------------------------------------------- RESULTS -------------------------------------------------  I have personally reviewed all laboratory and imaging results for this patient. Results are listed below.      LABS:  Results for orders placed or performed during the hospital encounter of 04/02/21   CBC auto differential   Result Value Ref Range    WBC 8.5 4.5 - 11.5 E9/L    RBC 4.62 3.80 - 5.80 E12/L    Hemoglobin 11.4 (L) 12.5 - 16.5 g/dL Hematocrit 34.9 (L) 37.0 - 54.0 %    MCV 75.5 (L) 80.0 - 99.9 fL    MCH 24.7 (L) 26.0 - 35.0 pg    MCHC 32.7 32.0 - 34.5 %    RDW 19.7 (H) 11.5 - 15.0 fL    Platelets 221 431 - 444 E9/L    MPV 8.4 7.0 - 12.0 fL    Neutrophils % 42.5 (L) 43.0 - 80.0 %    Immature Granulocytes % 0.2 0.0 - 5.0 %    Lymphocytes % 45.3 (H) 20.0 - 42.0 %    Monocytes % 10.5 2.0 - 12.0 %    Eosinophils % 1.4 0.0 - 6.0 %    Basophils % 0.1 0.0 - 2.0 %    Neutrophils Absolute 3.58 1.80 - 7.30 E9/L    Immature Granulocytes # 0.02 E9/L    Lymphocytes Absolute 3.83 1.50 - 4.00 E9/L    Monocytes Absolute 0.89 0.10 - 0.95 E9/L    Eosinophils Absolute 0.12 0.05 - 0.50 E9/L    Basophils Absolute 0.01 0.00 - 0.20 E9/L   Comprehensive Metabolic Panel   Result Value Ref Range    Sodium 144 132 - 146 mmol/L    Potassium 4.0 3.5 - 5.0 mmol/L    Chloride 103 98 - 107 mmol/L    CO2 30 (H) 22 - 29 mmol/L    Anion Gap 11 7 - 16 mmol/L    Glucose 96 74 - 99 mg/dL    BUN 17 8 - 23 mg/dL    CREATININE 1.5 (H) 0.7 - 1.2 mg/dL    GFR Non-African American 58 >=60 mL/min/1.73    GFR African American 58     Calcium 9.2 8.6 - 10.2 mg/dL    Total Protein 7.2 6.4 - 8.3 g/dL    Albumin 3.8 3.5 - 5.2 g/dL    Total Bilirubin 0.2 0.0 - 1.2 mg/dL    Alkaline Phosphatase 73 40 - 129 U/L    ALT 15 0 - 40 U/L    AST 13 0 - 39 U/L   Troponin   Result Value Ref Range    Troponin <0.01 0.00 - 0.03 ng/mL   Protime-INR   Result Value Ref Range    Protime 12.9 (H) 9.3 - 12.4 sec    INR 1.2    Brain Natriuretic Peptide   Result Value Ref Range    Pro-BNP 50 0 - 125 pg/mL       RADIOLOGY:  Interpreted by Radiologist.  CT HEAD WO CONTRAST   Final Result   No acute intracranial abnormality. XR CHEST PORTABLE   Final Result   Peribronchial thickening and lower lung infiltrates. This could be edema or   pneumonia. EKG:  This EKG is signed and interpreted by me.     Rate: 73  Rhythm: Sinus  Interpretation: non-specific EKG  Comparison: stable as compared to patient's most recent EKG    ------------------------- NURSING NOTES AND VITALS REVIEWED ---------------------------   The nursing notes within the ED encounter and vital signs as below have been reviewed by myself. /64   Pulse 75   Temp 97 °F (36.1 °C) (Temporal)   Resp 16   Wt 290 lb (131.5 kg)   SpO2 100%   BMI 42.83 kg/m²   Oxygen Saturation Interpretation: Normal    The patients available past medical records and past encounters were reviewed. ------------------------------ ED COURSE/MEDICAL DECISION MAKING----------------------  Medications   cefTRIAXone (ROCEPHIN) 1,000 mg in sterile water 10 mL IV syringe (has no administration in time range)   doxycycline (VIBRAMYCIN) 100 mg in dextrose 5 % 100 mL IVPB (has no administration in time range)   LORazepam (ATIVAN) injection 1 mg (1 mg Intravenous Given 4/2/21 1145)   metoclopramide (REGLAN) injection 10 mg (10 mg Intravenous Given 4/2/21 0550)   diphenhydrAMINE (BENADRYL) injection 25 mg (25 mg Intravenous Given 4/2/21 0550)             Medical Decision Making:    Presenting here because of bleeding from tracheostomy site. Patient reports it was just exchanged the day before. Patient reporting increased difficulty breathing and reporting some mild cough but is unchanged from prior. Patient does have history of COPD. Patient reports tracheostomy was placed due to his history of pneumonia in the past.  Patient here was in mild distress had appears to bronchospasm here in the emergency department. Patient was medicated and suctioned here in the emergency department. There is no active bleeding from tracheostomy site. He was complaining of severe headaches here in the emergency department. CT of the head was done shows no acute pathology he is on Eliquis at home. Patient afebrile. Covid testing was ordered due to x-ray findings. He was ordered IV antibiotics as well. Patient will be admitted    Re-Evaluations:             Re-evaluation.

## 2021-04-03 NOTE — H&P
Luanne Dumont is a 64 y.o. male  Chief Complaint   Patient presents with    Shortness of Breath     trach filling up with blood, started today, new trach yesterday, cant breath, headache     HPI  As above, pt is c/o feeling sob and having a HA. He states he has had a bad HA since the trach was replaced. He is also very anxious and upset because he was advised the trach is long-term. He denies cp, n/v. He is eating well with no issues with his swallow. No current facility-administered medications on file prior to encounter.       Current Outpatient Medications on File Prior to Encounter   Medication Sig Dispense Refill    Arformoterol Tartrate (BROVANA) 15 MCG/2ML NEBU Take 1 ampule by nebulization 2 times daily      budesonide (PULMICORT) 0.5 MG/2ML nebulizer suspension Take 1 ampule by nebulization 2 times daily      albuterol sulfate HFA (PROVENTIL HFA) 108 (90 Base) MCG/ACT inhaler Inhale 2 puffs into the lungs every 4 hours as needed for Wheezing 1 Inhaler zero    carvedilol (COREG) 25 MG tablet Take 1 tablet by mouth 2 times daily 60 tablet 3    olmesartan (BENICAR) 20 MG tablet Take 1 tablet by mouth daily 30 tablet 3    apixaban (ELIQUIS) 5 MG TABS tablet Take 5 mg by mouth 2 times daily      levothyroxine (SYNTHROID) 50 MCG tablet Take 1 tablet by mouth daily 30 tablet 3    amLODIPine (NORVASC) 10 MG tablet Take 1 tablet by mouth daily 90 tablet 0     Past Medical History:   Diagnosis Date    Acquired hypothyroidism 9/26/2017    Anxiety     Congestive heart failure with LV diastolic dysfunction, NYHA class 3 ([address]     COPD (chronic obstructive pulmonary disease) ([address]     Depression     DM (diabetes mellitus) ([address]     Essential hypertension 6/1/2016    Gouty arthritis 2006    Hyperlipidemia     Hypertension     Lymphedema     Obesity     Obstructive sleep apnea 8/17/2009    Obstructive sleep apnea 9/26/2017    Osteoarthritis     Type 2 diabetes mellitus without complication, activity: Not Currently   Lifestyle    Physical activity     Days per week: Not on file     Minutes per session: Not on file    Stress: Not on file   Relationships    Social connections     Talks on phone: Not on file     Gets together: Not on file     Attends Hinduism service: Not on file     Active member of club or organization: Not on file     Attends meetings of clubs or organizations: Not on file     Relationship status: Not on file    Intimate partner violence     Fear of current or ex partner: Not on file     Emotionally abused: Not on file     Physically abused: Not on file     Forced sexual activity: Not on file   Other Topics Concern    Not on file   Social History Narrative    Drinks 3 cups of coffee daily. Family History   Problem Relation Age of Onset    Alzheimer's Disease Mother     Heart Disease Father         Heart arrhythmia    Heart Attack Father          ROS  Patient positive for  SOB, NIEVES  Patient denies any fever, chills, night sweats, weight changes   Denies headache, visual changes,   Denies dysphagia, odynophagia dysphonia,   Denies  cough, sputum production,   Denies chest pain, pressure, orthopnea, palpitations,   Denies abd pain, N/V/D/C/melena, hematochezia,   Denies urinary frequency, urgency, dysuria, hematuria,   Denies any acute muscle aches, paresthesias, weakness, seizure, syncopal episodes, Denies depression, anxiety. OBJECTIVE  Vitals:    04/03/21 0215   BP: (!) 164/72   Pulse: 70   Resp: 18   Temp:    SpO2: 99%     Gen: AO3, NAD  Trach is midline   Head:  PERRL, EOMIx2, no icterus, conjunctival injection  Neck: No JVD, carotid bruits, LAD, thyroid non-palpable  Heart: RRR with no murmurs, rubs, gallops  Lungs: CTA B/L, no W/R/R  Abd: soft, NT, ND, BS+, no G/R, no HSM  Ext: No C/C/E, pulses intact distally B/L  Neuro: CN 2-12 grossly intact with no focal deficits    ASSESSMENT  1. Headache, unspecified headache type    2. Dyspnea and respiratory abnormalities    3. DM2  PLAN  Pulm team for [facility]ue Huerta care  ENT to look at Josue Huerta  I started him on medication to help with HA

## 2021-04-03 NOTE — CONSULTS
DEPARTMENT OF OTOLARYNGOLOGY -- HEAD & NECK SURGERY   CONSULT NOTE    PATIENT: Sheila Granados : 1960 (67 y.o.)   DATE: April 3, 2021  ROOM/BED: [address] [place] [address]A      REQUESTING PHYSICIAN: Koffi Hernandez MD    REASON FOR CONSULT: trach bleed     HISTORY OBTAINED FROM:  Patient/chart    HISTORY OF PRESENT ILLNESS:                                                                                        HPI  64 y.o. male with significant past medical history of severe HANS, pneumonia and vent dependence presents as a consult for trach bleed. Initially patient underwent tracheostomy with general surgery but was aborted due to excessive tracheal calcification. ENT was consulted then and trach was placed on 20. He has been doing well since but wishes for decannulation. However, it is unlikely for decannulation due to his severe HANS and obesity. His trach was changed last week by Dr Vamsi Rodriguez in the office without any complications. He, however, does not have any DME at home, such as suction machine and other trach supplies for his care. He presented to the ED last night due to SOB and thick tracheal secretions.           Past Medical History:   Diagnosis Date    Acquired hypothyroidism 2017    Anxiety     Congestive heart failure with LV diastolic dysfunction, NYHA class 3 (HCC)     COPD (chronic obstructive pulmonary disease) ([address]     Depression     DM (diabetes mellitus) ([address]     Essential hypertension 2016    Gouty arthritis 2006    Hyperlipidemia     Hypertension     Lymphedema     Obesity     Obstructive sleep apnea 2009    Obstructive sleep apnea 2017    Osteoarthritis     Type 2 diabetes mellitus without complication, without long-term current use of insulin ([address] 1/15/2014     Past Surgical History:   Procedure Laterality Date    BRONCHOSCOPY  08/10/2017    ECHO COMPL W DOP COLOR FLOW  2013         EXTERNAL EAR SURGERY  2009    keloid left ear    FOOT SURGERY 1990    Left foot    GASTROSTOMY TUBE PLACEMENT  08/10/2017    IR TUNNELED CATHETER PLACEMENT GREATER THAN 5 YEARS  11/19/2020    IR TUNNELED CATHETER PLACEMENT GREATER THAN 5 YEARS 11/19/2020 Anabell Larkin MD [facility]    TRACHEOSTOMY N/A 11/11/2020    TRACHEOTOMY performed by Maryann Espinoza MD at [facility] N/A 11/12/2020    OPEN TRACHEOTOMY, BRONCHOSCOPY, DIRECT LARYNGOSCOPY performed by Frederic Chowdhury DO at [address] N/A 1/18/2021    TRACHEOTOMY TUBE CHANGE performed by Frederic Chowdhury DO at [address]  08/10/2017    UPPER GASTROINTESTINAL ENDOSCOPY N/A 11/11/2020    EGD ESOPHAGOGASTRODUODENOSCOPY PEG TUBE INSERTION performed by Maryann Espinoza MD at [address]     Prior to Admission medications    Medication Sig Start Date End Date Taking?  Authorizing Provider   Arformoterol Tartrate (BROVANA) 15 MCG/2ML NEBU Take 1 ampule by nebulization 2 times daily   Yes Historical Provider, MD   budesonide (PULMICORT) 0.5 MG/2ML nebulizer suspension Take 1 ampule by nebulization 2 times daily   Yes Historical Provider, MD   albuterol sulfate HFA (PROVENTIL HFA) 108 (90 Base) MCG/ACT inhaler Inhale 2 puffs into the lungs every 4 hours as needed for Wheezing 3/6/21 3/6/22 Yes Praveena Ramirez MD   carvedilol (COREG) 25 MG tablet Take 1 tablet by mouth 2 times daily 2/26/21  Yes Marisela Dallas MD   olmesartan (BENICAR) 20 MG tablet Take 1 tablet by mouth daily 2/26/21  Yes Marisela Dallas MD   apixaban (ELIQUIS) 5 MG TABS tablet Take 5 mg by mouth 2 times daily   Yes Historical Provider, MD   levothyroxine (SYNTHROID) 50 MCG tablet Take 1 tablet by mouth daily 12/2/20  Yes Marisela Dallas MD   amLODIPine (NORVASC) 10 MG tablet Take 1 tablet by mouth daily 5/19/20  Yes Marisela Dallas MD     Scheduled Medications:   amLODIPine  10 mg Oral Daily    [Held by provider] apixaban  5 mg Oral BID    Arformoterol Tartrate  15 mcg Nebulization BID    budesonide  500 mcg Nebulization BID    carvedilol  25 mg Oral BID    levothyroxine  50 mcg Oral Daily    olmesartan  20 mg Oral Daily    sodium chloride flush  10 mL Intravenous 2 times per day     Continuous Infusion Medications:   sodium chloride       AsNeeded Medications:  LORazepam, albuterol, sodium chloride flush, sodium chloride, promethazine **OR** ondansetron, polyethylene glycol, acetaminophen **OR** acetaminophen, HYDROcodone 5 mg - acetaminophen  Allergies   Allergen Reactions    Bee Venom Anaphylaxis    Shellfish-Derived Products Anaphylaxis     Only crab legs     Social History     Socioeconomic History    Marital status: Legally      Spouse name: Not on file    Number of children: Not on file    Years of education: Not on file    Highest education level: Not on file   Occupational History    Not on file   Social Needs    Financial resource strain: Not on file    Food insecurity     Worry: Not on file     Inability: Not on file    Transportation needs     Medical: Not on file     Non-medical: Not on file   Tobacco Use    Smoking status: Never Smoker    Smokeless tobacco: Never Used   Substance and Sexual Activity    Alcohol use: Not Currently     Frequency: 2-3 times a week     Drinks per session: 1 or 2     Binge frequency: Never    Drug use: Not Currently     Comment: past use; none x 30 years    Sexual activity: Not Currently   Lifestyle    Physical activity     Days per week: Not on file     Minutes per session: Not on file    Stress: Not on file   Relationships    Social connections     Talks on phone: Not on file     Gets together: Not on file     Attends Hindu service: Not on file     Active member of club or organization: Not on file     Attends meetings of clubs or organizations: Not on file     Relationship status: Not on file    Intimate partner violence     Fear of current or ex partner: Not on file     Emotionally abused: Not on file     Physically abused: Not on file     Forced sexual activity: Not on file   Other Topics Concern    Not on file   Social History Narrative    Drinks 3 cups of coffee daily. Family History   Problem Relation Age of Onset    Alzheimer's Disease Mother     Heart Disease Father         Heart arrhythmia    Heart Attack Father        REVIEW OF SYSTEMS:  Review of Systems  Constitutional: Negative for chills and fever. Eyes: Negative for discharge and itching. ENT: Negative for congestion, ear discharge, ear pain, hearing loss and sore throat. Respiratory: Negative for chest tightness and shortness of breath. Cardiovascular: Negative for chest pain and palpitations. Gastrointestinal:  Negative for abdominal distention and abdominal pain. PHYSICAL EXAM:                                                                                                                Vitals:    04/03/21 0215 04/03/21 0915 04/03/21 0917 04/03/21 0921   BP: (!) 164/72 (!) 182/82 (!) 200/91 (!) 160/72   Pulse: 70 92     Resp: 18 18     Temp:  96 °F (35.6 °C)     TempSrc:  Temporal     SpO2: 99% 97%     Weight:       Height:         Physical Exam  GENERAL:  Laying in bed, awake, does not participate in exam, no apparent distress  HENT: Normocephalic, atraumatic, 6-0 Shiley uncuffed proximal xlt trach in place. NEURO: CN II-XII intact  EYES: No sclera icterus, pupils equal  LUNGS:  No increased work of breathing. Tracheoscopy showed no bleeding  CARDIOVASCULAR: Normal rate        None      DATA:                                                                                                                                      CT HEAD WO CONTRAST   Final Result   No acute intracranial abnormality. XR CHEST PORTABLE   Final Result   Peribronchial thickening and lower lung infiltrates. This could be edema or   pneumonia.            CBC with Differential:    Lab Results   Component Value Date    WBC 7.5 04/03/2021    RBC 4.18 04/03/2021    HGB 10.4 04/03/2021    HCT 32.2 04/03/2021     04/03/2021    MCV 77.0 04/03/2021    MCH 24.9 04/03/2021    MCHC 32.3 04/03/2021    RDW 19.5 04/03/2021    NRBC 0.9 11/16/2020    SEGSPCT 73 01/26/2014    METASPCT 1.8 11/15/2020    LYMPHOPCT 42.6 04/03/2021    MONOPCT 10.2 04/03/2021    MYELOPCT 0.9 11/15/2020    BASOPCT 0.3 04/03/2021    MONOSABS 0.76 04/03/2021    LYMPHSABS 3.19 04/03/2021    EOSABS 0.19 04/03/2021    BASOSABS 0.02 04/03/2021     Platelets:    Lab Results   Component Value Date     04/03/2021     Hemoglobin/Hematocrit:    Lab Results   Component Value Date    HGB 10.4 04/03/2021    HCT 32.2 04/03/2021     CMP:    Lab Results   Component Value Date     04/02/2021    K 4.0 04/02/2021    K 4.1 02/26/2021     04/02/2021    CO2 30 04/02/2021    BUN 17 04/02/2021    CREATININE 1.5 04/02/2021    GFRAA 58 04/02/2021    LABGLOM 58 04/02/2021    GLUCOSE 96 04/02/2021    PROT 7.2 04/02/2021    LABALBU 3.8 04/02/2021    CALCIUM 9.2 04/02/2021    BILITOT 0.2 04/02/2021    ALKPHOS 73 04/02/2021    AST 13 04/02/2021    ALT 15 04/02/2021     BUN/Creatinine:    Lab Results   Component Value Date    BUN 17 04/02/2021    CREATININE 1.5 04/02/2021     Albumin:    Lab Results   Component Value Date    LABALBU 3.8 04/02/2021     Calcium:    Lab Results   Component Value Date    CALCIUM 9.2 04/02/2021     Ionized Calcium:  No results found for: IONCA  PT/INR:    Lab Results   Component Value Date    PROTIME 12.9 04/02/2021    INR 1.2 04/02/2021     Warfarin PT/INR:  No components found for: PTPATWAR, PTINRWAR  PTT:    Lab Results   Component Value Date    APTT 67.9 12/11/2020   [APTT}    ASSESSMENTAND PLAN:                                                                                                   Patient Active Problem List   Diagnosis    Hyperlipidemia    Type 2 diabetes mellitus without complication, without long-term [facility]    Atypical chest pain    Essential hypertension    Chronic acquired lymphedema    Aortic valve disease    Morbid obesity due to excess calories (HCC)    Abdominal pain    Acute respiratory failure with hypoxia (HCC)    Acute pulmonary edema (HCC)    Congestive heart failure with LV diastolic dysfunction, NYHA class 3 (HCC)    HANS (obstructive sleep apnea)    Acquired hypothyroidism    Chronic diastolic heart failure (HCC)    Nonrheumatic aortic valve stenosis    ACE-inhibitor cough    Pneumonia due to organism    Acute gout of right knee    Bilateral pulmonary embolism (HCC)    Anxiety    Tracheostomy dependent (HCC)    Shortness of breath    H/O deep venous thrombosis    SOB (shortness of breath)     64 y.o. male with history of  severe HANS, pneumonia and vent dependence presents as a consult for trach bleed. Tracheoscopy showed no concern for trach bleed. · No acute ENT intervention is needed  · Stop Eliquis  · DME orders for trach supplies and home suction machine placed  · Trach care education provided to wife at bedside  · Patient to follow up with Dr. Tono Cui as an outpatient    Case, assessment and plans discussed with attending physician.     Shaina Mccracken DO  Resident Physician  Otolaryngology -- [facility]iu 1943   4/3/2021  12:10 PM

## 2021-04-03 NOTE — PROGRESS NOTES
Pulmonary Progress Note    Admit Date: 2021                            PCP: Bonnie Najera MD  Active Problems:    SOB (shortness of breath)  Resolved Problems:    * No resolved hospital problems. *      Subjective:  Sitting up in bed eating lunch on TOI 40%. Feeling better. Bleeding is better     Medications:   sodium chloride          amLODIPine  10 mg Oral Daily    [Held by provider] apixaban  5 mg Oral BID    Arformoterol Tartrate  15 mcg Nebulization BID    budesonide  500 mcg Nebulization BID    carvedilol  25 mg Oral BID    levothyroxine  50 mcg Oral Daily    olmesartan  20 mg Oral Daily    sodium chloride flush  10 mL Intravenous 2 times per day       Vitals:  VITALS:  BP (!) 160/72   Pulse 92   Temp 96 °F (35.6 °C) (Temporal)   Resp 18   Ht 5' 10\" (1.778 m)   Wt 286 lb (129.7 kg)   SpO2 97%   BMI 41.04 kg/m²   24HR INTAKE/OUTPUT:      Intake/Output Summary (Last 24 hours) at 4/3/2021 1200  Last data filed at 4/3/2021 0915  Gross per 24 hour   Intake 1080 ml   Output 200 ml   Net 880 ml     CURRENT PULSE OXIMETRY:  SpO2: 97 %  24HR PULSE OXIMETRY RANGE:  SpO2  Av.8 %  Min: 94 %  Max: 99 %  CVP:    VENT SETTINGS:   Vent Information  Skin Assessment: Clean, dry, & intact  FiO2 : 40 %  SpO2: 97 %  SpO2/FiO2 ratio: 250  Additional Respiratory  Assessments  Pulse: 92  Resp: 18  SpO2: 97 %  Oral Care: Mouthwash(given to patient)      EXAM:  General: Alert, in NAD  ENT: No discharge. Pharynx clear. membranes moist   Neck: Supple, Trachea midline. Chronic trach intact, no bleeding noted   Resp: No accessory muscle use. Non-labored. Lungs clear diminished with No crackles. No wheezing. No rhonchi. CV: Regular rate. Regular rhythm. No murmur . No edema. ABD: Non-tender. Non-distended. Soft, round . Normal bowel sounds. Obese   Skin: Warm and dry. M/S: No cyanosis. No joint deformity. No clubbing. Neuro: Awake. Follows commands.  O x 3, ARMSTRONG  Psych:  calm and interactive     I/O: I/O last 3 completed shifts: In: 720 [P.O.:720]  Out: 200 [Urine:200]  I/O this shift:  In: 360 [P.O.:360]  Out: -      Results:  CBC:   Recent Labs     04/02/21 0208 04/03/21  0829   WBC 8.5 7.5   HGB 11.4* 10.4*   HCT 34.9* 32.2*   MCV 75.5* 77.0*    246     BMP:   Recent Labs     04/02/21 0208      K 4.0      CO2 30*   BUN 17   CREATININE 1.5*     LFT:   Recent Labs     04/02/21 0208   ALKPHOS 73   ALT 15   AST 13   PROT 7.2   BILITOT 0.2   LABALBU 3.8     PT/INR:   Recent Labs     04/02/21 0208   PROTIME 12.9*   INR 1.2     Procalcitonin:   Recent Labs     04/02/21 0208   PROCAL 0.06     Cultures:  Results for Angeline Chavez (MRN [id number]) as of 4/3/2021 12:01   Ref. Range 4/2/2021 07:03   SARS-CoV-2, NAAT Latest Ref Range: Not Detected  Not Detected     Sputum cx sent---pending     ABG:   No results for input(s): PH, PO2, PCO2, HCO3, BE, O2SAT in the last 72 hours. Films:  Ct Head Wo Contrast    Result Date: 4/2/2021  EXAMINATION: CT OF THE HEAD WITHOUT CONTRAST  4/2/2021 4:39 am TECHNIQUE: CT of the head was performed without the administration of intravenous contrast. Dose modulation, iterative reconstruction, and/or weight based adjustment of the mA/kV was utilized to reduce the radiation dose to as low as reasonably achievable. COMPARISON: None. HISTORY: ORDERING SYSTEM PROVIDED HISTORY: Evaluate intracranial abnormality TECHNOLOGIST PROVIDED HISTORY: Has a \"code stroke\" or \"stroke alert\" been called? ->No Reason for exam:->Evaluate intracranial abnormality Decision Support Exception->Emergency Medical Condition (MA) What reading provider will be dictating this exam?->CRC FINDINGS: BRAIN/VENTRICLES: There is no acute intracranial hemorrhage, mass effect or midline shift. No abnormal extra-axial fluid collection. The gray-white differentiation is maintained without evidence of an acute infarct. There is no evidence of hydrocephalus.  ORBITS: The visualized portion of the orbits demonstrate no acute abnormality. SINUSES: The visualized paranasal sinuses and mastoid air cells demonstrate no acute abnormality. SOFT TISSUES/SKULL:  No acute abnormality of the visualized skull or soft tissues. No acute intracranial abnormality. Xr Chest Portable    Result Date: 4/2/2021  EXAMINATION: ONE XRAY VIEW OF THE CHEST 4/2/2021 2:23 am COMPARISON: 03/06/2021 HISTORY: ORDERING SYSTEM PROVIDED HISTORY: sob TECHNOLOGIST PROVIDED HISTORY: Reason for exam:->sob What reading provider will be dictating this exam?->CRC FINDINGS: There is central peribronchial thickening and mild infiltrate in mid to lower lungs. There is no pleural effusion or pneumothorax. There is no cardiomegaly. Findings are nonspecific. They could reflect edema or pneumonia. Peribronchial thickening and lower lung infiltrates. This could be edema or pneumonia.          Assessment:  · Tracheal stoma bleeding. Could be trauma related due to recent replacement and patient was receiving full dose of anticoagulation. · Doubt pneumonia  · provoked pulmonary embolism December 2020. As per guidelines 3 to 6 months will be more than enough. · History of COPD  · History of HANS        Plan:  · Hold Eliquis for 48 hours, may restart with lower dose like 2.5 p.o. twice a day to complete 6 months, June 2021. If recurrence of the bleeding noticed favor to stop anticoagulation indefinitely.   · Procalcitonin normal 0.06  Gram stain sputum to follow-sent and pending WBC normal, and afebrile , doubt pneumonia   · Continue home nebulizers, brovana and budesonide, PRN albuterol   · ENT following          Electronically signed by KATHERINE Andrews on 4/3/2021 at 12:00 PM

## 2021-04-03 NOTE — PLAN OF CARE
Problem: Falls - Risk of:  Goal: Will remain free from falls  Description: Will remain free from falls  4/3/2021 0506 by Mike March RN  Outcome: Met This Shift  3/5/9112 7594 by Michael Pop RN  Outcome: Ongoing  3/9/6621 4580 by Michael Pop RN  Outcome: Met This Shift  Goal: Absence of physical injury  Description: Absence of physical injury  4/3/2021 0506 by Mike March RN  Outcome: Met This Shift  6/0/1357 6263 by Michael Pop RN  Outcome: Ongoing  1/8/9658 6653 by Michael Pop RN  Outcome: Met This Shift     Problem: Pain:  Goal: Pain level will decrease  Description: Pain level will decrease  4/3/2021 0506 by Mike March RN  Outcome: Met This Shift  3/3/8168 7932 by Michael Pop RN  Outcome: Ongoing  4/2/6369 6101 by Michael Pop RN  Outcome: Met This Shift  Goal: Control of acute pain  Description: Control of acute pain  0/4/8939 0771 by Michael Pop RN  Outcome: Ongoing  9/0/9087 1844 by Michael Pop RN  Outcome: Met This Shift  Goal: Control of chronic pain  Description: Control of chronic pain  7/5/4027 3847 by Michael Pop RN  Outcome: Ongoing  4/7/7226 2124 by Michael Pop RN  Outcome: Met This Shift     Problem: Breathing Pattern - Ineffective:  Goal: Ability to achieve and maintain a regular respiratory rate will improve  Description: Ability to achieve and maintain a regular respiratory rate will improve  2/3/4619 9295 by Michael Pop RN  Outcome: Ongoing  7/2/9996 3722 by Michael Pop RN  Outcome: Met This Shift

## 2021-04-04 NOTE — PROGRESS NOTES
Pulmonary Progress Note    Admit Date: 2021                            PCP: Jose Alfredo Sarmiento MD  Active Problems:    SOB (shortness of breath)  Resolved Problems:    * No resolved hospital problems. *      Subjective:  Sitting up in bed eating lunch on TOI 40%. Feeling better. Bleeding is better. C/o thick mucous hard to cough it up     Medications:   sodium chloride          amLODIPine  10 mg Oral Daily    [Held by provider] apixaban  5 mg Oral BID    Arformoterol Tartrate  15 mcg Nebulization BID    budesonide  500 mcg Nebulization BID    carvedilol  25 mg Oral BID    levothyroxine  50 mcg Oral Daily    olmesartan  20 mg Oral Daily    sodium chloride flush  10 mL Intravenous 2 times per day       Vitals:  VITALS:  BP (!) 160/77   Pulse 118   Temp 96.6 °F (35.9 °C) (Temporal)   Resp 14   Ht 5' 10\" (1.778 m)   Wt 286 lb (129.7 kg)   SpO2 100%   BMI 41.04 kg/m²   24HR INTAKE/OUTPUT:      Intake/Output Summary (Last 24 hours) at 2021 1159  Last data filed at 2021 0911  Gross per 24 hour   Intake 720 ml   Output 1150 ml   Net -430 ml     CURRENT PULSE OXIMETRY:  SpO2: 100 %  24HR PULSE OXIMETRY RANGE:  SpO2  Av.3 %  Min: 99 %  Max: 100 %  CVP:    VENT SETTINGS:   Vent Information  Skin Assessment: Clean, dry, & intact  FiO2 : 40 %  SpO2: 100 %  SpO2/FiO2 ratio: 247.5  Additional Respiratory  Assessments  Pulse: 118  Resp: 14  SpO2: 100 %  Oral Care: Mouthwash      EXAM:  General: Alert, in NAD  ENT: No discharge. Pharynx clear. membranes moist   Neck: Supple, Trachea midline. Chronic trach intact, no bleeding noted   Resp: No accessory muscle use. Non-labored. Lungs clear diminished with No crackles. No wheezing. No rhonchi. CV: Regular rate. Regular rhythm. No murmur . No edema. ABD: Non-tender. Non-distended. Soft, round . Normal bowel sounds. Obese   Skin: Warm and dry. M/S: No cyanosis. No joint deformity. No clubbing. Neuro: Awake. Follows commands.  O x 3, ARMSTRONG  Psych: calm and interactive     I/O: I/O last 3 completed shifts: In: 840 [P.O.:840]  Out: 1150 [Urine:1150]  I/O this shift:  In: 240 [P.O.:240]  Out: -      Results:  CBC:   Recent Labs     04/02/21  0208 04/03/21  0829   WBC 8.5 7.5   HGB 11.4* 10.4*   HCT 34.9* 32.2*   MCV 75.5* 77.0*    246     BMP:   Recent Labs     04/02/21  0208      K 4.0      CO2 30*   BUN 17   CREATININE 1.5*     LFT:   Recent Labs     04/02/21 0208   ALKPHOS 73   ALT 15   AST 13   PROT 7.2   BILITOT 0.2   LABALBU 3.8     PT/INR:   Recent Labs     04/02/21 0208   PROTIME 12.9*   INR 1.2     Procalcitonin:   Recent Labs     04/02/21 0208   PROCAL 0.06     Cultures:  Results for Sara Brennan (MRN [id number]) as of 4/3/2021 12:01   Ref. Range 4/2/2021 07:03   SARS-CoV-2, NAAT Latest Ref Range: Not Detected  Not Detected     Sputum cx 4/4/2021 11:13 AM - Roque, y Incoming Results From [facility]    Specimen Information: Tracheal Aspirate; Sputum        Component Collected Lab   CULTURE, RESPIRATORY Abnormal  04/03/2021 10:00 AM [facility]   Oral Pharyngeal Tiesah absent    Smear, Respiratory 04/03/2021 10:00 AM [facility]   Group 6: <25 PMN's/LPF and <25 Epithelial cells/LPF   Rare Polymorphonuclear leukocytes   Rare Epithelial cells   Rare Gram positive cocci in pairs    Organism Abnormal  04/03/2021 10:00 AM [facility]   Gram negative erica    CULTURE, RESPIRATORY 04/03/2021 10:00 AM [facility]   Rare growth   Identification and sensitivity to follow    Organism Abnormal  04/03/2021 10:00 AM [facility] [place] [facility]   Staphylococcus species    CULTURE, RESPIRATORY 04/03/2021 10:00 AM [facility]   Moderate growth   Identification and sensitivity to follow    Testing Performed By        ABG:   No results for input(s): PH, PO2, PCO2, HCO3, BE, O2SAT in the last 72 hours.     Films:  Ct Head Wo Contrast    Result Date: 4/2/2021  EXAMINATION: CT OF THE HEAD WITHOUT CONTRAST  4/2/2021 4:39 am TECHNIQUE: CT of the head was performed without the administration of intravenous contrast. Dose modulation, iterative reconstruction, and/or weight based adjustment of the mA/kV was utilized to reduce the radiation dose to as low as reasonably achievable. COMPARISON: None. HISTORY: ORDERING SYSTEM PROVIDED HISTORY: Evaluate intracranial abnormality TECHNOLOGIST PROVIDED HISTORY: Has a \"code stroke\" or \"stroke alert\" been called? ->No Reason for exam:->Evaluate intracranial abnormality Decision Support Exception->Emergency Medical Condition (MA) What reading provider will be dictating this exam?->CRC FINDINGS: BRAIN/VENTRICLES: There is no acute intracranial hemorrhage, mass effect or midline shift. No abnormal extra-axial fluid collection. The gray-white differentiation is maintained without evidence of an acute infarct. There is no evidence of hydrocephalus. ORBITS: The visualized portion of the orbits demonstrate no acute abnormality. SINUSES: The visualized paranasal sinuses and mastoid air cells demonstrate no acute abnormality. SOFT TISSUES/SKULL:  No acute abnormality of the visualized skull or soft tissues. No acute intracranial abnormality. Xr Chest Portable    Result Date: 4/2/2021  EXAMINATION: ONE XRAY VIEW OF THE CHEST 4/2/2021 2:23 am COMPARISON: 03/06/2021 HISTORY: ORDERING SYSTEM PROVIDED HISTORY: sob TECHNOLOGIST PROVIDED HISTORY: Reason for exam:->sob What reading provider will be dictating this exam?->CRC FINDINGS: There is central peribronchial thickening and mild infiltrate in mid to lower lungs. There is no pleural effusion or pneumothorax. There is no cardiomegaly. Findings are nonspecific. They could reflect edema or pneumonia. Peribronchial thickening and lower lung infiltrates. This could be edema or pneumonia.          Assessment:  · Tracheal stoma bleeding.  Could be trauma related due to recent replacement and patient was receiving full dose of anticoagulation. · Doubt pneumonia  · provoked pulmonary embolism December 2020. As per guidelines 3 to 6 months will be more than enough.    · History of COPD  · History of HANS        Plan:  · ENT-following, no intervention at this time, stopping Eliquis   · Procalcitonin normal 0.06  Gram stain sputum culture -moderate growth Staph  WBC normal, and afebrile, will give  Doxycycline 100mg po BID x 7 days   · Add Mucinex QID and try CPT   · Continue home nebulizers, brovana and budesonide, PRN albuterol   · IS for pulmonary hygiene   · DME is [place] patient states he has no humidification at home  · Wean FIo2 as able keep POX >90%            Electronically signed by KATHERINE Malik on 4/4/2021 at 11:59 AM

## 2021-04-04 NOTE — PROGRESS NOTES
Subjective: The patient is awake and alert. No problems overnight. Denies chest pain, angina, and dyspnea. Denies abdominal pain. Tolerating diet. No nausea or vomiting. He is still c/o HA, but not as severe. Objective:  Pt is aox3 in nad   BP (!) 143/66   Pulse 118   Temp 98 °F (36.7 °C) (Temporal)   Resp 20   Ht 5' 10\" (1.778 m)   Wt 286 lb (129.7 kg)   SpO2 99%   BMI 41.04 kg/m²   HEENT no adenopathy no bruits  Heart:  RRR, no murmurs, gallops, or rubs.   Lungs:  CTA bilaterally, no wheeze, rales or rhonchi  Abd: bowel sounds present, nontender, nondistended, no masses  Extrem:  No clubbing, cyanosis, or edema  WBC/Hgb/Hct/Plts:  7.5/10.4/32.2/246 (04/03 1892) basic metabolic panel     Assessment:    Patient Active Problem List   Diagnosis    Hyperlipidemia    Type 2 diabetes mellitus without complication, without long-term current use of insulin (HCC)    Atypical chest pain    Essential hypertension    Chronic acquired lymphedema    Aortic valve disease    Morbid obesity due to excess calories (HCC)    Abdominal pain    Acute respiratory failure with hypoxia (HCC)    Acute pulmonary edema (HCC)    Congestive heart failure with LV diastolic dysfunction, NYHA class 3 (HCC)    HANS (obstructive sleep apnea)    Acquired hypothyroidism    Chronic diastolic heart failure (HCC)    Nonrheumatic aortic valve stenosis    ACE-inhibitor cough    Pneumonia due to organism    Acute gout of right knee    Bilateral pulmonary embolism (HCC)    Anxiety    Tracheostomy dependent (HCC)    Shortness of breath    H/O deep venous thrombosis    SOB (shortness of breath)       Plan:  Anticoagulation on hold  Pulmonary toilet  Increase activity         Godwin Schaffer  6:35 AM  4/4/2021

## 2021-04-05 NOTE — PLAN OF CARE
Problem: Breathing Pattern - Ineffective:  Goal: Ability to achieve and maintain a regular respiratory rate will improve  Description: Ability to achieve and maintain a regular respiratory rate will improve  Outcome: Met This Shift     Problem: Pain:  Description: Pain management should include both nonpharmacologic and pharmacologic interventions.   Goal: Control of chronic pain  Description: Control of chronic pain  Outcome: Met This Shift     Problem: Falls - Risk of:  Goal: Will remain free from falls  Description: Will remain free from falls  Outcome: Met This Shift

## 2021-04-05 NOTE — PROGRESS NOTES
Physical Therapy  Physical Therapy Initial Assessment     Name: Ronni Soto  : 1960  MRN: [id number]    Referring Provider:  Toribio Mchugh MD    Date of Service: 2021    Evaluating PT:  Lauri Holt, PT, DPT [id number]      Room #:  5178/7144-Y  Diagnosis:  SOB  PMHx/PSHx:  HTN, HLD< obstructive sleep apnea, depression, gouty arthritis, anxiety, OA, obesity, lymphedema, CHF, essential HTN, acquired hypothyroidism, DM, COPD, tracheostomy, EGD with PEG insertion  Procedure/Surgery:  None this admission  Precautions:  Falls, O2 via trach mask, PEG tube  Equipment Needs:  none    SUBJECTIVE:    Pt lives with his wife in a 2 story house with 5 stairs to enter and B rails. Bed is on second floor and bath is on second floor. Full flight of stairs to second floor with 1 rail. Pt ambulated with FWW as needed prior to admission. Pt was independent with ADLs at baseline. He uses 2L/min O2 via trach mask at baseline. OBJECTIVE:   Initial Evaluation  Date: 21 Treatment Short Term/ Long Term   Goals   AM-PAC 6 Clicks 75/78     Was pt agreeable to Eval/treatment? yes     Does pt have pain? Substantial pain around trach      Bed Mobility  Rolling: NT  Supine to sit: NT  Sit to supine: NT  Scooting: NT  Rolling: Mod Independent   Supine to sit: Mod Independent   Sit to supine: Mod Independent   Scooting: Mod Independent    Transfers Sit to stand: SBA  Stand to sit: SBA  Stand pivot: SBA with AAD  Sit to stand: Mod Independent   Stand to sit: Mod Independent   Stand pivot: Mod Independent    Ambulation    100 feet with no AD with SBA on RA  100 feet with no AD with SBA on supplemental O2 via trach mask  >150 feet with AAD with Mod Independent    Stair negotiation: ascended and descended  NT due to decreased SPO2, then pt fatigued.    12 steps with 1 rail Mod Independent    ROM BUE:  WNL  BLE:  WNL     Strength BUE:  WNL  BLE:  WNL     Balance Sitting EOB:  Independent  Dynamic Standing:  SBA with no AD  Sitting EOB:  Independent  Dynamic Standing:  Independent with AAD vs no AD     Pt is A & O x 4  Sensation:  Pt denies numbness and tingling to extremities  Edema:  unremarkable    Vitals:  SPO2 100% at rest on 12L/min at 40% FiO2   RN clears for pt to complete PT eval on RA, 98% on room air at rest.  SPO2 decreased to 85% on RA with ambulation, recovered to 88%  SPO2 returned to 97%sitting EOB. SPO2 maintained at 100% on supplemental O2 during additional ambulation. Patient education  Pt educated on role of therapy, objective findings, safety with mobility, transfer technique, gait mechanics, activity pacing    Patient response to education:   Pt verbalized understanding Pt demonstrated skill Pt requires further education in this area   yes yes yes     ASSESSMENT:    Comments:  Pt seated on foot stool upon arrival, agreeable to PT eval. Pt completed sit>stand without UEs and no external assist. Pt firmly declines use of supplemental O2 via trach mask for ambulation; RN clears for this level of activity with skilled monitoring of SPO2. Pt requires cues for slightly slowed pace of ambulation for improved safety. SPO2 decreased with ambulation, PT encouraging pt to pause to allow recovery. Pt frustrated with this and requests to attempt stairs. PT educated pt in safety concerns and pt was returned to room to obtain supplemental O2. Upon reaching stairs for second time pt declines stair negotiation as he is fatigued and lunch tray had just arrived. Pt seated EOB upon completion of session with all needs in reach. He is functioning slightly below baseline abilities and will benefit from conitnued skilled PT services to improve independence with all bed mobility/transfers, gait training, stair negotiation, and improved overall endurance.     Treatment:  Patient practiced and was instructed in the following treatment:     Transfer training: cues for safety awareness, SBA for safety   Gait training: cues for slightly slowed pace to allow improved balance, skilled monitoring of SPO2    Pt's/ family goals   1. To return home independently    Patient and or family understand(s) diagnosis, prognosis, and plan of care. yes    PLAN OF CARE:    Current Treatment Recommendations   [x] Strengthening     [] ROM   [x] Balance Training   [x] Endurance Training   [x] Transfer Training   [x] Gait Training   [x] Stair Training   [] Positioning   [x] Safety and Education Training   [] Patient/Caregiver Education   [] HEP  [] Other       PT care will be provided in accordance with the objectives noted above. Whenever appropriate, clear delegation orders will be provided for nursing staff. Exercises and functional mobility practice will be used as well as appropriate assistive devices or modalities to obtain goals. Patient and family education will also be administered as needed. Frequency of treatments: 2-5x/week x 5-7 days. Time in  1100  Time out  1115    Total Treatment Time  10 minutes     Evaluation Time includes thorough review of current medical information, gathering information on past medical history/social history and prior level of function, completion of standardized testing/informal observation of tasks, assessment of data and education on plan of care and goals.     CPT codes:  [x] Low Complexity PT evaluation 07925  [] Moderate Complexity PT evaluation 47793  [] High Complexity PT evaluation 43415  [] PT Re-evaluation 76060  [] Gait training 53440 0 minutes  [] Manual therapy 35547 0 minutes  [x] Therapeutic activities 12601 10 minutes  [] Therapeutic exercises 12131 0 minutes  [] Neuromuscular reeducation 33461 0 minutes     Attila Gallegos, PT, DPT  [id number]

## 2021-04-05 NOTE — PROGRESS NOTES
Subjective: The patient is awake and alert. No problems overnight. Denies chest pain, angina, and dyspnea. Denies abdominal pain. Tolerating diet. No nausea or vomiting. Still feels sob at times, NIEVES is better. No more bleeding from trach site. Objective:  Pt is aox3 in nad   /62   Pulse 86   Temp 96.9 °F (36.1 °C) (Temporal)   Resp 22   Ht 5' 10\" (1.778 m)   Wt 286 lb (129.7 kg)   SpO2 99%   BMI 41.04 kg/m²   HEENT no adenopathy no bruits  Trach is midline   Heart:  RRR, no murmurs, gallops, or rubs. Lungs:  CTA bilaterally, no wheeze, rales or rhonchi  Abd: bowel sounds present, nontender, nondistended, no masses  Extrem:  No clubbing, cyanosis, or edema  WBC/Hgb/Hct/Plts:  7.5/10.4/32.2/246 (04/03 8674) basic metabolic panel     Assessment:    Patient Active Problem List   Diagnosis    Hyperlipidemia    Type 2 diabetes mellitus without complication, without long-term current use of insulin (HCC)    Atypical chest pain    Essential hypertension    Chronic acquired lymphedema    Aortic valve disease    Morbid obesity due to excess calories (HCC)    Abdominal pain    Acute respiratory failure with hypoxia (HCC)    Acute pulmonary edema (HCC)    Congestive heart failure with LV diastolic dysfunction, NYHA class 3 (HCC)    HANS (obstructive sleep apnea)    Acquired hypothyroidism    Chronic diastolic heart failure (HCC)    Nonrheumatic aortic valve stenosis    ACE-inhibitor cough    Pneumonia due to organism    Acute gout of right knee    Bilateral pulmonary embolism (HCC)    Anxiety    Tracheostomy dependent (HCC)    Shortness of breath    H/O deep venous thrombosis    SOB (shortness of breath)       Plan:  Cont Pulmonary toilet  Work with PT  Home possible next 24 hrs.         Chaparro Henao  7:30 AM  4/5/2021

## 2021-04-05 NOTE — CARE COORDINATION
Met with pt and discussed discharge planning/transition of care. Pt lives with spouse in a 2 story home, pt used both up/down stairs. Pt has walker and O2 through [employer] at home. Pt has trache and peg tube. Pt having difficult time caring for trache, would need a suction machine if going home. Pt states he is depressed, that this is no way to live, he doesn't want to hurt himself but this is no way to live. Notified RN for a consult for depression. Pt states that his wife is going back to work, and he feels he needs to go to nursing home where he can get 24hr care, he doesn't want to be a burden on his loved ones. Gave a list of long term care facilities, will follow up for choices. Lindsay MORAN

## 2021-04-05 NOTE — CARE COORDINATION
[facility] accepted and [facility] declined, pt aware. Pt now unsure if he wants to go to [facility]. Pt inquiring about going home until end of month when his wife goes back to work. Informed him hhc with  could be arranged. Pt unsure will think about discharge over night and speak with LSW in morning. Aric RICOW

## 2021-04-05 NOTE — PROGRESS NOTES
PH, PO2, PCO2, HCO3, BE, O2SAT in the last 72 hours. Films:  Ct Head Wo Contrast    Result Date: 4/2/2021  EXAMINATION: CT OF THE HEAD WITHOUT CONTRAST  4/2/2021 4:39 am TECHNIQUE: CT of the head was performed without the administration of intravenous contrast. Dose modulation, iterative reconstruction, and/or weight based adjustment of the mA/kV was utilized to reduce the radiation dose to as low as reasonably achievable. COMPARISON: None. HISTORY: ORDERING SYSTEM PROVIDED HISTORY: Evaluate intracranial abnormality TECHNOLOGIST PROVIDED HISTORY: Has a \"code stroke\" or \"stroke alert\" been called? ->No Reason for exam:->Evaluate intracranial abnormality Decision Support Exception->Emergency Medical Condition (MA) What reading provider will be dictating this exam?->CRC FINDINGS: BRAIN/VENTRICLES: There is no acute intracranial hemorrhage, mass effect or midline shift. No abnormal extra-axial fluid collection. The gray-white differentiation is maintained without evidence of an acute infarct. There is no evidence of hydrocephalus. ORBITS: The visualized portion of the orbits demonstrate no acute abnormality. SINUSES: The visualized paranasal sinuses and mastoid air cells demonstrate no acute abnormality. SOFT TISSUES/SKULL:  No acute abnormality of the visualized skull or soft tissues. No acute intracranial abnormality. Xr Chest Portable    Result Date: 4/2/2021  EXAMINATION: ONE XRAY VIEW OF THE CHEST 4/2/2021 2:23 am COMPARISON: 03/06/2021 HISTORY: ORDERING SYSTEM PROVIDED HISTORY: sob TECHNOLOGIST PROVIDED HISTORY: Reason for exam:->sob What reading provider will be dictating this exam?->CRC FINDINGS: There is central peribronchial thickening and mild infiltrate in mid to lower lungs. There is no pleural effusion or pneumothorax. There is no cardiomegaly. Findings are nonspecific. They could reflect edema or pneumonia. Peribronchial thickening and lower lung infiltrates.   This could be edema or pneumonia.          Assessment:  · Chronic trach with new stoma bleeding. Could be trauma related due to recent replacement and patient was receiving full dose of anticoagulation. · Questionable pneumonia  · provoked pulmonary embolism December 2020. As per guidelines 3 to 6 months will be more than enough. · History of COPD  · History of HANS        Plan:  · ENT-following, no intervention at this time, stopping Eliquis   · Procalcitonin normal 0.06  Gram stain sputum culture -moderate growth Staph  WBC normal, and afebrile, on Doxycycline 100mg po BID x 7 days (last dose 4/11)  · Continue Mucinex QID and CPT   · Continue home nebulizers, brovana and budesonide, PRN albuterol   · DME is [employer] patient states he has no humidification at home  · Wean FIo2 as able keep POX >90%        With continued productive cough and increased discomfort to left side rib cage, will do CXR today. Add IS. Electronically signed by KATHERINE Lehman CNP on 4/5/2021 at 10:25 AM      Seen and examined, agree with above. Has left chest/ rib pain likely from cough. Oxygenating great. Still with sputum production so await cxr but can likely dc tomorrow.

## 2021-04-05 NOTE — PROGRESS NOTES
Occupational Therapy  OCCUPATIONAL THERAPY INITIAL EVALUATION      Date:2021  Patient Name: Gregory Smith  MRN: [id number]  : 1960  Room: [address] [place] [address]    Evaluating OT: RICKI Scott, OTR/L #[id number]    Referring physician: Dian Blunt MD  AM-PAC Daily Activity Raw Score: 15/24  Recommended Adaptive Equipment: Shower chair     Diagnosis: SOB (shortness of breath) [R06.02]  Reason for Admission: Presented to hospital with headache and dyspnea and respiratory abnormalities, concern for tracheal stoma bleeding  Pertinent Medical History/Surgery: acquired hypothyroidism, anxiety, CHF with LV diastolic dysfunction, COPD, DM, HTN, gouty arthritis, HLD, HTN, lymphedema, HANS, DM type II, L foot surgery, Patient with hospitalization 10/2020 due to pneumonia and subsequent PE s/p tracheostomy (2020), EGD with PEG        Precautions:  Falls, tracheostomy, supplemental O2 via trach mask, PEG     Home Living: Pt lives with his wife in a two story house with 5 steps to enter with B railings. Bedroom and bathroom are located on the second floor. Bathroom setup: Tub/shower, standard commode   Equipment owned: fww  Prior Level of Function:   Per patient, I with ADLs ,  Assist from wife with IADLs. Patient was using fww as needed for functional mobility. Patient reports wife works but is off until the end of 2021 to assist as needed. Patient reports he typically stays on the second floor of home. Driving: no                                 Pain Level:  Moderate pain at trach   Cognition: A&O: self:yes, place:yes, time: yes, situation:yes  Communication: WFL  Follows 2-3 step directions   Memory:  good    Sequencing:  good    Problem solving:  fair +   Judgement/safety:  fair      Functional Assessment:   Initial Eval Status  Date: 21 Treatment Status  Date: Short Term Goals=LTG  Treatment frequency: PRN 1-3 x/week   Feeding Set-up   Modified [place]    Grooming Minimal Assist   Modified [place]     UB Dressing Minimal Assist   Modified [place]    LB Dressing Moderate Assist   Modified [place]    Bathing Moderate Assist   Stand by Assist    Toileting Moderate Assist   Modified [place]    Bed Mobility  Supine to sit: NT   Sit to supine: NT  Supine to sit: Mod I   Sit to supine: Mod I   Functional Transfers Sit to stand: SBA  Stand to sit: SBA  Without device  Sit to stand: Independent/Mod I  Stand to sit: Independent//Mod I  Stand pivot: Independent//Mod I   Functional Mobility SBA  Completed in room/hallway without device  Mod I using LRD   Balance Sitting:     Static:Mod     Dynamic:SBA  Standing: SBA     Activity Tolerance Fair  Good   Visual/  Perceptual Glasses/corrective lenses: reading only         Safety       Fair                  Good     Upper Extremities:   Hand Dominance: Right [x]  Left []      ROM and Strength Coordination Short Term Goals=LTG   Right UE Active ROM:  WFL     Strength: 4-/5     Strength: 4-/5 Fine/gross Motor Coordination: WFL          Left UE Active ROM:  WFL     Strength: 4-/5     Strength: 4-/5 Fine/gross Motor Coordination: WFL          Hearing: WFL  Sensation:  No c/o numbness or tingling  Tone:  WFL  Edema: none observed B UE                            Comments: Nursing approved OT session. Upon arrival, patient agreeable to session. OT evaluation performed with education provided regarding the purpose and benefits of OT session, along with mobility and I/ADL completion. Overall, patient presents with decreased activity tolerance and weakness limiting ability to complete ADLs, along with functional mobility/transfers. Patient would benefit from continued skilled OT to increase safety and functional performance with ADLs/ functional mobility to maximize functional outcomes in order for patient to return to [facility].   At end of session, patient sitting edge of bed with call light and phone within reach, along with all lines and tubes intact. Treatment: OT treatment provided this date includes:    Skilled monitoring of vitals:  Skilled monitoring of the patient's response throughout treatment. SpO2 at rest 100% on 12 L O2, 40% FiO2   Patient requesting to complete functional mobility without supplemental O2. Nursing okay to complete mobility on RA. SpO2 during functional mobility 86-92% on RA with SpO2 increasing to 97% with seated rest break  SpO2 during functional mobility 100% on 12 L O2, 40% FiO2      · Eval Complexity: Evaluation Complexity: Mod Complexity  ? History: Expanded review of medical records and additional review of physical, cognitive, or psychosocial history related to current functional performance  ? Exam: 5+ performance deficits  ? Assistance/Modification: mod/max assistance or modifications required to perform tasks. May have comorbidities that affect occupational performance.       Assessment of current deficits   Functional mobility [x]  ADLs [x] Strength [x]  Cognition []  Functional transfers  [x] IADLs [x] Safety Awareness [x]  Endurance/Activity tolerance [x]  Fine Motor Coordination [] Balance [x] Vision/perception [] Sensation []   Gross Motor Coordination [] ROM [] Delirium []                  Motor Control []    Plan of Care:  OT 1-3x/week for 1-2 weeks PRN   [x] ADL retraining/modified techniques, along with assistive device recommendations to maximize patient safety and performance with self-care while maintaining precautions   [x]Balance retraining/tolerance tasks for facilitation of postural control with dynamic challenges during ADLs and functional activities   [x] Delirium Prevention/treatment to maximize functional outcomes   [] Cognitive Re-Training/ executive function skills for safe participation in ADLs/IADLs, along with functional activities   [x] Energy conservation techniques/Strategies to improve activity tolerance      [x] Environmental modifications for safe mobility and completion of ADLs Therapeutic Activities 70062       ADL/Self Care 11464       Orthotic Management 20732       Neuro Re-Ed [id number]       Non-Billable Time          Mod OT Evaluation   Dom Chaudhry, OTD, OTR/L #[id number]

## 2021-04-05 NOTE — CARE COORDINATION
Met with pt, [facility]. referral to [facility]) and [facility]) they will review. Envelope and ambullett form in soft chart, HENS to be completed once accepting facility. Called #[id number] for STAT evals. Tom Abarca LSW. The Plan for Transition of Care is related to the following treatment goals: The Patient and/or patient representative was provided with a choice of provider and agrees   with the discharge plan. [x] Yes [] No    Freedom of choice list was provided with basic dialogue that supports the patient's individualized plan of care/goals, treatment preferences and shares the quality data associated with the providers.  [x] Yes [] No

## 2021-04-06 NOTE — CONSULTS
PROCEDURES    TRACHEOSTOMY N/A 11/11/2020    TRACHEOTOMY performed by Ronnette Osler, MD at [address] N/A 11/12/2020    OPEN TRACHEOTOMY, BRONCHOSCOPY, DIRECT LARYNGOSCOPY performed by Patricia Nagy DO at [address] [facility] N/A 1/18/2021    TRACHEOTOMY TUBE CHANGE performed by Patricia Nagy DO at [address]  08/10/2017    UPPER GASTROINTESTINAL ENDOSCOPY N/A 11/11/2020    EGD ESOPHAGOGASTRODUODENOSCOPY PEG TUBE INSERTION performed by Ronnette Osler, MD at [address] [facility] History:      Social History     Socioeconomic History    Marital status: Legally      Spouse name: Not on file    Number of children: Not on file    Years of education: Not on file    Highest education level: Not on file   Occupational History    Not on file   Social Needs    Financial resource strain: Not on file    Food insecurity     Worry: Not on file     Inability: Not on file    Transportation needs     Medical: Not on file     Non-medical: Not on file   Tobacco Use    Smoking status: Never Smoker    Smokeless tobacco: Never Used   Substance and Sexual Activity    Alcohol use: Not Currently     Frequency: 2-3 times a week     Drinks per session: 1 or 2     Binge frequency: Never    Drug use: Not Currently     Comment: past use; none x 30 years    Sexual activity: Not Currently   Lifestyle    Physical activity     Days per week: Not on file     Minutes per session: Not on file    Stress: Not on file   Relationships    Social connections     Talks on phone: Not on file     Gets together: Not on file     Attends [employer]ism service: Not on file     Active member of club or organization: Not on file     Attends meetings of clubs or organizations: Not on file     Relationship status: Not on file    Intimate partner violence     Fear of current or ex partner: Not on file     Emotionally abused: Not on file     Physically abused: Not on file     Forced sexual activity: Not on file   Other Topics Concern    Not on file   Social History Narrative    Drinks 3 cups of coffee daily.         Family History:     Family History   Problem Relation Age of Onset    Alzheimer's Disease Mother     Heart Disease Father         Heart arrhythmia    Heart Attack Father        Current Medications:        Current Facility-Administered Medications:     oxyCODONE (ROXICODONE) immediate release tablet 5 mg, 5 mg, Oral, Q4H PRN, Charmel Payan, DO, 5 mg at 04/06/21 0022    doxycycline hyclate (VIBRAMYCIN) capsule 100 mg, 100 mg, Oral, 2 times per day, Wolf Rodas, APRN - CNS, 100 mg at 04/06/21 0817    LORazepam (ATIVAN) tablet 0.5 mg, 0.5 mg, Oral, Q6H PRN, Charmel Payan, DO, 0.5 mg at 04/06/21 0244    albuterol (PROVENTIL) nebulizer solution 2.5 mg, 2.5 mg, Nebulization, Q4H PRN, Yumi Payan, DO, 2.5 mg at 04/03/21 1030    amLODIPine (NORVASC) tablet 10 mg, 10 mg, Oral, Daily, Charmel Payan, DO, 10 mg at 04/06/21 0817    [Held by provider] apixaban (ELIQUIS) tablet 5 mg, 5 mg, Oral, BID, Charmel Payan, DO, Stopped at 04/05/21 2100    Arformoterol Tartrate (BROVANA) nebulizer solution 15 mcg, 15 mcg, Nebulization, BID, Concepc[place]mel Payan, DO, 15 mcg at 04/06/21 1008    budesonide (PULMICORT) nebulizer suspension 500 mcg, 500 mcg, Nebulization, BID, Charmel Payan, DO, 500 mcg at 04/06/21 1008    carvedilol (COREG) tablet 25 mg, 25 mg, Oral, BID, Charmel Payan, DO, 25 mg at 04/06/21 9553    levothyroxine (SYNTHROID) tablet 50 mcg, 50 mcg, Oral, Daily, Charmel Payan, DO, 50 mcg at 04/06/21 0817    olmesartan (BENICAR) tablet 20 mg, 20 mg, Oral, Daily, Charmel Payan, DO, 20 mg at 04/06/21 0818    sodium chloride flush 0.9 % injection 10 mL, 10 mL, Intravenous, 2 times per day, Yumi Payan DO, 10 mL at 04/06/21 0817    sodium chloride flush 0.9 % injection 10 mL, 10 mL, Intravenous, PRN, Yumi Payan DO    0.9 % sodium chloride infusion, 103 04/02/2021    CO2 30 04/02/2021    BUN 17 04/02/2021    CREATININE 1.5 04/02/2021    GFRAA 58 04/02/2021    LABGLOM 58 04/02/2021    GLUCOSE 96 04/02/2021    PROT 7.2 04/02/2021    LABALBU 3.8 04/02/2021    CALCIUM 9.2 04/02/2021    BILITOT 0.2 04/02/2021    ALKPHOS 73 04/02/2021    AST 13 04/02/2021    ALT 15 04/02/2021     BMP:    Lab Results   Component Value Date     04/02/2021    K 4.0 04/02/2021    K 4.1 02/26/2021     04/02/2021    CO2 30 04/02/2021    BUN 17 04/02/2021    LABALBU 3.8 04/02/2021    CREATININE 1.5 04/02/2021    CALCIUM 9.2 04/02/2021    GFRAA 58 04/02/2021    LABGLOM 58 04/02/2021    GLUCOSE 96 04/02/2021     Magnesium:    Lab Results   Component Value Date    MG 1.8 11/25/2020     Phosphorus:    Lab Results   Component Value Date    PHOS 2.4 11/25/2020     U/A:    Lab Results   Component Value Date    NITRITE neg 08/30/2019    COLORU Yellow 12/09/2020    PROTEINU Negative 12/09/2020    PHUR 5.5 12/09/2020    LABCAST FEW 04/11/2016    WBCUA 1-3 11/25/2020    RBCUA 2-5 11/25/2020    BACTERIA NONE SEEN 11/25/2020    CLARITYU Clear 12/09/2020    SPECGRAV 1.015 12/09/2020    LEUKOCYTESUR Negative 12/09/2020    UROBILINOGEN 0.2 12/09/2020    BILIRUBINUR Negative 12/09/2020    BILIRUBINUR neg 08/30/2019    BLOODU Negative 12/09/2020    GLUCOSEU Negative 12/09/2020    AMORPHOUS FEW 11/25/2020     HgBA1c:    Lab Results   Component Value Date    LABA1C 6.8 06/15/2020     VITAMIN B12: No components found for: B12  FOLATE:    Lab Results   Component Value Date    FOLATE >20.0 11/05/2020     IRON:    Lab Results   Component Value Date    IRON 95 11/05/2020     Iron Saturation:  No components found for: PERCENTFE  TIBC:    Lab Results   Component Value Date    TIBC 275 11/05/2020     FERRITIN:    Lab Results   Component Value Date    FERRITIN 335 11/05/2020     Assessment/Plan    1.  NANCY on CKD3a in the setting of ARB  Baseline cr 1.1-1.2  Initial cr. 1.5   Hold ARB  Check current BMP  Check DENICE, PVR, urine lytes, creatinine, urea    2. Shortness of breath    3. Bleeding from trach site  Eliquis on hold  ENT following      4. HTN of CKD stages I-IV  BP at/near target of <130/80  Follow off ARB      Thank you for the opportunity to participate in the care of Clovis Deng.     Farhad Brown,APRN-CNS    Patient examined appears in stable condition T stomy   Peg tube     Non oliguric , no dysurea , renal functions better   Plan as outlined above     Dr Jose Cruz Healy

## 2021-04-06 NOTE — PROGRESS NOTES
Breathing okay but complains of vomiting.   Vent settings:Vent Information  Skin Assessment: Clean, dry, & intact  FiO2 : 40 %  SpO2: 100 %  SpO2/FiO2 ratio: 250  Additional Respiratory  Assessments  Pulse: 74  Resp: 18  SpO2: 100 %  Oral Care: Mouthwash      Current Facility-Administered Medications:     oxyCODONE (ROXICODONE) immediate release tablet 5 mg, 5 mg, Oral, Q4H PRN, Linden Border, DO, 5 mg at 04/06/21 0022    doxycycline hyclate (VIBRAMYCIN) capsule 100 mg, 100 mg, Oral, 2 times per day, KATHERINE Jung - CNS, 100 mg at 04/06/21 0817    LORazepam (ATIVAN) tablet 0.5 mg, 0.5 mg, Oral, Q6H PRN, Linden Border, DO, 0.5 mg at 04/06/21 0244    albuterol (PROVENTIL) nebulizer solution 2.5 mg, 2.5 mg, Nebulization, Q4H PRN, Linden Border, DO, 2.5 mg at 04/03/21 1030    amLODIPine (NORVASC) tablet 10 mg, 10 mg, Oral, Daily, Linden Border, DO, 10 mg at 04/06/21 0817    [Held by provider] apixaban (ELIQUIS) tablet 5 mg, 5 mg, Oral, BID, Linden Border, DO, Stopped at 04/05/21 2100    Arformoterol Tartrate (BROVANA) nebulizer solution 15 mcg, 15 mcg, Nebulization, BID, Linden Border, DO, 15 mcg at 04/06/21 1008    budesonide (PULMICORT) nebulizer suspension 500 mcg, 500 mcg, Nebulization, BID, Linden Border, DO, 500 mcg at 04/06/21 1008    carvedilol (COREG) tablet 25 mg, 25 mg, Oral, BID, Linden Border, DO, 25 mg at 04/06/21 7830    levothyroxine (SYNTHROID) tablet 50 mcg, 50 mcg, Oral, Daily, Linden Border, DO, 50 mcg at 04/06/21 0817    [Held by provider] olmesartan (BENICAR) tablet 20 mg, 20 mg, Oral, Daily, Linden Border, DO, 20 mg at 04/06/21 0818    sodium chloride flush 0.9 % injection 10 mL, 10 mL, Intravenous, 2 times per day, Linden Joyner, , 10 mL at 04/06/21 0817    sodium chloride flush 0.9 % injection 10 mL, 10 mL, Intravenous, PRN, Linden Joyner, DO    0.9 % sodium chloride infusion, 25 mL, Intravenous, PRN, Linden Joyner, DO    promethazine (PHENERGAN) tablet 12.5 mg, 12.5 mg, Oral, Q6H PRN **OR** ondansetron (ZOFRAN) injection 4 mg, 4 mg, Intravenous, Q6H PRN, Harish Ortiz,     polyethylene glycol (GLYCOLAX) packet 17 g, 17 g, Oral, Daily PRN, Harish Ortiz,     acetaminophen (TYLENOL) tablet 650 mg, 650 mg, Oral, Q6H PRN, 650 mg at 04/06/21 0244 **OR** acetaminophen (TYLENOL) suppository 650 mg, 650 mg, Rectal, Q6H PRN, Harish Ortiz, DO  /74   Pulse 74   Temp 96.1 °F (35.6 °C) (Tympanic)   Resp 18   Ht 5' 10\" (1.778 m)   Wt 286 lb (129.7 kg)   SpO2 100%   BMI 41.04 kg/m²     General: No distress  Neck: Trach midline  Chest: Symmetric, no accessory use  Heart: RRR  Lungs: Fairly clear  Abdomen: Soft, nt, nd  Extremities: Tr. Edema ble    Intake/Output Summary (Last 24 hours) at 4/6/2021 1250  Last data filed at 4/6/2021 7925  Gross per 24 hour   Intake 480 ml   Output 1200 ml   Net -720 ml   CXR viewed and fairly clear    IMPRESSION:   Patient Active Problem List   Diagnosis    Hyperlipidemia    Type 2 diabetes mellitus without complication, without long-term current use of insulin (HCC)    Atypical chest pain    Essential hypertension    Chronic acquired lymphedema    Aortic valve disease    Morbid obesity due to excess calories (HCC)    Abdominal pain    Acute respiratory failure with hypoxia (HCC)    Acute pulmonary edema (HCC)    Congestive heart failure with LV diastolic dysfunction, NYHA class 3 (HCC)    HANS (obstructive sleep apnea)    Acquired hypothyroidism    Chronic diastolic heart failure (HCC)    Nonrheumatic aortic valve stenosis    ACE-inhibitor cough    Pneumonia due to organism    Acute gout of right knee    Bilateral pulmonary embolism (HCC)    Anxiety    Tracheostomy dependent (HCC)    Shortness of breath    H/O deep venous thrombosis    SOB (shortness of breath)     Doing okay pulmonary wise and okay to discharge. Will see as needed.   Andrés Wang  4/6/2021  12:50 PM

## 2021-04-06 NOTE — CARE COORDINATION
Met with pt, pt would now like to go home. Pt will need trache supplies and suction machine. Referral to [facility], spoke with Yue Lowery, faxed orders, will await call back. Referral to [facility]) will await assessment. 10:10am received message from [facility]) they are unable to accept pt. Referral to [facility]) await assessment. 10:50am received call from [facility], they are bringing suction machine to room to show pt how to use it, attempted to call spouse, no answer, updated pt, pt to call spouse to see if she can come to hospital and learn how to use suction machine. 1:25pm [facility] called, unable to accept no staffing. [facility] came to floor and showed pt and spouse how to use trach suction machine. Referral to [facility], await assessment. Received call back, pt is active with another company.  Active with [facility], called with referral [phone], will need to fax [facility] ordrs [id number] [facility]

## 2021-04-06 NOTE — PROGRESS NOTES
Soft, nontender. Normal bs  PT SEEN AND EXAMINED. Chart reviewed. meds reviewed. D/w nursing + family as available. EXAM: IN GENERAL, NAD. AWAKE AND ALERT. ROS NEGx10 EXCEPT:   /71   Pulse 86   Temp 96.7 °F (35.9 °C) (Temporal)   Resp 18   Ht 5' 10\" (1.778 m)   Wt 286 lb (129.7 kg)   SpO2 100%   BMI 41.04 kg/m²   GEN: A+O NAD. HEENT: NCAT. EOMI. JOE  NECK: NO JVD. TRACH MIDLINE. NO BRUITS. NO THYROMEGALY. LUNGS: CTA BL NO RALES, RHONCHI OR WHEEZES. GOOD EXCURSION. CV: Regular rate and rhythm, NO Murmurs, Rubs, Or gallops  ABD: Soft. Nontender. Normal bowel sounds. No organomegaly  EXT:No clubbing cyanosis or edema  Neuro: Alert and oriented x 3. No focal motor deficits. No sensory deficits. Reflexes appear intact.   Labs/data reviewedLABS: CBC with Differential:    Lab Results   Component Value Date    WBC 7.5 04/03/2021    RBC 4.18 04/03/2021    HGB 10.4 04/03/2021    HCT 32.2 04/03/2021     04/03/2021    MCV 77.0 04/03/2021    MCH 24.9 04/03/2021    MCHC 32.3 04/03/2021    RDW 19.5 04/03/2021    NRBC 0.9 11/16/2020    SEGSPCT 73 01/26/2014    METASPCT 1.8 11/15/2020    LYMPHOPCT 42.6 04/03/2021    MONOPCT 10.2 04/03/2021    MYELOPCT 0.9 11/15/2020    BASOPCT 0.3 04/03/2021    MONOSABS 0.76 04/03/2021    LYMPHSABS 3.19 04/03/2021    EOSABS 0.19 04/03/2021    BASOSABS 0.02 04/03/2021     Platelets:    Lab Results   Component Value Date     04/03/2021     CMP:    Lab Results   Component Value Date     04/02/2021    K 4.0 04/02/2021    K 4.1 02/26/2021     04/02/2021    CO2 30 04/02/2021    BUN 17 04/02/2021    CREATININE 1.5 04/02/2021    GFRAA 58 04/02/2021    LABGLOM 58 04/02/2021    GLUCOSE 96 04/02/2021    PROT 7.2 04/02/2021    LABALBU 3.8 04/02/2021    CALCIUM 9.2 04/02/2021    BILITOT 0.2 04/02/2021    ALKPHOS 73 04/02/2021    AST 13 04/02/2021    ALT 15 04/02/2021     Magnesium:    Lab Results   Component Value Date    MG 1.8 11/25/2020     LDH:    Lab Results Component Value Date     10/28/2020     PT/INR:    Lab Results   Component Value Date    PROTIME 12.9 04/02/2021    INR 1.2 04/02/2021     Last 3 Troponin:    Lab Results   Component Value Date    TROPONINI <0.01 04/02/2021    TROPONINI <0.01 03/06/2021    TROPONINI <0.01 02/23/2021     ABG:    Lab Results   Component Value Date    PH 7.393 11/26/2020    PCO2 43.1 11/26/2020    PO2 90.3 11/26/2020    HCO3 25.7 11/26/2020    BE 0.7 11/26/2020    O2SAT 96.1 11/26/2020     IRON:    Lab Results   Component Value Date    IRON 95 11/05/2020     IMAGING    Xr Chest (2 Vw)    Result Date: 4/5/2021  EXAMINATION: TWO XRAY VIEWS OF THE CHEST 4/5/2021 1:55 pm COMPARISON: April 2, 2021 HISTORY: ORDERING SYSTEM PROVIDED HISTORY: persistent cough and pain left side chest TECHNOLOGIST PROVIDED HISTORY: Reason for exam:->persistent cough and pain left side chest What reading provider will be dictating this exam?->CRC FINDINGS: Interstitial prominence bilaterally notable in perihilar locations. No focal airspace opacity or evidence of pleural effusion. The heart is normal in size. No pneumothorax. Tracheostomy demonstrated. Stable interstitial prominence in perihilar locations could suggest peribronchial inflammation or pulmonary vascular congestion. No focal airspace opacity or evidence of pleural effusion. Ct Head Wo Contrast    Result Date: 4/2/2021  EXAMINATION: CT OF THE HEAD WITHOUT CONTRAST  4/2/2021 4:39 am TECHNIQUE: CT of the head was performed without the administration of intravenous contrast. Dose modulation, iterative reconstruction, and/or weight based adjustment of the mA/kV was utilized to reduce the radiation dose to as low as reasonably achievable. COMPARISON: None. HISTORY: ORDERING SYSTEM PROVIDED HISTORY: Evaluate intracranial abnormality TECHNOLOGIST PROVIDED HISTORY: Has a \"code stroke\" or \"stroke alert\" been called? ->No Reason for exam:->Evaluate intracranial abnormality Decision Support Exception->Emergency Medical Condition (MA) What reading provider will be dictating this exam?->CRC FINDINGS: BRAIN/VENTRICLES: There is no acute intracranial hemorrhage, mass effect or midline shift. No abnormal extra-axial fluid collection. The gray-white differentiation is maintained without evidence of an acute infarct. There is no evidence of hydrocephalus. ORBITS: The visualized portion of the orbits demonstrate no acute abnormality. SINUSES: The visualized paranasal sinuses and mastoid air cells demonstrate no acute abnormality. SOFT TISSUES/SKULL:  No acute abnormality of the visualized skull or soft tissues. No acute intracranial abnormality. Xr Chest Portable    Result Date: 4/2/2021  EXAMINATION: ONE XRAY VIEW OF THE CHEST 4/2/2021 2:23 am COMPARISON: 03/06/2021 HISTORY: ORDERING SYSTEM PROVIDED HISTORY: sob TECHNOLOGIST PROVIDED HISTORY: Reason for exam:->sob What reading provider will be dictating this exam?->CRC FINDINGS: There is central peribronchial thickening and mild infiltrate in mid to lower lungs. There is no pleural effusion or pneumothorax. There is no cardiomegaly. Findings are nonspecific. They could reflect edema or pneumonia. Peribronchial thickening and lower lung infiltrates. This could be edema or pneumonia.        DIET:  DIET GENERAL;    Medications:    Scheduled Meds:   doxycycline hyclate  100 mg Oral 2 times per day    amLODIPine  10 mg Oral Daily    [Held by provider] apixaban  5 mg Oral BID    Arformoterol Tartrate  15 mcg Nebulization BID    budesonide  500 mcg Nebulization BID    carvedilol  25 mg Oral BID    levothyroxine  50 mcg Oral Daily    olmesartan  20 mg Oral Daily    sodium chloride flush  10 mL Intravenous 2 times per day       Continuous Infusions:   sodium chloride         PRN Meds:oxyCODONE, LORazepam, albuterol, sodium chloride flush, sodium chloride, promethazine **OR** ondansetron, polyethylene glycol, acetaminophen **OR** acetaminophen    A/P:      Patient Active Problem List   Diagnosis    Hyperlipidemia    Type 2 diabetes mellitus without complication, without long-term current use of insulin ([facility] [address]    Atypical chest pain    Essential hypertension    Chronic acquired lymphedema    Aortic valve disease    Morbid obesity due to excess calories (HCC)    Abdominal pain    Acute respiratory failure with hypoxia (HCC)    Acute pulmonary edema (HCC)    Congestive heart failure with LV diastolic dysfunction, NYHA class 3 (HCC)    HANS (obstructive sleep apnea)    Acquired hypothyroidism    Chronic diastolic heart failure (HCC)    Nonrheumatic aortic valve stenosis    ACE-inhibitor cough    Pneumonia due to organism    Acute gout of right knee    Bilateral pulmonary embolism (HCC)    Anxiety    Tracheostomy dependent (HCC)    Shortness of breath    H/O deep venous thrombosis    SOB (shortness of breath)        PLAN:  Dc if ok with ent, pulm  Ask neph to see    I can be reached though [facility] or [facility] [place] at [phone]

## 2021-04-06 NOTE — CONSULTS
Reason for consult: Depression      Consulting Physician: Dr. Gray Henderson      HPI:    Surya Jesus a 61 y. o. male with a history of Resp failure with chronic trach, COPD, hypertension, CHF, hyperlipidemia and diabetes mellitus. He  presented to the ED on 4/2 with c/o sob and bleeding from tracheostomy site. CXR showed edema or pneumonia. Labs on 4/2 were significant for Na+144, K+ 4.0, CO2 30, Cr 1.5, Ca++ 9.2, Hgb 11.4. Upon assessment today, the patient is sitting on the edge of his bed in his room, he has difficulty speaking and coughs frequently during conversation. He is dependent on the trach due to chronic respiratory failure. He states that he does have a psych history, he had 1 inpatient psychiatric hospitalization in the 1990s stating that he was diagnosed as manic depressive at that time. He states that he will be happy when its over because physically he is suffering, relating these feelings to his health condition stating that this is \"no way to live. \" He states that his quality of life is poor. He endorses depression, and relates this to feeling helpless over his health condition. Stating that this has been a problem since 2014. But he states that he has no intention to harm himself. Vehemently denies suicidal ideation. He has somewhat of a sarcastic sense of humor, attempting to make light of his situation. States that he has been off of his medications for some time. He is agreeable to starting medications again. MSE:    Mental status reveals a 57-year-old -American male in hospital attire with a tracheostomy, he appears younger than stated age, and he is superficially forthcoming with information for assessment. Psychomotor reveals no retardation, agitation or abnormal posturing. Speech is clear, however he has difficulty speaking due to his trach. He is able to answer questions with relevance and there is no delayed or long latency of response.   Eye contact is good during assessment. Thought process is linear and goal-directed there is no looseness of associations or flight of ideas. Thought content is devoid of auditory or visual hallucinations, he does endorse that he would be okay if he ,  due to his overall health condition. He denies being suicidal. He denies homicidal ideation intent or plan. Cognitive function appears to be at baseline. Memory is intact for conversation. Insight judgment and impulse control are adequate. He is alert and oriented to person place time and situation able to recount events leading to hospitalization. DX:  Mood disorder related to medical condition (respiratory failure)       Plan/Recommendations: The patient case, plan of care and recommendations has been discussed with Dr Barb Davenport and the collaborative psychiatric care team.     The patient is not suicidal, though he does state that he is comfortable with death,  homicidal, psychotic or manic and does not meet criteria for inpatient psychiatric hospitalizations. Psychiatry recommends beginning Zoloft 50 mg daily for depressed mood related to medical condition. We recommend that the patient follow up in the community for outpatient counseling and further medication adjustment if needed. Psychiatry signs off.      Thanks for the consult

## 2021-04-07 NOTE — PROGRESS NOTES
abnormality TECHNOLOGIST PROVIDED HISTORY: Has a \"code stroke\" or \"stroke alert\" been called? ->No Reason for exam:->Evaluate intracranial abnormality Decision Support Exception->Emergency Medical Condition (MA) What reading provider will be dictating this exam?->CRC FINDINGS: BRAIN/VENTRICLES: There is no acute intracranial hemorrhage, mass effect or midline shift. No abnormal extra-axial fluid collection. The gray-white differentiation is maintained without evidence of an acute infarct. There is no evidence of hydrocephalus. ORBITS: The visualized portion of the orbits demonstrate no acute abnormality. SINUSES: The visualized paranasal sinuses and mastoid air cells demonstrate no acute abnormality. SOFT TISSUES/SKULL:  No acute abnormality of the visualized skull or soft tissues. No acute intracranial abnormality. Xr Chest Portable    Result Date: 4/2/2021  EXAMINATION: ONE XRAY VIEW OF THE CHEST 4/2/2021 2:23 am COMPARISON: 03/06/2021 HISTORY: ORDERING SYSTEM PROVIDED HISTORY: sob TECHNOLOGIST PROVIDED HISTORY: Reason for exam:->sob What reading provider will be dictating this exam?->CRC FINDINGS: There is central peribronchial thickening and mild infiltrate in mid to lower lungs. There is no pleural effusion or pneumothorax. There is no cardiomegaly. Findings are nonspecific. They could reflect edema or pneumonia. Peribronchial thickening and lower lung infiltrates. This could be edema or pneumonia.        DIET:  DIET GENERAL;    Medications:    Scheduled Meds:   sertraline  50 mg Oral Daily    doxycycline hyclate  100 mg Oral 2 times per day    amLODIPine  10 mg Oral Daily    [Held by provider] apixaban  5 mg Oral BID    Arformoterol Tartrate  15 mcg Nebulization BID    budesonide  500 mcg Nebulization BID    carvedilol  25 mg Oral BID    levothyroxine  50 mcg Oral Daily    [Held by provider] olmesartan  20 mg Oral Daily    sodium chloride flush  10 mL Intravenous 2 times per day

## 2021-04-07 NOTE — PROGRESS NOTES
[id number]   Department of Pharmacy   Pharmacist Transition of Care Services         Patient Demographics  Name:  Nena Johnson   Medical Record Number:  [id number]  Gender:  male   Age:  64 y.o. YOB: 1960    Readmission Risk: 28%       Pharmacist Review and Summary of Medications     Date of last reviewed/update: 04/07/21     Category Comments   New Medication Started   1. Doxycycline 100 mg BID  2. Zoloft 50 mg QD  3. Ativan 0.5 mg Q6h PRN- patient has at home. Change in Outpatient Medication      Discontinued/On hold Outpatient Medication  1. Eliquis- on hold until Tofil sees in office  2. Benicar- discontinued. Other          Pharmacist Patient Education:    Date  Person Educated Content of Education             Documentation of Pharmacist Interventions and Follow-up Plan: The following Pharmacist Transition of Care Services were completed:   [x]  Reviewed and summarized medication changes  [x]  Entire home medication list was reviewed for accuracy- by med rec technician  [x]  Home medication list was updated or corrected- by med rec technician    [x]  Reviewed discharge medication reconciliation  []  Discharge medication list was updated or corrected    [x]  Added information to AVS   []  Patient education was provided on new medications  []  Patient education was provided on medication changes  []  Reviewed the AVS with patient     Additional Interventions:  []  Inpatient prescriber was contacted and the following pharmacy recommendations        were accepted: **     [x] Other interventions: Doxycycline E-Script transmission failed to Employee pharmacy- fixed.         Pharmacist: Marck Nettles PharmD, [facility]  Date:  4/7/2021 10:52 AM

## 2021-04-07 NOTE — PROGRESS NOTES
Progress Note  4/7/2021 1:01 PM  Subjective:   Admit Date: 4/2/2021  PCP: David Uribe MD     Interval History: patient examined doing well feels ok     Diet: DIET GENERAL;    Data:   Scheduled Meds:   sertraline  50 mg Oral Daily    doxycycline hyclate  100 mg Oral 2 times per day    amLODIPine  10 mg Oral Daily    [Held by provider] apixaban  5 mg Oral BID    Arformoterol Tartrate  15 mcg Nebulization BID    budesonide  500 mcg Nebulization BID    carvedilol  25 mg Oral BID    levothyroxine  50 mcg Oral Daily    [Held by provider] olmesartan  20 mg Oral Daily    sodium chloride flush  10 mL Intravenous 2 times per day     Continuous Infusions:   sodium chloride       PRN Meds:oxyCODONE, LORazepam, albuterol, sodium chloride flush, sodium chloride, promethazine **OR** ondansetron, polyethylene glycol, acetaminophen **OR** acetaminophen  I/O last 3 completed shifts: In: 840 [P.O.:840]  Out: 1150 [Urine:550; Emesis/NG output:600]  No intake/output data recorded. Intake/Output Summary (Last 24 hours) at 4/7/2021 1301  Last data filed at 4/7/2021 0901  Gross per 24 hour   Intake 0 ml   Output --   Net 0 ml     CBC:   Recent Labs     04/06/21  1201   WBC 6.5   HGB 11.4*        BMP:    Recent Labs     04/06/21  1201 04/07/21  0607    141   K 4.9 4.3   CL 96* 103   CO2 32* 27   BUN 13 13   CREATININE 1.2 1.2   GLUCOSE 143* 128*     Hepatic: No results for input(s): AST, ALT, ALB, BILITOT, ALKPHOS in the last 72 hours. Troponin: No results for input(s): TROPONINI in the last 72 hours. BNP: No results for input(s): BNP in the last 72 hours. Lipids: No results for input(s): CHOL, HDL in the last 72 hours. Invalid input(s): LDLCALCU  ABGs: No results found for: PHART, PO2ART, VTR0NDM  INR: No results for input(s): INR in the last 72 hours.     -----------------------------------------------------------------  RAD: Ct Head Wo Contrast    Result Date: 4/2/2021  EXAMINATION: CT OF THE HEAD WITHOUT CONTRAST  4/2/2021 4:39 am TECHNIQUE: CT of the head was performed without the administration of intravenous contrast. Dose modulation, iterative reconstruction, and/or weight based adjustment of the mA/kV was utilized to reduce the radiation dose to as low as reasonably achievable. COMPARISON: None. HISTORY: ORDERING SYSTEM PROVIDED HISTORY: Evaluate intracranial abnormality TECHNOLOGIST PROVIDED HISTORY: Has a \"code stroke\" or \"stroke alert\" been called? ->No Reason for exam:->Evaluate intracranial abnormality Decision Support Exception->Emergency Medical Condition (MA) What reading provider will be dictating this exam?->CRC FINDINGS: BRAIN/VENTRICLES: There is no acute intracranial hemorrhage, mass effect or midline shift. No abnormal extra-axial fluid collection. The gray-white differentiation is maintained without evidence of an acute infarct. There is no evidence of hydrocephalus. ORBITS: The visualized portion of the orbits demonstrate no acute abnormality. SINUSES: The visualized paranasal sinuses and mastoid air cells demonstrate no acute abnormality. SOFT TISSUES/SKULL:  No acute abnormality of the visualized skull or soft tissues. No acute intracranial abnormality. Xr Chest Portable    Result Date: 4/2/2021  EXAMINATION: ONE XRAY VIEW OF THE CHEST 4/2/2021 2:23 am COMPARISON: 03/06/2021 HISTORY: ORDERING SYSTEM PROVIDED HISTORY: sob TECHNOLOGIST PROVIDED HISTORY: Reason for exam:->sob What reading provider will be dictating this exam?->CRC FINDINGS: There is central peribronchial thickening and mild infiltrate in mid to lower lungs. There is no pleural effusion or pneumothorax. There is no cardiomegaly. Findings are nonspecific. They could reflect edema or pneumonia. Peribronchial thickening and lower lung infiltrates. This could be edema or pneumonia.        Objective:   Vitals: BP (!) 158/96   Pulse 76   Temp 95.5 °F (35.3 °C) (Tympanic)   Resp 22   Ht 5' 10\" (1.778 m) Wt 286 lb (129.7 kg)   SpO2 95%   BMI 41.04 kg/m²   General appearance: appears stated age   Skin:  No rashes or lesions  HEENT: Head: Normocephalic, no lesions, without obvious abnormality. Neck: t stomy  Lungs: clear to auscultation bilaterally  Heart: regular rate and rhythm, S1, S2 normal, no murmur, click, rub or gallop  Abdomen: soft, non-tender; bowel sounds normal; no masses,  no organomegaly  Extremities: extremities normal, atraumatic, no cyanosis or edema  Neurologic: Mental status: Alert, oriented, thought content appropriate    Assessment:   Patient Active Problem List:     Hyperlipidemia     Type 2 diabetes mellitus without complication, without long-term current use of insulin (HCC)     Atypical chest pain     Essential hypertension     Chronic acquired lymphedema     Aortic valve disease     Morbid obesity due to excess calories (HCC)     Abdominal pain     Acute respiratory failure with hypoxia (HCC)     Acute pulmonary edema (HCC)     Congestive heart failure with LV diastolic dysfunction, NYHA class 3 (HCC)     HANS (obstructive sleep apnea)     Acquired hypothyroidism     Chronic diastolic heart failure (HCC)     Nonrheumatic aortic valve stenosis     ACE-inhibitor cough     Pneumonia due to organism     Acute gout of right knee     Bilateral pulmonary embolism (HCC)     Anxiety     Tracheostomy dependent (HCC)     Shortness of breath     H/O deep venous thrombosis     SOB (shortness of breath)    Plan:   Assessment/Plan     1. NANCY on CKD3a in the setting of ARB  Baseline cr 1.1-1.2  Initial cr. 1.5   Hold ARB  Renal sonogram -NAD      2. Shortness of breath better      3. Bleeding from trach site  Eliquis on hold  ENT following        4.  HTN of CKD stages I-IV  BP at/near target of <130/80  Follow off ARB     He remains Non oliguric , no dysurea , renal functions better     Thank you for the opportunity to participate in the care of [id number] [place] [address]

## 2021-04-07 NOTE — CARE COORDINATION
Pt per records is active with [facility], called [facility], they discharged patient 3/10/21. Notified them that pt is still marked active with them. They aren't taking pt back, but will call their third party to discharge patient. Referral to [facility] was made, they will have to wait for discharge of pt. Await [facility] back from [facility]. Spoke with [facility], they are full. referral to [facility] [place] [facility], they are both full. Referral to Home with [facility], faxed clinicals, await assessment, they can not accept due to staffing. Referral to [facility], not in network. Referral to [facility] home care, unable to accept this insurance, referral to [facility] home care, faxed information, they will review but intake states they have minimal staffing. Called [facility] ([facility]) to check to see if anything has changed, [facility] to get back to this [facility]. [facility] unable to accept. Referral to home care with [facility], no staffing. Referral to [facility], faxed clinicals, await assessment. [facility] declined, due to they see only long term pts. Referral to home at [facility] and [facility], await assessment. Referral to [facility]) not in network. Marifer Nelson made Referral to [facility], out of their area. Referral to [facility], not in network. Called [facility]) they are able to accept but can't get into home 4/10 over 48hrs, perfect served Dr. Anjali Gant to verify if that is ok . [facility]

## 2021-04-07 NOTE — PROGRESS NOTES
CLINICAL PHARMACY NOTE: MEDS TO [address] [facility] Patient?: No  Total # of Prescriptions Filled: 3   The following medications were delivered to the patient:  · Sertraline 50 mg  · Budesonide 0.5mg/2ml  · Doxycycline 100 mg  Total # of Interventions Completed: 4  Time Spent (min): 30    Additional Documentation:

## 2021-04-14 NOTE — TELEPHONE ENCOUNTER
Called patient to see if he has his nebulizer and budesonide 0.5. Patient states that he has not received this and has no idea where he is supposed to get it from. Stated he has a sore throat today and that his breathing is ok now that his wife suctioned him and removed a blood clot that was stuck in his throat. States basically this is how it goes and then his SOB gets better. I asked him if he needed to be seen. He stated no.

## 2021-04-15 NOTE — TELEPHONE ENCOUNTER
Called Dr. Alvin Tomlin yesterday office was out to lunch. Tried again later and could not get through. Was able to get through today and the office will get a message in to the doctor to see if patient needs to be seen. Called Dr. Kristen Mendoza office. Had to leave a vm for them to call me back X2. Tried reaching pt twice today to check in on him. Had to leave vm with number. 4- called and left pt another vm to check on him.

## 2021-04-25 NOTE — DISCHARGE SUMMARY
Vw)    Result Date: 4/5/2021  EXAMINATION: TWO XRAY VIEWS OF THE CHEST 4/5/2021 1:55 pm COMPARISON: April 2, 2021 HISTORY: ORDERING SYSTEM PROVIDED HISTORY: persistent cough and pain left side chest TECHNOLOGIST PROVIDED HISTORY: Reason for exam:->persistent cough and pain left side chest What reading provider will be dictating this exam?->CRC FINDINGS: Interstitial prominence bilaterally notable in perihilar locations. No focal airspace opacity or evidence of pleural effusion. The heart is normal in size. No pneumothorax. Tracheostomy demonstrated. Stable interstitial prominence in perihilar locations could suggest peribronchial inflammation or pulmonary vascular congestion. No focal airspace opacity or evidence of pleural effusion. Ct Head Wo Contrast    Result Date: 4/2/2021  EXAMINATION: CT OF THE HEAD WITHOUT CONTRAST  4/2/2021 4:39 am TECHNIQUE: CT of the head was performed without the administration of intravenous contrast. Dose modulation, iterative reconstruction, and/or weight based adjustment of the mA/kV was utilized to reduce the radiation dose to as low as reasonably achievable. COMPARISON: None. HISTORY: ORDERING SYSTEM PROVIDED HISTORY: Evaluate intracranial abnormality TECHNOLOGIST PROVIDED HISTORY: Has a \"code stroke\" or \"stroke alert\" been called? ->No Reason for exam:->Evaluate intracranial abnormality Decision Support Exception->Emergency Medical Condition (MA) What reading provider will be dictating this exam?->CRC FINDINGS: BRAIN/VENTRICLES: There is no acute intracranial hemorrhage, mass effect or midline shift. No abnormal extra-axial fluid collection. The gray-white differentiation is maintained without evidence of an acute infarct. There is no evidence of hydrocephalus. ORBITS: The visualized portion of the orbits demonstrate no acute abnormality. SINUSES: The visualized paranasal sinuses and mastoid air cells demonstrate no acute abnormality.  SOFT TISSUES/SKULL:  No acute conjunctiva/corneas clear, EOM's intact, fundi     benign, both eyes        Ears:    Normal TM's and external ear canals, both ears   Nose:   Nares normal, septum midline, mucosa normal, no drainage    or sinus tenderness   Throat:   Lips, mucosa, and tongue normal; teeth and gums normal   Neck:   Supple, symmetrical, trachea midline, no adenopathy;        thyroid:  No enlargement/tenderness/nodules; no carotid    bruit or JVD   Back:     Symmetric, no curvature, ROM normal, no CVA tenderness   Lungs:     Clear to auscultation bilaterally, respirations unlabored   Chest wall:    No tenderness or deformity   Heart:    Regular rate and rhythm, S1 and S2 normal, no murmur, rub   or gallop   Abdomen:     Soft, non-tender, bowel sounds active all four quadrants,     no masses, no organomegaly   Genitalia:    Normal male without lesion, discharge or tenderness   Rectal:    Normal tone, normal prostate, no masses or tenderness;    guaiac negative stool   Extremities:   Extremities normal, atraumatic, no cyanosis or edema   Pulses:   2+ and symmetric all extremities   Skin:   Skin color, texture, turgor normal, no rashes or lesions   Lymph nodes:   Cervical, supraclavicular, and axillary nodes normal   Neurologic:   CNII-XII intact. Normal strength, sensation and reflexes       throughout       Disposition: home    Patient Instructions:      Medication List      START taking these medications    LORazepam 0.5 MG tablet  Commonly known as: ATIVAN  Take 1 tablet by mouth every 6 hours as needed for Anxiety for up to 30 days.      sertraline 50 MG tablet  Commonly known as: ZOLOFT  Take 1 tablet by mouth daily        CONTINUE taking these medications    albuterol sulfate  (90 Base) MCG/ACT inhaler  Commonly known as: Proventil HFA  Inhale 2 puffs into the lungs every 4 hours as needed for Wheezing     amLODIPine 10 MG tablet  Commonly known as: NORVASC  Take 1 tablet by mouth daily     Brovana 15 MCG/2ML Nebu  Generic

## 2021-04-28 NOTE — ED NOTES
Ambulated patient on room air without incident. SpO2 maintained at 100% HR 82. Dr Ines Zepeda notified.      Haley David RN  04/28/21 [id number]

## 2021-04-28 NOTE — ED PROVIDER NOTES
Patient is a 70-year-old male with a history of chronic tracheostomy, obesity, hypertension, hyperlipidemia, CHF that presents emergency department for evaluation of shortness of breath. Patient states that it began 2 days ago. It started gradually, nothing seemed to make it better or worse. His wife attempted suctioning at home without any improvement of symptoms. It has been constant and severe. States he has been coughing up mucus chunks with small amounts of blood. He admits to mild lightheadedness and chest tightness. Denies fever, chills, nausea, vomiting, chest pain, abdominal pain, change in bowel habits or change in urinary habits. The history is provided by the patient. Review of Systems   Constitutional: Positive for fatigue. Negative for chills and fever. HENT: Positive for congestion. Negative for rhinorrhea and sore throat. Eyes: Negative for photophobia and visual disturbance. Respiratory: Positive for cough, chest tightness, shortness of breath and wheezing. Cardiovascular: Negative for chest pain and palpitations. Gastrointestinal: Negative for abdominal pain, nausea and vomiting. Genitourinary: Negative for decreased urine volume, difficulty urinating, dysuria, hematuria and urgency. Musculoskeletal: Negative for back pain and myalgias. Skin: Negative for pallor and rash. Neurological: Negative for dizziness, light-headedness and headaches. Physical Exam  Constitutional:       General: He is in acute distress. Appearance: He is obese. He is ill-appearing and diaphoretic. HENT:      Head: Normocephalic and atraumatic. Eyes:      Extraocular Movements: Extraocular movements intact. Pupils: Pupils are equal, round, and reactive to light. Neck:      Musculoskeletal: Normal range of motion. Comments: Tracheostomy in place   Neurological:      Mental Status: He is alert.           Procedures     MDM  Number of Diagnoses or Management Options  Mucus plugging of bronchi  Diagnosis management comments: Patient is a 60-year-old male that presents emergency room for evaluation of shortness of breath. Patient noted to have an oxygen saturation of 60% on room air on arrival.  He was taken immediately back to the room. He was placed on a trach mask and then started on humidified air. Fiberoptic scope was used to visualize the end of the tracheostomy tube which showed thick dried secretions blocking the airway. After humidified air, DuoNeb breathing treatments and suctioning patient had significant improvement of his symptoms. Blood work was unremarkable including negative troponin, negative BNP. EKG showed no evidence of ischemia. Stated he was feeling much better at this time and would like to go home. Patient able to ambulate without difficulty and oxygen saturation remained 100% on room air. Discharged home in stable condition. Symptoms for return to the emergency department were discussed with the patient. ED Course as of Apr 29 1901 Wed Apr 28, 2021   1344 On reevaluation patient resting much more comfortably at this time. States that he is breathing easier. Heart rate is improved and of the 80s and remains sinus rhythm. Oxygen saturation is 100% on the humidified air at this time.     [JL]      ED Course User Index  [JL] Chema Roger, DO       --------------------------------------------- PAST HISTORY ---------------------------------------------  Past Medical History:  has a past medical history of Acquired hypothyroidism, Anxiety, Congestive heart failure with LV diastolic dysfunction, NYHA class 3 ([facility] [address] COPD (chronic obstructive pulmonary disease) ([facility] Depression, DM (diabetes mellitus) ([facility] Essential hypertension, Gouty arthritis, Hyperlipidemia, Hypertension, Lymphedema, Obesity, Obstructive sleep apnea, Obstructive sleep apnea, Osteoarthritis, and Type 2 diabetes mellitus without complication, without Absolute 0.03 0.00 - 0.20 E9/L   Comprehensive Metabolic Panel w/ Reflex to MG   Result Value Ref Range    Sodium 140 132 - 146 mmol/L    Potassium reflex Magnesium 4.5 3.5 - 5.0 mmol/L    Chloride 102 98 - 107 mmol/L    CO2 27 22 - 29 mmol/L    Anion Gap 11 7 - 16 mmol/L    Glucose 123 (H) 74 - 99 mg/dL    BUN 11 6 - 23 mg/dL    CREATININE 1.2 0.7 - 1.2 mg/dL    GFR Non-African American >60 >=60 mL/min/1.73    GFR African American >60     Calcium 9.4 8.6 - 10.2 mg/dL    Total Protein 8.4 (H) 6.4 - 8.3 g/dL    Albumin 4.3 3.5 - 5.2 g/dL    Total Bilirubin 0.3 0.0 - 1.2 mg/dL    Alkaline Phosphatase 86 40 - 129 U/L    ALT 17 0 - 40 U/L    AST 24 0 - 39 U/L   Troponin   Result Value Ref Range    Troponin <0.01 0.00 - 0.03 ng/mL   Brain Natriuretic Peptide   Result Value Ref Range    Pro-BNP 48 0 - 125 pg/mL   Lactic Acid, Plasma   Result Value Ref Range    Lactic Acid 1.3 0.5 - 2.2 mmol/L   Blood Gas, Arterial   Result Value Ref Range    Date Analyzed [id number]     Time Analyzed 5361     Source: Blood Arterial     pH, Blood Gas 7.426 7.350 - 7.450    PCO2 40.0 35.0 - 45.0 mmHg    PO2 369.8 (H) 75.0 - 100.0 mmHg    HCO3 25.7 22.0 - 26.0 mmol/L    B.E. 1.3 -3.0 - 3.0 mmol/L    O2 Sat 99.5 (H) 92.0 - 98.5 %    PO2/FIO2 3.70 mmHg/%    AaDO2 278.2 mmHg    RI(T) 75 %    O2Hb 99.0 (H) 94.0 - 97.0 %    COHb 0.3 0.0 - 1.5 %    MetHb 0.2 0.0 - 1.5 %    O2 Content 17.8 mL/dL    HHb 0.5 0.0 - 5.0 %    tHb (est) 12.1 11.5 - 16.5 g/dL    Mode TOI     FIO2 100.0 %    Date Of Collection      Time Collected      Pt Temp 37.0 C     ID [id number]     Lab [id number]     Critical(s) Notified .  No Critical Values    EKG 12 Lead   Result Value Ref Range    Ventricular Rate 80 BPM    Atrial Rate 80 BPM    P-R Interval 182 ms    QRS Duration 88 ms    Q-T Interval 374 ms    QTc Calculation (Bazett) 431 ms    P Axis 53 degrees    R Axis -21 degrees    T Axis 37 degrees       Radiology:  XR CHEST PORTABLE   Final Result   Minimal infiltrate and/or

## 2021-05-03 NOTE — ED NOTES
Bed: 05  Expected date:   Expected time:   Means of arrival:   Comments:  jorden Blanchard RN  05/03/21 2073

## 2021-05-03 NOTE — ED PROVIDER NOTES
65 yo male with trach presenting with shortness of breath starting this morning. On arrival, patient tachypneic and coughing. Further history obtained from wife who states symptoms started this morning. Hobert Dauer was placed in October after patient had pneumonia. Patient has not had any recent illnesses. Review of Systems   Unable to perform ROS: Severe respiratory distress        Physical Exam  Constitutional:       General: He is in acute distress. Appearance: He is well-developed. He is not ill-appearing or toxic-appearing. HENT:      Head: Normocephalic. Mouth/Throat:      Mouth: Mucous membranes are moist.      Pharynx: Oropharynx is clear. No pharyngeal swelling or oropharyngeal exudate. Neck:      Comments: Trach in place  Cardiovascular:      Rate and Rhythm: Regular rhythm. Tachycardia present. Pulmonary:      Effort: Tachypnea, accessory muscle usage and respiratory distress present. Breath sounds: Rhonchi present. Comments: Patient coughing persistently, unable to sit still due to dyspnea. Audible expiratory wheezes  Abdominal:      Palpations: Abdomen is soft. Tenderness: There is no abdominal tenderness. Musculoskeletal:      Right lower leg: No edema. Left lower leg: No edema. Skin:     General: Skin is warm and dry. Neurological:      General: No focal deficit present. Mental Status: He is alert. Procedures     MDM  Number of Diagnoses or Management Options  Dyspnea, unspecified type  Mucus plugging of bronchi  Diagnosis management comments: 65 yo male with trach in respiratory distress. Hobert Dauer was suctioned with removal of large mucous plug. He was given duonebs and mucomyst, with relief of symptoms. No focal consolidation on XR. On re-evaluation, patient breathing much more comfortably, though he did mention intermittent sharp chest pain over the 3 days. I offered further cardiac workup, including EKG, which patient declined.  He also complained of headache and sore throat, and was given toradol. At this time, suspect patient's dyspnea was due to mucus plug. He remained hemodynamically stable and not hypoxic at any time. He was discharged in stable condition. ED Course as of May 03 1530   Mon May 03, 2021   1527 Pt breathing more easily now. Complaining of headache, sore throat. [AP]      ED Course User Index  [AP] Amina Vitale MD        ED Course as of May 03 2345   Mon May 03, 2021   1527 Pt breathing more easily now. Complaining of headache, sore throat. [AP]      ED Course User Index  [AP] Amina Vitale MD       --------------------------------------------- PAST HISTORY ---------------------------------------------  Past Medical History:  has a past medical history of Acquired hypothyroidism, Anxiety, Congestive heart failure with LV diastolic dysfunction, NYHA class 3 ([address] COPD (chronic obstructive pulmonary disease) ([facility] [address] Depression, DM (diabetes mellitus) ([address] Essential hypertension, Gouty arthritis, Hyperlipidemia, Hypertension, Lymphedema, Obesity, Obstructive sleep apnea, Obstructive sleep apnea, Osteoarthritis, and Type 2 diabetes mellitus without complication, without long-term current use of insulin ([address] Past Surgical History:  has a past surgical history that includes Foot surgery (1990); External ear surgery (6/2/2009); ECHO Compl W Dop Color Flow (6/21/2013); Gastrostomy tube placement (08/10/2017); Tracheostomy tube placement (08/10/2017); bronchoscopy (08/10/2017); tracheostomy (N/A, 11/11/2020); Upper gastrointestinal endoscopy (N/A, 11/11/2020); tracheostomy (N/A, 11/12/2020); IR TUNNELED CVC PLACE WO SQ PORT/PUMP > 5 YEARS (11/19/2020); and tracheostomy tube change (N/A, 1/18/2021). Social History:  reports that he has never smoked. He has never used smokeless tobacco. He reports previous alcohol use. He reports previous drug use.     Family History: family history includes Alzheimer's Disease in his mother; Heart Attack in his father; Heart Disease in his father. The patients home medications have been reviewed. Allergies: Bee venom and Shellfish-derived products    -------------------------------------------------- RESULTS -------------------------------------------------  Labs:  No results found for this visit on 05/03/21. Radiology:  XR CHEST PORTABLE   Final Result   Early pulmonary vascular congestion.             ------------------------- NURSING NOTES AND VITALS REVIEWED ---------------------------  Date / Time Roomed:  5/3/2021  2:37 PM  ED Bed Assignment:  05/05    The nursing notes within the ED encounter and vital signs as below have been reviewed. /68   Pulse 70   Temp 98.4 °F (36.9 °C)   Resp 20   Ht 5' 9\" (1.753 m)   Wt 288 lb (130.6 kg)   SpO2 98%   BMI 42.53 kg/m²   Oxygen Saturation Interpretation: Normal      ------------------------------------------ PROGRESS NOTES ------------------------------------------  11:45 PM EDT  I have spoken with the patient and discussed todays results, in addition to providing specific details for the plan of care and counseling regarding the diagnosis and prognosis. Their questions are answered at this time and they are agreeable with the plan. I discussed at length with them reasons for immediate return here for re evaluation. They will followup with their primary care physician by calling their office tomorrow. --------------------------------- ADDITIONAL PROVIDER NOTES ---------------------------------  At this time the patient is without objective evidence of an acute process requiring hospitalization or inpatient management. They have remained hemodynamically stable throughout their entire ED visit and are stable for discharge with outpatient follow-up. The plan has been discussed in detail and they are aware of the specific conditions for emergent return, as well as the importance of follow-up.       Discharge Medication List as of 5/3/2021  5:13 PM          Diagnosis:  1. Mucus plugging of bronchi    2. Dyspnea, unspecified type        Disposition:  Patient's disposition: Discharge to home  Patient's condition is stable.            Jena Santiago MD  Resident  05/03/21 1761

## 2021-05-08 NOTE — ED PROVIDER NOTES
Chief Complaint   Patient presents with    Shortness of Breath     starting today patient has chronic trach states inner cannula is \"no good\". patient requesting new cannula and to be suctioned        Patient is a 57-year-old male who is chronic trach dependent who wears 2 L at baseline who presents today for cough and shortness of breath. States he has had increased bleeding production, is concerned that he needs this is intercranial suctioned. Shortness of breath has been ongoing for a day or 2. Patient does have mild wheezing. Is out of his inhalers and medication at home for his COPD. Denies fevers or chills. Denies chest pain. Denies lightheadedness or dizziness. Has not had Covid or his Covid vaccine. The history is provided by the patient. No  was used. Review of Systems   Constitutional: Negative for chills, diaphoresis and fever. Respiratory: Positive for cough, shortness of breath and wheezing. Cardiovascular: Negative for chest pain, palpitations and leg swelling. Gastrointestinal: Negative for abdominal pain, diarrhea, nausea and vomiting. Genitourinary: Negative for difficulty urinating and flank pain. Musculoskeletal: Negative for back pain. Skin: Negative for wound. Neurological: Negative for dizziness, syncope, weakness, numbness and headaches. Hematological: Negative for adenopathy. Psychiatric/Behavioral: Negative for behavioral problems and confusion. Physical Exam  Vitals signs and nursing note reviewed. Constitutional:       Appearance: He is well-developed. He is obese. HENT:      Head: Normocephalic and atraumatic. Comments: Trach midline  Eyes:      Pupils: Pupils are equal, round, and reactive to light. Neck:      Musculoskeletal: Normal range of motion and neck supple. Cardiovascular:      Rate and Rhythm: Normal rate and regular rhythm. Heart sounds: Normal heart sounds. No murmur.    Pulmonary:      Effort: Pulmonary effort is normal. No respiratory distress. Breath sounds: Wheezing present. No rales. Abdominal:      General: Bowel sounds are normal.      Palpations: Abdomen is soft. Tenderness: There is no abdominal tenderness. There is no guarding or rebound. Skin:     General: Skin is warm and dry. Neurological:      Mental Status: He is alert and oriented to person, place, and time. Cranial Nerves: No cranial nerve deficit. Coordination: Coordination normal.          Procedures     Labs Reviewed   CBC WITH AUTO DIFFERENTIAL - Abnormal; Notable for the following components:       Result Value    Hemoglobin 10.9 (*)     Hematocrit 33.8 (*)     MCV 74.3 (*)     MCH 24.0 (*)     RDW 19.0 (*)     Lymphocytes % 43.0 (*)     All other components within normal limits   BASIC METABOLIC PANEL W/ REFLEX TO MG FOR LOW K - Abnormal; Notable for the following components:    CO2 30 (*)     Glucose 69 (*)     All other components within normal limits   TROPONIN     XR CHEST PORTABLE   Final Result   Stable cardiomegaly with mild congestive changes. EKG #1:  I personally interpreted this EKG  Time:  1617    Rate: 65  Rhythm: Sinus. Interpretation: Sinus rhythm, normal axis, no ST elevation. MDM  Number of Diagnoses or Management Options  COPD exacerbation ([facility] [place] [address]  Shortness of breath  Diagnosis management comments: Patient is a 35-year-old male who is trach dependent who has asthma presents today for shortness of breath. Patient was suctioned at bedside. Patient does have wheezing. He was given duo nebs. Lab work and imaging is within normal limits. EKG shows no ischemic changes. With improvement of symptoms, patient was discharged home with steroids as well as a refill of his inhalers as he is out of these at home. Vitals are stable. Return precautions were given.        Amount and/or Complexity of Data Reviewed  Clinical lab tests: reviewed  Tests in the radiology section of CPT®: reviewed  Tests in the medicine section of CPT®: reviewed                --------------------------------------------- PAST HISTORY ---------------------------------------------  Past Medical History:  has a past medical history of Acquired hypothyroidism, Anxiety, Congestive heart failure with LV diastolic dysfunction, NYHA class 3 ([facility]), COPD (chronic obstructive pulmonary disease) ([facility] [address] Depression, DM (diabetes mellitus) ([facility] Essential hypertension, Gouty arthritis, Hyperlipidemia, Hypertension, Lymphedema, Obesity, Obstructive sleep apnea, Obstructive sleep apnea, Osteoarthritis, and Type 2 diabetes mellitus without complication, without long-term current use of insulin ([facility] Past Surgical History:  has a past surgical history that includes Foot surgery (1990); External ear surgery (6/2/2009); ECHO Compl W Dop Color Flow (6/21/2013); Gastrostomy tube placement (08/10/2017); Tracheostomy tube placement (08/10/2017); bronchoscopy (08/10/2017); tracheostomy (N/A, 11/11/2020); Upper gastrointestinal endoscopy (N/A, 11/11/2020); tracheostomy (N/A, 11/12/2020); IR TUNNELED CVC PLACE WO SQ PORT/PUMP > 5 YEARS (11/19/2020); and tracheostomy tube change (N/A, 1/18/2021). Social History:  reports that he has never smoked. He has never used smokeless tobacco. He reports previous alcohol use. He reports previous drug use. Family History: family history includes Alzheimer's Disease in his mother; Heart Attack in his father; Heart Disease in his father. The patients home medications have been reviewed.     Allergies: Bee venom and Shellfish-derived products    -------------------------------------------------- RESULTS -------------------------------------------------  Labs:  Results for orders placed or performed during the hospital encounter of 05/08/21   CBC Auto Differential   Result Value Ref Range    WBC 8.1 4.5 - 11.5 E9/L    RBC 4.55 3.80 - 5.80 E12/L    Hemoglobin 10.9 (L) 12.5 - 16.5 g/dL Hematocrit 33.8 (L) 37.0 - 54.0 %    MCV 74.3 (L) 80.0 - 99.9 fL    MCH 24.0 (L) 26.0 - 35.0 pg    MCHC 32.2 32.0 - 34.5 %    RDW 19.0 (H) 11.5 - 15.0 fL    Platelets 272 728 - 871 E9/L    MPV 9.0 7.0 - 12.0 fL    Neutrophils % 47.9 43.0 - 80.0 %    Immature Granulocytes % 0.5 0.0 - 5.0 %    Lymphocytes % 43.0 (H) 20.0 - 42.0 %    Monocytes % 5.9 2.0 - 12.0 %    Eosinophils % 2.5 0.0 - 6.0 %    Basophils % 0.2 0.0 - 2.0 %    Neutrophils Absolute 3.86 1.80 - 7.30 E9/L    Immature Granulocytes # 0.04 E9/L    Lymphocytes Absolute 3.47 1.50 - 4.00 E9/L    Monocytes Absolute 0.48 0.10 - 0.95 E9/L    Eosinophils Absolute 0.20 0.05 - 0.50 E9/L    Basophils Absolute 0.02 0.00 - 0.20 F6/S   Basic Metabolic Panel w/ Reflex to MG   Result Value Ref Range    Sodium 146 132 - 146 mmol/L    Potassium reflex Magnesium 4.0 3.5 - 5.0 mmol/L    Chloride 105 98 - 107 mmol/L    CO2 30 (H) 22 - 29 mmol/L    Anion Gap 11 7 - 16 mmol/L    Glucose 69 (L) 74 - 99 mg/dL    BUN 9 6 - 23 mg/dL    CREATININE 1.0 0.7 - 1.2 mg/dL    GFR Non-African American >60 >=60 mL/min/1.73    GFR African American >60     Calcium 9.7 8.6 - 10.2 mg/dL   Troponin   Result Value Ref Range    Troponin <0.01 0.00 - 0.03 ng/mL   EKG 12 Lead   Result Value Ref Range    Ventricular Rate 65 BPM    Atrial Rate 65 BPM    P-R Interval 172 ms    QRS Duration 90 ms    Q-T Interval 398 ms    QTc Calculation (Bazett) 413 ms    P Axis 46 degrees    R Axis -39 degrees    T Axis 31 degrees       Radiology:  XR CHEST PORTABLE   Final Result   Stable cardiomegaly with mild congestive changes. ------------------------- NURSING NOTES AND VITALS REVIEWED ---------------------------  Date / Time Roomed:  5/8/2021  3:31 PM  ED Bed Assignment:  24/24    The nursing notes within the ED encounter and vital signs as below have been reviewed.    BP (!) 149/90   Pulse 103   Temp 98 °F (36.7 °C)   Resp 18   SpO2 93%   Oxygen Saturation Interpretation: Normal      ------------------------------------------ PROGRESS NOTES ------------------------------------------  I have spoken with the patient and discussed todays results, in addition to providing specific details for the plan of care and counseling regarding the diagnosis and prognosis. Their questions are answered at this time and they are agreeable with the plan. I discussed at length with them reasons for immediate return here for re evaluation. They will followup with primary care by calling their office tomorrow. --------------------------------- ADDITIONAL PROVIDER NOTES ---------------------------------  At this time the patient is without objective evidence of an acute process requiring hospitalization or inpatient management. They have remained hemodynamically stable throughout their entire ED visit and are stable for discharge with outpatient follow-up. The plan has been discussed in detail and they are aware of the specific conditions for emergent return, as well as the importance of follow-up. New Prescriptions    No medications on file       Diagnosis:  1. COPD exacerbation ([facility] [address]    2. Shortness of breath        Disposition:  Patient's disposition: Discharge to home  Patient's condition is stable.             Sae Hagen,   Resident  05/08/21 [id number]

## 2021-05-28 NOTE — TELEPHONE ENCOUNTER
MA made first attempt to contact patient to schedule appointment based on referral by Dr Karuna Hackett for PEG tube removal. PEG tube was placed by Dr Stephanie Varma on 11/11/2021. Patient answered and then phone disconnected. MA made two additional attempts to reconnect, but phone line rang busy. MA will try back another day to schedule with Dr Stephanie Varma at any location.     Electronically signed by Danielle Covarrubias on 5/28/21 at 3:36 PM EDT

## 2021-06-24 NOTE — PROGRESS NOTES
[address] [facility]  General Surgery Attending Progress Note    Chief Complaint: PEG tube removal       Subjective:   Patient had a PEG tube that is placed by me in November 2020. He is tolerating regular diet. He longer needs a PEG tube. He wants the PEG tube out because it is more of an annoyance. Dangles. He has no nausea. No vomiting  No fevers. No chills    Patient has a chronic tracheostomy. I have reviewed and confirmed the past medical history, surgical history, social history, allergies in the chart. Medications: I have reviewed the medication list in the chart. Review of Systems - History obtained from the patient  General ROS: negative  Psychological ROS: negative  Ophthalmic ROS: negative  Allergy and Immunology ROS: negative  Hematological and Lymphatic ROS: negative  Endocrine ROS: negative  Breast ROS: negative  Respiratory ROS: negative  Cardiovascular ROS: negative  Gastrointestinal ROS: positive for -PEG tube  Genito-Urinary ROS: negative  Musculoskeletal ROS: negative      Objective:     Vitals:    06/24/21 1330   BP: (!) 145/67   Pulse: 71   Resp: 18   SpO2: 92%      PHYSICAL EXAM   PSYCH: mood and affect normal, alert and oriented x 3  CONSTITUTIONAL: No apparent distress, comfortable  EYES: Sclera white, pupils equal round and reactive to light  ENMT:  Hearing normal, trachea midline, ears externally intact, chronic tracheostomy  RESP: Breath sounds were clear and equal with no rales, wheezes, or rhonchi. Respiratory effort was normal with no retractions or use of accessory muscles. CV: Heart sounds were normal with a regular rate and rhythm. No pedal edema  GI/ Abdomen: The abdomen was soft and non distended. There was no tenderness, guarding, rebound, or rigidity. There was no                     masses, hepatosplenomegaly, or hernias.   PEG tube site clean dry intact        Assessment:     Patient Active Problem List   Diagnosis    Hyperlipidemia    Type 2 diabetes mellitus without complication, without long-term current use of insulin (HCC)    Atypical chest pain    Essential hypertension    Chronic acquired lymphedema    Aortic valve disease    Morbid obesity due to excess calories (HCC)    Abdominal pain    Acute respiratory failure with hypoxia (HCC)    Acute pulmonary edema (HCC)    Congestive heart failure with LV diastolic dysfunction, NYHA class 3 (HCC)    HANS (obstructive sleep apnea)    Acquired hypothyroidism    Chronic diastolic heart failure (HCC)    Nonrheumatic aortic valve stenosis    ACE-inhibitor cough    Pneumonia due to organism    Acute gout of right knee    Bilateral pulmonary embolism (HCC)    Anxiety    Tracheostomy dependent (HCC)    Shortness of breath    H/O deep venous thrombosis    SOB (shortness of breath)    Headache, unspecified headache type         Plan:   Dysphagia has resolved  Original 18 French PEG tube from November 2020 still in place  Patient is eating regular diet  PEG tube was removed without difficulty. Hole was covered with 4 x 4 gauze. Informed patient as well as his wife that the hole will close in the next 48 to 72 hours    Follow up in as needed         Alanna Ramos MD, FACS  6/24/2021  2:12 PM      NOTE: This report was transcribed using voice recognition software. Every effort was made to ensure accuracy; however, inadvertent computerized transcription errors may be present.

## 2021-08-16 NOTE — ED NOTES
Bed: 23  Expected date:   Expected time:   Means of arrival:   Comments:  issa Cid RN  08/16/21 1125

## 2021-08-16 NOTE — ED PROVIDER NOTES
Patient is a 68-year-old male with extensive past medical history including acute respiratory failure with trach dependence, hypertension, hyper lipid, diabetes, obesity presents to the emergency department with shortness of breath. Patient states that about 0900 hrs. patient developed difficulty breathing through his trach. He states that normally when this happens it is due to an mucous plug and his wife is able to suction him however she is at work today. Patient presented to the emergency department with difficulty phonating, one-word sentences, sats of 85% on trach mask. Patient with poor air movement with respirations. Patient was awake and alert, anxious appearing. ROS limited due to patient shortness of breath. Review of Systems   Unable to perform ROS: Severe respiratory distress   Constitutional: Negative for chills and fever. HENT: Negative for congestion and rhinorrhea. Respiratory: Positive for cough and shortness of breath. Cardiovascular: Negative for chest pain. Gastrointestinal: Negative for abdominal pain, nausea and vomiting. Genitourinary: Negative for dysuria and urgency. Musculoskeletal: Negative for arthralgias and myalgias. Skin: Negative for rash and wound. Neurological: Negative for dizziness, syncope, weakness, light-headedness, numbness and headaches. Psychiatric/Behavioral: Negative for confusion. The patient is not nervous/anxious. Physical Exam  Vitals and nursing note reviewed. Constitutional:       General: He is awake. He is not in acute distress. Appearance: He is morbidly obese. He is not ill-appearing or toxic-appearing. HENT:      Head: Normocephalic and atraumatic. Mouth/Throat:      Mouth: Mucous membranes are moist.      Pharynx: Oropharynx is clear. Eyes:      Extraocular Movements: Extraocular movements intact. Pupils: Pupils are equal, round, and reactive to light.    Neck:      Comments: Tracheotomy in place  Cardiovascular:      Rate and Rhythm: Regular rhythm. Tachycardia present. Pulses:           Radial pulses are 2+ on the right side and 2+ on the left side. Pulmonary:      Effort: Pulmonary effort is normal.      Breath sounds: Decreased air movement present. Decreased breath sounds present. Abdominal:      General: Bowel sounds are normal.      Palpations: Abdomen is soft. Musculoskeletal:         General: Normal range of motion. Cervical back: Normal range of motion and neck supple. Right lower le+ Edema present. Left lower le+ Edema present. Skin:     General: Skin is warm and dry. Capillary Refill: Capillary refill takes less than 2 seconds. Neurological:      General: No focal deficit present. Mental Status: He is alert and oriented to person, place, and time. GCS: GCS eye subscore is 4. GCS verbal subscore is 5. GCS motor subscore is 6. Cranial Nerves: Cranial nerves are intact. Sensory: Sensation is intact. Motor: Motor function is intact. Psychiatric:         Behavior: Behavior is cooperative. MDM  Number of Diagnoses or Management Options  Dyspnea, unspecified type  Tracheostomy obstruction ([facility] [address]  Diagnosis management comments: Patient is 78-year-old male with history of respiratory failure, trach dependent who presents for acute respiratory distress, possible trach plugging. Patient states he normally has his trach suction by his wife who is not at home today. Patient presents awake, alert, anxious, sats of 85%. Vital signs otherwise noted. Physical exam shows decreased air motion bilaterally. Patient very anxious appearing. Patient communicating to suction his trach. Respiratory therapy was called to the bedside and was able to suction his trach using normal saline and red rubber catheter with thick secretions removed. Patient with immediate relief in his breathing, able to expectorate thick secretions.   Patient able to speak in 3-4 word sentences following, 1 word sentences prior. Patient denies any other issues, denies chest pain or shortness of breath at that time, denies any fever chills or recent illness. Patient chest x-ray was reviewed showing no acute pathology. Patient was reevaluated, improved, stating he feels much better. Patient sats in the 90s. Patient able to be discharged to home, follow-up with PCP as needed. --------------------------------------------- PAST HISTORY ---------------------------------------------  Past Medical History:  has a past medical history of Acquired hypothyroidism, Anxiety, Congestive heart failure with LV diastolic dysfunction, NYHA class 3 (HCC), [facility] Depression, DM (diabetes mellitus) ([facility] Essential hypertension, Gouty arthritis, Hyperlipidemia, Hypertension, Lymphedema, Obesity, Obstructive sleep apnea, Obstructive sleep apnea, Osteoarthritis, and Type 2 diabetes mellitus without complication, without long-term current use of insulin ([facility] Past Surgical History:  has a past surgical history that includes Foot surgery (1990); External ear surgery (6/2/2009); ECHO Compl W Dop Color Flow (6/21/2013); Gastrostomy tube placement (08/10/2017); Tracheostomy tube placement (08/10/2017); bronchoscopy (08/10/2017); tracheostomy (N/A, 11/11/2020); Upper gastrointestinal endoscopy (N/A, 11/11/2020); tracheostomy (N/A, 11/12/2020); IR TUNNELED CVC PLACE WO SQ PORT/PUMP > 5 YEARS (11/19/2020); and tracheostomy tube change (N/A, 1/18/2021). Social History:  reports that he has never smoked. He has never used smokeless tobacco. He reports previous alcohol use. He reports previous drug use. Family History: family history includes Alzheimer's Disease in his mother; Heart Attack in his father; Heart Disease in his father. The patients home medications have been reviewed.     Allergies: Bee venom and Shellfish-derived products    -------------------------------------------------- RESULTS -------------------------------------------------  Labs:  No results found for this visit on 08/16/21. Radiology:  XR CHEST PORTABLE   Final Result   1. The tracheostomy tube is midline   2. Suspected findings of mild vascular congestion.           ------------------------- NURSING NOTES AND VITALS REVIEWED ---------------------------  Date / Time Roomed:  8/16/2021 11:26 AM  ED Bed Assignment:  23/23    The nursing notes within the ED encounter and vital signs as below have been reviewed. BP (!) 164/99   Pulse 128   Temp 97.9 °F (36.6 °C) (Infrared)   Resp 20   Ht 5' 9\" (1.753 m)   Wt 295 lb (133.8 kg)   SpO2 99%   BMI 43.56 kg/m²   Oxygen Saturation Interpretation: Abnormal and Improved after treatment      ------------------------------------------ PROGRESS NOTES ------------------------------------------  11:48 AM EDT  I have spoken with the patient and discussed todays results, in addition to providing specific details for the plan of care and counseling regarding the diagnosis and prognosis. Their questions are answered at this time and they are agreeable with the plan. I discussed at length with them reasons for immediate return here for re evaluation. They will followup with their primary care physician by calling their office as needed. --------------------------------- ADDITIONAL PROVIDER NOTES ---------------------------------  At this time the patient is without objective evidence of an acute process requiring hospitalization or inpatient management. They have remained hemodynamically stable throughout their entire ED visit and are stable for discharge with outpatient follow-up. The plan has been discussed in detail and they are aware of the specific conditions for emergent return, as well as the importance of follow-up. New Prescriptions    No medications on file     Diagnosis:  1.  Tracheostomy obstruction ([place] [address]    2. Dyspnea, unspecified type      Disposition:  Patient's disposition: Discharge to home  Patient's condition is stable. 8/16/21, 11:48 AM EDT.     This note is prepared by Yamileth Calvo MD -PGY-2           Yamileth Calvo MD  Resident  08/16/21 [id number]

## 2021-10-28 PROBLEM — J96.21 ACUTE ON CHRONIC RESPIRATORY FAILURE WITH HYPOXIA (HCC): Status: ACTIVE | Noted: 2021-01-01

## 2021-10-28 NOTE — ED PROVIDER NOTES
This is a 28-year-old male with history of hypertension, COPD, pulmonary embolism (on anticoagulation with Eliquis), trach dependent after being intubated for pneumonia a year ago, presenting to the emergency department for chest pain and shortness of breath. Patient states he has had intermittent left-sided chest pain for the past 2 weeks, and spiked a fever today. Patient states the chest pain is sharp, intermittent, left sided, nonradiating, moderate severity worse with nothing, improved by nothing, occurring randomly. He states he has not missed any of his doses of Eliquis. He currently does not have any chest pain. Patient states he is chronically on 4 L of oxygen via trach mask, on presentation today he is 81% SPO2, requiring 11 L of oxygen to improve to the upper 90s. Patient with no known ill contacts, he was not vaccinated for COVID-19. The history is provided by the patient and medical records. Review of Systems   Constitutional: Positive for fever. Negative for chills. HENT: Negative for congestion and sore throat. Eyes: Negative for visual disturbance. Respiratory: Positive for cough and shortness of breath. Cardiovascular: Positive for chest pain. Negative for palpitations and leg swelling. Gastrointestinal: Negative for abdominal pain, nausea and vomiting. Genitourinary: Negative for dysuria and flank pain. Musculoskeletal: Negative for back pain and neck pain. Skin: Negative for rash. Neurological: Positive for light-headedness. Negative for syncope. Physical Exam  Vitals and nursing note reviewed. Constitutional:       Appearance: He is obese. He is ill-appearing. Comments: Chronically ill-appearing, sitting in bed, mildly uncomfortable appearing   HENT:      Head: Normocephalic and atraumatic. Right Ear: External ear normal.      Left Ear: External ear normal.   Eyes:      Extraocular Movements: Extraocular movements intact. Conjunctiva/sclera: Conjunctivae normal.   Neck:      Comments: Tracheostomy in place  Cardiovascular:      Rate and Rhythm: Regular rhythm. Tachycardia present. Pulses: Normal pulses. Heart sounds: Murmur heard. Comments: Systolic ejection murmur, 4/6  Pulmonary:      Comments: Patient tachypneic, no accessory muscle use. Diminished breath sounds bilateral lungs  Abdominal:      Comments: Obese, soft, nontender   Musculoskeletal:         General: No swelling or deformity. Right lower leg: No edema. Left lower leg: No edema. Skin:     General: Skin is warm and dry. Capillary Refill: Capillary refill takes less than 2 seconds. Neurological:      General: No focal deficit present. Mental Status: He is alert. Psychiatric:         Mood and Affect: Mood normal.         Behavior: Behavior normal.         Thought Content: Thought content normal.         Judgment: Judgment normal.          Procedures     MDM  Number of Diagnoses or Management Options  Acute on chronic respiratory failure with hypoxia (HCC)  NANCY (acute kidney injury) ([facility]  COVID-19  Diagnosis management comments: This is a 42-year-old male, chronically trach and oxygen dependent, presenting to the emergency department for chest pain or shortness of breath. The chest pain is intermittent for the past 2 weeks, nonpleuritic and he has not missed any doses of his Eliquis. Patient initially 81% SPO2, improving to the upper 90s on 11 L of oxygen via trach mask. Patient is febrile, 39.9 °C, tachycardic with a pulse rate of 120, normotensive and tachypneic. Concern for COVID-19, other pneumonia. Low concern for PE given patient is anticoagulated on Eliquis. Patient positive for COVID-19, he is unvaccinated. Patient was weaned down after hyperoxia noted on ABG, wean down to 9 L via trach mask.   Patient is still mildly febrile after Tylenol, but tachycardia resolved, patient normotensive and in no significant hypothyroidism, Anxiety, Congestive heart failure with LV diastolic dysfunction, NYHA class 3 (HCC), COPD (chronic obstructive pulmonary disease) ([facility] [address] Depression, DM [facility]diabetes mellitus) ([facility] Essential hypertension, Gouty arthritis, Hyperlipidemia, Hypertension, Lymphedema, Obesity, Obstructive sleep apnea, Obstructive sleep apnea, Osteoarthritis, and Type 2 diabetes mellitus without complication, without long-term current use of insulin ([facility] Past Surgical History:  has a past surgical history that includes Foot surgery (1990); External ear surgery (6/2/2009); ECHO Compl W Dop Color Flow (6/21/2013); Gastrostomy tube placement (08/10/2017); Tracheostomy tube placement (08/10/2017); bronchoscopy (08/10/2017); tracheostomy (N/A, 11/11/2020); Upper gastrointestinal endoscopy (N/A, 11/11/2020); tracheostomy (N/A, 11/12/2020); IR TUNNELED CVC PLACE WO SQ PORT/PUMP > 5 YEARS (11/19/2020); and tracheostomy tube change (N/A, 1/18/2021). Social History:  reports that he has never smoked. He has never used smokeless tobacco. He reports previous alcohol use. He reports previous drug use. Family History: family history includes Alzheimer's Disease in his mother; Heart Attack in his father; Heart Disease in his father. The patients home medications have been reviewed.     Allergies: Bee venom and Shellfish-derived products    -------------------------------------------------- RESULTS -------------------------------------------------    LABS:  Results for orders placed or performed during the hospital encounter of 10/28/21   COVID-19, Rapid    Specimen: Nasopharyngeal Swab   Result Value Ref Range    SARS-CoV-2, NAAT DETECTED (A) Not Detected   Lactate, Sepsis   Result Value Ref Range    Lactic Acid, Sepsis 1.2 0.5 - 1.9 mmol/L   Lactate, Sepsis   Result Value Ref Range    Lactic Acid, Sepsis 0.8 0.5 - 1.9 mmol/L   CBC auto differential   Result Value Ref Range    WBC 6.4 4.5 - 11.5 E9/L    RBC 4.33 3.80 - 5.80 E12/L    Hemoglobin 10.7 (L) 12.5 - 16.5 g/dL    Hematocrit 31.7 (L) 37.0 - 54.0 %    MCV 73.2 (L) 80.0 - 99.9 fL    MCH 24.7 (L) 26.0 - 35.0 pg    MCHC 33.8 32.0 - 34.5 %    RDW 19.6 (H) 11.5 - 15.0 fL    Platelets 408 563 - 423 E9/L    MPV 9.4 7.0 - 12.0 fL    Neutrophils % 68.9 43.0 - 80.0 %    Immature Granulocytes % 0.6 0.0 - 5.0 %    Lymphocytes % 16.7 (L) 20.0 - 42.0 %    Monocytes % 12.4 (H) 2.0 - 12.0 %    Eosinophils % 1.1 0.0 - 6.0 %    Basophils % 0.3 0.0 - 2.0 %    Neutrophils Absolute 4.38 1.80 - 7.30 E9/L    Immature Granulocytes # 0.04 E9/L    Lymphocytes Absolute 1.06 (L) 1.50 - 4.00 E9/L    Monocytes Absolute 0.79 0.10 - 0.95 E9/L    Eosinophils Absolute 0.07 0.05 - 0.50 E9/L    Basophils Absolute 0.02 0.00 - 0.20 E9/L   Comprehensive Metabolic Panel w/ Reflex to MG   Result Value Ref Range    Sodium 137 132 - 146 mmol/L    Potassium reflex Magnesium 3.8 3.5 - 5.0 mmol/L    Chloride 97 (L) 98 - 107 mmol/L    CO2 28 22 - 29 mmol/L    Anion Gap 12 7 - 16 mmol/L    Glucose 145 (H) 74 - 99 mg/dL    BUN 12 6 - 23 mg/dL    CREATININE 1.4 (H) 0.7 - 1.2 mg/dL    GFR Non-African American >60 >=60 mL/min/1.73    GFR African American >60     Calcium 9.6 8.6 - 10.2 mg/dL    Total Protein 8.0 6.4 - 8.3 g/dL    Albumin 4.5 3.5 - 5.2 g/dL    Total Bilirubin 0.5 0.0 - 1.2 mg/dL    Alkaline Phosphatase 94 40 - 129 U/L    ALT 30 0 - 40 U/L    AST 95 (H) 0 - 39 U/L   APTT   Result Value Ref Range    aPTT 35.9 (H) 24.5 - 35.1 sec   Protime-INR   Result Value Ref Range    Protime 15.0 (H) 9.3 - 12.4 sec    INR 1.4    Lipase   Result Value Ref Range    Lipase 37 13 - 60 U/L   Troponin   Result Value Ref Range    Troponin, High Sensitivity 33 (H) 0 - 11 ng/L   Brain Natriuretic Peptide   Result Value Ref Range    Pro- 0 - 125 pg/mL   Blood Gas, Arterial   Result Value Ref Range    Date Analyzed 20211028     Time Analyzed 0172     Source: Blood Arterial     pH, Blood Gas 7.452 (H) 7.350 - 7.450    PCO2 38.2 35.0 - 45.0 mmHg    PO2 135.6 (H) 75.0 - 100.0 mmHg    HCO3 26.1 (H) 22.0 - 26.0 mmol/L    B.E. 2.1 -3.0 - 3.0 mmol/L    O2 Sat 98.3 92.0 - 98.5 %    O2Hb 97.6 (H) 94.0 - 97.0 %    COHb 0.3 0.0 - 1.5 %    MetHb 0.4 0.0 - 1.5 %    O2 Content 15.1 mL/dL    HHb 1.7 0.0 - 5.0 %    tHb (est) 10.8 (L) 11.5 - 16.5 g/dL    Mode TOI     Date Of Collection      Time Collected      Pt Temp 37.0 C     ID [id number]     Lab [id number]     Critical(s) Notified . No Critical Values    Troponin   Result Value Ref Range    Troponin, High Sensitivity 33 (H) 0 - 11 ng/L   EKG 12 lead   Result Value Ref Range    Ventricular Rate 113 BPM    Atrial Rate 113 BPM    P-R Interval 170 ms    QRS Duration 88 ms    Q-T Interval 326 ms    QTc Calculation (Bazett) 447 ms    P Axis 55 degrees    R Axis -39 degrees    T Axis 80 degrees       RADIOLOGY:  XR CHEST PORTABLE   Final Result   No acute process. ------------------------- NURSING NOTES AND VITALS REVIEWED ---------------------------  Date / Time Roomed:  10/28/2021 12:49 PM  ED Bed Assignment:  15/15    The nursing notes within the ED encounter and vital signs as below have been reviewed. Patient Vitals for the past 24 hrs:   BP Temp Temp src Pulse Resp SpO2 Height Weight   10/28/21 1524 (!) 143/68 101.3 °F (38.5 °C) Oral 87 20 97 % -- --   10/28/21 1422 -- -- -- -- -- 97 % -- --   10/28/21 1246 (!) 114/90 103.8 °F (39.9 °C) -- 119 30 (!) 89 % 5' 10\" (1.778 m) 290 lb (131.5 kg)   10/28/21 1242 -- -- -- 110 -- (!) 81 % -- --       Oxygen Saturation Interpretation: Abnormal and Improved after treatment    ------------------------------------------ PROGRESS NOTES ------------------------------------------  Re-evaluation(s):  See ED COURSE    Counseling:  I have spoken with the patient and discussed todays results, in addition to providing specific details for the plan of care and counseling regarding the diagnosis and prognosis.   Their questions are answered at this time and they are agreeable with the plan of admission.    --------------------------------- ADDITIONAL PROVIDER NOTES ---------------------------------  Consultations:  See ED COURSE  This patient's ED course included: a personal history and physicial examination, re-evaluation prior to disposition, multiple bedside re-evaluations, IV medications, cardiac monitoring, continuous pulse oximetry and complex medical decision making and emergency management    This patient has remained hemodynamically stable during their ED course. Diagnosis:  1. Acute on chronic respiratory failure with hypoxia (HCC)    2. COVID-19    3. NANCY (acute kidney injury) ([facility] [address]        Disposition:  Patient's disposition: Admit to telemetry  Patient's condition is stable.          [address] DO  Resident  10/28/21 0884

## 2021-10-29 NOTE — CONSULTS
Pulmonary Consultation    Admit Date: 10/28/2021    Requesting Physician: Kelsi Romeo MD    CC:     HPI:  This is a 64 y.o. male not vaccinated for coronavirus who presented with  history of DM, hypothyroid, COPD, HTN, HLD, PE on Eliquis  asthma tracheostomy dependence with tracheostomy 2017 decannulated in 2 months  Then again with tracheostomy 11/12/2020  His hospital course:     10/26 -12/3 admitted with SOB 10/27 RRT refused BiPAP became combative.  10/28 intubated and sedated.  CT chest showing dense multifocal airspace disease 11/8 patient extubated and reintubated the same day 11/11 PEG tube placed unable to place tracheostomy due to scar tissue.  Neck CT showing calcification at level of critical thyroid membrane 11/12 tracheostomy placed by ENT. Merrill Malave aspirated post trach.  Patient underwent withdrawal of alcohol required medications    11/17 emesis x3.  Had bright blood and clots coming from ENT.  ENT evaluated with no evidence of bleeding.  Neurologically improving moving all extremities.  Repeat bleeding from trach site in the evening.  Decreased to 40% +5.  Patient was treated for MRSA pneumonia right lower lobe.  Present with Zyvox AARON was nondiagnostic.  Patient was transferred to [address] was being weaned to pressure support    12/9 -12/17 presented back with chest pain.  Patient on TOI   Chest x-ray showing almost complete resolution of left midlung right upper lobe infiltrate, CTA positive for bilateral PE bilateral airspace disease  CT abdomen with fat stranding adjacent to the urinary bladder indicating cystitis,Ultrasound lower extremities showed no DVT,Covid negative  12/11 passed swallow study. 12/14 patient on 40% FiO2 via trach mask. [facility] Session 6 L12/15 With [place] valve patient coughed.  MRSA pneumonia treated till 12/17 finished course of antibiotics from last admission on oral Zyvox and Diflucan.     12/24 presents back to ER with a panic attack diarrhea  Saturations 99% on 4 L  Covid positive  Chest x-ray negative    patient has 6 proximal extra-large Shiley in place  He said he was exposed to a lot of people at a  when his niece  from cancer. There was 2 days ago. After that he came to the hospital not able to breathe. Had diarrhea at home. He says he is coughing a little bit. No chest pain no nausea vomiting.   He s able to talk with a trach in place does not tolerate finger occlusion capping her Pass[place] valve      PMH:    Past Medical History:   Diagnosis Date    Acquired hypothyroidism 2017    Anxiety     Arthritis     Congestive heart failure with LV diastolic dysfunction, NYHA class 3 ([address]     COPD (chronic obstructive pulmonary disease) ([address]     Depression     DM (diabetes mellitus) ([address]     Essential hypertension 2016    Hyperlipidemia     Hypertension     Lymphedema     Obesity     Obstructive sleep apnea 2009    Obstructive sleep apnea 2017    Type 2 diabetes mellitus without complication, without long-term current use of insulin ([address] 1/15/2014     PSH:   Past Surgical History:   Procedure Laterality Date    BRONCHOSCOPY  08/10/2017    ECHO COMPL W DOP COLOR FLOW  2013         EXTERNAL EAR SURGERY  2009    keloid left ear    FOOT SURGERY      Left foot    GASTROSTOMY TUBE PLACEMENT  08/10/2017    IR TUNNELED CATHETER PLACEMENT GREATER THAN 5 YEARS  2020    IR TUNNELED CATHETER PLACEMENT GREATER THAN 5 YEARS 2020 Florecita Dee MD [facility]    TRACHEOSTOMY N/A 2020    TRACHEOTOMY performed by Omar Seaman MD at [facility] N/A 2020    OPEN TRACHEOTOMY, BRONCHOSCOPY, DIRECT LARYNGOSCOPY performed by Nazia Can DO at [address] N/A 2021    TRACHEOTOMY TUBE CHANGE performed by Nazia Can DO at [address]  08/10/2017    UPPER GASTROINTESTINAL ENDOSCOPY N/A 11/11/2020    EGD ESOPHAGOGASTRODUODENOSCOPY PEG TUBE INSERTION performed by Peyman Wiley MD at [address]      Cholecystectomy  Unilateral hernia repair      Medications:     sodium chloride        carvedilol  25 mg Oral BID    olmesartan  20 mg Oral Daily    sodium chloride flush  5-40 mL IntraVENous 2 times per day    dexamethasone  6 mg IntraVENous Q12H    apixaban  5 mg Oral BID    atorvastatin  40 mg Oral Daily    busPIRone  15 mg Oral TID       Allergies: Allergies   Allergen Reactions    Bee Venom Anaphylaxis    Shellfish-Derived Products Anaphylaxis     Only crab legs       Social History:  [unfilled]  He smoked for maybe 2 years. He was a . Has some exposure to fumes. Family history: Family history positive for mom and dad having diabetes. There is no cancer in the family. He has 3 healthy kids he is     Respiratory ROS: Cough chronic as per above      Physical Examination    Vitals:  VITALS:  BP (!) 162/77   Pulse 83   Temp 98.9 °F (37.2 °C) (Oral)   Resp 18   Ht 5' 10\" (1.778 m)   Wt 290 lb (131.5 kg)   SpO2 99%   BMI 41.61 kg/m²   24HR INTAKE/OUTPUT:      Intake/Output Summary (Last 24 hours) at 10/29/2021 1603  Last data filed at 10/29/2021 1200  Gross per 24 hour   Intake 1200 ml   Output 400 ml   Net 800 ml         General: No distress. Alert. Moderately obese  Eyes: PERRL. No sclera icterus. ENT: No discharge. Pharynx clear. Nasal septum midline   Neck: Trachea midline. Normal thyroid. no JVD trach in place  Resp: No accessory muscle use. No crackles. No wheezing. No rhonchi. Symmetrical exansion  CV: PMI non displaced. Regular rate. Regular rhythm. Harsh systolic murmur  ABD: Non-tender. Non-distended. No organmegaly. Normal bowel sounds. Scar from PEG distended obese  Skin: Warm and dry. No rash on exposed extremities. Lymph: No cervical LAD. No supraclavicular LAD. Ext: No joint deformity. No clubbing. No cyanosis.  No decannulated    Oxygenation does not seem to be more than usual.  Chest x-ray is clear  Inflammatory markers are not increased  Patient possibly could go home and get antibody treatments as an outpatient. Thanks for letting us see this patient in consultation. Please contact us with any questions.       Electronically signed by Yaz May MD on 10/29/2021 at 4:03 PM

## 2021-10-29 NOTE — H&P
Chantale Torres is a 64 y.o. male  Chief Complaint   Patient presents with    Shortness of Breath     Patient c/o chest pain and shortness of breath started yesterday      HPI  As above, this am he is sitting up in bed, no cp, sob, n/v.  He admits he had a fever of 104 at home. Appetite is poor. He has a dry cough and nausea at times. He admits he had not received COVID vaccine. He has a chronic TM. No current facility-administered medications on file prior to encounter. Current Outpatient Medications on File Prior to Encounter   Medication Sig Dispense Refill    apixaban (ELIQUIS) 5 MG TABS tablet Take 5 mg by mouth 2 times daily      atorvastatin (LIPITOR) 40 MG tablet Take 40 mg by mouth nightly       olmesartan (BENICAR) 20 MG tablet Take 20 mg by mouth daily      busPIRone (BUSPAR) 15 MG tablet Take 15 mg by mouth 3 times daily      buPROPion (WELLBUTRIN XL) 150 MG extended release tablet Take 150 mg by mouth every morning      ALPRAZolam (XANAX) 0.5 MG tablet Take 0.5 mg by mouth nightly as needed for Sleep.       busPIRone (BUSPAR) 15 MG tablet Take 15 mg by mouth 2 times daily      albuterol sulfate HFA (PROVENTIL HFA) 108 (90 Base) MCG/ACT inhaler Inhale 2 puffs into the lungs every 4 hours as needed for Wheezing 1 Inhaler zero    budesonide (PULMICORT) 0.5 MG/2ML nebulizer suspension Take 2 mLs by nebulization 2 times daily 60 ampule 3    carvedilol (COREG) 25 MG tablet Take 1 tablet by mouth 2 times daily 60 tablet 3    amLODIPine (NORVASC) 10 MG tablet Take 1 tablet by mouth daily 90 tablet 0    Arformoterol Tartrate (BROVANA) 15 MCG/2ML NEBU Take 1 ampule by nebulization 2 times daily       Allergies   Allergen Reactions    Bee Venom Anaphylaxis    Shellfish-Derived Products Anaphylaxis     Only crab legs       Past Medical History:   Diagnosis Date    Acquired hypothyroidism 9/26/2017    Anxiety     Arthritis     Congestive heart failure with LV diastolic dysfunction, NYHA class 3 ([facility]     COPD (chronic obstructive pulmonary disease) ([facility]     Depression     DM (diabetes mellitus) ([facility]     Essential hypertension 2016    Hyperlipidemia     Hypertension     Lymphedema     Obesity     Obstructive sleep apnea 2009    Obstructive sleep apnea 2017    Type 2 diabetes mellitus without complication, without long-term current use of insulin ([facility] 1/15/2014     Past Surgical History:   Procedure Laterality Date    BRONCHOSCOPY  08/10/2017    ECHO COMPL W DOP COLOR FLOW  2013         EXTERNAL EAR SURGERY  2009    keloid left ear    FOOT SURGERY      Left foot    GASTROSTOMY TUBE PLACEMENT  08/10/2017    IR TUNNELED CATHETER PLACEMENT GREATER THAN 5 YEARS  2020    IR TUNNELED CATHETER PLACEMENT GREATER THAN 5 YEARS 2020 Latasha Godinez MD [facility]    TRACHEOSTOMY N/A 2020    TRACHEOTOMY performed by Dionisio Soto MD at [facility] N/A 2020    OPEN TRACHEOTOMY, BRONCHOSCOPY, DIRECT LARYNGOSCOPY performed by Tiny Guillaume DO at [facility] N/A 2021    TRACHEOTOMY TUBE CHANGE performed by Tiny Guillaume DO at [address]  08/10/2017    UPPER GASTROINTESTINAL ENDOSCOPY N/A 2020    EGD ESOPHAGOGASTRODUODENOSCOPY PEG TUBE INSERTION performed by Dionisio Soto MD at [place] [address] History     Socioeconomic History    Marital status: Legally      Spouse name: Not on file    Number of children: Not on file    Years of education: Not on file    Highest education level: Not on file   Occupational History    Not on file   Tobacco Use    Smoking status: Former Smoker     Packs/day: 1.00     Years: 3.00     Pack years: 3.00     Types: Cigarettes     Quit date: 2001     Years since quittin.7    Smokeless tobacco: Never Used   Vaping Use    Vaping Use: Never used   Substance and Sexual Activity  Alcohol use: Not Currently    Drug use: Not Currently     Comment: past use; none x 30 years    Sexual activity: Not Currently   Other Topics Concern    Not on file   Social History Narrative    Drinks 3 cups of coffee daily. Social Determinants of Health     Financial Resource Strain:     Difficulty of Paying Living Expenses:    Food Insecurity:     Worried About Running Out of Food in the Last Year:     [address] in the Last Year:    Transportation Needs:     Lack of Transportation (Medical):  Lack of Transportation (Non-Medical):    Physical Activity:     Days of Exercise per Week:     Minutes of Exercise per Session:    Stress:     Feeling of Stress :    Social Connections:     Frequency of Communication with Friends and Family:     Frequency of Social Gatherings with Friends and Family:     Attends [employer] Services:     Active Member of Clubs or Organizations:     Attends Club or Organization Meetings:     Marital Status:    Intimate Partner Violence:     Fear of Current or Ex-Partner:     Emotionally Abused:     Physically Abused:     Sexually Abused:      Family History   Problem Relation Age of Onset    Alzheimer's Disease Mother     Heart Disease Father         Heart arrhythmia    Heart Attack Father        ROS  Patient positive for  SOB, chest pressure   Patient denies any fever, chills, night sweats, weight changes   Denies headache, visual changes,   Denies dysphagia, odynophagia dysphonia,   Denies sputum production,   Denies chest pain,  orthopnea, palpitations,   Denies abd pain, N/V/D/C/melena, hematochezia,   Denies urinary frequency, urgency, dysuria, hematuria,   Denies any acute muscle aches, paresthesias, weakness, seizure, syncopal episodes, Denies depression, anxiety.   OBJECTIVE  Vitals:    10/29/21 0400   BP: (!) 184/85   Pulse: 82   Resp: 20   Temp: 98.5 °F (36.9 °C)   SpO2: 99%     Gen: AO3, NAD  Head:  PERRL, EOMIx2, no icterus, conjunctival injection  Trach is midline   Neck: No JVD, carotid bruits, LAD, thyroid non-palpable  Heart: RRR with no murmurs, rubs, gallops  Lungs: CTA B/L, no W/R/R  Abd: soft, NT, ND, BS+, no G/R, no HSM  Ext: No C/C/E, pulses intact distally B/L  Neuro: CN 2-12 grossly intact with no focal deficits  Lab Results   Component Value Date    WBC 6.4 10/28/2021    HGB 10.7 (L) 10/28/2021    HCT 31.7 (L) 10/28/2021    MCV 73.2 (L) 10/28/2021     10/28/2021     Lab Results   Component Value Date     10/28/2021    K 3.8 10/28/2021    CL 97 10/28/2021    CO2 28 10/28/2021    BUN 12 10/28/2021    CREATININE 1.4 10/28/2021    GLUCOSE 145 10/28/2021    CALCIUM 9.6 10/28/2021        ASSESSMENT  1. Acute on chronic respiratory failure with hypoxia (HCC)    2. COVID-19    3.  NANCY (acute kidney injury) ([facility]      PLAN  Pulm team consulted  Start on IV steroids  Monitor BS  Discharge to home once off regimen

## 2021-10-29 NOTE — PROGRESS NOTES
I spoke with Dr. Beth Ordoñez regarding admission orders.   I placed a general diet order and nightly medications and Dr. Beth Ordoñez will do the rest in the am. Jaspreet Dow RN

## 2021-10-29 NOTE — CARE COORDINATION
Contacted patient via bedside phone for transition of care planning but no answer. Call made to patient's wife but no answer. Unable to leave a voicemail due to voicemail box being full. Per nursing rounds, patient is independent, from home, has a trach, is currently on 4L NC, baseline is 3-4L at home and is on IV Decadron. SW/CM will continue to follow for discharge plan.     Oren Calvert, MSW, LSW [phone]

## 2021-10-29 NOTE — PROGRESS NOTES
Emma Burger was ordered olmesartan which is a nonformulary medication. The patient's home supply of this medication should be brought in to the hospital for inpatient use. If the medication has not been administered by 1400 on the following day from the time the order was placed, a pharmacist will follow-up with the nurse of the patient to assess the capability of the patient to bring in the medication. If it is determined that the patient cannot supply the medication and it is not available to be dispensed from the pharmacy, a call will be placed to the ordering provider to discuss alternative options.

## 2021-10-30 NOTE — PROGRESS NOTES
Pulmonary Progress Note    Admit Date: 10/28/2021                            PCP: Rafael Singh MD  Active Problems:    Acute on chronic respiratory failure with hypoxia ([address]  Resolved Problems:    * No resolved hospital problems. *      Subjective:  Sitting up in bedside chair on TOI 4Liters  Denies dyspnea or complaints ready to go home   Chronic trach     Medications:   sodium chloride          carvedilol  25 mg Oral BID    olmesartan  20 mg Oral Daily    sodium chloride flush  5-40 mL IntraVENous 2 times per day    dexamethasone  6 mg IntraVENous Q12H    apixaban  5 mg Oral BID    atorvastatin  40 mg Oral Daily    busPIRone  15 mg Oral TID       Vitals:  VITALS:  BP (!) 159/74   Pulse 64   Temp 97.6 °F (36.4 °C) (Temporal)   Resp 13   Ht 5' 10\" (1.778 m)   Wt 290 lb (131.5 kg)   SpO2 98%   BMI 41.61 kg/m²   24HR INTAKE/OUTPUT:      Intake/Output Summary (Last 24 hours) at 10/30/2021 1348  Last data filed at 10/29/2021 2249  Gross per 24 hour   Intake 360 ml   Output 475 ml   Net -115 ml     CURRENT PULSE OXIMETRY:  SpO2: 98 %  24HR PULSE OXIMETRY RANGE:  SpO2  Av.7 %  Min: 94 %  Max: 98 %  CVP:    VENT SETTINGS:   Vent Information  Skin Assessment: Clean, dry, & intact  FiO2 : (S) 35 %  SpO2: 98 %  Additional Respiratory  Assessments  Pulse: 64  Resp: 13  SpO2: 98 %      EXAM:  General: Alert, in NAD  ENT: No discharge. Pharynx clear. membranes moist   Neck: Supple, Trachea midline. , trach intact 4L TOI  Resp: No accessory muscle use. Non-labored. Lungs clear diminished with No crackles. No wheezing. No rhonchi. CV: Regular rate. Regular rhythm. No murmur . No edema. ABD: Non-tender. Non-distended. Soft, round . Normal bowel sounds. Skin: Warm and dry. M/S: No cyanosis. No joint deformity. No clubbing. Neuro: Awake. Follows commands. O x 3, ARMSTRONG  Psych:  calm and interactive     I/O: I/O last 3 completed shifts:   In: 1160 [P.O.:1160]  Out: 475 [Urine:475]  No intake/output data recorded. Results:  CBC:   Recent Labs     10/28/21  1314 10/30/21  0438   WBC 6.4 7.3   HGB 10.7* 11.2*   HCT 31.7* 34.1*   MCV 73.2* 73.7*    176     BMP:   Recent Labs     10/28/21  1314      K 3.8   CL 97*   CO2 28   BUN 12   CREATININE 1.4*     LFT:   Recent Labs     10/28/21  1314   ALKPHOS 94   ALT 30   AST 95*   PROT 8.0   BILITOT 0.5   LABALBU 4.5     PT/INR:   Recent Labs     10/28/21  1314   PROTIME 15.0*   INR 1.4     Procalcitonin:   Recent Labs     10/29/21  0441   PROCAL 0.07     Cultures:  Recent Labs     10/29/21  0500   CULTRESP Oral Pharyngeal Tiesha reduced*  Light growth  Identification and sensitivity to follow           ABG:   Recent Labs     10/29/21  0516   PH 7.394   PO2 63.5*   PCO2 47.5*   HCO3 28.4*   BE 2.8   O2SAT 90.8*       Films:  XR CHEST PORTABLE    Result Date: 10/28/2021  EXAMINATION: ONE XRAY VIEW OF THE CHEST 10/28/2021 1:29 pm COMPARISON: Comparison is dated August 16, 2021 HISTORY: ORDERING SYSTEM PROVIDED HISTORY: sob TECHNOLOGIST PROVIDED HISTORY: Reason for exam:->sob What reading provider will be dictating this exam?->CRC FINDINGS: The lungs are without acute focal process. There is no effusion or pneumothorax. The cardiomediastinal silhouette is without acute process. The osseous structures are without acute process tracheostomy again noted. No acute process. Assessment/Plan:   64 y.o.male who presented with shortness of breath  1. Laryngeal stenosis posterior glottic stenosis with possible tracheal stenosis with vocal cords in midline laryngoscopy 7/23/2021. S/p lysis of the posterior glottic stenosis with Kenalog and balloon dilation 9/9/2021  2. granulation tissue and tracheostomy  3. Obstructive sleep apnea cured with tracheostomy  4. Anemia microcytic  5. Systolic murmur  6. Chronic trach trach placed 11/12/20    7. chronic diastolic heart failure EF 20% LVH diastolic dysfunction on echo 11/30/2020  8.  H/o Bilateral PE 12/9/20 now on Lovenox   9. Chronic anemia  10. HANS ( AHI 17 on 4/18/18 requires BIPAP 16/12) noncompliant with CPAP now cured with tracheostomy  11. History of reactive airway disease  12. CT 3/14/2018 showing pulmonary nodule stable from 10/2017  13. Diabetes with elevated blood sugars  14. Obesity  15. chronic lymphedema  16. History of gout on colchicine  17. Hypothyroid  18. H/o respiratory failure: 7/8/2017 pt was  transferred from [facility] for acute respiratory failure after lap cholecystectomy, lap umbilical hernia repair, and lap liver biopsy (fatty liver).  He had been extubated postop but had laryngospasm requiring reintubation, complicated by negative pressure flash pulmonary edema, and required tracheostomy 8/10/2017.  Patient has staph aureus pneumonia and C. difficile colitis. Osmel Boyd was then transferred to [facility] where he was weaned off the ventilator and decannulated     Keep POX >88%, currently on 4L TOI which is baseline at home   cxr is clear  inflammatory markers ok-D-dimer <200  CRP 1.4  On eliquis  On decadron 6mg IV q12H, will decrease tomorrow is O2 needs maintain then to taper dose   Add vitamins C,D, and zinc  IS  Prone as able   Pct 0.07, doubt superimposed bacterial pneumonia         Electronically signed by KATHERINE Lechuga on 10/30/2021 at 1:48 PM

## 2021-10-30 NOTE — PROGRESS NOTES
Message left for Dr. Rayna Pierson on call for Dr. Shanika Gillespie re: 1/4 blood cultures positive.

## 2021-10-30 NOTE — PLAN OF CARE
Problem: Airway Clearance - Ineffective  Goal: Achieve or maintain patent airway  10/30/2021 1848 by Shauna Schaffer RN  Outcome: Met This Shift  10/30/2021 0633 by Isai Michael RN  Outcome: Met This Shift     Problem: Gas Exchange - Impaired  Goal: Promote optimal lung function  10/30/2021 1848 by Shauna Schaffer RN  Outcome: Met This Shift  10/30/2021 0633 by Isai Michael RN  Outcome: Met This Shift     Problem:  Body Temperature -  Risk of, Imbalanced  Goal: Will regain or maintain usual level of consciousness  10/30/2021 1848 by Shauna Schaffer RN  Outcome: Met This Shift  10/30/2021 0633 by Isai Michael RN  Outcome: Met This Shift

## 2021-10-30 NOTE — PROGRESS NOTES
Subjective: PATIENT SEEN THIS AM FOR FOLLOW UP UP IN CHAIR BREATHING FAIR NO NEW ISSUES OVERNIGHT          ROS:  Constitutional: negative except for fatigue and sweats  Respiratory: negative    Objective:    zinc sulfate (ZINCATE) capsule 50 mg, Daily  ascorbic acid (VITAMIN C) tablet 500 mg, Daily  Vitamin D (CHOLECALCIFEROL) tablet 1,000 Units, Daily  dexamethasone (DECADRON) tablet 6 mg, Daily  carvedilol (COREG) tablet 25 mg, BID  olmesartan (BENICAR) tablet 20 mg, Daily  sodium chloride flush 0.9 % injection 5-40 mL, 2 times per day  sodium chloride flush 0.9 % injection 5-40 mL, PRN  0.9 % sodium chloride infusion, PRN  ondansetron (ZOFRAN-ODT) disintegrating tablet 4 mg, Q8H PRN   Or  ondansetron (ZOFRAN) injection 4 mg, Q6H PRN  polyethylene glycol (GLYCOLAX) packet 17 g, Daily PRN  acetaminophen (TYLENOL) tablet 650 mg, Q6H PRN   Or  acetaminophen (TYLENOL) suppository 650 mg, Q6H PRN  albuterol sulfate  (90 Base) MCG/ACT inhaler 2 puff, Q4H PRN  ALPRAZolam (XANAX) tablet 0.5 mg, Nightly PRN  apixaban (ELIQUIS) tablet 5 mg, BID  atorvastatin (LIPITOR) tablet 40 mg, Daily  busPIRone (BUSPAR) tablet 15 mg, TID          BP (!) 159/74   Pulse 64   Temp 97.6 °F (36.4 °C) (Temporal)   Resp 13   Ht 5' 10\" (1.778 m)   Wt 290 lb (131.5 kg)   SpO2 98%   BMI 41.61 kg/m²     General Appearance:  awake, alert, oriented, in no acute distress  Neck:  TRACH PRESENT   Lungs:  Breath sounds: rhonchi- scattered  Heart:  Heart sounds are normal.  Regular rate and rhythm without murmur, gallop or rub. Abdomen:  Soft, non-tender, normal bowel sounds. No bruits, organomegaly or masses. Extremities: Extremities warm to touch, pink, with no edema.     CBC with Differential:    Lab Results   Component Value Date    WBC 7.3 10/30/2021    RBC 4.63 10/30/2021    HGB 11.2 10/30/2021    HCT 34.1 10/30/2021     10/30/2021    MCV 73.7 10/30/2021    MCH 24.2 10/30/2021    MCHC 32.8 10/30/2021    RDW 19.4 10/30/2021 NRBC 0.9 11/16/2020    SEGSPCT 73 01/26/2014    METASPCT 1.8 11/15/2020    LYMPHOPCT 16.2 10/30/2021    MONOPCT 10.3 10/30/2021    MYELOPCT 0.9 11/15/2020    BASOPCT 0.0 10/30/2021    MONOSABS 0.75 10/30/2021    LYMPHSABS 1.18 10/30/2021    EOSABS 0.00 10/30/2021    BASOSABS 0.00 10/30/2021     CMP:    Lab Results   Component Value Date     10/28/2021    K 3.8 10/28/2021    CL 97 10/28/2021    CO2 28 10/28/2021    BUN 12 10/28/2021    CREATININE 1.4 10/28/2021    GFRAA >60 10/28/2021    LABGLOM >60 10/28/2021    GLUCOSE 145 10/28/2021    PROT 8.0 10/28/2021    LABALBU 4.5 10/28/2021    CALCIUM 9.6 10/28/2021    BILITOT 0.5 10/28/2021    ALKPHOS 94 10/28/2021    AST 95 10/28/2021    ALT 30 10/28/2021        Assessment:    Patient Active Problem List   Diagnosis    Hyperlipidemia    Type 2 diabetes mellitus without complication, without long-term current use of insulin (HCC)    Atypical chest pain    Essential hypertension    Chronic acquired lymphedema    Aortic valve disease    Morbid obesity due to excess calories (HCC)    Abdominal pain    Acute respiratory failure with hypoxia (HCC)    Acute pulmonary edema (HCC)    Congestive heart failure with LV diastolic dysfunction, NYHA class 3 (HCC)    HANS (obstructive sleep apnea)    Acquired hypothyroidism    Chronic diastolic heart failure (HCC)    Nonrheumatic aortic valve stenosis    ACE-inhibitor cough    Pneumonia due to organism    Acute gout of right knee    Bilateral pulmonary embolism (HCC)    Anxiety    Tracheostomy dependent (HCC)    Shortness of breath    H/O deep venous thrombosis    SOB (shortness of breath)    Headache, unspecified headache type    Acute on chronic respiratory failure with hypoxia ([address]       Plan:  CONTINUE SAME BC NOTED /ID OPINION CONTINUE SAME         Saurabh Lind MD  5:29 PM  10/30/2021

## 2021-10-30 NOTE — CONSULTS
Department of Internal Medicine  Infectious Diseases   Consult Note      Reason for Consult:  Bacteremia       Requesting Physician:  MIRACLE Wu         HISTORY OF PRESENT ILLNESS:               This is a 64 yrs old male with hx of COPD, HANS, laryngeal stenosis s/p tracheostomy ( on home oxygen 3-4 L ) CHF presented to the ER with dizziness, fever  - temp 103 F . He denied increased shortness of breath or cough . He was hypoxic in the ER -required 12 L of oxygen, now down to 4 L of oxygen with saturation 94-98 %.   WBC was 6.4 K, resp PCR +ve SARS CoV2, blood cx, CONS , sputum cx - GNR   Chest  R ray - no infiltrates       Past Medical History:      Past Medical History:   Diagnosis Date    Acquired hypothyroidism 9/26/2017    Anxiety     Arthritis     Congestive heart failure with LV diastolic dysfunction, NYHA class 3 (HCC)     COPD (chronic obstructive pulmonary disease) ([address]     Depression     DM (diabetes mellitus) ([address]     Essential hypertension 6/1/2016    Hyperlipidemia     Hypertension     Lymphedema     Obesity     Obstructive sleep apnea 8/17/2009    Obstructive sleep apnea 9/26/2017    Type 2 diabetes mellitus without complication, without long-term current use of insulin ([address] 1/15/2014         Past Surgical History:    Past Surgical History:   Procedure Laterality Date    BRONCHOSCOPY  08/10/2017    ECHO COMPL W DOP COLOR FLOW  6/21/2013         EXTERNAL EAR SURGERY  6/2/2009    keloid left ear    FOOT SURGERY  1990    Left foot    GASTROSTOMY TUBE PLACEMENT  08/10/2017    IR TUNNELED CATHETER PLACEMENT GREATER THAN 5 YEARS  11/19/2020    IR TUNNELED CATHETER PLACEMENT GREATER THAN 5 YEARS 11/19/2020 Ema Perez MD [facility]    TRACHEOSTOMY N/A 11/11/2020    TRACHEOTOMY performed by Jennifer Fields MD at [facility] 11/12/2020    OPEN TRACHEOTOMY, BRONCHOSCOPY, DIRECT LARYNGOSCOPY performed by Jesus Baires DO at [facility] TUBE CHANGE N/A 1/18/2021    TRACHEOTOMY TUBE CHANGE performed by Geena Bach DO at [address]  08/10/2017    UPPER GASTROINTESTINAL ENDOSCOPY N/A 11/11/2020    EGD ESOPHAGOGASTRODUODENOSCOPY PEG TUBE INSERTION performed by Daphne Cheung MD at [address]         Current Medications:      Current Facility-Administered Medications   Medication Dose Route Frequency Provider Last Rate Last Admin    carvedilol (COREG) tablet 25 mg  25 mg Oral BID Delbert Buena, DO   25 mg at 10/30/21 9152    olmesartan (BENICAR) tablet 20 mg  20 mg Oral Daily Delbert Buena, DO        sodium chloride flush 0.9 % injection 5-40 mL  5-40 mL IntraVENous 2 times per day Delbert Buena, DO   10 mL at 10/30/21 1001    sodium chloride flush 0.9 % injection 5-40 mL  5-40 mL IntraVENous PRN Delbert Buena, DO   10 mL at 10/29/21 1652    0.9 % sodium chloride infusion  25 mL IntraVENous PRN Delbert Buena, DO        ondansetron (ZOFRAN-ODT) disintegrating tablet 4 mg  4 mg Oral Q8H PRN Delbert Nubia, DO        Or    ondansetron [facility]) injection 4 mg  4 mg IntraVENous Q6H PRN Delbert Buena, DO        polyethylene glycol (GLYCOLAX) packet 17 g  17 g Oral Daily PRN Delbert Nubia, DO        acetaminophen (TYLENOL) tablet 650 mg  650 mg Oral Q6H PRN Delbert Buena, DO        Or    acetaminophen (TYLENOL) suppository 650 mg  650 mg Rectal Q6H PRN Delbert Nubia, DO        dexamethasone (PF) (DECADRON) injection 6 mg  6 mg IntraVENous Q12H Delbert Buena, DO   6 mg at 10/30/21 0351    albuterol sulfate  (90 Base) MCG/ACT inhaler 2 puff  2 puff Inhalation Q4H PRN Delbert Buena, DO   2 puff at 10/28/21 2303    ALPRAZolam (XANAX) tablet 0.5 mg  0.5 mg Oral Nightly PRN Delbert Nubia, DO   0.5 mg at 10/29/21 2057    apixaban (ELIQUIS) tablet 5 mg  5 mg Oral BID Delbert Nubia, DO   5 mg at 10/30/21 0954    atorvastatin (LIPITOR) tablet 40 mg  40 mg Oral Daily Delbert Dleacruz DO   40 mg at 10/30/21 0954    busPIRone (BUSPAR) tablet 15 mg  15 mg Oral TID Surya Merritt DO   15 mg at 10/30/21 1001       Allergies:  Bee venom and Shellfish-derived products    Social History:      Social History     Socioeconomic History    Marital status: Legally      Spouse name: Not on file    Number of children: Not on file    Years of education: Not on file    Highest education level: Not on file   Occupational History    Not on file   Tobacco Use    Smoking status: Former Smoker     Packs/day: 1.00     Years: 3.00     Pack years: 3.00     Types: Cigarettes     Quit date: 2001     Years since quittin.7    Smokeless tobacco: Never Used   Vaping Use    Vaping Use: Never used   Substance and Sexual Activity    Alcohol use: Not Currently    Drug use: Not Currently     Comment: past use; none x 30 years    Sexual activity: Not Currently   Other Topics Concern    Not on file   Social History Narrative    Drinks 3 cups of coffee daily. Social Determinants of Health     Financial Resource Strain:     Difficulty of Paying Living Expenses:    Food Insecurity:     Worried About Running Out of Food in the Last Year:     [address] in the Last Year:    Transportation Needs:     Lack of Transportation (Medical):      Lack of Transportation (Non-Medical):    Physical Activity:     Days of Exercise per Week:     Minutes of Exercise per Session:    Stress:     Feeling of Stress :    Social Connections:     Frequency of Communication with Friends and Family:     Frequency of Social Gatherings with Friends and Family:     Attends Zoroastrianism Services:     Active Member of Clubs or Organizations:     Attends Club or Organization Meetings:     Marital Status:    Intimate Partner Violence:     Fear of Current or Ex-Partner:     Emotionally Abused:     Physically Abused:     Sexually Abused:          Family History:     Family History   Problem Relation Age of Onset    Alzheimer's Disease Mother     Heart Disease Father         Heart arrhythmia    Heart Attack Father        REVIEW OF SYSTEMS:    CONSTITUTIONAL: had fever   HEENT: denies blurring of vision or double vision, denies hearing problem  RESPIRATORY: denies increased shortness of breath . CARDIOVASCULAR:  Denies palpitation  GASTROINTESTINAL:  Denies abdomen pain, diarrhea or constipation. GENITOURINARY:  Denies burning urination or frequency of urination  INTEGUMENT: denies wound , rash  HEMATOLOGIC/LYMPHATIC:  Denies lymph node swelling, gum bleeding or easy bruising. MUSCULOSKELETAL:  Denies leg pain , joint pain , joint swelling  NEUROLOGICAL:  Dizziness     PHYSICAL EXAM:      Vitals:     Vitals:    10/30/21 0745   BP: (!) 159/74   Pulse: 64   Resp: 13   Temp: 97.6 °F (36.4 °C)   SpO2: 98%       General Appearance:    Awake, alert , no acute distress. Head:    Normocephalic, atraumatic   Eyes:    No pallor, no icterus,   Ears:    No obvious deformity or drainage.    Nose:   No nasal drainage   Throat:   Mucosa moist, tracheostomy    Neck:   Supple, no lymphadenopathy   Lungs:     Clear to auscultation bilaterally    Heart:    Regular rate and rhythm, no murmur   Abdomen:     Soft, non-tender, bowel sounds present    Extremities:    + edema    Pulses:   Dorsalis pedis palpable    Skin:   no rashes      CBC with Differential:      Lab Results   Component Value Date    WBC 7.3 10/30/2021    RBC 4.63 10/30/2021    HGB 11.2 10/30/2021    HCT 34.1 10/30/2021     10/30/2021    MCV 73.7 10/30/2021    MCH 24.2 10/30/2021    MCHC 32.8 10/30/2021    RDW 19.4 10/30/2021    NRBC 0.9 11/16/2020    SEGSPCT 73 01/26/2014    METASPCT 1.8 11/15/2020    LYMPHOPCT 16.2 10/30/2021    MONOPCT 10.3 10/30/2021    MYELOPCT 0.9 11/15/2020    BASOPCT 0.0 10/30/2021    MONOSABS 0.75 10/30/2021    LYMPHSABS 1.18 10/30/2021    EOSABS 0.00 10/30/2021    BASOSABS 0.00 10/30/2021       CMP     Lab Results   Component Value Date    NA 137 10/28/2021    K 3.8 10/28/2021    CL 97 10/28/2021    CO2 28 10/28/2021    BUN 12 10/28/2021    CREATININE 1.4 10/28/2021    GFRAA >60 10/28/2021    LABGLOM >60 10/28/2021    GLUCOSE 145 10/28/2021    PROT 8.0 10/28/2021    LABALBU 4.5 10/28/2021    CALCIUM 9.6 10/28/2021    BILITOT 0.5 10/28/2021    ALKPHOS 94 10/28/2021    AST 95 10/28/2021    ALT 30 10/28/2021         Hepatic Function Panel:    Lab Results   Component Value Date    ALKPHOS 94 10/28/2021    ALT 30 10/28/2021    AST 95 10/28/2021    PROT 8.0 10/28/2021    BILITOT 0.5 10/28/2021    LABALBU 4.5 10/28/2021       PT/INR:    Lab Results   Component Value Date    PROTIME 15.0 10/28/2021    INR 1.4 10/28/2021       TSH:    Lab Results   Component Value Date    TSH 4.340 10/27/2020       U/A:    Lab Results   Component Value Date    NITRITE neg 08/30/2019    COLORU Yellow 10/29/2021    PHUR 6.0 10/29/2021    LABCAST FEW 04/11/2016    WBCUA NONE 10/29/2021    RBCUA 1-3 10/29/2021    BACTERIA NONE SEEN 10/29/2021    CLARITYU Clear 10/29/2021    SPECGRAV 1.020 10/29/2021    LEUKOCYTESUR Negative 10/29/2021    UROBILINOGEN 0.2 10/29/2021    BILIRUBINUR Negative 10/29/2021    BILIRUBINUR neg 08/30/2019    BLOODU TRACE 10/29/2021    GLUCOSEU Negative 10/29/2021    AMORPHOUS FEW 11/25/2020       ABG:    Lab Results   Component Value Date    TMJ8DNJ 26.1 11/09/2020       MICROBIOLOGY:    Blood culture -  24 Hours no growth   Gram stain performed from blood culture bottle media   Gram positive cocci in clusters   Abnormal      Narrative:              Specimen: Nasopharyngeal Swab Updated: 10/28/21 1345    SARS-CoV-2, NAAT DETECTEDAbnormal      CULTURE, RESPIRATORY Oral Pharyngeal Tiesha reducedAbnormal      Smear, Respiratory --    Group 6: <25 PMN's/LPF and <25 Epithelial cells/LPF   Polymorphonuclear leukocytes not seen   Epithelial cells not seen   No organisms seen     Organism Gram negative rodAbnormal     CULTURE, RESPIRATORY --    Light growth Identification and sensitivity to follow    Narrative:     Source: [facility]       Site:                    Radiology :    Chest X ray : no infiltrates         IMPRESSION:     1. CONS bacteremia - contaminant   2. COVID 19 infection ( non pneumonic )   3. Fever     RECOMMENDATIONS:      1. Dexamethasone to 6 mg po q 24 hrs   2.  No abx for positive blood cx or sputum cx     Thank you Dr Monisha Zelaya for the consult

## 2021-10-31 NOTE — PROGRESS NOTES
Department of Internal Medicine  Infectious Diseases  Progress  Note      C/C : CONS bacteremia .  COVID 19    Pt is awake and alert  Denies fever or chills  Perla SOB   Afebrile     Current Facility-Administered Medications   Medication Dose Route Frequency Provider Last Rate Last Admin    zinc sulfate (ZINCATE) capsule 50 mg  50 mg Oral Daily [facility], [facility] - [facility]   50 mg at 10/30/21 1400    ascorbic acid (VITAMIN C) tablet 500 mg  500 mg Oral Daily [facility], APRN - CNS   500 mg at 10/30/21 1400    Vitamin D (CHOLECALCIFEROL) tablet 1,000 Units  1,000 Units Oral Daily [facility], APRN - CNS   1,000 Units at 10/30/21 1400    dexamethasone (DECADRON) tablet 6 mg  6 mg Oral Daily Quinton Kulkarni MD   6 mg at 10/30/21 1400    carvedilol (COREG) tablet 25 mg  25 mg Oral BID Channing Whitehead, DO   25 mg at 10/30/21 2114    olmesartan (BENICAR) tablet 20 mg  20 mg Oral Daily Channing Whitehead DO        sodium chloride flush 0.9 % injection 5-40 mL  5-40 mL IntraVENous 2 times per day Channing Whitehead, DO   10 mL at 10/30/21 2114    sodium chloride flush 0.9 % injection 5-40 mL  5-40 mL IntraVENous PRN Channing Whitehead, DO   10 mL at 10/29/21 1652    0.9 % sodium chloride infusion  25 mL IntraVENous PRN Channing Whitehead DO        ondansetron (ZOFRAN-ODT) disintegrating tablet 4 mg  4 mg Oral Q8H PRN Channing Whitehead DO        Or    ondansetron [facility]) injection 4 mg  4 mg IntraVENous Q6H PRN Channing Whitehead DO        polyethylene glycol (GLYCOLAX) packet 17 g  17 g Oral Daily PRN Channing Whitehead DO        acetaminophen (TYLENOL) tablet 650 mg  650 mg Oral Q6H PRN Channing Whitehead DO        Or    acetaminophen (TYLENOL) suppository 650 mg  650 mg Rectal Q6H PRN Channing Whitehead DO        albuterol sulfate  (90 Base) MCG/ACT inhaler 2 puff  2 puff Inhalation Q4H PRN Channing Whitehead DO   2 puff at 10/28/21 2303    ALPRAZolam [facility]) tablet 0.5 mg  0.5 mg Oral Nightly PRN Channing Whitehead DO   0.5 mg at 10/30/21 2114    apixaban (ELIQUIS) tablet 5 mg  5 mg Oral BID Adan Shall, DO   5 mg at 10/30/21 2114    atorvastatin (LIPITOR) tablet 40 mg  40 mg Oral Daily Adan Shall, DO   40 mg at 10/30/21 0723    busPIRone (BUSPAR) tablet 15 mg  15 mg Oral TID [place] Shall, DO   15 mg at 10/30/21 2114       REVIEW OF SYSTEMS:    CONSTITUTIONAL: Denies fever    HEENT: denies blurring of vision or double vision, denies hearing problem  RESPIRATORY: denies increased shortness of breath . CARDIOVASCULAR:  Denies palpitation  GASTROINTESTINAL:  Denies abdomen pain, diarrhea or constipation. GENITOURINARY:  Denies burning urination or frequency of urination  INTEGUMENT: denies wound , rash  HEMATOLOGIC/LYMPHATIC:  Denies lymph node swelling, gum bleeding or easy bruising. MUSCULOSKELETAL:  Denies leg pain , joint pain , joint swelling  NEUROLOGICAL:  Dizziness     PHYSICAL EXAM:      Vitals:     Vitals:    10/30/21 2100   BP: (!) 166/79   Pulse: 64   Resp: 16   Temp: 97.3 °F (36.3 °C)   SpO2: 94%       General Appearance:    Awake, alert , no acute distress. Head:    Normocephalic, atraumatic   Eyes:    No pallor, no icterus,   Ears:    No obvious deformity or drainage.    Nose:   No nasal drainage   Throat:   Mucosa moist, tracheostomy    Neck:   Supple, no lymphadenopathy   Lungs:     Clear to auscultation bilaterally    Heart:    Regular rate and rhythm, no murmur   Abdomen:     Soft, non-tender, bowel sounds present    Extremities:    + edema    Pulses:   Dorsalis pedis palpable    Skin:   no rashes        Lab Results   Component Value Date    WBC 7.3 10/30/2021    RBC 4.63 10/30/2021    HGB 11.2 10/30/2021    HCT 34.1 10/30/2021     10/30/2021    MCV 73.7 10/30/2021    MCH 24.2 10/30/2021    MCHC 32.8 10/30/2021    RDW 19.4 10/30/2021    NRBC 0.9 11/16/2020    SEGSPCT 73 01/26/2014    METASPCT 1.8 11/15/2020    LYMPHOPCT 16.2 10/30/2021    MONOPCT 10.3 10/30/2021    MYELOPCT 0.9 11/15/2020    BASOPCT 0.0 10/30/2021    MONOSABS 0.75 10/30/2021    LYMPHSABS 1.18 10/30/2021    EOSABS 0.00 10/30/2021    BASOSABS 0.00 10/30/2021       CMP     Lab Results   Component Value Date     10/28/2021    K 3.8 10/28/2021    CL 97 10/28/2021    CO2 28 10/28/2021    BUN 12 10/28/2021    CREATININE 1.4 10/28/2021    GFRAA >60 10/28/2021    LABGLOM >60 10/28/2021    GLUCOSE 145 10/28/2021    PROT 8.0 10/28/2021    LABALBU 4.5 10/28/2021    CALCIUM 9.6 10/28/2021    BILITOT 0.5 10/28/2021    ALKPHOS 94 10/28/2021    AST 95 10/28/2021    ALT 30 10/28/2021         Hepatic Function Panel:    Lab Results   Component Value Date    ALKPHOS 94 10/28/2021    ALT 30 10/28/2021    AST 95 10/28/2021    PROT 8.0 10/28/2021    BILITOT 0.5 10/28/2021    LABALBU 4.5 10/28/2021       PT/INR:    Lab Results   Component Value Date    PROTIME 15.0 10/28/2021    INR 1.4 10/28/2021       TSH:    Lab Results   Component Value Date    TSH 4.340 10/27/2020       U/A:    Lab Results   Component Value Date    NITRITE neg 08/30/2019    COLORU Yellow 10/29/2021    PHUR 6.0 10/29/2021    LABCAST FEW 04/11/2016    WBCUA NONE 10/29/2021    RBCUA 1-3 10/29/2021    BACTERIA NONE SEEN 10/29/2021    CLARITYU Clear 10/29/2021    SPECGRAV 1.020 10/29/2021    LEUKOCYTESUR Negative 10/29/2021    UROBILINOGEN 0.2 10/29/2021    BILIRUBINUR Negative 10/29/2021    BILIRUBINUR neg 08/30/2019    BLOODU TRACE 10/29/2021    GLUCOSEU Negative 10/29/2021    AMORPHOUS FEW 11/25/2020       ABG:    Lab Results   Component Value Date    KOZ5BXF 26.1 11/09/2020       MICROBIOLOGY:    Blood culture -  24 Hours no growth   Gram stain performed from blood culture bottle media   Gram positive cocci in clusters   Abnormal      Narrative:              Specimen: Nasopharyngeal Swab Updated: 10/28/21 1345    SARS-CoV-2, NAAT DETECTEDAbnormal      CULTURE, RESPIRATORY Oral Pharyngeal Tiesha reducedAbnormal      Smear, Respiratory --    Group 6: <25 PMN's/LPF and <25 Epithelial cells/LPF Polymorphonuclear leukocytes not seen   Epithelial cells not seen   No organisms seen     Organism Gram negative rodAbnormal     CULTURE, RESPIRATORY --    Light growth   Identification and sensitivity to follow    Narrative:     Source: TRAAS       Site:                  Radiology :    Chest X ray : no infiltrates     IMPRESSION:     1. CONS bacteremia - contaminant   2. COVID 19 infection ( non pneumonic )   3. Sputum cx - colonization      RECOMMENDATIONS:      1.  Dexamethasone  6 mg po q 24 hrs

## 2021-10-31 NOTE — PLAN OF CARE
Problem: Body Temperature -  Risk of, Imbalanced  Goal: Ability to maintain a body temperature within defined limits  10/31/2021 1513 by Ofelia Stewart RN  Outcome: Met This Shift  10/31/2021 0646 by Son Funes RN  Outcome: Met This Shift     Problem:  Body Temperature -  Risk of, Imbalanced  Goal: Complications related to the disease process, condition or treatment will be avoided or minimized  10/31/2021 1513 by Ofelia Stewart RN  Outcome: Met This Shift  10/31/2021 0646 by Son Funes RN  Outcome: Met This Shift     Problem: Risk for Fluid Volume Deficit  Goal: Maintain normal heart rhythm  10/31/2021 1513 by Ofelia Stewart RN  Outcome: Met This Shift  10/31/2021 0646 by Son Funes RN  Outcome: Met This Shift

## 2021-10-31 NOTE — PROGRESS NOTES
Pulmonary Progress Note    Admit Date: 10/28/2021                            PCP: Maya dHez MD  Active Problems:    Acute on chronic respiratory failure with hypoxia ([address]  Resolved Problems:    * No resolved hospital problems. *      Subjective:  Sitting up in bedside chair on TOI 4Liters  Denies dyspnea or complaints ready to go home, fatigued    Chronic trach     Medications:   sodium chloride          zinc sulfate  50 mg Oral Daily    ascorbic acid  500 mg Oral Daily    Vitamin D  1,000 Units Oral Daily    dexamethasone  6 mg Oral Daily    carvedilol  25 mg Oral BID    olmesartan  20 mg Oral Daily    sodium chloride flush  5-40 mL IntraVENous 2 times per day    apixaban  5 mg Oral BID    atorvastatin  40 mg Oral Daily    busPIRone  15 mg Oral TID       Vitals:  VITALS:  BP (!) 166/79   Pulse 64   Temp 97.3 °F (36.3 °C) (Infrared)   Resp 16   Ht 5' 10\" (1.778 m)   Wt 290 lb (131.5 kg)   SpO2 94%   BMI 41.61 kg/m²   24HR INTAKE/OUTPUT:      Intake/Output Summary (Last 24 hours) at 10/31/2021 0935  Last data filed at 10/30/2021 1245  Gross per 24 hour   Intake 240 ml   Output --   Net 240 ml     CURRENT PULSE OXIMETRY:  SpO2: 94 %  24HR PULSE OXIMETRY RANGE:  SpO2  Av %  Min: 94 %  Max: 94 %  CVP:    VENT SETTINGS:   Vent Information  Skin Assessment: Clean, dry, & intact  FiO2 : (S) 35 %  SpO2: 94 %  Additional Respiratory  Assessments  Pulse: 64  Resp: 16  SpO2: 94 %      EXAM:  General: Alert, in NAD  ENT: No discharge. Pharynx clear. membranes moist   Neck: Supple, Trachea midline. , trach intact 4L TOI  Resp: No accessory muscle use. Non-labored. Lungs clear diminished with No crackles. No wheezing. No rhonchi. CV: Regular rate. Regular rhythm. No murmur . No edema. ABD: Non-tender. Non-distended. Soft, round . Normal bowel sounds. Skin: Warm and dry. M/S: No cyanosis. No joint deformity. No clubbing. Neuro: Awake. Follows commands.  O x 3, ARMSTRONG  Psych:  calm and interactive     I/O: I/O last 3 completed shifts: In: 240 [P.O.:240]  Out: -   No intake/output data recorded. Results:  CBC:   Recent Labs     10/28/21  1314 10/30/21  0438   WBC 6.4 7.3   HGB 10.7* 11.2*   HCT 31.7* 34.1*   MCV 73.2* 73.7*    176     BMP:   Recent Labs     10/28/21  1314      K 3.8   CL 97*   CO2 28   BUN 12   CREATININE 1.4*     LFT:   Recent Labs     10/28/21  1314   ALKPHOS 94   ALT 30   AST 95*   PROT 8.0   BILITOT 0.5   LABALBU 4.5     PT/INR:   Recent Labs     10/28/21  1314   PROTIME 15.0*   INR 1.4     Procalcitonin:   Recent Labs     10/29/21  0441   PROCAL 0.07     Cultures:  Recent Labs     10/29/21  0500   CULTRESP Oral Pharyngeal Tiesha reduced*  Light growth  Identification and sensitivity to follow           ABG:   Recent Labs     10/29/21  0516   PH 7.394   PO2 63.5*   PCO2 47.5*   HCO3 28.4*   BE 2.8   O2SAT 90.8*       Films:  XR CHEST PORTABLE    Result Date: 10/28/2021  EXAMINATION: ONE XRAY VIEW OF THE CHEST 10/28/2021 1:29 pm COMPARISON: Comparison is dated August 16, 2021 HISTORY: ORDERING SYSTEM PROVIDED HISTORY: sob TECHNOLOGIST PROVIDED HISTORY: Reason for exam:->sob What reading provider will be dictating this exam?->CRC FINDINGS: The lungs are without acute focal process. There is no effusion or pneumothorax. The cardiomediastinal silhouette is without acute process. The osseous structures are without acute process tracheostomy again noted. No acute process. Assessment/Plan:   64 y.o.male who presented with shortness of breath  1. Laryngeal stenosis posterior glottic stenosis with possible tracheal stenosis with vocal cords in midline laryngoscopy 7/23/2021. S/p lysis of the posterior glottic stenosis with Kenalog and balloon dilation 9/9/2021  2. granulation tissue and tracheostomy  3. Obstructive sleep apnea cured with tracheostomy  4. Anemia microcytic  5. Systolic murmur  6.  Chronic trach trach placed 11/12/20    7. chronic diastolic heart failure EF 32% LVH diastolic dysfunction on echo 11/30/2020  8. H/o Bilateral PE 12/9/20 now on Lovenox   9. Chronic anemia  10. HANS ( AHI 17 on 4/18/18 requires BIPAP 16/12) noncompliant with CPAP now cured with tracheostomy  11. History of reactive airway disease  12. CT 3/14/2018 showing pulmonary nodule stable from 10/2017  13. Diabetes with elevated blood sugars  14. Obesity  15. chronic lymphedema  16. History of gout on colchicine  17. Hypothyroid  18. H/o respiratory failure: 7/8/2017 pt was  [facility] after lap cholecystectomy, lap umbilical hernia repair, and lap liver biopsy (fatty liver).  He had been extubated postop but had laryngospasm requiring reintubation, complicated by negative pressure flash pulmonary edema, and required tracheostomy 8/10/2017.  Patient has staph aureus pneumonia and C. difficile colitis. Lexus Rae was then transferred to [facility] where he was weaned off the ventilator and decannulated     Keep POX >88%, currently on 4L TOI which is baseline at home   cxr is clear  inflammatory markers ok-D-dimer <200  CRP 1.4  On eliquis  On decadron 6mg IV q12H, changed to 6mg oral 10/30-complete 5 days then 4mg po x 5 days then 2mg x 5 days  then to taper dose   Continue  vitamins C,D, and zinc  IS  Prone as able   Pct 0.07, doubt superimposed bacterial pneumonia         Electronically signed by KATHERINE Bowman on 10/31/2021 at 9:35 AM

## 2021-10-31 NOTE — PLAN OF CARE
Problem: Airway Clearance - Ineffective  Goal: Achieve or maintain patent airway  10/31/2021 0646 by Chance Horne RN  Outcome: Met This Shift  10/30/2021 1848 by Princess Light RN  Outcome: Met This Shift     Problem: Gas Exchange - Impaired  Goal: Absence of hypoxia  10/31/2021 0646 by Chance Horne RN  Outcome: Met This Shift  10/30/2021 1848 by Princess Light RN  Outcome: Met This Shift  Goal: Promote optimal lung function  10/31/2021 0646 by Chance Horne RN  Outcome: Met This Shift  10/30/2021 1848 by Princess Light RN  Outcome: Met This Shift     Problem: Breathing Pattern - Ineffective  Goal: Ability to achieve and maintain a regular respiratory rate  10/31/2021 0646 by Chance Horne RN  Outcome: Met This Shift  10/30/2021 1848 by Princess Light RN  Outcome: Met This Shift     Problem:  Body Temperature -  Risk of, Imbalanced  Goal: Ability to maintain a body temperature within defined limits  10/31/2021 0646 by Chance Horne RN  Outcome: Met This Shift  10/30/2021 1848 by Princess Light RN  Outcome: Met This Shift  Goal: Will regain or maintain usual level of consciousness  10/31/2021 0646 by Chance Horne RN  Outcome: Met This Shift  10/30/2021 1848 by Princess Light RN  Outcome: Met This Shift  Goal: Complications related to the disease process, condition or treatment will be avoided or minimized  10/31/2021 0646 by Chance Horne RN  Outcome: Met This Shift  10/30/2021 1848 by Princess Light RN  Outcome: Met This Shift     Problem: Isolation Precautions - Risk of Spread of Infection  Goal: Prevent transmission of infection  10/31/2021 0646 by Chance Horne RN  Outcome: Met This Shift  10/30/2021 1848 by Princess Light RN  Outcome: Met This Shift     Problem: Nutrition Deficits  Goal: Optimize nutritional status  10/31/2021 0646 by Chance Horne RN  Outcome: Met This Shift  10/30/2021 1848 by Princess Flattery, RN  Outcome: Met This Shift     Problem: Risk for Fluid Volume Deficit  Goal: Maintain normal heart rhythm  10/31/2021 0646 by Shanta Nash RN  Outcome: Met This Shift  10/30/2021 1848 by Rik Bullard RN  Outcome: Met This Shift  Goal: Maintain absence of muscle cramping  10/31/2021 0646 by Shanta Nash RN  Outcome: Met This Shift  10/30/2021 1848 by Rik Bullard RN  Outcome: Met This Shift  Goal: Maintain normal serum potassium, sodium, calcium, phosphorus, and pH  10/31/2021 0646 by Shanta Nash RN  Outcome: Met This Shift  10/30/2021 1848 by Rik Bullard RN  Outcome: Met This Shift     Problem: Loneliness or Risk for Loneliness  Goal: Demonstrate positive use of time alone when socialization is not possible  10/31/2021 0646 by Shanta Nash RN  Outcome: Met This Shift  10/30/2021 1848 by Rik Bullard RN  Outcome: Met This Shift     Problem: Fatigue  Goal: Verbalize increase energy and improved vitality  10/31/2021 0646 by Shanta Nash RN  Outcome: Met This Shift  10/30/2021 1848 by Rik Bullard RN  Outcome: Met This Shift     Problem: Patient Education: Go to Patient Education Activity  Goal: Patient/Family Education  10/31/2021 0646 by Shanta Nash RN  Outcome: Met This Shift  10/30/2021 1848 by Rik Bullard RN  Outcome: Met This Shift

## 2021-10-31 NOTE — PROGRESS NOTES
Subjective: patient seen for follow up doing about the same still for sob and cough no fever no chills       ROS:  Respiratory: negative except for cough and dyspnea on exertion    Objective:    zinc sulfate (ZINCATE) capsule 50 mg, Daily  ascorbic acid (VITAMIN C) tablet 500 mg, Daily  Vitamin D (CHOLECALCIFEROL) tablet 1,000 Units, Daily  dexamethasone (DECADRON) tablet 6 mg, Daily  carvedilol (COREG) tablet 25 mg, BID  olmesartan (BENICAR) tablet 20 mg, Daily  sodium chloride flush 0.9 % injection 5-40 mL, 2 times per day  sodium chloride flush 0.9 % injection 5-40 mL, PRN  0.9 % sodium chloride infusion, PRN  ondansetron (ZOFRAN-ODT) disintegrating tablet 4 mg, Q8H PRN   Or  ondansetron (ZOFRAN) injection 4 mg, Q6H PRN  polyethylene glycol (GLYCOLAX) packet 17 g, Daily PRN  acetaminophen (TYLENOL) tablet 650 mg, Q6H PRN   Or  acetaminophen (TYLENOL) suppository 650 mg, Q6H PRN  albuterol sulfate  (90 Base) MCG/ACT inhaler 2 puff, Q4H PRN  ALPRAZolam (XANAX) tablet 0.5 mg, Nightly PRN  apixaban (ELIQUIS) tablet 5 mg, BID  atorvastatin (LIPITOR) tablet 40 mg, Daily  busPIRone (BUSPAR) tablet 15 mg, TID          BP (!) 166/79   Pulse 64   Temp 97.3 °F (36.3 °C) (Infrared)   Resp 16   Ht 5' 10\" (1.778 m)   Wt 290 lb (131.5 kg)   SpO2 94%   BMI 41.61 kg/m²     General Appearance:  awake, alert, oriented, in no acute distress  Lungs:  Breath sounds: rhonchi- throughout  Heart:  Heart regular rate and rhythm  Abdomen:  Soft, non-tender, normal bowel sounds. No bruits, organomegaly or masses. Extremities: Extremities warm to touch, pink, with no edema.     CBC with Differential:    Lab Results   Component Value Date    WBC 7.3 10/30/2021    RBC 4.63 10/30/2021    HGB 11.2 10/30/2021    HCT 34.1 10/30/2021     10/30/2021    MCV 73.7 10/30/2021    MCH 24.2 10/30/2021    MCHC 32.8 10/30/2021    RDW 19.4 10/30/2021    NRBC 0.9 11/16/2020    SEGSPCT 73 01/26/2014    METASPCT 1.8 11/15/2020    LYMPHOPCT 16.2 10/30/2021    MONOPCT 10.3 10/30/2021    MYELOPCT 0.9 11/15/2020    BASOPCT 0.0 10/30/2021    MONOSABS 0.75 10/30/2021    LYMPHSABS 1.18 10/30/2021    EOSABS 0.00 10/30/2021    BASOSABS 0.00 10/30/2021     CMP:    Lab Results   Component Value Date     10/28/2021    K 3.8 10/28/2021    CL 97 10/28/2021    CO2 28 10/28/2021    BUN 12 10/28/2021    CREATININE 1.4 10/28/2021    GFRAA >60 10/28/2021    LABGLOM >60 10/28/2021    GLUCOSE 145 10/28/2021    PROT 8.0 10/28/2021    LABALBU 4.5 10/28/2021    CALCIUM 9.6 10/28/2021    BILITOT 0.5 10/28/2021    ALKPHOS 94 10/28/2021    AST 95 10/28/2021    ALT 30 10/28/2021        Assessment:    Patient Active Problem List   Diagnosis    Hyperlipidemia    Type 2 diabetes mellitus without complication, without long-term current use of insulin (HCC)    Atypical chest pain    Essential hypertension    Chronic acquired lymphedema    Aortic valve disease    Morbid obesity due to excess calories (HCC)    Abdominal pain    Acute respiratory failure with hypoxia (HCC)    Acute pulmonary edema (HCC)    Congestive heart failure with LV diastolic dysfunction, NYHA class 3 (HCC)    HANS (obstructive sleep apnea)    Acquired hypothyroidism    Chronic diastolic heart failure (HCC)    Nonrheumatic aortic valve stenosis    ACE-inhibitor cough    Pneumonia due to organism    Acute gout of right knee    Bilateral pulmonary embolism (HCC)    Anxiety    Tracheostomy dependent (HCC)    Shortness of breath    H/O deep venous thrombosis    SOB (shortness of breath)    Headache, unspecified headache type    Acute on chronic respiratory failure with hypoxia ([facility] [address]       Plan:  Continue same rx for now         Wes Vega MD  1:17 PM  10/31/2021

## 2021-11-01 NOTE — PROGRESS NOTES
Pulmonary Progress Note    Admit Date: 10/28/2021                            PCP: Dylan Rojas MD  Active Problems:    Acute on chronic respiratory failure with hypoxia ([address]  Resolved Problems:    * No resolved hospital problems. *      Subjective:  Sitting up in bed  On baseline O2 4L TM  Denies dyspnea, occ cough - clear  Not sure if he feels back to normal or not      Medications:   sodium chloride          zinc sulfate  50 mg Oral Daily    ascorbic acid  500 mg Oral Daily    Vitamin D  1,000 Units Oral Daily    dexamethasone  6 mg Oral Daily    carvedilol  25 mg Oral BID    olmesartan  20 mg Oral Daily    sodium chloride flush  5-40 mL IntraVENous 2 times per day    apixaban  5 mg Oral BID    atorvastatin  40 mg Oral Daily    busPIRone  15 mg Oral TID       Vitals:  VITALS:  BP (!) 140/70   Pulse 78   Temp 97.7 °F (36.5 °C) (Infrared)   Resp 18   Ht 5' 10\" (1.778 m)   Wt 290 lb (131.5 kg)   SpO2 95%   BMI 41.61 kg/m²   24HR INTAKE/OUTPUT:      Intake/Output Summary (Last 24 hours) at 2021 0815  Last data filed at 10/31/2021 0830  Gross per 24 hour   Intake 250 ml   Output --   Net 250 ml     CURRENT PULSE OXIMETRY:  SpO2: 95 %  24HR PULSE OXIMETRY RANGE:  SpO2  Av.8 %  Min: 94 %  Max: 96 %  CVP:    VENT SETTINGS:   Vent Information  Skin Assessment: Clean, dry, & intact  FiO2 : 28 %  SpO2: 95 %  SpO2/FiO2 ratio: 339.29  Additional Respiratory  Assessments  Pulse: 78  Resp: 18  SpO2: 95 %      EXAM:  General: Alert, in NAD  ENT: No discharge. Pharynx clear. membranes moist   Neck: Supple, Trachea midline. , trach intact 4L TOI  Resp: No accessory muscle use. Non-labored. Lungs clear diminished with No crackles. No wheezing. No rhonchi. CV: Regular rate. Regular rhythm. +Murmur . BLE edema. ABD: Non-tender. Non-distended. Soft, round . Normal bowel sounds. Skin: Warm and dry. M/S: No cyanosis. No joint deformity. No clubbing. Neuro: Awake. Follows commands.  O x 3, sugars  14. Obesity  15. chronic lymphedema  16. History of gout on colchicine  17. Hypothyroid  18. H/o respiratory failure: 7/8/2017 pt was  transferred from [facility] for acute respiratory failure after lap cholecystectomy, lap umbilical hernia repair, and lap liver biopsy (fatty liver). He had been extubated postop but had laryngospasm requiring reintubation, complicated by negative pressure flash pulmonary edema, and required tracheostomy 8/10/2017.  Patient has staph aureus pneumonia and C. difficile colitis. Isa Nino was then transferred to [facility] where he was weaned off the ventilator and decannulated    · At baseline with O2 needs, 4L TM. Keep POX >88%. · Follow inflammatory markers - ok-D-dimer <200,  CRP 1.4  · On eliquis  · Continue decadron taper - 6mg PO  (10/30) x5 days then 4mg po x 5 days then 2mg x 5 days  · Continue  vitamins C,D, and zinc  · On Eliquis  · IS  · Prone as able   · Pct 0.07, doubt superimposed bacterial pneumonia, CXR clear  · Hope to d/c home soon - today vs tomorrow? KATHERINE Garcia-CNP    Electronically signed by KATHERINE Lozano CNP on 11/1/2021 at 8:15 AM    Seen and examined, agree with above. COVID 19 not particularly symptomatic so okay to dc today. Steroid taper as above.

## 2021-11-01 NOTE — PROGRESS NOTES
Subjective: The patient is awake and alert. No problems overnight. Denies chest pain, angina, and dyspnea. Denies abdominal pain. Tolerating diet. No nausea or vomiting. Objective:  Pt is resting in nad   BP (!) 140/70   Pulse 78   Temp 97.7 °F (36.5 °C) (Infrared)   Resp 18   Ht 5' 10\" (1.778 m)   Wt 290 lb (131.5 kg)   SpO2 95%   BMI 41.61 kg/m²   HEENT no adenopathy no bruits  Trach is midline and capped  Heart:  RRR, no murmurs, gallops, or rubs. Lungs:  CTA bilaterally, no wheeze, rales or rhonchi  Abd: bowel sounds present, nontender, nondistended, no masses  Extrem:  No clubbing, cyanosis, or edema  WBC/Hgb/Hct/Plts:  7.3/11.2/34.1/176 (10/30 3249) basic metabolic panel     Assessment:    Patient Active Problem List   Diagnosis    Hyperlipidemia    Type 2 diabetes mellitus without complication, without long-term current use of insulin (HCC)    Atypical chest pain    Essential hypertension    Chronic acquired lymphedema    Aortic valve disease    Morbid obesity due to excess calories (HCC)    Abdominal pain    Acute respiratory failure with hypoxia (HCC)    Acute pulmonary edema (HCC)    Congestive heart failure with LV diastolic dysfunction, NYHA class 3 (HCC)    HANS (obstructive sleep apnea)    Acquired hypothyroidism    Chronic diastolic heart failure (HCC)    Nonrheumatic aortic valve stenosis    ACE-inhibitor cough    Pneumonia due to organism    Acute gout of right knee    Bilateral pulmonary embolism (HCC)    Anxiety    Tracheostomy dependent (HCC)    Shortness of breath    H/O deep venous thrombosis    SOB (shortness of breath)    Headache, unspecified headache type    Acute on chronic respiratory failure with hypoxia (HCC)       Plan:  Cont oral steroids  D/c?         Sabina Rhodes DO  7:46 AM  11/1/2021

## 2021-11-01 NOTE — PROGRESS NOTES
CLINICAL PHARMACY NOTE: MEDS TO BEDS    Total # of Prescriptions Filled: 1   The following medications were delivered to the patient:  · Dexamethasone 6mg    Additional Documentation:  Delivered to CHASE Alex 11/1 at 4:25

## 2021-11-01 NOTE — PROGRESS NOTES
Department of Internal Medicine  Infectious Diseases  Progress  Note      C/C : CONS bacteremia .  COVID 19    Pt is awake and alert  Denies fever or chills  Perla SOB   Afebrile     Current Facility-Administered Medications   Medication Dose Route Frequency Provider Last Rate Last Admin    zinc sulfate (ZINCATE) capsule 50 mg  50 mg Oral Daily Jacqulyne Isidron, APRN - CNS   50 mg at 11/01/21 0819    ascorbic acid (VITAMIN C) tablet 500 mg  500 mg Oral Daily Jacqulyne Fern, APRN - CNS   500 mg at 11/01/21 1554    Vitamin D (CHOLECALCIFEROL) tablet 1,000 Units  1,000 Units Oral Daily Jacqulyne Fern, APRN - CNS   1,000 Units at 11/01/21 0819    dexamethasone (DECADRON) tablet 6 mg  6 mg Oral Daily Quinton Kulkarni MD   6 mg at 11/01/21 0819    carvedilol (COREG) tablet 25 mg  25 mg Oral BID Charma Lluvia, DO   25 mg at 11/01/21 0819    olmesartan (BENICAR) tablet 20 mg  20 mg Oral Daily Charma Lluvia, DO   20 mg at 10/31/21 0900    sodium chloride flush 0.9 % injection 5-40 mL  5-40 mL IntraVENous 2 times per day Charma Lluvia, DO   10 mL at 11/01/21 0819    sodium chloride flush 0.9 % injection 5-40 mL  5-40 mL IntraVENous PRN Charma Lluvia, DO   10 mL at 10/29/21 1652    0.9 % sodium chloride infusion  25 mL IntraVENous PRN Charma Lluvia, DO        ondansetron (ZOFRAN-ODT) disintegrating tablet 4 mg  4 mg Oral Q8H PRN Charma Lluvia, DO        Or    ondansetron [facility]) injection 4 mg  4 mg IntraVENous Q6H PRN Charma Lluvia, DO        polyethylene glycol (GLYCOLAX) packet 17 g  17 g Oral Daily PRN Charma Lluvia, DO        acetaminophen (TYLENOL) tablet 650 mg  650 mg Oral Q6H PRN Charma Lluvia, DO        Or    acetaminophen (TYLENOL) suppository 650 mg  650 mg Rectal Q6H PRN Charma Lluvia, DO        albuterol sulfate  (90 Base) MCG/ACT inhaler 2 puff  2 puff Inhalation Q4H PRN Charma Lluvia, DO   2 puff at 10/28/21 2303    ALPRAZolam (XANAX) tablet 0.5 mg  0.5 mg Oral Nightly PRN Ralf Teixeira, DO 0.5 mg at 10/31/21 2023    apixaban (ELIQUIS) tablet 5 mg  5 mg Oral BID Ronald Medina, DO   5 mg at 11/01/21 3176    atorvastatin (LIPITOR) tablet 40 mg  40 mg Oral Daily Ronald Medina, DO   40 mg at 11/01/21 4943    busPIRone (BUSPAR) tablet 15 mg  15 mg Oral TID Ronald Medina, DO   15 mg at 11/01/21 6488       REVIEW OF SYSTEMS:    CONSTITUTIONAL: Denies fever    HEENT: denies blurring of vision or double vision, denies hearing problem  RESPIRATORY: denies increased shortness of breath . CARDIOVASCULAR:  Denies palpitation  GASTROINTESTINAL:  Denies abdomen pain, diarrhea or constipation. GENITOURINARY:  Denies burning urination or frequency of urination  INTEGUMENT: denies wound , rash  HEMATOLOGIC/LYMPHATIC:  Denies lymph node swelling, gum bleeding or easy bruising. MUSCULOSKELETAL:  Denies leg pain , joint pain , joint swelling  NEUROLOGICAL:  Dizziness     PHYSICAL EXAM:      Vitals:     Vitals:    11/01/21 0816   BP: (!) 165/76   Pulse: 77   Resp: 18   Temp: 96.8 °F (36 °C)   SpO2: 95%       General Appearance:    Awake, alert , no acute distress. Head:    Normocephalic, atraumatic   Eyes:    No pallor, no icterus,   Ears:    No obvious deformity or drainage.    Nose:   No nasal drainage   Throat:   Mucosa moist, tracheostomy    Neck:   Supple, no lymphadenopathy   Lungs:     Clear to auscultation bilaterally    Heart:    Regular rate and rhythm, no murmur   Abdomen:     Soft, non-tender, bowel sounds present    Extremities:    + edema    Pulses:   Dorsalis pedis palpable    Skin:   no rashes        Lab Results   Component Value Date    WBC 7.3 10/30/2021    RBC 4.63 10/30/2021    HGB 11.2 10/30/2021    HCT 34.1 10/30/2021     10/30/2021    MCV 73.7 10/30/2021    MCH 24.2 10/30/2021    MCHC 32.8 10/30/2021    RDW 19.4 10/30/2021    NRBC 0.9 11/16/2020    SEGSPCT 73 01/26/2014    METASPCT 1.8 11/15/2020    LYMPHOPCT 16.2 10/30/2021    MONOPCT 10.3 10/30/2021    MYELOPCT 0.9 11/15/2020 BASOPCT 0.0 10/30/2021    MONOSABS 0.75 10/30/2021    LYMPHSABS 1.18 10/30/2021    EOSABS 0.00 10/30/2021    BASOSABS 0.00 10/30/2021       CMP     Lab Results   Component Value Date     10/28/2021    K 3.8 10/28/2021    CL 97 10/28/2021    CO2 28 10/28/2021    BUN 12 10/28/2021    CREATININE 1.4 10/28/2021    GFRAA >60 10/28/2021    LABGLOM >60 10/28/2021    GLUCOSE 145 10/28/2021    PROT 8.0 10/28/2021    LABALBU 4.5 10/28/2021    CALCIUM 9.6 10/28/2021    BILITOT 0.5 10/28/2021    ALKPHOS 94 10/28/2021    AST 95 10/28/2021    ALT 30 10/28/2021         Hepatic Function Panel:    Lab Results   Component Value Date    ALKPHOS 94 10/28/2021    ALT 30 10/28/2021    AST 95 10/28/2021    PROT 8.0 10/28/2021    BILITOT 0.5 10/28/2021    LABALBU 4.5 10/28/2021       PT/INR:    Lab Results   Component Value Date    PROTIME 15.0 10/28/2021    INR 1.4 10/28/2021       TSH:    Lab Results   Component Value Date    TSH 4.340 10/27/2020       U/A:    Lab Results   Component Value Date    NITRITE neg 08/30/2019    COLORU Yellow 10/29/2021    PHUR 6.0 10/29/2021    LABCAST FEW 04/11/2016    WBCUA NONE 10/29/2021    RBCUA 1-3 10/29/2021    BACTERIA NONE SEEN 10/29/2021    CLARITYU Clear 10/29/2021    SPECGRAV 1.020 10/29/2021    LEUKOCYTESUR Negative 10/29/2021    UROBILINOGEN 0.2 10/29/2021    BILIRUBINUR Negative 10/29/2021    BILIRUBINUR neg 08/30/2019    BLOODU TRACE 10/29/2021    GLUCOSEU Negative 10/29/2021    AMORPHOUS FEW 11/25/2020       ABG:    Lab Results   Component Value Date    LRH9JBE 26.1 11/09/2020       MICROBIOLOGY:    Blood culture -  24 Hours no growth   Gram stain performed from blood culture bottle media   Gram positive cocci in clusters   Abnormal      Narrative:              Specimen: Nasopharyngeal Swab Updated: 10/28/21 1345    SARS-CoV-2, NAAT DETECTEDAbnormal      CULTURE, RESPIRATORY Oral Pharyngeal Tiesha reducedAbnormal      Smear, Respiratory --    Group 6: <25 PMN's/LPF and <25 Epithelial cells/LPF   Polymorphonuclear leukocytes not seen   Epithelial cells not seen   No organisms seen     Organism Gram negative rodAbnormal     CULTURE, RESPIRATORY --    Light growth   Identification and sensitivity to follow    Narrative:     Source: TRAAS       Site:                  Radiology :    Chest X ray : no infiltrates     IMPRESSION:     1. CONS bacteremia - contaminant   2. COVID 19 infection ( non pneumonic )   3. Sputum cx - colonization      RECOMMENDATIONS:      1. Dexamethasone  6 mg po q 24 hrs X 1 week   2.  OK to discharge from ID POV

## 2021-11-01 NOTE — PROGRESS NOTES
Discharge instructions reviewed and given to patient. Scripts given to patient. Waiting for family to arrive.

## 2021-11-02 NOTE — CARE COORDINATION
[facility] Transitions Initial Follow Up Call    Call within 2 business days of discharge: Yes    Patient: Ciro Simmons Patient : 1960   MRN: [id number]  Reason for Admission: Acute on Chronic Respiratory Failure with Hypoxia; COVID-19; NANCY  Discharge Date: 21 RARS: Readmission Risk Score: 19.7      Last Discharge [facility]       Complaint Diagnosis Description Type Department Provider    10/28/21 Shortness of Breath Acute on chronic respiratory failure with hypoxia ([facility] [address] . .. ED to Hosp-Admission (Discharged) (ADMITTED) [facility] 6WE Meaghan Laureano MD; Duke Puri MD        Attempted to reach patient/patient's spouse by phone for initial post hospital discharge follow up; COVID-19 Monitoring. HIPAA compliant message left on voicemail; CTN contact information provided. Follow Up  No future appointments.     Alber Mcginnis RN

## 2021-11-03 NOTE — CARE COORDINATION
[facility] Transitions Initial Follow Up Call    Call within 2 business days of discharge: Yes    Patient: Jesenia Castillo Patient : 1960   MRN: [id number]  Reason for Admission: Acute on Chronic Respiratory Failure with Hypoxia; COVID-19; NANCY  Discharge Date: 21 RARS: Readmission Risk Score: 19.7      Last Discharge [facility]       Complaint Diagnosis Description Type Department Provider    10/28/21 Shortness of Breath Acute on chronic respiratory failure with hypoxia ([facility] [address] . .. ED to Hosp-Admission (Discharged) (ADMITTED) SE[facility] 6WE Royann Goodell, MD; Aguilar Teran MD        Second attempt to reach the patient/patient's spouse for initial Care Transition call post hospital discharge. HIPAA compliant message left on voicemail; CTN contact information provided. Episode to be resolved and CTN to sign off if return call is not received from the patient.       Yuki Coleman RN

## 2021-11-09 NOTE — PROGRESS NOTES
OTOLARYNGOLOGY  DAILY PROGRESS NOTE  2021    Subjective:     6-0 ETT in place, no bleeding since ETT was placed, ventilating without difficulty     Objective:     /61   Pulse 74   Temp 97.3 °F (36.3 °C) (Temporal)   Resp 24   Ht 5' 10\" (1.778 m)   Wt 290 lb (131.5 kg)   SpO2 97%   BMI 41.61 kg/m²   PULSE OXIMETRY RANGE: SpO2  Av %  Min: 71 %  Max: 97 %  No intake/output data recorded. GENERAL:  Sedated on vent  HENT: Normocephalic, atraumatic. 6-0 ETT in tracheal stoma secured. Granulation tissue around tracheal stoma that is intact. No bleeding seen around tracheal stoma. Mild hypertrophic scarring around tracheal stoma. Neck soft no masses. Oral cavity with OG tube in place. EYES: No sclera icterus, pupils equal  LUNGS:  No increased work of breathing, no stridor  CARDIOVASCULAR:  RR      Assessment/Plan:     64 y.o. male with history of trach dependence and recent diagnosis of COVID-19 presents with acute respiratory failure from tracheal bleeding and obstruction.  Tracheal bleeding improved, no evidence of bleeding. Stoma c/d/i    Continue with 6-0 ETT in place in trach stoma   Pt will need repeat COVID19 testing prior to change of ETT or trach   Will need trach tube replaced once off vent. Will discuss if this will be done at bedside or in OR for tracheal stoma revision due to hx of difficult trach   ENT to follow       Patient seen and discussed with Attending.        Electronically signed by Renee Parkinson DO on 2021 at 7:15 AM

## 2021-11-09 NOTE — ED NOTES
Bed: 18A-18  Expected date:   Expected time:   Means of arrival:   Comments:  Js Quick RN  11/09/21 0013

## 2021-11-09 NOTE — H&P
Department of Internal Medicine  Dr. Rhiannon Giron History and Physical      CHIEF COMPLAINT:  SOB    Reason for Admission:  RESP FAILURE      HISTORY OF PRESENT ILLNESS:      The patient is a 64 y.o. male with  who presents with the above problems. He has history of trach dependence and recent diagnosis of COVID-19 presents with acute respiratory failure from tracheal bleeding and obstruction. He tried to put in his trach himself and could not place it. He started bleeding. He Could not place the trach and came in the ER with a pulse ox in the 60% range.     Past Medical History:        Diagnosis Date    Acquired hypothyroidism 9/26/2017    Anxiety     Arthritis     Congestive heart failure with LV diastolic dysfunction, NYHA class 3 (HCC)     COPD (chronic obstructive pulmonary disease) ([address]     Depression     DM (diabetes mellitus) ([address]     Essential hypertension 6/1/2016    Hyperlipidemia     Hypertension     Lymphedema     Obesity     Obstructive sleep apnea 8/17/2009    Obstructive sleep apnea 9/26/2017    Type 2 diabetes mellitus without complication, without long-term current use of insulin ([address] 1/15/2014     Past Surgical History:        Procedure Laterality Date    BRONCHOSCOPY  08/10/2017    ECHO COMPL W DOP COLOR FLOW  6/21/2013         EXTERNAL EAR SURGERY  6/2/2009    keloid left ear    FOOT SURGERY  1990    Left foot    GASTROSTOMY TUBE PLACEMENT  08/10/2017    IR TUNNELED CATHETER PLACEMENT GREATER THAN 5 YEARS  11/19/2020    IR TUNNELED CATHETER PLACEMENT GREATER THAN 5 YEARS 11/19/2020 Brett Juarez MD [facility] SPECIAL PROCEDURES    TRACHEOSTOMY N/A 11/11/2020    TRACHEOTOMY performed by Cally Oscar MD at [facility] N/A 11/12/2020    OPEN TRACHEOTOMY, BRONCHOSCOPY, DIRECT LARYNGOSCOPY performed by Kelsie Can DO at [facility] N/A 1/18/2021    TRACHEOTOMY TUBE CHANGE performed by Kelsie Can DO at [facility] TRACHEOSTOMY TUBE PLACEMENT  08/10/2017    UPPER GASTROINTESTINAL ENDOSCOPY N/A 11/11/2020    EGD ESOPHAGOGASTRODUODENOSCOPY PEG TUBE INSERTION performed by Shola Griffith MD at [address]       Medications Prior to Admission:    Not in a hospital admission. Allergies:  Bee venom and Shellfish-derived products    Social History:   TOBACCO:   reports that he quit smoking about 20 years ago. His smoking use included cigarettes. He has a 3.00 pack-year smoking history. He has never used smokeless tobacco.    Family History:       Problem Relation Age of Onset    Alzheimer's Disease Mother     Heart Disease Father         Heart arrhythmia    Heart Attack Father      REVIEW OF SYSTEMS:  Review of systems not obtained due to patient factors - intubation and mental status  PHYSICAL EXAM:    Vitals:  BP (!) 114/59   Pulse 61   Temp 98.7 °F (37.1 °C) (Axillary)   Resp 22   Ht 5' 10\" (1.778 m)   Wt 290 lb (131.5 kg)   SpO2 94%   BMI 41.61 kg/m²     CONSTITUTIONAL:  Patient is sedated and on ventilator with a #6 ET tube  EYES:  Lids and lashes normal, pupils equal, round and reactive to light, extra ocular muscles intact, sclera clear, conjunctiva normal  ENT:  Normocephalic, without obvious abnormality, atraumatic, sinuses nontender on palpation, external ears without lesions, oral pharynx with moist mucus membranes, tonsils without erythema or exudates, gums normal and good dentition.   NECK:  Supple, symmetrical, trachea midline, no adenopathy, thyroid symmetric, not enlarged and no tenderness, skin normal  HEMATOLOGIC/LYMPHATICS:  no cervical lymphadenopathy  BACK:  Symmetric, no curvature, spinous processes are non-tender on palpation, paraspinous muscles are non-tender on palpation, no costal vertebral tenderness  LUNGS:  No increased work of breathing, good air exchange, clear to auscultation bilaterally, no crackles or wheezing  CARDIOVASCULAR:  Normal apical impulse, regular rate and rhythm, normal S1 and S2, no S3 or S4, and no murmur noted  ABDOMEN:  Soft, nontender. Normal bs    MUSCULOSKELETAL:  There is no redness, warmth, or swelling of the joints. Full range of motion noted. Motor strength is 5 out of 5 all extremities bilaterally. Tone is normal.  NEUROLOGIC:  Sedated and on ventilator.   SKIN:  no bruising or bleeding    DATA:  CBC:   Lab Results   Component Value Date    WBC 13.1 11/09/2021    RBC 4.29 11/09/2021    HGB 10.3 11/09/2021    HCT 31.3 11/09/2021    MCV 73.0 11/09/2021    MCH 24.0 11/09/2021    MCHC 32.9 11/09/2021    RDW 19.0 11/09/2021     11/09/2021    MPV 9.0 11/09/2021     CMP:    Lab Results   Component Value Date     11/09/2021    K 3.3 11/09/2021    K 3.8 10/28/2021    CL 95 11/09/2021    CO2 28 11/09/2021    BUN 18 11/09/2021    CREATININE 1.2 11/09/2021    GFRAA >60 11/09/2021    LABGLOM >60 11/09/2021    GLUCOSE 279 11/09/2021    PROT 7.7 11/09/2021    LABALBU 3.4 11/09/2021    CALCIUM 9.1 11/09/2021    BILITOT 0.8 11/09/2021    ALKPHOS 90 11/09/2021    AST 36 11/09/2021    ALT 17 11/09/2021     LDH:    Lab Results   Component Value Date     10/28/2020     Warfarin PT/INR:  No components found for: Roxana Burciaga  Last 3 Troponin:    Lab Results   Component Value Date    TROPONINI <0.01 05/08/2021    TROPONINI <0.01 04/28/2021    TROPONINI <0.01 04/02/2021     ABG:    Lab Results   Component Value Date    PH 7.335 11/09/2021    PCO2 58.9 11/09/2021    PO2 111.0 11/09/2021    HCO3 30.7 11/09/2021    BE 3.9 11/09/2021    O2SAT 97.2 11/09/2021     HgBA1c:    Lab Results   Component Value Date    LABA1C 6.8 06/15/2020     TSH:    Lab Results   Component Value Date    TSH 4.340 10/27/2020     VITAMIN B12: No components found for: B12  FOLATE:    Lab Results   Component Value Date    FOLATE >20.0 11/05/2020     IRON:    Lab Results   Component Value Date    IRON 95 11/05/2020     Iron Saturation:  No components found for: PERCENTFE  TIBC:    Lab Results   Component Value Date    TIBC 275 11/05/2020     FERRITIN:    Lab Results   Component Value Date    FERRITIN 335 11/05/2020     RPR:  No results found for: RPR  IVAN:    Lab Results   Component Value Date    IVAN NEGATIVE 10/08/2017     AMYLASE:  No results found for: AMYLASE  LIPASE:    Lab Results   Component Value Date    LIPASE 37 10/28/2021       IMAGING    XR CHEST PORTABLE    Result Date: 11/9/2021  EXAMINATION: ONE XRAY VIEW OF THE CHEST 11/9/2021 3:18 am COMPARISON: Exam performed earlier today at 0117 hours HISTORY: [address]: tube adjusted TECHNOLOGIST PROVIDED HISTORY: Reason for exam:->tube adjusted What reading provider will be dictating this exam?->CRC FINDINGS: EKG leads overlie the chest.  The tracheostomy tube tip is difficult to identify but is likely near the level of the ellie. A nasogastric tube terminates in the mid stomach. No pneumothorax. There has been some interval improvement in aeration of the right lung especially superiorly. Otherwise, right greater than left parenchymal infiltrates persist.  No other interval change. Improving aeration of the right lung. Tip of tracheostomy tube is difficult to identify but favored to be located near the level of the ellie. Consider retracting approximately 2 cm. Parenchymal infiltrates which may represent multifocal pneumonia. Continued follow-up recommended. The findings were sent to the Radiology Results [id number] at 3:30 am on 11/9/2021to be communicated to a licensed caregiver. XR CHEST PORTABLE    Result Date: 11/9/2021  EXAMINATION: ONE XRAY VIEW OF THE CHEST 11/9/2021 1:30 am COMPARISON: Exam performed earlier today at 0026 hours HISTORY: ORDERING SYSTEM PROVIDED HISTORY: Post tube TECHNOLOGIST PROVIDED HISTORY: Reason for exam:->Post tube What reading provider will be dictating this exam?->CRC FINDINGS: Patient is rotated to the left. EKG leads overlie the chest.  A tracheostomy tube is again identified. The tip of this tube is likely partially obscured by an overlying EKG lead but is thought to extend into the left mainstem bronchus. Interval development of a centrally complete opacification of the right lung. Patchy infiltrates in the left lung appear similar to previous. No pneumothorax or other change. Tracheostomy tube appears to extend into the left mainstem bronchus and should be retracted approximately 5 cm. Complete opacification of the right hemithorax likely due to combination of atelectasis and infiltrates. Patchy parenchymal infiltrates in the left lung are similar to previous. The findings were sent to the Radiology Results [id number] at 1:44 am on 11/9/2021to be communicated to a licensed caregiver. XR CHEST PORTABLE    Result Date: 11/9/2021  EXAMINATION: ONE XRAY VIEW OF THE CHEST 11/8/2021 11:47 pm COMPARISON: October 28 HISTORY: ORDERING SYSTEM PROVIDED HISTORY: Hypoxia TECHNOLOGIST PROVIDED HISTORY: Reason for exam:->Hypoxia What reading provider will be dictating this exam?->CRC FINDINGS: Prominent increasing bilateral airspace disease predominantly in the mid to lower lung zones consistent with multifocal pneumonia. The heart is not enlarged. Patient is significantly rotated to the left. Tracheostomy cannula remains in place. Evaluation limited by technique. Prominent increase in bilateral airspace disease predominantly in the mid to lower lung zones consistent with multifocal pneumonia. Limited examination. XR CHEST PORTABLE    Result Date: 10/28/2021  EXAMINATION: ONE XRAY VIEW OF THE CHEST 10/28/2021 1:29 pm COMPARISON: Comparison is dated August 16, 2021 HISTORY: ORDERING SYSTEM PROVIDED HISTORY: sob TECHNOLOGIST PROVIDED HISTORY: Reason for exam:->sob What reading provider will be dictating this exam?->CRC FINDINGS: The lungs are without acute focal process. There is no effusion or pneumothorax. The cardiomediastinal silhouette is without acute process.  The osseous structures are without acute process tracheostomy again noted. No acute process. XR ABDOMEN FOR NG/OG/NE TUBE PLACEMENT    Result Date: 11/9/2021  EXAMINATION: ONE SUPINE XRAY VIEW(S) OF THE ABDOMEN 11/9/2021 1:12 pm COMPARISON: Abdominal radiograph November 9, 2021 HISTORY: ORDERING SYSTEM PROVIDED HISTORY: Confirmation of course of NG/OG/NE tube and location of tip of tube TECHNOLOGIST PROVIDED HISTORY: Reason for exam:->Confirmation of course of NG/OG/NE tube and location of tip of tube Portable? ->Yes What reading provider will be dictating this exam?->CRC FINDINGS: Enteric tube is identified below the diaphragm with the tip in the of stomach. The visualized bowel gas pattern is nonspecific, nonobstructive. Enteric tube is in the stomach. XR ABDOMEN FOR NG/OG/NE TUBE PLACEMENT    Result Date: 11/9/2021  EXAMINATION: ONE SUPINE XRAY VIEW(S) OF THE ABDOMEN 11/9/2021 1:29 am COMPARISON: None. HISTORY: ORDERING SYSTEM PROVIDED HISTORY: Confirmation of course of NG/OG/NE tube and location of tip of tube TECHNOLOGIST PROVIDED HISTORY: Reason for exam:->Confirmation of course of NG/OG/NE tube and location of tip of tube Portable? ->Yes What reading provider will be dictating this exam?->CRC FINDINGS: Nasogastric tube terminates in the mid stomach. Moderate gaseous distention of the stomach. Patchy opacities within the right greater than left lung, apparently improved compared with the portable chest radiograph performed earlier today at 0117 hours. Nasogastric tube terminates in the mid stomach. Improving aeration of the right lung. ASSESSMENT AND PLAN:    Respiratory failure secondary to tracheal bleeding/tracheal dislodgment.   Patient was recently in [facility] for reevaluation of possible trach removal see my progress note which copied the ENT progress note from [facility]    I can be reached though [employer] or [facility] [place] at [phone]

## 2021-11-09 NOTE — ED NOTES
Pt much more calm at this time. VS improving. Wife at bedside.       Renaldo Mendoza RN  11/09/21 0420

## 2021-11-09 NOTE — PROGRESS NOTES
Intubation Record    Date: 11/09/21  Time: 2250  Patient identity confirmed: Yes   Preoxygenation: BVM with 100% O2   Evac: yes  Tube size: 6  Number of attempts :1  Breath sounds present bilaterally: Yes  ETCO2 56  ETT to stoma: 15 cm  Tube secured with: JEMMA tube andres  Chest x-ray ordered: Yes  Difficulties encountered:     Patient unable to insert inner cannula of 6 XLT tracheostomy at home and presented to ED with Shortness of breath with SPO2 of 70%, attempted to pass suction catheter with some resistance and unable to pass a inner cannula. Tracheostomy was then removed and reinserted multiple times with no success. Finally intubated with 6 ET tube into stoma site, cuff inflated and placed on mechanical ventilator.       Difficult Airway: yes          Rusty More RCP, RRT

## 2021-11-09 NOTE — CONSULTS
OTOLARYNGOLOGY  CONSULT NOTE  11/9/2021    Physician Consulted: Dr. Angel Doe  Reason for Consult: Trach bleed  Referring Physician: Dr. Fiona Hoover    HPI Greig Schwab is a 64 y.o. male who ENT was consulted for evaluation of tracheal bleeding. Patient presented to the ED for sob and resp distress, beginning a few hours. The complaint has been persistent, severe in severity, and worsened by being unable to change inner trach cannula . Patient presents via private transport in respiratory distress. History of chronic oxygen failure normally on tracheostomy 4 L blow-by trach mask. Recent diagnosis of COVID-19, is on Decadron, he attempted to change his inner cannula today but was unable to. Arrives 60% on trach mask, tachypnea and respiratory distress. Initially patient underwent tracheostomy with general surgery but was aborted due to excessive tracheal calcification. ENT was consulted then and trach was placed on 11/12/20. Per ED, he came in with respiratory distress and significant mucus plugging within his 6-0 Proximal XLT uncuffed trach.  They were unable to intubate the patient from above and therefore placed a 6-0 ETT through his stoma and he is currently on the vent.        Review of Systems   Unable to perform ROS: Intubated       Past Medical History:   Diagnosis Date    Acquired hypothyroidism 9/26/2017    Anxiety     Arthritis     Congestive heart failure with LV diastolic dysfunction, NYHA class 3 ([address]     COPD (chronic obstructive pulmonary disease) ([address]     Depression     DM (diabetes mellitus) ([address]     Essential hypertension 6/1/2016    Hyperlipidemia     Hypertension     Lymphedema     Obesity     Obstructive sleep apnea 8/17/2009    Obstructive sleep apnea 9/26/2017    Type 2 diabetes mellitus without complication, without long-term current use of insulin ([address] 1/15/2014       Past Surgical History:   Procedure Laterality Date    BRONCHOSCOPY  08/10/2017    ECHO COMPL W DOP ALPRAZolam (XANAX) 0.5 MG tablet Take 0.5 mg by mouth nightly as needed for Sleep. Historical Provider, MD   albuterol sulfate HFA (PROVENTIL HFA) 108 (90 Base) MCG/ACT inhaler Inhale 2 puffs into the lungs every 4 hours as needed for Wheezing 21  Lydia Tolbert DO   budesonide (PULMICORT) 0.5 MG/2ML nebulizer suspension Take 2 mLs by nebulization 2 times daily 21   Karen Medeiros MD   Arformoterol Tartrate (BROVANA) 15 MCG/2ML NEBU Take 1 ampule by nebulization 2 times daily    Historical Provider, MD   carvedilol (COREG) 25 MG tablet Take 1 tablet by mouth 2 times daily 21   Karen Medeiros MD   amLODIPine (NORVASC) 10 MG tablet Take 1 tablet by mouth daily 20   Karen Medeiros MD       Allergies   Allergen Reactions    Bee Venom Anaphylaxis    Shellfish-Derived Products Anaphylaxis     Only crab legs       Family History   Problem Relation Age of Onset    Alzheimer's Disease Mother     Heart Disease Father         Heart arrhythmia    Heart Attack Father        Social History     Tobacco Use    Smoking status: Former Smoker     Packs/day: 1.00     Years: 3.00     Pack years: 3.00     Types: Cigarettes     Quit date: 2001     Years since quittin.7    Smokeless tobacco: Never Used   Vaping Use    Vaping Use: Never used   Substance Use Topics    Alcohol use: Not Currently    Drug use: Not Currently     Comment: past use; none x 30 years           PHYSICAL EXAM:    Vitals:    21 0102   BP: (!) 186/102   Pulse: 136   Resp: 19   Temp:    SpO2: 92%       General Appearance:  Sedated on vent  Head/face:  NC/AT  Eyes: PERRL, EOMI  ENT: Bilateral external ears WNL, Nares patent, Septum midline,   Neck: 6-0 ETT through tracheostomy with minimal bloody secretions appreciated around stoma no active bleeding, scope exam through ETT showed end of tube right above the ellie with pink frothy secretions appreciated in the airway  Lungs:   On vent  Heart:  RR  Neuro: Facial nerve symmetric and intact. House Brackmann 1/6, bilaterally. Media Information               LABS:  CBC  No results for input(s): WBC, HGB, HCT, PLT in the last 72 hours. RADIOLOGY  XR CHEST PORTABLE    Result Date: 11/9/2021  EXAMINATION: ONE XRAY VIEW OF THE CHEST 11/8/2021 11:47 pm COMPARISON: October 28 HISTORY: ORDERING SYSTEM PROVIDED HISTORY: Hypoxia TECHNOLOGIST PROVIDED HISTORY: Reason for exam:->Hypoxia What reading provider will be dictating this exam?->CRC FINDINGS: Prominent increasing bilateral airspace disease predominantly in the mid to lower lung zones consistent with multifocal pneumonia. The heart is not enlarged. Patient is significantly rotated to the left. Tracheostomy cannula remains in place. Evaluation limited by technique. Prominent increase in bilateral airspace disease predominantly in the mid to lower lung zones consistent with multifocal pneumonia. Limited examination. ASSESSMENT:  64 y.o. male with history of trach dependence and recent diagnosis of COVID-19 presents with acute respiratory failure from tracheal bleeding and obstruction. PLAN:  · 6-0 ETT placed by the ED  · Scoped at the bedside with findings noted above. Advised ED and respiratory staff to pull back his ETT about 1-2 cm. · Plan is for admission to ICU for medical management  · Recommend CTA to rule out tracheo-innominate fistula or other etiology for his bleeding. Pack around stoma as needed for any bleeding. · Hold Eliquis  · Patient may require bronchoscopy due to significant secretions appreciated in the airway  · Continue aggressive pulmonary hygiene  · Once he is medically stabilized and weaned from the vent, will attempt to replace his 6-0 Shiley Proximal XLT as patient is long term trach dependent. Patient seen and examined by resident. Will discuss plan with attending.      Electronically signed by Lyman Angelucci, DO on 11/9/21 at 1:22 AM EST

## 2021-11-09 NOTE — ED NOTES
Pt appears agitated. Pt biting on NG tube. Additional meds ordered and administered. IV sedation titrated.       Ac Stewart RN  11/09/21 [id number]

## 2021-11-09 NOTE — ED NOTES
Per pt, he attempted several times to replace inner cannula of trach at home and was unable to. Pt arrived with no inner cannula in place. Pt repeating \"I cant breathe\". Respiratory and ED physician unable to place inner cannula.       Celeste Clarke RN  11/09/21 [id number]

## 2021-11-09 NOTE — PROGRESS NOTES
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 We also performed tracheostomy tube exchange in OR. Exam revealed continued glottic airway closure and his bilateral cords were immobile in the midline. He will return next week for trach change in the office and we will also schedule for direct laryngoscopy w/ arytenoidectomy in the near future. I had a candid conversation with him and his spouse today regarding the complexity of his airway problems. He would need a patent and adequate glottic airway to be considered for possible tracheal reconstruction. His prior sleep apnea and neck anatomy make his reconstruction further complicated. It may not be possible to liberate him from his tracheostomy. I will also consult w/ Dr. Lalita Pritchett regarding his case and how to best move forward with treatment. Informed consent was obtained.      F/u for trach change

## 2021-11-09 NOTE — ED NOTES
ENT at bedside. Pt became agitated. Pt medicated with PRN versed.       Tatiana Alexandre RN  11/09/21 [id number]

## 2021-11-09 NOTE — ED PROVIDER NOTES
Department of Emergency Medicine   ED  Provider Note  Admit Date/RoomTime: 11/9/2021 12:13 AM  [facility]          History of Present Illness:  11/9/21, Time: 12:22 AM EST  Chief Complaint   Patient presents with    Shortness of Breath     per pt he attempted to change is trach and the inner cannula was not working. per pt he tried several different one and was unsuccessful. pt sp02 was 64% on room                Rayray Faith is a 64 y.o. male presenting to the ED for sob and resp distress, beginning a few hours. The complaint has been persistent, severe in severity, and worsened by being unable to change inner trach cannula . Patient presents via private transport in respiratory distress. History of chronic oxygen failure normally on tracheostomy 4 L blow-by trach mask. Recent diagnosis of COVID-19, is on Decadron, he attempted to change his inner cannula today but was unable to. Arrives 60% on trach mask, tachypnea and respiratory distress. ENCOUNTER LIMITATION:    Please note that the HPI, ROS, Past History, and Physical Examination are limited due to this patients acuity of illness. Review of Systems:   A complete review of systems was unable to be performed secondary to the limitations noted above            --------------------------------------------- PAST HISTORY ---------------------------------------------  Past Medical History:  has a past medical history of Acquired hypothyroidism, Anxiety, Arthritis, Congestive heart failure with LV diastolic dysfunction, NYHA class 3 ([facility] [address] COPD (chronic obstructive pulmonary disease) ([facility] [address] Depression, DM (diabetes mellitus) ([facility] [address] Essential hypertension, Hyperlipidemia, Hypertension, Lymphedema, Obesity, Obstructive sleep apnea, Obstructive sleep apnea, and Type 2 diabetes mellitus without complication, without long-term current use of insulin ([facility] [address]     Past Surgical History:  has a past surgical history that includes Foot surgery (1990); External ear surgery (6/2/2009); ECHO Compl W Dop Color Flow (6/21/2013); Gastrostomy tube placement (08/10/2017); Tracheostomy tube placement (08/10/2017); bronchoscopy (08/10/2017); tracheostomy (N/A, 11/11/2020); Upper gastrointestinal endoscopy (N/A, 11/11/2020); tracheostomy (N/A, 11/12/2020); IR TUNNELED CVC PLACE WO SQ PORT/PUMP > 5 YEARS (11/19/2020); and tracheostomy tube change (N/A, 1/18/2021). Social History:  reports that he quit smoking about 20 years ago. His smoking use included cigarettes. He has a 3.00 pack-year smoking history. He has never used smokeless tobacco. He reports previous alcohol use. He reports previous drug use. Family History: family history includes Alzheimer's Disease in his mother; Heart Attack in his father; Heart Disease in his father. . Unless otherwise noted, family history is non contributory    The patients home medications have been reviewed. Allergies: Bee venom and Shellfish-derived products        ---------------------------------------------------PHYSICAL EXAM--------------------------------------    Constitutional/General: Alert and oriented x3 in respiratory distress  Head: Normocephalic and atraumatic  Eyes: PERRL, EOMI, sclera non icteric  Mouth: Oropharynx clear, handling secretions, no trismus, no asymmetry of the posterior oropharynx or uvular edema  Neck: Supple, 6 Shiley external tracheostomy in place, thick secretions within the cannula, there is no inner cannula, trach ties in place  Respiratory: Coarse lung sounds, tachypnea, in respiratory distress  Cardiovascular: Tachycardic 2+ distal pulses. Equal extremity pulses. Chest: No chest wall tenderness  GI:  Abdomen Soft, Non tender, Non distended. No rebound, guarding, or rigidity. Musculoskeletal: Moves all extremities x 4.  Warm and well perfused, diaphoretic integument: skin warm and diaphoretic  Neurologic: GCS 15, no focal deficits,    Psychiatric: anxious Affect -------------------------------------------------- RESULTS -------------------------------------------------  I have personally reviewed all laboratory and imaging results for this patient. Results are listed below.      LABS: (Lab results interpreted by me)  Results for orders placed or performed during the hospital encounter of 11/09/21   Procalcitonin   Result Value Ref Range    Procalcitonin 0.41 (H) 0.00 - 0.08 ng/mL   Troponin   Result Value Ref Range    Troponin, High Sensitivity 28 (H) 0 - 11 ng/L   CBC Auto Differential   Result Value Ref Range    WBC 13.1 (H) 4.5 - 11.5 E9/L    RBC 4.29 3.80 - 5.80 E12/L    Hemoglobin 10.3 (L) 12.5 - 16.5 g/dL    Hematocrit 31.3 (L) 37.0 - 54.0 %    MCV 73.0 (L) 80.0 - 99.9 fL    MCH 24.0 (L) 26.0 - 35.0 pg    MCHC 32.9 32.0 - 34.5 %    RDW 19.0 (H) 11.5 - 15.0 fL    Platelets 723 507 - 004 E9/L    MPV 9.0 7.0 - 12.0 fL    Neutrophils Absolute 9.30 (H) 1.80 - 7.30 E9/L    Anisocytosis 1+     Polychromasia 3+     Hypochromia 2+     Poikilocytes 3+     Schistocytes 1+     Ovalocytes 1+     Stomatocytes 1+     Target Cells 3+     Tear Drop Cells 2+    Comprehensive Metabolic Panel   Result Value Ref Range    Sodium 134 132 - 146 mmol/L    Potassium 3.3 (L) 3.5 - 5.0 mmol/L    Chloride 95 (L) 98 - 107 mmol/L    CO2 28 22 - 29 mmol/L    Anion Gap 11 7 - 16 mmol/L    Glucose 279 (H) 74 - 99 mg/dL    BUN 18 6 - 23 mg/dL    CREATININE 1.2 0.7 - 1.2 mg/dL    GFR Non-African American >60 >=60 mL/min/1.73    GFR African American >60     Calcium 9.1 8.6 - 10.2 mg/dL    Total Protein 7.7 6.4 - 8.3 g/dL    Albumin 3.4 (L) 3.5 - 5.2 g/dL    Total Bilirubin 0.8 0.0 - 1.2 mg/dL    Alkaline Phosphatase 90 40 - 129 U/L    ALT 17 0 - 40 U/L    AST 36 0 - 39 U/L   Brain Natriuretic Peptide   Result Value Ref Range    Pro-BNP 81 0 - 125 pg/mL   Magnesium   Result Value Ref Range    Magnesium 2.1 1.6 - 2.6 mg/dL   Lactate, Sepsis   Result Value Ref Range    Lactic Acid, Sepsis 1.6 0.5 - 1.9 mmol/L   Urinalysis   Result Value Ref Range    Color, UA Yellow Straw/Yellow    Clarity, UA Clear Clear    Glucose, Ur 250 (A) Negative mg/dL    Bilirubin Urine Negative Negative    Ketones, Urine Negative Negative mg/dL    Specific Gravity, UA >=1.030 1.005 - 1.030    Blood, Urine MODERATE (A) Negative    pH, UA 6.0 5.0 - 9.0    Protein, UA >=300 (A) Negative mg/dL    Urobilinogen, Urine 4.0 (A) <2.0 E.U./dL    Nitrite, Urine Negative Negative    Leukocyte Esterase, Urine Negative Negative   Blood Gas, Arterial   Result Value Ref Range    Date Analyzed [id number]     Time Analyzed 0131     Source: Blood Arterial     pH, Blood Gas 7.231 (L) 7.350 - 7.450    PCO2 80.5 (HH) 35.0 - 45.0 mmHg    PO2 42.3 (LL) 75.0 - 100.0 mmHg    HCO3 33.0 (H) 22.0 - 26.0 mmol/L    B.E. 3.4 (H) -3.0 - 3.0 mmol/L    O2 Sat 61.9 (L) 92.0 - 98.5 %    PO2/FIO2 0.42 mmHg/%    AaDO2 565.2 mmHg    RI(T) 1,336 %    O2Hb 61.3 (L) 94.0 - 97.0 %    COHb 0.6 0.0 - 1.5 %    MetHb 0.3 0.0 - 1.5 %    O2 Content 10.2 mL/dL    HHb 37.8 (H) 0.0 - 5.0 %    tHb (est) 11.8 11.5 - 16.5 g/dL    Mode AC     FIO2 100.0 %    Rr Mechanical 18.0 b/min    Vt Mechanical 500.0 mL    Peep/Cpap 10.0 cmH2O    Date Of Collection      Time Collected      Pt Temp 37.0 C     ID [id number]     Lab 10196     Critical(s) Notified Handed report to Dr/RN    Microscopic Urinalysis   Result Value Ref Range    WBC, UA 1-3 0 - 5 /HPF    RBC, UA 5-10 (A) 0 - 2 /HPF    Epithelial Cells, UA RARE /HPF    Bacteria, UA MODERATE (A) None Seen /HPF   EKG 12 Lead   Result Value Ref Range    Ventricular Rate 110 BPM    Atrial Rate 110 BPM    P-R Interval 182 ms    QRS Duration 88 ms    Q-T Interval 326 ms    QTc Calculation (Bazett) 441 ms    P Axis 66 degrees    R Axis -40 degrees    T Axis 73 degrees   ,       RADIOLOGY:  Interpreted by Radiologist unless otherwise specified  XR ABDOMEN FOR NG/OG/NE TUBE PLACEMENT   Final Result   Nasogastric tube terminates in the mid stomach. Improving aeration of the right lung. XR CHEST PORTABLE   Final Result   Tracheostomy tube appears to extend into the left mainstem bronchus and   should be retracted approximately 5 cm. Complete opacification of the right hemithorax likely due to combination of   atelectasis and infiltrates. Patchy parenchymal infiltrates in the left lung are similar to previous. The findings were sent to the Radiology Results [address] at 1:44   am on 11/9/2021to be communicated to a licensed caregiver. XR CHEST PORTABLE   Final Result   Prominent increase in bilateral airspace disease predominantly in the mid to   lower lung zones consistent with multifocal pneumonia. Limited examination. XR CHEST PORTABLE    (Results Pending)                    ------------------------- NURSING NOTES AND VITALS REVIEWED ---------------------------   The nursing notes within the ED encounter and vital signs as below have been reviewed by myself  /70   Pulse 116   Temp 97.3 °F (36.3 °C) (Temporal)   Resp 24   Ht 5' 10\" (1.778 m)   Wt 290 lb (131.5 kg)   SpO2 90%   BMI 41.61 kg/m²     Oxygen Saturation Interpretation: Abnormal    The cardiac monitor revealed NSR with ST with a heart rate in the 120s as interpreted by me. The cardiac monitor was ordered secondary to the patient's heart rate and to monitor the patient for dysrhythmia. CPT 50576    The patients available past medical records and past encounters were reviewed.         ------------------------------ ED COURSE/MEDICAL DECISION MAKING----------------------  Medications   ketamine (KETALAR) 10 MG/ML injection (has no administration in time range)   rocuronium (ZEMURON) 50 MG/5ML injection (has no administration in time range)   midazolam (VERSED) 2 MG/2ML injection (has no administration in time range)   fentaNYL (SUBLIMAZE) 100 MCG/2ML injection (has no administration in time range)   propofol 1000 MG/100ML injection (has no administration in time range)   midazolam PF (VERSED) injection 2 mg (2 mg IntraVENous Given 11/9/21 0243)   fentaNYL (SUBLIMAZE) injection 100 mcg (100 mcg IntraVENous Given 11/9/21 0243)   vancomycin (VANCOCIN) 2,000 mg in dextrose 5 % 500 mL IVPB (2,000 mg IntraVENous New Bag 11/9/21 0209)   0.9 % sodium chloride infusion ( IntraVENous New Bag 11/9/21 0208)   labetalol (NORMODYNE;TRANDATE) injection 10 mg (has no administration in time range)   fentaNYL 5 mcg/ml in 0.9%  ml infusion (100 mcg/hr IntraVENous Rate/Dose Change 11/9/21 0304)   hydrALAZINE (APRESOLINE) injection 5 mg (has no administration in time range)   propofol injection (20 mcg/kg/min × 131.5 kg IntraVENous New Bag 11/9/21 0240)   ipratropium-albuterol (DUONEB) nebulizer solution 3 ampule (3 ampules Inhalation Given 11/9/21 0131)   EPINEPHrine 1 MG/10ML injection 1 mg (1 mg IntraVENous Given 11/9/21 0054)   sodium bicarbonate 8.4 % injection 50 mEq (50 mEq IntraVENous Given 11/9/21 0055)   cefepime (MAXIPIME) 2000 mg IVPB minibag (0 mg IntraVENous Stopped 11/9/21 0208)   dexamethasone (DECADRON) injection 6 mg (6 mg IntraVENous Given 11/9/21 0203)   fentaNYL (SUBLIMAZE) injection 100 mcg (100 mcg IntraVENous Given 11/9/21 0301)                PROCEDURE  11/9/21       Time: 3429    INTUBATION  Risks, benefits and alternatives if able (for applicable procedures below) described. Performed By: EM Attending Physician and EM Resident. Indication:  Respiratory failure and hypoxia. Informed consent: Consent unable to be obtained due to patient's condition. .  Procedure: Following Preoxygenation the patient was pretreated with ketamine followed by rocuronium.  Intubation was performed after multiple attempt(s) really attempted video laryngoscopy, he appears to have significant stenosis unable to pass ET tube, a 6.0 tube was inserted to 13 cm through his stoma  Initial post procedure placement:  confirmed by bilateral breath sounds, ETCO2 detection, and absence of sounds over stomach. Tube Secured @ 13mm at neck   Post procedure chest x-ray: has been ordered but is still pending. Procedural Complications: bleeding and multiple attempts. 6.0 tube was placed through tracheal stoma   ENT consult - YES        PROCEDURE  11/9/21       Time: 0100    CENTRAL LINE INSERTION  Risks, benefits and alternatives (for applicable procedures below) described. Performed By: Kaylan Sterling MD.    Indication: central venous monitoring, need for frequent blood draws and inability to gain peripheral access. Informed consent: Consent unable to be obtained due to the emergent nature of this procedure. .  Procedure: After routine sterile preparation, local anesthesia not performed due to emergent nature of this procedure. A left 3-Lumen 7F Central Venous Catheter was placed by femoral vein approach and secured by standard fashion. Ultrasound Guidance:   used. Number of Attempts: 1  Post-procedure Findings: A post procedural chest x-ray  was not indicated. Patient tolerated the procedure well. Medical Decision Making:     I, Dr. Remedios Jeffers am the primary provider of record    Patient presents in respiratory distress, attempted to change his inner cannula tonight was unable to pass the cannula, states he tempted multiple times, arrived in respiratory distress by private transport, on trach mask, currently 15 L hypoxic, unable to pass new inner cannula, the entire device was attempted to be exchanged, unable to place new device, decision made to take airway control, airway was eventually secured using a 6.0 endotracheal tube through the stoma. Cuff is inflated, acute on chronic hypoxic respiratory failure, tube was readjusted after x-ray was noted. He did have a cardiac arrest immediately following intubation, ROSC achieved after 1 round of CPR single bicarb single epinephrine.   Recent COVID-19 diagnosis, cultures obtained broad-spectrum antibiotics initiated. Spoke with medicine ENT critical care patient will be admitted. Re-Evaluations:  ED Course as of 11/09/21 0310   Tue Nov 09, 2021   0132 EKG: This EKG is signed by emergency department physician. Rate: 110  Rhythm: Sinus  Interpretation: No acute ST ovation depression left axis deviation sinus rhythm  Comparison: stable with previous ekg       [CB]      ED Course User Index  [CB] Britany Wiggins MD       Time: 0045  Re-evaluation. Patients symptoms are worsening  Repeat physical examination is significantly worsened -unable to place new inner cannula, unable to exchange trachea or get new 6 Shiley or 4 Shiley, decision made to intubate, unable to pass tube from above, a 6.0 tube was placed in the stoma. Patient arrested after this, ROSC achieved after 1 round of CPR, single epinephrine, single bicarb. Significant bloody secretions from mid endotracheal tube, aggressively suctioned, epinephrine was also placed down the ET tube     Time: 0130  Re-evaluation. Patients symptoms are improving  Repeat physical examination is improved     Time: 0230  Re-evaluation. Patients symptoms show no change  Repeat physical examination is not changed      Sepsis Re-examination  11/9/21   3:09 AM EST          Vital Signs:   Vitals:    11/09/21 0131 11/09/21 0208 11/09/21 0209 11/09/21 0242   BP:  (!) 199/99  137/70   Pulse: 140 131 133 116   Resp: 18 24 26 24   Temp:       TempSrc:       SpO2: (!) 84% 92% 91% 90%   Weight:       Height:         Card/Pulm:  Rhythm: rapid rate. Heart Sounds: Normal S1, S2. Rhonchi on vent   Capillary Refill: normal.  Radial Pulse:  present 2+. Skin:  Warm.       This patient's ED course included: a personal history and physicial examination, multiple bedside re-evaluations, IV medications, cardiac monitoring, continuous pulse oximetry and complex medical decision making and emergency management    This patient has been closely monitored during their ED course. Consultations:    Spoke with ENT - they will come in. Spoke with Dr. Necia Cheadle, MD (Medicine). Discussed case. They will admit this patient. Spoke with Dr. sen(Critical care). Discussed case. They will provide consultation. Critical Care: Please note that the withdrawal or failure to initiate urgent interventions for this patient would likely result in a life threatening deterioration or permanent disability. Accordingly this patient received 47 minutes of critical care time, excluding separately billable procedures. Counseling: The emergency provider has spoken with the family a bedside and discussed todays results, in addition to providing specific details for the plan of care and counseling regarding the diagnosis and prognosis. Questions are answered at this time and they are agreeable with the plan.       --------------------------------- IMPRESSION AND DISPOSITION ---------------------------------    IMPRESSION  1. Acute on chronic respiratory failure with hypoxia (HCC)    2. Tracheostomy complication, unspecified complication type ([facility] [address]    3. Chronic anticoagulation    4. Respiratory distress    5. Airway compromise    6. COVID-19    7. Acute pulmonary edema (HCC)    8. Hypercapnemia    9. Cardiac arrest [facility])        DISPOSITION  Disposition: Admit to CCU/ICU  Patient condition is critical    Deon Foreman MD PGY-2  11/9/2021 5:15 AM  The patient was seen and evaluated by myself and        NOTE: This report was transcribed using voice recognition software.  Every effort was made to ensure accuracy; however, inadvertent computerized transcription errors may be present       Deon Foreman MD  Resident  11/09/21 6099

## 2021-11-10 NOTE — PROGRESS NOTES
Patient became combative, trying to flip out of bed, became tachypneic, tachycardic, and hypertensive. Dr Guero Gomez at bedside. Orders obtained.

## 2021-11-10 NOTE — CONSULTS
[facility]  Internal Medicine Residency Program  Consult Note  MICU    Patient:  Norah Gates 64 y.o. male MRN: [id number]     Date of Service: 11/10/2021    Hospital Day: 2      Chief complaint: Acute respiratory failure  History of Present Illness   The patient is a 64 y.o. male with past medical history of laryngeal stenosis s/p tracheostomy on 3 to 4 L oxygen at home, COPD, type 2 diabetes mellitus, hypertension, congestive heart failure, depression presented to the hospital with chief complaint of acute respiratory failure. Patient was recently discharged from the hospital on 11/1 with diagnosis of Covid 19, on Decadron 6 mg x 10 days. Patient presented to the ED for shortness of breath, beginning few hours prior to admission. Patient state he attempted to change his inner cannula but was unable. There is some bleeding through the trach. Patient arrived to ED with oxygen saturation 60% on trach mask, tachypnea and respiratory distress. Patient underwent tracheostomy but it was unsuccessful due to tracheal calcification. There is also significant mucus plugging. Patient did have a cardiac arrest, ROSC achieved after 1 round of CPR, 1 round epi and bicarb. ENT consulted, 6 0 ETT is placed through his stoma. Patient is then transferred to the ICU for further management. Upon arrival to the ICU. Patient is on ventilator through stoma. Sedated with fentanyl, propofol and Versed. Vitals significant for sinus bradycardia, heart rate is around 40s. Blood pressures stable. MAP 70s. Laboratory significant for creatinine increased from 1.2 to 1.7, Hb 7.8( was 10.3 10/09). ABG 7.33/58.9/111/30.7. Vent settings: ACVC/24/500/10/80%FiO2. Patient was given normal saline 100 cc/h.       Past Medical History:      Diagnosis Date    Acquired hypothyroidism 9/26/2017    Anxiety     Arthritis     Congestive heart failure with LV diastolic dysfunction, NYHA class 3 (HCC)     COPD [facility]ronic obstructive pulmonary disease) [facility]     Depression     DM (diabetes mellitus) ([facility]     Essential hypertension 6/1/2016    Hyperlipidemia     Hypertension     Lymphedema     Obesity     Obstructive sleep apnea 8/17/2009    Obstructive sleep apnea 9/26/2017    Type 2 diabetes mellitus without complication, without long-term current use of insulin ([facility] 1/15/2014       Past Surgical History:        Procedure Laterality Date    BRONCHOSCOPY  08/10/2017    ECHO COMPL W DOP COLOR FLOW  6/21/2013         EXTERNAL EAR SURGERY  6/2/2009    keloid left ear    FOOT SURGERY  1990    Left foot    GASTROSTOMY TUBE PLACEMENT  08/10/2017    IR TUNNELED CATHETER PLACEMENT GREATER THAN 5 YEARS  11/19/2020    IR TUNNELED CATHETER PLACEMENT GREATER THAN 5 YEARS 11/19/2020 Omar Vallecillo MD [facility]    TRACHEOSTOMY N/A 11/11/2020    TRACHEOTOMY performed by Moisés Elias MD at [facility] N/A 11/12/2020    OPEN TRACHEOTOMY, BRONCHOSCOPY, DIRECT LARYNGOSCOPY performed by Kaye Sun DO at [facility] N/A 1/18/2021    TRACHEOTOMY TUBE CHANGE performed by Kaye Sun DO at [address]  08/10/2017    UPPER GASTROINTESTINAL ENDOSCOPY N/A 11/11/2020    EGD ESOPHAGOGASTRODUODENOSCOPY PEG TUBE INSERTION performed by Moisés Elias MD at [address] [place]       Medications Prior to Admission:    Prior to Admission medications    Medication Sig Start Date End Date Taking?  Authorizing Provider   dexamethasone (DECADRON) 6 MG tablet Take 1 tablet by mouth daily for 10 days 11/2/21 11/12/21  Alison Castañeda, DO   zinc sulfate (ZINCATE) 220 (50 Zn) MG capsule Take 1 capsule by mouth daily 11/2/21   Alison Castañeda, DO   ascorbic acid (VITAMIN C) 500 MG tablet Take 1 tablet by mouth daily 11/2/21   Alison Castañeda, DO   Cholecalciferol (VITAMIN D) 25 MCG TABS Take 1 tablet by mouth daily 11/2/21   Alison Castañeda, DO apixaban (ELIQUIS) 5 MG TABS tablet Take 5 mg by mouth 2 times daily    Historical Provider, MD   atorvastatin (LIPITOR) 40 MG tablet Take 40 mg by mouth nightly     Historical Provider, MD   busPIRone (BUSPAR) 15 MG tablet Take 15 mg by mouth 3 times daily    Historical Provider, MD   buPROPion (WELLBUTRIN XL) 150 MG extended release tablet Take 150 mg by mouth every morning    Historical Provider, MD   ALPRAZolam (XANAX) 0.5 MG tablet Take 0.5 mg by mouth nightly as needed for Sleep. Historical Provider, MD   albuterol sulfate HFA (PROVENTIL HFA) 108 (90 Base) MCG/ACT inhaler Inhale 2 puffs into the lungs every 4 hours as needed for Wheezing 5/8/21 5/8/22  Vivian Hui DO   budesonide (PULMICORT) 0.5 MG/2ML nebulizer suspension Take 2 mLs by nebulization 2 times daily 4/7/21   Alana Pagan MD   Arformoterol Tartrate (BROVANA) 15 MCG/2ML NEBU Take 1 ampule by nebulization 2 times daily    Historical Provider, MD       Allergies:  Bee venom and Shellfish-derived products    Social History:   TOBACCO:   reports that he quit smoking about 20 years ago. His smoking use included cigarettes. He has a 3.00 pack-year smoking history. He has never used smokeless tobacco.  ETOH:   reports previous alcohol use. Family History:       Problem Relation Age of Onset    Alzheimer's Disease Mother     Heart Disease Father         Heart arrhythmia    Heart Attack Father        REVIEW OF SYSTEMS:    · Cannot obtain due to patient's condition    Physical Exam   · Vitals: BP (!) 144/74   Pulse (!) 41   Temp 94.5 °F (34.7 °C)   Resp 24   Ht 5' 10\" (1.778 m)   Wt 290 lb (131.5 kg)   SpO2 97%   BMI 41.61 kg/m²   ·    ·      · General Appearance: unresponsive, intubated  · Skin: warm and dry and no rash or erythema  · Head: normocephalic and atraumatic  · Eyes: pupils equal, round, and reactive to light  · ENT: ET tube through stoma, clean, CDI.   No signs of active bleeding  · Neck: no thyroid nodules and no cervical adenopathy   · Pulmonary/Chest: decreased breath sound bilaterally  · Cardiovascular: normal S1 and S2, no gallops, intact distal pulses, no carotid bruits and abnormal rate- 40s  · Abdomen: soft, non-tender, non-distended, normal bowel sounds, no masses or organomegaly  · Extremities: no cyanosis, no clubbing, nonpitting edema bilateral  · Musculoskeletal: no swollen joints and no joint deformity  · Neurologic: reflexes normal and symmetric and intubated     Lines     site day    Art line   None    TLC L Fem 11/09   PICC None    Hemoaccess None    Oxygen:       Mechanical Ventilation:   Mode: A/C    TV:500 ml RR: 24  PEEP [id number]  FiO2 80%    ABG:     Lab Results   Component Value Date    PH 7.335 11/09/2021    PCO2 58.9 11/09/2021    PO2 111.0 11/09/2021    SOT6ZAZ 26.1 11/09/2020    HCO3 30.7 11/09/2021    BE 3.9 11/09/2021    THB 9.8 11/09/2021    O2SAT 97.2 11/09/2021     Labs and Imaging Studies   Basic Labs  CBC:   Lab Results   Component Value Date    WBC 6.9 11/10/2021    RBC 3.20 11/10/2021    HGB 7.8 11/10/2021    HCT 24.1 11/10/2021    MCV 75.3 11/10/2021    RDW 19.3 11/10/2021     11/10/2021     BMP:    Lab Results   Component Value Date     11/10/2021    K 4.9 11/10/2021    CL 99 11/10/2021    CO2 26 11/10/2021    BUN 24 11/10/2021     U/A:  No components found for: Buster Marrow, USPGRAV, UPH, UPROTEIN, UGLUCOSE, UKETONE, UBILI, UBLOOD, UNITRITE, UUROBIL, [place], [facility], [place], [facility], [place], [place], [place]  PT/INR:  No results found for: PTINR  PTT:    Lab Results   Component Value Date    APTT 35.9 10/28/2021     HgBA1c:  No components found for: HGBA1C  Iron Studies:    Lab Results   Component Value Date    TIBC 275 11/05/2020    FERRITIN 335 11/05/2020     VITAMIN B12: No components found for: B12  FOLATE:    Lab Results   Component Value Date    FOLATE >20.0 11/05/2020       Imaging Studies:     XR CHEST PORTABLE    Result Date: 11/9/2021  EXAMINATION: ONE XRAY VIEW OF THE CHEST 11/9/2021 3:18 am COMPARISON: Exam performed earlier today at 0117 hours HISTORY: [address]: tube adjusted TECHNOLOGIST PROVIDED HISTORY: Reason for exam:->tube adjusted What reading provider will be dictating this exam?->CRC FINDINGS: EKG leads overlie the chest.  The tracheostomy tube tip is difficult to identify but is likely near the level of the ellie. A nasogastric tube terminates in the mid stomach. No pneumothorax. There has been some interval improvement in aeration of the right lung especially superiorly. Otherwise, right greater than left parenchymal infiltrates persist.  No other interval change. Improving aeration of the right lung. Tip of tracheostomy tube is difficult to identify but favored to be located near the level of the ellie. Consider retracting approximately 2 cm. Parenchymal infiltrates which may represent multifocal pneumonia. Continued follow-up recommended. The findings were sent to the Radiology Results [id number] at 3:30 am on 11/9/2021to be communicated to a licensed caregiver. XR CHEST PORTABLE    Result Date: 11/9/2021  EXAMINATION: ONE XRAY VIEW OF THE CHEST 11/9/2021 1:30 am COMPARISON: Exam performed earlier today at 0026 hours HISTORY: ORDERING SYSTEM PROVIDED HISTORY: Post tube TECHNOLOGIST PROVIDED HISTORY: Reason for exam:->Post tube What reading provider will be dictating this exam?->CRC FINDINGS: Patient is rotated to the left. EKG leads overlie the chest.  A tracheostomy tube is again identified. The tip of this tube is likely partially obscured by an overlying EKG lead but is thought to extend into the left mainstem bronchus. Interval development of a centrally complete opacification of the right lung. Patchy infiltrates in the left lung appear similar to previous. No pneumothorax or other change. Tracheostomy tube appears to extend into the left mainstem bronchus and should be retracted approximately 5 cm. Complete opacification of the right hemithorax likely due to combination of atelectasis and infiltrates. Patchy parenchymal infiltrates in the left lung are similar to previous. The findings were sent to the Radiology Results [id number] at 1:44 am on 11/9/2021to be communicated to a licensed caregiver. XR CHEST PORTABLE    Result Date: 11/9/2021  EXAMINATION: ONE XRAY VIEW OF THE CHEST 11/8/2021 11:47 pm COMPARISON: October 28 HISTORY: ORDERING SYSTEM PROVIDED HISTORY: Hypoxia TECHNOLOGIST PROVIDED HISTORY: Reason for exam:->Hypoxia What reading provider will be dictating this exam?->CRC FINDINGS: Prominent increasing bilateral airspace disease predominantly in the mid to lower lung zones consistent with multifocal pneumonia. The heart is not enlarged. Patient is significantly rotated to the left. Tracheostomy cannula remains in place. Evaluation limited by technique. Prominent increase in bilateral airspace disease predominantly in the mid to lower lung zones consistent with multifocal pneumonia. Limited examination. XR CHEST PORTABLE    Result Date: 10/28/2021  EXAMINATION: ONE XRAY VIEW OF THE CHEST 10/28/2021 1:29 pm COMPARISON: Comparison is dated August 16, 2021 HISTORY: ORDERING SYSTEM PROVIDED HISTORY: sob TECHNOLOGIST PROVIDED HISTORY: Reason for exam:->sob What reading provider will be dictating this exam?->CRC FINDINGS: The lungs are without acute focal process. There is no effusion or pneumothorax. The cardiomediastinal silhouette is without acute process. The osseous structures are without acute process tracheostomy again noted. No acute process.      XR ABDOMEN FOR NG/OG/NE TUBE PLACEMENT    Result Date: 11/9/2021  EXAMINATION: ONE SUPINE XRAY VIEW(S) OF THE ABDOMEN 11/9/2021 1:12 pm COMPARISON: Abdominal radiograph November 9, 2021 HISTORY: ORDERING SYSTEM PROVIDED HISTORY: Confirmation of course of NG/OG/NE tube and location of tip of tube TECHNOLOGIST PROVIDED HISTORY: Reason for exam:->Confirmation of course of NG/OG/NE tube and location of tip of tube Portable? ->Yes What reading provider will be dictating this exam?->CRC FINDINGS: Enteric tube is identified below the diaphragm with the tip in the of stomach. The visualized bowel gas pattern is nonspecific, nonobstructive. Enteric tube is in the stomach. XR ABDOMEN FOR NG/OG/NE TUBE PLACEMENT    Result Date: 11/9/2021  EXAMINATION: ONE SUPINE XRAY VIEW(S) OF THE ABDOMEN 11/9/2021 1:29 am COMPARISON: None. HISTORY: ORDERING SYSTEM PROVIDED HISTORY: Confirmation of course of NG/OG/NE tube and location of tip of tube TECHNOLOGIST PROVIDED HISTORY: Reason for exam:->Confirmation of course of NG/OG/NE tube and location of tip of tube Portable? ->Yes What reading provider will be dictating this exam?->CRC FINDINGS: Nasogastric tube terminates in the mid stomach. Moderate gaseous distention of the stomach. Patchy opacities within the right greater than left lung, apparently improved compared with the portable chest radiograph performed earlier today at 0117 hours. Nasogastric tube terminates in the mid stomach. Improving aeration of the right lung. CXR:    Improving aeration of the right lung.       Tip of tracheostomy tube is difficult to identify but favored to be located   near the level of the ellie.  Consider retracting approximately 2 cm.       Parenchymal infiltrates which may represent multifocal pneumonia.  Continued   follow-up recommended. EKG:Possible Left atrial enlargement  Left axis deviation  Left ventricular hypertrophy    Resident's Assessment and Plan     Walter Velazquez is a 64 y.o. male with past medical history of laryngeal stenosis s/p tracheostomy on 3 to 4 L oxygen at home, COPD, type 2 diabetes mellitus, hypertension, congestive heart failure, depression presented to the hospital with chief complaint of acute respiratory failure. Assessment:     1.  Acute encephalopathy, multifactorial, 2/2 acute on chronic respiratory failure, cardiac arrest.  2. Acute on chronic hypoxic hypercapnicrespiratory failure 2/2 laryngeal stenosis s/p trach  3. Cardiac arrest 2/2 #2   4. Laryngeal stenosis s/p trach   5. COVID -19   6. Sinus bradycardia  7. NANCY stage II likely prerenal 2/2 cardiac arrest.   8. Acute on chronic anemia, 2/2 tracheal bleeding vs dilutional   9. T2DM  10. Hx COPD  11. Hx HANS  12. History of hypertension. 13. History of hyperlipidemia. 14. History of depression    Plan:     · Continue ventilator support. Discussed with ENT, need trach tube fixed before we can wean off the vent. Continue monitor ABG. Adjust vent settings as tolerated. · Monitor HR. Currently BP stable. · Covid -19 positive. Will continue breathing treatment. Vit C and Zinc. Monitor inflammatory marker. Currently not on steroid. · Follow urine studies. On  cc/hr for 24h. Will follow repeat BMP  · Follow repeat CBC tomorrow. Anemia likely 2/2 tracheal bleeding vs dilutional  · POCT glucose x4 daily. Hypoglycemia protocol in place.       Next of Kin/ POA:   Stephan Cullen Child [phone] REL2       Code Status:   Full code     PT/OT: Not warranted at this time  DVT ppx: Heparin  GI ppx: Ihsan Shelton MD, PGY-2   Attending physician: Dr. Karen Fontenot

## 2021-11-10 NOTE — DISCHARGE SUMMARY
Physician Discharge Summary     Patient ID:  Wesly Adan  [id number]  56 y.o.  1960    Admit date: 10/28/2021    Discharge date and time: 11/1/2021  7:24 PM     Admission Diagnoses: NANCY (acute kidney injury) ([facility] [N17.9]  Acute on chronic respiratory failure with hypoxia ([facility] [J96.21]  COVID-19 [U07.1]    Discharge Diagnoses:   Patient Active Problem List   Diagnosis    Hyperlipidemia    Type 2 diabetes mellitus without complication, without long-term current use of insulin ([facility]    Atypical chest pain    Essential hypertension    Chronic acquired lymphedema    Aortic valve disease    Morbid obesity due to excess calories (HCC)    Abdominal pain    [facility] respiratory failure with hypoxia [facility]    Acute pulmonary edema (HCC)    Congestive heart failure with LV diastolic dysfunction, NYHA class 3 (HCC)    HANS (obstructive sleep apnea)    Acquired hypothyroidism    Chronic diastolic heart failure (HCC)    Nonrheumatic aortic valve stenosis    ACE-inhibitor cough    Pneumonia due to organism    Acute gout of right knee    Bilateral pulmonary embolism (HCC)    Anxiety    Tracheostomy dependent (HCC)    Shortness of breath    H/O deep venous thrombosis    SOB (shortness of breath)    Headache, unspecified headache type    Acute on chronic respiratory failure with hypoxia (HCC)     Current Outpatient Medications   Medication Instructions    albuterol sulfate HFA (PROVENTIL HFA) 108 (90 Base) MCG/ACT inhaler 2 puffs, Inhalation, EVERY 4 HOURS PRN    ALPRAZolam (XANAX) 0.5 mg, Oral, NIGHTLY PRN    apixaban (ELIQUIS) 5 mg, Oral, 2 TIMES DAILY    Arformoterol Tartrate (BROVANA) 15 MCG/2ML NEBU 1 ampule, Nebulization, 2 TIMES DAILY    ascorbic acid (VITAMIN C) 500 mg, Oral, DAILY    atorvastatin (LIPITOR) 40 mg, Oral, NIGHTLY    budesonide (PULMICORT) 500 mcg, Nebulization, 2 TIMES DAILY    buPROPion (WELLBUTRIN XL) 150 mg, Oral, EVERY MORNING    busPIRone (BUSPAR) 15 mg, Oral, 3 TIMES DAILY    dexamethasone (DECADRON) 6 mg, Oral, DAILY    Vitamin D3 25 mcg, Oral, DAILY    zinc sulfate (ZINCATE) 50 mg, Oral, DAILY             Procedures: None     Hospital Course: Stable, he tolerated medications and treatments, jenny with regards to COVID treatment. He was eating well and ambulating in room. He was seen by [facility] team.  They were in agreement with him going home once he completed COVID treatment. Discharge Exam:  See progress note from today    Disposition: Home in stable condition, long-term prognosis is fair. I have advised him to follow up with Dr. Susie Saul in one week.     Ralf Teixeira, DO  11/10/2021

## 2021-11-10 NOTE — ED NOTES
Pt resting eyes closed resp assisted via ventilator skin warm dry lungs diminished     Urban Tay, CHASE  11/09/21 [id number]

## 2021-11-10 NOTE — PROGRESS NOTES
PT SEEN AND EXAMINED. bp is slightly low  Chart reviewed. meds reviewed. D/w nursing + family as available. EXAM: IN GENERAL, NAD. AWAKE AND ALERT. ROS NEGx10 EXCEPT:   BP (!) 95/45   Pulse (!) 44   Temp 98.2 °F (36.8 °C) (Bladder)   Resp 24   Ht 5' 10\" (1.778 m)   Wt 290 lb (131.5 kg)   SpO2 100%   BMI 41.61 kg/m²   GEN: A+O NAD. HEENT: NCAT. EOMI. JOE  NECK: NO JVD. TRACH MIDLINE. NO BRUITS. NO THYROMEGALY. LUNGS: CTA BL NO RALES, RHONCHI OR WHEEZES. GOOD EXCURSION. CV: Regular rate and rhythm, NO Murmurs, Rubs, Or gallops  ABD: Soft. Nontender. Normal bowel sounds. No organomegaly  EXT:No clubbing cyanosis or edema  Neuro: Alert and oriented x 3. No focal motor deficits. No sensory deficits. Reflexes appear intact.   Labs/data reviewedLABS: CBC with Differential:    Lab Results   Component Value Date    WBC 6.9 11/10/2021    RBC 3.20 11/10/2021    HGB 7.8 11/10/2021    HCT 24.1 11/10/2021     11/10/2021    MCV 75.3 11/10/2021    MCH 24.4 11/10/2021    MCHC 32.4 11/10/2021    RDW 19.3 11/10/2021    NRBC 0.9 11/16/2020    SEGSPCT 73 01/26/2014    METASPCT 1.8 11/15/2020    LYMPHOPCT 22.6 11/09/2021    MONOPCT 2.6 11/09/2021    MYELOPCT 0.9 11/15/2020    BASOPCT 0.2 11/09/2021    MONOSABS 0.39 11/09/2021    LYMPHSABS 3.01 11/09/2021    EOSABS 0.46 11/09/2021    BASOSABS 0.00 11/09/2021     Platelets:    Lab Results   Component Value Date     11/10/2021     CMP:    Lab Results   Component Value Date     11/10/2021    K 4.9 11/10/2021    CL 99 11/10/2021    CO2 26 11/10/2021    BUN 24 11/10/2021    CREATININE 1.7 11/10/2021    GFRAA 50 11/10/2021    LABGLOM 50 11/10/2021    GLUCOSE 194 11/10/2021    PROT 7.7 11/09/2021    LABALBU 3.4 11/09/2021    CALCIUM 7.3 11/10/2021    BILITOT 0.8 11/09/2021    ALKPHOS 90 11/09/2021    AST 36 11/09/2021    ALT 17 11/09/2021     Magnesium:    Lab Results   Component Value Date    MG 2.1 11/09/2021     LDH:    Lab Results   Component Value Date  10/28/2020     PT/INR:    Lab Results   Component Value Date    PROTIME 15.0 10/28/2021    INR 1.4 10/28/2021     Last 3 Troponin:    Lab Results   Component Value Date    TROPONINI <0.01 05/08/2021    TROPONINI <0.01 04/28/2021    TROPONINI <0.01 04/02/2021     ABG:    Lab Results   Component Value Date    PH 7.335 11/09/2021    PCO2 58.9 11/09/2021    PO2 111.0 11/09/2021    HCO3 30.7 11/09/2021    BE 3.9 11/09/2021    O2SAT 97.2 11/09/2021     IRON:    Lab Results   Component Value Date    IRON 95 11/05/2020     IMAGING    XR CHEST PORTABLE    Result Date: 11/9/2021  EXAMINATION: ONE XRAY VIEW OF THE CHEST 11/9/2021 3:18 am COMPARISON: Exam performed earlier today at 0117 hours HISTORY: [facility]: tube adjusted TECHNOLOGIST PROVIDED HISTORY: Reason for exam:->tube adjusted What reading provider will be dictating this exam?->CRC FINDINGS: EKG leads overlie the chest.  The tracheostomy tube tip is difficult to identify but is likely near the level of the ellie. A nasogastric tube terminates in the mid stomach. No pneumothorax. There has been some interval improvement in aeration of the right lung especially superiorly. Otherwise, right greater than left parenchymal infiltrates persist.  No other interval change. Improving aeration of the right lung. Tip of tracheostomy tube is difficult to identify but favored to be located near the level of the ellie. Consider retracting approximately 2 cm. Parenchymal infiltrates which may represent multifocal pneumonia. Continued follow-up recommended. The findings were sent to the Radiology Results [id number] at 3:30 am on 11/9/2021to be communicated to a licensed caregiver.      XR CHEST PORTABLE    Result Date: 11/9/2021  EXAMINATION: ONE XRAY VIEW OF THE CHEST 11/9/2021 1:30 am COMPARISON: Exam performed earlier today at 0026 hours HISTORY: ORDERING SYSTEM PROVIDED HISTORY: Post tube TECHNOLOGIST PROVIDED HISTORY: Reason for exam:->Post tube What reading provider will be dictating this exam?->CRC FINDINGS: Patient is rotated to the left. EKG leads overlie the chest.  A tracheostomy tube is again identified. The tip of this tube is likely partially obscured by an overlying EKG lead but is thought to extend into the left mainstem bronchus. Interval development of a centrally complete opacification of the right lung. Patchy infiltrates in the left lung appear similar to previous. No pneumothorax or other change. Tracheostomy tube appears to extend into the left mainstem bronchus and should be retracted approximately 5 cm. Complete opacification of the right hemithorax likely due to combination of atelectasis and infiltrates. Patchy parenchymal infiltrates in the left lung are similar to previous. The findings were sent to the Radiology Results [id number] at 1:44 am on 11/9/2021to be communicated to a licensed caregiver. XR CHEST PORTABLE    Result Date: 11/9/2021  EXAMINATION: ONE XRAY VIEW OF THE CHEST 11/8/2021 11:47 pm COMPARISON: October 28 HISTORY: ORDERING SYSTEM PROVIDED HISTORY: Hypoxia TECHNOLOGIST PROVIDED HISTORY: Reason for exam:->Hypoxia What reading provider will be dictating this exam?->CRC FINDINGS: Prominent increasing bilateral airspace disease predominantly in the mid to lower lung zones consistent with multifocal pneumonia. The heart is not enlarged. Patient is significantly rotated to the left. Tracheostomy cannula remains in place. Evaluation limited by technique. Prominent increase in bilateral airspace disease predominantly in the mid to lower lung zones consistent with multifocal pneumonia. Limited examination.      XR CHEST PORTABLE    Result Date: 10/28/2021  EXAMINATION: ONE XRAY VIEW OF THE CHEST 10/28/2021 1:29 pm COMPARISON: Comparison is dated August 16, 2021 HISTORY: ORDERING SYSTEM PROVIDED HISTORY: sob TECHNOLOGIST PROVIDED HISTORY: Reason for exam:->sob What reading provider will be dictating this exam?->CRC FINDINGS: The lungs are without acute focal process. There is no effusion or pneumothorax. The cardiomediastinal silhouette is without acute process. The osseous structures are without acute process tracheostomy again noted. No acute process. XR ABDOMEN FOR NG/OG/NE TUBE PLACEMENT    Result Date: 11/9/2021  EXAMINATION: ONE SUPINE XRAY VIEW(S) OF THE ABDOMEN 11/9/2021 1:12 pm COMPARISON: Abdominal radiograph November 9, 2021 HISTORY: ORDERING SYSTEM PROVIDED HISTORY: Confirmation of course of NG/OG/NE tube and location of tip of tube TECHNOLOGIST PROVIDED HISTORY: Reason for exam:->Confirmation of course of NG/OG/NE tube and location of tip of tube Portable? ->Yes What reading provider will be dictating this exam?->CRC FINDINGS: Enteric tube is identified below the diaphragm with the tip in the of stomach. The visualized bowel gas pattern is nonspecific, nonobstructive. Enteric tube is in the stomach. XR ABDOMEN FOR NG/OG/NE TUBE PLACEMENT    Result Date: 11/9/2021  EXAMINATION: ONE SUPINE XRAY VIEW(S) OF THE ABDOMEN 11/9/2021 1:29 am COMPARISON: None. HISTORY: ORDERING SYSTEM PROVIDED HISTORY: Confirmation of course of NG/OG/NE tube and location of tip of tube TECHNOLOGIST PROVIDED HISTORY: Reason for exam:->Confirmation of course of NG/OG/NE tube and location of tip of tube Portable? ->Yes What reading provider will be dictating this exam?->CRC FINDINGS: Nasogastric tube terminates in the mid stomach. Moderate gaseous distention of the stomach. Patchy opacities within the right greater than left lung, apparently improved compared with the portable chest radiograph performed earlier today at 0117 hours. Nasogastric tube terminates in the mid stomach. Improving aeration of the right lung.        DIET:  Diet NPO    Medications:    Scheduled Meds:   Arformoterol Tartrate  15 mcg Nebulization BID    busPIRone  15 mg PEG Tube TID    Vitamin D  1,000 Units PEG Tube Daily    losartan  100 mg PEG Tube Daily    zinc sulfate  50 mg PEG Tube Daily    sodium chloride flush  5-40 mL IntraVENous 2 times per day    enoxaparin  40 mg SubCUTAneous Daily       Continuous Infusions:   sodium chloride 100 mL/hr at 11/10/21 0314    fentaNYL 5 mcg/ml in 0.9%  ml infusion 150 mcg/hr (11/09/21 2345)    propofol 30 mcg/kg/min (11/10/21 0443)    sodium chloride      midazolam 2 mg/hr (11/09/21 2205)       PRN Meds:midazolam, fentanNYL, labetalol, hydrALAZINE, albuterol sulfate HFA, sodium chloride flush, sodium chloride, ondansetron **OR** ondansetron, polyethylene glycol, acetaminophen **OR** acetaminophen    A/P:      Patient Active Problem List   Diagnosis    Hyperlipidemia    Type 2 diabetes mellitus without complication, without long-term current use of insulin (HCC)    Atypical chest pain    Essential hypertension    Chronic acquired lymphedema    Aortic valve disease    Morbid obesity due to excess calories (HCC)    Abdominal pain    Acute respiratory failure with hypoxia (HCC)    Acute pulmonary edema (HCC)    Congestive heart failure with LV diastolic dysfunction, NYHA class 3 (HCC)    HANS (obstructive sleep apnea)    Acquired hypothyroidism    Chronic diastolic heart failure (HCC)    Nonrheumatic aortic valve stenosis    ACE-inhibitor cough    Pneumonia due to organism    Acute gout of right knee    Bilateral pulmonary embolism (HCC)    Anxiety    Tracheostomy dependent (HCC)    Shortness of breath    H/O deep venous thrombosis    SOB (shortness of breath)    Headache, unspecified headache type    Acute on chronic respiratory failure with hypoxia (HCC)        PLAN:  Stop bp meds  Give prn hydralazine if bp>160    I can be reached though [employer] or [facility] [place] at [phone]

## 2021-11-10 NOTE — ED NOTES
Pt remains sedated skin cool dry resp assisted lungs ess cta pt placed on bear jimgger     Urban Tay, CHASE  11/10/21 542 Sunday Tyler RN  11/10/21 8934

## 2021-11-10 NOTE — PROGRESS NOTES
11/10/21 1036   Vent Information   Vent Type 980   Vent Mode AC/VC   Vt Ordered 500 mL   Rate Set 24 bmp   Peak Flow 60 L/min   Pressure Support 0 cmH20   FiO2  80 %   SpO2 97 %   SpO2/FiO2 ratio 121.25   Sensitivity 3   PEEP/CPAP 10   I Time/ I Time % 0 s   Vent Patient Data   High Peep/I Pressure 0   Peak Inspiratory Pressure 40 cmH2O   Mean Airway Pressure 21 cmH20   Rate Measured 24 br/min   Vt Exhaled 531 mL   Minute Volume 12.7 Liters   I:E Ratio 1:1.80   Spontaneous Breathing Trial (SBT) RT Doc   Pulse (!) 42   Additional Respiratory  Assessments   Resp 24   Alarm Settings   High Pressure Alarm 56 cmH2O

## 2021-11-10 NOTE — CARE COORDINATION
11/10 Care Coordination: Pt in ED for Resp. Failure. He has history of trach dependence and recent diagnosis of COVID-19 presents with acute respiratory failure from tracheal bleeding and obstruction. He tried to put in his trach himself and could not place it. He started bleeding. He Could not place the trach and came in the ER with a pulse ox in the 60% range. Placed on Vent, fio2 80%, peep 10, AC 24.IV Sedation, admit to MICU 11/10 from ED hold. PTA patient was  independent, from home with his [facility] lives with spouse. CM/SW will continue to follow for discharge planning.    Coni PALACIOSN,RN-CV-BC  [phone]

## 2021-11-10 NOTE — CONSULTS
[address] NOTE    Patient: Eva Bonds  MRN: [id number]  : 1960    Encounter Date: 11/10/2021  Encounter Time: 4:26 PM     Date of Admission: .2021 12:13 AM    Consulting Physician:  Primary Care Physician:      Faby Tay MD     [phone]    PROBLEM LIST:  Patient Active Problem List   Diagnosis    Hyperlipidemia    Type 2 diabetes mellitus without complication, without long-term current use of insulin ([address]    Atypical chest pain    Essential hypertension    Chronic acquired lymphedema    Aortic valve disease    Morbid obesity due to excess calories ([address]    Abdominal pain    Acute respiratory failure with hypoxia (HCC)    Acute pulmonary edema (HCC)    Congestive heart failure with LV diastolic dysfunction, NYHA class 3 ([address]    HANS (obstructive sleep apnea)    Acquired hypothyroidism    Chronic diastolic heart failure (HCC)    Nonrheumatic aortic valve stenosis    ACE-inhibitor cough    Pneumonia due to organism    Acute gout of right knee    Bilateral pulmonary embolism ([address]    Anxiety    Tracheostomy dependent (HCC)    Shortness of breath    H/O deep venous thrombosis    SOB (shortness of breath)    Headache, unspecified headache type    Acute on chronic respiratory failure with hypoxia ([address]     Reason for Consultation: Respiratory Failure, COVID    HPI:   Mr. Charlotte Lopez is a 65 y/o [place] American male with past medical history noted for recent hospital stay for respiratory failure and protracted mechanical ventilation that required trach placement 2020. Patient was at [facility] and was short of breath and sent to hospital where he was found to have B/L lower segmental pulmonary embolism after that point. He was in hospital previously with prolonged ETOH withdrawal course and intubation.   Last hospital stay was 10/26/2020 to 12/3/2020.     He has history of trach dependence and recent diagnosis of COVID-19  (D/C from hospital on 11/1 with diagnosis of Covid 19, on Decadron 6 mg x 10 days) presents with acute respiratory failure from tracheal bleeding and obstruction. Per ED, he came in with respiratory distress and significant mucus plugging within his 6-0 Proximal XLT uncuffed trach. ER staff were unable to intubate the patient from above and therefore placed a 6-0 ETT through his stoma. Patient attempted to place trach himself and met significant resistance with bleed. In ER patient had oxygen saturations in 60s range. Now patient has ET tube in trach stoma and on vent ACVC 24/500/100/10 on drips versed, fentanyl, propofol.      PAST MEDICAL HISTORY:   Past Medical History:   Diagnosis Date    Acquired hypothyroidism 9/26/2017    Anxiety     Arthritis     Congestive heart failure with LV diastolic dysfunction, NYHA class 3 (HCC)     COPD (chronic obstructive pulmonary disease) ([address]     Depression     DM (diabetes mellitus) ([address]     Essential hypertension 6/1/2016    Hyperlipidemia     Hypertension     Lymphedema     Obesity     Obstructive sleep apnea 8/17/2009    Obstructive sleep apnea 9/26/2017    Type 2 diabetes mellitus without complication, without long-term current use of insulin ([address] 1/15/2014       PAST SURGICAL HISTORY:   Past Surgical History:   Procedure Laterality Date    BRONCHOSCOPY  08/10/2017    ECHO COMPL W DOP COLOR FLOW  6/21/2013         EXTERNAL EAR SURGERY  6/2/2009    keloid left ear    FOOT SURGERY  1990    Left foot    GASTROSTOMY TUBE PLACEMENT  08/10/2017    IR TUNNELED CATHETER PLACEMENT GREATER THAN 5 YEARS  11/19/2020    IR TUNNELED CATHETER PLACEMENT GREATER THAN 5 YEARS 11/19/2020 Brett Juarez MD [facility]ED[facility]    TRACHEOSTOMY N/A 11/11/2020    TRACHEOTOMY performed by Cally Oscar MD at [address] [facility] N/A 11/12/2020    OPEN TRACHEOTOMY, BRONCHOSCOPY, DIRECT LARYNGOSCOPY performed by Kelsie Can DO at [facility] N/A 2021    TRACHEOTOMY TUBE CHANGE performed by Nazia Can DO at [address]  08/10/2017    UPPER GASTROINTESTINAL ENDOSCOPY N/A 2020    EGD ESOPHAGOGASTRODUODENOSCOPY PEG TUBE INSERTION performed by Omar Seaman MD at [facility]:   Family History   Problem Relation Age of Onset    Alzheimer's Disease Mother     Heart Disease Father         Heart arrhythmia    Heart Attack Father        SOCIAL HISTORY:   Social History     Socioeconomic History    Marital status: Legally      Spouse name: Not on file    Number of children: Not on file    Years of education: Not on file    Highest education level: Not on file   Occupational History    Not on file   Tobacco Use    Smoking status: Former Smoker     Packs/day: 1.00     Years: 3.00     Pack years: 3.00     Types: Cigarettes     Quit date: 2001     Years since quittin.7    Smokeless tobacco: Never Used   Vaping Use    Vaping Use: Never used   Substance and Sexual Activity    Alcohol use: Not Currently    Drug use: Not Currently     Comment: past use; none x 30 years    Sexual activity: Not Currently   Other Topics Concern    Not on file   Social History Narrative    Drinks 3 cups of coffee daily. Social Determinants of Health     Financial Resource Strain:     Difficulty of Paying Living Expenses: Not on file   Food Insecurity:     Worried About Running Out of Food in the Last Year: Not on file    Mitch of Food in the Last Year: Not on file   Transportation Needs:     Lack of Transportation (Medical): Not on file    Lack of Transportation (Non-Medical):  Not on file   Physical Activity:     Days of Exercise per Week: Not on file    Minutes of Exercise per Session: Not on file   Stress:     Feeling of Stress : Not on file   Social Connections:     Frequency of Communication with Friends and Family: Not on file    Frequency of Social Gatherings with Friends and Family: Not on file    Attends Muslim Services: Not on file    Active Member of Clubs or Organizations: Not on file    Attends Club or Organization Meetings: Not on file    Marital Status: Not on file   Intimate Partner Violence:     Fear of Current or Ex-Partner: Not on file    Emotionally Abused: Not on file    Physically Abused: Not on file    Sexually Abused: Not on file   Housing Stability:     Unable to Pay for Housing in the Last Year: Not on file    Number of [place] in the Last Year: Not on file    Unstable Housing in the Last Year: Not on file     Social History     Tobacco Use   Smoking Status Former Smoker    Packs/day: 1.00    Years: 3.00    Pack years: 3.00    Types: Cigarettes    Quit date: 2001    Years since quittin.7   Smokeless Tobacco Never Used     Social History     Substance and Sexual Activity   Alcohol Use Not Currently     Social History     Substance and Sexual Activity   Drug Use Not Currently    Comment: past use; none x 30 years     HOME MEDICATIONS:  Prior to Admission medications    Medication Sig Start Date End Date Taking?  Authorizing Provider   dexamethasone (DECADRON) 6 MG tablet Take 1 tablet by mouth daily for 10 days 21  Ralf Teixeira DO   zinc sulfate (ZINCATE) 220 (50 Zn) MG capsule Take 1 capsule by mouth daily 21   Ralf Teixeira DO   ascorbic acid (VITAMIN C) 500 MG tablet Take 1 tablet by mouth daily 21   Ralf Teixeira DO   Cholecalciferol (VITAMIN D) 25 MCG TABS Take 1 tablet by mouth daily 21   Ralf Teixeira DO   apixaban (ELIQUIS) 5 MG TABS tablet Take 5 mg by mouth 2 times daily    Historical Provider, MD   atorvastatin (LIPITOR) 40 MG tablet Take 40 mg by mouth nightly     Historical Provider, MD   busPIRone (BUSPAR) 15 MG tablet Take 15 mg by mouth 3 times daily    Historical Provider, MD   buPROPion (WELLBUTRIN XL) 150 MG extended release tablet 1,000 Units, 1,000 Units, PEG Tube, Daily  losartan (COZAAR) tablet 100 mg, 100 mg, PEG Tube, Daily  zinc sulfate (ZINCATE) capsule 50 mg, 50 mg, PEG Tube, Daily  sodium chloride flush 0.9 % injection 5-40 mL, 5-40 mL, IntraVENous, 2 times per day  sodium chloride flush 0.9 % injection 5-40 mL, 5-40 mL, IntraVENous, PRN  0.9 % sodium chloride infusion, 25 mL, IntraVENous, PRN  ondansetron (ZOFRAN-ODT) disintegrating tablet 4 mg, 4 mg, Per NG tube, Q8H PRN **OR** ondansetron (ZOFRAN) injection 4 mg, 4 mg, IntraVENous, Q6H PRN  polyethylene glycol (GLYCOLAX) packet 17 g, 17 g, Per NG tube, Daily PRN  acetaminophen (TYLENOL) tablet 650 mg, 650 mg, Oral, Q6H PRN **OR** acetaminophen (TYLENOL) suppository 650 mg, 650 mg, Rectal, Q6H PRN  midazolam (VERSED) 100 mg in dextrose 5 % 100 mL infusion, 1-10 mg/hr, IntraVENous, Continuous    IV MEDICATIONS:   sodium chloride 100 mL/hr at 11/10/21 1255    dextrose      fentaNYL 5 mcg/ml in 0.9%  ml infusion 150 mcg/hr (11/10/21 1023)    propofol 30 mcg/kg/min (11/10/21 3283)    sodium chloride      midazolam 2 mg/hr (11/09/21 4485)       ALLERGIES:  Allergies   Allergen Reactions    Bee Venom Anaphylaxis    Shellfish-Derived Products Anaphylaxis     Only crab legs     REVIEW OF SYSTEMS: .Unable to Attain given patient status and condition on vent    PHYSICAL EXAMINATION:     VITAL SIGNS:  BP (!) 159/69   Pulse 52   Temp 95.4 °F (35.2 °C)   Resp 24   Ht 5' 10\" (1.778 m)   Wt 290 lb (131.5 kg)   SpO2 96%   BMI 41.61 kg/m²   Wt Readings from Last 3 Encounters:   11/09/21 290 lb (131.5 kg)   10/28/21 290 lb (131.5 kg)   08/16/21 295 lb (133.8 kg)     Temp Readings from Last 3 Encounters:   11/10/21 95.4 °F (35.2 °C)   11/01/21 98.1 °F (36.7 °C) (Oral)   08/16/21 97.9 °F (36.6 °C) (Infrared)     TMAX:  BP Readings from Last 3 Encounters:   11/10/21 (!) 159/69   11/01/21 (!) 141/66   08/16/21 (!) 151/76     Pulse Readings from Last 3 Encounters:   11/10/21 52   21 60   21 78       CURRENT PULSE OXIMETRY: SpO2: 96 %  24HR PULSE OXIMETRY RANGE: SpO2  Av.8 %  Min: 89 %  Max: 100 %  CVP:      ________________________________________________________________________    VENTILATOR SETTINGS (if applicable):         Vent Information  $Ventilation: $Initial Day  Skin Assessment: Clean, dry, & intact  Equipment ID: [id number]  Equipment Changed: HME  Vent Type: [phone]  Vent Mode: AC/VC  Vt Ordered: 500 mL  Rate Set: 24 bmp  Peak Flow: 50 L/min  Pressure Support: 0 cmH20  FiO2 : 80 %  SpO2: 96 %  SpO2/FiO2 ratio: 120  Sensitivity: 3  PEEP/CPAP: 10  I Time/ I Time %: 0 s  Humidification Source: Heated wire  Humidification Temp: 37  Additional Respiratory  Assessments  Pulse: 52  Resp: 24  SpO2: 96 %  End Tidal CO2: 55 (%)  Position: Semi-Lockwood's  Humidification Source: Heated wire  Humidification Temp: 37  Cuff Pressure (cm H2O):  (mlt)  ETCO2:  Peak Inspiratory Pressure:  End-Inspiratory Plateau Pressure:    ABG:  Recent Labs     21  1539   PH 7.335*   PO2 111.0*   PCO2 58.9*   HCO3 30.7*   BE 3.9*   O2SAT 97.2   METHB 0.7   O2HB 96.2   COHB 0.3   O2CON 13.5   HHB 2.8   THB 9.8*     FiO2 : 80 %  I:E Ratio: 1:1.30  ________________________________________________________________________    IV ACCESS:    NUTRITION: Diet NPO    INTAKE/OUTPUTS:  I/O last 3 completed shifts:   In: 3723 [I.V.:3998; NG/GT:100]  Out: 3300 [Urine:3300]    Intake/Output Summary (Last 24 hours) at 11/10/2021 1626  Last data filed at 11/10/2021 1600  Gross per 24 hour   Intake 4098 ml   Output 3100 ml   Net 998 ml     General Appearance: Vent to ET tube through trach stoma, in no acute distress   Eyes: pupils equal, round, and reactive to light, conjunctivae normal and sclera anicteric   Neck: neck supple and non tender without mass, no thyromegaly, no thyroid nodules and no cervical adenopathy   Pulmonary/Chest: decreased breath sounds, no accessory muscles of inspiration, no focal Component Value Date    LABA1C 6.8 06/15/2020     No results found for: EAG  IVAN:   Lab Results   Component Value Date    IVAN NEGATIVE 10/08/2017     ESR:   Lab Results   Component Value Date    SEDRATE 135 (H) 09/04/2017     CRP:   Lab Results   Component Value Date    CRP 1.4 (H) 11/02/2020     D Dimer:   Lab Results   Component Value Date    DDIMER <200 02/25/2021     Folate and B12:   Lab Results   Component Value Date    VHPINPUY85 8921 (H) 11/05/2020   ,   Lab Results   Component Value Date    FOLATE >20.0 11/05/2020       Lactic Acid:   Lab Results   Component Value Date    LACTA 1.3 04/28/2021     Ammonia:   Cortisol:  Thyroid Studies:  Lab Results   Component Value Date    TSH 4.340 (H) 10/27/2020     XR ABDOMEN FOR NG/OG/NE TUBE PLACEMENT   Final Result   Enteric tube is in the stomach. XR CHEST PORTABLE   Final Result   Improving aeration of the right lung. Tip of tracheostomy tube is difficult to identify but favored to be located   near the level of the ellie. Consider retracting approximately 2 cm. Parenchymal infiltrates which may represent multifocal pneumonia. Continued   follow-up recommended. The findings were sent to the Radiology Results [address] at 3:30   am on 11/9/2021to be communicated to a licensed caregiver. XR ABDOMEN FOR NG/OG/NE TUBE PLACEMENT   Final Result   Nasogastric tube terminates in the mid stomach. Improving aeration of the right lung. XR CHEST PORTABLE   Final Result   Tracheostomy tube appears to extend into the left mainstem bronchus and   should be retracted approximately 5 cm. Complete opacification of the right hemithorax likely due to combination of   atelectasis and infiltrates. Patchy parenchymal infiltrates in the left lung are similar to previous. The findings were sent to the Radiology Results [address] at 1:44   am on 11/9/2021to be communicated to a licensed caregiver. XR CHEST PORTABLE   Final Result   Prominent increase in bilateral airspace disease predominantly in the mid to   lower lung zones consistent with multifocal pneumonia. Limited examination. 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 S/p lysis of the posterior glottic stenosis with Kenalog and balloon dilation 9/9/2021  8.) HANS  9.) Obesity  10.) Chronic Lymphedema   11.) H/O B/L Pulmonary Emboli  12.) Chronic Diastolic Heart Failure, EF 65%    H/O respiratory failure: 7[facility]2017 [facility] after lap cholecystectomy, lap umbilical hernia repair, and lap liver biopsy (fatty liver).  He had been extubated postop but had laryngospasm requiring reintubation, complicated by negative pressure flash pulmonary edema, and required tracheostomy 8/10/2017. Renee Moore has staph aureus pneumonia and C. difficile colitis. Renee Moore was then transferred to [facility] hospital where he was weaned off the ventilator and decannulated     PLAN:  1.) ACVC 25/119960/10  2.) versed, fentanyl, propofol, monitor TG levels  3.) pulmicort, brovana, duoneb  4.) s/p COVID infection, no role for decadron, baricitinib, remdesivir  5.) DVT Prophylaxis     - defer possible bronchoscopy to ICU team   - procalcitonin 0.41  - ETT 6.0 through stoma, that will need to be changed to trach prior to vent liberation  - likely LTACH management   - pan culture  - defer abx to ICU team     Thank you Kelly Ibrahim MD very much for allowing me to see this patient in consultation and follow up. Care reviewed with nursing staff, medical and surgical specialty care, primary care and the patient's family as available. Restraints are ordered when the patient can do harm to him/herself by pulling out devices.     Alanna Abdi MD, M.D.

## 2021-11-11 PROBLEM — J98.8 AIRWAY COMPROMISE: Status: ACTIVE | Noted: 2017-09-26

## 2021-11-11 NOTE — PROGRESS NOTES
OTOLARYNGOLOGY  DAILY PROGRESS NOTE  2021    Subjective:     Patient intubated sedated vent ACVC 24/500/100/10 on drips versed, fentanyl, propofol. Objective:     BP (!) 168/59   Pulse 82   Temp 99.9 °F (37.7 °C) (Axillary)   Resp 24   Ht 5' 10\" (1.778 m)   Wt 290 lb (131.5 kg)   SpO2 94%   BMI 41.61 kg/m²   PULSE OXIMETRY RANGE: SpO2  Av.7 %  Min: 94 %  Max: 100 %  I/O last 3 completed shifts: In: 1 [I.V.:5842]  Out: 2400 [Urine:2400]    GENERAL:  Intubated sedated   HENT: Normocephalic, atraumatic. 6-0 ETT in tracheal stoma, stoma c/d/i without bleeding. Granulation tissue around stoma. Neck soft no masses   EYES: No sclera icterus, pupils equal  LUNGS:  On vent  CARDIOVASCULAR:  RR      Assessment/Plan:     64 y.o. male with history of trach dependence secondary to posterior glottic stenosis, bilateral immobile vocal cords and HANS with recent diagnosis of COVID-19 and pneumonia with acute respiratory failure from tracheal dislodgement and b/l pneumonia        ETT was cut to decrease size in length, decrease dead space    Need vent settings to improve, goal of PEEP < 8, FiO2 closer to 40% in order to for ENT trach change as pt cannot be intubated from above and trach change will be tenuous    If vent settings improve, will plan for trach change in OR on Monday 11/15. If vent settings with no improvement will continue to observe and reschedule timing    Patient is also covid19 positive so will continue to observe and allow time to improve overall condition/vent settings    Patient discussed with Attending.        Electronically signed by Fabi Luu DO on 2021 at 7:38 AM

## 2021-11-11 NOTE — PROGRESS NOTES
[address] PROGRESS NOTE    Patient: Criss Singleton  MRN: [id number]  : 1960    Encounter Date: 2021  Encounter Time: 3:49 PM     Date of Admission: .2021 12:13 AM    Consulting Physician:  Primary Care Physician:     Fifi Shields MD     [phone]    Reason for Consultation: Respiratory Failure     Problem List:  Patient Active Problem List   Diagnosis    Hyperlipidemia    Type 2 diabetes mellitus without complication, without long-term current use of insulin ([address]    Atypical chest pain    Essential hypertension    Chronic acquired lymphedema    Aortic valve disease    Morbid obesity due to excess calories ([address]    Abdominal pain    Acute respiratory failure with hypoxia ([address]    Acute pulmonary edema (HCC)    Congestive heart failure with LV diastolic dysfunction, NYHA class 3 ([address]    HANS (obstructive sleep apnea)    Acquired hypothyroidism    Chronic diastolic heart failure (HCC)    Nonrheumatic aortic valve stenosis    ACE-inhibitor cough    Pneumonia due to organism    Acute gout of right knee    Bilateral pulmonary embolism ([address]    Anxiety    Tracheostomy dependence ([address]    Shortness of breath    H/O deep venous thrombosis    SOB (shortness of breath)    Headache, unspecified headache type    Acute on chronic respiratory failure with hypoxia ([address]    Tracheostomy complication ([address]    Respiratory distress    Chronic anticoagulation    COVID-19     SUBJECTIVE: Patient seen and examined. Patient remains on vent with ETT through trach stoma. HOME MEDICATIONS:  Prior to Admission medications    Medication Sig Start Date End Date Taking?  Authorizing Provider   dexamethasone (DECADRON) 6 MG tablet Take 1 tablet by mouth daily for 10 days 21  Vanesa Gibbons DO   zinc sulfate (ZINCATE) 220 (50 Zn) MG capsule Take 1 capsule by mouth daily 21   Vanesa Gibbons, DO   ascorbic acid (VITAMIN C) 500 MG tablet Take 1 tablet by mouth daily 11/2/21   Rivera Changtering,    Cholecalciferol (VITAMIN D) 25 MCG TABS Take 1 tablet by mouth daily 11/2/21   Rivera Changtering,    apixaban (ELIQUIS) 5 MG TABS tablet Take 5 mg by mouth 2 times daily    Historical Provider, MD   atorvastatin (LIPITOR) 40 MG tablet Take 40 mg by mouth nightly     Historical Provider, MD   busPIRone (BUSPAR) 15 MG tablet Take 15 mg by mouth 3 times daily    Historical Provider, MD   buPROPion (WELLBUTRIN XL) 150 MG extended release tablet Take 150 mg by mouth every morning    Historical Provider, MD   ALPRAZolam Rosaleen Kidney) 0.5 MG tablet Take 0.5 mg by mouth nightly as needed for Sleep.     Historical Provider, MD   albuterol sulfate HFA (PROVENTIL HFA) 108 (90 Base) MCG/ACT inhaler Inhale 2 puffs into the lungs every 4 hours as needed for Wheezing 5/8/21 5/8/22  Yosi CouncilmanDO   budesonide (PULMICORT) 0.5 MG/2ML nebulizer suspension Take 2 mLs by nebulization 2 times daily 4/7/21   Danial Melo MD   Arformoterol Tartrate (BROVANA) 15 MCG/2ML NEBU Take 1 ampule by nebulization 2 times daily    Historical Provider, MD       CURRENT MEDICATIONS:  Current Facility-Administered Medications: acetaminophen (TYLENOL) tablet 650 mg, 650 mg, Per NG tube, Q6H PRN **OR** acetaminophen (TYLENOL) suppository 650 mg, 650 mg, Rectal, Q6H PRN  [START ON 11/12/2021] ascorbic acid (VITAMIN C) tablet 500 mg, 500 mg, Per NG tube, Daily  busPIRone (BUSPAR) tablet 15 mg, 15 mg, Per NG tube, TID  [START ON 11/12/2021] losartan (COZAAR) tablet 100 mg, 100 mg, Per NG tube, Daily  polyethylene glycol (GLYCOLAX) packet 17 g, 17 g, Per NG tube, Daily  [START ON 11/12/2021] Vitamin D (CHOLECALCIFEROL) tablet 1,000 Units, 1,000 Units, Per NG tube, Daily  [START ON 11/12/2021] zinc sulfate (ZINCATE) capsule 50 mg, 50 mg, Per NG tube, Daily  heparin (porcine) injection 4,000 Units, 4,000 Units, IntraVENous, PRN  heparin (porcine) injection 2,000 Units, 2,000 Units, IntraVENous, PRN  heparin 25,000 units in dextrose 5% 250 mL (premix) infusion, 5-30 Units/kg/hr, IntraVENous, Continuous  hydrALAZINE (APRESOLINE) injection 10 mg, 10 mg, IntraVENous, Q4H PRN  perflutren lipid microspheres (DEFINITY) injection 1.65 mg, 1.5 mL, IntraVENous, ONCE PRN  glucose (GLUTOSE) 40 % oral gel 15 g, 15 g, Oral, PRN  dextrose 50 % IV solution, 12.5 g, IntraVENous, PRN  glucagon (rDNA) injection 1 mg, 1 mg, IntraMUSCular, PRN  dextrose 5 % solution, 100 mL/hr, IntraVENous, PRN  pantoprazole (PROTONIX) injection 40 mg, 40 mg, IntraVENous, Daily **AND** sodium chloride (PF) 0.9 % injection 10 mL, 10 mL, IntraVENous, Daily  ipratropium-albuterol (DUONEB) nebulizer solution 1 ampule, 1 ampule, Inhalation, Q4H WA  budesonide (PULMICORT) nebulizer suspension 500 mcg, 0.5 mg, Nebulization, BID  midazolam PF (VERSED) injection 2 mg, 2 mg, IntraVENous, Q1H PRN  fentaNYL (SUBLIMAZE) injection 100 mcg, 100 mcg, IntraVENous, Q1H PRN  labetalol (NORMODYNE;TRANDATE) injection 10 mg, 10 mg, IntraVENous, Q4H PRN  fentaNYL 5 mcg/ml in 0.9%  ml infusion, 12.5-200 mcg/hr, IntraVENous, Continuous  Arformoterol Tartrate (BROVANA) nebulizer solution 15 mcg, 15 mcg, Nebulization, BID  sodium chloride flush 0.9 % injection 5-40 mL, 5-40 mL, IntraVENous, 2 times per day  sodium chloride flush 0.9 % injection 5-40 mL, 5-40 mL, IntraVENous, PRN  0.9 % sodium chloride infusion, 25 mL, IntraVENous, PRN  ondansetron (ZOFRAN-ODT) disintegrating tablet 4 mg, 4 mg, Per NG tube, Q8H PRN **OR** ondansetron (ZOFRAN) injection 4 mg, 4 mg, IntraVENous, Q6H PRN  midazolam (VERSED) 100 mg in dextrose 5 % 100 mL infusion, 1-10 mg/hr, IntraVENous, Continuous    ALLERGIES:  Allergies   Allergen Reactions    Bee Venom Anaphylaxis    Shellfish-Derived Products Anaphylaxis     Only crab legs     REVIEW OF SYSTEMS: Unable to Attain given patient status and condition on vent    OBJECTIVE:  PHYSICAL EXAMINATION:     VITAL SIGNS:  BP (!) 149/63   Pulse 62   Temp 99.9 °F (37.7 °C) (Axillary)   Resp 20   Ht 5' 10\" (1.778 m)   Wt 290 lb (131.5 kg)   SpO2 97%   BMI 41.61 kg/m²   Wt Readings from Last 3 Encounters:   21 290 lb (131.5 kg)   10/28/21 290 lb (131.5 kg)   21 295 lb (133.8 kg)     Temp Readings from Last 3 Encounters:   21 99.9 °F (37.7 °C) (Axillary)   21 98.1 °F (36.7 °C) (Oral)   21 97.9 °F (36.6 °C) (Infrared)     TMAX:  BP Readings from Last 3 Encounters:   21 (!) 149/63   21 (!) 141/66   21 (!) 151/76     Pulse Readings from Last 3 Encounters:   21 62   21 60   21 78       CURRENT PULSE OXIMETRY: SpO2: 97 %  24HR PULSE OXIMETRY RANGE: SpO2  Av.3 %  Min: 94 %  Max: 100 %  CVP:      ________________________________________________________________________    VENTILATOR SETTINGS (if applicable):         Vent Information  $Ventilation: $Subsequent Day  Skin Assessment: Clean, dry, & intact  Equipment ID: [id number]  Equipment Changed: HME  Vent Type: [phone]  Vent Mode: AC/VC  Vt Ordered: 500 mL  Rate Set: 20 bmp  Peak Flow: 55 L/min  Pressure Support: 0 cmH20  FiO2 : 60 %  SpO2: 97 %  SpO2/FiO2 ratio: 161.67  Sensitivity: 3  PEEP/CPAP: 10  I Time/ I Time %: 0 s  Humidification Source: Heated wire  Humidification Temp: 37  Humidification Temp Measured: 36.8  Circuit Condensation: Drained  Additional Respiratory  Assessments  Pulse: 62  Resp: 20  SpO2: 97 %  End Tidal CO2: 55 (%)  Position: Semi-Lockwood's  Humidification Source: Heated wire  Humidification Temp: 37  Circuit Condensation: Drained  Oral Care: Mouth swabbed, Mouth moisturizer, Mouth suctioned  Airway Type: ET  Airway Size: 6  Cuff Pressure (cm H2O):  (mlt)  ETCO2:  Peak Inspiratory Pressure:  End-Inspiratory Plateau Pressure:    ABG:  Recent Labs     21  0459   PH 7.580*   PO2 87.0   PCO2 24.7*   HCO3 22.6   BE 1.5   O2SAT 96.8   METHB 0.4   O2HB 96.1   COHB 0.3   O2CON 13.1   HHB 3.2   THB 9.6*     FiO2 : 60 %  I:E Ratio: 1:2.00  ________________________________________________________________________    IV ACCESS:    NUTRITION: Diet NPO  ADULT TUBE FEEDING; Nasogastric; Standard with Fiber; Continuous; 10; Yes; 10; Q 6 hours; 50; 30; Q 4 hours    INTAKE/OUTPUTS:  I/O last 3 completed shifts: In: 4135 [I. X.:5258]  Out: 1750 [Urine:1750]    Intake/Output Summary (Last 24 hours) at 11/11/2021 1549  Last data filed at 11/11/2021 1400  Gross per 24 hour   Intake 4135 ml   Output 1750 ml   Net 2385 ml     General Appearance: Vent to ET tube through trach stoma, in no acute distress   Eyes: pupils equal, round, and reactive to light, conjunctivae normal and sclera anicteric   Neck: neck supple and non tender without mass, no thyromegaly, no thyroid nodules and no cervical adenopathy   Pulmonary/Chest: decreased breath sounds, no accessory muscles of inspiration, no focal wheezes  Cardiovascular: normal rate, regular rhythm, normal S1 and S2, no murmurs, rubs, clicks or gallops, distal pulses intact, no carotid bruits, no murmurs, no gallops, no carotid bruits and no JVD   Abdomen: soft, non-tender, non-distended, normal bowel sounds, no masses or organomegaly   Extremities: no cyanosis, no clubbing, no edema  Neuro: intubated through stoma, sedated    LABS/IMAGING:    CBC:  Lab Results   Component Value Date    WBC 7.9 11/11/2021    HGB 8.4 (L) 11/11/2021    HCT 25.5 (L) 11/11/2021    MCV 73.3 (L) 11/11/2021     11/11/2021    LYMPHOPCT 23.4 11/11/2021    RBC 3.48 (L) 11/11/2021    MCH 24.1 (L) 11/11/2021    MCHC 32.9 11/11/2021    RDW 18.7 (H) 11/11/2021    NEUTOPHILPCT 69.4 11/11/2021    MONOPCT 4.3 11/11/2021    BASOPCT 0.1 11/11/2021    NEUTROABS 5.45 11/11/2021    LYMPHSABS 1.84 11/11/2021    MONOSABS 0.34 11/11/2021    EOSABS 0.11 11/11/2021    BASOSABS 0.01 11/11/2021       Recent Labs     11/11/21  0407 11/10/21  0510 11/09/21  0117   WBC 7.9 6.9 13.1*   HGB 8.4* 7.8* 10.3*   HCT 25.5* 24.1* 31.3*   MCV 73.3* 75.3* 73.0*    222 317       BMP:   Recent Labs     11/09/21  0117 11/10/21  0510 11/11/21  0407    135 138   K 3.3* 4.9 3.7   CL 95* 99 102   CO2 28 26 24   BUN 18 24* 23   CREATININE 1.2 1.7* 1.6*       MG:   Lab Results   Component Value Date    MG 2.1 11/09/2021     Ca/Phos:   Lab Results   Component Value Date    CALCIUM 7.6 (L) 11/11/2021    PHOS 2.4 (L) 11/25/2020     Amylase: No results found for: AMYLASE  Lipase:   Lab Results   Component Value Date    LIPASE 37 10/28/2021     LIVER PROFILE:   Recent Labs     11/09/21 0117   AST 36   ALT 17   BILITOT 0.8   ALKPHOS 90       PT/INR: No results for input(s): PROTIME, INR in the last 72 hours. APTT: No results for input(s): APTT in the last 72 hours. Cardiac Enzymes:  Lab Results   Component Value Date    CKTOTAL 98 10/08/2017    CKMB 1.1 10/08/2017    TROPONINI <0.01 05/08/2021       Hgb A1C:   Lab Results   Component Value Date    LABA1C 6.8 06/15/2020     No results found for: EAG  IVAN:   Lab Results   Component Value Date    IVAN NEGATIVE 10/08/2017     ESR:   Lab Results   Component Value Date    SEDRATE 135 (H) 09/04/2017     CRP:   Lab Results   Component Value Date    CRP 12.1 (H) 11/11/2021     D Dimer:   Lab Results   Component Value Date    DDIMER 4853 11/11/2021     Folate and B12:   Lab Results   Component Value Date    BPINBWOA94 3117 (H) 11/05/2020   ,   Lab Results   Component Value Date    FOLATE >20.0 11/05/2020       Lactic Acid:   Lab Results   Component Value Date    LACTA 1.3 04/28/2021     Ammonia:   Cortisol:  Thyroid Studies:  Lab Results   Component Value Date    TSH 4.340 (H) 10/27/2020     XR CHEST PORTABLE   Final Result   1. Low position for the tip of the tracheostomy tube, towards the origin of   the left main bronchus. The a pull-back of 3 cm is recommended. 2.  Bilateral lung infiltrates compatible with pneumonia.          XR ABDOMEN FOR NG/OG/NE TUBE PLACEMENT   Final Result   Enteric tube is in the stomach. XR CHEST PORTABLE   Final Result   Improving aeration of the right lung. Tip of tracheostomy tube is difficult to identify but favored to be located   near the level of the ellie. Consider retracting approximately 2 cm. Parenchymal infiltrates which may represent multifocal pneumonia. Continued   follow-up recommended. The findings were sent to the Radiology Results [address] at 3:30   am on 11/9/2021to be communicated to a licensed caregiver. XR ABDOMEN FOR NG/OG/NE TUBE PLACEMENT   Final Result   Nasogastric tube terminates in the mid stomach. Improving aeration of the right lung. XR CHEST PORTABLE   Final Result   Tracheostomy tube appears to extend into the left mainstem bronchus and   should be retracted approximately 5 cm. Complete opacification of the right hemithorax likely due to combination of   atelectasis and infiltrates. Patchy parenchymal infiltrates in the left lung are similar to previous. The findings were sent to the Radiology Results [address] at 1:44   am on 11/9/2021to be communicated to a licensed caregiver. XR CHEST PORTABLE   Final Result   Prominent increase in bilateral airspace disease predominantly in the mid to   lower lung zones consistent with multifocal pneumonia. Limited examination.          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  S/p lysis of the posterior glottic stenosis with Kenalog and balloon dilation 9/9/2021  8.) HANS  9.) Obesity  10.) Chronic Lymphedema   11.) H/O B/L Pulmonary Emboli  12.) Chronic Diastolic Heart Failure, EF 65%     H/O respiratory failure: [facility] lap cholecystectomy, lap umbilical hernia repair, and lap liver biopsy (fatty liver). He had been extubated postop but had laryngospasm requiring reintubation, complicated by negative pressure flash pulmonary edema, and required tracheostomy 8/10/2017.  Patient has staph aureus pneumonia and C. difficile colitis. Marielle Bray was then transferred to [facility] where he was weaned off the ventilator and decannulated     PLAN:  1.) ACVC 20/500/60/10  2.) versed, fentanyl, propofol, monitor TG levels  3.) pulmicort, brovana, duoneb  4.) s/p COVID infection, no role for decadron, baricitinib, remdesivir  5.) DVT Prophylaxis      - defer possible bronchoscopy to ICU team   - procalcitonin 0.41  - ETT 6.0 through stoma, that will need to be changed to trach prior to vent liberation  - likely LTACH management   - pan culture  - defer abx to ICU team   - rate on vent to 20  - , propofol off at this time  - ETT to pull back by 3 cm     Thank you Fifi Shields MD very much for allowing me to see this patient in consultation and follow up. Care reviewed with nursing staff, medical and surgical specialty care, primary care and the patient's family as available. Restraints are ordered when the patient can do harm to him/herself by pulling out devices.     Kenny Alejandra MD, M.D.

## 2021-11-11 NOTE — PLAN OF CARE
Problem: Airway Clearance - Ineffective  Goal: Achieve or maintain patent airway  Outcome: Met This Shift     Problem: Gas Exchange - Impaired  Goal: Absence of hypoxia  Outcome: Met This Shift  Goal: Promote optimal lung function  Outcome: Met This Shift     Problem: Breathing Pattern - Ineffective  Goal: Ability to achieve and maintain a regular respiratory rate  Outcome: Met This Shift     Problem:  Body Temperature -  Risk of, Imbalanced  Goal: Ability to maintain a body temperature within defined limits  Outcome: Met This Shift  Goal: Will regain or maintain usual level of consciousness  Outcome: Met This Shift  Goal: Complications related to the disease process, condition or treatment will be avoided or minimized  Outcome: Met This Shift     Problem: Isolation Precautions - Risk of Spread of Infection  Goal: Prevent transmission of infection  Outcome: Met This Shift     Problem: Nutrition Deficits  Goal: Optimize nutritional status  Outcome: Met This Shift     Problem: Risk for Fluid Volume Deficit  Goal: Maintain normal heart rhythm  Outcome: Met This Shift  Goal: Maintain absence of muscle cramping  Outcome: Met This Shift  Goal: Maintain normal serum potassium, sodium, calcium, phosphorus, and pH  Outcome: Met This Shift     Problem: Loneliness or Risk for Loneliness  Goal: Demonstrate positive use of time alone when socialization is not possible  Outcome: Met This Shift     Problem: Fatigue  Goal: Verbalize increase energy and improved vitality  Outcome: Met This Shift     Problem: Patient Education: Go to Patient Education Activity  Goal: Patient/Family Education  Outcome: Met This Shift     Problem: Skin Integrity:  Goal: Will show no infection signs and symptoms  Description: Will show no infection signs and symptoms  Outcome: Met This Shift  Goal: Absence of new skin breakdown  Description: Absence of new skin breakdown  Outcome: Met This Shift     Problem: Falls - Risk of:  Goal: Will remain free from falls  Description: Will remain free from falls  Outcome: Met This Shift  Goal: Absence of physical injury  Description: Absence of physical injury  Outcome: Met This Shift     Problem: Non-Violent Restraints  Goal: No harm/injury to patient while restraints in use  11/11/2021 1816 by Jelena Diamond RN  Outcome: Met This Shift  11/11/2021 0906 by Jelena Diamond RN  Outcome: Met This Shift  Goal: Patient's dignity will be maintained  11/11/2021 1816 by Jelena Diamond RN  Outcome: Met This Shift  11/11/2021 0906 by Jelena Diamond RN  Outcome: Met This Shift     Problem: Non-Violent Restraints  Goal: Removal from restraints as soon as assessed to be safe  11/11/2021 1816 by Jelena Diamond RN  Outcome: Not Met This Shift  11/11/2021 0906 by Jelena Diamond RN  Outcome: Not Met This Shift

## 2021-11-11 NOTE — PLAN OF CARE
Patient reaches towards ETT/ trach when unrestrained. B/L soft wrist restraints to remain in place for patient safety.      Problem: Non-Violent Restraints  Goal: Removal from restraints as soon as assessed to be safe  11/10/2021 2255 by Hannah Murphy RN  Outcome: Not Met This Shift  11/10/2021 1256 by Darryle Castillo, RN  Outcome: Not Met This Shift  Goal: No harm/injury to patient while restraints in use  11/10/2021 2255 by Hannah Murphy RN  Outcome: Met This Shift  11/10/2021 1256 by Darryle Castillo, RN  Outcome: Met This Shift  Goal: Patient's dignity will be maintained  11/10/2021 2255 by Hannah Murphy RN  Outcome: Met This Shift  11/10/2021 1256 by Darryle Castillo, RN  Outcome: Met This Shift

## 2021-11-11 NOTE — PROGRESS NOTES
PT SEEN AND EXAMINED. bp is BETTER. DW WIFE  Chart reviewed. meds reviewed. D/w nursing + family as available. EXAM: IN GENERAL, NAD. AWAKE AND ALERT. ROS NEGx10 EXCEPT:   BP (!) 168/59   Pulse 82   Temp 99.9 °F (37.7 °C) (Axillary)   Resp 24   Ht 5' 10\" (1.778 m)   Wt 290 lb (131.5 kg)   SpO2 94%   BMI 41.61 kg/m²   GEN: A+O NAD. HEENT: NCAT. EOMI. JOE  NECK: NO JVD. TRACH MIDLINE. NO BRUITS. NO THYROMEGALY. LUNGS: CTA BL NO RALES, RHONCHI OR WHEEZES. GOOD EXCURSION. CV: Regular rate and rhythm, NO Murmurs, Rubs, Or gallops  ABD: Soft. Nontender. Normal bowel sounds. No organomegaly  EXT:No clubbing cyanosis or edema  Neuro: Alert and oriented x 3. No focal motor deficits. No sensory deficits. Reflexes appear intact.   Labs/data reviewedLABS: CBC with Differential:    Lab Results   Component Value Date    WBC 7.9 11/11/2021    RBC 3.48 11/11/2021    HGB 8.4 11/11/2021    HCT 25.5 11/11/2021     11/11/2021    MCV 73.3 11/11/2021    MCH 24.1 11/11/2021    MCHC 32.9 11/11/2021    RDW 18.7 11/11/2021    NRBC 1.0 11/10/2021    SEGSPCT 73 01/26/2014    METASPCT 1.8 11/15/2020    LYMPHOPCT 23.4 11/11/2021    MONOPCT 4.3 11/11/2021    MYELOPCT 0.9 11/15/2020    BASOPCT 0.1 11/11/2021    MONOSABS 0.34 11/11/2021    LYMPHSABS 1.84 11/11/2021    EOSABS 0.11 11/11/2021    BASOSABS 0.01 11/11/2021     Platelets:    Lab Results   Component Value Date     11/11/2021     CMP:    Lab Results   Component Value Date     11/11/2021    K 3.7 11/11/2021     11/11/2021    CO2 24 11/11/2021    BUN 23 11/11/2021    CREATININE 1.6 11/11/2021    GFRAA 53 11/11/2021    LABGLOM 53 11/11/2021    GLUCOSE 161 11/11/2021    PROT 7.7 11/09/2021    LABALBU 3.4 11/09/2021    CALCIUM 7.6 11/11/2021    BILITOT 0.8 11/09/2021    ALKPHOS 90 11/09/2021    AST 36 11/09/2021    ALT 17 11/09/2021     Magnesium:    Lab Results   Component Value Date    MG 2.1 11/09/2021     LDH:    Lab Results   Component Value Date  11/11/2021     PT/INR:    Lab Results   Component Value Date    PROTIME 15.0 10/28/2021    INR 1.4 10/28/2021     Last 3 Troponin:    Lab Results   Component Value Date    TROPONINI <0.01 05/08/2021    TROPONINI <0.01 04/28/2021    TROPONINI <0.01 04/02/2021     ABG:    Lab Results   Component Value Date    PH 7.580 11/11/2021    PCO2 24.7 11/11/2021    PO2 87.0 11/11/2021    HCO3 22.6 11/11/2021    BE 1.5 11/11/2021    O2SAT 96.8 11/11/2021     IRON:    Lab Results   Component Value Date    IRON 95 11/05/2020     IMAGING    XR CHEST PORTABLE    Result Date: 11/9/2021  EXAMINATION: ONE XRAY VIEW OF THE CHEST 11/9/2021 3:18 am COMPARISON: Exam performed earlier today at 0117 hours HISTORY: [address] [facility]: tube adjusted TECHNOLOGIST PROVIDED HISTORY: Reason for exam:->tube adjusted What reading provider will be dictating this exam?->CRC FINDINGS: EKG leads overlie the chest.  The tracheostomy tube tip is difficult to identify but is likely near the level of the ellie. A nasogastric tube terminates in the mid stomach. No pneumothorax. There has been some interval improvement in aeration of the right lung especially superiorly. Otherwise, right greater than left parenchymal infiltrates persist.  No other interval change. Improving aeration of the right lung. Tip of tracheostomy tube is difficult to identify but favored to be located near the level of the ellie. Consider retracting approximately 2 cm. Parenchymal infiltrates which may represent multifocal pneumonia. Continued follow-up recommended. The findings were sent to the Radiology Results [address] at 3:30 am on 11/9/2021to be communicated to a licensed caregiver.      XR CHEST PORTABLE    Result Date: 11/9/2021  EXAMINATION: ONE XRAY VIEW OF THE CHEST 11/9/2021 1:30 am COMPARISON: Exam performed earlier today at 0026 hours HISTORY: ORDERING SYSTEM PROVIDED HISTORY: Post tube TECHNOLOGIST PROVIDED HISTORY: Reason for will be dictating this exam?->CRC FINDINGS: The lungs are without acute focal process. There is no effusion or pneumothorax. The cardiomediastinal silhouette is without acute process. The osseous structures are without acute process tracheostomy again noted. No acute process. XR ABDOMEN FOR NG/OG/NE TUBE PLACEMENT    Result Date: 11/9/2021  EXAMINATION: ONE SUPINE XRAY VIEW(S) OF THE ABDOMEN 11/9/2021 1:12 pm COMPARISON: Abdominal radiograph November 9, 2021 HISTORY: ORDERING SYSTEM PROVIDED HISTORY: Confirmation of course of NG/OG/NE tube and location of tip of tube TECHNOLOGIST PROVIDED HISTORY: Reason for exam:->Confirmation of course of NG/OG/NE tube and location of tip of tube Portable? ->Yes What reading provider will be dictating this exam?->CRC FINDINGS: Enteric tube is identified below the diaphragm with the tip in the of stomach. The visualized bowel gas pattern is nonspecific, nonobstructive. Enteric tube is in the stomach. XR ABDOMEN FOR NG/OG/NE TUBE PLACEMENT    Result Date: 11/9/2021  EXAMINATION: ONE SUPINE XRAY VIEW(S) OF THE ABDOMEN 11/9/2021 1:29 am COMPARISON: None. HISTORY: ORDERING SYSTEM PROVIDED HISTORY: Confirmation of course of NG/OG/NE tube and location of tip of tube TECHNOLOGIST PROVIDED HISTORY: Reason for exam:->Confirmation of course of NG/OG/NE tube and location of tip of tube Portable? ->Yes What reading provider will be dictating this exam?->CRC FINDINGS: Nasogastric tube terminates in the mid stomach. Moderate gaseous distention of the stomach. Patchy opacities within the right greater than left lung, apparently improved compared with the portable chest radiograph performed earlier today at 0117 hours. Nasogastric tube terminates in the mid stomach. Improving aeration of the right lung.        DIET:  Diet NPO    Medications:    Scheduled Meds:   heparin (porcine)  7,500 Units SubCUTAneous Q8H    pantoprazole  40 mg IntraVENous Daily    And    sodium chloride (PF)  10 mL IntraVENous Daily    ipratropium-albuterol  1 ampule Inhalation Q4H WA    budesonide  0.5 mg Nebulization BID    ascorbic acid  500 mg Oral Daily    Arformoterol Tartrate  15 mcg Nebulization BID    busPIRone  15 mg PEG Tube TID    Vitamin D  1,000 Units PEG Tube Daily    losartan  100 mg PEG Tube Daily    zinc sulfate  50 mg PEG Tube Daily    sodium chloride flush  5-40 mL IntraVENous 2 times per day       Continuous Infusions:   sodium chloride 100 mL/hr at 11/10/21 2330    dextrose      fentaNYL 5 mcg/ml in 0.9%  ml infusion 200 mcg/hr (11/11/21 0215)    propofol 25 mcg/kg/min (11/11/21 9812)    sodium chloride      midazolam 10 mg/hr (11/11/21 5840)       PRN Meds:hydrALAZINE, perflutren lipid microspheres, glucose, dextrose, glucagon (rDNA), dextrose, midazolam, fentanNYL, labetalol, sodium chloride flush, sodium chloride, ondansetron **OR** ondansetron, polyethylene glycol, acetaminophen **OR** acetaminophen    A/P:      Patient Active Problem List   Diagnosis    Hyperlipidemia    Type 2 diabetes mellitus without complication, without long-term current use of insulin (HCC)    Atypical chest pain    Essential hypertension    Chronic acquired lymphedema    Aortic valve disease    Morbid obesity due to excess calories (HCC)    Abdominal pain    Acute respiratory failure with hypoxia (HCC)    Acute pulmonary edema (HCC)    Congestive heart failure with LV diastolic dysfunction, NYHA class 3 (HCC)    HANS (obstructive sleep apnea)    Acquired hypothyroidism    Chronic diastolic heart failure (HCC)    Nonrheumatic aortic valve stenosis    ACE-inhibitor cough    Pneumonia due to organism    Acute gout of right knee    Bilateral pulmonary embolism (HCC)    Anxiety    Tracheostomy dependent (HCC)    Shortness of breath    H/O deep venous thrombosis    SOB (shortness of breath)    Headache, unspecified headache type    Acute on chronic respiratory failure with hypoxia ([facility] [address]        PLAN:  PULM TOILET  ? BRONCH  ATBX PER CCM  ENT FOR [facility]  Give prn hydralazine if bp>160    I can be reached though [employer] or [facility] at [phone]

## 2021-11-11 NOTE — CARE COORDINATION
11/11 Care CoordinationPt remains in MICU. On Vent ENT consult for Trach revision. If vent settings improve, will plan for trach change in OR on Monday 11/15. Pt has a    history of trach dependence secondary to posterior glottic stenosis, bilateral immobile vocal cords and HANS with recent diagnosis of COVID-19 and pneumonia with acute respiratory failure from tracheal dislodgement  and b/l pneumonia  CM/SW will continue to follow for discharge planning. Pt was from Home with Noe Harrington.    Karena Swanson BSN,RN-CV-BC  [phone]

## 2021-11-11 NOTE — PLAN OF CARE
Problem: Non-Violent Restraints  Goal: No harm/injury to patient while restraints in use  11/11/2021 0906 by Fei Rodgers RN  Outcome: Met This Shift  11/10/2021 2255 by Yohana Queen RN  Outcome: Met This Shift  Goal: Patient's dignity will be maintained  11/11/2021 0906 by Fei Rodgers RN  Outcome: Met This Shift  11/10/2021 2255 by Yohana Queen RN  Outcome: Met This Shift     Problem: Non-Violent Restraints  Goal: Removal from restraints as soon as assessed to be safe  11/11/2021 0906 by Fei Rodgers RN  Outcome: Not Met This Shift  11/10/2021 2255 by Yoahna Queen RN  Outcome: Not Met This Shift

## 2021-11-11 NOTE — PROGRESS NOTES
[address] [facility]  Department of Internal Medicine   Internal Medicine Residency   MICU Progress Note    Patient:  Shadi Zazueta 64 y.o. male  MRN: [id number]     Date of Service: 11/11/2021    Allergy: Bee venom and Shellfish-derived products    Subjective     Patient was seen and examined at bedside this morning. Patient is on ventilator through his stoma. No active bleeding from the tracheostomy. Sedated with fentanyl and Versed. Patient is afebrile. Vitals stable. Heart rate 67. Blood pressures stable 150/63. Patient responding to pain. Moving extremity voluntary. Vent settings AC/VC/24/500/10/70 percent FiO2. ABG 7.5 8/24/87/22. Creatinine trending down to 1.6. Inflammatory marker elevated. Triglyceride 338.  24h change: No acute event overnight    Objective     VS: BP (!) 127/58   Pulse 84   Temp 99.3 °F (37.4 °C) (Axillary)   Resp 20   Ht 5' 10\" (1.778 m)   Wt 290 lb (131.5 kg)   SpO2 94%   BMI 41.61 kg/m²           I & O - 24hr:   Intake/Output Summary (Last 24 hours) at 11/11/2021 1042  Last data filed at 11/11/2021 0900  Gross per 24 hour   Intake 5842 ml   Output 2300 ml   Net 3542 ml       Physical Exam:  · General Appearance: appears stated age, mild distress, uncooperative and Sedated  · Neck: no adenopathy, no carotid bruit, no JVD, supple, symmetrical, trachea midline, thyroid not enlarged, symmetric, no tenderness/mass/nodules and ET tube through stoma, no active bleeding  · Lung: clear to auscultation bilaterally  · Heart: regular rate and rhythm, S1, S2 normal, no murmur, click, rub or gallop  · Abdomen: soft, non-tender; bowel sounds normal; no masses,  no organomegaly  · Extremities:  extremities normal, atraumatic, no cyanosis or edema  · Musculoskeletal: No joint swelling, no muscle tenderness. ROM normal in all joints of extremities.    · Neurologic: Mental status: alertness: obtunded, orientation: none    Lines     site day    Art line   None    TLC L Fem 11/09   PICC None    Hemoaccess None    Oxygen:     N/A  Mechanical Ventilation:   Mode: A/C    TV:500 ml RR: 24  PEEP 32mgV2C  FiO2 70    ABG:     Lab Results   Component Value Date    PH 7.580 11/11/2021    PCO2 24.7 11/11/2021    PO2 87.0 11/11/2021    SOY8SVW 26.1 11/09/2020    HCO3 22.6 11/11/2021    BE 1.5 11/11/2021    THB 9.6 11/11/2021    O2SAT 96.8 11/11/2021        Medications     Infusions: (Fluid, Sedation, Vasopressors)  IVF:    NaCl 0.9%: heplock/rate 100 ml/h ;      Vasopressors   N/A  Sedation       Nutrition:   NG/OG tube TF type: Pulmocare/Nephro/Glucerna/Jevity        At rate: 10ml/h  ATB:   Antibiotics  Days   None              Skin issues: none  Patient currently has   Urinary cath  Isolation  Restraints  DVT prophylaxis/ GI prophylaxis,    Labs     CBC with Differential:    Lab Results   Component Value Date    WBC 7.9 11/11/2021    RBC 3.48 11/11/2021    HGB 8.4 11/11/2021    HCT 25.5 11/11/2021     11/11/2021    MCV 73.3 11/11/2021    MCH 24.1 11/11/2021    MCHC 32.9 11/11/2021    RDW 18.7 11/11/2021    NRBC 1.0 11/10/2021    SEGSPCT 73 01/26/2014    METASPCT 1.8 11/15/2020    LYMPHOPCT 23.4 11/11/2021    MONOPCT 4.3 11/11/2021    MYELOPCT 0.9 11/15/2020    BASOPCT 0.1 11/11/2021    MONOSABS 0.34 11/11/2021    LYMPHSABS 1.84 11/11/2021    EOSABS 0.11 11/11/2021    BASOSABS 0.01 11/11/2021     Hemoglobin/Hematocrit:    Lab Results   Component Value Date    HGB 8.4 11/11/2021    HCT 25.5 11/11/2021     CMP:    Lab Results   Component Value Date     11/11/2021    K 3.7 11/11/2021     11/11/2021    CO2 24 11/11/2021    BUN 23 11/11/2021    CREATININE 1.6 11/11/2021    GFRAA 53 11/11/2021    LABGLOM 53 11/11/2021    GLUCOSE 161 11/11/2021    PROT 7.7 11/09/2021    LABALBU 3.4 11/09/2021    CALCIUM 7.6 11/11/2021    BILITOT 0.8 11/09/2021    ALKPHOS 90 11/09/2021    AST 36 11/09/2021    ALT 17 11/09/2021     BMP:    Lab Results   Component Value Date     11/11/2021 K 3.7 11/11/2021     11/11/2021    CO2 24 11/11/2021    BUN 23 11/11/2021    LABALBU 3.4 11/09/2021    CREATININE 1.6 11/11/2021    CALCIUM 7.6 11/11/2021    GFRAA 53 11/11/2021    LABGLOM 53 11/11/2021    GLUCOSE 161 11/11/2021     Hepatic Function Panel:    Lab Results   Component Value Date    ALKPHOS 90 11/09/2021    ALT 17 11/09/2021    AST 36 11/09/2021    PROT 7.7 11/09/2021    BILITOT 0.8 11/09/2021    LABALBU 3.4 11/09/2021     Ionized Calcium:  No results found for: IONCA  Magnesium:    Lab Results   Component Value Date    MG 2.1 11/09/2021     Phosphorus:    Lab Results   Component Value Date    PHOS 2.4 11/25/2020     Warfarin PT/INR:  No components found for: Verita Sink  Last 3 Troponin:    Lab Results   Component Value Date    TROPONINI <0.01 05/08/2021    TROPONINI <0.01 04/28/2021    TROPONINI <0.01 04/02/2021       Imaging Studies:  CXR:   1.  Low position for the tip of the tracheostomy tube, towards the origin of   the left main bronchus.  The a pull-back of 3 cm is recommended.       2.  Bilateral lung infiltrates compatible with pneumonia. Resident's Assessment and Plan     Sheron Bradley is a 64 y.o. male with past medical history of laryngeal stenosis s/p tracheostomy on 3 to 4 L oxygen at home, COPD, type 2 diabetes mellitus, hypertension, congestive heart failure, depression presented to the hospital with chief complaint of acute respiratory failure.     Assessment:      1. Acute encephalopathy, multifactorial, 2/2 acute on chronic respiratory failure, cardiac arrest.  2. Acute on chronic hypoxic hypercapnicrespiratory failure 2/2 tracheostomy malfunction  3. Cardiac arrest 2/2 #2   4. Laryngeal stenosis s/p trach   5. COVID -19   6. Hx Bilateral PE 2020, on Eliquis  7. Sinus bradycardia  8. NANCY stage II likely prerenal 2/2 cardiac arrest.   9. Acute on chronic anemia, 2/2 tracheal bleeding vs dilutional   10. T2DM  11. Hx COPD  12. Hx HANS  13.  History of hypertension. 14. History of hyperlipidemia. 15. History of depression  16. Hx alcohol abuse     Plan:     · Continue ventilator support. Discussed with ENT, Per ENT: Need vent settings to improve, goal of PEEP < 8, FiO2 closer to 40% in order to for ENT trach change as pt cannot be intubated from above and trach change will be tenuous. Scheduled for OR repair on Monday  · Monitor HR. Currently BP stable. Hydralazine PRN for SBP > 160  · Covid -19 positive. Will continue breathing treatment. Vit C and Zinc. Monitor inflammatory marker. Currently not on steroid. · Start Heparin drip today  · FeNa 0.4%. likely prerenal. Received  cc/hr for 1 day. Repeat BMP tomorrow. · Follow daily CBC, BMP. CXR. Inflammatory marker q48h. · Watch for alcohol withdrawal symptoms  · POCT glucose x4 daily. Hypoglycemia protocol in place.       Next of Kin/ POA:   Tapan Carrasquillo Child [phone] REL2         Code Status:   Full code      PT/OT: Not warranted at this time  DVT ppx: Heparin  GI ppx: Protonix      Jaz Payan MD, PGY-2    Attending physician: Dr. Brooklynn Bacon

## 2021-11-12 NOTE — PROGRESS NOTES
PT SEEN AND EXAMINED. Patient desaturates and bp and hr spikes with any manipulation  Chart reviewed. meds reviewed. D/w nursing + family as available. EXAM: IN GENERAL, NAD. AWAKE AND ALERT. ROS NEGx10 EXCEPT:   BP (!) 206/103   Pulse 92   Temp 98.6 °F (37 °C)   Resp 20   Ht 5' 10\" (1.778 m)   Wt 299 lb 2.6 oz (135.7 kg)   SpO2 90%   BMI 42.93 kg/m²   GEN: A+O NAD. HEENT: NCAT. EOMI. JOE  NECK: NO JVD. TRACH MIDLINE. NO BRUITS. NO THYROMEGALY. LUNGS: CTA BL + RHONCHI   GOOD EXCURSION. CV: Regular rate and rhythm, NO Murmurs, Rubs, Or gallops  ABD: Soft. Nontender. Normal bowel sounds. No organomegaly  EXT:No clubbing cyanosis or edema  Neuro: Alert and oriented x 3. No focal motor deficits. No sensory deficits. Reflexes appear intact.   Labs/data reviewedLABS: CBC with Differential:    Lab Results   Component Value Date    WBC 9.3 11/12/2021    RBC 3.44 11/12/2021    HGB 8.3 11/12/2021    HCT 24.4 11/12/2021     11/12/2021    MCV 70.9 11/12/2021    MCH 24.1 11/12/2021    MCHC 34.0 11/12/2021    RDW 19.0 11/12/2021    NRBC 1.0 11/10/2021    SEGSPCT 73 01/26/2014    METASPCT 1.8 11/15/2020    LYMPHOPCT 27.1 11/12/2021    MONOPCT 4.5 11/12/2021    MYELOPCT 0.9 11/15/2020    BASOPCT 0.1 11/12/2021    MONOSABS 0.42 11/12/2021    LYMPHSABS 2.52 11/12/2021    EOSABS 0.14 11/12/2021    BASOSABS 0.01 11/12/2021     Platelets:    Lab Results   Component Value Date     11/12/2021     CMP:    Lab Results   Component Value Date     11/11/2021    K 3.7 11/11/2021     11/11/2021    CO2 24 11/11/2021    BUN 23 11/11/2021    CREATININE 1.6 11/11/2021    GFRAA 53 11/11/2021    LABGLOM 53 11/11/2021    GLUCOSE 161 11/11/2021    PROT 7.7 11/09/2021    LABALBU 3.4 11/09/2021    CALCIUM 7.6 11/11/2021    BILITOT 0.8 11/09/2021    ALKPHOS 90 11/09/2021    AST 36 11/09/2021    ALT 17 11/09/2021     Magnesium:    Lab Results   Component Value Date    MG 2.1 11/09/2021     LDH:    Lab Results Component Value Date     11/11/2021     PT/INR:    Lab Results   Component Value Date    PROTIME 15.0 10/28/2021    INR 1.4 10/28/2021     Last 3 Troponin:    Lab Results   Component Value Date    TROPONINI <0.01 05/08/2021    TROPONINI <0.01 04/28/2021    TROPONINI <0.01 04/02/2021     ABG:    Lab Results   Component Value Date    PH 7.427 11/12/2021    PCO2 38.1 11/12/2021    PO2 84.4 11/12/2021    HCO3 24.6 11/12/2021    BE 0.3 11/12/2021    O2SAT 95.0 11/12/2021     IRON:    Lab Results   Component Value Date    IRON 95 11/05/2020     IMAGING    XR CHEST PORTABLE    Result Date: 11/9/2021  EXAMINATION: ONE XRAY VIEW OF THE CHEST 11/9/2021 3:18 am COMPARISON: Exam performed earlier today at 0117 hours HISTORY: [address]: tube adjusted TECHNOLOGIST PROVIDED HISTORY: Reason for exam:->tube adjusted What reading provider will be dictating this exam?->CRC FINDINGS: EKG leads overlie the chest.  The tracheostomy tube tip is difficult to identify but is likely near the level of the ellie. A nasogastric tube terminates in the mid stomach. No pneumothorax. There has been some interval improvement in aeration of the right lung especially superiorly. Otherwise, right greater than left parenchymal infiltrates persist.  No other interval change. Improving aeration of the right lung. Tip of tracheostomy tube is difficult to identify but favored to be located near the level of the ellie. Consider retracting approximately 2 cm. Parenchymal infiltrates which may represent multifocal pneumonia. Continued follow-up recommended. The findings were sent to the Radiology Results [id number] at 3:30 am on 11/9/2021to be communicated to a licensed caregiver.      XR CHEST PORTABLE    Result Date: 11/9/2021  EXAMINATION: ONE XRAY VIEW OF THE CHEST 11/9/2021 1:30 am COMPARISON: Exam performed earlier today at 0026 hours HISTORY: ORDERING SYSTEM PROVIDED HISTORY: Post tube TECHNOLOGIST PROVIDED HISTORY: Reason for exam:->Post tube What reading provider will be dictating this exam?->CRC FINDINGS: Patient is rotated to the left. EKG leads overlie the chest.  A tracheostomy tube is again identified. The tip of this tube is likely partially obscured by an overlying EKG lead but is thought to extend into the left mainstem bronchus. Interval development of a centrally complete opacification of the right lung. Patchy infiltrates in the left lung appear similar to previous. No pneumothorax or other change. Tracheostomy tube appears to extend into the left mainstem bronchus and should be retracted approximately 5 cm. Complete opacification of the right hemithorax likely due to combination of atelectasis and infiltrates. Patchy parenchymal infiltrates in the left lung are similar to previous. The findings were sent to the Radiology Results [id number] at 1:44 am on 11/9/2021to be communicated to a licensed caregiver. XR CHEST PORTABLE    Result Date: 11/9/2021  EXAMINATION: ONE XRAY VIEW OF THE CHEST 11/8/2021 11:47 pm COMPARISON: October 28 HISTORY: ORDERING SYSTEM PROVIDED HISTORY: Hypoxia TECHNOLOGIST PROVIDED HISTORY: Reason for exam:->Hypoxia What reading provider will be dictating this exam?->CRC FINDINGS: Prominent increasing bilateral airspace disease predominantly in the mid to lower lung zones consistent with multifocal pneumonia. The heart is not enlarged. Patient is significantly rotated to the left. Tracheostomy cannula remains in place. Evaluation limited by technique. Prominent increase in bilateral airspace disease predominantly in the mid to lower lung zones consistent with multifocal pneumonia. Limited examination.      XR CHEST PORTABLE    Result Date: 10/28/2021  EXAMINATION: ONE XRAY VIEW OF THE CHEST 10/28/2021 1:29 pm COMPARISON: Comparison is dated August 16, 2021 HISTORY: ORDERING SYSTEM PROVIDED HISTORY: sob TECHNOLOGIST PROVIDED HISTORY: Reason for hours    Medications:    Scheduled Meds:   ascorbic acid  500 mg Per NG tube Daily    busPIRone  15 mg Per NG tube TID    losartan  100 mg Per NG tube Daily    polyethylene glycol  17 g Per NG tube Daily    Vitamin D  1,000 Units Per NG tube Daily    zinc sulfate  50 mg Per NG tube Daily    metoprolol        pantoprazole  40 mg IntraVENous Daily    And    sodium chloride (PF)  10 mL IntraVENous Daily    ipratropium-albuterol  1 ampule Inhalation Q4H WA    budesonide  0.5 mg Nebulization BID    Arformoterol Tartrate  15 mcg Nebulization BID    sodium chloride flush  5-40 mL IntraVENous 2 times per day       Continuous Infusions:   heparin (PORCINE) Infusion 9.6 Units/kg/hr (11/11/21 1153)    dextrose      fentaNYL 5 mcg/ml in 0.9%  ml infusion 200 mcg/hr (11/12/21 7972)    sodium chloride      midazolam 10 mg/hr (11/11/21 0692)       PRN Meds:acetaminophen **OR** acetaminophen, heparin (porcine), heparin (porcine), hydrALAZINE, perflutren lipid microspheres, glucose, dextrose, glucagon (rDNA), dextrose, midazolam, fentanNYL, labetalol, sodium chloride flush, sodium chloride, ondansetron **OR** ondansetron    A/P:      Patient Active Problem List   Diagnosis    Hyperlipidemia    Type 2 diabetes mellitus without complication, without long-term current use of insulin (HCC)    Atypical chest pain    Essential hypertension    Chronic acquired lymphedema    Aortic valve disease    Morbid obesity due to excess calories (HCC)    Abdominal pain    Acute respiratory failure with hypoxia (HCC)    Acute pulmonary edema (HCC)    Congestive heart failure with LV diastolic dysfunction, NYHA class 3 (HCC)    HANS (obstructive sleep apnea)    Acquired hypothyroidism    Chronic diastolic heart failure (HCC)    Nonrheumatic aortic valve stenosis    ACE-inhibitor cough    Pneumonia due to organism    Acute gout of right knee    Bilateral pulmonary embolism (HCC)    Anxiety    Tracheostomy dependence (HCC)    Shortness of breath    H/O deep venous thrombosis    SOB (shortness of breath)    Headache, unspecified headache type    Acute on chronic respiratory failure with hypoxia (HCC)    Tracheostomy complication (HCC)    Respiratory distress    Chronic anticoagulation    COVID-19        PLAN:  PULM TOILET  ? BRONCH  ATBX PER [facility]  ENT FOR TRACH  Give prn hydralazine if bp>160  RESTART COREG  I can be reached though [employer] [facility] or [facility] [place] at [phone]

## 2021-11-12 NOTE — PROGRESS NOTES
Spoke with ICU resident regarding patient's status. Per resident, patient underwent bronchoscopy earlier this morning with improvement in his O2 saturations. They did confirm during bronchoscopy that his ETT was close to the ellie, however the tube was not withdrawn. Given that patient just underwent bronchoscopy, there is no need for ENT to re-scope at this time. Recommend ICU staff withdraw the tube approximately 2 cm and continue to work towards optimizing vent settings prior to OR trach exchange on Monday.      Electronically signed by Tien Stafford DO on 11/12/2021 at 2:49 PM

## 2021-11-12 NOTE — PROGRESS NOTES
[address] [facility]  Department of Internal Medicine   Internal Medicine Residency   MICU Progress Note    Patient:  Van Salter 64 y.o. male  MRN: [id number]     Date of Service: 11/12/2021    Allergy: Bee venom and Shellfish-derived products    Subjective     Patient was seen and examined at bedside this AM. Sedated with Fentanyl 150 and Versed 10mg/hr. Saturating 91% on ACVC/20/500/10/100%  ABG 7.42/38.1/84/24   Late morning patient desaturate to 70s. BP increased to 213/100s. Bedside bronchoscopy showed no significant mucus plug. Increased PEEP to 18, VT down to 480. Increase sedation. Patient improves on oxygenation  CXR stable compared to yesterday    Objective     VS: BP (!) 106/52   Pulse 85   Temp 100.6 °F (38.1 °C) (Axillary)   Resp 18   Ht 5' 10\" (1.778 m)   Wt 299 lb 2.6 oz (135.7 kg)   SpO2 92%   BMI 42.93 kg/m²           I & O - 24hr:     Intake/Output Summary (Last 24 hours) at 11/12/2021 1320  Last data filed at 11/12/2021 1200  Gross per 24 hour   Intake 3687 ml   Output 1420 ml   Net 2267 ml       Physical Exam:  · General Appearance: appears stated age, mild distress, uncooperative and Sedated  · Neck: no adenopathy, no carotid bruit, no JVD, supple, symmetrical, trachea midline, thyroid not enlarged, symmetric, no tenderness/mass/nodules and ET tube through stoma, no active bleeding  · Lung: clear to auscultation bilaterally  · Heart: regular rate and rhythm, S1, S2 normal, no murmur, click, rub or gallop  · Abdomen: soft, non-tender; bowel sounds normal; no masses,  no organomegaly  · Extremities:  extremities normal, atraumatic, no cyanosis or edema  · Musculoskeletal: No joint swelling, no muscle tenderness. ROM normal in all joints of extremities.    · Neurologic: Mental status: alertness: obtunded, orientation: none    Lines     site day    Art line   None    TLC L Fem 11/09   PICC None    Hemoaccess None    Oxygen:     N/A  Mechanical Ventilation:   Mode: A/C    TV:480 ml RR: 24  PEEP [id number]  FiO2 100    ABG:     Lab Results   Component Value Date    PH 7.298 11/12/2021    PCO2 58.2 11/12/2021    PO2 44.2 11/12/2021    CIR3GYR 26.1 11/09/2020    HCO3 27.9 11/12/2021    BE 0.6 11/12/2021    THB 10.6 11/12/2021    O2SAT 68.9 11/12/2021        Medications     Infusions: (Fluid, Sedation, Vasopressors)  IVF:    N/A     Vasopressors   N/A  Sedation       Nutrition:   NG/OG tube TF type: Pulmocare/Nephro/Glucerna/Jevity        At rate: 10ml/h  ATB:   Antibiotics  Days   None              Skin issues: none  Patient currently has   Urinary cath  Isolation  Restraints  DVT prophylaxis/ GI prophylaxis,    Labs     CBC with Differential:    Lab Results   Component Value Date    WBC 9.3 11/12/2021    RBC 3.44 11/12/2021    HGB 8.3 11/12/2021    HCT 24.4 11/12/2021     11/12/2021    MCV 70.9 11/12/2021    MCH 24.1 11/12/2021    MCHC 34.0 11/12/2021    RDW 19.0 11/12/2021    NRBC 1.0 11/10/2021    SEGSPCT 73 01/26/2014    METASPCT 1.8 11/15/2020    LYMPHOPCT 27.1 11/12/2021    MONOPCT 4.5 11/12/2021    MYELOPCT 0.9 11/15/2020    BASOPCT 0.1 11/12/2021    MONOSABS 0.42 11/12/2021    LYMPHSABS 2.52 11/12/2021    EOSABS 0.14 11/12/2021    BASOSABS 0.01 11/12/2021     Hemoglobin/Hematocrit:    Lab Results   Component Value Date    HGB 8.3 11/12/2021    HCT 24.4 11/12/2021     CMP:    Lab Results   Component Value Date     11/12/2021    K 3.9 11/12/2021     11/12/2021    CO2 23 11/12/2021    BUN 17 11/12/2021    CREATININE 1.6 11/12/2021    GFRAA 53 11/12/2021    LABGLOM 53 11/12/2021    GLUCOSE 146 11/12/2021    PROT 7.7 11/09/2021    LABALBU 3.4 11/09/2021    CALCIUM 7.6 11/12/2021    BILITOT 0.8 11/09/2021    ALKPHOS 90 11/09/2021    AST 36 11/09/2021    ALT 17 11/09/2021     BMP:    Lab Results   Component Value Date     11/12/2021    K 3.9 11/12/2021     11/12/2021    CO2 23 11/12/2021    BUN 17 11/12/2021    LABALBU 3.4 11/09/2021    CREATININE 1.6 11/12/2021    CALCIUM 7.6 11/12/2021    GFRAA 53 11/12/2021    LABGLOM 53 11/12/2021    GLUCOSE 146 11/12/2021     Hepatic Function Panel:    Lab Results   Component Value Date    ALKPHOS 90 11/09/2021    ALT 17 11/09/2021    AST 36 11/09/2021    PROT 7.7 11/09/2021    BILITOT 0.8 11/09/2021    LABALBU 3.4 11/09/2021     Ionized Calcium:  No results found for: IONCA  Magnesium:    Lab Results   Component Value Date    MG 2.1 11/09/2021     Phosphorus:    Lab Results   Component Value Date    PHOS 2.4 11/25/2020     Warfarin PT/INR:  No components found for: Cherre Olp  Last 3 Troponin:    Lab Results   Component Value Date    TROPONINI <0.01 05/08/2021    TROPONINI <0.01 04/28/2021    TROPONINI <0.01 04/02/2021       Imaging Studies:  CXR:     Bilateral perihilar and bibasilar predominant airspace disease appears   unchanged.       Tracheostomy tube terminates near the ellie and may benefit from retraction   of 2-3 cm. Resident's Assessment and Plan     Claudeen Doheny is a 64 y.o. male with past medical history of laryngeal stenosis s/p tracheostomy on 3 to 4 L oxygen at home, COPD, type 2 diabetes mellitus, hypertension, congestive heart failure, depression presented to the hospital with chief complaint of acute respiratory failure.     Assessment:      1. Acute encephalopathy, multifactorial, 2/2 acute on chronic respiratory failure, cardiac arrest.   · Currently sedated with Fentanyl and Versed. Give 1 dose of Vecuronium this AM for better sedation  · Still on vent. Ac/VC/ 480/18/100% FiO2  · Electrolytes wnl. BUN 17.     2. Acute on chronic hypoxic hypercapnicrespiratory failure 2/2 tracheostomy malfunction  · Desaturate today. Adjusted vent settings. · CXR stable. · ENT following. 3. Cardiac arrest 2/2 #2  4. Hx laryngeal stenosis s/p trach   5. COVID -19 since October 28th  6. Hx Bilateral PE 2020, on Eliquis  7. Sinus bradycardia  8.  NANCY stage II likely prerenal 2/2 cardiac arrest. 9. Acute on chronic anemia, 2/2 tracheal bleeding vs dilutional   10. T2DM  11. Hx COPD  12. Hx HANS  13. History of hypertension. 14. History of hyperlipidemia. 15. History of depression  16. Hx alcohol abuse     Plan:     · Continue vent support. Follow repeat ABG. · Gave Laxis 40 mg once. Hydralazine and Labetalol PRN for HTN. Follow BP, Urine output. · Check TSH, Vitamin B12, folate. Daily Thiamine, Folate supplementation  ·  Continue breathing treatment. Vit C and Zinc. Monitor inflammatory marker. Currently not on steroid. Cultures still pending  · Continue Heparin drip   · Follow daily CBC, BMP. CXR. Inflammatory marker q48h. · Watch for alcohol withdrawal symptoms  · POCT glucose x4 daily. Hypoglycemia protocol in place.       Next of Kin/ POA:   Susy Krishnan Child [phone] REL2         Code Status:   Full code      PT/OT: Not warranted at this time  DVT ppx: Heparin  GI ppx: Protonix      Ayana Levin MD, PGY-2    Attending physician: Dr. Geovanny Hernandez

## 2021-11-12 NOTE — PLAN OF CARE
Patient seen to be agitated, asynchronous with the vent, with high peak pressures going upto 55. CXR in the AM did show tracheostomy tube sitting close to the ellie. Spoke to the respiratory therapist, and the consensus per morning team was to leave the tube as it is, to be maneuvered by ENT in the OR, as the patient has a difficult airway with laryngeal stenosis. Repeat CXR shows similar position with recommendation to retract the tube by 2-3cm. Suctioned multiple times, increased sedation, was able to get peak pressures down to 38-40 (where he has been during the daytime), saturating well. No further changes made.

## 2021-11-12 NOTE — PROGRESS NOTES
[place] [address] CARE PROGRESS NOTE    Patient: Erin Marsh  MRN: [id number]  : 1960    Encounter Date: 2021  Encounter Time: 12:56 PM     Date of Admission: .2021 12:13 AM    Consulting Physician:  Primary Care Physician:     Nando Leigh MD     [phone]    Reason for Consultation: Respiratory Failure     Problem List:  Patient Active Problem List   Diagnosis    Hyperlipidemia    Type 2 diabetes mellitus without complication, without long-term current use of insulin ([address]    Atypical chest pain    Essential hypertension    Chronic acquired lymphedema    Aortic valve disease    Morbid obesity due to excess calories ([address]    Abdominal pain    Acute respiratory failure with hypoxia ([address]    Acute pulmonary edema (HCC)    Congestive heart failure with LV diastolic dysfunction, NYHA class 3 ([address]    HANS (obstructive sleep apnea)    Acquired hypothyroidism    Chronic diastolic heart failure (HCC)    Nonrheumatic aortic valve stenosis    ACE-inhibitor cough    Pneumonia due to organism    Acute gout of right knee    Bilateral pulmonary embolism ([address]    Anxiety    Tracheostomy dependence ([address]    Shortness of breath    H/O deep venous thrombosis    SOB (shortness of breath)    Headache, unspecified headache type    Acute on chronic respiratory failure with hypoxia ([address]    Tracheostomy complication ([address]    Respiratory distress    Chronic anticoagulation    COVID-19     SUBJECTIVE: Patient seen and examined. Patient remains on vent with ETT through trach stoma. Vent ACVC 18/480/100/18    HOME MEDICATIONS:  Prior to Admission medications    Medication Sig Start Date End Date Taking?  Authorizing Provider   dexamethasone (DECADRON) 6 MG tablet Take 1 tablet by mouth daily for 10 days 21  Jan Ortega DO   zinc sulfate (ZINCATE) 220 (50 Zn) MG capsule Take 1 capsule by mouth daily 21   Cleave Deal DO Wanda   ascorbic acid (VITAMIN C) 500 MG tablet Take 1 tablet by mouth daily 11/2/21   Amlul Rhodes DO   Cholecalciferol (VITAMIN D) 25 MCG TABS Take 1 tablet by mouth daily 11/2/21   Amlul Rhodes DO   apixaban (ELIQUIS) 5 MG TABS tablet Take 5 mg by mouth 2 times daily    Historical Provider, MD   atorvastatin (LIPITOR) 40 MG tablet Take 40 mg by mouth nightly     Historical Provider, MD   busPIRone (BUSPAR) 15 MG tablet Take 15 mg by mouth 3 times daily    Historical Provider, MD   buPROPion (WELLBUTRIN XL) 150 MG extended release tablet Take 150 mg by mouth every morning    Historical Provider, MD   ALPRAZolam Robbin Patience) 0.5 MG tablet Take 0.5 mg by mouth nightly as needed for Sleep.     Historical Provider, MD   albuterol sulfate HFA (PROVENTIL HFA) 108 (90 Base) MCG/ACT inhaler Inhale 2 puffs into the lungs every 4 hours as needed for Wheezing 5/8/21 5/8/22  Adriana Torrez DO   budesonide (PULMICORT) 0.5 MG/2ML nebulizer suspension Take 2 mLs by nebulization 2 times daily 4/7/21   Dylan Rojas MD   Arformoterol Tartrate (BROVANA) 15 MCG/2ML NEBU Take 1 ampule by nebulization 2 times daily    Historical Provider, MD       CURRENT MEDICATIONS:  Current Facility-Administered Medications: carvedilol (COREG) tablet 12.5 mg, 12.5 mg, PEG Tube, Q12H  midazolam PF (VERSED) injection 3 mg, 3 mg, IntraVENous, Once  acetaminophen (TYLENOL) tablet 650 mg, 650 mg, Per NG tube, Q6H PRN **OR** acetaminophen (TYLENOL) suppository 650 mg, 650 mg, Rectal, Q6H PRN  ascorbic acid (VITAMIN C) tablet 500 mg, 500 mg, Per NG tube, Daily  busPIRone (BUSPAR) tablet 15 mg, 15 mg, Per NG tube, TID  [Held by provider] losartan (COZAAR) tablet 100 mg, 100 mg, Per NG tube, Daily  polyethylene glycol (GLYCOLAX) packet 17 g, 17 g, Per NG tube, Daily  Vitamin D (CHOLECALCIFEROL) tablet 1,000 Units, 1,000 Units, Per NG tube, Daily  zinc sulfate (ZINCATE) capsule 50 mg, 50 mg, Per NG tube, Daily  heparin (porcine) injection 4,000 Units, 4,000 Units, IntraVENous, PRN  heparin (porcine) injection 2,000 Units, 2,000 Units, IntraVENous, PRN  heparin 25,000 units in dextrose 5% 250 mL (premix) infusion, 5-30 Units/kg/hr, IntraVENous, Continuous  hydrALAZINE (APRESOLINE) injection 10 mg, 10 mg, IntraVENous, Q4H PRN  perflutren lipid microspheres (DEFINITY) injection 1.65 mg, 1.5 mL, IntraVENous, ONCE PRN  glucose (GLUTOSE) 40 % oral gel 15 g, 15 g, Oral, PRN  dextrose 50 % IV solution, 12.5 g, IntraVENous, PRN  glucagon (rDNA) injection 1 mg, 1 mg, IntraMUSCular, PRN  dextrose 5 % solution, 100 mL/hr, IntraVENous, PRN  pantoprazole (PROTONIX) injection 40 mg, 40 mg, IntraVENous, Daily **AND** sodium chloride (PF) 0.9 % injection 10 mL, 10 mL, IntraVENous, Daily  ipratropium-albuterol (DUONEB) nebulizer solution 1 ampule, 1 ampule, Inhalation, Q4H WA  budesonide (PULMICORT) nebulizer suspension 500 mcg, 0.5 mg, Nebulization, BID  midazolam PF (VERSED) injection 2 mg, 2 mg, IntraVENous, Q1H PRN  fentaNYL (SUBLIMAZE) injection 100 mcg, 100 mcg, IntraVENous, Q1H PRN  labetalol (NORMODYNE;TRANDATE) injection 10 mg, 10 mg, IntraVENous, Q4H PRN  fentaNYL 5 mcg/ml in 0.9%  ml infusion, 12.5-200 mcg/hr, IntraVENous, Continuous  Arformoterol Tartrate (BROVANA) nebulizer solution 15 mcg, 15 mcg, Nebulization, BID  sodium chloride flush 0.9 % injection 5-40 mL, 5-40 mL, IntraVENous, 2 times per day  sodium chloride flush 0.9 % injection 5-40 mL, 5-40 mL, IntraVENous, PRN  0.9 % sodium chloride infusion, 25 mL, IntraVENous, PRN  ondansetron (ZOFRAN-ODT) disintegrating tablet 4 mg, 4 mg, Per NG tube, Q8H PRN **OR** ondansetron (ZOFRAN) injection 4 mg, 4 mg, IntraVENous, Q6H PRN  midazolam (VERSED) 100 mg in dextrose 5 % 100 mL infusion, 1-10 mg/hr, IntraVENous, Continuous    ALLERGIES:  Allergies   Allergen Reactions    Bee Venom Anaphylaxis    Shellfish-Derived Products Anaphylaxis     Only crab legs     REVIEW OF SYSTEMS: Unable to Attain given patient status and condition on vent    OBJECTIVE:  PHYSICAL EXAMINATION:     VITAL SIGNS:  BP (!) 106/52   Pulse 85   Temp 100.6 °F (38.1 °C) (Axillary)   Resp 18   Ht 5' 10\" (1.778 m)   Wt 299 lb 2.6 oz (135.7 kg)   SpO2 92%   BMI 42.93 kg/m²   Wt Readings from Last 3 Encounters:   21 299 lb 2.6 oz (135.7 kg)   10/28/21 290 lb (131.5 kg)   21 295 lb (133.8 kg)     Temp Readings from Last 3 Encounters:   21 100.6 °F (38.1 °C) (Axillary)   21 98.1 °F (36.7 °C) (Oral)   21 97.9 °F (36.6 °C) (Infrared)     TMAX:  BP Readings from Last 3 Encounters:   21 (!) 106/52   21 (!) 141/66   21 (!) 151/76     Pulse Readings from Last 3 Encounters:   21 85   21 60   21 78       CURRENT PULSE OXIMETRY: SpO2: 92 %  24HR PULSE OXIMETRY RANGE: SpO2  Av.3 %  Min: 81 %  Max: 97 %  CVP:      ________________________________________________________________________    VENTILATOR SETTINGS (if applicable):         Vent Information  $Ventilation: $Subsequent Day  Skin Assessment: Clean, dry, & intact  Equipment ID: [id number]  Equipment Changed: HME  Vent Type: [id number]  Vent Mode: AC/VC  Vt Ordered: 480 mL  Rate Set: 18 bmp  Peak Flow: 55 L/min  Pressure Support: 0 cmH20  FiO2 : 100 %  SpO2: 92 %  SpO2/FiO2 ratio: 92  Sensitivity: 3  PEEP/CPAP: 18  I Time/ I Time %: 0 s  Humidification Source: Heated wire  Humidification Temp: 37  Humidification Temp Measured: 37  Circuit Condensation: Drained  Additional Respiratory  Assessments  Pulse: 85  Resp: 18  SpO2: 92 %  End Tidal CO2: 55 (%)  Position: Semi-Lockwood's  Humidification Source: Heated wire  Humidification Temp: 37  Circuit Condensation: Drained  Oral Care: Mouth swabbed, Mouth moisturizer, Mouth suctioned  Airway Type: ET  Airway Size: 6  Cuff Pressure (cm H2O):  (mlt)  ETCO2:  Peak Inspiratory Pressure:  End-Inspiratory Plateau Pressure:    ABG:    Recent Labs     21  1124   PH 7.298*   PO2 44.2*   PCO2 58.2*   HCO3 27.9*   BE 0.6   O2SAT 68.9*   METHB 0.4   O2HB 68.4*   COHB 0.3   O2CON 10.2   HHB 30.9*   THB 10.6*     FiO2 : 100 %  I:E Ratio: 1:2.00  ________________________________________________________________________    IV ACCESS:    NUTRITION: Diet NPO  ADULT TUBE FEEDING; Nasogastric; Standard with Fiber; Continuous; 10; Yes; 10; Q 6 hours; 50; 30; Q 4 hours    INTAKE/OUTPUTS:  I/O last 3 completed shifts:   In: 2287 [I.V.:3383; NG/GT:304]  Out: 1425 [Urine:1425]    Intake/Output Summary (Last 24 hours) at 11/12/2021 1256  Last data filed at 11/12/2021 1200  Gross per 24 hour   Intake 3687 ml   Output 1490 ml   Net 2197 ml     General Appearance: Vent to ET tube through trach stoma, in no acute distress   Eyes: pupils equal, round, and reactive to light, conjunctivae normal and sclera anicteric   Neck: neck supple and non tender without mass, no thyromegaly, no thyroid nodules and no cervical adenopathy   Pulmonary/Chest: decreased breath sounds, no accessory muscles of inspiration, no focal wheezes  Cardiovascular: normal rate, regular rhythm, normal S1 and S2, no murmurs, rubs, clicks or gallops, distal pulses intact, no carotid bruits, no murmurs, no gallops, no carotid bruits and no JVD   Abdomen: soft, non-tender, non-distended, normal bowel sounds, no masses or organomegaly   Extremities: no cyanosis, no clubbing, no edema  Neuro: intubated through stoma, sedated    LABS/IMAGING:    CBC:  Lab Results   Component Value Date    WBC 9.3 11/12/2021    HGB 8.3 (L) 11/12/2021    HCT 24.4 (L) 11/12/2021    MCV 70.9 (L) 11/12/2021     11/12/2021    LYMPHOPCT 27.1 11/12/2021    RBC 3.44 (L) 11/12/2021    MCH 24.1 (L) 11/12/2021    MCHC 34.0 11/12/2021    RDW 19.0 (H) 11/12/2021    NEUTOPHILPCT 65.3 11/12/2021    MONOPCT 4.5 11/12/2021    BASOPCT 0.1 11/12/2021    NEUTROABS 6.06 11/12/2021    LYMPHSABS 2.52 11/12/2021    MONOSABS 0.42 11/12/2021    EOSABS 0.14 11/12/2021    BASOSABS 0.01 11/12/2021       Recent Labs 11/12/21  0518 11/11/21  0407 11/10/21  0510   WBC 9.3 7.9 6.9   HGB 8.3* 8.4* 7.8*   HCT 24.4* 25.5* 24.1*   MCV 70.9* 73.3* 75.3*    258 222       BMP:   Recent Labs     11/10/21  0510 11/11/21  0407 11/12/21  0518    138 142   K 4.9 3.7 3.9   CL 99 102 106   CO2 26 24 23   BUN 24* 23 17   CREATININE 1.7* 1.6* 1.6*       MG:   Lab Results   Component Value Date    MG 2.1 11/09/2021     Ca/Phos:   Lab Results   Component Value Date    CALCIUM 7.6 (L) 11/12/2021    PHOS 2.4 (L) 11/25/2020     Amylase: No results found for: AMYLASE  Lipase:   Lab Results   Component Value Date    LIPASE 37 10/28/2021     LIVER PROFILE:   No results for input(s): AST, ALT, LIPASE, BILIDIR, BILITOT, ALKPHOS in the last 72 hours. Invalid input(s): AMYLASE,  ALB    PT/INR: No results for input(s): PROTIME, INR in the last 72 hours. APTT:   Recent Labs     11/11/21  1530 11/11/21 2046 11/12/21 0518   APTT 26.3 35.8* 50.1*       Cardiac Enzymes:  Lab Results   Component Value Date    CKTOTAL 98 10/08/2017    CKMB 1.1 10/08/2017    TROPONINI <0.01 05/08/2021       Hgb A1C:   Lab Results   Component Value Date    LABA1C 6.8 06/15/2020     No results found for: EAG  IVAN:   Lab Results   Component Value Date    IVAN NEGATIVE 10/08/2017     ESR:   Lab Results   Component Value Date    SEDRATE 135 (H) 09/04/2017     CRP:   Lab Results   Component Value Date    CRP 12.1 (H) 11/11/2021     D Dimer:   Lab Results   Component Value Date    DDIMER 8349 11/11/2021     Folate and B12:   Lab Results   Component Value Date    EDYXJOCN86 3538 (H) 11/05/2020   ,   Lab Results   Component Value Date    FOLATE >20.0 11/05/2020       Lactic Acid:   Lab Results   Component Value Date    LACTA 1.3 04/28/2021     Ammonia:   Cortisol:  Thyroid Studies:  Lab Results   Component Value Date    TSH 4.340 (H) 10/27/2020     XR CHEST PORTABLE   Final Result   Bilateral perihilar and bibasilar predominant airspace disease appears   unchanged. Tracheostomy tube terminates near the ellie and may benefit from retraction   of 2-3 cm. XR CHEST PORTABLE   Final Result   Multifocal bilateral airspace disease, not significantly changed. XR CHEST ABDOMEN NG PLACEMENT   Final Result   Nasogastric tube terminates in mid stomach. XR CHEST PORTABLE   Final Result   Right greater than left parenchymal infiltrates are slightly worsened. The tip of the tracheostomy tube is at or just above the level of the ellie   as detailed above. Consider retracting approximately 2 cm. The findings were sent to the Radiology Results [address] at 11:27   pm on 11/11/2021to be communicated to a licensed caregiver. XR CHEST PORTABLE   Final Result   1. Low position for the tip of the tracheostomy tube, towards the origin of   the left main bronchus. The a pull-back of 3 cm is recommended. 2.  Bilateral lung infiltrates compatible with pneumonia. XR ABDOMEN FOR NG/OG/NE TUBE PLACEMENT   Final Result   Enteric tube is in the stomach. XR CHEST PORTABLE   Final Result   Improving aeration of the right lung. Tip of tracheostomy tube is difficult to identify but favored to be located   near the level of the ellie. Consider retracting approximately 2 cm. Parenchymal infiltrates which may represent multifocal pneumonia. Continued   follow-up recommended. The findings were sent to the Radiology Results [address] at 3:30   am on 11/9/2021to be communicated to a licensed caregiver. XR ABDOMEN FOR NG/OG/NE TUBE PLACEMENT   Final Result   Nasogastric tube terminates in the mid stomach. Improving aeration of the right lung. XR CHEST PORTABLE   Final Result   Tracheostomy tube appears to extend into the left mainstem bronchus and   should be retracted approximately 5 cm.       Complete opacification of the right hemithorax likely due to combination of   atelectasis and infiltrates. Patchy parenchymal infiltrates in the left lung are similar to previous. The findings were sent to the Radiology Results [address] at 1:44   am on 11/9/2021to be communicated to a licensed caregiver. XR CHEST PORTABLE   Final Result   Prominent increase in bilateral airspace disease predominantly in the mid to   lower lung zones consistent with multifocal pneumonia. Limited examination. 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  S/p lysis of the posterior glottic stenosis with Kenalog and balloon dilation 9/9/2021  8.) HANS  9.) Obesity  10.) Chronic Lymphedema   11.) H/O B/L Pulmonary Emboli  12.) Chronic Diastolic Heart Failure, EF 65%     H/O respiratory failure: 7/8/2017 pt [facility] after lap cholecystectomy, lap umbilical hernia repair, and lap liver biopsy (fatty liver).  He had been extubated postop but had laryngospasm requiring reintubation, complicated by negative pressure flash pulmonary edema, and required tracheostomy 8/10/2017.  Patient has staph aureus pneumonia and C. difficile colitis. Elizabeth Alcala was then transferred to [facility] hospital where he was weaned off the ventilator and decannulated     PLAN:  1.) [address] 18/480/100/18  2.) versed, fentanyl  3.) pulmicort, brovana, duoneb  4.) s/p COVID infection, no role for decadron, baricitinib, remdesivir  5.) DVT Prophylaxis      - defer possible bronchoscopy to ICU team   - procalcitonin 0.41  - ETT 6.0 through stoma, that will need to be changed to trach prior to vent liberation  - likely LTACH management   - pan culture  - , propofol off at this time    Thank you Tatum Pepper MD very much for allowing me to see this patient in consultation and follow up. Care reviewed with nursing staff, medical and surgical specialty care, primary care and the patient's family as available. Restraints are ordered when the patient can do harm to him/herself by pulling out devices.     Keaton Sheffield MD, M.D.

## 2021-11-12 NOTE — PLAN OF CARE
Problem: Non-Violent Restraints  Goal: No harm/injury to patient while restraints in use  11/12/2021 0109 by Marlynn Claude, RN  Outcome: Met This Shift     Problem: Non-Violent Restraints  Goal: Patient's dignity will be maintained  11/12/2021 0109 by Marlynn Claude, RN  Outcome: Met This Shift     Problem: Non-Violent Restraints  Goal: Removal from restraints as soon as assessed to be safe  11/12/2021 0109 by Marlynn Claude, RN  Outcome: Not Met This Shift

## 2021-11-12 NOTE — PLAN OF CARE
Patient reaches towards ETT/ Trach when unrestrained. B/L soft wrist restraints to remain in place for patient safety.        Problem: Non-Violent Restraints  Goal: Removal from restraints as soon as assessed to be safe  11/11/2021 2239 by Tati Lopez RN  Outcome: Not Met This Shift  11/11/2021 1816 by Kush Atkinson RN  Outcome: Not Met This Shift  11/11/2021 0906 by Kush Atkinson RN  Outcome: Not Met This Shift  Goal: No harm/injury to patient while restraints in use  11/11/2021 2239 by Tati Lopez RN  Outcome: Met This Shift  11/11/2021 1816 by Kush Atkinson RN  Outcome: Met This Shift  11/11/2021 0906 by Kush Atkinson RN  Outcome: Met This Shift  Goal: Patient's dignity will be maintained  11/11/2021 2239 by Tati Lopez RN  Outcome: Met This Shift  11/11/2021 1816 by Kush Atkinson RN  Outcome: Met This Shift  11/11/2021 0906 by Kush Atkinson RN  Outcome: Met This Shift

## 2021-11-12 NOTE — PROGRESS NOTES
Rt notified of decreased o2 saturation. Patient 02 sat increases to mid 80s with suction. Suctioned for scant amount of secretions, tan. Will continue to monitor.

## 2021-11-12 NOTE — PLAN OF CARE
Problem: OXYGENATION/RESPIRATORY FUNCTION  Goal: Patient will maintain patent airway  Outcome: Met This Shift     Problem: OXYGENATION/RESPIRATORY FUNCTION  Goal: Patient will achieve/maintain normal respiratory rate/effort  Description: Respiratory rate and effort will be within normal limits for the patient  Outcome: Met This Shift     Problem: MECHANICAL VENTILATION  Goal: Patient will maintain patent airway  Outcome: Met This Shift     Problem: MECHANICAL VENTILATION  Goal: Oral health is maintained or improved  Outcome: Met This Shift     Problem: MECHANICAL VENTILATION  Goal: Tracheostomy will be managed safely  Outcome: Met This Shift     Problem: MECHANICAL VENTILATION  Goal: ET tube will be managed safely  Outcome: Met This Shift     Problem: MECHANICAL VENTILATION  Goal: Mobility/activity is maintained at optimum level for patient  Outcome: Met This Shift

## 2021-11-12 NOTE — PROGRESS NOTES
Comprehensive Nutrition Assessment    Type and Reason for Visit:  Initial (New TF)     Nutrition Recommendations/Plan: Continue NPO, Modify Tube Feeding     Rec Diabetic @ 55 ml/hr + 1 protein modular BID. Will provide: 1320 ml tv, 1980 kcals, 109 gm pro (2180 kcals & 161 gm pro w/ mods), 1002 ml free water  Regimen meets 100% est calorie & protein needs    Nutrition Assessment:  Pt admit w/ resp failure 2/2 trach malfunction +[id number] complicated by cardiac arrest. Noted hx DM, CHF, COPD, ETOH abuse, & laryngeal stenosis. EN support initiated- will provide updated TF rec & monitor.     Malnutrition Assessment:  Malnutrition Status:  Insufficient data    Context:  Chronic Illness     Findings of the 6 clinical characteristics of malnutrition:  Energy Intake:  Mild decrease in energy intake  (FAB PTA intakes)  Weight Loss:   (7.7% > 1 year, non-significant)     Body Fat Loss:  Unable to assess (d/t covid iso)     Muscle Mass Loss:  Unable to assess (d/t covid iso)    Fluid Accumulation:  No significant fluid accumulation     Strength:  Not Performed    Estimated Daily Nutrient Needs:  Energy (kcal):  ; 3278-0981; Weight Used for Energy Requirements:  Current     Protein (g):  150-185; Weight Used for Protein Requirements:  Ideal (2.0-2.5 g/kg)        Fluid (ml/day):  per critical care    Nutrition Related Findings:  vent via trach, intermittent hypotension (not on pressor), +I/O's, +1/+2 edema, hypoactive BS, NGT w/ TF      Wounds:  None       Current Nutrition Therapies:    Current Tube Feeding (TF) Orders:  · Feeding Route: Nasogastric  · Formula: Standard with Fiber  · Schedule: Continuous (50 ml/hr (running @25 ml/hr per doc flow))  · Goal TF & Flush Orders Provides: 1200 ml tv, 1800 kcals, 77 gm pro,912 ml free water    Anthropometric Measures:  · Height: 5' 10\" (177.8 cm)  · Current Body Weight: 299 lb (135.6 kg) (11/11 actual)   · Admission Body Weight: 299 lb (135.6 kg) (11/11 first measured)    · Usual Body Weight: 324 lb (147 kg) (10/26/20 EMR actual)     · Ideal Body Weight: 166 lbs; % Ideal Body Weight 180.1 %   · BMI: 42.9 BMI Categories: Obese Class 3 (BMI 40.0 or greater)       Nutrition Diagnosis:   · Inadequate oral intake related to impaired respiratory function as evidenced by NPO or clear liquid status due to medical condition, intubation, nutrition support - enteral nutrition    Nutrition Interventions:   Nutrition Education/Counseling:  Education not appropriate   Coordination of Nutrition Care:  Continue to monitor while inpatient    Goals:  Pt to tolerate TF at goal rate       Nutrition Monitoring and Evaluation:   Food/Nutrient Intake Outcomes:  Enteral Nutrition Intake/Tolerance  Physical Signs/Symptoms Outcomes:  Nutrition Focused Physical Findings, Skin, Weight, Biochemical Data, GI Status, Fluid Status or Edema, Hemodynamic Status     Discharge Planning:     Too soon to determine     Electronically signed by Carrie Álvarez RD, LD on 11/12/21 at 2:28 PM EST    Contact: Ext [phone]

## 2021-11-13 NOTE — PROGRESS NOTES
[address] [facility]  Department of Internal Medicine   Internal Medicine Residency   MICU Progress Note    Patient:  Merary Cisneros 64 y.o. male  MRN: [id number]     Date of Service: 11/13/2021    Allergy: Bee venom and Shellfish-derived products    Subjective     · Patient was seen and examined at bedside this morning. · He was noted to have a desaturation episode in 80s overnight which resolved with breathing treatments. · Remains maxed on fentanyl and Versed  · Vent settings: AC/VC 18/480/18/100  · Awaiting possible trach exchange on Monday per ENT    Objective     VS: BP (!) 114/53   Pulse 81   Temp 99.5 °F (37.5 °C) (Axillary)   Resp 18   Ht 5' 10\" (1.778 m)   Wt 299 lb 2.6 oz (135.7 kg)   SpO2 95%   BMI 42.93 kg/m²           I & O - 24hr:     Intake/Output Summary (Last 24 hours) at 11/13/2021 1403  Last data filed at 11/13/2021 0600  Gross per 24 hour   Intake 863 ml   Output 515 ml   Net 348 ml       Physical Exam:  · General Appearance: appears stated age, mild distress, uncooperative and Sedated  · Neck: no adenopathy, no carotid bruit, no JVD, supple, symmetrical, trachea midline, thyroid not enlarged, symmetric, no tenderness/mass/nodules and ET tube through stoma, no active bleeding  · Lung: clear to auscultation bilaterally  · Heart: regular rate and rhythm, S1, S2 normal, no murmur, click, rub or gallop  · Abdomen: soft, non-tender; bowel sounds normal; no masses,  no organomegaly  · Extremities:  extremities normal, atraumatic, no cyanosis or edema  · Musculoskeletal: No joint swelling, no muscle tenderness. ROM normal in all joints of extremities.    · Neurologic: Mental status: alertness: obtunded, orientation: none    Lines     site day    Art line   None    TLC L Fem 11/09   PICC None    Hemoaccess None    Oxygen:     N/A  Mechanical Ventilation:   Mode: A/C    TV:480 ml RR: 24  PEEP [id number]  FiO2 100    ABG:     Lab Results   Component Value Date    PH 7.298 11/12/2021    PCO2 58.2 11/12/2021    PO2 44.2 11/12/2021    [id number] 26.1 11/09/2020    HCO3 27.9 11/12/2021    BE 0.6 11/12/2021    THB 10.6 11/12/2021    O2SAT 68.9 11/12/2021        Medications     Infusions: (Fluid, Sedation, Vasopressors)  IVF:    N/A     Vasopressors   N/A  Sedation       Nutrition:   NG/OG tube TF type: Pulmocare/Nephro/Glucerna/Jevity        At rate: 10ml/h  ATB:   Antibiotics  Days   None              Skin issues: none  Patient currently has   Urinary cath  Isolation  Restraints  DVT prophylaxis/ GI prophylaxis    Labs     CBC with Differential:    Lab Results   Component Value Date    WBC 9.7 11/13/2021    RBC 3.61 11/13/2021    HGB 8.5 11/13/2021    HCT 26.8 11/13/2021     11/13/2021    MCV 74.2 11/13/2021    MCH 23.5 11/13/2021    MCHC 31.7 11/13/2021    RDW 19.1 11/13/2021    NRBC 1.0 11/10/2021    SEGSPCT 73 01/26/2014    METASPCT 1.8 11/15/2020    LYMPHOPCT 19.2 11/13/2021    MONOPCT 3.8 11/13/2021    MYELOPCT 0.9 11/15/2020    BASOPCT 0.1 11/13/2021    MONOSABS 0.37 11/13/2021    LYMPHSABS 1.87 11/13/2021    EOSABS 0.14 11/13/2021    BASOSABS 0.01 11/13/2021     Hemoglobin/Hematocrit:    Lab Results   Component Value Date    HGB 8.5 11/13/2021    HCT 26.8 11/13/2021     CMP:    Lab Results   Component Value Date     11/13/2021    K 4.2 11/13/2021     11/13/2021    CO2 25 11/13/2021    BUN 18 11/13/2021    CREATININE 1.6 11/13/2021    GFRAA 53 11/13/2021    LABGLOM 53 11/13/2021    GLUCOSE 167 11/13/2021    PROT 7.7 11/09/2021    LABALBU 3.4 11/09/2021    CALCIUM 8.2 11/13/2021    BILITOT 0.8 11/09/2021    ALKPHOS 90 11/09/2021    AST 36 11/09/2021    ALT 17 11/09/2021     BMP:    Lab Results   Component Value Date     11/13/2021    K 4.2 11/13/2021     11/13/2021    CO2 25 11/13/2021    BUN 18 11/13/2021    LABALBU 3.4 11/09/2021    CREATININE 1.6 11/13/2021    CALCIUM 8.2 11/13/2021    GFRAA 53 11/13/2021    LABGLOM 53 11/13/2021    GLUCOSE 167 11/13/2021     Hepatic Function Panel:    Lab Results   Component Value Date    ALKPHOS 90 11/09/2021    ALT 17 11/09/2021    AST 36 11/09/2021    PROT 7.7 11/09/2021    BILITOT 0.8 11/09/2021    LABALBU 3.4 11/09/2021     Ionized Calcium:  No results found for: IONCA  Magnesium:    Lab Results   Component Value Date    MG 2.3 11/13/2021     Phosphorus:    Lab Results   Component Value Date    PHOS 3.8 11/13/2021     Warfarin PT/INR:  No components found for: Tatiana Soto  Last 3 Troponin:    Lab Results   Component Value Date    TROPONINI <0.01 05/08/2021    TROPONINI <0.01 04/28/2021    TROPONINI <0.01 04/02/2021       Imaging Studies:  CXR:     Bilateral perihilar and bibasilar predominant airspace disease appears   unchanged.       Tracheostomy tube terminates near the ellie and may benefit from retraction   of 2-3 cm. Resident's Assessment and Plan     Dl Kuhn is a 64 y.o. male with past medical history of laryngeal stenosis s/p tracheostomy on 3 to 4 L oxygen at home, COPD, type 2 diabetes mellitus, hypertension, congestive heart failure, depression presented to the hospital with chief complaint of acute respiratory failure.     Assessment:   1. Acute encephalopathy, multifactorial, 2/2 acute on chronic respiratory failure, cardiac arrest  2. Acute on chronic hypoxic hypercapnicrespiratory failure 2/2 tracheostomy malfunction  3. Cardiac arrest 2/2 #2  4. Hx laryngeal stenosis s/p trach   5. COVID -19 since October 28th  6. Hx Bilateral PE 2020, on Eliquis  7. Sinus bradycardia  8. NANCY stage II likely prerenal 2/2 cardiac arrest  9. Acute on chronic anemia, 2/2 tracheal bleeding vs dilutional   10. T2DM  11. Hx COPD  12. Hx HANS  13. History of hypertension  14. History of hyperlipidemia  15. History of depression  16.  Hx alcohol abuse       Plan:   · Continue weaning PEEP and FiO2 as tolerated (goal PEEP < 8, FiO2 < 40)  · Wean sedation as tolerated  · Give Lasix 40 mg IV once today  · Hold off ETT tube retraction until Monday. Plan to do it via bronchoscopic guidance per Dr. Diane Andujar. · Continue hydralazine/labetalol as needed for elevated blood pressure  ·  Continue breathing treatment  · Continue Vit C and Zinc  · Continue monitoring inflammatory markers. Currently not on steroid. Blood/urine cultures negative to date  · Continue Heparin drip   · Follow daily CBC, BMP. CXR. Inflammatory marker q48h. · Follow POCT glucose x4 daily. Hypoglycemia protocol in place. Will add sliding scale as needed.        Next of Kin/ POA:   Erasto Tellez [phone] REL2         Code Status:   Full code      PT/OT: Not warranted at this time  DVT ppx: Heparin  GI ppx: Hari Gaspar MD, PGY-2  Attending physician: Dr. Diane Andujar

## 2021-11-13 NOTE — PLAN OF CARE
Problem: Gas Exchange - Impaired  Goal: Absence of hypoxia  11/13/2021 1152 by Matthias Gil RN  Outcome: Met This Shift  11/12/2021 2346 by Avelino Lucero RN  Outcome: Met This Shift     Problem:  Body Temperature -  Risk of, Imbalanced  Goal: Complications related to the disease process, condition or treatment will be avoided or minimized  11/12/2021 2346 by Avelino Lucero RN  Outcome: Met This Shift     Problem: Isolation Precautions - Risk of Spread of Infection  Goal: Prevent transmission of infection  11/13/2021 1152 by Matthias Gil RN  Outcome: Met This Shift  11/12/2021 2346 by Avelino Lucero RN  Outcome: Met This Shift     Problem: Nutrition Deficits  Goal: Optimize nutritional status  11/12/2021 2346 by Avelino Lucero RN  Outcome: Met This Shift     Problem: Risk for Fluid Volume Deficit  Goal: Maintain normal heart rhythm  11/13/2021 1152 by Matthias Gil RN  Outcome: Met This Shift  11/12/2021 2346 by Avelino Lucero RN  Outcome: Met This Shift  Goal: Maintain absence of muscle cramping  11/13/2021 1152 by Matthias Gil RN  Outcome: Met This Shift  11/12/2021 2346 by Avelino Lucero RN  Outcome: Met This Shift  Goal: Maintain normal serum potassium, sodium, calcium, phosphorus, and pH  11/13/2021 1152 by Matthias Gil RN  Outcome: Met This Shift  11/12/2021 2346 by Avelino Lucero RN  Outcome: Met This Shift     Problem: Fatigue  Goal: Verbalize increase energy and improved vitality  11/12/2021 2346 by Avelino Lucero RN  Outcome: Met This Shift     Problem: Patient Education: Go to Patient Education Activity  Goal: Patient/Family Education  11/12/2021 2346 by Avelino Lucero RN  Outcome: Met This Shift     Problem: Skin Integrity:  Goal: Will show no infection signs and symptoms  Description: Will show no infection signs and symptoms  11/13/2021 1152 by Matthias Gil RN  Outcome: Met This Shift  11/12/2021 2346 by Avelino Lucero RN  Outcome: Met This Shift  Goal: Absence of new skin breakdown  Description: Absence of new skin breakdown  11/13/2021 1152 by Emma Clarke RN  Outcome: Met This Shift  11/12/2021 2346 by Elma Samaniego RN  Outcome: Met This Shift     Problem: Falls - Risk of:  Goal: Will remain free from falls  Description: Will remain free from falls  11/13/2021 1152 by Emma Clarke RN  Outcome: Met This Shift  11/12/2021 2346 by Elma Samaniego RN  Outcome: Met This Shift  Goal: Absence of physical injury  Description: Absence of physical injury  11/13/2021 1152 by Emma Clarke RN  Outcome: Met This Shift  11/12/2021 2346 by Elma Samaniego RN  Outcome: Met This Shift     Problem: Non-Violent Restraints  Goal: No harm/injury to patient while restraints in use  11/13/2021 1152 by Emma Clarke RN  Outcome: Met This Shift  11/12/2021 2346 by Elma Samaniego RN  Outcome: Met This Shift  Goal: Patient's dignity will be maintained  11/13/2021 1152 by Emma Clarke RN  Outcome: Met This Shift  11/12/2021 2346 by Elma Samaniego RN  Outcome: Met This Shift     Problem: OXYGENATION/RESPIRATORY FUNCTION  Goal: Patient will maintain patent airway  11/12/2021 2346 by Elma Samaniego RN  Outcome: Met This Shift  Goal: Patient will achieve/maintain normal respiratory rate/effort  Description: Respiratory rate and effort will be within normal limits for the patient  11/12/2021 2346 by Elma Samaniego RN  Outcome: Met This Shift     Problem: MECHANICAL VENTILATION  Goal: Patient will maintain patent airway  11/12/2021 2346 by Elma Samaniego RN  Outcome: Met This Shift  Goal: Oral health is maintained or improved  11/12/2021 2346 by Elma Samaniego RN  Outcome: Met This Shift  Goal: Tracheostomy will be managed safely  11/12/2021 2346 by Elma Samaniego RN  Outcome: Met This Shift  Goal: ET tube will be managed safely  11/12/2021 2346 by Elma Samaniego RN  Outcome: Met This Shift  Goal: Ability to express needs and understand communication  11/12/2021 2346 by Elma Samaniego RN  Outcome:  Met This Shift  Goal: Mobility/activity is maintained at optimum level for patient  11/12/2021 2346 by Jennifer Bernal RN  Outcome: Met This Shift     Problem: Airway Clearance - Ineffective  Goal: Achieve or maintain patent airway  11/13/2021 1152 by Jn Leal RN  Outcome: Not Met This Shift  11/12/2021 2346 by Jennifer Bernal RN  Outcome: Met This Shift     Problem: Gas Exchange - Impaired  Goal: Promote optimal lung function  11/13/2021 1152 by Jn Leal RN  Outcome: Not Met This Shift  11/12/2021 2346 by Jennifer Bernal RN  Outcome: Met This Shift     Problem: Breathing Pattern - Ineffective  Goal: Ability to achieve and maintain a regular respiratory rate  11/13/2021 1152 by Jn Leal RN  Outcome: Not Met This Shift  11/12/2021 2346 by Jennifer Bernal RN  Outcome: Met This Shift     Problem:  Body Temperature -  Risk of, Imbalanced  Goal: Ability to maintain a body temperature within defined limits  11/13/2021 1152 by Jn Leal RN  Outcome: Not Met This Shift  11/12/2021 2346 by Jennifer Bernal RN  Outcome: Met This Shift  Goal: Will regain or maintain usual level of consciousness  11/13/2021 1152 by Jn Leal RN  Outcome: Not Met This Shift  11/12/2021 2346 by Jennifer Bernal RN  Outcome: Met This Shift     Problem: Loneliness or Risk for Loneliness  Goal: Demonstrate positive use of time alone when socialization is not possible  11/13/2021 1152 by Jn Leal RN  Outcome: Not Met This Shift  11/12/2021 2346 by Jennifer Bernal RN  Outcome: Met This Shift     Problem: Non-Violent Restraints  Goal: Removal from restraints as soon as assessed to be safe  11/13/2021 1152 by Jn Leal RN  Outcome: Not Met This Shift  11/12/2021 2346 by Jennifer Bernal RN  Outcome: Met This Shift

## 2021-11-13 NOTE — PROGRESS NOTES
Subjective:  Pt is resting on vent, case d/w nurse at bedside. He opens eyes to verbal stimuli. Objective:    BP (!) 178/71   Pulse 86   Temp 100 °F (37.8 °C) (Axillary)   Resp 18   Ht 5' 10\" (1.778 m)   Wt 299 lb 2.6 oz (135.7 kg)   SpO2 90%   BMI 42.93 kg/m²   HEENT no adenopathy no bruits  Trach is midline   Heart:  RRR, no murmurs, gallops, or rubs.   Lungs:  Scattered rhonchi   Abd: bowel sounds present, nontender, nondistended, no masses  Extrem:  No clubbing, cyanosis, +edema lower   WBC/Hgb/Hct/Plts:  9.3/8.3/24.4/239 (11/12 9739) basic metabolic panel   Lab Results   Component Value Date     11/12/2021    K 3.9 11/12/2021     11/12/2021    CO2 23 11/12/2021    BUN 17 11/12/2021    CREATININE 1.6 11/12/2021    GLUCOSE 146 11/12/2021    CALCIUM 7.6 11/12/2021        Assessment:    Patient Active Problem List   Diagnosis    Hyperlipidemia    Type 2 diabetes mellitus without complication, without long-term current use of insulin (HCC)    Atypical chest pain    Essential hypertension    Chronic acquired lymphedema    Aortic valve disease    Morbid obesity due to excess calories (HCC)    Abdominal pain    Acute respiratory failure with hypoxia (HCC)    Acute pulmonary edema (HCC)    Congestive heart failure with LV diastolic dysfunction, NYHA class 3 (HCC)    HANS (obstructive sleep apnea)    Acquired hypothyroidism    Chronic diastolic heart failure (HCC)    Nonrheumatic aortic valve stenosis    ACE-inhibitor cough    Pneumonia due to organism    Acute gout of right knee    Bilateral pulmonary embolism (HCC)    Anxiety    Tracheostomy dependence (HCC)    Shortness of breath    H/O deep venous thrombosis    SOB (shortness of breath)    Headache, unspecified headache type    Acute on chronic respiratory failure with hypoxia (HCC)    Tracheostomy complication (HCC)    Respiratory distress    Chronic anticoagulation    COVID-19       Plan:  Critical care  Attempt to wean, full vent support at this time  Treatment for Silvano Draper DO  6:38 AM  11/13/2021

## 2021-11-13 NOTE — FLOWSHEET NOTE
Patient reaches for and pulls on vital lines during assessment despite redirection and education. Bilateral soft wrist restraints will be maintained for patient safety. Will continue to follow.

## 2021-11-13 NOTE — PLAN OF CARE
Problem: Falls - Risk of:  Goal: Will remain free from falls  Description: Will remain free from falls  11/13/2021 1658 by Shaunna Ware RN  Outcome: Met This Shift     Problem: Falls - Risk of:  Goal: Absence of physical injury  Description: Absence of physical injury  11/13/2021 1658 by Shaunna Ware RN  Outcome: Met This Shift     Problem: Non-Violent Restraints  Goal: No harm/injury to patient while restraints in use  11/13/2021 1658 by Shaunna Ware RN  Outcome: Met This Shift     Problem: Non-Violent Restraints  Goal: Patient's dignity will be maintained  11/13/2021 1658 by Shaunna Ware RN  Outcome: Met This Shift     Problem: Non-Violent Restraints  Goal: Removal from restraints as soon as assessed to be safe  11/13/2021 1658 by Shaunna Ware RN  Outcome: Not Met This Shift

## 2021-11-14 NOTE — PLAN OF CARE
Problem:  Body Temperature -  Risk of, Imbalanced  Goal: Complications related to the disease process, condition or treatment will be avoided or minimized  Outcome: Met This Shift     Problem: Risk for Fluid Volume Deficit  Goal: Maintain normal heart rhythm  11/14/2021 1712 by Tatiana Herbert RN  Outcome: Met This Shift  11/14/2021 1044 by Tatiana Herbert RN  Outcome: Met This Shift  Goal: Maintain absence of muscle cramping  11/14/2021 1712 by Tatiana Herbert RN  Outcome: Met This Shift  11/14/2021 1044 by Tatiana Herbert RN  Outcome: Met This Shift  Goal: Maintain normal serum potassium, sodium, calcium, phosphorus, and pH  11/14/2021 1712 by Tatiana Herbert RN  Outcome: Met This Shift  11/14/2021 1044 by Tatiana Herbert RN  Outcome: Met This Shift     Problem: Skin Integrity:  Goal: Will show no infection signs and symptoms  Description: Will show no infection signs and symptoms  11/14/2021 1712 by Tatiana Herbert RN  Outcome: Met This Shift  11/14/2021 1044 by Tatiana Herbert RN  Outcome: Met This Shift  Goal: Absence of new skin breakdown  Description: Absence of new skin breakdown  11/14/2021 1712 by Tatiana Herbert RN  Outcome: Met This Shift  11/14/2021 1044 by Tatiana Herbert RN  Outcome: Met This Shift     Problem: Falls - Risk of:  Goal: Will remain free from falls  Description: Will remain free from falls  11/14/2021 1712 by Tatiana Herbert RN  Outcome: Met This Shift  11/14/2021 1044 by Tatiana Herbert RN  Outcome: Met This Shift  Goal: Absence of physical injury  Description: Absence of physical injury  11/14/2021 1712 by Tatiana Herbert RN  Outcome: Met This Shift  11/14/2021 1044 by Tatiana Herbert RN  Outcome: Met This Shift     Problem: Non-Violent Restraints  Goal: No harm/injury to patient while restraints in use  11/14/2021 1712 by Tatiana Herbert RN  Outcome: Met This Shift  11/14/2021 1044 by Tatiana Herbert RN  Outcome: Met This Shift  Goal: Patient's dignity

## 2021-11-14 NOTE — PLAN OF CARE
Problem: Body Temperature -  Risk of, Imbalanced  Goal: Complications related to the disease process, condition or treatment will be avoided or minimized  Outcome: Met This Shift     Problem: Risk for Fluid Volume Deficit  Goal: Maintain normal heart rhythm  Outcome: Met This Shift  Goal: Maintain absence of muscle cramping  Outcome: Met This Shift  Goal: Maintain normal serum potassium, sodium, calcium, phosphorus, and pH  Outcome: Met This Shift     Problem: Skin Integrity:  Goal: Will show no infection signs and symptoms  Description: Will show no infection signs and symptoms  Outcome: Met This Shift  Goal: Absence of new skin breakdown  Description: Absence of new skin breakdown  Outcome: Met This Shift     Problem: Falls - Risk of:  Goal: Will remain free from falls  Description: Will remain free from falls  Outcome: Met This Shift  Goal: Absence of physical injury  Description: Absence of physical injury  Outcome: Met This Shift     Problem: Non-Violent Restraints  Goal: No harm/injury to patient while restraints in use  Outcome: Met This Shift  Goal: Patient's dignity will be maintained  Outcome: Met This Shift     Problem:  Body Temperature -  Risk of, Imbalanced  Goal: Ability to maintain a body temperature within defined limits  Outcome: Not Met This Shift  Goal: Will regain or maintain usual level of consciousness  Outcome: Not Met This Shift     Problem: Non-Violent Restraints  Goal: Removal from restraints as soon as assessed to be safe  Outcome: Not Met This Shift

## 2021-11-14 NOTE — PROGRESS NOTES
[address] [facility]  Department of Internal Medicine   Internal Medicine Residency   MICU Progress Note    Patient:  Trish Soliman 64 y.o. male  MRN: [id number]     Date of Service: 11/14/2021    Allergy: Bee venom and Shellfish-derived products    Subjective     Patient was seen and examined at bedside this morning. Patient is sedated with fentanyl and Versed. Patient awake to pain stimuli. Patient is afebrile. Vent settings AC/VC/18/488/18/100% FiO2  ABG 7.42/40/88/25. Creatinine stable 1.6. I/O: 3.3/2.6 ( +6L)  No acute events overnight. Objective     VS: BP (!) 96/46   Pulse 72   Temp 100.1 °F (37.8 °C) (Axillary)   Resp 18   Ht 5' 10\" (1.778 m)   Wt 299 lb 13.2 oz (136 kg)   SpO2 95%   BMI 43.02 kg/m²           I & O - 24hr:     Intake/Output Summary (Last 24 hours) at 11/14/2021 0912  Last data filed at 11/14/2021 0606  Gross per 24 hour   Intake 3336.7 ml   Output 2545 ml   Net 791.7 ml       Physical Exam:  · General Appearance: appears stated age, mild distress, uncooperative and .  · Neck: no adenopathy, no carotid bruit, no JVD, supple, symmetrical, trachea midline, thyroid not enlarged, symmetric, no tenderness/mass/nodules and ET tube through stoma, no active bleeding  · Lung: clear to auscultation bilaterally  · Heart: regular rate and rhythm, S1, S2 normal, no murmur, click, rub or gallop  · Abdomen: Soft, mildly distended, nontender, no hepatosplenomegaly  · Extremities:  extremities normal, atraumatic, no cyanosis or edema  · Musculoskeletal: No joint swelling, no muscle tenderness. ROM normal in all joints of extremities.    · Neurologic: Mental status: alertness: obtunded, orientation: none    Lines     site day    Art line   None    TLC L Fem 11/09   PICC None    Hemoaccess None    Oxygen:     N/A  Mechanical Ventilation:   Mode: A/C    TV:480 ml RR: 24  PEEP [id number]  FiO2 100    ABG:     Lab Results   Component Value Date    PH 7.421 11/14/2021    PCO2 40.6 11/14/2021 PO2 88.9 11/14/2021    JHT5NSZ 26.1 11/09/2020    HCO3 25.8 11/14/2021    BE 1.3 11/14/2021    THB 8.9 11/14/2021    O2SAT 95.4 11/14/2021        Medications     Infusions: (Fluid, Sedation, Vasopressors)  IVF:    N/A     Vasopressors   N/A  Sedation       Nutrition:   NG/OG tube TF type: Pulmocare/Nephro/Glucerna/Jevity        At rate: 10ml/h  ATB:   Antibiotics  Days   None              Skin issues: none  Patient currently has   Urinary cath  Isolation  Restraints  DVT prophylaxis/ GI prophylaxis,    Labs     CBC with Differential:    Lab Results   Component Value Date    WBC 9.2 11/14/2021    RBC 3.44 11/14/2021    HGB 8.1 11/14/2021    HCT 24.9 11/14/2021     11/14/2021    MCV 72.4 11/14/2021    MCH 23.5 11/14/2021    MCHC 32.5 11/14/2021    RDW 18.8 11/14/2021    NRBC 1.0 11/10/2021    SEGSPCT 73 01/26/2014    METASPCT 1.8 11/15/2020    LYMPHOPCT 27.9 11/14/2021    MONOPCT 5.0 11/14/2021    MYELOPCT 0.9 11/15/2020    BASOPCT 0.1 11/14/2021    MONOSABS 0.46 11/14/2021    LYMPHSABS 2.56 11/14/2021    EOSABS 0.14 11/14/2021    BASOSABS 0.01 11/14/2021     Hemoglobin/Hematocrit:    Lab Results   Component Value Date    HGB 8.1 11/14/2021    HCT 24.9 11/14/2021     CMP:    Lab Results   Component Value Date     11/14/2021    K 4.2 11/14/2021     11/14/2021    CO2 23 11/14/2021    BUN 20 11/14/2021    CREATININE 1.7 11/14/2021    GFRAA 50 11/14/2021    LABGLOM 50 11/14/2021    GLUCOSE 154 11/14/2021    PROT 7.7 11/09/2021    LABALBU 3.4 11/09/2021    CALCIUM 9.1 11/14/2021    BILITOT 0.8 11/09/2021    ALKPHOS 90 11/09/2021    AST 36 11/09/2021    ALT 17 11/09/2021     BMP:    Lab Results   Component Value Date     11/14/2021    K 4.2 11/14/2021     11/14/2021    CO2 23 11/14/2021    BUN 20 11/14/2021    LABALBU 3.4 11/09/2021    CREATININE 1.7 11/14/2021    CALCIUM 9.1 11/14/2021    GFRAA 50 11/14/2021    LABGLOM 50 11/14/2021    GLUCOSE 154 11/14/2021     Hepatic Function Panel: Lab Results   Component Value Date    ALKPHOS 90 11/09/2021    ALT 17 11/09/2021    AST 36 11/09/2021    PROT 7.7 11/09/2021    BILITOT 0.8 11/09/2021    LABALBU 3.4 11/09/2021     Ionized Calcium:  No results found for: IONCA  Magnesium:    Lab Results   Component Value Date    MG 2.3 11/14/2021     Phosphorus:    Lab Results   Component Value Date    PHOS 4.7 11/14/2021     Warfarin PT/INR:  No components found for: Verita Sink  Last 3 Troponin:    Lab Results   Component Value Date    TROPONINI <0.01 05/08/2021    TROPONINI <0.01 04/28/2021    TROPONINI <0.01 04/02/2021       Imaging Studies:  CXR:     Bilateral perihilar and bibasilar predominant airspace disease appears   unchanged.       Tracheostomy tube terminates near the ellie and may benefit from retraction   of 2-3 cm. Resident's Assessment and Plan     Sheron Bradley is a 64 y.o. male with past medical history of laryngeal stenosis s/p tracheostomy on 3 to 4 L oxygen at home, COPD, type 2 diabetes mellitus, hypertension, congestive heart failure, depression presented to the hospital with chief complaint of acute respiratory failure.     Assessment:      1. Acute encephalopathy, multifactorial, 2/2 acute on chronic respiratory failure, cardiac arrest.   2. Acute on chronic hypoxic hypercapnicrespiratory failure 2/2 tracheostomy malfunction  3. Cardiac arrest 2/2 #2  4. Hx laryngeal stenosis s/p trach   5. COVID -19 since October 28th  6. Hx Bilateral PE 2020, on Eliquis  7. Sinus bradycardia  8. NANCY stage II likely prerenal 2/2 cardiac arrest.   9. Acute on chronic anemia, 2/2 tracheal bleeding vs dilutional   10. T2DM  11. Hx COPD  12. Hx HANS  13. History of hypertension. 14. History of hyperlipidemia. 15. History of depression  16. Hx alcohol abuse     Plan:     · Continue vent support. Wean FiO2 and PEEP as tolerated. SAT and SBT daily if tolerated. · Will give Lasix 40 mg x1 today. Hydralazine and Labetalol PRN for HTN. Follow BP, Urine output. · Continue Daily Thiamine, Folate supplementation  ·  Continue breathing treatment. Vit C and Zinc. Monitor inflammatory marker. Currently not on steroid. Cultures still pending  · Continue Heparin drip. Patient is for bronchoscopy on Monday, possible retract ETT  · Follow daily CBC, BMP. CXR. Inflammatory marker q48h. · Watch for alcohol withdrawal symptoms  · POCT glucose x4 daily. Hypoglycemia protocol in place.       Next of Kin/ POA:   Chantelle Delcid Child [phone] REL2         Code Status:   Full code      PT/OT: Not warranted at this time  DVT ppx: Heparin  GI ppx: Protonix      Alfred Shahid MD, PGY-2    Attending physician: Dr. Trice Santiago

## 2021-11-15 NOTE — PROGRESS NOTES
Hemoaccess None    Oxygen:     N/A  Mechanical Ventilation:   Mode: A/C    TV:480 ml RR: 24  PEEP 59dkT3H  FiO2 100    ABG:     Lab Results   Component Value Date    PH 7.402 11/15/2021    PCO2 46.7 11/15/2021    PO2 58.1 11/15/2021    MPK4QDM 26.1 11/09/2020    HCO3 28.4 11/15/2021    BE 3.2 11/15/2021    THB 8.5 11/15/2021    O2SAT 86.1 11/15/2021        Medications     Infusions: (Fluid, Sedation, Vasopressors)  IVF:    N/A     Vasopressors   N/A  Sedation       Nutrition:   NG/OG tube TF type: Pulmocare/Nephro/Glucerna/Jevity        At rate: 10ml/h  ATB:   Antibiotics  Days   Vancomycin 11/15   Zosyn 11/15         Skin issues: none  Patient currently has   Urinary cath  Isolation  Restraints  DVT prophylaxis/ GI prophylaxis,    Labs     CBC with Differential:    Lab Results   Component Value Date    WBC 9.9 11/15/2021    RBC 3.47 11/15/2021    HGB 8.4 11/15/2021    HCT 25.7 11/15/2021     11/15/2021    MCV 74.1 11/15/2021    MCH 24.2 11/15/2021    MCHC 32.7 11/15/2021    RDW 18.5 11/15/2021    NRBC 1.0 11/10/2021    SEGSPCT 73 01/26/2014    METASPCT 1.8 11/15/2020    LYMPHOPCT 20.3 11/15/2021    MONOPCT 3.9 11/15/2021    MYELOPCT 0.9 11/15/2020    BASOPCT 0.3 11/15/2021    MONOSABS 0.39 11/15/2021    LYMPHSABS 2.01 11/15/2021    EOSABS 0.14 11/15/2021    BASOSABS 0.03 11/15/2021     Hemoglobin/Hematocrit:    Lab Results   Component Value Date    HGB 8.4 11/15/2021    HCT 25.7 11/15/2021     CMP:    Lab Results   Component Value Date     11/15/2021    K 4.6 11/15/2021    CL 97 11/15/2021    CO2 24 11/15/2021    BUN 24 11/15/2021    CREATININE 2.1 11/15/2021    GFRAA 39 11/15/2021    LABGLOM 39 11/15/2021    GLUCOSE 177 11/15/2021    PROT 7.7 11/09/2021    LABALBU 3.4 11/09/2021    CALCIUM 8.4 11/15/2021    BILITOT 0.8 11/09/2021    ALKPHOS 90 11/09/2021    AST 36 11/09/2021    ALT 17 11/09/2021     BMP:    Lab Results   Component Value Date     11/15/2021    K 4.6 11/15/2021    CL 97 11/15/2021    CO2 24 11/15/2021    BUN 24 11/15/2021    LABALBU 3.4 11/09/2021    CREATININE 2.1 11/15/2021    CALCIUM 8.4 11/15/2021    GFRAA 39 11/15/2021    LABGLOM 39 11/15/2021    GLUCOSE 177 11/15/2021     Hepatic Function Panel:    Lab Results   Component Value Date    ALKPHOS 90 11/09/2021    ALT 17 11/09/2021    AST 36 11/09/2021    PROT 7.7 11/09/2021    BILITOT 0.8 11/09/2021    LABALBU 3.4 11/09/2021     Ionized Calcium:  No results found for: IONCA  Magnesium:    Lab Results   Component Value Date    MG 2.1 11/15/2021     Phosphorus:    Lab Results   Component Value Date    PHOS 5.0 11/15/2021     Warfarin PT/INR:  No components found for: [employer]  Last 3 Troponin:    Lab Results   Component Value Date    TROPONINI <0.01 05/08/2021    TROPONINI <0.01 04/28/2021    TROPONINI <0.01 04/02/2021       Imaging Studies:  CXR:     Bilateral perihilar and bibasilar predominant airspace disease appears   unchanged.       Tracheostomy tube terminates near the ellie and may benefit from retraction   of 2-3 cm. Resident's Assessment and Plan     Erin Marsh is a 64 y.o. male with past medical history of laryngeal stenosis s/p tracheostomy on 3 to 4 L oxygen at home, COPD, type 2 diabetes mellitus, hypertension, congestive heart failure, depression presented to the hospital with chief complaint of acute respiratory failure.     Assessment:      1. Acute encephalopathy, multifactorial, 2/2 acute on chronic respiratory failure, cardiac arrest.   2. Acute on chronic hypoxic hypercapnicrespiratory failure 2/2 tracheostomy malfunction vs ARDS 2/2 COVID-19  3. Cardiac arrest 2/2 #2  4. Hx laryngeal stenosis s/p trach 2017  5. COVID -19 since October 28th  6. Hx Bilateral PE 2020, on Eliquis  7. NANCY stage II likely prerenal 2/2 cardiac arrest, worsening. 8. Acute on chronic anemia, 2/2 tracheal bleeding vs dilutional, stable  9. T2DM  10. Hx COPD  11. Hx HANS  12.  History of hypertension. 13. History of hyperlipidemia. 14. History of depression  15. Hx alcohol abuse     Plan:     · Repeat ABG. Continue monitor respiratory status. Consider bronchoscopy and need ET tube retract. · Urine output has been decreasing, creatinine trending up to 2.1. We will repeat BMP this afternoon. Renal ultrasound. Consider nephrology consultation if creatinine worsening. · Start Levophed. Monitor blood pressure  ·  Continue breathing treatment. Vit C and Zinc. Monitor inflammatory marker. Currently not on steroid. Cultures still pending  · Continue Heparin drip. Patient is for bronchoscopy. Patient has schedule for stoma repair per ENT. · Follow daily CBC, BMP. CXR. Inflammatory marker q48h. · Continue Daily Thiamine, Folate supplementation  · POCT glucose x4 daily. Hypoglycemia protocol in place. Next of Kin/ POA:   Glenroy Conklin [phone] REL2         Code Status:   Full code      PT/OT: Not warranted at this time  DVT ppx: Heparin  GI ppx: Protonix      Daniel Doe MD, PGY-2    Attending physician: Dr. Mildred Swan  Department of Pulmonary, Critical Care and Sleep Medicine  [facility]  Department of Internal Medicine      During multidisciplinary team rounds Walter Velazquez is a 64 y.o. male was seen, examined and discussed. This is confirmation that I have personally seen and examined the patient and that the key elements of the encounter were performed by me (> 85 % time). The medications & laboratory data was discussed and adjusted where necessary. The radiographic images were reviewed or with radiologist or consultant if felt dis-concordant with the exam or history. The above findings were corroborated, plans confirmed and changes made if needed. Family is updated at the bedside as available. Key issues of the case were discussed among consultants.   Critical Care time is documented if appropriate.       Alix Wiggins DO, FACP, FCCP, [facility],

## 2021-11-15 NOTE — PROGRESS NOTES
not excluded. Micro:  Vent Information  $Ventilation: $Subsequent Day  Skin Assessment: Clean, dry, & intact  Equipment ID: [id number]  Equipment Changed: Humidification  Vent Type: [id number]  Vent Mode: AC/VC  Vt Ordered: 480 mL  Rate Set: 18 bmp  Peak Flow: 70 L/min  Pressure Support: 0 cmH20  FiO2 : 100 %  SpO2: 100 %  SpO2/FiO2 ratio: 100  Sensitivity: 3  PEEP/CPAP: 18  I Time/ I Time %: 0 s  Humidification Source: Heated wire  Humidification Temp: 37  Humidification Temp Measured: 36.9  Circuit Condensation: Drained    Additional Respiratory  Assessments  Pulse: 90  Resp: 18  SpO2: 100 %  End Tidal CO2: 55 (%)  Position: Semi-Lockwood's  Humidification Source: Heated wire  Humidification Temp: 37  Circuit Condensation: Drained  Oral Care: Mouth suctioned  Airway Type: ET (in stoma)  Airway Size: 6  Cuff Pressure (cm H2O):  (MLT)    Objective:   Vitals:   Vitals:    11/15/21 1100   BP: (!) 75/47   Pulse: 90   Resp: 18   Temp: 102.4 °F (39.1 °C)   SpO2: 100%      TEMP:Current: Temp: 102.4 °F (39.1 °C)  Max: Temp  Av.7 °F (38.7 °C)  Min: 99.7 °F (37.6 °C)  Max: 103 °F (39.4 °C)    BP Range: Systolic (77IDI), TUE:840 , Min:74 , XUF:756     Diastolic (43SKN), RVP:94, Min:45, Max:79      General appearance: Sedated paralyzed  In no acute distress  Skin: No rashes or lesions  HEENT: mucous membranes are moist  Neck: No JVD tracheostomy  Lungs: symmetrical expansion, clear to auscultation, no use of accessory muscles  Heart: S1S2 no murmurs,  Abdomen: soft, non tender,   Extremities: no peripheral edema  Neurologic: Sedated  Affect: Calm    Assessment:   Patient Active Problem List:   64 y.o.male who presented with shortness of breath    10/ patient positive for Covid patient was treated with dexamethasone 6 mg every 24 for 1 week   3 presented with shortness of breath saturations were 64% on room air at home.   Attempted to change his trach at home  Chest x-ray prominent lower lung infiltrates  Patient seen by ENT for tracheal revision  11/10 Covid positive  11/12 on fentanyl and Versed drips  11/13 T-max 100  11/14 sedated with fentanyl and Versed  11/15 patient coughing asynchronous on the vent on 100% FiO2 with significant secretions. Patient paralyzed with Nimbex drip T-max 102. Also on fentanyl and Versed. Hypotensive. Chest x-ray showing low position of tip of endotracheal tube persistent bilateral infiltrates    1. Status post arrest  2. Respiratory failure on mechanical ventilation   3. Acute renal failure  4. Covid positive on vitamin cocktail  5. Tracheostomy complication malfunction  6. H/o Laryngeal stenosis posterior glottic stenosis with possible tracheal stenosis with vocal cords in midline laryngoscopy 7/23/2021. S/p lysis of the posterior glottic stenosis with Kenalog and balloon dilation 9/9/2021  7. granulation tissue and tracheostomy  8. Obstructive sleep apnea cured with tracheostomy  9. Anemia microcytic  10. Systolic murmur  11. Chronic trach trach placed 11/12/20    12. chronic diastolic heart failure EF 69% LVH diastolic dysfunction on echo 11/30/2020  13. H/o Bilateral PE 12/9/20 now on heparin drip   14. chronic anemia  15. HANS ( AHI 17 on 4/18/18 requires BIPAP 16/12) noncompliant with CPAP now cured with tracheostomy  16. History of reactive airway disease  17. CT 3/14/2018 showing pulmonary nodule stable from 10/2017  18. Diabetes with elevated blood sugars  19. Obesity  20. chronic lymphedema  21. History of gout on colchicine  22. Hypothyroid  23. H/o respiratory failure: 7/8/2017 pt was  transferred from [facility] for acute respiratory failure after lap cholecystectomy, lap umbilical hernia repair, and lap liver biopsy (fatty liver).  He had been extubated postop but had laryngospasm requiring reintubation, complicated by negative pressure flash pulmonary edema, and required tracheostomy 8/10/2017.  Patient has staph aureus pneumonia and C. difficile colitis.  Patient was then transferred to [facility] hospital where he was weaned off the ventilator and decannulated    Plan:   Wean FiO2  May benefit from being treated for HAP with elevated procalcitonin and fevers  Discussed with ENT complicated airway would wait for oxygenation to improve before any procedures        Robin Crooks MD,[facility]

## 2021-11-15 NOTE — PROGRESS NOTES
Pt still PEEP 18  Will cancel OR today for trach change   Pt with complex airway history including posterior glottic stenosis sp laser and dilation at [facility], not a candidate for laryngotracheal recon per [facility] laryngologist   Once pt recovers from a pulm standpoint and O2/PEEP requirements decrease, will consider OR trach change     Please contact ENT resident with questions    Electronically signed by Moreen Krabbe, DO on 11/15/2021 at 1:05 PM

## 2021-11-15 NOTE — PROGRESS NOTES
Stage  CI HR MAP TPRI SVI   Baseline 2.5 82 55  30   Challenge  2.7 83 41  32   Result (%Ä) 7.0 .1   7.3     Not fluid responsive

## 2021-11-15 NOTE — PROGRESS NOTES
PT SEEN AND EXAMINED. Patient desaturated. cxr noted. Still sedated. on vent. Febrile. Chart reviewed. meds reviewed. D/w nursing + family as available. EXAM: IN GENERAL, NAD. AWAKE AND ALERT. ROS NEGx10 EXCEPT:   BP (!) 122/50   Pulse 97   Temp 101.7 °F (38.7 °C) (Axillary)   Resp 18   Ht 5' 10\" (1.778 m)   Wt 299 lb 13.2 oz (136 kg)   SpO2 (!) 81%   BMI 43.02 kg/m²   GEN: sedated. On vent. HEENT: NCAT. EOMI. JOE  NECK: NO JVD. TRACH MIDLINE. NO BRUITS. NO THYROMEGALY. LUNGS: CTA BL + RHONCHI   GOOD EXCURSION. CV: Regular rate and rhythm, NO Murmurs, Rubs, Or gallops  ABD: Soft. Nontender. Normal bowel sounds. No organomegaly  EXT:No clubbing cyanosis or edema  Neuro: sedated.  Labs/data reviewedLABS: CBC with Differential:    Lab Results   Component Value Date    WBC 9.9 11/15/2021    RBC 3.47 11/15/2021    HGB 8.4 11/15/2021    HCT 25.7 11/15/2021     11/15/2021    MCV 74.1 11/15/2021    MCH 24.2 11/15/2021    MCHC 32.7 11/15/2021    RDW 18.5 11/15/2021    NRBC 1.0 11/10/2021    SEGSPCT 73 01/26/2014    METASPCT 1.8 11/15/2020    LYMPHOPCT 20.3 11/15/2021    MONOPCT 3.9 11/15/2021    MYELOPCT 0.9 11/15/2020    BASOPCT 0.3 11/15/2021    MONOSABS 0.39 11/15/2021    LYMPHSABS 2.01 11/15/2021    EOSABS 0.14 11/15/2021    BASOSABS 0.03 11/15/2021     Platelets:    Lab Results   Component Value Date     11/15/2021     CMP:    Lab Results   Component Value Date     11/15/2021    K 4.6 11/15/2021    CL 97 11/15/2021    CO2 24 11/15/2021    BUN 24 11/15/2021    CREATININE 2.1 11/15/2021    GFRAA 39 11/15/2021    LABGLOM 39 11/15/2021    GLUCOSE 177 11/15/2021    PROT 7.7 11/09/2021    LABALBU 3.4 11/09/2021    CALCIUM 8.4 11/15/2021    BILITOT 0.8 11/09/2021    ALKPHOS 90 11/09/2021    AST 36 11/09/2021    ALT 17 11/09/2021     Magnesium:    Lab Results   Component Value Date    MG 2.1 11/15/2021     LDH:    Lab Results   Component Value Date     11/11/2021     PT/INR:    Lab Results   Component Value Date    PROTIME 15.0 10/28/2021    INR 1.4 10/28/2021     Last 3 Troponin:    Lab Results   Component Value Date    TROPONINI <0.01 05/08/2021    TROPONINI <0.01 04/28/2021    TROPONINI <0.01 04/02/2021     ABG:    Lab Results   Component Value Date    PH 7.381 11/15/2021    PCO2 47.6 11/15/2021    PO2 40.2 11/15/2021    HCO3 27.6 11/15/2021    BE 2.1 11/15/2021    O2SAT 66.0 11/15/2021     IRON:    Lab Results   Component Value Date    IRON 95 11/05/2020     IMAGING    XR CHEST PORTABLE    Result Date: 11/9/2021  EXAMINATION: ONE XRAY VIEW OF THE CHEST 11/9/2021 3:18 am COMPARISON: Exam performed earlier today at 0117 hours HISTORY: [address]: tube adjusted TECHNOLOGIST PROVIDED HISTORY: Reason for exam:->tube adjusted What reading provider will be dictating this exam?->CRC FINDINGS: EKG leads overlie the chest.  The tracheostomy tube tip is difficult to identify but is likely near the level of the ellie. A nasogastric tube terminates in the mid stomach. No pneumothorax. There has been some interval improvement in aeration of the right lung especially superiorly. Otherwise, right greater than left parenchymal infiltrates persist.  No other interval change. Improving aeration of the right lung. Tip of tracheostomy tube is difficult to identify but favored to be located near the level of the ellie. Consider retracting approximately 2 cm. Parenchymal infiltrates which may represent multifocal pneumonia. Continued follow-up recommended. The findings were sent to the Radiology Results [id number] at 3:30 am on 11/9/2021to be communicated to a licensed caregiver.      XR CHEST PORTABLE    Result Date: 11/9/2021  EXAMINATION: ONE XRAY VIEW OF THE CHEST 11/9/2021 1:30 am COMPARISON: Exam performed earlier today at 0026 hours HISTORY: ORDERING SYSTEM PROVIDED HISTORY: Post tube TECHNOLOGIST PROVIDED HISTORY: Reason for exam:->Post tube What reading provider will be dictating this exam?->CRC FINDINGS: Patient is rotated to the left. EKG leads overlie the chest.  A tracheostomy tube is again identified. The tip of this tube is likely partially obscured by an overlying EKG lead but is thought to extend into the left mainstem bronchus. Interval development of a centrally complete opacification of the right lung. Patchy infiltrates in the left lung appear similar to previous. No pneumothorax or other change. Tracheostomy tube appears to extend into the left mainstem bronchus and should be retracted approximately 5 cm. Complete opacification of the right hemithorax likely due to combination of atelectasis and infiltrates. Patchy parenchymal infiltrates in the left lung are similar to previous. The findings were sent to the Radiology Results [id number] at 1:44 am on 11/9/2021to be communicated to a licensed caregiver. XR CHEST PORTABLE    Result Date: 11/9/2021  EXAMINATION: ONE XRAY VIEW OF THE CHEST 11/8/2021 11:47 pm COMPARISON: October 28 HISTORY: ORDERING SYSTEM PROVIDED HISTORY: Hypoxia TECHNOLOGIST PROVIDED HISTORY: Reason for exam:->Hypoxia What reading provider will be dictating this exam?->CRC FINDINGS: Prominent increasing bilateral airspace disease predominantly in the mid to lower lung zones consistent with multifocal pneumonia. The heart is not enlarged. Patient is significantly rotated to the left. Tracheostomy cannula remains in place. Evaluation limited by technique. Prominent increase in bilateral airspace disease predominantly in the mid to lower lung zones consistent with multifocal pneumonia. Limited examination.      XR CHEST PORTABLE    Result Date: 10/28/2021  EXAMINATION: ONE XRAY VIEW OF THE CHEST 10/28/2021 1:29 pm COMPARISON: Comparison is dated August 16, 2021 HISTORY: ORDERING SYSTEM PROVIDED HISTORY: sob TECHNOLOGIST PROVIDED HISTORY: Reason for exam:->sob What reading provider will be dictating this exam?->CRC FINDINGS: The lungs are without acute focal process. There is no effusion or pneumothorax. The cardiomediastinal silhouette is without acute process. The osseous structures are without acute process tracheostomy again noted. No acute process. XR ABDOMEN FOR NG/OG/NE TUBE PLACEMENT    Result Date: 11/9/2021  EXAMINATION: ONE SUPINE XRAY VIEW(S) OF THE ABDOMEN 11/9/2021 1:12 pm COMPARISON: Abdominal radiograph November 9, 2021 HISTORY: ORDERING SYSTEM PROVIDED HISTORY: Confirmation of course of NG/OG/NE tube and location of tip of tube TECHNOLOGIST PROVIDED HISTORY: Reason for exam:->Confirmation of course of NG/OG/NE tube and location of tip of tube Portable? ->Yes What reading provider will be dictating this exam?->CRC FINDINGS: Enteric tube is identified below the diaphragm with the tip in the of stomach. The visualized bowel gas pattern is nonspecific, nonobstructive. Enteric tube is in the stomach. XR ABDOMEN FOR NG/OG/NE TUBE PLACEMENT    Result Date: 11/9/2021  EXAMINATION: ONE SUPINE XRAY VIEW(S) OF THE ABDOMEN 11/9/2021 1:29 am COMPARISON: None. HISTORY: ORDERING SYSTEM PROVIDED HISTORY: Confirmation of course of NG/OG/NE tube and location of tip of tube TECHNOLOGIST PROVIDED HISTORY: Reason for exam:->Confirmation of course of NG/OG/NE tube and location of tip of tube Portable? ->Yes What reading provider will be dictating this exam?->CRC FINDINGS: Nasogastric tube terminates in the mid stomach. Moderate gaseous distention of the stomach. Patchy opacities within the right greater than left lung, apparently improved compared with the portable chest radiograph performed earlier today at 0117 hours. Nasogastric tube terminates in the mid stomach. Improving aeration of the right lung.        DIET:  Diet NPO  ADULT TUBE FEEDING; Nasogastric; Diabetic; Continuous; 10; Yes; 10; Q 6 hours; 55; 30; Q 4 hours; Protein; 1 Proteinex BID via feeding tube    Medications:    Scheduled Meds:   vancomycin  1,500 mg IntraVENous Once    piperacillin-tazobactam  3,375 mg IntraVENous Once    senna  1 tablet Oral BID    polyethylene glycol  17 g Per NG tube BID    midazolam  3 mg IntraVENous Once    folic acid  1 mg IntraVENous Daily    carvedilol  12.5 mg Per NG tube Q12H    thiamine mononitrate  100 mg Per NG tube Daily    ascorbic acid  500 mg Per NG tube Daily    busPIRone  15 mg Per NG tube TID    [Held by provider] losartan  100 mg Per NG tube Daily    Vitamin D  1,000 Units Per NG tube Daily    zinc sulfate  50 mg Per NG tube Daily    pantoprazole  40 mg IntraVENous Daily    And    sodium chloride (PF)  10 mL IntraVENous Daily    ipratropium-albuterol  1 ampule Inhalation Q4H WA    budesonide  0.5 mg Nebulization BID    Arformoterol Tartrate  15 mcg Nebulization BID    sodium chloride flush  5-40 mL IntraVENous 2 times per day       Continuous Infusions:   cisatracurium (NIMBEX) infusion 2 mcg/kg/min (11/15/21 0609)    heparin (PORCINE) Infusion 11.6 Units/kg/hr (11/14/21 1488)    dextrose      fentaNYL 5 mcg/ml in 0.9%  ml infusion 175 mcg/hr (11/15/21 0326)    sodium chloride      midazolam 8 mg/hr (11/14/21 6422)       PRN Meds:acetaminophen **OR** acetaminophen, heparin (porcine), heparin (porcine), hydrALAZINE, perflutren lipid microspheres, glucose, dextrose, glucagon (rDNA), dextrose, midazolam, fentanNYL, labetalol, sodium chloride flush, sodium chloride, ondansetron **OR** ondansetron    A/P:      Patient Active Problem List   Diagnosis    Hyperlipidemia    Type 2 diabetes mellitus without complication, without long-term current use of insulin (HCC)    Atypical chest pain    Essential hypertension    Chronic acquired lymphedema    Aortic valve disease    Morbid obesity due to excess calories (HCC)    Abdominal pain    Acute respiratory failure with hypoxia (HCC)    Acute pulmonary edema (HCC)    Congestive heart failure with LV diastolic dysfunction, NYHA class 3 (HCC)    HANS (obstructive sleep apnea)    Acquired hypothyroidism    Chronic diastolic heart failure (HCC)    Nonrheumatic aortic valve stenosis    ACE-inhibitor cough    Pneumonia due to organism    Acute gout of right knee    Bilateral pulmonary embolism (HCC)    Anxiety    Tracheostomy dependence (HCC)    Shortness of breath    H/O deep venous thrombosis    SOB (shortness of breath)    Headache, unspecified headache type    Acute on chronic respiratory failure with hypoxia (HCC)    Tracheostomy complication (HCC)    Respiratory distress    Chronic anticoagulation    COVID-19        PLAN:  PULM TOILET  ? BRONCH  ATBX PER CCM/ID  ENT FOR TRACH  Give prn hydralazine if bp>160    I can be reached though [employer] or [facility] at [phone]

## 2021-11-15 NOTE — ANESTHESIA PRE PROCEDURE
Department of Anesthesiology  Preprocedure Note       Name:  Dl Kuhn   Age:  64 y.o.  :  1960                                          MRN:  [id number]         Date:  11/15/2021      Surgeon: Odell Villasenor    Procedure: TRACHEOSTOMY TUBE CHANGE    Medications prior to admission:   Prior to Admission medications    Medication Sig Start Date End Date Taking? Authorizing Provider   zinc sulfate (ZINCATE) 220 (50 Zn) MG capsule Take 1 capsule by mouth daily 21   Scott Jones,    ascorbic acid (VITAMIN C) 500 MG tablet Take 1 tablet by mouth daily 21   [facility], DO   Cholecalciferol (VITAMIN D) 25 MCG TABS Take 1 tablet by mouth daily 21   [facility] DO   apixaban (ELIQUIS) 5 MG TABS tablet Take 5 mg by mouth 2 times daily    Historical Provider, MD   atorvastatin (LIPITOR) 40 MG tablet Take 40 mg by mouth nightly     Historical Provider, MD   busPIRone (BUSPAR) 15 MG tablet Take 15 mg by mouth 3 times daily    Historical Provider, MD   buPROPion (WELLBUTRIN XL) 150 MG extended release tablet Take 150 mg by mouth every morning    Historical Provider, MD   ALPRAZolam Maggie Balboa) 0.5 MG tablet Take 0.5 mg by mouth nightly as needed for Sleep. Historical Provider, MD   albuterol sulfate HFA (PROVENTIL HFA) 108 (90 Base) MCG/ACT inhaler Inhale 2 puffs into the lungs every 4 hours as needed for Wheezing 21  Selvin Charles DO   budesonide (PULMICORT) 0.5 MG/2ML nebulizer suspension Take 2 mLs by nebulization 2 times daily 21   Sonido Martin MD   Arformoterol Tartrate (BROVANA) 15 MCG/2ML NEBU Take 1 ampule by nebulization 2 times daily    Historical Provider, MD       Current medications:    No current facility-administered medications for this visit. No current outpatient medications on file.      Facility-Administered Medications Ordered in Other Visits   Medication Dose Route Frequency Provider Last Rate Last Admin    cisatracurium besylate (NIMBEX) 200 mg in sodium chloride 0.9 % 100 mL infusion  0.5-10 mcg/kg/min IntraVENous Continuous Duyen Echevarria MD 10.2 mL/hr at 11/15/21 0920 2.5 mcg/kg/min at 11/15/21 0920    senna (SENOKOT) tablet 8.6 mg  1 tablet Oral BID Milad Masterson MD   8.6 mg at 11/15/21 0912    polyethylene glycol (GLYCOLAX) packet 17 g  17 g Per NG tube BID Milad Masterson MD   17 g at 11/15/21 1034    midazolam PF (VERSED) injection 3 mg  3 mg IntraVENous Once Kathryn Walker MD        folic acid injection 1 mg  1 mg IntraVENous Daily Milad Masterson MD   1 mg at 11/15/21 0914    carvedilol (COREG) tablet 12.5 mg  12.5 mg Per NG tube Q12H Melvin Medeiros MD   12.5 mg at 11/15/21 0913    thiamine mononitrate tablet 100 mg  100 mg Per NG tube Daily Melvin Medeiros MD   100 mg at 11/15/21 0912    acetaminophen (TYLENOL) tablet 650 mg  650 mg Per NG tube Q6H PRN Melvin Medeiros MD   650 mg at 11/15/21 1033    Or    acetaminophen (TYLENOL) suppository 650 mg  650 mg Rectal Q6H PRN Melvin Medeiros MD        ascorbic acid (VITAMIN C) tablet 500 mg  500 mg Per NG tube Daily Melvin Medeiros MD   500 mg at 11/15/21 0912    busPIRone (BUSPAR) tablet 15 mg  15 mg Per NG tube TID Kathryn Walker MD   15 mg at 11/15/21 0913    [Held by provider] losartan (COZAAR) tablet 100 mg  100 mg Per NG tube Daily Melvin Medeiros MD        Vitamin D (CHOLECALCIFEROL) tablet 1,000 Units  1,000 Units Per NG tube Daily Melvin Medeiros MD   1,000 Units at 11/15/21 0912    zinc sulfate (ZINCATE) capsule 50 mg  50 mg Per NG tube Daily Melvin Medeiros MD   50 mg at 11/15/21 0919    heparin (porcine) injection 4,000 Units  4,000 Units IntraVENous PRN Milad Masterson MD        heparin (porcine) injection 2,000 Units  2,000 Units IntraVENous PRN Milad Masterson MD   2,000 Units at 11/15/21 0640    heparin 25,000 units in dextrose 5% 250 mL (premix) infusion  5-30 Units/kg/hr IntraVENous Continuous Mikayla Ours chloride flush 0.9 % injection 5-40 mL  5-40 mL IntraVENous PRN Lorie Lang MD        0.9 % sodium chloride infusion  25 mL IntraVENous PRN Lorie Lang MD        ondansetron (ZOFRAN-ODT) disintegrating tablet 4 mg  4 mg Per NG tube Q8H PRN Lorie Lang MD        Or    ondansetron (ZOFRAN) injection 4 mg  4 mg IntraVENous Q6H PRN Lorie Lang MD        midazolam (VERSED) 100 mg in dextrose 5 % 100 mL infusion  1-10 mg/hr IntraVENous Continuous Ramila Sariah, DO 8 mL/hr at 11/15/21 0740 8 mg/hr at 11/15/21 0740       Allergies:     Allergies   Allergen Reactions    Bee Venom Anaphylaxis    Shellfish-Derived Products Anaphylaxis     Only crab legs       Problem List:    Patient Active Problem List   Diagnosis Code    Hyperlipidemia E78.5    Type 2 diabetes mellitus without complication, without long-term current use of insulin ([facility] E11.9    Atypical chest pain R07.89    Essential hypertension I10    Chronic acquired lymphedema I89.0    Aortic valve disease I35.9    Morbid obesity due to excess calories ([facility]) E66.01    Abdominal pain R10.9    Acute respiratory failure with hypoxia ([facility]) J96.01    Acute pulmonary edema ([facility]) J81.0    Congestive heart failure with LV diastolic dysfunction, NYHA class 3 ([facility]) I50.30    HANS (obstructive sleep apnea) G47.33    Acquired hypothyroidism E03.9    Chronic diastolic heart failure ([facility]) I50.32    Nonrheumatic aortic valve stenosis I35.0    ACE-inhibitor cough R05.8, T46.4X5A    Pneumonia due to organism J18.9    Acute gout of right knee M10.9    Bilateral pulmonary embolism ([facility]) I26.99    Anxiety F41.9    Tracheostomy dependence ([facility]) Z93.0    Shortness of breath R06.02    H/O deep venous thrombosis Z86.718    SOB (shortness of breath) R06.02    Headache, unspecified headache type R51.9    Acute on chronic respiratory failure with hypoxia ([facility]) J96.21    Tracheostomy complication ([facility]) Q35.64    Respiratory distress R06.03    Chronic anticoagulation Z79.01    COVID-19 U07.1       Past Medical History:        Diagnosis Date    Acquired hypothyroidism 2017    Anxiety     Arthritis     Congestive heart failure with LV diastolic dysfunction, NYHA class 3 [facility]     COPD (chronic obstructive pulmonary disease) ([facility]     Depression     [facility]di[facility]     Essential hypertension 2016    Hyperlipidemia     Hypertension     Lymphedema     Obesity     Obstructive sleep apnea 2009    Obstructive sleep apnea 2017    Type 2 diabetes mellitus without complication, without long-term current use of insulin ([facility] 1/15/2014       Past Surgical History:        Procedure Laterality Date    BRONCHOSCOPY  08/10/2017    ECHO COMPL W DOP COLOR FLOW  2013         EXTERNAL EAR SURGERY  2009    keloid left ear    FOOT SURGERY      Left foot    GASTROSTOMY TUBE PLACEMENT  08/10/2017    IR TUNNELED CATHETER PLACEMENT GREATER THAN 5 YEARS  2020    IR TUNNELED CATHETER PLACEMENT GREATER THAN 5 YEARS 2020 Caity Sanchez MD [facility]    TRACHEOSTOMY N/A 2020    TRACHEOTOMY performed by Uzma Pritchard MD at [address] [facility] N/A 2020    OPEN TRACHEOTOMY, BRONCHOSCOPY, DIRECT LARYNGOSCOPY performed by William Conway DO at [address] [facility] N/A 2021    TRACHEOTOMY TUBE CHANGE performed by William Conway DO at [address] [place]  08/10/2017    UPPER GASTROINTESTINAL ENDOSCOPY N/A 2020    EGD ESOPHAGOGASTRODUODENOSCOPY PEG TUBE INSERTION performed by Uzma Pritchard MD at [facility] OR       Social History:    Social History     Tobacco Use    Smoking status: Former Smoker     Packs/day: 1.00     Years: 3.00     Pack years: 3.00     Types: Cigarettes     Quit date: 2001     Years since quittin.7    Smokeless tobacco: Never Used   Substance Use Topics    Alcohol use: Not Currently Counseling given: Not Answered      Vital Signs (Current): There were no vitals filed for this visit. BP Readings from Last 3 Encounters:   11/15/21 (!) 75/47   11/01/21 (!) 141/66   08/16/21 (!) 151/76       NPO Status: TFs held since last evening, 11/14/2021, due to high residuals per ICU RN                                                                              BMI:   Wt Readings from Last 3 Encounters:   11/15/21 (!) 304 lb 10.8 oz (138.2 kg)   10/28/21 290 lb (131.5 kg)   08/16/21 295 lb (133.8 kg)     There is no height or weight on file to calculate BMI.    CBC:   Lab Results   Component Value Date    WBC 9.9 11/15/2021    RBC 3.47 11/15/2021    HGB 8.4 11/15/2021    HCT 25.7 11/15/2021    MCV 74.1 11/15/2021    RDW 18.5 11/15/2021     11/15/2021       CMP:   Lab Results   Component Value Date     11/15/2021    K 4.6 11/15/2021    CL 97 11/15/2021    CO2 24 11/15/2021    BUN 24 11/15/2021    CREATININE 2.1 11/15/2021    GFRAA 39 11/15/2021    LABGLOM 39 11/15/2021    GLUCOSE 177 11/15/2021    PROT 7.7 11/09/2021    CALCIUM 8.4 11/15/2021    BILITOT 0.8 11/09/2021    ALKPHOS 90 11/09/2021    AST 36 11/09/2021    ALT 17 11/09/2021       POC Tests: No results for input(s): POCGLU, POCNA, POCK, POCCL, POCBUN, POCHEMO, POCHCT in the last 72 hours.     Coags:   Lab Results   Component Value Date    PROTIME 15.0 10/28/2021    INR 1.4 10/28/2021    APTT 49.1 11/15/2021       HCG (If Applicable): No results found for: PREGTESTUR, PREGSERUM, HCG, HCGQUANT     ABGs:   Lab Results   Component Value Date    WRM5EKM 26.1 11/09/2020        Type & Screen (If Applicable):  No results found for: LABABO, LABRH    Drug/Infectious Status (If Applicable):  No results found for: HIV, HEPCAB    COVID-19 Screening (If Applicable):   Lab Results   Component Value Date    COVID19 DETECTED 11/10/2021    COVID19 Not Detected 12/14/2020    COVID19 Not (patient takes eliquis at home), and versed gtt    Current vent settings: /100%/18/18  Pt being ventilated by ETT through tracheostomy stoma  Tracheal bleeding vs dilutional   Cardiovascular:  Exercise tolerance: poor (<4 METS),   (+) hypertension:, valvular problems/murmurs: AS, CHF: diastolic, hyperlipidemia      NYHA Classification: III  ECG reviewed  Rhythm: regular  Rate: normal  Echocardiogram reviewed               ROS comment: S/p cardiac arrest 11/09, Patient was doing trach care at home and unable to place inner cannula, hypoxic arrest     Neuro/Psych:   (+) depression/anxiety    (-) psychiatric history           GI/Hepatic/Renal:   (+) morbid obesity          Endo/Other:    (+) DiabetesType II DM, , hypothyroidism (Acquired ), blood dyscrasia: anemia, arthritis (Gout ): OA., .                  ROS comment: EtOH abuse- hx of withdrawal   Chronic lymphedema Abdominal:   (+) obese,           Vascular: negative vascular ROS.  + PVD, aortic or cerebral (Aortic valve disease), DVT (Hx. DVTs, now resolved), PE (12/2020 bilateral PE's resolved). Other Findings:             Anesthesia Plan      general     ASA 4       Induction: intravenous. MIPS: Postoperative opioids intended and Prophylactic antiemetics administered. Anesthetic plan and risks discussed with patient. Plan discussed with CRNA and attending.           Gisele Cortez RN   11/15/2021

## 2021-11-15 NOTE — DEATH NOTES
Called to bedside to pronounce death after patient was noted to be in asystole at 17:05 11/15/2021. NO pupillary, corneal, doll's eye or gag reflex noted. NO heart sounds or pulse noted. NO Chest rise or Lung sounds noted. NO Response to painful stimuli  Time of death declared at 17:10.   Reed Walker MD MD PGY-2  11/15/2021  6:32 PM                                        Attending: Dr. Oliver Jiménez

## 2021-11-15 NOTE — PROGRESS NOTES
Patient noted to have SpO2 of 68% on monitor. Patient was coughing and becoming more asynchronous with the vent. Patient already Fi02 of 100% on ventilator. Patient noted to have significant tracheal secretions which were attempted to suction with red rubber suction. Tracheal tube was cleared of secretions, but patient remains asynchronous with the ventilator and SpO2 remained <70%. Dr. Ana Scott and RT Gely, called to room. Dr. Ana Scott on phone with Dr. Silvia Duarte and received new orders for vecuronium, lasix, and versed. Patient was then bagged by RT Gely. While being bagged the patient would increase his SpO2 to 86%, but would immediately desaturate to the 70s when returned to ventilator control. This occurred several times over the course of 45 mins while Dr. Amina Medina, RT Gely, and this RN remained at bedside. Dr. Silvia Duarte made aware and new ordered received for additional dose of vecuronium and to start nimbex drip with increased patient ventilator compliance. Patient is currently only 82% despite ventilator settings of 18/500/100%/18, Dr. Amina Medina aware and continue to monitor.

## 2021-11-15 NOTE — PLAN OF CARE
Problem: Non-Violent Restraints  Goal: No harm/injury to patient while restraints in use  11/15/2021 0449 by Dm Redding RN  Outcome: Met This Shift     Problem: Non-Violent Restraints  Goal: Patient's dignity will be maintained  11/15/2021 0449 by Dm Redding RN  Outcome: Met This Shift     Problem: Non-Violent Restraints  Goal: Removal from restraints as soon as assessed to be safe  11/15/2021 0449 by Dm Redding RN  Outcome: Not Met This Shift

## 2021-11-15 NOTE — SIGNIFICANT EVENT
CODE STATUS CHANGE    Changed code status to [facility] comfort care arrest after I spoke with patient's spouse Lorne Davison at 3:50pm    Electronically signed by Vin Nguyen MD on 11/15/2021 at 3:58 PM

## 2021-11-18 NOTE — DISCHARGE SUMMARY
Physician Discharge Summary     Patient ID:  Rashard Brennan  [id number]  89 y.o.  1960    Admit date: 2021    Discharge date and time: 11/15/21  Admitting Physician: Remy Moseley MD     Discharge Physician: Remy Moseley MD      Admission Diagnoses: Cardiac arrest [facility]) [I46.9]  Hypercapnemia [R06.89]  Acute pulmonary edema ([address] [J81.0]  Respiratory distress [R06.03]  Chronic anticoagulation [Z79.01]  Airway compromise [J98.8]  Acute on chronic respiratory failure with hypoxia ([address] [J96.21]  Tracheostomy complication, unspecified complication type ([address] [F54.30]  COVID-19 [U07.1]    Discharge Diagnoses:   Cardiac arrest  respiratory failure - acute on chronic with hypercapnia  Covid  tracheostomy complication - with bleeding  CRI stage III      Admission Condition: poor    Discharged Condition: Patient     Indication for Admission: Cardiac arrest ([address] [I46.9]  Hypercapnemia [R06.89]  Acute pulmonary edema ([address] [J81.0]  Respiratory distress [R06.03]  Chronic anticoagulation [Z79.01]  Airway compromise [J98.8]  Acute on chronic respiratory failure with hypoxia (HCC) [J96.21]  Tracheostomy complication, unspecified complication type ([address] [O95.47]  COVID-19 [U07.1]    Hospital Course: Patient was admitted to the ICU close monitoring was carried out. He was seen by consultants. ET tube was inserted in his stoma. Patient was ventilated and sedated. However his condition worsened despite continuing efforts.  on the above date.     Consults: pulmonary/intensive care, ID, nephrology and ENT    Significant Diagnostic Studies: microbiology: blood culture: negative    Lab Results   Component Value Date     11/15/2021    K 5.5 (H) 11/15/2021     11/15/2021    CO2 22 11/15/2021    BUN 29 (H) 11/15/2021    CREATININE 3.0 (H) 11/15/2021    GLUCOSE 186 (H) 11/15/2021    CALCIUM 8.2 (L) 11/15/2021    PROT 6.1 (L) 11/15/2021    LABALBU 2.3 (L) 11/15/2021    BILITOT 1.1 11/15/2021    ALKPHOS 130 (H) 11/15/2021    AST 51 (H) 11/15/2021    ALT 39 11/15/2021    LABGLOM 26 11/15/2021    GFRAA 26 11/15/2021          Lab Results   Component Value Date    WBC 12.1 (H) 11/15/2021    HGB 6.7 (LL) 11/15/2021    HCT 21.6 (L) 11/15/2021    MCV 76.1 (L) 11/15/2021     11/15/2021        XR CHEST PORTABLE    Result Date: 11/15/2021  EXAMINATION: ONE XRAY VIEW OF THE CHEST 11/15/2021 4:43 pm COMPARISON: 11/15/2021 HISTORY: ORDERING SYSTEM PROVIDED HISTORY: hypoxia TECHNOLOGIST PROVIDED HISTORY: Reason for exam:->hypoxia What reading provider will be dictating this exam?->CRC FINDINGS: Increasing right lung infiltrates. Stable left infiltrates. There is no effusion or pneumothorax. The cardiomediastinal silhouette is without acute process. The osseous structures are without acute process. Stable positioning of lines and tubes. Increasing right lung infiltrates. Stable left infiltrates. XR CHEST PORTABLE    Result Date: 11/15/2021  EXAMINATION: ONE XRAY VIEW OF THE CHEST 11/15/2021 8:35 am COMPARISON: November 12-15 same-day performed early at 03:50 a.m. HISTORY: ORDERING SYSTEM PROVIDED HISTORY: Acute respiratory failure TECHNOLOGIST PROVIDED HISTORY: Reason for exam:->Acute respiratory failure What reading provider will be dictating this exam?->CRC FINDINGS: Study done in November 15, at  07:55 a.m.. Tracheostomy tube tip in low position just above the ellie oriented more towards the left main bronchus. Can consider pull-back of the endotracheal tube for about the 2.5 cm to placed it at the level of the arch of the aorta. NG tube projects below the diaphragma, in the area of the body of the stomach. Persistent the bilateral infiltrates of ground-glass density widespread throughout both lungs as seen previously. Heart is upper normal size. No conspicuous pleural effusions are seen. The if pleural effusions present will be minimal. There is no pneumothorax on the right on the left.      1.  Low position for the tip of the tracheostomy tube, close to the ellie. Can consider a pull-back of 2.5 cm. 2.  Persistent the widespread bilateral lung infiltrates. 3.  No significant changes since the recent examinations. XR CHEST PORTABLE    Result Date: 11/15/2021  EXAMINATION: ONE XRAY VIEW OF THE CHEST 11/15/2021 4:02 am COMPARISON: 11/14/2021 HISTORY: ORDERING SYSTEM PROVIDED HISTORY: O2 sat 63% TECHNOLOGIST PROVIDED HISTORY: Reason for exam:->O2 sat 63% What reading provider will be dictating this exam?->CRC FINDINGS: Cardiac monitoring leads project over the chest.  Bilateral infiltrates are unchanged. There is no pneumothorax. Small pleural effusions not excluded. Unchanged bilateral infiltrates. Small pleural effusions not excluded. XR CHEST PORTABLE    Result Date: 11/14/2021  EXAMINATION: ONE X-RAY VIEW OF THE CHEST 11/14/2021 8:14 am COMPARISON: Multiple priors, most recent from yesterday. HISTORY: ORDERING SYSTEM PROVIDED HISTORY: Acute respiratory failure TECHNOLOGIST PROVIDED HISTORY: Reason for exam:->Acute respiratory failure What reading provider will be dictating this exam?->CRC FINDINGS: Heart size is unable to be accurately assessed on this single portable view of the chest, but appears to be stable. Supportive lines and tubes are not significantly changed in position. There is slightly improved aeration of the lungs bilaterally with persistent patchy and hazy bilateral airspace opacities. Difficult to exclude a small left pleural effusion. No pneumothorax. No acute osseous abnormality. Slightly improved aeration of the lungs bilaterally with persistent patchy and hazy bilateral airspace opacities, in keeping with the patient's known diagnosis of COVID pneumonia.      XR CHEST PORTABLE    Result Date: 11/13/2021  EXAMINATION: ONE XRAY VIEW OF THE CHEST 11/13/2021 5:13 am COMPARISON: Chest x-ray 11/12/2021 HISTORY: ORDERING SYSTEM PROVIDED HISTORY: Acute respiratory failure TECHNOLOGIST PROVIDED HISTORY: Reason for exam:->Acute respiratory failure What reading provider will be dictating this exam?->CRC FINDINGS: The ellie is difficult to visualize, however the tracheostomy tube tip appears to terminate between the clavicular heads and the expected location of ellie. Consider retraction of the tracheostomy tube by 2-3 cm. Enteric tube terminates in the stomach. Low lung volumes. Grossly stable bilateral pulmonary consolidation. No evidence of pneumothorax or significant pleural effusion. Stable heart size. Bones are stable. Consider retraction of tracheostomy tube by 2-3 cm. Grossly stable bilateral pulmonary consolidation. XR CHEST PORTABLE    Result Date: 11/12/2021  EXAMINATION: ONE XRAY VIEW OF THE CHEST 11/12/2021 11:16 am COMPARISON: November 12, 2021 HISTORY: ORDERING SYSTEM PROVIDED HISTORY: Acute respiratory failure TECHNOLOGIST PROVIDED HISTORY: Reason for exam:->hypoxic What reading provider will be dictating this exam?->CRC FINDINGS: Tracheostomy tube terminates at the level of the ellie, unchanged. Cardiomediastinal silhouette appears unchanged. Enteric tube remains in satisfactory position. Bilateral perihilar and bibasilar predominant airspace disease. No pneumothorax or subdiaphragmatic free air. Bilateral perihilar and bibasilar predominant airspace disease appears unchanged. Tracheostomy tube terminates near the ellie and may benefit from retraction of 2-3 cm. XR CHEST PORTABLE    Result Date: 11/12/2021  EXAMINATION: ONE XRAY VIEW OF THE CHEST 11/12/2021 8:29 am COMPARISON: November 11, 2021 HISTORY: ORDERING SYSTEM PROVIDED HISTORY: Acute respiratory failure TECHNOLOGIST PROVIDED HISTORY: Reason for exam:->Acute respiratory failure What reading provider will be dictating this exam?->CRC FINDINGS: Tracheostomy and enteric tubes appear in unchanged position. Cardiomediastinal silhouette appears unchanged.   Bilateral perihilar and bibasilar predominant airspace disease redemonstrated. Trace left pleural effusion can not be excluded. No pneumothorax or subdiaphragmatic free air. Multifocal bilateral airspace disease, not significantly changed. XR CHEST PORTABLE    Result Date: 11/11/2021  EXAMINATION: ONE XRAY VIEW OF THE CHEST 11/11/2021 10:18 pm COMPARISON: Exam performed earlier today at 0742 hours HISTORY: ORDERING SYSTEM PROVIDED HISTORY: desaturation TECHNOLOGIST PROVIDED HISTORY: Reason for exam:->desaturation What reading provider will be dictating this exam?->CRC FINDINGS: EKG leads overlie the chest.  Tracheostomy tube again terminates near the level of the ellie. The tip is difficult to visualize but is favored to be approximately 9 mm above the ellie. No pneumothorax. Right greater than left diffuse parenchymal infiltrates appear to be slightly worsened. A nasogastric tube extends into the stomach with the tip out of the field of view. Right greater than left parenchymal infiltrates are slightly worsened. The tip of the tracheostomy tube is at or just above the level of the ellie as detailed above. Consider retracting approximately 2 cm. The findings were sent to the Radiology Results [id number] at 11:27 pm on 11/11/2021to be communicated to a licensed caregiver. XR CHEST PORTABLE    Result Date: 11/11/2021  EXAMINATION: ONE XRAY VIEW OF THE CHEST 11/11/2021 8:29 am COMPARISON: October 28 -  November 9 HISTORY: ORDERING SYSTEM PROVIDED HISTORY: Acute respiratory failure TECHNOLOGIST PROVIDED HISTORY: Reason for exam:->Acute respiratory failure What reading provider will be dictating this exam?->CRC FINDINGS: Study performed in November 11, at 07:43 a.m.. Tracheostomy tube is in low position oriented towards the origin of the left main bronchus. A pull-back of a 3 cm would place the tracheostomy tube tip at the level of the upper contour of the arch of the aorta.  Bilateral infiltrates with consolidations are seen in both lungs towards the mid lower aspect. The heart has borderline size. No conspicuous pleural effusions are seen. There is no signs for perihilar vascular congestion. There is no pneumothorax on the right on the left. NG tube in the area of the stomach. 1.  Low position for the tip of the tracheostomy tube, towards the origin of the left main bronchus. The a pull-back of 3 cm is recommended. 2.  Bilateral lung infiltrates compatible with pneumonia. XR CHEST PORTABLE    Result Date: 11/9/2021  EXAMINATION: ONE XRAY VIEW OF THE CHEST 11/9/2021 3:18 am COMPARISON: Exam performed earlier today at 0117 hours HISTORY: [address]: tube adjusted TECHNOLOGIST PROVIDED HISTORY: Reason for exam:->tube adjusted What reading provider will be dictating this exam?->CRC FINDINGS: EKG leads overlie the chest.  The tracheostomy tube tip is difficult to identify but is likely near the level of the ellie. A nasogastric tube terminates in the mid stomach. No pneumothorax. There has been some interval improvement in aeration of the right lung especially superiorly. Otherwise, right greater than left parenchymal infiltrates persist.  No other interval change. Improving aeration of the right lung. Tip of tracheostomy tube is difficult to identify but favored to be located near the level of the ellie. Consider retracting approximately 2 cm. Parenchymal infiltrates which may represent multifocal pneumonia. Continued follow-up recommended. The findings were sent to the Radiology Results [id number] at 3:30 am on 11/9/2021to be communicated to a licensed caregiver.      XR CHEST PORTABLE    Result Date: 11/9/2021  EXAMINATION: ONE XRAY VIEW OF THE CHEST 11/9/2021 1:30 am COMPARISON: Exam performed earlier today at 0026 hours HISTORY: ORDERING SYSTEM PROVIDED HISTORY: Post tube TECHNOLOGIST PROVIDED HISTORY: Reason for exam:->Post tube What reading provider will be dictating this exam?->CRC FINDINGS: Patient is rotated to the left. EKG leads overlie the chest.  A tracheostomy tube is again identified. The tip of this tube is likely partially obscured by an overlying EKG lead but is thought to extend into the left mainstem bronchus. Interval development of a centrally complete opacification of the right lung. Patchy infiltrates in the left lung appear similar to previous. No pneumothorax or other change. Tracheostomy tube appears to extend into the left mainstem bronchus and should be retracted approximately 5 cm. Complete opacification of the right hemithorax likely due to combination of atelectasis and infiltrates. Patchy parenchymal infiltrates in the left lung are similar to previous. The findings were sent to the Radiology Results [id number] at 1:44 am on 11/9/2021to be communicated to a licensed caregiver. XR CHEST PORTABLE    Result Date: 11/9/2021  EXAMINATION: ONE XRAY VIEW OF THE CHEST 11/8/2021 11:47 pm COMPARISON: October 28 HISTORY: ORDERING SYSTEM PROVIDED HISTORY: Hypoxia TECHNOLOGIST PROVIDED HISTORY: Reason for exam:->Hypoxia What reading provider will be dictating this exam?->CRC FINDINGS: Prominent increasing bilateral airspace disease predominantly in the mid to lower lung zones consistent with multifocal pneumonia. The heart is not enlarged. Patient is significantly rotated to the left. Tracheostomy cannula remains in place. Evaluation limited by technique. Prominent increase in bilateral airspace disease predominantly in the mid to lower lung zones consistent with multifocal pneumonia. Limited examination.      XR CHEST PORTABLE    Result Date: 10/28/2021  EXAMINATION: ONE XRAY VIEW OF THE CHEST 10/28/2021 1:29 pm COMPARISON: Comparison is dated August 16, 2021 HISTORY: ORDERING SYSTEM PROVIDED HISTORY: sob TECHNOLOGIST PROVIDED HISTORY: Reason for exam:->sob What reading provider will be dictating this exam?->CRC FINDINGS: The lungs are without acute focal process. There is no effusion or pneumothorax. The cardiomediastinal silhouette is without acute process. The osseous structures are without acute process tracheostomy again noted. No acute process. XR ABDOMEN FOR NG/OG/NE TUBE PLACEMENT    Result Date: 11/9/2021  EXAMINATION: ONE SUPINE XRAY VIEW(S) OF THE ABDOMEN 11/9/2021 1:12 pm COMPARISON: Abdominal radiograph November 9, 2021 HISTORY: ORDERING SYSTEM PROVIDED HISTORY: Confirmation of course of NG/OG/NE tube and location of tip of tube TECHNOLOGIST PROVIDED HISTORY: Reason for exam:->Confirmation of course of NG/OG/NE tube and location of tip of tube Portable? ->Yes What reading provider will be dictating this exam?->CRC FINDINGS: Enteric tube is identified below the diaphragm with the tip in the of stomach. The visualized bowel gas pattern is nonspecific, nonobstructive. Enteric tube is in the stomach. XR ABDOMEN FOR NG/OG/NE TUBE PLACEMENT    Result Date: 11/9/2021  EXAMINATION: ONE SUPINE XRAY VIEW(S) OF THE ABDOMEN 11/9/2021 1:29 am COMPARISON: None. HISTORY: ORDERING SYSTEM PROVIDED HISTORY: Confirmation of course of NG/OG/NE tube and location of tip of tube TECHNOLOGIST PROVIDED HISTORY: Reason for exam:->Confirmation of course of NG/OG/NE tube and location of tip of tube Portable? ->Yes What reading provider will be dictating this exam?->CRC FINDINGS: Nasogastric tube terminates in the mid stomach. Moderate gaseous distention of the stomach. Patchy opacities within the right greater than left lung, apparently improved compared with the portable chest radiograph performed earlier today at 0117 hours. Nasogastric tube terminates in the mid stomach. Improving aeration of the right lung.      XR CHEST ABDOMEN NG PLACEMENT    Result Date: 11/12/2021  EXAMINATION: ONE SUPINE XRAY VIEW(S) OF THE ABDOMEN 11/12/2021 12:56 am COMPARISON: 11/09/2021 HISTORY: ORDERING SYSTEM PROVIDED HISTORY: ngt reposition TECHNOLOGIST PROVIDED HISTORY: Reason for exam:->ngt reposition What reading provider will be dictating this exam?->CRC FINDINGS: Nasogastric tube has been advanced further into the stomach and terminates in the mid stomach. Nonspecific bowel gas pattern. Portions of the abdomen were not entirely included in the field of view. Bilateral lung infiltrates are similar to previous. Nasogastric tube terminates in mid stomach. Outstanding Order Results     Date and Time Order Name Status Description    11/15/2021  4:49 AM Culture, Blood 2 Preliminary     11/15/2021  4:28 AM Culture, Blood 1 Preliminary           Treatments: antibiotics: Per ID    Discharge Exam:  See progress note    Disposition: Patient   Patient Instructions:      Medication List      STOP taking these medications    amLODIPine 10 MG tablet  Commonly known as: NORVASC     Benicar 20 MG tablet  Generic drug: olmesartan     carvedilol 25 MG tablet  Commonly known as: Coreg        ASK your doctor about these medications    albuterol sulfate  (90 Base) MCG/ACT inhaler  Commonly known as: Proventil HFA  Inhale 2 puffs into the lungs every 4 hours as needed for Wheezing     ALPRAZolam 0.5 MG tablet  Commonly known as: XANAX     ascorbic acid 500 MG tablet  Commonly known as: VITAMIN C  Take 1 tablet by mouth daily     atorvastatin 40 MG tablet  Commonly known as: LIPITOR     Brovana 15 MCG/2ML Nebu  Generic drug: Arformoterol Tartrate     budesonide 0.5 MG/2ML nebulizer suspension  Commonly known as: PULMICORT  Take 2 mLs by nebulization 2 times daily     buPROPion 150 MG extended release tablet  Commonly known as: WELLBUTRIN XL     busPIRone 15 MG tablet  Commonly known as: BUSPAR     dexamethasone 6 MG tablet  Commonly known as: DECADRON  Take 1 tablet by mouth daily for 10 days  Ask about: Should I take this medication?      Eliquis 5 MG Tabs tablet  Generic drug: apixaban     Vitamin D3 25 MCG Tabs  Take 1 tablet by mouth daily     zinc sulfate 220 (50 Zn) MG capsule  Commonly known as: ZINCATE  Take 1 capsule by mouth daily           Signed:  Kem Lujan MD  11/18/2021  3:02 AM

## 2021-11-20 LAB
BLOOD CULTURE, ROUTINE: NORMAL
CULTURE, BLOOD 2: NORMAL

## 2023-03-13 NOTE — PROGRESS NOTES
Comprehensive Nutrition Assessment    Type and Reason for Visit:  Reassess    Nutrition Recommendations/Plan: Continue current EN as ordered as it continues to meet pts' needs. Will monitor/follow. Nutrition Assessment:  Pt remain w/ trach to vent & PEG for nutrition.  WIll continue current EN as ordered    Malnutrition Assessment:  Malnutrition Status:  No malnutrition    =    Estimated Daily Nutrient Needs:  Energy (kcal):  PS10: 1633-3491; Weight Used for Energy Requirements:  Admission     Protein (g):  1.5-2.0= 110-140; Weight Used for Protein Requirements:  Ideal        Fluid (ml/day):  2100ml; Method Used for Fluid Requirements:  1 ml/kcal      Nutrition Related Findings:  -I/os, trach to vent, PEG LUQ, diarrhea w/ FMS, soft abd ,active bs, +1 generalized edema      Wounds:  Multiple, Surgical Incision       Current Nutrition Therapies:    Current Tube Feeding (TF) Orders:  · Feeding Route: PEG  · Formula: 1.5 Diabetic  Current TF & Flush Orders Provides: 2242 kcals 123 gm protien, 1134 ml free water  · Goal TF & Flush Orders Provides: @ goal      Anthropometric Measures:  · Height: 5' 8\" (172.7 cm)  · Current Body Weight: 290 lb (131.5 kg)(will use admit wt 2/2 fluids w/ noted 335# 12/2)   · Admission Body Weight: 334 lb (151.5 kg)(10/29 actual)    · Usual Body Weight: 318 lb (144.2 kg)(12/2019 per EMR, no method)     · Ideal Body Weight: 154 lbs; % Ideal Body Weight 188.3 %   · BMI: 44.1   · BMI Categories: Obese Class 3 (BMI 40.0 or greater)(noted wt loss since admit, current fluids - at this time)       Nutrition Diagnosis:   · Inadequate oral intake related to impaired respiratory function as evidenced by NPO or clear liquid status due to medical condition, intubation, nutrition support - enteral nutrition      Nutrition Interventions:   Food and/or Nutrient Delivery:  Continue NPO, Continue Current Tube Feeding  Nutrition Education/Counseling:  No recommendation at this time   Coordination of Doxycycline Counseling:  Patient counseled regarding possible photosensitivity and increased risk for sunburn.  Patient instructed to avoid sunlight, if possible.  When exposed to sunlight, patients should wear protective clothing, sunglasses, and sunscreen.  The patient was instructed to call the office immediately if the following severe adverse effects occur:  hearing changes, easy bruising/bleeding, severe headache, or vision changes.  The patient verbalized understanding of the proper use and possible adverse effects of doxycycline.  All of the patient's questions and concerns were addressed.

## 2023-10-15 NOTE — PLAN OF CARE
Problem: Falls - Risk of:  Goal: Will remain free from falls  Description: Will remain free from falls  Outcome: Met This Shift     Problem: Falls - Risk of:  Goal: Absence of physical injury  Description: Absence of physical injury  Outcome: Met This Shift     Problem: Pain:  Goal: Pain level will decrease  Description: Pain level will decrease  Outcome: Met This Shift Improved

## 2024-02-08 NOTE — PROGRESS NOTES
-- DO NOT REPLY / DO NOT REPLY ALL --  -- Message is from [facility] ([facility]) --    General Patient Message: Patient returning a call regarding lab orders. Patient is wanting to know when do she need to have these orders done and what order is it for. Please call patient or send message via [employer] [facility].      Caller Information         Type Contact Phone/Fax    02/08/2024 10:40 AM CST Phone (Incoming) Carmelita Valentino (Self) [phone] (M)          Alternative phone number: n/a    Can a detailed message be left? Yes    Message Turnaround:     Is it Working Hours? Yes - Working Hours                      [facility]       PROGRESS NOTE         C/C : Pneumonia, respiratory failure       Pt is ventilated s/p trached   Afebrile     Current Facility-Administered Medications   Medication Dose Route Frequency Provider Last Rate Last Dose    dexmedetomidine (PRECEDEX) 1,000 mcg in sodium chloride 0.9 % 250 mL infusion  0.2 mcg/kg/hr Intravenous Continuous Josescott Collado, DO 39.5 mL/hr at 11/14/20 0457 1.2 mcg/kg/hr at 11/14/20 0457    lidocaine PF 1 % injection 5 mL  5 mL Intradermal Once [facility] DO        sodium chloride flush 0.9 % injection 10 mL  10 mL Intravenous PRN [facility] DO        heparin flush 100 UNIT/ML injection 300 Units  3 mL Intravenous 2 times per day [facility] DO        heparin flush 100 UNIT/ML injection 300 Units  3 mL Intracatheter PRN [facility] DO        chlordiazePOXIDE (LIBRIUM) capsule 25 mg  25 mg Oral Q8H [facility]., DO   25 mg at 11/13/20 2358    QUEtiapine (SEROQUEL) tablet 100 mg  100 mg Oral BID [facility] Amina., DO   100 mg at 11/14/20 0053    senna (SENOKOT) tablet 17.2 mg  2 tablet Oral Nightly Ashley L Cespedes, DO   Stopped at 11/13/20 2055    polyethylene glycol (GLYCOLAX) packet 17 g  17 g Oral Daily PRN Ashley L Cespedes, DO        fentaNYL 5 mcg/ml in 0.9%  ml infusion  25 mcg/hr Intravenous Continuous Debbi Meek MD 40 mL/hr at 11/14/20 0214 200 mcg/hr at 11/14/20 0214    perflutren lipid microspheres (DEFINITY) injection 1.65 mg  1.5 mL Intravenous ONCE PRN Herlene Polo Jr., DO        insulin lispro (HUMALOG) injection vial 0-18 Units  0-18 Units Subcutaneous Q4H [facility] DO   3 Units at 11/14/20 0451    acetaminophen (TYLENOL) 160 MG/5ML solution 650 mg  650 mg Oral Q6H PRN Herlene Polo Jr., DO        piperacillin-tazobactam (ZOSYN) 3.375 g in dextrose 5 % 100 mL IVPB extended infusion (mini-bag)  3.375 g Intravenous Q8H Erna Estrella MD 25 mL/hr at 11/14/20 0630 3.375 g at 11/14/20 0630    Zosyn Flush (0.9 % sodium chloride infusion)   Intravenous Q8H Xander Brody MD   Stopped at 11/14/20 0400    carvedilol (COREG) tablet 3.125 mg  3.125 mg Oral BID [facility] Noa Pepper MD   3.125 mg at 30/71/24 6874    folic acid injection 1 mg  1 mg Intravenous Daily Zaynab Arcos MD   1 mg at 11/13/20 1012    thiamine (B-1) 100 mg in sodium chloride 0.9 % 100 mL IVPB  100 mg Intravenous Q24H Zaynab Arcos MD   Stopped at 11/13/20 1623    polyvinyl alcohol (LIQUIFILM TEARS) 1.4 % ophthalmic solution 1 drop  1 drop Both Eyes Q4H PRN Khalida Gibson MD   1 drop at 11/02/20 1526    [Held by provider] insulin glargine (LANTUS) injection vial 20 Units  20 Units Subcutaneous Nightly Js Damian MD   Stopped at 11/12/20 2100    sodium chloride (Inhalant) 3 % nebulizer solution 2 mL  2 mL Nebulization Q4H Farrah Arredondo MD   2 mL at 11/14/20 0314    hydrALAZINE (APRESOLINE) injection 10 mg  10 mg Intravenous Q4H PRN Kelvin Hill MD   10 mg at 11/14/20 0337    heparin (porcine) injection 7,500 Units  7,500 Units Subcutaneous Q8H Farrah Arredondo MD   7,500 Units at 11/14/20 0018    pantoprazole (PROTONIX) injection 40 mg  40 mg Intravenous Daily Ayanna Ray MD   40 mg at 11/13/20 0845    And    sodium chloride (PF) 0.9 % injection 10 mL  10 mL Intravenous Daily Ayanna Ray MD   10 mL at 11/13/20 0846    sodium chloride flush 0.9 % injection 10 mL  10 mL Intravenous 2 times per day Terrance Murray MD   10 mL at 11/13/20 2055    sodium chloride flush 0.9 % injection 10 mL  10 mL Intravenous PRN Terrance Murray MD   10 mL at 11/12/20 0903    acetaminophen (TYLENOL) suppository 650 mg  650 mg Rectal Q6H PRN Terrance Murray MD        Arformoterol Tartrate (BROVANA) nebulizer solution 15 mcg  15 mcg Nebulization BID Terrance Murray MD   15 mcg at 11/13/20 2050    chlorhexidine (PERIDEX) 0.12 % solution 15 mL  15 mL Mouth/Throat BID Js Damian MD   15 mL at 20    amLODIPine (NORVASC) tablet 10 mg  10 mg Oral Daily Pat Sierra MD   10 mg at 20 0845    aspirin chewable tablet 81 mg  81 mg Oral Daily Pat Sierra MD   81 mg at 20 0845    colchicine (COLCRYS) tablet 0.6 mg  0.6 mg Oral BID PRN Pat Sierra MD   0.6 mg at 20 5695    levothyroxine (SYNTHROID) tablet 50 mcg  50 mcg Oral Daily Pat Sierra MD   50 mcg at 20 0618    [Held by provider] losartan (COZAAR) tablet 100 mg  100 mg Oral Daily Pat Sierra MD   100 mg at 10/28/20 0849    budesonide (PULMICORT) nebulizer suspension 500 mcg  0.5 mg Nebulization BID Pat Sierra MD   500 mcg at 20    ipratropium-albuterol (DUONEB) nebulizer solution 1 ampule  1 ampule Inhalation Q4H [facility] Pat Sierra MD   1 ampule at 20    glucose (GLUTOSE) 40 % oral gel 15 g  15 g Oral PRN Pat Sierra MD        dextrose 50 % IV solution  12.5 g Intravenous PRN Pat Sierra MD        glucagon (rDNA) injection 1 mg  1 mg Intramuscular PRN Pat Sierra MD        dextrose 5 % solution  100 mL/hr Intravenous PRN Pat Sierra MD               Review of systems: could not be obtained     OBJECTIVE:  /65   Pulse 65   Temp 98.7 °F (37.1 °C) (Axillary)   Resp 28   Ht 5' 8\" (1.727 m)   Wt 288 lb 3.2 oz (130.7 kg)   SpO2 96%   BMI 43.82 kg/m²   Temp  Av.2 °F (37.3 °C)  Min: 98.7 °F (37.1 °C)  Max: 99.9 °F (37.7 °C)     Constitutional: Ventilated  and sedated   Skin: Warm and dry. No rashes were noted. HEENT: no pallor, pupils reacting to light . Chest: Bilateral rhonchi   Cardiovascular: Regular . Systolic murmurs appreciated.    Abdomen: Hyperactive bowel sounds, obese abdomen, unable to palpate any masses PEG  Extremities: No clubbing, no cyanosis, + Lymphedema left lower extremity, improving   Left IJ     Laboratory and Tests Review:  Lab Results   Component Value Date    WBC 9.1 2020    WBC 8.6 2020    WBC 9.1 2020 HGB 10.2 (L) 11/14/2020    HCT 30.7 (L) 11/14/2020    MCV 80.6 11/14/2020     11/14/2020     Lab Results   Component Value Date    NEUTROABS 5.81 11/14/2020    NEUTROABS 4.47 11/13/2020    NEUTROABS 6.92 11/12/2020     No results found for: CRPHS  Lab Results   Component Value Date     (H) 11/14/2020    AST 45 (H) 11/14/2020    ALKPHOS 140 (H) 11/14/2020    BILITOT 0.5 11/14/2020     Lab Results   Component Value Date     11/14/2020    K 3.4 11/14/2020    K 4.0 10/27/2020     11/14/2020    CO2 18 11/14/2020    BUN 23 11/14/2020    CREATININE 1.4 11/14/2020    CREATININE 1.3 11/13/2020    CREATININE 1.5 11/12/2020    GFRAA >60 11/14/2020    LABGLOM >60 11/14/2020    GLUCOSE 178 11/14/2020    PROT 6.8 11/14/2020    LABALBU 2.9 11/14/2020    CALCIUM 9.0 11/14/2020    BILITOT 0.5 11/14/2020    ALKPHOS 140 11/14/2020    AST 45 11/14/2020     11/14/2020     Lab Results   Component Value Date    CRP 1.4 (H) 11/02/2020    CRP 2.8 (H) 09/04/2017    CRP 10.5 (H) 08/28/2017     Lab Results   Component Value Date    SEDRATE 135 (H) 09/04/2017    SEDRATE 150 (H) 08/28/2017    SEDRATE 141 (H) 08/21/2017     Radiology:'    Chest x ray - patchy bilateral infiltrates     Microbiology:     Blood cx - CONS   Sputum cx - Neg       Assessment:  · Acute on chronic hypoxic respiratory failure s/p tracheostomy   · Bacterial pneumonia, likely aspiration pneumonia but after extubation patient had to be reintubated rule out HCAP  · Shock, sepsis- resolved, blood cultures negative   · Alcohol withdrawal   · CONS bacteremia   ·   Plan:    ·  Zosyn 3.375 grams IV q 8 hrs   · TMonitor labs,  · Will follow with you    Quinton Kulkarni  8:24 AM  11/14/2020

## 2025-03-31 NOTE — PROGRESS NOTES
Attempted to reach patient brother, Mario Alberto, to reschedule appointment for patient on 5/21/25. Patients brother stated he was still unavailable to schedule and handed phone to his wife. Writer provided wife with phone number for clinic to call back and schedule.    PT SEEN AND EXAMINED. intubated, in pic. BP better. More alert. S/p peg/trach, some bleeding around trach  Chart reviewed. meds reviewed. D/w nursing + family as available. EXAM: IN GENERAL, NAD. Charanjit Snare ROS NEGx10 EXCEPT:   BP (!) 172/79   Pulse 93   Temp 98.2 °F (36.8 °C) (Temporal)   Resp 20   Ht 5' 8\" (1.727 m)   Wt (!) 302 lb 6.4 oz (137.2 kg)   SpO2 95%   BMI 45.98 kg/m²   GEN:  NAD. HEENT: NCAT. NECK: NO JVD. TRACH MIDLINE. NO BRUITS. NO THYROMEGALY. LUNGS: CTA BL NO RALES, RHONCHI OR WHEEZES. GOOD EXCURSION. CV: Regular rate and rhythm, NO Murmurs, Rubs, Or gallops  ABD: Soft. Nontender. Normal bowel sounds.  No organomegaly  EXT:No clubbing cyanosis or edema  Neuro: Labs/data reviewedLABS: CBC with Differential:    Lab Results   Component Value Date    WBC 9.9 11/19/2020    RBC 3.60 11/19/2020    HGB 9.5 11/19/2020    HCT 28.7 11/19/2020     11/19/2020    MCV 79.7 11/19/2020    MCH 26.4 11/19/2020    MCHC 33.1 11/19/2020    RDW 19.3 11/19/2020    NRBC 0.9 11/16/2020    SEGSPCT 73 01/26/2014    METASPCT 1.8 11/15/2020    LYMPHOPCT 18.4 11/19/2020    MONOPCT 9.5 11/19/2020    MYELOPCT 0.9 11/15/2020    BASOPCT 0.5 11/19/2020    MONOSABS 0.94 11/19/2020    LYMPHSABS 1.81 11/19/2020    EOSABS 0.64 11/19/2020    BASOSABS 0.05 11/19/2020     Platelets:    Lab Results   Component Value Date     11/19/2020     CMP:    Lab Results   Component Value Date     11/19/2020    K 3.6 11/19/2020    K 4.0 10/27/2020     11/19/2020    CO2 23 11/19/2020    BUN 18 11/19/2020    CREATININE 1.1 11/19/2020    GFRAA >60 11/19/2020    LABGLOM >60 11/19/2020    GLUCOSE 125 11/19/2020    PROT 7.8 11/19/2020    LABALBU 3.6 11/19/2020    CALCIUM 10.2 11/19/2020    BILITOT 0.5 11/19/2020    ALKPHOS 118 11/19/2020    AST 43 11/19/2020     11/19/2020     Magnesium:    Lab Results   Component Value Date    MG 1.7 11/18/2020     LDH:    Lab Results   Component Value Date     10/28/2020 PT/INR:    Lab Results   Component Value Date    PROTIME 14.0 11/18/2020    INR 1.2 11/18/2020     Last 3 Troponin:    Lab Results   Component Value Date    TROPONINI <0.01 10/26/2020    TROPONINI <0.01 05/26/2019    TROPONINI <0.01 05/26/2019     ABG:    Lab Results   Component Value Date    PH 7.396 11/19/2020    PCO2 39.0 11/19/2020    PO2 80.2 11/19/2020    HCO3 23.4 11/19/2020    BE -1.3 11/19/2020    O2SAT 95.0 11/19/2020     IRON:    Lab Results   Component Value Date    IRON 95 11/05/2020     IMAGING    Xr Chest Portable    Result Date: 10/27/2020  EXAMINATION: ONE XRAY VIEW OF THE CHEST 10/27/2020 7:58 pm COMPARISON: October 26, 2020 HISTORY: ORDERING SYSTEM PROVIDED HISTORY: SOB TECHNOLOGIST PROVIDED HISTORY: Reason for exam:->SOB What reading provider will be dictating this exam?->CRC FINDINGS: There is mild cardiomegaly. There is patchy perihilar and bibasilar atelectasis/infiltrates. Tiny pleural effusions are suspected. Progressive bibasilar atelectasis/infiltrates concerning for pneumonia. Underlying CHF is considered. Xr Chest Portable    Result Date: 10/26/2020  EXAMINATION: ONE XRAY VIEW OF THE CHEST 10/26/2020 4:11 pm COMPARISON: 05/26/2019 HISTORY: ORDERING SYSTEM PROVIDED HISTORY: SOB TECHNOLOGIST PROVIDED HISTORY: Reason for exam:->SOB What reading provider will be dictating this exam?->CRC FINDINGS: Cardiac size appears stable. Discoid airspace disease seen at the left lung base which may represent atelectasis or scarring. Right infrahilar and basilar infiltrate is identified. No pneumothorax is identified. No acute osseous abnormality is identified. Right infrahilar and basilar infiltrate concerning for pneumonia. Left basilar atelectasis or scarring.        Medications:    Scheduled Meds:   piperacillin-tazobactam  3.375 g Intravenous Q8H    sodium chloride  25 mL Intravenous Q8H    chlordiazePOXIDE  25 mg Oral Q12H    QUEtiapine  50 mg Oral BID    thiamine (VITAMIN B1) IVPB  250 mg Intravenous Q24H    divalproex  250 mg Oral 3 times per day    heparin flush  3 mL Intravenous 2 times per day    senna  2 tablet Oral Nightly    insulin lispro  0-18 Units Subcutaneous Q4H    carvedilol  3.125 mg Oral BID WC    folic acid  1 mg Intravenous Daily    [Held by provider] insulin glargine  20 Units Subcutaneous Nightly    sodium chloride (Inhalant)  2 mL Nebulization Q4H    heparin (porcine)  7,500 Units Subcutaneous Q8H    pantoprazole  40 mg Intravenous Daily    And    sodium chloride (PF)  10 mL Intravenous Daily    sodium chloride flush  10 mL Intravenous 2 times per day    Arformoterol Tartrate  15 mcg Nebulization BID    chlorhexidine  15 mL Mouth/Throat BID    amLODIPine  10 mg Oral Daily    [Held by provider] aspirin  81 mg Oral Daily    levothyroxine  50 mcg Oral Daily    [Held by provider] losartan  100 mg Oral Daily    budesonide  0.5 mg Nebulization BID    ipratropium-albuterol  1 ampule Inhalation Q4H [facility]       Continuous Infusions:   dexmedetomidine (PRECEDEX) IV infusion Stopped (11/15/20 1534)    fentaNYL 5 mcg/ml in 0.9%  ml infusion Stopped (11/15/20 1534)    dextrose         PRN Meds:trimethobenzamide, labetalol, midazolam, sodium chloride flush, heparin flush, polyethylene glycol, perflutren lipid microspheres, acetaminophen, polyvinyl alcohol, hydrALAZINE, sodium chloride flush, [DISCONTINUED] acetaminophen **OR** acetaminophen, colchicine, glucose, dextrose, glucagon (rDNA), dextrose    A/P:      Patient Active Problem List   Diagnosis    Hyperlipidemia    Type 2 diabetes mellitus without complication, without long-term current use of insulin (HCC)    Atypical chest pain    Essential hypertension    Chronic acquired lymphedema    Aortic valve disease    Morbid obesity due to excess calories (HCC)    Abdominal pain    Acute respiratory failure with hypoxia (HCC)    Acute pulmonary edema (HCC)    Congestive heart failure with LV diastolic dysfunction, NYHA class 3 (HCC)    Obstructive sleep apnea    Acquired hypothyroidism    Chronic diastolic heart failure (HCC)    Nonrheumatic aortic valve stenosis    ACE-inhibitor cough    Pneumonia due to organism    Acute gout of right knee   Acute encephalopathy 2/2 Acute hypoxic hypercapnic respiratory failure.         Acute hypoxic hypercapnic respiratory failure 2/2 MSSA pneumonia versus COPD versus ADHF versus chronic OHS  Pt has Hx chronic HANS not on CPAP at home. ABG showed pH 7.188/91.2/78.9/33 on RRT. currently intubated. - Continue to monitor respiratory status, wean down FiO2 as tolerated. - Pro  on presentation.   - US dup LE negative for DVT. CTA negative for PE. COVID -19 negative x2  - Continue breathing treatment  - Monitor daily CXR. CBC.   - Pulmonology consulted, further recommendations appreciated.     NANCY stage III likely pre renal 2/2 diuretic use, contrast agent vs? cardiorenal syndrome. -   - Nephrology consulted. Further recommendations appreciated. - Continue monitor daily BMP, renal function. Monitor I/O. Avoid nephrotoxicity. · Bacterial pneumonia, likely aspiration pneumonia vs MSSA   · Shock, sepsis- resolved   · ATBx per id  Alcohol withdrawal .    Hx HFpEF, EF 65%  cta noted  prob needs ltac  Wean per pulm/ccm  S/p Peg/trach PER ENT  Monitor HR    Cont restraints for pt safety  Wean librium- q12 hr x 3 days then qd x 3 days then off  Change seroquel to 50 mg bid  I called [employer] for peer to peer- they denied. I dw Dr Geoff Quevedo- as director of [employer]- she will try again.  Try to get him out of restraints as that was  A sticking point for [employer],

## (undated) DEVICE — TOWEL,OR,DSP,ST,BLUE,STD,6/PK,12PK/CS: Brand: MEDLINE

## (undated) DEVICE — SPONGE,LAP,4"X18",XR,ST,5/PK,40PK/CS: Brand: MEDLINE INDUSTRIES, INC.

## (undated) DEVICE — SET INST MINOR PROCEDURE

## (undated) DEVICE — STANDARD HYPODERMIC NEEDLE,ALUMINUM HUB: Brand: MONOJECT

## (undated) DEVICE — RETRACTOR WEITLANER STR

## (undated) DEVICE — Device

## (undated) DEVICE — GLOVE ORANGE PI 8 1/2   MSG9085

## (undated) DEVICE — TUBING SUCT 12FR MAL ALUM SHFT FN CAP VENT UNIV CONN W/ OBT

## (undated) DEVICE — MAGNETIC INSTR DRAPE 20X16: Brand: MEDLINE INDUSTRIES, INC.

## (undated) DEVICE — TUBE TRACH AD L95MM OD11MM ID6MM PROX EXTN CUFLSS HI VOL LO

## (undated) DEVICE — SURGICAL PROCEDURE PACK BASIC

## (undated) DEVICE — CONTROL SYRINGE LUER-LOCK TIP: Brand: MONOJECT

## (undated) DEVICE — TUBE TRACH AD L105MM OD13.3MM ID8MM PROX EXTN CUF HI VOL LO

## (undated) DEVICE — MARKER,SKIN,WI/RULER AND LABELS: Brand: MEDLINE

## (undated) DEVICE — GLOVE SURG SZ 75 STD WHT LTX SYN POLYMER BEAD REINF ANTI RL

## (undated) DEVICE — GOWN,PREVENTION PLUS,L,ST,24/CS: Brand: MEDLINE

## (undated) DEVICE — APPLICATOR MEDICATED 26 CC SOLUTION HI LT ORNG CHLORAPREP

## (undated) DEVICE — SPONGE,DRAIN,NONWVN,4"X4",6PLY,STRL,LF: Brand: MEDLINE

## (undated) DEVICE — ELECTRODE PT RET AD L9FT HI MOIST COND ADH HYDRGEL CORDED

## (undated) DEVICE — PACK PROCEDURE SURG GEN CUST

## (undated) DEVICE — SET INSTR TRACH

## (undated) DEVICE — YANKAUER,OPEN TIP,W/O VENT,STERILE: Brand: MEDLINE INDUSTRIES, INC.

## (undated) DEVICE — INTENDED FOR TISSUE SEPARATION, AND OTHER PROCEDURES THAT REQUIRE A SHARP SURGICAL BLADE TO PUNCTURE OR CUT.: Brand: BARD-PARKER ® STAINLESS STEEL BLADES

## (undated) DEVICE — E-Z CLEAN, NON-STICK, PTFE COATED, ELECTROSURGICAL BLADE ELECTRODE, MODIFIED EXTENDED INSULATION, 2.5 INCH (6.35 CM): Brand: MEGADYNE

## (undated) DEVICE — HOLDER TRACH TB W1IN FIT UPTO 19.5IN NK 2 PC ADJ BLU

## (undated) DEVICE — DRESSING FOAM W3XL3IN POLYUR HYDRPHLC N ADH FEN PD OPTIFOAM

## (undated) DEVICE — 1.5L THIN WALL CAN: Brand: CRD

## (undated) DEVICE — CATHETER ETER SUCT 14FR RED POLYPR STR OPN W VLV

## (undated) DEVICE — KIT SURG PREP POVIDONE IOD PRESATURATED PAINT WET FOR UNIV

## (undated) DEVICE — GAUZE,SPONGE,POST-OP,4X3,STRL,LF: Brand: MEDLINE

## (undated) DEVICE — CORD,CAUTERY,BIPOLAR,STERILE: Brand: MEDLINE

## (undated) DEVICE — SYRINGE 20ML LL S/C 50

## (undated) DEVICE — JELLY,LUBE,STERILE,FLIP TOP,TUBE,4-OZ: Brand: MEDLINE

## (undated) DEVICE — DEFENDO AIR WATER SUCTION AND BIOPSY VALVE KIT FOR  OLYMPUS: Brand: DEFENDO AIR/WATER/SUCTION AND BIOPSY VALVE

## (undated) DEVICE — BITEBLOCK 54FR W/ DENT RIM BLOX

## (undated) DEVICE — AGENT HEMSTAT W2XL3IN OXIDIZED REGENERATED CELOS ABSRB

## (undated) DEVICE — SPONGE,PEANUT,XRAY,ST,SM,3/8",5/CARD: Brand: MEDLINE INDUSTRIES, INC.

## (undated) DEVICE — Z DUP USE 2612868 DRESSING FOAM W3.5XL3.5IN PNK HYDROCELLULAR NONADHESIVE KEY

## (undated) DEVICE — GOWN,SIRUS,FABRNF,XL,20/CS: Brand: MEDLINE

## (undated) DEVICE — BLADE ES ELASTOMERIC COAT INSUL DURABLE BEND UPTO 90DEG

## (undated) DEVICE — COVER,LIGHT HANDLE,FLX,2/PK: Brand: MEDLINE INDUSTRIES, INC.

## (undated) DEVICE — GARMENT,MEDLINE,DVT,INT,CALF,LG, GEN2: Brand: MEDLINE

## (undated) DEVICE — DOUBLE BASIN SET: Brand: MEDLINE INDUSTRIES, INC.

## (undated) DEVICE — 4-PORT MANIFOLD: Brand: NEPTUNE 2

## (undated) DEVICE — PATIENT RETURN ELECTRODE, SINGLE-USE, CONTACT QUALITY MONITORING, ADULT, WITH 9FT CORD, FOR PATIENTS WEIGING OVER 33LBS. (15KG): Brand: MEGADYNE

## (undated) DEVICE — SURGICAL PROCEDURE PACK EENT CUST

## (undated) DEVICE — SYRINGE MED 10ML TRNSLUC BRL PLUNG BLK MRK POLYPR CTRL

## (undated) DEVICE — SOLUTION IV IRRIG POUR BRL 0.9% SODIUM CHL 2F7124